# Patient Record
Sex: MALE | Race: BLACK OR AFRICAN AMERICAN | Employment: OTHER | URBAN - METROPOLITAN AREA
[De-identification: names, ages, dates, MRNs, and addresses within clinical notes are randomized per-mention and may not be internally consistent; named-entity substitution may affect disease eponyms.]

---

## 2017-01-04 ENCOUNTER — ANESTHESIA EVENT (OUTPATIENT)
Dept: ENDOSCOPY | Age: 77
End: 2017-01-04
Payer: MEDICARE

## 2017-01-05 ENCOUNTER — ANESTHESIA (OUTPATIENT)
Dept: ENDOSCOPY | Age: 77
End: 2017-01-05
Payer: MEDICARE

## 2017-01-05 ENCOUNTER — SURGERY (OUTPATIENT)
Age: 77
End: 2017-01-05

## 2017-01-05 PROCEDURE — 74011250636 HC RX REV CODE- 250/636

## 2017-01-05 PROCEDURE — 74011000250 HC RX REV CODE- 250

## 2017-01-05 RX ORDER — PROPOFOL 10 MG/ML
INJECTION, EMULSION INTRAVENOUS AS NEEDED
Status: DISCONTINUED | OUTPATIENT
Start: 2017-01-05 | End: 2017-01-05 | Stop reason: HOSPADM

## 2017-01-05 RX ORDER — HYDRALAZINE HYDROCHLORIDE 20 MG/ML
INJECTION INTRAMUSCULAR; INTRAVENOUS AS NEEDED
Status: DISCONTINUED | OUTPATIENT
Start: 2017-01-05 | End: 2017-01-05 | Stop reason: HOSPADM

## 2017-01-05 RX ADMIN — PROPOFOL 20 MG: 10 INJECTION, EMULSION INTRAVENOUS at 09:02

## 2017-01-05 RX ADMIN — HYDRALAZINE HYDROCHLORIDE 15 MG: 20 INJECTION INTRAMUSCULAR; INTRAVENOUS at 08:57

## 2017-01-05 RX ADMIN — PROPOFOL 50 MG: 10 INJECTION, EMULSION INTRAVENOUS at 08:58

## 2017-01-05 RX ADMIN — PROPOFOL 30 MG: 10 INJECTION, EMULSION INTRAVENOUS at 08:59

## 2017-01-05 RX ADMIN — PROPOFOL 10 MG: 10 INJECTION, EMULSION INTRAVENOUS at 09:05

## 2017-01-05 NOTE — ANESTHESIA POSTPROCEDURE EVALUATION
Post-Anesthesia Evaluation and Assessment    Patient: Jake Gil MRN: [id number]  SSN: [id number]    YOB: 1940  Age: 68 y.o. Sex: male       Cardiovascular Function/Vital Signs  Visit Vitals    BP (!) 114/95    Pulse 60    Temp 36.4 °C (97.5 °F)    Resp 18    Ht 5' 8\" (1.727 m)    Wt 103.9 kg (229 lb 2 oz)    SpO2 100%    BMI 34.84 kg/m2       Patient is status post MAC anesthesia for Procedure(s):  ENDOSCOPY w/ biopsies. Nausea/Vomiting: None    Postoperative hydration reviewed and adequate. Pain:  Pain Scale 1: Numeric (0 - 10) (01/05/17 0915)  Pain Intensity 1: 0 (01/05/17 0915)   Managed    Neurological Status:   Neuro (WDL): Within Defined Limits (01/05/17 0915)   At baseline    Mental Status and Level of Consciousness: Arousable    Pulmonary Status:   O2 Device: Room air (01/05/17 0915)   Adequate oxygenation and airway patent    Complications related to anesthesia: None    Post-anesthesia assessment completed.  No concerns    Signed By: Luzma Green MD     January 5, 2017

## 2017-01-05 NOTE — ANESTHESIA PREPROCEDURE EVALUATION
Anesthetic History   No history of anesthetic complications            Review of Systems / Medical History  Patient summary reviewed    Pulmonary  Within defined limits      Sleep apnea           Neuro/Psych   Within defined limits           Cardiovascular  Within defined limits  Hypertension: poorly controlled          CAD    Exercise tolerance: <4 METS     GI/Hepatic/Renal     GERD           Endo/Other  Within defined limits  Diabetes    Morbid obesity     Other Findings   Comments: FLU SHOT received? NO       If NO, provide reason: Pt did not want a Flu shot    Current Smoker? NO       Elective Surgery? Yes       Abstained from smoking 24 hours prior to anesthesia? N/A    Risk Factors for Postoperative nausea/vomiting:       History of postoperative nausea/vomiting? NO       Female? NO       Motion sickness? NO       Intended opioid administration for postoperative analgesia?   NO           Physical Exam    Airway  Mallampati: II  TM Distance: 4 - 6 cm  Neck ROM: normal range of motion   Mouth opening: Normal     Cardiovascular    Rhythm: regular  Rate: normal         Dental    Dentition: Edentulous     Pulmonary  Breath sounds clear to auscultation               Abdominal  GI exam deferred       Other Findings            Anesthetic Plan    ASA: 3  Anesthesia type: MAC          Induction: Intravenous  Anesthetic plan and risks discussed with: Patient

## 2017-01-25 ENCOUNTER — TELEPHONE (OUTPATIENT)
Dept: CARDIOLOGY CLINIC | Age: 77
End: 2017-01-25

## 2017-01-25 NOTE — TELEPHONE ENCOUNTER
Dr. Ricardo Aragon office stated they received a letter regarding patient omeprazole and plavix. The office would like to know if you would like for them to change medication. Please advise.

## 2017-01-26 NOTE — TELEPHONE ENCOUNTER
Called and left message with Pedro Velez at Dr. Mike Morales office that Dr. Jasiel Billings states patient needs plavix and please change omeprazole to protonix. If any questions to call the office.

## 2017-03-14 ENCOUNTER — OFFICE VISIT (OUTPATIENT)
Dept: CARDIOLOGY CLINIC | Age: 77
End: 2017-03-14

## 2017-03-14 VITALS
HEIGHT: 68 IN | SYSTOLIC BLOOD PRESSURE: 146 MMHG | HEART RATE: 55 BPM | BODY MASS INDEX: 34.71 KG/M2 | DIASTOLIC BLOOD PRESSURE: 54 MMHG | WEIGHT: 229 LBS

## 2017-03-14 DIAGNOSIS — I10 ESSENTIAL HYPERTENSION: ICD-10-CM

## 2017-03-14 DIAGNOSIS — I25.10 ATHEROSCLEROSIS OF NATIVE CORONARY ARTERY OF NATIVE HEART WITHOUT ANGINA PECTORIS: Primary | ICD-10-CM

## 2017-03-14 DIAGNOSIS — I50.32 CHRONIC DIASTOLIC HEART FAILURE (HCC): ICD-10-CM

## 2017-03-14 DIAGNOSIS — E78.2 MIXED HYPERLIPIDEMIA: ICD-10-CM

## 2017-03-14 DIAGNOSIS — Z95.1 POSTSURGICAL AORTOCORONARY BYPASS STATUS: ICD-10-CM

## 2017-03-14 NOTE — PROGRESS NOTES
HISTORY OF PRESENT ILLNESS  Rory Carter is a 68 y.o. male. HPI Comments: Patient with cad,chf,post cabg and pci  . recent admission with chf,acs,non q mi  Had vg to dia pci        CHF   The history is provided by the patient. This is a chronic problem. The problem occurs constantly. The problem has been gradually improving. Associated symptoms include shortness of breath. Pertinent negatives include no chest pain, no abdominal pain and no headaches. The symptoms are aggravated by exertion. Hypertension   The history is provided by the patient. This is a chronic problem. The problem occurs constantly. Associated symptoms include shortness of breath. Pertinent negatives include no chest pain, no abdominal pain and no headaches. Chest Pain (Angina)    The history is provided by the patient. This is a recurrent problem. The problem has been resolved. Episode Length: like a flash. The pain is present in the left side. Associated symptoms include shortness of breath. Pertinent negatives include no abdominal pain, no claudication, no cough, no dizziness, no fever, no headaches, no hemoptysis, no malaise/fatigue, no nausea, no orthopnea, no palpitations, no PND, no sputum production, no vomiting and no weakness. Shortness of Breath   The history is provided by the patient. This is a recurrent problem. The problem occurs intermittently. The current episode started more than 1 week ago. Pertinent negatives include no fever, no headaches, no cough, no sputum production, no hemoptysis, no wheezing, no PND, no orthopnea, no chest pain, no vomiting, no abdominal pain, no rash, no leg swelling and no claudication. The problem's precipitants include exercise (>30 mts of treadmill). Review of Systems   Constitutional: Negative for chills, fever and malaise/fatigue. HENT: Negative for nosebleeds. Eyes: Negative for blurred vision and double vision. Respiratory: Positive for shortness of breath.  Negative for cough, hemoptysis, sputum production and wheezing. Cardiovascular: Negative for chest pain, palpitations, orthopnea, claudication, leg swelling and PND. Gastrointestinal: Negative for abdominal pain, heartburn, nausea and vomiting. Musculoskeletal: Negative for myalgias. Skin: Negative for rash. Neurological: Negative for dizziness, weakness and headaches. Endo/Heme/Allergies: Does not bruise/bleed easily. Family History   Problem Relation Age of Onset    Heart Attack Father 64       Past Medical History:   Diagnosis Date    Atherosclerosis of renal artery (HCC)     S/P Rt stent    CAD (coronary artery disease) , 1997, 2012    ,double bypass, 2 stents, 2stents 7/2016    Cancer ([facility] [address]     prostate    CHF (congestive heart failure) (HCC)     Chronic diastolic heart failure ([facility])     Constipation     Coronary atherosclerosis of unspecified type of vessel, native or graft     Stable angina after recent PCI continue treatment.  CRI (chronic renal insufficiency)     Diabetes ([facility] [address] [facility] 1973    type 2    Essential hypertension, benign     Hypertension 1982    Morbid obesity ([facility] [address]     Weight loss has been strongly encouraged by following dietary restrictions and an exercise routine    APRYL (obstructive sleep apnea) 6/26/2015    non compliance to cpap     Other and unspecified hyperlipidemia     HDL's are not at goal, LDL's are at goal, triglycerides are not at goal.    Other and unspecified hyperlipidemia     HDL's are not at goal, LDL's are at goal, triglycerides are not at goal.     Other ill-defined conditions(799.89) 1960    pneumonia twice    Sleep disturbance     Type II or unspecified type diabetes mellitus without mention of complication, not stated as uncontrolled        Past Surgical History:   Procedure Laterality Date   [address]    lt. carotid endart.  HX CHOLECYSTECTOMY      HX HEENT  1997    cholecystectomy    HX UROLOGICAL  1998    renal art. surg       Allergies   Allergen Reactions    Nka [No Known Allergies] Other (comments)       Current Outpatient Prescriptions   Medication Sig    tamsulosin (FLOMAX) 0.4 mg capsule TAKE ONE CAPSULE BY MOUTH ONCE A DAY    Cholecalciferol, Vitamin D3, (DECARA) 50,000 unit cap Take  by mouth every seven (7) days.  isosorbide mononitrate ER (IMDUR) 30 mg tablet Take 1 Tab by mouth daily.  aspirin delayed-release 81 mg tablet Take 1 Tab by mouth daily.  metoprolol tartrate (LOPRESSOR) 100 mg IR tablet Take 1 Tab by mouth two (2) times a day.  clopidogrel (PLAVIX) 75 mg tablet Take 1 Tab by mouth daily.  simvastatin (ZOCOR) 40 mg tablet Take 1 Tab by mouth nightly.  furosemide (LASIX) 40 mg tablet Take 1 Tab by mouth daily as needed.  hydrALAZINE (APRESOLINE) 100 mg tablet Take 1 Tab by mouth three (3) times daily. (Patient taking differently: Take 50 mg by mouth three (3) times daily.)    NIFEdipine ER (PROCARDIA XL) 90 mg ER tablet Take 1 Tab by mouth daily.  insulin glargine (LANTUS) 100 unit/mL injection 20 Units by SubCUTAneous route every twelve (12) hours.  nitroglycerin (NITROSTAT) 0.4 mg SL tablet 1 Tab by SubLINGual route as needed for Chest Pain.  insulin aspart (NOVOLOG) 100 unit/mL injection 20 units sq 3 times a day,sliding scale    allopurinol (ZYLOPRIM) 100 mg tablet Take 100 mg by mouth daily.  cloNIDine (CATAPRESS) 0.2 mg tablet Take 0.2 mg by mouth four (4) times daily. No current facility-administered medications for this visit. Visit Vitals    /54    Pulse (!) 55    Ht 5' 8\" (1.727 m)    Wt 103.9 kg (229 lb)    BMI 34.82 kg/m2         Physical Exam   Constitutional: He is oriented to person, place, and time. He appears well-developed and well-nourished. obese   HENT:   Head: Normocephalic and atraumatic. Eyes: Conjunctivae are normal.   Neck: Neck supple. No JVD present. No tracheal deviation present. No thyromegaly present. Cardiovascular: Normal rate, regular rhythm and normal heart sounds. Exam reveals no gallop and no friction rub. No murmur heard. Pulmonary/Chest: Breath sounds normal. No respiratory distress. He has no wheezes. He has no rales. He exhibits no tenderness. Abdominal: Soft. There is no tenderness. Musculoskeletal: He exhibits no edema. Neurological: He is alert and oriented to person, place, and time. Skin: Skin is warm and dry. Psychiatric: He has a normal mood and affect. Mr. Deborah Shah has a reminder for a \"due or due soon\" health maintenance. I have asked that he contact his primary care provider for follow-up on this health maintenance. CARDIOLOGY STUDIES 10/8/2012   Cardiac Cath Result PATENT LIMA TO LAD, SVG TO D1 SUBTOTAL DIFFUSE, POST PCI WITH STENTS,HAD PROXIMAL IN STENT PROTRUSION UNABLE TO CROSS WITH BALLONS,D1 DIFFISE SEVERE   Echocardiogram - Complete Result NORMAL EF 70%     SUMMARY:echo:11/2014  Left ventricle: Systolic function was hyperdynamic. Ejection fraction was  estimated in the range of 70 % to 75 %. No obvious wall motion  abnormalities identified in the views obtained. Near cavity obliteration  seen in the mid to apical portions of the ventricular cavity. There was  mild concentric hypertrophy. Features were consistent with a pseudonormal  left ventricular filling pattern, with concomitant abnormal relaxation and  increased filling pressure (grade 2 diastolic dysfunction). NUCLEAR IMAGING:    Findings: stress test:11/2014  1. Post-stress imaging in short axis, horizontal and vertical long axis views reveals normal isotope uptake in all areas. 2. Resting images also show normal isotope uptake in all areas. 3. Gated images show normal left ventricular size, wall motion and systolic function. Ejection fraction is 85%. Diagnosis:   1. Normal scan.    2. No evidence of significant fixed or reversible defect suggesting ischemia or myocardial infarction noted from this nuclear study. 3. low risk scan.  7/2016-pci  vg to [address] [place] [address] ICD-9-CM    1. Atherosclerosis of native coronary artery of native heart without angina pectoris I25.10 414.01     stable   2. Essential hypertension I10 401.9     mildly elevated  stable at home   3. Postsurgical aortocoronary bypass status Z95.1 V45.81     stable   4. Chronic diastolic heart failure (HCC) I50.32 428.32     stable     5. Mixed hyperlipidemia E78.2 272.2     ldl 61  11/2016       Medications Discontinued During This Encounter   Medication Reason    esomeprazole (NEXIUM) 20 mg capsule Not A Current Medication       No orders of the defined types were placed in this encounter. Follow-up Disposition:  Return in about 6 months (around 9/14/2017).

## 2017-03-14 NOTE — PROGRESS NOTES
1. Have you been to the ER, urgent care clinic since your last visit? Hospitalized since your last visit? No    2. Have you seen or consulted any other health care providers outside of the [address] [place] [address] since your last visit? Include any pap smears or colon screening. Yes Where: PCP      3. Since your last visit, have you had any of the following symptoms? shortness of breath. 4.  Have you had any blood work, X-rays or cardiac testing? Yes Where: [address]              5.  Where do you normally have your labs drawn? [facility]     6. Do you need any refills today?    No

## 2017-03-14 NOTE — LETTER
Whitney Angel 1940 
 
3/14/2017 Dear MD Jorje Zambrano PA Edmond Navy, MD 
 
I had the pleasure of evaluating  Mr. Fany Elizabeth in office today. Below are the relevant portions of my assessment and plan of care. ICD-10-CM ICD-9-CM 1. Atherosclerosis of native coronary artery of native heart without angina pectoris I25.10 414.01   
 stable 2. Essential hypertension I10 401.9   
 mildly elevated 
stable at home 3. Postsurgical aortocoronary bypass status Z95.1 V45.81   
 stable 4. Chronic diastolic heart failure (HCC) I50.32 428.32   
 stable 5. Mixed hyperlipidemia E78.2 272.2   
 ldl 61 
11/2016 Current Outpatient Prescriptions Medication Sig Dispense Refill  tamsulosin (FLOMAX) 0.4 mg capsule TAKE ONE CAPSULE BY MOUTH ONCE A DAY 90 Cap 2  Cholecalciferol, Vitamin D3, (DECARA) 50,000 unit cap Take  by mouth every seven (7) days.  isosorbide mononitrate ER (IMDUR) 30 mg tablet Take 1 Tab by mouth daily. 90 Tab 3  
 aspirin delayed-release 81 mg tablet Take 1 Tab by mouth daily. 100 Tab 6  
 metoprolol tartrate (LOPRESSOR) 100 mg IR tablet Take 1 Tab by mouth two (2) times a day. 180 Tab 3  clopidogrel (PLAVIX) 75 mg tablet Take 1 Tab by mouth daily. 90 Tab 3  
 simvastatin (ZOCOR) 40 mg tablet Take 1 Tab by mouth nightly. 90 Tab 3  furosemide (LASIX) 40 mg tablet Take 1 Tab by mouth daily as needed. 90 Tab 3  
 hydrALAZINE (APRESOLINE) 100 mg tablet Take 1 Tab by mouth three (3) times daily. (Patient taking differently: Take 50 mg by mouth three (3) times daily.) 90 Tab 2  
 NIFEdipine ER (PROCARDIA XL) 90 mg ER tablet Take 1 Tab by mouth daily. 30 Tab 2  
 insulin glargine (LANTUS) 100 unit/mL injection 20 Units by SubCUTAneous route every twelve (12) hours. 1 Vial 2  
 nitroglycerin (NITROSTAT) 0.4 mg SL tablet 1 Tab by SubLINGual route as needed for Chest Pain.  25 Tab 1  
  insulin aspart (NOVOLOG) 100 unit/mL injection 20 units sq 3 times a day,sliding scale 10 mL 0  
 allopurinol (ZYLOPRIM) 100 mg tablet Take 100 mg by mouth daily.  cloNIDine (CATAPRESS) 0.2 mg tablet Take 0.2 mg by mouth four (4) times daily. No orders of the defined types were placed in this encounter. If you have questions, please do not hesitate to call me. I look forward to following Mr. Sherron Pickett along with you. Sincerely, Cata Vines MD

## 2017-03-14 NOTE — MR AVS SNAPSHOT
Visit Information Date & Time Provider Department Dept. Phone Encounter #  
 3/14/2017  1:00 PM Jodie Cisneros MD [facility] [place] [phone] [id number] Follow-up Instructions Return in about 6 months (around 9/14/2017). Your Appointments 3/14/2017  1:00 PM  
Follow Up with Jodie Cisneros MD  
[facility] [place] ([facility]) Appt Note: 6 month follow up; 6 month follow up  
 [address] [place] [address] [facility] [address]  
  
   
 [address] [address] [address] [address] 6/15/2017 10:05 AM  
Nurse Visit with [facility] [address] [place] [address] ([facility]) Appt Note: PCA Profile  
 [address] [address] [address] [address]  
  
    
 6/29/2017 10:30 AM  
ESTABLISHED PATIENT with Beth Garner MD  
[facility] of [address] [place] [address]) Appt Note: 6mo Eligard PCa Profile Prior [address] [address] [address] Upcoming Health Maintenance Date Due  
 FOOT EXAM Q1 7/10/1950 MICROALBUMIN Q1 7/10/1950 EYE EXAM RETINAL OR DILATED Q1 7/10/1950 DTaP/Tdap/Td series (1 - Tdap) 7/10/1961 ZOSTER VACCINE AGE 60> 7/10/2000 GLAUCOMA SCREENING Q2Y 7/10/2005 MEDICARE YEARLY EXAM 7/10/2005 INFLUENZA AGE 9 TO ADULT 8/1/2016 HEMOGLOBIN A1C Q6M 1/7/2017 Pneumococcal 65+ High/Highest Risk (2 of 2 - PPSV23) 3/9/2017 LIPID PANEL Q1 7/6/2017 Allergies as of 3/14/2017  Review Complete On: 3/14/2017 By: Jodie Cisneros MD  
  
 Severity Noted Reaction Type Reactions Nka [No Known Allergies]  10/07/2012    Other (comments) Current Immunizations  Reviewed on 3/12/2013 No immunizations on file. Not reviewed this visit You Were Diagnosed With   
  
 Codes Comments  Atherosclerosis of native coronary artery of native heart without angina pectoris    -  Primary ICD-10-CM: I25.10 ICD-9-CM: 414.01 stable Essential hypertension     ICD-10-CM: I10 
ICD-9-CM: 401.9 mildly elevated 
stable at home Postsurgical aortocoronary bypass status     ICD-10-CM: Z95.1 ICD-9-CM: V45.81 stable Chronic diastolic heart failure (HCC)     ICD-10-CM: I50.32 
ICD-9-CM: 428.32 stable Mixed hyperlipidemia     ICD-10-CM: E78.2 ICD-9-CM: 272.2 ldl 61 
11/2016 Vitals BP Pulse Height(growth percentile) Weight(growth percentile) BMI Smoking Status 146/54 (!) 55 5' 8\" (1.727 m) 229 lb (103.9 kg) 34.82 kg/m2 Never Smoker Vitals History BMI and BSA Data Body Mass Index Body Surface Area 34.82 kg/m 2 2.23 m 2 Preferred Pharmacy Pharmacy Name Phone [id number] [address] [phone] Your Updated Medication List  
  
   
This list is accurate as of: 3/14/17 12:55 PM.  Always use your most recent med list.  
  
  
  
  
 allopurinol 100 mg tablet Commonly known as:  Meredith Gosling Take 100 mg by mouth daily. aspirin delayed-release 81 mg tablet Take 1 Tab by mouth daily. cloNIDine HCl 0.2 mg tablet Commonly known as:  CATAPRES Take 0.2 mg by mouth four (4) times daily. clopidogrel 75 mg Tab Commonly known as:  PLAVIX Take 1 Tab by mouth daily. DECARA 50,000 unit Cap Generic drug:  Cholecalciferol (Vitamin D3) Take  by mouth every seven (7) days. furosemide 40 mg tablet Commonly known as:  LASIX Take 1 Tab by mouth daily as needed. hydrALAZINE 100 mg tablet Commonly known as:  APRESOLINE Take 1 Tab by mouth three (3) times daily. insulin aspart 100 unit/mL injection Commonly known as:  Rene Meeter 20 units sq 3 times a day,sliding scale  
  
 insulin glargine 100 unit/mL injection Commonly known as:  LANTUS  
20 Units by SubCUTAneous route every twelve (12) hours. isosorbide mononitrate ER 30 mg tablet Commonly known as:  IMDUR Take 1 Tab by mouth daily. metoprolol tartrate 100 mg IR tablet Commonly known as:  LOPRESSOR Take 1 Tab by mouth two (2) times a day. NIFEdipine ER 90 mg ER tablet Commonly known as:  PROCARDIA XL Take 1 Tab by mouth daily. nitroglycerin 0.4 mg SL tablet Commonly known as:  NITROSTAT  
1 Tab by SubLINGual route as needed for Chest Pain.  
  
 simvastatin 40 mg tablet Commonly known as:  ZOCOR Take 1 Tab by mouth nightly. tamsulosin 0.4 mg capsule Commonly known as:  FLOMAX TAKE ONE CAPSULE BY MOUTH ONCE A DAY Follow-up Instructions Return in about 6 months (around 9/14/2017). Introducing \[facility] [place] [facility]\"" & HEALTH SERVICES! Chung Contreras introduces [employer] patient portal. Now you can access parts of your medical record, email your doctor's office, and request medication refills online. 1. In your internet browser, go to [email or web address] 2. Click on the First Time User? Click Here link in the Sign In box. You will see the New Member Sign Up page. 3. Enter your [employer] Access Code exactly as it appears below. You will not need to use this code after youve completed the sign-up process. If you do not sign up before the expiration date, you must request a new code. · [employer] Access Code: [email or web address] Expires: 6/12/2017 12:24 PM 
 
4. Enter the last four digits of your Social Security Number (xxxx) and Date of Birth (mm/dd/yyyy) as indicated and click Submit. You will be taken to the next sign-up page. 5. Create a [employer] ID. This will be your [employer] login ID and cannot be changed, so think of one that is secure and easy to remember. 6. Create a [employer] password. You can change your password at any time. 7. Enter your Password Reset Question and Answer. This can be used at a later time if you forget your password. 8. Enter your e-mail address.  You will receive e-mail notification when new information is available in [employer]. 9. Click Sign Up. You can now view and download portions of your medical record. 10. Click the Download Summary menu link to download a portable copy of your medical information. If you have questions, please visit the Frequently Asked Questions section of the [employer] website. Remember, [employer] is NOT to be used for urgent needs. For medical emergencies, dial 911. Now available from your iPhone and Android! Please provide this summary of care documentation to your next provider. Your primary care clinician is listed as Zach Bradley. If you have any questions after today's visit, please call [phone].

## 2017-04-14 RX ORDER — CLOPIDOGREL BISULFATE 75 MG/1
75 TABLET ORAL DAILY
Qty: 90 TAB | Refills: 3 | Status: SHIPPED | OUTPATIENT
Start: 2017-04-14 | End: 2018-05-09 | Stop reason: SDUPTHER

## 2017-06-12 ENCOUNTER — HOSPITAL ENCOUNTER (EMERGENCY)
Age: 77
Discharge: HOME | End: 2017-06-13
Attending: EMERGENCY MEDICINE
Payer: MEDICARE

## 2017-06-12 ENCOUNTER — APPOINTMENT (OUTPATIENT)
Dept: GENERAL RADIOLOGY | Age: 77
End: 2017-06-12
Attending: EMERGENCY MEDICINE
Payer: MEDICARE

## 2017-06-12 DIAGNOSIS — K92.2 GASTROINTESTINAL HEMORRHAGE, UNSPECIFIED GASTROINTESTINAL HEMORRHAGE TYPE: ICD-10-CM

## 2017-06-12 DIAGNOSIS — M54.2 NECK PAIN: ICD-10-CM

## 2017-06-12 DIAGNOSIS — R07.9 CHEST PAIN, UNSPECIFIED TYPE: Primary | ICD-10-CM

## 2017-06-12 LAB
ANION GAP BLD CALC-SCNC: 8 MMOL/L (ref 3–18)
BASOPHILS # BLD AUTO: 0 K/UL (ref 0–0.1)
BASOPHILS # BLD: 0 % (ref 0–2)
BUN SERPL-MCNC: 48 MG/DL (ref 7–18)
BUN/CREAT SERPL: 13 (ref 12–20)
CALCIUM SERPL-MCNC: 8.8 MG/DL (ref 8.5–10.1)
CHLORIDE SERPL-SCNC: 105 MMOL/L (ref 100–108)
CK MB CFR SERPL CALC: 2.6 % (ref 0–4)
CK MB SERPL-MCNC: 2.7 NG/ML (ref 5–25)
CK SERPL-CCNC: 104 U/L (ref 39–308)
CO2 SERPL-SCNC: 28 MMOL/L (ref 21–32)
CREAT SERPL-MCNC: 3.84 MG/DL (ref 0.6–1.3)
DIFFERENTIAL METHOD BLD: ABNORMAL
EOSINOPHIL # BLD: 0.2 K/UL (ref 0–0.4)
EOSINOPHIL NFR BLD: 2 % (ref 0–5)
ERYTHROCYTE [DISTWIDTH] IN BLOOD BY AUTOMATED COUNT: 13 % (ref 11.6–14.5)
GLUCOSE SERPL-MCNC: 129 MG/DL (ref 74–99)
HCT VFR BLD AUTO: 30.6 % (ref 36–48)
HGB BLD-MCNC: 9.8 G/DL (ref 13–16)
LYMPHOCYTES # BLD AUTO: 24 % (ref 21–52)
LYMPHOCYTES # BLD: 2.4 K/UL (ref 0.9–3.6)
MCH RBC QN AUTO: 28.5 PG (ref 24–34)
MCHC RBC AUTO-ENTMCNC: 32 G/DL (ref 31–37)
MCV RBC AUTO: 89 FL (ref 74–97)
MONOCYTES # BLD: 0.8 K/UL (ref 0.05–1.2)
MONOCYTES NFR BLD AUTO: 8 % (ref 3–10)
NEUTS SEG # BLD: 6.6 K/UL (ref 1.8–8)
NEUTS SEG NFR BLD AUTO: 66 % (ref 40–73)
PLATELET # BLD AUTO: 222 K/UL (ref 135–420)
PMV BLD AUTO: 10.6 FL (ref 9.2–11.8)
POTASSIUM SERPL-SCNC: 4 MMOL/L (ref 3.5–5.5)
RBC # BLD AUTO: 3.44 M/UL (ref 4.7–5.5)
SODIUM SERPL-SCNC: 141 MMOL/L (ref 136–145)
TROPONIN I SERPL-MCNC: <0.02 NG/ML (ref 0–0.04)
WBC # BLD AUTO: 9.9 K/UL (ref 4.6–13.2)

## 2017-06-12 PROCEDURE — 85025 COMPLETE CBC W/AUTO DIFF WBC: CPT | Performed by: EMERGENCY MEDICINE

## 2017-06-12 PROCEDURE — 82550 ASSAY OF CK (CPK): CPT | Performed by: EMERGENCY MEDICINE

## 2017-06-12 PROCEDURE — 80048 BASIC METABOLIC PNL TOTAL CA: CPT | Performed by: EMERGENCY MEDICINE

## 2017-06-12 PROCEDURE — 99284 EMERGENCY DEPT VISIT MOD MDM: CPT

## 2017-06-12 PROCEDURE — 72050 X-RAY EXAM NECK SPINE 4/5VWS: CPT

## 2017-06-12 PROCEDURE — 93005 ELECTROCARDIOGRAM TRACING: CPT

## 2017-06-12 PROCEDURE — 71010 XR CHEST PORT: CPT

## 2017-06-13 VITALS
HEIGHT: 68 IN | TEMPERATURE: 98.7 F | OXYGEN SATURATION: 99 % | BODY MASS INDEX: 33.65 KG/M2 | DIASTOLIC BLOOD PRESSURE: 79 MMHG | WEIGHT: 222 LBS | HEART RATE: 53 BPM | RESPIRATION RATE: 16 BRPM | SYSTOLIC BLOOD PRESSURE: 212 MMHG

## 2017-06-13 LAB
ATRIAL RATE: 70 BPM
CALCULATED P AXIS, ECG09: 57 DEGREES
CALCULATED R AXIS, ECG10: 9 DEGREES
CALCULATED T AXIS, ECG11: 70 DEGREES
CK MB CFR SERPL CALC: 2.6 % (ref 0–4)
CK MB SERPL-MCNC: 2.3 NG/ML (ref 5–25)
CK SERPL-CCNC: 90 U/L (ref 39–308)
DIAGNOSIS, 93000: NORMAL
P-R INTERVAL, ECG05: 162 MS
Q-T INTERVAL, ECG07: 398 MS
QRS DURATION, ECG06: 102 MS
QTC CALCULATION (BEZET), ECG08: 429 MS
TROPONIN I SERPL-MCNC: <0.02 NG/ML (ref 0–0.04)
VENTRICULAR RATE, ECG03: 70 BPM

## 2017-06-13 PROCEDURE — 74011250637 HC RX REV CODE- 250/637: Performed by: EMERGENCY MEDICINE

## 2017-06-13 PROCEDURE — 82550 ASSAY OF CK (CPK): CPT | Performed by: EMERGENCY MEDICINE

## 2017-06-13 RX ORDER — CLONIDINE HYDROCHLORIDE 0.1 MG/1
TABLET ORAL
Status: DISCONTINUED
Start: 2017-06-13 | End: 2017-06-13 | Stop reason: HOSPADM

## 2017-06-13 RX ORDER — CLONIDINE HYDROCHLORIDE 0.1 MG/1
0.2 TABLET ORAL
Status: COMPLETED | OUTPATIENT
Start: 2017-06-13 | End: 2017-06-13

## 2017-06-13 RX ADMIN — CLONIDINE HYDROCHLORIDE 0.2 MG: 0.1 TABLET ORAL at 01:36

## 2017-06-13 NOTE — ED PROVIDER NOTES
HPI Comments: 10:08 PM Maribell Hayes is a 68 y.o. Male with a hx of DM, HTN, CRI, APRYL, CAD,CHF, prostate cancer, and other noted PMH who presents to the ED c/o chest tightness onset yesterday at 6 PM, but has progressively gotten worse. Pt was in resting position when the pain occurred, lasting for 30 minutes. He also has some positional exacerbations. Pt also c/o bilateral arm numbness, neck stiffness, and R neck pain that radiates \"down to the shoulder\" . Pt states that he had some stents placed in her L carotid. Dr. Uriah Ivey saw the patient last month and all his test resulted normal. Pt also mentions that he has had hematochezia X 2 months, but he is going to follow up with Dr. Keshav Encinas on 07/03/17 regarding the issue. No other associated symptoms or modifying factors at this time. The history is provided by the patient. Past Medical History:   Diagnosis Date    Atherosclerosis of renal artery (HCC)     S/P Rt stent    CAD (coronary artery disease) , 1997, 2012    ,double bypass, 2 stents, 2stents 7/2016    Cancer [facility])     prostate    CHF (congestive heart failure) (HCC)     Chronic diastolic heart failure (HCC)     Constipation     Coronary atherosclerosis of unspecified type of vessel, native or graft     Stable angina after recent PCI continue treatment.        CRI (chronic renal insufficiency)     Diabetes ([address] 1973    type 2    Essential hypertension, benign     Hypertension 1982    Morbid obesity ([address]     Weight loss has been strongly encouraged by following dietary restrictions and an exercise routine    APRYL (obstructive sleep apnea) 6/26/2015    non compliance to cpap     Other and unspecified hyperlipidemia     HDL's are not at goal, LDL's are at goal, triglycerides are not at goal.    Other and unspecified hyperlipidemia     HDL's are not at goal, LDL's are at goal, triglycerides are not at goal.     Other ill-defined conditions 1960    pneumonia twice    Sleep disturbance  Type II or unspecified type diabetes mellitus without mention of complication, not stated as uncontrolled        Past Surgical History:   Procedure Laterality Date   [address]    lt. carotid endart.  HX CHOLECYSTECTOMY      HX HEENT  1997    cholecystectomy    HX UROLOGICAL  1998    renal art. surg         Family History:   Problem Relation Age of Onset    Heart Attack Father 64       Social History     Social History    Marital status:      Spouse name: N/A    Number of children: N/A    Years of education: N/A     Occupational History    Not on file. Social History Main Topics    Smoking status: Never Smoker    Smokeless tobacco: Never Used    Alcohol use No    Drug use: No    Sexual activity: Not on file     Other Topics Concern    Not on file     Social History Narrative         ALLERGIES: Nka [no known allergies]    Review of Systems   Constitutional: Negative for activity change, appetite change, diaphoresis and fever. HENT: Negative for congestion, dental problem, ear pain, hearing loss, nosebleeds, postnasal drip, sinus pressure, sneezing and tinnitus. Eyes: Negative for photophobia, discharge, redness and visual disturbance. Respiratory: Positive for chest tightness. Negative for cough, choking, shortness of breath, wheezing and stridor. Cardiovascular: Negative for palpitations and leg swelling. Gastrointestinal: Positive for blood in stool (chronic). Negative for abdominal distention, abdominal pain, anal bleeding and nausea. Genitourinary: Negative for decreased urine volume, difficulty urinating, discharge, dysuria, frequency, hematuria, penile swelling, scrotal swelling, testicular pain and urgency. Musculoskeletal: Positive for neck pain and neck stiffness. Negative for arthralgias, back pain, gait problem, joint swelling and myalgias. Skin: Negative for color change and pallor. Neurological: Positive for numbness.  Negative for dizziness, tremors, seizures, syncope and headaches. Hematological: Negative for adenopathy. Does not bruise/bleed easily. Psychiatric/Behavioral: Negative for agitation, behavioral problems, confusion and hallucinations. The patient is not nervous/anxious. All other systems reviewed and are negative. Vitals:    06/12/17 2030 06/12/17 2039 06/12/17 2100 06/12/17 2130   BP: 169/55 182/60 157/47 148/47   Pulse: 68 67 62 (!) 55   Resp: 18 18 20 18   Temp:  98.1 °F (36.7 °C)     SpO2: 99% 98% 98% 97%   Weight:  100.7 kg (222 lb)     Height:  5' 8\" (1.727 m)              Physical Exam   Constitutional: He is oriented to person, place, and time. He appears well-developed and well-nourished. Overweight    HENT:   Head: Normocephalic and atraumatic. Right Ear: External ear normal.   Left Ear: External ear normal.   Nose: Nose normal.   Mouth/Throat: Oropharynx is clear and moist.   Eyes: Conjunctivae and EOM are normal. Pupils are equal, round, and reactive to light. Right eye exhibits no discharge. No scleral icterus. Neck: Normal range of motion. Neck supple. No JVD present. No thyromegaly present. Cardiovascular: Normal rate, regular rhythm, normal heart sounds and intact distal pulses. Exam reveals no gallop and no friction rub. No murmur heard. Pulmonary/Chest: Effort normal and breath sounds normal. No respiratory distress. He has no wheezes. He has no rales. He exhibits no tenderness. Old surgical scar noted   Abdominal: Soft. Bowel sounds are normal. He exhibits no distension and no mass. There is no tenderness. There is no rebound and no guarding. Musculoskeletal: Normal range of motion. He exhibits edema. 2+ lower extremity edema   Lymphadenopathy:     He has no cervical adenopathy. Neurological: He is alert and oriented to person, place, and time. No cranial nerve deficit. Coordination normal.   Skin: Skin is warm and dry. No rash noted. No erythema.    Psychiatric: He has a normal mood and affect. His behavior is normal. Judgment normal.   Nursing note and vitals reviewed. [facility]  ED Course       Procedures  Vitals:  Patient Vitals for the past 12 hrs:   Temp Pulse Resp BP SpO2   06/12/17 2130 - (!) 55 18 148/47 97 %   06/12/17 2100 - 62 20 157/47 98 %   06/12/17 2039 98.1 °F (36.7 °C) 67 18 182/60 98 %   06/12/17 2030 - 68 18 169/55 99 %       Medications ordered:   Medications - No data to display      Lab findings:  Recent Results (from the past 12 hour(s))   EKG, 12 LEAD, INITIAL    Collection Time: 06/12/17  8:32 PM   Result Value Ref Range    Ventricular Rate 0 BPM    Atrial Rate 0 BPM    QRS Duration 0 ms    Q-T Interval 0 ms    QTC Calculation (Bezet) 0 ms    Calculated R Axis 0 degrees    Calculated T Axis 0 degrees    Diagnosis       No QRS complexes found, no ECG analysis possible  When compared with ECG of 20-JUL-2016 04:27,  Current undetermined rhythm precludes rhythm comparison, needs review     CARDIAC PANEL,(CK, CKMB & TROPONIN)    Collection Time: 06/12/17  8:49 PM   Result Value Ref Range     39 - 308 U/L    CK - MB 2.7 <3.6 ng/ml    CK-MB Index 2.6 0.0 - 4.0 %    Troponin-I, Qt. <0.02 0.0 - 0.045 NG/ML   CBC WITH AUTOMATED DIFF    Collection Time: 06/12/17  8:49 PM   Result Value Ref Range    WBC 9.9 4.6 - 13.2 K/uL    RBC 3.44 (L) 4.70 - 5.50 M/uL    HGB 9.8 (L) 13.0 - 16.0 g/dL    HCT 30.6 (L) 36.0 - 48.0 %    MCV 89.0 74.0 - 97.0 FL    MCH 28.5 24.0 - 34.0 PG    MCHC 32.0 31.0 - 37.0 g/dL    RDW 13.0 11.6 - 14.5 %    PLATELET 652 418 - 856 K/uL    MPV 10.6 9.2 - 11.8 FL    NEUTROPHILS 66 40 - 73 %    LYMPHOCYTES 24 21 - 52 %    MONOCYTES 8 3 - 10 %    EOSINOPHILS 2 0 - 5 %    BASOPHILS 0 0 - 2 %    ABS. NEUTROPHILS 6.6 1.8 - 8.0 K/UL    ABS. LYMPHOCYTES 2.4 0.9 - 3.6 K/UL    ABS. MONOCYTES 0.8 0.05 - 1.2 K/UL    ABS. EOSINOPHILS 0.2 0.0 - 0.4 K/UL    ABS.  BASOPHILS 0.0 0.0 - 0.1 K/UL    DF AUTOMATED     METABOLIC PANEL, BASIC    Collection Time: 06/12/17 8:49 PM   Result Value Ref Range    Sodium 141 136 - 145 mmol/L    Potassium 4.0 3.5 - 5.5 mmol/L    Chloride 105 100 - 108 mmol/L    CO2 28 21 - 32 mmol/L    Anion gap 8 3.0 - 18 mmol/L    Glucose 129 (H) 74 - 99 mg/dL    BUN 48 (H) 7.0 - 18 MG/DL    Creatinine 3.84 (H) 0.6 - 1.3 MG/DL    BUN/Creatinine ratio 13 12 - 20      GFR est AA 19 (L) >60 ml/min/1.73m2    GFR est non-AA 15 (L) >60 ml/min/1.73m2    Calcium 8.8 8.5 - 10.1 MG/DL   CARDIAC PANEL,(CK, CKMB & TROPONIN)    Collection Time: 06/13/17 12:03 AM   Result Value Ref Range    CK 90 39 - 308 U/L    CK - MB 2.3 <3.6 ng/ml    CK-MB Index 2.6 0.0 - 4.0 %    Troponin-I, Qt. <0.02 0.0 - 0.045 NG/ML       EKG interpretation by ED Physician:  20:35 NSR with a rate of 70. Incomplete R BBB. No STEMI. X-Ray, CT or other radiology findings or impressions:  XR CHEST PORT   Final Result   IMPRESSION:  1. History of CABG. 2. No acute findings. XR SPIN CERV 4 OR 5  IMPRESSION:  No fractures noted. Per Carl Bradley MD 12:54 AM     Reevaluation of patient:   I informed the patient about their BP and instructed them to follow up with their PCP regarding their elevated blood pressure. I have reassessed the patient. Patient is feeling oaky. Patient was discharged. Pt states that he is not experiencing any CP or NVD. No MI noted. Question if the patient is having angina symptoms. Will discharge and told to follow up with Dr. Concepcion Ochoa, cardiology. Also suspect DJD in cervical xray, or DJD with pinched nerve. Suggest follow up with PCP for treatment and MRI as an outpatient. Pt already has an scheduled appointment with GI, and I told him to go that appointment. All questions and concerns were addressed. Disposition:  Diagnosis:   1. Chest pain, unspecified type    2. Neck pain    3.  Gastrointestinal hemorrhage, unspecified gastrointestinal hemorrhage type        Disposition: Discharged    Follow-up Information     Follow up With Details Comments Contact Kleber Santoro MD Schedule an appointment as soon as possible for a visit Follow up [address] [facility]      Rosendo Burns MD Go to Scheduled GI appointment  [address] [place] [address] Drive  [address]  Kelly Atwood [address]      Kale Richards MD Schedule an appointment as soon as possible for a visit Cardiology follow up [address] [address]  [phone]      [facility] EMERGENCY DEPT Go to As needed, If symptoms worsen [address]  [phone]           Patient's Medications   Start Taking    No medications on file   Continue Taking    ALLOPURINOL (ZYLOPRIM) 100 MG TABLET    Take 100 mg by mouth daily. ASPIRIN DELAYED-RELEASE 81 MG TABLET    Take 1 Tab by mouth daily. CHOLECALCIFEROL, VITAMIN D3, (DECARA) 50,000 UNIT CAP    Take  by mouth every seven (7) days. CLONIDINE (CATAPRESS) 0.2 MG TABLET    Take 0.2 mg by mouth four (4) times daily. CLOPIDOGREL (PLAVIX) 75 MG TAB    Take 1 Tab by mouth daily. FUROSEMIDE (LASIX) 40 MG TABLET    Take 1 Tab by mouth daily as needed. HYDRALAZINE (APRESOLINE) 100 MG TABLET    Take 1 Tab by mouth three (3) times daily. INSULIN ASPART (NOVOLOG) 100 UNIT/ML INJECTION    20 units sq 3 times a day,sliding scale    INSULIN GLARGINE (LANTUS) 100 UNIT/ML INJECTION    20 Units by SubCUTAneous route every twelve (12) hours. ISOSORBIDE MONONITRATE ER (IMDUR) 30 MG TABLET    Take 1 Tab by mouth daily. METOPROLOL TARTRATE (LOPRESSOR) 100 MG IR TABLET    Take 1 Tab by mouth two (2) times a day. NIFEDIPINE ER (PROCARDIA XL) 90 MG ER TABLET    Take 1 Tab by mouth daily. NITROGLYCERIN (NITROSTAT) 0.4 MG SL TABLET    1 Tab by SubLINGual route as needed for Chest Pain. SIMVASTATIN (ZOCOR) 40 MG TABLET    Take 1 Tab by mouth nightly.     TAMSULOSIN (FLOMAX) 0.4 MG CAPSULE    TAKE ONE CAPSULE BY MOUTH ONCE A DAY   These Medications have changed    No medications on file   Stop Taking    No medications on file       SCRIBE ATTESTATION STATEMENT  Documented by: Chery Boyer for, and in the presence of, Alice Beard MD 10:07 PM    Signed by: Rigo Hwang, 06/12/17 10:07 PM    PROVIDER ATTESTATION STATEMENT  I personally performed the services described in the documentation, reviewed the documentation, as recorded by the scribe in my presence, and it accurately and completely records my words and actions.   Alice Beard MD

## 2017-06-13 NOTE — ED TRIAGE NOTES
Pt states that starting yesterday, he had numbness in both arms and beginning today, he has been having tightness in his chest beginning around 6pm.

## 2017-06-22 ENCOUNTER — ANESTHESIA EVENT (OUTPATIENT)
Dept: ENDOSCOPY | Age: 77
End: 2017-06-22
Payer: MEDICARE

## 2017-06-23 ENCOUNTER — ANESTHESIA (OUTPATIENT)
Dept: ENDOSCOPY | Age: 77
End: 2017-06-23
Payer: MEDICARE

## 2017-06-23 ENCOUNTER — HOSPITAL ENCOUNTER (OUTPATIENT)
Age: 77
Setting detail: OUTPATIENT SURGERY
Discharge: HOME | End: 2017-06-23
Attending: INTERNAL MEDICINE | Admitting: INTERNAL MEDICINE
Payer: MEDICARE

## 2017-06-23 VITALS
OXYGEN SATURATION: 100 % | WEIGHT: 222 LBS | BODY MASS INDEX: 33.65 KG/M2 | HEIGHT: 68 IN | TEMPERATURE: 97.1 F | RESPIRATION RATE: 16 BRPM | DIASTOLIC BLOOD PRESSURE: 67 MMHG | SYSTOLIC BLOOD PRESSURE: 152 MMHG | HEART RATE: 48 BPM

## 2017-06-23 LAB
BUN BLD-MCNC: 51 MG/DL (ref 7–18)
CHLORIDE BLD-SCNC: 104 MMOL/L (ref 100–108)
GLUCOSE BLD STRIP.AUTO-MCNC: 110 MG/DL (ref 70–110)
GLUCOSE BLD STRIP.AUTO-MCNC: 132 MG/DL (ref 74–106)
GLUCOSE BLD STRIP.AUTO-MCNC: 133 MG/DL (ref 70–110)
HCT VFR BLD CALC: 33 % (ref 36–49)
HGB BLD-MCNC: 11.2 G/DL (ref 12–16)
POTASSIUM BLD-SCNC: 3.9 MMOL/L (ref 3.5–5.5)
SODIUM BLD-SCNC: 137 MMOL/L (ref 136–145)

## 2017-06-23 PROCEDURE — 77030008565 HC TBNG SUC IRR ERBE -B: Performed by: INTERNAL MEDICINE

## 2017-06-23 PROCEDURE — 74011250636 HC RX REV CODE- 250/636

## 2017-06-23 PROCEDURE — 74011250636 HC RX REV CODE- 250/636: Performed by: NURSE ANESTHETIST, CERTIFIED REGISTERED

## 2017-06-23 PROCEDURE — 76040000007: Performed by: INTERNAL MEDICINE

## 2017-06-23 PROCEDURE — 74011000250 HC RX REV CODE- 250: Performed by: NURSE ANESTHETIST, CERTIFIED REGISTERED

## 2017-06-23 PROCEDURE — 76060000032 HC ANESTHESIA 0.5 TO 1 HR: Performed by: INTERNAL MEDICINE

## 2017-06-23 PROCEDURE — 82962 GLUCOSE BLOOD TEST: CPT

## 2017-06-23 PROCEDURE — 82947 ASSAY GLUCOSE BLOOD QUANT: CPT

## 2017-06-23 PROCEDURE — 77030013992 HC SNR POLYP ENDOSC BSC -B: Performed by: INTERNAL MEDICINE

## 2017-06-23 PROCEDURE — 77030018846 HC SOL IRR STRL H20 ICUM -A: Performed by: INTERNAL MEDICINE

## 2017-06-23 PROCEDURE — 77030009426 HC FCPS BIOP ENDOSC BSC -B: Performed by: INTERNAL MEDICINE

## 2017-06-23 PROCEDURE — 88305 TISSUE EXAM BY PATHOLOGIST: CPT | Performed by: INTERNAL MEDICINE

## 2017-06-23 PROCEDURE — 77030034694 HC SCPL CANADY PLSM DISP USMD -E: Performed by: INTERNAL MEDICINE

## 2017-06-23 PROCEDURE — 74011000250 HC RX REV CODE- 250

## 2017-06-23 RX ORDER — INSULIN LISPRO 100 [IU]/ML
INJECTION, SOLUTION INTRAVENOUS; SUBCUTANEOUS ONCE
Status: DISCONTINUED | OUTPATIENT
Start: 2017-06-23 | End: 2017-06-23 | Stop reason: HOSPADM

## 2017-06-23 RX ORDER — SODIUM CHLORIDE 0.9 % (FLUSH) 0.9 %
5-10 SYRINGE (ML) INJECTION AS NEEDED
Status: DISCONTINUED | OUTPATIENT
Start: 2017-06-23 | End: 2017-06-23 | Stop reason: HOSPADM

## 2017-06-23 RX ORDER — SODIUM CHLORIDE, SODIUM LACTATE, POTASSIUM CHLORIDE, CALCIUM CHLORIDE 600; 310; 30; 20 MG/100ML; MG/100ML; MG/100ML; MG/100ML
75 INJECTION, SOLUTION INTRAVENOUS CONTINUOUS
Status: DISCONTINUED | OUTPATIENT
Start: 2017-06-23 | End: 2017-06-23 | Stop reason: HOSPADM

## 2017-06-23 RX ORDER — LIDOCAINE HYDROCHLORIDE 20 MG/ML
INJECTION, SOLUTION EPIDURAL; INFILTRATION; INTRACAUDAL; PERINEURAL AS NEEDED
Status: DISCONTINUED | OUTPATIENT
Start: 2017-06-23 | End: 2017-06-23 | Stop reason: HOSPADM

## 2017-06-23 RX ORDER — MAGNESIUM SULFATE 100 %
4 CRYSTALS MISCELLANEOUS AS NEEDED
Status: DISCONTINUED | OUTPATIENT
Start: 2017-06-23 | End: 2017-06-23 | Stop reason: SDUPTHER

## 2017-06-23 RX ORDER — DEXTROSE 50 % IN WATER (D50W) INTRAVENOUS SYRINGE
25-50 AS NEEDED
Status: DISCONTINUED | OUTPATIENT
Start: 2017-06-23 | End: 2017-06-23 | Stop reason: SDUPTHER

## 2017-06-23 RX ORDER — MAGNESIUM SULFATE 100 %
4 CRYSTALS MISCELLANEOUS AS NEEDED
Status: DISCONTINUED | OUTPATIENT
Start: 2017-06-23 | End: 2017-06-23 | Stop reason: HOSPADM

## 2017-06-23 RX ORDER — SODIUM CHLORIDE 0.9 % (FLUSH) 0.9 %
5-10 SYRINGE (ML) INJECTION EVERY 8 HOURS
Status: DISCONTINUED | OUTPATIENT
Start: 2017-06-23 | End: 2017-06-23 | Stop reason: HOSPADM

## 2017-06-23 RX ORDER — SODIUM CHLORIDE, SODIUM LACTATE, POTASSIUM CHLORIDE, CALCIUM CHLORIDE 600; 310; 30; 20 MG/100ML; MG/100ML; MG/100ML; MG/100ML
INJECTION, SOLUTION INTRAVENOUS
Status: DISCONTINUED | OUTPATIENT
Start: 2017-06-23 | End: 2017-06-23 | Stop reason: HOSPADM

## 2017-06-23 RX ORDER — PROPOFOL 10 MG/ML
INJECTION, EMULSION INTRAVENOUS AS NEEDED
Status: DISCONTINUED | OUTPATIENT
Start: 2017-06-23 | End: 2017-06-23 | Stop reason: HOSPADM

## 2017-06-23 RX ORDER — DEXTROSE 50 % IN WATER (D50W) INTRAVENOUS SYRINGE
25-50 AS NEEDED
Status: DISCONTINUED | OUTPATIENT
Start: 2017-06-23 | End: 2017-06-23 | Stop reason: HOSPADM

## 2017-06-23 RX ORDER — INSULIN LISPRO 100 [IU]/ML
INJECTION, SOLUTION INTRAVENOUS; SUBCUTANEOUS ONCE
Status: DISCONTINUED | OUTPATIENT
Start: 2017-06-23 | End: 2017-06-23 | Stop reason: SDUPTHER

## 2017-06-23 RX ADMIN — PROPOFOL 20 MG: 10 INJECTION, EMULSION INTRAVENOUS at 10:52

## 2017-06-23 RX ADMIN — LIDOCAINE HYDROCHLORIDE 40 MG: 20 INJECTION, SOLUTION EPIDURAL; INFILTRATION; INTRACAUDAL; PERINEURAL at 10:30

## 2017-06-23 RX ADMIN — PROPOFOL 50 MG: 10 INJECTION, EMULSION INTRAVENOUS at 10:40

## 2017-06-23 RX ADMIN — FAMOTIDINE 20 MG: 10 INJECTION, SOLUTION INTRAVENOUS at 08:33

## 2017-06-23 RX ADMIN — SODIUM CHLORIDE, SODIUM LACTATE, POTASSIUM CHLORIDE, AND CALCIUM CHLORIDE 75 ML/HR: 600; 310; 30; 20 INJECTION, SOLUTION INTRAVENOUS at 08:33

## 2017-06-23 RX ADMIN — SODIUM CHLORIDE, SODIUM LACTATE, POTASSIUM CHLORIDE, CALCIUM CHLORIDE: 600; 310; 30; 20 INJECTION, SOLUTION INTRAVENOUS at 10:19

## 2017-06-23 RX ADMIN — PROPOFOL 50 MG: 10 INJECTION, EMULSION INTRAVENOUS at 10:36

## 2017-06-23 RX ADMIN — PROPOFOL 50 MG: 10 INJECTION, EMULSION INTRAVENOUS at 10:30

## 2017-06-23 RX ADMIN — PROPOFOL 50 MG: 10 INJECTION, EMULSION INTRAVENOUS at 10:33

## 2017-06-23 NOTE — IP AVS SNAPSHOT
Chiquita Toth 
 
 
 [address] [facility] 61 [facility] Patient: Francois Grant MRN: [id number] You are allergic to the following Allergen Reactions Nka (No Known Allergies) Other (comments) Recent Documentation Height Weight BMI Smoking Status 1.727 m 100.7 kg 33.75 kg/m2 Never Smoker Emergency Contacts Name Discharge Info Relation Home Work Mobile LazarusBrandy DISCHARGE CAREGIVER [3] Spouse [3] [phone] Yancy Schroeder CAREGIVER [3] Daughter [21] [phone] Lazarus,Cary  Son [22] About your hospitalization You were admitted on:  June 23, 2017 You last received care in the:  [facility] PHASE 2 RECOVERY You were discharged on:  June 23, 2017 Unit phone number:  [phone] Why you were hospitalized Your primary diagnosis was:  Not on File Providers Seen During Your Hospitalizations Provider Role Specialty Primary office phone Cecile Tomas MD Attending Provider Gastroenterology [phone] Your Primary Care Physician (PCP) Primary Care Physician Office Phone Office Fax Katerin Zhu [phone] Follow-up Information Follow up With Details Comments Contact Info Sujatha Jimenez MD   [address] [facility] [address] [facility] [address] 
[phone] Cecile Tomas MD  Follow up within 8 to 12 weeks. [address] [place] [address] [address] [facility] [address] 
[phone] Your Appointments Thursday June 29, 2017 10:30 AM EDT  
ESTABLISHED PATIENT with Author MD Alix  
[facility] [address] [facility] [address] [facility] [address]  
[phone] Current Discharge Medication List  
  
CONTINUE these medications which have CHANGED Dose & Instructions Dispensing Information Comments Morning Noon Evening Bedtime  
 hydrALAZINE 100 mg tablet Commonly known as:  APRESOLINE What changed:  how much to take Your last dose was: Your next dose is:    
   
   
 Dose:  100 mg Take 1 Tab by mouth three (3) times daily. Quantity:  90 Tab Refills:  2 CONTINUE these medications which have NOT CHANGED Dose & Instructions Dispensing Information Comments Morning Noon Evening Bedtime  
 allopurinol 100 mg tablet Commonly known as:  Jackbubba Dominguez Your last dose was: Your next dose is:    
   
   
 Dose:  100 mg Take 100 mg by mouth daily. Refills:  0  
     
   
   
   
  
 aspirin delayed-release 81 mg tablet Your last dose was: Your next dose is:    
   
   
 Dose:  81 mg Take 1 Tab by mouth daily. Quantity:  100 Tab Refills:  6  
     
   
   
   
  
 cloNIDine HCl 0.2 mg tablet Commonly known as:  CATAPRES Your last dose was: Your next dose is:    
   
   
 Dose:  0.2 mg Take 0.2 mg by mouth four (4) times daily. Refills:  0  
     
   
   
   
  
 clopidogrel 75 mg Tab Commonly known as:  PLAVIX Your last dose was: Your next dose is:    
   
   
 Dose:  75 mg Take 1 Tab by mouth daily. Quantity:  90 Tab Refills:  3 DECARA 50,000 unit Cap Generic drug:  Cholecalciferol (Vitamin D3) Your last dose was: Your next dose is: Take  by mouth every seven (7) days. Refills:  0  
     
   
   
   
  
 furosemide 40 mg tablet Commonly known as:  LASIX Your last dose was: Your next dose is:    
   
   
 Dose:  40 mg Take 1 Tab by mouth daily as needed. Quantity:  90 Tab Refills:  3  
     
   
   
   
  
 insulin aspart 100 unit/mL injection Commonly known as:  Caridad Kildare Your last dose was: Your next dose is:    
   
   
 20 units sq 3 times a day,sliding scale Quantity:  10 mL Refills:  0  
     
   
   
   
  
 insulin glargine 100 unit/mL injection Commonly known as:  LANTUS Your last dose was: Your next dose is:    
   
   
 Dose:  20 Units 20 Units by SubCUTAneous route every twelve (12) hours. Quantity:  1 Vial  
Refills:  2  
     
   
   
   
  
 isosorbide mononitrate ER 30 mg tablet Commonly known as:  IMDUR Your last dose was: Your next dose is:    
   
   
 Dose:  30 mg Take 1 Tab by mouth daily. Quantity:  90 Tab Refills:  3  
     
   
   
   
  
 metoprolol tartrate 100 mg IR tablet Commonly known as:  LOPRESSOR Your last dose was: Your next dose is:    
   
   
 Dose:  100 mg Take 1 Tab by mouth two (2) times a day. Quantity:  180 Tab Refills:  3 NIFEdipine ER 90 mg ER tablet Commonly known as:  PROCARDIA XL Your last dose was: Your next dose is:    
   
   
 Dose:  90 mg Take 1 Tab by mouth daily. Quantity:  30 Tab Refills:  2  
     
   
   
   
  
 nitroglycerin 0.4 mg SL tablet Commonly known as:  NITROSTAT Your last dose was: Your next dose is:    
   
   
 Dose:  0.4 mg  
1 Tab by SubLINGual route as needed for Chest Pain. Quantity:  25 Tab Refills:  1  
     
   
   
   
  
 simvastatin 40 mg tablet Commonly known as:  ZOCOR Your last dose was: Your next dose is:    
   
   
 Dose:  40 mg Take 1 Tab by mouth nightly. Quantity:  90 Tab Refills:  3  
     
   
   
   
  
 tamsulosin 0.4 mg capsule Commonly known as:  FLOMAX Your last dose was: Your next dose is: TAKE ONE CAPSULE BY MOUTH ONCE A DAY Quantity:  90 Cap Refills:  2 Discharge Instructions Resume Plavix on Sunday, 06/25/2017. Colonoscopy: What to Expect at [facility] Your Recovery After you have a colonoscopy, you will stay at the clinic for 1 to 2 hours until the medicines wear off. Then you can go home. But you will need to arrange for a ride. Your doctor will tell you when you can eat and do your other usual activities. Your doctor will talk to you about when you will need your next colonoscopy. Your doctor can help you decide how often you need to be checked. This will depend on the results of your test and your risk for colorectal cancer. After the test, you may be bloated or have gas pains. You may need to pass gas. If a biopsy was done or a polyp was removed, you may have streaks of blood in your stool (feces) for a few days. This care sheet gives you a general idea about how long it will take for you to recover. But each person recovers at a different pace. Follow the steps below to get better as quickly as possible. How can you care for yourself at home? Activity · Rest when you feel tired. · You can do your normal activities when it feels okay to do so. Diet · Follow your doctor's directions for eating. · Unless your doctor has told you not to, drink plenty of fluids. This helps to replace the fluids that were lost during the colon prep. · Do not drink alcohol. Medicines · Your doctor will tell you if and when you can restart your medicines. He or she will also give you instructions about taking any new medicines. · If you take blood thinners, such as warfarin (Coumadin), clopidogrel (Plavix), or aspirin, be sure to talk to your doctor. He or she will tell you if and when to start taking those medicines again. Make sure that you understand exactly what your doctor wants you to do. · If polyps were removed or a biopsy was done during the test, your doctor may tell you not to take aspirin or other anti-inflammatory medicines for a few days. These include ibuprofen (Advil, Motrin) and naproxen (Aleve). Other instructions · For your safety, do not drive or operate machinery until the medicine wears off and you can think clearly. Your doctor may tell you not to drive or operate machinery until the day after your test. 
· Do not sign legal documents or make major decisions until the medicine wears off and you can think clearly. The anesthesia can make it hard for you to fully understand what you are agreeing to. Follow-up care is a key part of your treatment and safety. Be sure to make and go to all appointments, and call your doctor if you are having problems. It's also a good idea to know your test results and keep a list of the medicines you take. When should you call for help? Call 911 anytime you think you may need emergency care. For example, call if: 
· You passed out (lost consciousness). · You pass maroon or bloody stools. · You have severe belly pain. Call your doctor now or seek immediate medical care if: 
· Your stools are black and tarlike. · Your stools have streaks of blood, but you did not have a biopsy or any polyps removed. · You have belly pain, or your belly is swollen and firm. · You vomit. · You have a fever. · You are very dizzy. Watch closely for changes in your health, and be sure to contact your doctor if you have any problems. Where can you learn more? Go to http://kendal-farzaneh.info/. Enter E264 in the search box to learn more about \"Colonoscopy: What to Expect at Home. \" Current as of: August 9, 2016 Content Version: 11.3 © 6298-5817 [employer]. Care instructions adapted under license by [employer] (which disclaims liability or warranty for this information). If you have questions about a medical condition or this instruction, always ask your healthcare professional. [place] [address] any warranty or liability for your use of this information. Colon Polyps: Care Instructions Your Care Instructions Colon polyps are growths in the colon or the rectum.  The cause of most colon polyps is not known, and most people who get them do not have any problems. But a certain kind can turn into cancer. For this reason, regular testing for colon polyps is important for people age 48 and older and anyone who has an increased risk for colon cancer. Polyps are usually found through routine colon cancer screening tests. Although most colon polyps are not cancerous, they are usually removed and then tested for cancer. Screening for colon cancer saves lives because the cancer can usually be cured if it is caught early. If you have a polyp that is the type that can turn into cancer, you may need more tests to examine your entire colon. The doctor will remove any other polyps that he or she finds, and you will be tested more often. Follow-up care is a key part of your treatment and safety. Be sure to make and go to all appointments, and call your doctor if you are having problems. It's also a good idea to know your test results and keep a list of the medicines you take. How can you care for yourself at home? Regular exams to look for colon polyps are the best way to prevent polyps from turning into colon cancer. These can include stool tests, sigmoidoscopy, colonoscopy, and CT colonography. Talk with your doctor about a testing schedule that is right for you. To prevent polyps There is no home treatment that can prevent colon polyps. But these steps may help lower your risk for cancer. · Stay active. Being active can help you get to and stay at a healthy weight. Try to exercise on most days of the week. Walking is a good choice. · Eat well. Choose a variety of vegetables, fruits, legumes (such as peas and beans), fish, poultry, and whole grains. · Do not smoke. If you need help quitting, talk to your doctor about stop-smoking programs and medicines. These can increase your chances of quitting for good. · If you drink alcohol, limit how much you drink.  Limit alcohol to 2 drinks a day for men and 1 drink a day for women. When should you call for help? Call your doctor now or seek immediate medical care if: 
· You have severe belly pain. · Your stools are maroon or very bloody. Watch closely for changes in your health, and be sure to contact your doctor if: 
· You have a fever. · You have nausea or vomiting. · You have a change in bowel habits (new constipation or diarrhea). · Your symptoms get worse or are not improving as expected. Where can you learn more? Go to http://kendal-farzaneh.info/. Enter 95 [id number] in the search box to learn more about \"Colon Polyps: Care Instructions. \" Current as of: May 5, 2017 Content Version: 11.3 © 8954-6693 [employer]. Care instructions adapted under license by [employer] (which disclaims liability or warranty for this information). If you have questions about a medical condition or this instruction, always ask your healthcare professional. [place] [address] any warranty or liability for your use of this information. DISCHARGE SUMMARY from Nurse The following personal items are in your possession at time of discharge: 
 
Dental Appliances: At home, Partials, Uppers Visual Aid: None Hearing Aids/Status: Bilateral 
  
  
  
  
  
PATIENT INSTRUCTIONS: 
 
 
F-face looks uneven A-arms unable to move or move unevenly S-speech slurred or non-existent T-time-call 911 as soon as signs and symptoms begin-DO NOT go Back to bed or wait to see if you get better-TIME IS BRAIN. Warning Signs of HEART ATTACK Call 911 if you have these symptoms: 
? Chest discomfort. Most heart attacks involve discomfort in the center of the chest that lasts more than a few minutes, or that goes away and comes back. It can feel like uncomfortable pressure, squeezing, fullness, or pain. ? Discomfort in other areas of the upper body. Symptoms can include pain or discomfort in one or both arms, the back, neck, jaw, or stomach. ? Shortness of breath with or without chest discomfort. ? Other signs may include breaking out in a cold sweat, nausea, or lightheadedness. Don't wait more than five minutes to call [address]! Fast action can save your life. Calling 911 is almost always the fastest way to get lifesaving treatment. Emergency Medical Services staff can begin treatment when they arrive  up to an hour sooner than if someone gets to the hospital by car. The discharge information has been reviewed with the patient and spouse. The patient and spouse verbalized understanding. Discharge medications reviewed with the patient and spouse and appropriate educational materials and side effects teaching were provided. Mesalamine (Into the rectum) Mesalamine (me-SAL-a-meen) Treats ulcerative proctitis. Brand Name(s): Canasa, Rowasa, sfRowasa There may be other brand names for this medicine. When This Medicine Should Not Be Used: This medicine is not right for everyone. Do not use it if you had an allergic reaction to mesalamine or salicylates (such as aspirin). Do not use the rectal suppository if you are allergic to saturated vegetable fat, such as shortening. How to Use This Medicine:  
Enema, Suppository · Your doctor will tell you how much medicine to use. Do not use more than directed. · Enema:  
¨ Use the enema at bedtime, unless your doctor tells you otherwise. Shake the bottle and then remove the cap. ¨ Wash your hands with soap and water before and after using this medicine. Arturo Veronica on your left side with your left leg straight or slightly bent, and your right knee bent upward. Insert the applicator tip into your rectum about an inch. Gently squeeze the bottom of the bottle to release the enema. ¨ Remove the bottle and throw it away. ¨ You may need to hold the liquid in your rectum for several minutes or hours. This may be difficult or feel uncomfortable. It should become easier to do as you continue to use the medicine. · Suppository: ¨ Never take rectal suppositories by mouth. ¨ Use this medicine at bedtime, unless your doctor tells you otherwise. ¨ Before you insert a suppository, go to the bathroom and empty your bladder and, if possible, have a bowel movement. ¨ This medicine will stain any surface it touches (such as clothing, bedsheets, floors, and countertops). To protect your clothing, consider wearing a sanitary napkin or adult undergarment such as Depend®. Protect your sheets by placing a waterproof pad on your bed. ¨ Wash your hands with soap and water before and after using this medicine. Remove the foil or wrapper from the suppository before inserting it. Do not keep the suppository in your hand too long or it will begin to melt. ¨ To make the suppository easier to insert, you may use a lubricating gel such as K-Y® Jelly, but do not use petroleum jelly (Vaseline®). Runell Crape on your left side with your left leg straight or slightly bent, and your right knee bent upward. Gently push the pointed end of the suppository into the rectum about 1 inch. ¨ The medicine needs to remain in your body for 1 to 3 hours or longer. Try not to use the bathroom for at least that length of time after inserting the suppository. · Read and follow the patient instructions that come with this medicine. Talk to your doctor or pharmacist if you have any questions. · Missed dose: Take a dose as soon as you remember. If it is almost time for your next dose, wait until then and take a regular dose. Do not take extra medicine to make up for a missed dose. · You may store the suppositories in the refrigerator, but do not freeze them. Store the enema at room temperature. Keep it away from heat, moisture, or direct light. Drugs and Foods to Avoid: Ask your doctor or pharmacist before using any other medicine, including over-the-counter medicines, vitamins, and herbal products. · Some medicines can affect how mesalamine works. Tell your doctor if you are using any of the following: 
¨ Azathioprine ¨ Mercaptopurine ¨ Mesalamine by mouth ¨ NSAID pain or arthritis medicine (including aspirin, diclofenac, ibuprofen, naproxen) Warnings While Using This Medicine: · Tell your doctor if you are pregnant or breastfeeding, or if you have kidney disease, liver disease, or a history of heart disease (such as myocarditis or pericarditis). Tell your doctor if you have had an allergic reaction to sulfasalazine. · This medicine may cause the following problems: ¨ Kidney problems ¨ Mesalamine-induced acute intolerance syndrome ¨ Liver failure · Tell any doctor or dentist who treats you that you are using this medicine. This medicine may affect certain medical test results. · Your doctor will do lab tests at regular visits to check on the effects of this medicine. Keep all appointments. · Keep all medicine out of the reach of children. Never share your medicine with anyone. Possible Side Effects While Using This Medicine:  
Call your doctor right away if you notice any of these side effects: · Allergic reaction: Itching or hives, swelling in your face or hands, swelling or tingling in your mouth or throat, chest tightness, trouble breathing · Bloody diarrhea, severe cramping, stomach pain, possibly with fever, headache, rash · Change in how much or how often you urinate · Chest pain, trouble breathing · Dark urine or pale stools, nausea, vomiting, loss of appetite, yellow skin or eyes · Fast, slow, or pounding heartbeat If you notice other side effects that you think are caused by this medicine, tell your doctor. Call your doctor for medical advice about side effects. You may report side effects to FDA at 7-108-OXU-7350 © 2017 [facility] Information is for End User's use only and may not be sold, redistributed or otherwise used for commercial purposes. The above information is an  only. It is not intended as medical advice for individual conditions or treatments. Talk to your doctor, nurse or pharmacist before following any medical regimen to see if it is safe and effective for you. Discharge Orders None Introducing [facility] SERVICES! [employer] introduces Panasas patient portal. Now you can access parts of your medical record, email your doctor's office, and request medication refills online. 1. In your internet browser, go to [email or web address] 2. Click on the First Time User? Click Here link in the Sign In box. You will see the New Member Sign Up page. 3. Enter your Panasas Access Code exactly as it appears below. You will not need to use this code after youve completed the sign-up process. If you do not sign up before the expiration date, you must request a new code. · Panasas Access Code: [email or web address] 
Expires: 9/11/2017 12:52 AM 
 
4. Enter the last four digits of your Social Security Number (xxxx) and Date of Birth (mm/dd/yyyy) as indicated and click Submit. You will be taken to the next sign-up page. 5. Create a Panasas ID. This will be your Panasas login ID and cannot be changed, so think of one that is secure and easy to remember. 6. Create a Panasas password. You can change your password at any time. 7. Enter your Password Reset Question and Answer. This can be used at a later time if you forget your password. 8. Enter your e-mail address. You will receive e-mail notification when new information is available in [address] 9. Click Sign Up. You can now view and download portions of your medical record. 10. Click the Download Summary menu link to download a portable copy of your medical information. If you have questions, please visit the Frequently Asked Questions section of the MyChart website. Remember, MyChart is NOT to be used for urgent needs. For medical emergencies, dial 911. Now available from your iPhone and Android! General Information Please provide this summary of care documentation to your next provider. Patient Signature:  ____________________________________________________________ Date:  ____________________________________________________________  
  
Lajuanda Carole Provider Signature:  ____________________________________________________________ Date:  ____________________________________________________________

## 2017-06-23 NOTE — ANESTHESIA PREPROCEDURE EVALUATION
Anesthetic History   No history of anesthetic complications            Review of Systems / Medical History  Patient summary reviewed and pertinent labs reviewed    Pulmonary  Within defined limits      Sleep apnea           Neuro/Psych   Within defined limits           Cardiovascular    Hypertension      CHF    Past MI, CAD, cardiac stents and CABG    Exercise tolerance: <4 METS     GI/Hepatic/Renal  Within defined limits   GERD    Renal disease: CRI       Endo/Other  Within defined limits  Diabetes: type 2    Morbid obesity     Other Findings   Comments:   Risk Factors for Postoperative nausea/vomiting:       History of postoperative nausea/vomiting? NO       Female? NO       Motion sickness? NO       Intended opioid administration for postoperative analgesia? NO      Smoking Abstinence  Current Smoker? NO  Elective Surgery? YES  Seen preoperatively by anesthesiologist or proxy prior to day of surgery? YES  Pt abstained from smoking 24 hours prior to anesthesia?  YES               Physical Exam    Airway  Mallampati: II  TM Distance: 4 - 6 cm  Neck ROM: normal range of motion   Mouth opening: Normal     Cardiovascular    Rhythm: regular  Rate: normal         Dental  No notable dental hx       Pulmonary  Breath sounds clear to auscultation               Abdominal  GI exam deferred       Other Findings            Anesthetic Plan    ASA: 4  Anesthesia type: MAC          Induction: Intravenous  Anesthetic plan and risks discussed with: Patient

## 2017-06-23 NOTE — PROGRESS NOTES
[email or web address] [address]  [address], [address]      Brief Procedure Note    Ana Maria Forget  1940  [id number]    Date of Procedure: 6/23/2017    Preoperative diagnosis: Anemia, unspecified type [D64.9]  Anal bleeding [K62.5]  Abdominal pain, unspecified site [R10.9]  Encounter for colonoscopy due to history of adenomatous colonic polyps [Z12.11, Z86.010]  Atherosclerosis of native coronary artery without angina pectoris, unspecified whether native or transplanted heart [I25.10]  Long term (current) use of anticoagulants [Z79.01]  Essential hypertension, malignant [I10]  Heart failure, unspecified ([facility] [address] [I50.9]  Chronic kidney disease, unspecified [N18.9]  Sleep apnea, unspecified type [G47.30]    Postoperative diagnosis: radiation proctitis, hepatic flexure polyp, cecal polyp, internal hemorrhoids. Type of Anesthesia: MAC (Monitored anesthesia care)    Description of findings: same as post op dx    Procedure: Procedure(s):  COLONOSCOPY w/ APC w/ polypectomies    :  Dr. Michelle Love MD    Assistant(s): Endoscopy Technician-1: Cecilia Benites  Endoscopy Technician-2: Ricky Meza  Endoscopy RN-1: Sean Andres RN  Endoscopy RN-2: Kenya Ewing;  Jacek Holley RN    EBL:None    Specimens:   ID Type Source Tests Collected by Time Destination   1 : hepatic flexure and cecal polyps Preservative Colon  Michelle Love MD 6/23/2017 1039 Pathology       Findings: See printed and scanned procedure note    Complications: None    Dr. Michelle Love MD  6/23/2017  11:13 AM

## 2017-06-23 NOTE — ANESTHESIA POSTPROCEDURE EVALUATION
Post-Anesthesia Evaluation and Assessment    Patient: Maribell Hayes MRN: [id number]  SSN: [id number]    YOB: 1940  Age: 68 y.o. Sex: male       Cardiovascular Function/Vital Signs  Visit Vitals    /45    Pulse (!) 49    Temp 36.4 °C (97.6 °F)    Resp 17    Ht 5' 8\" (1.727 m)    Wt 100.7 kg (222 lb)    SpO2 100%    BMI 33.75 kg/m2       Patient is status post MAC anesthesia for Procedure(s):  COLONOSCOPY w/ APC w/ polypectomies. Nausea/Vomiting: None    Postoperative hydration reviewed and adequate. Pain:  Pain Scale 1: Visual (06/23/17 1109)  Pain Intensity 1: 0 (06/23/17 1109)   Managed    Neurological Status:   Neuro (WDL): Within Defined Limits (06/23/17 1109)   At baseline    Mental Status and Level of Consciousness: Arousable    Pulmonary Status:   O2 Device: Room air (06/23/17 1115)   Adequate oxygenation and airway patent    Complications related to anesthesia: None    Post-anesthesia assessment completed.  No concerns    Signed By: Nixon Garduno MD     June 23, 2017

## 2017-06-23 NOTE — H&P
[email or web address]  [phone]    GASTROENTEROLOGY Pre-Procedure H and P      Impression/Plan:   1. This patient is consented for a colonoscopy for rectal bleeding    Chief Complaint: rectal bleeding      HPI:  Yadira Claros is a 68 y.o. male who is being is having a colonoscopy for rectal bleeding  PMH:   Past Medical History:   Diagnosis Date    Atherosclerosis of renal artery (HCC)     S/P Rt stent    CAD (coronary artery disease) , 1997, 2012    ,double bypass, 2 stents, 2stents 7/2016    Cancer ([address]     prostate    CHF (congestive heart failure) ([address]     Chronic diastolic heart failure (HCC)     Chronic kidney disease (CKD), stage IV (severe) ([address]     Constipation     Coronary atherosclerosis of unspecified type of vessel, native or graft     Stable angina after recent PCI continue treatment.  CRI (chronic renal insufficiency)     Diabetes ([address] 1973    type 2    Essential hypertension, benign     GERD (gastroesophageal reflux disease)     Gout     Hypertension 1982    Morbid obesity ([address]     Weight loss has been strongly encouraged by following dietary restrictions and an exercise routine    APRYL (obstructive sleep apnea) 6/26/2015    no cpap    Other and unspecified hyperlipidemia     HDL's are not at goal, LDL's are at goal, triglycerides are not at goal.    Other and unspecified hyperlipidemia     HDL's are not at goal, LDL's are at goal, triglycerides are not at goal.     Other ill-defined conditions 1960    pneumonia twice    Sleep disturbance     Type II or unspecified type diabetes mellitus without mention of complication, not stated as uncontrolled        PSH:   Past Surgical History:   Procedure Laterality Date   [address]    lt. carotid endart.     HX CHOLECYSTECTOMY      HX CORONARY ARTERY BYPASS GRAFT  2000    x2    HX HEENT  1997    cholecystectomy    HX UROLOGICAL  1998    renal art. surg       Social HX:   Social History     Social History    Marital status:      Spouse name: N/A    Number of children: N/A    Years of education: N/A     Occupational History    Not on file. Social History Main Topics    Smoking status: Never Smoker    Smokeless tobacco: Never Used    Alcohol use No    Drug use: No    Sexual activity: Not on file     Other Topics Concern    Not on file     Social History Narrative       FHX:   Family History   Problem Relation Age of Onset    Heart Attack Father 64       Allergy:   Allergies   Allergen Reactions    Nka [No Known Allergies] Other (comments)       Home Medications:     Prescriptions Prior to Admission   Medication Sig    clopidogrel (PLAVIX) 75 mg tab Take 1 Tab by mouth daily.  tamsulosin (FLOMAX) 0.4 mg capsule TAKE ONE CAPSULE BY MOUTH ONCE A DAY    Cholecalciferol, Vitamin D3, (DECARA) 50,000 unit cap Take  by mouth every seven (7) days.  aspirin delayed-release 81 mg tablet Take 1 Tab by mouth daily.  metoprolol tartrate (LOPRESSOR) 100 mg IR tablet Take 1 Tab by mouth two (2) times a day.  simvastatin (ZOCOR) 40 mg tablet Take 1 Tab by mouth nightly.  furosemide (LASIX) 40 mg tablet Take 1 Tab by mouth daily as needed.  hydrALAZINE (APRESOLINE) 100 mg tablet Take 1 Tab by mouth three (3) times daily. (Patient taking differently: Take 50 mg by mouth three (3) times daily.)    insulin glargine (LANTUS) 100 unit/mL injection 20 Units by SubCUTAneous route every twelve (12) hours.  insulin aspart (NOVOLOG) 100 unit/mL injection 20 units sq 3 times a day,sliding scale    allopurinol (ZYLOPRIM) 100 mg tablet Take 100 mg by mouth daily.  cloNIDine (CATAPRESS) 0.2 mg tablet Take 0.2 mg by mouth four (4) times daily.  isosorbide mononitrate ER (IMDUR) 30 mg tablet Take 1 Tab by mouth daily.  NIFEdipine ER (PROCARDIA XL) 90 mg ER tablet Take 1 Tab by mouth daily.     nitroglycerin (NITROSTAT) 0.4 mg SL tablet 1 Tab by SubLINGual route as needed for Chest Pain.       Review of Systems:     Constitutional: No fevers, chills, weight loss, fatigue. Skin: No rashes, pruritis, jaundice, ulcerations, erythema. HENT: No headaches, nosebleeds, sinus pressure, rhinorrhea, sore throat. Eyes: No visual changes, blurred vision, eye pain, photophobia, jaundice. Cardiovascular: No chest pain, heart palpitations. Respiratory: No cough, SOB, wheezing, chest discomfort, orthopnea. Gastrointestinal:    Genitourinary: No dysuria, bleeding, discharge, pyuria. Musculoskeletal: No weakness, arthralgias, wasting. Endo: No sweats. Heme: No bruising, easy bleeding. Allergies: As noted. Neurological: Cranial nerves intact. Alert and oriented. Gait not assessed. Psychiatric:  No anxiety, depression, hallucinations. Visit Vitals    /76    Pulse (!) 55    Temp 97.6 °F (36.4 °C)    Resp 16    Ht 5' 8\" (1.727 m)    Wt 100.7 kg (222 lb)    SpO2 97%    BMI 33.75 kg/m2       Physical Assessment:     constitutional: appearance: well developed, well nourished, normal habitus, no deformities, in no acute distress. skin: inspection: no rashes, ulcers, icterus or other lesions; no clubbing or telangiectasias. palpation: no induration or subcutaneos nodules. eyes: inspection: normal conjunctivae and lids; no jaundice pupils: normal  ENMT: mouth: normal oral mucosa,lips and gums; good dentition. oropharynx: normal tongue, hard and soft palate; posterior pharynx without erithema, exudate or lesions. neck: thyroid: normal size, consistency and position; no masses or tenderness. respiratory: effort: normal chest excursion; no intercostal retraction or accessory muscle use. cardiovascular: abdominal aorta: normal size and position; no bruits. palpation: PMI of normal size and position; normal rhythm; no thrill or murmurs. abdominal: abdomen: normal consistency; no tenderness or masses. hernias: no hernias appreciated.  liver: normal size and consistency. spleen: not palpable. rectal: hemoccult/guaiac: not performed. musculoskeletal: digits and nails: no clubbing, cyanosis, petechiae or other inflammatory conditions. gait: normal gait and station head and neck: normal range of motion; no pain, crepitation or contracture. spine/ribs/pelvis: normal range of motion; no pain, deformity or contracture. neurologic: cranial nerves: II-XII normal.   psychiatric: judgement/insight: within normal limits. memory: within normal limits for recent and remote events. mood and affect: no evidence of depression, anxiety or agitation. orientation: oriented to time, space and person. Basic Metabolic Profile   No results for input(s): NA, K, CL, CO2, BUN, GLU, CA, MG, PHOS in the last 72 hours. No lab exists for component: CREAT      CBC w/Diff    No results for input(s): WBC, RBC, HGB, HCT, MCV, MCH, MCHC, RDW, PLT, HGBEXT, HCTEXT, PLTEXT in the last 72 hours. No lab exists for component: MPV No results for input(s): GRANS, LYMPH, EOS, PRO, MYELO, METAS, BLAST in the last 72 hours. No lab exists for component: MONO, BASO     Hepatic Function   No results for input(s): ALB, TP, TBILI, GPT, SGOT, AP, AML, LPSE in the last 72 hours. No lab exists for component: DBILI     Coags   No results for input(s): PTP, INR, APTT in the last 72 hours. No lab exists for component: Alina Harley MD.  [facility] [address]  Cell: [phone]  Direct pager: [phone]  [email or web address]  [email or web address]. [email or web address]

## 2017-06-23 NOTE — DISCHARGE INSTRUCTIONS
Resume Plavix on Sunday, 06/25/2017. Colonoscopy: What to Expect at [address] [place] [address]  After you have a colonoscopy, you will stay at the clinic for 1 to 2 hours until the medicines wear off. Then you can go home. But you will need to arrange for a ride. Your doctor will tell you when you can eat and do your other usual activities. Your doctor will talk to you about when you will need your next colonoscopy. Your doctor can help you decide how often you need to be checked. This will depend on the results of your test and your risk for colorectal cancer. After the test, you may be bloated or have gas pains. You may need to pass gas. If a biopsy was done or a polyp was removed, you may have streaks of blood in your stool (feces) for a few days. This care sheet gives you a general idea about how long it will take for you to recover. But each person recovers at a different pace. Follow the steps below to get better as quickly as possible. How can you care for yourself at home? Activity  · Rest when you feel tired. · You can do your normal activities when it feels okay to do so. Diet  · Follow your doctor's directions for eating. · Unless your doctor has told you not to, drink plenty of fluids. This helps to replace the fluids that were lost during the colon prep. · Do not drink alcohol. Medicines  · Your doctor will tell you if and when you can restart your medicines. He or she will also give you instructions about taking any new medicines. · If you take blood thinners, such as warfarin (Coumadin), clopidogrel (Plavix), or aspirin, be sure to talk to your doctor. He or she will tell you if and when to start taking those medicines again. Make sure that you understand exactly what your doctor wants you to do. · If polyps were removed or a biopsy was done during the test, your doctor may tell you not to take aspirin or other anti-inflammatory medicines for a few days.  These include ibuprofen (Advil, Motrin) and naproxen (Aleve). Other instructions  · For your safety, do not drive or operate machinery until the medicine wears off and you can think clearly. Your doctor may tell you not to drive or operate machinery until the day after your test.  · Do not sign legal documents or make major decisions until the medicine wears off and you can think clearly. The anesthesia can make it hard for you to fully understand what you are agreeing to. Follow-up care is a key part of your treatment and safety. Be sure to make and go to all appointments, and call your doctor if you are having problems. It's also a good idea to know your test results and keep a list of the medicines you take. When should you call for help? Call 911 anytime you think you may need emergency care. For example, call if:  · You passed out (lost consciousness). · You pass maroon or bloody stools. · You have severe belly pain. Call your doctor now or seek immediate medical care if:  · Your stools are black and tarlike. · Your stools have streaks of blood, but you did not have a biopsy or any polyps removed. · You have belly pain, or your belly is swollen and firm. · You vomit. · You have a fever. · You are very dizzy. Watch closely for changes in your health, and be sure to contact your doctor if you have any problems. Where can you learn more? Go to http://kendal-farzaneh.info/. Enter E264 in the search box to learn more about \"Colonoscopy: What to Expect at Home. \"  Current as of: August 9, 2016  Content Version: 11.3  © 0429-9787 Healthwise, Incorporated. Care instructions adapted under license by [employer] (which disclaims liability or warranty for this information). If you have questions about a medical condition or this instruction, always ask your healthcare professional. [address] any warranty or liability for your use of this information.     Colon Polyps: Care Instructions  Your Care Instructions    Colon polyps are growths in the colon or the rectum. The cause of most colon polyps is not known, and most people who get them do not have any problems. But a certain kind can turn into cancer. For this reason, regular testing for colon polyps is important for people age 48 and older and anyone who has an increased risk for colon cancer. Polyps are usually found through routine colon cancer screening tests. Although most colon polyps are not cancerous, they are usually removed and then tested for cancer. Screening for colon cancer saves lives because the cancer can usually be cured if it is caught early. If you have a polyp that is the type that can turn into cancer, you may need more tests to examine your entire colon. The doctor will remove any other polyps that he or she finds, and you will be tested more often. Follow-up care is a key part of your treatment and safety. Be sure to make and go to all appointments, and call your doctor if you are having problems. It's also a good idea to know your test results and keep a list of the medicines you take. How can you care for yourself at home? Regular exams to look for colon polyps are the best way to prevent polyps from turning into colon cancer. These can include stool tests, sigmoidoscopy, colonoscopy, and CT colonography. Talk with your doctor about a testing schedule that is right for you. To prevent polyps  There is no home treatment that can prevent colon polyps. But these steps may help lower your risk for cancer. · Stay active. Being active can help you get to and stay at a healthy weight. Try to exercise on most days of the week. Walking is a good choice. · Eat well. Choose a variety of vegetables, fruits, legumes (such as peas and beans), fish, poultry, and whole grains. · Do not smoke. If you need help quitting, talk to your doctor about stop-smoking programs and medicines.  These can increase your chances of quitting for good. · If you drink alcohol, limit how much you drink. Limit alcohol to 2 drinks a day for men and 1 drink a day for women. When should you call for help? Call your doctor now or seek immediate medical care if:  · You have severe belly pain. · Your stools are maroon or very bloody. Watch closely for changes in your health, and be sure to contact your doctor if:  · You have a fever. · You have nausea or vomiting. · You have a change in bowel habits (new constipation or diarrhea). · Your symptoms get worse or are not improving as expected. Where can you learn more? Go to http://kendal-farzaneh.info/. Enter 95 [id number] in the search box to learn more about \"Colon Polyps: Care Instructions. \"  Current as of: May 5, 2017  Content Version: 11.3  © 3560-1947 [employer]. Care instructions adapted under license by [employer] (which disclaims liability or warranty for this information). If you have questions about a medical condition or this instruction, always ask your healthcare professional. [place] [address] any warranty or liability for your use of this information. DISCHARGE SUMMARY from Nurse    The following personal items are in your possession at time of discharge:    Dental Appliances: At home, Partials, Uppers  Visual Aid: None  Hearing Aids/Status: Bilateral                 PATIENT INSTRUCTIONS:    After general anesthesia or intravenous sedation, for 24 hours or while taking prescription Narcotics:  · Limit your activities  · Do not drive and operate hazardous machinery  · Do not make important personal or business decisions  · Do  not drink alcoholic beverages  · If you have not urinated within 8 hours after discharge, please contact your surgeon on call.     Report the following to your surgeon:  · Excessive pain, swelling, redness or odor of or around the surgical area  · Temperature over 100.5  · Nausea and vomiting lasting longer than 4 hours or if unable to take medications  · Any signs of decreased circulation or nerve impairment to extremity: change in color, persistent  numbness, tingling, coldness or increase pain  · Any questions    *  Please give a list of your current medications to your Primary Care Provider. *  Please update this list whenever your medications are discontinued, doses are      changed, or new medications (including over-the-counter products) are added. *  Please carry medication information at all times in case of emergency situations. These are general instructions for a healthy lifestyle:    No smoking/ No tobacco products/ Avoid exposure to second hand smoke    Surgeon General's Warning:  Quitting smoking now greatly reduces serious risk to your health. Obesity, smoking, and sedentary lifestyle greatly increases your risk for illness    A healthy diet, regular physical exercise & weight monitoring are important for maintaining a healthy lifestyle    You may be retaining fluid if you have a history of heart failure or if you experience any of the following symptoms:  Weight gain of 3 pounds or more overnight or 5 pounds in a week, increased swelling in our hands or feet or shortness of breath while lying flat in bed. Please call your doctor as soon as you notice any of these symptoms; do not wait until your next office visit. Recognize signs and symptoms of STROKE:    F-face looks uneven    A-arms unable to move or move unevenly    S-speech slurred or non-existent    T-time-call 911 as soon as signs and symptoms begin-DO NOT go       Back to bed or wait to see if you get better-TIME IS BRAIN. Warning Signs of HEART ATTACK     Call 911 if you have these symptoms:   Chest discomfort. Most heart attacks involve discomfort in the center of the chest that lasts more than a few minutes, or that goes away and comes back.  It can feel like uncomfortable pressure, squeezing, fullness, or pain.   Discomfort in other areas of the upper body. Symptoms can include pain or discomfort in one or both arms, the back, neck, jaw, or stomach.  Shortness of breath with or without chest discomfort.  Other signs may include breaking out in a cold sweat, nausea, or lightheadedness. Don't wait more than five minutes to call 911 - MINUTES MATTER! Fast action can save your life. Calling 911 is almost always the fastest way to get lifesaving treatment. Emergency Medical Services staff can begin treatment when they arrive -- up to an hour sooner than if someone gets to the hospital by car. The discharge information has been reviewed with the patient and spouse. The patient and spouse verbalized understanding. Discharge medications reviewed with the patient and spouse and appropriate educational materials and side effects teaching were provided. Mesalamine (Into the rectum)   Mesalamine (me-SAL-a-meen)  Treats ulcerative proctitis. Brand Name(s): Canasa, Rowasa, sfRowasa   There may be other brand names for this medicine. When This Medicine Should Not Be Used: This medicine is not right for everyone. Do not use it if you had an allergic reaction to mesalamine or salicylates (such as aspirin). Do not use the rectal suppository if you are allergic to saturated vegetable fat, such as shortening. How to Use This Medicine:   Enema, Suppository  · Your doctor will tell you how much medicine to use. Do not use more than directed. · Enema:   ¨ Use the enema at bedtime, unless your doctor tells you otherwise. Shake the bottle and then remove the cap. ¨ Wash your hands with soap and water before and after using this medicine. Chris Lipschutz on your left side with your left leg straight or slightly bent, and your right knee bent upward. Insert the applicator tip into your rectum about an inch. Gently squeeze the bottom of the bottle to release the enema. ¨ Remove the bottle and throw it away.   ¨ You may need to hold the liquid in your rectum for several minutes or hours. This may be difficult or feel uncomfortable. It should become easier to do as you continue to use the medicine. · Suppository:   ¨ Never take rectal suppositories by mouth. ¨ Use this medicine at bedtime, unless your doctor tells you otherwise. ¨ Before you insert a suppository, go to the bathroom and empty your bladder and, if possible, have a bowel movement. ¨ This medicine will stain any surface it touches (such as clothing, bedsheets, floors, and countertops). To protect your clothing, consider wearing a sanitary napkin or adult undergarment such as Depend®. Protect your sheets by placing a waterproof pad on your bed. ¨ Wash your hands with soap and water before and after using this medicine. Remove the foil or wrapper from the suppository before inserting it. Do not keep the suppository in your hand too long or it will begin to melt. ¨ To make the suppository easier to insert, you may use a lubricating gel such as K-Y® Jelly, but do not use petroleum jelly (Vaseline®). Vergie Hanson on your left side with your left leg straight or slightly bent, and your right knee bent upward. Gently push the pointed end of the suppository into the rectum about 1 inch. ¨ The medicine needs to remain in your body for 1 to 3 hours or longer. Try not to use the bathroom for at least that length of time after inserting the suppository. · Read and follow the patient instructions that come with this medicine. Talk to your doctor or pharmacist if you have any questions. · Missed dose: Take a dose as soon as you remember. If it is almost time for your next dose, wait until then and take a regular dose. Do not take extra medicine to make up for a missed dose. · You may store the suppositories in the refrigerator, but do not freeze them. Store the enema at room temperature. Keep it away from heat, moisture, or direct light.   Drugs and Foods to Avoid:   Ask your doctor or pharmacist before using any other medicine, including over-the-counter medicines, vitamins, and herbal products. · Some medicines can affect how mesalamine works. Tell your doctor if you are using any of the following:  ¨ Azathioprine  ¨ Mercaptopurine  ¨ Mesalamine by mouth  ¨ NSAID pain or arthritis medicine (including aspirin, diclofenac, ibuprofen, naproxen)  Warnings While Using This Medicine:   · Tell your doctor if you are pregnant or breastfeeding, or if you have kidney disease, liver disease, or a history of heart disease (such as myocarditis or pericarditis). Tell your doctor if you have had an allergic reaction to sulfasalazine. · This medicine may cause the following problems:  ¨ Kidney problems  ¨ Mesalamine-induced acute intolerance syndrome  ¨ Liver failure  · Tell any doctor or dentist who treats you that you are using this medicine. This medicine may affect certain medical test results. · Your doctor will do lab tests at regular visits to check on the effects of this medicine. Keep all appointments. · Keep all medicine out of the reach of children. Never share your medicine with anyone. Possible Side Effects While Using This Medicine:   Call your doctor right away if you notice any of these side effects:  · Allergic reaction: Itching or hives, swelling in your face or hands, swelling or tingling in your mouth or throat, chest tightness, trouble breathing  · Bloody diarrhea, severe cramping, stomach pain, possibly with fever, headache, rash  · Change in how much or how often you urinate  · Chest pain, trouble breathing  · Dark urine or pale stools, nausea, vomiting, loss of appetite, yellow skin or eyes  · Fast, slow, or pounding heartbeat  If you notice other side effects that you think are caused by this medicine, tell your doctor. Call your doctor for medical advice about side effects.  You may report side effects to FDA at 1-843-FDA-7864  © 2017 [address] Information is for End User's use only and may not be sold, redistributed or otherwise used for commercial purposes. The above information is an  only. It is not intended as medical advice for individual conditions or treatments. Talk to your doctor, nurse or pharmacist before following any medical regimen to see if it is safe and effective for you.

## 2017-07-19 RX ORDER — METOPROLOL TARTRATE 100 MG/1
TABLET ORAL
Qty: 180 TAB | Refills: 2 | Status: SHIPPED | OUTPATIENT
Start: 2017-07-19 | End: 2019-01-03

## 2017-07-19 RX ORDER — SIMVASTATIN 40 MG/1
TABLET, FILM COATED ORAL
Qty: 90 TAB | Refills: 2 | Status: SHIPPED | OUTPATIENT
Start: 2017-07-19 | End: 2018-05-09 | Stop reason: SDUPTHER

## 2017-08-21 RX ORDER — ASPIRIN 81 MG/1
TABLET ORAL
Qty: 100 TAB | Refills: 5 | Status: SHIPPED | OUTPATIENT
Start: 2017-08-21 | End: 2017-09-26 | Stop reason: ALTCHOICE

## 2017-09-26 ENCOUNTER — OFFICE VISIT (OUTPATIENT)
Dept: CARDIOLOGY CLINIC | Age: 77
End: 2017-09-26

## 2017-09-26 VITALS
BODY MASS INDEX: 34.4 KG/M2 | HEART RATE: 56 BPM | HEIGHT: 68 IN | DIASTOLIC BLOOD PRESSURE: 60 MMHG | SYSTOLIC BLOOD PRESSURE: 126 MMHG | WEIGHT: 227 LBS

## 2017-09-26 DIAGNOSIS — Z95.5 S/P CORONARY ARTERY STENT PLACEMENT: ICD-10-CM

## 2017-09-26 DIAGNOSIS — I10 ESSENTIAL HYPERTENSION: ICD-10-CM

## 2017-09-26 DIAGNOSIS — N18.4 CKD (CHRONIC KIDNEY DISEASE) STAGE 4, GFR 15-29 ML/MIN (HCC): ICD-10-CM

## 2017-09-26 DIAGNOSIS — Z95.1 POSTSURGICAL AORTOCORONARY BYPASS STATUS: ICD-10-CM

## 2017-09-26 DIAGNOSIS — I25.10 ATHEROSCLEROSIS OF NATIVE CORONARY ARTERY OF NATIVE HEART WITHOUT ANGINA PECTORIS: Primary | ICD-10-CM

## 2017-09-26 DIAGNOSIS — I50.32 CHRONIC DIASTOLIC HEART FAILURE (HCC): ICD-10-CM

## 2017-09-26 RX ORDER — METOLAZONE 5 MG/1
TABLET ORAL DAILY
COMMUNITY
End: 2017-10-25

## 2017-09-26 NOTE — MR AVS SNAPSHOT
Visit Information Date & Time Provider Department Dept. Phone Encounter #  
 9/26/2017  9:00 AM Ines Rosario MD [facility] [phone] [id number] Follow-up Instructions Return in about 6 months (around 3/26/2018). Your Appointments 9/26/2017  9:00 AM  
Follow Up with Ines Rosario MD  
[facility] ([facility]) Appt Note: 6 month follow up  
 [facility] [address]  
  
   
 [address] [place] [address]  
  
    
 12/7/2017 10:00 AM  
Nurse Visit with [facility] [address] ([place]) Appt Note: PSA  
 [address] [facility] [address] [facility] [address]  
  
    
 12/21/2017 10:15 AM  
ESTABLISHED PATIENT with Gurjit Justin MD  
[facility] [address] [place] [address]) Appt Note: 6mo F/u Flow bus ua psa prior [address] [facility] Upcoming Health Maintenance Date Due  
 FOOT EXAM Q1 7[birth year]10/1950 MICROALBUMIN Q1 7/10/1950 EYE EXAM RETINAL OR DILATED Q1 7/10/1950 DTaP/Tdap/Td series (1 - Tdap) 7/10/1961 ZOSTER VACCINE AGE 60> 5/10/2000 GLAUCOMA SCREENING Q2Y 7/10/2005 MEDICARE YEARLY EXAM 7/10/2005 HEMOGLOBIN A1C Q6M 1/7/2017 Pneumococcal 65+ High/Highest Risk (2 of 2 - PPSV23) 3/9/2017 LIPID PANEL Q1 7/6/2017 INFLUENZA AGE 9 TO ADULT 8/1/2017 Allergies as of 9/26/2017  Review Complete On: 9/26/2017 By: Ines Rosario MD  
  
 Severity Noted Reaction Type Reactions Nka [No Known Allergies]  10/07/2012    Other (comments) Current Immunizations  Reviewed on 3/12/2013 No immunizations on file. Not reviewed this visit You Were Diagnosed With   
  
 Codes Comments  Atherosclerosis of native coronary artery of native heart without angina pectoris    -  Primary ICD-10-CM: I25.10 ICD-9-CM: 414.01 stable Essential hypertension     ICD-10-CM: I10 
ICD-9-CM: 401.9 controlled Postsurgical aortocoronary bypass status     ICD-10-CM: Z95.1 ICD-9-CM: V45.81 Chronic diastolic heart failure (HCC)     ICD-10-CM: I50.32 
ICD-9-CM: 428.32 stable S/P coronary artery stent placement     ICD-10-CM: Z95.5 ICD-9-CM: V45.82   
 CKD (chronic kidney disease) stage 4, GFR 15-29 ml/min (HCC)     ICD-10-CM: N18.4 ICD-9-CM: 808. 4 Vitals BP Pulse Height(growth percentile) Weight(growth percentile) BMI Smoking Status 126/60 (!) 56 5' 8\" (1.727 m) 227 lb (103 kg) 34.52 kg/m2 Never Smoker Vitals History BMI and BSA Data Body Mass Index Body Surface Area 34.52 kg/m 2 2.22 m 2 Preferred Pharmacy Pharmacy Name [id number] [address] [phone] Your Updated Medication List  
  
   
This list is accurate as of: 9/26/17  8:30 AM.  Always use your most recent med list.  
  
  
  
  
 allopurinol 100 mg tablet Commonly known as:  Alexandre Riedel Take 100 mg by mouth daily. cloNIDine HCl 0.2 mg tablet Commonly known as:  CATAPRES Take 0.2 mg by mouth four (4) times daily. clopidogrel 75 mg Tab Commonly known as:  PLAVIX Take 1 Tab by mouth daily. DECARA 50,000 unit Cap Generic drug:  Cholecalciferol (Vitamin D3) Take  by mouth every seven (7) days. furosemide 40 mg tablet Commonly known as:  LASIX Take 1 Tab by mouth daily as needed. hydrALAZINE 100 mg tablet Commonly known as:  APRESOLINE Take 1 Tab by mouth three (3) times daily. insulin aspart 100 unit/mL injection Commonly known as:  Bonnee [place] 20 units sq 3 times a day,sliding scale  
  
 insulin glargine 100 unit/mL injection Commonly known as:  LANTUS  
20 Units by SubCUTAneous route every twelve (12) hours. isosorbide mononitrate ER 30 mg tablet Commonly known as:  IMDUR Take 1 Tab by mouth daily. metOLazone 5 mg tablet Commonly known as:  Astrid Hamburger Take  by mouth daily. metoprolol tartrate 100 mg IR tablet Commonly known as:  LOPRESSOR  
TAKE 1 TABLET BY MOUTH TWO TIMES A DAY. NIFEdipine ER 90 mg ER tablet Commonly known as:  PROCARDIA XL Take 1 Tab by mouth daily. nitroglycerin 0.4 mg SL tablet Commonly known as:  NITROSTAT  
1 Tab by SubLINGual route as needed for Chest Pain.  
  
 simvastatin 40 mg tablet Commonly known as:  ZOCOR  
TAKE 1 TABLET BY MOUTH NIGHTLY.  
  
 tamsulosin 0.4 mg capsule Commonly known as:  FLOMAX TAKE ONE CAPSULE BY MOUTH ONCE A DAY Follow-up Instructions Return in about 6 months (around 3/26/2018). To-Do List   
 09/26/2017 Lab:  HEPATIC FUNCTION PANEL   
  
 09/26/2017 Lab:  LIPID PANEL Introducing [facility] SERVICES! [facility] introduces [email or web address] patient portal. Now you can access parts of your medical record, email your doctor's office, and request medication refills online. 1. In your internet browser, go to [email or web address] 2. Click on the First Time User? Click Here link in the Sign In box. You will see the New Member Sign Up page. 3. Enter your [email or web address] Access Code exactly as it appears below. You will not need to use this code after youve completed the sign-up process. If you do not sign up before the expiration date, you must request a new code. · [email or web address] Access Code: [email or web address] Expires: 12/25/2017  8:30 AM 
 
4. Enter the last four digits of your Social Security Number (xxxx) and Date of Birth (mm/dd/yyyy) as indicated and click Submit. You will be taken to the next sign-up page. 5. Create a [email or web address] ID. This will be your [email or web address] login ID and cannot be changed, so think of one that is secure and easy to remember. 6. Create a Ikariat password. You can change your password at any time. 7. Enter your Password Reset Question and Answer. This can be used at a later time if you forget your password. 8. Enter your e-mail address. You will receive e-mail notification when new information is available in [address] 9. Click Sign Up. You can now view and download portions of your medical record. 10. Click the Download Summary menu link to download a portable copy of your medical information. If you have questions, please visit the Frequently Asked Questions section of the [employer] website. Remember, [employer]ua is NOT to be used for urgent needs. For medical emergencies, dial 911. Now available from your iPhone and Android! Please provide this summary of care documentation to your next provider. Your primary care clinician is listed as Brown Santos. If you have any questions after today's visit, please call [phone].

## 2017-09-26 NOTE — LETTER
Jose Guadalupe Aguirreelliot 1940 
 
9/26/2017 Dear MD Stephani Villalobos PA Lollie Abate, MD 
 
I had the pleasure of evaluating  Mr. Jewel Tolliver in office today. Below are the relevant portions of my assessment and plan of care. ICD-10-CM ICD-9-CM 1. Atherosclerosis of native coronary artery of native heart without angina pectoris I25.10 414.01 HEPATIC FUNCTION PANEL  
   LIPID PANEL  
 stable 2. Essential hypertension I10 401.9   
 controlled 3. Postsurgical aortocoronary bypass status Z95.1 V45.81   
4. Chronic diastolic heart failure (HCC) I50.32 428.32   
 stable 5. S/P coronary artery stent placement Z95.5 V45.82 6. CKD (chronic kidney disease) stage 4, GFR 15-29 ml/min ([facility]) N18.4 585.4 Current Outpatient Prescriptions Medication Sig Dispense Refill  metOLazone (ZAROXOLYN) 5 mg tablet Take  by mouth daily.  simvastatin (ZOCOR) 40 mg tablet TAKE 1 TABLET BY MOUTH NIGHTLY. 90 Tab 2  
 metoprolol tartrate (LOPRESSOR) 100 mg IR tablet TAKE 1 TABLET BY MOUTH TWO TIMES A DAY. 180 Tab 2  clopidogrel (PLAVIX) 75 mg tab Take 1 Tab by mouth daily. 90 Tab 3  
 tamsulosin (FLOMAX) 0.4 mg capsule TAKE ONE CAPSULE BY MOUTH ONCE A DAY 90 Cap 2  Cholecalciferol, Vitamin D3, (DECARA) 50,000 unit cap Take  by mouth every seven (7) days.  isosorbide mononitrate ER (IMDUR) 30 mg tablet Take 1 Tab by mouth daily. 90 Tab 3  furosemide (LASIX) 40 mg tablet Take 1 Tab by mouth daily as needed. (Patient taking differently: Take 80 mg by mouth daily as needed.) 90 Tab 3  
 hydrALAZINE (APRESOLINE) 100 mg tablet Take 1 Tab by mouth three (3) times daily. (Patient taking differently: Take 50 mg by mouth three (3) times daily.) 90 Tab 2  
 NIFEdipine ER (PROCARDIA XL) 90 mg ER tablet Take 1 Tab by mouth daily. 30 Tab 2  
 insulin glargine (LANTUS) 100 unit/mL injection 20 Units by SubCUTAneous route every twelve (12) hours.  1 Vial 2  
  nitroglycerin (NITROSTAT) 0.4 mg SL tablet 1 Tab by SubLINGual route as needed for Chest Pain. 25 Tab 1  
 insulin aspart (NOVOLOG) 100 unit/mL injection 20 units sq 3 times a day,sliding scale 10 mL 0  
 allopurinol (ZYLOPRIM) 100 mg tablet Take 100 mg by mouth daily.  cloNIDine (CATAPRESS) 0.2 mg tablet Take 0.2 mg by mouth four (4) times daily. Orders Placed This Encounter  HEPATIC FUNCTION PANEL Standing Status:   Future Standing Expiration Date:   3/27/2018  LIPID PANEL Standing Status:   Future Standing Expiration Date:   3/27/2018  metOLazone (ZAROXOLYN) 5 mg tablet Sig: Take  by mouth daily. If you have questions, please do not hesitate to call me. I look forward to following Mr. Magno Wiseman along with you. Sincerely, Ericka Ogden MD

## 2017-09-26 NOTE — PROGRESS NOTES
1. Have you been to the ER, urgent care clinic since your last visit? Hospitalized since your last visit? Yes, [facility] blood vessel burst d/t chemo    2. Have you seen or consulted any other health care providers outside of the [facility] since your last visit? Include any pap smears or colon screening.  Yes, [facility] doctor

## 2017-09-26 NOTE — PROGRESS NOTES
HISTORY OF PRESENT ILLNESS  Valdez Yang is a 68 y.o. male. HPI Comments: Patient with cad,chf,post cabg and pci  . recent admission with chf,acs,non q mi  Had vg to dia pci        CHF   The history is provided by the patient. This is a chronic problem. The problem occurs constantly. The problem has been gradually improving. Pertinent negatives include no chest pain, no abdominal pain, no headaches and no shortness of breath. The symptoms are aggravated by exertion. Hypertension   The history is provided by the patient. This is a chronic problem. The problem occurs constantly. Pertinent negatives include no chest pain, no abdominal pain, no headaches and no shortness of breath. Shortness of Breath   The history is provided by the patient. This is a recurrent problem. The problem occurs intermittently. The current episode started more than 1 week ago. Pertinent negatives include no fever, no headaches, no cough, no sputum production, no hemoptysis, no wheezing, no PND, no orthopnea, no chest pain, no vomiting, no abdominal pain, no rash, no leg swelling and no claudication. The problem's precipitants include exercise (>30 mts of treadmill). Review of Systems   Constitutional: Negative for chills and fever. HENT: Negative for nosebleeds. Eyes: Negative for blurred vision and double vision. Respiratory: Negative for cough, hemoptysis, sputum production, shortness of breath and wheezing. Cardiovascular: Negative for chest pain, palpitations, orthopnea, claudication, leg swelling and PND. Gastrointestinal: Negative for abdominal pain, heartburn, nausea and vomiting. Musculoskeletal: Negative for myalgias. Skin: Negative for rash. Neurological: Negative for dizziness, weakness and headaches. Endo/Heme/Allergies: Does not bruise/bleed easily.      Family History   Problem Relation Age of Onset    Heart Attack Father 64       Past Medical History:   Diagnosis Date    Atherosclerosis of renal artery (HCC)     S/P Rt stent    CAD (coronary artery disease) , 1997, 2012    ,double bypass, 2 stents, 2stents 7/2016    [facility]     prostate    CHF (congestive heart failure) (HCC)     Chronic diastolic heart failure (HCC)     Chronic kidney disease (CKD), stage IV (severe) (HCC)     Constipation     Coronary atherosclerosis of unspecified type of vessel, native or graft     Stable angina after recent PCI continue treatment.  CRI (chronic renal insufficiency)     Diabetes ([facility] 1973    type 2    Essential hypertension, benign     GERD (gastroesophageal reflux disease)     Gout     Hypertension 1982    Morbid obesity ([facility]     Weight loss has been strongly encouraged by following dietary restrictions and an exercise routine    APRYL (obstructive sleep apnea) 6/26/2015    no cpap    Other and unspecified hyperlipidemia     HDL's are not at goal, LDL's are at goal, triglycerides are not at goal.    Other and unspecified hyperlipidemia     HDL's are not at goal, LDL's are at goal, triglycerides are not at goal.     Other ill-defined conditions 1960    pneumonia twice    Sleep disturbance     Type II or unspecified type diabetes mellitus without mention of complication, not stated as uncontrolled        Past Surgical History:   Procedure Laterality Date   [facility] 635    lt. carotid endart.  COLONOSCOPY N/A 6/23/2017    COLONOSCOPY w/ APC w/ polypectomies performed by Vergie Paget, MD at [address] HX CHOLECYSTECTOMY      HX CORONARY ARTERY BYPASS GRAFT  2000    x2    HX HEENT  1997    cholecystectomy    HX UROLOGICAL  1998    renal art. surg       Allergies   Allergen Reactions    Nka [No Known Allergies] Other (comments)       Current Outpatient Prescriptions   Medication Sig    metOLazone (ZAROXOLYN) 5 mg tablet Take  by mouth daily.  simvastatin (ZOCOR) 40 mg tablet TAKE 1 TABLET BY MOUTH NIGHTLY.     metoprolol tartrate (LOPRESSOR) 100 mg IR tablet TAKE 1 TABLET BY MOUTH TWO TIMES A DAY.  clopidogrel (PLAVIX) 75 mg tab Take 1 Tab by mouth daily.  tamsulosin (FLOMAX) 0.4 mg capsule TAKE ONE CAPSULE BY MOUTH ONCE A DAY    Cholecalciferol, Vitamin D3, (DECARA) 50,000 unit cap Take  by mouth every seven (7) days.  isosorbide mononitrate ER (IMDUR) 30 mg tablet Take 1 Tab by mouth daily.  furosemide (LASIX) 40 mg tablet Take 1 Tab by mouth daily as needed. (Patient taking differently: Take 80 mg by mouth daily as needed.)    hydrALAZINE (APRESOLINE) 100 mg tablet Take 1 Tab by mouth three (3) times daily. (Patient taking differently: Take 50 mg by mouth three (3) times daily.)    NIFEdipine ER (PROCARDIA XL) 90 mg ER tablet Take 1 Tab by mouth daily.  insulin glargine (LANTUS) 100 unit/mL injection 20 Units by SubCUTAneous route every twelve (12) hours.  nitroglycerin (NITROSTAT) 0.4 mg SL tablet 1 Tab by SubLINGual route as needed for Chest Pain.  insulin aspart (NOVOLOG) 100 unit/mL injection 20 units sq 3 times a day,sliding scale    allopurinol (ZYLOPRIM) 100 mg tablet Take 100 mg by mouth daily.  cloNIDine (CATAPRESS) 0.2 mg tablet Take 0.2 mg by mouth four (4) times daily. No current facility-administered medications for this visit. Visit Vitals    /60    Pulse (!) 56    Ht 5' 8\" (1.727 m)    Wt 103 kg (227 lb)    BMI 34.52 kg/m2         Physical Exam   Constitutional: He is oriented to person, place, and time. He appears well-developed and well-nourished. obese   HENT:   Head: Normocephalic and atraumatic. Eyes: Conjunctivae are normal.   Neck: Neck supple. No JVD present. No tracheal deviation present. No thyromegaly present. Cardiovascular: Normal rate, regular rhythm and normal heart sounds. Exam reveals no gallop and no friction rub. No murmur heard. Pulmonary/Chest: Breath sounds normal. No respiratory distress. He has no wheezes. He has no rales. He exhibits no tenderness. Abdominal: Soft. There is no tenderness. Musculoskeletal: He exhibits no edema. Neurological: He is alert and oriented to person, place, and time. Skin: Skin is warm and dry. Psychiatric: He has a normal mood and affect. Mr. Isael Mercedes has a reminder for a \"due or due soon\" health maintenance. I have asked that he contact his primary care provider for follow-up on this health maintenance. CARDIOLOGY STUDIES 10/8/2012   Cardiac Cath Result PATENT LIMA TO LAD, SVG TO D1 SUBTOTAL DIFFUSE, POST PCI WITH STENTS,HAD PROXIMAL IN STENT PROTRUSION UNABLE TO CROSS WITH BALLONS,D1 DIFFISE SEVERE   Echocardiogram - Complete Result NORMAL EF 70%     SUMMARY:echo:11/2014  Left ventricle: Systolic function was hyperdynamic. Ejection fraction was  estimated in the range of 70 % to 75 %. No obvious wall motion  abnormalities identified in the views obtained. Near cavity obliteration  seen in the mid to apical portions of the ventricular cavity. There was  mild concentric hypertrophy. Features were consistent with a pseudonormal  left ventricular filling pattern, with concomitant abnormal relaxation and  increased filling pressure (grade 2 diastolic dysfunction). NUCLEAR IMAGING:    Findings: stress test:11/2014  1. Post-stress imaging in short axis, horizontal and vertical long axis views reveals normal isotope uptake in all areas. 2. Resting images also show normal isotope uptake in all areas. 3. Gated images show normal left ventricular size, wall motion and systolic function. Ejection fraction is 85%. Diagnosis:   1. Normal scan. 2. No evidence of significant fixed or reversible defect suggesting ischemia or myocardial infarction noted from this nuclear study. 3. low risk scan.  7/2016-pci  vg to [address] [place] [address] ICD-9-CM    1. Atherosclerosis of native coronary artery of native heart without angina pectoris I25.10 414.01 HEPATIC FUNCTION PANEL      LIPID PANEL    stable   2.  Essential hypertension I10 401.9     controlled   3. Postsurgical aortocoronary bypass status Z95.1 V45.81    4. Chronic diastolic heart failure (HCC) I50.32 428.32     stable   5. S/P coronary artery stent placement Z95.5 V45.82    6. CKD (chronic kidney disease) stage 4, GFR 15-29 ml/min ([facility]) N18.4 585.4      9/2017-asa stopped-continue plavix-last stent 7/2016  Medications Discontinued During This Encounter   Medication Reason    aspirin delayed-release 81 mg tablet Therapy Completed       Orders Placed This Encounter    HEPATIC FUNCTION PANEL     Standing Status:   Future     Standing Expiration Date:   3/27/2018    LIPID PANEL     Standing Status:   Future     Standing Expiration Date:   3/27/2018       Follow-up Disposition:  Return in about 6 months (around 3/26/2018).

## 2017-10-10 ENCOUNTER — TELEPHONE (OUTPATIENT)
Dept: CARDIOLOGY CLINIC | Age: 77
End: 2017-10-10

## 2017-10-10 DIAGNOSIS — E78.5 DYSLIPIDEMIA: Primary | ICD-10-CM

## 2017-10-10 RX ORDER — FENOFIBRATE 54 MG/1
54 TABLET ORAL DAILY
Qty: 30 TAB | Refills: 5 | Status: SHIPPED | OUTPATIENT
Start: 2017-10-10 | End: 2018-04-01 | Stop reason: SDUPTHER

## 2017-10-10 NOTE — TELEPHONE ENCOUNTER
Patient called and spoke with wife. He will begin fenofibrate 54mg and have repeat labs in 4 weeks. She voices understanding and acceptance of this advice and will call back if any further questions or concerns.

## 2017-10-11 DIAGNOSIS — I25.10 ATHEROSCLEROSIS OF NATIVE CORONARY ARTERY OF NATIVE HEART WITHOUT ANGINA PECTORIS: ICD-10-CM

## 2017-10-23 ENCOUNTER — APPOINTMENT (OUTPATIENT)
Dept: GENERAL RADIOLOGY | Age: 77
DRG: 281 | End: 2017-10-23
Attending: EMERGENCY MEDICINE
Payer: MEDICARE

## 2017-10-23 ENCOUNTER — HOSPITAL ENCOUNTER (INPATIENT)
Age: 77
LOS: 2 days | Discharge: HOME | DRG: 281 | End: 2017-10-25
Attending: EMERGENCY MEDICINE | Admitting: INTERNAL MEDICINE
Payer: MEDICARE

## 2017-10-23 DIAGNOSIS — I21.4 NSTEMI (NON-ST ELEVATED MYOCARDIAL INFARCTION) (HCC): Primary | ICD-10-CM

## 2017-10-23 LAB
ALBUMIN SERPL-MCNC: 2.8 G/DL (ref 3.4–5)
ALBUMIN SERPL-MCNC: 3.3 G/DL (ref 3.4–5)
ALBUMIN/GLOB SERPL: 0.7 {RATIO} (ref 0.8–1.7)
ALBUMIN/GLOB SERPL: 0.8 {RATIO} (ref 0.8–1.7)
ALP SERPL-CCNC: 116 U/L (ref 45–117)
ALP SERPL-CCNC: 90 U/L (ref 45–117)
ALT SERPL-CCNC: 18 U/L (ref 16–61)
ALT SERPL-CCNC: 21 U/L (ref 16–61)
ANION GAP SERPL CALC-SCNC: 8 MMOL/L (ref 3–18)
ANION GAP SERPL CALC-SCNC: 8 MMOL/L (ref 3–18)
APTT PPP: 26.5 SEC (ref 23–36.4)
APTT PPP: 50.9 SEC (ref 23–36.4)
AST SERPL-CCNC: 23 U/L (ref 15–37)
AST SERPL-CCNC: 46 U/L (ref 15–37)
BASOPHILS # BLD: 0 K/UL (ref 0–0.1)
BASOPHILS NFR BLD: 0 % (ref 0–2)
BILIRUB DIRECT SERPL-MCNC: <0.1 MG/DL (ref 0–0.2)
BILIRUB SERPL-MCNC: 0.4 MG/DL (ref 0.2–1)
BILIRUB SERPL-MCNC: 0.4 MG/DL (ref 0.2–1)
BNP SERPL-MCNC: 765 PG/ML (ref 0–1800)
BUN SERPL-MCNC: 88 MG/DL (ref 7–18)
BUN SERPL-MCNC: 88 MG/DL (ref 7–18)
BUN/CREAT SERPL: 19 (ref 12–20)
BUN/CREAT SERPL: 19 (ref 12–20)
CALCIUM SERPL-MCNC: 8.8 MG/DL (ref 8.5–10.1)
CALCIUM SERPL-MCNC: 9.1 MG/DL (ref 8.5–10.1)
CHLORIDE SERPL-SCNC: 99 MMOL/L (ref 100–108)
CHLORIDE SERPL-SCNC: 99 MMOL/L (ref 100–108)
CK MB CFR SERPL CALC: 4.9 % (ref 0–4)
CK MB CFR SERPL CALC: 7.1 % (ref 0–4)
CK MB SERPL-MCNC: 15.3 NG/ML (ref 5–25)
CK MB SERPL-MCNC: 8.3 NG/ML (ref 5–25)
CK SERPL-CCNC: 171 U/L (ref 39–308)
CK SERPL-CCNC: 214 U/L (ref 39–308)
CO2 SERPL-SCNC: 30 MMOL/L (ref 21–32)
CO2 SERPL-SCNC: 30 MMOL/L (ref 21–32)
CREAT SERPL-MCNC: 4.61 MG/DL (ref 0.6–1.3)
CREAT SERPL-MCNC: 4.68 MG/DL (ref 0.6–1.3)
DIFFERENTIAL METHOD BLD: ABNORMAL
EOSINOPHIL # BLD: 0.2 K/UL (ref 0–0.4)
EOSINOPHIL NFR BLD: 2 % (ref 0–5)
ERYTHROCYTE [DISTWIDTH] IN BLOOD BY AUTOMATED COUNT: 13.4 % (ref 11.6–14.5)
ERYTHROCYTE [DISTWIDTH] IN BLOOD BY AUTOMATED COUNT: 13.5 % (ref 11.6–14.5)
EST. AVERAGE GLUCOSE BLD GHB EST-MCNC: 192 MG/DL
GLOBULIN SER CALC-MCNC: 4.1 G/DL (ref 2–4)
GLOBULIN SER CALC-MCNC: 4.2 G/DL (ref 2–4)
GLUCOSE BLD STRIP.AUTO-MCNC: 129 MG/DL (ref 70–110)
GLUCOSE BLD STRIP.AUTO-MCNC: 171 MG/DL (ref 70–110)
GLUCOSE BLD STRIP.AUTO-MCNC: 181 MG/DL (ref 70–110)
GLUCOSE SERPL-MCNC: 163 MG/DL (ref 74–99)
GLUCOSE SERPL-MCNC: 175 MG/DL (ref 74–99)
HBA1C MFR BLD: 8.3 % (ref 4.2–5.6)
HCT VFR BLD AUTO: 31.7 % (ref 36–48)
HCT VFR BLD AUTO: 34 % (ref 36–48)
HGB BLD-MCNC: 10.3 G/DL (ref 13–16)
HGB BLD-MCNC: 11 G/DL (ref 13–16)
INR PPP: 1 (ref 0.8–1.2)
LYMPHOCYTES # BLD: 1.8 K/UL (ref 0.9–3.6)
LYMPHOCYTES NFR BLD: 18 % (ref 21–52)
MCH RBC QN AUTO: 28.3 PG (ref 24–34)
MCH RBC QN AUTO: 28.5 PG (ref 24–34)
MCHC RBC AUTO-ENTMCNC: 32.4 G/DL (ref 31–37)
MCHC RBC AUTO-ENTMCNC: 32.5 G/DL (ref 31–37)
MCV RBC AUTO: 87.4 FL (ref 74–97)
MCV RBC AUTO: 87.6 FL (ref 74–97)
MONOCYTES # BLD: 0.5 K/UL (ref 0.05–1.2)
MONOCYTES NFR BLD: 5 % (ref 3–10)
NEUTS SEG # BLD: 7.4 K/UL (ref 1.8–8)
NEUTS SEG NFR BLD: 75 % (ref 40–73)
PLATELET # BLD AUTO: 225 K/UL (ref 135–420)
PLATELET # BLD AUTO: 249 K/UL (ref 135–420)
PMV BLD AUTO: 10.8 FL (ref 9.2–11.8)
PMV BLD AUTO: 10.9 FL (ref 9.2–11.8)
POTASSIUM SERPL-SCNC: 3.4 MMOL/L (ref 3.5–5.5)
POTASSIUM SERPL-SCNC: 3.5 MMOL/L (ref 3.5–5.5)
PROT SERPL-MCNC: 7 G/DL (ref 6.4–8.2)
PROT SERPL-MCNC: 7.4 G/DL (ref 6.4–8.2)
PROTHROMBIN TIME: 12.5 SEC (ref 11.5–15.2)
RBC # BLD AUTO: 3.62 M/UL (ref 4.7–5.5)
RBC # BLD AUTO: 3.89 M/UL (ref 4.7–5.5)
SODIUM SERPL-SCNC: 137 MMOL/L (ref 136–145)
SODIUM SERPL-SCNC: 137 MMOL/L (ref 136–145)
T4 FREE SERPL-MCNC: 1.1 NG/DL (ref 0.7–1.5)
TROPONIN I SERPL-MCNC: 0.69 NG/ML (ref 0–0.04)
TROPONIN I SERPL-MCNC: 8.41 NG/ML (ref 0–0.04)
TSH SERPL DL<=0.05 MIU/L-ACNC: 2.16 UIU/ML (ref 0.36–3.74)
WBC # BLD AUTO: 10 K/UL (ref 4.6–13.2)
WBC # BLD AUTO: 8.8 K/UL (ref 4.6–13.2)

## 2017-10-23 PROCEDURE — 74011250636 HC RX REV CODE- 250/636: Performed by: EMERGENCY MEDICINE

## 2017-10-23 PROCEDURE — 74011250637 HC RX REV CODE- 250/637: Performed by: INTERNAL MEDICINE

## 2017-10-23 PROCEDURE — 85730 THROMBOPLASTIN TIME PARTIAL: CPT | Performed by: INTERNAL MEDICINE

## 2017-10-23 PROCEDURE — 80053 COMPREHEN METABOLIC PANEL: CPT | Performed by: EMERGENCY MEDICINE

## 2017-10-23 PROCEDURE — 83880 ASSAY OF NATRIURETIC PEPTIDE: CPT | Performed by: EMERGENCY MEDICINE

## 2017-10-23 PROCEDURE — 84443 ASSAY THYROID STIM HORMONE: CPT | Performed by: INTERNAL MEDICINE

## 2017-10-23 PROCEDURE — 80076 HEPATIC FUNCTION PANEL: CPT | Performed by: INTERNAL MEDICINE

## 2017-10-23 PROCEDURE — 94762 N-INVAS EAR/PLS OXIMTRY CONT: CPT

## 2017-10-23 PROCEDURE — 99285 EMERGENCY DEPT VISIT HI MDM: CPT

## 2017-10-23 PROCEDURE — 82962 GLUCOSE BLOOD TEST: CPT

## 2017-10-23 PROCEDURE — 96374 THER/PROPH/DIAG INJ IV PUSH: CPT

## 2017-10-23 PROCEDURE — 85025 COMPLETE CBC W/AUTO DIFF WBC: CPT | Performed by: EMERGENCY MEDICINE

## 2017-10-23 PROCEDURE — 85027 COMPLETE CBC AUTOMATED: CPT | Performed by: INTERNAL MEDICINE

## 2017-10-23 PROCEDURE — 85610 PROTHROMBIN TIME: CPT | Performed by: INTERNAL MEDICINE

## 2017-10-23 PROCEDURE — 71010 XR CHEST PORT: CPT

## 2017-10-23 PROCEDURE — 74011636637 HC RX REV CODE- 636/637: Performed by: INTERNAL MEDICINE

## 2017-10-23 PROCEDURE — 82550 ASSAY OF CK (CPK): CPT | Performed by: EMERGENCY MEDICINE

## 2017-10-23 PROCEDURE — 96375 TX/PRO/DX INJ NEW DRUG ADDON: CPT

## 2017-10-23 PROCEDURE — 80048 BASIC METABOLIC PNL TOTAL CA: CPT | Performed by: INTERNAL MEDICINE

## 2017-10-23 PROCEDURE — 93005 ELECTROCARDIOGRAM TRACING: CPT

## 2017-10-23 PROCEDURE — 84439 ASSAY OF FREE THYROXINE: CPT | Performed by: INTERNAL MEDICINE

## 2017-10-23 PROCEDURE — 65270000029 HC RM PRIVATE

## 2017-10-23 PROCEDURE — 85730 THROMBOPLASTIN TIME PARTIAL: CPT | Performed by: EMERGENCY MEDICINE

## 2017-10-23 PROCEDURE — 83036 HEMOGLOBIN GLYCOSYLATED A1C: CPT | Performed by: INTERNAL MEDICINE

## 2017-10-23 RX ORDER — ONDANSETRON 2 MG/ML
4 INJECTION INTRAMUSCULAR; INTRAVENOUS
Status: COMPLETED | OUTPATIENT
Start: 2017-10-23 | End: 2017-10-23

## 2017-10-23 RX ORDER — CLONIDINE HYDROCHLORIDE 0.1 MG/1
0.2 TABLET ORAL 4 TIMES DAILY
Status: DISCONTINUED | OUTPATIENT
Start: 2017-10-23 | End: 2017-10-25

## 2017-10-23 RX ORDER — HYDRALAZINE HYDROCHLORIDE 50 MG/1
50 TABLET, FILM COATED ORAL 3 TIMES DAILY
Status: DISCONTINUED | OUTPATIENT
Start: 2017-10-23 | End: 2017-10-25

## 2017-10-23 RX ORDER — TAMSULOSIN HYDROCHLORIDE 0.4 MG/1
0.4 CAPSULE ORAL DAILY
Status: DISCONTINUED | OUTPATIENT
Start: 2017-10-24 | End: 2017-10-25 | Stop reason: HOSPADM

## 2017-10-23 RX ORDER — DEXTROSE 50 % IN WATER (D50W) INTRAVENOUS SYRINGE
25-50 AS NEEDED
Status: DISCONTINUED | OUTPATIENT
Start: 2017-10-23 | End: 2017-10-25 | Stop reason: HOSPADM

## 2017-10-23 RX ORDER — MORPHINE SULFATE 2 MG/ML
2 INJECTION, SOLUTION INTRAMUSCULAR; INTRAVENOUS
Status: COMPLETED | OUTPATIENT
Start: 2017-10-23 | End: 2017-10-23

## 2017-10-23 RX ORDER — INSULIN LISPRO 100 [IU]/ML
INJECTION, SOLUTION INTRAVENOUS; SUBCUTANEOUS
Status: DISCONTINUED | OUTPATIENT
Start: 2017-10-23 | End: 2017-10-25

## 2017-10-23 RX ORDER — HEPARIN SODIUM 1000 [USP'U]/ML
4000 INJECTION, SOLUTION INTRAVENOUS; SUBCUTANEOUS ONCE
Status: DISCONTINUED | OUTPATIENT
Start: 2017-10-23 | End: 2017-10-23

## 2017-10-23 RX ORDER — HEPARIN SODIUM 1000 [USP'U]/ML
60 INJECTION, SOLUTION INTRAVENOUS; SUBCUTANEOUS ONCE
Status: DISCONTINUED | OUTPATIENT
Start: 2017-10-23 | End: 2017-10-23 | Stop reason: CLARIF

## 2017-10-23 RX ORDER — ACETAMINOPHEN 325 MG/1
650 TABLET ORAL
Status: DISCONTINUED | OUTPATIENT
Start: 2017-10-23 | End: 2017-10-25 | Stop reason: HOSPADM

## 2017-10-23 RX ORDER — MAGNESIUM SULFATE 100 %
4 CRYSTALS MISCELLANEOUS AS NEEDED
Status: DISCONTINUED | OUTPATIENT
Start: 2017-10-23 | End: 2017-10-25 | Stop reason: HOSPADM

## 2017-10-23 RX ORDER — METOLAZONE 2.5 MG/1
5 TABLET ORAL DAILY
Status: DISCONTINUED | OUTPATIENT
Start: 2017-10-24 | End: 2017-10-24

## 2017-10-23 RX ORDER — HYDROCODONE BITARTRATE AND ACETAMINOPHEN 5; 325 MG/1; MG/1
1 TABLET ORAL
Status: DISCONTINUED | OUTPATIENT
Start: 2017-10-23 | End: 2017-10-25

## 2017-10-23 RX ORDER — INSULIN GLARGINE 100 [IU]/ML
20 INJECTION, SOLUTION SUBCUTANEOUS EVERY 12 HOURS
Status: DISCONTINUED | OUTPATIENT
Start: 2017-10-23 | End: 2017-10-25 | Stop reason: HOSPADM

## 2017-10-23 RX ORDER — SODIUM CHLORIDE 0.9 % (FLUSH) 0.9 %
5-10 SYRINGE (ML) INJECTION AS NEEDED
Status: DISCONTINUED | OUTPATIENT
Start: 2017-10-23 | End: 2017-10-25 | Stop reason: HOSPADM

## 2017-10-23 RX ORDER — ADHESIVE BANDAGE
30 BANDAGE TOPICAL DAILY PRN
Status: DISCONTINUED | OUTPATIENT
Start: 2017-10-23 | End: 2017-10-25 | Stop reason: HOSPADM

## 2017-10-23 RX ORDER — ISOSORBIDE MONONITRATE 30 MG/1
30 TABLET, EXTENDED RELEASE ORAL DAILY
Status: DISCONTINUED | OUTPATIENT
Start: 2017-10-24 | End: 2017-10-25 | Stop reason: HOSPADM

## 2017-10-23 RX ORDER — CLOPIDOGREL BISULFATE 75 MG/1
75 TABLET ORAL DAILY
Status: DISCONTINUED | OUTPATIENT
Start: 2017-10-24 | End: 2017-10-25 | Stop reason: HOSPADM

## 2017-10-23 RX ORDER — NIFEDIPINE 90 MG/1
90 TABLET, EXTENDED RELEASE ORAL DAILY
Status: DISCONTINUED | OUTPATIENT
Start: 2017-10-24 | End: 2017-10-25 | Stop reason: HOSPADM

## 2017-10-23 RX ORDER — METOPROLOL TARTRATE 50 MG/1
100 TABLET ORAL 2 TIMES DAILY
Status: DISCONTINUED | OUTPATIENT
Start: 2017-10-23 | End: 2017-10-25 | Stop reason: HOSPADM

## 2017-10-23 RX ORDER — DOCUSATE SODIUM 100 MG/1
100 CAPSULE, LIQUID FILLED ORAL 2 TIMES DAILY
Status: DISCONTINUED | OUTPATIENT
Start: 2017-10-23 | End: 2017-10-25

## 2017-10-23 RX ORDER — HEPARIN SODIUM 10000 [USP'U]/100ML
9.708-25 INJECTION, SOLUTION INTRAVENOUS
Status: DISCONTINUED | OUTPATIENT
Start: 2017-10-23 | End: 2017-10-25

## 2017-10-23 RX ORDER — HYDROMORPHONE HYDROCHLORIDE 1 MG/ML
1 INJECTION, SOLUTION INTRAMUSCULAR; INTRAVENOUS; SUBCUTANEOUS
Status: DISCONTINUED | OUTPATIENT
Start: 2017-10-23 | End: 2017-10-25

## 2017-10-23 RX ORDER — FUROSEMIDE 40 MG/1
80 TABLET ORAL
Status: DISCONTINUED | OUTPATIENT
Start: 2017-10-23 | End: 2017-10-25 | Stop reason: HOSPADM

## 2017-10-23 RX ORDER — SODIUM CHLORIDE 0.9 % (FLUSH) 0.9 %
5-10 SYRINGE (ML) INJECTION EVERY 8 HOURS
Status: DISCONTINUED | OUTPATIENT
Start: 2017-10-23 | End: 2017-10-25 | Stop reason: HOSPADM

## 2017-10-23 RX ORDER — HEPARIN SODIUM 10000 [USP'U]/100ML
9.7-25 INJECTION, SOLUTION INTRAVENOUS
Status: DISCONTINUED | OUTPATIENT
Start: 2017-10-23 | End: 2017-10-23

## 2017-10-23 RX ORDER — SIMVASTATIN 40 MG/1
40 TABLET, FILM COATED ORAL
Status: DISCONTINUED | OUTPATIENT
Start: 2017-10-23 | End: 2017-10-25 | Stop reason: HOSPADM

## 2017-10-23 RX ORDER — HEPARIN SODIUM 1000 [USP'U]/ML
38.8 INJECTION, SOLUTION INTRAVENOUS; SUBCUTANEOUS ONCE
Status: COMPLETED | OUTPATIENT
Start: 2017-10-23 | End: 2017-10-23

## 2017-10-23 RX ADMIN — Medication 2 MG: at 15:50

## 2017-10-23 RX ADMIN — DOCUSATE SODIUM 100 MG: 100 CAPSULE, LIQUID FILLED ORAL at 20:52

## 2017-10-23 RX ADMIN — HYDRALAZINE HYDROCHLORIDE 50 MG: 50 TABLET, FILM COATED ORAL at 21:33

## 2017-10-23 RX ADMIN — SIMVASTATIN 40 MG: 40 TABLET, FILM COATED ORAL at 21:30

## 2017-10-23 RX ADMIN — INSULIN GLARGINE 20 UNITS: 100 INJECTION, SOLUTION SUBCUTANEOUS at 20:45

## 2017-10-23 RX ADMIN — HEPARIN SODIUM AND DEXTROSE 9.71 UNITS/KG/HR: 10000; 5 INJECTION INTRAVENOUS at 16:40

## 2017-10-23 RX ADMIN — HEPARIN SODIUM 4000 UNITS: 1000 INJECTION, SOLUTION INTRAVENOUS; SUBCUTANEOUS at 16:36

## 2017-10-23 RX ADMIN — CLONIDINE HYDROCHLORIDE 0.2 MG: 0.1 TABLET ORAL at 21:30

## 2017-10-23 RX ADMIN — INSULIN LISPRO 2 UNITS: 100 INJECTION, SOLUTION INTRAVENOUS; SUBCUTANEOUS at 21:23

## 2017-10-23 RX ADMIN — METOPROLOL TARTRATE 100 MG: 50 TABLET ORAL at 20:52

## 2017-10-23 RX ADMIN — ONDANSETRON 4 MG: 2 INJECTION INTRAMUSCULAR; INTRAVENOUS at 15:48

## 2017-10-23 NOTE — ED TRIAGE NOTES
Patient's wife brought him into the ER because he was complaining of chest pain. This pain began around 1230 this afternoon. He described it as \"tightness and burning\". At that time, he stated the pain was 6/10 but got better when he took his clonidine for HTN. He says the pain is now 4/10. He denies being dizzy and having nausea, but does say that he was \"sweaty\" before he arrived.

## 2017-10-23 NOTE — IP AVS SNAPSHOT
Chelle Mota 
 
 
 [address] [address] [facility] Giorgi Caicedo MRN: [id number] UMQ:[id number] My Medications STOP taking these medications   
 metOLazone 5 mg tablet Commonly known as:  ZAROXOLYN  
   
  
  
TAKE these medications as instructed Instructions Each Dose to Equal  
 Morning Noon Evening Bedtime  
 allopurinol 100 mg tablet Commonly known as:  Tasha Hutchinson Your last dose was: Your next dose is: Take 100 mg by mouth daily. 100 mg  
    
   
   
   
  
 aspirin 81 mg chewable tablet Start taking on:  10/26/2017 Your last dose was: Your next dose is: Take 1 Tab by mouth daily. 81 mg  
    
   
   
   
  
 b complex-vitamin c-folic acid 1 mg capsule Commonly known as:  Jo Cambric Your last dose was: Your next dose is: Take 1 Cap by mouth daily. 1 Cap  
    
   
   
   
  
 calcitRIOL 0.25 mcg capsule Commonly known as:  ROCALTROL Your last dose was: Your next dose is: Take 1 Cap by mouth every Monday, Wednesday, Friday. 0.25 mcg  
    
   
   
   
  
 cloNIDine HCl 0.2 mg tablet Commonly known as:  CATAPRES Your last dose was: Your next dose is: Take 1 Tab by mouth three (3) times daily. 0.2 mg  
    
   
   
   
  
 clopidogrel 75 mg Tab Commonly known as:  PLAVIX Your last dose was: Your next dose is: Take 1 Tab by mouth daily. 75 mg DECARA 50,000 unit Cap Generic drug:  Cholecalciferol (Vitamin D3) Your last dose was: Your next dose is: Take  by mouth every seven (7) days. fenofibrate 54 mg tablet Commonly known as:  LOFIBRA Your last dose was: Your next dose is: Take 1 Tab by mouth daily.   
 54 mg  
    
   
   
   
  
 furosemide 40 mg tablet Commonly known as:  LASIX Your last dose was: Your next dose is: Take 1 Tab by mouth daily as needed. 40 mg  
    
   
   
   
  
 hydrALAZINE 100 mg tablet Commonly known as:  APRESOLINE Your last dose was: Your next dose is: Take 1 Tab by mouth three (3) times daily. 100 mg  
    
   
   
   
  
 insulin aspart 100 unit/mL injection Commonly known as:  Oumar Dillard Your last dose was: Your next dose is:    
   
   
 20 units sq 3 times a day,sliding scale  
     
   
   
   
  
 insulin glargine 100 unit/mL injection Commonly known as:  LANTUS Your last dose was: Your next dose is:    
   
   
 20 Units by SubCUTAneous route every twelve (12) hours. 20 Units  
    
   
   
   
  
 isosorbide mononitrate ER 30 mg tablet Commonly known as:  IMDUR Your last dose was: Your next dose is: Take 1 Tab by mouth daily. 30 mg  
    
   
   
   
  
 metoprolol tartrate 100 mg IR tablet Commonly known as:  LOPRESSOR Your last dose was: Your next dose is: TAKE 1 TABLET BY MOUTH TWO TIMES A DAY. NIFEdipine ER 90 mg ER tablet Commonly known as:  PROCARDIA XL Your last dose was: Your next dose is: Take 1 Tab by mouth daily. 90 mg  
    
   
   
   
  
 nitroglycerin 0.4 mg SL tablet Commonly known as:  NITROSTAT Your last dose was: Your next dose is:    
   
   
 1 Tab by SubLINGual route as needed for Chest Pain. 0.4 mg  
    
   
   
   
  
 senna-docusate 8.6-50 mg per tablet Commonly known as:  Lidia Mt Your last dose was: Your next dose is: Take 1 Tab by mouth daily. 1 Tab  
    
   
   
   
  
 simvastatin 40 mg tablet Commonly known as:  ZOCOR Your last dose was: Your next dose is: TAKE 1 TABLET BY MOUTH NIGHTLY.  
     
   
   
   
  
 tamsulosin 0.4 mg capsule Commonly known as:  FLOMAX Your last dose was: Your next dose is: TAKE ONE CAPSULE BY MOUTH ONCE A DAY Where to Get Your Medications Information on where to get these meds will be given to you by the nurse or doctor. ! Ask your nurse or doctor about these medications  
  aspirin 81 mg chewable tablet  
 b complex-vitamin c-folic acid 1 mg capsule  
 calcitRIOL 0.25 mcg capsule  
 cloNIDine HCl 0.2 mg tablet  
 senna-docusate 8.6-50 mg per tablet

## 2017-10-23 NOTE — ED PROVIDER NOTES
HPI Comments: 3:02 PM Whitney Angel is a 68 y.o. male with h/o CHF, CABG, CKD, HTN, DM who presents to ED complaining of left sided  5/10 chest pain with radiation to neck and left arm onset 2 and a half hours ago. Pt states he was had just finished bathing when the pain started, which he describes as tightness and being similar to previous MI's. Pt states his baseline SOB progressively worsened with this episode and that he started sweating. Pt has SHx of CABG with 4 stents and is followed by Dr. Roderick Ackerman, Cardiology. He also reports being recently treated for prostate cancer that he has been getting hormone injections by Dr. Alberto Arzola at [facility] that \"aren't good for the heart\". Pt takes Plavix. Denies Coumadin and Xarelto. Pt states pain has decreased in the ED to a 4/10. Pt denies appetite change, tobacco use, ETOH use, N/V/D. No other concerns or symptoms at this time. PCP: Sebastián Mclaughlin MD      The history is provided by the patient. Past Medical History:   Diagnosis Date    Atherosclerosis of renal artery (HCC)     S/P Rt stent    CAD (coronary artery disease) , 1997, 2012    ,double bypass, 2 stents, 2stents 7/2016    Cancer [facility])     prostate    CHF (congestive heart failure) (HCC)     Chronic diastolic heart failure (HCC)     Chronic kidney disease (CKD), stage IV (severe) (HCC)     Constipation     Coronary atherosclerosis of unspecified type of vessel, native or graft     Stable angina after recent PCI continue treatment.        CRI (chronic renal insufficiency)     Diabetes ([facility] [address] 1973    type 2    Essential hypertension, benign     GERD (gastroesophageal reflux disease)     Gout     Hypertension 1982    Morbid obesity ([facility] [address]     Weight loss has been strongly encouraged by following dietary restrictions and an exercise routine    APRYL (obstructive sleep apnea) 6/26/2015    no cpap    Other and unspecified hyperlipidemia     HDL's are not at goal, LDL's are at goal, triglycerides are not at goal.    Other and unspecified hyperlipidemia     HDL's are not at goal, LDL's are at goal, triglycerides are not at goal.     Other ill-defined conditions 1960    pneumonia twice    Sleep disturbance     Type II or unspecified type diabetes mellitus without mention of complication, not stated as uncontrolled        Past Surgical History:   Procedure Laterality Date   [facility]    lt. carotid endart.  COLONOSCOPY N/A 6/23/2017    COLONOSCOPY w/ APC w/ polypectomies performed by Angelito Jenkins MD at [address] HX CHOLECYSTECTOMY      HX CORONARY ARTERY BYPASS GRAFT  2000    x2    HX HEENT  1997    cholecystectomy    HX UROLOGICAL  1998    renal art. surg         Family History:   Problem Relation Age of Onset    Heart Attack Father 64       Social History     Social History    Marital status:      Spouse name: N/A    Number of children: N/A    Years of education: N/A     Occupational History    Not on file. Social History Main Topics    Smoking status: Never Smoker    Smokeless tobacco: Never Used    Alcohol use No    Drug use: No    Sexual activity: Not on file     Other Topics Concern    Not on file     Social History Narrative         ALLERGIES: Nka [no known allergies]    Review of Systems   Constitutional: Positive for diaphoresis. Negative for chills and fever. HENT: Negative. Negative for congestion, rhinorrhea and sore throat. Eyes: Negative. Negative for pain, discharge and redness. Respiratory: Positive for shortness of breath. Negative for cough, chest tightness and wheezing. Cardiovascular: Positive for chest pain. Gastrointestinal: Negative. Negative for abdominal pain, constipation, diarrhea, nausea and vomiting. Genitourinary: Negative. Negative for dysuria, flank pain, frequency, hematuria and urgency. Musculoskeletal: Positive for neck pain. Negative for back pain.         Positive left arm pain     Skin: Negative. Negative for rash. Neurological: Negative. Negative for syncope, weakness, numbness and headaches. Psychiatric/Behavioral: Negative. All other systems reviewed and are negative. There were no vitals filed for this visit. Physical Exam   Constitutional: He appears well-developed and well-nourished. Non-toxic appearance. He does not have a sickly appearance. He does not appear ill. No distress. HENT:   Head: Normocephalic and atraumatic. Mouth/Throat: Oropharynx is clear and moist. No oropharyngeal exudate. Eyes: Conjunctivae and EOM are normal. Pupils are equal, round, and reactive to light. No scleral icterus. Neck: Normal range of motion. Neck supple. No hepatojugular reflux and no JVD present. No tracheal deviation present. No thyromegaly present. Cardiovascular: Normal rate, regular rhythm, S1 normal, S2 normal, normal heart sounds, intact distal pulses and normal pulses. Exam reveals no gallop, no S3 and no S4. No murmur heard. Pulses:       Radial pulses are 2+ on the right side, and 2+ on the left side. Dorsalis pedis pulses are 2+ on the right side, and 2+ on the left side. Pulmonary/Chest: Effort normal and breath sounds normal. No respiratory distress. He has no decreased breath sounds. He has no wheezes. He has no rhonchi. He has no rales. Abdominal: Soft. Normal appearance and bowel sounds are normal. He exhibits no distension and no mass. There is no hepatosplenomegaly. There is no tenderness. There is no rigidity, no rebound, no guarding, no CVA tenderness, no tenderness at McBurney's point and negative Dumont's sign. Musculoskeletal: Normal range of motion. He exhibits edema (1+ bilateral ). Strength 5/5 throughout    Lymphadenopathy:        Head (right side): No submental, no submandibular, no preauricular and no occipital adenopathy present.         Head (left side): No submental, no submandibular, no preauricular and no occipital adenopathy present. He has no cervical adenopathy. Right: No supraclavicular adenopathy present. Left: No supraclavicular adenopathy present. Neurological: He is alert. He has normal strength and normal reflexes. He is not disoriented. No cranial nerve deficit or sensory deficit. Coordination and gait normal. GCS eye subscore is 4. GCS verbal subscore is 5. GCS motor subscore is 6. Grossly intact    Skin: Skin is warm, dry and intact. No rash noted. He is not diaphoretic. Psychiatric: He has a normal mood and affect. His speech is normal and behavior is normal. Judgment and thought content normal. Cognition and memory are normal.   Nursing note and vitals reviewed. MDM  Number of Diagnoses or Management Options  NSTEMI (non-ST elevated myocardial infarction) [facility]):   Diagnosis management comments: DIFFERENTIAL DIAGNOSES/ MEDICAL DECISION MAKING:  Chest pain etiologies include acute cardiac events to include possible acute myocardial infarction, acute coronary syndrome, pneumonia, chest wall pain (myofascial/ musculoskeletal etiology), chronic obstructive pulmonary disease (copd), acute asthma exacerbation, congestive heart failure, acute bronchitis, pulmonary embolism, upper respiratory infection, referred abdominal pain, other etiologies, versus combination of the above. ED Course       Procedures    Labs essentially normal with the exception of BUN 88 and Creatine 4.68 this is chronic. CKMB 8.3 Troponin 0.69 . Chest X-Ray showed Low lung volumes with bibasilar hypoventilatory changes similar to 6/12/2017. Nodefinite acute abnormalities. EKG showed NSR with rate of 73 bpm. With no ST elevations or depression and non specific T wave changes. 3:51 PM 10/23/2017    Consult:  Discussed care with Dr Augustus Riley, Specialty: Hosptialist  Standard discussion; including history of patients chief complaint, available diagnostic results, and treatment course. Agrees to admit.   4:16 PM, 10/23/2017      Scribe Attestation      Vera [employer] acting as a scribe for and in the presence of Pablo Escalera DO      October 23, 2017 at 3:15 PM       Provider Attestation:      I personally performed the services described in the documentation, reviewed the documentation, as recorded by the scribe in my presence, and it accurately and completely records my words and actions.  October 23, 2017 at 3:15 PM - Pablo Escalera DO

## 2017-10-23 NOTE — IP AVS SNAPSHOT
[address] Patient: Huong Bonner MRN: [id number] About your hospitalization You were admitted on:  October 23, 2017 You last received care in the:  [facility] [address] You were discharged on:  October 25, 2017 Why you were hospitalized Your primary diagnosis was:  Nstemi (Non-St Elevated Myocardial Infarction) ([facility]) Your diagnoses also included:  S/P Coronary Artery Stent Placement, Prostate Cancer (Hcc), Postsurgical Aortocoronary Bypass Status, Neeraj (Obstructive Sleep Apnea), Morbid Obesity (Hcc), Essential Hypertension, Benign, Dyslipidemia, Contrast Dye Induced Nephropathy, Diastolic Chf, Acute On Chronic (Hcc), Dm2 (Diabetes Mellitus, Type 2) (Hcc), Atherosclerosis Of Renal Artery (Hcc), Ckd (Chronic Kidney Disease) Stage 4, Gfr 15-29 Ml/Min (Hcc), Secondary Hyperparathyroidism Of Renal Origin ([facility]) Things You Need To Do (next 8 weeks) Follow up with Luis Toney MD  
  
Phone:  [phone] Where:  [address], [address] [facility] Monday Nov 20, 2017 ESTABLISHED PATIENT with Isabella High MD at  1:00 PM  
Where:  [facility] ([facility]) Thursday Dec 07, 2017 Nurse Visit with SUREKHA at 10:00 AM  
Where:  [facility] [address] [place] [address] Drive ([facility]) Discharge Orders Procedure Order Date Status Priority Quantity Spec Type Associated Dx DIET DIABETIC CONSISTENT CARB Regular; FR 1200ML; 70-70-70 (House); High Pro/Mc Protein 10/25/17 1555 Normal Routine 1 Questions: Texture:  Regular Fluid restriction:  FR 1200ML Renal (Protein/Sodium/Potassium):  70-70-70 (House) Total protein:  High Pro/Mc Protein METABOLIC PANEL, BASIC 63/46/65 1555 Future Routine 1 Blood Comments:  On 10/31/17. Call report to PCP and dr. Katy Tomas Diagnosis; stage 4 CKD A check jose indicates which time of day the medication should be taken. My Medications STOP taking these medications   
 metOLazone 5 mg tablet Commonly known as:  ZAROXOLYN  
   
  
  
TAKE these medications as instructed Instructions Each Dose to Equal  
 Morning Noon Evening Bedtime  
 allopurinol 100 mg tablet Commonly known as:  Iglesia Rader Your last dose was: Your next dose is: Take 100 mg by mouth daily. 100 mg  
    
   
   
   
  
 aspirin 81 mg chewable tablet Start taking on:  10/26/2017 Your last dose was: Your next dose is: Take 1 Tab by mouth daily. 81 mg  
    
   
   
   
  
 b complex-vitamin c-folic acid 1 mg capsule Commonly known as:  Shaun Clay Your last dose was: Your next dose is: Take 1 Cap by mouth daily. 1 Cap  
    
   
   
   
  
 calcitRIOL 0.25 mcg capsule Commonly known as:  ROCALTROL Your last dose was: Your next dose is: Take 1 Cap by mouth every Monday, Wednesday, Friday. 0.25 mcg  
    
   
   
   
  
 cloNIDine HCl 0.2 mg tablet Commonly known as:  CATAPRES Your last dose was: Your next dose is: Take 1 Tab by mouth three (3) times daily. 0.2 mg  
    
   
   
   
  
 clopidogrel 75 mg Tab Commonly known as:  PLAVIX Your last dose was: Your next dose is: Take 1 Tab by mouth daily. 75 mg DECARA 50,000 unit Cap Generic drug:  Cholecalciferol (Vitamin D3) Your last dose was: Your next dose is: Take  by mouth every seven (7) days. fenofibrate 54 mg tablet Commonly known as:  LOFIBRA Your last dose was: Your next dose is: Take 1 Tab by mouth daily. 54 mg  
    
   
   
   
  
 furosemide 40 mg tablet Commonly known as:  LASIX Your last dose was: Your next dose is: Take 1 Tab by mouth daily as needed. 40 mg  
    
   
   
   
  
 hydrALAZINE 100 mg tablet Commonly known as:  APRESOLINE Your last dose was: Your next dose is: Take 1 Tab by mouth three (3) times daily. 100 mg  
    
   
   
   
  
 insulin aspart 100 unit/mL injection Commonly known as:  Chyrel Nilsa Your last dose was: Your next dose is:    
   
   
 20 units sq 3 times a day,sliding scale  
     
   
   
   
  
 insulin glargine 100 unit/mL injection Commonly known as:  LANTUS Your last dose was: Your next dose is:    
   
   
 20 Units by SubCUTAneous route every twelve (12) hours. 20 Units  
    
   
   
   
  
 isosorbide mononitrate ER 30 mg tablet Commonly known as:  IMDUR Your last dose was: Your next dose is: Take 1 Tab by mouth daily. 30 mg  
    
   
   
   
  
 metoprolol tartrate 100 mg IR tablet Commonly known as:  LOPRESSOR Your last dose was: Your next dose is: TAKE 1 TABLET BY MOUTH TWO TIMES A DAY. NIFEdipine ER 90 mg ER tablet Commonly known as:  PROCARDIA XL Your last dose was: Your next dose is: Take 1 Tab by mouth daily. 90 mg  
    
   
   
   
  
 nitroglycerin 0.4 mg SL tablet Commonly known as:  NITROSTAT Your last dose was: Your next dose is:    
   
   
 1 Tab by SubLINGual route as needed for Chest Pain. 0.4 mg  
    
   
   
   
  
 senna-docusate 8.6-50 mg per tablet Commonly known as:  Miachel Huge Your last dose was: Your next dose is: Take 1 Tab by mouth daily. 1 Tab  
    
   
   
   
  
 simvastatin 40 mg tablet Commonly known as:  ZOCOR Your last dose was: Your next dose is: TAKE 1 TABLET BY MOUTH NIGHTLY.  
     
   
   
   
  
 tamsulosin 0.4 mg capsule Commonly known as:  FLOMAX Your last dose was: Your next dose is: TAKE ONE CAPSULE BY MOUTH ONCE A DAY Where to Get Your Medications Information on where to get these meds will be given to you by the nurse or doctor. ! Ask your nurse or doctor about these medications  
  aspirin 81 mg chewable tablet  
 b complex-vitamin c-folic acid 1 mg capsule  
 calcitRIOL 0.25 mcg capsule  
 cloNIDine HCl 0.2 mg tablet  
 senna-docusate 8.6-50 mg per tablet Discharge Instructions Patient armband removed and shredded Discharge medications reviewed with patient and appropriate educational materials and side effects teaching were provided. Taking Aspirin to Prevent Heart Attack and Stroke: Care Instructions Your Care Instructions Aspirin acts as a \"blood thinner. \" It prevents blood clots from forming. When taken during and after a heart attack, it can reduce your chance of dying. And it's used if you have a stent in your coronary artery. Also, aspirin helps certain people lower their risk of a heart attack or stroke. Be sure you know what dose of aspirin to take and how often to take it. Low-dose aspirin is typically 81 mg. But the dose for daily aspirin can range from 81 mg to 325 mg. Taking aspirin every day can cause bleeding. It may not be safe if you have stomach ulcers. And it may not be safe if you have high blood pressure that is not controlled. If you take aspirin pills every day, do not take ones that have other ingredients such as caffeine or sodium. Before you start to take aspirin, tell your doctor all the medicines, vitamins, herbal products, and supplements you take. Follow-up care is a key part of your treatment and safety. Be sure to make and go to all appointments, and call your doctor if you are having problems. It's also a good idea to know your test results and keep a list of the medicines you take. How can you care for yourself at home? · Take aspirin with a full glass of water unless your doctor tells you not to. Do not lie down right after you take it. · If you have a stent in your coronary artery, take your aspirin as your heart doctor says to. If another doctor says to stop taking the aspirin for any reason, talk to your heart doctor before you stop. · Do not chew or crush the coated or sustained-release forms of aspirin. · Ask your doctor if you can drink alcohol while you take aspirin. And ask how much you can drink. Too much alcohol with aspirin can cause stomach bleeding. · Do not take aspirin if you are pregnant, unless your doctor says it is okay. · Keep all aspirin out of children's reach. · Throw aspirin away if it starts to smell like vinegar. · Do not take aspirin if you have gout or if you take prescription blood thinners, unless your doctor has told you to. · Do not take prescription or over-the-counter medicines, vitamins, herbal products, or supplements without talking to your doctor first. Rimma Natalia the label before you take another over-the-counter medicine. Many contain aspirin. So they could cause you to take too much aspirin. · Talk with your doctor before you take a pain medicine. Ask which type of medicine you can take and how to take it safely with aspirin. · Tell your doctor or dentist before a surgery or procedure that you take aspirin. He or she will tell you if you should stop taking aspirin before your surgery or procedure. Make sure that you understand exactly what your doctor wants you to do. Where can you learn more? Go to http://kendal-farzaneh.info/. Enter J950 in the search box to learn more about \"Taking Aspirin to Prevent Heart Attack and Stroke: Care Instructions. \" Current as of: September 21, 2016 Content Version: 11.4 © 0211-4706 [employer].  Care instructions adapted under license by [employer] (which disclaims liability or warranty for this information). If you have questions about a medical condition or this instruction, always ask your healthcare professional. [address] any warranty or liability for your use of this information. Learning About ACE Inhibitors for Heart Failure Introduction ACE (angiotensin-converting enzyme) inhibitors block an enzyme that makes blood vessels narrow. As a result, the blood vessels relax and widen. This lowers blood pressure. These medicines also put more water and salt into the urine. This also lowers blood pressure. In heart failure, your heart does not pump as much blood as your body needs. These medicines: · Make it easier for your heart to pump. · Help reduce symptoms. · Make a heart attack or stroke less likely. Examples These medicines include: · Benazepril (Lotensin). · Lisinopril (Prinivil, Zestril). · Ramipril (Altace). This is not a complete list. 
Possible side effects Side effects may include: · Cough. · Low blood pressure. You may feel dizzy and weak. · High potassium levels. · An allergic reaction. You may have other side effects or reactions not listed here. Check the information that comes with your medicine. What to know about taking this medicine · ACE inhibitors: ¨ Are often used to treat heart failure. They may be the only medicine used if your only symptoms are feeling tired and mildly short of breath with no fluid buildup (edema). But in most other cases, they are used with other medicines. ¨ Can cause a dry cough. If the cough is bad, talk to your doctor. You may need to try a different medicine. ¨ Can relieve symptoms, improve how you feel, and make it less likely you will need to stay in a hospital. And they can reduce the risk of early death. ¨ Can cause an allergic reaction of the skin. Symptoms may range from mild swelling to painful welts. It is rare to have severe swelling that makes it hard to breathe. · Take your medicines exactly as prescribed. Call your doctor if you think you are having a problem with your medicine. · Check with your doctor or pharmacist before you use any other medicines. This includes over-the-counter medicines. Make sure your doctor knows all of the medicines, vitamins, herbal products, and supplements you take. Taking some medicines together can cause problems. · You should not take an ACE inhibitor if you are pregnant or planning to become pregnant. · You may need regular blood tests. Where can you learn more? Go to http://kendal-farzaneh.info/. Enter E518 in the search box to learn more about \"Learning About ACE Inhibitors for Heart Failure. \" Current as of: September 21, 2016 Content Version: 11.4 © 1107-7411 [employer]. Care instructions adapted under license by [employer] (which disclaims liability or warranty for this information). If you have questions about a medical condition or this instruction, always ask your healthcare professional. [place] [address] any warranty or liability for your use of this information. Learning About ACE Inhibitors and ARBs for Diabetes Introduction ACE inhibitors and ARBs are medicines used to control blood pressure. They allow blood vessels to relax and open up. This lowers your blood pressure. When you have diabetes, taking an ACE inhibitor or ARB can help to: · Treat high blood pressure. Your risk of problems from diabetes goes up when you have high blood pressure. · Prevent or slow kidney damage. Diabetes can damage the blood vessels in the kidneys. High blood pressure can damage the kidneys, too. · Lower the risks of stroke and heart attack. Your risks go up when you have high blood pressure, heart disease, or both. An ACE inhibitor or ARB is a good choice for people with diabetes. Unlike some medicines, these don't affect blood sugar levels. Examples ACE inhibitors include: · Benazepril. · Lisinopril. · Ramipril. ARBs include: · Irbesartan. · Losartan. · Telmisartan. Possible side effects All medicines can cause side effects. Some side effects of ACE inhibitors include: 
· Low blood pressure. You may feel dizzy and weak. · A cough. · High potassium levels. · An allergic reaction of the skin. Symptoms may range from mild swelling to painful welts. Some side effects of ARBs include: · Diarrhea. · High potassium levels. · Sinus problems. · Stomach problems. You may have other side effects or reactions not listed here. Check the information that comes with your medicine. What to know about taking this medicine · Be safe with medicines. Take your medicines exactly as prescribed. Call your doctor if you think you are having a problem with your medicine. · Before starting an ACE inhibitor or ARB, tell your doctor if you: ¨ Use a salt substitute. ¨ Take diuretics or potassium tablets. · These medicines are not safe for pregnancy. If you are pregnant or planning to be, talk to your doctor about a safe blood pressure medicine. · ACE inhibitors can cause a dry cough. If the cough is bad, talk to your doctor. Switching to an ARB is likely to help. · Taking some medicines together can cause problems. Tell your doctor or pharmacist all the medicines you take. This includes over-the-counter medicines, vitamins, herbal products, and supplements. · You may need regular blood and urine tests. Where can you learn more? Go to http://kendal-farzaneh.info/. Enter M316 in the search box to learn more about \"Learning About ACE Inhibitors and ARBs for Diabetes. \" Current as of: March 13, 2017 Content Version: 11.4 © 8214-6711 [employer]. Care instructions adapted under license by [employer] (which disclaims liability or warranty for this information).  If you have questions about a medical condition or this instruction, always ask your healthcare professional. [address] any warranty or liability for your use of this information. Deciding About Stopping Dialysis Deciding About Stopping Dialysis What should you know about stopping dialysis? Dialysis is a process that filters waste from your blood when your kidneys can no longer do the job. When you have kidney failure, you may have either hemodialysis or peritoneal dialysis. Most people die within weeks of stopping dialysis. If you decide to stop, health professionals can help you have the highest quality of life possible. These are people who give end-of-life care. This may be done through hospice care. Hospice care offers the chance to think about personal goals. It can relieve pain. And it can help take care of your emotional and spiritual needs. No matter what you decide, take the time to let others know your wishes about your care. You can use a legal document to make sure that you get the medical treatment you want. This is called an advance directive. What are varghese points about this decision? · You may feel better on dialysis than you did before you started treatment. But you may have side effects, such as appetite changes. Or you may start to have other problems. If this is the case, you may feel that continuing treatment is too hard. · If dialysis lets you do the activities you enjoyed before, you may feel that it hasn't changed your daily life that much. You may feel this way even if you can't do all of your old activities. Or you may feel that your quality of life on dialysis is not good. · Your diagnosis of kidney failure may force you to rethink your goals for your future. If you feel that your life has been rewarding and that you have met many goals, you may feel okay about stopping dialysis. But if you have goals you have not yet met, you may want to continue. · Most people die within weeks of stopping dialysis. If you choose to stop, you should be ready to put your personal, financial, and legal affairs in order. You may want to continue dialysis if you aren't ready to face these issues. · Clearly state your wishes to your family. If you decide to stop treatment, will your family understand your reasons? Do they support your decision to continue (or stop) treatment? Why might you choose to stop dialysis? If you have been getting regular dialysis, and if a kidney transplant is not an option for you, stopping dialysis may: · Give you more time each day to spend with friends and family instead of going to treatments. · Allow you to eat and drink what you want in the time you have left. You may welcome this if your diet has been limited while you have been on dialysis. · Relieve worries about paying for dialysis, if you have been concerned. · Reduce problems that come with regular dialysis. These problems may include infection or clotting of the access. · Encourage you to talk with your loved ones about end-of-life goals and wishes. Why might you choose to stay on dialysis? Staying on dialysis may: · Allow you to live longer and have more time with your family and friends. · Give you time to complete your goals and projects. · Help you feel better for as long as possible. You may feel better physically on dialysis than you did before dialysis. · Allow you to do your normal activities. · Give you more time to put your affairs in order. Your decision Thinking about the facts and your feelings can help you make a decision that is right for you. Be sure you understand the benefits and risks of your options, and think about what else you need to do before you make the decision. Where can you learn more? Go to http://kendal-farzaneh.info/. Enter V013 in the search box to learn more about \"Deciding About Stopping Dialysis. \" 
 Current as of: May 12, 2017 Content Version: 11.4 © 4317-3391 Healthwise, [facility]. Care instructions adapted under license by [employer] (which disclaims liability or warranty for this information). If you have questions about a medical condition or this instruction, always ask your healthcare professional. [address] any warranty or liability for your use of this information. ACO Transitions of Care Introducing [employer] [address] offers a voluntary care coordination program to provide high quality service and care to [facility] fee-for-service beneficiaries. Jann Bass was designed to help you enhance your health and well-being through the following services: ? Transitions of Care  support for individuals who are transitioning from one care setting to another (example: Hospital to home). ? Chronic and Complex Care Coordination  support for individuals and caregivers of those with serious or chronic illnesses or with more than one chronic (ongoing) condition and those who take a number of different medications. If you meet specific medical criteria, a [facility] [address] may call you directly to coordinate your care with your primary care physician and your other care providers. For questions about the [facility] programs, please, contact your physicians office. For general questions or additional information about Accountable Care Organizations: 
Please visit www.medicare.gov/acos. html or call 1-800-MEDICARE (4-983.173.2936) TTY users should call 9-136.943.1037. [employer] Announcement We are excited to announce that we are making your provider's discharge notes available to you in MySiteAppt.   You will see these notes when they are completed and signed by the physician that discharged you from your recent hospital stay. If you have any questions or concerns about any information you see in PSafe, please call the Health Information Department where you were seen or reach out to your Primary Care Provider for more information about your plan of care. Introducing [facility]! Daryn Osborn introduces PSafe patient portal. Now you can access parts of your medical record, email your doctor's office, and request medication refills online. 1. In your internet browser, go to [email or web address] mHealth 2. Click on the First Time User? Click Here link in the Sign In box. You will see the New Member Sign Up page. 3. Enter your PSafe Access Code exactly as it appears below. You will not need to use this code after youve completed the sign-up process. If you do not sign up before the expiration date, you must request a new code. · PSafe Access Code: [email or web address] Expires: 12/25/2017  8:30 AM 
 
4. Enter the last four digits of your Social Security Number (xxxx) and Date of Birth (mm/dd/yyyy) as indicated and click Submit. You will be taken to the next sign-up page. 5. Create a PSafe ID. This will be your PSafe login ID and cannot be changed, so think of one that is secure and easy to remember. 6. Create a PSafe password. You can change your password at any time. 7. Enter your Password Reset Question and Answer. This can be used at a later time if you forget your password. 8. Enter your e-mail address. You will receive e-mail notification when new information is available in [address] 9. Click Sign Up. You can now view and download portions of your medical record. 10. Click the Download Summary menu link to download a portable copy of your medical information. If you have questions, please visit the Frequently Asked Questions section of the PSafe website. Remember, PSafe is NOT to be used for urgent needs. For medical emergencies, dial 911. Now available from your iPhone and Android! Providers Seen During Your Hospitalization Provider Specialty Primary office phone Katja Ann DO Emergency Medicine [phone] Migdalia Chavez MD Internal Medicine [phone] Your Primary Care Physician (PCP) Primary Care Physician Office Phone Office Fax Joao Gray [phone] You are allergic to the following Allergen Reactions Nka (No Known Allergies) Other (comments) Recent Documentation Height Weight BMI Smoking Status 1.727 m 100.8 kg 33.79 kg/m2 Never Smoker Emergency Contacts Name Discharge Info Relation Home Work Mobile Lazarus,Winsome DISCHARGE CAREGIVER [3] Spouse [3] [phone] Susanleyda Hashimoto CAREGIVER [3] Daughter [21] [phone] LazarusShivani walker  Son [22] Eda Huston  Spouse [3] [phone] Patient Belongings The following personal items are in your possession at time of discharge: 
  Dental Appliances: None  Visual Aid: None      Home Medications: Kept at bedside   Jewelry: Watch, With patient  Clothing: Undergarments, With patient    Other Valuables: None Please provide this summary of care documentation to your next provider. Signatures-by signing, you are acknowledging that this After Visit Summary has been reviewed with you and you have received a copy. Patient Signature:  ____________________________________________________________ Date:  ____________________________________________________________  
  
Carlos Morrison Provider Signature:  ____________________________________________________________ Date:  ____________________________________________________________

## 2017-10-23 NOTE — ED NOTES
Pt hourly rounding competed. Safety   Pt  (x) resting on stretcher with side rails up and call bell in reach. ( ) in chair   ( ) in parents arms. Toileting   Pt offered   ( )Bedpan   (x)Assistance to Restroom   ( )Urinal   Ongoing Updates   Updated on plan of care and status of test results.    Pain Management   Inquired as to comfort and offered comfort measures:   (x ) warm blankets   (x ) dimmed lights

## 2017-10-23 NOTE — H&P
Hospitalist Admission Note    NAME: Rory Carter   :  1940   MRN:  [id number]     Date/Time of admission:  10/23/2017 4:17 PM    Patient PCP: Bonifacio Lion MD  ________________________________________________________________________    My assessment of this patient's clinical condition and my plan of care is as follows. Assessment / Plan:  1. NSTEMI  2. Acute Kidney Injury on chronic kidney disease IV/V  3. Type 2 DM  4. Chronic diastolic CHF  5. CAD s/p multiple PCI's most recently 2016 during previous MI  6. Benign HTN  7. APRYL  8. H/o prostate CA  9. DELFIN s/p right renal artery stent    1. Admit to tele with heparin gtt, bblockade, ASA, Plavix   2. Hold nephrotoxic medications and consult nephrology to assist in am - (allopurinol) but continue lasix and zaroxolyn (defer to cards for adjusting these)  3. Cardiology consulted by ED  4. Need echo and await possible further studies per cards - concerned that renal function may limit us at this  Point. Code Status: full  Surrogate Decision Maker: patient    DVT Prophylaxis: on hep gtt  GI Prophylaxis: not indicated          Subjective:   CHIEF COMPLAINT: chest pain    HISTORY OF PRESENT ILLNESS:     Tiana Nolasco is a 68 y.o. [place] male who presents with chest pain x 2 hours prior to presenting to the ED. Has a h/o CAD s/p multiple PCI's. Also has a h/o CKD. Troponin was noted to be elevated at 0.69. However, creatinine was elevated as well. Baseline creatinine usually in mid 3's. Now in upper 4's. Also, pt's troponin was negative several months ago. Due to patient's history, medicine was asked to admit. Of note, pt was recently taken off of ASA, although he still takes Plavix. Cardiology was consulted. We were asked to admit for work up and evaluation of the above problems.      Past Medical History:   Diagnosis Date    Atherosclerosis of renal artery (HCC)     S/P Rt stent    CAD (coronary artery disease) , ,     ,double bypass, 2 stents, 2stents 7/2016    Cancer [facility])     prostate    CHF (congestive heart failure) (HCC)     Chronic diastolic heart failure (HCC)     Chronic kidney disease (CKD), stage IV (severe) (HCC)     Constipation     Coronary atherosclerosis of unspecified type of vessel, native or graft     Stable angina after recent PCI continue treatment.  CRI (chronic renal insufficiency)     Diabetes ([facility] [address] 1973    type 2    Essential hypertension, benign     GERD (gastroesophageal reflux disease)     Gout     Hypertension 1982    Morbid obesity ([facility] [address]     Weight loss has been strongly encouraged by following dietary restrictions and an exercise routine    APRYL (obstructive sleep apnea) 6/26/2015    no cpap    Other and unspecified hyperlipidemia     HDL's are not at goal, LDL's are at goal, triglycerides are not at goal.    Other and unspecified hyperlipidemia     HDL's are not at goal, LDL's are at goal, triglycerides are not at goal.     Other ill-defined conditions(799.89) 1960    pneumonia twice    Sleep disturbance     Type II or unspecified type diabetes mellitus without mention of complication, not stated as uncontrolled         Past Surgical History:   Procedure Laterality Date   [facility] 635    lt. carotid endart.  COLONOSCOPY N/A 6/23/2017    COLONOSCOPY w/ APC w/ polypectomies performed by Vania Fothergill, MD at [address] HX CHOLECYSTECTOMY      HX CORONARY ARTERY BYPASS GRAFT  2000    x2    HX HEENT  1997    cholecystectomy    HX UROLOGICAL  1998    renal art. surg       Social History   Substance Use Topics    Smoking status: Never Smoker    Smokeless tobacco: Never Used    Alcohol use No        Family History   Problem Relation Age of Onset    Heart Attack Father 64     Allergies   Allergen Reactions    Nka [No Known Allergies] Other (comments)        Prior to Admission medications    Medication Sig Start Date End Date Taking?  Authorizing Provider fenofibrate (LOFIBRA) 54 mg tablet Take 1 Tab by mouth daily. 10/10/17   Urszula Coughlin NP   metOLazone (ZAROXOLYN) 5 mg tablet Take  by mouth daily. Historical Provider   simvastatin (ZOCOR) 40 mg tablet TAKE 1 TABLET BY MOUTH NIGHTLY. 7/19/17   Urszula Coughlin NP   metoprolol tartrate (LOPRESSOR) 100 mg IR tablet TAKE 1 TABLET BY MOUTH TWO TIMES A DAY. 7/19/17   Urszula Coughlin NP   clopidogrel (PLAVIX) 75 mg tab Take 1 Tab by mouth daily. 4/14/17   Isabella High MD   tamsulosin (FLOMAX) 0.4 mg capsule TAKE ONE CAPSULE BY MOUTH ONCE A DAY 2/23/17   Yael River MD   Cholecalciferol, Vitamin D3, (DECARA) 50,000 unit cap Take  by mouth every seven (7) days. Historical Provider   isosorbide mononitrate ER (IMDUR) 30 mg tablet Take 1 Tab by mouth daily. 9/15/16   Isabella High MD   furosemide (LASIX) 40 mg tablet Take 1 Tab by mouth daily as needed. Patient taking differently: Take 80 mg by mouth daily as needed. 8/1/16   Isabella High MD   hydrALAZINE (APRESOLINE) 100 mg tablet Take 1 Tab by mouth three (3) times daily. Patient taking differently: Take 50 mg by mouth three (3) times daily. 7/21/16   Julieta Pathak MD   NIFEdipine ER (PROCARDIA XL) 90 mg ER tablet Take 1 Tab by mouth daily. 7/21/16   Julieta Pathak MD   insulin glargine (LANTUS) 100 unit/mL injection 20 Units by SubCUTAneous route every twelve (12) hours. 7/21/16   Julieta Pathak MD   nitroglycerin (NITROSTAT) 0.4 mg SL tablet 1 Tab by SubLINGual route as needed for Chest Pain. 6/21/16   Isabella High MD   insulin aspart (NOVOLOG) 100 unit/mL injection 20 units sq 3 times a day,sliding scale 3/13/13   Isabella High MD   allopurinol (ZYLOPRIM) 100 mg tablet Take 100 mg by mouth daily. Historical Provider   cloNIDine (CATAPRESS) 0.2 mg tablet Take 0.2 mg by mouth four (4) times daily. Historical Provider       REVIEW OF SYSTEMS:     I am not able to complete the review of systems because:    The patient is intubated and sedated    The patient has altered mental status due to his acute medical problems    The patient has baseline aphasia from prior stroke(s)    The patient has baseline dementia and is not reliable historian    The patient is in acute medical distress and unable to provide information           Total of 12 systems reviewed as follows:       POSITIVE= bolded text  Negative = text not underlined  General:  fever, chills, sweats, generalized weakness, weight loss/gain,      loss of appetite   Eyes:    blurred vision, eye pain, loss of vision, double vision  ENT:    rhinorrhea, pharyngitis   Respiratory:   cough, sputum production, SOB, DARBY, wheezing, pleuritic pain   Cardiology:   chest pain, palpitations, orthopnea, PND, edema, syncope   Gastrointestinal:  abdominal pain , N/V, diarrhea, dysphagia, constipation, bleeding   Genitourinary:  frequency, urgency, dysuria, hematuria, incontinence   Muskuloskeletal :  arthralgia, myalgia, back pain  Hematology:  easy bruising, nose or gum bleeding, lymphadenopathy   Dermatological: rash, ulceration, pruritis, color change / jaundice  Endocrine:   hot flashes or polydipsia   Neurological:  headache, dizziness, confusion, focal weakness, paresthesia,     Speech difficulties, memory loss, gait difficulty  Psychological: Feelings of anxiety, depression, agitation    Objective:   VITALS:    Visit Vitals    /62 (BP 1 Location: Right arm, BP Patient Position: At rest)    Pulse 68    Temp 98.1 °F (36.7 °C)    Resp 16    Ht 5' 8\" (1.727 m)    Wt 103 kg (227 lb)    SpO2 99%    BMI 34.52 kg/m2       PHYSICAL EXAM:    General:    Alert, cooperative, no distress, appears stated age. HEENT: Atraumatic, anicteric sclerae, pink conjunctivae     No oral ulcers, mucosa moist, throat clear, dentition fair  Neck:  Supple, symmetrical,  thyroid: non tender  Lungs:   Clear to auscultation bilaterally. No Wheezing or Rhonchi. No rales.   Chest wall:  No tenderness No Accessory muscle use. Heart:   Regular  rhythm,  No  murmur   Trace to 1+ edema  Abdomen:   Soft, non-tender. Not distended. Bowel sounds normal  Extremities: No cyanosis. No clubbing,      Skin turgor normal, Capillary refill normal, Radial dial pulse 2+  Skin:     Not pale. Not Jaundiced  No rashes   Psych:  Good insight. Not depressed. Not anxious or agitated. Neurologic: EOMs intact. No facial asymmetry. No aphasia or slurred speech. Symmetrical strength, Sensation grossly intact. Alert and oriented X 4.     _______________________________________________________________________  Care Plan discussed with:    Comments   Patient x    Family      RN     Care Manager                    Consultant:      _______________________________________________________________________  Expected  Disposition:   Home with Family x   HH/PT/OT/RN x   SNF/LTC    JACQUELINE    ________________________________________________________________________  TOTAL TIME:  39 Minutes    Critical Care Provided     Minutes non procedure based      Comments    x Reviewed previous records   >50% of visit spent in counseling and coordination of care x Discussion with patient and/or family and questions answered       ________________________________________________________________________      Procedures: see electronic medical records for all procedures/Xrays and details which were not copied into this note but were reviewed prior to creation of Plan.     LAB DATA REVIEWED:    Recent Results (from the past 24 hour(s))   EKG, 12 LEAD, INITIAL    Collection Time: 10/23/17  2:23 PM   Result Value Ref Range    Ventricular Rate 73 BPM    Atrial Rate 73 BPM    P-R Interval 162 ms    QRS Duration 110 ms    Q-T Interval 412 ms    QTC Calculation (Bezet) 453 ms    Calculated P Axis 53 degrees    Calculated R Axis 8 degrees    Calculated T Axis 74 degrees    Diagnosis       Normal sinus rhythm  Incomplete right bundle branch block  Nonspecific ST and T wave abnormality  Abnormal ECG  When compared with ECG of 12-JUN-2017 20:34,  T wave inversion now evident in Anterior leads     CBC WITH AUTOMATED DIFF    Collection Time: 10/23/17  2:55 PM   Result Value Ref Range    WBC 10.0 4.6 - 13.2 K/uL    RBC 3.89 (L) 4.70 - 5.50 M/uL    HGB 11.0 (L) 13.0 - 16.0 g/dL    HCT 34.0 (L) 36.0 - 48.0 %    MCV 87.4 74.0 - 97.0 FL    MCH 28.3 24.0 - 34.0 PG    MCHC 32.4 31.0 - 37.0 g/dL    RDW 13.5 11.6 - 14.5 %    PLATELET 816 685 - 229 K/uL    MPV 10.8 9.2 - 11.8 FL    NEUTROPHILS 75 (H) 40 - 73 %    LYMPHOCYTES 18 (L) 21 - 52 %    MONOCYTES 5 3 - 10 %    EOSINOPHILS 2 0 - 5 %    BASOPHILS 0 0 - 2 %    ABS. NEUTROPHILS 7.4 1.8 - 8.0 K/UL    ABS. LYMPHOCYTES 1.8 0.9 - 3.6 K/UL    ABS. MONOCYTES 0.5 0.05 - 1.2 K/UL    ABS. EOSINOPHILS 0.2 0.0 - 0.4 K/UL    ABS. BASOPHILS 0.0 0.0 - 0.1 K/UL    DF AUTOMATED     METABOLIC PANEL, COMPREHENSIVE    Collection Time: 10/23/17  2:55 PM   Result Value Ref Range    Sodium 137 136 - 145 mmol/L    Potassium 3.4 (L) 3.5 - 5.5 mmol/L    Chloride 99 (L) 100 - 108 mmol/L    CO2 30 21 - 32 mmol/L    Anion gap 8 3.0 - 18 mmol/L    Glucose 175 (H) 74 - 99 mg/dL    BUN 88 (H) 7.0 - 18 MG/DL    Creatinine 4.68 (H) 0.6 - 1.3 MG/DL    BUN/Creatinine ratio 19 12 - 20      GFR est AA 15 (L) >60 ml/min/1.73m2    GFR est non-AA 12 (L) >60 ml/min/1.73m2    Calcium 9.1 8.5 - 10.1 MG/DL    Bilirubin, total 0.4 0.2 - 1.0 MG/DL    ALT (SGPT) 21 16 - 61 U/L    AST (SGOT) 23 15 - 37 U/L    Alk.  phosphatase 116 45 - 117 U/L    Protein, total 7.4 6.4 - 8.2 g/dL    Albumin 3.3 (L) 3.4 - 5.0 g/dL    Globulin 4.1 (H) 2.0 - 4.0 g/dL    A-G Ratio 0.8 0.8 - 1.7     CARDIAC PANEL,(CK, CKMB & TROPONIN)    Collection Time: 10/23/17  2:55 PM   Result Value Ref Range     39 - 308 U/L    CK - MB 8.3 (H) <3.6 ng/ml    CK-MB Index 4.9 (H) 0.0 - 4.0 %    Troponin-I, Qt. 0.69 (H) 0.0 - 0.045 NG/ML   NT-PRO BNP    Collection Time: 10/23/17  2:55 PM   Result Value Ref Range    NT pro- 0 - 1800 PG/ML   GLUCOSE, POC    Collection Time: 10/23/17  3:47 PM   Result Value Ref Range    Glucose (POC) 171 (H) 70 - 110 mg/dL       Jorge Luis Stewart MD  Internal Medicine  Hospitalist Division

## 2017-10-24 LAB
ALBUMIN SERPL-MCNC: 3.2 G/DL (ref 3.4–5)
ALBUMIN/GLOB SERPL: 0.7 {RATIO} (ref 0.8–1.7)
ALP SERPL-CCNC: 103 U/L (ref 45–117)
ALT SERPL-CCNC: 19 U/L (ref 16–61)
ANION GAP SERPL CALC-SCNC: 10 MMOL/L (ref 3–18)
ANION GAP SERPL CALC-SCNC: 8 MMOL/L (ref 3–18)
APTT PPP: 51.5 SEC (ref 23–36.4)
APTT PPP: 90.6 SEC (ref 23–36.4)
APTT PPP: 94.9 SEC (ref 23–36.4)
AST SERPL-CCNC: 25 U/L (ref 15–37)
ATRIAL RATE: 52 BPM
ATRIAL RATE: 55 BPM
ATRIAL RATE: 73 BPM
BILIRUB SERPL-MCNC: 0.3 MG/DL (ref 0.2–1)
BUN SERPL-MCNC: 86 MG/DL (ref 7–18)
BUN SERPL-MCNC: 88 MG/DL (ref 7–18)
BUN/CREAT SERPL: 18 (ref 12–20)
BUN/CREAT SERPL: 19 (ref 12–20)
CALCIUM SERPL-MCNC: 8.8 MG/DL (ref 8.5–10.1)
CALCIUM SERPL-MCNC: 9.1 MG/DL (ref 8.5–10.1)
CALCULATED P AXIS, ECG09: 38 DEGREES
CALCULATED P AXIS, ECG09: 53 DEGREES
CALCULATED P AXIS, ECG09: 62 DEGREES
CALCULATED R AXIS, ECG10: -11 DEGREES
CALCULATED R AXIS, ECG10: -4 DEGREES
CALCULATED R AXIS, ECG10: 8 DEGREES
CALCULATED T AXIS, ECG11: 61 DEGREES
CALCULATED T AXIS, ECG11: 64 DEGREES
CALCULATED T AXIS, ECG11: 74 DEGREES
CHLORIDE SERPL-SCNC: 99 MMOL/L (ref 100–108)
CHLORIDE SERPL-SCNC: 99 MMOL/L (ref 100–108)
CHOLEST SERPL-MCNC: 158 MG/DL
CK MB CFR SERPL CALC: 7.3 % (ref 0–4)
CK MB CFR SERPL CALC: 8.1 % (ref 0–4)
CK MB SERPL-MCNC: 10.8 NG/ML (ref 5–25)
CK MB SERPL-MCNC: 13.5 NG/ML (ref 5–25)
CK SERPL-CCNC: 148 U/L (ref 39–308)
CK SERPL-CCNC: 167 U/L (ref 39–308)
CO2 SERPL-SCNC: 29 MMOL/L (ref 21–32)
CO2 SERPL-SCNC: 30 MMOL/L (ref 21–32)
CREAT SERPL-MCNC: 4.46 MG/DL (ref 0.6–1.3)
CREAT SERPL-MCNC: 4.78 MG/DL (ref 0.6–1.3)
DIAGNOSIS, 93000: NORMAL
GLOBULIN SER CALC-MCNC: 4.5 G/DL (ref 2–4)
GLUCOSE BLD STRIP.AUTO-MCNC: 118 MG/DL (ref 70–110)
GLUCOSE BLD STRIP.AUTO-MCNC: 185 MG/DL (ref 70–110)
GLUCOSE BLD STRIP.AUTO-MCNC: 252 MG/DL (ref 70–110)
GLUCOSE BLD STRIP.AUTO-MCNC: 302 MG/DL (ref 70–110)
GLUCOSE SERPL-MCNC: 151 MG/DL (ref 74–99)
GLUCOSE SERPL-MCNC: 270 MG/DL (ref 74–99)
HDLC SERPL-MCNC: 39 MG/DL (ref 40–60)
HDLC SERPL: 4.1 {RATIO} (ref 0–5)
LDLC SERPL CALC-MCNC: 72.4 MG/DL (ref 0–100)
LIPID PROFILE,FLP: ABNORMAL
P-R INTERVAL, ECG05: 162 MS
P-R INTERVAL, ECG05: 176 MS
P-R INTERVAL, ECG05: 176 MS
POTASSIUM SERPL-SCNC: 3.2 MMOL/L (ref 3.5–5.5)
POTASSIUM SERPL-SCNC: 3.7 MMOL/L (ref 3.5–5.5)
PROT SERPL-MCNC: 7.7 G/DL (ref 6.4–8.2)
Q-T INTERVAL, ECG07: 412 MS
Q-T INTERVAL, ECG07: 450 MS
Q-T INTERVAL, ECG07: 450 MS
QRS DURATION, ECG06: 110 MS
QRS DURATION, ECG06: 118 MS
QRS DURATION, ECG06: 118 MS
QTC CALCULATION (BEZET), ECG08: 418 MS
QTC CALCULATION (BEZET), ECG08: 430 MS
QTC CALCULATION (BEZET), ECG08: 453 MS
SODIUM SERPL-SCNC: 137 MMOL/L (ref 136–145)
SODIUM SERPL-SCNC: 138 MMOL/L (ref 136–145)
TRIGL SERPL-MCNC: 233 MG/DL (ref ?–150)
TROPONIN I SERPL-MCNC: 4.42 NG/ML (ref 0–0.04)
TROPONIN I SERPL-MCNC: 6.5 NG/ML (ref 0–0.04)
VENTRICULAR RATE, ECG03: 52 BPM
VENTRICULAR RATE, ECG03: 55 BPM
VENTRICULAR RATE, ECG03: 73 BPM
VLDLC SERPL CALC-MCNC: 46.6 MG/DL

## 2017-10-24 PROCEDURE — 80048 BASIC METABOLIC PNL TOTAL CA: CPT | Performed by: NURSE PRACTITIONER

## 2017-10-24 PROCEDURE — 80061 LIPID PANEL: CPT | Performed by: INTERNAL MEDICINE

## 2017-10-24 PROCEDURE — 74011250637 HC RX REV CODE- 250/637: Performed by: INTERNAL MEDICINE

## 2017-10-24 PROCEDURE — 85730 THROMBOPLASTIN TIME PARTIAL: CPT | Performed by: INTERNAL MEDICINE

## 2017-10-24 PROCEDURE — 93005 ELECTROCARDIOGRAM TRACING: CPT

## 2017-10-24 PROCEDURE — 80053 COMPREHEN METABOLIC PANEL: CPT | Performed by: INTERNAL MEDICINE

## 2017-10-24 PROCEDURE — 82550 ASSAY OF CK (CPK): CPT | Performed by: INTERNAL MEDICINE

## 2017-10-24 PROCEDURE — 36415 COLL VENOUS BLD VENIPUNCTURE: CPT | Performed by: NURSE PRACTITIONER

## 2017-10-24 PROCEDURE — 74011250637 HC RX REV CODE- 250/637: Performed by: NURSE PRACTITIONER

## 2017-10-24 PROCEDURE — 74011250636 HC RX REV CODE- 250/636: Performed by: INTERNAL MEDICINE

## 2017-10-24 PROCEDURE — 74011636637 HC RX REV CODE- 636/637: Performed by: INTERNAL MEDICINE

## 2017-10-24 PROCEDURE — 74011250636 HC RX REV CODE- 250/636: Performed by: EMERGENCY MEDICINE

## 2017-10-24 PROCEDURE — 82962 GLUCOSE BLOOD TEST: CPT

## 2017-10-24 PROCEDURE — 65660000000 HC RM CCU STEPDOWN

## 2017-10-24 PROCEDURE — 74011250636 HC RX REV CODE- 250/636: Performed by: HOSPITALIST

## 2017-10-24 RX ORDER — GUAIFENESIN 100 MG/5ML
81 LIQUID (ML) ORAL DAILY
Status: DISCONTINUED | OUTPATIENT
Start: 2017-10-24 | End: 2017-10-25 | Stop reason: HOSPADM

## 2017-10-24 RX ORDER — HEPARIN SODIUM 1000 [USP'U]/ML
3000 INJECTION, SOLUTION INTRAVENOUS; SUBCUTANEOUS ONCE
Status: COMPLETED | OUTPATIENT
Start: 2017-10-24 | End: 2017-10-24

## 2017-10-24 RX ORDER — FENOFIBRATE 48 MG/1
48 TABLET, COATED ORAL DAILY
Status: DISCONTINUED | OUTPATIENT
Start: 2017-10-25 | End: 2017-10-25 | Stop reason: HOSPADM

## 2017-10-24 RX ORDER — PANTOPRAZOLE SODIUM 40 MG/1
40 TABLET, DELAYED RELEASE ORAL DAILY
Status: DISCONTINUED | OUTPATIENT
Start: 2017-10-24 | End: 2017-10-25 | Stop reason: HOSPADM

## 2017-10-24 RX ORDER — POTASSIUM CHLORIDE 20 MEQ/1
40 TABLET, EXTENDED RELEASE ORAL
Status: COMPLETED | OUTPATIENT
Start: 2017-10-24 | End: 2017-10-24

## 2017-10-24 RX ADMIN — HEPARIN SODIUM AND DEXTROSE 1200 UNITS/HR: 10000; 5 INJECTION INTRAVENOUS at 00:14

## 2017-10-24 RX ADMIN — Medication 10 ML: at 06:01

## 2017-10-24 RX ADMIN — HEPARIN SODIUM 3000 UNITS: 1000 INJECTION, SOLUTION INTRAVENOUS; SUBCUTANEOUS at 15:57

## 2017-10-24 RX ADMIN — DOCUSATE SODIUM 100 MG: 100 CAPSULE, LIQUID FILLED ORAL at 17:11

## 2017-10-24 RX ADMIN — Medication 10 ML: at 21:41

## 2017-10-24 RX ADMIN — TAMSULOSIN HYDROCHLORIDE 0.4 MG: 0.4 CAPSULE ORAL at 08:40

## 2017-10-24 RX ADMIN — METOPROLOL TARTRATE 100 MG: 50 TABLET ORAL at 17:10

## 2017-10-24 RX ADMIN — CLONIDINE HYDROCHLORIDE 0.2 MG: 0.1 TABLET ORAL at 17:10

## 2017-10-24 RX ADMIN — POTASSIUM CHLORIDE 40 MEQ: 20 TABLET, EXTENDED RELEASE ORAL at 12:39

## 2017-10-24 RX ADMIN — HYDRALAZINE HYDROCHLORIDE 50 MG: 50 TABLET, FILM COATED ORAL at 21:39

## 2017-10-24 RX ADMIN — METOLAZONE 5 MG: 2.5 TABLET ORAL at 08:58

## 2017-10-24 RX ADMIN — INSULIN GLARGINE 20 UNITS: 100 INJECTION, SOLUTION SUBCUTANEOUS at 17:26

## 2017-10-24 RX ADMIN — INSULIN LISPRO 2 UNITS: 100 INJECTION, SOLUTION INTRAVENOUS; SUBCUTANEOUS at 12:21

## 2017-10-24 RX ADMIN — METOPROLOL TARTRATE 50 MG: 50 TABLET ORAL at 09:43

## 2017-10-24 RX ADMIN — CLONIDINE HYDROCHLORIDE 0.2 MG: 0.1 TABLET ORAL at 12:23

## 2017-10-24 RX ADMIN — HEPARIN SODIUM 3000 UNITS: 1000 INJECTION, SOLUTION INTRAVENOUS; SUBCUTANEOUS at 00:25

## 2017-10-24 RX ADMIN — HEPARIN SODIUM AND DEXTROSE 1400 UNITS/HR: 10000; 5 INJECTION INTRAVENOUS at 15:47

## 2017-10-24 RX ADMIN — HYDRALAZINE HYDROCHLORIDE 50 MG: 50 TABLET, FILM COATED ORAL at 15:55

## 2017-10-24 RX ADMIN — CLONIDINE HYDROCHLORIDE 0.2 MG: 0.1 TABLET ORAL at 21:39

## 2017-10-24 RX ADMIN — INSULIN LISPRO 8 UNITS: 100 INJECTION, SOLUTION INTRAVENOUS; SUBCUTANEOUS at 17:26

## 2017-10-24 RX ADMIN — HYDRALAZINE HYDROCHLORIDE 50 MG: 50 TABLET, FILM COATED ORAL at 06:34

## 2017-10-24 RX ADMIN — DOCUSATE SODIUM 100 MG: 100 CAPSULE, LIQUID FILLED ORAL at 08:36

## 2017-10-24 RX ADMIN — CLONIDINE HYDROCHLORIDE 0.2 MG: 0.1 TABLET ORAL at 06:33

## 2017-10-24 RX ADMIN — SIMVASTATIN 40 MG: 40 TABLET, FILM COATED ORAL at 21:39

## 2017-10-24 RX ADMIN — PANTOPRAZOLE SODIUM 40 MG: 40 TABLET, DELAYED RELEASE ORAL at 17:11

## 2017-10-24 RX ADMIN — ASPIRIN 81 MG CHEWABLE TABLET 81 MG: 81 TABLET CHEWABLE at 12:54

## 2017-10-24 RX ADMIN — Medication 10 ML: at 15:44

## 2017-10-24 RX ADMIN — INSULIN LISPRO 6 UNITS: 100 INJECTION, SOLUTION INTRAVENOUS; SUBCUTANEOUS at 21:39

## 2017-10-24 RX ADMIN — Medication 10 ML: at 06:14

## 2017-10-24 RX ADMIN — CLOPIDOGREL BISULFATE 75 MG: 75 TABLET ORAL at 08:36

## 2017-10-24 NOTE — ROUTINE PROCESS
Bedside and Verbal shift change report given to Michael Mata (oncoming nurse) by ADRIANNE Vale RN(offgoing nurse). Report included the following information SBAR, Kardex, Intake/Output, MAR and Recent Results.

## 2017-10-24 NOTE — DIABETES MGMT
Diabetes Patient/Family Education Record    Factors That  May Influence Patients Ability  to Learn or  Comply with Recommendations   []   Language barrier    []   Cultural needs   []   Motivation    []   Cognitive limitation    []   Physical   [x]   Education    []   Physiological factors   []   Hearing/vision/speaking impairment   []   Protestant beliefs    []   Financial factors   []  Other:   []  No factors identified at this time. Person Instructed:   [x]   Patient   [x]   Family   []  Other     Preference for Learning:   [x]   Verbal   [x]   Written   []  Demonstration     Level of Comprehension & Competence:    []  Good                                      [x] Fair                                     []  Poor                             [x]  Needs Reinforcement   []  Teachback completed    Education Component:   [x]  Medication management, including how to administer insulin (if appropriate) and potential medication interactions Pt reports taking Lantus insulin 60 units BID and Novolog insulin per sliding scale.  States he follows up with Dr. Yary Varghese at [facility] regularly    [x]  Nutritional management Reports following a 2000 Kcal diet at home   []  Exercise   []  Signs, symptoms, and treatment of hyperglycemia and hypoglycemia   [] Prevention, recognition and treatment of hyperglycemia and hypoglycemia   [x]  Importance of blood glucose monitoring and how to obtain a blood glucose meter    []  Instruction on use of the blood glucose meter   [x]  Discuss the importance of HbA1C monitoring    []  Sick day guidelines   []  Proper use and disposal of lancets, needles, syringes or insulin pens (if appropriate)   []  Potential long-term complications (retinopathy, kidney disease, neuropathy, foot care)   [x] Information about whom to contact in case of emergency or for more information    [x]  Goal:  Patient/family will demonstrate understanding of Diabetes Self Management Skills by: 10/31/17  Plan for post-discharge education or self-management support:    [x] Outpatient class schedule provided            [] Patient Declined    [] Scheduled for outpatient classes (date) _______       Edwar Cade RD, CDE

## 2017-10-24 NOTE — PROGRESS NOTES
Patient received from ED, no complaints of chest pain or any other complaints, afebrile, AOx4, heparin gtt at 12 cc/hr, patient voiding well per urinal, repeat EKG done, [facility]

## 2017-10-24 NOTE — ED NOTES
Dr. Sarahi Pike informed that pt's Troponin is elevated at 8.41 and EKG has been transmitted. Pt resting comfortably.

## 2017-10-24 NOTE — CARDIO/PULMONARY
Cardiac rehab in to see patient today. He was sitting on side of bed talking with his wife and daughter. He has participated in the outpatient cardiac rehab program in the past. He stated that he has been doing ok. We reviewed following a low fat diet, participating in regular exercise and doing daily relaxation. Patient stated that he has gotten off track. His wife states that he does not exercise much and he does not follow a good diet all the time. Encouraged patient to make sure that he exercises at least 3 days per week for 30 minutes of walking, riding a bike, swimming, etc. Also stated that he would be eligible to participate in the outpatient cardiac rehab program again. He stated that would be a good idea. Then advised patient to eat fresh and fresh frozen items and to bake, grill broil steam and roast the foods that he chooses to eat. Will continue to follow. He appreciated the visit and the information given to him.

## 2017-10-24 NOTE — CONSULTS
[facility] - Consult Note    Date of  Admission: 10/23/2017  2:29 PM     Primary Care Physician:  Connie Lovell MD     Plan:         1. NSTEMI- -positive troponin now trending down. currently chest pain free improve SOB. Resumed asa. continue with statin, bb and Plavix. Continue with  Heparin. Will continue monitoring. Not a candidate for invasive w/u due to CKD stage IV will continue with medical management for now. Further plans based on clinical course. 2. CAD s/p coronary artery stent placement in 2012 and 7/17 with HERBER stents deployed in SVG in the focal mid lesion and a long proximal lesion. continue with asa and Plavix   3. Chronic diastolic heart failure- appears compensated- monitor for fluid overload. Will d/c metolazone                4. Essential hypertension-stable continue with present medications. 5. 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 Hyperlipidemia- on statin                                                            7. NICOLAS on CKD- Consider hydration as tolerated. Renal following   8. Diabetes managed per medicine. Patient with CKD and NSTEMI-- discussed with patient at length regarding invasive w/u. He clearly understands the need of cardiac cath to evaluate CAD- but he doesn't want to proceed due to risk of HD. We will obtain echo to assess LV function. Cardiac Cath 7/19/17  IMPRESSION: Successful deployment of drug-eluting stents as detailed above  in the focal mid lesion and a long proximal lesion of the very old  saphenous venous graft to first diagonal artery without any complications. There was no evidence of distal occlusion and distal embolization and the  BRENNA flow was 3 at the end. IV Angiomax was used during the procedure, 300  mg of Plavix was given at the end of the procedure.     DISCUSSION AND RECOMMENDATIONS: This is a very old graft.  Fortunately, we  were able to open it well today, deploying 2 drug-eluting stents. Aggressive risk factor modification and medical treatment should continue. SUMMARY: Echo 7/5/17   Left ventricle: Systolic function was normal. Ejection fraction was  estimated in the range of 65 % to 70 %. There were no regional wall motion  abnormalities. Wall thickness was mildly to moderately increased. There  was mild concentric hypertrophy. Right ventricle: The size was at the upper limits of normal.  Aortic valve: The valve was trileaflet. Leaflets exhibited mildly  increased thickness, mild to moderate calcification, and sclerosis. Inferior vena cava, hepatic veins: The respirophasic change in diameter  was more than 50%. COMPARISONS:  Comparison was made with the previous study of 20-Nov-2014. Left atrium  has decreased in size.   INDICATIONS: Congestive heart failure     Assessment:     Hospital Problems  Date Reviewed: 9/26/2017          Codes Class Noted POA    Diastolic CHF, acute on chronic [facility]) ICD-10-CM: I50.33  ICD-9-CM: 428.33, 428.0  7/5/2016 Yes        APRYL (obstructive sleep apnea) ICD-10-CM: G47.33  ICD-9-CM: 327.23  6/26/2015 Yes    Overview Signed 6/26/2015  9:25 AM by Earline Dominguez MD     non compliance to cpap             Prostate cancer ([facility] ICD-10-CM: C61  ICD-9-CM: 185  4/29/2015 Yes    Overview Addendum 5/6/2015  4:32 PM by Milan Robison MD     4/2015  T2c  PSA 57, [place];8 in 2 cores, and G7 in 2,  CT and Bone Scan no obvious mets             Atherosclerosis of renal artery (HCC) ICD-10-CM: I70.1  ICD-9-CM: 440.1  Unknown Yes    Overview Signed 4/29/2013  3:09 PM by Jose D Vinson     S/P Rt stent             Morbid obesity ([facility] ICD-10-CM: E66.01  ICD-9-CM: 278.01  Unknown Yes    Overview Signed 4/29/2013  3:10 PM by Monica Wong     Weight loss has been strongly encouraged by following dietary restrictions and an exercise routine             Essential hypertension, benign ICD-10-CM: I10  ICD-9-CM: 401.1  Unknown Yes        S/P coronary artery stent placement ICD-10-CM: Z95.5  ICD-9-CM: V45.82  3/12/2013 Yes    Overview Signed 3/12/2013  3:37 PM by Earline Dominguez MD     vg to diag stent 10/2012             Postsurgical aortocoronary bypass status ICD-10-CM: Z95.1  ICD-9-CM: V45.81  3/12/2013 Yes        Contrast dye induced nephropathy ICD-10-CM: T50.8X1A, N14.1  ICD-9-CM: 584.5, E980.4  10/14/2012 Yes        * (Principal)NSTEMI (non-ST elevated myocardial infarction) [facility]) ICD-10-CM: I21.4  ICD-9-CM: 410.70  10/7/2012 Unknown        CKD (chronic kidney disease) stage 4, GFR 15-29 ml/min ([facility]) ICD-10-CM: N18.4  ICD-9-CM: 585.4  10/7/2012 Yes        DM2 (diabetes mellitus, type 2) ([facility] ICD-10-CM: E11.9  ICD-9-CM: 250.00  10/7/2012 Yes        Dyslipidemia ICD-10-CM: G29.3  ICD-9-CM: 272.4  10/7/2012 Yes                   History of Present Illness: This is a 68 y.o. male admitted for NSTEMI (non-ST elevated myocardial infarction) ([facility] Patient known to our service followed by Dr. Sinai Fulton with PMHx of  CAD, CABG, s/pecent PCI in 07/17, hypertension, diabetes, hyperlipidemia, CKD  and morbid obesity. Patient came to the ED c/o chest pain. Patient tells me  Chest pain onset was on Sunday morning lasted for a few minutes and went away. But Monday afternoon chest pain came back again. This episode lasted for a few minutes then went away. Later on chest came back again but was more intense he report chest pain was pressure radiated to his jaw and left shoulder. Patient's  associated symptoms SOB. He denies any nausea or diaphoresis associated with his chest pain. The initial w/u in the ER revealed elevated troponin and abnormal creatinine 4.68. Patient is currently resting in bed comfortable tells me chest pain and SOB improved. He denies any orthopnea or palpitations. No dizziness or lightheadedness. No fever or chills. No diaphoresis. No leg swelling. . No recent CVA.        Past Medical History:     Past Medical History:   Diagnosis Date    Atherosclerosis of renal artery (HCC)     S/P Rt stent    CAD (coronary artery disease) , 1997, 2012    ,double bypass, 2 stents, 2stents 7/2016    Cancer ([facility]     prostate    CHF (congestive heart failure) (HCC)     Chronic diastolic heart failure (HCC)     Chronic kidney disease (CKD), stage IV (severe) (HCC)     Constipation     Coronary atherosclerosis of unspecified type of vessel, native or graft     Stable angina after recent PCI continue treatment.  CRI (chronic renal insufficiency)     Diabetes ([facility] 1973    type 2    Essential hypertension, benign     GERD (gastroesophageal reflux disease)     Gout     Hypertension 1982    Morbid obesity ([facility]     Weight loss has been strongly encouraged by following dietary restrictions and an exercise routine    APRYL (obstructive sleep apnea) 6/26/2015    no cpap    Other and unspecified hyperlipidemia     HDL's are not at goal, LDL's are at goal, triglycerides are not at goal.    Other and unspecified hyperlipidemia     HDL's are not at goal, LDL's are at goal, triglycerides are not at goal.     Other ill-defined conditions(799.89) 1960    pneumonia twice    Sleep disturbance     Type II or unspecified type diabetes mellitus without mention of complication, not stated as uncontrolled          Social History:     Social History     Social History    Marital status:      Spouse name: N/A    Number of children: N/A    Years of education: N/A     Social History Main Topics    Smoking status: Never Smoker    Smokeless tobacco: Never Used    Alcohol use No    Drug use: No    Sexual activity: Not Asked     Other Topics Concern    None     Social History Narrative        Family History:     Family History   Problem Relation Age of Onset    Heart Attack Father 64        Medications:      Allergies   Allergen Reactions    Nka [No Known Allergies] Other (comments)        Current Facility-Administered Medications   Medication Dose Route Frequency    aspirin chewable tablet 81 mg  81 mg Oral DAILY    heparin 25,000 units in D5W 250 ml infusion  9.708-25 Units/kg/hr IntraVENous TITRATE    cloNIDine HCl (CATAPRES) tablet 0.2 mg  0.2 mg Oral QID    clopidogrel (PLAVIX) tablet 75 mg  75 mg Oral DAILY    furosemide (LASIX) tablet 80 mg  80 mg Oral DAILY PRN    hydrALAZINE (APRESOLINE) tablet 50 mg  50 mg Oral TID    insulin glargine (LANTUS) injection 20 Units  20 Units SubCUTAneous Q12H    isosorbide mononitrate ER (IMDUR) tablet 30 mg  30 mg Oral DAILY    metOLazone (ZAROXOLYN) tablet 5 mg  5 mg Oral DAILY    metoprolol tartrate (LOPRESSOR) tablet 100 mg  100 mg Oral BID    NIFEdipine ER (PROCARDIA XL) tablet 90 mg  90 mg Oral DAILY    simvastatin (ZOCOR) tablet 40 mg  40 mg Oral QHS    tamsulosin (FLOMAX) capsule 0.4 mg  0.4 mg Oral DAILY    sodium chloride (NS) flush 5-10 mL  5-10 mL IntraVENous Q8H    sodium chloride (NS) flush 5-10 mL  5-10 mL IntraVENous PRN    magnesium hydroxide (MILK OF MAGNESIA) 400 mg/5 mL oral suspension 30 mL  30 mL Oral DAILY PRN    docusate sodium (COLACE) capsule 100 mg  100 mg Oral BID    acetaminophen (TYLENOL) tablet 650 mg  650 mg Oral Q6H PRN    HYDROcodone-acetaminophen (NORCO) 5-325 mg per tablet 1 Tab  1 Tab Oral Q6H PRN    HYDROmorphone (DILAUDID) syringe 1 mg  1 mg IntraVENous Q4H PRN    insulin lispro (HUMALOG) injection   SubCUTAneous AC&HS    glucose chewable tablet 16 g  4 Tab Oral PRN    glucagon (GLUCAGEN) injection 1 mg  1 mg IntraMUSCular PRN    dextrose (D50W) injection syrg 12.5-25 g  25-50 mL IntraVENous PRN        Review Of Systems:           Constitutional: No fever, no chills, no weight loss, no night sweats   HEENT: No epistaxis, no nasal drainage, no difficulty in swallowing, no redness in eyes  Respiratory: dyspnea on exertion,  Cardiovascular: chest pain, dyspnea, no pnd, no claudication no palpitations, no chronic leg edema, no syncope  Gastrointestinal: no abd pain, no vomiting, no diarrhea, no bleeding symptoms  Genitourinary: No urinary symptoms or hematuria  Integument/breast: No ulcers or rashes  Musculoskeletal: no muscle pain, no weakness  Neurological: No focal weakness, no seizures, no headaches  Behvioral/Psych: No anxiety, no depression             Physical Exam:     Visit Vitals    /66    Pulse (!) 56    Temp 97.9 °F (36.6 °C)    Resp 14    Ht 5' 8\" (1.727 m)    Wt 101.6 kg (224 lb)    SpO2 99%    BMI 34.06 kg/m2     BP Readings from Last 3 Encounters:   10/24/17 147/66   09/26/17 126/60   06/23/17 152/67     Pulse Readings from Last 3 Encounters:   10/24/17 (!) 56   09/26/17 (!) 56   06/23/17 (!) 48     Wt Readings from Last 3 Encounters:   10/24/17 101.6 kg (224 lb)   09/26/17 103 kg (227 lb)   06/29/17 100.7 kg (222 lb)       General:  alert, cooperative, no distress, appears stated age, morbidly obese  Skin: Warm and dry, acyanotic, normal color. Head: Normocephalic, atraumatic. Eyes: Sclerae anicteric, conjunctivae without injection. Neck:  nontender, no nuchal rigidity, no masses, no stridor, no carotid bruit, no JVD  Lungs:  clear to auscultation bilaterally. Heart:  regular rate and rhythm, S1, S2 normal, no click, no rub  Abdomen:  abdomen is soft without significant tenderness, masses, organomegaly or guarding  Extremities:  extremities normal, atraumatic, no cyanosis or edema  Neurological: grossly intact. No focal abnormalities, moves all extremities well. Psychiatric Affect: The patient is awake, alert and oriented x3Adan Turner is interactive and appropriate.    Data Review:     Recent Results (from the past 48 hour(s))   EKG, 12 LEAD, INITIAL    Collection Time: 10/23/17  2:23 PM   Result Value Ref Range    Ventricular Rate 73 BPM    Atrial Rate 73 BPM    P-R Interval 162 ms    QRS Duration 110 ms    Q-T Interval 412 ms    QTC Calculation (Bezet) 453 ms    Calculated P Axis 53 degrees    Calculated R Axis 8 degrees    Calculated T Axis 74 degrees    Diagnosis       Normal sinus rhythm  Incomplete right bundle branch block  Nonspecific ST and T wave abnormality  Abnormal ECG  When compared with ECG of 12-JUN-2017 20:34,  T wave inversion now evident in Anterior leads  Confirmed by Qing Bass MD, [facility] ([id number]) on 10/24/2017 8:14:36 AM     CBC WITH AUTOMATED DIFF    Collection Time: 10/23/17  2:55 PM   Result Value Ref Range    WBC 10.0 4.6 - 13.2 K/uL    RBC 3.89 (L) 4.70 - 5.50 M/uL    HGB 11.0 (L) 13.0 - 16.0 g/dL    HCT 34.0 (L) 36.0 - 48.0 %    MCV 87.4 74.0 - 97.0 FL    MCH 28.3 24.0 - 34.0 PG    MCHC 32.4 31.0 - 37.0 g/dL    RDW 13.5 11.6 - 14.5 %    PLATELET 889 844 - 286 K/uL    MPV 10.8 9.2 - 11.8 FL    NEUTROPHILS 75 (H) 40 - 73 %    LYMPHOCYTES 18 (L) 21 - 52 %    MONOCYTES 5 3 - 10 %    EOSINOPHILS 2 0 - 5 %    BASOPHILS 0 0 - 2 %    ABS. NEUTROPHILS 7.4 1.8 - 8.0 K/UL    ABS. LYMPHOCYTES 1.8 0.9 - 3.6 K/UL    ABS. MONOCYTES 0.5 0.05 - 1.2 K/UL    ABS. EOSINOPHILS 0.2 0.0 - 0.4 K/UL    ABS. BASOPHILS 0.0 0.0 - 0.1 K/UL    DF AUTOMATED     METABOLIC PANEL, COMPREHENSIVE    Collection Time: 10/23/17  2:55 PM   Result Value Ref Range    Sodium 137 136 - 145 mmol/L    Potassium 3.4 (L) 3.5 - 5.5 mmol/L    Chloride 99 (L) 100 - 108 mmol/L    CO2 30 21 - 32 mmol/L    Anion gap 8 3.0 - 18 mmol/L    Glucose 175 (H) 74 - 99 mg/dL    BUN 88 (H) 7.0 - 18 MG/DL    Creatinine 4.68 (H) 0.6 - 1.3 MG/DL    BUN/Creatinine ratio 19 12 - 20      GFR est AA 15 (L) >60 ml/min/1.73m2    GFR est non-AA 12 (L) >60 ml/min/1.73m2    Calcium 9.1 8.5 - 10.1 MG/DL    Bilirubin, total 0.4 0.2 - 1.0 MG/DL    ALT (SGPT) 21 16 - 61 U/L    AST (SGOT) 23 15 - 37 U/L    Alk.  phosphatase 116 45 - 117 U/L    Protein, total 7.4 6.4 - 8.2 g/dL    Albumin 3.3 (L) 3.4 - 5.0 g/dL    Globulin 4.1 (H) 2.0 - 4.0 g/dL    A-G Ratio 0.8 0.8 - 1.7     CARDIAC PANEL,(CK, CKMB & TROPONIN)    Collection Time: 10/23/17  2:55 PM   Result Value Ref Range     39 - 308 U/L    CK - MB 8.3 (H) <3.6 ng/ml    CK-MB Index 4.9 (H) 0.0 - 4.0 %    Troponin-I, Qt. 0.69 (H) 0.0 - 0.045 NG/ML   NT-PRO BNP    Collection Time: 10/23/17  2:55 PM   Result Value Ref Range    NT pro- 0 - 1800 PG/ML   PTT    Collection Time: 10/23/17  2:55 PM   Result Value Ref Range    aPTT 26.5 23.0 - 36.4 SEC   HEMOGLOBIN A1C WITH EAG    Collection Time: 10/23/17  2:55 PM   Result Value Ref Range    Hemoglobin A1c 8.3 (H) 4.2 - 5.6 %    Est. average glucose 192 mg/dL   GLUCOSE, POC    Collection Time: 10/23/17  3:47 PM   Result Value Ref Range    Glucose (POC) 171 (H) 70 - 110 mg/dL   GLUCOSE, POC    Collection Time: 10/23/17  7:13 PM   Result Value Ref Range    Glucose (POC) 129 (H) 70 - 110 mg/dL   GLUCOSE, POC    Collection Time: 10/23/17  9:22 PM   Result Value Ref Range    Glucose (POC) 181 (H) 70 - 958 mg/dL   METABOLIC PANEL, BASIC    Collection Time: 10/23/17 11:08 PM   Result Value Ref Range    Sodium 137 136 - 145 mmol/L    Potassium 3.5 3.5 - 5.5 mmol/L    Chloride 99 (L) 100 - 108 mmol/L    CO2 30 21 - 32 mmol/L    Anion gap 8 3.0 - 18 mmol/L    Glucose 163 (H) 74 - 99 mg/dL    BUN 88 (H) 7.0 - 18 MG/DL    Creatinine 4.61 (H) 0.6 - 1.3 MG/DL    BUN/Creatinine ratio 19 12 - 20      GFR est AA 15 (L) >60 ml/min/1.73m2    GFR est non-AA 12 (L) >60 ml/min/1.73m2    Calcium 8.8 8.5 - 10.1 MG/DL   HEPATIC FUNCTION PANEL    Collection Time: 10/23/17 11:08 PM   Result Value Ref Range    Protein, total 7.0 6.4 - 8.2 g/dL    Albumin 2.8 (L) 3.4 - 5.0 g/dL    Globulin 4.2 (H) 2.0 - 4.0 g/dL    A-G Ratio 0.7 (L) 0.8 - 1.7      Bilirubin, total 0.4 0.2 - 1.0 MG/DL    Bilirubin, direct <0.1 0.0 - 0.2 MG/DL    Alk.  phosphatase 90 45 - 117 U/L    AST (SGOT) 46 (H) 15 - 37 U/L    ALT (SGPT) 18 16 - 61 U/L   TSH 3RD GENERATION    Collection Time: 10/23/17 11:08 PM   Result Value Ref Range    TSH 2.16 0.36 - 3.74 uIU/mL   CBC W/O DIFF    Collection Time: 10/23/17 11:08 PM   Result Value Ref Range    WBC 8.8 4.6 - 13.2 K/uL RBC 3.62 (L) 4.70 - 5.50 M/uL    HGB 10.3 (L) 13.0 - 16.0 g/dL    HCT 31.7 (L) 36.0 - 48.0 %    MCV 87.6 74.0 - 97.0 FL    MCH 28.5 24.0 - 34.0 PG    MCHC 32.5 31.0 - 37.0 g/dL    RDW 13.4 11.6 - 14.5 %    PLATELET 309 514 - 508 K/uL    MPV 10.9 9.2 - 11.8 FL   PROTHROMBIN TIME + INR    Collection Time: 10/23/17 11:08 PM   Result Value Ref Range    Prothrombin time 12.5 11.5 - 15.2 sec    INR 1.0 0.8 - 1.2     T4, FREE    Collection Time: 10/23/17 11:08 PM   Result Value Ref Range    T4, Free 1.1 0.7 - 1.5 NG/DL   PTT    Collection Time: 10/23/17 11:08 PM   Result Value Ref Range    aPTT 50.9 (H) 23.0 - 36.4 SEC   CARDIAC PANEL,(CK, CKMB & TROPONIN)    Collection Time: 10/23/17 11:08 PM   Result Value Ref Range     39 - 308 U/L    CK - MB 15.3 (H) <3.6 ng/ml    CK-MB Index 7.1 (H) 0.0 - 4.0 %    Troponin-I, Qt. 8.41 (HH) 0.0 - 0.045 NG/ML   EKG, 12 LEAD, INITIAL    Collection Time: 10/24/17 12:05 AM   Result Value Ref Range    Ventricular Rate 52 BPM    Atrial Rate 52 BPM    P-R Interval 176 ms    QRS Duration 118 ms    Q-T Interval 450 ms    QTC Calculation (Bezet) 418 ms    Calculated P Axis 38 degrees    Calculated R Axis -4 degrees    Calculated T Axis 64 degrees    Diagnosis       Sinus bradycardia  Incomplete right bundle branch block  ST & T wave abnormality, consider anterolateral ischemia  Abnormal ECG    Confirmed by Jered Flores MD, [facility] ([id number]) on 10/24/2017 8:26:28 AM     PTT    Collection Time: 10/24/17  5:08 AM   Result Value Ref Range    aPTT 94.9 (H) 23.0 - 36.4 SEC   LIPID PANEL    Collection Time: 10/24/17  5:08 AM   Result Value Ref Range    LIPID PROFILE          Cholesterol, total 158 <200 MG/DL    Triglyceride 233 (H) <150 MG/DL    HDL Cholesterol 39 (L) 40 - 60 MG/DL    LDL, calculated 72.4 0 - 100 MG/DL    VLDL, calculated 46.6 MG/DL    CHOL/HDL Ratio 4.1 0 - 5.0     CARDIAC PANEL,(CK, CKMB & TROPONIN)    Collection Time: 10/24/17  5:08 AM   Result Value Ref Range     39 - 308 U/L CK - MB 13.5 (H) <3.6 ng/ml    CK-MB Index 8.1 (H) 0.0 - 4.0 %    Troponin-I, Qt. 6.50 (HH) 0.0 - 0.045 NG/ML   GLUCOSE, POC    Collection Time: 10/24/17  6:20 AM   Result Value Ref Range    Glucose (POC) 118 (H) 70 - 110 mg/dL   EKG, 12 LEAD, INITIAL    Collection Time: 10/24/17  6:32 AM   Result Value Ref Range    Ventricular Rate 55 BPM    Atrial Rate 55 BPM    P-R Interval 176 ms    QRS Duration 118 ms    Q-T Interval 450 ms    QTC Calculation (Bezet) 430 ms    Calculated P Axis 62 degrees    Calculated R Axis -11 degrees    Calculated T Axis 61 degrees    Diagnosis       Sinus bradycardia  Incomplete right bundle branch block  ST & T wave abnormality, consider anterior ischemia  Abnormal ECG  When compared with ECG of 24-OCT-2017 00:05,  No significant change was found  Confirmed by Oren Michelle MD, [facility] ([id number]) on 10/24/2017 8:27:56 AM           Intake/Output Summary (Last 24 hours) at 10/24/17 1102  Last data filed at 10/24/17 0704   Gross per 24 hour   Intake                0 ml   Output              500 ml   Net             -500 ml       Cardiographics:     ECG: Sinus bradycardia. ST-T wave abnormality and incomplete RBBB. Signed By: Yaya Alvarez NP supervised    October 24, 2017      I have independently evaluated and examined the patient. All relevant labs and testing data's are reviewed. Care plan discussed and updated after review.     Cristhian Quiñonez MD

## 2017-10-24 NOTE — ROUTINE PROCESS
TRANSFER - OUT REPORT:    Verbal report given to Jah Gardner on Daryle Blade  being transferred to ICU (unit) for routine progression of care       Report consisted of patients Situation, Background, Assessment and   Recommendations(SBAR). Information from the following report(s) SBAR, ED Summary, [facility] and Recent Results was reviewed with the receiving nurse. Lines:   Peripheral IV 10/23/17 Left Antecubital (Active)   Site Assessment Clean, dry, & intact 10/23/2017  3:00 PM   Phlebitis Assessment 0 10/23/2017  3:00 PM   Infiltration Assessment 0 10/23/2017  3:00 PM   Dressing Status Clean, dry, & intact 10/23/2017  3:00 PM   Dressing Type Transparent 10/23/2017  3:00 PM   Hub Color/Line Status Pink;Flushed;Patent 10/23/2017  3:00 PM   Action Taken Blood drawn 10/23/2017  3:00 PM       Peripheral IV 10/23/17 Right Antecubital (Active)   Site Assessment Clean, dry, & intact 10/23/2017  4:35 PM   Phlebitis Assessment 0 10/23/2017  4:35 PM   Infiltration Assessment 0 10/23/2017  4:35 PM   Dressing Status Clean, dry, & intact 10/23/2017  4:35 PM   Dressing Type Transparent 10/23/2017  4:35 PM   Hub Color/Line Status Patent; Flushed;Pink 10/23/2017  4:35 PM        Opportunity for questions and clarification was provided.       Patient transported with:   Monitor  Registered Nurse

## 2017-10-24 NOTE — CONSULTS
[address]    Name:  India Pruett  MR#:  [id number]  :  1940  Account #:  [de-identified]  Date of Adm:  10/23/2017  Date of Consultation:  10/24/2017      RENAL CONSULTATION    REQUESTING PHYSICIAN: Dr. Aslhie Silva: Advanced renal failure with an acute  non-STEMI. HISTORY OF PRESENT ILLNESS: This 80-year-old   male, known to me from previous hospital admission and consultation,  was admitted again with chest pain and found to have an acute non-  STEMI, along with a rising troponin and the troponin has started  coming down, but unfortunately his serum creatinine has increased to  a significant point that without dialysis, further cardiac intervention  would be not safe to do at all. He has long-term history of  hypertension, type 2 diabetes mellitus, history of CABG, also history of  sleep apnea, history of prostate cancer, status post right renal artery  stent and also multiple PCIs done of the coronary arteries, along with  the last one in July of last year. He also has a diastolic dysfunction by  echocardiogram with ejection fraction around 60% to 70%. The patient  is being treated with a heparin drip and now is pain free, but feels mild  shortness of breath. The patient is not willing to undergo any dialysis,  but wants to manage medically as long as possible, but also would like  to know exactly what dialysis is, and also would like to know exactly  what is going on in his heart. On  of this year, he did undergo  cardiac catheterization which shows successful deployment of drug-  eluting stents in the focal mid lesion and a long proximal lesion of the  very old saphenous venous graft to first diagonal artery without any  complications.  There was no evidence of any distal occlusion and  distal embolization and BRENNA flow was 3 at the end of IV, Angiomax  was used during the procedure, 300 mg of Plavix was given at the end  of the procedure, and the discussion was mentioned that this was very  old graft. Fortunately, the cardiologist was able to open it well,  deploying 2 drug-eluting stents and aggressive lifestyle modification  and medical treatment were recommended, but again, he came with a  similar problem,2-D echocardiogram in July of this year showed he  had an ejection fraction around 65% to 70%. There was no regional  wall motion abnormalities. Renal-wise, his serum creatinine on admission was 4.68 which  decreased to 4.61 and today came down to 4.46 spontaneously  without any hydration, but the chest x-ray shows some blunting of the  costophrenic angle, as well as some mild pleural effusion. His  creatinine in June of this year was 3.84 and in July of last year was  2.24. Today, his blood urea as high as 86 with a creatinine of 4.46,  with an estimated GFR of only 16 mL per minute, it varying between  12-16 mL per minute since admission. PAST MEDICAL HISTORY: As I mentioned, hypertension, type 2  diabetes mellitus, atherosclerotic renal artery, status post stenting of  the right renal artery, coronary artery disease, diastolic dysfunction,  gastroesophageal reflux disease, gout, prostate cancer,  hyperlipidemia, as well as sleep disturbances. PAST SURGICAL HISTORY: Cardiac surgery, colonoscopy,  cholecystectomy, coronary artery bypass graft surgery,  cholecystectomy and renal artery stenosis and stent placement. SOCIAL HISTORY: Never smoked, never drank, no alcohol. FAMILY HISTORY: Heart attack in the family, which was the father. ALLERGIES: NO KNOWN DRUG ALLERGIES. LIST OF MEDICATIONS: Before this admission are as follows:  1. Fenofibrate 54 mg tablet 1 tablet daily. 2. Zaroxolyn 5 mg tablet daily. 3. Zocor 40 mg p.o. daily. 4. Lopressor 100 mg 2 times daily. 5. Plavix 75 mg p.o. daily. 6. Flomax 0.4 mg p.o. daily. 7. Vitamin D3 50,000 units once a week. 8. Imdur 30 mg p.o. daily.   9. Lasix 40 mg tablet 1 tablet daily. 10. Hydralazine 100 mg tablet 3 times daily. 11. Nifedipine 90 mg p.o. daily. 12. Insulin Lantus 20 units subcutaneous every morning. 13. Nitroglycerin sublingually every night. 14. NovoLog insulin 20 units subcutaneous 3 times daily on sliding  scale. 15. Allopurinol 100 mg p.o. daily. 16. Clonidine 0.2 mg 4 times daily. Katherene Means PHYSICAL EXAMINATION:  GENERAL: At this time, currently on the heparin drip. VITAL SIGNS: Temperature 97.9, heart rate varying between 51-59  per minute, blood pressure 151/49, respiration of 15, oxygen saturation  99% at room air. Weight is 101.6 kilograms. NECK: Jugular venous pressure is slightly distended. LUNGS: Decreased breath sounds without any obvious crackles. HEART: S1, S2, without any gallop or murmur. ABDOMEN: Obese, could not appreciate any organomegaly. EXTREMITIES: Ankle negative edema. IMAGING: Chest x-ray: Blunting of the costophrenic  angle, cardiomegaly and mild pleural effusion. LABORATORY DATA: Done as of today, sodium 138, potassium 3.2,  chloride 99, CO2 29, glucose of 151, blood urea nitrogen of 86,  creatinine of 4.46. On admission, sodium 137, potassium 3.4, chloride  99, CO2 30, glucose 175, blood urea nitrogen of 88, creatinine 4.68,  calcium 9.1. EGFR is around 15 mL per minute. Total protein of 7.4  with albumin of 3.3. Triglycerides 233 with cholesterol of 158, HDL of  39, LDL of 72. Since admission, CPKs are as follows: 171, 204, 167  and 148 with an MB fraction of 4.97, 0.18, 0.1 and 7.3. MB index of  8.3, 15.3, 13.5 and 10.8. Troponin initially was 0.69, then increased up  to as high as 8.41, came down to 6.50 and the last one is 4.42. Hemoglobin A1c is 8.3, blood glucose is 192. Chest x-ray official  report: Low lung volumes with bibasilar hypoventilatory changes similar  to in June of this year, no definitive of acute abnormalities. A 2D echo  is still pending.  EKG that is done today shows sinus bradycardia,  incomplete right bundle branch block, ST-T wave changes, consider  anterior ischemia. IMPRESSION AND PLAN: The patient has acute on chronic stage 4  renal failure, now in stage 5, by EGFR has advanced renal failure. Certainly, it will be extremely impossible to do any cardiac intervention  without thinking of any dialysis. If the cardiologist strongly feels that the  patient needs cardiac catheterization, only then will consider doing  dialysis and that discussion was made with the patient. I also discussed  with Cardiology. They are going to do a 2D echocardiogram and  depending on the ejection fraction, they will decide whether they need  any dialysis or not. If not, medical management will be coming from the  cardiologist's recommendations, and again from a renal point of view,  given his cardiac conditions and the renal status, definitely he is a  candidate for long-term dialysis, provided he wants, but if he does not  want there is no reason to place for it and again, if he needs cardiac  catheterization, my recommendation would be do a short-term dialysis  and after the cardiac catheterization, he can stop it. Again, if not, then  management would be extremely difficult and continue to manage  conservatively and medically as far as possible. Further  recommendations will be given based on the hospital course. Also at  this time, I am not in favor of giving any IV fluid, given his chest x-ray  with mild cardiomegaly and blunting of the costophrenic angle and also  the patient complained of mild intermittent shortness of breath.         MD Umang Sarmiento / [employer] [facility]  D:  10/24/2017   18:43  T:  10/24/2017   19:54  Job #:  [id number]

## 2017-10-24 NOTE — ED NOTES
Bedside shift change report given to Bonnie Orr RN (oncoming nurse) by Kiko Gomes RN   (offgoing nurse). Report included the following information SBAR, ED Summary and MAR.

## 2017-10-24 NOTE — PROGRESS NOTES
conducted an Initial consultation and Spiritual Assessment for Donny Eric, who is a 68 y. o.,male. Patients Primary Language is: [place]. According to the patients EMR Mu-ism Affiliation is: Rastafarian. The reason the Patient came to the hospital is:   Patient Active Problem List    Diagnosis Date Noted    Fatigue 20/18/3318    Diastolic CHF, acute on chronic (HCC) 07/05/2016    CHF (congestive heart failure), NYHA class IV ([facility] 07/05/2016    APRYL (obstructive sleep apnea) 06/26/2015    [facility] 04/29/2015    Abdominal pain 03/05/2015    GERD (gastroesophageal reflux disease) 03/05/2015    Constipation 03/05/2015    Chronic renal insufficiency 03/05/2015    Atherosclerosis of renal artery (HCC)     Chronic diastolic heart failure ([facility])     Morbid obesity ([facility]     Essential hypertension, benign     Sleep disturbance     Coronary atherosclerosis     Mixed hyperlipidemia     Type II or unspecified type diabetes mellitus without mention of complication, not stated as uncontrolled     Chest pain, unspecified 03/12/2013    S/P coronary artery stent placement 03/12/2013    Postsurgical aortocoronary bypass status 03/12/2013    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    NSTEMI (non-ST elevated myocardial infarction) ([facility] 10/07/2012    Coronary atherosclerosis of native coronary artery 10/07/2012    CKD (chronic kidney disease) stage 4, GFR 15-29 ml/min ([facility] 10/07/2012    HTN (hypertension) 10/07/2012    DM2 (diabetes mellitus, type 2) ([facility] 10/07/2012    Dyslipidemia 10/07/2012        The  provided the following Interventions:  Initiated a relationship of care and support.  has known patient from a previous admission. Explored issues of negrita, belief, spirituality and Anglican/ritual needs. Patient confirmed his Anglican affiliation as Rastafarian.   Listened empathically to patient's medical concerns and his sharing on spiritual matters. Offered the Anointing of the Sick, [employer], prayer, and assurance of continued prayer for patient. Patient also received a Spiritual Health Kit. Chart reviewed. The following outcomes were achieved:  Patient shared limited information about his medical narrative and spiritual journey and beliefs. Patient expressed gratitude for the 's visit. Assessment:  Patient does not have any Latter day or cultural needs that will affect patients preferences in health care. Patient did not indicate any other spiritual or Latter day issues which require Spiritual Care Services interventions at this time. Plan:  Chaplains will continue to follow and will provide pastoral care as needed or requested. The Rev.  [address] [facility], [address] [place] [address] [address] [facility] [address]  [facility] [phone] / [facility] [phone]

## 2017-10-24 NOTE — PROGRESS NOTES
SUBJECTIVE:    Feeling better. No chest pain currently. No nausea or vomiting. No SOB or cough. No dizziness. Lives with wife. Wishes to be a full code. OBJECTIVE:    Visit Vitals    /66    Pulse (!) 56    Temp 97.9 °F (36.6 °C)    Resp 14    Ht 5' 8\" (1.727 m)    Wt 101.6 kg (224 lb)    SpO2 99%    BMI 34.06 kg/m2     HEENT: No pallor. No sinus tenderness  Neck: no JVD  CVS: RRR  RS: CTA bilaterally  Chest: no tenderness  GI: NT, BS +  Extremities: no pedal edema  CNS: Moves all extremities well  General: NAD, Awake    ASSESSMENT:    1. NSTEMI  2. Acute Kidney Injury on stage 3 chronic kidney disease   3. Type 2 DM  4. Chronic diastolic CHF, compensated  5. CAD s/p multiple PCI's most recently 7/2016 during previous MI  6. Benign HTN  7. APRYL  8. H/o prostate CA  9.  DELFIN s/p right renal artery stent    PLAN:    Cont heparin drip  Cardiology input noted about medical management  Nephrology consulted - dr. Hampton Child report reviewed - add IS  PT/OT to follow  Cont current management    Total time greater than 35 minutes    CMP:   Lab Results   Component Value Date/Time     10/24/2017 10:37 AM    K 3.2 (L) 10/24/2017 10:37 AM    CL 99 (L) 10/24/2017 10:37 AM    CO2 29 10/24/2017 10:37 AM    AGAP 10 10/24/2017 10:37 AM     (H) 10/24/2017 10:37 AM    BUN 86 (H) 10/24/2017 10:37 AM    CREA 4.46 (H) 10/24/2017 10:37 AM    GFRAA 16 (L) 10/24/2017 10:37 AM    GFRNA 13 (L) 10/24/2017 10:37 AM    CA 9.1 10/24/2017 10:37 AM    ALB 2.8 (L) 10/23/2017 11:08 PM    TP 7.0 10/23/2017 11:08 PM    GLOB 4.2 (H) 10/23/2017 11:08 PM    AGRAT 0.7 (L) 10/23/2017 11:08 PM    SGOT 46 (H) 10/23/2017 11:08 PM    ALT 18 10/23/2017 11:08 PM     CBC:   Lab Results   Component Value Date/Time    WBC 8.8 10/23/2017 11:08 PM    HGB 10.3 (L) 10/23/2017 11:08 PM    HCT 31.7 (L) 10/23/2017 11:08 PM     10/23/2017 11:08 PM     All Cardiac Markers in the last 24 hours:   Lab Results   Component Value Date/Time  10/24/2017 10:37 AM     10/24/2017 05:08 AM     10/23/2017 11:08 PM     10/23/2017 02:55 PM    CKMB 10.8 (H) 10/24/2017 10:37 AM    CKMB 13.5 (H) 10/24/2017 05:08 AM    CKMB 15.3 (H) 10/23/2017 11:08 PM    CKMB 8.3 (H) 10/23/2017 02:55 PM    CKND1 7.3 (H) 10/24/2017 10:37 AM    CKND1 8.1 (H) 10/24/2017 05:08 AM    CKND1 7.1 (H) 10/23/2017 11:08 PM    CKND1 4.9 (H) 10/23/2017 02:55 PM    TROIQ 4.42 (HH) 10/24/2017 10:37 AM    TROIQ 6.50 (HH) 10/24/2017 05:08 AM    TROIQ 8.41 ([facility]) 10/23/2017 11:08 PM    TROIQ 0.69 (H) 10/23/2017 02:55 PM

## 2017-10-25 VITALS
HEART RATE: 68 BPM | OXYGEN SATURATION: 98 % | BODY MASS INDEX: 33.68 KG/M2 | TEMPERATURE: 98 F | RESPIRATION RATE: 13 BRPM | HEIGHT: 68 IN | DIASTOLIC BLOOD PRESSURE: 55 MMHG | SYSTOLIC BLOOD PRESSURE: 136 MMHG | WEIGHT: 222.2 LBS

## 2017-10-25 PROBLEM — N25.81 SECONDARY HYPERPARATHYROIDISM OF RENAL ORIGIN (HCC): Chronic | Status: ACTIVE | Noted: 2017-10-25

## 2017-10-25 LAB
ANION GAP SERPL CALC-SCNC: 7 MMOL/L (ref 3–18)
APTT PPP: 47 SEC (ref 23–36.4)
APTT PPP: 89.1 SEC (ref 23–36.4)
ATRIAL RATE: 112 BPM
ATTENDING PHYSICIAN, CST07: NORMAL
BASOPHILS # BLD: 0 K/UL (ref 0–0.1)
BASOPHILS NFR BLD: 0 % (ref 0–2)
BUN SERPL-MCNC: 90 MG/DL (ref 7–18)
BUN/CREAT SERPL: 20 (ref 12–20)
CALCIUM SERPL-MCNC: 8.9 MG/DL (ref 8.5–10.1)
CALCIUM SERPL-MCNC: 8.9 MG/DL (ref 8.5–10.1)
CALCULATED P AXIS, ECG09: 67 DEGREES
CALCULATED R AXIS, ECG10: 5 DEGREES
CALCULATED T AXIS, ECG11: 59 DEGREES
CHLORIDE SERPL-SCNC: 100 MMOL/L (ref 100–108)
CK MB CFR SERPL CALC: 4.8 % (ref 0–4)
CK MB SERPL-MCNC: 4.1 NG/ML (ref 5–25)
CK SERPL-CCNC: 85 U/L (ref 39–308)
CO2 SERPL-SCNC: 31 MMOL/L (ref 21–32)
CREAT SERPL-MCNC: 4.6 MG/DL (ref 0.6–1.3)
DIAGNOSIS, 93000: NORMAL
DIAGNOSIS, 93000: NORMAL
DIFFERENTIAL METHOD BLD: ABNORMAL
DUKE TM SCORE RESULT, CST14: NORMAL
DUKE TREADMILL SCORE, CST13: NORMAL
ECG INTERP BEFORE EX, CST11: NORMAL
ECG INTERP DURING EX, CST12: NORMAL
EOSINOPHIL # BLD: 0.4 K/UL (ref 0–0.4)
EOSINOPHIL NFR BLD: 5 % (ref 0–5)
ERYTHROCYTE [DISTWIDTH] IN BLOOD BY AUTOMATED COUNT: 13.6 % (ref 11.6–14.5)
FUNCTIONAL CAPACITY, CST17: NORMAL
GLUCOSE BLD STRIP.AUTO-MCNC: 219 MG/DL (ref 70–110)
GLUCOSE BLD STRIP.AUTO-MCNC: 227 MG/DL (ref 70–110)
GLUCOSE BLD STRIP.AUTO-MCNC: 248 MG/DL (ref 70–110)
GLUCOSE BLD STRIP.AUTO-MCNC: 304 MG/DL (ref 70–110)
GLUCOSE SERPL-MCNC: 266 MG/DL (ref 74–99)
HCT VFR BLD AUTO: 30.8 % (ref 36–48)
HGB BLD-MCNC: 9.7 G/DL (ref 13–16)
KNOWN CARDIAC CONDITION, CST08: NORMAL
LYMPHOCYTES # BLD: 2.3 K/UL (ref 0.9–3.6)
LYMPHOCYTES NFR BLD: 28 % (ref 21–52)
MAX. DIASTOLIC BP, CST04: 77 MMHG
MAX. HEART RATE, CST05: 79 BPM
MAX. SYSTOLIC BP, CST03: 220 MMHG
MCH RBC QN AUTO: 28.2 PG (ref 24–34)
MCHC RBC AUTO-ENTMCNC: 31.5 G/DL (ref 31–37)
MCV RBC AUTO: 89.5 FL (ref 74–97)
MONOCYTES # BLD: 0.5 K/UL (ref 0.05–1.2)
MONOCYTES NFR BLD: 6 % (ref 3–10)
NEUTS SEG # BLD: 4.9 K/UL (ref 1.8–8)
NEUTS SEG NFR BLD: 61 % (ref 40–73)
OVERALL BP RESPONSE TO EXERCISE, CST16: NORMAL
OVERALL HR RESPONSE TO EXERCISE, CST15: NORMAL
P-R INTERVAL, ECG05: 180 MS
PEAK EX METS, CST10: 1 METS
PHOSPHATE SERPL-MCNC: 4.1 MG/DL (ref 2.5–4.9)
PLATELET # BLD AUTO: 228 K/UL (ref 135–420)
PMV BLD AUTO: 11.2 FL (ref 9.2–11.8)
POTASSIUM SERPL-SCNC: 3.7 MMOL/L (ref 3.5–5.5)
PROTOCOL NAME, CST01: NORMAL
PTH-INTACT SERPL-MCNC: 495.7 PG/ML (ref 14–72)
Q-T INTERVAL, ECG07: 488 MS
QRS DURATION, ECG06: 118 MS
QTC CALCULATION (BEZET), ECG08: 474 MS
RBC # BLD AUTO: 3.44 M/UL (ref 4.7–5.5)
SODIUM SERPL-SCNC: 138 MMOL/L (ref 136–145)
TEST INDICATION, CST09: NORMAL
TROPONIN I SERPL-MCNC: 1.93 NG/ML (ref 0–0.04)
VENTRICULAR RATE, ECG03: 57 BPM
WBC # BLD AUTO: 8.1 K/UL (ref 4.6–13.2)

## 2017-10-25 PROCEDURE — 84100 ASSAY OF PHOSPHORUS: CPT | Performed by: NURSE PRACTITIONER

## 2017-10-25 PROCEDURE — 78452 HT MUSCLE IMAGE SPECT MULT: CPT | Performed by: NURSE PRACTITIONER

## 2017-10-25 PROCEDURE — A9500 TC99M SESTAMIBI: HCPCS

## 2017-10-25 PROCEDURE — 74011250637 HC RX REV CODE- 250/637: Performed by: INTERNAL MEDICINE

## 2017-10-25 PROCEDURE — 74011250637 HC RX REV CODE- 250/637: Performed by: NURSE PRACTITIONER

## 2017-10-25 PROCEDURE — 83970 ASSAY OF PARATHORMONE: CPT | Performed by: INTERNAL MEDICINE

## 2017-10-25 PROCEDURE — 74011636637 HC RX REV CODE- 636/637: Performed by: INTERNAL MEDICINE

## 2017-10-25 PROCEDURE — 93005 ELECTROCARDIOGRAM TRACING: CPT

## 2017-10-25 PROCEDURE — 93017 CV STRESS TEST TRACING ONLY: CPT | Performed by: NURSE PRACTITIONER

## 2017-10-25 PROCEDURE — 82962 GLUCOSE BLOOD TEST: CPT

## 2017-10-25 PROCEDURE — 85025 COMPLETE CBC W/AUTO DIFF WBC: CPT | Performed by: NURSE PRACTITIONER

## 2017-10-25 PROCEDURE — 74011250636 HC RX REV CODE- 250/636: Performed by: EMERGENCY MEDICINE

## 2017-10-25 PROCEDURE — 82550 ASSAY OF CK (CPK): CPT | Performed by: NURSE PRACTITIONER

## 2017-10-25 PROCEDURE — 85730 THROMBOPLASTIN TIME PARTIAL: CPT | Performed by: INTERNAL MEDICINE

## 2017-10-25 PROCEDURE — 36415 COLL VENOUS BLD VENIPUNCTURE: CPT | Performed by: NURSE PRACTITIONER

## 2017-10-25 PROCEDURE — 80048 BASIC METABOLIC PNL TOTAL CA: CPT | Performed by: NURSE PRACTITIONER

## 2017-10-25 PROCEDURE — 85730 THROMBOPLASTIN TIME PARTIAL: CPT | Performed by: NURSE PRACTITIONER

## 2017-10-25 PROCEDURE — 97165 OT EVAL LOW COMPLEX 30 MIN: CPT

## 2017-10-25 PROCEDURE — 93306 TTE W/DOPPLER COMPLETE: CPT

## 2017-10-25 PROCEDURE — 97161 PT EVAL LOW COMPLEX 20 MIN: CPT

## 2017-10-25 PROCEDURE — 74011250636 HC RX REV CODE- 250/636: Performed by: INTERNAL MEDICINE

## 2017-10-25 RX ORDER — CLONIDINE HYDROCHLORIDE 0.2 MG/1
0.2 TABLET ORAL 3 TIMES DAILY
Qty: 90 TAB | Refills: 0 | Status: ON HOLD
Start: 2017-10-25 | End: 2017-11-14

## 2017-10-25 RX ORDER — AMOXICILLIN 250 MG
1 CAPSULE ORAL DAILY
Status: DISCONTINUED | OUTPATIENT
Start: 2017-10-25 | End: 2017-10-25 | Stop reason: HOSPADM

## 2017-10-25 RX ORDER — SODIUM CHLORIDE 9 MG/ML
250 INJECTION, SOLUTION INTRAVENOUS ONCE
Status: COMPLETED | OUTPATIENT
Start: 2017-10-25 | End: 2017-10-25

## 2017-10-25 RX ORDER — GUAIFENESIN 100 MG/5ML
81 LIQUID (ML) ORAL DAILY
Qty: 30 TAB | Refills: 0 | Status: SHIPPED | OUTPATIENT
Start: 2017-10-26 | End: 2018-05-07

## 2017-10-25 RX ORDER — HYDRALAZINE HYDROCHLORIDE 50 MG/1
50 TABLET, FILM COATED ORAL 3 TIMES DAILY
Status: DISCONTINUED | OUTPATIENT
Start: 2017-10-25 | End: 2017-10-25 | Stop reason: HOSPADM

## 2017-10-25 RX ORDER — BISACODYL 5 MG
10 TABLET, DELAYED RELEASE (ENTERIC COATED) ORAL
Status: COMPLETED | OUTPATIENT
Start: 2017-10-25 | End: 2017-10-25

## 2017-10-25 RX ORDER — CALCITRIOL 0.25 UG/1
0.25 CAPSULE ORAL
Qty: 15 CAP | Refills: 0 | Status: SHIPPED | OUTPATIENT
Start: 2017-10-25 | End: 2017-11-14

## 2017-10-25 RX ORDER — CALCITRIOL 0.25 UG/1
0.25 CAPSULE ORAL
Status: DISCONTINUED | OUTPATIENT
Start: 2017-10-25 | End: 2017-10-25 | Stop reason: HOSPADM

## 2017-10-25 RX ORDER — AMOXICILLIN 250 MG
1 CAPSULE ORAL DAILY
Qty: 30 TAB | Refills: 0 | Status: SHIPPED | OUTPATIENT
Start: 2017-10-25 | End: 2018-07-09

## 2017-10-25 RX ORDER — CLONIDINE HYDROCHLORIDE 0.1 MG/1
0.2 TABLET ORAL 3 TIMES DAILY
Status: DISCONTINUED | OUTPATIENT
Start: 2017-10-25 | End: 2017-10-25 | Stop reason: HOSPADM

## 2017-10-25 RX ORDER — INSULIN LISPRO 100 [IU]/ML
INJECTION, SOLUTION INTRAVENOUS; SUBCUTANEOUS
Status: DISCONTINUED | OUTPATIENT
Start: 2017-10-25 | End: 2017-10-25 | Stop reason: HOSPADM

## 2017-10-25 RX ORDER — INSULIN GLARGINE 100 [IU]/ML
20 INJECTION, SOLUTION SUBCUTANEOUS
Status: COMPLETED | OUTPATIENT
Start: 2017-10-25 | End: 2017-10-25

## 2017-10-25 RX ADMIN — DOCUSATE SODIUM 100 MG: 100 CAPSULE, LIQUID FILLED ORAL at 10:39

## 2017-10-25 RX ADMIN — NEPHROCAP 1 CAPSULE: 1 CAP ORAL at 16:53

## 2017-10-25 RX ADMIN — FENOFIBRATE 48 MG: 48 TABLET, FILM COATED ORAL at 10:39

## 2017-10-25 RX ADMIN — TAMSULOSIN HYDROCHLORIDE 0.4 MG: 0.4 CAPSULE ORAL at 10:39

## 2017-10-25 RX ADMIN — NIFEDIPINE 90 MG: 90 TABLET, FILM COATED, EXTENDED RELEASE ORAL at 10:39

## 2017-10-25 RX ADMIN — CALCITRIOL 0.25 MCG: 0.25 CAPSULE, LIQUID FILLED ORAL at 16:27

## 2017-10-25 RX ADMIN — Medication 10 ML: at 15:50

## 2017-10-25 RX ADMIN — STANDARDIZED SENNA CONCENTRATE AND DOCUSATE SODIUM 1 TABLET: 8.6; 5 TABLET, FILM COATED ORAL at 16:27

## 2017-10-25 RX ADMIN — REGADENOSON 0.4 MG: 0.08 INJECTION, SOLUTION INTRAVENOUS at 08:20

## 2017-10-25 RX ADMIN — ASPIRIN 81 MG CHEWABLE TABLET 81 MG: 81 TABLET CHEWABLE at 10:39

## 2017-10-25 RX ADMIN — ISOSORBIDE MONONITRATE 30 MG: 30 TABLET, EXTENDED RELEASE ORAL at 10:39

## 2017-10-25 RX ADMIN — CLONIDINE HYDROCHLORIDE 0.2 MG: 0.1 TABLET ORAL at 10:39

## 2017-10-25 RX ADMIN — INSULIN LISPRO 4 UNITS: 100 INJECTION, SOLUTION INTRAVENOUS; SUBCUTANEOUS at 10:37

## 2017-10-25 RX ADMIN — METOPROLOL TARTRATE 100 MG: 50 TABLET ORAL at 18:32

## 2017-10-25 RX ADMIN — CLOPIDOGREL BISULFATE 75 MG: 75 TABLET ORAL at 10:39

## 2017-10-25 RX ADMIN — HYDRALAZINE HYDROCHLORIDE 50 MG: 50 TABLET, FILM COATED ORAL at 16:28

## 2017-10-25 RX ADMIN — INSULIN LISPRO 12 UNITS: 100 INJECTION, SOLUTION INTRAVENOUS; SUBCUTANEOUS at 16:29

## 2017-10-25 RX ADMIN — INSULIN GLARGINE 20 UNITS: 100 INJECTION, SOLUTION SUBCUTANEOUS at 10:33

## 2017-10-25 RX ADMIN — HEPARIN SODIUM AND DEXTROSE 1400 UNITS/HR: 10000; 5 INJECTION INTRAVENOUS at 11:01

## 2017-10-25 RX ADMIN — INSULIN GLARGINE 20 UNITS: 100 INJECTION, SOLUTION SUBCUTANEOUS at 16:28

## 2017-10-25 RX ADMIN — CLONIDINE HYDROCHLORIDE 0.2 MG: 0.1 TABLET ORAL at 16:28

## 2017-10-25 RX ADMIN — BISACODYL 10 MG: 5 TABLET, COATED ORAL at 16:27

## 2017-10-25 RX ADMIN — HYDRALAZINE HYDROCHLORIDE 50 MG: 50 TABLET, FILM COATED ORAL at 10:39

## 2017-10-25 RX ADMIN — Medication 10 ML: at 05:06

## 2017-10-25 RX ADMIN — METOPROLOL TARTRATE 100 MG: 50 TABLET ORAL at 10:39

## 2017-10-25 RX ADMIN — PANTOPRAZOLE SODIUM 40 MG: 40 TABLET, DELAYED RELEASE ORAL at 10:39

## 2017-10-25 RX ADMIN — SODIUM CHLORIDE 250 ML/HR: 9 INJECTION, SOLUTION INTRAVENOUS at 10:55

## 2017-10-25 NOTE — PROGRESS NOTES
[facility]      CARDIOLOGY PROGRESS NOTE  RECS:    1. NSTEMI- -positive troponin now trending down. currently chest pain free improve SOB. D/c Heparin drip. Continue with medical management for CAD. Will proceed with NUC stress test today to assess CAD progress. 2. CAD s/p coronary artery stent placement in 2012 and 7/17 with HERBER stents deployed in SVG in the focal mid lesion and a long proximal lesion. continue with asa and Plavix   3. Chronic diastolic heart failure- appears compensated- monitor for fluid overload.                 4. Essential hypertension-elevated this am patient did not get his BP medications. Increased hydralazine to 75 mg tid continue with clonidine and bb.   5. 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 Hyperlipidemia- on statin                                                            7. NICOLAS on CKD- Consider hydration as tolerated. Renal following   8. Diabetes managed per medicine.     Patient with CKD and NSTEMI-- discussed with patient at length regarding abnormal stress test and the need for invasive w/u. He clearly understands the need of cardiac cath to evaluate CAD due to risk of recurrent MI/ arrhythymia and death- but he doesn't want to proceed due to risk of HD. He promises to return to ER promptly if chest pain recurs. Discussed patient's wishes with Dr Yelena Begum and Dr Roni Hernnadez: Echo 10/17  Left ventricle: Systolic function was normal by visual assessment. Ejection   fraction was estimated in the range of 60 %  to 65 %. No obvious wall motion abnormalities identified in the views   obtained. Wall thickness was moderately to  markedly increased. Aortic valve: There was very mild stenosis. Valve peak gradient was 21 mmHg. Valve mean gradient was 12 mmHg. Estimated  aortic valve area (by VTI) was 1.6 cm-sq. COMPARISONS:  There has been no significant change.  Comparison was made with the previous   study of 06-Jul-2016. Cardiac Cath 7/19/17  IMPRESSION: Successful deployment of drug-eluting stents as detailed above  in the focal mid lesion and a long proximal lesion of the very old  saphenous venous graft to first diagonal artery without any complications. There was no evidence of distal occlusion and distal embolization and the  BRENNA flow was 3 at the end. IV Angiomax was used during the procedure, 300  mg of Plavix was given at the end of the procedure.      DISCUSSION AND RECOMMENDATIONS: This is a very old graft. Fortunately, we  were able to open it well today, deploying 2 drug-eluting stents. Aggressive risk factor modification and medical treatment should continue.       ASSESSMENT:  Hospital Problems  Date Reviewed: 10/25/2017          Codes Class Noted POA    Secondary hyperparathyroidism of renal origin [facility]) (Chronic) ICD-10-CM: N25.81  ICD-9-CM: 588.81  10/25/2017 Unknown        Diastolic CHF, acute on chronic (HCC) ICD-10-CM: I50.33  ICD-9-CM: 428.33, 428.0  7/5/2016 Yes        APRYL (obstructive sleep apnea) ICD-10-CM: G47.33  ICD-9-CM: 327.23  6/26/2015 Yes    Overview Signed 6/26/2015  9:25 AM by Kirill Klein MD     non compliance to cpap             Prostate cancer ([facility] ICD-10-CM: C61  ICD-9-CM: 185  4/29/2015 Yes    Overview Addendum 5/6/2015  4:32 PM by Alicia Molina MD     4/2015  T2c  PSA 57, [place];8 in 2 cores, and G7 in 2,  CT and Bone Scan no obvious mets             Atherosclerosis of renal artery (HCC) ICD-10-CM: I70.1  ICD-9-CM: 440.1  Unknown Yes    Overview Signed 4/29/2013  3:09 PM by Edi Vinson     S/P Rt stent             Morbid obesity ([facility] ICD-10-CM: E66.01  ICD-9-CM: 278.01  Unknown Yes    Overview Signed 4/29/2013  3:10 PM by Pedro Saleem     Weight loss has been strongly encouraged by following dietary restrictions and an exercise routine             Essential hypertension, benign ICD-10-CM: I10  ICD-9-CM: 401.1  Unknown Yes        S/P coronary artery stent placement ICD-10-CM: Z95.5  ICD-9-CM: V45.82  3/12/2013 Yes    Overview Signed 3/12/2013  3:37 PM by Minor Grant MD     vg to diag stent 10/2012             Postsurgical aortocoronary bypass status ICD-10-CM: Z95.1  ICD-9-CM: V45.81  3/12/2013 Yes        Contrast dye induced nephropathy ICD-10-CM: T50.8X1A, N14.1  ICD-9-CM: 584.5, E980.4  10/14/2012 Yes        * (Principal)NSTEMI (non-ST elevated myocardial infarction) [facility]) ICD-10-CM: I21.4  ICD-9-CM: 410.70  10/7/2012 Unknown        CKD (chronic kidney disease) stage 4, GFR 15-29 ml/min ([facility]) ICD-10-CM: N18.4  ICD-9-CM: 585.4  10/7/2012 Yes        DM2 (diabetes mellitus, type 2) ([facility] ICD-10-CM: E11.9  ICD-9-CM: 250.00  10/7/2012 Yes        Dyslipidemia ICD-10-CM: E78.5  ICD-9-CM: 272.4  10/7/2012 Yes                SUBJECTIVE:    No CP  No SOB    OBJECTIVE:    VS:   Visit Vitals    /60    Pulse (!) 54    Temp 97.9 °F (36.6 °C)    Resp 15    Ht 5' 8\" (1.727 m)    Wt 100.8 kg (222 lb 3.2 oz)    SpO2 97%    BMI 33.79 kg/m2         Intake/Output Summary (Last 24 hours) at 10/25/17 1110  Last data filed at 10/25/17 0512   Gross per 24 hour   Intake           781.03 ml   Output              950 ml   Net          -168.97 ml     TELE: normal sinus rhythm    General: alert, well developed, pleasant and in no apparent distress  HENT: Normocephalic, atraumatic. Normal external eye.   Neck :  no bruit, no JVD  Cardiac:  regular rate and rhythm, S1, S2 normal, no S3 or S4, no click, no rub  Lungs: clear to auscultation bilaterally  Abdomen: Soft, nontender, no masses  Extremities:  No c/c/e, peripheral pulses present      Labs: Results:       Chemistry Recent Labs      10/25/17   0301  10/24/17   2156  10/24/17   1037  10/23/17   2308  10/23/17   1455   GLU  266*  270*  151*  163*  175*   NA  138  137  138  137  137   K  3.7  3.7  3.2*  3.5  3.4*   CL  100  99*  99*  99*  99*   CO2  31  30  29  30  30   BUN  90*  88*  86*  88*  88* CREA  4.60*  4.78*  4.46*  4.61*  4.68*   CA  8.9  8.9  8.8  9.1  8.8  9.1   AGAP  7  8  10  8  8   BUCR  20  18  19  19  19   AP   --   103   --   90  116   TP   --   7.7   --   7.0  7.4   ALB   --   3.2*   --   2.8*  3.3*   GLOB   --   4.5*   --   4.2*  4.1*   AGRAT   --   0.7*   --   0.7*  0.8      CBC w/Diff Recent Labs      10/25/17   0301  10/23/17   2308  10/23/17   1455   WBC  8.1  8.8  10.0   RBC  3.44*  3.62*  3.89*   HGB  9.7*  10.3*  11.0*   HCT  30.8*  31.7*  34.0*   PLT  228  225  249   GRANS  61   --   75*   LYMPH  28   --   18*   EOS  5   --   2      Cardiac Enzymes Recent Labs      10/25/17   0301  10/24/17   1037   CPK  85  148   CKND1  4.8*  7.3*      Coagulation Recent Labs      10/25/17   0301  10/24/17   2156   10/23/17   2308   PTP   --    --    --   12.5   INR   --    --    --   1.0   APTT  89.1*  90.6*   < >  50.9*    < > = values in this interval not displayed. Lipid Panel Lab Results   Component Value Date/Time    Cholesterol, total 158 10/24/2017 05:08 AM    HDL Cholesterol 39 10/24/2017 05:08 AM    LDL, calculated 72.4 10/24/2017 05:08 AM    VLDL, calculated 46.6 10/24/2017 05:08 AM    Triglyceride 233 10/24/2017 05:08 AM    CHOL/HDL Ratio 4.1 10/24/2017 05:08 AM      BNP No results for input(s): BNPP in the last 72 hours. Liver Enzymes Recent Labs      10/24/17   2156   TP  7.7   ALB  3.2*   AP  103   SGOT  25      Thyroid Studies Lab Results   Component Value Date/Time    T4, Total 10.9 10/09/2012 04:53 AM    TSH 2.16 10/23/2017 11:08 PM              [place] 55 supervised    I have independently evaluated and examined the patient. All relevant labs and testing data's are reviewed. Care plan discussed and updated after review.     Natasha Herbert MD

## 2017-10-25 NOTE — DISCHARGE SUMMARY
PATIENT DISCHARGE INSTRUCTIONS      PATIENT DISCHARGE INSTRUCTIONS    Saman Booth / [id number] : 1940    Admitted 10/23/2017 Discharged: 10/25/2017     Dictated # [id number]    · It is important that you take the medication exactly as they are prescribed. · Keep your medication in the bottles provided by the pharmacist and keep a list of the medication names, dosages, and times to be taken in your wallet. · Do not take other medications without consulting your doctor. What to do at Home    Recommended Diet: Cardiac Diet, Diabetic Diet and Renal Diet    Recommended Activity: Activity as tolerated    Current Discharge Medication List      START taking these medications    Details   aspirin 81 mg chewable tablet Take 1 Tab by mouth daily. Qty: 30 Tab, Refills: 0      b complex-vitamin c-folic acid (NEPHROCAPS) 1 mg capsule Take 1 Cap by mouth daily. Qty: 30 Cap, Refills: 0      calcitRIOL (ROCALTROL) 0.25 mcg capsule Take 1 Cap by mouth every Monday, Wednesday, Friday. Qty: 15 Cap, Refills: 0      senna-docusate (PERICOLACE) 8.6-50 mg per tablet Take 1 Tab by mouth daily. Qty: 30 Tab, Refills: 0         CONTINUE these medications which have CHANGED    Details   cloNIDine HCl (CATAPRES) 0.2 mg tablet Take 1 Tab by mouth three (3) times daily. Qty: 90 Tab, Refills: 0         CONTINUE these medications which have NOT CHANGED    Details   fenofibrate (LOFIBRA) 54 mg tablet Take 1 Tab by mouth daily. Qty: 30 Tab, Refills: 5    Associated Diagnoses: Dyslipidemia      simvastatin (ZOCOR) 40 mg tablet TAKE 1 TABLET BY MOUTH NIGHTLY. Qty: 90 Tab, Refills: 2      metoprolol tartrate (LOPRESSOR) 100 mg IR tablet TAKE 1 TABLET BY MOUTH TWO TIMES A DAY. Qty: 180 Tab, Refills: 2      clopidogrel (PLAVIX) 75 mg tab Take 1 Tab by mouth daily.   Qty: 90 Tab, Refills: 3      tamsulosin (FLOMAX) 0.4 mg capsule TAKE ONE CAPSULE BY MOUTH ONCE A DAY  Qty: 90 Cap, Refills: 2      Cholecalciferol, Vitamin D3, ([id number] ([facility])) 50,000 unit cap Take  by mouth every seven (7) days. isosorbide mononitrate ER (IMDUR) 30 mg tablet Take 1 Tab by mouth daily. Qty: 90 Tab, Refills: 3      furosemide (LASIX) 40 mg tablet Take 1 Tab by mouth daily as needed. Qty: 90 Tab, Refills: 3      hydrALAZINE (APRESOLINE) 100 mg tablet Take 1 Tab by mouth three (3) times daily. Qty: 90 Tab, Refills: 2      NIFEdipine ER (PROCARDIA XL) 90 mg ER tablet Take 1 Tab by mouth daily. Qty: 30 Tab, Refills: 2      insulin glargine (LANTUS) 100 unit/mL injection 20 Units by SubCUTAneous route every twelve (12) hours. Qty: 1 Vial, Refills: 2      nitroglycerin (NITROSTAT) 0.4 mg SL tablet 1 Tab by SubLINGual route as needed for Chest Pain. Qty: 25 Tab, Refills: 1      insulin aspart (NOVOLOG) 100 unit/mL injection 20 units sq 3 times a day,sliding scale  Qty: 10 mL, Refills: 0      allopurinol (ZYLOPRIM) 100 mg tablet Take 100 mg by mouth daily.            STOP taking these medications       metOLazone (ZAROXOLYN) 5 mg tablet Comments:   Reason for Stopping:                 Signed By: Shannan Clancy MD     October 25, 2017

## 2017-10-25 NOTE — CARDIO/PULMONARY
Brief visit completed with patient. He stated that he was feeling pretty good today. He stated that he will get back on track with his exercise and diet. He is even thinking that he will go participate in the outpatient cardiac rehab program again to build his strength and get back in routine of doing regular activity. Will continue to follow.

## 2017-10-25 NOTE — PROGRESS NOTES
SUBJECTIVE:    Sitting up in chair. No chest and abdominal pain. No nausea and vomiting. No SOB or cough. Wants to go home. States he has been walking in hallway without any chest pain. Does not want to go to on HD. OBJECTIVE:    Visit Vitals    /49    Pulse 61    Temp 97.9 °F (36.6 °C)    Resp 12    Ht 5' 8\" (1.727 m)    Wt 100.8 kg (222 lb 3.2 oz)    SpO2 98%    BMI 33.79 kg/m2     CVS: RRR  RS: CTA bilaterally  Chest: no tenderness  GI: NT, BS +  Extremities: no pedal edema  CNS: Moves all extremities well  General: NAD, Awake    ASSESSMENT:    1. NSTEMI  2. Acute Kidney Injury on stage 3 chronic kidney disease   3. Type 2 DM  4. Chronic diastolic CHF, compensated  5. CAD s/p multiple PCI's most recently 7/2016 during previous MI  6. Benign HTN  7. APRYL  8. H/o prostate CA  9. DELFIN s/p right renal artery stent    PLAN:    OFF heparin drip  Cardiology input noted about medical management.  Talked to dr. Maynor Sethi - will let me know about stress test report and follow up  Talked to dr. Blane Rios - can go home and will follow him as an outpatinet  Cont current management  Can be discharge later on if okay with cardiology     Total time greater than 35 minutes    CMP:   Lab Results   Component Value Date/Time     10/25/2017 03:01 AM    K 3.7 10/25/2017 03:01 AM     10/25/2017 03:01 AM    CO2 31 10/25/2017 03:01 AM    AGAP 7 10/25/2017 03:01 AM     (H) 10/25/2017 03:01 AM    BUN 90 (H) 10/25/2017 03:01 AM    CREA 4.60 (H) 10/25/2017 03:01 AM    GFRAA 15 (L) 10/25/2017 03:01 AM    GFRNA 12 (L) 10/25/2017 03:01 AM    CA 8.9 10/25/2017 03:01 AM    CA 8.9 10/25/2017 03:01 AM    PHOS 4.1 10/25/2017 03:01 AM    ALB 3.2 (L) 10/24/2017 09:56 PM    TP 7.7 10/24/2017 09:56 PM    GLOB 4.5 (H) 10/24/2017 09:56 PM    AGRAT 0.7 (L) 10/24/2017 09:56 PM    SGOT 25 10/24/2017 09:56 PM    ALT 19 10/24/2017 09:56 PM     CBC:   Lab Results   Component Value Date/Time    WBC 8.1 10/25/2017 03:01 AM    HGB 9.7 (L) 10/25/2017 03:01 AM    HCT 30.8 (L) 10/25/2017 03:01 AM     10/25/2017 03:01 AM     All Cardiac Markers in the last 24 hours:   Lab Results   Component Value Date/Time    CPK 85 10/25/2017 03:01 AM    CKMB 4.1 (H) 10/25/2017 03:01 AM    CKND1 4.8 (H) 10/25/2017 03:01 AM    [facility] 10/25/2017 03:01 AM       Current Discharge Medication List      START taking these medications    Details   aspirin 81 mg chewable tablet Take 1 Tab by mouth daily. Qty: 30 Tab, Refills: 0      b complex-vitamin c-folic acid (NEPHROCAPS) 1 mg capsule Take 1 Cap by mouth daily. Qty: 30 Cap, Refills: 0      calcitRIOL (ROCALTROL) 0.25 mcg capsule Take 1 Cap by mouth every Monday, Wednesday, Friday. Qty: 15 Cap, Refills: 0      senna-docusate (PERICOLACE) 8.6-50 mg per tablet Take 1 Tab by mouth daily. Qty: 30 Tab, Refills: 0         CONTINUE these medications which have CHANGED    Details   cloNIDine HCl (CATAPRES) 0.2 mg tablet Take 1 Tab by mouth three (3) times daily. Qty: 90 Tab, Refills: 0         CONTINUE these medications which have NOT CHANGED    Details   fenofibrate (LOFIBRA) 54 mg tablet Take 1 Tab by mouth daily. Qty: 30 Tab, Refills: 5    Associated Diagnoses: Dyslipidemia      simvastatin (ZOCOR) 40 mg tablet TAKE 1 TABLET BY MOUTH NIGHTLY. Qty: 90 Tab, Refills: 2      metoprolol tartrate (LOPRESSOR) 100 mg IR tablet TAKE 1 TABLET BY MOUTH TWO TIMES A DAY. Qty: 180 Tab, Refills: 2      clopidogrel (PLAVIX) 75 mg tab Take 1 Tab by mouth daily. Qty: 90 Tab, Refills: 3      tamsulosin (FLOMAX) 0.4 mg capsule TAKE ONE CAPSULE BY MOUTH ONCE A DAY  Qty: 90 Cap, Refills: 2      Cholecalciferol, Vitamin D3, (DECARA) 50,000 unit cap Take  by mouth every seven (7) days. isosorbide mononitrate ER (IMDUR) 30 mg tablet Take 1 Tab by mouth daily. Qty: 90 Tab, Refills: 3      furosemide (LASIX) 40 mg tablet Take 1 Tab by mouth daily as needed.   Qty: 90 Tab, Refills: 3      hydrALAZINE (APRESOLINE) 100 mg tablet Take 1 Tab by mouth three (3) times daily. Qty: 90 Tab, Refills: 2      NIFEdipine ER (PROCARDIA XL) 90 mg ER tablet Take 1 Tab by mouth daily. Qty: 30 Tab, Refills: 2      insulin glargine (LANTUS) 100 unit/mL injection 20 Units by SubCUTAneous route every twelve (12) hours. Qty: 1 Vial, Refills: 2      nitroglycerin (NITROSTAT) 0.4 mg SL tablet 1 Tab by SubLINGual route as needed for Chest Pain. Qty: 25 Tab, Refills: 1      insulin aspart (NOVOLOG) 100 unit/mL injection 20 units sq 3 times a day,sliding scale  Qty: 10 mL, Refills: 0      allopurinol (ZYLOPRIM) 100 mg tablet Take 100 mg by mouth daily.            STOP taking these medications       metOLazone (ZAROXOLYN) 5 mg tablet Comments:   Reason for Stopping:

## 2017-10-25 NOTE — ROUTINE PROCESS
DISCHARGE COMPLETED. WAITING ON FAMILY TO . PT HAS CANE AND MEDS THAT HE BROUGHT IN THE HOSPITAL WITH HIM. WALKING AROUND IN ROOM WITH CANE. PRESCRIPTIONS GIVEN.

## 2017-10-25 NOTE — PROGRESS NOTES
Patient was given 10.98 mCi of Sestamibi for the Resting pictures. Patient received 0.4 mg of Lexiscan for the exercise portion of the Stress test. Patient was then given 33 mCi of Sestamibi for the Stress pictures. Patient went back to room with armband still on.

## 2017-10-25 NOTE — PROGRESS NOTES
Problem: Self Care Deficits Care Plan (Adult)  Goal: *Acute Goals and Plan of Care (Insert Text)  Outcome: Resolved/Met Date Met: 10/25/17  Occupational Therapy EVALUATION/discharge    Patient: Nohemy Lopez (62 y.o. male)  Date: 10/25/2017  Primary Diagnosis: NSTEMI (non-ST elevated myocardial infarction) [facility])        Precautions:   Fall    ASSESSMENT AND RECOMMENDATIONS:  Based on the objective data described below, the patient is able to perform basic self care tasks and functional mobility without assistance. Patient has a supportive wife at home to assist him prn and all needed DME for bathroom safety. Discussed energy conservation techniques during daily activities and patient verbalized understanding. Skilled occupational therapy is not indicated at this time. Discharge Recommendations: None  Further Equipment Recommendations for Discharge: N/A      Barriers to Learning/Limitations: None  Compensate with: visual, verbal, tactile, kinesthetic cues/model     COMPLEXITY     Eval Complexity: History: LOW Complexity : Brief history review ; Examination: LOW Complexity : 1-3 performance deficits relating to physical, cognitive , or psychosocial skils that result in activity limitations and / or participation restrictions ; Decision Making:LOW Complexity : No comorbidities that affect functional and no verbal or physical assistance needed to complete eval tasks  Assessment: Low Complexity        G-CODES:     Self Care  Current  CI= 1-19%   Goal  CI= 1-19%   D/C  CI= 1-19%. The severity rating is based on the Level of Assistance required for Functional Mobility and ADLs. SUBJECTIVE:   Patient stated I would like to go back to pulmonary rehab.     OBJECTIVE DATA SUMMARY:     Past Medical History:   Diagnosis Date    Atherosclerosis of renal artery (HCC)     S/P Rt stent    CAD (coronary artery disease) , 1997, 2012    ,double bypass, 2 stents, 2stents 7/2016    Cancer [facility])     prostate  CHF (congestive heart failure) (HCC)     Chronic diastolic heart failure (HCC)     Chronic kidney disease (CKD), stage IV (severe) (HCC)     Constipation     Coronary atherosclerosis of unspecified type of vessel, native or graft     Stable angina after recent PCI continue treatment.  CRI (chronic renal insufficiency)     Diabetes ([facility] [facility] 1973    type 2    Essential hypertension, benign     GERD (gastroesophageal reflux disease)     Gout     Hypertension 1982    Morbid obesity ([facility] [facility]     Weight loss has been strongly encouraged by following dietary restrictions and an exercise routine    APRYL (obstructive sleep apnea) 6/26/2015    no cpap    Other and unspecified hyperlipidemia     HDL's are not at goal, LDL's are at goal, triglycerides are not at goal.    Other and unspecified hyperlipidemia     HDL's are not at goal, LDL's are at goal, triglycerides are not at goal.     Other ill-defined conditions(799.89) 1960    pneumonia twice    Sleep disturbance     Type II or unspecified type diabetes mellitus without mention of complication, not stated as uncontrolled      Past Surgical History:   Procedure Laterality Date   [facility] 635    lt. carotid endart.  COLONOSCOPY N/A 6/23/2017    COLONOSCOPY w/ APC w/ polypectomies performed by Kristi Yepez MD at [address] HX CHOLECYSTECTOMY      HX CORONARY ARTERY BYPASS GRAFT  2000    x2    HX HEENT  1997    cholecystectomy    HX UROLOGICAL  1998    renal art. surg     Prior Level of Function/Home Situation: Pt was independent with basic self care tasks and functional mobility PTA.   Home Situation  Home Environment: Private residence  # Steps to Enter: 2  Rails to Enter: No  One/Two Story Residence: One story  Living Alone: No  Support Systems: Spouse/Significant Other/Partner, Child(jeanette), Family member(s)  Patient Expects to be Discharged to[de-identified] Private residence  Tub or Shower Type: Shower (seat and grab bar)  [x] Right hand dominant   []     Left hand dominant  Cognitive/Behavioral Status:  Neurologic State: Alert  Orientation Level: Oriented X4  Cognition: Appropriate decision making; Follows commands  Safety/Judgement: Awareness of environment; Fall prevention    Skin: Intact on UEs    Edema: None noted in UEs    Vision/Perceptual:    Acuity: Within Defined Limits      Coordination:  Fine Motor Skills-Upper: Left Intact; Right Intact    Gross Motor Skills-Upper: Left Intact; Right Intact    Balance:  Sitting: Intact; Without support  Standing: Intact; Without support    Strength:  Strength: Within functional limits (UEs)    Tone & Sensation:  Tone: Normal (UEs)  Sensation: Intact (UEs)    Range of Motion:  AROM: Within functional limits (UEs)    Functional Mobility and Transfers for ADLs:  Bed Mobility:  Rolling: Modified independent  Supine to Sit: Modified independent  Sit to Supine: Modified independent  Scooting: Modified independent  Transfers:  Sit to Stand: Modified independent   Toilet Transfer : Modified independent    ADL Assessment:  Feeding: Independent    Oral Facial Hygiene/Grooming: Independent    Bathing: Modified independent    Upper Body Dressing: Modified independent    Lower Body Dressing: Modified independent    Toileting: Modified independent    Pain:  Pt reports 0/10 pain or discomfort prior to treatment.    Pt reports 0/10 pain or discomfort post treatment. Activity Tolerance:   Good    Please refer to the flowsheet for vital signs taken during this treatment. After treatment:   [x]  Patient left in no apparent distress sitting up in chair  []  Patient left in no apparent distress in bed  [x]  Call bell left within reach  []  Nursing notified  []  Caregiver present  []  Bed alarm activated    COMMUNICATION/EDUCATION:   Communication/Collaboration:  [x]      Home safety education was provided and the patient/caregiver indicated understanding.   [x]      Patient/family have participated as able and agree with findings and recommendations. []      Patient is unable to participate in plan of care at this time.     Linda Pineda MS OTR/L  Time Calculation: 15 mins

## 2017-10-25 NOTE — PROGRESS NOTES
Progress Note    Ana Laura Marker  68 y.o. Admit Date: 10/23/2017  Principal Problem:    NSTEMI (non-ST elevated myocardial infarction) ([facility] (10/7/2012) POA: Unknown    Active Problems:    CKD (chronic kidney disease) stage 4, GFR 15-29 ml/min ([facility] (10/7/2012) POA: Yes      DM2 (diabetes mellitus, type 2) ([facility] (10/7/2012) POA: Yes      Dyslipidemia (10/7/2012) POA: Yes      Contrast dye induced nephropathy (10/14/2012) POA: Yes      S/P coronary artery stent placement (3/12/2013) POA: Yes      Overview: vg to diag stent 10/2012      Postsurgical aortocoronary bypass status (3/12/2013) POA: Yes      Atherosclerosis of renal artery (HCC) () POA: Yes      Overview: S/P Rt stent      Morbid obesity ([facility] () POA: Yes      Overview: Weight loss has been strongly encouraged by following dietary restrictions       and an exercise routine      Essential hypertension, benign () POA: Yes      Prostate cancer ([facility] (4/29/2015) POA: Yes      Overview: 4/2015  T2c  PSA 57, [place];8 in 2 cores, and G7 in 2,  CT and Bone Scan       no obvious mets      APRYL (obstructive sleep apnea) (6/26/2015) POA: Yes      Overview: non compliance to cpap      Diastolic CHF, acute on chronic ([facility] (7/5/2016) POA: Yes      Secondary hyperparathyroidism of renal origin ([facility] (10/25/2017) POA: Unknown            Subjective:     Patient feels good, no Chest pain, no SOB, on Heparin drip. Back from  2-D echo. A comprehensive review of systems was negative except for that written in the History of Present Illness.     Objective:     Visit Vitals    /60    Pulse (!) 54    Temp 97.9 °F (36.6 °C)    Resp 15    Ht 5' 8\" (1.727 m)    Wt 100.8 kg (222 lb 3.2 oz)    SpO2 97%    BMI 33.79 kg/m2         Intake/Output Summary (Last 24 hours) at 10/25/17 1044  Last data filed at 10/25/17 0512   Gross per 24 hour   Intake           781.03 ml   Output              950 ml   Net          -168.97 ml       Current Facility-Administered Medications   Medication Dose Route Frequency Provider Last Rate Last Dose    0.9% sodium chloride infusion  250 mL/hr IntraVENous ONCE Monica Call MD        reg[facility]oson [facility]) injection 0.4 mg  0.4 mg IntraVENous CARD ONCE Monica Call MD        aspirin chewable tablet 81 mg  81 mg Oral DAILY Urszula ARIELA Coughlin, NP   81 mg at 10/25/17 1039    fenofibrate nanocrystallized (TRICOR) tablet 48 mg  48 mg Oral DAILY Urszula Coughlin, NP   48 mg at 10/25/17 1039    pantoprazole (PROTONIX) tablet 40 mg  40 mg Oral DAILY Samson Ramirez MD   40 mg at 10/25/17 1039    heparin 25,000 units in D5W 250 ml infusion  9.708-25 Units/kg/hr IntraVENous TITRATE Skyler Pratt DO 14 mL/hr at 10/24/17 2301 1,400 Units/hr at 10/24/17 2301    cloNIDine HCl (CATAPRES) tablet 0.2 mg  0.2 mg Oral QID Jorge Luis Stewart MD   0.2 mg at 10/25/17 1039    clopidogrel (PLAVIX) tablet 75 mg  75 mg Oral DAILY Jorge Luis Stewart MD   75 mg at 10/25/17 1039    furosemide (LASIX) tablet 80 mg  80 mg Oral DAILY PRN Jorge Luis Stewart MD        hydrALAZINE (APRESOLINE) tablet 50 mg  50 mg Oral TID Jorge Luis Stewart MD   50 mg at 10/25/17 1039    insulin glargine (LANTUS) injection 20 Units  20 Units SubCUTAneous Q12H Jorge Luis Stewart MD   20 Units at 10/25/17 1033    isosorbide mononitrate ER (IMDUR) tablet 30 mg  30 mg Oral DAILY Jorge Luis Stewart MD   30 mg at 10/25/17 1039    metoprolol tartrate (LOPRESSOR) tablet 100 mg  100 mg Oral BID Jorge Luis Stewart MD   100 mg at 10/25/17 1039    NIFEdipine ER (PROCARDIA XL) tablet 90 mg  90 mg Oral DAILY Jorge Luis Stewart MD   90 mg at 10/25/17 1039    simvastatin (ZOCOR) tablet 40 mg  40 mg Oral QHS Jorge Luis Stewart MD   40 mg at 10/24/17 2139    tamsulosin (FLOMAX) capsule 0.4 mg  0.4 mg Oral DAILY Jorge Luis Stewart MD   0.4 mg at 10/25/17 1039    sodium chloride (NS) flush 5-10 mL  5-10 mL IntraVENous Q8H Jorge Luis Stewart MD   10 mL at 10/25/17 0506    sodium chloride (NS) flush 5-10 mL 5-10 mL IntraVENous PRN Mikayla Mcelroy MD        magnesium hydroxide (MILK OF MAGNESIA) 400 mg/5 mL oral suspension 30 mL  30 mL Oral DAILY PRN Mikayla Mcelroy MD        docusate sodium (COLACE) capsule 100 mg  100 mg Oral BID Mikayla Mcelroy MD   100 mg at 10/25/17 1039    acetaminophen (TYLENOL) tablet 650 mg  650 mg Oral Q6H PRN Mikayla Mcelroy MD        HYDROcodone-acetaminophen [facility]) 5-325 mg per tablet 1 Tab  1 Tab Oral Q6H PRN Mikayla Mcelroy MD        HYDROmorphone (DILAUDID) syringe 1 mg  1 mg IntraVENous Q4H PRN Mikayla Mcelroy MD        insulin lispro (HUMALOG) injection   SubCUTAneous AC&HS Mikayla Mcelroy MD   4 Units at 10/25/17 1037    glucose chewable tablet 16 g  4 Tab Oral PRN Mikayla Mcelroy MD        glucagon [facility]) injection 1 mg  1 mg IntraMUSCular PRN Mikayla Mcelroy MD        dextrose (D50W) injection syrg 12.5-25 g  25-50 mL IntraVENous PRN Mikayla Mcelroy MD            Physical Exam:     Physical Exam:   General:  Alert, cooperative, no distress, appears stated age. Mouth/Throat: Lips, mucosa, and tongue normal. Teeth and gums normal.   Neck: Supple, symmetrical, trachea midline, no adenopathy, thyroid: no enlargement/tenderness/nodules, no carotid bruit and has  JVD. Lungs:   Clear to auscultation bilaterally. Heart:  Regular rate and rhythm, S1, S2 normal, no murmur, click, rub or gallop. Abdomen:   Soft, non-tender. Bowel sounds normal. No masses,  No organomegaly. Extremities: Extremities normal, atraumatic, no cyanosis or edema.    Skin: Skin color, texture, turgor normal. No rashes or lesions         Data Review:    CBC w/Diff    Recent Labs      10/25/17   0301  10/23/17   2308  10/23/17   1455   WBC  8.1  8.8  10.0   RBC  3.44*  3.62*  3.89*   HGB  9.7*  10.3*  11.0*   HCT  30.8*  31.7*  34.0*   MCV  89.5  87.6  87.4   MCH  28.2  28.5  28.3   MCHC  31.5  32.5  32.4   RDW  13.6  13.4  13.5    Recent Labs      10/25/17   0301   10/23/17   1455   MONOS  6   -- 5   EOS  5   --   2   BASOS  0   --   0   RDW  13.6   < >  13.5    < > = values in this interval not displayed. Comprehensive Metabolic Profile    Recent Labs      10/25/17   0301  10/24/17   2156  10/24/17   1037   NA  138  137  138   K  3.7  3.7  3.2*   CL  100  99*  99*   CO2  31  30  29   BUN  90*  88*  86*   CREA  4.60*  4.78*  4.46*    Recent Labs      10/25/17   0301  10/24/17   2156  10/24/17   1037  10/23/17   2308  10/23/17   1455   CA  8.9  8.9  8.8  9.1  8.8  9.1   PHOS  4.1   --    --    --    --    ALB   --   3.2*   --   2.8*  3.3*   TP   --   7.7   --   7.0  7.4   SGOT   --   25   --   46*  23   TBILI   --   0.3   --   0.4  0.4                        Impression:       Active Hospital Problems    Diagnosis Date Noted    Secondary hyperparathyroidism of renal origin ([facility] 94/95/6000    Diastolic CHF, acute on chronic ([facility] 07/05/2016    APRYL (obstructive sleep apnea) 06/26/2015     non compliance to cpap      [facility] ([facility] 04/29/2015 4/2015  T2c  PSA 57, [place];8 in 2 cores, and G7 in 2,  CT and Bone Scan no obvious mets      Morbid obesity ([facility]      Weight loss has been strongly encouraged by following dietary restrictions and an exercise routine      Essential hypertension, benign     Atherosclerosis of renal artery (HCC)      S/P Rt stent      S/P coronary artery stent placement 03/12/2013     vg to diag stent 10/2012      Postsurgical aortocoronary bypass status 03/12/2013    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    NSTEMI (non-ST elevated myocardial infarction) ([address] 10/07/2012    Dyslipidemia 10/07/2012    DM2 (diabetes mellitus, type 2) ([facility] 10/07/2012    CKD (chronic kidney disease) stage 4, GFR 15-29 ml/min ([address] 10/07/2012            Plan:     Continue supportive care, restart his all BP medicine,await report of 2 - D echocardiogram & Lexican, not in [place] of giving any fluid, no plan  For any acute dialysis until he needs  Cardiac cath.  Add Rocaltrol & Renal vitamin.     Christine Fong MD

## 2017-10-25 NOTE — DIABETES MGMT
NUTRITIONAL ASSESSMENT GLYCEMIC CONTROL/ PLAN OF CARE     Juan Miguel Thompson           68 y.o.           10/23/2017                 1. NSTEMI (non-ST elevated myocardial infarction) ([facility] [address]       INTERVENTIONS/PLAN:   Advance to the very insulin resistant correctional Lispro scale  Consider addition of prandial Lispro insulin coverage per Insulin Subcutaneous order set  Diabetes education   ASSESSMENT:   Pt is a 68year old male with a past medical history significant for atherosclerosis, CAD, CHF, CKD, diabetes, obesity, and hypertension. Blood glucose elevated above targets, noted pt did refuse one dose of Lantus insulin yesterday. Pt receives correctional Lispro insulin coverage (normal sensitivity scale). Pt is tolerating diet with good appetite. Reports following a 2000 kcal diet at home. Pt reports regular follow up with endocrinologist and states his glucose target is 80 -120 mg/dL. Refer to education note 10/24/17. Continue inpatient monitoring and intervention. Diabetes Management:   Recent blood glucose:    10/24/2017 17:15 10/24/2017 21:13 10/25/2017 07:24 10/25/2017 10:29 10/25/2017 11:17   302 (H) 252 (H) 219 (H) 248 (H) 227 (H)    Within target range (non-ICU: <140; ICU<180): [] Yes   [x]  No    Current Insulin regimen:   Lantus insulin 20 units every 12 hours   Correctional Lispro insulin 4 times daily ACHS (normal insulin sensitivity)  Home medication/insulin regimen:   Pt reports taking Lantus insulin 60 units BID and Novolog insulin per sliding scale.    HbA1c: 8.3% (estimated average glucose of 192 mg/dL)   Adequate glycemic control PTA:  [] Yes  [x] No     SUBJECTIVE/OBJECTIVE:   Information obtained from: patient, chart review     Diet: cardiac, 2 gram NA, consistent carbohydrate, 1800 kcal, renal     Patient Vitals for the past 100 hrs:   % Diet Eaten   10/25/17 1200 100 %   10/24/17 1700 100 %   10/24/17 1200 100 %   10/24/17 0800 50 %     Medications: [x] Reviewed     Most Recent POC Glucose: Recent Labs      10/25/17   0301  10/24/17   2156  10/24/17   1037  10/23/17   2308   GLU  266*  270*  151*  163*       Labs:   Lab Results   Component Value Date/Time    Hemoglobin A1c 8.3 10/23/2017 02:55 PM     Lab Results   Component Value Date/Time    Sodium 138 10/25/2017 03:01 AM    Potassium 3.7 10/25/2017 03:01 AM    Chloride 100 10/25/2017 03:01 AM    CO2 31 10/25/2017 03:01 AM    Anion gap 7 10/25/2017 03:01 AM    Glucose 266 10/25/2017 03:01 AM    BUN 90 10/25/2017 03:01 AM    Creatinine 4.60 10/25/2017 03:01 AM    Calcium 8.9 10/25/2017 03:01 AM    Calcium 8.9 10/25/2017 03:01 AM    Magnesium 2.4 07/06/2016 02:40 AM    Phosphorus 4.1 10/25/2017 03:01 AM    Albumin 3.2 10/24/2017 09:56 PM     Anthropometrics: BMI (calculated): 33.8  Wt Readings from Last 1 Encounters:   10/25/17 100.8 kg (222 lb 3.2 oz)    Ht Readings from Last 1 Encounters:   10/23/17 5' 8\" (1.727 m)     Estimated Nutrition Needs:  1840-8407 Kcal/day, 81 grams protein/day  Based on:   [x] Actual BW    [] IBW   []  Adjusted BW      Nutrition Diagnoses:    Altered nutrition related lab value related to diabetes as evidenced by Hemoglobin A1c of 8.3%  Nutrition Interventions: diabetes education, coordination of care   Goal: Blood glucose will be within target range of  mg/dL by 10/27/17    Nutrition Monitoring and Evaluation    []     Monitor po intake on meal rounds  [x]     Continue inpatient monitoring and intervention  []     Other:    Destinee Laird RD, CDE

## 2017-10-25 NOTE — DISCHARGE INSTRUCTIONS
Patient armband removed and shredded    Discharge medications reviewed with patient and appropriate educational materials and side effects teaching were provided. Taking Aspirin to Prevent Heart Attack and Stroke: Care Instructions  Your Care Instructions    Aspirin acts as a \"blood thinner. \" It prevents blood clots from forming. When taken during and after a heart attack, it can reduce your chance of dying. And it's used if you have a stent in your coronary artery. Also, aspirin helps certain people lower their risk of a heart attack or stroke. Be sure you know what dose of aspirin to take and how often to take it. Low-dose aspirin is typically 81 mg. But the dose for daily aspirin can range from 81 mg to 325 mg. Taking aspirin every day can cause bleeding. It may not be safe if you have stomach ulcers. And it may not be safe if you have high blood pressure that is not controlled. If you take aspirin pills every day, do not take ones that have other ingredients such as caffeine or sodium. Before you start to take aspirin, tell your doctor all the medicines, vitamins, herbal products, and supplements you take. Follow-up care is a key part of your treatment and safety. Be sure to make and go to all appointments, and call your doctor if you are having problems. It's also a good idea to know your test results and keep a list of the medicines you take. How can you care for yourself at home? · Take aspirin with a full glass of water unless your doctor tells you not to. Do not lie down right after you take it. · If you have a stent in your coronary artery, take your aspirin as your heart doctor says to. If another doctor says to stop taking the aspirin for any reason, talk to your heart doctor before you stop. · Do not chew or crush the coated or sustained-release forms of aspirin. · Ask your doctor if you can drink alcohol while you take aspirin. And ask how much you can drink.  Too much alcohol with aspirin can cause stomach bleeding. · Do not take aspirin if you are pregnant, unless your doctor says it is okay. · Keep all aspirin out of children's reach. · Throw aspirin away if it starts to smell like vinegar. · Do not take aspirin if you have gout or if you take prescription blood thinners, unless your doctor has told you to. · Do not take prescription or over-the-counter medicines, vitamins, herbal products, or supplements without talking to your doctor first. Valentino Agent the label before you take another over-the-counter medicine. Many contain aspirin. So they could cause you to take too much aspirin. · Talk with your doctor before you take a pain medicine. Ask which type of medicine you can take and how to take it safely with aspirin. · Tell your doctor or dentist before a surgery or procedure that you take aspirin. He or she will tell you if you should stop taking aspirin before your surgery or procedure. Make sure that you understand exactly what your doctor wants you to do. Where can you learn more? Go to http://kendal-farzaneh.info/. Enter N744 in the search box to learn more about \"Taking Aspirin to Prevent Heart Attack and Stroke: Care Instructions. \"  Current as of: September 21, 2016  Content Version: 11.4  © 4369-2046 [employer]. Care instructions adapted under license by [employer] (which disclaims liability or warranty for this information). If you have questions about a medical condition or this instruction, always ask your healthcare professional. [place] [address] any warranty or liability for your use of this information. Learning About ACE Inhibitors for Heart Failure  Introduction    ACE (angiotensin-converting enzyme) inhibitors block an enzyme that makes blood vessels narrow. As a result, the blood vessels relax and widen. This lowers blood pressure. These medicines also put more water and salt into the urine.  This also lowers blood pressure. In heart failure, your heart does not pump as much blood as your body needs. These medicines:  · Make it easier for your heart to pump. · Help reduce symptoms. · Make a heart attack or stroke less likely. Examples  These medicines include:  · Benazepril (Lotensin). · Lisinopril (Prinivil, Zestril). · Ramipril (Altace). This is not a complete list.  Possible side effects  Side effects may include:  · Cough. · Low blood pressure. You may feel dizzy and weak. · High potassium levels. · An allergic reaction. You may have other side effects or reactions not listed here. Check the information that comes with your medicine. What to know about taking this medicine  · ACE inhibitors:  ¨ Are often used to treat heart failure. They may be the only medicine used if your only symptoms are feeling tired and mildly short of breath with no fluid buildup (edema). But in most other cases, they are used with other medicines. ¨ Can cause a dry cough. If the cough is bad, talk to your doctor. You may need to try a different medicine. ¨ Can relieve symptoms, improve how you feel, and make it less likely you will need to stay in a hospital. And they can reduce the risk of early death. ¨ Can cause an allergic reaction of the skin. Symptoms may range from mild swelling to painful welts. It is rare to have severe swelling that makes it hard to breathe. · Take your medicines exactly as prescribed. Call your doctor if you think you are having a problem with your medicine. · Check with your doctor or pharmacist before you use any other medicines. This includes over-the-counter medicines. Make sure your doctor knows all of the medicines, vitamins, herbal products, and supplements you take. Taking some medicines together can cause problems. · You should not take an ACE inhibitor if you are pregnant or planning to become pregnant. · You may need regular blood tests. Where can you learn more?   Go to http://kendal-farzaneh.info/. Enter U447 in the search box to learn more about \"Learning About ACE Inhibitors for Heart Failure. \"  Current as of: September 21, 2016  Content Version: 11.4  © 7188-4399 [employer]. Care instructions adapted under license by [employer] (which disclaims liability or warranty for this information). If you have questions about a medical condition or this instruction, always ask your healthcare professional. [address] any warranty or liability for your use of this information. Learning About ACE Inhibitors and ARBs for Diabetes  Introduction    ACE inhibitors and ARBs are medicines used to control blood pressure. They allow blood vessels to relax and open up. This lowers your blood pressure. When you have diabetes, taking an ACE inhibitor or ARB can help to:  · Treat high blood pressure. Your risk of problems from diabetes goes up when you have high blood pressure. · Prevent or slow kidney damage. Diabetes can damage the blood vessels in the kidneys. High blood pressure can damage the kidneys, too. · Lower the risks of stroke and heart attack. Your risks go up when you have high blood pressure, heart disease, or both. An ACE inhibitor or ARB is a good choice for people with diabetes. Unlike some medicines, these don't affect blood sugar levels. Examples  ACE inhibitors include:  · Benazepril. · Lisinopril. · Ramipril. ARBs include:  · Irbesartan. · Losartan. · Telmisartan. Possible side effects  All medicines can cause side effects. Some side effects of ACE inhibitors include:  · Low blood pressure. You may feel dizzy and weak. · A cough. · High potassium levels. · An allergic reaction of the skin. Symptoms may range from mild swelling to painful welts. Some side effects of ARBs include:  · Diarrhea. · High potassium levels. · Sinus problems. · Stomach problems.   You may have other side effects or reactions not listed here. Check the information that comes with your medicine. What to know about taking this medicine  · Be safe with medicines. Take your medicines exactly as prescribed. Call your doctor if you think you are having a problem with your medicine. · Before starting an ACE inhibitor or ARB, tell your doctor if you:  ¨ Use a salt substitute. ¨ Take diuretics or potassium tablets. · These medicines are not safe for pregnancy. If you are pregnant or planning to be, talk to your doctor about a safe blood pressure medicine. · ACE inhibitors can cause a dry cough. If the cough is bad, talk to your doctor. Switching to an ARB is likely to help. · Taking some medicines together can cause problems. Tell your doctor or pharmacist all the medicines you take. This includes over-the-counter medicines, vitamins, herbal products, and supplements. · You may need regular blood and urine tests. Where can you learn more? Go to http://kendal-farzaneh.info/. Enter M316 in the search box to learn more about \"Learning About ACE Inhibitors and ARBs for Diabetes. \"  Current as of: March 13, 2017  Content Version: 11.4  © 5886-2492 [employer]. Care instructions adapted under license by [employer] (which disclaims liability or warranty for this information). If you have questions about a medical condition or this instruction, always ask your healthcare professional. [place] [address] any warranty or liability for your use of this information. Deciding About Stopping Dialysis  Deciding About Stopping Dialysis    What should you know about stopping dialysis? Dialysis is a process that filters waste from your blood when your kidneys can no longer do the job. When you have kidney failure, you may have either hemodialysis or peritoneal dialysis. Most people die within weeks of stopping dialysis.  If you decide to stop, health professionals can help you have the highest quality of life possible. These are people who give end-of-life care. This may be done through hospice care. Hospice care offers the chance to think about personal goals. It can relieve pain. And it can help take care of your emotional and spiritual needs. No matter what you decide, take the time to let others know your wishes about your care. You can use a legal document to make sure that you get the medical treatment you want. This is called an advance directive. What are varghese points about this decision? · You may feel better on dialysis than you did before you started treatment. But you may have side effects, such as appetite changes. Or you may start to have other problems. If this is the case, you may feel that continuing treatment is too hard. · If dialysis lets you do the activities you enjoyed before, you may feel that it hasn't changed your daily life that much. You may feel this way even if you can't do all of your old activities. Or you may feel that your quality of life on dialysis is not good. · Your diagnosis of kidney failure may force you to rethink your goals for your future. If you feel that your life has been rewarding and that you have met many goals, you may feel okay about stopping dialysis. But if you have goals you have not yet met, you may want to continue. · Most people die within weeks of stopping dialysis. If you choose to stop, you should be ready to put your personal, financial, and legal affairs in order. You may want to continue dialysis if you aren't ready to face these issues. · Clearly state your wishes to your family. If you decide to stop treatment, will your family understand your reasons? Do they support your decision to continue (or stop) treatment? Why might you choose to stop dialysis?   If you have been getting regular dialysis, and if a kidney transplant is not an option for you, stopping dialysis may:  · Give you more time each day to spend with friends and family instead of going to treatments. · Allow you to eat and drink what you want in the time you have left. You may welcome this if your diet has been limited while you have been on dialysis. · Relieve worries about paying for dialysis, if you have been concerned. · Reduce problems that come with regular dialysis. These problems may include infection or clotting of the access. · Encourage you to talk with your loved ones about end-of-life goals and wishes. Why might you choose to stay on dialysis? Staying on dialysis may:  · Allow you to live longer and have more time with your family and friends. · Give you time to complete your goals and projects. · Help you feel better for as long as possible. You may feel better physically on dialysis than you did before dialysis. · Allow you to do your normal activities. · Give you more time to put your affairs in order. Your decision  Thinking about the facts and your feelings can help you make a decision that is right for you. Be sure you understand the benefits and risks of your options, and think about what else you need to do before you make the decision. Where can you learn more? Go to http://kendal-farzaneh.info/. Enter O262 in the search box to learn more about \"Deciding About Stopping Dialysis. \"  Current as of: May 12, 2017  Content Version: 11.4  © 2127-8120 [employer]. Care instructions adapted under license by [employer] (which disclaims liability or warranty for this information). If you have questions about a medical condition or this instruction, always ask your healthcare professional. [place] [address] any warranty or liability for your use of this information.

## 2017-10-25 NOTE — PROGRESS NOTES
Problem: Mobility Impaired (Adult and Pediatric)  Goal: *Acute Goals and Plan of Care (Insert Text)  Acute goals not established. Patient reports/demonstrates independent/baseline functional mobility, acute skilled PT services not indicated at this time. physical Therapy EVALUATION and Discharge    Patient: Ravindre Burciaga (46 y.o. male)  Date: 10/25/2017  Primary Diagnosis: NSTEMI (non-ST elevated myocardial infarction) [facility])  Precautions: Fall    ASSESSMENT AND RECOMMENDATIONS:  Based on the objective data described below, the patient presents with baseline functional mobility with regards to bed mobility, transfers, ambulation, and general activity tolerance following admission for NSTEMI. Patient presents today supine in bed with HOB elevated, family in room, alert and agreeable to PT evaluation. Patient able to transfer OOB with Roxanna, gait training initiated with RW however patient able to progress to no assisitive device while maintaining balance and safety. Patient's ambulation limited by heart monitor lead, however able to demonstrate baseline functional mobility and safety at this time. Patient returned to bed as no recliner in room, left with call bell in reach and family in room, nurse notified. Skilled physical therapy is not indicated at this time. Discharge Recommendations: Home Health  Further Equipment Recommendations for Discharge: N/A      SUBJECTIVE:   Patient stated A walker? Are you trying to make me look like an old man?     OBJECTIVE DATA SUMMARY:     Past Medical History:   Diagnosis Date    Atherosclerosis of renal artery (HCC)     S/P Rt stent    CAD (coronary artery disease) , 1997, 2012    ,double bypass, 2 stents, 2stents 7/2016    Cancer (HCC)     prostate    CHF (congestive heart failure) (HCC)     Chronic diastolic heart failure (HCC)     Chronic kidney disease (CKD), stage IV (severe) (HCC)     Constipation     Coronary atherosclerosis of unspecified type of vessel, native or graft     Stable angina after recent PCI continue treatment.  CRI (chronic renal insufficiency)     Diabetes ([facility] [address] 1973    type 2    Essential hypertension, benign     GERD (gastroesophageal reflux disease)     Gout     Hypertension 1982    Morbid obesity ([facility] [address]     Weight loss has been strongly encouraged by following dietary restrictions and an exercise routine    APRYL (obstructive sleep apnea) 6/26/2015    no cpap    Other and unspecified hyperlipidemia     HDL's are not at goal, LDL's are at goal, triglycerides are not at goal.    Other and unspecified hyperlipidemia     HDL's are not at goal, LDL's are at goal, triglycerides are not at goal.     Other ill-defined conditions(799.89) 1960    pneumonia twice    Sleep disturbance     Type II or unspecified type diabetes mellitus without mention of complication, not stated as uncontrolled      Past Surgical History:   Procedure Laterality Date   [address]    lt. carotid endart.  COLONOSCOPY N/A 6/23/2017    COLONOSCOPY w/ APC w/ polypectomies performed by Carey Lewis MD at [address] HX CHOLECYSTECTOMY      HX CORONARY ARTERY BYPASS GRAFT  2000    x2    HX HEENT  1997    cholecystectomy    HX UROLOGICAL  1998    renal art. surg     Barriers to Learning/Limitations: None  Compensate with: N/A  Prior Level of Function/Home Situation: Patient occasionally uses SPC at home, otherwise fully independent with all functional mobility. Home Situation  Home Environment: Private residence  # Steps to Enter: 2  Rails to Enter: No  One/Two Story Residence: One story  Living Alone: No  Support Systems: Spouse/Significant Other/Partner, Child(jeanette), Family member(s)  Patient Expects to be Discharged to[de-identified] Private residence  Critical Behavior:  Neurologic State: Alert  Psychosocial  Patient Behaviors: Calm; Cooperative  Family  Behaviors: Calm; Cooperative  Purposeful Interaction: Yes  Pt Identified Daily Priority: Clinical issues (comment)  Skylars Process: Nurture loving kindness;Enable negrita/hope;Establish trust;Nurture spiritual self;Teaching/learning; Attend basic human needs; Open life/death unknowns;Create healing environment;Supportive expression  Caring Interventions: Reassure; Therapeutic modalities  Reassure: Prayer; Informing; Acceptance; Instilling negrita and hope;Support family;Quiet presence; Sit with patient;Caring rounds  Therapeutic Modalities: Humor; Intentional therapeutic touch  Strength:    Strength: Within functional limits (BLE)  Tone & Sensation:   Tone: Normal  Sensation: Intact (BLE)   Range Of Motion:  AROM: Within functional limits (BLE)  Functional Mobility:  Bed Mobility:  Rolling: Modified independent  Supine to Sit: Modified independent  Sit to Supine: Modified independent  Scooting: Modified independent  Transfers:  Sit to Stand: Modified independent  Stand to Sit: Modified independent  Balance:   Sitting: Intact; Without support  Standing: Intact; Without support  Ambulation/Gait Training:  Distance (ft): 40 Feet (ft)  Assistive Device: Walker, rolling; Other (comment) (RW for 5 ft, No assisitive device for 35 ft)  Ambulation - Level of Assistance: Supervision  Base of Support: Widened  Pain:  Pain Scale 1: Numeric (0 - 10)  Pain Intensity 1: 0  Activity Tolerance:   Good  Please refer to the flowsheet for vital signs taken during this treatment. After treatment:   [] Patient left in no apparent distress sitting up in chair  [] Patient left sitting on EOB  [x] Patient left in no apparent distress in bed  [] Patient declined to be OOB at this time due to  [x] Call bell left within reach  [x] Nursing notified(Keila)  [x] Caregiver present  [] Bed alarm activated    COMMUNICATION/EDUCATION:   [x]         Fall prevention education was provided and the patient/caregiver indicated understanding. [x]         Patient/family have participated as able in goal setting and plan of care.   [x] Patient/family agree to work toward stated goals and plan of care. []         Patient understands intent and goals of therapy, but is neutral about his/her participation. []         Patient is unable to participate in goal setting and plan of care. Thank you for this referral.  Kit Chol   Time Calculation: 16 mins    Mobility  Current  CI= 1-19%   Goal  CI= 1-19%  D/C  CI= 1-19%. The severity rating is based on the Level of Assistance required for Functional Mobility and ADLs.     Eval Complexity: History: MEDIUM  Complexity : 1-2 comorbidities / personal factors will impact the outcome/ POC Exam:MEDIUM Complexity : 3 Standardized tests and measures addressing body structure, function, activity limitation and / or participation in recreation  Presentation: MEDIUM Complexity : Evolving with changing characteristics Overall Complexity:MEDIUM

## 2017-10-26 ENCOUNTER — HOSPITAL ENCOUNTER (INPATIENT)
Age: 77
LOS: 1 days | Discharge: HOME HEALTH | DRG: 281 | End: 2017-10-28
Attending: EMERGENCY MEDICINE | Admitting: INTERNAL MEDICINE
Payer: MEDICARE

## 2017-10-26 ENCOUNTER — TELEPHONE (OUTPATIENT)
Dept: OTHER | Age: 77
End: 2017-10-26

## 2017-10-26 ENCOUNTER — TELEPHONE (OUTPATIENT)
Dept: CASE MANAGEMENT | Age: 77
End: 2017-10-26

## 2017-10-26 DIAGNOSIS — N18.5 CHRONIC RENAL FAILURE, STAGE 5 (HCC): ICD-10-CM

## 2017-10-26 DIAGNOSIS — I20.0 UNSTABLE ANGINA (HCC): Primary | ICD-10-CM

## 2017-10-26 PROCEDURE — 96372 THER/PROPH/DIAG INJ SC/IM: CPT

## 2017-10-26 PROCEDURE — 99284 EMERGENCY DEPT VISIT MOD MDM: CPT

## 2017-10-26 RX ORDER — ISOSORBIDE MONONITRATE 30 MG/1
30 TABLET, EXTENDED RELEASE ORAL DAILY
Qty: 90 TAB | Refills: 3 | Status: SHIPPED | OUTPATIENT
Start: 2017-10-26 | End: 2017-12-06

## 2017-10-26 NOTE — TELEPHONE ENCOUNTER
Explained reason for call. Reviewed discharge and patient asked about a home health nurse and having his prescriptions filled. I will look into this and return patient call.

## 2017-10-26 NOTE — TELEPHONE ENCOUNTER
Spoke with patient concerning about not being able to get his prescriptions. Patient states he was told not to drive, but has already spoken with his pharmacy [facility] in [place], and  they will deliver his prescriptions to him. Spouse reports when their son gets off work today he will be taking her to drop off the prescriptions. Patient reports he is doing well. Discussed advanced care plan and if he currently had one. Patient reported he did not. Encouraged patient to have a conversation with his PCP about advanced directive and obtaining paper work for one on 11/6/17. Patient stated he understood. Will continue to follow.

## 2017-10-26 NOTE — PROCEDURES
[address] STRESS    Name:  Adelia Lopez  MR#:  [id number]  :  1940  Account #:  [de-identified]  Date of Adm:  10/23/2017  Date of Service:  10/25/2017      Inderjit Serve REPORT    ORDERING PHYSICIAN: Jayant Williamson MD    INTERPRETING PHYSICIAN: Jayant Williamson MD    INDICATION: Non-ST elevation myocardial infarction. INTRAVENOUS SITE: Right antecubital vein. BASELINE DATA: Baseline EKG reveals sinus bradycardia with heart  rate of 57 beats per minute. Nonspecific ST-T changes noted in the  inferolateral leads. Baseline blood pressure was 194/70 mmHg. Following this, 0.4 mg of IV Lexiscan was injected over a 30 second  period. Heart rate increased to maximum 77 beats per minute. Blood  pressure increased to maximum 220/77 mmHg. We noted the patient  developing about 0.5 to 1 mm ST-segment depression in the anterior  inferior leads. ST-segment depressions subsequently resolved with  time. The patient did not report chest pain or trouble breathing. No  hypotension was documented. No ventricular arrhythmias were noted. CONCLUSIONS: A 0.5 mm to 1 mm ST-segment depression noted in  the anterior inferior leads. Nuclear imaging report to follow. NUCLEAR IMAGING REPORT: Following IV Lexiscan, 33 mCi  sestamibi was injected. Stress images were obtained. The rest images  were obtained using 10.98 mCi of sestamibi. Images were compared. FINDINGS:  1. Post-stress images in short axis, horizontal, vertical long axis  reveals a small to medium reversible defect noted in the anterior wall. 2. Resting images shows normal isotope uptake in all areas. 3. Left ventricular cavity appears to be normal size with a wall motion  and ejection fraction of 44%. CONCLUSIONS:  1. Small to medium reversible defect involving the anterior wall. 2. Left ventricular ejection fraction was 44%.   3. Positive EKG portion of the test.  4. Moderate risk scan.    Thank you for this referral.        MD Ale Hernandez / TANJA  D:  10/25/2017   17:00  T:  10/26/2017   05:39  Job #:  [id number]

## 2017-10-26 NOTE — IP AVS SNAPSHOT
[place] 
 
 
 [address] Patient: Jake Gil MRN: [id number] WER:0[birth year]11/0402 My Medications TAKE these medications as instructed Instructions Each Dose to Equal  
 Morning Noon Evening Bedtime  
 allopurinol 100 mg tablet Commonly known as:  Mikey Steve Your last dose was: Your next dose is: Take 100 mg by mouth daily. 100 mg  
    
   
   
   
  
 aspirin 81 mg chewable tablet Your last dose was: Your next dose is: Take 1 Tab by mouth daily. 81 mg  
    
   
   
   
  
 b complex-vitamin c-folic acid 1 mg capsule Commonly known as:  Kate Sanchez Your last dose was: Your next dose is: Take 1 Cap by mouth daily. 1 Cap  
    
   
   
   
  
 calcitRIOL 0.25 mcg capsule Commonly known as:  ROCALTROL Your last dose was: Your next dose is: Take 1 Cap by mouth every Monday, Wednesday, Friday. 0.25 mcg  
    
   
   
   
  
 cloNIDine HCl 0.2 mg tablet Commonly known as:  CATAPRES Your last dose was: Your next dose is: Take 1 Tab by mouth three (3) times daily. 0.2 mg  
    
   
   
   
  
 clopidogrel 75 mg Tab Commonly known as:  PLAVIX Your last dose was: Your next dose is: Take 1 Tab by mouth daily. 75 mg DECARA 50,000 unit Cap Generic drug:  Cholecalciferol (Vitamin D3) Your last dose was: Your next dose is: Take  by mouth every seven (7) days. fenofibrate 54 mg tablet Commonly known as:  LOFIBRA Your last dose was: Your next dose is: Take 1 Tab by mouth daily. 54 mg  
    
   
   
   
  
 furosemide 40 mg tablet Commonly known as:  LASIX Your last dose was: Your next dose is: Take 1 Tab by mouth daily as needed. 40 mg  
    
   
   
   
  
 hydrALAZINE 100 mg tablet Commonly known as:  APRESOLINE Your last dose was: Your next dose is: Take 1 Tab by mouth three (3) times daily. 100 mg  
    
   
   
   
  
 insulin aspart 100 unit/mL injection Commonly known as:  [place] Your last dose was: Your next dose is:    
   
   
 20 units sq 3 times a day,sliding scale  
     
   
   
   
  
 insulin glargine 100 unit/mL injection Commonly known as:  LANTUS Your last dose was: Your next dose is:    
   
   
 20 Units by SubCUTAneous route every twelve (12) hours. 20 Units  
    
   
   
   
  
 isosorbide mononitrate ER 30 mg tablet Commonly known as:  IMDUR Your last dose was: Your next dose is: Take 1 Tab by mouth daily. 30 mg  
    
   
   
   
  
 metoprolol tartrate 100 mg IR tablet Commonly known as:  LOPRESSOR Your last dose was: Your next dose is: TAKE 1 TABLET BY MOUTH TWO TIMES A DAY. NIFEdipine ER 90 mg ER tablet Commonly known as:  PROCARDIA XL Your last dose was: Your next dose is: Take 1 Tab by mouth daily. 90 mg  
    
   
   
   
  
 nitroglycerin 0.4 mg SL tablet Commonly known as:  NITROSTAT Your last dose was: Your next dose is:    
   
   
 1 Tab by SubLINGual route as needed for Chest Pain. 0.4 mg  
    
   
   
   
  
 senna-docusate 8.6-50 mg per tablet Commonly known as:  Stefanie Julian Your last dose was: Your next dose is: Take 1 Tab by mouth daily. 1 Tab  
    
   
   
   
  
 simvastatin 40 mg tablet Commonly known as:  ZOCOR Your last dose was: Your next dose is: TAKE 1 TABLET BY MOUTH NIGHTLY.  
     
   
   
   
  
 tamsulosin 0.4 mg capsule Commonly known as:  FLOMAX Your last dose was: Your next dose is:  TAKE ONE CAPSULE BY MOUTH ONCE A DAY

## 2017-10-26 NOTE — IP AVS SNAPSHOT
[address] [address] [place] [address] Patient: Davi Elkins MRN: [id number] EDE:[birth year]38[birth year]33 About your hospitalization You were admitted on:  October 27, 2017 You last received care in the:  [facility] You were discharged on:  October 28, 2017 Why you were hospitalized Your primary diagnosis was:  Not on File Your diagnoses also included:  Chest Pain, Secondary Hyperparathyroidism Of Renal Origin (Hcc), S/P Coronary Artery Stent Placement, Neeraj (Obstructive Sleep Apnea), Morbid Obesity (Hcc), Nstemi (Non-St Elevated Myocardial Infarction) (Hcc), Htn (Hypertension), Fatigue, Essential Hypertension, Benign, Dyslipidemia, Dm2 (Diabetes Mellitus, Type 2) (Hcc), Postsurgical Aortocoronary Bypass Status, Mixed Hyperlipidemia, Diastolic Chf, Acute On Chronic (Hcc), Hyperuricemia, Chronic Kidney Disease, Stage V (Hcc) Things You Need To Do (next 8 weeks) Monday Nov 06, 2017 Follow up with Connie Lovell MD  
at 8:15am (first avalible, already scheduled by patient) Phone:  [phone] Where:  [facility], [facility] Monday Nov 20, 2017 ESTABLISHED PATIENT with Rafael Mejía MD at  1:00 PM  
Where:  [facility] ([facility]) Thursday Dec 07, 2017 Nurse Visit with SUREKHA at 10:00 AM  
Where:  [facility] ([facility]) Thursday Dec 21, 2017 ESTABLISHED PATIENT with Kirti Ibrahim MD at 10:15 AM  
Where:  [facility]) Discharge Orders None A check jose indicates which time of day the medication should be taken. My Medications TAKE these medications as instructed Instructions Each Dose to Equal  
 Morning Noon Evening Bedtime  
 allopurinol 100 mg tablet Commonly known as:  Guru Mena Your last dose was: Your next dose is: Take 100 mg by mouth daily. 100 mg  
    
   
   
   
  
 aspirin 81 mg chewable tablet Your last dose was: Your next dose is: Take 1 Tab by mouth daily. 81 mg  
    
   
   
   
  
 b complex-vitamin c-folic acid 1 mg capsule Commonly known as:  Jolene Devoid Your last dose was: Your next dose is: Take 1 Cap by mouth daily. 1 Cap  
    
   
   
   
  
 calcitRIOL 0.25 mcg capsule Commonly known as:  ROCALTROL Your last dose was: Your next dose is: Take 1 Cap by mouth every Monday, Wednesday, Friday. 0.25 mcg  
    
   
   
   
  
 cloNIDine HCl 0.2 mg tablet Commonly known as:  CATAPRES Your last dose was: Your next dose is: Take 1 Tab by mouth three (3) times daily. 0.2 mg  
    
   
   
   
  
 clopidogrel 75 mg Tab Commonly known as:  PLAVIX Your last dose was: Your next dose is: Take 1 Tab by mouth daily. 75 mg DECARA 50,000 unit Cap Generic drug:  Cholecalciferol (Vitamin D3) Your last dose was: Your next dose is: Take  by mouth every seven (7) days. fenofibrate 54 mg tablet Commonly known as:  LOFIBRA Your last dose was: Your next dose is: Take 1 Tab by mouth daily. 54 mg  
    
   
   
   
  
 furosemide 40 mg tablet Commonly known as:  LASIX Your last dose was: Your next dose is: Take 1 Tab by mouth daily as needed. 40 mg  
    
   
   
   
  
 hydrALAZINE 100 mg tablet Commonly known as:  APRESOLINE Your last dose was: Your next dose is: Take 1 Tab by mouth three (3) times daily. 100 mg  
    
   
   
   
  
 insulin aspart 100 unit/mL injection Commonly known as:  [place] Your last dose was: Your next dose is: 20 units sq 3 times a day,sliding scale  
     
   
   
   
  
 insulin glargine 100 unit/mL injection Commonly known as:  LANTUS Your last dose was: Your next dose is:    
   
   
 20 Units by SubCUTAneous route every twelve (12) hours. 20 Units  
    
   
   
   
  
 isosorbide mononitrate ER 30 mg tablet Commonly known as:  IMDUR Your last dose was: Your next dose is: Take 1 Tab by mouth daily. 30 mg  
    
   
   
   
  
 metoprolol tartrate 100 mg IR tablet Commonly known as:  LOPRESSOR Your last dose was: Your next dose is: TAKE 1 TABLET BY MOUTH TWO TIMES A DAY. NIFEdipine ER 90 mg ER tablet Commonly known as:  PROCARDIA XL Your last dose was: Your next dose is: Take 1 Tab by mouth daily. 90 mg  
    
   
   
   
  
 nitroglycerin 0.4 mg SL tablet Commonly known as:  NITROSTAT Your last dose was: Your next dose is:    
   
   
 1 Tab by SubLINGual route as needed for Chest Pain. 0.4 mg  
    
   
   
   
  
 senna-docusate 8.6-50 mg per tablet Commonly known as:  Isaak Orta Your last dose was: Your next dose is: Take 1 Tab by mouth daily. 1 Tab  
    
   
   
   
  
 simvastatin 40 mg tablet Commonly known as:  ZOCOR Your last dose was: Your next dose is: TAKE 1 TABLET BY MOUTH NIGHTLY.  
     
   
   
   
  
 tamsulosin 0.4 mg capsule Commonly known as:  FLOMAX Your last dose was: Your next dose is: TAKE ONE CAPSULE BY MOUTH ONCE A DAY Discharge Instructions DISCHARGE SUMMARY from Nurse PATIENT INSTRUCTIONS: 
 
 
F-face looks uneven A-arms unable to move or move unevenly S-speech slurred or non-existent T-time-call 911 as soon as signs and symptoms begin-DO NOT go Back to bed or wait to see if you get better-TIME IS BRAIN. Warning Signs of HEART ATTACK Call 911 if you have these symptoms: 
? Chest discomfort. Most heart attacks involve discomfort in the center of the chest that lasts more than a few minutes, or that goes away and comes back. It can feel like uncomfortable pressure, squeezing, fullness, or pain. ? Discomfort in other areas of the upper body. Symptoms can include pain or discomfort in one or both arms, the back, neck, jaw, or stomach. ? Shortness of breath with or without chest discomfort. ? Other signs may include breaking out in a cold sweat, nausea, or lightheadedness. Don't wait more than five minutes to call [address]! Fast action can save your life. Calling 911 is almost always the fastest way to get lifesaving treatment. Emergency Medical Services staff can begin treatment when they arrive  up to an hour sooner than if someone gets to the hospital by car. The discharge information has been reviewed with the patient. The patient verbalized understanding. Discharge medications reviewed with the patient and appropriate educational materials and side effects teaching were provided. [employer] Activation Thank you for requesting access to [employer]. Please follow the instructions below to securely access and download your online medical record. [employer] allows you to send messages to your doctor, view your test results, renew your prescriptions, schedule appointments, and more. How Do I Sign Up? 1. In your internet browser, go to [email or web address] 
 2. Click on the First Time User? Click Here link in the Sign In box. You will be redirect to the New Member Sign Up page. 3. Enter your [employer] Access Code exactly as it appears below. You will not need to use this code after youve completed the sign-up process. If you do not sign up before the expiration date, you must request a new code. [employer] Access Code: [email or web address] Expires: 2017  8:30 AM (This is the date your [employer]able access code will ) 4. Enter the last four digits of your Social Security Number (xxxx) and Date of Birth (mm/dd/yyyy) as indicated and click Submit. You will be taken to the next sign-up page. 5. Create a App[employer]able ID. This will be your App[employer]able login ID and cannot be changed, so think of one that is secure and easy to remember. 6. Create a App[employer]able password. You can change your password at any time. 7. Enter your Password Reset Question and Answer. This can be used at a later time if you forget your password. 8. Enter your e-mail address. You will receive e-mail notification when new information is available in [address] 9. Click Sign Up. You can now view and download portions of your medical record. 10. Click the Download Summary menu link to download a portable copy of your medical information. Additional Information If you have questions, please visit the Frequently Asked Questions section of the [employer] website at [email or web address] Remember, [employer] is NOT to be used for urgent needs. For medical emergencies, dial 911. Patient armband removed and shredded 
___________________________________________________________________________________________________________________________________ ACO Transitions of Care Introducing [employer] [address] offers a voluntary care coordination program to provide high quality service and care to [facility] fee-for-service beneficiaries. José Manuel Chan was designed to help you enhance your health and well-being through the following services: ? Transitions of Care  support for individuals who are transitioning from one care setting to another (example: Hospital to home). ? Chronic and Complex Care Coordination  support for individuals and caregivers of those with serious or chronic illnesses or with more than one chronic (ongoing) condition and those who take a number of different medications. If you meet specific medical criteria, a [facility] [address] may call you directly to coordinate your care with your primary care physician and your other care providers. For questions about the [facility] programs, please, contact your physicians office. For general questions or additional information about Accountable Care Organizations: 
Please visit www.medicare.gov/acos. html or call 1-800-MEDICARE (5-550.919.6110) TTY users should call 8-987.873.9262. [employer] Announcement We are excited to announce that we are making your provider's discharge notes available to you in [employer]. You will see these notes when they are completed and signed by the physician that discharged you from your recent hospital stay. If you have any questions or concerns about any information you see in [employer], please call the Health Information Department where you were seen or reach out to your Primary Care Provider for more information about your plan of care. Introducing [facility] & HEALTH SERVICES! [facility] introduces [employer] patient portal. Now you can access parts of your medical record, email your doctor's office, and request medication refills online. 1. In your internet browser, go to [email or web address] 2. Click on the First Time User? Click Here link in the Sign In box. You will see the New Member Sign Up page. 3. Enter your [employer] Access Code exactly as it appears below.  You will not need to use this code after youve completed the sign-up process. If you do not sign up before the expiration date, you must request a new code. · [employer] Access Code: [email or web address] Expires: 12/25/2017  8:30 AM 
 
4. Enter the last four digits of your Social Security Number (xxxx) and Date of Birth (mm/dd/yyyy) as indicated and click Submit. You will be taken to the next sign-up page. 5. Create a [employer] ID. This will be your [employer] login ID and cannot be changed, so think of one that is secure and easy to remember. 6. Create a [employer] password. You can change your password at any time. 7. Enter your Password Reset Question and Answer. This can be used at a later time if you forget your password. 8. Enter your e-mail address. You will receive e-mail notification when new information is available in [address] 9. Click Sign Up. You can now view and download portions of your medical record. 10. Click the Download Summary menu link to download a portable copy of your medical information. If you have questions, please visit the Frequently Asked Questions section of the [employer] website. Remember, [employer] is NOT to be used for urgent needs. For medical emergencies, dial 911. Now available from your iPhone and Android! Providers Seen During Your Hospitalization Provider Specialty Primary office phone Yakelin Law MD Emergency Medicine [phone] Terrell Salazar, [address] [place] [address] Internal Medicine [phone] Your Primary Care Physician (PCP) Primary Care Physician Office Phone Office Fax William Flores [phone] You are allergic to the following Allergen Reactions Nka (No Known Allergies) Other (comments) Recent Documentation Weight BMI Smoking Status  
  
  
  
 100.8 kg 33.79 kg/m2 Never Smoker Emergency Contacts Name Discharge Info Relation Home Work Mobile Lazarus,Winsome DISCHARGE CAREGIVER [3] Spouse [3] [phone] [facility] C[facility] [3] Daughter [21] [phone] Patient Belongings The following personal items are in your possession at time of discharge: 
  Dental Appliances: None  Visual Aid: Contacts      Home Medications: None   Jewelry: None  Clothing: At bedside, Pants    Other Valuables: None Please provide this summary of care documentation to your next provider. Signatures-by signing, you are acknowledging that this After Visit Summary has been reviewed with you and you have received a copy. Patient Signature:  ____________________________________________________________ Date:  ____________________________________________________________  
  
[facility] Provider Signature:  ____________________________________________________________ Date:  ____________________________________________________________

## 2017-10-26 NOTE — DISCHARGE SUMMARY
[facility] [address] [place] SUMMARY    Name:  David Douglas  MR#:  [id number]  :  1940  Account #:  [de-identified]  Date of Adm:  10/23/2017  Date of Discharge:  10/25/2017      DISPOSITION: Discharged to home. DISCHARGE CONDITION: Stable. DISCHARGE DIAGNOSES  1. Non-ST-elevation myocardial infarction, status post treatment. 2. Positive stress test suggesting of 1-vessel coronary artery disease. The patient does not want any further workup at this point. 3. Stage IV chronic kidney disease. Does not want to go on dialysis. 4. History of coronary artery disease and status post stenting in . 5. Chronic diastolic heart failure, compensated. 6. Hypertension. 7. Dyslipidemia. 8. Diabetes mellitus. 9. Obstructive sleep apnea. 10. History of prostate cancer. 11. Renal artery stenosis and history of stenting in the past.    DISCHARGE MEDICATIONS  1. Aspirin 81 mg daily. 2. Plavix 75 mg daily. 3. Metoprolol 100 mg b.i.d.  4. Imdur 30 mg daily. 5. Zocor 40 mg daily. 6. Nephrocaps 1 capsule daily. 7. Rocaltrol 0.25 mcg on Monday, Wednesday, Friday if it is covered  by his insurance. Otherwise, we may have to talk to Dr. Laine Lambert. 8. Lynn-Colace 1 tablet p.o. daily, hold it for diarrhea. 9. Clonidine 0.1 mg 3 times a day. 10. Hydralazine 100 mg 3 times a day. 11. Flomax 0.4 mg daily. 12. Lofibra 54 mg daily. 13. Lasix 40 mg daily as needed for shortness of breath and/or  swelling. 14. Procardia XL 90 mg daily. 15. Nitrostat 0.4 mg as needed. 16. Insulin NovoLog sliding scale. Continue home dose. 17. Lantus 20 units b.i.d. 18. Allopurinol 100 mg p.o. daily. IMAGING AND PROCEDURES  1. Chest x-ray was done, reported low lung volume, no acute finding. 2. Echocardiogram was done, showed ejection fraction around 60%. 3. Nuclear stress test was done, reported abnormal with ST-segment  wall ischemia. CONSULTANTS  1.  Cardiology with Dr. Denia Gilbert who had a lengthy discussion with  the patient on the day of discharge about abnormal stress test and  need for invasive workup. The patient clearly understood the need of  cardiac catheterization to evaluate coronary artery disease due to  recurrent risk of MI, arrhythmia, as well as sudden cardiac death. He  did not want to proceed due to risk of going on hemodialysis. He also  promised that he would return to the ED if his chest pain reoccurs, but  he wants to go home. 2. Nephrology with Dr. Katie Mccloud. I talked to him on the day of discharge  and he says he will follow this patient as an outpatient and he cannot  provide anything at this point, as the patient is refusing to have  dialysis. HOSPITAL COURSE: The patient was admitted to the hospital with a  complaint of chest pain. Please refer to hospital admission H and P  done by Dr. James Leger for further details. The patient was found out to have  a non-STEMI, started on heparin drip. He also had acute on chronic  kidney disease. Was seen by nephrologist. He was continued on his  home medication for diabetes, hypertension, and dyslipidemia. He  started getting better after he was started on a heparin drip. His  cardiac enzymes were down trending. Heparin drip was stopped. He  had an abnormal stress test. Cardiologist doctor made a plan, but the  patient did not want any further workup as there is a risk of going on  dialysis and he does not want to go on dialysis; however, he promises  that if it comes back again with chest pain in the emergency room, then  he will go on dialysis and get all the workup done; however, he also  understand that in between, he will remain at risk of cardiac  arrhythmia, sudden cardiac death. He verbalized understanding. He  wanted to go home. He was ambulating in the hallway without any  chest pain. He will be continued on above-mentioned medication and  discharged home. DISCHARGE INSTRUCTIONS  1. Diet is ADA, cardiac and renal diet.   2. Activity as tolerated. 3. Follow with the primary care physician in 5 days. 4. Follow with Dr. Amelia Chavez in 10-14 days. 5. Follow with Dr. Brook Garcia in 10-14 days. TOTAL TIME: Greater than 35 minutes.         Leopold Martin, MD VP / COLBY  D:  10/25/2017   18:10  T:  10/26/2017   10:36  Job #:  [id number]

## 2017-10-26 NOTE — Clinical Note
Status[de-identified] Inpatient [101] Type of Bed: Telemetry [19] Inpatient Hospitalization Certified Necessary for the Following Reasons: 9. Other (further clarification in H&P documentation) Admitting Diagnosis: Chest pain [118813] Admitting Physician: Sebastian Wadsworth Attending Physician: Sebastian Wadsworth Estimated Length of Stay: 3-4 Midnights Discharge Plan[de-identified] [facility]

## 2017-10-27 ENCOUNTER — APPOINTMENT (OUTPATIENT)
Dept: GENERAL RADIOLOGY | Age: 77
DRG: 281 | End: 2017-10-27
Attending: INTERNAL MEDICINE
Payer: MEDICARE

## 2017-10-27 PROBLEM — R07.9 CHEST PAIN: Status: ACTIVE | Noted: 2017-10-27

## 2017-10-27 LAB
ALBUMIN SERPL-MCNC: 3.3 G/DL (ref 3.4–5)
ALBUMIN/GLOB SERPL: 0.7 {RATIO} (ref 0.8–1.7)
ALP SERPL-CCNC: 93 U/L (ref 45–117)
ALT SERPL-CCNC: 22 U/L (ref 16–61)
ANION GAP SERPL CALC-SCNC: 8 MMOL/L (ref 3–18)
AST SERPL-CCNC: 18 U/L (ref 15–37)
ATRIAL RATE: 64 BPM
BASOPHILS # BLD: 0 K/UL (ref 0–0.1)
BASOPHILS NFR BLD: 0 % (ref 0–2)
BILIRUB SERPL-MCNC: 0.4 MG/DL (ref 0.2–1)
BUN SERPL-MCNC: 79 MG/DL (ref 7–18)
BUN/CREAT SERPL: 15 (ref 12–20)
CALCIUM SERPL-MCNC: 9.4 MG/DL (ref 8.5–10.1)
CALCULATED P AXIS, ECG09: 60 DEGREES
CALCULATED R AXIS, ECG10: 20 DEGREES
CALCULATED T AXIS, ECG11: 146 DEGREES
CHLORIDE SERPL-SCNC: 98 MMOL/L (ref 100–108)
CK MB CFR SERPL CALC: 2.4 % (ref 0–4)
CK MB SERPL-MCNC: 2.9 NG/ML (ref 5–25)
CK SERPL-CCNC: 119 U/L (ref 39–308)
CO2 SERPL-SCNC: 28 MMOL/L (ref 21–32)
CREAT SERPL-MCNC: 5.2 MG/DL (ref 0.6–1.3)
DIAGNOSIS, 93000: NORMAL
DIFFERENTIAL METHOD BLD: ABNORMAL
EOSINOPHIL # BLD: 0.3 K/UL (ref 0–0.4)
EOSINOPHIL NFR BLD: 3 % (ref 0–5)
ERYTHROCYTE [DISTWIDTH] IN BLOOD BY AUTOMATED COUNT: 13.8 % (ref 11.6–14.5)
GLOBULIN SER CALC-MCNC: 4.8 G/DL (ref 2–4)
GLUCOSE BLD STRIP.AUTO-MCNC: 125 MG/DL (ref 70–110)
GLUCOSE BLD STRIP.AUTO-MCNC: 308 MG/DL (ref 70–110)
GLUCOSE BLD STRIP.AUTO-MCNC: 322 MG/DL (ref 70–110)
GLUCOSE SERPL-MCNC: 145 MG/DL (ref 74–99)
HCT VFR BLD AUTO: 34.4 % (ref 36–48)
HGB BLD-MCNC: 10.7 G/DL (ref 13–16)
LYMPHOCYTES # BLD: 2 K/UL (ref 0.9–3.6)
LYMPHOCYTES NFR BLD: 19 % (ref 21–52)
MCH RBC QN AUTO: 27.9 PG (ref 24–34)
MCHC RBC AUTO-ENTMCNC: 31.1 G/DL (ref 31–37)
MCV RBC AUTO: 89.6 FL (ref 74–97)
MONOCYTES # BLD: 0.7 K/UL (ref 0.05–1.2)
MONOCYTES NFR BLD: 7 % (ref 3–10)
NEUTS SEG # BLD: 7.3 K/UL (ref 1.8–8)
NEUTS SEG NFR BLD: 71 % (ref 40–73)
P-R INTERVAL, ECG05: 168 MS
PLATELET # BLD AUTO: 262 K/UL (ref 135–420)
PMV BLD AUTO: 11.3 FL (ref 9.2–11.8)
POTASSIUM SERPL-SCNC: 3.9 MMOL/L (ref 3.5–5.5)
PROT SERPL-MCNC: 8.1 G/DL (ref 6.4–8.2)
Q-T INTERVAL, ECG07: 396 MS
QRS DURATION, ECG06: 118 MS
QTC CALCULATION (BEZET), ECG08: 408 MS
RBC # BLD AUTO: 3.84 M/UL (ref 4.7–5.5)
SODIUM SERPL-SCNC: 134 MMOL/L (ref 136–145)
TROPONIN I SERPL-MCNC: 0.65 NG/ML (ref 0–0.04)
VENTRICULAR RATE, ECG03: 64 BPM
WBC # BLD AUTO: 10.3 K/UL (ref 4.6–13.2)

## 2017-10-27 PROCEDURE — 74011636637 HC RX REV CODE- 636/637: Performed by: INTERNAL MEDICINE

## 2017-10-27 PROCEDURE — 65660000000 HC RM CCU STEPDOWN

## 2017-10-27 PROCEDURE — 72050 X-RAY EXAM NECK SPINE 4/5VWS: CPT

## 2017-10-27 PROCEDURE — 93005 ELECTROCARDIOGRAM TRACING: CPT

## 2017-10-27 PROCEDURE — 82962 GLUCOSE BLOOD TEST: CPT

## 2017-10-27 PROCEDURE — 80053 COMPREHEN METABOLIC PANEL: CPT | Performed by: EMERGENCY MEDICINE

## 2017-10-27 PROCEDURE — 99218 HC RM OBSERVATION: CPT

## 2017-10-27 PROCEDURE — 74011250636 HC RX REV CODE- 250/636: Performed by: INTERNAL MEDICINE

## 2017-10-27 PROCEDURE — 74011250637 HC RX REV CODE- 250/637: Performed by: INTERNAL MEDICINE

## 2017-10-27 PROCEDURE — 85025 COMPLETE CBC W/AUTO DIFF WBC: CPT | Performed by: EMERGENCY MEDICINE

## 2017-10-27 PROCEDURE — 82550 ASSAY OF CK (CPK): CPT | Performed by: EMERGENCY MEDICINE

## 2017-10-27 RX ORDER — SODIUM CHLORIDE 9 MG/ML
50 INJECTION, SOLUTION INTRAVENOUS CONTINUOUS
Status: DISCONTINUED | OUTPATIENT
Start: 2017-10-27 | End: 2017-10-28 | Stop reason: HOSPADM

## 2017-10-27 RX ORDER — FUROSEMIDE 40 MG/1
40 TABLET ORAL DAILY
Status: DISCONTINUED | OUTPATIENT
Start: 2017-10-27 | End: 2017-10-27

## 2017-10-27 RX ORDER — HEPARIN SODIUM 5000 [USP'U]/ML
5000 INJECTION, SOLUTION INTRAVENOUS; SUBCUTANEOUS EVERY 8 HOURS
Status: DISCONTINUED | OUTPATIENT
Start: 2017-10-27 | End: 2017-10-28 | Stop reason: HOSPADM

## 2017-10-27 RX ORDER — CLONIDINE HYDROCHLORIDE 0.1 MG/1
0.2 TABLET ORAL 3 TIMES DAILY
Status: DISCONTINUED | OUTPATIENT
Start: 2017-10-27 | End: 2017-10-28 | Stop reason: HOSPADM

## 2017-10-27 RX ORDER — CALCITRIOL 0.25 UG/1
0.25 CAPSULE ORAL
Status: DISCONTINUED | OUTPATIENT
Start: 2017-10-27 | End: 2017-10-28 | Stop reason: HOSPADM

## 2017-10-27 RX ORDER — HYDRALAZINE HYDROCHLORIDE 50 MG/1
100 TABLET, FILM COATED ORAL 3 TIMES DAILY
Status: DISCONTINUED | OUTPATIENT
Start: 2017-10-27 | End: 2017-10-28 | Stop reason: HOSPADM

## 2017-10-27 RX ORDER — ONDANSETRON 2 MG/ML
4 INJECTION INTRAMUSCULAR; INTRAVENOUS
Status: DISCONTINUED | OUTPATIENT
Start: 2017-10-27 | End: 2017-10-28 | Stop reason: HOSPADM

## 2017-10-27 RX ORDER — ALLOPURINOL 100 MG/1
100 TABLET ORAL DAILY
Status: DISCONTINUED | OUTPATIENT
Start: 2017-10-27 | End: 2017-10-28 | Stop reason: HOSPADM

## 2017-10-27 RX ORDER — TAMSULOSIN HYDROCHLORIDE 0.4 MG/1
0.4 CAPSULE ORAL DAILY
Status: DISCONTINUED | OUTPATIENT
Start: 2017-10-27 | End: 2017-10-28 | Stop reason: HOSPADM

## 2017-10-27 RX ORDER — GUAIFENESIN 100 MG/5ML
81 LIQUID (ML) ORAL DAILY
Status: DISCONTINUED | OUTPATIENT
Start: 2017-10-27 | End: 2017-10-28 | Stop reason: HOSPADM

## 2017-10-27 RX ORDER — AMOXICILLIN 250 MG
1 CAPSULE ORAL DAILY
Status: DISCONTINUED | OUTPATIENT
Start: 2017-10-27 | End: 2017-10-28 | Stop reason: HOSPADM

## 2017-10-27 RX ORDER — INSULIN GLARGINE 100 [IU]/ML
20 INJECTION, SOLUTION SUBCUTANEOUS EVERY 12 HOURS
Status: DISCONTINUED | OUTPATIENT
Start: 2017-10-27 | End: 2017-10-28 | Stop reason: HOSPADM

## 2017-10-27 RX ORDER — METOPROLOL TARTRATE 50 MG/1
50 TABLET ORAL 2 TIMES DAILY
Status: DISCONTINUED | OUTPATIENT
Start: 2017-10-27 | End: 2017-10-28 | Stop reason: HOSPADM

## 2017-10-27 RX ORDER — HYDRALAZINE HYDROCHLORIDE 20 MG/ML
10 INJECTION INTRAMUSCULAR; INTRAVENOUS
Status: DISCONTINUED | OUTPATIENT
Start: 2017-10-27 | End: 2017-10-28 | Stop reason: HOSPADM

## 2017-10-27 RX ORDER — INSULIN LISPRO 100 [IU]/ML
INJECTION, SOLUTION INTRAVENOUS; SUBCUTANEOUS
Status: DISCONTINUED | OUTPATIENT
Start: 2017-10-27 | End: 2017-10-27

## 2017-10-27 RX ORDER — CLOPIDOGREL BISULFATE 75 MG/1
75 TABLET ORAL DAILY
Status: DISCONTINUED | OUTPATIENT
Start: 2017-10-27 | End: 2017-10-28 | Stop reason: HOSPADM

## 2017-10-27 RX ORDER — GABAPENTIN 100 MG/1
100 CAPSULE ORAL DAILY
Status: DISCONTINUED | OUTPATIENT
Start: 2017-10-28 | End: 2017-10-28 | Stop reason: HOSPADM

## 2017-10-27 RX ORDER — INSULIN LISPRO 100 [IU]/ML
INJECTION, SOLUTION INTRAVENOUS; SUBCUTANEOUS
Status: DISCONTINUED | OUTPATIENT
Start: 2017-10-27 | End: 2017-10-28 | Stop reason: SDUPTHER

## 2017-10-27 RX ORDER — SIMVASTATIN 40 MG/1
40 TABLET, FILM COATED ORAL DAILY
Status: DISCONTINUED | OUTPATIENT
Start: 2017-10-27 | End: 2017-10-28 | Stop reason: HOSPADM

## 2017-10-27 RX ORDER — ISOSORBIDE MONONITRATE 30 MG/1
30 TABLET, EXTENDED RELEASE ORAL DAILY
Status: DISCONTINUED | OUTPATIENT
Start: 2017-10-27 | End: 2017-10-28 | Stop reason: HOSPADM

## 2017-10-27 RX ORDER — FENOFIBRATE 145 MG/1
145 TABLET, COATED ORAL DAILY
Status: DISCONTINUED | OUTPATIENT
Start: 2017-10-27 | End: 2017-10-28 | Stop reason: HOSPADM

## 2017-10-27 RX ADMIN — INSULIN GLARGINE 20 UNITS: 100 INJECTION, SOLUTION SUBCUTANEOUS at 09:27

## 2017-10-27 RX ADMIN — STANDARDIZED SENNA CONCENTRATE AND DOCUSATE SODIUM 1 TABLET: 8.6; 5 TABLET, FILM COATED ORAL at 08:58

## 2017-10-27 RX ADMIN — CLONIDINE HYDROCHLORIDE 0.2 MG: 0.1 TABLET ORAL at 08:58

## 2017-10-27 RX ADMIN — CALCITRIOL 0.25 MCG: 0.25 CAPSULE, LIQUID FILLED ORAL at 17:15

## 2017-10-27 RX ADMIN — HEPARIN SODIUM 5000 UNITS: 5000 INJECTION, SOLUTION INTRAVENOUS; SUBCUTANEOUS at 05:31

## 2017-10-27 RX ADMIN — HYDRALAZINE HYDROCHLORIDE 100 MG: 50 TABLET, FILM COATED ORAL at 15:38

## 2017-10-27 RX ADMIN — SIMVASTATIN 40 MG: 40 TABLET, FILM COATED ORAL at 08:58

## 2017-10-27 RX ADMIN — ISOSORBIDE MONONITRATE 30 MG: 30 TABLET, EXTENDED RELEASE ORAL at 08:58

## 2017-10-27 RX ADMIN — CLONIDINE HYDROCHLORIDE 0.2 MG: 0.1 TABLET ORAL at 15:38

## 2017-10-27 RX ADMIN — CLOPIDOGREL BISULFATE 75 MG: 75 TABLET ORAL at 08:58

## 2017-10-27 RX ADMIN — HYDRALAZINE HYDROCHLORIDE 100 MG: 50 TABLET, FILM COATED ORAL at 22:29

## 2017-10-27 RX ADMIN — NIFEDIPINE 90 MG: 60 TABLET, FILM COATED, EXTENDED RELEASE ORAL at 09:46

## 2017-10-27 RX ADMIN — ASPIRIN 81 MG 81 MG: 81 TABLET ORAL at 08:58

## 2017-10-27 RX ADMIN — HYDRALAZINE HYDROCHLORIDE 100 MG: 50 TABLET, FILM COATED ORAL at 08:58

## 2017-10-27 RX ADMIN — HEPARIN SODIUM 5000 UNITS: 5000 INJECTION, SOLUTION INTRAVENOUS; SUBCUTANEOUS at 22:29

## 2017-10-27 RX ADMIN — METOPROLOL TARTRATE 50 MG: 50 TABLET ORAL at 22:28

## 2017-10-27 RX ADMIN — METOPROLOL TARTRATE 50 MG: 50 TABLET ORAL at 08:58

## 2017-10-27 RX ADMIN — INSULIN LISPRO 8 UNITS: 100 INJECTION, SOLUTION INTRAVENOUS; SUBCUTANEOUS at 17:14

## 2017-10-27 RX ADMIN — FUROSEMIDE 40 MG: 20 TABLET ORAL at 08:58

## 2017-10-27 RX ADMIN — NEPHROCAP 1 CAPSULE: 1 CAP ORAL at 08:57

## 2017-10-27 RX ADMIN — INSULIN GLARGINE 20 UNITS: 100 INJECTION, SOLUTION SUBCUTANEOUS at 22:22

## 2017-10-27 RX ADMIN — SODIUM CHLORIDE 25 ML/HR: 9 INJECTION, SOLUTION INTRAVENOUS at 05:31

## 2017-10-27 RX ADMIN — ALLOPURINOL 100 MG: 100 TABLET ORAL at 08:58

## 2017-10-27 RX ADMIN — HEPARIN SODIUM 5000 UNITS: 5000 INJECTION, SOLUTION INTRAVENOUS; SUBCUTANEOUS at 15:38

## 2017-10-27 RX ADMIN — FENOFIBRATE 145 MG: 145 TABLET, FILM COATED ORAL at 09:46

## 2017-10-27 RX ADMIN — TAMSULOSIN HYDROCHLORIDE 0.4 MG: 0.4 CAPSULE ORAL at 08:58

## 2017-10-27 RX ADMIN — CLONIDINE HYDROCHLORIDE 0.2 MG: 0.1 TABLET ORAL at 22:29

## 2017-10-27 NOTE — DIABETES MGMT
NUTRITIONAL ASSESSMENT GLYCEMIC CONTROL/ PLAN OF CARE     Huong Levo           68 y.o.           10/26/2017                 1. Unstable angina ([address]    2. Chronic renal failure, stage 5 (HCC)       INTERVENTIONS/PLAN:   Recommend addition of consistent carbohydrate diet to current renal diet   Continue inpatient monitoring and intervention    ASSESSMENT:   Pt is a 68year old male with a past medical history significant for atherosclerosis, CAD, CHF, CKD, diabetes, obesity, and hypertension. Pt receives correctional Lispro insulin coverage (normal sensitivity scale TID with meals) and home dose of Lantus insulin. Reports following a 2000 kcal diet at home. Refer to education note 10/24/17. Continue inpatient monitoring and intervention.      Diabetes Management:   Recent blood glucose:   145, 125 mg/dL   Within target range (non-ICU: <140; ICU<180): [x] Yes   []  No    Current Insulin regimen:   Lantus insulin 20 units every 12 hours   Correctional Lispro insulin TID before meals (normal insulin sensitivity)  Home medication/insulin regimen:   Lantus insulin 20 units every 12 hours  Novolog insulin 20 units TID and sliding scale  HbA1c: (8.3%, estimated average glucose of 192 mg/dL)  Adequate glycemic control PTA:  [] Yes  [x] No     SUBJECTIVE/OBJECTIVE:     Diet: Renal     Medications: [x] Reviewed     Most Recent POC Glucose:   Recent Labs      10/27/17   0015  10/25/17   0301  10/24/17   2156   GLU  145*  266*  270*      Labs:   Lab Results   Component Value Date/Time    Hemoglobin A1c 8.3 10/23/2017 02:55 PM     Lab Results   Component Value Date/Time    Sodium 134 10/27/2017 12:15 AM    Potassium 3.9 10/27/2017 12:15 AM    Chloride 98 10/27/2017 12:15 AM    CO2 28 10/27/2017 12:15 AM    Anion gap 8 10/27/2017 12:15 AM    Glucose 145 10/27/2017 12:15 AM    BUN 79 10/27/2017 12:15 AM    Creatinine 5.20 10/27/2017 12:15 AM    Calcium 9.4 10/27/2017 12:15 AM    Magnesium 2.4 07/06/2016 02:40 AM    Phosphorus 4.1 10/25/2017 03:01 AM    Albumin 3.3 10/27/2017 12:15 AM     Anthropometrics: Wt Readings from Last 1 Encounters:   10/27/17 99.8 kg (220 lb)      Ht Readings from Last 1 Encounters:   10/23/17 5' 8\" (1.727 m)     Estimated Nutrition Needs: 7614-9559 Kcal/day,  grams protein/day. Based on:   [x] Actual BW    [] IBW   [] Adjusted BW      Nutrition Diagnoses:    Altered nutrition related lab value related to diabetes as evidenced by Hemoglobin A1c of 8.3%  Nutrition Interventions: diabetes education, coordination of care   Goal: Blood glucose will be within target range of  mg/dL by 10/30/17     Nutrition Monitoring and Evaluation    []     Monitor po intake on meal rounds  [x]     Continue inpatient monitoring and intervention  []     Other:    Guerrero Loera RD,  CDE

## 2017-10-27 NOTE — ED TRIAGE NOTES
Patient reports he asked to be discharged from unit yesterday because he did not want dialysis. Patient reports he was told that he had some type of blockage and the procedure that was needed would require treatment with dialysis as well. Patient denies CP or SOB at this time, he complains of numbness in both hands, he ambulated into triage.

## 2017-10-27 NOTE — CONSULTS
[address]    Name:  Coleen Kasper  MR#:  [id number]  :  1940  Account #:  [de-identified]  Date of Adm:  10/26/2017  Date of Consultation:  10/27/2017      REQUESTING PHYSICIAN: Emergency room physician. REASON FOR CONSULTATION: Worsening renal functions. HISTORY OF PRESENT ILLNESS: This 44-year-old [facility] [employer]  male, known to me from his previous admission and consultation, as  well as followup with my office. He is back again in the hospital. This  time he presented with bilateral hand numbness, as well as increased  shortness of breath, without any chest pain. It is worth to mention that  the patient was just discharged about 48 hours back from this hospital.  At that time, the patient came with an acute non-STEMI, but did  not undergo any cardiac catheterization given his advanced renal  failure. A 2D echocardiogram, ejection fraction was not any worsened  compared to the previous one. The patient has a drug-eluting stent that  was done sometime this year, but off and on having recurrent anginal  pain. At this time on admission does not have any chest pain. Troponin  is trending down, but having bilateral arm pain, numbness and tingling  sensation. At the same time, the patient's creatinine has increased  further. The patient has good appetite. No chest pain, no palpitations. No urinary complaint. Today when he came, his blood urea has  increased up to as high as 279, and creatinine of 5.0, compared to  blood urea nitrogen of 90 and creatinine of 4.60 at the time of  discharge. His peak serum creatinine on last admission was 4.78, but  now is worsened further. During last admission, the patient was  managed conservatively and decision was made to avoid dialysis  If no  acute cardiac  catheterization was not done, and again,if   this patient  needs cardiac catheterization he needs to be dialyzed,  that was clear to him.  The patient went home in stable condition, now  back with the same problem. PAST MEDICAL HISTORY: Includes history of hypertension, type 2  diabetes mellitus, history of CABG, history of coronary artery disease,  history of a drug-eluting stent placement, stage IV to early stage V  chronic renal failure, prostate cancer, secondary hyperparathyroidism. Also, has history of hyperlipidemia. His ejection fraction around 60% to  65%. He has acute on chronic heart failure in the past, with diastolic  dysfunction, and obstructive sleep apnea also, morbid obesity. SOCIAL HISTORY: Nonsmoker, , lives with the family. No  history of drugs and alcohol. ALLERGIES: NO KNOWN DRUG ALLERGIES. FAMILY HISTORY: Positive for congestive heart failure, hypertension  and diabetes. LIST OF MEDICATIONS BEFORE ADMISSION ARE AS FOLLOWS:  1. Allopurinol 100 mg daily. 2. Aspirin 81 mg p.o. daily. 3. Nephrocaps once a day. 4. Rocaltrol 0.25 mcg Monday, Wednesday, Friday. 5. Vitamin D3 50,000 units every 7 days. 6. Clonidine 0.2 mg 3 times daily. 7. Plavix 75 mg daily. 8. Fenofibrate 54 mg tablets 1 tablet daily. 9. Lasix 40 mg tablet on need basis. He has not taken any for a long  time. 10. Hydralazine 100 mg tablet 1 tablet 3 times daily. 11. NovoLog insulin 20 units subcutaneous 3 times daily. 12. Lantus 20 units subcutaneous. 13. Imdur 30 mg p.o. daily. 14. Metoprolol tartrate or Lopressor 100 mg 1 tablet 2 times daily. 15. Procardia 90 mg tablet daily. 16. Nitroglycerin 0.4 mg sublingually on need basis. 17. Zocor 40 mg tablet daily. 18. Flomax 0.4 mg capsule daily. PHYSICAL EXAMINATION  VITAL SIGNS: Temperature 98.2, heart rate 67, blood pressure  160/65, respirations of 14, oxygen saturation 99%. Weight of 99.8  kilograms. HEENT: Oral mucosa moist.  NECK: Jugular venous pressure not distended. LUNGS: Decreased breath sounds without any obvious crackles. HEART: S1, S2. No palpable or reproducible chest pain.   ABDOMEN: Obese, could not appreciate any organomegaly. EXTREMITIES: Ankle 1+ edema. RELEVANT LABS: As of today, sodium 134, potassium 3.9, chloride  98, CO2 28, glucose of 145, blood urea nitrogen of 79, creatinine of  5.20, calcium of 9.4. Total protein of 8.1, with albumin of 3.3. EGFR is  only 13 mL per minute, compared to 16 mL last week. No chest x-ray has been done. Renal ultrasound was done on 07/07/2016, which is consistent with  chronic renal failure. The right kidney was normal in echogenicity and  size, measuring 10.6 x 5.5 x 5.3 cm. No solid mass, no  hydronephrosis. Left kidney also normal in echogenicity, 11 x 5.5 x 5.2  cm. By reviewing the ultrasound, it apparently looks like he has chronic  renal failure. IMPRESSION AND PLAN: Worsening renal failure. The patient could  be mildly dry side. No definite signs of volume overload or heart failure  at this time. Troponin is trending down. No immediate need for any  cardiac intervention, but his numbness and tingling could be from  peripheral neuropathy from renal failure, as in both arms the symptoms  are. Will try to hydrate carefully with IV fluid at 50 mL per hour and see  how clinically he improves or not. In the meanwhile for his tingling and  numbness sensation in both arms, will try low dose of Neurontin. Will  restart his Rocaltrol. No need for any diuretics. Avoid nephrotoxins. Further recommendations will be given on the hospital course, and arm  tingling/numbness, any acute event happens or kidney function does  not improve, there is no immediate plan for any dialysis. Further  recommendations will be given based on the hospital course.         MD Katy Mobley / Klaus Hagen  D:  10/27/2017   18:29  T:  10/27/2017   19:39  Job #:  [id number]

## 2017-10-27 NOTE — ROUTINE PROCESS
RECEIVED REPORT FROM MARI BOWEN IN ED REGARDING PATIENT. PT ARRIVED TO UNIT AROUND 1500. PT IS ABLE TO AMBULATE WITH NO ASSISTANCE. TELE WAS PLACED ON PATIENT. NO COMPLAINTS OF PAIN. PT COMPLAINS OF TINGLING IN HIS FINGERTIPS AND REPORTS THAT THE TINGLING \"HAS GOTTEN A LOT BETTER\"  NO SIGNS OF DISTRESS NOTED. IV FLUIDS INFUSING PER ORDER. WILL CONTINUE TO MONITOR.

## 2017-10-27 NOTE — PROGRESS NOTES
[facility]      CARDIOLOGY PROGRESS NOTE  RECS:          1. Bilateral arm pain and numbness- improved -w/u and medications per medical team   2. Hx recent NSTEMI- on 10/24/17 with troponin peak 8.41. Now rending down  has moderate DARBY and chest pain on exertion at home. No diaphoresis or nausea associated with his CP. Continue with medical management for CAD for now. 3. Recent abnormal NUC stress test- with Small to medium reversible defect involving the anterior wall and Positive EKG portion of the test. discussed with patient at length regarding invasive w/u. He clearly understands the need of cardiac cath to evaluate CAD- but he doesn't want to proceed due to risk of HD. 4. CAD s/p coronary artery stent placement in 2012 and 7/17 with HERBER stents deployed in SVG in the focal mid lesion and a long proximal lesion. continue with asa and Plavix   5. Chronic diastolic heart failure- appears compensated- monitor for fluid overload. Continue with diuretic on hold               6. Essential hypertension-labile. Continue with present medications.                           7. Hyperlipidemia- on statin                                                            8. NICOLAS on CKD stage IV- agree with gently  hydration as tolerated- monitor for fluid overload. Per Renal no need for HD at this point.- plan for gentle hydration  9. Diabetes managed per medical team      Cardiac Cath 7/19/17  IMPRESSION: Successful deployment of drug-eluting stents as detailed above  in the focal mid lesion and a long proximal lesion of the very old  saphenous venous graft to first diagonal artery without any complications. There was no evidence of distal occlusion and distal embolization and the  BRENNA flow was 3 at the end. IV Angiomax was used during the procedure, 300  mg of Plavix was given at the end of the procedure.      DISCUSSION AND RECOMMENDATIONS: This is a very old graft.  Fortunately, we  were able to open it well today, deploying 2 drug-eluting stents. Aggressive risk factor modification and medical treatment should continue. Echo 10/25/17  Left ventricle: Systolic function was normal by visual assessment. Ejection   fraction was estimated in the range of 60 %  to 65 %. No obvious wall motion abnormalities identified in the views   obtained. Wall thickness was moderately to  markedly increased. Aortic valve: There was very mild stenosis. Valve peak gradient was 21 mmHg. Valve mean gradient was 12 mmHg. Estimated  aortic valve area (by VTI) was 1.6 cm-sq. COMPARISONS:  There has been no significant change. Comparison was made with the previous   study of 06-Jul-2016.     ASSESSMENT:  Hospital Problems  Date Reviewed: 10/25/2017          Codes Class Noted POA    Chest pain ICD-10-CM: R07.9  ICD-9-CM: 786.50  10/27/2017 Unknown        Secondary hyperparathyroidism of renal origin [facility]) (Chronic) ICD-10-CM: N25.81  ICD-9-CM: 588.81  10/25/2017 Yes        Fatigue ICD-10-CM: R53.83  ICD-9-CM: 780.79  8/23/2016 Yes    Overview Signed 8/23/2016  9:10 AM by Osmar Montejo MD     chk lytes  likely due to APRYL             Diastolic CHF, acute on chronic ([facility] ICD-10-CM: I50.33  ICD-9-CM: 428.33, 428.0  7/5/2016 Yes        APRYL (obstructive sleep apnea) ICD-10-CM: G47.33  ICD-9-CM: 327.23  6/26/2015 Yes    Overview Signed 6/26/2015  9:25 AM by Charlette Pereira MD     non compliance to cpap             GERD (gastroesophageal reflux disease) ICD-10-CM: K21.9  ICD-9-CM: 530.81  3/5/2015 Yes        Chronic renal insufficiency ICD-10-CM: N18.9  ICD-9-CM: 585.9  3/5/2015 Yes        Morbid obesity ([facility] ICD-10-CM: E66.01  ICD-9-CM: 278.01  Unknown Yes    Overview Signed 4/29/2013  3:10 PM by Christine Carias     Weight loss has been strongly encouraged by following dietary restrictions and an exercise routine             Essential hypertension, benign ICD-10-CM: I10  ICD-9-CM: 401.1  Unknown Yes        Mixed hyperlipidemia ICD-10-CM: E78.2  ICD-9-CM: 272.2  Unknown Yes    Overview Signed 4/29/2013  3:11 PM by Yg Rodriguez     HDL's are not at goal, LDL's are at goal, triglycerides are not at goal.             S/P coronary artery stent placement ICD-10-CM: Z95.5  ICD-9-CM: V45.82  3/12/2013 Yes    Overview Signed 3/12/2013  3:37 PM by Gertrude Kirby MD     vg to diag stent 10/2012             Postsurgical aortocoronary bypass status ICD-10-CM: Z95.1  ICD-9-CM: V45.81  3/12/2013 Yes        NSTEMI (non-ST elevated myocardial infarction) [facility]) ICD-10-CM: I21.4  ICD-9-CM: 410.70  10/7/2012 Yes        HTN (hypertension) ICD-10-CM: I10  ICD-9-CM: 401.9  10/7/2012 Yes        DM2 (diabetes mellitus, type 2) ([facility] ICD-10-CM: E11.9  ICD-9-CM: 250.00  10/7/2012 Yes        Dyslipidemia ICD-10-CM: H56.7  ICD-9-CM: 272.4  10/7/2012 Yes                SUBJECTIVE:  No CP  No SOB    OBJECTIVE:    VS:   Visit Vitals    /57    Pulse 67    Temp 97.5 °F (36.4 °C)    Resp 14    Wt 99.8 kg (220 lb)    SpO2 97%    BMI 33.45 kg/m2       No intake or output data in the 24 hours ending 10/27/17 1433  TELE: normal sinus rhythm        General: alert, well developed, pleasant and in no apparent distress  HENT: Normocephalic, atraumatic. Normal external eye.   Neck :  JVD difficult to assess due to obesity  Cardiac:  regular rate and rhythm, S1, S2 normal, no S3 or S4, no click, no rub  Lungs: clear to auscultation bilaterally  Abdomen: Soft, nontender, no masses  Extremities:  No c/c/e, peripheral pulses present      Labs: Results:       Chemistry Recent Labs      10/27/17   0015  10/25/17   0301  10/24/17   2156   GLU  145*  266*  270*   NA  134*  138  137   K  3.9  3.7  3.7   CL  98*  100  99*   CO2  28  31  30   BUN  79*  90*  88*   CREA  5.20*  4.60*  4.78*   CA  9.4  8.9  8.9  8.8   AGAP  8  7  8   BUCR  15  20  18   AP  93   --   103   TP  8.1   --   7.7   ALB  3.3*   --   3.2*   GLOB  4.8*   --   4.5*   AGRAT  0.7*   --   0.7*      CBC w/Diff Recent Labs      10/27/17   0015  10/25/17   0301   WBC  10.3  8.1   RBC  3.84*  3.44*   HGB  10.7*  9.7*   HCT  34.4*  30.8*   PLT  262  228   GRANS  71  61   LYMPH  19*  28   EOS  3  5      Cardiac Enzymes Recent Labs      10/27/17   0015  10/25/17   0301   CPK  119  85   CKND1  2.4  4.8*      Coagulation Recent Labs      10/25/17   1040  10/25/17   0301   APTT  47.0*  89.1*       Lipid Panel Lab Results   Component Value Date/Time    Cholesterol, total 158 10/24/2017 05:08 AM    HDL Cholesterol 39 10/24/2017 05:08 AM    LDL, calculated 72.4 10/24/2017 05:08 AM    VLDL, calculated 46.6 10/24/2017 05:08 AM    Triglyceride 233 10/24/2017 05:08 AM    CHOL/HDL Ratio 4.1 10/24/2017 05:08 AM      BNP No results for input(s): BNPP in the last 72 hours. Liver Enzymes Recent Labs      10/27/17   0015   TP  8.1   ALB  3.3*   AP  93   SGOT  18      Thyroid Studies Lab Results   Component Value Date/Time    T4, Total 10.9 10/09/2012 04:53 AM    TSH 2.16 10/23/2017 11:08 PM              Urszula Coughlin Clarissa:[phone] supervised. I have independently evaluated and examined the patient. All relevant labs and testing data's are reviewed. Care plan discussed and updated after review.     Kailyn Blakely MD

## 2017-10-27 NOTE — PROGRESS NOTES
Notes and labs reviewed. Patient seen.  BP improving  Will order x-ray neck    Patient Vitals for the past 24 hrs:   Temp Pulse Resp BP SpO2   10/27/17 1500 98.2 °F (36.8 °C) 88 20 151/68 98 %   10/27/17 1145 - 67 14 175/57 97 %   10/27/17 1130 - 68 15 157/45 93 %   10/27/17 1115 - 63 16 145/43 99 %   10/27/17 1100 - 63 16 130/49 98 %   10/27/17 1045 - 62 16 127/43 99 %   10/27/17 1030 - 61 17 (!) 121/39 98 %   10/27/17 1015 - 62 15 118/42 99 %   10/27/17 1000 - 62 15 (!) 105/38 97 %   10/27/17 0945 - 67 19 133/56 100 %   10/27/17 0915 - 75 14 178/73 98 %   10/27/17 0857 - 70 - 199/82 -   10/27/17 0845 - (!) 57 14 199/82 97 %   10/27/17 0815 - 64 14 194/56 97 %   10/27/17 0745 - (!) 59 17 171/80 98 %   10/27/17 0730 - 62 19 185/78 99 %   10/27/17 0700 - (!) 59 14 154/57 98 %   10/27/17 0645 - 61 14 200/61 98 %   10/27/17 0630 - 67 15 187/63 98 %   10/27/17 0600 - 63 15 (!) 206/63 96 %   10/27/17 0530 - 64 17 188/57 90 %   10/27/17 0500 - (!) 58 14 163/58 98 %   10/27/17 0430 - (!) 59 14 167/58 99 %   10/27/17 0400 - 66 19 159/63 100 %   10/27/17 0330 - 61 11 176/68 100 %   10/27/17 0300 - 69 16 164/53 99 %   10/27/17 0230 - 62 16 117/62 97 %   10/27/17 0200 - 63 17 133/52 97 %   10/27/17 0115 - 66 22 142/50 97 %   10/27/17 0045 - 67 14 160/65 99 %   10/27/17 0000 97.5 °F (36.4 °C) 68 17 149/76 99 %

## 2017-10-27 NOTE — ED PROVIDER NOTES
HPI Comments: 3:26 AM Elijah Healy is a 68 y.o. male with h/o DM, HTN, CHF, MI, CAD, prostate cancer, and stage 4 CKD who presents to ED complaining of intermittent bilateral fingers numbness onset 11PM, approx around 5 hours ago. Numbness started when he combed his hair. He describes the numbness as tingling. He states worsening SOB starting 2 days ago after his discharge from the hospital and neck pain. He denies having CP. He states that he had CP 4 days ago that brought him to the ED. He was diagnosed with MI and was discharged 2 days ago around [facility] because he did not want to have a cardiac catheterization if it required him going to hemodialysis. The physcian warned him that if he had any other problems he would have to go to the ED for treatment. The patient understands that he will most likely need to be admitted. The patient is not a smoker. History is difficult to obtain due to the patient being a poor historian. PCP: Kaye Arreola MD      The history is provided by the patient. Past Medical History:   Diagnosis Date    Atherosclerosis of renal artery (HCC)     S/P Rt stent    CAD (coronary artery disease) , 1997, 2012    ,double bypass, 2 stents, 2stents 7/2016    Cancer [facility])     prostate    CHF (congestive heart failure) (HCC)     Chronic diastolic heart failure (HCC)     Chronic kidney disease (CKD), stage IV (severe) (HCC)     Constipation     Coronary atherosclerosis of unspecified type of vessel, native or graft     Stable angina after recent PCI continue treatment.        CRI (chronic renal insufficiency)     Diabetes ([facility] [address] 1973    type 2    Essential hypertension, benign     GERD (gastroesophageal reflux disease)     Gout     Hypertension 1982    Morbid obesity ([address]     Weight loss has been strongly encouraged by following dietary restrictions and an exercise routine    APRYL (obstructive sleep apnea) 6/26/2015    no cpap    Other and unspecified hyperlipidemia     HDL's are not at goal, LDL's are at goal, triglycerides are not at goal.    Other and unspecified hyperlipidemia     HDL's are not at goal, LDL's are at goal, triglycerides are not at goal.     Other ill-defined conditions(799.89) 1960    pneumonia twice    Sleep disturbance     Type II or unspecified type diabetes mellitus without mention of complication, not stated as uncontrolled        Past Surgical History:   Procedure Laterality Date   [address]    lt. carotid endart.  COLONOSCOPY N/A 6/23/2017    COLONOSCOPY w/ APC w/ polypectomies performed by Julieta Sanford MD at [address] HX CHOLECYSTECTOMY      HX CORONARY ARTERY BYPASS GRAFT  2000    x2    HX HEENT  1997    cholecystectomy    HX UROLOGICAL  1998    renal art. surg         Family History:   Problem Relation Age of Onset    Heart Attack Father 64       Social History     Social History    Marital status:      Spouse name: N/A    Number of children: N/A    Years of education: N/A     Occupational History    Not on file. Social History Main Topics    Smoking status: Never Smoker    Smokeless tobacco: Never Used    Alcohol use No    Drug use: No    Sexual activity: Not on file     Other Topics Concern    Not on file     Social History Narrative         ALLERGIES: Nka [no known allergies]    Review of Systems   Constitutional: Negative for activity change, appetite change, diaphoresis and fever. HENT: Negative for congestion, dental problem, ear pain, hearing loss, nosebleeds, postnasal drip, sinus pressure, sneezing and tinnitus. Eyes: Negative for photophobia, discharge, redness and visual disturbance. Respiratory: Positive for shortness of breath. Negative for cough, choking, wheezing and stridor. Cardiovascular: Negative for chest pain, palpitations and leg swelling.    Gastrointestinal: Negative for abdominal distention, abdominal pain, anal bleeding and blood in stool.   Genitourinary: Negative for decreased urine volume, difficulty urinating, discharge, dysuria, frequency, hematuria, penile swelling, scrotal swelling, testicular pain and urgency. Musculoskeletal: Positive for neck pain. Negative for arthralgias, back pain, gait problem, joint swelling and myalgias. Skin: Negative for color change and pallor. Neurological: Positive for numbness. Negative for dizziness, tremors, seizures, syncope and headaches. Hematological: Negative for adenopathy. Does not bruise/bleed easily. Psychiatric/Behavioral: Negative for agitation, behavioral problems, confusion and hallucinations. The patient is not nervous/anxious. Vitals:    10/27/17 0000 10/27/17 0045 10/27/17 0115 10/27/17 0230   BP: 149/76 160/65 142/50 117/62   Pulse: 68 67 66 62   Resp: 17 14 22 16   SpO2: 99% 99% 97% 97%            Physical Exam   Constitutional: He is oriented to person, place, and time. He appears well-developed and well-nourished. HENT:   Head: Normocephalic and atraumatic. Right Ear: External ear normal.   Left Ear: External ear normal.   Nose: Nose normal.   Mouth/Throat: Oropharynx is clear and moist.   Eyes: Conjunctivae and EOM are normal. Pupils are equal, round, and reactive to light. Right eye exhibits no discharge. No scleral icterus. Neck: Normal range of motion. Neck supple. No JVD present. No thyromegaly present. Cardiovascular: Normal rate, regular rhythm, normal heart sounds and intact distal pulses. Exam reveals no gallop and no friction rub. No murmur heard. Pulmonary/Chest: No respiratory distress. He has no wheezes. He has no rales. He exhibits no tenderness. Lungs are clear to auscultation. Abdominal: Soft. He exhibits no distension and no mass. There is no tenderness. There is no rebound and no guarding. Musculoskeletal: Normal range of motion. He exhibits edema. Pain in neck when hyperflexed.    Bilateral arm strength equal and normal.   Trace edema of lower extremities. Lymphadenopathy:     He has no cervical adenopathy. Neurological: He is alert and oriented to person, place, and time. No cranial nerve deficit. Coordination normal.   Skin: Skin is warm and dry. No rash noted. No erythema. Psychiatric: He has a normal mood and affect. His behavior is normal. Judgment normal.   Nursing note and vitals reviewed. MDM  Number of Diagnoses or Management Options  Diagnosis management comments: 70year old male discharged one day prior for NSTEMI. He also has CKD, GFR 13. Discharged as he wanted no further workup but returns with fatigue on exertion, intermittent numbness   Numbness is most likely from cervical degenerative disease. Tiredness possibly from angina or equivalent. Will admit the patient. Amount and/or Complexity of Data Reviewed  Clinical lab tests: ordered  Tests in the radiology section of CPT®: ordered  Tests in the medicine section of CPT®: ordered    Risk of Complications, Morbidity, and/or Mortality  Presenting problems: moderate      ED Course       Procedures      Vitals:  Patient Vitals for the past 12 hrs:   Pulse Resp BP SpO2   10/27/17 0230 62 16 117/62 97 %   10/27/17 0115 66 22 142/50 97 %   10/27/17 0045 67 14 160/65 99 %   10/27/17 0000 68 17 149/76 99 %       Medications ordered:   Medications - No data to display      Lab findings:  Recent Results (from the past 12 hour(s))   CBC WITH AUTOMATED DIFF    Collection Time: 10/27/17 12:15 AM   Result Value Ref Range    WBC 10.3 4.6 - 13.2 K/uL    RBC 3.84 (L) 4.70 - 5.50 M/uL    HGB 10.7 (L) 13.0 - 16.0 g/dL    HCT 34.4 (L) 36.0 - 48.0 %    MCV 89.6 74.0 - 97.0 FL    MCH 27.9 24.0 - 34.0 PG    MCHC 31.1 31.0 - 37.0 g/dL    RDW 13.8 11.6 - 14.5 %    PLATELET 698 802 - 488 K/uL    MPV 11.3 9.2 - 11.8 FL    NEUTROPHILS 71 40 - 73 %    LYMPHOCYTES 19 (L) 21 - 52 %    MONOCYTES 7 3 - 10 %    EOSINOPHILS 3 0 - 5 %    BASOPHILS 0 0 - 2 %    ABS.  NEUTROPHILS 7.3 1.8 - 8.0 K/UL    ABS. LYMPHOCYTES 2.0 0.9 - 3.6 K/UL    ABS. MONOCYTES 0.7 0.05 - 1.2 K/UL    ABS. EOSINOPHILS 0.3 0.0 - 0.4 K/UL    ABS. BASOPHILS 0.0 0.0 - 0.1 K/UL    DF AUTOMATED     METABOLIC PANEL, COMPREHENSIVE    Collection Time: 10/27/17 12:15 AM   Result Value Ref Range    Sodium 134 (L) 136 - 145 mmol/L    Potassium 3.9 3.5 - 5.5 mmol/L    Chloride 98 (L) 100 - 108 mmol/L    CO2 28 21 - 32 mmol/L    Anion gap 8 3.0 - 18 mmol/L    Glucose 145 (H) 74 - 99 mg/dL    BUN 79 (H) 7.0 - 18 MG/DL    Creatinine 5.20 (H) 0.6 - 1.3 MG/DL    BUN/Creatinine ratio 15 12 - 20      GFR est AA 13 (L) >60 ml/min/1.73m2    GFR est non-AA 11 (L) >60 ml/min/1.73m2    Calcium 9.4 8.5 - 10.1 MG/DL    Bilirubin, total 0.4 0.2 - 1.0 MG/DL    ALT (SGPT) 22 16 - 61 U/L    AST (SGOT) 18 15 - 37 U/L    Alk. phosphatase 93 45 - 117 U/L    Protein, total 8.1 6.4 - 8.2 g/dL    Albumin 3.3 (L) 3.4 - 5.0 g/dL    Globulin 4.8 (H) 2.0 - 4.0 g/dL    A-G Ratio 0.7 (L) 0.8 - 1.7     CARDIAC PANEL,(CK, CKMB & TROPONIN)    Collection Time: 10/27/17 12:15 AM   Result Value Ref Range     39 - 308 U/L    CK - MB 2.9 <3.6 ng/ml    CK-MB Index 2.4 0.0 - 4.0 %    Troponin-I, Qt. 0.65 (H) 0.0 - 0.045 NG/ML       EKG interpretation by ED Physician:  0206 NSR at a rate of 64bpm. Incomplete right bundle branch block. ST and T wave abnormality, consider anterolateral ischemia. No STEMI. X-Ray, CT or other radiology findings or impressions:  No orders to display       Progress notes, Consult notes or additional Procedure notes:   3:26 AM discussed with the patient the need for admission. Pt accepted. Consult:  Discussed care with Dr. Tariq Oconnor, hospitalist Standard discussion; including history of patients chief complaint, available diagnostic results, and treatment course. Dr. Tariq Oconnor accepted admission. 3:29 AM, 10/26/2017     Disposition:  Diagnosis:   1. Unstable angina ([facility] [address]    2. Chronic renal failure, stage 5 (HCC)        Disposition: Admit. [address] Azul Hess, acting as scribes for and in the presence of Keon Padron MD      October 27, 2017 at 3:25 AM       Provider Attestation:      I personally performed the services described in the documentation, reviewed the documentation, as recorded by the scribe in my presence, and it accurately and completely records my words and actions.  October 27, 2017 at 3:25 AM - Keon Padron MD

## 2017-10-27 NOTE — H&P
[facility]  ROUTINE H AND PS    Name:  India Pruett  MR#:  [id number]  :  1940  Account #:  [de-identified]  Date of Adm:  10/26/2017      CHIEF COMPLAINT: Shortness of breath and bilateral finger  numbness. HISTORY OF PRESENT ILLNESS: The patient is a 80-year-old, black  male who was hospitalized here several days ago for acute myocardial  infarction. At that time, cardiac catheterization was suggested, but the  patient declined because of renal issues. He was discharged on  medications as an outpatient, but apparently developed recurrent hand  numbness bilaterally, as well as increased shortness of breath. He  denied any chest pain. He came to the emergency room for evaluation. REVIEW OF SYSTEMS  CONSTITUTIONAL: His weight has been stable for the past several  months. He has had no fevers. HEENT: No problems with his vision, , tinnitus, difficulty with pain in  his face, postnasal drip. RESPIRATORY: No cough, wheezing, sputum production. CARDIOVASCULAR: As mentioned above. GASTROINTESTINAL: No dysphagia, hematemesis, or melena. GENITOURINARY: The patient has a history of prostate cancer. ENDOCRINOLOGIC: He has a history of type 2 diabetes mellitus. HEMATOLOGIC: No coagulopathy or thromboembolic phenomenon. ORTHOPEDIC: No bone pain, pain or swelling involving any of his  joints or myopathies. SKIN: No history of easy bruisability or pruritus. NEUROLOGIC: No seizures, tremors, changes in mental status, etc.    PAST MEDICAL HISTORY: As I mentioned above. SOCIAL HISTORY: He is a nonsmoker. He does not use alcohol. MEDICATIONS: Mentioned on his admission documentation. ALLERGIES: HE HAS NO ALLERGIES, EITHER SEASONAL OR TO  DRUGS. FAMILY HISTORY: Positive for congestive failure, hypertension, and  diabetes. PHYSICAL EXAMINATION  GENERAL: When I saw him, he was a moderately obese, black male  who appeared his stated age.   VITAL SIGNS: Blood pressure was 117/62, pulse 62 and regular,  respiratory rate was 16. He was afebrile. HEENT: Unremarkable. Pharynx was clear. NECK: Supple. He had no carotid bruits. No enlargement of thyroid  gland, no enlargement of cervical nodes. Trachea midline. CHEST: Revealed good airway movement bilaterally. He had no  wheezes, rales, or rhonchi. CARDIAC: Revealed PMI to be in the 5th intercostal space on  midclavicular line. S1 and S2 were normal. He had no S3, S4, or  murmur. ABDOMEN: Soft. He had no palpable organs or masses. EXTREMITIES: Revealed trace to +1 pedal edema bilaterally. He had  good pulses all around. NEUROLOGIC: At the time I saw him was basically intact. IMPRESSION  1. Bilateral arm pain with shortness of breath in the context of:  2. Atherosclerotic cardiovascular disease characterized by coronary  artery disease with congestive failure. 3. Type 2 diabetes mellitus. 4. Hypertension. 5. Chronic renal disease stage 4.  6. Prostate cancer. PLAN: The patient is to be admitted to telemetry for observation. It might be reasonable to do a CT of his neck to make sure he does not  have some type of lesion involving his cervical spine. With regard to  his diabetes, bolus basal insulin therapy is to be obtained. His  hypertension can be controlled with his outpatient medications. With  regard to his chronic renal disease stage 4, that is noted; and with  regard to his prostate, that is likewise noted. Usual housekeeping procedures to include PPI for stress ulcer  prophylaxis and heparin compound for DVT prophylaxis.         MD CANDY Foley / Giselle Reyes  D:  10/27/2017   04:18  T:  10/27/2017   05:56  Job #:  [id number]

## 2017-10-27 NOTE — PROGRESS NOTES
conducted an initial consultation and Spiritual Assessment for Nohemy Lopez, who is a 68 y. o.,male. Patients Primary Language is: [place]. According to the patients EMR Pentecostal Affiliation is: Religion. The reason the Patient came to the hospital is:   Patient Active Problem List    Diagnosis Date Noted    Chest pain 10/27/2017    Secondary hyperparathyroidism [facility] renal origin ([facility] 10/25/2017    Fatigue 10/11/3267    Diastolic CHF, acute on chronic (HCC) 07/05/2016    CHF (congestive heart failure), NYHA class IV ([facility] 07/05/2016    APRYL (obstructive sleep apnea) 06/26/2015    Prostate cancer ([facility] 04/29/2015    Abdominal pain 03/05/2015    GERD (gastroesophageal reflux disease) 03/05/2015    Constipation 03/05/2015    Chronic renal insufficiency 03/05/2015    Atherosclerosis of renal artery (HCC)     Chronic diastolic heart failure (HCC)     Morbid obesity (HCC)     Essential hypertension, benign     Sleep disturbance     Coronary atherosclerosis     Mixed hyperlipidemia     Type II or unspecified type diabetes mellitus without mention of complication, not stated as uncontrolled     Chest pain, unspecified 03/12/2013    S/P coronary artery stent placement 03/12/2013    Postsurgical aortocoronary bypass status 03/12/2013    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    NSTEMI (non-ST elevated myocardial infarction) ([facility] 10/07/2012    Coronary atherosclerosis of native coronary artery 10/07/2012    CKD (chronic kidney disease) stage 4, GFR 15-29 ml/min ([facility] 10/07/2012    HTN (hypertension) 10/07/2012    DM2 (diabetes mellitus, type 2) ([facility] 10/07/2012    Dyslipidemia 10/07/2012        The  provided the following Interventions:  Initiated a relationship of care and support. Explored issues of negrita, belief, spirituality and Christian/ritual needs while hospitalized. Listened empathically. Provided information about Spiritual Care Services.   Chart reviewed. The following outcomes where achieved:   confirmed Patient's Roman Catholic Affiliation. Patient expressed gratitude for 's visit. Assessment:  Patient does not have any Latter-day/cultural needs that will affect patients preferences in health care. There are no spiritual or Latter-day issues which require intervention at this time. Plan:  Chaplains will continue to follow and will provide pastoral care on an as needed/requested basis.  recommends bedside caregivers page  on duty if patient shows signs of acute spiritual or emotional distress.     [address]  [phone])

## 2017-10-28 ENCOUNTER — APPOINTMENT (OUTPATIENT)
Dept: GENERAL RADIOLOGY | Age: 77
DRG: 281 | End: 2017-10-28
Attending: INTERNAL MEDICINE
Payer: MEDICARE

## 2017-10-28 VITALS
WEIGHT: 222.22 LBS | RESPIRATION RATE: 16 BRPM | HEART RATE: 84 BPM | SYSTOLIC BLOOD PRESSURE: 174 MMHG | DIASTOLIC BLOOD PRESSURE: 76 MMHG | BODY MASS INDEX: 33.79 KG/M2 | OXYGEN SATURATION: 97 % | TEMPERATURE: 98.5 F

## 2017-10-28 PROBLEM — N18.5 CHRONIC KIDNEY DISEASE, STAGE V (HCC): Status: ACTIVE | Noted: 2017-10-28

## 2017-10-28 PROBLEM — E79.0 HYPERURICEMIA: Status: ACTIVE | Noted: 2017-10-28

## 2017-10-28 LAB
ANION GAP SERPL CALC-SCNC: 10 MMOL/L (ref 3–18)
BASOPHILS # BLD: 0 K/UL (ref 0–0.1)
BASOPHILS NFR BLD: 0 % (ref 0–2)
BUN SERPL-MCNC: 75 MG/DL (ref 7–18)
BUN/CREAT SERPL: 17 (ref 12–20)
CALCIUM SERPL-MCNC: 9.1 MG/DL (ref 8.5–10.1)
CHLORIDE SERPL-SCNC: 101 MMOL/L (ref 100–108)
CO2 SERPL-SCNC: 29 MMOL/L (ref 21–32)
CREAT SERPL-MCNC: 4.51 MG/DL (ref 0.6–1.3)
DIFFERENTIAL METHOD BLD: ABNORMAL
EOSINOPHIL # BLD: 0.2 K/UL (ref 0–0.4)
EOSINOPHIL NFR BLD: 2 % (ref 0–5)
ERYTHROCYTE [DISTWIDTH] IN BLOOD BY AUTOMATED COUNT: 13.9 % (ref 11.6–14.5)
EST. AVERAGE GLUCOSE BLD GHB EST-MCNC: 192 MG/DL
GLUCOSE BLD STRIP.AUTO-MCNC: 171 MG/DL (ref 70–110)
GLUCOSE BLD STRIP.AUTO-MCNC: 274 MG/DL (ref 70–110)
GLUCOSE BLD STRIP.AUTO-MCNC: 312 MG/DL (ref 70–110)
GLUCOSE SERPL-MCNC: 241 MG/DL (ref 74–99)
HBA1C MFR BLD: 8.3 % (ref 4.2–5.6)
HCT VFR BLD AUTO: 34.3 % (ref 36–48)
HGB BLD-MCNC: 10.8 G/DL (ref 13–16)
LYMPHOCYTES # BLD: 1.8 K/UL (ref 0.9–3.6)
LYMPHOCYTES NFR BLD: 19 % (ref 21–52)
MCH RBC QN AUTO: 28.1 PG (ref 24–34)
MCHC RBC AUTO-ENTMCNC: 31.5 G/DL (ref 31–37)
MCV RBC AUTO: 89.3 FL (ref 74–97)
MONOCYTES # BLD: 0.7 K/UL (ref 0.05–1.2)
MONOCYTES NFR BLD: 7 % (ref 3–10)
NEUTS SEG # BLD: 6.8 K/UL (ref 1.8–8)
NEUTS SEG NFR BLD: 72 % (ref 40–73)
PHOSPHATE SERPL-MCNC: 3 MG/DL (ref 2.5–4.9)
PLATELET # BLD AUTO: 257 K/UL (ref 135–420)
PMV BLD AUTO: 10.9 FL (ref 9.2–11.8)
POTASSIUM SERPL-SCNC: 3.4 MMOL/L (ref 3.5–5.5)
RBC # BLD AUTO: 3.84 M/UL (ref 4.7–5.5)
SODIUM SERPL-SCNC: 140 MMOL/L (ref 136–145)
URATE SERPL-MCNC: 9 MG/DL (ref 2.6–7.2)
WBC # BLD AUTO: 9.5 K/UL (ref 4.6–13.2)

## 2017-10-28 PROCEDURE — 80048 BASIC METABOLIC PNL TOTAL CA: CPT | Performed by: INTERNAL MEDICINE

## 2017-10-28 PROCEDURE — 85025 COMPLETE CBC W/AUTO DIFF WBC: CPT | Performed by: INTERNAL MEDICINE

## 2017-10-28 PROCEDURE — 99218 HC RM OBSERVATION: CPT

## 2017-10-28 PROCEDURE — 74011636637 HC RX REV CODE- 636/637

## 2017-10-28 PROCEDURE — 83036 HEMOGLOBIN GLYCOSYLATED A1C: CPT | Performed by: INTERNAL MEDICINE

## 2017-10-28 PROCEDURE — 84550 ASSAY OF BLOOD/URIC ACID: CPT | Performed by: INTERNAL MEDICINE

## 2017-10-28 PROCEDURE — 74011250637 HC RX REV CODE- 250/637: Performed by: INTERNAL MEDICINE

## 2017-10-28 PROCEDURE — 36415 COLL VENOUS BLD VENIPUNCTURE: CPT | Performed by: INTERNAL MEDICINE

## 2017-10-28 PROCEDURE — 71020 XR CHEST PA LAT: CPT

## 2017-10-28 PROCEDURE — 84100 ASSAY OF PHOSPHORUS: CPT | Performed by: INTERNAL MEDICINE

## 2017-10-28 RX ORDER — INSULIN LISPRO 100 [IU]/ML
INJECTION, SOLUTION INTRAVENOUS; SUBCUTANEOUS
Status: DISCONTINUED | OUTPATIENT
Start: 2017-10-28 | End: 2017-10-28 | Stop reason: HOSPADM

## 2017-10-28 RX ORDER — DEXTROSE 50 % IN WATER (D50W) INTRAVENOUS SYRINGE
25-50 AS NEEDED
Status: DISCONTINUED | OUTPATIENT
Start: 2017-10-28 | End: 2017-10-28 | Stop reason: HOSPADM

## 2017-10-28 RX ORDER — MAGNESIUM SULFATE 100 %
4 CRYSTALS MISCELLANEOUS AS NEEDED
Status: DISCONTINUED | OUTPATIENT
Start: 2017-10-28 | End: 2017-10-28 | Stop reason: HOSPADM

## 2017-10-28 RX ADMIN — CLONIDINE HYDROCHLORIDE 0.2 MG: 0.1 TABLET ORAL at 10:52

## 2017-10-28 RX ADMIN — METOPROLOL TARTRATE 50 MG: 50 TABLET ORAL at 10:52

## 2017-10-28 RX ADMIN — INSULIN HUMAN 5 UNITS: 100 INJECTION, SOLUTION PARENTERAL at 00:37

## 2017-10-28 RX ADMIN — HYDRALAZINE HYDROCHLORIDE 100 MG: 50 TABLET, FILM COATED ORAL at 10:52

## 2017-10-28 RX ADMIN — CLOPIDOGREL BISULFATE 75 MG: 75 TABLET ORAL at 10:52

## 2017-10-28 RX ADMIN — ISOSORBIDE MONONITRATE 30 MG: 30 TABLET, EXTENDED RELEASE ORAL at 10:53

## 2017-10-28 RX ADMIN — NIFEDIPINE 90 MG: 60 TABLET, FILM COATED, EXTENDED RELEASE ORAL at 10:52

## 2017-10-28 NOTE — PROGRESS NOTES
Admit Date: 10/26/2017  Date of Service: 10/28/2017    Reason for follow-up: SOB      Assessment:         B/l arm pain and numbness: improved, x-ray with DJD, no obvious stenosis  Acute on chronic renal failure: Cr slightly better today. Gentle hydration. Followed by Dr. Edmundo Gomez NSTEMI 10/24/17 with hx of CAD (prior stenting in ): troponins trending down as expected; still has DARBY at home. Does not want to proceed with cardiac cath. Cardiology following closely,. On ASA and Plavix  Essential HTN: currently stable  DM type 2: on SSI      Plan: Will d/c today. Discussed with Nephrology and Cardiology  Continue current home medication regimen  May benefit from cardiac rehab  Has f/u with Cardiology on     Current Antibtiocs:   None    Lines:   peripheral    I have independently examined the patient and reviewed all lab studies and imgaing as well as review of nursing notes and physican notes from the past 24 hours. The plan of care has been discussed with the patient and all questions are answered. Jane Boyd D.O. Allergies   Allergen Reactions    Nka [No Known Allergies] Other (comments)           Subjective:      Pt seen and examined. Feeling much better today. Has concerns regarding decreased exercise tolerance and SOB. Has some mild neck pain. No more heaviness or numbness in his arms or shoulders.       Objective:        Visit Vitals    /74 (BP 1 Location: Left arm, BP Patient Position: At rest)    Pulse 83    Temp 97.9 °F (36.6 °C)    Resp 17    Wt 100.8 kg (222 lb 3.6 oz)    SpO2 97%    BMI 33.79 kg/m2     Temp (24hrs), Av.4 °F (36.9 °C), Min:97.8 °F (36.6 °C), Max:99.2 °F (37.3 °C)        General:   awake alert and oriented, non-toxic   Skin:   no rashes or skin lesions noted on limited exam, dry and warm   HEENT:  No scleral icterus or pallor; oral mucosa moist, lips moist   Lymph Nodes:   not assessed today   Lungs:   non, labored; bilaterally clear to aspiration- no crackles wheezes rales or rhonchi   Heart:  RRR, s1 and s2; no murmurs rubs or gallops; no edema, + pedal pulses   Abdomen:  soft, non-distended, active bowel sounds, non-tender   Genitourinary:  deferred   Extremities:   average muscle tone; no contractures, no joint effusions   Neurologic:  No gross focal motor or sensory abnormalities; CN 2-12 intact; Follows commands. Psychiatric:   appropriate and interactive. Labs: Results:   Chemistry Recent Labs      10/28/17   0420  10/27/17   0015   GLU  241*  145*   NA  140  134*   K  3.4*  3.9   CL  101  98*   CO2  29  28   BUN  75*  79*   CREA  4.51*  5.20*   CA  9.1  9.4   AGAP  10  8   BUCR  17  15   AP   --   93   TP   --   8.1   ALB   --   3.3*   GLOB   --   4.8*   AGRAT   --   0.7*      CBC w/Diff Recent Labs      10/28/17   0420  10/27/17   0015   WBC  9.5  10.3   RBC  3.84*  3.84*   HGB  10.8*  10.7*   HCT  34.3*  34.4*   PLT  257  262   GRANS  72  71   LYMPH  19*  19*   EOS  2  3        Lab Results   Component Value Date/Time    Specimen Description: [facility] 10/10/2012 06:15 AM    Lab Results   Component Value Date/Time    Culture result: NO GROWTH 2 DAYS 10/08/2015 07:46 PM    Culture result: NO GROWTH 1 DAY 10/10/2012 06:15 AM          Imaging:   10/27 cervical spine x-ray: Advanced degenerative disease with no acute injuries  10/28 CXR: No CHF or pneumonia. No acute findings    Echo 10/25/17  Left ventricle: Systolic function was normal by visual assessment. Ejection   fraction was estimated in the range of 60 %  to 65 %. No obvious wall motion abnormalities identified in the views   obtained. Wall thickness was moderately to  markedly increased. Aortic valve: There was very mild stenosis. Valve peak gradient was 21 mmHg. Valve mean gradient was 12 mmHg. Estimated  aortic valve area (by VTI) was 1.6 cm-sq. COMPARISONS:  There has been no significant change. Comparison was made with the previous   study of 06-Jul-2016.

## 2017-10-28 NOTE — DISCHARGE SUMMARY
Discharge Summary    Patient: Brii Paul MRN: [id number]  CSN: [id number]    YOB: 1940  Age: 68 y.o.   Sex: male    DOA: 10/26/2017 LOS:  LOS: 0 days   Discharge Date:      Admission Diagnoses: Chest pain  Chest pain    Discharge Diagnoses:    Problem List as of 10/28/2017  Date Reviewed: 10/25/2017          Codes Class Noted - Resolved    Chest pain ICD-10-CM: R07.9  ICD-9-CM: 786.50  10/27/2017 - Present        Secondary hyperparathyroidism of renal origin ([facility] (Chronic) ICD-10-CM: N25.81  ICD-9-CM: 588.81  10/25/2017 - Present        Fatigue ICD-10-CM: R53.83  ICD-9-CM: 780.79  8/23/2016 - Present    Overview Signed 8/23/2016  9:10 AM by Priyanka Bergeron MD     k lytes  likely due to APRYL             Diastolic CHF, acute on chronic [facility]) ICD-10-CM: I50.33  ICD-9-CM: 428.33, 428.0  7/5/2016 - Present        CHF (congestive heart failure), NYHA class IV ([facility] ICD-10-CM: I50.9  ICD-9-CM: 428.0  7/5/2016 - Present        APRYL (obstructive sleep apnea) ICD-10-CM: G47.33  ICD-9-CM: 327.23  6/26/2015 - Present    Overview Signed 6/26/2015  9:25 AM by Bonita Warren MD     non compliance to cpap             Prostate cancer ([facility] ICD-10-CM: C61  ICD-9-CM: 185  4/29/2015 - Present    Overview Addendum 5/6/2015  4:32 PM by Kavon Rodriguez MD     4/2015  T2c  PSA 57, Navid;8 in 2 cores, and G7 in 2,  CT and Bone Scan no obvious mets             Abdominal pain ICD-10-CM: R10.9  ICD-9-CM: 789.00  3/5/2015 - Present        GERD (gastroesophageal reflux disease) ICD-10-CM: K21.9  ICD-9-CM: 530.81  3/5/2015 - Present        Constipation ICD-10-CM: K59.00  ICD-9-CM: 564.00  3/5/2015 - Present        Chronic renal insufficiency ICD-10-CM: N18.9  ICD-9-CM: 585.9  3/5/2015 - Present        Atherosclerosis of renal artery (HCC) ICD-10-CM: I70.1  ICD-9-CM: 440.1  Unknown - Present    Overview Signed 4/29/2013  3:09 PM by Towana Conception E Alisson     S/P Rt stent             Chronic diastolic heart failure ([facility] ICD-10-CM: I50.32  ICD-9-CM: 428.32  Unknown - Present        Morbid obesity ([facility] ICD-10-CM: E66.01  ICD-9-CM: 278.01  Unknown - Present    Overview Signed 4/29/2013  3:10 PM by Sandy Haynes     Weight loss has been strongly encouraged by following dietary restrictions and an exercise routine             Essential hypertension, benign ICD-10-CM: I10  ICD-9-CM: 401.1  Unknown - Present        Sleep disturbance ICD-10-CM: G47.9  ICD-9-CM: 780.50  Unknown - Present    Overview Signed 4/29/2013  3:11 PM by Thalia Vinson     APRYL, not using CPAP regularly             Coronary atherosclerosis ICD-10-CM: I25.10  ICD-9-CM: 414.00  Unknown - Present    Overview Signed 4/29/2013  3:11 PM by Jennifer Vinson     Stable angina after recent PCI continue treatment.                  Mixed hyperlipidemia ICD-10-CM: E78.2  ICD-9-CM: 272.2  Unknown - Present    Overview Signed 4/29/2013  3:11 PM by Sandy Haynes     HDL's are not at goal, LDL's are at goal, triglycerides are not at goal.             Type II or unspecified type diabetes mellitus without mention of complication, not stated as uncontrolled ICD-10-CM: E11.9  ICD-9-CM: 250.00  Unknown - Present        Chest pain, unspecified ICD-10-CM: R07.9  ICD-9-CM: 786.50  3/12/2013 - Present        S/P coronary artery stent placement ICD-10-CM: Z95.5  ICD-9-CM: V45.82  3/12/2013 - Present    Overview Signed 3/12/2013  3:37 PM by Ivan Kasper MD     vg to diag stent 10/2012             Postsurgical aortocoronary bypass status ICD-10-CM: Z95.1  ICD-9-CM: V45.81  3/12/2013 - Present        Contrast dye induced nephropathy ICD-10-CM: T50.8X1A, N14.1  ICD-9-CM: 584.5, E980.4  10/14/2012 - Present        NSTEMI (non-ST elevated myocardial infarction) ([facility] ICD-10-CM: I21.4  ICD-9-CM: 410.70  10/7/2012 - Present        Coronary atherosclerosis of native coronary artery ICD-10-CM: I25.10  ICD-9-CM: 414.01  10/7/2012 - Present        CKD (chronic kidney disease) stage 4, GFR 15-29 ml/min [facility]) ICD-10-CM: N18.4  ICD-9-CM: 585.4  10/7/2012 - Present        HTN (hypertension) ICD-10-CM: I10  ICD-9-CM: 401.9  10/7/2012 - Present        DM2 (diabetes mellitus, type 2) ([facility] ICD-10-CM: E11.9  ICD-9-CM: 250.00  10/7/2012 - Present        Dyslipidemia ICD-10-CM: E78.5  ICD-9-CM: 272.4  10/7/2012 - Present              Discharge Condition: Stable    PHYSICAL EXAM  Visit Vitals    /74 (BP 1 Location: Left arm, BP Patient Position: At rest)    Pulse 83    Temp 97.9 °F (36.6 °C)    Resp 17    Wt 100.8 kg (222 lb 3.6 oz)    SpO2 97%    BMI 33.79 kg/m2       General: Alert, cooperative, no acute distress    HEENT: NC, Atraumatic. PERRLA, EOMI. Anicteric sclerae. Lungs:  CTA Bilaterally. No Wheezing/Rhonchi/Rales. Heart:  Regular  rhythm,  No murmur, No Rubs, No Gallops  Abdomen: Soft, Non distended, Non tender.  +Bowel sounds, no HSM  Extremities: No c/c/e  Psych:   Good insight. Not anxious or agitated. Neurologic:  CN 2-12 grossly intact, oriented X 3. No acute neurological                                 Deficits,     Hospital Course:   Mr. Fany Elizabeth is a 80-year-old, black male who was recently discharged from the hospital for acute myocardial infarction. At that time, cardiac catheterization was suggested, but the patient declined because of renal issues. He was discharged on medications as an outpatient, but apparently developed recurrent hand numbness bilaterally, as well as increased shortness of breath. He denied any chest pain. The following problems have been addressed this admission:     B/l arm pain and numbness: improved, x-ray with DJD, no obvious stenosis  Acute on chronic renal failure: Cr slightly better. Gentle hydration. Followed by Dr. Constance Coto NSTEMI 10/24/17 with hx of CAD (prior stenting in 2012): troponins trending down as expected; still has DARBY at home. Does not want to proceed with cardiac cath. Cardiology following closely,.  On ASA and Plavix  Essential HTN: currently stable  DM type 2: on SSI    Consults:   Nephrology: Dr. Yobani Norman  Cardiology: Dr. Lorie Gilford    Significant Diagnostic Studies:     10/27 cervical spine x-ray: Advanced degenerative disease with no acute injuries  10/28 CXR: No CHF or pneumonia. No acute findings     Echo 10/25/17  Left ventricle: Systolic function was normal by visual assessment. Ejection   fraction was estimated in the range of 60 %  to 65 %. No obvious wall motion abnormalities identified in the views   obtained. Wall thickness was moderately to  markedly increased. Aortic valve: There was very mild stenosis. Valve peak gradient was 21 mmHg. Valve mean gradient was 12 mmHg. Estimated  aortic valve area (by VTI) was 1.6 cm-sq. COMPARISONS:  There has been no significant change. Comparison was made with the previous   study of 06-Jul-2016. Discharge Medications:     Current Discharge Medication List      CONTINUE these medications which have NOT CHANGED    Details   isosorbide mononitrate ER (IMDUR) 30 mg tablet Take 1 Tab by mouth daily. Qty: 90 Tab, Refills: 3      aspirin 81 mg chewable tablet Take 1 Tab by mouth daily. Qty: 30 Tab, Refills: 0      b complex-vitamin c-folic acid (NEPHROCAPS) 1 mg capsule Take 1 Cap by mouth daily. Qty: 30 Cap, Refills: 0      calcitRIOL (ROCALTROL) 0.25 mcg capsule Take 1 Cap by mouth every Monday, Wednesday, Friday. Qty: 15 Cap, Refills: 0      cloNIDine HCl (CATAPRES) 0.2 mg tablet Take 1 Tab by mouth three (3) times daily. Qty: 90 Tab, Refills: 0      senna-docusate (PERICOLACE) 8.6-50 mg per tablet Take 1 Tab by mouth daily. Qty: 30 Tab, Refills: 0      fenofibrate (LOFIBRA) 54 mg tablet Take 1 Tab by mouth daily. Qty: 30 Tab, Refills: 5    Associated Diagnoses: Dyslipidemia      simvastatin (ZOCOR) 40 mg tablet TAKE 1 TABLET BY MOUTH NIGHTLY. Qty: 90 Tab, Refills: 2      metoprolol tartrate (LOPRESSOR) 100 mg IR tablet TAKE 1 TABLET BY MOUTH TWO TIMES A DAY.   Qty: 180 Tab, Refills: 2 clopidogrel (PLAVIX) 75 mg tab Take 1 Tab by mouth daily. Qty: 90 Tab, Refills: 3      tamsulosin (FLOMAX) 0.4 mg capsule TAKE ONE CAPSULE BY MOUTH ONCE A DAY  Qty: 90 Cap, Refills: 2      Cholecalciferol, Vitamin D3, (DECARA) 50,000 unit cap Take  by mouth every seven (7) days. furosemide (LASIX) 40 mg tablet Take 1 Tab by mouth daily as needed. Qty: 90 Tab, Refills: 3      hydrALAZINE (APRESOLINE) 100 mg tablet Take 1 Tab by mouth three (3) times daily. Qty: 90 Tab, Refills: 2      NIFEdipine ER (PROCARDIA XL) 90 mg ER tablet Take 1 Tab by mouth daily. Qty: 30 Tab, Refills: 2      insulin glargine (LANTUS) 100 unit/mL injection 20 Units by SubCUTAneous route every twelve (12) hours. Qty: 1 Vial, Refills: 2      nitroglycerin (NITROSTAT) 0.4 mg SL tablet 1 Tab by SubLINGual route as needed for Chest Pain. Qty: 25 Tab, Refills: 1      insulin aspart (NOVOLOG) 100 unit/mL injection 20 units sq 3 times a day,sliding scale  Qty: 10 mL, Refills: 0      allopurinol (ZYLOPRIM) 100 mg tablet Take 100 mg by mouth daily.                Activity: activity as tolerated    Diet: Diabetic Diet    Wound Care: None needed    Follow-up: with PCP, Felicia Arora MD in 7-10 days  Cardiology: has appointment on 11/13  Dr. Puja Atkins- onesimo has appointment arranged from prior admission    Minutes spent on discharge: >30 minutes spent coordinating this discharge (review instructions/follow-up, prescriptions, preparing report for sign off)

## 2017-10-28 NOTE — PROGRESS NOTES
ASSUMED CARE OF PATIENT AT 0730. PT IS ALERT AND ORIENTED X4. NO COMPLAINTS OF PAIN. NO SIGNS OF DISTRESS. PT IS REFUSING TO TAKE ALL AM MEDS AND DEMANDING TO SEE A DOCTOR. PT INFORMED THAT THE DOCTORS ARE CURRENTLY MAKING ROUNDS. PT STILL REFUSES TO TAKE MEDICATIONS. AROUND 1145 RN ENTERED PATIENT ROOM TO ADMINISTER AFTERNOON MEDS AND INSULIN. RN NOTED PATIENT DRAWING UP INSULIN FROM HOME. PTS WIFE AND SON WERE AT THE BEDSIDE. PT WAS INFORMED THAT HE IS NOT ALLOWED TO TAKE HIS OWN MEDS FROM HOME PER HOSPITAL POLICY. PT STATED, \"TELL ALL THOSE DOCTORS TO GO TO [facility] AND COME UP HERE WITH THEIR . I DON'T NEED YOUR INSULIN. \" PT CONTINUED TO IGNORE RN AND TAKE MEDICATIONS. PT WAS EDUCATED ON RISKS. DR. Solano Neither WAS MADE AWARE. DISCHARGE PAPERWORK REVIEWED WITH PATIENT AND FAMILY. PT EXPRESSED UNDERSTANDING. IV ACCESS WAS REMOVED. PT WAS ESCORTED TO FRONT ENTRANCE VIA WHEELCHAIR BY NURSING STAFF. BELONGINGS SENT WITH PATIENT.

## 2017-10-28 NOTE — PROGRESS NOTES
[facility]      CARDIOLOGY PROGRESS NOTE  RECS:  1. Bilateral arm pain and numbness- improved -w/u and medications per medical team   2. Hx recent NSTEMI- on 10/24/17 with troponin peak 8.41. Now rending down  has moderate DARBY and chest pain on exertion at home. No diaphoresis or nausea associated with his CP. Continue with medical management for CAD for now. 3. Recent abnormal NUC stress test- with Small to medium reversible defect involving the anterior wall and Positive EKG portion of the test. discussed with patient at length regarding invasive w/u. He clearly understands the need of cardiac cath to evaluate CAD- but he doesn't want to proceed due to risk of HD. 4. CAD s/p coronary artery stent placement in 2012 and 7/17 with HERBER stents deployed in SVG in the focal mid lesion and a long proximal lesion. continue with ASA and Plavix   5. Chronic diastolic heart failure- appears compensated- monitor for fluid overload. Continue with diuretic on hold. His volume status is stable               6. Essential hypertension-labile. Continue with present medications.                           7. Hyperlipidemia- on statin                                                            8. NICOLAS on CKD stage IV- agree with gently  hydration as tolerated- monitor for fluid overload. Per Renal no need for HD at this point.- plan for gentle hydration. His creatinine is improving daily. 9. Diabetes managed per medical team     ok to d/c from cardiac point  Cardiac Cath 7/19/17  IMPRESSION: Successful deployment of drug-eluting stents as detailed above  in the focal mid lesion and a long proximal lesion of the very old  saphenous venous graft to first diagonal artery without any complications. There was no evidence of distal occlusion and distal embolization and the  BRENNA flow was 3 at the end.  IV Angiomax was used during the procedure, 300  mg of Plavix was given at the end of the procedure.      DISCUSSION AND RECOMMENDATIONS: This is a very old graft. Fortunately, we  were able to open it well today, deploying 2 drug-eluting stents. Aggressive risk factor modification and medical treatment should continue. Echo 10/25/17  Left ventricle: Systolic function was normal by visual assessment. Ejection   fraction was estimated in the range of 60 %  to 65 %. No obvious wall motion abnormalities identified in the views   obtained. Wall thickness was moderately to  markedly increased. Aortic valve: There was very mild stenosis. Valve peak gradient was 21 mmHg. Valve mean gradient was 12 mmHg. Estimated  aortic valve area (by VTI) was 1.6 cm-sq. COMPARISONS:  There has been no significant change. Comparison was made with the previous   study of 06-Jul-2016.     ASSESSMENT:  Hospital Problems  Date Reviewed: 10/25/2017          Codes Class Noted POA    Chest pain ICD-10-CM: R07.9  ICD-9-CM: 786.50  10/27/2017 Unknown        Secondary hyperparathyroidism of renal origin [facility]) (Chronic) ICD-10-CM: N25.81  ICD-9-CM: 588.81  10/25/2017 Yes        Fatigue ICD-10-CM: R53.83  ICD-9-CM: 780.79  8/23/2016 Yes    Overview Signed 8/23/2016  9:10 AM by Leonardo Mcknight MD chk lyjaimie  likely due to APRYL             Diastolic CHF, acute on chronic ([facility] ICD-10-CM: I50.33  ICD-9-CM: 428.33, 428.0  7/5/2016 Yes        APRYL (obstructive sleep apnea) ICD-10-CM: G47.33  ICD-9-CM: 327.23  6/26/2015 Yes    Overview Signed 6/26/2015  9:25 AM by Micah Echols MD     non compliance to cpap             GERD (gastroesophageal reflux disease) ICD-10-CM: K21.9  ICD-9-CM: 530.81  3/5/2015 Yes        Chronic renal insufficiency ICD-10-CM: N18.9  ICD-9-CM: 585.9  3/5/2015 Yes        Morbid obesity ([facility] ICD-10-CM: E66.01  ICD-9-CM: 278.01  Unknown Yes    Overview Signed 4/29/2013  3:10 PM by Ashley Vinson     Weight loss has been strongly encouraged by following dietary restrictions and an exercise routine Essential hypertension, benign ICD-10-CM: I10  ICD-9-CM: 401.1  Unknown Yes        Mixed hyperlipidemia ICD-10-CM: E78.2  ICD-9-CM: 272.2  Unknown Yes    Overview Signed 4/29/2013  3:11 PM by Malika Loya     HDL's are not at goal, LDL's are at goal, triglycerides are not at goal.             S/P coronary artery stent placement ICD-10-CM: Z95.5  ICD-9-CM: V45.82  3/12/2013 Yes    Overview Signed 3/12/2013  3:37 PM by Hamzah Loyola MD     vg to diag stent 10/2012             Postsurgical aortocoronary bypass status ICD-10-CM: Z95.1  ICD-9-CM: V45.81  3/12/2013 Yes        NSTEMI (non-ST elevated myocardial infarction) [facility]) ICD-10-CM: I21.4  ICD-9-CM: 410.70  10/7/2012 Yes        HTN (hypertension) ICD-10-CM: I10  ICD-9-CM: 401.9  10/7/2012 Yes        DM2 (diabetes mellitus, type 2) ([facility] ICD-10-CM: E11.9  ICD-9-CM: 250.00  10/7/2012 Yes        Dyslipidemia ICD-10-CM: R16.0  ICD-9-CM: 272.4  10/7/2012 Yes                SUBJECTIVE:  No CP  No SOB  Patient refused his morning meds claiming that there are too many to take at one time. He is asking to break them up. OBJECTIVE:    VS:   Visit Vitals    /74 (BP 1 Location: Left arm, BP Patient Position: At rest)    Pulse 83    Temp 97.9 °F (36.6 °C)    Resp 17    Wt 100.8 kg (222 lb 3.6 oz)    SpO2 97%    BMI 33.79 kg/m2         Intake/Output Summary (Last 24 hours) at 10/28/17 0901  Last data filed at 10/27/17 1752   Gross per 24 hour   Intake              480 ml   Output                0 ml   Net              480 ml     TELE: normal sinus rhythm    General: healthy, alert, well developed, well nourished, cooperative, in no apparent distress and stated age,  HENT: Normocephalic, atraumatic. Normal external eye.   Neck :  negative  Cardiac:  regular rate and rhythm, S1, S2 normal, no murmur, click, rub or gallop  Chest/Lungs:chest clear, no wheezing, rales, normal symmetric air entry  Extremities:  No c/c/edema, peripheral pulses present      Labs: Results:       Chemistry Recent Labs      10/28/17   0420  10/27/17   0015   GLU  241*  145*   NA  140  134*   K  3.4*  3.9   CL  101  98*   CO2  29  28   BUN  75*  79*   CREA  4.51*  5.20*   CA  9.1  9.4   PHOS  3.0   --    AGAP  10  8   BUCR  17  15   AP   --   93   TP   --   8.1   ALB   --   3.3*   GLOB   --   4.8*   AGRAT   --   0.7*      CBC w/Diff Recent Labs      10/28/17   0420  10/27/17   0015   WBC  9.5  10.3   RBC  3.84*  3.84*   HGB  10.8*  10.7*   HCT  34.3*  34.4*   PLT  257  262   GRANS  72  71   LYMPH  19*  19*   EOS  2  3      Cardiac Enzymes Recent Labs      10/27/17   0015   CPK  119   CKND1  2.4      Coagulation Recent Labs      10/25/17   1040   APTT  47.0*       Lipid Panel Lab Results   Component Value Date/Time    Cholesterol, total 158 10/24/2017 05:08 AM    HDL Cholesterol 39 10/24/2017 05:08 AM    LDL, calculated 72.4 10/24/2017 05:08 AM    VLDL, calculated 46.6 10/24/2017 05:08 AM    Triglyceride 233 10/24/2017 05:08 AM    CHOL/HDL Ratio 4.1 10/24/2017 05:08 AM      BNP No results for input(s): BNPP in the last 72 hours. Liver Enzymes Recent Labs      10/27/17   0015   TP  8.1   ALB  3.3*   AP  93   SGOT  18      Digoxin    Thyroid Studies Lab Results   Component Value Date/Time    T4, Total 10.9 10/09/2012 04:53 AM    TSH 2.16 10/23/2017 11:08 PM              LESLY Dunbar       I have independently evaluated taken history and examined the patient. All relevant labs and testing data's are reviewed. Care plan discussed and updated after review.   Gloria Stout MD

## 2017-10-28 NOTE — PROGRESS NOTES
Progress Note    Darryl Urias  68 y.o. Admit Date: 10/26/2017  Active Problems:    NSTEMI (non-ST elevated myocardial infarction) ([facility] (10/7/2012) POA: Yes      HTN (hypertension) (10/7/2012) POA: Yes      DM2 (diabetes mellitus, type 2) ([facility] (10/7/2012) POA: Yes      Dyslipidemia (10/7/2012) POA: Yes      S/P coronary artery stent placement (3/12/2013) POA: Yes      Overview: vg to diag stent 10/2012      Postsurgical aortocoronary bypass status (3/12/2013) POA: Yes      Morbid obesity ([facility] () POA: Yes      Overview: Weight loss has been strongly encouraged by following dietary restrictions       and an exercise routine      Essential hypertension, benign () POA: Yes      Mixed hyperlipidemia () POA: Yes      Overview: HDL's are not at goal, LDL's are at goal, triglycerides are not at goal.      GERD (gastroesophageal reflux disease) (3/5/2015) POA: Yes      Chronic renal insufficiency (3/5/2015) POA: Yes      APRYL (obstructive sleep apnea) (6/26/2015) POA: Yes      Overview: non compliance to cpap      Diastolic CHF, acute on chronic ([facility] (7/5/2016) POA: Yes      Fatigue (8/23/2016) POA: Yes      Overview: chk lytes      likely due to APRYL      Secondary hyperparathyroidism of renal origin ([facility] (10/25/2017) POA: Yes      Chest pain (10/27/2017) POA: Unknown      Hyperuricemia (10/28/2017) POA: Unknown      Chronic kidney disease, stage V ([facility] (10/28/2017) POA: Unknown            Subjective:     Patient feels good, no SOB, no numbness  On hands, no Chest pain, wants to go home. Received hydration. Now off IV  Fluid. Off Diuretics. A comprehensive review of systems was negative except for that written in the History of Present Illness.     Objective:     Visit Vitals    /74 (BP 1 Location: Left arm, BP Patient Position: At rest)    Pulse 83    Temp 97.9 °F (36.6 °C)    Resp 17    Wt 100.8 kg (222 lb 3.6 oz)    SpO2 97%    BMI 33.79 kg/m2         Intake/Output Summary (Last 24 hours) at 10/28/17 1121  Last data filed at 10/27/17 1752   Gross per 24 hour   Intake              480 ml   Output                0 ml   Net              480 ml       Current Facility-Administered Medications   Medication Dose Route Frequency Provider Last Rate Last Dose    insulin lispro (HUMALOG) injection   SubCUTAneous AC&HS Terrell Kam MD   Stopped at 10/28/17 0730    glucose chewable tablet 16 g  4 Tab Oral PRN Terrell Kam MD        glucagon (GLUCAGEN) injection 1 mg  1 mg IntraMUSCular PRN Terrell Kam MD        dextrose (D50W) injection syrg 12.5-25 g  25-50 mL IntraVENous PRN Terrell Kam MD        isosorbide mononitrate ER (IMDUR) tablet 30 mg  30 mg Oral DAILY Terrell Kam MD   30 mg at 10/28/17 1053    aspirin chewable tablet 81 mg  81 mg Oral DAILY Terrell Kam MD   81 mg at 10/27/17 0858    B complex-vitaminC-folic acid (NEPHROCAP) cap  1 Cap Oral DAILY Terrell Kam MD   1 Cap at 10/27/17 0857    calcitRIOL (ROCALTROL) capsule 0.25 mcg  0.25 mcg Oral Q MON, WED & FRI Terrell Kam MD   0.25 mcg at 10/27/17 1715    cloNIDine HCl (CATAPRES) tablet 0.2 mg  0.2 mg Oral TID Terrell Kam MD   0.2 mg at 10/28/17 1052    senna-docusate (PERICOLACE) 8.6-50 mg per tablet 1 Tab  1 Tab Oral DAILY Terrell Kam MD   1 Tab at 10/27/17 0858    fenofibrate nanocrystallized (TRICOR) tablet 145 mg  145 mg Oral DAILY Terrell Kam MD   145 mg at 10/27/17 0946    metoprolol tartrate (LOPRESSOR) tablet 50 mg  50 mg Oral BID Terrell Kam MD   50 mg at 10/28/17 1052    simvastatin (ZOCOR) tablet 40 mg  40 mg Oral DAILY Terrell Kam MD   40 mg at 10/27/17 0858    clopidogrel (PLAVIX) tablet 75 mg  75 mg Oral DAILY Terrell Kam MD   75 mg at 10/28/17 1052    tamsulosin (FLOMAX) capsule 0.4 mg  0.4 mg Oral DAILY Terrell Kam MD   0.4 mg at 10/27/17 0854    hydrALAZINE (APRESOLINE) tablet 100 mg  100 mg Oral TID Terrell Kam MD   100 mg at 10/28/17 1050    insulin glargine (LANTUS) injection 20 Units  20 Units SubCUTAneous Q12H Terrell Kam MD   20 Units at 10/27/17 2222    NIFEdipine ER (PROCARDIA XL) tablet 90 mg  90 mg Oral DAILY Terrell Kam MD   90 mg at 10/28/17 1052    allopurinol (ZYLOPRIM) tablet 100 mg  100 mg Oral DAILY Terrell Kam MD   100 mg at 10/27/17 0858    0.9% sodium chloride infusion  50 mL/hr IntraVENous CONTINUOUS Adam Ivey MD 25 mL/hr at 10/27/17 0531 25 mL/hr at 10/27/17 0531    ondansetron (ZOFRAN) injection 4 mg  4 mg IntraVENous Q4H PRN Terrell Kam MD        heparin (porcine) injection 5,000 Units  5,000 Units SubCUTAneous Q8H Terrell Kam MD   5,000 Units at 10/27/17 2229    hydrALAZINE (APRESOLINE) 20 mg/mL injection 10 mg  10 mg IntraVENous Q4H PRN Terrell Kam MD        gabapentin (NEURONTIN) capsule 100 mg  100 mg Oral DAILY Adam Ivey MD            Physical Exam:     Physical Exam:   General:  Alert, cooperative, no distress, appears stated age. Lungs:   Clear to auscultation bilaterally. Heart:  Regular rate and rhythm, S1, S2 normal, no murmur, click, rub or gallop. Abdomen:   Soft, non-tender. Bowel sounds normal. No masses,  No organomegaly. Extremities: Extremities normal, atraumatic, no cyanosis or edema.    Skin: Skin color, texture, turgor normal. No rashes or lesions         Data Review:    CBC w/Diff    Recent Labs      10/28/17   0420  10/27/17   0015   WBC  9.5  10.3   RBC  3.84*  3.84*   HGB  10.8*  10.7*   HCT  34.3*  34.4*   MCV  89.3  89.6   MCH  28.1  27.9   MCHC  31.5  31.1   RDW  13.9  13.8    Recent Labs      10/28/17   0420  10/27/17   0015   MONOS  7  7   EOS  2  3   BASOS  0  0   RDW  13.9  13.8        Comprehensive Metabolic Profile    Recent Labs      10/28/17   0420  10/27/17   0015   NA  140  134*   K  3.4*  3.9   CL  101  98*   CO2  29  28   BUN  75*  79*   CREA  4.51*  5.20*    Recent Labs      10/28/17   0420  10/27/17   0015   CA  9.1  9.4   PHOS  3.0   -- ALB   --   3.3*   TP   --   8.1   SGOT   --   18   TBILI   --   0.4                        Impression:       Active Hospital Problems    Diagnosis Date Noted    Hyperuricemia 10/28/2017    Chronic kidney disease, stage V ([facility] 10/28/2017    Chest pain 10/27/2017    Secondary hyperparathyroidism of renal origin ([facility] 10/25/2017    Fatigue 08/23/2016     chk lytes  likely due to APRYL      Diastolic CHF, acute on chronic (HCC) 07/05/2016    APRYL (obstructive sleep apnea) 06/26/2015     non compliance to cpap      GERD (gastroesophageal reflux disease) 03/05/2015    Chronic renal insufficiency 03/05/2015    Morbid obesity ([facility]      Weight loss has been strongly encouraged by following dietary restrictions and an exercise routine      Essential hypertension, benign     Mixed hyperlipidemia      HDL's are not at goal, LDL's are at goal, triglycerides are not at goal.      S/P coronary artery stent placement 03/12/2013     vg to diag stent 10/2012      Postsurgical aortocoronary bypass status 03/12/2013    NSTEMI (non-ST elevated myocardial infarction) ([facility] 10/07/2012    HTN (hypertension) 10/07/2012    Dyslipidemia 10/07/2012    DM2 (diabetes mellitus, type 2) ([facility] 10/07/2012        Renal function improved to some extent. Plan:     OK to DC Home, continue to hold Diuretics, ARB. Continue Rocaltrol, Neurontin & allopurinol. Follow renal function, he knows if he needs cardiac  catheterization then he will have to be on Dialysis, he also knows that if he gets any acute symptoms of SOB, Chest pain then he should notify us. Continue to follow with me  & cardiology as planned.       Christine Fong MD

## 2017-10-28 NOTE — DISCHARGE INSTRUCTIONS
DISCHARGE SUMMARY from Nurse    PATIENT INSTRUCTIONS:    After general anesthesia or intravenous sedation, for 24 hours or while taking prescription Narcotics:  · Limit your activities  · Do not drive and operate hazardous machinery  · Do not make important personal or business decisions  · Do  not drink alcoholic beverages  · If you have not urinated within 8 hours after discharge, please contact your surgeon on call. Report the following to your surgeon:  · Excessive pain, swelling, redness or odor of or around the surgical area  · Temperature over 100.5  · Nausea and vomiting lasting longer than 4 hours or if unable to take medications  · Any signs of decreased circulation or nerve impairment to extremity: change in color, persistent  numbness, tingling, coldness or increase pain  · Any questions    What to do at Home:  Recommended activity: Activity as tolerated,     If you experience any of the following symptoms chest pain or pressure, increased tingling, dizziness, weakness, shortness of breath, please follow up with PCP or call 911 if an emergency. *  Please give a list of your current medications to your Primary Care Provider. *  Please update this list whenever your medications are discontinued, doses are      changed, or new medications (including over-the-counter products) are added. *  Please carry medication information at all times in case of emergency situations. These are general instructions for a healthy lifestyle:    No smoking/ No tobacco products/ Avoid exposure to second hand smoke  Surgeon General's Warning:  Quitting smoking now greatly reduces serious risk to your health.     Obesity, smoking, and sedentary lifestyle greatly increases your risk for illness    A healthy diet, regular physical exercise & weight monitoring are important for maintaining a healthy lifestyle    You may be retaining fluid if you have a history of heart failure or if you experience any of the following symptoms:  Weight gain of 3 pounds or more overnight or 5 pounds in a week, increased swelling in our hands or feet or shortness of breath while lying flat in bed. Please call your doctor as soon as you notice any of these symptoms; do not wait until your next office visit. Recognize signs and symptoms of STROKE:    F-face looks uneven    A-arms unable to move or move unevenly    S-speech slurred or non-existent    T-time-call 911 as soon as signs and symptoms begin-DO NOT go       Back to bed or wait to see if you get better-TIME IS BRAIN. Warning Signs of HEART ATTACK     Call 911 if you have these symptoms:   Chest discomfort. Most heart attacks involve discomfort in the center of the chest that lasts more than a few minutes, or that goes away and comes back. It can feel like uncomfortable pressure, squeezing, fullness, or pain.  Discomfort in other areas of the upper body. Symptoms can include pain or discomfort in one or both arms, the back, neck, jaw, or stomach.  Shortness of breath with or without chest discomfort.  Other signs may include breaking out in a cold sweat, nausea, or lightheadedness. Don't wait more than five minutes to call 911 - MINUTES MATTER! Fast action can save your life. Calling 911 is almost always the fastest way to get lifesaving treatment. Emergency Medical Services staff can begin treatment when they arrive -- up to an hour sooner than if someone gets to the hospital by car. The discharge information has been reviewed with the patient. The patient verbalized understanding. Discharge medications reviewed with the patient and appropriate educational materials and side effects teaching were provided. Matchup Activation    Thank you for requesting access to Matchup. Please follow the instructions below to securely access and download your online medical record.  Matchup allows you to send messages to your doctor, view your test results, renew your prescriptions, schedule appointments, and more. How Do I Sign Up? 1. In your internet browser, go to [email or web address]  2. Click on the First Time User? Click Here link in the Sign In box. You will be redirect to the New Member Sign Up page. 3. Enter your Carmine Access Code exactly as it appears below. You will not need to use this code after youve completed the sign-up process. If you do not sign up before the expiration date, you must request a new code. Carmine Access Code: [email or web address]  Expires: 2017  8:30 AM (This is the date your Carmine access code will )    4. Enter the last four digits of your Social Security Number (xxxx) and Date of Birth (mm/dd/yyyy) as indicated and click Submit. You will be taken to the next sign-up page. 5. Create a Carmine ID. This will be your Carmine login ID and cannot be changed, so think of one that is secure and easy to remember. 6. Create a Carmine password. You can change your password at any time. 7. Enter your Password Reset Question and Answer. This can be used at a later time if you forget your password. 8. Enter your e-mail address. You will receive e-mail notification when new information is available in [address] 9. Click Sign Up. You can now view and download portions of your medical record. 10. Click the Download Summary menu link to download a portable copy of your medical information. Additional Information    If you have questions, please visit the Frequently Asked Questions section of the Carmine website at [email or web address] Remember, Carmine is NOT to be used for urgent needs. For medical emergencies, dial 911.     Patient armband removed and shredded  ___________________________________________________________________________________________________________________________________

## 2017-10-30 ENCOUNTER — TELEPHONE (OUTPATIENT)
Dept: OTHER | Age: 77
End: 2017-10-30

## 2017-10-31 ENCOUNTER — TELEPHONE (OUTPATIENT)
Dept: CARDIAC REHAB | Age: 77
End: 2017-10-31

## 2017-10-31 NOTE — TELEPHONE ENCOUNTER
Cardiac Rehab called patient and spoke with his wife. They are both very interested. Will follow up to schedule.     Thank you,  Felipe Louis

## 2017-11-03 ENCOUNTER — PATIENT OUTREACH (OUTPATIENT)
Dept: CASE MANAGEMENT | Age: 77
End: 2017-11-03

## 2017-11-07 ENCOUNTER — OFFICE VISIT (OUTPATIENT)
Dept: CARDIOLOGY CLINIC | Age: 77
End: 2017-11-07

## 2017-11-07 ENCOUNTER — HOSPITAL ENCOUNTER (INPATIENT)
Age: 77
LOS: 7 days | Discharge: HOME | DRG: 246 | End: 2017-11-14
Attending: INTERNAL MEDICINE | Admitting: INTERNAL MEDICINE
Payer: MEDICARE

## 2017-11-07 VITALS
HEIGHT: 68 IN | SYSTOLIC BLOOD PRESSURE: 117 MMHG | DIASTOLIC BLOOD PRESSURE: 50 MMHG | WEIGHT: 226 LBS | BODY MASS INDEX: 34.25 KG/M2 | HEART RATE: 54 BPM

## 2017-11-07 DIAGNOSIS — Z95.5 S/P CORONARY ARTERY STENT PLACEMENT: ICD-10-CM

## 2017-11-07 DIAGNOSIS — Z95.1 POSTSURGICAL AORTOCORONARY BYPASS STATUS: ICD-10-CM

## 2017-11-07 DIAGNOSIS — I10 ESSENTIAL HYPERTENSION: ICD-10-CM

## 2017-11-07 DIAGNOSIS — I25.119 ATHEROSCLEROSIS OF NATIVE CORONARY ARTERY OF NATIVE HEART WITH ANGINA PECTORIS (HCC): ICD-10-CM

## 2017-11-07 DIAGNOSIS — I21.4 NSTEMI (NON-ST ELEVATED MYOCARDIAL INFARCTION) (HCC): Primary | ICD-10-CM

## 2017-11-07 DIAGNOSIS — I50.32 CHRONIC DIASTOLIC HEART FAILURE (HCC): ICD-10-CM

## 2017-11-07 LAB
ALBUMIN SERPL-MCNC: 3.3 G/DL (ref 3.4–5)
ALBUMIN/GLOB SERPL: 0.9 {RATIO} (ref 0.8–1.7)
ALP SERPL-CCNC: 85 U/L (ref 45–117)
ALT SERPL-CCNC: 19 U/L (ref 16–61)
ANION GAP SERPL CALC-SCNC: 6 MMOL/L (ref 3–18)
AST SERPL-CCNC: 16 U/L (ref 15–37)
BILIRUB SERPL-MCNC: 0.4 MG/DL (ref 0.2–1)
BUN SERPL-MCNC: 68 MG/DL (ref 7–18)
BUN/CREAT SERPL: 14 (ref 12–20)
CALCIUM SERPL-MCNC: 8.9 MG/DL (ref 8.5–10.1)
CHLORIDE SERPL-SCNC: 97 MMOL/L (ref 100–108)
CO2 SERPL-SCNC: 32 MMOL/L (ref 21–32)
CREAT SERPL-MCNC: 4.93 MG/DL (ref 0.6–1.3)
GLOBULIN SER CALC-MCNC: 3.8 G/DL (ref 2–4)
GLUCOSE BLD STRIP.AUTO-MCNC: 231 MG/DL (ref 70–110)
GLUCOSE BLD STRIP.AUTO-MCNC: 239 MG/DL (ref 70–110)
GLUCOSE SERPL-MCNC: 250 MG/DL (ref 74–99)
POTASSIUM SERPL-SCNC: 3.7 MMOL/L (ref 3.5–5.5)
PROT SERPL-MCNC: 7.1 G/DL (ref 6.4–8.2)
SODIUM SERPL-SCNC: 135 MMOL/L (ref 136–145)

## 2017-11-07 PROCEDURE — 80053 COMPREHEN METABOLIC PANEL: CPT | Performed by: INTERNAL MEDICINE

## 2017-11-07 PROCEDURE — 74011250637 HC RX REV CODE- 250/637: Performed by: INTERNAL MEDICINE

## 2017-11-07 PROCEDURE — 82962 GLUCOSE BLOOD TEST: CPT

## 2017-11-07 PROCEDURE — 36415 COLL VENOUS BLD VENIPUNCTURE: CPT | Performed by: INTERNAL MEDICINE

## 2017-11-07 PROCEDURE — 93005 ELECTROCARDIOGRAM TRACING: CPT

## 2017-11-07 PROCEDURE — 74011636637 HC RX REV CODE- 636/637: Performed by: INTERNAL MEDICINE

## 2017-11-07 PROCEDURE — 65660000000 HC RM CCU STEPDOWN

## 2017-11-07 RX ORDER — TAMSULOSIN HYDROCHLORIDE 0.4 MG/1
0.4 CAPSULE ORAL DAILY
Status: DISCONTINUED | OUTPATIENT
Start: 2017-11-07 | End: 2017-11-07

## 2017-11-07 RX ORDER — CALCITRIOL 0.25 UG/1
0.25 CAPSULE ORAL
Status: DISCONTINUED | OUTPATIENT
Start: 2017-11-08 | End: 2017-11-08

## 2017-11-07 RX ORDER — FENOFIBRATE 48 MG/1
48 TABLET, COATED ORAL DAILY
Status: DISCONTINUED | OUTPATIENT
Start: 2017-11-08 | End: 2017-11-14 | Stop reason: HOSPADM

## 2017-11-07 RX ORDER — ISOSORBIDE MONONITRATE 30 MG/1
30 TABLET, EXTENDED RELEASE ORAL DAILY
Status: DISCONTINUED | OUTPATIENT
Start: 2017-11-08 | End: 2017-11-07

## 2017-11-07 RX ORDER — INSULIN GLARGINE 100 [IU]/ML
20 INJECTION, SOLUTION SUBCUTANEOUS EVERY 12 HOURS
Status: DISCONTINUED | OUTPATIENT
Start: 2017-11-07 | End: 2017-11-14 | Stop reason: HOSPADM

## 2017-11-07 RX ORDER — DOXERCALCIFEROL 4 UG/2ML
0.5 INJECTION INTRAVENOUS
Status: DISCONTINUED | OUTPATIENT
Start: 2017-11-08 | End: 2017-11-09

## 2017-11-07 RX ORDER — DEXTROSE 50 % IN WATER (D50W) INTRAVENOUS SYRINGE
25-50 AS NEEDED
Status: DISCONTINUED | OUTPATIENT
Start: 2017-11-07 | End: 2017-11-14 | Stop reason: HOSPADM

## 2017-11-07 RX ORDER — MAGNESIUM SULFATE 100 %
16 CRYSTALS MISCELLANEOUS AS NEEDED
Status: DISCONTINUED | OUTPATIENT
Start: 2017-11-07 | End: 2017-11-14 | Stop reason: HOSPADM

## 2017-11-07 RX ORDER — TAMSULOSIN HYDROCHLORIDE 0.4 MG/1
0.4 CAPSULE ORAL DAILY
Status: DISCONTINUED | OUTPATIENT
Start: 2017-11-08 | End: 2017-11-14 | Stop reason: HOSPADM

## 2017-11-07 RX ORDER — CLONIDINE HYDROCHLORIDE 0.1 MG/1
0.2 TABLET ORAL 3 TIMES DAILY
Status: DISCONTINUED | OUTPATIENT
Start: 2017-11-07 | End: 2017-11-07

## 2017-11-07 RX ORDER — NIFEDIPINE 90 MG/1
90 TABLET, EXTENDED RELEASE ORAL DAILY
Status: DISCONTINUED | OUTPATIENT
Start: 2017-11-08 | End: 2017-11-09

## 2017-11-07 RX ORDER — NITROGLYCERIN 0.4 MG/1
0.4 TABLET SUBLINGUAL AS NEEDED
Status: DISCONTINUED | OUTPATIENT
Start: 2017-11-07 | End: 2017-11-08 | Stop reason: SDUPTHER

## 2017-11-07 RX ORDER — GUAIFENESIN 100 MG/5ML
81 LIQUID (ML) ORAL DAILY
Status: DISCONTINUED | OUTPATIENT
Start: 2017-11-08 | End: 2017-11-14 | Stop reason: HOSPADM

## 2017-11-07 RX ORDER — METOPROLOL TARTRATE 50 MG/1
100 TABLET ORAL 2 TIMES DAILY
Status: DISCONTINUED | OUTPATIENT
Start: 2017-11-07 | End: 2017-11-14 | Stop reason: HOSPADM

## 2017-11-07 RX ORDER — FENOFIBRATE 48 MG/1
48 TABLET, COATED ORAL DAILY
Status: DISCONTINUED | OUTPATIENT
Start: 2017-11-08 | End: 2017-11-07

## 2017-11-07 RX ORDER — SIMVASTATIN 40 MG/1
40 TABLET, FILM COATED ORAL
Status: DISCONTINUED | OUTPATIENT
Start: 2017-11-07 | End: 2017-11-07

## 2017-11-07 RX ORDER — ALLOPURINOL 100 MG/1
100 TABLET ORAL DAILY
Status: DISCONTINUED | OUTPATIENT
Start: 2017-11-08 | End: 2017-11-10

## 2017-11-07 RX ORDER — ONDANSETRON 2 MG/ML
4 INJECTION INTRAMUSCULAR; INTRAVENOUS
Status: DISCONTINUED | OUTPATIENT
Start: 2017-11-07 | End: 2017-11-14 | Stop reason: HOSPADM

## 2017-11-07 RX ORDER — HYDRALAZINE HYDROCHLORIDE 50 MG/1
100 TABLET, FILM COATED ORAL 3 TIMES DAILY
Status: DISCONTINUED | OUTPATIENT
Start: 2017-11-07 | End: 2017-11-12

## 2017-11-07 RX ORDER — AMOXICILLIN 250 MG
1 CAPSULE ORAL DAILY
Status: DISCONTINUED | OUTPATIENT
Start: 2017-11-08 | End: 2017-11-14 | Stop reason: HOSPADM

## 2017-11-07 RX ORDER — CLONIDINE HYDROCHLORIDE 0.1 MG/1
0.2 TABLET ORAL 3 TIMES DAILY
Status: DISCONTINUED | OUTPATIENT
Start: 2017-11-07 | End: 2017-11-12

## 2017-11-07 RX ORDER — SIMVASTATIN 40 MG/1
40 TABLET, FILM COATED ORAL
Status: DISCONTINUED | OUTPATIENT
Start: 2017-11-07 | End: 2017-11-14 | Stop reason: HOSPADM

## 2017-11-07 RX ORDER — BISACODYL 5 MG
5 TABLET, DELAYED RELEASE (ENTERIC COATED) ORAL DAILY PRN
Status: DISCONTINUED | OUTPATIENT
Start: 2017-11-07 | End: 2017-11-14 | Stop reason: HOSPADM

## 2017-11-07 RX ORDER — ALLOPURINOL 100 MG/1
100 TABLET ORAL DAILY
Status: DISCONTINUED | OUTPATIENT
Start: 2017-11-08 | End: 2017-11-07

## 2017-11-07 RX ORDER — NITROGLYCERIN 0.4 MG/1
0.4 TABLET SUBLINGUAL AS NEEDED
Status: DISCONTINUED | OUTPATIENT
Start: 2017-11-07 | End: 2017-11-14 | Stop reason: HOSPADM

## 2017-11-07 RX ORDER — METOPROLOL TARTRATE 50 MG/1
100 TABLET ORAL EVERY 12 HOURS
Status: DISCONTINUED | OUTPATIENT
Start: 2017-11-07 | End: 2017-11-07

## 2017-11-07 RX ORDER — NIFEDIPINE 90 MG/1
90 TABLET, EXTENDED RELEASE ORAL DAILY
Status: DISCONTINUED | OUTPATIENT
Start: 2017-11-08 | End: 2017-11-07

## 2017-11-07 RX ORDER — ACETAMINOPHEN 325 MG/1
650 TABLET ORAL
Status: DISCONTINUED | OUTPATIENT
Start: 2017-11-07 | End: 2017-11-14 | Stop reason: HOSPADM

## 2017-11-07 RX ORDER — GUAIFENESIN 100 MG/5ML
81 LIQUID (ML) ORAL DAILY
Status: DISCONTINUED | OUTPATIENT
Start: 2017-11-08 | End: 2017-11-07

## 2017-11-07 RX ORDER — ISOSORBIDE MONONITRATE 30 MG/1
30 TABLET, EXTENDED RELEASE ORAL DAILY
Status: DISCONTINUED | OUTPATIENT
Start: 2017-11-08 | End: 2017-11-14 | Stop reason: HOSPADM

## 2017-11-07 RX ORDER — INSULIN LISPRO 100 [IU]/ML
INJECTION, SOLUTION INTRAVENOUS; SUBCUTANEOUS
Status: DISCONTINUED | OUTPATIENT
Start: 2017-11-07 | End: 2017-11-14 | Stop reason: HOSPADM

## 2017-11-07 RX ORDER — HYDRALAZINE HYDROCHLORIDE 50 MG/1
100 TABLET, FILM COATED ORAL 3 TIMES DAILY
Status: DISCONTINUED | OUTPATIENT
Start: 2017-11-07 | End: 2017-11-07

## 2017-11-07 RX ADMIN — METOPROLOL TARTRATE 100 MG: 50 TABLET ORAL at 21:00

## 2017-11-07 RX ADMIN — CLONIDINE HYDROCHLORIDE 0.2 MG: 0.1 TABLET ORAL at 22:00

## 2017-11-07 RX ADMIN — SIMVASTATIN 40 MG: 40 TABLET, FILM COATED ORAL at 22:00

## 2017-11-07 RX ADMIN — INSULIN GLARGINE 20 UNITS: 100 INJECTION, SOLUTION SUBCUTANEOUS at 21:52

## 2017-11-07 RX ADMIN — INSULIN LISPRO 6 UNITS: 100 INJECTION, SOLUTION INTRAVENOUS; SUBCUTANEOUS at 21:52

## 2017-11-07 RX ADMIN — HYDRALAZINE HYDROCHLORIDE 100 MG: 50 TABLET, FILM COATED ORAL at 22:00

## 2017-11-07 NOTE — PROGRESS NOTES
HISTORY OF PRESENT ILLNESS  Juan Miguel Thompson is a 68 y.o. male. HPI Comments: Patient with cad,chf,post cabg and pci  . recent admission with chf,acs,non q mi  Had vg to [facility] pci        CHF   The history is provided by the patient. This is a chronic problem. The problem occurs constantly. The problem has not changed since onset. Pertinent negatives include no chest pain, no abdominal pain, no headaches and no shortness of breath. The symptoms are aggravated by exertion. Hypertension   The history is provided by the patient. This is a chronic problem. The problem occurs constantly. The problem has not changed since onset. Pertinent negatives include no chest pain, no abdominal pain, no headaches and no shortness of breath. Nothing aggravates the symptoms. Shortness of Breath   The history is provided by the patient. This is a recurrent problem. The problem occurs intermittently. The current episode started more than 1 week ago. Pertinent negatives include no fever, no headaches, no cough, no sputum production, no hemoptysis, no wheezing, no PND, no orthopnea, no chest pain, no vomiting, no abdominal pain, no rash, no leg swelling and no claudication. The problem's precipitants include exercise (>30 mts of treadmill). Chest Pain (Angina)    This is a recurrent problem. The problem has not changed since onset. The pain is associated with exertion. The pain is present in the substernal region. The quality of the pain is described as pressure-like. The pain does not radiate. Pertinent negatives include no abdominal pain, no claudication, no cough, no dizziness, no fever, no headaches, no hemoptysis, no nausea, no orthopnea, no palpitations, no PND, no shortness of breath, no sputum production, no vomiting and no weakness. Review of Systems   Constitutional: Negative for chills and fever. HENT: Negative for nosebleeds. Eyes: Negative for blurred vision and double vision.    Respiratory: Negative for cough, hemoptysis, sputum production, shortness of breath and wheezing. Cardiovascular: Negative for chest pain, palpitations, orthopnea, claudication, leg swelling and PND. Gastrointestinal: Negative for abdominal pain, heartburn, nausea and vomiting. Musculoskeletal: Negative for myalgias. Skin: Negative for rash. Neurological: Negative for dizziness, weakness and headaches. Endo/Heme/Allergies: Does not bruise/bleed easily. Family History   Problem Relation Age of Onset    Heart Attack Father 64       Past Medical History:   Diagnosis Date    Atherosclerosis of renal artery (HCC)     S/P Rt stent    CAD (coronary artery disease) , 1997, 2012    ,double bypass, 2 stents, 2stents 7/2016    Cancer ([facility]     prostate    CHF (congestive heart failure) (HCC)     Chronic diastolic heart failure (HCC)     Chronic kidney disease (CKD), stage IV (severe) (HCC)     Constipation     Coronary atherosclerosis of unspecified type of vessel, native or graft     Stable angina after recent PCI continue treatment.  CRI (chronic renal insufficiency)     Diabetes ([facility] 1973    type 2    Essential hypertension, benign     GERD (gastroesophageal reflux disease)     Gout     Hypertension 1982    Morbid obesity ([address]     Weight loss has been strongly encouraged by following dietary restrictions and an exercise routine    APRYL (obstructive sleep apnea) 6/26/2015    no cpap    Other and unspecified hyperlipidemia     HDL's are not at goal, LDL's are at goal, triglycerides are not at goal.    Other and unspecified hyperlipidemia     HDL's are not at goal, LDL's are at goal, triglycerides are not at goal.     Other ill-defined conditions(799.89) 1960    pneumonia twice    Sleep disturbance     Type II or unspecified type diabetes mellitus without mention of complication, not stated as uncontrolled        Past Surgical History:   Procedure Laterality Date   [address]    lt. carotid endart.  COLONOSCOPY N/A 6/23/2017    COLONOSCOPY w/ APC w/ polypectomies performed by Ekaterina Monae MD at [address] HX CHOLECYSTECTOMY      HX CORONARY ARTERY BYPASS GRAFT  2000    x2    HX HEENT  1997    cholecystectomy    HX UROLOGICAL  1998    renal art. surg       Allergies   Allergen Reactions    Nka [No Known Allergies] Other (comments)       Current Outpatient Prescriptions   Medication Sig    isosorbide mononitrate ER (IMDUR) 30 mg tablet Take 1 Tab by mouth daily.  aspirin 81 mg chewable tablet Take 1 Tab by mouth daily.  b complex-vitamin c-folic acid (NEPHROCAPS) 1 mg capsule Take 1 Cap by mouth daily.  calcitRIOL (ROCALTROL) 0.25 mcg capsule Take 1 Cap by mouth every Monday, Wednesday, Friday.  cloNIDine HCl (CATAPRES) 0.2 mg tablet Take 1 Tab by mouth three (3) times daily.  senna-docusate (PERICOLACE) 8.6-50 mg per tablet Take 1 Tab by mouth daily.  fenofibrate (LOFIBRA) 54 mg tablet Take 1 Tab by mouth daily.  simvastatin (ZOCOR) 40 mg tablet TAKE 1 TABLET BY MOUTH NIGHTLY.  metoprolol tartrate (LOPRESSOR) 100 mg IR tablet TAKE 1 TABLET BY MOUTH TWO TIMES A DAY.  clopidogrel (PLAVIX) 75 mg tab Take 1 Tab by mouth daily.  tamsulosin (FLOMAX) 0.4 mg capsule TAKE ONE CAPSULE BY MOUTH ONCE A DAY    Cholecalciferol, Vitamin D3, (DECARA) 50,000 unit cap Take  by mouth every seven (7) days.  furosemide (LASIX) 40 mg tablet Take 1 Tab by mouth daily as needed. (Patient taking differently: Take 80 mg by mouth daily as needed.)    hydrALAZINE (APRESOLINE) 100 mg tablet Take 1 Tab by mouth three (3) times daily. (Patient taking differently: Take 50 mg by mouth three (3) times daily.)    NIFEdipine ER (PROCARDIA XL) 90 mg ER tablet Take 1 Tab by mouth daily.  insulin glargine (LANTUS) 100 unit/mL injection 20 Units by SubCUTAneous route every twelve (12) hours.     nitroglycerin (NITROSTAT) 0.4 mg SL tablet 1 Tab by SubLINGual route as needed for Chest Pain.  insulin aspart (NOVOLOG) 100 unit/mL injection 20 units sq 3 times a day,sliding scale    allopurinol (ZYLOPRIM) 100 mg tablet Take 100 mg by mouth daily. No current facility-administered medications for this visit. Visit Vitals    /50    Pulse (!) 54    Ht 5' 8\" (1.727 m)    Wt 102.5 kg (226 lb)    BMI 34.36 kg/m2         Physical Exam   Constitutional: He is oriented to person, place, and time. He appears well-developed and well-nourished. obese   HENT:   Head: Normocephalic and atraumatic. Eyes: Conjunctivae are normal.   Neck: Neck supple. No JVD present. No tracheal deviation present. No thyromegaly present. Cardiovascular: Normal rate, regular rhythm and normal heart sounds. Exam reveals no gallop and no friction rub. No murmur heard. Pulmonary/Chest: Breath sounds normal. No respiratory distress. He has no wheezes. He has no rales. He exhibits no tenderness. Abdominal: Soft. There is no tenderness. Musculoskeletal: He exhibits no edema. Neurological: He is alert and oriented to person, place, and time. Skin: Skin is warm and dry. Psychiatric: He has a normal mood and affect. Mr. Lucretia Duarte has a reminder for a \"due or due soon\" health maintenance. I have asked that he contact his primary care provider for follow-up on this health maintenance. CARDIOLOGY STUDIES 10/8/2012   Cardiac Cath Result PATENT LIMA TO LAD, SVG TO D1 SUBTOTAL DIFFUSE, POST PCI WITH STENTS,HAD PROXIMAL IN STENT PROTRUSION UNABLE TO CROSS WITH BALLONS,D1 DIFFISE SEVERE   Echocardiogram - Complete Result NORMAL EF 70%     SUMMARY:echo:11/2014  Left ventricle: Systolic function was hyperdynamic. Ejection fraction was  estimated in the range of 70 % to 75 %. No obvious wall motion  abnormalities identified in the views obtained. Near cavity obliteration  seen in the mid to apical portions of the ventricular cavity. There was  mild concentric hypertrophy.  Features were consistent with a pseudonormal  left ventricular filling pattern, with concomitant abnormal relaxation and  increased filling pressure (grade 2 diastolic dysfunction). NUCLEAR IMAGING:    Findings: stress test:11/2014  1. Post-stress imaging in short axis, horizontal and vertical long axis views reveals normal isotope uptake in all areas. 2. Resting images also show normal isotope uptake in all areas. 3. Gated images show normal left ventricular size, wall motion and systolic function. Ejection fraction is 85%. Diagnosis:   1. Normal scan. 2. No evidence of significant fixed or reversible defect suggesting ischemia or myocardial infarction noted from this nuclear study. 3. low risk scan.  7/2016-pci  vg to [address] [place] [address] ICD-9-CM    1. NSTEMI (non-ST elevated myocardial infarction) ([facility] I21.4 410.70     recent mi  now willing to proceed with cath-as ready for dialysis   2. Atherosclerosis of native coronary artery of native heart with angina pectoris ([facility] I25.119 414.01      413.9     recurrent angina   3. Essential hypertension I10 401.9     stable   4. Postsurgical aortocoronary bypass status Z95.1 V45.81     stable   5. Chronic diastolic heart failure (HCC) I50.32 428.32     sob on exertion   6. S/P coronary artery stent placement Z95.5 V45.82      9/2017-asa stopped-continue plavix-last stent 7/2016 11/2017  Ready for cardiac cath  Discussed with dr Priyank Kate -will admit to hospital-plan for catheter and cath after one dialysis  There are no discontinued medications. No orders of the defined types were placed in this encounter. Follow-up Disposition:  Return in about 6 weeks (around 12/19/2017).

## 2017-11-07 NOTE — IP AVS SNAPSHOT
[address] Patient: Juan Miguel Thompson MRN: [id number] YDH:[id number] My Medications STOP taking these medications   
 calcitRIOL 0.25 mcg capsule Commonly known as:  ROCALTROL  
   
  
 furosemide 40 mg tablet Commonly known as:  LASIX  
   
  
 hydrALAZINE 100 mg tablet Commonly known as:  APRESOLINE  
   
  
 NIFEdipine ER 90 mg ER tablet Commonly known as:  PROCARDIA XL  
   
  
  
TAKE these medications as instructed Instructions Each Dose to Equal  
 Morning Noon Evening Bedtime  
 allopurinol 100 mg tablet Commonly known as:  Sharon Gold Your last dose was: Your next dose is: Take 100 mg by mouth daily. 100 mg  
    
   
   
   
  
 aspirin 81 mg chewable tablet Your last dose was: Your next dose is: Take 1 Tab by mouth daily. 81 mg  
    
   
   
   
  
 b complex-vitamin c-folic acid 1 mg capsule Commonly known as:  Larena Folk Your last dose was: Your next dose is: Take 1 Cap by mouth daily. 1 Cap  
    
   
   
   
  
 cloNIDine HCl 0.2 mg tablet Commonly known as:  CATAPRES Your last dose was: Your next dose is: Take 1 Tab by mouth two (2) times a day. 0.2 mg  
    
   
   
   
  
 clopidogrel 75 mg Tab Commonly known as:  PLAVIX Your last dose was: Your next dose is: Take 1 Tab by mouth daily. 75 mg DECARA 50,000 unit Cap Generic drug:  Cholecalciferol (Vitamin D3) Your last dose was: Your next dose is: Take  by mouth every seven (7) days. fenofibrate 54 mg tablet Commonly known as:  LOFIBRA Your last dose was: Your next dose is: Take 1 Tab by mouth daily. 54 mg  
    
   
   
   
  
 insulin aspart 100 unit/mL injection Commonly known as:  Jason Minium Your last dose was: Your next dose is:    
   
   
 20 units sq 3 times a day,sliding scale  
     
   
   
   
  
 insulin glargine 100 unit/mL injection Commonly known as:  LANTUS Your last dose was: Your next dose is:    
   
   
 20 Units by SubCUTAneous route every twelve (12) hours. 20 Units  
    
   
   
   
  
 isosorbide mononitrate ER 30 mg tablet Commonly known as:  IMDUR Your last dose was: Your next dose is: Take 1 Tab by mouth daily. 30 mg  
    
   
   
   
  
 losartan 50 mg tablet Commonly known as:  COZAAR Your last dose was: Your next dose is: Take 1 Tab by mouth daily. 50 mg  
    
   
   
   
  
 metoprolol tartrate 100 mg IR tablet Commonly known as:  LOPRESSOR Your last dose was: Your next dose is: TAKE 1 TABLET BY MOUTH TWO TIMES A DAY. nitroglycerin 0.4 mg SL tablet Commonly known as:  NITROSTAT Your last dose was: Your next dose is:    
   
   
 1 Tab by SubLINGual route as needed for Chest Pain. 0.4 mg  
    
   
   
   
  
 senna-docusate 8.6-50 mg per tablet Commonly known as:  Leellen Cancel Your last dose was: Your next dose is: Take 1 Tab by mouth daily. 1 Tab  
    
   
   
   
  
 simvastatin 40 mg tablet Commonly known as:  ZOCOR Your last dose was: Your next dose is: TAKE 1 TABLET BY MOUTH NIGHTLY.  
     
   
   
   
  
 tamsulosin 0.4 mg capsule Commonly known as:  FLOMAX Your last dose was: Your next dose is: TAKE ONE CAPSULE BY MOUTH ONCE A DAY Where to Get Your Medications Information on where to get these meds will be given to you by the nurse or doctor. ! Ask your nurse or doctor about these medications  
  cloNIDine HCl 0.2 mg tablet losartan 50 mg tablet

## 2017-11-07 NOTE — IP AVS SNAPSHOT
Carlosdennis Washington 
 
 
 [address] Patient: Cecile Sosa MRN: [id number] About your hospitalization You were admitted on:  November 7, 2017 You last received care in the:  [address] [place] [address] You were discharged on:  November 14, 2017 Why you were hospitalized Your primary diagnosis was:  Not on File Your diagnoses also included:  Chest Pain, Esrd (End Stage Renal Disease) (Hcc), Secondary Hyperparathyroidism Of Renal Origin (Hcc), Coronary Atherosclerosis Of Native Coronary Artery, Htn (Hypertension), Dm2 (Diabetes Mellitus, Type 2) (Hcc), S/P Coronary Artery Stent Placement, Chronic Kidney Disease, Stage V (Hcc) Things You Need To Do (next 8 weeks) Follow up with Lakesha Posadas MD  
  
Phone:  [phone] Where:  [facility], [address] [facility] Thursday Dec 07, 2017 Nurse Visit with SUREKHA at 10:00 AM  
Where:  Urology of [address] [place] [address] ([facility]) Monday Dec 18, 2017 Follow Up with Rashad Cote MD at 10:00 AM  
Where:  [facility] ([facility]) Thursday Dec 21, 2017 ESTABLISHED PATIENT with Joaquina Iniguez MD at 10:15 AM  
Where:  Urology of [address] [place] [address]) Discharge Orders Procedure Order Date Status Priority Quantity Spec Type Associated Dx DIET DIABETIC CONSISTENT CARB Regular; FR 1200ML; 70-70-70 (House); AHA-LOW-CHOL FAT 11/14/17 1251 Normal Routine 1 Questions: Texture:  Regular Fluid restriction:  FR 1200ML Renal (Protein/Sodium/Potassium):  70-70-70 (House) Cardiac:  AHA-LOW-CHOL FAT A check jose indicates which time of day the medication should be taken. My Medications STOP taking these medications   
 calcitRIOL 0.25 mcg capsule Commonly known as:  ROCALTROL  
   
  
 furosemide 40 mg tablet Commonly known as:  LASIX  
   
  
 hydrALAZINE 100 mg tablet Commonly known as:  APRESOLINE  
   
  
 NIFEdipine ER 90 mg ER tablet Commonly known as:  PROCARDIA XL  
   
  
  
TAKE these medications as instructed Instructions Each Dose to Equal  
 Morning Noon Evening Bedtime  
 allopurinol 100 mg tablet Commonly known as:  Rachell Lemuel Your last dose was: Your next dose is: Take 100 mg by mouth daily. 100 mg  
    
   
   
   
  
 aspirin 81 mg chewable tablet Your last dose was: Your next dose is: Take 1 Tab by mouth daily. 81 mg  
    
   
   
   
  
 b complex-vitamin c-folic acid 1 mg capsule Commonly known as:  Marlborough Mend Your last dose was: Your next dose is: Take 1 Cap by mouth daily. 1 Cap  
    
   
   
   
  
 cloNIDine HCl 0.2 mg tablet Commonly known as:  CATAPRES Your last dose was: Your next dose is: Take 1 Tab by mouth two (2) times a day. 0.2 mg  
    
   
   
   
  
 clopidogrel 75 mg Tab Commonly known as:  PLAVIX Your last dose was: Your next dose is: Take 1 Tab by mouth daily. 75 mg DECARA 50,000 unit Cap Generic drug:  Cholecalciferol (Vitamin D3) Your last dose was: Your next dose is: Take  by mouth every seven (7) days. fenofibrate 54 mg tablet Commonly known as:  LOFIBRA Your last dose was: Your next dose is: Take 1 Tab by mouth daily. 54 mg  
    
   
   
   
  
 insulin aspart 100 unit/mL injection Commonly known as:  Frandy How Your last dose was: Your next dose is:    
   
   
 20 units sq 3 times a day,sliding scale  
     
   
   
   
  
 insulin glargine 100 unit/mL injection Commonly known as:  LANTUS Your last dose was: Your next dose is: 20 Units by SubCUTAneous route every twelve (12) hours. 20 Units  
    
   
   
   
  
 isosorbide mononitrate ER 30 mg tablet Commonly known as:  IMDUR Your last dose was: Your next dose is: Take 1 Tab by mouth daily. 30 mg  
    
   
   
   
  
 losartan 50 mg tablet Commonly known as:  COZAAR Your last dose was: Your next dose is: Take 1 Tab by mouth daily. 50 mg  
    
   
   
   
  
 metoprolol tartrate 100 mg IR tablet Commonly known as:  LOPRESSOR Your last dose was: Your next dose is: TAKE 1 TABLET BY MOUTH TWO TIMES A DAY. nitroglycerin 0.4 mg SL tablet Commonly known as:  NITROSTAT Your last dose was: Your next dose is:    
   
   
 1 Tab by SubLINGual route as needed for Chest Pain. 0.4 mg  
    
   
   
   
  
 senna-docusate 8.6-50 mg per tablet Commonly known as:  Vermary Nuness Your last dose was: Your next dose is: Take 1 Tab by mouth daily. 1 Tab  
    
   
   
   
  
 simvastatin 40 mg tablet Commonly known as:  ZOCOR Your last dose was: Your next dose is: TAKE 1 TABLET BY MOUTH NIGHTLY.  
     
   
   
   
  
 tamsulosin 0.4 mg capsule Commonly known as:  FLOMAX Your last dose was: Your next dose is: TAKE ONE CAPSULE BY MOUTH ONCE A DAY Where to Get Your Medications Information on where to get these meds will be given to you by the nurse or doctor. ! Ask your nurse or doctor about these medications  
  cloNIDine HCl 0.2 mg tablet  
 losartan 50 mg tablet Discharge Instructions DISCHARGE SUMMARY from Nurse PATIENT INSTRUCTIONS: 
 
 
F-face looks uneven A-arms unable to move or move unevenly S-speech slurred or non-existent T-time-call 911 as soon as signs and symptoms begin-DO NOT go Back to bed or wait to see if you get better-TIME IS BRAIN. Warning Signs of HEART ATTACK Call 911 if you have these symptoms: 
? Chest discomfort. Most heart attacks involve discomfort in the center of the chest that lasts more than a few minutes, or that goes away and comes back. It can feel like uncomfortable pressure, squeezing, fullness, or pain. ? Discomfort in other areas of the upper body. Symptoms can include pain or discomfort in one or both arms, the back, neck, jaw, or stomach. ? Shortness of breath with or without chest discomfort. ? Other signs may include breaking out in a cold sweat, nausea, or lightheadedness. Don't wait more than five minutes to call [address]! Fast action can save your life. Calling 911 is almost always the fastest way to get lifesaving treatment. Emergency Medical Services staff can begin treatment when they arrive  up to an hour sooner than if someone gets to the hospital by car. The discharge information has been reviewed with the patient. The patient verbalized understanding. Discharge medications reviewed with the patient and appropriate educational materials and side effects teaching were provided. ___________________________________________________________________________________________________________________________________ ACO Transitions of Care Introducing [employer] [address] offers a voluntary care coordination program to provide high quality service and care to [facility] fee-for-service beneficiaries. Mando Thompson was designed to help you enhance your health and well-being through the following services: ? Transitions of Care  support for individuals who are transitioning from one care setting to another (example: Hospital to home). ? Chronic and Complex Care Coordination  support for individuals and caregivers of those with serious or chronic illnesses or with more than one chronic (ongoing) condition and those who take a number of different medications. If you meet specific medical criteria, a [address] [place] [address] Rd may call you directly to coordinate your care with your primary care physician and your other care providers. For questions about the [facility] programs, please, contact your physicians office. For general questions or additional information about Accountable Care Organizations: 
Please visit www.medicare.gov/acos. html or call 1-800-MEDICARE (3-927.450.5728) TTY users should call 5-667.787.2712. FAB BAG Announcement We are excited to announce that we are making your provider's discharge notes available to you in FAB BAG. You will see these notes when they are completed and signed by the physician that discharged you from your recent hospital stay. If you have any questions or concerns about any information you see in FAB BAG, please call the Health Information Department where you were seen or reach out to your Primary Care Provider for more information about your plan of care. Introducing [facility] [place] & [facility]! Aruna Winchester introduces FAB BAG patient portal. Now you can access parts of your medical record, email your doctor's office, and request medication refills online. 1. In your internet browser, go to [email or web address] 2. Click on the First Time User? Click Here link in the Sign In box. You will see the New Member Sign Up page. 3. Enter your FAB BAG Access Code exactly as it appears below.  You will not need to use this code after youve completed the sign-up process. If you do not sign up before the expiration date, you must request a new code. · iContact Access Code: [email or web address] Expires: 12/25/2017  7:30 AM 
 
4. Enter the last four digits of your Social Security Number (xxxx) and Date of Birth (mm/dd/yyyy) as indicated and click Submit. You will be taken to the next sign-up page. 5. Create a iContact ID. This will be your iContact login ID and cannot be changed, so think of one that is secure and easy to remember. 6. Create a iContact password. You can change your password at any time. 7. Enter your Password Reset Question and Answer. This can be used at a later time if you forget your password. 8. Enter your e-mail address. You will receive e-mail notification when new information is available in [address] 9. Click Sign Up. You can now view and download portions of your medical record. 10. Click the Download Summary menu link to download a portable copy of your medical information. If you have questions, please visit the Frequently Asked Questions section of the iContact website. Remember, iContact is NOT to be used for urgent needs. For medical emergencies, dial 911. Now available from your iPhone and Android! Unresulted Labs-Please follow up with your PCP about these lab tests Order Current Status NC XR TECHNOLOGIST SERVICE In process Providers Seen During Your Hospitalization Provider Specialty Primary office phone Earline Dominguez MD Cardiology [phone] Your Primary Care Physician (PCP) Primary Care Physician Office Phone Office Fax Joaquin Orts [phone] You are allergic to the following Allergen Reactions Nka (No Known Allergies) Other (comments) Recent Documentation Height Weight BMI Smoking Status 1.727 m 96.8 kg 32.45 kg/m2 Never Smoker Emergency Contacts Name Discharge Info Relation Home Work Mobile Lazarus,Winsome DISCHARGE CAREGIVER [3] Spouse [3] [phone] Charlyne Gain CAREGIVER [3] Daughter [21] [phone] Patient Belongings The following personal items are in your possession at time of discharge: 
  Dental Appliances: Uppers  Visual Aid: None      Home Medications: None   Jewelry: None  Clothing: Undergarments    Other Valuables: None  Personal Items Sent to Safe: Declines Please provide this summary of care documentation to your next provider. Signatures-by signing, you are acknowledging that this After Visit Summary has been reviewed with you and you have received a copy. Patient Signature:  ____________________________________________________________ Date:  ____________________________________________________________  
  
[facility] Provider Signature:  ____________________________________________________________ Date:  ____________________________________________________________

## 2017-11-07 NOTE — PROGRESS NOTES
Admitted with advance Renal failure  & known CAD, requiring frequent admissions, finally  has agreed for dialysis , discussed with Vascular surgery Dr. Radha Johnson , he will put dialyssi catheter tomorrow morning, will dialyze him first  Then he will undergo Cardiac cat,

## 2017-11-07 NOTE — MR AVS SNAPSHOT
Visit Information Date & Time Provider Department Dept. Phone Encounter #  
 11/7/2017 10:00 AM Rashad Cote MD [facility] [phone] Follow-up Instructions Return in about 6 weeks (around 12/19/2017). Your Appointments 11/7/2017 10:00 AM  
Follow Up with Rasahd Cote MD  
[facility] ([facility]) Appt Note: h/f/ sw wife/ date per dr Luisana Hoover r/s; h/f/ sw wife/ date per dr Luisana Hoover r/s; confirmed appt with pt wife/dg [address] [place] [address] [address]  
  
   
 [address] [address] [address] [address]  
  
    
 12/7/2017 10:00 AM  
Nurse Visit with DAKOTA_UVA [facility] of [address] [place] [address] ([facility]) Appt Note: PSA  
 [address] [facility] 1 [facility] [address]  
  
    
 12/21/2017 10:15 AM  
ESTABLISHED PATIENT with Joaquina Iniguez MD  
Urology of [address] [place] [address]) Appt Note: 6mo F/u Flow bus ua psa prior [facility] 1 [facility] Upcoming Health Maintenance Date Due  
 FOOT EXAM Q1 7/10/1950 MICROALBUMIN Q1 7/10/1950 EYE EXAM RETINAL OR DILATED Q1 7/10/1950 DTaP/Tdap/Td series (1 - Tdap) 7/10/1961 ZOSTER VACCINE AGE 60> 5/10/2000 GLAUCOMA SCREENING Q2Y 7/10/2005 MEDICARE YEARLY EXAM 7/10/2005 Pneumococcal 65+ High/Highest Risk (2 of 2 - PPSV23) 3/9/2017 INFLUENZA AGE 9 TO ADULT 8/1/2017 HEMOGLOBIN A1C Q6M 4/28/2018 LIPID PANEL Q1 10/24/2018 Allergies as of 11/7/2017  Review Complete On: 11/7/2017 By: Rashad Cote MD  
  
 Severity Noted Reaction Type Reactions Nka [No Known Allergies]  10/07/2012    Other (comments) Current Immunizations  Reviewed on 3/12/2013 No immunizations on file. Not reviewed this visit You Were Diagnosed With   
  
 Codes Comments NSTEMI (non-ST elevated myocardial infarction) ([facility] [address]    -  Primary ICD-10-CM: I21.4 ICD-9-CM: 410.70 recent mi 
now willing to proceed with cath-as ready for dialysis Atherosclerosis of native coronary artery of native heart with angina pectoris ([facility] [address]     ICD-10-CM: I25.119 ICD-9-CM: 414.01, 413.9 recurrent angina Essential hypertension     ICD-10-CM: I10 
ICD-9-CM: 401.9 stable Postsurgical aortocoronary bypass status     ICD-10-CM: Z95.1 ICD-9-CM: V45.81 stable Chronic diastolic heart failure (HCC)     ICD-10-CM: I50.32 
ICD-9-CM: 428.32 sob on exertion S/P coronary artery stent placement     ICD-10-CM: Z95.5 ICD-9-CM: V45.82 Vitals BP Pulse Height(growth percentile) Weight(growth percentile) BMI Smoking Status 117/50 (!) 54 5' 8\" (1.727 m) 226 lb (102.5 kg) 34.36 kg/m2 Never Smoker Vitals History BMI and BSA Data Body Mass Index Body Surface Area  
 34.36 kg/m 2 2.22 m 2 Preferred Pharmacy Pharmacy Name Phone [id number] [address] [phone] Your Updated Medication List  
  
   
This list is accurate as of: 11/7/17  9:48 AM.  Always use your most recent med list.  
  
  
  
  
 allopurinol 100 mg tablet Commonly known as:  Rawland Shyanne Take 100 mg by mouth daily. aspirin 81 mg chewable tablet Take 1 Tab by mouth daily. b complex-vitamin c-folic acid 1 mg capsule Commonly known as:  Maritza Sacks Take 1 Cap by mouth daily. calcitRIOL 0.25 mcg capsule Commonly known as:  ROCALTROL Take 1 Cap by mouth every Monday, Wednesday, Friday. cloNIDine HCl 0.2 mg tablet Commonly known as:  CATAPRES Take 1 Tab by mouth three (3) times daily. clopidogrel 75 mg Tab Commonly known as:  PLAVIX Take 1 Tab by mouth daily. DECARA 50,000 unit Cap Generic drug:  Cholecalciferol (Vitamin D3) Take  by mouth every seven (7) days. fenofibrate 54 mg tablet Commonly known as:  LOFIBRA Take 1 Tab by mouth daily. furosemide 40 mg tablet Commonly known as:  LASIX Take 1 Tab by mouth daily as needed. hydrALAZINE 100 mg tablet Commonly known as:  APRESOLINE Take 1 Tab by mouth three (3) times daily. insulin aspart 100 unit/mL injection Commonly known as:  Chyrel Coyer 20 units sq 3 times a day,sliding scale  
  
 insulin glargine 100 unit/mL injection Commonly known as:  LANTUS  
20 Units by SubCUTAneous route every twelve (12) hours. isosorbide mononitrate ER 30 mg tablet Commonly known as:  IMDUR Take 1 Tab by mouth daily. metoprolol tartrate 100 mg IR tablet Commonly known as:  LOPRESSOR  
TAKE 1 TABLET BY MOUTH TWO TIMES A DAY. NIFEdipine ER 90 mg ER tablet Commonly known as:  PROCARDIA XL Take 1 Tab by mouth daily. nitroglycerin 0.4 mg SL tablet Commonly known as:  NITROSTAT  
1 Tab by SubLINGual route as needed for Chest Pain. senna-docusate 8.6-50 mg per tablet Commonly known as:  Miachel Huge Take 1 Tab by mouth daily. simvastatin 40 mg tablet Commonly known as:  ZOCOR  
TAKE 1 TABLET BY MOUTH NIGHTLY.  
  
 tamsulosin 0.4 mg capsule Commonly known as:  FLOMAX TAKE ONE CAPSULE BY MOUTH ONCE A DAY Follow-up Instructions Return in about 6 weeks (around 12/19/2017). Introducing [facility] & HEALTH SERVICES! [facility] introduces CFX BATTERY patient portal. Now you can access parts of your medical record, email your doctor's office, and request medication refills online. 1. In your internet browser, go to [email or web address] 2. Click on the First Time User? Click Here link in the Sign In box. You will see the New Member Sign Up page. 3. Enter your CFX BATTERY Access Code exactly as it appears below. You will not need to use this code after youve completed the sign-up process. If you do not sign up before the expiration date, you must request a new code. · Appriss Access Code: [email or web address] Expires: 12/25/2017  7:30 AM 
 
4. Enter the last four digits of your Social Security Number (xxxx) and Date of Birth (mm/dd/yyyy) as indicated and click Submit. You will be taken to the next sign-up page. 5. Create a Appriss ID. This will be your Appriss login ID and cannot be changed, so think of one that is secure and easy to remember. 6. Create a Appriss password. You can change your password at any time. 7. Enter your Password Reset Question and Answer. This can be used at a later time if you forget your password. 8. Enter your e-mail address. You will receive e-mail notification when new information is available in [address] 9. Click Sign Up. You can now view and download portions of your medical record. 10. Click the Download Summary menu link to download a portable copy of your medical information. If you have questions, please visit the Frequently Asked Questions section of the Appriss website. Remember, Appriss is NOT to be used for urgent needs. For medical emergencies, dial 911. Now available from your iPhone and Android! Please provide this summary of care documentation to your next provider. Your primary care clinician is listed as Tawnya Thomson. If you have any questions after today's visit, please call [phone].

## 2017-11-07 NOTE — PROGRESS NOTES
1. Have you been to the ER, urgent care clinic since your last visit? Hospitalized since your last visit? Yes [facility]  2. Have you seen or consulted any other health care providers outside of the [address] [place] [address] since your last visit? Include any pap smears or colon screening. Yes Where: pcp     3. Since your last visit, have you had any of the following symptoms? shortness of breath.

## 2017-11-08 ENCOUNTER — APPOINTMENT (OUTPATIENT)
Dept: GENERAL RADIOLOGY | Age: 77
DRG: 246 | End: 2017-11-08
Attending: SURGERY
Payer: MEDICARE

## 2017-11-08 ENCOUNTER — APPOINTMENT (OUTPATIENT)
Dept: CARDIAC CATH/INVASIVE PROCEDURES | Age: 77
DRG: 246 | End: 2017-11-08
Attending: SURGERY
Payer: MEDICARE

## 2017-11-08 PROBLEM — N18.6 ESRD (END STAGE RENAL DISEASE) (HCC): Status: ACTIVE | Noted: 2017-11-08

## 2017-11-08 LAB
ANION GAP SERPL CALC-SCNC: 10 MMOL/L (ref 3–18)
ATRIAL RATE: 65 BPM
BASOPHILS # BLD: 0 K/UL (ref 0–0.1)
BASOPHILS NFR BLD: 0 % (ref 0–2)
BUN SERPL-MCNC: 69 MG/DL (ref 7–18)
BUN/CREAT SERPL: 15 (ref 12–20)
CALCIUM SERPL-MCNC: 9.8 MG/DL (ref 8.5–10.1)
CALCIUM SERPL-MCNC: 9.9 MG/DL (ref 8.5–10.1)
CALCULATED P AXIS, ECG09: 20 DEGREES
CALCULATED R AXIS, ECG10: -14 DEGREES
CALCULATED T AXIS, ECG11: 41 DEGREES
CHLORIDE SERPL-SCNC: 97 MMOL/L (ref 100–108)
CO2 SERPL-SCNC: 30 MMOL/L (ref 21–32)
CREAT SERPL-MCNC: 4.68 MG/DL (ref 0.6–1.3)
DIAGNOSIS, 93000: NORMAL
DIFFERENTIAL METHOD BLD: ABNORMAL
EOSINOPHIL # BLD: 0.3 K/UL (ref 0–0.4)
EOSINOPHIL NFR BLD: 4 % (ref 0–5)
ERYTHROCYTE [DISTWIDTH] IN BLOOD BY AUTOMATED COUNT: 14 % (ref 11.6–14.5)
GLUCOSE BLD STRIP.AUTO-MCNC: 175 MG/DL (ref 70–110)
GLUCOSE BLD STRIP.AUTO-MCNC: 179 MG/DL (ref 70–110)
GLUCOSE BLD STRIP.AUTO-MCNC: 188 MG/DL (ref 70–110)
GLUCOSE BLD STRIP.AUTO-MCNC: 192 MG/DL (ref 70–110)
GLUCOSE SERPL-MCNC: 147 MG/DL (ref 74–99)
HBV SURFACE AG SER QL: <0.1 INDEX
HBV SURFACE AG SER QL: NEGATIVE
HCT VFR BLD AUTO: 34.1 % (ref 36–48)
HGB BLD-MCNC: 10.6 G/DL (ref 13–16)
INR PPP: 0.9 (ref 0.8–1.2)
LYMPHOCYTES # BLD: 2.1 K/UL (ref 0.9–3.6)
LYMPHOCYTES NFR BLD: 24 % (ref 21–52)
MCH RBC QN AUTO: 28.1 PG (ref 24–34)
MCHC RBC AUTO-ENTMCNC: 31.1 G/DL (ref 31–37)
MCV RBC AUTO: 90.5 FL (ref 74–97)
MONOCYTES # BLD: 0.5 K/UL (ref 0.05–1.2)
MONOCYTES NFR BLD: 6 % (ref 3–10)
NEUTS SEG # BLD: 5.9 K/UL (ref 1.8–8)
NEUTS SEG NFR BLD: 66 % (ref 40–73)
P-R INTERVAL, ECG05: 144 MS
PHOSPHATE SERPL-MCNC: 4.4 MG/DL (ref 2.5–4.9)
PLATELET # BLD AUTO: 305 K/UL (ref 135–420)
PMV BLD AUTO: 11.3 FL (ref 9.2–11.8)
POTASSIUM SERPL-SCNC: 3.2 MMOL/L (ref 3.5–5.5)
PROTHROMBIN TIME: 12.1 SEC (ref 11.5–15.2)
PTH-INTACT SERPL-MCNC: 475.6 PG/ML (ref 14–72)
Q-T INTERVAL, ECG07: 454 MS
QRS DURATION, ECG06: 110 MS
QTC CALCULATION (BEZET), ECG08: 472 MS
RBC # BLD AUTO: 3.77 M/UL (ref 4.7–5.5)
SODIUM SERPL-SCNC: 137 MMOL/L (ref 136–145)
VENTRICULAR RATE, ECG03: 65 BPM
WBC # BLD AUTO: 8.9 K/UL (ref 4.6–13.2)

## 2017-11-08 PROCEDURE — 74011000250 HC RX REV CODE- 250: Performed by: SURGERY

## 2017-11-08 PROCEDURE — 77030018719 HC DRSG PTCH ANTIMIC J&J -A

## 2017-11-08 PROCEDURE — 65660000000 HC RM CCU STEPDOWN

## 2017-11-08 PROCEDURE — 36558 INSERT TUNNELED CV CATH: CPT

## 2017-11-08 PROCEDURE — 82962 GLUCOSE BLOOD TEST: CPT

## 2017-11-08 PROCEDURE — 71010 XR CHEST PORT: CPT

## 2017-11-08 PROCEDURE — 74011250636 HC RX REV CODE- 250/636: Performed by: INTERNAL MEDICINE

## 2017-11-08 PROCEDURE — 77030002986 HC SUT PROL J&J -A

## 2017-11-08 PROCEDURE — 83970 ASSAY OF PARATHORMONE: CPT | Performed by: INTERNAL MEDICINE

## 2017-11-08 PROCEDURE — 36415 COLL VENOUS BLD VENIPUNCTURE: CPT | Performed by: NURSE PRACTITIONER

## 2017-11-08 PROCEDURE — 74011250637 HC RX REV CODE- 250/637: Performed by: NURSE PRACTITIONER

## 2017-11-08 PROCEDURE — 84100 ASSAY OF PHOSPHORUS: CPT | Performed by: NURSE PRACTITIONER

## 2017-11-08 PROCEDURE — 74011250636 HC RX REV CODE- 250/636: Performed by: EMERGENCY MEDICINE

## 2017-11-08 PROCEDURE — 85610 PROTHROMBIN TIME: CPT | Performed by: NURSE PRACTITIONER

## 2017-11-08 PROCEDURE — 74011250637 HC RX REV CODE- 250/637: Performed by: INTERNAL MEDICINE

## 2017-11-08 PROCEDURE — 85025 COMPLETE CBC W/AUTO DIFF WBC: CPT | Performed by: NURSE PRACTITIONER

## 2017-11-08 PROCEDURE — 80048 BASIC METABOLIC PNL TOTAL CA: CPT | Performed by: NURSE PRACTITIONER

## 2017-11-08 PROCEDURE — 74011250636 HC RX REV CODE- 250/636: Performed by: SURGERY

## 2017-11-08 PROCEDURE — 02H633Z INSERTION OF INFUSION DEVICE INTO RIGHT ATRIUM, PERCUTANEOUS APPROACH: ICD-10-PCS | Performed by: SURGERY

## 2017-11-08 PROCEDURE — 90935 HEMODIALYSIS ONE EVALUATION: CPT

## 2017-11-08 PROCEDURE — C1752 CATH,HEMODIALYSIS,SHORT-TERM: HCPCS

## 2017-11-08 PROCEDURE — 87340 HEPATITIS B SURFACE AG IA: CPT | Performed by: INTERNAL MEDICINE

## 2017-11-08 PROCEDURE — 74011636637 HC RX REV CODE- 636/637: Performed by: INTERNAL MEDICINE

## 2017-11-08 RX ORDER — HEPARIN SODIUM 5000 [USP'U]/ML
5000 INJECTION, SOLUTION INTRAVENOUS; SUBCUTANEOUS EVERY 8 HOURS
Status: DISCONTINUED | OUTPATIENT
Start: 2017-11-08 | End: 2017-11-14 | Stop reason: HOSPADM

## 2017-11-08 RX ORDER — HEPARIN SODIUM 200 [USP'U]/100ML
1000 INJECTION, SOLUTION INTRAVENOUS ONCE
Status: COMPLETED | OUTPATIENT
Start: 2017-11-08 | End: 2017-11-08

## 2017-11-08 RX ORDER — HEPARIN SODIUM 5000 [USP'U]/ML
5000 INJECTION, SOLUTION INTRAVENOUS; SUBCUTANEOUS ONCE
Status: DISCONTINUED | OUTPATIENT
Start: 2017-11-08 | End: 2017-11-08 | Stop reason: HOSPADM

## 2017-11-08 RX ORDER — LIDOCAINE HYDROCHLORIDE 10 MG/ML
1-30 INJECTION, SOLUTION EPIDURAL; INFILTRATION; INTRACAUDAL; PERINEURAL
Status: DISCONTINUED | OUTPATIENT
Start: 2017-11-08 | End: 2017-11-08 | Stop reason: HOSPADM

## 2017-11-08 RX ADMIN — HYDRALAZINE HYDROCHLORIDE 100 MG: 50 TABLET, FILM COATED ORAL at 18:00

## 2017-11-08 RX ADMIN — ERYTHROPOIETIN 5000 UNITS: 3000 INJECTION, SOLUTION INTRAVENOUS; SUBCUTANEOUS at 16:51

## 2017-11-08 RX ADMIN — HEPARIN SODIUM 1000 UNITS: 200 INJECTION, SOLUTION INTRAVENOUS at 09:55

## 2017-11-08 RX ADMIN — HYDRALAZINE HYDROCHLORIDE 100 MG: 50 TABLET, FILM COATED ORAL at 21:50

## 2017-11-08 RX ADMIN — CLONIDINE HYDROCHLORIDE 0.2 MG: 0.1 TABLET ORAL at 18:37

## 2017-11-08 RX ADMIN — INSULIN LISPRO 100 UNITS: 100 INJECTION, SOLUTION INTRAVENOUS; SUBCUTANEOUS at 07:30

## 2017-11-08 RX ADMIN — INSULIN GLARGINE 20 UNITS: 100 INJECTION, SOLUTION SUBCUTANEOUS at 21:51

## 2017-11-08 RX ADMIN — HEPARIN SODIUM 5000 UNITS: 5000 INJECTION, SOLUTION INTRAVENOUS; SUBCUTANEOUS at 18:38

## 2017-11-08 RX ADMIN — DOXERCALCIFEROL 0.5 MCG: 4 INJECTION, SOLUTION INTRAVENOUS at 16:51

## 2017-11-08 RX ADMIN — METOPROLOL TARTRATE 100 MG: 50 TABLET ORAL at 18:00

## 2017-11-08 RX ADMIN — SIMVASTATIN 40 MG: 40 TABLET, FILM COATED ORAL at 21:50

## 2017-11-08 RX ADMIN — CLONIDINE HYDROCHLORIDE 0.2 MG: 0.1 TABLET ORAL at 21:49

## 2017-11-08 RX ADMIN — INSULIN LISPRO 3 UNITS: 100 INJECTION, SOLUTION INTRAVENOUS; SUBCUTANEOUS at 16:30

## 2017-11-08 RX ADMIN — INSULIN LISPRO 3 UNITS: 100 INJECTION, SOLUTION INTRAVENOUS; SUBCUTANEOUS at 11:30

## 2017-11-08 RX ADMIN — INSULIN GLARGINE 20 UNITS: 100 INJECTION, SOLUTION SUBCUTANEOUS at 09:00

## 2017-11-08 RX ADMIN — LIDOCAINE HYDROCHLORIDE 3 ML: 10 INJECTION, SOLUTION EPIDURAL; INFILTRATION; INTRACAUDAL; PERINEURAL at 09:58

## 2017-11-08 RX ADMIN — INSULIN LISPRO 3 UNITS: 100 INJECTION, SOLUTION INTRAVENOUS; SUBCUTANEOUS at 21:53

## 2017-11-08 RX ADMIN — CLONIDINE HYDROCHLORIDE 0.2 MG: 0.1 TABLET ORAL at 06:05

## 2017-11-08 RX ADMIN — HYDRALAZINE HYDROCHLORIDE 100 MG: 50 TABLET, FILM COATED ORAL at 06:06

## 2017-11-08 RX ADMIN — NITROGLYCERIN 0.4 MG: 0.4 TABLET SUBLINGUAL at 14:49

## 2017-11-08 NOTE — ROUTINE PROCESS
TRANSFER - OUT REPORT:    Verbal report given to JESSI Hernandez(name) on Maggie Silverman  being transferred to CVT Stepdown(unit) for routine progression of care       Report consisted of patients Situation, Background, Assessment and   Recommendations(SBAR). Information from the following report(s) SBAR, Kardex, Procedure Summary and MAR was reviewed with the receiving nurse. Lines:   Peripheral IV 11/07/17 Left Antecubital (Active)   Site Assessment Clean, dry, & intact 11/8/2017  7:05 AM   Phlebitis Assessment 0 11/8/2017  7:05 AM   Infiltration Assessment 0 11/8/2017  7:05 AM   Dressing Status Clean, dry, & intact 11/8/2017  7:05 AM   Dressing Type Transparent 11/8/2017  7:05 AM   Hub Color/Line Status Patent;Pink 11/8/2017  7:05 AM   Alcohol Cap Used Yes 11/8/2017  1:00 AM        Opportunity for questions and clarification was provided.       Patient transported with:   [employer]

## 2017-11-08 NOTE — PROGRESS NOTES
Bedside turnover given to me by JESSI Brown. Pt is on the cardiac telemetry monitor, stable condition, vitals to his baseline. Call bell is within reach on the bedside table. Pt denies chest pain and denies shortness of breath. He denies all pain. He has a urinal at bedside and is aware we are measuring his input and output. Discussed his plan of care tomorrow related to dialysis access insertion and then dialysis and then cardiac catheterization. Consent forms obtained. Pt took his own home medications before I began my shift, day shift JESSI Brown informed me and I verified this with patient. The only medications he had not taken were Lantus and regular insulin which was given to him. \    Pt npo with the exception of two sips of water to swallow medication. He was shaved and clipped this am at 5:50 then wiped down with chlorhexidine wipes. Patient then cleaned himself with soap and wash clothes in the sink. Pt asked for his bp medication early, systolic was 889 he was anxious and wanted BP med   Consent forms signed by patient and questions answered. 0715 am bedside turnover given to RN Flash DELANEY, [facility], ED Summary and care plan for the day given. Call bell within reach on bedside table.

## 2017-11-08 NOTE — PROGRESS NOTES
Progress Note    Ana Laura Marker  68 y.o. Admit Date: 11/7/2017  Patient Active Problem List   Diagnosis Code    NSTEMI (non-ST elevated myocardial infarction) ([facility] I21.4    Coronary atherosclerosis of native coronary artery I25.10    CKD (chronic kidney disease) stage 4, GFR 15-29 ml/min ([facility]) N18.4    HTN (hypertension) I10    DM2 (diabetes mellitus, type 2) ([facility]) E11.9    Dyslipidemia E78.5    Contrast dye induced nephropathy T50.8X1A, N14.1    Chest pain, unspecified R07.9    S/P coronary artery stent placement Z95.5    Postsurgical aortocoronary bypass status Z95.1    Atherosclerosis of renal artery ([facility]) I70.1    Chronic diastolic heart failure ([facility]) I50.32    Morbid obesity ([facility]) E66.01    Essential hypertension, benign I10    Sleep disturbance G47.9    Coronary atherosclerosis I25.10    Mixed hyperlipidemia E78.2    Type II or unspecified type diabetes mellitus without mention of complication, not stated as uncontrolled E11.9    Abdominal pain R10.9    GERD (gastroesophageal reflux disease) K21.9    Constipation K59.00    Chronic renal insufficiency N18.9    Prostate cancer ([facility] C61    APRYL (obstructive sleep apnea) X86.75    Diastolic CHF, acute on chronic ([facility]) I50.33    CHF (congestive heart failure), NYHA class IV ([facility]) I50.9    Fatigue R53.83    Secondary hyperparathyroidism of renal origin ([facility] N25.81    Chest pain R07.9    Hyperuricemia E79.0    Chronic kidney disease, stage V ([facility]) N18.5    ESRD (end stage renal disease) ([facility]) N18.6           Subjective:     Patient is complaining of mild headache, mild SOB, did not receive BP medicine. Has right Temporary HD catheter. A comprehensive review of systems was negative except for that written in the History of Present Illness.     Objective:     Visit Vitals    /68 (BP 1 Location: Right arm, BP Patient Position: At rest)  Comment: JESSI Brown aware;imminent dialysis planned    Pulse 63    Temp 97.6 °F (36.4 °C)    Resp 18    Wt 100.2 kg (220 lb 12.8 oz)    SpO2 97%    BMI 33.57 kg/m2         Intake/Output Summary (Last 24 hours) at 11/08/17 1351  Last data filed at 11/08/17 1302   Gross per 24 hour   Intake              420 ml   Output             2175 ml   Net            -1755 ml       Current Facility-Administered Medications   Medication Dose Route Frequency Provider Last Rate Last Dose    nitroglycerin (NITROSTAT) tablet 0.4 mg  0.4 mg SubLINGual PRN Urszula Coughlin NP        epoetin lee (EPOGEN;PROCRIT) 5,000 Units  5,000 Units IntraVENous DIALYSIS MON, WED & Darren Figueredo MD        doxercalciferol (HECTOROL) 4 mcg/2 mL injection 0.5 mcg  0.5 mcg IntraVENous DIALYSIS MON, WED & Darren Figueredo MD        allopurinol (ZYLOPRIM) tablet 100 mg  100 mg Oral DAILY Vera Land MD   Stopped at 11/08/17 0900    aspirin chewable tablet 81 mg  81 mg Oral DAILY Vera Land MD   Stopped at 11/08/17 0900    B complex-vitaminC-folic acid (NEPHROCAP) cap  1 Cap Oral DAILY Vera Land MD   Stopped at 11/08/17 0900    cloNIDine HCl (CATAPRES) tablet 0.2 mg  0.2 mg Oral TID Vera Land MD   Stopped at 11/08/17 0900    fenofibrate nanocrystallized (TRICOR) tablet 48 mg  48 mg Oral DAILY Vera Land MD   Stopped at 11/08/17 0900    hydrALAZINE (APRESOLINE) tablet 100 mg  100 mg Oral TID Vera Land MD   Stopped at 11/08/17 0900    insulin glargine (LANTUS) injection 20 Units  20 Units SubCUTAneous Q12H Vera Land MD   20 Units at 11/08/17 0900    isosorbide mononitrate ER (IMDUR) tablet 30 mg  30 mg Oral DAILY Vera Land MD   Stopped at 11/08/17 0900    metoprolol tartrate (LOPRESSOR) tablet 100 mg  100 mg Oral BID Vera Land MD   Stopped at 11/08/17 0900    NIFEdipine ER (PROCARDIA XL) tablet 90 mg  90 mg Oral DAILY Vera Land MD   Stopped at 11/08/17 0900    senna-docusate (PERICOLACE) 8.6-50 mg per tablet 1 Tab  1 Tab Oral DAILY Vera Land MD Stopped at 11/08/17 0900    simvastatin (ZOCOR) tablet 40 mg  40 mg Oral QHS Terence Aase, MD   40 mg at 11/07/17 2200    tamsulosin (FLOMAX) capsule 0.4 mg  0.4 mg Oral DAILY Terence Aase, MD   Stopped at 11/08/17 0900    insulin lispro (HUMALOG) injection   SubCUTAneous AC&HS Terence Aase, MD   100 Units at 11/08/17 0730    glucose chewable tablet 16 g  16 g Oral PRN Terence Aase, MD        glucagon (GLUCAGEN) injection 1 mg  1 mg IntraMUSCular PRN Terence Aase, MD        dextrose (D50W) injection syrg 12.5-25 g  25-50 mL IntraVENous PRN Terence Aase, MD        acetaminophen (TYLENOL) tablet 650 mg  650 mg Oral Q4H PRN Yogesh Olguin MD        bisacodyl (DULCOLAX) tablet 5 mg  5 mg Oral DAILY PRN Terence Aase, MD        ondansetron [facility]) injection 4 mg  4 mg IntraVENous Q4H PRN Terence Aase, MD            Physical Exam:     Physical Exam:   General:  Alert, cooperative, no distress, appears stated age. Lungs:   Clear to auscultation bilaterally. Heart:  Regular rate and rhythm, S1, S2 normal, no murmur, click, rub or gallop. Abdomen:   Soft, non-tender. Bowel sounds normal. No masses,  No organomegaly. Extremities: Extremities normal, atraumatic, no cyanosis or edema, has Temporary HD catheter in right IJ.    Skin: Skin color, texture, turgor normal. No rashes or lesions         Data Review:    CBC w/Diff    Recent Labs      11/08/17   0600   WBC  8.9   RBC  3.77*   HGB  10.6*   HCT  34.1*   MCV  90.5   MCH  28.1   MCHC  31.1   RDW  14.0    Recent Labs      11/08/17   0600   MONOS  6   EOS  4   BASOS  0   RDW  14.0        Comprehensive Metabolic Profile    Recent Labs      11/08/17   0600  11/07/17   1730   NA  137  135*   K  3.2*  3.7   CL  97*  97*   CO2  30  32   BUN  69*  68*   CREA  4.68*  4.93*    Recent Labs      11/08/17   0600 11/07/17   1730   CA  9.9  9.8  8.9   PHOS  4.4   --    ALB   --   3.3*   TP   --   7.1   SGOT   --   16   TBILI   --   0.4 Lab Results   Component Value Date/Time    GFR est AA 15 11/08/2017 06:00 AM    GFR est non-AA 12 11/08/2017 06:00 AM    Creatinine, POC 3.1 07/08/2015 12:22 PM    Creatinine 4.68 11/08/2017 06:00 AM    BUN 69 11/08/2017 06:00 AM    BUN, POC 51 06/23/2017 08:35 AM    Sodium,  06/23/2017 08:35 AM    Sodium 137 11/08/2017 06:00 AM    Potassium 3.2 11/08/2017 06:00 AM    Potassium, POC 3.9 06/23/2017 08:35 AM    Chloride,  06/23/2017 08:35 AM    Chloride 97 11/08/2017 06:00 AM    CO2 30 11/08/2017 06:00 AM                 Active Hospital Problems    Diagnosis Date Noted    [facility]) [facility] 11/08/2017    Chronic kidney disease, stage V [facility] 10/28/2017    Chest pain 10/27/2017    Secondary hyperparathyroidism of renal origin ([facility] 10/25/2017    S/P coronary artery stent placement 03/12/2013     vg to diag stent 10/2012      Coronary atherosclerosis of native coronary artery 10/07/2012    HTN (hypertension) 10/07/2012    DM2 (diabetes mellitus, type 2) ([facility] 10/07/2012            Plan: Will start dialysis today & do tomorrow  after checking that  Chest x-ray beng done for  HD cath  placement.     Lesly Panchal MD

## 2017-11-08 NOTE — DIABETES MGMT
Diabetes Patient/Family Education Record    Factors That  May Influence Patients Ability  to Learn or  Comply with Recommendations   []   Language barrier    []   Cultural needs   []   Motivation    []   Cognitive limitation    []   Physical   [x]   Education    []   Physiological factors   []   Hearing/vision/speaking impairment   []   Christianity beliefs    []   Financial factors   []  Other:   []  No factors identified at this time. Person Instructed:   [x]   Patient   []   Family   []  Other     Preference for Learning:   [x]   Verbal   []   Written   []  Demonstration     Level of Comprehension & Competence:    [x]  Good                                      [] Fair                                     []  Poor                             []  Needs Reinforcement   [x]  Teachback completed    Education Component:   [x]  Medication management, including how to administer insulin (if appropriate) and potential medication interactions: Patient stated that his physician Dr. Ana Ferguson knows how he is taking insulin at home:  Lantus: up to 60 units daily. Clarified this information with patient and stated that he usually inject 20-30 units of lantus twice daily depending on his blood sugar trend and Dr. Ana Ferguson knows about it. Novolog sliding scale insulin. [x]  Nutritional management: Patient stated that he recently resumed healthy diet to help manage his diabetes and he noticed better fasting blood glucose now. [x]  Exercise   [x]  Signs, symptoms, and treatment of hyperglycemia and hypoglycemia   [x] Prevention, recognition and treatment of hyperglycemia and hypoglycemia   [x]  Importance of blood glucose monitoring and how to obtain a blood glucose meter: Patient stated that he is checking his blood sugar at least 4x daily.     []  Instruction on use of the blood glucose meter   [x]  Discuss the importance of HbA1C monitoring: Discussed with patient his current A1c of 8.3% (10/28/2017) which is equivalent to average blood glucose of 192 mg/dL during the past 2-3 months. Patient acknowledge elevated A1c indicating his diabetes is not controlled. Patient stated that he knew better but did not comply to recommended nutrition for diabetes. His A1c was previously 7.3% (07/06/2016). Patient stated that he ate lot, drank alcohol, and consumed sweets during for months, but changed eating habits during the last couple of months. He is looking forward to improved/lower A1c around Feb. 2019. []  Sick day guidelines   []  Proper use and disposal of lancets, needles, syringes or insulin pens (if appropriate)   [x]  Potential long-term complications (retinopathy, kidney disease, neuropathy, foot care)   [x] Information about whom to contact in case of emergency or for more information    [x]  Goal:  Patient/family will demonstrate understanding of Diabetes Self Management Skills by: 11/15/2017  Plan for post-discharge education or self-management support:    [] Outpatient class schedule provided            [x] Patient Declined: Patient not interested and stated that he already know how to manage his diabetes. Encouraged patient to follow his diabetes treatment plan as prescribed by his doctor.     [] Scheduled for outpatient classes (date) _______         Kobe Hamilton RN

## 2017-11-08 NOTE — DIABETES MGMT
Diabetes Education:    Patient with A1c of 8.3% (10/28/2017). Unable to see patient because he went to cath lab. Plan to see patient later today for assessment of home diabetes management and educational needs.     Ana Bonds RN

## 2017-11-08 NOTE — NURSE NAVIGATOR
[facility] Readmission Patient Interview    The following is related to the patient's last admission and the events following discharge from the hospital:      Date of last hospital discharge: 10/28/2017      Date of Readmission:  11/7/2017    Reason for Readmission:  Chest pain, SOB    Were you kept informed about your diagnoses during your stay in the hospital and what was being done to further evaluate and treat them? * in reference to index admission*      Interviewer Notes:       [] None of time   [] Some of time   [] Most of time   [x] All of time   At the time of your discharge, did someone talk to you about:  *if patient answers yes - ask them to recall what information they remember*     1. What your diagnoses were? 2. What tests or procedures needed to be done after you left? 3. What to watch out for regarding worsening of your disease? 4. What to do if you were experiencing worsening of your disease? 5. Who to contact (and how) if you were experiencing worsening of your disease? Interviewer Notes:           [x] Yes  [] No  [] Not Sure   [x] Yes  [] No  [] Not Sure   [x] Yes  [] No  [] Not Sure   [x] Yes  [] No  [] Not Sure   [x] Yes  [] No  [] Not Sure   Were you asked about your understanding of these instructions? Interviewer Notes:    [x] Yes  [] No  [] Not Sure   Were the discharge instructions written down and given to you before you left? Interviewer Notes:    [x] Yes  [] No  [] Not Sure   Were the written discharge instructions and plans easy to read and understand? Interviewer Notes:    [x] Yes  [] No  [] Not Sure   How confident were you about understanding these instructions? Interviewer Notes:  [x] Very confident   [] Somewhat confident   [] Not confident   [] Not Sure   Do you have a regular doctor who takes care of you for most things?    Primary Providers Name/Practice Name:    [x] Yes  [] No  [] Not Sure   At the time of your discharge, did someone talk to you about which medications to take when you left and which ones to discontinue? Interviewer Notes:    [x] Yes  [] No  [] Not Sure   Did you take your medications as they were prescribed? Interviewer Notes:    [x] Yes  [] No  [] Not Sure   If not, what were the difficulties you experienced with taking your medications? Comments:          After you left the hospital, did you have an appointment with your doctor? [x] Yes  [] No  [] Not Sure     If yes, who made the appointment? [] I did    [] My family   [] Hospital staff   [x] Not Sure   Were you able to get to this appointment? Interviewer Notes:    [x] Yes  [] No  [] Not Sure   How do you think you became sick enough to be readmitted to the hospital?   Comment:   \"Because I didn't get the procedure last time, I wanted to talk to my family\".         Source:  Modified from [facility], [place], [address] [place] [address] Drive

## 2017-11-08 NOTE — DIABETES MGMT
NUTRITIONAL ASSESSMENT GLYCEMIC CONTROL/ PLAN OF CARE     Juan Miguel Thompson           68 y.o.           11/7/2017                    INTERVENTIONS/PLAN:   Monitor need to increase Lantus insulin to 25 units every 12 hours. Continue very insulin resistant correctional Lispro coverage  Added diabetic restriction to renal diet. ASSESSMENT:   Pt is a 68year old male with a past medical history significant for CAD, prostate cancer, chronic diastolic heart failure, CKD stage 4, diabetes, GERD, hypertension, morbid obesity, and dyslipidemia. Blood glucose slightly elevated. Pt is receiving basal and correctional insulin coverage. Pt eating lunch when visited. States appetite is good and reports following a diabetic diet at home. States he has had diabetes for forty years and does not need any education at this time.      Diabetes Management:   Recent blood glucose:     11/7/2017 17:06 11/7/2017 21:22 11/8/2017 07:30 11/8/2017 10:30   239 (H) 231 (H) 179 (H) 188 (H)   Within target range (non-ICU: <140; ICU<180): [] Yes   [x]  No    Current Insulin regimen:   Lantus insulin 20 units every 12 hours  Correctional Lispro insulin 4 times daily ACHS (very insulin resistant)   Home medication/insulin regimen:   Lantus insulin, 20-30 units twice daily depending on his blood sugar trend  HbA1c: 8.3% (estimated average glucose of 192 mg/dL)  Adequate glycemic control PTA:  [] Yes  [x] No     SUBJECTIVE/OBJECTIVE:   Information obtained from: patient, chart review     Diet: Renal, No concentrated sweets     Patient Vitals for the past 100 hrs:   % Diet Eaten   11/08/17 0934 0 %   11/07/17 1715 100 %     Medications: [x]  Reviewed     Most Recent POC Glucose: Recent Labs      11/08/17   0600  11/07/17   1730   GLU  147*  250*       Labs:   Lab Results   Component Value Date/Time    Hemoglobin A1c 8.3 10/28/2017 04:20 AM     Lab Results   Component Value Date/Time    Sodium 137 11/08/2017 06:00 AM    Potassium 3.2 11/08/2017 06:00 AM    Chloride 97 11/08/2017 06:00 AM    CO2 30 11/08/2017 06:00 AM    Anion gap 10 11/08/2017 06:00 AM    Glucose 147 11/08/2017 06:00 AM    BUN 69 11/08/2017 06:00 AM    Creatinine 4.68 11/08/2017 06:00 AM    Calcium 9.8 11/08/2017 06:00 AM    Calcium 9.9 11/08/2017 06:00 AM    Magnesium 2.4 07/06/2016 02:40 AM    Phosphorus 4.4 11/08/2017 06:00 AM    Albumin 3.3 11/07/2017 05:30 PM     Anthropometrics: BMI (calculated): 33.6  Wt Readings from Last 1 Encounters:   11/08/17 100.2 kg (220 lb 12.8 oz)    Ht Readings from Last 1 Encounters:   11/07/17 5' 8\" (1.727 m)     Estimated Nutrition Needs: 8060-9894 Kcal/day, 80-90 grams protein/day    Based on:   [x] Actual BW    []  IBW   [] Adjusted BW      Nutrition Diagnoses:    Altered nutrition related lab value related to diabetes as evidenced by Hemoglobin A1c of 8.3%  Nutrition Interventions: diabetes education, coordination of care   Goal: Blood glucose will be within target range of  mg/dL by 11/11/17    Nutrition Monitoring and Evaluation    []     Monitor po intake on meal rounds  [x]     Continue inpatient monitoring and intervention  []     Other:    Nurys Juarez RD, CDE

## 2017-11-08 NOTE — CARDIO/PULMONARY
Cardiac rehab by to see patient. He stated that he was trying to eat right and do more activity. He stated that he did plenty of relaxation. Encouraged patient to look closely at what he is eating and try to eat fresh and fresh frozen items. He appreciated the visit. Will continue to follow.

## 2017-11-08 NOTE — PROGRESS NOTES
[facility] Hospitalist Group  Progress Note    Patient: Shelia Zhou Age: 68 y.o. : 1940 MR#: [id number] SSN: [id number]  Date: 2017     Subjective:     Patient seen in HD, awake, alert. Denies CP or sob    Assessment/Plan:     1. chest pain. 2. Coronary artery disease. 3. Stage 5 chronic kidney disease.- ESRD  4. Diabetes mellitus. 5. Hypertension. 6. History of prostate cancer.   7- Uncontrolled HTN  8- Anemia of chronic disease  9- Chronic diastolic chf - compensated    PLAN  HD as per nephro, s/p [facility]  Cardio following, on asa, BB, statin  On clonidine  Lantus, ssi  DVT prophylaxis  Advanced care planning: full code per patient    Case discussed with:  [x]Patient  []Family  []Nursing  []Case Management  DVT Prophylaxis:  []Lovenox  []Hep SQ  []SCDs  []Coumadin   []On Heparin gtt    Objective:   VS:   Visit Vitals    /79    Pulse 84    Temp 97.9 °F (36.6 °C) (Oral)    Resp 19    Wt 100.2 kg (220 lb 12.8 oz)    SpO2 97%    BMI 33.57 kg/m2      Tmax/24hrs: Temp (24hrs), Av.8 °F (36.6 °C), Min:97.6 °F (36.4 °C), Max:98.1 °F (36.7 °C)    Intake/Output Summary (Last 24 hours) at 17 1716  Last data filed at 17 1302   Gross per 24 hour   Intake              180 ml   Output             2125 ml   Net            -1945 ml       General:  Awake, alert  Cardiovascular:  S1S2+, RRR  Pulmonary:  CTA b/l  GI:  Soft, BS+, NT, ND  Extremities:  No edema      Labs:    Recent Results (from the past 24 hour(s))   METABOLIC PANEL, COMPREHENSIVE    Collection Time: 17  5:30 PM   Result Value Ref Range    Sodium 135 (L) 136 - 145 mmol/L    Potassium 3.7 3.5 - 5.5 mmol/L    Chloride 97 (L) 100 - 108 mmol/L    CO2 32 21 - 32 mmol/L    Anion gap 6 3.0 - 18 mmol/L    Glucose 250 (H) 74 - 99 mg/dL    BUN 68 (H) 7.0 - 18 MG/DL    Creatinine 4.93 (H) 0.6 - 1.3 MG/DL    BUN/Creatinine ratio 14 12 - 20      GFR est AA 14 (L) >60 ml/min/1.73m2    GFR est non-AA 11 (L) >60 ml/min/1.73m2    Calcium 8.9 8.5 - 10.1 MG/DL    Bilirubin, total 0.4 0.2 - 1.0 MG/DL    ALT (SGPT) 19 16 - 61 U/L    AST (SGOT) 16 15 - 37 U/L    Alk.  phosphatase 85 45 - 117 U/L    Protein, total 7.1 6.4 - 8.2 g/dL    Albumin 3.3 (L) 3.4 - 5.0 g/dL    Globulin 3.8 2.0 - 4.0 g/dL    A-G Ratio 0.9 0.8 - 1.7     EKG, 12 LEAD, SUBSEQUENT    Collection Time: 11/07/17  6:10 PM   Result Value Ref Range    Ventricular Rate 65 BPM    Atrial Rate 65 BPM    P-R Interval 144 ms    QRS Duration 110 ms    Q-T Interval 454 ms    QTC Calculation (Bezet) 472 ms    Calculated P Axis 20 degrees    Calculated R Axis -14 degrees    Calculated T Axis 41 degrees    Diagnosis       Normal sinus rhythm  Incomplete right bundle branch block  Borderline ECG  When compared with ECG of 27-OCT-2017 02:04,  Nonspecific T wave abnormality no longer evident in Inferior leads  T wave inversion no longer evident in Anterolateral leads  QT has lengthened  Confirmed by Selma Aguilar [id number]) on 11/8/2017 8:23:21 AM     GLUCOSE, POC    Collection Time: 11/07/17  9:22 PM   Result Value Ref Range    Glucose (POC) 231 (H) 70 - 709 mg/dL   METABOLIC PANEL, BASIC    Collection Time: 11/08/17  6:00 AM   Result Value Ref Range    Sodium 137 136 - 145 mmol/L    Potassium 3.2 (L) 3.5 - 5.5 mmol/L    Chloride 97 (L) 100 - 108 mmol/L    CO2 30 21 - 32 mmol/L    Anion gap 10 3.0 - 18 mmol/L    Glucose 147 (H) 74 - 99 mg/dL    BUN 69 (H) 7.0 - 18 MG/DL    Creatinine 4.68 (H) 0.6 - 1.3 MG/DL    BUN/Creatinine ratio 15 12 - 20      GFR est AA 15 (L) >60 ml/min/1.73m2    GFR est non-AA 12 (L) >60 ml/min/1.73m2    Calcium 9.8 8.5 - 10.1 MG/DL   CBC WITH AUTOMATED DIFF    Collection Time: 11/08/17  6:00 AM   Result Value Ref Range    WBC 8.9 4.6 - 13.2 K/uL    RBC 3.77 (L) 4.70 - 5.50 M/uL    HGB 10.6 (L) 13.0 - 16.0 g/dL    HCT 34.1 (L) 36.0 - 48.0 %    MCV 90.5 74.0 - 97.0 FL    MCH 28.1 24.0 - 34.0 PG    MCHC 31.1 31.0 - 37.0 g/dL    RDW 14.0 11.6 - 14.5 % PLATELET 035 389 - 727 K/uL    MPV 11.3 9.2 - 11.8 FL    NEUTROPHILS 66 40 - 73 %    LYMPHOCYTES 24 21 - 52 %    MONOCYTES 6 3 - 10 %    EOSINOPHILS 4 0 - 5 %    BASOPHILS 0 0 - 2 %    ABS. NEUTROPHILS 5.9 1.8 - 8.0 K/UL    ABS. LYMPHOCYTES 2.1 0.9 - 3.6 K/UL    ABS. MONOCYTES 0.5 0.05 - 1.2 K/UL    ABS. EOSINOPHILS 0.3 0.0 - 0.4 K/UL    ABS. BASOPHILS 0.0 0.0 - 0.1 K/UL    DF AUTOMATED     PHOSPHORUS    Collection Time: 11/08/17  6:00 AM   Result Value Ref Range    Phosphorus 4.4 2.5 - 4.9 MG/DL   PTH INTACT    Collection Time: 11/08/17  6:00 AM   Result Value Ref Range    Calcium 9.9 8.5 - 10.1 MG/DL    PTH, Intact 475.6 (H) 14.0 - 72.0 pg/mL   HEP B SURFACE AG    Collection Time: 11/08/17  6:00 AM   Result Value Ref Range    Hepatitis B surface Ag <0.10 <1.00 Index    Hep B surface Ag Interp.  NEGATIVE  NEG     PROTHROMBIN TIME + INR    Collection Time: 11/08/17  6:00 AM   Result Value Ref Range    Prothrombin time 12.1 11.5 - 15.2 sec    INR 0.9 0.8 - 1.2     GLUCOSE, POC    Collection Time: 11/08/17  7:30 AM   Result Value Ref Range    Glucose (POC) 179 (H) 70 - 110 mg/dL   GLUCOSE, POC    Collection Time: 11/08/17 10:30 AM   Result Value Ref Range    Glucose (POC) 188 (H) 70 - 110 mg/dL       Signed By: Brigido Garcia MD     November 8, 2017 5:16 PM

## 2017-11-08 NOTE — PROGRESS NOTES
Care Management Interventions  PCP Verified by CM: Yes  Current Support Network: Lives with Spouse  Confirm Follow Up Transport: Family  Plan discussed with Pt/Family/Caregiver: Yes     Pt is a 68year old male admitted for NSTEMI angina. , chest pain. Pt is alert and oriented in room. Pt states that he resides in the home with her spouse Devyn Souza [phone] or [phone]). Pt's plan is to return home at discharge. He shares that he is independent with ADLs and reports that he uses a cane as needed to assist with ambulation. Pt mentions that family will be assisting him with transportation home at discharge.

## 2017-11-08 NOTE — OP NOTES
[address]    Name:  Jose Astorga  MR#:  [id number]  :  1940  Account #:  [de-identified]  Date of Adm:  2017  Date of Surgery:  2017      PREOPERATIVE DIAGNOSIS: Acute renal failure. POSTOPERATIVE DIAGNOSIS: Acute renal failure. PROCEDURES PERFORMED: Placement of right internal jugular  temporary hemodialysis catheter, ultrasound of right internal jugular  with interpretation. ESTIMATED BLOOD LOSS: Zero. SPECIMENS REMOVED: None. ANESTHESIA: Local.    SURGEON: La Milian DO    COMPLICATIONS: Zero. CONDITION OF THE PATIENT: Stable. DETAILS OF PROCEDURE: The patient is a 49-year-old male  admitted with heart failure and coronary artery disease, is planning for  heart catheterization. They need dialysis access to dialyze him before  and after with his acute renal failure as well. Called by Nephrology and  agreed for his catheter placement today. He was brought to the cath lab, placed on the table in supine position. His right neck was prepped and draped in the usual standard fashion,  1% lidocaine was used for local. I did an ultrasound. His internal  jugular vein on the right was dilated, compressible. No signs of DVT. It  was patent. There was a single anterior puncture with these, placed a  wire into the central system, seen on fluoroscopy. Placed a 16 cm  triple-lumen hemodialysis catheter without any difficulty. It was secured  in place. It flushes and aspirates easily. Caps were applied after it was  flushed and a sterile dressing was applied. He can use this right away  for dialysis. We will check a chest x-ray.         Najma Walker DO CM / Kristi Chaves  D:  2017   10:12  T:  2017   14:08  Job #:  [id number]

## 2017-11-08 NOTE — PROGRESS NOTES
[facility]      CARDIOLOGY PROGRESS NOTE  RECS:  1. Recent NSTEMI (non-ST elevated myocardial infarction) ([facility] I21.4 410.70       recent mi- will recommend cath after 1-2 HD which will help CHF  now willing to proceed with cath-as ready for dialysis   2. Atherosclerosis of native coronary artery of native heart with angina pectoris ([facility] I25.119 414.01         413. 9       recurrent angina   3. Essential hypertension I10 401. 9       stable   4. Postsurgical aortocoronary bypass status Z95.1 V45.81       stable   5. Chronic diastolic heart failure (HCC)  NYHA3 I50.32 428.32         sob on exertion   6. S/P coronary artery stent placement Z95.5 V45.82      Plan d/w pt and Dr Donovan Go:  Hospital Problems  Date Reviewed: 11/7/2017          Codes Class Noted POA    Chest pain ICD-10-CM: R07.9  ICD-9-CM: 786.50  10/27/2017 Unknown                SUBJECTIVE:  No CP  Severe DARBY- few steps    OBJECTIVE:    VS:   Visit Vitals    /68 (BP 1 Location: Right arm, BP Patient Position: At rest)    Pulse 63    Temp 97.6 °F (36.4 °C)    Resp 18    Wt 100.2 kg (220 lb 12.8 oz)    SpO2 97%    BMI 33.57 kg/m2         Intake/Output Summary (Last 24 hours) at 11/08/17 1242  Last data filed at 11/08/17 0934   Gross per 24 hour   Intake              420 ml   Output             1850 ml   Net            -1430 ml     TELE: normal sinus rhythm    General: alert and in no apparent distress  HENT: Normocephalic, atraumatic. Normal external eye.   Neck :  no bruit, JVD difficult to assess due to obesity  Cardiac:  regular rate and rhythm, S1, S2 normal, no murmur, click, rub or gallop  Chest/Lungs:chest clear, no wheezing, rales, normal symmetric air entry  Abdomen: Soft, nontender, no masses  Extremities:  No c/c/edema, peripheral pulses present      Labs: Results:       Chemistry Recent Labs      11/08/17   0600  11/07/17   1730   GLU  147*  250*   NA  137  135*   K  3.2*  3.7   CL  97* 97*   CO2  30  32   BUN  69*  68*   CREA  4.68*  4.93*   CA  9.9  9.8  8.9   PHOS  4.4   --    AGAP  10  6   BUCR  15  14   AP   --   85   TP   --   7.1   ALB   --   3.3*   GLOB   --   3.8   AGRAT   --   0.9      CBC w/Diff Recent Labs      11/08/17   0600   WBC  8.9   RBC  3.77*   HGB  10.6*   HCT  34.1*   PLT  305   GRANS  66   LYMPH  24   EOS  4      Cardiac Enzymes No results for input(s): CPK, CKND1, PAIGE in the last 72 hours. No lab exists for component: CKRMB, TROIP   Coagulation Recent Labs      11/08/17   0600   PTP  12.1   INR  0.9       Lipid Panel Lab Results   Component Value Date/Time    Cholesterol, total 158 10/24/2017 05:08 AM    HDL Cholesterol 39 10/24/2017 05:08 AM    LDL, calculated 72.4 10/24/2017 05:08 AM    VLDL, calculated 46.6 10/24/2017 05:08 AM    Triglyceride 233 10/24/2017 05:08 AM    CHOL/HDL Ratio 4.1 10/24/2017 05:08 AM      BNP No results for input(s): BNPP in the last 72 hours.    Liver Enzymes Recent Labs      11/07/17   1730   TP  7.1   ALB  3.3*   AP  85   SGOT  16      Digoxin    Thyroid Studies Lab Results   Component Value Date/Time    T4, Total 10.9 10/09/2012 04:53 AM    TSH 2.16 10/23/2017 11:08 PM              Lorenza Montoya MD   Pager # [phone]

## 2017-11-08 NOTE — DIALYSIS
ACUTE HEMODIALYSIS FLOW SHEET    HEMODIALYSIS ORDERS: Physician: Puja Atkins     Pt monitored by PCT Sydni Shields.      Dialyzer: revaclear         Duration: 3.0 hr  BFR: 300   DFR: 600   Dialysate:  Temp 37 K+   2    Ca+  2.5 Na 140 Bicarb 30   Weight:   kg    Bed Scale [x]     Unable to Obtain []      Dry weight/UF Goal: 1000 ml  Access CVL   Needle Gauge     Heparin []  Bolus      Units    [] Hourly       Units    [x]None     Catheter locking solution heparin   Pre BP: 201/78    Pulse: 840    Temperature:   97.9  Respirations: 18  Tx: NS       ml/Bolus  Other        [x] N/A   Labs: Pre        Post:        [x] N/A   Additional Orders(medications, blood products, hypotension management):       [x] N/A     [x] Time Out/Safety Check  [x] [facility] Consent Verified     CATHETER ACCESS: []N/A   []Right   []Left   []IJ     []Fem   [] First use X-ray verified     [x]Tunnel                [] Non Tunneled   [x]No S/S infection  []Redness  []Drainage []Cultured []Swelling []Pain   [x]Medical Aseptic Prep Utilized   []Dressing Changed  [] Biopatch  Date:       []Clotted   [x]Patent   Flows: [x]Good  []Poor  []Reversed   If access problem,  notified: []Yes    []N/A  Date:           GRAFT/FISTULA ACCESS:  [x]N/A     []Right     []Left     []UE     []LE   []AVG   []AVF        []Buttonhole    []Medical Aseptic Prep Utilized   []No S/S infection  []Redness  []Drainage []Cultured []Swelling []Pain    Bruit:   [] Strong    [] Weak       Thrill :   [] Strong    [] Weak       Needle Gauge:    Length:     If access problem,  notified: []Yes     [x]N/A  Date:        Please describe access if present and not used:       GENERAL ASSESSMENT:    LUNGS:  Rate  SaO2%        [x] N/A    [x] Clear  [] Coarse  [] Crackles  [] Wheezing        [] Diminished     Location : []RLL   []LLL    []RUL  []NOMI   Cough: []Productive  []Dry  [x]N/A   Respirations:  [x]Easy  []Labored   Therapy:  [x]RA  []NC  l/min    Mask: []NRB []Venti       O2% []Ventilator  []Intubated  [] Trach  [] BiPaP   CARDIAC: [x]Regular      [] Irregular   [] Pericardial Rub  [] JVD        []  Monitored  [] Bedside  [] Remotely monitored [] N/A  Rhythm:    EDEMA: [] None  [x]Generalized  [] Pitting [] 1    [] 2    [] 3    [] 4                 [] Facial  [] Pedal  []  UE  [] LE   SKIN:   [x] Warm  [] Hot     [] Cold   [x] Dry     [] Pale   [] Diaphoretic                  [] Flushed  [] Jaundiced  [] Cyanotic  [] Rash  [] Weeping   LOC:    [x] Alert      [x]Oriented:    [x] Person     [x] Place  [x]Time               [] Confused  [] Lethargic  [] Medicated  [] Non-responsive     GI / ABDOMEN:  [] Flat    [] Distended    [x] Soft    [] Firm   []  Obese                             [] Diarrhea  [x] Bowel Sounds  [] Nausea  [] Vomiting       / URINE ASSESSMENT:[] Voiding   [x] Oliguria  [] Anuria   []  Xiao     [] Incontinent    []  Incontinent Brief      []  Bathroom Privileges     PAIN: [x] 0 []1  []2   []3   []4   []5   []6   []7   []8   []9   []10            Scale 0-10  Action/Follow Up:    MOBILITY:  [] Amb    [] Amb/Assist    [x] Bed    [] Wheelchair  [] Stretcher      All Vitals and Treatment Details on Attached [address]: [facility]          Room # 1812     [x] 1st Time Acute  [] Stat  [x] Routine  [] Urgent     [x] Acute Room  []  Bedside  [] ICU/CCU  [] ER   Isolation Precautions:  [x] Dialysis   [] Airborne   [] Contact    [] Reverse   Special Considerations:         [] Blood Consent Verified [x]N/A     ALLERGIES:  benadryl, ibuprofen [] NKA          Code Status:  [x] Full Code  [] DNR  [] Other           HBsAg ONLY: Date Drawn 11/8/17         [x]Negative []Positive []Unknown   HBsAb: Date 11/8/17    [x] Susceptible   [] Onwdge83 []Not Drawn  [] Drawn     Current Labs:    Date of Labs: Today [x]        Cut and paste current labs here. DIET:  [x] Renal    [] Other     [] NPO     []  Diabetic      PRIMARY NURSE REPORT: First initial/Last name/Title      Pre Dialysis: Agustin Mcallister RN    Time: 0900      EDUCATION:    [x] Patient [] Other         Knowledge Basis: []None [x]Minimal [] Substantial   Barriers to learning  [x]N/A   [] Access Care     [] S&S of infection     [] Fluid Management     []K+     [x]Procedural    []Albumin     [] Medications     [] Tx Options     [] Transplant     [] Diet     [] Other   Teaching Tools:  [x] Explain  [] Demo  [] Handouts [] Video  Patient response:   [x] Verbalized understanding  [] Teach back  [] Return demonstration [] Requires follow up   Inappropriate due to            [address] Before each treatment:     Machine Number:                   [address] [place] [address]                                   [x] Unit Machine # 9 with centralized RO                                  [] Portable Machine #1/RO serial # [id number]                                  [] Portable Machine #2/RO serial # [id number]                                  [] Portable Machine #3/RO serial # [id number]                                                                                                       [facility]                                  [] Portable Machine #11/RO serial # [id number]                                   [] Portable Machine #12/RO serial # [id number]                                  [] Portable Machine #13/RO serial #  [id number]      Alarm Test:  Pass time 1400         Other:         [x] RO/Machine Log Complete   Temp    37            [x]Extracorporeal Circuit Tested for integrity   Dialysate: pH  7.4 Conductivity: Meter   14     HD Machine   14.2                  TCD: 14  Dialyzer Lot # [id number]        Blood Tubing Lot # [id number]     Saline Lot #  [id number]     CHLORINE TESTING-Before each treatment and every 4 hours    Total Chlorine: [x] less than 0.1 ppm  Time: 0800 4 Hr/2nd Check Time: 1100   (if greater than 0.1 ppm from Primary then every 30 minutes from Secondary)     TREATMENT INITIATION  with Dialysis Precautions:   [x] All Connections Secured                 [x] Saline Line Double Clamped   [x] Venous Parameters Set                  [x] Arterial Parameters Set    [x] Prime Given  250 ml                        [x]Air Foam Detector Engaged      Treatment Initiation Note: Pt arrived in stable condition via bed. No acute distress noted. Right I/J HD cath accessed and treatment initiated without difficulty. Medication Dose Volume Route Initials Dialyzer Cleared: [x] Good [] Fair  [] Poor    Blood processed:  36L  UF Removed  1400 Ml    Post Wt:  99 kg  POst BP: 189/67   Pulse: 86      Respirations: 18  Temperature: 98.3                                   Post Tx Vascular Access: AVF/AVG: Bleeding stopped Art  min. Cecil. Min   N/A                                   Catheter: Locking solution: Heparin 1:1000 Art. 1.6 ml   Cecil. 1.6  ml                                 Post Assessment:                                    Skin:  [x] Warm  [x] Dry [] Diaphoretic    [] Flushed  [] Pale [] Cyanotic   [facility] Signatures Title Initials  Time Lungs: [x] Clear    [] Course  [] Crackles  [] Wheezing [] Diminished   Harpreet Wild RN    Cardiac: [x] Regular   [] Irregular   [] Monitor  [] N/A  Rhythm:           Edema:  [x] None    [] General     [] Facial   [] Pedal    [] UE    [] LE       Pain: [x]0  []1  []2   []3  []4   []5   []6   []7   []8   []9   []10         Post Treatment Note: Pt tolerated HD tx well. No acute distress noted post tx. 1200 ml UF removed. No acute distress noted during or post HD treatment.      POST TREATMENT PRIMARY NURSE HANDOFF REPORT:     First initial/Last name/Title         Post Dialysis: Anton Bass RN Time: 1800     Abbreviations: AVG-arterial venous graft, AVF-arterial venous fistula, IJ-Internal Jugular, Subcl-Subclavian, Fem-Femoral, Tx-treatment, AP/HR-apical heart rate, DFR-dialysate flow rate, BFR-blood flow rate, AP-arterial pressure, -venous pressure, UF-ultrafiltrate, TMP-transmembrane pressure, Cecil-Venous, Art-Arterial, RO-Reverse Osmosis

## 2017-11-08 NOTE — H&P
[address] [facility]  ROUTINE H AND PS    Name:  Yassine Millard  MR#:  [id number]  :  1940  Account #:  [de-identified]  Date of Adm:  2017      PRIMARY CARE PHYSICIAN: Blanca Gtz MD    REQUESTING PHYSICIAN: Dr. Tsering Alarcon. CHIEF COMPLAINT: The patient was sent here from Dr. Van Page office for off and chest pain. HISTORY OF PRESENT ILLNESS: This is a 26-year-old male patient  with the below-mentioned medical problem, presented to hospital from  Dr. Van Page office for further evaluation of chest pain and some  shortness of breath on exertion. The patient told me he had some  retrosternal chest pain earlier today, so he decided to go to Dr. Van Page office. His pain is much better. He has had off and on epigastric  pain. He accepts that he has dyspnea on exertion and denies for  coughing. No headache or dizziness or no syncopal effect. No visual  disturbances. No bowel or bladder disturbances. He had a normal  bowel movement this morning. Some chronic back pain. No tingling,  numbness. He says he also decided to go on dialysis, as well as have a cardiac  workup done. PAST MEDICAL HISTORY:  1. Coronary artery disease and status post stenting in 2017.  2. Prostate cancer. 3. Chronic diastolic heart failure. 4. CKD, stage 4.  5. Constipation. 6. Diabetes mellitus. 7. GERD. 8. Hypertension. 9. Morbid obesity. 10. Sleep apnea. 11. Dyslipidemia. PAST SURGICAL HISTORY:  1. Carotid endarterectomy. 2. Colonoscopy. 3. Cholecystectomy. ALLERGIES: NKDA. HOME MEDICATION: The patient says he has been taking same  medication he was discharged from here, that includes:  1. Imdur 30 mg daily. 2. Aspirin 81 mg daily. 3. Nephrocaps 1 capsule daily. 4. Rocaltrol 0.25 mcg on Monday, Wednesday, Friday. 5. Clonidine 0.2 mg 3 times a day. 6. Lynn-Colace 1 tablet daily. 7. Lofibra 54 mg daily. 8. Zocor 40 mg daily.   9. Lopressor 100 mg twice a day.  10. Plavix 75 mg daily. 11. Flomax 0.4 mg daily. 12. Vitamin D3 50,000 units every 1 week. 13. Lasix 40 mg daily as needed for shortness of breath and/or  swelling. 14. Hydralazine 100 mg 3 times a day. 15. Procardia XL 90 mg daily. 16. Lantus 20 units b.i.d.  17. Insulin Humalog 20 units 3 times a day or sliding scale. 18. Nitroglycerin sublingual p.r.n. for chest pain. 19. Allopurinol 100 mg daily. SOCIAL HISTORY: He lives with his wife. Denies smoking or drinking  any alcohol. FAMILY HISTORY: Positive for heart problem. Negative for stroke or  cancer. PHYSICAL EXAMINATION:  GENERAL: The patient is awake, not in acute distress. VITAL SIGNS: Temperature 98.4, heart rate 69, respiratory rate 18,  blood pressure 164/71. HEENT: PERRLA, EOMI. Atraumatic, normocephalic. NECK: No JVD. Trachea is midline. CHEST: CTA bilaterally. No tenderness. CVS: S1, S2 heard. ABDOMEN: Soft, nontender. Bowel sounds present. EXTREMITIES: Pitting pedal edema present. NEUROLOGICAL: Moves all 4 extremities very well. SKIN: No active skin rash. LABORATORY DATA: Sodium 135, potassium 3.7, chloride 97,  bicarbonate 32, BUN of 68, creatinine 4.93. Liver function test is  normal.    EKG x1 showed normal sinus rhythm, incomplete right bundle branch  block, no acute ST wave changes to me. Last A1c was 8.3 on October 28, 2017. ASSESSMENT:  1. Atypical chest pain. 2. Coronary artery disease. 3. Stage 4 chronic kidney disease. 4. Diabetes mellitus. 5. Hypertension. 6. History of prostate cancer. PLAN: The patient was already admitted here. It was a direct  admission. Hospitalist service was not notified until cardiologist PA called us. The  patient was already seen by PA of cardiologist and has been started  on clonidine, hydralazine, metoprolol, Imdur, Procardia XL for  hypertension. Will continue Zocor and aspirin at this point. Plavix will  be on hold as patient will need dialysis catheter.   Radha Aguirre was  consulted. He already started that process of having him a dialysis  catheter placed via Vascular Surgery Department. We will put him on sliding  scale and continue Lantus, ADA diet. HE WISHES TO BE A FULL  CODE. TOTAL TIME: Greater than 65 minutes.          MD TORO Mendiola / Deidre Leo  D:  11/07/2017   19:10  T:  11/07/2017   23:18  Job #:  [id number]

## 2017-11-09 LAB
ANION GAP SERPL CALC-SCNC: 8 MMOL/L (ref 3–18)
BASOPHILS # BLD: 0 K/UL (ref 0–0.06)
BASOPHILS NFR BLD: 0 % (ref 0–2)
BUN SERPL-MCNC: 44 MG/DL (ref 7–18)
BUN/CREAT SERPL: 12 (ref 12–20)
CALCIUM SERPL-MCNC: 9.1 MG/DL (ref 8.5–10.1)
CHLORIDE SERPL-SCNC: 97 MMOL/L (ref 100–108)
CO2 SERPL-SCNC: 35 MMOL/L (ref 21–32)
CREAT SERPL-MCNC: 3.62 MG/DL (ref 0.6–1.3)
DIFFERENTIAL METHOD BLD: ABNORMAL
EOSINOPHIL # BLD: 0.2 K/UL (ref 0–0.4)
EOSINOPHIL NFR BLD: 2 % (ref 0–5)
ERYTHROCYTE [DISTWIDTH] IN BLOOD BY AUTOMATED COUNT: 13.8 % (ref 11.6–14.5)
GLUCOSE BLD STRIP.AUTO-MCNC: 119 MG/DL (ref 70–110)
GLUCOSE BLD STRIP.AUTO-MCNC: 169 MG/DL (ref 70–110)
GLUCOSE BLD STRIP.AUTO-MCNC: 186 MG/DL (ref 70–110)
GLUCOSE BLD STRIP.AUTO-MCNC: 331 MG/DL (ref 70–110)
GLUCOSE SERPL-MCNC: 179 MG/DL (ref 74–99)
HCT VFR BLD AUTO: 34.1 % (ref 36–48)
HGB BLD-MCNC: 10.6 G/DL (ref 13–16)
LYMPHOCYTES # BLD: 2.2 K/UL (ref 0.9–3.6)
LYMPHOCYTES NFR BLD: 25 % (ref 21–52)
MCH RBC QN AUTO: 28.3 PG (ref 24–34)
MCHC RBC AUTO-ENTMCNC: 31.1 G/DL (ref 31–37)
MCV RBC AUTO: 90.9 FL (ref 74–97)
MONOCYTES # BLD: 0.7 K/UL (ref 0.05–1.2)
MONOCYTES NFR BLD: 8 % (ref 3–10)
NEUTS SEG # BLD: 5.6 K/UL (ref 1.8–8)
NEUTS SEG NFR BLD: 65 % (ref 40–73)
PHOSPHATE SERPL-MCNC: 3.2 MG/DL (ref 2.5–4.9)
PLATELET # BLD AUTO: 250 K/UL (ref 135–420)
PMV BLD AUTO: 10.8 FL (ref 9.2–11.8)
POTASSIUM SERPL-SCNC: 3.5 MMOL/L (ref 3.5–5.5)
RBC # BLD AUTO: 3.75 M/UL (ref 4.7–5.5)
SODIUM SERPL-SCNC: 140 MMOL/L (ref 136–145)
WBC # BLD AUTO: 8.7 K/UL (ref 4.6–13.2)

## 2017-11-09 PROCEDURE — 90935 HEMODIALYSIS ONE EVALUATION: CPT

## 2017-11-09 PROCEDURE — 74011250636 HC RX REV CODE- 250/636

## 2017-11-09 PROCEDURE — 65660000000 HC RM CCU STEPDOWN

## 2017-11-09 PROCEDURE — 74011250636 HC RX REV CODE- 250/636: Performed by: INTERNAL MEDICINE

## 2017-11-09 PROCEDURE — 74011250636 HC RX REV CODE- 250/636: Performed by: EMERGENCY MEDICINE

## 2017-11-09 PROCEDURE — 74011636637 HC RX REV CODE- 636/637: Performed by: INTERNAL MEDICINE

## 2017-11-09 PROCEDURE — 80048 BASIC METABOLIC PNL TOTAL CA: CPT | Performed by: NURSE PRACTITIONER

## 2017-11-09 PROCEDURE — 84100 ASSAY OF PHOSPHORUS: CPT | Performed by: NURSE PRACTITIONER

## 2017-11-09 PROCEDURE — 36415 COLL VENOUS BLD VENIPUNCTURE: CPT | Performed by: NURSE PRACTITIONER

## 2017-11-09 PROCEDURE — 85025 COMPLETE CBC W/AUTO DIFF WBC: CPT | Performed by: NURSE PRACTITIONER

## 2017-11-09 PROCEDURE — 82962 GLUCOSE BLOOD TEST: CPT

## 2017-11-09 PROCEDURE — 74011250637 HC RX REV CODE- 250/637: Performed by: NURSE PRACTITIONER

## 2017-11-09 PROCEDURE — 74011250637 HC RX REV CODE- 250/637: Performed by: INTERNAL MEDICINE

## 2017-11-09 RX ORDER — CLOPIDOGREL BISULFATE 75 MG/1
75 TABLET ORAL DAILY
Status: DISCONTINUED | OUTPATIENT
Start: 2017-11-09 | End: 2017-11-14 | Stop reason: HOSPADM

## 2017-11-09 RX ORDER — CLOPIDOGREL BISULFATE 75 MG/1
75 TABLET ORAL DAILY
Status: DISCONTINUED | OUTPATIENT
Start: 2017-11-10 | End: 2017-11-09

## 2017-11-09 RX ORDER — DOXERCALCIFEROL 4 UG/2ML
0.5 INJECTION INTRAVENOUS
Status: DISCONTINUED | OUTPATIENT
Start: 2017-11-09 | End: 2017-11-14 | Stop reason: HOSPADM

## 2017-11-09 RX ORDER — LOSARTAN POTASSIUM 50 MG/1
50 TABLET ORAL DAILY
Status: DISCONTINUED | OUTPATIENT
Start: 2017-11-10 | End: 2017-11-12

## 2017-11-09 RX ORDER — HYDRALAZINE HYDROCHLORIDE 20 MG/ML
10 INJECTION INTRAMUSCULAR; INTRAVENOUS
Status: DISCONTINUED | OUTPATIENT
Start: 2017-11-09 | End: 2017-11-13

## 2017-11-09 RX ORDER — HEPARIN SODIUM 1000 [USP'U]/ML
INJECTION, SOLUTION INTRAVENOUS; SUBCUTANEOUS
Status: COMPLETED
Start: 2017-11-09 | End: 2017-11-09

## 2017-11-09 RX ADMIN — STANDARDIZED SENNA CONCENTRATE AND DOCUSATE SODIUM 1 TABLET: 8.6; 5 TABLET, FILM COATED ORAL at 09:08

## 2017-11-09 RX ADMIN — INSULIN LISPRO 12 UNITS: 100 INJECTION, SOLUTION INTRAVENOUS; SUBCUTANEOUS at 22:03

## 2017-11-09 RX ADMIN — METOPROLOL TARTRATE 100 MG: 50 TABLET ORAL at 18:05

## 2017-11-09 RX ADMIN — ALLOPURINOL 100 MG: 100 TABLET ORAL at 09:08

## 2017-11-09 RX ADMIN — ACETAMINOPHEN 650 MG: 325 TABLET ORAL at 09:08

## 2017-11-09 RX ADMIN — CLONIDINE HYDROCHLORIDE 0.2 MG: 0.1 TABLET ORAL at 16:37

## 2017-11-09 RX ADMIN — CLOPIDOGREL 75 MG: 75 TABLET, FILM COATED ORAL at 15:00

## 2017-11-09 RX ADMIN — FENOFIBRATE 48 MG: 48 TABLET, FILM COATED ORAL at 09:08

## 2017-11-09 RX ADMIN — DOXERCALCIFEROL 0.5 MCG: 4 INJECTION, SOLUTION INTRAVENOUS at 15:00

## 2017-11-09 RX ADMIN — HYDRALAZINE HYDROCHLORIDE 100 MG: 50 TABLET, FILM COATED ORAL at 16:37

## 2017-11-09 RX ADMIN — METOPROLOL TARTRATE 100 MG: 50 TABLET ORAL at 09:08

## 2017-11-09 RX ADMIN — CLONIDINE HYDROCHLORIDE 0.2 MG: 0.1 TABLET ORAL at 22:02

## 2017-11-09 RX ADMIN — ISOSORBIDE MONONITRATE 30 MG: 30 TABLET, EXTENDED RELEASE ORAL at 09:08

## 2017-11-09 RX ADMIN — INSULIN LISPRO 3 UNITS: 100 INJECTION, SOLUTION INTRAVENOUS; SUBCUTANEOUS at 09:09

## 2017-11-09 RX ADMIN — HEPARIN SODIUM: 1000 INJECTION INTRAVENOUS; SUBCUTANEOUS at 13:00

## 2017-11-09 RX ADMIN — HYDRALAZINE HYDROCHLORIDE 100 MG: 50 TABLET, FILM COATED ORAL at 09:08

## 2017-11-09 RX ADMIN — NEPHROCAP 1 CAPSULE: 1 CAP ORAL at 09:07

## 2017-11-09 RX ADMIN — HEPARIN SODIUM 5000 UNITS: 5000 INJECTION, SOLUTION INTRAVENOUS; SUBCUTANEOUS at 09:18

## 2017-11-09 RX ADMIN — TAMSULOSIN HYDROCHLORIDE 0.4 MG: 0.4 CAPSULE ORAL at 09:08

## 2017-11-09 RX ADMIN — SIMVASTATIN 40 MG: 40 TABLET, FILM COATED ORAL at 22:01

## 2017-11-09 RX ADMIN — HEPARIN SODIUM 5000 UNITS: 5000 INJECTION, SOLUTION INTRAVENOUS; SUBCUTANEOUS at 02:00

## 2017-11-09 RX ADMIN — ASPIRIN 81 MG CHEWABLE TABLET 81 MG: 81 TABLET CHEWABLE at 09:07

## 2017-11-09 RX ADMIN — INSULIN GLARGINE 20 UNITS: 100 INJECTION, SOLUTION SUBCUTANEOUS at 09:09

## 2017-11-09 RX ADMIN — CLONIDINE HYDROCHLORIDE 0.2 MG: 0.1 TABLET ORAL at 09:08

## 2017-11-09 RX ADMIN — HYDRALAZINE HYDROCHLORIDE 100 MG: 50 TABLET, FILM COATED ORAL at 22:01

## 2017-11-09 RX ADMIN — NIFEDIPINE 90 MG: 90 TABLET, FILM COATED, EXTENDED RELEASE ORAL at 09:08

## 2017-11-09 RX ADMIN — HEPARIN SODIUM 5000 UNITS: 5000 INJECTION, SOLUTION INTRAVENOUS; SUBCUTANEOUS at 18:05

## 2017-11-09 RX ADMIN — INSULIN LISPRO 3 UNITS: 100 INJECTION, SOLUTION INTRAVENOUS; SUBCUTANEOUS at 16:38

## 2017-11-09 RX ADMIN — INSULIN GLARGINE 20 UNITS: 100 INJECTION, SOLUTION SUBCUTANEOUS at 22:02

## 2017-11-09 RX ADMIN — ONDANSETRON 4 MG: 2 INJECTION INTRAMUSCULAR; INTRAVENOUS at 18:05

## 2017-11-09 RX ADMIN — ERYTHROPOIETIN 5000 UNITS: 3000 INJECTION, SOLUTION INTRAVENOUS; SUBCUTANEOUS at 15:00

## 2017-11-09 NOTE — CARDIO/PULMONARY
Cardiac rehab by to see patient earlier. He stated that he was doing ok today. He stated that he has no questions for me at the present time. Will continue to follow throughout his hospital stay.

## 2017-11-09 NOTE — PROGRESS NOTES
This writer was requested by Dr. Saintclair Bright, M.D. A need for dialysis set up in the [facility]. Pt will placed in a chair at the [facility]. Paperwork for intake faxed to [facility]. Pt  is Kim Yun [phone] is agent's direct number. Fax # [phone].

## 2017-11-09 NOTE — PROGRESS NOTES
Bedside turnover given to me by JESSI Brown. Pt is on the cardiac telemetry monitor, I asked him how his day had gone and he stated \"It was a very difficult day\". Pt explained that dialysis was more than he thought it would be and stated \"I don't know if I want to do it again\". I educated him on the need for continuous dialysis to keep his body free of the toxins he is not able to filter out through his kidneys. Pt bathed on his own at the sink  Bedside turnover given to RN _ pt is on the cardiac telemetry monitor, denies chest pain and denies shortness of breath. Call bell is within reach on his bedside table.

## 2017-11-09 NOTE — PROGRESS NOTES
[facility]      CARDIOLOGY PROGRESS NOTE  RECS:      1. Unstable Angina- currently chest pain free improving SOB. Plans for cardiac cath possible tomorrow. I will resume Plavix   2. Hx of recent NSTEMI- was managed medically. Unable to perform cardiac cath due to CKD stage IV now on HD. 3. CAD s/p coronary artery stent placement in 2012 and 7/17 with HERBER stents deployed in SVG in the focal mid lesion and a long proximal lesion. continue with asa and Plavix   4. Chronic diastolic heart failure- appears compensated- monitor for fluid overload.                5. Essential hypertension-better controlled but still elevated. 6. 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 Hyperlipidemia- on statin                                                            8. CKD- now ESRD on HD per Renal following   9. Diabetes managed per medicine.     Patient now on HD for ESRD. Will proceed with [facility] to evaluate CAD.   Npo past MN.        ASSESSMENT:  Hospital Problems  Date Reviewed: 11/8/2017          Codes Class Noted POA    ESRD (end stage renal disease) ([facility] ICD-10-CM: N18.6  ICD-9-CM: 585.6  11/8/2017 Yes        Chronic kidney disease, stage V ([facility] ICD-10-CM: N18.5  ICD-9-CM: 585.5  10/28/2017 Yes        Chest pain ICD-10-CM: R07.9  ICD-9-CM: 786.50  10/27/2017 Unknown        Secondary hyperparathyroidism of renal origin ([facility] (Chronic) ICD-10-CM: N25.81  ICD-9-CM: 588.81  10/25/2017 Yes        S/P coronary artery stent placement ICD-10-CM: Z95.5  ICD-9-CM: V45.82  3/12/2013 Yes    Overview Signed 3/12/2013  3:37 PM by Alexandre Person MD     vg to diag stent 10/2012             Coronary atherosclerosis of native coronary artery ICD-10-CM: I25.10  ICD-9-CM: 414.01  10/7/2012 Yes        HTN (hypertension) ICD-10-CM: I10  ICD-9-CM: 401.9  10/7/2012 Yes        DM2 (diabetes mellitus, type 2) ([facility] ICD-10-CM: E11.9  ICD-9-CM: 250.00  10/7/2012 Yes SUBJECTIVE:  No CP  No SOB    OBJECTIVE:    VS:   Visit Vitals    /74    Pulse 63    Temp 98.1 °F (36.7 °C)    Resp 20    Wt 101.5 kg (223 lb 12.8 oz)    SpO2 96%    BMI 34.03 kg/m2         Intake/Output Summary (Last 24 hours) at 11/09/17 8677  Last data filed at 11/09/17 0454   Gross per 24 hour   Intake              320 ml   Output             2155 ml   Net            -1835 ml     TELE: normal sinus rhythm    General: alert, well developed, cooperative, pleasant and in no apparent distress  HENT: Normocephalic, atraumatic. Normal external eye. Neck :  JVD difficult to assess due to obesity  Cardiac:  regular rate and rhythm, S1, S2 normal, no murmur, click, rub or gallop  Lungs: clear to auscultation bilaterally  Abdomen: Soft, nontender, no masses  Extremities:  No c/c/e, peripheral pulses present      Labs: Results:       Chemistry Recent Labs      11/09/17   0550  11/08/17   0600  11/07/17   1730   GLU  179*  147*  250*   NA  140  137  135*   K  3.5  3.2*  3.7   CL  97*  97*  97*   CO2  35*  30  32   BUN  44*  69*  68*   CREA  3.62*  4.68*  4.93*   CA  9.1  9.9  9.8  8.9   AGAP  8  10  6   BUCR  12  15  14   AP   --    --   85   TP   --    --   7.1   ALB   --    --   3.3*   GLOB   --    --   3.8   AGRAT   --    --   0.9      CBC w/Diff Recent Labs      11/09/17   0550  11/08/17   0600   WBC  8.7  8.9   RBC  3.75*  3.77*   HGB  10.6*  10.6*   HCT  34.1*  34.1*   PLT  250  305   GRANS  65  66   LYMPH  25  24   EOS  2  4      Cardiac Enzymes No results for input(s): CPK, CKND1, PAIGE in the last 72 hours.     No lab exists for component: CKRMB, TROIP   Coagulation Recent Labs      11/08/17   0600   PTP  12.1   INR  0.9       Lipid Panel Lab Results   Component Value Date/Time    Cholesterol, total 158 10/24/2017 05:08 AM    HDL Cholesterol 39 10/24/2017 05:08 AM    LDL, calculated 72.4 10/24/2017 05:08 AM    VLDL, calculated 46.6 10/24/2017 05:08 AM    Triglyceride 233 10/24/2017 05:08 AM CHOL/HDL Ratio 4.1 10/24/2017 05:08 AM      BNP No results for input(s): BNPP in the last 72 hours. Liver Enzymes Recent Labs      11/07/17   1730   TP  7.1   ALB  3.3*   AP  85   SGOT  16      Thyroid Studies Lab Results   Component Value Date/Time    T4, Total 10.9 10/09/2012 04:53 AM    TSH 2.16 10/23/2017 11:08 PM              [place] 55 supervised. I have independently evaluated and examined the patient. All relevant labs and testing data's are reviewed. Care plan discussed and updated after review.     Sherrine Castleman MD

## 2017-11-09 NOTE — PROGRESS NOTES
conducted an initial consultation and Spiritual Assessment for Leonor Braden, who is a 68 y. o.,male. Patients Primary Language is: [place]. According to the patients EMR Oriental orthodox Affiliation is: Episcopal. The reason the Patient came to the hospital is:   Patient Active Problem List    Diagnosis Date Noted    ESRD (end stage renal disease) ([facility] 11/08/2017    Hyperuricemia 10/28/2017    Chronic kidney disease, stage V ([facility] 10/28/2017    Chest pain 10/27/2017    Secondary hyperparathyroidism of renal origin ([facility] 10/25/2017    Fatigue 61/44/8219    Diastolic CHF, acute on chronic (HCC) 07/05/2016    CHF (congestive heart failure), NYHA class IV ([facility] 07/05/2016    APRYL (obstructive sleep apnea) 06/26/2015    Prostate cancer ([facility] 04/29/2015    Abdominal pain 03/05/2015    GERD (gastroesophageal reflux disease) 03/05/2015    Constipation 03/05/2015    Chronic renal insufficiency 03/05/2015    Atherosclerosis of renal artery (HCC)     Chronic diastolic heart failure (HCC)     Morbid obesity (HCC)     Essential hypertension, benign     Sleep disturbance     Coronary atherosclerosis     Mixed hyperlipidemia     Type II or unspecified type diabetes mellitus without mention of complication, not stated as uncontrolled     Chest pain, unspecified 03/12/2013    S/P coronary artery stent placement 03/12/2013    Postsurgical aortocoronary bypass status 03/12/2013    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    NSTEMI (non-ST elevated myocardial infarction) ([facility] 10/07/2012    Coronary atherosclerosis of native coronary artery 10/07/2012    CKD (chronic kidney disease) stage 4, GFR 15-29 ml/min ([facility] 10/07/2012    HTN (hypertension) 10/07/2012    DM2 (diabetes mellitus, type 2) ([facility] 10/07/2012    Dyslipidemia 10/07/2012        The  provided the following Interventions:  Initiated a relationship of care and support.    Explored issues of negrita, belief, spirituality and Restorationist/ritual needs while hospitalized. Listened empathically about medical journey and multiple health crisis and life's stories. Provided information about Spiritual Care Services. Offered prayer and assurance of continued prayers on patient's behalf. The following outcomes where achieved:  Patient shared limited information about both their medical narrative and spiritual journey/beliefs.  confirmed Patient's Sikhism Affiliation: [address] [place] [address];   Patient processed feeling about current hospitalization. Patient expressed gratitude for 's visit. Assessment:  Patient does not have any Restorationist/cultural needs that will affect patients preferences in health care. There are no spiritual or Restorationist issues which require intervention at this time. Plan:  Chaplains will continue to follow and will provide pastoral care on an as needed/requested basis.  recommends bedside caregivers page  on duty if patient shows signs of acute spiritual or emotional distress.       Leroy Mendezj [address]  Spiritual Care  [phone]

## 2017-11-09 NOTE — DIALYSIS
ACUTE HEMODIALYSIS FLOW SHEET     HEMODIALYSIS ORDERS: Physician: Margaret Saavedra    Dialyzer: Revaclear      Duration: 3.0 hr  BFR: 350   DFR: 600   Dialysate:  Temp 37 K+   3    Ca+  2.5 Na 140 Bicarb 30   Weight:  101.5 kg    Bed Scale [x]     Unable to Obtain []      Dry weight/UF Goal: 1000 ml  Access Right IJ Cath   Needle Gauge     Heparin []  Bolus      Units    [] Hourly       Units    [x]None     Catheter locking solution Heparin 1:1000   Pre BP: 140/53   Pulse: 62    Temperature:   97.6  Respirations: 18  Tx: NS       ml/Bolus  Other        [x] N/A   Labs: Pre        Post:        [x] N/A   Additional Orders(medications, blood products, hypotension management):       [x] N/A      [x] Time Out/Safety Check                                            [x] [facility] Consent Verified      CATHETER ACCESS: []N/A   [x]Right   []Left   []IJ     []Fem   [] First use X-ray verified     [x]Tunnel                [] Non Tunneled   [x]No S/S infection  []Redness  []Drainage []Cultured []Swelling []Pain   [x]Medical Aseptic Prep Utilized   []Dressing Changed  [x] Biopatch  Date:    11/08/2017   []Clotted   [x]Patent   Flows: [x]Good  []Poor  []Reversed   If access problem,  notified: []Yes    []N/A  Date:            GRAFT/FISTULA ACCESS:  [x]N/A     []Right     []Left     []UE     []LE   []AVG   []AVF        []Buttonhole    []Medical Aseptic Prep Utilized   []No S/S infection  []Redness  []Drainage []Cultured []Swelling []Pain     Bruit:   [] Strong    [] Weak       Thrill :   [] Strong    [] Weak        Needle Gauge:    Length:     If access problem,  notified: []Yes     [x]N/A  Date:        Please describe access if present and not used:         GENERAL ASSESSMENT:    LUNGS: 18 Rate  SaO2%        [x] N/A    [] Clear  [x] Coarse  [] Crackles  [] Wheezing        [] Diminished     Location : []RLL   []LLL    []RUL  []NOMI   Cough: []Productive  []Dry  [x]N/A   Respirations:  [x]Easy  []Labored   Therapy:  [x]RA  []NC l/min    Mask: []NRB []Venti       O2%                  []Ventilator  []Intubated  [] Trach  [] BiPaP   CARDIAC: [x]Regular      [] Irregular   [] Pericardial Rub  [] JVD                             []  Monitored  [] Bedside  [] Remotely monitored [] N/A  Rhythm:    EDEMA: [] None  [x]Generalized  [] Pitting [] 1    [] 2    [] 3    [] 4                 [] Facial  [] Pedal  []  UE  [] LE   SKIN:   [x] Warm  [] Hot     [] Cold   [x] Dry     [] Pale   [] Diaphoretic                  [] Flushed  [] Jaundiced  [] Cyanotic  [] Rash  [] Weeping   LOC:    [x] Alert      [x]Oriented:    [x] Person     [x] Place  [x]Time               [] Confused  [] Lethargic  [] Medicated  [] Non-responsive      GI / ABDOMEN:  [] Flat    [] Distended    [x] Soft    [] Firm   []  Obese                             [] Diarrhea  [x] Bowel Sounds  [] Nausea  [] Vomiting         / URINE ASSESSMENT:[] Voiding   [x] Oliguria  [] Anuria   []  Xiao     [] Incontinent    []  Incontinent Brief      []  Bathroom Privileges   PAIN: [x] 0 []1  []2   []3   []4   []5   []6   []7   []8   []9   []10            Scale 0-10  Action/Follow Up:    MOBILITY:  [] Amb    [] Amb/Assist    [x] Bed    [] Wheelchair  [] Stretcher       All Vitals and Treatment Details on Attached [facility] [place]          Room # 5065   [x] 1st Time Acute  [] Stat  [x] Routine  [] Urgent     [x] Acute Room  []  Bedside  [] ICU/CCU  [] ER   Isolation Precautions:  [x] Dialysis   [] Airborne   [] Contact    [] Reverse   Special Considerations:         [] Blood Consent Verified [x]N/A      ALLERGIES:   [] NKA                    Allergies   Nka [No Known Allergies] Other (comments)   10/7/2012  Past Updates. ..       [place] as Reviewed Last Reviewed by Sophia Buckley on 11/8/2017 at 9:36 AM: Review Complete  (History)           Code Status:  [x] Full Code  [] DNR  [] Other            HBsAg ONLY: Date Drawn 11/8/17         [x]Negative []Positive []Unknown   HBsAb: Date 11/8/17 [x] Susceptible   [] [id number] []Not Drawn  [] Drawn      Current Labs:    Date of Labs: Today [x]          Results for Laurence Lutz (MRN [id number]) as of 11/9/2017 11:10   Ref. Range 11/9/2017 05:50   WBC Latest Ref Range: 4.6 - 13.2 K/uL 8.7   RBC Latest Ref Range: 4.70 - 5.50 M/uL 3.75 (L)   HGB Latest Ref Range: 13.0 - 16.0 g/dL 10.6 (L)   HCT Latest Ref Range: 36.0 - 48.0 % 34.1 (L)   MCV Latest Ref Range: 74.0 - 97.0 FL 90.9   MCH Latest Ref Range: 24.0 - 34.0 PG 28.3   MCHC Latest Ref Range: 31.0 - 37.0 g/dL 31.1   RDW Latest Ref Range: 11.6 - 14.5 % 13.8   PLATELET Latest Ref Range: 135 - 420 K/uL 250   MPV Latest Ref Range: 9.2 - 11.8 FL 10.8   NEUTROPHILS Latest Ref Range: 40 - 73 % 65   LYMPHOCYTES Latest Ref Range: 21 - 52 % 25   MONOCYTES Latest Ref Range: 3 - 10 % 8   EOSINOPHILS Latest Ref Range: 0 - 5 % 2   BASOPHILS Latest Ref Range: 0 - 2 % 0   DF Latest Units:   AUTOMATED   ABS. NEUTROPHILS Latest Ref Range: 1.8 - 8.0 K/UL 5.6   ABS. LYMPHOCYTES Latest Ref Range: 0.9 - 3.6 K/UL 2.2   ABS. MONOCYTES Latest Ref Range: 0.05 - 1.2 K/UL 0.7   ABS. EOSINOPHILS Latest Ref Range: 0.0 - 0.4 K/UL 0.2   ABS.  BASOPHILS Latest Ref Range: 0.0 - 0.06 K/UL 0.0   Sodium Latest Ref Range: 136 - 145 mmol/L 140   Potassium Latest Ref Range: 3.5 - 5.5 mmol/L 3.5   Chloride Latest Ref Range: 100 - 108 mmol/L 97 (L)   CO2 Latest Ref Range: 21 - 32 mmol/L 35 (H)   Anion gap Latest Ref Range: 3.0 - 18 mmol/L 8   Glucose Latest Ref Range: 74 - 99 mg/dL 179 (H)   BUN Latest Ref Range: 7.0 - 18 MG/DL 44 (H)   Creatinine Latest Ref Range: 0.6 - 1.3 MG/DL 3.62 (H)   BUN/Creatinine ratio Latest Ref Range: 12 - 20   12   Calcium Latest Ref Range: 8.5 - 10.1 MG/DL 9.1   Phosphorus Latest Ref Range: 2.5 - 4.9 MG/DL 3.2   GFR est non-AA Latest Ref Range: >60 ml/min/1.73m2 16 (L)   GFR est AA Latest Ref Range: >60 ml/min/1.73m2 20 (L)               DIET:  [x] Renal    [] Other     [] NPO     []  Diabetic       PRIMARY NURSE REPORT: First initial/Last name/Title       Pre Dialysis: Kathy Flanagan RN  Time: 5539       EDUCATION:    [x] Patient [] Other         Knowledge Basis: []None [x]Minimal [] Substantial   Barriers to learning  [x]N/A  New to dialysis, second dialysis treatment.     [x] Access Care     [x] S&S of infection     [x] Fluid Management     [x]K+     [x]Procedural    []Albumin     [] Medications     [] Tx Options     [] Transplant     [] Diet     [] Other   Teaching Tools:  [x] Explain  [] Demo  [] Handouts [] Video  Patient response:   [x] Verbalized understanding  [] Teach back  [] Return demonstration [x] Requires follow up   Inappropriate due to             [address] Before each treatment:     Machine Number:                   [address] [place] [address]                                   [x] Unit Machine # 9 with centralized RO                                  [] Portable Machine #1/RO serial # [id number]                                  [] Portable Machine #2/RO serial # [id number]                                  [] Portable Machine #3/RO serial # [id number]  [facility]                                  [] Portable Machine #11/RO serial # [id number]                                   [] Portable Machine #12/RO serial # [id number]                                  [] Portable Machine #13/RO serial #  [id number]      Alarm Test:  Pass time: 6525        Other:         [x] RO/Machine Log Complete   Temp    37            [x]Extracorporeal Circuit Tested for integrity   Dialysate: pH  7.4 Conductivity: Meter   14     HD Machine   14.2                  TCD: 14  Dialyzer Lot # [id number]        Blood Tubing Lot # [id number]     Saline Lot #  [id number]      CHLORINE TESTING-Before each treatment and every 4 hours    Total Chlorine: [x] less than 0.1 ppm  Time: 0800 4 Hr/2nd Check Time: 1100   (if greater than 0.1 ppm from Primary then every 30 minutes from Secondary)      TREATMENT INITIATION  with Dialysis Precautions:   [x] All Connections Secured                 [x] Saline Line Double Clamped   [x] Venous Parameters Set                  [x] Arterial Parameters Set    [x] Prime Given  250 ml                        [x]Air Foam Detector Engaged       Treatment Initiation Note: Patient arrived alert and oriented x4. Resp unlabored and even on RA. Denies any SOB or CP. No complaints of nausea or vomiting. Right IJ accessed without any difficulty. Arterial/Venous flush/arpirate without difficulty. IJ cath reinforced by dry guaze dressing.     [id number] Patient complain of nausea and noted diaphoresis. 250ml NS given. Vitals obtained and documented. UF turned off. Dr Kaylee Medrano paged. @0024-- Dr. Kaylee Medrano returned call, ordered for UF goal to remain off this treatment. @1895- Patient complaining of chest discomfort, Cardiology NP at bedside and notified. Patient says pain is minimum and can be associated with \"acid reflux\". He denies needing any medications. @2449- Patient resting, both eyes closed in no acute distress.                   Medication Dose Volume Route Initials Dialyzer Cleared: [x] Good [] Fair  [] Poor     Blood processed:  L  UF Removed   Ml     Post Wt:   kg  POst BP: 182/79    Pulse: 76  Respirations: 18 Temperature: 98.0   No meds given             Post Tx Vascular Access: AVF/AVG: Bleeding stopped Art  min. Cecil. Min   N/A                  Catheter: Locking solution: Heparin 1:1000 Art. 1.6 ml   Cecil.  1.6  ml                     Post Assessment:                   Skin:  [x] Warm  [x] Dry [] Diaphoretic    [] Flushed  [] Pale [] Cyanotic   [facility] Signatures Title Initials  Time Lungs: [x] Clear    [] Course  [] Crackles  [] Wheezing [] Diminished   Chassitie Raman Silverman RN CLG  1069 Cardiac: [x] Regular   [] Irregular   [] Monitor  [] N/A  Rhythm:               Edema:  [x] None    [] General     [] Facial   [] Pedal    [] UE    [] LE           Pain: []0  []1 [x]2   []3  []4   []5   []6   []7   []8   []9   []10            Post Treatment Note: Dr. Junior Carson at bedside. Blood returned without difficulty. Ports flushed and capped with heparin as per protocol. Patient tolerated dialysis fair. No fluid removed as per ordered by Dr. Junior Carson. Report called to primary nurse.  Patient states he needs BP meds, message relayed to primary nurse.       POST TREATMENT PRIMARY NURSE HANDOFF REPORT:      First initial/Last name/Title          Post Dialysis:KATHI Herndon RN   Time: 1190      Abbreviations: AVG-arterial venous graft, AVF-arterial venous fistula, IJ-Internal Jugular, Subcl-Subclavian, Fem-Femoral, Tx-treatment, AP/HR-apical heart rate, DFR-dialysate flow rate, BFR-blood flow rate, AP-arterial pressure, -venous pressure, UF-ultrafiltrate, TMP-transmembrane pressure, Cecil-Venous, Art-Arterial, RO-Reverse Osmosis

## 2017-11-09 NOTE — CONSULTS
[address]    Name:  Corrine Ramirez  MR#:  [id number]  :  1940  Account #:  [de-identified]  Date of Adm:  2017  Date of Consultation:  2017      REQUESTING PHYSICIAN: Jonathan Siegel MD, Cardiologist    REASON FOR CONSULTATION: The patient needs dialysis in  preparation for cardiac catheterization and the patient has long-term  history of advanced renal failure, stage IV to stage V chronic renal  failure. HISTORY OF PRESENT ILLNESS: This 77-year-old male known to  me from his recurrent admissions in the hospital with chest pain and  SOB  with a recent non-STEMI acute MI with renal failure. The patient  was treated in this hospital several times with medications and medical  management and the patient was on the verge for dialysis, but the  patient declined. The patient was successfully managed with medical  treatment and the patient remained chest pain-free and was trying to  avoid dialysis, but now he has come to the point that he   Is  having shortness of breath and also having recurrent chest pain off  and on. With these symptoms, the patient presented to his  cardiologist's office yesterday. The cardiologist definitely told him to do  the cardiac catheterization. He needs dialysis, no other choice. Based  on that, he has decided to come back to the hospital and start dialysis. I had seen this patient after the admission yesterday. The patient,  during that time, was not complaining of anything. No chest pain, no  shortness of breath, no palpitation, but the patient is willing to proceed  with dialysis. Based on that, vascular surgeon  was notified and it was requested to put  a dialysis catheter. Plan is to start dialysis and after a few dialysis,  cardiologist can decide about his cardiac catheterization. This patient  has a significant coronary artery disease. The patient has a history of  atherosclerotic coronary artery disease, angina pectoris.  He also has a  history of CABG, diastolic dysfunction, hypertension, history of  diabetes mellitus. Stage IV to stage V chronic renal failure along with   secondary Hyperparathyroidism Also, has anemia of chronic disease. Besides that,  also has morbid obesity, sleep apnea, as well as dyslipidemia and  gastroesophageal reflux disease. PAST SURGICAL HISTORY: Includes carotid  endarterectomy, colonoscopy, cholecystectomy, and CABG. ALLERGIES: NO KNOWN DRUG ALLERGIES. LIST OF MEDICATIONS: Before admissions were as follows:  1. Imdur 30 mg p.o. daily. 2. Aspirin 81 mg p.o. daily. 3. Nephrocaps once a day. 4. Rocaltrol 0.25 mcg Monday, Wednesday, Friday. 5. Clonidine 0.2 mg 3 times daily. 6. Colace 1 tablet daily. 7. Lofibra 54 mg daily. 8. Zocor 40 mg daily. 9. Lopressor 100 mg twice daily. 10. Plavix 75 mg p.o. daily, which is on hold. 11. Flomax 0.4 mg daily. 12. Vitamin D2 at 50,000 units once per week. 13. Lasix 40 mg daily as needed for shortness of breath. 14. Hydralazine 100 mg 3 times daily. 15. Procardia 90 mg p.o. daily. 16. Lantus 20 units b.i.d.  17. Humalog insulin 3 times daily. 18. Nitroglycerin sublingual on need basis for chest pain. 19. Allopurinol 100 mg daily. SOCIAL HISTORY: He lives with his wife. No history of smoking or  drinking or drug-related problem. FAMILY HISTORY: Significant for cardiac issue. Negative for stroke or  cancer. No history of kidney disease in the family. REVIEW OF SYSTEMS:  GENERAL: Well-nourished, well-developed, obese gentleman without  any acute distress, but has a tendency to have recurrent off and on  chest pain which equals to angina pectoris. CARDIOVASCULAR: Off and on chest pain with shortness of breath  and some leg swelling. RESPIRATORY: No cough, no wheezing, no hemoptysis. GASTROINTESTINAL: No nausea, no vomiting, but has constipation. GENITOURINARY: No urgency, no hesitancy, no dysuria, no hematuria. NEUROLOGIC: No headache. No dizziness. No seizure. No gait  disturbances. PHYSICAL EXAMINATION  VITAL SIGNS: Temperature 98.4, , blood pressure 141/71,  Oxygen saturation of 95% at room air. Weight is 101.6 kg. HEENT: Oral mucosa moist.  NECK: Jugular venous pressure distended. Carotids with no audible  bruit. LUNGS: Decreased breath sounds without obvious crackles. HEART: S1, S2, without any gallop or murmur. No reproducible chest  pain. ABDOMEN: Obese, could not appreciate organomegaly. EXTREMITIES: Ankle trace edema. RELEVANT LABS ON ADMISSION: WBC of 8.9 with hemoglobin of  10.6, hematocrit of 34.1, platelets of 969,846, neutrophils 56%,  lymphocytes 24%. Prothrombin time of 12.1 with INR of 0.9. Chemistry: Sodium 137, potassium 3.2, chloride 97, CO2 of 30,  glucose of 147, blood urea nitrogen of 69, creatinine of 4.68. On  10/27/2017, blood urea nitrogen at 79 with a creatinine of  5.20,  calcium of 9.8, with a phosphorus of 4.4, total protein of 7.1 with  albumin of 3.3. Hemoglobin A1c was 8.3. . EKG shows normal sinus rhythm, incomplete right bundle branch  block, nonspecific T-wave changes. Echocardiogram was done on 10/25/2017 which shows left ventricular  systolic function was normal by visual estimation, ejection fraction was  estimated to be 60% to 65%, no wall motion abnormalities,  mild  aortic stenosis. Urine chemistry that was done in the past in July of last year reveals  that the patient has a protein creatinine ratio of 3.1, indicating  significant proteinuria from diabetes mellitus. IMPRESSION AND PLAN: The patient has advanced renal failure with  stage V chronic renal failure with underlying type 2 diabetes mellitus  and hypertension and known coronary artery disease. The patient is  having symptoms of angina off and on. No evidence of any heart  failure, has some signs of volume overload. The patient needs to be  dialyzed before Cardiology can do the cardiac catheterization.  The  patient has diastolic dysfunction. The patient has proteinuria also. The  patient was in the ICU in the past, given his advanced renal failure and  advised the patient to start  dialysis. Recently, put him on A2 receptor  blocker and he tolerated that . Also, we need to keep an eye on  his potassium,k  was on the lower side. and also switch from  Rocaltrol to [facility] and also will have to determine cardiac  status and once the cardiac issues  have been addressed, and if no surgical intervention from cardiac  standpoint, then after this admission will arrange his outpatient dialysis  center and will follow him as an outpatient. Further recommendations  will be given based on the hospital course. His Temporary  catheter has to be changed to [facility].         Loni Erwin MD    [address] / CD  D:  11/08/2017   21:51  T:  11/09/2017   11:26  Job #:  [id number]

## 2017-11-09 NOTE — PROGRESS NOTES
[facility] Hospitalist Group  Progress Note    Patient: Shelia Zhou Age: 68 y.o. : 1940 MR#: [id number] SSN: [id number]  Date: 2017     Subjective:     Patient seen and evaluated independently on 17. Patient sitting in bed in NAD, denies CP or sob    Assessment/Plan:     1. chest pain. 2. Coronary artery disease. 3. Stage 5 chronic kidney disease.- ESRD  4. Diabetes mellitus. 5. Hypertension. 6. History of prostate cancer.   7- Uncontrolled HTN  8- Anemia of chronic disease  9- Chronic diastolic chf - compensated    PLAN  HD as per nephro, s/p [facility]  Cardio following, , asa, BB, statin, plans for cath noted  On clonidine  SSI, lantus  DVT prophylaxis  Advanced care planning: full code per patient  D/w patient    Case discussed with:  [x]Patient  []Family  []Nursing  []Case Management  DVT Prophylaxis:  []Lovenox  []Hep SQ  []SCDs  []Coumadin   []On Heparin gtt    Objective:   VS:   Visit Vitals    /74    Pulse 89    Temp 99.5 °F (37.5 °C)    Resp 18    Wt 101.5 kg (223 lb 12.8 oz)    SpO2 93%    BMI 34.03 kg/m2      Tmax/24hrs: Temp (24hrs), Av °F (36.7 °C), Min:97.3 °F (36.3 °C), Max:99.5 °F (37.5 °C)      Intake/Output Summary (Last 24 hours) at 17 1810  Last data filed at 17 1701   Gross per 24 hour   Intake              440 ml   Output              380 ml   Net               60 ml       General:  Awake, alert  Cardiovascular:  S1S2+, RRR  Pulmonary:  CTA b/l  GI:  Soft, BS+, NT, ND  Extremities:  No edema      Labs:    Recent Results (from the past 24 hour(s))   GLUCOSE, POC    Collection Time: 17  9:45 PM   Result Value Ref Range    Glucose (POC) 175 (H) 70 - 789 mg/dL   METABOLIC PANEL, BASIC    Collection Time: 17  5:50 AM   Result Value Ref Range    Sodium 140 136 - 145 mmol/L    Potassium 3.5 3.5 - 5.5 mmol/L    Chloride 97 (L) 100 - 108 mmol/L    CO2 35 (H) 21 - 32 mmol/L    Anion gap 8 3.0 - 18 mmol/L    Glucose 179 (H) 74 - 99 mg/dL    BUN 44 (H) 7.0 - 18 MG/DL    Creatinine 3.62 (H) 0.6 - 1.3 MG/DL    BUN/Creatinine ratio 12 12 - 20      GFR est AA 20 (L) >60 ml/min/1.73m2    GFR est non-AA 16 (L) >60 ml/min/1.73m2    Calcium 9.1 8.5 - 10.1 MG/DL   CBC WITH AUTOMATED DIFF    Collection Time: 11/09/17  5:50 AM   Result Value Ref Range    WBC 8.7 4.6 - 13.2 K/uL    RBC 3.75 (L) 4.70 - 5.50 M/uL    HGB 10.6 (L) 13.0 - 16.0 g/dL    HCT 34.1 (L) 36.0 - 48.0 %    MCV 90.9 74.0 - 97.0 FL    MCH 28.3 24.0 - 34.0 PG    MCHC 31.1 31.0 - 37.0 g/dL    RDW 13.8 11.6 - 14.5 %    PLATELET 534 407 - 708 K/uL    MPV 10.8 9.2 - 11.8 FL    NEUTROPHILS 65 40 - 73 %    LYMPHOCYTES 25 21 - 52 %    MONOCYTES 8 3 - 10 %    EOSINOPHILS 2 0 - 5 %    BASOPHILS 0 0 - 2 %    ABS. NEUTROPHILS 5.6 1.8 - 8.0 K/UL    ABS. LYMPHOCYTES 2.2 0.9 - 3.6 K/UL    ABS. MONOCYTES 0.7 0.05 - 1.2 K/UL    ABS. EOSINOPHILS 0.2 0.0 - 0.4 K/UL    ABS.  BASOPHILS 0.0 0.0 - 0.06 K/UL    DF AUTOMATED     PHOSPHORUS    Collection Time: 11/09/17  5:50 AM   Result Value Ref Range    Phosphorus 3.2 2.5 - 4.9 MG/DL   GLUCOSE, POC    Collection Time: 11/09/17  7:43 AM   Result Value Ref Range    Glucose (POC) 186 (H) 70 - 110 mg/dL   GLUCOSE, POC    Collection Time: 11/09/17  1:48 PM   Result Value Ref Range    Glucose (POC) 119 (H) 70 - 110 mg/dL   GLUCOSE, POC    Collection Time: 11/09/17  4:25 PM   Result Value Ref Range    Glucose (POC) 169 (H) 70 - 110 mg/dL       Signed By: Kolton Barnard MD     November 9, 2017 5:16 PM

## 2017-11-09 NOTE — PROGRESS NOTES
C/O nausea, prn Zofran given. Pt stated, \"I suffer from this ever since I was diagnosed with prostate cancer, I have reflux. \" He also stated he is now cancer free.

## 2017-11-10 LAB
ACT BLD: 120 SECS (ref 79–138)
ACT BLD: 164 SECS (ref 79–138)
ACT BLD: 213 SECS (ref 79–138)
ANION GAP SERPL CALC-SCNC: 8 MMOL/L (ref 3–18)
BASOPHILS # BLD: 0 K/UL (ref 0–0.1)
BASOPHILS NFR BLD: 0 % (ref 0–2)
BUN SERPL-MCNC: 27 MG/DL (ref 7–18)
BUN/CREAT SERPL: 7 (ref 12–20)
CALCIUM SERPL-MCNC: 8.8 MG/DL (ref 8.5–10.1)
CHLORIDE SERPL-SCNC: 99 MMOL/L (ref 100–108)
CO2 SERPL-SCNC: 31 MMOL/L (ref 21–32)
CREAT SERPL-MCNC: 3.73 MG/DL (ref 0.6–1.3)
DIFFERENTIAL METHOD BLD: ABNORMAL
EOSINOPHIL # BLD: 0.4 K/UL (ref 0–0.4)
EOSINOPHIL NFR BLD: 3 % (ref 0–5)
ERYTHROCYTE [DISTWIDTH] IN BLOOD BY AUTOMATED COUNT: 14.1 % (ref 11.6–14.5)
GLUCOSE BLD STRIP.AUTO-MCNC: 195 MG/DL (ref 70–110)
GLUCOSE BLD STRIP.AUTO-MCNC: 206 MG/DL (ref 70–110)
GLUCOSE BLD STRIP.AUTO-MCNC: 229 MG/DL (ref 70–110)
GLUCOSE BLD STRIP.AUTO-MCNC: 259 MG/DL (ref 70–110)
GLUCOSE SERPL-MCNC: 214 MG/DL (ref 74–99)
HCT VFR BLD AUTO: 31.7 % (ref 36–48)
HGB BLD-MCNC: 9.8 G/DL (ref 13–16)
LYMPHOCYTES # BLD: 2.2 K/UL (ref 0.9–3.6)
LYMPHOCYTES NFR BLD: 19 % (ref 21–52)
MCH RBC QN AUTO: 28.2 PG (ref 24–34)
MCHC RBC AUTO-ENTMCNC: 30.9 G/DL (ref 31–37)
MCV RBC AUTO: 91.4 FL (ref 74–97)
MONOCYTES # BLD: 0.7 K/UL (ref 0.05–1.2)
MONOCYTES NFR BLD: 6 % (ref 3–10)
NEUTS SEG # BLD: 8.2 K/UL (ref 1.8–8)
NEUTS SEG NFR BLD: 72 % (ref 40–73)
PHOSPHATE SERPL-MCNC: 2.8 MG/DL (ref 2.5–4.9)
PLATELET # BLD AUTO: 242 K/UL (ref 135–420)
PMV BLD AUTO: 10.9 FL (ref 9.2–11.8)
POTASSIUM SERPL-SCNC: 3.5 MMOL/L (ref 3.5–5.5)
RBC # BLD AUTO: 3.47 M/UL (ref 4.7–5.5)
SODIUM SERPL-SCNC: 138 MMOL/L (ref 136–145)
WBC # BLD AUTO: 11.5 K/UL (ref 4.6–13.2)

## 2017-11-10 PROCEDURE — 85025 COMPLETE CBC W/AUTO DIFF WBC: CPT | Performed by: NURSE PRACTITIONER

## 2017-11-10 PROCEDURE — C1725 CATH, TRANSLUMIN NON-LASER: HCPCS

## 2017-11-10 PROCEDURE — 74011250637 HC RX REV CODE- 250/637

## 2017-11-10 PROCEDURE — 74011000250 HC RX REV CODE- 250

## 2017-11-10 PROCEDURE — 74011250637 HC RX REV CODE- 250/637: Performed by: NURSE PRACTITIONER

## 2017-11-10 PROCEDURE — 65660000004 HC RM CVT STEPDOWN

## 2017-11-10 PROCEDURE — 74011636320 HC RX REV CODE- 636/320: Performed by: INTERNAL MEDICINE

## 2017-11-10 PROCEDURE — 74011250636 HC RX REV CODE- 250/636

## 2017-11-10 PROCEDURE — 74011250637 HC RX REV CODE- 250/637: Performed by: INTERNAL MEDICINE

## 2017-11-10 PROCEDURE — 74011250636 HC RX REV CODE- 250/636: Performed by: INTERNAL MEDICINE

## 2017-11-10 PROCEDURE — 84100 ASSAY OF PHOSPHORUS: CPT | Performed by: NURSE PRACTITIONER

## 2017-11-10 PROCEDURE — 36415 COLL VENOUS BLD VENIPUNCTURE: CPT | Performed by: INTERNAL MEDICINE

## 2017-11-10 PROCEDURE — 86706 HEP B SURFACE ANTIBODY: CPT | Performed by: INTERNAL MEDICINE

## 2017-11-10 PROCEDURE — B2111ZZ FLUOROSCOPY OF MULTIPLE CORONARY ARTERIES USING LOW OSMOLAR CONTRAST: ICD-10-PCS | Performed by: INTERNAL MEDICINE

## 2017-11-10 PROCEDURE — 80048 BASIC METABOLIC PNL TOTAL CA: CPT | Performed by: NURSE PRACTITIONER

## 2017-11-10 PROCEDURE — 02C03ZZ EXTIRPATION OF MATTER FROM CORONARY ARTERY, ONE ARTERY, PERCUTANEOUS APPROACH: ICD-10-PCS | Performed by: INTERNAL MEDICINE

## 2017-11-10 PROCEDURE — 85347 COAGULATION TIME ACTIVATED: CPT

## 2017-11-10 PROCEDURE — 74011636637 HC RX REV CODE- 636/637: Performed by: INTERNAL MEDICINE

## 2017-11-10 PROCEDURE — B2121ZZ FLUOROSCOPY OF SINGLE CORONARY ARTERY BYPASS GRAFT USING LOW OSMOLAR CONTRAST: ICD-10-PCS | Performed by: INTERNAL MEDICINE

## 2017-11-10 PROCEDURE — 82962 GLUCOSE BLOOD TEST: CPT

## 2017-11-10 PROCEDURE — 74011250636 HC RX REV CODE- 250/636: Performed by: EMERGENCY MEDICINE

## 2017-11-10 PROCEDURE — B2181ZZ FLUOROSCOPY OF LEFT INTERNAL MAMMARY BYPASS GRAFT USING LOW OSMOLAR CONTRAST: ICD-10-PCS | Performed by: INTERNAL MEDICINE

## 2017-11-10 PROCEDURE — 027034Z DILATION OF CORONARY ARTERY, ONE ARTERY WITH DRUG-ELUTING INTRALUMINAL DEVICE, PERCUTANEOUS APPROACH: ICD-10-PCS | Performed by: INTERNAL MEDICINE

## 2017-11-10 PROCEDURE — 74011000250 HC RX REV CODE- 250: Performed by: INTERNAL MEDICINE

## 2017-11-10 RX ORDER — NICARDIPINE HYDROCHLORIDE 2.5 MG/ML
INJECTION INTRAVENOUS
Status: COMPLETED
Start: 2017-11-10 | End: 2017-11-10

## 2017-11-10 RX ORDER — HEPARIN SODIUM 1000 [USP'U]/ML
INJECTION, SOLUTION INTRAVENOUS; SUBCUTANEOUS
Status: COMPLETED
Start: 2017-11-10 | End: 2017-11-10

## 2017-11-10 RX ORDER — HEPARIN SODIUM 200 [USP'U]/100ML
500 INJECTION, SOLUTION INTRAVENOUS ONCE
Status: COMPLETED | OUTPATIENT
Start: 2017-11-10 | End: 2017-11-11

## 2017-11-10 RX ORDER — MIDAZOLAM HYDROCHLORIDE 1 MG/ML
.5-2 INJECTION, SOLUTION INTRAMUSCULAR; INTRAVENOUS
Status: DISCONTINUED | OUTPATIENT
Start: 2017-11-10 | End: 2017-11-10 | Stop reason: HOSPADM

## 2017-11-10 RX ORDER — SODIUM CHLORIDE 0.9 % (FLUSH) 0.9 %
5-10 SYRINGE (ML) INJECTION AS NEEDED
Status: DISCONTINUED | OUTPATIENT
Start: 2017-11-10 | End: 2017-11-14 | Stop reason: HOSPADM

## 2017-11-10 RX ORDER — FENTANYL CITRATE 50 UG/ML
12.5-1 INJECTION, SOLUTION INTRAMUSCULAR; INTRAVENOUS
Status: DISCONTINUED | OUTPATIENT
Start: 2017-11-10 | End: 2017-11-10 | Stop reason: HOSPADM

## 2017-11-10 RX ORDER — SODIUM CHLORIDE 0.9 % (FLUSH) 0.9 %
5-10 SYRINGE (ML) INJECTION EVERY 8 HOURS
Status: DISCONTINUED | OUTPATIENT
Start: 2017-11-10 | End: 2017-11-14 | Stop reason: HOSPADM

## 2017-11-10 RX ORDER — LIDOCAINE HYDROCHLORIDE 10 MG/ML
1-30 INJECTION, SOLUTION EPIDURAL; INFILTRATION; INTRACAUDAL; PERINEURAL
Status: DISCONTINUED | OUTPATIENT
Start: 2017-11-10 | End: 2017-11-10 | Stop reason: HOSPADM

## 2017-11-10 RX ORDER — EPTIFIBATIDE 2 MG/ML
INJECTION, SOLUTION INTRAVENOUS
Status: DISPENSED
Start: 2017-11-10 | End: 2017-11-11

## 2017-11-10 RX ORDER — HEPARIN SODIUM 1000 [USP'U]/ML
1000-10000 INJECTION, SOLUTION INTRAVENOUS; SUBCUTANEOUS
Status: DISCONTINUED | OUTPATIENT
Start: 2017-11-10 | End: 2017-11-10 | Stop reason: HOSPADM

## 2017-11-10 RX ORDER — CLOPIDOGREL 300 MG/1
TABLET, FILM COATED ORAL
Status: COMPLETED
Start: 2017-11-10 | End: 2017-11-10

## 2017-11-10 RX ORDER — EPTIFIBATIDE 0.75 MG/ML
2 INJECTION, SOLUTION INTRAVENOUS CONTINUOUS
Status: DISCONTINUED | OUTPATIENT
Start: 2017-11-10 | End: 2017-11-10

## 2017-11-10 RX ORDER — EPTIFIBATIDE 2 MG/ML
180 INJECTION, SOLUTION INTRAVENOUS ONCE
Status: COMPLETED | OUTPATIENT
Start: 2017-11-10 | End: 2017-11-10

## 2017-11-10 RX ORDER — NICARDIPINE HYDROCHLORIDE 2.5 MG/ML
100 INJECTION INTRAVENOUS
Status: DISCONTINUED | OUTPATIENT
Start: 2017-11-10 | End: 2017-11-10 | Stop reason: HOSPADM

## 2017-11-10 RX ORDER — HEPARIN SODIUM 200 [USP'U]/100ML
INJECTION, SOLUTION INTRAVENOUS
Status: DISPENSED
Start: 2017-11-10 | End: 2017-11-10

## 2017-11-10 RX ORDER — ALLOPURINOL 100 MG/1
100 TABLET ORAL
Status: DISCONTINUED | OUTPATIENT
Start: 2017-11-13 | End: 2017-11-14 | Stop reason: HOSPADM

## 2017-11-10 RX ADMIN — CLONIDINE HYDROCHLORIDE 0.2 MG: 0.1 TABLET ORAL at 17:13

## 2017-11-10 RX ADMIN — NEPHROCAP 1 CAPSULE: 1 CAP ORAL at 08:15

## 2017-11-10 RX ADMIN — FENTANYL CITRATE 25 MCG: 50 INJECTION INTRAMUSCULAR; INTRAVENOUS at 11:52

## 2017-11-10 RX ADMIN — HYDRALAZINE HYDROCHLORIDE 100 MG: 50 TABLET, FILM COATED ORAL at 17:13

## 2017-11-10 RX ADMIN — SIMVASTATIN 40 MG: 40 TABLET, FILM COATED ORAL at 21:31

## 2017-11-10 RX ADMIN — INSULIN LISPRO 9 UNITS: 100 INJECTION, SOLUTION INTRAVENOUS; SUBCUTANEOUS at 21:32

## 2017-11-10 RX ADMIN — HYDRALAZINE HYDROCHLORIDE 100 MG: 50 TABLET, FILM COATED ORAL at 08:15

## 2017-11-10 RX ADMIN — CLONIDINE HYDROCHLORIDE 0.2 MG: 0.1 TABLET ORAL at 08:15

## 2017-11-10 RX ADMIN — Medication 10 ML: at 17:14

## 2017-11-10 RX ADMIN — NICARDIPINE HYDROCHLORIDE 200 MCG: 2.5 INJECTION INTRAVENOUS at 13:19

## 2017-11-10 RX ADMIN — NITROGLYCERIN 200 MCG: 5 INJECTION, SOLUTION INTRAVENOUS at 13:43

## 2017-11-10 RX ADMIN — IOPAMIDOL 240 ML: 612 INJECTION, SOLUTION INTRAVENOUS at 14:09

## 2017-11-10 RX ADMIN — INSULIN GLARGINE 20 UNITS: 100 INJECTION, SOLUTION SUBCUTANEOUS at 21:30

## 2017-11-10 RX ADMIN — CLOPIDOGREL 75 MG: 75 TABLET, FILM COATED ORAL at 08:15

## 2017-11-10 RX ADMIN — CLONIDINE HYDROCHLORIDE 0.2 MG: 0.1 TABLET ORAL at 21:31

## 2017-11-10 RX ADMIN — EPTIFIBATIDE 18.36 MG: 2 INJECTION, SOLUTION INTRAVENOUS at 13:33

## 2017-11-10 RX ADMIN — NICARDIPINE HYDROCHLORIDE 200 MCG: 2.5 INJECTION INTRAVENOUS at 14:01

## 2017-11-10 RX ADMIN — METOPROLOL TARTRATE 100 MG: 50 TABLET ORAL at 08:15

## 2017-11-10 RX ADMIN — EPTIFIBATIDE 18.36 MG: 2 INJECTION, SOLUTION INTRAVENOUS at 13:11

## 2017-11-10 RX ADMIN — NICARDIPINE HYDROCHLORIDE 100 MCG: 2.5 INJECTION INTRAVENOUS at 13:17

## 2017-11-10 RX ADMIN — Medication 10 ML: at 21:34

## 2017-11-10 RX ADMIN — HYDRALAZINE HYDROCHLORIDE 100 MG: 50 TABLET, FILM COATED ORAL at 21:31

## 2017-11-10 RX ADMIN — NICARDIPINE HYDROCHLORIDE 100 MCG: 2.5 INJECTION INTRAVENOUS at 13:44

## 2017-11-10 RX ADMIN — HEPARIN SODIUM 5000 UNITS: 5000 INJECTION, SOLUTION INTRAVENOUS; SUBCUTANEOUS at 02:31

## 2017-11-10 RX ADMIN — FENOFIBRATE 48 MG: 48 TABLET, FILM COATED ORAL at 08:15

## 2017-11-10 RX ADMIN — NICARDIPINE HYDROCHLORIDE 200 MCG: 2.5 INJECTION INTRAVENOUS at 13:51

## 2017-11-10 RX ADMIN — INSULIN LISPRO 6 UNITS: 100 INJECTION, SOLUTION INTRAVENOUS; SUBCUTANEOUS at 15:50

## 2017-11-10 RX ADMIN — HEPARIN SODIUM 1000 UNITS: 200 INJECTION, SOLUTION INTRAVENOUS at 11:40

## 2017-11-10 RX ADMIN — EPTIFIBATIDE 18.36 MG: 2 INJECTION, SOLUTION INTRAVENOUS at 12:27

## 2017-11-10 RX ADMIN — METOPROLOL TARTRATE 100 MG: 50 TABLET ORAL at 17:13

## 2017-11-10 RX ADMIN — HEPARIN SODIUM 5000 UNITS: 5000 INJECTION, SOLUTION INTRAVENOUS; SUBCUTANEOUS at 09:53

## 2017-11-10 RX ADMIN — TAMSULOSIN HYDROCHLORIDE 0.4 MG: 0.4 CAPSULE ORAL at 08:15

## 2017-11-10 RX ADMIN — CLOPIDOGREL BISULFATE 300 MG: 300 TABLET, FILM COATED ORAL at 14:15

## 2017-11-10 RX ADMIN — LIDOCAINE HYDROCHLORIDE 20 ML: 10 INJECTION, SOLUTION EPIDURAL; INFILTRATION; INTRACAUDAL; PERINEURAL at 11:53

## 2017-11-10 RX ADMIN — MIDAZOLAM HYDROCHLORIDE 0.5 MG: 1 INJECTION, SOLUTION INTRAMUSCULAR; INTRAVENOUS at 11:52

## 2017-11-10 RX ADMIN — LOSARTAN POTASSIUM 50 MG: 50 TABLET ORAL at 09:53

## 2017-11-10 RX ADMIN — INSULIN LISPRO 3 UNITS: 100 INJECTION, SOLUTION INTRAVENOUS; SUBCUTANEOUS at 08:08

## 2017-11-10 RX ADMIN — NITROGLYCERIN 200 MCG: 5 INJECTION, SOLUTION INTRAVENOUS at 12:58

## 2017-11-10 RX ADMIN — ISOSORBIDE MONONITRATE 30 MG: 30 TABLET, EXTENDED RELEASE ORAL at 08:15

## 2017-11-10 RX ADMIN — HEPARIN SODIUM 5000 UNITS: 5000 INJECTION, SOLUTION INTRAVENOUS; SUBCUTANEOUS at 17:17

## 2017-11-10 RX ADMIN — HEPARIN SODIUM 4000 UNITS: 1000 INJECTION, SOLUTION INTRAVENOUS; SUBCUTANEOUS at 13:11

## 2017-11-10 RX ADMIN — HEPARIN SODIUM 5000 UNITS: 1000 INJECTION, SOLUTION INTRAVENOUS; SUBCUTANEOUS at 12:25

## 2017-11-10 RX ADMIN — INSULIN GLARGINE 20 UNITS: 100 INJECTION, SOLUTION SUBCUTANEOUS at 08:09

## 2017-11-10 RX ADMIN — ALLOPURINOL 100 MG: 100 TABLET ORAL at 08:15

## 2017-11-10 RX ADMIN — ASPIRIN 81 MG CHEWABLE TABLET 81 MG: 81 TABLET CHEWABLE at 08:15

## 2017-11-10 RX ADMIN — HEPARIN SODIUM 1000 UNITS: 1000 INJECTION, SOLUTION INTRAVENOUS; SUBCUTANEOUS at 12:02

## 2017-11-10 RX ADMIN — NICARDIPINE HYDROCHLORIDE 100 MCG: 25 INJECTION INTRAVENOUS at 13:17

## 2017-11-10 NOTE — PROGRESS NOTES
Progress Note    Leonor Core  68 y.o. Admit Date: 11/7/2017  Active Problems:    Coronary atherosclerosis of native coronary artery (10/7/2012) POA: Yes      HTN (hypertension) (10/7/2012) POA: Yes      DM2 (diabetes mellitus, type 2) ([facility] (10/7/2012) POA: Yes      S/P coronary artery stent placement (3/12/2013) POA: Yes      Overview: vg to diag stent 10/2012      Secondary hyperparathyroidism of renal origin ([facility] (10/25/2017) POA: Yes      Chest pain (10/27/2017) POA: Unknown      Chronic kidney disease, stage V ([facility] (10/28/2017) POA: Yes      ESRD (end stage renal disease) ([facility] (11/8/2017) POA: Yes            Subjective:     Patient feels good, undergone Cardiac cath ,received contrast 250 cc, No SOB,eating dinner, cath findings noted. A comprehensive review of systems was negative except for that written in the History of Present Illness.     Objective:     Visit Vitals    /57    Pulse 85    Temp 98.1 °F (36.7 °C)    Resp 16    Ht 5' 8\" (1.727 m)    Wt 101.6 kg (224 lb)    SpO2 95%    BMI 34.06 kg/m2         Intake/Output Summary (Last 24 hours) at 11/10/17 1806  Last data filed at 11/10/17 1418   Gross per 24 hour   Intake              370 ml   Output              100 ml   Net              270 ml       Current Facility-Administered Medications   Medication Dose Route Frequency Provider Last Rate Last Dose    heparinized saline 2 units/mL 1,000 unit/500 mL infusion        Stopped at 11/10/17 1123    [START ON 11/13/2017] allopurinol (ZYLOPRIM) tablet 100 mg  100 mg Oral Q MON, WED & Darren Figueredo MD        eptifibatide (INTEGRILIN) 2 mg/mL injection        Stopped at 11/10/17 1331    eptifibatide (INTEGRILIN) 2 mg/mL injection        Stopped at 11/10/17 1333    sodium chloride (NS) flush 5-10 mL  5-10 mL IntraVENous Q8H Alma Hoyt MD   10 mL at 11/10/17 1714    sodium chloride (NS) flush 5-10 mL  5-10 mL IntraVENous PRN Alma Hoyt MD       [address] [place] [address] clopidogrel (PLAVIX) tablet 75 mg  75 mg Oral DAILY Urszula Coughlin NP   75 mg at 11/10/17 0815    epoetin lee (EPOGEN;PROCRIT) 5,000 Units  5,000 Units IntraVENous DIALYSIS ABIEL KRUGER & HUGO Vallejo MD   5,000 Units at 11/09/17 1500    doxercalciferol (HECTOROL) 4 mcg/2 mL injection 0.5 mcg  0.5 mcg IntraVENous DIALYSIS ABIEL KRUGER & HUGO Vallejo MD   0.5 mcg at 11/09/17 1500    losartan (COZAAR) tablet 50 mg  50 mg Oral DAILY Lorrie Vallejo MD   50 mg at 11/10/17 0953    hydrALAZINE (APRESOLINE) 20 mg/mL injection 10 mg  10 mg IntraVENous Q6H PRN Glennie Goldberg, MD        heparin (porcine) injection 5,000 Units  5,000 Units SubCUTAneous Q8H Glennie Goldberg, MD   5,000 Units at 11/10/17 1717    nitroglycerin (NITROSTAT) tablet 0.4 mg  0.4 mg SubLINGual PRN Urszula Coughlin NP   0.4 mg at 11/08/17 1449    aspirin chewable tablet 81 mg  81 mg Oral DAILY Harini Raman MD   81 mg at 11/10/17 0815    B complex-vitaminC-folic acid (NEPHROCAP) cap  1 Cap Oral DAILY Harini Raman MD   1 Cap at 11/10/17 0815    cloNIDine HCl (CATAPRES) tablet 0.2 mg  0.2 mg Oral TID Harini Raman MD   0.2 mg at 11/10/17 1713    fenofibrate nanocrystallized (TRICOR) tablet 48 mg  48 mg Oral DAILY Harini Raman MD   48 mg at 11/10/17 0815    hydrALAZINE (APRESOLINE) tablet 100 mg  100 mg Oral TID Harini Raman MD   100 mg at 11/10/17 1713    insulin glargine (LANTUS) injection 20 Units  20 Units SubCUTAneous Q12H Harini Raman MD   20 Units at 11/10/17 0809    isosorbide mononitrate ER (IMDUR) tablet 30 mg  30 mg Oral DAILY Harini Raman MD   30 mg at 11/10/17 0815    metoprolol tartrate (LOPRESSOR) tablet 100 mg  100 mg Oral BID Harini Raman MD   100 mg at 11/10/17 1713    senna-docusate (PERICOLACE) 8.6-50 mg per tablet 1 Tab  1 Tab Oral DAILY Harini Raman MD   1 Tab at 11/09/17 0908    simvastatin (ZOCOR) tablet 40 mg  40 mg Oral QHS Harini Raman MD   40 mg at 11/09/17 2201    tamsulosin (FLOMAX) capsule 0.4 mg  0.4 mg Oral DAILY Efrem Cadena MD   0.4 mg at 11/10/17 0815    insulin lispro (HUMALOG) injection   SubCUTAneous AC&HS Efrem Cadena MD   6 Units at 11/10/17 1550    glucose chewable tablet 16 g  16 g Oral PRN Efrem Cadena MD        glucagon (GLUCAGEN) injection 1 mg  1 mg IntraMUSCular PRN Efrem Cadena MD        dextrose (D50W) injection syrg 12.5-25 g  25-50 mL IntraVENous PRN Efrem Cadena MD        acetaminophen (TYLENOL) tablet 650 mg  650 mg Oral Q4H PRN Efrem Cadena MD   650 mg at 11/09/17 0908    bisacodyl (DULCOLAX) tablet 5 mg  5 mg Oral DAILY PRN Efrem Cadena MD        ondansetron [facility]) injection 4 mg  4 mg IntraVENous Q4H PRN Efrem Cadena MD   4 mg at 11/09/17 1805        Physical Exam:     Physical Exam:   General:  Alert, cooperative, no distress, appears stated age. Neck: Supple, symmetrical, trachea midline, no adenopathy, thyroid: no enlargement/tenderness/nodules, no carotid bruit and no JVD. Lungs:   Clear to auscultation bilaterally. Heart:  Regular rate and rhythm, S1, S2 normal, no murmur, click, rub or gallop. Abdomen:   Soft, non-tender. Bowel sounds normal. No masses,  No organomegaly. Extremities: Extremities normal, atraumatic, no cyanosis or edema,HD catheter is well dressed.          Data Review:    CBC w/Diff    Recent Labs      11/10/17   0546  11/09/17   0550  11/08/17   0600   WBC  11.5  8.7  8.9   RBC  3.47*  3.75*  3.77*   HGB  9.8*  10.6*  10.6*   HCT  31.7*  34.1*  34.1*   MCV  91.4  90.9  90.5   MCH  28.2  28.3  28.1   MCHC  30.9*  31.1  31.1   RDW  14.1  13.8  14.0    Recent Labs      11/10/17   0546  11/09/17   0550  11/08/17   0600   MONOS  6  8  6   EOS  3  2  4   BASOS  0  0  0   RDW  14.1  13.8  14.0        Comprehensive Metabolic Profile    Recent Labs      11/10/17   0546  11/09/17   0550  11/08/17   0600   NA  138  140  137   K  3.5  3.5  3.2*   CL  99*  97*  97*   CO2  31  35*  30   BUN  27*  44* 69*   CREA  3.73*  3.62*  4.68*    Recent Labs      11/10/17   0546  11/09/17   0550  11/08/17   0600   CA  8.8  9.1  9.9  9.8   PHOS  2.8  3.2  4.4                        Impression:       Active Hospital Problems    Diagnosis Date Noted    ESRD (end stage renal disease) ([facility] 11/08/2017    Chronic kidney disease, stage V ([facility] 10/28/2017    Chest pain 10/27/2017    Secondary hyperparathyroidism of renal origin ([facility] 10/25/2017    S/P coronary artery stent placement 03/12/2013     vg to diag stent 10/2012      Coronary atherosclerosis of native coronary artery 10/07/2012    HTN (hypertension) 10/07/2012    DM2 (diabetes mellitus, type 2) ([facility] 10/07/2012            Plan:     No need for any dialysis tonight, will Dialyze tomorrow morning  & then maintain on q Tues, Thursday & Saturday. , needs to change his Temporary HD  catheter to [facility]  Before DC, his OP Dialysis center is  finalized      Erna Hodges MD

## 2017-11-10 NOTE — PROGRESS NOTES
Right FAS aspirated and pulled @ 1500, by JT. Pressure held for 20 min. Sterile hemostaic dressing applied. No bleeding or swelling. Safety instructions reviewed.

## 2017-11-10 NOTE — PROGRESS NOTES
Patient arrived to cath holding awake and alert, vital signs stable, no C/O of pain, will continue to monitor. 1345 report given to Paige Stanley RN to assume care of patient once out of cath lab.

## 2017-11-10 NOTE — PROGRESS NOTES
Pt alert, able to voice needs/wants. Denies pain. VSS. SR on the monitor. Pt has stated that he is has concerns about the procedure and is not sure he wants to have it done. Active listening and reassurance given to patient. Pt states has some questions for the MD.  Pt has been NPO since midnight. Call light within reach, will monitor.

## 2017-11-10 NOTE — ROUTINE PROCESS
TRANSFER - OUT REPORT:    Verbal report given to Julien Diaz RN(name) on Brii Paul  being transferred to [facility]) for routine progression of care       Report consisted of patients Situation, Background, Assessment and   Recommendations(SBAR). Information from the following report(s) SBAR, Kardex, Procedure Summary and MAR was reviewed with the receiving nurse. Lines:   Peripheral IV 11/07/17 Left Antecubital (Active)   Site Assessment Clean, dry, & intact 11/10/2017  8:03 AM   Phlebitis Assessment 0 11/10/2017  8:03 AM   Infiltration Assessment 0 11/10/2017  8:03 AM   Dressing Status Clean, dry, & intact 11/10/2017  8:03 AM   Dressing Type Transparent 11/10/2017  8:03 AM   Hub Color/Line Status Pink;Patent 11/10/2017  8:03 AM   Action Taken Dressing reinforced 11/8/2017  1:04 PM   Alcohol Cap Used Yes 11/10/2017  8:03 AM        Opportunity for questions and clarification was provided.       Patient transported with:   [employer]

## 2017-11-10 NOTE — PROGRESS NOTES
CM met with pt regarding scheduled time preference for outpatient dialysis. Pt shares that he would prefer evenings. CM contacted Mason Mcginnis with [facility] to inform. Mason Mcginnis shares that pt has a scheduled time of 5:30 am on T-T-S. Nephrologist (Dr. Priyank Kate) will be able to follow pt during that scheduled time. Mason Mcginnis shares that she will contact  for confirmation and will update CM.

## 2017-11-10 NOTE — PROGRESS NOTES
[facility]      CARDIOLOGY PROGRESS NOTE  RECS:      1. Unstable Angina- currently chest pain free. No SOB. Plans for cardiac cath Today   2. Hx of recent NSTEMI- was managed medically. 3. CAD s/p coronary artery stent placement in 2012 and 7/17 with HERBER stents deployed in SVG in the focal mid lesion and a long proximal lesion. continue with asa/Plavix/bb/statin   4. Chronic diastolic heart failure- appears compensated- monitor for fluid overload.                5. Essential hypertension-labile now on HD. Will adjust medications   6. 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 Hyperlipidemia- on statin                                                            8. CKD- now ESRD on HD per Renal following   9. Diabetes managed per medicine.       The benefits and risks of cardiac catheterization have been discussed in detail with the patient present at the time. Patient understands risk of potential cath complications including but not limited to bleeding, infection, difficulty healing the arteriotomy access site(2 chances in 100) which may require surgical repair, potential thromboembolic complications which could result in stroke, myocardial infarction, loss of limb or organ function and/or death( 1 chance in 1000)and potential allergic reaction to contrast dye or other medication used during the procedure. Patient is also aware of the therapeutic implications for medical management vs coronary artery bypass surgery vs percutaneous coronary intervention in treatment of coronary artery disease. The additional risks for percutaneous coronary artery intervention include the need for emergent bypass surgery at 1 chance in 100 and for restenosis which may range from 35% for plain balloon angioplasty, 15% for bare metal stent, and 5% for drug eluting stent implants.  The need for mandatory uninterrupted dual antiplatelet therapy with lifelong Aspirin combined with Plavix for up to 12 months following drug eluting stents, and minimum 1 month following bare metal stents to prevent stent thrombosis which is the equivalent of acute heart attack has been reviewed in detail. No contraindications to use of drug eluting stents has been discovered.       ASSESSMENT:  Hospital Problems  Date Reviewed: 11/8/2017          Codes Class Noted POA    ESRD (end stage renal disease) ([facility] ICD-10-CM: N18.6  ICD-9-CM: 585.6  11/8/2017 Yes        Chronic kidney disease, stage V ([facility] ICD-10-CM: N18.5  ICD-9-CM: 585.5  10/28/2017 Yes        Chest pain ICD-10-CM: R07.9  ICD-9-CM: 786.50  10/27/2017 Unknown        Secondary hyperparathyroidism of renal origin ([facility] (Chronic) ICD-10-CM: N25.81  ICD-9-CM: 588.81  10/25/2017 Yes        S/P coronary artery stent placement ICD-10-CM: Z95.5  ICD-9-CM: V45.82  3/12/2013 Yes    Overview Signed 3/12/2013  3:37 PM by Rashad Cote MD     vg to diag stent 10/2012             Coronary atherosclerosis of native coronary artery ICD-10-CM: I25.10  ICD-9-CM: 414.01  10/7/2012 Yes        HTN (hypertension) ICD-10-CM: I10  ICD-9-CM: 401.9  10/7/2012 Yes        DM2 (diabetes mellitus, type 2) ([facility] ICD-10-CM: E11.9  ICD-9-CM: 250.00  10/7/2012 Yes                SUBJECTIVE:  No CP  No SOB    OBJECTIVE:    VS:   Visit Vitals    /65    Pulse 76    Temp 98.3 °F (36.8 °C)    Resp 20    Wt 102 kg (224 lb 14.4 oz)    SpO2 94%    BMI 34.2 kg/m2         Intake/Output Summary (Last 24 hours) at 11/10/17 0919  Last data filed at 11/10/17 0448   Gross per 24 hour   Intake              240 ml   Output              100 ml   Net              140 ml     TELE: normal sinus rhythm    General: alert, well developed, cooperative, pleasant and in no apparent distress  HENT: Normocephalic, atraumatic. Normal external eye.   Neck :  JVD difficult to assess due to obesity  Cardiac:  regular rate and rhythm, S1, S2 normal, no murmur, click, rub or gallop  Lungs: clear to auscultation bilaterally  Abdomen: Soft, nontender, no masses  Extremities:  No c/c/e, peripheral pulses present      Labs: Results:       Chemistry Recent Labs      11/10/17   0546  11/09/17   0550  11/08/17   0600  11/07/17   1730   GLU  214*  179*  147*  250*   NA  138  140  137  135*   K  3.5  3.5  3.2*  3.7   CL  99*  97*  97*  97*   CO2  31  35*  30  32   BUN  27*  44*  69*  68*   CREA  3.73*  3.62*  4.68*  4.93*   CA  8.8  9.1  9.9  9.8  8.9   AGAP  8  8  10  6   BUCR  7*  12  15  14   AP   --    --    --   85   TP   --    --    --   7.1   ALB   --    --    --   3.3*   GLOB   --    --    --   3.8   AGRAT   --    --    --   0.9      CBC w/Diff Recent Labs      11/10/17   0546  11/09/17   0550  11/08/17   0600   WBC  11.5  8.7  8.9   RBC  3.47*  3.75*  3.77*   HGB  9.8*  10.6*  10.6*   HCT  31.7*  34.1*  34.1*   PLT  242  250  305   GRANS  72  65  66   LYMPH  19*  25  24   EOS  3  2  4      Cardiac Enzymes No results for input(s): CPK, CKND1, PAIGE in the last 72 hours. No lab exists for component: CKRMB, TROIP   Coagulation Recent Labs      11/08/17   0600   PTP  12.1   INR  0.9       Lipid Panel Lab Results   Component Value Date/Time    Cholesterol, total 158 10/24/2017 05:08 AM    HDL Cholesterol 39 10/24/2017 05:08 AM    LDL, calculated 72.4 10/24/2017 05:08 AM    VLDL, calculated 46.6 10/24/2017 05:08 AM    Triglyceride 233 10/24/2017 05:08 AM    CHOL/HDL Ratio 4.1 10/24/2017 05:08 AM      BNP No results for input(s): BNPP in the last 72 hours. Liver Enzymes Recent Labs      11/07/17   1730   TP  7.1   ALB  3.3*   AP  85   SGOT  16      Thyroid Studies Lab Results   Component Value Date/Time    T4, Total 10.9 10/09/2012 04:53 AM    TSH 2.16 10/23/2017 11:08 PM              Urszula BONILLA Clarissa:[phone]  Supervised    I have independently evaluated and examined the patient. All relevant labs and testing data's are reviewed.   Care plan discussed and updated after review.     Kailyn Blakely MD

## 2017-11-10 NOTE — PROGRESS NOTES
Pt's IV found to be leaking heme; screw cap not tightened all the way. Cap was replaced. IV flushed. MD notified of heme loss.

## 2017-11-10 NOTE — PROGRESS NOTES
[facility] Hospitalist Group  Progress Note    Patient: Ravinder Burciaga Age: 68 y.o. : 1940 MR#: [id number] SSN: [id number]  Date: 11/10/2017     Subjective:     Patient lying in bed in NAD, s/p cardiac catheterization    Assessment/Plan:     1. chest pain. 2. Coronary artery disease. 3. Stage 5 chronic kidney disease.- ESRD  4. Diabetes mellitus. 5. Hypertension. 6. History of prostate cancer.   7- Uncontrolled HTN  8- Anemia of chronic disease  9- Chronic diastolic chf - compensated    PLAN  HD as per nephro, s/p [facility]  Cardio following, s/p cardiac catheterization, On asa, plavix, BB, statin  On clonidine  Lantus, ssi  DVT prophylaxis  Advanced care planning: full code per patient  D/w patient, d/w Dr Elby Sandifer    Case discussed with:  [x]Patient  []Family  []Nursing  []Case Management  DVT Prophylaxis:  []Lovenox  []Hep SQ  []SCDs  []Coumadin   []On Heparin gtt    Objective:   VS:   Visit Vitals    /59    Pulse 76    Temp 98.3 °F (36.8 °C)    Resp 8    Ht 5' 8\" (1.727 m)    Wt 101.6 kg (224 lb)    SpO2 96%    BMI 34.06 kg/m2      Tmax/24hrs: Temp (24hrs), Av.8 °F (37.1 °C), Min:98.3 °F (36.8 °C), Max:99.5 °F (37.5 °C)      Intake/Output Summary (Last 24 hours) at 11/10/17 1554  Last data filed at 11/10/17 1418   Gross per 24 hour   Intake              490 ml   Output              100 ml   Net              390 ml       General:  Awake, alert  Cardiovascular:  S1S2+, RRR  Pulmonary:  CTA b/l  GI:  Soft, BS+, NT, ND  Extremities:  No edema      Labs:    Recent Results (from the past 24 hour(s))   GLUCOSE, POC    Collection Time: 17  4:25 PM   Result Value Ref Range    Glucose (POC) 169 (H) 70 - 110 mg/dL   GLUCOSE, POC    Collection Time: 17  9:20 PM   Result Value Ref Range    Glucose (POC) 331 (H) 70 - 172 mg/dL   METABOLIC PANEL, BASIC    Collection Time: 11/10/17  5:46 AM   Result Value Ref Range    Sodium 138 136 - 145 mmol/L    Potassium 3.5 3.5 - 5.5 mmol/L    Chloride 99 (L) 100 - 108 mmol/L    CO2 31 21 - 32 mmol/L    Anion gap 8 3.0 - 18 mmol/L    Glucose 214 (H) 74 - 99 mg/dL    BUN 27 (H) 7.0 - 18 MG/DL    Creatinine 3.73 (H) 0.6 - 1.3 MG/DL    BUN/Creatinine ratio 7 (L) 12 - 20      GFR est AA 19 (L) >60 ml/min/1.73m2    GFR est non-AA 16 (L) >60 ml/min/1.73m2    Calcium 8.8 8.5 - 10.1 MG/DL   CBC WITH AUTOMATED DIFF    Collection Time: 11/10/17  5:46 AM   Result Value Ref Range    WBC 11.5 4.6 - 13.2 K/uL    RBC 3.47 (L) 4.70 - 5.50 M/uL    HGB 9.8 (L) 13.0 - 16.0 g/dL    HCT 31.7 (L) 36.0 - 48.0 %    MCV 91.4 74.0 - 97.0 FL    MCH 28.2 24.0 - 34.0 PG    MCHC 30.9 (L) 31.0 - 37.0 g/dL    RDW 14.1 11.6 - 14.5 %    PLATELET 904 651 - 474 K/uL    MPV 10.9 9.2 - 11.8 FL    NEUTROPHILS 72 40 - 73 %    LYMPHOCYTES 19 (L) 21 - 52 %    MONOCYTES 6 3 - 10 %    EOSINOPHILS 3 0 - 5 %    BASOPHILS 0 0 - 2 %    ABS. NEUTROPHILS 8.2 (H) 1.8 - 8.0 K/UL    ABS. LYMPHOCYTES 2.2 0.9 - 3.6 K/UL    ABS. MONOCYTES 0.7 0.05 - 1.2 K/UL    ABS. EOSINOPHILS 0.4 0.0 - 0.4 K/UL    ABS.  BASOPHILS 0.0 0.0 - 0.1 K/UL    DF AUTOMATED     PHOSPHORUS    Collection Time: 11/10/17  5:46 AM   Result Value Ref Range    Phosphorus 2.8 2.5 - 4.9 MG/DL   GLUCOSE, POC    Collection Time: 11/10/17  8:02 AM   Result Value Ref Range    Glucose (POC) 195 (H) 70 - 110 mg/dL   POC ACTIVATED CLOTTING TIME    Collection Time: 11/10/17 12:32 PM   Result Value Ref Range    Activated Clotting Time (POC) 180 (H) 79 - 138 SECS   POC ACTIVATED CLOTTING TIME    Collection Time: 11/10/17 12:54 PM   Result Value Ref Range    Activated Clotting Time (POC) 120 79 - 138 SECS   POC ACTIVATED CLOTTING TIME    Collection Time: 11/10/17  1:08 PM   Result Value Ref Range    Activated Clotting Time (POC) 125 79 - 138 SECS   POC ACTIVATED CLOTTING TIME    Collection Time: 11/10/17  1:18 PM   Result Value Ref Range    Activated Clotting Time (POC) 213 (H) 79 - [address] TIME    Collection Time: 11/10/17  2:07 PM   Result Value Ref Range    Activated Clotting Time (POC) 197 (H) 79 - 138 SECS   POC ACTIVATED CLOTTING TIME    Collection Time: 11/10/17  2:57 PM   Result Value Ref Range    Activated Clotting Time (POC) 164 (H) 79 - 138 SECS   GLUCOSE, POC    Collection Time: 11/10/17  3:47 PM   Result Value Ref Range    Glucose (POC) 206 (H) 70 - 110 mg/dL       Signed By: Javad Tomas MD     November 10, 2017

## 2017-11-10 NOTE — ROUTINE PROCESS
Bedside and Verbal shift change report given to Sana Drummond RN   (oncoming nurse) by Eve Tirado RN   (offgoing nurse). Report included the following information SBAR, Kardex, Procedure Summary, Intake/Output, MAR, Recent Results, Med Rec Status and Cardiac Rhythm NSR.

## 2017-11-10 NOTE — CARDIO/PULMONARY
Patient has been seen multiple times since admission. He is a very pleasant man. Will forward his information to the  for outpatient cardiac rehab.

## 2017-11-10 NOTE — ROUTINE PROCESS
TRANSFER - OUT REPORT:    Verbal report given to Micah Zayas RN(name) on Cecile Sosa  being transferred to 2304(unit) for routine progression of care       Report consisted of patients Situation, Background, Assessment and   Recommendations(SBAR). Information from the following report(s) SBAR was reviewed with the receiving nurse. Lines:   Peripheral IV 11/07/17 Left Antecubital (Active)   Site Assessment Clean, dry, & intact 11/10/2017  8:03 AM   Phlebitis Assessment 0 11/10/2017  8:03 AM   Infiltration Assessment 0 11/10/2017  8:03 AM   Dressing Status Clean, dry, & intact 11/10/2017  8:03 AM   Dressing Type Transparent 11/10/2017  8:03 AM   Hub Color/Line Status Pink;Patent 11/10/2017  8:03 AM   Action Taken Dressing reinforced 11/8/2017  1:04 PM   Alcohol Cap Used Yes 11/10/2017  8:03 AM        Opportunity for questions and clarification was provided. Patient transported with:   Registered Nurse: LELO Kim

## 2017-11-10 NOTE — PROGRESS NOTES
Problem: Falls - Risk of  Goal: *Absence of Falls  Document Rene Fall Risk and appropriate interventions in the flowsheet.    Outcome: Progressing Towards Goal  Fall Risk Interventions:     Call light within reach  Pt to call before getting OOB

## 2017-11-10 NOTE — CARDIO/PULMONARY
Brief visit by cardiac rehab. He stated that he is doing ok. He knows what he needs to do at home. Will continue to follow.

## 2017-11-11 ENCOUNTER — ANESTHESIA EVENT (OUTPATIENT)
Dept: CARDIOTHORACIC SURGERY | Age: 77
DRG: 246 | End: 2017-11-11
Payer: MEDICARE

## 2017-11-11 LAB
ANION GAP SERPL CALC-SCNC: 9 MMOL/L (ref 3–18)
BUN SERPL-MCNC: 35 MG/DL (ref 7–18)
BUN/CREAT SERPL: 7 (ref 12–20)
CALCIUM SERPL-MCNC: 8.7 MG/DL (ref 8.5–10.1)
CHLORIDE SERPL-SCNC: 101 MMOL/L (ref 100–108)
CO2 SERPL-SCNC: 29 MMOL/L (ref 21–32)
CREAT SERPL-MCNC: 5.2 MG/DL (ref 0.6–1.3)
GLUCOSE BLD STRIP.AUTO-MCNC: 101 MG/DL (ref 70–110)
GLUCOSE BLD STRIP.AUTO-MCNC: 169 MG/DL (ref 70–110)
GLUCOSE BLD STRIP.AUTO-MCNC: 230 MG/DL (ref 70–110)
GLUCOSE BLD STRIP.AUTO-MCNC: 93 MG/DL (ref 70–110)
GLUCOSE SERPL-MCNC: 113 MG/DL (ref 74–99)
PHOSPHATE SERPL-MCNC: 2.8 MG/DL (ref 2.5–4.9)
POTASSIUM SERPL-SCNC: 3.7 MMOL/L (ref 3.5–5.5)
SODIUM SERPL-SCNC: 139 MMOL/L (ref 136–145)

## 2017-11-11 PROCEDURE — 74011250637 HC RX REV CODE- 250/637: Performed by: INTERNAL MEDICINE

## 2017-11-11 PROCEDURE — 74011250636 HC RX REV CODE- 250/636: Performed by: EMERGENCY MEDICINE

## 2017-11-11 PROCEDURE — 74011636637 HC RX REV CODE- 636/637: Performed by: INTERNAL MEDICINE

## 2017-11-11 PROCEDURE — 74011250636 HC RX REV CODE- 250/636: Performed by: INTERNAL MEDICINE

## 2017-11-11 PROCEDURE — 90935 HEMODIALYSIS ONE EVALUATION: CPT

## 2017-11-11 PROCEDURE — 80048 BASIC METABOLIC PNL TOTAL CA: CPT | Performed by: INTERNAL MEDICINE

## 2017-11-11 PROCEDURE — 82962 GLUCOSE BLOOD TEST: CPT

## 2017-11-11 PROCEDURE — 65660000004 HC RM CVT STEPDOWN

## 2017-11-11 PROCEDURE — 84100 ASSAY OF PHOSPHORUS: CPT | Performed by: INTERNAL MEDICINE

## 2017-11-11 PROCEDURE — 74011250637 HC RX REV CODE- 250/637: Performed by: NURSE PRACTITIONER

## 2017-11-11 RX ADMIN — SIMVASTATIN 40 MG: 40 TABLET, FILM COATED ORAL at 21:22

## 2017-11-11 RX ADMIN — INSULIN LISPRO 3 UNITS: 100 INJECTION, SOLUTION INTRAVENOUS; SUBCUTANEOUS at 11:30

## 2017-11-11 RX ADMIN — HEPARIN SODIUM 5000 UNITS: 5000 INJECTION, SOLUTION INTRAVENOUS; SUBCUTANEOUS at 10:00

## 2017-11-11 RX ADMIN — HYDRALAZINE HYDROCHLORIDE 100 MG: 50 TABLET, FILM COATED ORAL at 21:22

## 2017-11-11 RX ADMIN — Medication 10 ML: at 21:27

## 2017-11-11 RX ADMIN — BISACODYL 5 MG: 5 TABLET, COATED ORAL at 16:49

## 2017-11-11 RX ADMIN — HEPARIN SODIUM 5000 UNITS: 5000 INJECTION, SOLUTION INTRAVENOUS; SUBCUTANEOUS at 17:04

## 2017-11-11 RX ADMIN — TAMSULOSIN HYDROCHLORIDE 0.4 MG: 0.4 CAPSULE ORAL at 16:41

## 2017-11-11 RX ADMIN — DOXERCALCIFEROL 0.5 MCG: 4 INJECTION, SOLUTION INTRAVENOUS at 15:04

## 2017-11-11 RX ADMIN — METOPROLOL TARTRATE 100 MG: 50 TABLET ORAL at 16:43

## 2017-11-11 RX ADMIN — HYDRALAZINE HYDROCHLORIDE 10 MG: 20 INJECTION INTRAMUSCULAR; INTRAVENOUS at 16:52

## 2017-11-11 RX ADMIN — INSULIN LISPRO 6 UNITS: 100 INJECTION, SOLUTION INTRAVENOUS; SUBCUTANEOUS at 21:24

## 2017-11-11 RX ADMIN — ASPIRIN 81 MG CHEWABLE TABLET 81 MG: 81 TABLET CHEWABLE at 16:43

## 2017-11-11 RX ADMIN — NEPHROCAP 1 CAPSULE: 1 CAP ORAL at 16:41

## 2017-11-11 RX ADMIN — HEPARIN SODIUM 5000 UNITS: 5000 INJECTION, SOLUTION INTRAVENOUS; SUBCUTANEOUS at 01:21

## 2017-11-11 RX ADMIN — ERYTHROPOIETIN 5000 UNITS: 3000 INJECTION, SOLUTION INTRAVENOUS; SUBCUTANEOUS at 15:00

## 2017-11-11 RX ADMIN — ISOSORBIDE MONONITRATE 30 MG: 30 TABLET, EXTENDED RELEASE ORAL at 16:42

## 2017-11-11 RX ADMIN — INSULIN GLARGINE 20 UNITS: 100 INJECTION, SOLUTION SUBCUTANEOUS at 21:24

## 2017-11-11 RX ADMIN — CLONIDINE HYDROCHLORIDE 0.2 MG: 0.1 TABLET ORAL at 21:22

## 2017-11-11 RX ADMIN — Medication 10 ML: at 16:09

## 2017-11-11 RX ADMIN — Medication 10 ML: at 05:32

## 2017-11-11 RX ADMIN — CLONIDINE HYDROCHLORIDE 0.2 MG: 0.1 TABLET ORAL at 16:39

## 2017-11-11 RX ADMIN — LOSARTAN POTASSIUM 50 MG: 50 TABLET ORAL at 16:42

## 2017-11-11 RX ADMIN — CLOPIDOGREL 75 MG: 75 TABLET, FILM COATED ORAL at 16:39

## 2017-11-11 RX ADMIN — STANDARDIZED SENNA CONCENTRATE AND DOCUSATE SODIUM 1 TABLET: 8.6; 5 TABLET, FILM COATED ORAL at 16:18

## 2017-11-11 RX ADMIN — INSULIN GLARGINE 20 UNITS: 100 INJECTION, SOLUTION SUBCUTANEOUS at 09:02

## 2017-11-11 RX ADMIN — HYDRALAZINE HYDROCHLORIDE 100 MG: 50 TABLET, FILM COATED ORAL at 16:38

## 2017-11-11 NOTE — PROGRESS NOTES
[facility]  Progress Note    Patient: Nohemy Lopez Age: 68 y.o. : 1940 MR#: [id number] SSN: [id number]  Date: 2017     Subjective:     Patient in NAD, denies cp or sobcatheterization    Assessment/Plan:     1. chest pain. 2. Coronary artery disease. 3. Stage 5 chronic kidney disease.- ESRD  4. Diabetes mellitus. 5. Hypertension. 6. History of prostate cancer.   7- Uncontrolled HTN  8- Anemia of chronic disease  9- Chronic diastolic chf - compensated    PLAN  HD as per nephro, await Permacath  Cardio following, s/p cardiac catheterization, On asa, plavix, BB, statin  On clonidine  SSI, lantus  DVT prophylaxis  Advnced care planning: full code per patient  D/w patient    Case discussed with:  [x]Patient  []Family  []Nursing  []Case Management  DVT Prophylaxis:  []Lovenox  []Hep SQ  []SCDs  []Coumadin   []On Heparin gtt    Objective:   VS:   Visit Vitals    /65    Pulse 74    Temp 98.8 °F (37.1 °C)    Resp 18    Ht 5' 8\" (1.727 m)    Wt 98.9 kg (218 lb)    SpO2 95%    BMI 33.15 kg/m2      Tmax/24hrs: Temp (24hrs), Av.2 °F (36.8 °C), Min:97.9 °F (36.6 °C), Max:98.8 °F (37.1 °C)      Intake/Output Summary (Last 24 hours) at 17 1230  Last data filed at 17 0814   Gross per 24 hour   Intake              490 ml   Output              400 ml   Net               90 ml       General:  Awake, alert  Cardiovascular:  S1S2+, RRR  Pulmonary:  CTA b/l  GI:  Soft, BS+, NT, ND  Extremities:  No edema        Labs:    Recent Results (from the past 24 hour(s))   POC ACTIVATED CLOTTING TIME    Collection Time: 11/10/17 12:32 PM   Result Value Ref Range    Activated Clotting Time (POC) 180 (H) 79 - 138 SECS   POC ACTIVATED CLOTTING TIME    Collection Time: 11/10/17 12:54 PM   Result Value Ref Range    Activated Clotting Time (POC) 120 79 - 138 SECS   POC ACTIVATED CLOTTING TIME    Collection Time: 11/10/17  1:08 PM   Result Value Ref Range Activated Clotting Time (POC) 125 79 - 138 SECS   POC ACTIVATED CLOTTING TIME    Collection Time: 11/10/17  1:18 PM   Result Value Ref Range    Activated Clotting Time (POC) 213 (H) 79 - 138 SECS   POC ACTIVATED CLOTTING TIME    Collection Time: 11/10/17  2:07 PM   Result Value Ref Range    Activated Clotting Time (POC) 197 (H) 79 - 138 SECS   POC ACTIVATED CLOTTING TIME    Collection Time: 11/10/17  2:57 PM   Result Value Ref Range    Activated Clotting Time (POC) 164 (H) 79 - 138 SECS   GLUCOSE, POC    Collection Time: 11/10/17  3:47 PM   Result Value Ref Range    Glucose (POC) 206 (H) 70 - 110 mg/dL   GLUCOSE, POC    Collection Time: 11/10/17  4:35 PM   Result Value Ref Range    Glucose (POC) 229 (H) 70 - 110 mg/dL   GLUCOSE, POC    Collection Time: 11/10/17  8:10 PM   Result Value Ref Range    Glucose (POC) 259 (H) 70 - 517 mg/dL   METABOLIC PANEL, BASIC    Collection Time: 11/11/17  3:00 AM   Result Value Ref Range    Sodium 139 136 - 145 mmol/L    Potassium 3.7 3.5 - 5.5 mmol/L    Chloride 101 100 - 108 mmol/L    CO2 29 21 - 32 mmol/L    Anion gap 9 3.0 - 18 mmol/L    Glucose 113 (H) 74 - 99 mg/dL    BUN 35 (H) 7.0 - 18 MG/DL    Creatinine 5.20 (H) 0.6 - 1.3 MG/DL    BUN/Creatinine ratio 7 (L) 12 - 20      GFR est AA 13 (L) >60 ml/min/1.73m2    GFR est non-AA 11 (L) >60 ml/min/1.73m2    Calcium 8.7 8.5 - 10.1 MG/DL   PHOSPHORUS    Collection Time: 11/11/17  3:00 AM   Result Value Ref Range    Phosphorus 2.8 2.5 - 4.9 MG/DL   GLUCOSE, POC    Collection Time: 11/11/17  7:17 AM   Result Value Ref Range    Glucose (POC) 93 70 - 110 mg/dL   GLUCOSE, POC    Collection Time: 11/11/17 11:16 AM   Result Value Ref Range    Glucose (POC) 169 (H) 70 - 110 mg/dL       Signed By: Ricky Garcia MD     November 11, 2017

## 2017-11-11 NOTE — PROGRESS NOTES
[facility]      CARDIOLOGY PROGRESS NOTE  RECS:      1. Unstable Angina- currently chest pain free. No SOB. P0st HERBER in vg to diag  Continue treatment  2. Hx of recent NSTEMI- was managed medically. 3. CAD s/p coronary artery stent placement in 2012 and 7/17 with HERBER stents deployed in SVG in the focal mid lesion and a long proximal lesion. continue with asa/Plavix/bb/statin   4. Chronic diastolic heart failure- appears compensated- monitor for fluid overload.                5. Essential hypertension-labile now on HD. Will adjust medications   6. 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 Hyperlipidemia- on statin                                                            8. CKD- now ESRD on HD per Renal following   9.  Diabetes managed per medicine.           ASSESSMENT:  Hospital Problems  Date Reviewed: 11/8/2017          Codes Class Noted POA    ESRD (end stage renal disease) ([facility] ICD-10-CM: N18.6  ICD-9-CM: 585.6  11/8/2017 Yes        Chronic kidney disease, stage V ([facility] ICD-10-CM: N18.5  ICD-9-CM: 585.5  10/28/2017 Yes        Chest pain ICD-10-CM: R07.9  ICD-9-CM: 786.50  10/27/2017 Unknown        Secondary hyperparathyroidism of renal origin ([facility] (Chronic) ICD-10-CM: N25.81  ICD-9-CM: 588.81  10/25/2017 Yes        S/P coronary artery stent placement ICD-10-CM: Z95.5  ICD-9-CM: V45.82  3/12/2013 Yes    Overview Signed 3/12/2013  3:37 PM by Bonita Warren MD     vg to diag stent 10/2012             Coronary atherosclerosis of native coronary artery ICD-10-CM: I25.10  ICD-9-CM: 414.01  10/7/2012 Yes        HTN (hypertension) ICD-10-CM: I10  ICD-9-CM: 401.9  10/7/2012 Yes        DM2 (diabetes mellitus, type 2) ([facility] ICD-10-CM: E11.9  ICD-9-CM: 250.00  10/7/2012 Yes                SUBJECTIVE:  No CP  No SOB    OBJECTIVE:    VS:   Visit Vitals    /57 (BP 1 Location: Right arm, BP Patient Position: At rest)    Pulse 60    Temp 98.1 °F (36.7 °C)    Resp 18    Ht 5' 8\" (1.727 m)    Wt 98.9 kg (218 lb)    SpO2 94%    BMI 33.15 kg/m2         Intake/Output Summary (Last 24 hours) at 11/11/17 1108  Last data filed at 11/11/17 0814   Gross per 24 hour   Intake              490 ml   Output              400 ml   Net               90 ml     TELE: normal sinus rhythm    General: alert, well developed, cooperative, pleasant and in no apparent distress  HENT: Normocephalic, atraumatic. Normal external eye. Neck :  JVD difficult to assess due to obesity  Cardiac:  regular rate and rhythm, S1, S2 normal, no murmur, click, rub or gallop  Lungs: clear to auscultation bilaterally  Abdomen: Soft, nontender, no masses  Extremities:  No c/c/e, peripheral pulses present      Labs: Results:       Chemistry Recent Labs      11/11/17   0300  11/10/17   0546  11/09/17   0550   GLU  113*  214*  179*   NA  139  138  140   K  3.7  3.5  3.5   CL  101  99*  97*   CO2  29  31  35*   BUN  35*  27*  44*   CREA  5.20*  3.73*  3.62*   CA  8.7  8.8  9.1   AGAP  9  8  8   BUCR  7*  7*  12      CBC w/Diff Recent Labs      11/10/17   0546  11/09/17   0550   WBC  11.5  8.7   RBC  3.47*  3.75*   HGB  9.8*  10.6*   HCT  31.7*  34.1*   PLT  242  250   GRANS  72  65   LYMPH  19*  25   EOS  3  2      Cardiac Enzymes No results for input(s): CPK, CKND1, PAIGE in the last 72 hours. No lab exists for component: CKRMB, TROIP   Coagulation No results for input(s): PTP, INR, APTT in the last 72 hours. No lab exists for component: INREXT, INREXT    Lipid Panel Lab Results   Component Value Date/Time    Cholesterol, total 158 10/24/2017 05:08 AM    HDL Cholesterol 39 10/24/2017 05:08 AM    LDL, calculated 72.4 10/24/2017 05:08 AM    VLDL, calculated 46.6 10/24/2017 05:08 AM    Triglyceride 233 10/24/2017 05:08 AM    CHOL/HDL Ratio 4.1 10/24/2017 05:08 AM      BNP No results for input(s): BNPP in the last 72 hours.    Liver Enzymes No results for input(s): TP, ALB, TBIL, AP, SGOT, GPT in the last 72 hours.     No lab exists for component: DBIL   Thyroid Studies Lab Results   Component Value Date/Time    T4, Total 10.9 10/09/2012 04:53 AM    TSH 2.16 10/23/2017 11:08 PM              Sebastián Gómez MD

## 2017-11-11 NOTE — PROGRESS NOTES
Progress Note    Ana Laura Marker  68 y.o. Admit Date: 11/7/2017  Active Problems:    Coronary atherosclerosis of native coronary artery (10/7/2012) POA: Yes      HTN (hypertension) (10/7/2012) POA: Yes      DM2 (diabetes mellitus, type 2) ([facility] (10/7/2012) POA: Yes      S/P coronary artery stent placement (3/12/2013) POA: Yes      Overview: vg to diag stent 10/2012      Secondary hyperparathyroidism of renal origin ([facility] (10/25/2017) POA: Yes      Chest pain (10/27/2017) POA: Unknown      Chronic kidney disease, stage V ([facility] (10/28/2017) POA: Yes      ESRD (end stage renal disease) ([facility] (11/8/2017) POA: Yes            Subjective:     Patient feels good, no Chest pain, no SO, Surrounded by Family members. A comprehensive review of systems was negative except for that written in the History of Present Illness.     Objective:     Visit Vitals    /65    Pulse 74    Temp 98.8 °F (37.1 °C)    Resp 18    Ht 5' 8\" (1.727 m)    Wt 98.9 kg (218 lb)    SpO2 95%    BMI 33.15 kg/m2         Intake/Output Summary (Last 24 hours) at 11/11/17 1130  Last data filed at 11/11/17 0814   Gross per 24 hour   Intake              490 ml   Output              400 ml   Net               90 ml       Current Facility-Administered Medications   Medication Dose Route Frequency Provider Last Rate Last Dose    [START ON 11/13/2017] allopurinol (ZYLOPRIM) tablet 100 mg  100 mg Oral Q MON, WED & FRI Natan Parish MD        sodium chloride (NS) flush 5-10 mL  5-10 mL IntraVENous Q8H America Denise MD   10 mL at 11/11/17 0532    sodium chloride (NS) flush 5-10 mL  5-10 mL IntraVENous PRN America Denise MD        clopidogrel (PLAVIX) tablet 75 mg  75 mg Oral DAILY Urszula Coughlin NP   Stopped at 11/11/17 0900    epoetin lee (EPOGEN;PROCRIT) 5,000 Units  5,000 Units IntraVENous DIALYSIS TUE, THU & SAT Natan Parish MD   5,000 Units at 11/09/17 1500    doxercalciferol (HECTOROL) 4 mcg/2 mL injection 0.5 mcg 0.5 mcg IntraVENous DIALYSIS TUE, THU & SAT Nito Bell MD   Stopped at 11/11/17 0900    losartan (COZAAR) tablet 50 mg  50 mg Oral DAILY Nito Bell MD   50 mg at 11/10/17 0953    hydrALAZINE (APRESOLINE) 20 mg/mL injection 10 mg  10 mg IntraVENous Q6H PRN Richy Nayak MD        heparin (porcine) injection 5,000 Units  5,000 Units SubCUTAneous Q8H Richy Nayak MD   5,000 Units at 11/11/17 0121    nitroglycerin (NITROSTAT) tablet 0.4 mg  0.4 mg SubLINGual PRN Urszula Coughlin NP   0.4 mg at 11/08/17 1449    aspirin chewable tablet 81 mg  81 mg Oral DAILY Kiko Morfin MD   Stopped at 11/11/17 0900    B complex-vitaminC-folic acid (NEPHROCAP) cap  1 Cap Oral DAILY Kiko Morfin MD   Stopped at 11/11/17 0900    cloNIDine HCl (CATAPRES) tablet 0.2 mg  0.2 mg Oral TID Kiko Morfin MD   Stopped at 11/11/17 0900    fenofibrate nanocrystallized (TRICOR) tablet 48 mg  48 mg Oral DAILY Kiko Morfin MD   Stopped at 11/11/17 0900    hydrALAZINE (APRESOLINE) tablet 100 mg  100 mg Oral TID Kiko Morfin MD   100 mg at 11/10/17 2131    insulin glargine (LANTUS) injection 20 Units  20 Units SubCUTAneous Q12H Kiko Morfin MD   20 Units at 11/10/17 2130    isosorbide mononitrate ER (IMDUR) tablet 30 mg  30 mg Oral DAILY Kiko Morfin MD   30 mg at 11/10/17 0815    metoprolol tartrate (LOPRESSOR) tablet 100 mg  100 mg Oral BID Kiko Morfin MD   100 mg at 11/10/17 1713    senna-docusate (PERICOLACE) 8.6-50 mg per tablet 1 Tab  1 Tab Oral DAILY Kiko Morfin MD   1 Tab at 11/09/17 0908    simvastatin (ZOCOR) tablet 40 mg  40 mg Oral QHS Kiko Morfin MD   40 mg at 11/10/17 2131    tamsulosin (FLOMAX) capsule 0.4 mg  0.4 mg Oral DAILY Kiko Morfin MD   0.4 mg at 11/10/17 0815    insulin lispro (HUMALOG) injection   SubCUTAneous AC&HS Kiko Morfin MD   9 Units at 11/10/17 8344    glucose chewable tablet 16 g  16 g Oral PRN Kiko Morfin MD        glucagon [facility]) injection 1 mg  1 mg IntraMUSCular PRN Efrem Cadena MD        dextrose (D50W) injection syrg 12.5-25 g  25-50 mL IntraVENous PRN Efrem Cadena MD        acetaminophen (TYLENOL) tablet 650 mg  650 mg Oral Q4H PRN Efrem Cadena MD   650 mg at 11/09/17 0908    bisacodyl (DULCOLAX) tablet 5 mg  5 mg Oral DAILY PRN Efrem Cadena MD        ondansetron [facility]) injection 4 mg  4 mg IntraVENous Q4H PRN Efrem Cadena MD   4 mg at 11/09/17 1805        Physical Exam:     Physical Exam:   General:  Alert, cooperative, no distress, appears stated age. Neck: Supple, symmetrical, trachea midline, no adenopathy, thyroid: no enlargement/tenderness/nodules, no carotid bruit and no JVD. Lungs:   Clear to auscultation bilaterally. Heart:  Regular rate and rhythm, S1, S2 normal, no murmur, click, rub or gallop. Abdomen:   Soft, non-tender. Bowel sounds normal. No masses,  No organomegaly. Extremities: Extremities normal, atraumatic, no cyanosis or edema, has temporary HD catheter   Pulses: 2+ and symmetric all extremities.    Skin: Skin color, texture, turgor normal. No rashes or lesions         Data Review:    CBC w/Diff    Recent Labs      11/10/17   0546  11/09/17   0550   WBC  11.5  8.7   RBC  3.47*  3.75*   HGB  9.8*  10.6*   HCT  31.7*  34.1*   MCV  91.4  90.9   MCH  28.2  28.3   MCHC  30.9*  31.1   RDW  14.1  13.8    Recent Labs      11/10/17   0546  11/09/17   0550   MONOS  6  8   EOS  3  2   BASOS  0  0   RDW  14.1  13.8        Comprehensive Metabolic Profile    Recent Labs      11/11/17   0300  11/10/17   0546  11/09/17   0550   NA  139  138  140   K  3.7  3.5  3.5   CL  101  99*  97*   CO2  29  31  35*   BUN  35*  27*  44*   CREA  5.20*  3.73*  3.62*    Recent Labs      11/11/17   0300  11/10/17   0546  11/09/17   0550   CA  8.7  8.8  9.1   PHOS  2.8  2.8  3.2                        Impression:       Active Hospital Problems    Diagnosis Date Noted    [facility] 11/08/2017    Chronic kidney disease, stage V [facility] 10/28/2017    Chest pain 10/27/2017    Secondary hyperparathyroidism of renal origin ([facility] 10/25/2017    S/P coronary artery stent placement 03/12/2013     vg to diag stent 10/2012      Coronary atherosclerosis of native coronary artery 10/07/2012    HTN (hypertension) 10/07/2012    DM2 (diabetes mellitus, type 2) ([facility] 10/07/2012            Plan:     Dalysis today, await for [facility] by vascular, LOVE once TDC is done & then start OP dialysis q Tues, Thurs & Saturday.       Adam Ivey MD

## 2017-11-11 NOTE — PROGRESS NOTES
Bedside turnover given to me by JESSI Walter. Pt is on the cardiac telemetry monitor, stable condition. He is seated in the recliner chair watching tv and waiting for breakfast tray. 0900 dialysis phoned for report to send transport for pt, he has family on a lay over from [place] and asked to have his dialysis delayed for an hour. JESSI Tapia called with report on dialysis, removed 2000 ml. Pt is returning to the unit shortly. Bedside turnover given to RN _ Pt is on the cardiac monitor.

## 2017-11-11 NOTE — PROCEDURES
[facility] CATH LAB    Name:  Kade Hood  MR#:  [id number]  :  1940  Account #:  [de-identified]  Date of Adm:  2017  Date of Service:  11/10/2017      ESTIMATED BLOOD LOSS: 30 cc    SPECIMENS REMOVED: none    PROCEDURE PERFORMED BY: Dana Gerber MD    NAME OF PROCEDURE: Coronary angiogram, saphenous vein graft,  left internal mammary artery angiogram. Status post percutaneous  transluminal coronary angioplasty/stent to saphenous vein graft to  diagonal 1 artery. INDICATION: Unstable angina. ENTRY: Right femoral artery. COMPLICATIONS: None. SEDATION: Moderate sedation was obtained using 0.5 mg of Versed  and 50 mcg of fentanyl. Total sedation time was 2 hours and 15  minutes. PROCEDURE IN DETAIL: [facility]. At that time, he declined cardiac catheterization due to CKD stage 4. He returned back to the emergency room within a day due to chest  pain and had worsening kidney function. At that time, he was treated  conservatively and ended up on dialysis. Now that he is on dialysis, we  decided to proceed with coronary angiogram due to unstable  symptoms. A written witnessed informed consent was obtained from  the patient. The patient was prepped and draped in a sterile fashion. Sedation was obtained using 0.5 mg of Versed and 50 mcg of  fentanyl; 1% lidocaine was injected around the groin. The right femoral  artery was accessed using a 5-Russian sheath without any  complication. Following this, a left coronary angiogram was performed  using a JL4 catheter. A right coronary angiogram was performed using  a JR catheter. SVG to diagonal 1 angiogram was performed using a  JR catheter. Left internal mammary artery angiogram was performed  using a LIMA catheter. Following diagnostic angiogram, we noted a  high-grade stenosis of mid SVG to diagonal 1 graft.  We decided to  proceed with intervention due to symptoms. Diagnostic catheter was removed and a 5-Pashto was exchanged over a wire for a 6-Pashto sheath. Following this, a JR guide was advanced to the  level of SVG graft. A FilterWire was then carefully advanced to the  level of mid to distal portion of the SVG to diagonal 1 artery. However,  we did not have enough support and the FilterWire started prolapsing,  hence, the FilterWire was then captured and removed. At this  time, we also exchanged for a JR4 guide with side hole. We obtained SVG  angiogram. Following this, we then wired the graft using a Runthrough  wire. The graft wire was positioned in the distal portion of diagonal 1  artery. Following this, we then performed PTCA using Emerge 2.5 mm  x 15 mm balloon. We noted some thrombus in the mid to distal portion  of the graft, likely from a low ACT from problems with the IV site. The balloon was withdrawn and anticoagulation was  appropriately addressed using IV heparin with the ACT greater than  200. Two boluses of Integrilin were administered. Aspiration  thrombectomy was performed using Pronto catheter. After  3 runs of thrombectomy, we successfully aspirated the thrombus. We  deployed a Xience 2.75 mm x 28 mm stent to the distal portion of the  previously deployed stent edge. The stent was deployed about 14  atmospheres. Following this, the stent balloon was then withdrawn and  was inflated about 16 atmospheres. The proximal portion of the SVG  graft which had 95% stenosis was tackled using an Emerge 2.5 mm x  15 mm balloon followed by a noncompliant Emerge 3.0 mm x 15 mm  balloon inflated at about 16 atmospheres. The lesion was reduced to  about 10%. At the end of the case, BRENNA-3 flow with excellent  angiographic result was noted. Following this, supporting catheter and  wire were removed. The patient was transferred to holding area for  further management. FINDINGS:  1. Left main is patent.  It bifurcates into left anterior descending artery  and circumflex artery. 2. Left anterior descending artery is occluded in the proximal portion up  to the bifurcation of septal 1 branch. 3. Circumflex artery is a medium caliber vessel with proximal 30% to  40% stenosis followed by diffuse 60% stenosis in the distal portion. It  subtends to become a small caliber vessel. Obtuse marginal 1 and obtuse  marginal 2 and obtuse marginal 3 are small to medium caliber vessels  with 20%-30% stenosis. 4. Right coronary artery is codominant with proximal 99% stenosis. It  appears to have right-to-right collaterals. 5. Left internal mammary artery to left anterior descending artery is  patent. Distal to anastomosis of the LIMA, LAD has wall irregularities. 6. SVG to diagonal 1 artery - site of prior stenting has severe in-stent  stenosis up to 99%. Status post PTCA using an Emerge 2.5 mm x 15  mm balloon followed by a noncompliant Emerge 3.0 mm x 15 mm  balloon, lesion reduced to about 10%. Aspiration thrombectomy was  performed to the distal portion of the graft due to thrombus during the  procedure. A successful aspiration thrombectomy was performed using  a Pronto catheter. Following this, we then deployed a Xience 2.75 mm  x 28 mm stent to cover the thrombus layered area. We noted BRENNA-3  flow at the end of the case. Distal to anastomosis, diagonal 1 artery is  a small to medium caliber vessel with wall irregularities. Brisk BRENNA-3  flow was noted. CONCLUSION: Triple vessel coronary artery disease. Patent left  internal mammary artery to left anterior descending. Status post  percutaneous transluminal coronary angioplasty/stent, status post  aspiration thrombectomy, followed by percutaneous transluminal  coronary angioplasty and stent to saphenous vein graft to diagonal 1  artery. The patient was advised to be on intense medical management  and risk factor modification.  Will reload this patient with 300 mg of  Plavix.         MD Yannick Lopez Memdilcia  D:  11/10/2017   14:21  T:  11/10/2017   21:49  Job #:  [id number]

## 2017-11-11 NOTE — PROGRESS NOTES
RENAL PROGRESS NOTE: Pt seen on dialysis. Follow up of ESRD     Present on Admission:   ESRD (end stage renal disease) ([facility]   Secondary hyperparathyroidism of renal origin ([facility]   Coronary atherosclerosis of native coronary artery   HTN (hypertension)   DM2 (diabetes mellitus, type 2) ([facility]   S/P coronary artery stent placement   Chronic kidney disease, stage V (HCC)         Subjective:  Doing OK, no chest pain, no SOB. Had cardiac cath yesterday. Patient is on Dialysis. Objective:    Patient Vitals for the past 12 hrs:   Temp Pulse Resp BP SpO2   11/11/17 1243 98.7 °F (37.1 °C) 76 20 143/63 96 %   11/11/17 1125 98.8 °F (37.1 °C) 74 18 149/65 95 %   11/11/17 0712 98.1 °F (36.7 °C) 60 18 141/57 94 %   11/11/17 0400 97.9 °F (36.6 °C) 66 18 124/57 94 %        Intake/Output Summary (Last 24 hours) at 11/11/17 1456  Last data filed at 11/11/17 1314   Gross per 24 hour   Intake              240 ml   Output              400 ml   Net             -160 ml       Physical Assessment:     General Appearance: NAD  Lung: clear to auscultation  Heart: regular rate and rhythm and no murmurs, clicks or gallops  Lower Extremities: no edema   Access: has temporary HD catheter, qb 350    Labs    CBC w/Diff    Recent Labs      11/10/17   0546  11/09/17   0550   WBC  11.5  8.7   RBC  3.47*  3.75*   HGB  9.8*  10.6*   HCT  31.7*  34.1*   MCV  91.4  90.9   MCH  28.2  28.3   MCHC  30.9*  31.1   RDW  14.1  13.8    Recent Labs      11/10/17   0546  11/09/17   0550   MONOS  6  8   EOS  3  2   BASOS  0  0   RDW  14.1  13.8        Comprehensive Metabolic Profile    Recent Labs      11/11/17   0300  11/10/17   0546  11/09/17   0550   NA  139  138  140   K  3.7  3.5  3.5   CL  101  99*  97*   CO2  29  31  35*   BUN  35*  27*  44*   CREA  5.20*  3.73*  3.62*    Recent Labs      11/11/17   0300  11/10/17   0546  11/09/17   0550   CA  8.7  8.8  9.1   PHOS  2.8  2.8  3.2          Basic Metabolic Profile       results  reviwed. MEDS:Reviwed.   Current Facility-Administered Medications   Medication Dose Route Frequency Provider Last Rate Last Dose    [START ON 11/13/2017] allopurinol (ZYLOPRIM) tablet 100 mg  100 mg Oral Q MON, WED & FRI Jay Haines MD        sodium chloride (NS) flush 5-10 mL  5-10 mL IntraVENous Q8H Boris Blas MD   10 mL at 11/11/17 0532    sodium chloride (NS) flush 5-10 mL  5-10 mL IntraVENous PRN Boris Blas MD        clopidogrel (PLAVIX) tablet 75 mg  75 mg Oral DAILY Urszula Coughlin NP   Stopped at 11/11/17 0900    epoetin lee (EPOGEN;PROCRIT) 5,000 Units  5,000 Units IntraVENous DIALYSIS ABIEL KRUGER & HUGO Haines MD   5,000 Units at 11/09/17 1500    doxercalciferol (HECTOROL) 4 mcg/2 mL injection 0.5 mcg  0.5 mcg IntraVENous DIALYSIS ABIEL KRUGER & HUGO Haines MD   Stopped at 11/11/17 0900    losartan (COZAAR) tablet 50 mg  50 mg Oral DAILY Jay Haines MD   50 mg at 11/10/17 0953    hydrALAZINE (APRESOLINE) 20 mg/mL injection 10 mg  10 mg IntraVENous Q6H PRN Lynne Samaniego MD        heparin (porcine) injection 5,000 Units  5,000 Units SubCUTAneous Q8H Lynne Samaniego MD   5,000 Units at 11/11/17 0121    nitroglycerin (NITROSTAT) tablet 0.4 mg  0.4 mg SubLINGual PRN Urszula Coughlin NP   0.4 mg at 11/08/17 1449    aspirin chewable tablet 81 mg  81 mg Oral DAILY Renetta Andrews MD   Stopped at 11/11/17 0900    B complex-vitaminC-folic acid (NEPHROCAP) cap  1 Cap Oral DAILY Renetta Andrews MD   Stopped at 11/11/17 0900    cloNIDine HCl (CATAPRES) tablet 0.2 mg  0.2 mg Oral TID Renetta Andrews MD   Stopped at 11/11/17 0900    fenofibrate nanocrystallized (TRICOR) tablet 48 mg  48 mg Oral DAILY Renetta Andrews MD   Stopped at 11/11/17 0900    hydrALAZINE (APRESOLINE) tablet 100 mg  100 mg Oral TID Renetta Andrews MD   100 mg at 11/10/17 2131    insulin glargine (LANTUS) injection 20 Units  20 Units SubCUTAneous Q12H Renetta Andrews MD   20 Units at 11/11/17 2124  isosorbide mononitrate ER (IMDUR) tablet 30 mg  30 mg Oral DAILY Ricardo Rivera MD   30 mg at 11/10/17 0815    metoprolol tartrate (LOPRESSOR) tablet 100 mg  100 mg Oral BID Ricardo Rivera MD   100 mg at 11/10/17 1713    senna-docusate (PERICOLACE) 8.6-50 mg per tablet 1 Tab  1 Tab Oral DAILY Ricardo Rivera MD   1 Tab at 11/09/17 0908    simvastatin (ZOCOR) tablet 40 mg  40 mg Oral QHS Ricardo Rivera MD   40 mg at 11/10/17 2131    tamsulosin (FLOMAX) capsule 0.4 mg  0.4 mg Oral DAILY Ricardo Rivera MD   0.4 mg at 11/10/17 0815    insulin lispro (HUMALOG) injection   SubCUTAneous AC&HS Ricardo Rivera MD   9 Units at 11/10/17 2132    glucose chewable tablet 16 g  16 g Oral PRN Ricardo Rivera MD        glucagon (GLUCAGEN) injection 1 mg  1 mg IntraMUSCular PRN Ricardo Rivera MD        dextrose (D50W) injection syrg 12.5-25 g  25-50 mL IntraVENous PRN Ricardo Rivera MD        acetaminophen (TYLENOL) tablet 650 mg  650 mg Oral Q4H PRN Ricardo Rivera MD   650 mg at 11/09/17 0908    bisacodyl (DULCOLAX) tablet 5 mg  5 mg Oral DAILY PRN Ricardo Rivera MD        [facility]etron [facility]) injection 4 mg  4 mg IntraVENous Q4H PRN Ricardo Rivera MD   4 mg at 11/09/17 1805       Impression:    ESRD: Tolerating HD well. Anemia of CKD: on Epogen. Hyperparathyroidism Yes  Hypertension  Plan  Dialysis for volume and solute management  Epogen  5000 units. Hectorol: 0.5 microgram.  Needs to change temporary HD catheter to [facility].         Elizabeth Edgar MD

## 2017-11-12 LAB
ATRIAL RATE: 57 BPM
CALCULATED P AXIS, ECG09: 38 DEGREES
CALCULATED T AXIS, ECG11: 129 DEGREES
CK MB CFR SERPL CALC: 3.2 % (ref 0–4)
CK MB SERPL-MCNC: 1.7 NG/ML (ref 5–25)
CK SERPL-CCNC: 53 U/L (ref 39–308)
DIAGNOSIS, 93000: NORMAL
GLUCOSE BLD STRIP.AUTO-MCNC: 149 MG/DL (ref 70–110)
GLUCOSE BLD STRIP.AUTO-MCNC: 152 MG/DL (ref 70–110)
GLUCOSE BLD STRIP.AUTO-MCNC: 259 MG/DL (ref 70–110)
GLUCOSE BLD STRIP.AUTO-MCNC: 336 MG/DL (ref 70–110)
P-R INTERVAL, ECG05: 164 MS
Q-T INTERVAL, ECG07: 456 MS
QRS DURATION, ECG06: 116 MS
QTC CALCULATION (BEZET), ECG08: 443 MS
TROPONIN I SERPL-MCNC: 0.41 NG/ML (ref 0–0.04)
VENTRICULAR RATE, ECG03: 57 BPM

## 2017-11-12 PROCEDURE — 36415 COLL VENOUS BLD VENIPUNCTURE: CPT | Performed by: INTERNAL MEDICINE

## 2017-11-12 PROCEDURE — 74011250637 HC RX REV CODE- 250/637: Performed by: INTERNAL MEDICINE

## 2017-11-12 PROCEDURE — 74011636637 HC RX REV CODE- 636/637: Performed by: INTERNAL MEDICINE

## 2017-11-12 PROCEDURE — 74011250637 HC RX REV CODE- 250/637: Performed by: NURSE PRACTITIONER

## 2017-11-12 PROCEDURE — 82550 ASSAY OF CK (CPK): CPT | Performed by: INTERNAL MEDICINE

## 2017-11-12 PROCEDURE — 74011250636 HC RX REV CODE- 250/636: Performed by: EMERGENCY MEDICINE

## 2017-11-12 PROCEDURE — 65660000004 HC RM CVT STEPDOWN

## 2017-11-12 PROCEDURE — 82962 GLUCOSE BLOOD TEST: CPT

## 2017-11-12 PROCEDURE — 74011636637 HC RX REV CODE- 636/637: Performed by: EMERGENCY MEDICINE

## 2017-11-12 PROCEDURE — 93005 ELECTROCARDIOGRAM TRACING: CPT

## 2017-11-12 RX ORDER — INSULIN LISPRO 100 [IU]/ML
5 INJECTION, SOLUTION INTRAVENOUS; SUBCUTANEOUS
Status: DISCONTINUED | OUTPATIENT
Start: 2017-11-12 | End: 2017-11-14 | Stop reason: HOSPADM

## 2017-11-12 RX ORDER — HYDRALAZINE HYDROCHLORIDE 25 MG/1
25 TABLET, FILM COATED ORAL 3 TIMES DAILY
Status: DISCONTINUED | OUTPATIENT
Start: 2017-11-12 | End: 2017-11-12

## 2017-11-12 RX ORDER — SODIUM CHLORIDE 9 MG/ML
250 INJECTION, SOLUTION INTRAVENOUS CONTINUOUS
Status: DISCONTINUED | OUTPATIENT
Start: 2017-11-12 | End: 2017-11-13

## 2017-11-12 RX ORDER — LOSARTAN POTASSIUM 25 MG/1
25 TABLET ORAL DAILY
Status: DISCONTINUED | OUTPATIENT
Start: 2017-11-13 | End: 2017-11-13

## 2017-11-12 RX ADMIN — ACETAMINOPHEN 650 MG: 325 TABLET ORAL at 19:05

## 2017-11-12 RX ADMIN — CLOPIDOGREL 75 MG: 75 TABLET, FILM COATED ORAL at 08:42

## 2017-11-12 RX ADMIN — Medication 10 ML: at 14:00

## 2017-11-12 RX ADMIN — Medication 10 ML: at 05:00

## 2017-11-12 RX ADMIN — INSULIN LISPRO 5 UNITS: 100 INJECTION, SOLUTION INTRAVENOUS; SUBCUTANEOUS at 17:49

## 2017-11-12 RX ADMIN — BISACODYL 5 MG: 5 TABLET, COATED ORAL at 16:06

## 2017-11-12 RX ADMIN — SIMVASTATIN 40 MG: 40 TABLET, FILM COATED ORAL at 22:38

## 2017-11-12 RX ADMIN — INSULIN GLARGINE 20 UNITS: 100 INJECTION, SOLUTION SUBCUTANEOUS at 22:37

## 2017-11-12 RX ADMIN — Medication 10 ML: at 22:51

## 2017-11-12 RX ADMIN — ASPIRIN 81 MG CHEWABLE TABLET 81 MG: 81 TABLET CHEWABLE at 08:43

## 2017-11-12 RX ADMIN — HEPARIN SODIUM 5000 UNITS: 5000 INJECTION, SOLUTION INTRAVENOUS; SUBCUTANEOUS at 17:00

## 2017-11-12 RX ADMIN — METOPROLOL TARTRATE 100 MG: 50 TABLET ORAL at 08:43

## 2017-11-12 RX ADMIN — FENOFIBRATE 48 MG: 48 TABLET, FILM COATED ORAL at 09:00

## 2017-11-12 RX ADMIN — NEPHROCAP 1 CAPSULE: 1 CAP ORAL at 08:50

## 2017-11-12 RX ADMIN — INSULIN GLARGINE 20 UNITS: 100 INJECTION, SOLUTION SUBCUTANEOUS at 08:54

## 2017-11-12 RX ADMIN — INSULIN LISPRO 12 UNITS: 100 INJECTION, SOLUTION INTRAVENOUS; SUBCUTANEOUS at 16:30

## 2017-11-12 RX ADMIN — HEPARIN SODIUM 5000 UNITS: 5000 INJECTION, SOLUTION INTRAVENOUS; SUBCUTANEOUS at 10:00

## 2017-11-12 RX ADMIN — LOSARTAN POTASSIUM 50 MG: 50 TABLET ORAL at 08:52

## 2017-11-12 RX ADMIN — CLONIDINE HYDROCHLORIDE 0.2 MG: 0.1 TABLET ORAL at 08:44

## 2017-11-12 RX ADMIN — TAMSULOSIN HYDROCHLORIDE 0.4 MG: 0.4 CAPSULE ORAL at 08:42

## 2017-11-12 RX ADMIN — HEPARIN SODIUM 5000 UNITS: 5000 INJECTION, SOLUTION INTRAVENOUS; SUBCUTANEOUS at 04:58

## 2017-11-12 RX ADMIN — STANDARDIZED SENNA CONCENTRATE AND DOCUSATE SODIUM 1 TABLET: 8.6; 5 TABLET, FILM COATED ORAL at 08:53

## 2017-11-12 RX ADMIN — INSULIN LISPRO 3 UNITS: 100 INJECTION, SOLUTION INTRAVENOUS; SUBCUTANEOUS at 22:37

## 2017-11-12 RX ADMIN — HYDRALAZINE HYDROCHLORIDE 100 MG: 50 TABLET, FILM COATED ORAL at 08:44

## 2017-11-12 RX ADMIN — ISOSORBIDE MONONITRATE 30 MG: 30 TABLET, EXTENDED RELEASE ORAL at 08:50

## 2017-11-12 RX ADMIN — INSULIN LISPRO 9 UNITS: 100 INJECTION, SOLUTION INTRAVENOUS; SUBCUTANEOUS at 11:30

## 2017-11-12 NOTE — PROGRESS NOTES
Progress Note    Hugo Sanchez  68 y.o. Admit Date: 11/7/2017  Active Problems:    Coronary atherosclerosis of native coronary artery (10/7/2012) POA: Yes      HTN (hypertension) (10/7/2012) POA: Yes      DM2 (diabetes mellitus, type 2) ([facility] (10/7/2012) POA: Yes      S/P coronary artery stent placement (3/12/2013) POA: Yes      Overview: vg to diag stent 10/2012      Secondary hyperparathyroidism of renal origin ([facility] (10/25/2017) POA: Yes      Chest pain (10/27/2017) POA: Unknown      Chronic kidney disease, stage V ([facility] (10/28/2017) POA: Yes      ESRD (end stage renal disease) ([facility] (11/8/2017) POA: Yes            Subjective:     Patient became hypotensive & became dis\zzy weak feeling in legs,No SOB, No Chest pain,  happened after receiving Metoprolol, Losartan & Isordil at 9.00 AM. Earlier received  Clonidine, was dialyzed yesterday afternoon , removed 36846. BP dropped to 87/50, now improved to 114/64. Did not receive any bolus yet & he feels OK.           Objective:     Visit Vitals    /64    Pulse 65    Temp 97.9 °F (36.6 °C)    Resp 18    Ht 5' 8\" (1.727 m)    Wt 96.9 kg (213 lb 11.2 oz)    SpO2 97%    BMI 32.49 kg/m2         Intake/Output Summary (Last 24 hours) at 11/12/17 1255  Last data filed at 11/12/17 0835   Gross per 24 hour   Intake              360 ml   Output                0 ml   Net              360 ml       Current Facility-Administered Medications   Medication Dose Route Frequency Provider Last Rate Last Dose    0.9% sodium chloride infusion  250 mL/hr IntraVENous CONTINUOUS Yassinemarc Shrestha MD        [START ON 11/13/2017] allopurinol (ZYLOPRIM) tablet 100 mg  100 mg Oral Q MON, WED & FRI Yassinemarc Shrestha MD        sodium chloride (NS) flush 5-10 mL  5-10 mL IntraVENous Q8H Hawa Robles MD   10 mL at 11/12/17 0500    sodium chloride (NS) flush 5-10 mL  5-10 mL IntraVENous PRN Hawa Robles MD        clopidogrel (PLAVIX) tablet 75 mg  75 mg Oral DAILY Urszula Coughlin NP   75 mg at 11/12/17 0842    epoetin lee (EPOGEN;PROCRIT) 5,000 Units  5,000 Units IntraVENous DIALYSIS ABIEL KRUGER & HUGO Mortensen MD   5,000 Units at 11/11/17 1500    doxercalciferol (HECTOROL) 4 mcg/2 mL injection 0.5 mcg  0.5 mcg IntraVENous DIALYSIS ABIEL KRUGER & HUGO Mortensen MD   0.5 mcg at 11/11/17 1504    losartan (COZAAR) tablet 50 mg  50 mg Oral DAILY Goldie Mortensen MD   50 mg at 11/12/17 0852    hydrALAZINE (APRESOLINE) 20 mg/mL injection 10 mg  10 mg IntraVENous Q6H PRN Mirella Mcelroy MD   10 mg at 11/11/17 1652    heparin (porcine) injection 5,000 Units  5,000 Units SubCUTAneous Q8H Mirella Mcelroy MD   5,000 Units at 11/12/17 0458    nitroglycerin (NITROSTAT) tablet 0.4 mg  0.4 mg SubLINGual PRN Urszula Coughlin NP   0.4 mg at 11/08/17 1449    aspirin chewable tablet 81 mg  81 mg Oral DAILY Nusrat Paulino MD   81 mg at 11/12/17 0843    B complex-vitaminC-folic acid (NEPHROCAP) cap  1 Cap Oral DAILY Nusrat Paulino MD   1 Cap at 11/12/17 0850    fenofibrate nanocrystallized (TRICOR) tablet 48 mg  48 mg Oral DAILY Nusrat Paulino MD   Stopped at 11/11/17 0900    insulin glargine (LANTUS) injection 20 Units  20 Units SubCUTAneous Q12H Nusrat Paulino MD   20 Units at 11/12/17 0854    isosorbide mononitrate ER (IMDUR) tablet 30 mg  30 mg Oral DAILY Nusrat Paulino MD   30 mg at 11/12/17 0850    metoprolol tartrate (LOPRESSOR) tablet 100 mg  100 mg Oral BID Nusrat Paulino MD   100 mg at 11/12/17 0843    senna-docusate (PERICOLACE) 8.6-50 mg per tablet 1 Tab  1 Tab Oral DAILY Nusrat Paulino MD   1 Tab at 11/12/17 0853    simvastatin (ZOCOR) tablet 40 mg  40 mg Oral QHS Nusrat Paulino MD   40 mg at 11/11/17 2122    tamsulosin (FLOMAX) capsule 0.4 mg  0.4 mg Oral DAILY Nusrat Paulino MD   0.4 mg at 11/12/17 0842    insulin lispro (HUMALOG) injection   SubCUTAneous AC&HS Nusrat Paulino MD   Stopped at 11/12/17 0700    glucose chewable tablet 16 g  16 g Oral HAYLEY Christine MD        glucagon (GLUCAGEN) injection 1 mg  1 mg IntraMUSCular PRN Mason Christine MD        dextrose (D50W) injection syrg 12.5-25 g  25-50 mL IntraVENous PRN Mason Christine MD        acetaminophen (TYLENOL) tablet 650 mg  650 mg Oral Q4H PRN Mason Christine MD   650 mg at 11/09/17 0908    bisacodyl (DULCOLAX) tablet 5 mg  5 mg Oral DAILY PRN Mason Christine MD   5 mg at 11/11/17 1649    ondansetron (ZOFRAN) injection 4 mg  4 mg IntraVENous Q4H PRN Mason Christine MD   4 mg at 11/09/17 1805        Physical Exam:     Physical Exam:   General:  Alert, cooperative, no distress, appears stated age. Lungs:   Clear to auscultation bilaterally. Heart:  Regular rate and rhythm, S1, S2 normal, no murmur, click, rub or gallop. Abdomen:   Soft, non-tender. Bowel sounds normal. No masses,  No organomegaly. Extremities: Extremities normal, atraumatic, no cyanosis or edema, HD catheter is well dressed. Skin: Skin color, texture, turgor normal. No rashes or lesions         Data Review:    CBC w/Diff    Recent Labs      11/10/17   0546   WBC  11.5   RBC  3.47*   HGB  9.8*   HCT  31.7*   MCV  91.4   MCH  28.2   MCHC  30.9*   RDW  14.1    Recent Labs      11/10/17   0546   MONOS  6   EOS  3   BASOS  0   RDW  14.1        Comprehensive Metabolic Profile    Recent Labs      11/11/17   0300  11/10/17   0546   NA  139  138   K  3.7  3.5   CL  101  99*   CO2  29  31   BUN  35*  27*   CREA  5.20*  3.73*    Recent Labs      11/11/17   0300  11/10/17   0546   CA  8.7  8.8   PHOS  2.8  2.8          EKG done : has more pronounced ST depression & T wave inversions from V3 to V6 than his base line EKG before cardiac cath.               Impression:       Active Hospital Problems    Diagnosis Date Noted    [facility] 11/08/2017    Chronic [facility], [facility] V [facility] 10/28/2017    Chest pain 10/27/2017    Secondary hyperparathyroidism of renal origin ([facility] 10/25/2017    S/P coronary artery stent placement 03/12/2013     vg to diag stent 10/2012      Coronary atherosclerosis of native coronary artery 10/07/2012    HTN (hypertension) 10/07/2012    DM2 (diabetes mellitus, type 2) ([facility] 10/07/2012            Plan:     DC Clonidine, Hydralazine, decrease  Losartan  to 25 mg PO daily, continue  Metoprolol  & Isosorbide, observe in Tele, next dialysis on 11/4/16. Christopherf\elpidio permanent TDC  By vascular tomorrow. Discussed with Dr. Desmond Snellen, he will review the EKG.       Duglas Centeno MD

## 2017-11-12 NOTE — PROGRESS NOTES
Bedside turnover given to me by JESSI Bullock. Pt is on the cardiac telemetry monitor in stable condition. He is well and already laughing with visitors. He ate most of his breakfast./  He is asking if he can leave as soon as he gets his permanent dialysis. JESSI Hilliard given bedside report, Pt upset about not receiving his lantus and humalog together at the same time. RN talking with him now. SBAR, MAR, ED Summary and a chance to ask questions given. //

## 2017-11-12 NOTE — PROGRESS NOTES
[facility] Hospitalist Group  Progress Note    Patient: Rory Carter Age: 68 y.o. : 1940 MR#: [id number] SSN: [id number]  Date: 2017     Subjective:   Patient was seen and evaluated independently on 17  Patient sitting in bed in NAD, no new complaints    Assessment/Plan:     1. chest pain. 2. Coronary artery disease. 3. Stage 5 chronic kidney disease.- ESRD  4. Diabetes mellitus. 5. Hypertension. 6. History of prostate cancer.   7- Uncontrolled HTN  8- Anemia of chronic disease  9- Chronic diastolic chf - compensated    PLAN  HD as per nephro, await Permacath  Cardio following, s/p cardiac catheterization, asa, plavix, BB, statin  On clonidine  SSI, lantus, add premeal humalog  DVT prophylaxis  Advnced care planning: full code  D/w patient    Case discussed with:  [x]Patient  []Family  []Nursing  []Case Management  DVT Prophylaxis:  []Lovenox  []Hep SQ  []SCDs  []Coumadin   []On Heparin gtt    Objective:   VS:   Visit Vitals    /62    Pulse 66    Temp 98.1 °F (36.7 °C)    Resp 18    Ht 5' 8\" (1.727 m)    Wt 96.9 kg (213 lb 11.2 oz)    SpO2 97%    BMI 32.49 kg/m2      Tmax/24hrs: Temp (24hrs), Av.6 °F (37 °C), Min:97.9 °F (36.6 °C), Max:99.6 °F (37.6 °C)      Intake/Output Summary (Last 24 hours) at 17 1632  Last data filed at 17 1619   Gross per 24 hour   Intake              360 ml   Output              300 ml   Net               60 ml       General:  Awake, alert  Cardiovascular:  S1S2+, RRR  Pulmonary:  CTA b/l  GI:  Soft, BS+, NT, ND  Extremities:  No edema      Labs:    Recent Results (from the past 24 hour(s))   GLUCOSE, POC    Collection Time: 17  4:43 PM   Result Value Ref Range    Glucose (POC) 101 70 - 110 mg/dL   GLUCOSE, POC    Collection Time: 17  9:10 PM   Result Value Ref Range    Glucose (POC) 230 (H) 70 - 110 mg/dL   GLUCOSE, POC    Collection Time: 17  7:26 AM   Result Value Ref Range    Glucose (POC) 149 (H) 70 - 110 mg/dL   GLUCOSE, POC    Collection Time: 11/12/17 10:43 AM   Result Value Ref Range    Glucose (POC) 259 (H) 70 - 110 mg/dL   EKG, 12 LEAD, INITIAL    Collection Time: 11/12/17 12:39 PM   Result Value Ref Range    Ventricular Rate 57 BPM    Atrial Rate 57 BPM    P-R Interval 164 ms    QRS Duration 116 ms    Q-T Interval 456 ms    QTC Calculation (Bezet) 443 ms    Calculated P Axis 38 degrees    Calculated T Axis 129 degrees    Diagnosis       Sinus bradycardia  Incomplete right bundle branch block  ST & T wave abnormality, consider anterolateral ischemia  Abnormal ECG  When compared with ECG of 07-NOV-2017 18:10,  Nonspecific T wave abnormality now evident in Inferior leads  T wave inversion now evident in Anterolateral leads  Confirmed by Jose Richmond ([id number]) on 11/12/2017 1:11:30 PM     CARDIAC PANEL,(CK, CKMB & TROPONIN)    Collection Time: 11/12/17  1:15 PM   Result Value Ref Range    CK 53 39 - 308 U/L    CK - MB 1.7 <3.6 ng/ml    CK-MB Index 3.2 0.0 - 4.0 %    Troponin-I, Qt. 0.41 (H) 0.0 - 0.045 NG/ML   GLUCOSE, POC    Collection Time: 11/12/17  3:50 PM   Result Value Ref Range    Glucose (POC) 336 (H) 70 - 110 mg/dL       Signed By: Richy Nayak MD     November 12, 2017

## 2017-11-12 NOTE — PROGRESS NOTES
[facility]      CARDIOLOGY PROGRESS NOTE  RECS:      1. Unstable Angina- stable now. Post HERBER in vg to diag  Continue treatment  2. Hx of recent NSTEMI- was managed medically. 3. CAD s/p coronary artery stent placement in 2012 and 7/17 with HERBER stents deployed in SVG in the focal mid lesion and a long proximal lesion. continue with asa/Plavix/bb/statin   4. Chronic diastolic heart failure- appears compensated- monitor for fluid overload.                5. Essential hypertension-Hypotension with orthostatic symptoms this am-stop clonidine-reduce hydralazine-monitor for further change in meds  6. 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 Hyperlipidemia- on statin                                                            8. CKD- now ESRD on HD per Renal following   9.  Diabetes managed per medicine.           ASSESSMENT:  Hospital Problems  Date Reviewed: 11/8/2017          Codes Class Noted POA    ESRD (end stage renal disease) ([facility] ICD-10-CM: N18.6  ICD-9-CM: 585.6  11/8/2017 Yes        Chronic [facility], stage V [facility] ICD-10-CM: N18.5  ICD-9-CM: 585.5  10/28/2017 Yes        Chest pain ICD-10-CM: R07.9  ICD-9-CM: 786.50  10/27/2017 Unknown        Secondary hyperparathyroidism of renal origin ([facility] (Chronic) ICD-10-CM: N25.81  ICD-9-CM: 588.81  10/25/2017 Yes        S/P coronary artery stent placement ICD-10-CM: Z95.5  ICD-9-CM: V45.82  3/12/2013 Yes    Overview Signed 3/12/2013  3:37 PM by Micah Echols MD     vg to diag stent 10/2012             Coronary atherosclerosis of native coronary artery ICD-10-CM: I25.10  ICD-9-CM: 414.01  10/7/2012 Yes        HTN (hypertension) ICD-10-CM: I10  ICD-9-CM: 401.9  10/7/2012 Yes        DM2 (diabetes mellitus, type 2) ([facility] ICD-10-CM: E11.9  ICD-9-CM: 250.00  10/7/2012 Yes                SUBJECTIVE:  No CP  No SOB    OBJECTIVE:    VS:   Visit Vitals    BP 99/49    Pulse 65    Temp 97.9 °F (36.6 °C)    Resp 18    Ht 5' 8\" (1.727 m)    Wt 96.9 kg (213 lb 11.2 oz)    SpO2 97%    BMI 32.49 kg/m2         Intake/Output Summary (Last 24 hours) at 11/12/17 1111  Last data filed at 11/12/17 0835   Gross per 24 hour   Intake              360 ml   Output                0 ml   Net              360 ml     TELE: normal sinus rhythm    General: alert, well developed, cooperative, pleasant and in no apparent distress  HENT: Normocephalic, atraumatic. Normal external eye. Neck :  JVD difficult to assess due to obesity  Cardiac:  regular rate and rhythm, S1, S2 normal, no murmur, click, rub or gallop  Lungs: clear to auscultation bilaterally  Abdomen: Soft, nontender, no masses  Extremities:  No c/c/e, peripheral pulses present      Labs: Results:       Chemistry Recent Labs      11/11/17   0300  11/10/17   0546   GLU  113*  214*   NA  139  138   K  3.7  3.5   CL  101  99*   CO2  29  31   BUN  35*  27*   CREA  5.20*  3.73*   CA  8.7  8.8   AGAP  9  8   BUCR  7*  7*      CBC w/Diff Recent Labs      11/10/17   0546   WBC  11.5   RBC  3.47*   HGB  9.8*   HCT  31.7*   PLT  242   GRANS  72   LYMPH  19*   EOS  3      Cardiac Enzymes No results for input(s): CPK, CKND1, PAIGE in the last 72 hours. No lab exists for component: CKRMB, TROIP   Coagulation No results for input(s): PTP, INR, APTT in the last 72 hours. No lab exists for component: INREXT, INREXT    Lipid Panel Lab Results   Component Value Date/Time    Cholesterol, total 158 10/24/2017 05:08 AM    HDL Cholesterol 39 10/24/2017 05:08 AM    LDL, calculated 72.4 10/24/2017 05:08 AM    VLDL, calculated 46.6 10/24/2017 05:08 AM    Triglyceride 233 10/24/2017 05:08 AM    CHOL/HDL Ratio 4.1 10/24/2017 05:08 AM      BNP No results for input(s): BNPP in the last 72 hours. Liver Enzymes No results for input(s): TP, ALB, TBIL, AP, SGOT, GPT in the last 72 hours.     No lab exists for component: DBIL   Thyroid Studies Lab Results   Component Value Date/Time T4, Total 10.9 10/09/2012 04:53 AM    TSH 2.16 10/23/2017 11:08 PM              Mayur Flowers MD

## 2017-11-12 NOTE — ROUTINE PROCESS
Bedside and Verbal shift change report given to Danny Still (oncoming nurse) by Melecio Wu RN (offgoing nurse). Report included the following information SBAR, Kardex and Recent Results.

## 2017-11-13 ENCOUNTER — ANESTHESIA (OUTPATIENT)
Dept: CARDIOTHORACIC SURGERY | Age: 77
DRG: 246 | End: 2017-11-13
Payer: MEDICARE

## 2017-11-13 ENCOUNTER — APPOINTMENT (OUTPATIENT)
Dept: GENERAL RADIOLOGY | Age: 77
DRG: 246 | End: 2017-11-13
Attending: SURGERY
Payer: MEDICARE

## 2017-11-13 DIAGNOSIS — E78.5 DYSLIPIDEMIA: ICD-10-CM

## 2017-11-13 LAB
ACT BLD: 125 SECS (ref 79–138)
ACT BLD: 136 SECS (ref 79–138)
ACT BLD: 180 SECS (ref 79–138)
ACT BLD: 197 SECS (ref 79–138)
ANION GAP SERPL CALC-SCNC: 13 MMOL/L (ref 3–18)
BASOPHILS # BLD: 0 K/UL (ref 0–0.1)
BASOPHILS NFR BLD: 0 % (ref 0–2)
BUN SERPL-MCNC: 31 MG/DL (ref 7–18)
BUN/CREAT SERPL: 4 (ref 12–20)
CALCIUM SERPL-MCNC: 9.4 MG/DL (ref 8.5–10.1)
CHLORIDE SERPL-SCNC: 99 MMOL/L (ref 100–108)
CO2 SERPL-SCNC: 25 MMOL/L (ref 21–32)
CREAT SERPL-MCNC: 7.08 MG/DL (ref 0.6–1.3)
DIFFERENTIAL METHOD BLD: ABNORMAL
EOSINOPHIL # BLD: 0.4 K/UL (ref 0–0.4)
EOSINOPHIL NFR BLD: 3 % (ref 0–5)
ERYTHROCYTE [DISTWIDTH] IN BLOOD BY AUTOMATED COUNT: 14.2 % (ref 11.6–14.5)
GLUCOSE BLD STRIP.AUTO-MCNC: 119 MG/DL (ref 70–110)
GLUCOSE BLD STRIP.AUTO-MCNC: 145 MG/DL (ref 70–110)
GLUCOSE BLD STRIP.AUTO-MCNC: 147 MG/DL (ref 70–110)
GLUCOSE BLD STRIP.AUTO-MCNC: 184 MG/DL (ref 70–110)
GLUCOSE SERPL-MCNC: 91 MG/DL (ref 74–99)
HBV SURFACE AB SER QL IA: NEGATIVE
HBV SURFACE AB SERPL IA-ACNC: <3.1 MIU/ML
HCT VFR BLD AUTO: 32.2 % (ref 36–48)
HEP BS AB COMMENT,HBSAC: ABNORMAL
HGB BLD-MCNC: 10 G/DL (ref 13–16)
LYMPHOCYTES # BLD: 2 K/UL (ref 0.9–3.6)
LYMPHOCYTES NFR BLD: 16 % (ref 21–52)
MCH RBC QN AUTO: 28.2 PG (ref 24–34)
MCHC RBC AUTO-ENTMCNC: 31.1 G/DL (ref 31–37)
MCV RBC AUTO: 90.7 FL (ref 74–97)
MONOCYTES # BLD: 0.8 K/UL (ref 0.05–1.2)
MONOCYTES NFR BLD: 6 % (ref 3–10)
NEUTS SEG # BLD: 9.4 K/UL (ref 1.8–8)
NEUTS SEG NFR BLD: 75 % (ref 40–73)
PHOSPHATE SERPL-MCNC: 3.6 MG/DL (ref 2.5–4.9)
PLATELET # BLD AUTO: 305 K/UL (ref 135–420)
PMV BLD AUTO: 10.9 FL (ref 9.2–11.8)
POTASSIUM SERPL-SCNC: 3.2 MMOL/L (ref 3.5–5.5)
RBC # BLD AUTO: 3.55 M/UL (ref 4.7–5.5)
SODIUM SERPL-SCNC: 137 MMOL/L (ref 136–145)
WBC # BLD AUTO: 12.7 K/UL (ref 4.6–13.2)

## 2017-11-13 PROCEDURE — 76010000112 HC CV SURG 0.5 TO 1 HR: Performed by: SURGERY

## 2017-11-13 PROCEDURE — 77030002986 HC SUT PROL J&J -A: Performed by: SURGERY

## 2017-11-13 PROCEDURE — 74011636637 HC RX REV CODE- 636/637: Performed by: EMERGENCY MEDICINE

## 2017-11-13 PROCEDURE — 74011250636 HC RX REV CODE- 250/636: Performed by: SURGERY

## 2017-11-13 PROCEDURE — 02PY33Z REMOVAL OF INFUSION DEVICE FROM GREAT VESSEL, PERCUTANEOUS APPROACH: ICD-10-PCS | Performed by: SURGERY

## 2017-11-13 PROCEDURE — C1894 INTRO/SHEATH, NON-LASER: HCPCS | Performed by: SURGERY

## 2017-11-13 PROCEDURE — 77030003390 HC NDL ANGI MRTM -A: Performed by: SURGERY

## 2017-11-13 PROCEDURE — 74011000258 HC RX REV CODE- 258: Performed by: NURSE ANESTHETIST, CERTIFIED REGISTERED

## 2017-11-13 PROCEDURE — 74011250637 HC RX REV CODE- 250/637: Performed by: INTERNAL MEDICINE

## 2017-11-13 PROCEDURE — 74011250637 HC RX REV CODE- 250/637: Performed by: NURSE ANESTHETIST, CERTIFIED REGISTERED

## 2017-11-13 PROCEDURE — 84100 ASSAY OF PHOSPHORUS: CPT | Performed by: INTERNAL MEDICINE

## 2017-11-13 PROCEDURE — 74011636637 HC RX REV CODE- 636/637: Performed by: INTERNAL MEDICINE

## 2017-11-13 PROCEDURE — 0JH63XZ INSERTION OF TUNNELED VASCULAR ACCESS DEVICE INTO CHEST SUBCUTANEOUS TISSUE AND FASCIA, PERCUTANEOUS APPROACH: ICD-10-PCS | Performed by: SURGERY

## 2017-11-13 PROCEDURE — 74011250637 HC RX REV CODE- 250/637: Performed by: NURSE PRACTITIONER

## 2017-11-13 PROCEDURE — 71010 XR CHEST PORT: CPT

## 2017-11-13 PROCEDURE — C1750 CATH, HEMODIALYSIS,LONG-TERM: HCPCS | Performed by: SURGERY

## 2017-11-13 PROCEDURE — 74011250636 HC RX REV CODE- 250/636: Performed by: EMERGENCY MEDICINE

## 2017-11-13 PROCEDURE — 74011250636 HC RX REV CODE- 250/636

## 2017-11-13 PROCEDURE — 82962 GLUCOSE BLOOD TEST: CPT

## 2017-11-13 PROCEDURE — 76060000032 HC ANESTHESIA 0.5 TO 1 HR: Performed by: SURGERY

## 2017-11-13 PROCEDURE — 77030002933 HC SUT MCRYL J&J -A: Performed by: SURGERY

## 2017-11-13 PROCEDURE — 85025 COMPLETE CBC W/AUTO DIFF WBC: CPT | Performed by: INTERNAL MEDICINE

## 2017-11-13 PROCEDURE — 76210000001 HC OR PH I REC 2.5 TO 3 HR: Performed by: SURGERY

## 2017-11-13 PROCEDURE — 74011000250 HC RX REV CODE- 250: Performed by: SURGERY

## 2017-11-13 PROCEDURE — 65660000000 HC RM CCU STEPDOWN

## 2017-11-13 PROCEDURE — 36415 COLL VENOUS BLD VENIPUNCTURE: CPT | Performed by: INTERNAL MEDICINE

## 2017-11-13 PROCEDURE — 77030018719 HC DRSG PTCH ANTIMIC J&J -A: Performed by: SURGERY

## 2017-11-13 PROCEDURE — 80048 BASIC METABOLIC PNL TOTAL CA: CPT | Performed by: INTERNAL MEDICINE

## 2017-11-13 DEVICE — PRECISION HSI CHRONIC CATHETER SPORT PACK,SYMMETRICAL TIP, HEPARIN COATING, SILVER ION SLEEVE AND TAL VENATRAC STYLET,14.5 FR/CH (4.8 MM) X 23 CM
Type: IMPLANTABLE DEVICE | Site: NECK | Status: FUNCTIONAL
Brand: PALINDROME

## 2017-11-13 RX ORDER — PANTOPRAZOLE SODIUM 40 MG/1
40 TABLET, DELAYED RELEASE ORAL
Status: DISPENSED | OUTPATIENT
Start: 2017-11-13 | End: 2017-11-13

## 2017-11-13 RX ORDER — INSULIN LISPRO 100 [IU]/ML
INJECTION, SOLUTION INTRAVENOUS; SUBCUTANEOUS ONCE
Status: DISCONTINUED | OUTPATIENT
Start: 2017-11-13 | End: 2017-11-13 | Stop reason: HOSPADM

## 2017-11-13 RX ORDER — LIDOCAINE HYDROCHLORIDE 10 MG/ML
INJECTION, SOLUTION EPIDURAL; INFILTRATION; INTRACAUDAL; PERINEURAL AS NEEDED
Status: DISCONTINUED | OUTPATIENT
Start: 2017-11-13 | End: 2017-11-13 | Stop reason: HOSPADM

## 2017-11-13 RX ORDER — FAMOTIDINE 20 MG/1
20 TABLET, FILM COATED ORAL ONCE
Status: COMPLETED | OUTPATIENT
Start: 2017-11-13 | End: 2017-11-13

## 2017-11-13 RX ORDER — HYDROMORPHONE HYDROCHLORIDE 2 MG/ML
0.5 INJECTION, SOLUTION INTRAMUSCULAR; INTRAVENOUS; SUBCUTANEOUS
Status: DISCONTINUED | OUTPATIENT
Start: 2017-11-13 | End: 2017-11-14 | Stop reason: HOSPADM

## 2017-11-13 RX ORDER — PROPOFOL 10 MG/ML
INJECTION, EMULSION INTRAVENOUS
Status: DISCONTINUED | OUTPATIENT
Start: 2017-11-13 | End: 2017-11-13 | Stop reason: HOSPADM

## 2017-11-13 RX ORDER — HEPARIN SODIUM 5000 [USP'U]/ML
INJECTION, SOLUTION INTRAVENOUS; SUBCUTANEOUS AS NEEDED
Status: DISCONTINUED | OUTPATIENT
Start: 2017-11-13 | End: 2017-11-13 | Stop reason: HOSPADM

## 2017-11-13 RX ORDER — SODIUM CHLORIDE 0.9 % (FLUSH) 0.9 %
5-10 SYRINGE (ML) INJECTION AS NEEDED
Status: DISCONTINUED | OUTPATIENT
Start: 2017-11-13 | End: 2017-11-14 | Stop reason: HOSPADM

## 2017-11-13 RX ORDER — SODIUM CHLORIDE 0.9 % (FLUSH) 0.9 %
5-10 SYRINGE (ML) INJECTION AS NEEDED
Status: DISCONTINUED | OUTPATIENT
Start: 2017-11-13 | End: 2017-11-13 | Stop reason: HOSPADM

## 2017-11-13 RX ORDER — DEXTROSE MONOHYDRATE AND SODIUM CHLORIDE 5; .45 G/100ML; G/100ML
25 INJECTION, SOLUTION INTRAVENOUS CONTINUOUS
Status: DISCONTINUED | OUTPATIENT
Start: 2017-11-13 | End: 2017-11-13 | Stop reason: HOSPADM

## 2017-11-13 RX ORDER — PROPOFOL 10 MG/ML
INJECTION, EMULSION INTRAVENOUS AS NEEDED
Status: DISCONTINUED | OUTPATIENT
Start: 2017-11-13 | End: 2017-11-13 | Stop reason: HOSPADM

## 2017-11-13 RX ORDER — ONDANSETRON 2 MG/ML
4 INJECTION INTRAMUSCULAR; INTRAVENOUS ONCE
Status: ACTIVE | OUTPATIENT
Start: 2017-11-13 | End: 2017-11-14

## 2017-11-13 RX ORDER — INSULIN LISPRO 100 [IU]/ML
INJECTION, SOLUTION INTRAVENOUS; SUBCUTANEOUS ONCE
Status: ACTIVE | OUTPATIENT
Start: 2017-11-13 | End: 2017-11-14

## 2017-11-13 RX ORDER — HEPARIN SODIUM 200 [USP'U]/100ML
INJECTION, SOLUTION INTRAVENOUS
Status: DISCONTINUED | OUTPATIENT
Start: 2017-11-13 | End: 2017-11-13 | Stop reason: HOSPADM

## 2017-11-13 RX ORDER — FLUTICASONE PROPIONATE 50 MCG
2 SPRAY, SUSPENSION (ML) NASAL DAILY
Status: DISCONTINUED | OUTPATIENT
Start: 2017-11-13 | End: 2017-11-14 | Stop reason: HOSPADM

## 2017-11-13 RX ORDER — LOSARTAN POTASSIUM 50 MG/1
50 TABLET ORAL DAILY
Status: DISCONTINUED | OUTPATIENT
Start: 2017-11-14 | End: 2017-11-14 | Stop reason: HOSPADM

## 2017-11-13 RX ORDER — MAGNESIUM SULFATE 100 %
4 CRYSTALS MISCELLANEOUS AS NEEDED
Status: DISCONTINUED | OUTPATIENT
Start: 2017-11-13 | End: 2017-11-13 | Stop reason: HOSPADM

## 2017-11-13 RX ORDER — SODIUM CHLORIDE 0.9 % (FLUSH) 0.9 %
5-10 SYRINGE (ML) INJECTION EVERY 8 HOURS
Status: DISCONTINUED | OUTPATIENT
Start: 2017-11-13 | End: 2017-11-13 | Stop reason: HOSPADM

## 2017-11-13 RX ORDER — HYDRALAZINE HYDROCHLORIDE 50 MG/1
100 TABLET, FILM COATED ORAL
Status: DISCONTINUED | OUTPATIENT
Start: 2017-11-13 | End: 2017-11-14 | Stop reason: HOSPADM

## 2017-11-13 RX ORDER — LIDOCAINE HYDROCHLORIDE 10 MG/ML
INJECTION, SOLUTION EPIDURAL; INFILTRATION; INTRACAUDAL; PERINEURAL
Status: DISPENSED
Start: 2017-11-13 | End: 2017-11-13

## 2017-11-13 RX ORDER — DEXTROSE 50 % IN WATER (D50W) INTRAVENOUS SYRINGE
25-50 AS NEEDED
Status: DISCONTINUED | OUTPATIENT
Start: 2017-11-13 | End: 2017-11-13 | Stop reason: HOSPADM

## 2017-11-13 RX ORDER — DEXTROSE 50 % IN WATER (D50W) INTRAVENOUS SYRINGE
25-50 AS NEEDED
Status: DISCONTINUED | OUTPATIENT
Start: 2017-11-13 | End: 2017-11-14 | Stop reason: HOSPADM

## 2017-11-13 RX ORDER — MAGNESIUM SULFATE 100 %
4 CRYSTALS MISCELLANEOUS AS NEEDED
Status: DISCONTINUED | OUTPATIENT
Start: 2017-11-13 | End: 2017-11-14 | Stop reason: HOSPADM

## 2017-11-13 RX ORDER — CEFAZOLIN SODIUM 2 G/50ML
2 SOLUTION INTRAVENOUS ONCE
Status: COMPLETED | OUTPATIENT
Start: 2017-11-13 | End: 2017-11-13

## 2017-11-13 RX ADMIN — METOPROLOL TARTRATE 100 MG: 50 TABLET ORAL at 09:05

## 2017-11-13 RX ADMIN — INSULIN GLARGINE 20 UNITS: 100 INJECTION, SOLUTION SUBCUTANEOUS at 21:21

## 2017-11-13 RX ADMIN — INSULIN GLARGINE 20 UNITS: 100 INJECTION, SOLUTION SUBCUTANEOUS at 09:06

## 2017-11-13 RX ADMIN — ALLOPURINOL 100 MG: 100 TABLET ORAL at 16:48

## 2017-11-13 RX ADMIN — ACETAMINOPHEN 650 MG: 325 TABLET ORAL at 16:48

## 2017-11-13 RX ADMIN — INSULIN LISPRO 5 UNITS: 100 INJECTION, SOLUTION INTRAVENOUS; SUBCUTANEOUS at 17:00

## 2017-11-13 RX ADMIN — PROPOFOL 50 MCG/KG/MIN: 10 INJECTION, EMULSION INTRAVENOUS at 11:08

## 2017-11-13 RX ADMIN — LOSARTAN POTASSIUM 25 MG: 25 TABLET ORAL at 09:05

## 2017-11-13 RX ADMIN — HYDRALAZINE HYDROCHLORIDE 100 MG: 50 TABLET, FILM COATED ORAL at 23:22

## 2017-11-13 RX ADMIN — SIMVASTATIN 40 MG: 40 TABLET, FILM COATED ORAL at 21:24

## 2017-11-13 RX ADMIN — HEPARIN SODIUM 5000 UNITS: 5000 INJECTION, SOLUTION INTRAVENOUS; SUBCUTANEOUS at 09:13

## 2017-11-13 RX ADMIN — CLOPIDOGREL 75 MG: 75 TABLET, FILM COATED ORAL at 09:04

## 2017-11-13 RX ADMIN — FENOFIBRATE 48 MG: 48 TABLET, FILM COATED ORAL at 09:05

## 2017-11-13 RX ADMIN — Medication 10 ML: at 21:24

## 2017-11-13 RX ADMIN — ASPIRIN 81 MG CHEWABLE TABLET 81 MG: 81 TABLET CHEWABLE at 09:05

## 2017-11-13 RX ADMIN — CEFAZOLIN SODIUM 2 G: 2 SOLUTION INTRAVENOUS at 11:01

## 2017-11-13 RX ADMIN — TAMSULOSIN HYDROCHLORIDE 0.4 MG: 0.4 CAPSULE ORAL at 09:05

## 2017-11-13 RX ADMIN — STANDARDIZED SENNA CONCENTRATE AND DOCUSATE SODIUM 1 TABLET: 8.6; 5 TABLET, FILM COATED ORAL at 09:05

## 2017-11-13 RX ADMIN — PROPOFOL 20 MG: 10 INJECTION, EMULSION INTRAVENOUS at 11:05

## 2017-11-13 RX ADMIN — HYDRALAZINE HYDROCHLORIDE 10 MG: 20 INJECTION INTRAMUSCULAR; INTRAVENOUS at 12:51

## 2017-11-13 RX ADMIN — FAMOTIDINE 20 MG: 20 TABLET, FILM COATED ORAL at 10:39

## 2017-11-13 RX ADMIN — NEPHROCAP 1 CAPSULE: 1 CAP ORAL at 09:04

## 2017-11-13 RX ADMIN — ISOSORBIDE MONONITRATE 30 MG: 30 TABLET, EXTENDED RELEASE ORAL at 09:04

## 2017-11-13 RX ADMIN — HEPARIN SODIUM 5000 UNITS: 5000 INJECTION, SOLUTION INTRAVENOUS; SUBCUTANEOUS at 19:06

## 2017-11-13 RX ADMIN — INSULIN LISPRO 3 UNITS: 100 INJECTION, SOLUTION INTRAVENOUS; SUBCUTANEOUS at 21:20

## 2017-11-13 RX ADMIN — METOPROLOL TARTRATE 100 MG: 50 TABLET ORAL at 19:06

## 2017-11-13 RX ADMIN — DEXTROSE MONOHYDRATE AND SODIUM CHLORIDE: 5; .45 INJECTION, SOLUTION INTRAVENOUS at 11:01

## 2017-11-13 NOTE — ANESTHESIA POSTPROCEDURE EVALUATION
Post-Anesthesia Evaluation and Assessment    Patient: Cortney Giles MRN: [id number]  SSN: [id number]    YOB: 1940  Age: 68 y.o. Sex: male       Cardiovascular Function/Vital Signs  Visit Vitals    /68 (BP 1 Location: Left arm, BP Patient Position: At rest)    Pulse 91    Temp 36.9 °C (98.5 °F)    Resp 20    Ht 5' 8\" (1.727 m)    Wt 96.1 kg (211 lb 13.8 oz)    SpO2 96%    BMI 32.21 kg/m2       Patient is status post MAC anesthesia for Procedure(s):  iNSERTION PERMANENT CATHETER PLACEMENT /C-ARM . Nausea/Vomiting: None    Postoperative hydration reviewed and adequate. Pain:  Pain Scale 1: Numeric (0 - 10) (11/13/17 1411)  Pain Intensity 1: 2 (11/13/17 1411)   Managed    Neurological Status:   Neuro (WDL): Within Defined Limits (11/13/17 1142)  Neuro  Neurologic State: Alert (11/12/17 2000)  Orientation Level: Oriented X4 (11/12/17 2000)  Cognition: Appropriate decision making (11/12/17 2000)  Speech: Clear (11/12/17 2000)   At baseline    Mental Status and Level of Consciousness: Arousable    Pulmonary Status:   O2 Device: Nasal cannula (11/13/17 1152)   Adequate oxygenation and airway patent    Complications related to anesthesia: None    Post-anesthesia assessment completed.  No concerns      Signed By: Shannan Solano MD     November 13, 2017

## 2017-11-13 NOTE — ANESTHESIA PREPROCEDURE EVALUATION
Anesthetic History               Review of Systems / Medical History      Pulmonary        Sleep apnea: No treatment           Neuro/Psych              Cardiovascular    Hypertension          CAD         GI/Hepatic/Renal     GERD           Endo/Other    Diabetes         Other Findings   Comments:   Risk Factors for Postoperative nausea/vomiting:       History of postoperative nausea/vomiting? NO       Female? NO       Motion sickness? NO       Intended opioid administration for postoperative analgesia? YES      Smoking Abstinence  Current Smoker? NO  Elective Surgery? YES  Seen preoperatively by anesthesiologist or proxy prior to day of surgery? YES  Pt abstained from smoking 24 hours prior to anesthesia?  N/A           Physical Exam    Airway  Mallampati: III  TM Distance: 4 - 6 cm  Neck ROM: normal range of motion   Mouth opening: Normal     Cardiovascular  Regular rate and rhythm,  S1 and S2 normal,  no murmur, click, rub, or gallop  Rhythm: regular  Rate: normal         Dental    Dentition: Lower dentition intact and Full upper dentures     Pulmonary  Breath sounds clear to auscultation               Abdominal  GI exam deferred       Other Findings            Anesthetic Plan    ASA: 3  Anesthesia type: MAC          Induction: Intravenous  Anesthetic plan and risks discussed with: Patient

## 2017-11-13 NOTE — PROGRESS NOTES
[facility]      CARDIOLOGY PROGRESS NOTE  RECS:      1. Unstable Angina- stable now. Post HERBER in svg to diag  Continue treatment  2. Hx of recent NSTEMI- was managed medically. 3. CAD s/p coronary artery stent placement in 2012 and 7/17 with HERBER stents deployed in SVG in the focal mid lesion and a long proximal lesion. continue with asa/Plavix/bb/statin   4. Chronic diastolic heart failure- appears compensated- monitor for fluid overload.                5. Essential hypertension-better today with no hypotension  6. 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 Hyperlipidemia- on statin                                                            8. CKD- now ESRD on HD per Renal following   9.  Diabetes managed per medicine.           ASSESSMENT:  Hospital Problems  Date Reviewed: 11/8/2017          Codes Class Noted POA    ESRD (end stage renal disease) ([facility].) ICD-10-CM: N18.6  ICD-9-CM: 585.6  11/8/2017 Yes        Chronic kidney disease, stage V ([facility] ICD-10-CM: N18.5  ICD-9-CM: 585.5  10/28/2017 Yes        Chest pain ICD-10-CM: R07.9  ICD-9-CM: 786.50  10/27/2017 Unknown        Secondary hyperparathyroidism of renal origin ([facility] (Chronic) ICD-10-CM: N25.81  ICD-9-CM: 588.81  10/25/2017 Yes        S/P coronary artery stent placement ICD-10-CM: Z95.5  ICD-9-CM: V45.82  3/12/2013 Yes    Overview Signed 3/12/2013  3:37 PM by Leandro Parrish MD     vg to diag stent 10/2012             Coronary atherosclerosis of native coronary artery ICD-10-CM: I25.10  ICD-9-CM: 414.01  10/7/2012 Yes        HTN (hypertension) ICD-10-CM: I10  ICD-9-CM: 401.9  10/7/2012 Yes        DM2 (diabetes mellitus, type 2) ([facility] ICD-10-CM: E11.9  ICD-9-CM: 250.00  10/7/2012 Yes                SUBJECTIVE:  No CP  No SOB    OBJECTIVE:    VS:   Visit Vitals    /74 (BP 1 Location: Left arm, BP Patient Position: Sitting)    Pulse 93    Temp 98.4 °F (36.9 °C)    Resp 20  Ht 5' 8\" (1.727 m)    Wt 96.1 kg (211 lb 13.8 oz)    SpO2 94%    BMI 32.21 kg/m2         Intake/Output Summary (Last 24 hours) at 11/13/17 0841  Last data filed at 11/12/17 1619   Gross per 24 hour   Intake                0 ml   Output              120 ml   Net             -120 ml     TELE: normal sinus rhythm    General: alert, well developed, cooperative, pleasant and in no apparent distress  HENT: Normocephalic, atraumatic. Normal external eye. Neck :  JVD difficult to assess due to obesity  Cardiac:  regular rate and rhythm, S1, S2 normal, no murmur, click, rub or gallop  Lungs: clear to auscultation bilaterally  Abdomen: Soft, nontender, no masses  Extremities:  No c/c/e, peripheral pulses present      Labs: Results:       Chemistry Recent Labs      11/13/17   0515  11/11/17   0300   GLU  91  113*   NA  137  139   K  3.2*  3.7   CL  99*  101   CO2  25  29   BUN  31*  35*   CREA  7.08*  5.20*   CA  9.4  8.7   AGAP  13  9   BUCR  4*  7*      CBC w/Diff Recent Labs      11/13/17   0515   WBC  12.7   RBC  3.55*   HGB  10.0*   HCT  32.2*   PLT  305   GRANS  75*   LYMPH  16*   EOS  3      Cardiac Enzymes Recent Labs      11/12/17   1315   CPK  53   CKND1  3.2      Coagulation No results for input(s): PTP, INR, APTT in the last 72 hours. No lab exists for component: INREXT, INREXT    Lipid Panel Lab Results   Component Value Date/Time    Cholesterol, total 158 10/24/2017 05:08 AM    HDL Cholesterol 39 10/24/2017 05:08 AM    LDL, calculated 72.4 10/24/2017 05:08 AM    VLDL, calculated 46.6 10/24/2017 05:08 AM    Triglyceride 233 10/24/2017 05:08 AM    CHOL/HDL Ratio 4.1 10/24/2017 05:08 AM      BNP No results for input(s): BNPP in the last 72 hours. Liver Enzymes No results for input(s): TP, ALB, TBIL, AP, SGOT, GPT in the last 72 hours.     No lab exists for component: DBIL   Thyroid Studies Lab Results   Component Value Date/Time    T4, Total 10.9 10/09/2012 04:53 AM    TSH 2.16 10/23/2017 11:08 PM Isael Hays MD

## 2017-11-13 NOTE — PROGRESS NOTES
Progress Note    Leonor Core  68 y.o. Admit Date: 11/7/2017  Active Problems:    Coronary atherosclerosis of native coronary artery (10/7/2012) POA: Yes      HTN (hypertension) (10/7/2012) POA: Yes      DM2 (diabetes mellitus, type 2) ([facility] (10/7/2012) POA: Yes      S/P coronary artery stent placement (3/12/2013) POA: Yes      Overview: vg to diag stent 10/2012      Secondary hyperparathyroidism of renal origin ([facility] (10/25/2017) POA: Yes      Chest pain (10/27/2017) POA: Unknown      Chronic kidney disease, stage V ([facility] 75.) (10/28/2017) POA: Yes      ESRD (end stage renal disease) ([facility] 75.) (11/8/2017) POA: Yes            Subjective:     Patient feels good, has TDC in (R) IJ, seen in recovery room. Clear mental status, no new complain. A comprehensive review of systems was negative except for that written in the History of Present Illness.     Objective:     Visit Vitals    /78    Pulse 79    Temp 97 °F (36.1 °C)    Resp 16    Ht 5' 8\" (1.727 m)    Wt 96.1 kg (211 lb 13.8 oz)    SpO2 100%    BMI 32.21 kg/m2         Intake/Output Summary (Last 24 hours) at 11/13/17 1320  Last data filed at 11/13/17 1134   Gross per 24 hour   Intake              100 ml   Output              120 ml   Net              -20 ml       Current Facility-Administered Medications   Medication Dose Route Frequency Provider Last Rate Last Dose    sodium chloride (NS) flush 5-10 mL  5-10 mL IntraVENous Q8H Dakota Al CRNA        sodium chloride (NS) flush 5-10 mL  5-10 mL IntraVENous PRN Dakota Al CRNA        insulin lispro (HUMALOG) injection   SubCUTAneous ONCE Dakota Al CRNA        glucose chewable tablet 16 g  4 Tab Oral PRN Dakota Al CRNA        glucagon (GLUCAGEN) injection 1 mg  1 mg IntraMUSCular PRN Dakota Al CRNA        dextrose (D50W) injection syrg 12.5-25 g  25-50 mL IntraVENous PRN Dakota Al CRNA        dextrose 5 % - 0.45% NaCl infusion  25 mL/hr IntraVENous CONTINUOUS Chery Or, CRNA   Stopped at 11/13/17 1134    pantoprazole (PROTONIX) tablet 40 mg  40 mg Oral NOW Camille Jones MD        lidocaine (PF) (XYLOCAINE) 10 mg/mL (1 %) injection             sodium chloride (NS) flush 5-10 mL  5-10 mL IntraVENous PRN Chery Or, CRNA        ondansetron [facility]) injection 4 mg  4 mg IntraVENous ONCE Chery Or, CRNA        insulin lispro (HUMALOG) injection   SubCUTAneous ONCE Chery Or, CRNA        glucose chewable tablet 16 g  4 Tab Oral PRN Chery Or, CRNA        glucagon (GLUCAGEN) injection 1 mg  1 mg IntraMUSCular PRN Chery Or, CRNA        dextrose (D50W) injection syrg 12.5-25 g  25-50 mL IntraVENous PRN Chery Or, CRNA        HYDROmorphone (PF) (DILAUDID) injection 0.5 mg  0.5 mg IntraVENous Q10MIN PRN Chery Or, CRNA        lidocaine (PF) (XYLOCAINE) 10 mg/mL (1 %) injection    PRN Daxa Larios MD   30 mL at 11/13/17 1115    heparinized saline 2 units/mL infusion    CONTINUOUS Daxa Larios MD   500 mL at 11/13/17 1115    heparin (porcine) injection    PRN Daxa Larios MD   10,000 Units at 11/13/17 1115    losartan (COZAAR) tablet 25 mg  25 mg Oral DAILY Adam Ivey MD   25 mg at 11/13/17 0905    insulin lispro (HUMALOG) injection 5 Units  5 Units SubCUTAneous TID WITH MEALS Nora Mehta MD   Stopped at 11/13/17 0908    allopurinol (ZYLOPRIM) tablet 100 mg  100 mg Oral Q MON, WED & FRI Adam Ivey MD        sodium chloride (NS) flush 5-10 mL  5-10 mL IntraVENous Q8H Minus MD Jacoby   10 mL at 11/12/17 2251    sodium chloride (NS) flush 5-10 mL  5-10 mL IntraVENous PRN Minus MD Jacoby        clopidogrel (PLAVIX) tablet 75 mg  75 mg Oral DAILY Urszula Coughlin, NP   75 mg at 11/13/17 0904    epoetin lee (EPOGEN;PROCRIT) 5,000 Units  5,000 Units IntraVENous [facility] Medina Sa, MD   5,000 Units at 11/11/17 1500    doxercalciferol (HECTOROL) 4 mcg/2 mL injection 0.5 mcg  0.5 mcg IntraVENous [facility] Doris Gibbs MD   0.5 mcg at 11/11/17 1504    hydrALAZINE (APRESOLINE) 20 mg/mL injection 10 mg  10 mg IntraVENous Q6H PRN Ricky Garcia MD   10 mg at 11/13/17 1251    heparin (porcine) injection 5,000 Units  5,000 Units SubCUTAneous Q8H Ricky Garcia MD   5,000 Units at 11/13/17 0913    nitroglycerin (NITROSTAT) tablet 0.4 mg  0.4 mg SubLINGual PRN Urszula Coughlin NP   0.4 mg at 11/08/17 1449    aspirin chewable tablet 81 mg  81 mg Oral DAILY Dbebie Buckner MD   81 mg at 11/13/17 0905    B complex-vitaminC-folic acid (NEPHROCAP) cap  1 Cap Oral DAILY Debbie Buckner MD   1 Cap at 11/13/17 0904    fenofibrate nanocrystallized (TRICOR) tablet 48 mg  48 mg Oral DAILY Debbie Buckner MD   48 mg at 11/13/17 0905    insulin glargine (LANTUS) injection 20 Units  20 Units SubCUTAneous Q12H Debbie Buckner MD   20 Units at 11/13/17 0906    isosorbide mononitrate ER (IMDUR) tablet 30 mg  30 mg Oral DAILY Debbie Buckner MD   30 mg at 11/13/17 0904    metoprolol tartrate (LOPRESSOR) tablet 100 mg  100 mg Oral BID Debbie Buckner MD   100 mg at 11/13/17 0905    senna-docusate (PERICOLACE) 8.6-50 mg per tablet 1 Tab  1 Tab Oral DAILY Debbie Buckner MD   1 Tab at 11/13/17 0905    simvastatin (ZOCOR) tablet 40 mg  40 mg Oral QHS Debbie Buckner MD   40 mg at 11/12/17 2238    tamsulosin (FLOMAX) capsule 0.4 mg  0.4 mg Oral DAILY Debbie Buckner MD   0.4 mg at 11/13/17 0905    insulin lispro (HUMALOG) injection   SubCUTAneous AC&HS Debbie Buckner MD   Stopped at 11/13/17 0908    glucose chewable tablet 16 g  16 g Oral PRN Debbie Buckner MD        glucagon (GLUCAGEN) injection 1 mg  1 mg IntraMUSCular PRN Debbie Buckner MD        dextrose (D50W) injection syrg 12.5-25 g  25-50 mL IntraVENous PRN Debbie Buckner MD        acetaminophen (TYLENOL) tablet 650 mg  650 mg Oral Q4H PRN Yogesh Collado, MD   650 mg at 11/12/17 1905    bisacodyl (DULCOLAX) tablet 5 mg  5 mg Oral DAILY PRN Soco Hartman MD   5 mg at 11/12/17 1606    ondansetron (ZOFRAN) injection 4 mg  4 mg IntraVENous Q4H PRN Soco Hartman MD   4 mg at 11/09/17 1805        Physical Exam:     Physical Exam:   General:  Alert, cooperative, no distress, appears stated age. Neck: Supple, symmetrical, trachea midline, no adenopathy, thyroid: no enlargement/tenderness/nodules, no carotid bruit and no JVD. Lungs:   Clear to auscultation bilaterally. Heart:  Regular rate and rhythm, S1, S2 normal, no murmur, click, rub or gallop. Abdomen:   Soft, non-tender. Bowel sounds normal. No masses,  No organomegaly. Extremities: Extremities normal, atraumatic, no cyanosis or edema, HD catheter site is well dressed.    Skin: Skin color, texture, turgor normal. No rashes or lesions         Data Review:    CBC w/Diff    Recent Labs      11/13/17 0515   WBC  12.7   RBC  3.55*   HGB  10.0*   HCT  32.2*   MCV  90.7   MCH  28.2   MCHC  31.1   RDW  14.2    Recent Labs      11/13/17   0515   MONOS  6   EOS  3   BASOS  0   RDW  14.2        Comprehensive Metabolic Profile    Recent Labs      11/13/17   0515  11/11/17   0300   NA  137  139   K  3.2*  3.7   CL  99*  101   CO2  25  29   BUN  31*  35*   CREA  7.08*  5.20*    Recent Labs      11/13/17 0515  11/11/17   0300   CA  9.4  8.7   PHOS  3.6  2.8                        Impression:       Active Hospital Problems    Diagnosis Date Noted    ESRD (end stage renal disease) ([facility] 11/08/2017    Chronic kidney disease, stage V ([facility] 10/28/2017    Chest pain 10/27/2017    Secondary hyperparathyroidism of renal origin ([facility].) 10/25/2017    S/P coronary artery stent placement 03/12/2013     vg to diag stent 10/2012      Coronary atherosclerosis of native coronary artery 10/07/2012    HTN (hypertension) 10/07/2012    DM2 (diabetes mellitus, type 2) ([facility] 10/07/2012            Plan:     Adjust BP medicine,encouraged him to ambulate. Dialysis tomorrow AM & then DC,EDW 96 kg.       Ian Dailey MD

## 2017-11-13 NOTE — ROUTINE PROCESS
Spoke with on call Md Dr. Kiran Baron in reference to patient c/o acid reflux. Orders received will continue to monitor.

## 2017-11-13 NOTE — CARDIO/PULMONARY
Attempted to stop by and see patient. He was out of room at present. Will attempt to see patient at a later time.

## 2017-11-13 NOTE — PROGRESS NOTES
Pt explained he uses a different sliding scale for his insulin than he has been getting during his hospital stay. This RN asked him to write down the sliding scale he uses so it can be reported to the physician. Last BS was 336 and pt refuses to eat his dinner after previous RN gave him his ordered 12 units according the current sliding scale. This RN spoke to Adan Brand to explain pt's concern regarding insulin amount in in accordance to his blood glucose levels.  Dr. Corrine Felix gave verbal order with read-back to add 5 units of Humalog before meals including now along with the current ordered sliding scale and instructions that pt must eat his current meal.

## 2017-11-13 NOTE — PERIOP NOTES
TRANSFER - OUT REPORT:    Verbal report given to Baby Caodaism RN on Nagi Yin  being transferred to [facility] [id number] for routine post - op       Report consisted of patients Situation, Background, Assessment and   Recommendations(SBAR). Information from the following report(s) SBAR, OR Summary and MAR was reviewed with the receiving nurse. Lines:   Peripheral IV 11/07/17 Left Antecubital (Active)   Site Assessment Clean, dry, & intact 11/12/2017  8:00 PM   Phlebitis Assessment 0 11/12/2017  8:00 PM   Infiltration Assessment 0 11/12/2017  8:00 PM   Dressing Status Clean, dry, & intact 11/12/2017  8:00 PM   Dressing Type Transparent 11/12/2017  8:00 PM   Hub Color/Line Status Pink;Flushed 11/12/2017  8:00 PM   Action Taken Dressing reinforced 11/11/2017 10:30 AM   Alcohol Cap Used Yes 11/12/2017  4:17 PM        Opportunity for questions and clarification was provided.       Patient transported with:   O2 @ 3 liters  Registered Nurse

## 2017-11-13 NOTE — ROUTINE PROCESS
Bedside and Verbal shift change report given to Sg Bird RN (oncoming nurse) by Kat Joyner RN   (offgoing nurse). Report included the following information SBAR, Kardex, ED Summary, Procedure Summary, Intake/Output, MAR, Recent Results and Cardiac Rhythm NSR.

## 2017-11-13 NOTE — DIABETES MGMT
Glycemic Control Plan of Care    POC BG range on 11/12/2017: 149-336 mg/dL. Noted prandial insulin was added. POC BG report on 11/13/2017 at time of review: 119 mg/dL. Noted plan for permanent dialysis cath placement. Recommendation(s):  1.) Continue BG monitoring and current insulin orders: basal, prandial, and correctional.  2.) Resume renal diabetic consistent carb diet post op when ready to feed patient. Assessment:  Patient is 68year old with past medical history including type 2 diabetes mellitus, hypertension, CAD with stent, CKD/ESRD, obesity, hyperlipidemia, GERD, prostate cancer, APRYL, and CHF - was admitted on 11/07/2017 with c/o chest pain. Noted:  CAD. Status post HERBER on 11/10/2017. Stage 5 CKD - ESRD, hemodialysis. Type 2 diabetes mellitus with current A1c of 8.3% (10/28/2017). Most recent blood glucose values:    Results for Renny Cooper (MRN [id number]) as of 11/13/2017 10:40   Ref. Range 11/12/2017 07:26 11/12/2017 10:43 11/12/2017 15:50 11/12/2017 21:22   GLUCOSE,FAST - POC Latest Ref Range: 70 - 110 mg/dL 149 (H) 259 (H) 336 (H) 152 (H)     Results for Renny Cooper (MRN [id number]) as of 11/13/2017 10:40   Ref. Range 11/13/2017 07:20   GLUCOSE,FAST - POC Latest Ref Range: 70 - 110 mg/dL 119 (H)     Current A1C: 8.3% (10/28/2017) is equivalent to average blood glucose of 192 mg/dL during the past 2-3 months. Current hospital diabetes medications:  Basal lantus insulin 20 units every 12 hours. Prandial lispro insulin 5 units TID AC. Correctional lispro insulin ACHS. Very resistant dose. Total daily dose insulin requirement previous day: 11/12/2017  Lantus: 40 units  Lispro: 29 units   TDD: 69 units of insulin. Home diabetes medications: As reported by patient on 11/08/2017:  Patient stated that his physician Dr. Ruslan Sparrow knows how he is taking insulin at home:  Lantus: up to 60 units daily.  Clarified this information with patient and stated that he usually inject 20-30 units of lantus twice daily depending on his blood sugar trend and Dr. Ana Ferguson knows about it. Novolog sliding scale insulin. Diet: NPO for procedure 11/13/2017.     Goals:  Blood glucose will be within target range of  mg/dL by 11/16/2017    Education:  _X__  Refer to Diabetes Education Record: 11/08/2017             ___  Education not indicated at this time    Kervin Mott RN

## 2017-11-13 NOTE — PROGRESS NOTES
[facility] Hospitalist Group  Progress Note    Patient: Mindy Win Age: 68 y.o. : 1940 MR#: [id number] SSN: [id number]  Date: 2017     Subjective:     Feeling better. No chest or abdominal pain. No SOB or cough. Nasal congestion present. Wants to be dialyzed in morning. Assessment/Plan:     1. chest pain, resolved . 2. Coronary artery disease s/p  Post HERBER in svg to diag  3. ESRD  4. Diabetes mellitus. 5. Hypertension. 6. History of prostate cancer.   7- Uncontrolled HTN  8- Anemia of chronic disease  9- Chronic diastolic chf - compensated    PLAN    HD as per nephro, s/p Permacath, d/c home tomorrow after HD  Cardio following, s/p cardiac catheterization, asa, plavix, BB, statin  Cont current management  flonase will be added    Case discussed with:  [x]Patient  []Family  []Nursing  []Case Management  DVT Prophylaxis:  []Lovenox  []Hep SQ  []SCDs  []Coumadin   []On Heparin gtt    Objective:   VS:   Visit Vitals    /68 (BP 1 Location: Left arm, BP Patient Position: At rest)    Pulse 91    Temp 98.5 °F (36.9 °C)    Resp 20    Ht 5' 8\" (1.727 m)    Wt 96.1 kg (211 lb 13.8 oz)    SpO2 96%    BMI 32.21 kg/m2      Tmax/24hrs: Temp (24hrs), Av °F (36.7 °C), Min:97 °F (36.1 °C), Max:98.6 °F (37 °C)      Intake/Output Summary (Last 24 hours) at 17 1712  Last data filed at 17 1134   Gross per 24 hour   Intake              100 ml   Output                0 ml   Net              100 ml       General:  Awake, alert  Cardiovascular:  S1S2+, RRR  Pulmonary:  CTA b/l  GI:  Soft, BS+, NT, ND  Extremities:  No pedal edema      Labs:    Recent Results (from the past 24 hour(s))   GLUCOSE, POC    Collection Time: 17  9:22 PM   Result Value Ref Range    Glucose (POC) 152 (H) 70 - 110 mg/dL   CBC WITH AUTOMATED DIFF    Collection Time: 17  5:15 AM   Result Value Ref Range    WBC 12.7 4.6 - 13.2 K/uL    RBC 3.55 (L) 4.70 - 5.50 M/uL    HGB 10.0 (L) 13.0 - 16.0 g/dL    HCT 32.2 (L) 36.0 - 48.0 %    MCV 90.7 74.0 - 97.0 FL    MCH 28.2 24.0 - 34.0 PG    MCHC 31.1 31.0 - 37.0 g/dL    RDW 14.2 11.6 - 14.5 %    PLATELET 664 579 - 732 K/uL    MPV 10.9 9.2 - 11.8 FL    NEUTROPHILS 75 (H) 40 - 73 %    LYMPHOCYTES 16 (L) 21 - 52 %    MONOCYTES 6 3 - 10 %    EOSINOPHILS 3 0 - 5 %    BASOPHILS 0 0 - 2 %    ABS. NEUTROPHILS 9.4 (H) 1.8 - 8.0 K/UL    ABS. LYMPHOCYTES 2.0 0.9 - 3.6 K/UL    ABS. MONOCYTES 0.8 0.05 - 1.2 K/UL    ABS. EOSINOPHILS 0.4 0.0 - 0.4 K/UL    ABS.  BASOPHILS 0.0 0.0 - 0.1 K/UL    DF AUTOMATED     METABOLIC PANEL, BASIC    Collection Time: 11/13/17  5:15 AM   Result Value Ref Range    Sodium 137 136 - 145 mmol/L    Potassium 3.2 (L) 3.5 - 5.5 mmol/L    Chloride 99 (L) 100 - 108 mmol/L    CO2 25 21 - 32 mmol/L    Anion gap 13 3.0 - 18 mmol/L    Glucose 91 74 - 99 mg/dL    BUN 31 (H) 7.0 - 18 MG/DL    Creatinine 7.08 (H) 0.6 - 1.3 MG/DL    BUN/Creatinine ratio 4 (L) 12 - 20      GFR est AA 9 (L) >60 ml/min/1.73m2    GFR est non-AA 8 (L) >60 ml/min/1.73m2    Calcium 9.4 8.5 - 10.1 MG/DL   PHOSPHORUS    Collection Time: 11/13/17  5:15 AM   Result Value Ref Range    Phosphorus 3.6 2.5 - 4.9 MG/DL   GLUCOSE, POC    Collection Time: 11/13/17  7:20 AM   Result Value Ref Range    Glucose (POC) 119 (H) 70 - 110 mg/dL   GLUCOSE, POC    Collection Time: 11/13/17  1:00 PM   Result Value Ref Range    Glucose (POC) 147 (H) 70 - 110 mg/dL   GLUCOSE, POC    Collection Time: 11/13/17  3:10 PM   Result Value Ref Range    Glucose (POC) 145 (H) 70 - 110 mg/dL       Signed By: Samson Ramirez MD     November 13, 2017

## 2017-11-14 VITALS
RESPIRATION RATE: 18 BRPM | HEART RATE: 123 BPM | DIASTOLIC BLOOD PRESSURE: 83 MMHG | TEMPERATURE: 97.9 F | HEIGHT: 68 IN | BODY MASS INDEX: 32.34 KG/M2 | SYSTOLIC BLOOD PRESSURE: 177 MMHG | OXYGEN SATURATION: 98 % | WEIGHT: 213.4 LBS

## 2017-11-14 LAB
ANION GAP SERPL CALC-SCNC: 12 MMOL/L (ref 3–18)
BASOPHILS # BLD: 0.1 K/UL (ref 0–0.06)
BASOPHILS NFR BLD: 0 % (ref 0–2)
BUN SERPL-MCNC: 34 MG/DL (ref 7–18)
BUN/CREAT SERPL: 5 (ref 12–20)
CALCIUM SERPL-MCNC: 9.9 MG/DL (ref 8.5–10.1)
CHLORIDE SERPL-SCNC: 99 MMOL/L (ref 100–108)
CO2 SERPL-SCNC: 25 MMOL/L (ref 21–32)
CREAT SERPL-MCNC: 6.36 MG/DL (ref 0.6–1.3)
DIFFERENTIAL METHOD BLD: ABNORMAL
EOSINOPHIL # BLD: 0.2 K/UL (ref 0–0.4)
EOSINOPHIL NFR BLD: 2 % (ref 0–5)
ERYTHROCYTE [DISTWIDTH] IN BLOOD BY AUTOMATED COUNT: 14.5 % (ref 11.6–14.5)
GLUCOSE BLD STRIP.AUTO-MCNC: 253 MG/DL (ref 70–110)
GLUCOSE BLD STRIP.AUTO-MCNC: 255 MG/DL (ref 70–110)
GLUCOSE SERPL-MCNC: 218 MG/DL (ref 74–99)
HCT VFR BLD AUTO: 32.8 % (ref 36–48)
HGB BLD-MCNC: 10.4 G/DL (ref 13–16)
LYMPHOCYTES # BLD: 1.7 K/UL (ref 0.9–3.6)
LYMPHOCYTES NFR BLD: 15 % (ref 21–52)
MCH RBC QN AUTO: 28.7 PG (ref 24–34)
MCHC RBC AUTO-ENTMCNC: 31.7 G/DL (ref 31–37)
MCV RBC AUTO: 90.6 FL (ref 74–97)
MONOCYTES # BLD: 0.8 K/UL (ref 0.05–1.2)
MONOCYTES NFR BLD: 7 % (ref 3–10)
NEUTS SEG # BLD: 8.6 K/UL (ref 1.8–8)
NEUTS SEG NFR BLD: 76 % (ref 40–73)
PHOSPHATE SERPL-MCNC: 3.3 MG/DL (ref 2.5–4.9)
PLATELET # BLD AUTO: 299 K/UL (ref 135–420)
PMV BLD AUTO: 10.7 FL (ref 9.2–11.8)
POTASSIUM SERPL-SCNC: 3.4 MMOL/L (ref 3.5–5.5)
RBC # BLD AUTO: 3.62 M/UL (ref 4.7–5.5)
SODIUM SERPL-SCNC: 136 MMOL/L (ref 136–145)
WBC # BLD AUTO: 11.4 K/UL (ref 4.6–13.2)

## 2017-11-14 PROCEDURE — 5A1D70Z PERFORMANCE OF URINARY FILTRATION, INTERMITTENT, LESS THAN 6 HOURS PER DAY: ICD-10-PCS | Performed by: INTERNAL MEDICINE

## 2017-11-14 PROCEDURE — 74011250636 HC RX REV CODE- 250/636: Performed by: INTERNAL MEDICINE

## 2017-11-14 PROCEDURE — 36415 COLL VENOUS BLD VENIPUNCTURE: CPT | Performed by: INTERNAL MEDICINE

## 2017-11-14 PROCEDURE — 90935 HEMODIALYSIS ONE EVALUATION: CPT

## 2017-11-14 PROCEDURE — 82962 GLUCOSE BLOOD TEST: CPT

## 2017-11-14 PROCEDURE — 80048 BASIC METABOLIC PNL TOTAL CA: CPT | Performed by: INTERNAL MEDICINE

## 2017-11-14 PROCEDURE — 74011250636 HC RX REV CODE- 250/636: Performed by: EMERGENCY MEDICINE

## 2017-11-14 PROCEDURE — 74011250637 HC RX REV CODE- 250/637: Performed by: INTERNAL MEDICINE

## 2017-11-14 PROCEDURE — 85025 COMPLETE CBC W/AUTO DIFF WBC: CPT | Performed by: INTERNAL MEDICINE

## 2017-11-14 PROCEDURE — 84100 ASSAY OF PHOSPHORUS: CPT | Performed by: INTERNAL MEDICINE

## 2017-11-14 RX ORDER — LOSARTAN POTASSIUM 50 MG/1
50 TABLET ORAL DAILY
Qty: 30 TAB | Refills: 0 | Status: SHIPPED | OUTPATIENT
Start: 2017-11-14 | End: 2017-12-12 | Stop reason: SDUPTHER

## 2017-11-14 RX ORDER — CLONIDINE HYDROCHLORIDE 0.1 MG/1
0.1 TABLET ORAL 2 TIMES DAILY
Status: DISCONTINUED | OUTPATIENT
Start: 2017-11-14 | End: 2017-11-14 | Stop reason: HOSPADM

## 2017-11-14 RX ORDER — HYDRALAZINE HYDROCHLORIDE 20 MG/ML
10 INJECTION INTRAMUSCULAR; INTRAVENOUS
Status: DISCONTINUED | OUTPATIENT
Start: 2017-11-14 | End: 2017-11-14 | Stop reason: HOSPADM

## 2017-11-14 RX ORDER — CLONIDINE HYDROCHLORIDE 0.1 MG/1
0.1 TABLET ORAL
Status: COMPLETED | OUTPATIENT
Start: 2017-11-14 | End: 2017-11-14

## 2017-11-14 RX ORDER — CLONIDINE HYDROCHLORIDE 0.2 MG/1
0.2 TABLET ORAL 2 TIMES DAILY
Qty: 90 TAB | Refills: 0 | Status: ON HOLD
Start: 2017-11-14 | End: 2017-12-05

## 2017-11-14 RX ADMIN — ONDANSETRON 4 MG: 2 INJECTION INTRAMUSCULAR; INTRAVENOUS at 05:47

## 2017-11-14 RX ADMIN — ERYTHROPOIETIN 5000 UNITS: 3000 INJECTION, SOLUTION INTRAVENOUS; SUBCUTANEOUS at 11:42

## 2017-11-14 RX ADMIN — HYDRALAZINE HYDROCHLORIDE 10 MG: 20 INJECTION INTRAMUSCULAR; INTRAVENOUS at 05:04

## 2017-11-14 RX ADMIN — DOXERCALCIFEROL 0.5 MCG: 4 INJECTION, SOLUTION INTRAVENOUS at 11:42

## 2017-11-14 RX ADMIN — HYDRALAZINE HYDROCHLORIDE 10 MG: 20 INJECTION INTRAMUSCULAR; INTRAVENOUS at 15:19

## 2017-11-14 RX ADMIN — CLONIDINE HYDROCHLORIDE 0.1 MG: 0.1 TABLET ORAL at 15:19

## 2017-11-14 RX ADMIN — HEPARIN SODIUM 5000 UNITS: 5000 INJECTION, SOLUTION INTRAVENOUS; SUBCUTANEOUS at 01:32

## 2017-11-14 NOTE — PROGRESS NOTES
Care Management Interventions  PCP Verified by CM: Yes  Current Support Network: Lives with Spouse  Confirm Follow Up Transport: Family  Plan discussed with Pt/Family/Caregiver: Yes     Pt is being discharged home. He will receive outpatient dialysis beginning 11/16/2017 at 5:30 am at the [facility] on [place]. Family will be assisting pt with transportation home.

## 2017-11-14 NOTE — DIALYSIS
ACUTE HEMODIALYSIS FLOW SHEET    Treatment Initiation Note: Care taken over from Antonella James RN at 4602. Initial noted done via paper charting. Medication Dose Volume Route Initials Dialyzer Cleared: [] Good [x] Fair  [] Poor    Blood processed: 63.5  L  UF Removed  1500 Ml    Post Wt:     kg  POst BP:    123/57      Pulse: 121      Respirations: 18  Temperature: 98.3     Hectorol 0.5 mcg 0.25 ml IV SN Post Tx Vascular Access: AVF/AVG: Bleeding stopped Art  min. Cecil. Min   N/A     epogen 5000 units 2 ml IV SN Catheter: Locking solution: Heparin 1:1000 Art. 1.9  Cecil. 1.9  N/A                                 Post Assessment:                                    Skin:  [x] Warm  [x] Dry [] Diaphoretic    [] Flushed  [] Pale [] Cyanotic   [facility] Signatures Title Initials  Time Lungs: [x] Clear    [] Course  [] Crackles  [] Wheezing [] Diminished   Pamela Prophet RN SN  Cardiac: [x] Regular   [] Irregular   [] Monitor  [] N/A  Rhythm:           Edema:  [x] None    [] General     [] Facial   [] Pedal    [] UE    [] LE       Pain: [x]0  []1  []2   []3  []4   []5   []6   []7   []8   []9   []10         Post Treatment Note: Pt completed treatment without complications. All blood returned via CVC. VSS, no complaints of pain, in no acute distress at handoff.       POST TREATMENT PRIMARY NURSE HANDOFF REPORT:     First initial/Last name/Title         Post Dialysis: Marsha Pinedo RN Time:  3012     Abbreviations: AVG-arterial venous graft, AVF-arterial venous fistula, IJ-Internal Jugular, Subcl-Subclavian, Fem-Femoral, Tx-treatment, AP/HR-apical heart rate, DFR-dialysate flow rate, BFR-blood flow rate, AP-arterial pressure, -venous pressure, UF-ultrafiltrate, TMP-transmembrane pressure, Cecil-Venous, Art-Arterial, RO-Reverse Osmosis

## 2017-11-14 NOTE — PROGRESS NOTES
RENAL PROGRESS NOTE: Pt seen on dialysis. Follow up of ESRD     Present on Admission:   ESRD (end stage renal disease) ([facility]   Secondary hyperparathyroidism of renal origin ([facility]   Coronary atherosclerosis of native coronary artery   HTN (hypertension)   DM2 (diabetes mellitus, type 2) ([facility]   S/P coronary artery stent placement   Chronic kidney disease, stage V (HCC)         Subjective: Feels good, no SOB, no chest pain, did not take BP medicine yet due to early dialysis. Patient is on Dialysis.      Objective:    Patient Vitals for the past 12 hrs:   Temp Pulse Resp BP SpO2   11/14/17 1100 - (!) 111 18 (!) 189/93 -   11/14/17 1030 - (!) 120 18 168/68 -   11/14/17 1000 - (!) 114 18 (!) 225/94 -   11/14/17 0945 98.4 °F (36.9 °C) (!) 114 18 (!) 211/81 -   11/14/17 0800 98.3 °F (36.8 °C) (!) 117 22 195/84 99 %   11/14/17 0530 - - - 148/70 -   11/14/17 0414 - - - 183/80 -   11/14/17 0404 98.7 °F (37.1 °C) 98 20 183/80 96 %   11/14/17 0157 - - - 116/62 -   11/13/17 2353 98.9 °F (37.2 °C) 84 20 185/70 95 %   11/13/17 2322 - - - 177/52 -        Intake/Output Summary (Last 24 hours) at 11/14/17 1119  Last data filed at 11/14/17 0036   Gross per 24 hour   Intake              580 ml   Output                0 ml   Net              580 ml       Physical Assessment:     General Appearance: NAD  Lung: clear to auscultation  Heart: regular rate and rhythm and no murmurs, clicks or gallops  Lower Extremities:no  edema   Access: (R) IJ TDC, qb 350    Labs    CBC w/Diff    Recent Labs      11/14/17   0520  11/13/17   0515   WBC  11.4  12.7   RBC  3.62*  3.55*   HGB  10.4*  10.0*   HCT  32.8*  32.2*   MCV  90.6  90.7   MCH  28.7  28.2   MCHC  31.7  31.1   RDW  14.5  14.2    Recent Labs      11/14/17   0520  11/13/17   0515   MONOS  7  6   EOS  2  3   BASOS  0  0   RDW  14.5  14.2        Comprehensive Metabolic Profile    Recent Labs      11/14/17   0520  11/13/17 0515   NA  136  137   K  3.4*  3.2*   CL  99*  99* CO2  25  25   BUN  34*  31*   CREA  6.36*  7.08*    Recent Labs      11/14/17   0520  11/13/17   0515   CA  9.9  9.4   PHOS  3.3  3.6          Basic Metabolic Profile       results  reviwed. MEDS:Reviwed.   Current Facility-Administered Medications   Medication Dose Route Frequency Provider Last Rate Last Dose    hydrALAZINE (APRESOLINE) 20 mg/mL injection 10 mg  10 mg IntraVENous Q6H PRN Anali Padilla MD   10 mg at 11/14/17 0504    sodium chloride (NS) flush 5-10 mL  5-10 mL IntraVENous PRN Janay Puentes CRNA        glucose chewable tablet 16 g  4 Tab Oral PRN Janay Puentes CRNA        glucagon (GLUCAGEN) injection 1 mg  1 mg IntraMUSCular PRN Janay Puentes CRNA        dextrose (D50W) injection syrg 12.5-25 g  25-50 mL IntraVENous PRN Janay Puentes CRNA        HYDROmorphone (PF) (DILAUDID) injection 0.5 mg  0.5 mg IntraVENous Q10MIN PRN Janay Puentes CRNA        losartan (COZAAR) tablet 50 mg  50 mg Oral DAILY Doris Gibbs MD        fluticasone (FLONASE) 50 mcg/actuation nasal spray 2 Spray  2 Spray Both Nostrils DAILY Debbie Buckner MD        hydrALAZINE (APRESOLINE) tablet 100 mg  100 mg Oral Q6H PRN Anali Padilla MD   100 mg at 11/13/17 2322    insulin lispro (HUMALOG) injection 5 Units  5 Units SubCUTAneous TID WITH MEALS Ricky Garcia MD   5 Units at 11/13/17 1700    allopurinol (ZYLOPRIM) tablet 100 mg  100 mg Oral Q MON, WED & Conrado Vaca MD   100 mg at 11/13/17 1648    sodium chloride (NS) flush 5-10 mL  5-10 mL IntraVENous Q8H Juan Mariano MD   10 mL at 11/13/17 2124    sodium chloride (NS) flush 5-10 mL  5-10 mL IntraVENous PRN Juan Mariano MD        clopidogrel (PLAVIX) tablet 75 mg  75 mg Oral DAILY Urszula Coughlin, PATRICIA   75 mg at 11/13/17 0904    epoetin lee (EPOGEN;PROCRIT) 5,000 Units  5,000 Units IntraVENous DIALYSIS ABIEL KRUGER & HUGO Gibbs MD   5,000 Units at 11/11/17 1500    doxercalciferol ([address] 4 mcg/2 mL injection 0.5 mcg  0.5 mcg IntraVENous DIALYSIS TUE, THU & SAT Ian Dailey MD   0.5 mcg at 11/11/17 1504    heparin (porcine) injection 5,000 Units  5,000 Units SubCUTAneous Q8H Gavin Alejandra MD   5,000 Units at 11/14/17 0132    nitroglycerin (NITROSTAT) tablet 0.4 mg  0.4 mg SubLINGual PRN Urszula Coughlin NP   0.4 mg at 11/08/17 1449    aspirin chewable tablet 81 mg  81 mg Oral DAILY Marylee Balo, MD   81 mg at 11/13/17 0905    B complex-vitaminC-folic acid (NEPHROCAP) cap  1 Cap Oral DAILY Marylee Balo, MD   1 Cap at 11/13/17 0904    fenofibrate nanocrystallized (TRICOR) tablet 48 mg  48 mg Oral DAILY Marylee Balo, MD   48 mg at 11/13/17 0905    insulin glargine (LANTUS) injection 20 Units  20 Units SubCUTAneous Q12H Marylee Balo, MD   20 Units at 11/13/17 2121    isosorbide mononitrate ER (IMDUR) tablet 30 mg  30 mg Oral DAILY Marylee Balo, MD   30 mg at 11/13/17 0904    metoprolol tartrate (LOPRESSOR) tablet 100 mg  100 mg Oral BID Marylee Balo, MD   100 mg at 11/13/17 1906    senna-docusate (PERICOLACE) 8.6-50 mg per tablet 1 Tab  1 Tab Oral DAILY Marylee Balo, MD   1 Tab at 11/13/17 0905    simvastatin (ZOCOR) tablet 40 mg  40 mg Oral QHS Marylee Balo, MD   40 mg at 11/13/17 2124    tamsulosin (FLOMAX) capsule 0.4 mg  0.4 mg Oral DAILY Marylee Balo, MD   0.4 mg at 11/13/17 0905    insulin lispro (HUMALOG) injection   SubCUTAneous AC&HS Marylee Balo, MD   3 Units at 11/13/17 2120    glucose chewable tablet 16 g  16 g Oral PRN Marylee Balo, MD        glucagon (GLUCAGEN) injection 1 mg  1 mg IntraMUSCular PRN Marylee Balo, MD        dextrose (D50W) injection syrg 12.5-25 g  25-50 mL IntraVENous PRN Marylee Balo, MD        acetaminophen (TYLENOL) tablet 650 mg  650 mg Oral Q4H PRN Marylee Balo, MD   650 mg at 11/13/17 1648    bisacodyl (DULCOLAX) tablet 5 mg  5 mg Oral DAILY PRN Marylee Balo, MD   5 mg at 11/12/17 1606    ondansetron [facility]) injection 4 mg  4 mg IntraVENous Q4H PRN Indy Capellan MD   4 mg at 11/14/17 0545       Impression:    ESRD: Tolerating HD well. Anemia of CKD: on Epogen. Hyperparathyroidism Yes  Hypertension  . Plan  Dialysis for volume and solute management  Epogen  5000 units. Hectorol: 0.5 microgram.  Will give Clonidine 0.1 mg  PO now  & increased UF goal to 2.4 kg. Anticipate BP will come Down. Continue Metoprolol ,Losartan& Isosorbide . DC home after dialysis. I will follow him  At OP dialysis . Start Dialysis as OP on 11/6/17 at 5.30 AM  At:  [facility]   Dialysis Unit. [address] , [address]       Lesly Panchal MD

## 2017-11-14 NOTE — PROGRESS NOTES
Patient MEWS 4, high heart rate and blood pressure. Patient awake and alert in bed without signs of distress or pain.

## 2017-11-14 NOTE — PROGRESS NOTES
Chelle BP high. I went to administer his PRN BP medication that is IV, and his IV is infilitrated. Patient declined a new IV and asked me to call the MD and have the medication switched to PO. I will call MD. Matt Pineda with MD and had the hydralazine changed to PO, given at 2322. Will recheck BP in 1 hour.

## 2017-11-14 NOTE — PROGRESS NOTES
[facility]      CARDIOLOGY PROGRESS NOTE  RECS:      1. Unstable Angina- stable now. Post HERBER in svg to diag  Continue treatment  2. Hx of recent NSTEMI- was managed medically until prior to initiating HD. 3. CAD s/p coronary artery stent placement in 2012 and 7/17 with HERBER stents deployed in SVG in the focal mid lesion and a long proximal lesion. continue with asa/Plavix/bb/statin   4. Chronic diastolic heart failure- appears compensated- monitor for fluid overload.                5. Essential hypertension-better today with no hypotension. Appears labile. D/w pt the need to titrate BP meds for few weeks due to new HD.  6. 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 Hyperlipidemia- on statin                                                            8. CKD- now ESRD on HD per Renal following   9.  Diabetes managed per medicine.           ASSESSMENT:  Hospital Problems  Date Reviewed: 11/8/2017          Codes Class Noted POA    ESRD (end stage renal disease) ([facility] ICD-10-CM: N18.6  ICD-9-CM: 585.6  11/8/2017 Yes        Chronic kidney disease, stage V ([facility] ICD-10-CM: N18.5  ICD-9-CM: 585.5  10/28/2017 Yes        Chest pain ICD-10-CM: R07.9  ICD-9-CM: 786.50  10/27/2017 Unknown        Secondary hyperparathyroidism of renal origin ([facility] (Chronic) ICD-10-CM: N25.81  ICD-9-CM: 588.81  10/25/2017 Yes        S/P coronary artery stent placement ICD-10-CM: Z95.5  ICD-9-CM: V45.82  3/12/2013 Yes    Overview Signed 3/12/2013  3:37 PM by Mahin Mena MD     vg to diag stent 10/2012             Coronary atherosclerosis of native coronary artery ICD-10-CM: I25.10  ICD-9-CM: 414.01  10/7/2012 Yes        HTN (hypertension) ICD-10-CM: I10  ICD-9-CM: 401.9  10/7/2012 Yes        DM2 (diabetes mellitus, type 2) ([facility] ICD-10-CM: E11.9  ICD-9-CM: 250.00  10/7/2012 Yes                SUBJECTIVE:  No CP  No SOB    OBJECTIVE:    VS:   Visit Vitals    BP (!) 115/94    Pulse (!) 125    Temp 98.4 °F (36.9 °C) (Oral)    Resp 18    Ht 5' 8\" (1.727 m)    Wt 96.8 kg (213 lb 6.4 oz)    SpO2 99%    BMI 32.45 kg/m2         Intake/Output Summary (Last 24 hours) at 11/14/17 1236  Last data filed at 11/14/17 0036   Gross per 24 hour   Intake              480 ml   Output                0 ml   Net              480 ml     TELE: normal sinus rhythm    General: alert, well developed, cooperative, pleasant and in no apparent distress  HENT: Normocephalic, atraumatic. Normal external eye. Neck :  JVD difficult to assess due to obesity  Cardiac:  regular rate and rhythm, S1, S2 normal, no murmur, click, rub or gallop  Lungs: clear to auscultation bilaterally  Abdomen: Soft, nontender, no masses  Extremities:  No c/c/e,       Labs: Results:       Chemistry Recent Labs      11/14/17 0520 11/13/17 0515   GLU  218*  91   NA  136  137   K  3.4*  3.2*   CL  99*  99*   CO2  25  25   BUN  34*  31*   CREA  6.36*  7.08*   CA  9.9  9.4   AGAP  12  13   BUCR  5*  4*      CBC w/Diff Recent Labs      11/14/17   0520  11/13/17   0515   WBC  11.4  12.7   RBC  3.62*  3.55*   HGB  10.4*  10.0*   HCT  32.8*  32.2*   PLT  299  305   GRANS  76*  75*   LYMPH  15*  16*   EOS  2  3      Cardiac Enzymes Recent Labs      11/12/17   1315   CPK  53   CKND1  3.2      Coagulation No results for input(s): PTP, INR, APTT in the last 72 hours. No lab exists for component: INREXT, INREXT    Lipid Panel Lab Results   Component Value Date/Time    Cholesterol, total 158 10/24/2017 05:08 AM    HDL Cholesterol 39 10/24/2017 05:08 AM    LDL, calculated 72.4 10/24/2017 05:08 AM    VLDL, calculated 46.6 10/24/2017 05:08 AM    Triglyceride 233 10/24/2017 05:08 AM    CHOL/HDL Ratio 4.1 10/24/2017 05:08 AM      BNP No results for input(s): BNPP in the last 72 hours. Liver Enzymes No results for input(s): TP, ALB, TBIL, AP, SGOT, GPT in the last 72 hours.     No lab exists for component: DBIL   Thyroid Studies Lab Results   Component Value Date/Time    T4, Total 10.9 10/09/2012 04:53 AM    TSH 2.16 10/23/2017 11:08 PM              Roc Barr MD

## 2017-11-14 NOTE — PROGRESS NOTES
conducted a Follow up consultation and Spiritual Assessment for Ana Laura Vargas, who is a 68 y. o.,male. The  provided the following Interventions:  Continued the relationship of care and support. Patient was dressed up, ready for discharge. His son Soumya Doll was with him. Listened empathically to patient, who is glad to get discharged. Offered assurance of continued prayer and best wishes on patient's discharge. Chart reviewed. The following outcomes were achieved:  Patient and son expressed gratitude for pastoral care visit. Assessment:  There are no further spiritual or Jehovah's witness issues which require Spiritual Care Services interventions at this time. Plan:  Chaplains will continue to follow and provide pastoral care as needed or requested. The Rev.  [address]  [facility] [phone] / [facility] [phone]

## 2017-11-14 NOTE — PROGRESS NOTES
Perm cath placed yesterday. Note plan for discharge today after dialysis. Can f/u in office as outpt to discuss permanent access. Please call with questions.

## 2017-11-14 NOTE — PROGRESS NOTES
Blood pressures elevated, sbp 165-188 through multiple tries (both manual and automatic), heart rate elevated at 123 while doing vitals. Patient insisting to leave tonight despite Dr. Vicki Donis coming to room educating patient on the risk of going home with elevated blood pressures and heart rates. Patient understanding he has higher risk of heart related issues going home in his current state. IVs removed, telemetry removed, amrband removed and shredded. Patient educated on the signs/ symptoms of stroke, heart attack, when to call for medical help. Patient given discharge paperwork and education, prescription to be filled. Time given for questions, all questions answered. Dr. Vicki Donis spoken with son at bedside with patient.

## 2017-11-14 NOTE — PROGRESS NOTES
[facility] Hospitalist [facility]  Progress Note    Patient: Giorgi Caicedo Age: 68 y.o. : 1940 MR#: [id number] SSN: [id number]  Date: 2017     Subjective:     Feeling better. Saw him in HD room. Wants to go home. No chest or abdominal pain. No SOB or cough. ADDENDUM: patient's HR is now 110 and SBP is 165 after he got his anti-HTN medications. I recommended him to stay overnight as there is a risk of cardiac arrythmias and sudden cardiac death in presence of his son and nurse. Patient states he knows about it, he will be responsible BUT wants to go home at any cost and asking me to discharge him. Assessment/Plan:     1. chest pain, resolved . 2. Coronary artery disease s/p  Post HERBER in svg to diag  3. ESRD  4. Diabetes mellitus. 5. Hypertension. 6. History of prostate cancer. 7- Uncontrolled HTN  8- Anemia of chronic disease  9- Chronic diastolic chf - compensated    PLAN    HD as per nephro, s/p Permacath, d/c home today after HD  Talked to dr. Darren Hartley - can go home after HD, has given patient information about outpatient HD center.    Cardio following, s/p cardiac catheterization, asa, plavix, BB, statin  Cont current management    Case discussed with:  [x]Patient  []Family  []Nursing  []Case Management  DVT Prophylaxis:  []Lovenox  []Hep SQ  []SCDs  []Coumadin   []On Heparin gtt    Objective:   VS:   Visit Vitals    BP (!) 115/94    Pulse (!) 125    Temp 98.4 °F (36.9 °C) (Oral)    Resp 18    Ht 5' 8\" (1.727 m)    Wt 96.8 kg (213 lb 6.4 oz)    SpO2 99%    BMI 32.45 kg/m2      Tmax/24hrs: Temp (24hrs), Av.5 °F (36.9 °C), Min:98.1 °F (36.7 °C), Max:98.9 °F (37.2 °C)      Intake/Output Summary (Last 24 hours) at 17 1251  Last data filed at 17 0036   Gross per 24 hour   Intake              480 ml   Output                0 ml   Net              480 ml       General:  Awake, alert  Cardiovascular:  S1S2+, RRR  Pulmonary:  CTA b/l  GI:  Soft, BS+, NT, ND  Extremities:  No pedal edema      Labs:    Recent Results (from the past 24 hour(s))   GLUCOSE, POC    Collection Time: 11/13/17  1:00 PM   Result Value Ref Range    Glucose (POC) 147 (H) 70 - 110 mg/dL   GLUCOSE, POC    Collection Time: 11/13/17  3:10 PM   Result Value Ref Range    Glucose (POC) 145 (H) 70 - 110 mg/dL   GLUCOSE, POC    Collection Time: 11/13/17  9:19 PM   Result Value Ref Range    Glucose (POC) 184 (H) 70 - 110 mg/dL   CBC WITH AUTOMATED DIFF    Collection Time: 11/14/17  5:20 AM   Result Value Ref Range    WBC 11.4 4.6 - 13.2 K/uL    RBC 3.62 (L) 4.70 - 5.50 M/uL    HGB 10.4 (L) 13.0 - 16.0 g/dL    HCT 32.8 (L) 36.0 - 48.0 %    MCV 90.6 74.0 - 97.0 FL    MCH 28.7 24.0 - 34.0 PG    MCHC 31.7 31.0 - 37.0 g/dL    RDW 14.5 11.6 - 14.5 %    PLATELET 709 666 - 538 K/uL    MPV 10.7 9.2 - 11.8 FL    NEUTROPHILS 76 (H) 40 - 73 %    LYMPHOCYTES 15 (L) 21 - 52 %    MONOCYTES 7 3 - 10 %    EOSINOPHILS 2 0 - 5 %    BASOPHILS 0 0 - 2 %    ABS. NEUTROPHILS 8.6 (H) 1.8 - 8.0 K/UL    ABS. LYMPHOCYTES 1.7 0.9 - 3.6 K/UL    ABS. MONOCYTES 0.8 0.05 - 1.2 K/UL    ABS. EOSINOPHILS 0.2 0.0 - 0.4 K/UL    ABS.  BASOPHILS 0.1 (H) 0.0 - 0.06 K/UL    DF AUTOMATED     METABOLIC PANEL, BASIC    Collection Time: 11/14/17  5:20 AM   Result Value Ref Range    Sodium 136 136 - 145 mmol/L    Potassium 3.4 (L) 3.5 - 5.5 mmol/L    Chloride 99 (L) 100 - 108 mmol/L    CO2 25 21 - 32 mmol/L    Anion gap 12 3.0 - 18 mmol/L    Glucose 218 (H) 74 - 99 mg/dL    BUN 34 (H) 7.0 - 18 MG/DL    Creatinine 6.36 (H) 0.6 - 1.3 MG/DL    BUN/Creatinine ratio 5 (L) 12 - 20      GFR est AA 10 (L) >60 ml/min/1.73m2    GFR est non-AA 9 (L) >60 ml/min/1.73m2    Calcium 9.9 8.5 - 10.1 MG/DL   PHOSPHORUS    Collection Time: 11/14/17  5:20 AM   Result Value Ref Range    Phosphorus 3.3 2.5 - 4.9 MG/DL   GLUCOSE, POC    Collection Time: 11/14/17  5:33 AM   Result Value Ref Range    Glucose (POC) 253 (H) 70 - 110 mg/dL   GLUCOSE, POC    Collection Time: 11/14/17  8:33 AM   Result Value Ref Range    Glucose (POC) 255 (H) 70 - 110 mg/dL     Current Discharge Medication List      START taking these medications    Details   losartan (COZAAR) 50 mg tablet Take 1 Tab by mouth daily. Qty: 30 Tab, Refills: 0         CONTINUE these medications which have CHANGED    Details   cloNIDine HCl (CATAPRES) 0.2 mg tablet Take 1 Tab by mouth two (2) times a day. Qty: 90 Tab, Refills: 0         CONTINUE these medications which have NOT CHANGED    Details   isosorbide mononitrate ER (IMDUR) 30 mg tablet Take 1 Tab by mouth daily. Qty: 90 Tab, Refills: 3      aspirin 81 mg chewable tablet Take 1 Tab by mouth daily. Qty: 30 Tab, Refills: 0      b complex-vitamin c-folic acid (NEPHROCAPS) 1 mg capsule Take 1 Cap by mouth daily. Qty: 30 Cap, Refills: 0      senna-docusate (PERICOLACE) 8.6-50 mg per tablet Take 1 Tab by mouth daily. Qty: 30 Tab, Refills: 0      fenofibrate (LOFIBRA) 54 mg tablet Take 1 Tab by mouth daily. Qty: 30 Tab, Refills: 5    Associated Diagnoses: Dyslipidemia      simvastatin (ZOCOR) 40 mg tablet TAKE 1 TABLET BY MOUTH NIGHTLY. Qty: 90 Tab, Refills: 2      metoprolol tartrate (LOPRESSOR) 100 mg IR tablet TAKE 1 TABLET BY MOUTH TWO TIMES A DAY. Qty: 180 Tab, Refills: 2      clopidogrel (PLAVIX) 75 mg tab Take 1 Tab by mouth daily. Qty: 90 Tab, Refills: 3      tamsulosin (FLOMAX) 0.4 mg capsule TAKE ONE CAPSULE BY MOUTH ONCE A DAY  Qty: 90 Cap, Refills: 2      Cholecalciferol, Vitamin D3, (DECARA) 50,000 unit cap Take  by mouth every seven (7) days. insulin glargine (LANTUS) 100 unit/mL injection 20 Units by SubCUTAneous route every twelve (12) hours. Qty: 1 Vial, Refills: 2      nitroglycerin (NITROSTAT) 0.4 mg SL tablet 1 Tab by SubLINGual route as needed for Chest Pain.   Qty: 25 Tab, Refills: 1      insulin aspart (NOVOLOG) 100 unit/mL injection 20 units sq 3 times a day,sliding scale  Qty: 10 mL, Refills: 0      allopurinol (ZYLOPRIM) 100 mg tablet Take 100 mg by mouth daily.            STOP taking these medications       calcitRIOL (ROCALTROL) 0.25 mcg capsule Comments:   Reason for Stopping:         furosemide (LASIX) 40 mg tablet Comments:   Reason for Stopping:         hydrALAZINE (APRESOLINE) 100 mg tablet Comments:   Reason for Stopping:         NIFEdipine ER (PROCARDIA XL) 90 mg ER tablet Comments:   Reason for Stopping:                 Signed By: Efrem Cadena MD     November 14, 2017

## 2017-11-14 NOTE — DISCHARGE INSTRUCTIONS
symptoms:  Weight gain of 3 pounds or more overnight or 5 pounds in a week, increased swelling in our hands or feet or shortness of breath while lying flat in bed. Please call your doctor as soon as you notice any of these symptoms; do not wait until your next office visit. Recognize signs and symptoms of STROKE:    F-face looks uneven    A-arms unable to move or move unevenly    S-speech slurred or non-existent    T-time-call 911 as soon as signs and symptoms begin-DO NOT go       Back to bed or wait to see if you get better-TIME IS BRAIN. Warning Signs of HEART ATTACK     Call 911 if you have these symptoms:   Chest discomfort. Most heart attacks involve discomfort in the center of the chest that lasts more than a few minutes, or that goes away and comes back. It can feel like uncomfortable pressure, squeezing, fullness, or pain.  Discomfort in other areas of the upper body. Symptoms can include pain or discomfort in one or both arms, the back, neck, jaw, or stomach.  Shortness of breath with or without chest discomfort.  Other signs may include breaking out in a cold sweat, nausea, or lightheadedness. Don't wait more than five minutes to call 911 - MINUTES MATTER! Fast action can save your life. Calling 911 is almost always the fastest way to get lifesaving treatment. Emergency Medical Services staff can begin treatment when they arrive -- up to an hour sooner than if someone gets to the hospital by car. The discharge information has been reviewed with the patient. The patient verbalized understanding. Discharge medications reviewed with the patient and appropriate educational materials and side effects teaching were provided.   ___________________________________________________________________________________________________________________________________

## 2017-11-14 NOTE — DISCHARGE SUMMARY
PATIENT DISCHARGE INSTRUCTIONS      PATIENT DISCHARGE INSTRUCTIONS    Saman Booth / [id number] : 1940    Admitted 2017 Discharged: 2017     Dictated # [id number]    · It is important that you take the medication exactly as they are prescribed. · Keep your medication in the bottles provided by the pharmacist and keep a list of the medication names, dosages, and times to be taken in your wallet. · Do not take other medications without consulting your doctor. What to do at Home    Recommended Diet: Cardiac Diet, Diabetic Diet and Renal Diet    Recommended Activity: Activity as tolerated    Current Discharge Medication List      START taking these medications    Details   losartan (COZAAR) 50 mg tablet Take 1 Tab by mouth daily. Qty: 30 Tab, Refills: 0         CONTINUE these medications which have CHANGED    Details   cloNIDine HCl (CATAPRES) 0.2 mg tablet Take 1 Tab by mouth two (2) times a day. Qty: 90 Tab, Refills: 0         CONTINUE these medications which have NOT CHANGED    Details   isosorbide mononitrate ER (IMDUR) 30 mg tablet Take 1 Tab by mouth daily. Qty: 90 Tab, Refills: 3      aspirin 81 mg chewable tablet Take 1 Tab by mouth daily. Qty: 30 Tab, Refills: 0      b complex-vitamin c-folic acid (NEPHROCAPS) 1 mg capsule Take 1 Cap by mouth daily. Qty: 30 Cap, Refills: 0      senna-docusate (PERICOLACE) 8.6-50 mg per tablet Take 1 Tab by mouth daily. Qty: 30 Tab, Refills: 0      fenofibrate (LOFIBRA) 54 mg tablet Take 1 Tab by mouth daily. Qty: 30 Tab, Refills: 5    Associated Diagnoses: Dyslipidemia      simvastatin (ZOCOR) 40 mg tablet TAKE 1 TABLET BY MOUTH NIGHTLY. Qty: 90 Tab, Refills: 2      metoprolol tartrate (LOPRESSOR) 100 mg IR tablet TAKE 1 TABLET BY MOUTH TWO TIMES A DAY. Qty: 180 Tab, Refills: 2      clopidogrel (PLAVIX) 75 mg tab Take 1 Tab by mouth daily.   Qty: 90 Tab, Refills: 3      tamsulosin (FLOMAX) 0.4 mg capsule TAKE ONE CAPSULE BY MOUTH ONCE A DAY  Qty: 90 Cap, Refills: 2      Cholecalciferol, Vitamin D3, (DECARA) 50,000 unit cap Take  by mouth every seven (7) days. insulin glargine (LANTUS) 100 unit/mL injection 20 Units by SubCUTAneous route every twelve (12) hours. Qty: 1 Vial, Refills: 2      nitroglycerin (NITROSTAT) 0.4 mg SL tablet 1 Tab by SubLINGual route as needed for Chest Pain. Qty: 25 Tab, Refills: 1      insulin aspart (NOVOLOG) 100 unit/mL injection 20 units sq 3 times a day,sliding scale  Qty: 10 mL, Refills: 0      allopurinol (ZYLOPRIM) 100 mg tablet Take 100 mg by mouth daily.            STOP taking these medications       calcitRIOL (ROCALTROL) 0.25 mcg capsule Comments:   Reason for Stopping:         furosemide (LASIX) 40 mg tablet Comments:   Reason for Stopping:         hydrALAZINE (APRESOLINE) 100 mg tablet Comments:   Reason for Stopping:         NIFEdipine ER (PROCARDIA XL) 90 mg ER tablet Comments:   Reason for Stopping:                 Signed By: Nusrat Paulino MD     November 14, 2017

## 2017-11-15 ENCOUNTER — PATIENT OUTREACH (OUTPATIENT)
Dept: CASE MANAGEMENT | Age: 77
End: 2017-11-15

## 2017-11-15 NOTE — PROGRESS NOTES
Huong Bonner is a 68 y.o. male   This patient was received as a referral from inpatient admission   High Risk            24       Total Score        3 Has Seen PCP in Last 6 Months (Yes=3, No=0)    4 IP Visits Last 12 Months (1-3=4, 4=9, >4=11)    17 Charlson Comorbidity Score (Age + Comorbid Conditions)        Criteria that do not apply:    . Living with Significant Other. Assisted Living. LTAC. SNF. or   Rehab    Patient Length of Stay (>5 days = 3)    Pt. Coverage (Medicare=5 , Medicaid, or Self-Pay=4)        Patient's challenges to self management identified:  Patient has a language barrier and often needs to have his spouse speak for him. Medication Management:  poor understanding, Patient refused to go over medications at this time    Advance Care Planning:   Patient was offered the opportunity to discuss advance care planning:  yes     Does patient have an Advance Directive:  no   If no, did you provide information on Advance Care Planning? yes     [employer], Referrals, and Durable Medical Equipment: Dietra Hives, Patient has spoken with cardiac rehab and wants to attend. Spouse states she is awaiting to hear back to confirm. Follow up appointments:  Cardiology 11/20/17  Goals        Patient Stated     <enter goal here> (pt-stated)            Fill out advance directive paperwork and provide a copy to all physicians. Patient verbalized understanding of all information discussed. Patient has this Nurse Navigators contact information for any further questions, concerns, or needs.

## 2017-11-15 NOTE — DISCHARGE SUMMARY
[facility] [address]  [address] [place] SUMMARY    Name:  Coleen Kasper  MR#:  [id number]  :  1940  Account #:  [de-identified]  Date of Adm:  2017  Date of Discharge:  2017      DISPOSITION: Discharged to home. DISCHARGE CONDITION: Stable. DISCHARGE DIAGNOSES  1. Unstable angina/chest pain, now resolved. 2. Coronary artery disease, status post drug-eluting stent in saphenous  vein graft to diagonal.  3. End-stage renal disease, on dialysis. 4. Diabetes mellitus. 5. Hypertension. 6. History of prostate cancer. 7. Anemia of chronic disease. 8. Chronic diastolic congestive heart failure, compensated with ejection  fraction of 60%. DISCHARGE MEDICATIONS  1. Aspirin 81 mg p.o. daily. 2. Plavix 75 mg p.o. daily. 3. Isosorbide mononitrate 30 mg p.o. daily. 4. Cozaar 50 mg daily. 5. Clonidine 0.2 mg twice a day. 6. Nephrocaps 1 capsule daily. 7. Lynn-Colace 1 tablet p.o. daily if he was taking it. 8. Lofibra 54 mg daily. 9. Zocor 40 mg daily. 10. Metoprolol 100 mg b.i.d.  11. Flomax 0.4 mg daily. 12. Vitamin D3 50,000 units every week. 13. Lantus 20 units subcutaneous b.i.d.  14. Insulin NovoLog sliding scale. 15. Nitroglycerin sublingual p.r.n. for chest pain. 16. Allopurinol 100 mg p.o. daily. IMAGING AND PROCEDURES  1. Chest x-ray was done at the time of admission, showed no CHF or  pneumonia. 2. Repeat chest x-ray after the dialysis catheter was negative for  pneumothorax. 3. Repeat chest x-ray on  after obtaining Perm-A-Cath was  negative for pneumothorax. 4. Cardiac catheterization was done, showed the patient has triple-  vessel coronary artery disease, patent LIMA to LAD, status post  percutaneous coronary angioplasty/stent, aspiration thrombectomy  from SVG to diagonal, 1 artery. CONSULTANTS  1. Cardiology with Dr. Bridget Farmer and group. 2. Nephrology with Dr. Annabel Torres.     HOSPITAL COURSE: The patient was admitted directly from Dr. Cornelio Esquivel office for chest pain and unstable angina. Please refer  to hospital admission H and P for further details. The patient had  known coronary artery disease, as well as stage IV chronic kidney  disease prior to coming to the hospital; however, he did not want any  intervention until then because he did not want to go on dialysis. At  that point, however, afterwards he decided to go on dialysis. The  patient was admitted. He had a Nephrology consult done. After Dr. Gustavo Crandall spoke to the patient, the patient elected to go for dialysis. Vascular Surgery was consulted. The patient had a temporary dialysis  catheter placed initially and started on dialysis. After the patient being  started on dialysis, he also underwent cardiac catheterization, which  showed above-mentioned finding and the patient had a stent placed. Post-procedure course was uneventful. The patient also had a [place]-  A-Cath placed afterwards and dialyzed. However, prior to coming to  the hospital, he was on multiple antihypertensive medications, and  hydralazine and clonidine were stopped. On the day of dialysis, he had  some hypertension, as well as some tachycardia which was thought  secondary to rebound hypertension and tachycardia due to clonidine  withdrawal. Clonidine was restarted. I recommended the patient to stay  overnight and go home once his heart rate and blood pressure is  better; however, the patient insisted to go home in the presence of his  son and nurse. Risk of sudden cardiac death from uncontrolled  hypertension and tachycardia was discussed with the patient. He  states he understands, but he would like to go home and he left the  hospital with the above-mentioned medications. DISCHARGE INSTRUCTIONS  1. Diet: ADA, cardiac, renal diet. 2. Activity as tolerated. 3. Follow with the primary care physician in 3-5 days. 4. Follow with Dr. Paris Hyatt in 10 days. 5. Please resume dialysis as scheduled by Dr. Gustavo Crandall.     TOTAL TIME: Greater than 35 minutes.         MD TORO Orellana / COLBY  D:  11/15/2017   08:09  T:  11/15/2017   11:26  Job #:  [id number]

## 2017-11-20 ENCOUNTER — APPOINTMENT (OUTPATIENT)
Dept: VASCULAR SURGERY | Age: 77
End: 2017-11-20

## 2017-11-20 DIAGNOSIS — N18.6 ESRD (END STAGE RENAL DISEASE) (HCC): ICD-10-CM

## 2017-11-20 DIAGNOSIS — N18.6 ESRD (END STAGE RENAL DISEASE) (HCC): Primary | ICD-10-CM

## 2017-11-20 NOTE — PROCEDURES
[address] [place] [address] Vein   *** FINAL REPORT ***    Name: Vita Long  MRN: [id number]       Outpatient  : 10 Jul 1940  HIS Order #: [id number]  [address] [facility] Visit #: [id number]  Date: 2017    TYPE OF TEST: Peripheral Venous Testing    REASON FOR TEST  Chronic renal failure, Venous mapping    Right Arm:-  Deep venous thrombosis:           Not examined  Superficial venous thrombosis:    Not examined    Left Arm:-  Deep venous thrombosis:           Not examined  Superficial venous thrombosis:    Not examined    Vein Mapping:    Diam.   Depth  (mm)    Right Cephalic Vein:    Axilla:          3.3    Upper Arm:       3.0    Lower Arm:       3.6    Antecubital:     4.0    Upper Forearm:   3.4    Forearm:         3.0    Wrist:           2.5    Right Basilic Vein:    Upper Arm:    Lower Arm:       5.0    Antecubital:     1.9    Upper Forearm:   1.9    Lower Forearm:   1.9    Wrist:           1.8    R. Median Cubital:    Right Brachial Vein:    Proximal:    Distal:    Right Subclavian Artery:  Right Axillary Artery  :    Left Cephalic Vein:    Axilla:          4.1    Upper Arm:       4.1    Lower Arm:       4.3    Antecubital:     4.5    Upper Forearm:   3.7    Forearm:         3.8    Wrist:           3.5    Left Basilic Vein:    Upper Arm:    Lower Arm:       4.8    Antecubital:     3.1    Upper Forearm:   1.6    Lower Forearm:   1.4    Wrist:           1.8    L. Median Cubital:    Left Brachial Vein:    Proximal:    Distal:    Left Subclavian Artery:  Left Axillary Artery  :      INTERPRETATION/FINDINGS  Duplex images were obtained using 2-D gray scale, color flow and  spectral doppler analysis. Right arm :  1. The cephalic vein appears to be an adequate conduit for dialysis  access - the smallest diameter is 2.5 mm.  2. The basilic vein may be an inadequate conduit for dialysis access -   the smallest diameter is 1.8 mm.  3. Triphasic signals noted in the right radial artery at the wrist  measuring 3.9 mm.   The right brachial artery measures 6.5 mm at the  ACF level. 4. Right sided catheter noted. Left arm :  1. The cephalic vein appears to be an adequate conduit for dialysis  access - the smallest diameter is 3.5 mm.  2. The basilic vein may be an inadequate conduit for dialysis access -   the smallest diameter is 1.4 mm. 3. Triphasic signals noted in the left radial artery at the wrist  measuring 3.5 mm. The left brachial artery at the ACF level measures  5.3 mm.  4. Left SCV patent. ADDITIONAL COMMENTS    I have personally reviewed the data relevant to the interpretation of  this  study. TECHNOLOGIST: Luh Fraser RVT, KASANDRA  Signed: 11/20/2017 01:46 PM    PHYSICIAN: Aristeo Grant MD  Signed: 11/20/2017 02:51 PM

## 2017-11-27 ENCOUNTER — APPOINTMENT (OUTPATIENT)
Dept: GENERAL RADIOLOGY | Age: 77
End: 2017-11-27
Attending: PHYSICIAN ASSISTANT
Payer: MEDICARE

## 2017-11-27 ENCOUNTER — HOSPITAL ENCOUNTER (EMERGENCY)
Age: 77
Discharge: HOME | End: 2017-11-27
Attending: EMERGENCY MEDICINE
Payer: MEDICARE

## 2017-11-27 VITALS
BODY MASS INDEX: 31.83 KG/M2 | WEIGHT: 210 LBS | HEART RATE: 82 BPM | HEIGHT: 68 IN | RESPIRATION RATE: 18 BRPM | OXYGEN SATURATION: 98 % | DIASTOLIC BLOOD PRESSURE: 63 MMHG | TEMPERATURE: 98.5 F | SYSTOLIC BLOOD PRESSURE: 126 MMHG

## 2017-11-27 DIAGNOSIS — J20.9 ACUTE BRONCHITIS, UNSPECIFIED ORGANISM: Primary | ICD-10-CM

## 2017-11-27 LAB
ANION GAP SERPL CALC-SCNC: 11 MMOL/L (ref 3–18)
BASOPHILS # BLD: 0 K/UL (ref 0–0.06)
BASOPHILS NFR BLD: 0 % (ref 0–2)
BUN SERPL-MCNC: 45 MG/DL (ref 7–18)
BUN/CREAT SERPL: 10 (ref 12–20)
CALCIUM SERPL-MCNC: 9.6 MG/DL (ref 8.5–10.1)
CHLORIDE SERPL-SCNC: 98 MMOL/L (ref 100–108)
CO2 SERPL-SCNC: 28 MMOL/L (ref 21–32)
CREAT SERPL-MCNC: 4.6 MG/DL (ref 0.6–1.3)
DIFFERENTIAL METHOD BLD: ABNORMAL
EOSINOPHIL # BLD: 0 K/UL (ref 0–0.4)
EOSINOPHIL NFR BLD: 1 % (ref 0–5)
ERYTHROCYTE [DISTWIDTH] IN BLOOD BY AUTOMATED COUNT: 14.1 % (ref 11.6–14.5)
FLUAV AG NPH QL IA: NEGATIVE
FLUBV AG NOSE QL IA: NEGATIVE
GLUCOSE SERPL-MCNC: 196 MG/DL (ref 74–99)
HCT VFR BLD AUTO: 31.5 % (ref 36–48)
HGB BLD-MCNC: 9.6 G/DL (ref 13–16)
LACTATE BLD-SCNC: 1 MMOL/L (ref 0.4–2)
LYMPHOCYTES # BLD: 1.1 K/UL (ref 0.9–3.6)
LYMPHOCYTES NFR BLD: 16 % (ref 21–52)
MCH RBC QN AUTO: 27.7 PG (ref 24–34)
MCHC RBC AUTO-ENTMCNC: 30.5 G/DL (ref 31–37)
MCV RBC AUTO: 90.8 FL (ref 74–97)
MONOCYTES # BLD: 0.6 K/UL (ref 0.05–1.2)
MONOCYTES NFR BLD: 9 % (ref 3–10)
NEUTS SEG # BLD: 5.2 K/UL (ref 1.8–8)
NEUTS SEG NFR BLD: 74 % (ref 40–73)
PLATELET # BLD AUTO: 238 K/UL (ref 135–420)
PMV BLD AUTO: 10 FL (ref 9.2–11.8)
POTASSIUM SERPL-SCNC: 4.1 MMOL/L (ref 3.5–5.5)
RBC # BLD AUTO: 3.47 M/UL (ref 4.7–5.5)
SODIUM SERPL-SCNC: 137 MMOL/L (ref 136–145)
WBC # BLD AUTO: 7 K/UL (ref 4.6–13.2)

## 2017-11-27 PROCEDURE — 99283 EMERGENCY DEPT VISIT LOW MDM: CPT

## 2017-11-27 PROCEDURE — 87804 INFLUENZA ASSAY W/OPTIC: CPT | Performed by: PHYSICIAN ASSISTANT

## 2017-11-27 PROCEDURE — 80048 BASIC METABOLIC PNL TOTAL CA: CPT | Performed by: PHYSICIAN ASSISTANT

## 2017-11-27 PROCEDURE — 77030029684 HC NEB SM VOL KT MONA -A

## 2017-11-27 PROCEDURE — 71020 XR CHEST PA LAT: CPT

## 2017-11-27 PROCEDURE — 83605 ASSAY OF LACTIC ACID: CPT

## 2017-11-27 PROCEDURE — 94640 AIRWAY INHALATION TREATMENT: CPT

## 2017-11-27 PROCEDURE — 87040 BLOOD CULTURE FOR BACTERIA: CPT | Performed by: PHYSICIAN ASSISTANT

## 2017-11-27 PROCEDURE — 74011000250 HC RX REV CODE- 250: Performed by: PHYSICIAN ASSISTANT

## 2017-11-27 PROCEDURE — 85025 COMPLETE CBC W/AUTO DIFF WBC: CPT | Performed by: PHYSICIAN ASSISTANT

## 2017-11-27 RX ORDER — IPRATROPIUM BROMIDE AND ALBUTEROL SULFATE 2.5; .5 MG/3ML; MG/3ML
3 SOLUTION RESPIRATORY (INHALATION)
Status: COMPLETED | OUTPATIENT
Start: 2017-11-27 | End: 2017-11-27

## 2017-11-27 RX ORDER — AMOXICILLIN AND CLAVULANATE POTASSIUM 875; 125 MG/1; MG/1
TABLET, FILM COATED ORAL 2 TIMES DAILY
COMMUNITY
End: 2017-12-14

## 2017-11-27 RX ORDER — CODEINE PHOSPHATE AND GUAIFENESIN 10; 100 MG/5ML; MG/5ML
5 SOLUTION ORAL
Qty: 120 ML | Refills: 0 | Status: SHIPPED | OUTPATIENT
Start: 2017-11-27 | End: 2017-12-14

## 2017-11-27 RX ORDER — ALBUTEROL SULFATE 90 UG/1
2 AEROSOL, METERED RESPIRATORY (INHALATION)
Qty: 1 INHALER | Refills: 0 | Status: ON HOLD | OUTPATIENT
Start: 2017-11-27 | End: 2018-01-28

## 2017-11-27 RX ADMIN — IPRATROPIUM BROMIDE AND ALBUTEROL SULFATE 3 ML: .5; 3 SOLUTION RESPIRATORY (INHALATION) at 17:00

## 2017-11-27 NOTE — ED NOTES
69 yo M with hx of ESRD (dialysis Tu,Th, Sat), NSTEMI, DM who presents to the ED c/o productive cough, fever/chills, dyspnea and weakness  x 3 days. PMD saw patient today and sent him to the ED for further evaluation to r/o pneumonia. Has been on Augmentin x 3 days. I performed a brief evaluation, including history and physical, of the patient here in triage and I have determined that pt will need further treatment and evaluation from the main side ER physician. I have placed initial orders to help in expediting patients care.      November 27, 2017 at 4:07 PM - [place] Marie        Visit Vitals    /63    Pulse 82    Temp 98.5 °F (36.9 °C)    Resp 18    Ht 5' 8\" (1.727 m)    Wt 95.3 kg (210 lb)    SpO2 98%    BMI 31.93 kg/m2

## 2017-11-27 NOTE — DISCHARGE INSTRUCTIONS
Bronchitis: Care Instructions  Your Care Instructions    Bronchitis is inflammation of the bronchial tubes, which carry air to the lungs. The tubes swell and produce mucus, or phlegm. The mucus and inflamed bronchial tubes make you cough. You may have trouble breathing. Most cases of bronchitis are caused by viruses like those that cause colds. Antibiotics usually do not help and they may be harmful. Bronchitis usually develops rapidly and lasts about 2 to 3 weeks in otherwise healthy people. Follow-up care is a key part of your treatment and safety. Be sure to make and go to all appointments, and call your doctor if you are having problems. It's also a good idea to know your test results and keep a list of the medicines you take. How can you care for yourself at home? · Take all medicines exactly as prescribed. Call your doctor if you think you are having a problem with your medicine. · Get some extra rest.  · Take an over-the-counter pain medicine, such as acetaminophen (Tylenol), ibuprofen (Advil, Motrin), or naproxen (Aleve) to reduce fever and relieve body aches. Read and follow all instructions on the label. · Do not take two or more pain medicines at the same time unless the doctor told you to. Many pain medicines have acetaminophen, which is Tylenol. Too much acetaminophen (Tylenol) can be harmful. · Take an over-the-counter cough medicine that contains dextromethorphan to help quiet a dry, hacking cough so that you can sleep. Avoid cough medicines that have more than one active ingredient. Read and follow all instructions on the label. · Breathe moist air from a humidifier, hot shower, or sink filled with hot water. The heat and moisture will thin mucus so you can cough it out. · Do not smoke. Smoking can make bronchitis worse. If you need help quitting, talk to your doctor about stop-smoking programs and medicines. These can increase your chances of quitting for good.   When should you call for help? Call 911 anytime you think you may need emergency care. For example, call if:  ? · You have severe trouble breathing. ?Call your doctor now or seek immediate medical care if:  ? · You have new or worse trouble breathing. ? · You cough up dark brown or bloody mucus (sputum). ? · You have a new or higher fever. ? · You have a new rash. ? Watch closely for changes in your health, and be sure to contact your doctor if:  ? · You cough more deeply or more often, especially if you notice more mucus or a change in the color of your mucus. ? · You are not getting better as expected. Where can you learn more? Go to http://kendal-farzaneh.info/. Enter H333 in the search box to learn more about \"Bronchitis: Care Instructions. \"  Current as of: May 12, 2017  Content Version: 11.4  © 9712-2968 [employer]. Care instructions adapted under license by [employer] (which disclaims liability or warranty for this information). If you have questions about a medical condition or this instruction, always ask your healthcare professional. [address] any warranty or liability for your use of this information.

## 2017-11-27 NOTE — Clinical Note
Follow up with your primary doctor in 2-3 days to recheck your symptoms. Go to dialysis as scheduled tomorrow. Return to the ED for any concerns.

## 2017-11-27 NOTE — ED NOTES
Cecile Sosa is a 68 y.o. male that was discharged in stable condition. The patients diagnosis, condition and treatment were explained to  patient and aftercare instructions were given. The patient verbalized understanding. Patient armband removed and shredded.

## 2017-11-27 NOTE — ED PROVIDER NOTES
HPI Comments: 67 yo M with h/o ESRD on [facility] dialysis c/o cough x 10 days. Was started on augmentin by his nephrologist 3 days ago and states his cough has been improving. Saw his PCP today for same and was instructed to come to the ED for further evaluation. Has had associated generalized fatigue and subjective fever but states when he has checked his temperature it was 98F. Denies CP, SOB, abdominal pain, n/v. No other complaints. Patient is a 68 y.o. male presenting with cough and fever. Cough   Pertinent negatives include no chest pain, no shortness of breath, no wheezing, no nausea and no vomiting. Fever    Associated symptoms include cough. Pertinent negatives include no chest pain, no vomiting and no shortness of breath. Past Medical History:   Diagnosis Date    Atherosclerosis of renal artery (HCC)     S/P Rt stent    CAD (coronary artery disease) , 1997, 2012    ,double bypass, 2 stents, 2stents 7/2016    Cancer [facility])     prostate    CHF (congestive heart failure) (HCC)     Chronic diastolic heart failure (HCC)     Chronic kidney disease (CKD), stage IV (severe) (HCC)     Constipation     Coronary atherosclerosis of unspecified type of vessel, native or graft     Stable angina after recent PCI continue treatment.        CRI (chronic renal insufficiency)     Diabetes ([address] 1973    type 2    Essential hypertension, benign     GERD (gastroesophageal reflux disease)     Gout     Hypertension 1982    Morbid obesity ([address]     Weight loss has been strongly encouraged by following dietary restrictions and an exercise routine    APRYL (obstructive sleep apnea) 6/26/2015    no cpap    Other and unspecified hyperlipidemia     HDL's are not at goal, LDL's are at goal, triglycerides are not at goal.    Other and unspecified hyperlipidemia     HDL's are not at goal, LDL's are at goal, triglycerides are not at goal.     Other ill-defined conditions(799.89) 1960    pneumonia twice    Sleep disturbance     Type II or unspecified type diabetes mellitus without mention of complication, not stated as uncontrolled        Past Surgical History:   Procedure Laterality Date   [address]    lt. carotid endart.  COLONOSCOPY N/A 6/23/2017    COLONOSCOPY w/ APC w/ polypectomies performed by Hazel Uribe MD at [address] HX CHOLECYSTECTOMY      HX CORONARY ARTERY BYPASS GRAFT  2000    x2    HX HEENT  1997    cholecystectomy    HX UROLOGICAL  1998    renal art. surg         Family History:   Problem Relation Age of Onset    Heart Attack Father 64       Social History     Social History    Marital status:      Spouse name: N/A    Number of children: N/A    Years of education: N/A     Occupational History    Not on file. Social History Main Topics    Smoking status: Never Smoker    Smokeless tobacco: Never Used    Alcohol use No    Drug use: No    Sexual activity: Not on file     Other Topics Concern    Not on file     Social History Narrative         ALLERGIES: Nka [no known allergies]    Review of Systems   Constitutional: Positive for fatigue and fever (subjective). Respiratory: Positive for cough. Negative for shortness of breath and wheezing. Cardiovascular: Negative for chest pain and palpitations. Gastrointestinal: Negative for abdominal pain, nausea and vomiting. All other systems reviewed and are negative. Vitals:    11/27/17 1603 11/27/17 1605   BP:  126/63   Pulse: 82    Resp: 18    Temp: 98.5 °F (36.9 °C)    SpO2: 98%    Weight: 95.3 kg (210 lb)    Height: 5' 8\" (1.727 m)             Physical Exam   Constitutional: He is oriented to person, place, and time. He appears well-developed and well-nourished. No distress. HENT:   Head: Normocephalic and atraumatic. Eyes: Conjunctivae are normal.   Neck: Normal range of motion. Neck supple. Cardiovascular: Normal rate, regular rhythm and normal heart sounds.     Pulmonary/Chest: Effort normal. No respiratory distress. He has wheezes (moderate). He has no rales. Abdominal: Soft. He exhibits no distension. There is no tenderness. There is no guarding. Musculoskeletal: Normal range of motion. Neurological: He is alert and oriented to person, place, and time. Skin: Skin is warm and dry. Psychiatric: He has a normal mood and affect. His behavior is normal. Judgment and thought content normal.   Nursing note and vitals reviewed. MDM  Number of Diagnoses or Management Options  Acute bronchitis, unspecified organism:     ED Course       Procedures      -------------------------------------------------------------------------------------------------------------------     EKG INTERPRETATIONS:      RADIOLOGY RESULTS:   XR CHEST PA LAT   Final Result          LABORATORY RESULTS:  Recent Results (from the past 12 hour(s))   CBC WITH AUTOMATED DIFF    Collection Time: 11/27/17  4:40 PM   Result Value Ref Range    WBC 7.0 4.6 - 13.2 K/uL    RBC 3.47 (L) 4.70 - 5.50 M/uL    HGB 9.6 (L) 13.0 - 16.0 g/dL    HCT 31.5 (L) 36.0 - 48.0 %    MCV 90.8 74.0 - 97.0 FL    MCH 27.7 24.0 - 34.0 PG    MCHC 30.5 (L) 31.0 - 37.0 g/dL    RDW 14.1 11.6 - 14.5 %    PLATELET 422 427 - 881 K/uL    MPV 10.0 9.2 - 11.8 FL    NEUTROPHILS 74 (H) 40 - 73 %    LYMPHOCYTES 16 (L) 21 - 52 %    MONOCYTES 9 3 - 10 %    EOSINOPHILS 1 0 - 5 %    BASOPHILS 0 0 - 2 %    ABS. NEUTROPHILS 5.2 1.8 - 8.0 K/UL    ABS. LYMPHOCYTES 1.1 0.9 - 3.6 K/UL    ABS. MONOCYTES 0.6 0.05 - 1.2 K/UL    ABS. EOSINOPHILS 0.0 0.0 - 0.4 K/UL    ABS.  BASOPHILS 0.0 0.0 - 0.06 K/UL    DF AUTOMATED     METABOLIC PANEL, BASIC    Collection Time: 11/27/17  4:40 PM   Result Value Ref Range    Sodium 137 136 - 145 mmol/L    Potassium 4.1 3.5 - 5.5 mmol/L    Chloride 98 (L) 100 - 108 mmol/L    CO2 28 21 - 32 mmol/L    Anion gap 11 3.0 - 18 mmol/L    Glucose 196 (H) 74 - 99 mg/dL    BUN 45 (H) 7.0 - 18 MG/DL    Creatinine 4.60 (H) 0.6 - 1.3 MG/DL BUN/Creatinine ratio 10 (L) 12 - 20      GFR est AA 15 (L) >60 ml/min/1.73m2    GFR est non-AA 12 (L) >60 ml/min/1.73m2    Calcium 9.6 8.5 - 10.1 MG/DL   INFLUENZA A & B AG (RAPID TEST)    Collection Time: 11/27/17  5:00 PM   Result Value Ref Range    Influenza A Antigen NEGATIVE  NEG      Influenza B Antigen NEGATIVE  NEG     POC LACTIC ACID    Collection Time: 11/27/17  5:00 PM   Result Value Ref Range    Lactic Acid (POC) 1.0 0.4 - 2.0 mmol/L           CONSULTATIONS:        PROGRESS NOTES:    5:42 PM Pt well appearing and in NAD. Labs unremarkable. CXR without acute process. Feeling better with neb treatment in ED. CURB-65 score of 2, pt has good outpatient f/u and per pt symptoms improving since starting abx, so reasonable to continue outpatient treatment. Recheck by PCP in 2 days for re-eval, return precautions given. Lengthy D/W pt regarding possible worsening of pt's condition, need for follow up and strict ED return instructions for any worsening symptoms. DISPOSITION:  ED DIAGNOSIS & DISPOSITION INFORMATION  Diagnosis:   1. Acute bronchitis, unspecified organism          Disposition: home    Follow-up Information     Follow up With Details Comments Radha Rice MD In 2 days For further evaluation [address] [place] [address]      [id number] [place] [address] EMERGENCY DEPT  Immediately if symptoms worsen [facility]  [phone]          Patient's Medications   Start Taking    ALBUTEROL (PROVENTIL HFA, VENTOLIN HFA, PROAIR HFA) 90 MCG/ACTUATION INHALER    Take 2 Puffs by inhalation every four (4) hours as needed for Wheezing. GUAIFENESIN-CODEINE (ROBITUSSIN AC) 100-10 MG/5 ML SOLUTION    Take 5 mL by mouth nightly as needed for Cough. Max Daily Amount: 5 mL. Continue Taking    ALLOPURINOL (ZYLOPRIM) 100 MG TABLET    Take 100 mg by mouth daily.       AMOXICILLIN-CLAVULANATE (AUGMENTIN) 875-125 MG PER TABLET    Take  by mouth two (2) times a day. Indications: HAEMOPHILUS INFLUENZAE PNEUMONIA    ASPIRIN 81 MG CHEWABLE TABLET    Take 1 Tab by mouth daily. B COMPLEX-VITAMIN C-FOLIC ACID (NEPHROCAPS) 1 MG CAPSULE    Take 1 Cap by mouth daily. CHOLECALCIFEROL, VITAMIN D3, (DECARA) 50,000 UNIT CAP    Take  by mouth every seven (7) days. CLONIDINE HCL (CATAPRES) 0.2 MG TABLET    Take 1 Tab by mouth two (2) times a day. CLOPIDOGREL (PLAVIX) 75 MG TAB    Take 1 Tab by mouth daily. FENOFIBRATE (LOFIBRA) 54 MG TABLET    Take 1 Tab by mouth daily. INSULIN ASPART (NOVOLOG) 100 UNIT/ML INJECTION    20 units sq 3 times a day,sliding scale    INSULIN GLARGINE (LANTUS) 100 UNIT/ML INJECTION    20 Units by SubCUTAneous route every twelve (12) hours. ISOSORBIDE MONONITRATE ER (IMDUR) 30 MG TABLET    Take 1 Tab by mouth daily. LOSARTAN (COZAAR) 50 MG TABLET    Take 1 Tab by mouth daily. METOPROLOL TARTRATE (LOPRESSOR) 100 MG IR TABLET    TAKE 1 TABLET BY MOUTH TWO TIMES A DAY. NITROGLYCERIN (NITROSTAT) 0.4 MG SL TABLET    1 Tab by SubLINGual route as needed for Chest Pain. SENNA-DOCUSATE (PERICOLACE) 8.6-50 MG PER TABLET    Take 1 Tab by mouth daily. SIMVASTATIN (ZOCOR) 40 MG TABLET    TAKE 1 TABLET BY MOUTH NIGHTLY.     TAMSULOSIN (FLOMAX) 0.4 MG CAPSULE    TAKE ONE CAPSULE BY MOUTH ONCE A DAY   These Medications have changed    No medications on file   Stop Taking    No medications on file

## 2017-11-29 ENCOUNTER — PATIENT OUTREACH (OUTPATIENT)
Dept: CASE MANAGEMENT | Age: 77
End: 2017-11-29

## 2017-11-29 NOTE — PROGRESS NOTES
Spoke with spouse who reports patient had to go to the emergency room for bronchitis on 11/27 and was sent there by his PCP, but was discharged home with antibiotics and cough medication. Patient is currently taking them now and is feeling better. Denies any other needs at this time. Will continue to follow.

## 2017-12-03 LAB
BACTERIA SPEC CULT: NORMAL
BACTERIA SPEC CULT: NORMAL
SERVICE CMNT-IMP: NORMAL
SERVICE CMNT-IMP: NORMAL

## 2017-12-05 ENCOUNTER — HOSPITAL ENCOUNTER (OUTPATIENT)
Dept: CARDIAC CATH/INVASIVE PROCEDURES | Age: 77
Discharge: HOME | End: 2017-12-05
Attending: SURGERY | Admitting: SURGERY
Payer: MEDICARE

## 2017-12-05 VITALS
SYSTOLIC BLOOD PRESSURE: 162 MMHG | HEIGHT: 68 IN | WEIGHT: 215 LBS | OXYGEN SATURATION: 100 % | HEART RATE: 62 BPM | RESPIRATION RATE: 13 BRPM | BODY MASS INDEX: 32.58 KG/M2 | DIASTOLIC BLOOD PRESSURE: 88 MMHG

## 2017-12-05 LAB
BUN BLD-MCNC: 31 MG/DL (ref 7–18)
CHLORIDE BLD-SCNC: 100 MMOL/L (ref 100–108)
GLUCOSE BLD STRIP.AUTO-MCNC: 175 MG/DL (ref 74–106)
HCT VFR BLD CALC: 31 % (ref 36–49)
HGB BLD-MCNC: 10.5 G/DL (ref 12–16)
POTASSIUM BLD-SCNC: 4.3 MMOL/L (ref 3.5–5.5)
SODIUM BLD-SCNC: 138 MMOL/L (ref 136–145)

## 2017-12-05 PROCEDURE — 74011250636 HC RX REV CODE- 250/636: Performed by: SURGERY

## 2017-12-05 PROCEDURE — 82947 ASSAY GLUCOSE BLOOD QUANT: CPT

## 2017-12-05 PROCEDURE — 77030018719 HC DRSG PTCH ANTIMIC J&J -A

## 2017-12-05 PROCEDURE — 77030002986 HC SUT PROL J&J -A

## 2017-12-05 PROCEDURE — 36558 INSERT TUNNELED CV CATH: CPT

## 2017-12-05 PROCEDURE — 74011000250 HC RX REV CODE- 250: Performed by: SURGERY

## 2017-12-05 PROCEDURE — C1769 GUIDE WIRE: HCPCS

## 2017-12-05 PROCEDURE — C1750 CATH, HEMODIALYSIS,LONG-TERM: HCPCS

## 2017-12-05 PROCEDURE — 99152 MOD SED SAME PHYS/QHP 5/>YRS: CPT

## 2017-12-05 RX ORDER — FENTANYL CITRATE 50 UG/ML
100 INJECTION, SOLUTION INTRAMUSCULAR; INTRAVENOUS
Status: DISCONTINUED | OUTPATIENT
Start: 2017-12-05 | End: 2017-12-05 | Stop reason: HOSPADM

## 2017-12-05 RX ORDER — MORPHINE SULFATE 10 MG/ML
1 INJECTION, SOLUTION INTRAMUSCULAR; INTRAVENOUS
Status: DISCONTINUED | OUTPATIENT
Start: 2017-12-05 | End: 2017-12-05 | Stop reason: HOSPADM

## 2017-12-05 RX ORDER — MIDAZOLAM HYDROCHLORIDE 1 MG/ML
1-4 INJECTION, SOLUTION INTRAMUSCULAR; INTRAVENOUS
Status: DISCONTINUED | OUTPATIENT
Start: 2017-12-05 | End: 2017-12-05 | Stop reason: HOSPADM

## 2017-12-05 RX ORDER — ONDANSETRON 2 MG/ML
4 INJECTION INTRAMUSCULAR; INTRAVENOUS
Status: DISCONTINUED | OUTPATIENT
Start: 2017-12-05 | End: 2017-12-05 | Stop reason: HOSPADM

## 2017-12-05 RX ORDER — OXYCODONE AND ACETAMINOPHEN 5; 325 MG/1; MG/1
1 TABLET ORAL
Status: DISCONTINUED | OUTPATIENT
Start: 2017-12-05 | End: 2017-12-05 | Stop reason: HOSPADM

## 2017-12-05 RX ORDER — DIPHENHYDRAMINE HYDROCHLORIDE 50 MG/ML
12.5 INJECTION, SOLUTION INTRAMUSCULAR; INTRAVENOUS
Status: DISCONTINUED | OUTPATIENT
Start: 2017-12-05 | End: 2017-12-05 | Stop reason: HOSPADM

## 2017-12-05 RX ORDER — HEPARIN SODIUM 5000 [USP'U]/ML
10000 INJECTION, SOLUTION INTRAVENOUS; SUBCUTANEOUS ONCE
Status: COMPLETED | OUTPATIENT
Start: 2017-12-05 | End: 2017-12-05

## 2017-12-05 RX ORDER — LIDOCAINE HYDROCHLORIDE 10 MG/ML
30 INJECTION, SOLUTION EPIDURAL; INFILTRATION; INTRACAUDAL; PERINEURAL
Status: DISCONTINUED | OUTPATIENT
Start: 2017-12-05 | End: 2017-12-05 | Stop reason: HOSPADM

## 2017-12-05 RX ORDER — HEPARIN SODIUM 200 [USP'U]/100ML
500 INJECTION, SOLUTION INTRAVENOUS ONCE
Status: COMPLETED | OUTPATIENT
Start: 2017-12-05 | End: 2017-12-05

## 2017-12-05 RX ORDER — ACETAMINOPHEN 325 MG/1
650 TABLET ORAL
Status: DISCONTINUED | OUTPATIENT
Start: 2017-12-05 | End: 2017-12-05 | Stop reason: HOSPADM

## 2017-12-05 RX ADMIN — HEPARIN SODIUM IN SODIUM CHLORIDE 1000 UNITS: 200 INJECTION INTRAVENOUS at 07:44

## 2017-12-05 RX ADMIN — LIDOCAINE HYDROCHLORIDE 12 ML: 10 INJECTION, SOLUTION EPIDURAL; INFILTRATION; INTRACAUDAL; PERINEURAL at 07:54

## 2017-12-05 RX ADMIN — FENTANYL CITRATE 25 MCG: 50 INJECTION INTRAMUSCULAR; INTRAVENOUS at 07:51

## 2017-12-05 RX ADMIN — MIDAZOLAM HYDROCHLORIDE 1 MG: 1 INJECTION, SOLUTION INTRAMUSCULAR; INTRAVENOUS at 07:51

## 2017-12-05 RX ADMIN — HEPARIN SODIUM 10000 UNITS: 5000 INJECTION, SOLUTION INTRAVENOUS; SUBCUTANEOUS at 07:57

## 2017-12-05 NOTE — PROGRESS NOTES
Pt up to ambulate without difficulty. Dressed at bedside and PIV removed in good condition. DC instructions reviewed with verbalized understanding and copy provided. Released ambulatory to wife at vehicle in no pain.

## 2017-12-05 NOTE — DISCHARGE INSTRUCTIONS
[facility] Tunneled or Non Tunneled Catheter Discharge Instructions    General Information:   A catheter, either tunneled (permanent) or non-tunneled (temporary) catheter was inserted into your neck today for the purpose of Cancer treatment (apheresis) or dialysis. Your catheter will be used for the length of your apheresis, or until you get a fistula placed in surgery for dialysis. After this time, your catheter may be removed. You may return to our department for the catheter removal.  A non-tunneled catheter will exit at the neck. There will be three ports, two of which will be used for dialysis or apheresis. The one smaller port in the middle can be used like a regular IV. It ideally is used only for about two weeks. A tunneled catheter will exit lower down on the chest wall, and can be used for a longer period of time. There is no IV port on these. The tunneled catheter is used only for dialysis. In case of emergencies only can drugs be given through these catheters and only with written permission from your doctor. Home Care Instructions: You can resume your regular diet. Do not drink alcohol, drive, or make any important legal decisions in the next 24 hours. Watch the site carefully for signs of infection, like fever, drainage, redness, and/or swelling. Your catheter should be \"packed\" with heparin after each use or at least once a week if it is not being used. This \"packing\" should only be done by nurses experienced with caring for this type of catheter. You may shower in 24 hours, but you need to cover the catheter with plastic wrap and tape to keep it dry while you are showering. Keep the site clean, dry, and protected. Do not immerse yourself in water like in the case of tub baths or swimming as long as you have the catheter.      Call If:   You should call your Physician and/or the Radiology Nurse if you have any signs of infection, shortness of breath, or if the dressing should come off or become moist.  You will be instructed on where to go for a new dressing. You should not have to change the dressings yourself, as that will be done by the nurses who access the catheter. Follow-Up Instructions:  Please see your ordering doctor as he/she has requested. DISCHARGE SUMMARY from Nurse    PATIENT INSTRUCTIONS:    After general anesthesia or intravenous sedation, for 24 hours or while taking prescription Narcotics:  · Limit your activities  · Do not drive and operate hazardous machinery  · Do not make important personal or business decisions  · Do  not drink alcoholic beverages  · If you have not urinated within 8 hours after discharge, please contact your surgeon on call. Report the following to your surgeon:  · Excessive pain, swelling, redness or odor of or around the surgical area  · Temperature over 100.5  · Nausea and vomiting lasting longer than 4 hours or if unable to take medications  · Any signs of decreased circulation or nerve impairment to extremity: change in color, persistent  numbness, tingling, coldness or increase pain  · Any questions        *  Please give a list of your current medications to your Primary Care Provider. *  Please update this list whenever your medications are discontinued, doses are      changed, or new medications (including over-the-counter products) are added. *  Please carry medication information at all times in case of emergency situations. These are general instructions for a healthy lifestyle:    No smoking/ No tobacco products/ Avoid exposure to second hand smoke  Surgeon General's Warning:  Quitting smoking now greatly reduces serious risk to your health.     Obesity, smoking, and sedentary lifestyle greatly increases your risk for illness    A healthy diet, regular physical exercise & weight monitoring are important for maintaining a healthy lifestyle    You may be retaining fluid if you have a history of heart failure or if you experience any of the following symptoms:  Weight gain of 3 pounds or more overnight or 5 pounds in a week, increased swelling in our hands or feet or shortness of breath while lying flat in bed. Please call your doctor as soon as you notice any of these symptoms; do not wait until your next office visit. Recognize signs and symptoms of STROKE:    F-face looks uneven    A-arms unable to move or move unevenly    S-speech slurred or non-existent    T-time-call 911 as soon as signs and symptoms begin-DO NOT go       Back to bed or wait to see if you get better-TIME IS BRAIN. Warning Signs of HEART ATTACK     Call 911 if you have these symptoms:   Chest discomfort. Most heart attacks involve discomfort in the center of the chest that lasts more than a few minutes, or that goes away and comes back. It can feel like uncomfortable pressure, squeezing, fullness, or pain.  Discomfort in other areas of the upper body. Symptoms can include pain or discomfort in one or both arms, the back, neck, jaw, or stomach.  Shortness of breath with or without chest discomfort.  Other signs may include breaking out in a cold sweat, nausea, or lightheadedness. Don't wait more than five minutes to call 911 - MINUTES MATTER! Fast action can save your life. Calling 911 is almost always the fastest way to get lifesaving treatment. Emergency Medical Services staff can begin treatment when they arrive -- up to an hour sooner than if someone gets to the hospital by car. The discharge information has been reviewed with the patient and spouse. The patient and spouse verbalized understanding. Discharge medications reviewed with the patient and spouse and appropriate educational materials and side effects teaching were provided.   ___________________________________________________________________________________________________________________________________

## 2017-12-05 NOTE — ROUTINE PROCESS
TRANSFER - IN REPORT:    Verbal report received from Radha Adams RN(name) on Cecile Sosa  being received from [facility](unit) for ordered procedure      Report consisted of patients Situation, Background, Assessment and   Recommendations(SBAR). Information from the following report(s) SBAR, Kardex and MAR was reviewed with the receiving nurse. Opportunity for questions and clarification was provided. Assessment completed upon patients arrival to unit and care assumed.

## 2017-12-05 NOTE — ROUTINE PROCESS
TRANSFER - OUT REPORT:    Verbal report given to Ghada Rider RN(name) on Donny   being transferred to [facility](unit) for routine progression of care       Report consisted of patients Situation, Background, Assessment and   Recommendations(SBAR). Information from the following report(s) SBAR, Procedure Summary and MAR was reviewed with the receiving nurse. Lines:   Peripheral IV 12/05/17 Right Antecubital (Active)   Site Assessment Clean, dry, & intact 12/5/2017  7:25 AM   Phlebitis Assessment 0 12/5/2017  7:25 AM   Infiltration Assessment 0 12/5/2017  7:25 AM   Dressing Status Clean, dry, & intact 12/5/2017  7:25 AM   Hub Color/Line Status Pink;Flushed 12/5/2017  7:25 AM        Opportunity for questions and clarification was provided.       Patient transported with:   Check-Cap

## 2017-12-05 NOTE — H&P
Surgery History and Physical    Subjective:      Tori Pickard is a 68 y.o. male who presents with poorly working dialysis catheter.     Patient Active Problem List    Diagnosis Date Noted    ESRD (end stage renal disease) ([facility] 11/08/2017    Hyperuricemia 10/28/2017    Chronic kidney disease, stage V ([facility] 10/28/2017    Chest pain 10/27/2017    Secondary hyperparathyroidism of renal origin ([facility] 10/25/2017    Fatigue 86/42/6628    Diastolic CHF, acute on chronic (HCC) 07/05/2016    CHF (congestive heart failure), NYHA class IV ([facility] 07/05/2016    APRYL (obstructive sleep apnea) 06/26/2015    Prostate cancer ([facility] 04/29/2015    Abdominal pain 03/05/2015    GERD (gastroesophageal reflux disease) 03/05/2015    Constipation 03/05/2015    Chronic renal insufficiency 03/05/2015    Atherosclerosis of renal artery ([facility]     Chronic diastolic heart failure ([facility]     Morbid obesity ([facility])     Essential hypertension, benign     Sleep disturbance     Coronary atherosclerosis     Mixed hyperlipidemia     Type II or unspecified type diabetes mellitus without mention of complication, not stated as uncontrolled     Chest pain, unspecified 03/12/2013    S/P coronary artery stent placement 03/12/2013    Postsurgical aortocoronary bypass status 03/12/2013    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    NSTEMI (non-ST elevated myocardial infarction) ([facility] 10/07/2012    Coronary atherosclerosis of native coronary artery 10/07/2012    CKD (chronic kidney disease) stage 4, GFR 15-29 ml/min ([address] 10/07/2012    HTN (hypertension) 10/07/2012    DM2 (diabetes mellitus, type 2) ([facility] 10/07/2012    Dyslipidemia 10/07/2012     Past Medical History:   Diagnosis Date    Atherosclerosis of renal artery ([facility])     S/P Rt stent    CAD (coronary artery disease) , 1997, 2012    ,double bypass, 2 stents, 2stents 7/2016    Cancer ([facility])     prostate    CHF (congestive heart failure) ([facility])     Chronic diastolic heart failure (HCC)     Chronic kidney disease (CKD), stage IV (severe) (HCC)     Constipation     Coronary atherosclerosis of unspecified type of vessel, native or graft     Stable angina after recent PCI continue treatment.  CRI (chronic renal insufficiency)     Diabetes ([facility] 1973    type 2    Essential hypertension, benign     GERD (gastroesophageal reflux disease)     Gout     Hypertension 1982    Morbid obesity ([facility]     Weight loss has been strongly encouraged by following dietary restrictions and an exercise routine    APRYL (obstructive sleep apnea) 6/26/2015    no cpap    Other and unspecified hyperlipidemia     HDL's are not at goal, LDL's are at goal, triglycerides are not at goal.    Other and unspecified hyperlipidemia     HDL's are not at goal, LDL's are at goal, triglycerides are not at goal.     Other ill-defined conditions(799.89) 1960    pneumonia twice    Sleep disturbance     Type II or unspecified type diabetes mellitus without mention of complication, not stated as uncontrolled       Past Surgical History:   Procedure Laterality Date   [address]    lt. carotid endart.  COLONOSCOPY N/A 6/23/2017    COLONOSCOPY w/ APC w/ polypectomies performed by Compa Angeles MD at [address] HX CHOLECYSTECTOMY      HX CORONARY ARTERY BYPASS GRAFT  2000    x2    HX HEENT  1997    cholecystectomy    HX UROLOGICAL  1998    renal art. surg      Social History   Substance Use Topics    Smoking status: Never Smoker    Smokeless tobacco: Never Used    Alcohol use No      Family History   Problem Relation Age of Onset    Heart Attack Father 64      Prior to Admission medications    Medication Sig Start Date End Date Taking? Authorizing Provider   tamsulosin (FLOMAX) 0.4 mg capsule TAKE ONE CAPSULE BY MOUTH ONE TIME DAILY  11/30/17  Yes Will Watson MD   losartan (COZAAR) 50 mg tablet Take 1 Tab by mouth daily.  11/14/17  Yes Indy Capellan MD   isosorbide mononitrate ER (IMDUR) 30 mg tablet Take 1 Tab by mouth daily. 10/26/17  Yes Urszula Coughlin NP   aspirin 81 mg chewable tablet Take 1 Tab by mouth daily. 10/26/17  Yes Mounika Walters MD   b complex-vitamin c-folic acid (NEPHROCAPS) 1 mg capsule Take 1 Cap by mouth daily. 10/25/17  Yes Mounika Walters MD   senna-docusate (PERICOLACE) 8.6-50 mg per tablet Take 1 Tab by mouth daily. 10/25/17  Yes Mounika Walters MD   fenofibrate (LOFIBRA) 54 mg tablet Take 1 Tab by mouth daily. 10/10/17  Yes Urszula Coughlin NP   simvastatin (ZOCOR) 40 mg tablet TAKE 1 TABLET BY MOUTH NIGHTLY. 7/19/17  Yes Urszula Coughlin NP   metoprolol tartrate (LOPRESSOR) 100 mg IR tablet TAKE 1 TABLET BY MOUTH TWO TIMES A DAY. 7/19/17  Yes Urszula Coughlin NP   clopidogrel (PLAVIX) 75 mg tab Take 1 Tab by mouth daily. 4/14/17  Yes Faiza Liu MD   Cholecalciferol, Vitamin D3, (DECARA) 50,000 unit cap Take  by mouth every seven (7) days. Yes Historical Provider   insulin glargine (LANTUS) 100 unit/mL injection 20 Units by SubCUTAneous route every twelve (12) hours. 7/21/16  Yes Gerardo Saini MD   insulin aspart (NOVOLOG) 100 unit/mL injection 20 units sq 3 times a day,sliding scale 3/13/13  Yes Faiza Liu MD   allopurinol (ZYLOPRIM) 100 mg tablet Take 100 mg by mouth daily. Yes Historical Provider   amoxicillin-clavulanate (AUGMENTIN) 875-125 mg per tablet Take  by mouth two (2) times a day. Indications: HAEMOPHILUS INFLUENZAE PNEUMONIA    Laya Irwin, MD   albuterol (PROVENTIL HFA, VENTOLIN HFA, PROAIR HFA) 90 mcg/actuation inhaler Take 2 Puffs by inhalation every four (4) hours as needed for Wheezing. 11/27/17   Dora Campo PA-C   guaiFENesin-codeine (ROBITUSSIN AC) 100-10 mg/5 mL solution Take 5 mL by mouth nightly as needed for Cough.  Max Daily Amount: 5 mL. 11/27/17   Dora Campo PA-C   nitroglycerin (NITROSTAT) 0.4 mg SL tablet 1 Tab by SubLINGual route as needed for Chest Pain. 6/21/16   Faiza Liu MD Allergies   Allergen Reactions    Nka [No Known Allergies] Other (comments)         ROS:  Pertinent items are noted in HPI. Unless otherwise mentioned in the HPI. Objective:     Patient Vitals for the past 8 hrs:   BP Pulse SpO2 Height Weight   12/05/17 0719 156/65 86 97 % - -   12/05/17 0714 - - - 5' 8\" (1.727 m) 215 lb (97.5 kg)       No data recorded. Physical Exam:  GENERAL: alert, cooperative, no distress, appears stated age, THROAT & NECK: normal and no erythema or exudates noted. , LUNG: clear to auscultation bilaterally, HEART: regular rate and rhythm, S1, S2 normal, no murmur, click, rub or gallop, ABDOMEN: soft, non-tender. Bowel sounds normal. No masses,  no organomegaly    Labs:   Recent Results (from the past 24 hour(s))   POC 6 PLUS    Collection Time: 12/05/17  7:25 AM   Result Value Ref Range    Sodium,  136 - 145 MMOL/L    Potassium, POC 4.3 3.5 - 5.5 MMOL/L    Chloride,  100 - 108 MMOL/L    BUN, POC 31 (H) 7 - 18 MG/DL    Glucose,  (H) 74 - 106 MG/DL    Hematocrit, POC 31 (L) 36 - 49 %    Hemoglobin, POC 10.5 (L) 12 - 16 G/DL       Data Review:    CBC:   Lab Results   Component Value Date/Time    WBC 7.0 11/27/2017 04:40 PM    RBC 3.47 11/27/2017 04:40 PM    HGB 9.6 11/27/2017 04:40 PM    HCT 31.5 11/27/2017 04:40 PM    PLATELET 403 67/43/0339 04:40 PM      BMP:   Lab Results   Component Value Date/Time    Glucose 196 11/27/2017 04:40 PM    Sodium 137 11/27/2017 04:40 PM    Potassium 4.1 11/27/2017 04:40 PM    Chloride 98 11/27/2017 04:40 PM    CO2 28 11/27/2017 04:40 PM    BUN 45 11/27/2017 04:40 PM    Creatinine 4.60 11/27/2017 04:40 PM    Calcium 9.6 11/27/2017 04:40 PM     Coagulation:   Lab Results   Component Value Date/Time    Prothrombin time 12.1 11/08/2017 06:00 AM    INR 0.9 11/08/2017 06:00 AM    aPTT 47.0 10/25/2017 10:40 AM       Assessment:     Active Problems:    * No active hospital problems. *      Plan:     permcath exchange.     Signed By: Jumana Machado Monalisa Fry MD     December 5, 2017

## 2017-12-05 NOTE — IP AVS SNAPSHOT
[address] [address] Patient: Nagi Esqueda MRN: [id number] CHELSEY:[birth year] About your hospitalization You were admitted on:  December 5, 2017 You last received care in the:  [facility] You were discharged on:  December 5, 2017 Why you were hospitalized Your primary diagnosis was:  Not on File Things You Need To Do (next 8 weeks) Follow up with Mellissa Shultz MD  
  
Phone:  [phone] Where:  [address], [address] [facility] Schedule an appointment with Vasile Carson MD as soon as possible for a visit in 5 day(s) Phone:  [phone] Where:  [address] [facility],  [facility], 8 On license of [facility] [address] Monday Dec 11, 2017 Nurse Visit with SUREKHA at 10:15 AM  
Where:  [facility] ([facility]) Monday Dec 18, 2017 Follow Up with Ana Jackson MD at 10:00 AM  
Where:  [facility] ([address]) Monday Jan 08, 2018 ESTABLISHED PATIENT with China Frias MD at 10:00 AM  
Where:  [facility] ([address] [place] [address]) Discharge Orders None A check jose indicates which time of day the medication should be taken. My Medications TAKE these medications as instructed Instructions Each Dose to Equal  
 Morning Noon Evening Bedtime  
 albuterol 90 mcg/actuation inhaler Commonly known as:  PROVENTIL HFA, VENTOLIN HFA, PROAIR HFA Your last dose was: Your next dose is: Take 2 Puffs by inhalation every four (4) hours as needed for Wheezing. 2 Puff  
    
   
   
   
  
 allopurinol 100 mg tablet Commonly known as:  Cornelio Gr Your last dose was: Your next dose is: Take 100 mg by mouth daily. 100 mg  
    
   
   
   
  
 aspirin 81 mg chewable tablet Your last dose was: Your next dose is: Take 1 Tab by mouth daily. 81 mg  
    
   
   
   
  
 AUGMENTIN 875-125 mg per tablet Generic drug:  amoxicillin-clavulanate Your last dose was: Your next dose is: Take  by mouth two (2) times a day. Indications: HAEMOPHILUS INFLUENZAE PNEUMONIA  
     
   
   
   
  
 b complex-vitamin c-folic acid 1 mg capsule Commonly known as:  Lenetta [place] Your last dose was: Your next dose is: Take 1 Cap by mouth daily. 1 Cap  
    
   
   
   
  
 clopidogrel 75 mg Tab Commonly known as:  PLAVIX Your last dose was: Your next dose is: Take 1 Tab by mouth daily. 75 mg DECARA 50,000 unit Cap Generic drug:  Cholecalciferol (Vitamin D3) Your last dose was: Your next dose is: Take  by mouth every seven (7) days. fenofibrate 54 mg tablet Commonly known as:  LOFIBRA Your last dose was: Your next dose is: Take 1 Tab by mouth daily. 54 mg  
    
   
   
   
  
 guaiFENesin-codeine 100-10 mg/5 mL solution Commonly known as:  ROBITUSSIN AC Your last dose was: Your next dose is: Take 5 mL by mouth nightly as needed for Cough. Max Daily Amount: 5 mL. 5 mL  
    
   
   
   
  
 insulin aspart 100 unit/mL injection Commonly known as:  Neftali [place] Your last dose was: Your next dose is:    
   
   
 20 units sq 3 times a day,sliding scale  
     
   
   
   
  
 insulin glargine 100 unit/mL injection Commonly known as:  LANTUS Your last dose was: Your next dose is:    
   
   
 20 Units by SubCUTAneous route every twelve (12) hours. 20 Units  
    
   
   
   
  
 isosorbide mononitrate ER 30 mg tablet Commonly known as:  IMDUR Your last dose was: Your next dose is: Take 1 Tab by mouth daily. 30 mg  
    
   
   
   
  
 losartan 50 mg tablet Commonly known as:  COZAAR Your last dose was: Your next dose is: Take 1 Tab by mouth daily. 50 mg  
    
   
   
   
  
 metoprolol tartrate 100 mg IR tablet Commonly known as:  LOPRESSOR Your last dose was: Your next dose is: TAKE 1 TABLET BY MOUTH TWO TIMES A DAY. nitroglycerin 0.4 mg SL tablet Commonly known as:  NITROSTAT Your last dose was: Your next dose is:    
   
   
 1 Tab by SubLINGual route as needed for Chest Pain. 0.4 mg  
    
   
   
   
  
 senna-docusate 8.6-50 mg per tablet Commonly known as:  Lanfrancisco [place] Your last dose was: Your next dose is: Take 1 Tab by mouth daily. 1 Tab  
    
   
   
   
  
 simvastatin 40 mg tablet Commonly known as:  ZOCOR Your last dose was: Your next dose is: TAKE 1 TABLET BY MOUTH NIGHTLY.  
     
   
   
   
  
 tamsulosin 0.4 mg capsule Commonly known as:  FLOMAX Your last dose was: Your next dose is: TAKE ONE CAPSULE BY MOUTH ONE TIME DAILY Discharge Instructions Mil[address] 34 Tunneled or Non Tunneled Catheter Discharge Instructions General Information: A catheter, either tunneled (permanent) or non-tunneled (temporary) catheter was inserted into your neck today for the purpose of Cancer treatment (apheresis) or dialysis. Your catheter will be used for the length of your apheresis, or until you get a fistula placed in surgery for dialysis. After this time, your catheter may be removed. You may return to our department for the catheter removal.  A non-tunneled catheter will exit at the neck. There will be three ports, two of which will be used for dialysis or apheresis.  The one smaller port in the middle can be used like a regular IV. It ideally is used only for about two weeks. A tunneled catheter will exit lower down on the chest wall, and can be used for a longer period of time. There is no IV port on these. The tunneled catheter is used only for dialysis. In case of emergencies only can drugs be given through these catheters and only with written permission from your doctor. Home Care Instructions: You can resume your regular diet. Do not drink alcohol, drive, or make any important legal decisions in the next 24 hours. Watch the site carefully for signs of infection, like fever, drainage, redness, and/or swelling. Your catheter should be \"packed\" with heparin after each use or at least once a week if it is not being used. This \"packing\" should only be done by nurses experienced with caring for this type of catheter. You may shower in 24 hours, but you need to cover the catheter with plastic wrap and tape to keep it dry while you are showering. Keep the site clean, dry, and protected. Do not immerse yourself in water like in the case of tub baths or swimming as long as you have the catheter. Call If: 
 You should call your Physician and/or the Radiology Nurse if you have any signs of infection, shortness of breath, or if the dressing should come off or become moist.  You will be instructed on where to go for a new dressing. You should not have to change the dressings yourself, as that will be done by the nurses who access the catheter. Follow-Up Instructions:  Please see your ordering doctor as he/she has requested. DISCHARGE SUMMARY from Nurse PATIENT INSTRUCTIONS: 
 
 
F-face looks uneven A-arms unable to move or move unevenly S-speech slurred or non-existent T-time-call 911 as soon as signs and symptoms begin-DO NOT go Back to bed or wait to see if you get better-TIME IS BRAIN. Warning Signs of HEART ATTACK Call 911 if you have these symptoms: 
? Chest discomfort. Most heart attacks involve discomfort in the center of the chest that lasts more than a few minutes, or that goes away and comes back. It can feel like uncomfortable pressure, squeezing, fullness, or pain. ? Discomfort in other areas of the upper body. Symptoms can include pain or discomfort in one or both arms, the back, neck, jaw, or stomach. ? Shortness of breath with or without chest discomfort. ? Other signs may include breaking out in a cold sweat, nausea, or lightheadedness. Don't wait more than five minutes to call [address]! Fast action can save your life. Calling 911 is almost always the fastest way to get lifesaving treatment. Emergency Medical Services staff can begin treatment when they arrive  up to an hour sooner than if someone gets to the hospital by car. The discharge information has been reviewed with the patient and spouse. The patient and spouse verbalized understanding. Discharge medications reviewed with the patient and spouse and appropriate educational materials and side effects teaching were provided. ___________________________________________________________________________________________________________________________________ ACO Transitions of Care Introducing [facility] [address] offers a voluntary care coordination program to provide high quality service and care to [facility] fee-for-service beneficiaries. Mal Mera was designed to help you enhance your health and well-being through the following services: ? Transitions of Care  support for individuals who are transitioning from one care setting to another (example: Hospital to home). ?  Chronic and Complex Care Coordination  support for individuals and caregivers of those with serious or chronic illnesses or with more than one chronic (ongoing) condition and those who take a number of different medications. If you meet specific medical criteria, a [address] [place] [address] Rd may call you directly to coordinate your care with your primary care physician and your other care providers. For questions about the [facility] programs, please, contact your physicians office. For general questions or additional information about Accountable Care Organizations: 
Please visit www.medicare.gov/acos. html or call 1-800-MEDICARE (1-752.334.6769) TTY users should call 7-140.247.6976. Introducing \[facility]\"" & HEALTH SERVICES! Jessy Kim introduces [employer] patient portal. Now you can access parts of your medical record, email your doctor's office, and request medication refills online. 1. In your internet browser, go to [email or web address] 2. Click on the First Time User? Click Here link in the Sign In box. You will see the New Member Sign Up page. 3. Enter your [employer] Access Code exactly as it appears below. You will not need to use this code after youve completed the sign-up process. If you do not sign up before the expiration date, you must request a new code. · [employer] Access Code: [email or web address] Expires: 12/25/2017  7:30 AM 
 
4. Enter the last four digits of your Social Security Number (xxxx) and Date of Birth (mm/dd/yyyy) as indicated and click Submit. You will be taken to the next sign-up page. 5. Create a [employer] ID. This will be your [employer] login ID and cannot be changed, so think of one that is secure and easy to remember. 6. Create a [employer] password. You can change your password at any time. 7. Enter your Password Reset Question and Answer. This can be used at a later time if you forget your password. 8. Enter your e-mail address. You will receive e-mail notification when new information is available in [address] 9. Click Sign Up. You can now view and download portions of your medical record. 10. Click the Download Summary menu link to download a portable copy of your medical information. If you have questions, please visit the Frequently Asked Questions section of the [employer] website. Remember, [employer] is NOT to be used for urgent needs. For medical emergencies, dial 911. Now available from your iPhone and Android! Providers Seen During Your Hospitalization Provider Specialty Primary office phone Esau Lesches, MD Vascular Surgery [phone] Your Primary Care Physician (PCP) Primary Care Physician Office Phone Office Fax Zoie Venturaide [phone] You are allergic to the following Allergen Reactions Nka (No Known Allergies) Other (comments) Recent Documentation Height Weight BMI Smoking Status 1.727 m 97.5 kg 32.69 kg/m2 Never Smoker Emergency Contacts Name Discharge Info Relation Home Work Mobile Lazarus,Winsome DISCHARGE CAREGIVER [3] Spouse [3] [phone] Biggers Sandra CAREGIVER [3] Daughter [21] [phone] Renee Mclain  Spouse [3] [phone] Patient Belongings The following personal items are in your possession at time of discharge: 
     Visual Aid: None Please provide this summary of care documentation to your next provider. Signatures-by signing, you are acknowledging that this After Visit Summary has been reviewed with you and you have received a copy. Patient Signature:  ____________________________________________________________ Date:  ____________________________________________________________  
  
Satinder Artis Provider Signature:  ____________________________________________________________ Date:  ____________________________________________________________

## 2017-12-05 NOTE — OP NOTES
[address]    Name:  Sandip Wilkinson  MR#:  [id number]  :  1940  Account #:  [de-identified]  Date of Adm:  2017  Date of Surgery:  2017      PREOPERATIVE DIAGNOSIS: End-stage renal disease in need of  new dialysis catheter. POSTOPERATIVE DIAGNOSIS: End-stage renal disease in need of  new dialysis catheter. PROCEDURES PERFORMED:  1. Permanent dialysis catheter exchange 23 cm Palindrome catheter  through a right internal jugular approach. 2. Moderate conscious sedation of 11 minutes. CULTURES: None. SPECIMENS REMOVED: None. DRAINS: None. ESTIMATED BLOOD LOSS: Less than 50 mL. ANESTHESIA: Spinal. Conscious sedation of 11 minutes. This was  provided by an independent provider, given medications per my  discretion and monitoring the vital signs throughout the case. INDICATIONS FOR PROCEDURE: The patient is a 72-year-old  gentleman with end-stage renal disease in need of dialysis catheter  exchange as his previous one was not working appropriately. The  patient was given risks and benefits of the procedure including but not  limited to bleeding, infection, damage to adjacent structures, MI,  stroke, and death, as well as loss of access. The patient was  understanding of all the risks and underwent the procedure. DESCRIPTION OF PROCEDURE: The patient was correctly identified  in the precath area and took to the cath lab in stable condition. The  patient had pre-incision timeout prior to any incision. The patient was  prepped and draped in normal sterile fashion according to CDC  guidelines, dissecting technique. We then were able to numb her up  appropriately using 1% lidocaine from the catheter insertion site, all the  way to the neck.  We then bluntly dissected out the cuff, placed an  Amplatz Super Stiff wire through the blue port of the previous catheter,  removed the catheter out over a wire and placed a new 23 cm  Palindrome catheter over wire. We then were able to remove the wire  and stiffness. We made sure that the cuff was approximately 1 cm  within the patient. The tip of the catheter was at the sinoatrial junction. There was good curvature of the catheter without any kinking and no  evidence of pneumothorax. We then were able to easily flush and  aspirate both ports, placed 1.9 mL of hot heparin in each port, capped and wrapped it  with 4 x 4 and tape, secured the catheter with 2-0 Prolene suture at  both butterfly holes and a catheter at insertion site. Biopatch and  Tegaderm were placed. The patient tolerated the procedure well  without any issues.         Faustino Morrissey MD    EC / CD  D:  12/05/2017   09:42  T:  12/05/2017   14:01  Job #:  [id number]

## 2017-12-07 ENCOUNTER — TELEPHONE (OUTPATIENT)
Dept: CARDIAC REHAB | Age: 77
End: 2017-12-07

## 2017-12-07 ENCOUNTER — PATIENT OUTREACH (OUTPATIENT)
Dept: CASE MANAGEMENT | Age: 77
End: 2017-12-07

## 2017-12-07 NOTE — PROGRESS NOTES
Per Spouse, patient is doing well, and is having a new dialysis catheter inserted next week. Patient feels much better after his bronchitis diagnosis and treatment. Denies any other needs at this time.

## 2017-12-07 NOTE — TELEPHONE ENCOUNTER
Cardiac Rehab called patient attempting to schedule an evaluation and left a message on his voicemail. Additional attempts to contact patient will be made.     Thank you,  Vance Rodgers

## 2017-12-08 ENCOUNTER — TELEPHONE (OUTPATIENT)
Dept: CARDIAC REHAB | Age: 77
End: 2017-12-08

## 2017-12-08 NOTE — H&P
Subjective:       Ekaterina Villalobos is a 68 y.o. male who presents with hemodialysis catheter for dialysis, now in need of permanent dialysis access          Patient Active Problem List     Diagnosis Date Noted    ESRD (end stage renal disease) ([facility] 11/08/2017    Hyperuricemia 10/28/2017    Chronic kidney disease, stage V ([facility] 10/28/2017    Chest pain 10/27/2017    Secondary hyperparathyroidism of renal origin ([facility] 10/25/2017    Fatigue 20/81/5312    Diastolic CHF, acute on chronic (HCC) 07/05/2016    CHF (congestive heart failure), NYHA class IV ([facility] 07/05/2016    APRYL (obstructive sleep apnea) 06/26/2015    Prostate cancer ([facility] 04/29/2015    Abdominal pain 03/05/2015    GERD (gastroesophageal reflux disease) 03/05/2015    Constipation 03/05/2015    Chronic renal insufficiency 03/05/2015    Atherosclerosis of renal artery ([facility])      Chronic diastolic heart failure ([facility])      Morbid obesity ([facility])      Essential hypertension, benign      Sleep disturbance      Coronary atherosclerosis      Mixed hyperlipidemia      Type II or unspecified type diabetes mellitus without mention of complication, not stated as uncontrolled      Chest pain, unspecified 03/12/2013    S/P coronary artery stent placement 03/12/2013    Postsurgical aortocoronary bypass status 03/12/2013    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    NSTEMI (non-ST elevated myocardial infarction) ([facility] 10/07/2012    Coronary atherosclerosis of native coronary artery 10/07/2012    CKD (chronic kidney disease) stage 4, GFR 15-29 ml/min ([facility] 10/07/2012    HTN (hypertension) 10/07/2012    DM2 (diabetes mellitus, type 2) ([facility] 10/07/2012    Dyslipidemia 10/07/2012           Past Medical History:   Diagnosis Date    Atherosclerosis of renal artery ([facility])       S/P Rt stent    CAD (coronary artery disease) , 1997, 2012     ,double bypass, 2 stents, 2stents 7/2016    Cancer ([facility]       prostate    CHF (congestive heart failure) (HCC)      Chronic diastolic heart failure (HCC)      Chronic kidney disease (CKD), stage IV (severe) (HCC)      Constipation      Coronary atherosclerosis of unspecified type of vessel, native or graft       Stable angina after recent PCI continue treatment.  CRI (chronic renal insufficiency)      Diabetes ([facility] 1973     type 2    Essential hypertension, benign      GERD (gastroesophageal reflux disease)      Gout      Hypertension 1982    Morbid obesity ([facility]       Weight loss has been strongly encouraged by following dietary restrictions and an exercise routine    APRYL (obstructive sleep apnea) 6/26/2015     no cpap    Other and unspecified hyperlipidemia       HDL's are not at goal, LDL's are at goal, triglycerides are not at goal.    Other and unspecified hyperlipidemia       HDL's are not at goal, LDL's are at goal, triglycerides are not at goal.     Other ill-defined conditions(799.89) 1960     pneumonia twice    Sleep disturbance      Type II or unspecified type diabetes mellitus without mention of complication, not stated as uncontrolled              Past Surgical History:   Procedure Laterality Date    CARDIAC SURG PROCEDURE UNLIST   1995     lt. carotid endart.  COLONOSCOPY N/A 6/23/2017     COLONOSCOPY w/ APC w/ polypectomies performed by Fidelina Turner MD at [address] HX CHOLECYSTECTOMY        HX CORONARY ARTERY BYPASS GRAFT   2000     x2    HX HEENT   1997     cholecystectomy    HX UROLOGICAL   1998     renal art. surg           Social History   Substance Use Topics    Smoking status: Never Smoker    Smokeless tobacco: Never Used    Alcohol use No            Family History   Problem Relation Age of Onset    Heart Attack Father 64              Prior to Admission medications    Medication Sig Start Date End Date Taking?  Authorizing Provider   tamsulosin (FLOMAX) 0.4 mg capsule TAKE ONE CAPSULE BY MOUTH ONE TIME DAILY 11/30/17   Yes Kia Mathews MD   losartan (COZAAR) 50 mg tablet Take 1 Tab by mouth daily. 11/14/17   Yes Celeste Vera MD   isosorbide mononitrate ER (IMDUR) 30 mg tablet Take 1 Tab by mouth daily. 10/26/17   Yes Urszula Coughlin NP   aspirin 81 mg chewable tablet Take 1 Tab by mouth daily. 10/26/17   Yes Celeste Vera MD   b complex-vitamin c-folic acid (NEPHROCAPS) 1 mg capsule Take 1 Cap by mouth daily. 10/25/17   Yes Celeste Vera MD   senna-docusate (PERICOLACE) 8.6-50 mg per tablet Take 1 Tab by mouth daily. 10/25/17   Yes Celeste Vera MD   fenofibrate (LOFIBRA) 54 mg tablet Take 1 Tab by mouth daily. 10/10/17   Yes Urszula Coughlin NP   simvastatin (ZOCOR) 40 mg tablet TAKE 1 TABLET BY MOUTH NIGHTLY. 7/19/17   Yes Urszula Coughlin NP   metoprolol tartrate (LOPRESSOR) 100 mg IR tablet TAKE 1 TABLET BY MOUTH TWO TIMES A DAY. 7/19/17   Yes Urszula Coughlin NP   clopidogrel (PLAVIX) 75 mg tab Take 1 Tab by mouth daily. 4/14/17   Yes Patt Dumont MD   Cholecalciferol, Vitamin D3, ([address] ([facility])) 50,000 unit cap Take  by mouth every seven (7) days.     Yes Historical Provider   insulin glargine (LANTUS) 100 unit/mL injection 20 Units by SubCUTAneous route every twelve (12) hours. 7/21/16   Yes Xu Watson MD   insulin aspart (NOVOLOG) 100 unit/mL injection 20 units sq 3 times a day,sliding scale 3/13/13   Yes Patt Dumont MD   allopurinol (ZYLOPRIM) 100 mg tablet Take 100 mg by mouth daily.       Yes Historical Provider   amoxicillin-clavulanate (AUGMENTIN) 875-125 mg per tablet Take  by mouth two (2) times a day. Indications: HAEMOPHILUS INFLUENZAE PNEUMONIA       Laya Irwin, MD   albuterol (PROVENTIL HFA, VENTOLIN HFA, PROAIR HFA) 90 mcg/actuation inhaler Take 2 Puffs by inhalation every four (4) hours as needed for Wheezing. 11/27/17     Naida Partida PA-C   guaiFENesin-codeine [facility]) 100-10 mg/5 mL solution Take 5 mL by mouth nightly as needed for Cough. Max Daily Amount: 5 mL. 11/27/17     Vivi Multani PA-C   nitroglycerin (NITROSTAT) 0.4 mg SL tablet 1 Tab by SubLINGual route as needed for Chest Pain. 6/21/16     Tracey Tabares MD           Allergies   Allergen Reactions    Nka [No Known Allergies] Other (comments)          ROS:  Pertinent items are noted in HPI. Unless otherwise mentioned in the HPI.     Objective:      Patient Vitals for the past 8 hrs:    BP Pulse SpO2 Height Weight   12/05/17 0719 156/65 86 97 % - -   12/05/17 0714 - - - 5' 8\" (1.727 m) 215 lb (97.5 kg)         No data recorded.        Physical Exam:  GENERAL: alert, cooperative, no distress, appears stated age, THROAT & NECK: normal and no erythema or exudates noted. , LUNG: clear to auscultation bilaterally, HEART: regular rate and rhythm, S1, S2 normal, no murmur, click, rub or gallop, ABDOMEN: soft, non-tender.  Bowel sounds normal. No masses,  no organomegaly          Assessment:      ESRD        Plan:      Creation of left arm avf vs avg      avf today, pt agrees

## 2017-12-08 NOTE — TELEPHONE ENCOUNTER
[facility] called and left a message on patient home voicemail. Will continue to follow up.     Thanks,  Ozzie Claros

## 2017-12-09 ENCOUNTER — ANESTHESIA EVENT (OUTPATIENT)
Dept: CARDIOTHORACIC SURGERY | Age: 77
DRG: 264 | End: 2017-12-09
Payer: MEDICARE

## 2017-12-11 ENCOUNTER — ANESTHESIA (OUTPATIENT)
Dept: CARDIOTHORACIC SURGERY | Age: 77
DRG: 264 | End: 2017-12-11
Payer: MEDICARE

## 2017-12-11 ENCOUNTER — HOSPITAL ENCOUNTER (OUTPATIENT)
Age: 77
Setting detail: OUTPATIENT SURGERY
Discharge: HOME | DRG: 264 | End: 2017-12-11
Attending: SURGERY | Admitting: SURGERY
Payer: MEDICARE

## 2017-12-11 VITALS
OXYGEN SATURATION: 98 % | HEART RATE: 96 BPM | WEIGHT: 217 LBS | HEIGHT: 65 IN | RESPIRATION RATE: 15 BRPM | DIASTOLIC BLOOD PRESSURE: 68 MMHG | SYSTOLIC BLOOD PRESSURE: 153 MMHG | TEMPERATURE: 97.9 F | BODY MASS INDEX: 36.15 KG/M2

## 2017-12-11 LAB
BUN BLD-MCNC: 32 MG/DL (ref 7–18)
CHLORIDE BLD-SCNC: 99 MMOL/L (ref 100–108)
GLUCOSE BLD STRIP.AUTO-MCNC: 152 MG/DL (ref 74–106)
GLUCOSE BLD STRIP.AUTO-MCNC: 246 MG/DL (ref 70–110)
HCT VFR BLD CALC: 34 % (ref 36–49)
HGB BLD-MCNC: 11.6 G/DL (ref 12–16)
POTASSIUM BLD-SCNC: 5 MMOL/L (ref 3.5–5.5)
SODIUM BLD-SCNC: 135 MMOL/L (ref 136–145)

## 2017-12-11 PROCEDURE — 84295 ASSAY OF SERUM SODIUM: CPT

## 2017-12-11 PROCEDURE — 74011250637 HC RX REV CODE- 250/637: Performed by: ANESTHESIOLOGY

## 2017-12-11 PROCEDURE — 74011000250 HC RX REV CODE- 250

## 2017-12-11 PROCEDURE — 77030011640 HC PAD GRND REM COVD -A: Performed by: SURGERY

## 2017-12-11 PROCEDURE — 76010000113 HC CV SURG 1 TO 1.5 HR: Performed by: SURGERY

## 2017-12-11 PROCEDURE — 74011636637 HC RX REV CODE- 636/637: Performed by: ANESTHESIOLOGY

## 2017-12-11 PROCEDURE — 77030002996 HC SUT SLK J&J -A: Performed by: SURGERY

## 2017-12-11 PROCEDURE — 77030010509 HC AIRWY LMA MSK TELE -A: Performed by: ANESTHESIOLOGY

## 2017-12-11 PROCEDURE — 82962 GLUCOSE BLOOD TEST: CPT

## 2017-12-11 PROCEDURE — 74011250636 HC RX REV CODE- 250/636

## 2017-12-11 PROCEDURE — 77030010507 HC ADH SKN DERMBND J&J -B: Performed by: SURGERY

## 2017-12-11 PROCEDURE — 76210000006 HC OR PH I REC 0.5 TO 1 HR: Performed by: SURGERY

## 2017-12-11 PROCEDURE — 77030002933 HC SUT MCRYL J&J -A: Performed by: SURGERY

## 2017-12-11 PROCEDURE — 76060000033 HC ANESTHESIA 1 TO 1.5 HR: Performed by: SURGERY

## 2017-12-11 PROCEDURE — 03180ZD BYPASS LEFT BRACHIAL ARTERY TO UPPER ARM VEIN, OPEN APPROACH: ICD-10-PCS | Performed by: SURGERY

## 2017-12-11 PROCEDURE — 74011250636 HC RX REV CODE- 250/636: Performed by: SURGERY

## 2017-12-11 PROCEDURE — 74011000250 HC RX REV CODE- 250: Performed by: SURGERY

## 2017-12-11 PROCEDURE — 77030013079 HC BLNKT BAIR HGGR 3M -A: Performed by: ANESTHESIOLOGY

## 2017-12-11 PROCEDURE — 74011000258 HC RX REV CODE- 258

## 2017-12-11 PROCEDURE — 76210000026 HC REC RM PH II 1 TO 1.5 HR: Performed by: SURGERY

## 2017-12-11 RX ORDER — SODIUM CHLORIDE 0.9 % (FLUSH) 0.9 %
5-10 SYRINGE (ML) INJECTION EVERY 8 HOURS
Status: DISCONTINUED | OUTPATIENT
Start: 2017-12-11 | End: 2017-12-11 | Stop reason: HOSPADM

## 2017-12-11 RX ORDER — CEFAZOLIN SODIUM 2 G/50ML
SOLUTION INTRAVENOUS
Status: DISCONTINUED
Start: 2017-12-11 | End: 2017-12-11 | Stop reason: HOSPADM

## 2017-12-11 RX ORDER — HYDROCODONE BITARTRATE AND ACETAMINOPHEN 5; 325 MG/1; MG/1
1-2 TABLET ORAL
Qty: 40 TAB | Refills: 0 | Status: ON HOLD | OUTPATIENT
Start: 2017-12-11 | End: 2017-12-13

## 2017-12-11 RX ORDER — SODIUM CHLORIDE 0.9 % (FLUSH) 0.9 %
5-10 SYRINGE (ML) INJECTION AS NEEDED
Status: DISCONTINUED | OUTPATIENT
Start: 2017-12-11 | End: 2017-12-11 | Stop reason: HOSPADM

## 2017-12-11 RX ORDER — DEXTROSE MONOHYDRATE AND SODIUM CHLORIDE 5; .225 G/100ML; G/100ML
25 INJECTION, SOLUTION INTRAVENOUS CONTINUOUS
Status: DISCONTINUED | OUTPATIENT
Start: 2017-12-11 | End: 2017-12-11 | Stop reason: HOSPADM

## 2017-12-11 RX ORDER — HEPARIN SODIUM 1000 [USP'U]/ML
INJECTION, SOLUTION INTRAVENOUS; SUBCUTANEOUS AS NEEDED
Status: DISCONTINUED | OUTPATIENT
Start: 2017-12-11 | End: 2017-12-11 | Stop reason: HOSPADM

## 2017-12-11 RX ORDER — MIDAZOLAM HYDROCHLORIDE 1 MG/ML
INJECTION, SOLUTION INTRAMUSCULAR; INTRAVENOUS AS NEEDED
Status: DISCONTINUED | OUTPATIENT
Start: 2017-12-11 | End: 2017-12-11 | Stop reason: HOSPADM

## 2017-12-11 RX ORDER — HEPARIN SODIUM 200 [USP'U]/100ML
INJECTION, SOLUTION INTRAVENOUS
Status: DISCONTINUED
Start: 2017-12-11 | End: 2017-12-11 | Stop reason: HOSPADM

## 2017-12-11 RX ORDER — DEXTROSE 50 % IN WATER (D50W) INTRAVENOUS SYRINGE
25-50 AS NEEDED
Status: DISCONTINUED | OUTPATIENT
Start: 2017-12-11 | End: 2017-12-11 | Stop reason: HOSPADM

## 2017-12-11 RX ORDER — LIDOCAINE HYDROCHLORIDE 10 MG/ML
INJECTION, SOLUTION EPIDURAL; INFILTRATION; INTRACAUDAL; PERINEURAL
Status: DISCONTINUED
Start: 2017-12-11 | End: 2017-12-11 | Stop reason: HOSPADM

## 2017-12-11 RX ORDER — FENTANYL CITRATE 50 UG/ML
25 INJECTION, SOLUTION INTRAMUSCULAR; INTRAVENOUS AS NEEDED
Status: DISCONTINUED | OUTPATIENT
Start: 2017-12-11 | End: 2017-12-11 | Stop reason: HOSPADM

## 2017-12-11 RX ORDER — INSULIN LISPRO 100 [IU]/ML
INJECTION, SOLUTION INTRAVENOUS; SUBCUTANEOUS ONCE
Status: COMPLETED | OUTPATIENT
Start: 2017-12-11 | End: 2017-12-11

## 2017-12-11 RX ORDER — LIDOCAINE HYDROCHLORIDE 20 MG/ML
INJECTION, SOLUTION EPIDURAL; INFILTRATION; INTRACAUDAL; PERINEURAL AS NEEDED
Status: DISCONTINUED | OUTPATIENT
Start: 2017-12-11 | End: 2017-12-11 | Stop reason: HOSPADM

## 2017-12-11 RX ORDER — ONDANSETRON 2 MG/ML
4 INJECTION INTRAMUSCULAR; INTRAVENOUS ONCE
Status: DISCONTINUED | OUTPATIENT
Start: 2017-12-11 | End: 2017-12-11 | Stop reason: HOSPADM

## 2017-12-11 RX ORDER — PROPOFOL 10 MG/ML
INJECTION, EMULSION INTRAVENOUS AS NEEDED
Status: DISCONTINUED | OUTPATIENT
Start: 2017-12-11 | End: 2017-12-11 | Stop reason: HOSPADM

## 2017-12-11 RX ORDER — LIDOCAINE HYDROCHLORIDE 10 MG/ML
INJECTION, SOLUTION EPIDURAL; INFILTRATION; INTRACAUDAL; PERINEURAL AS NEEDED
Status: DISCONTINUED | OUTPATIENT
Start: 2017-12-11 | End: 2017-12-11 | Stop reason: HOSPADM

## 2017-12-11 RX ORDER — ONDANSETRON 2 MG/ML
INJECTION INTRAMUSCULAR; INTRAVENOUS AS NEEDED
Status: DISCONTINUED | OUTPATIENT
Start: 2017-12-11 | End: 2017-12-11 | Stop reason: HOSPADM

## 2017-12-11 RX ORDER — HEPARIN SODIUM 200 [USP'U]/100ML
INJECTION, SOLUTION INTRAVENOUS
Status: DISCONTINUED | OUTPATIENT
Start: 2017-12-11 | End: 2017-12-11 | Stop reason: HOSPADM

## 2017-12-11 RX ORDER — DEXAMETHASONE SODIUM PHOSPHATE 4 MG/ML
INJECTION, SOLUTION INTRA-ARTICULAR; INTRALESIONAL; INTRAMUSCULAR; INTRAVENOUS; SOFT TISSUE AS NEEDED
Status: DISCONTINUED | OUTPATIENT
Start: 2017-12-11 | End: 2017-12-11 | Stop reason: HOSPADM

## 2017-12-11 RX ORDER — FAMOTIDINE 20 MG/1
20 TABLET, FILM COATED ORAL ONCE
Status: COMPLETED | OUTPATIENT
Start: 2017-12-11 | End: 2017-12-11

## 2017-12-11 RX ORDER — MAGNESIUM SULFATE 100 %
4 CRYSTALS MISCELLANEOUS AS NEEDED
Status: DISCONTINUED | OUTPATIENT
Start: 2017-12-11 | End: 2017-12-11 | Stop reason: HOSPADM

## 2017-12-11 RX ORDER — FENTANYL CITRATE 50 UG/ML
INJECTION, SOLUTION INTRAMUSCULAR; INTRAVENOUS AS NEEDED
Status: DISCONTINUED | OUTPATIENT
Start: 2017-12-11 | End: 2017-12-11 | Stop reason: HOSPADM

## 2017-12-11 RX ORDER — CEFAZOLIN SODIUM 2 G/50ML
2 SOLUTION INTRAVENOUS EVERY 8 HOURS
Status: DISCONTINUED | OUTPATIENT
Start: 2017-12-11 | End: 2017-12-11 | Stop reason: HOSPADM

## 2017-12-11 RX ORDER — CEFAZOLIN SODIUM IN 0.9 % NACL 2 G/100 ML
PLASTIC BAG, INJECTION (ML) INTRAVENOUS AS NEEDED
Status: DISCONTINUED | OUTPATIENT
Start: 2017-12-11 | End: 2017-12-11 | Stop reason: HOSPADM

## 2017-12-11 RX ADMIN — FENTANYL CITRATE 50 MCG: 50 INJECTION, SOLUTION INTRAMUSCULAR; INTRAVENOUS at 10:14

## 2017-12-11 RX ADMIN — INSULIN LISPRO 3 UNITS: 100 INJECTION, SOLUTION INTRAVENOUS; SUBCUTANEOUS at 08:57

## 2017-12-11 RX ADMIN — Medication 2 G: at 10:20

## 2017-12-11 RX ADMIN — FAMOTIDINE 20 MG: 20 TABLET, FILM COATED ORAL at 08:33

## 2017-12-11 RX ADMIN — PROPOFOL 150 MG: 10 INJECTION, EMULSION INTRAVENOUS at 10:14

## 2017-12-11 RX ADMIN — FENTANYL CITRATE 50 MCG: 50 INJECTION, SOLUTION INTRAMUSCULAR; INTRAVENOUS at 11:00

## 2017-12-11 RX ADMIN — DEXAMETHASONE SODIUM PHOSPHATE 4 MG: 4 INJECTION, SOLUTION INTRA-ARTICULAR; INTRALESIONAL; INTRAMUSCULAR; INTRAVENOUS; SOFT TISSUE at 10:14

## 2017-12-11 RX ADMIN — MIDAZOLAM HYDROCHLORIDE 2 MG: 1 INJECTION, SOLUTION INTRAMUSCULAR; INTRAVENOUS at 10:14

## 2017-12-11 RX ADMIN — INSULIN LISPRO 6 UNITS: 100 INJECTION, SOLUTION INTRAVENOUS; SUBCUTANEOUS at 11:59

## 2017-12-11 RX ADMIN — HEPARIN SODIUM 3000 UNITS: 1000 INJECTION, SOLUTION INTRAVENOUS; SUBCUTANEOUS at 10:50

## 2017-12-11 RX ADMIN — LIDOCAINE HYDROCHLORIDE 40 MG: 20 INJECTION, SOLUTION EPIDURAL; INFILTRATION; INTRACAUDAL; PERINEURAL at 10:14

## 2017-12-11 RX ADMIN — ONDANSETRON 4 MG: 2 INJECTION INTRAMUSCULAR; INTRAVENOUS at 10:14

## 2017-12-11 NOTE — ANESTHESIA PREPROCEDURE EVALUATION
Anesthetic History   No history of anesthetic complications            Review of Systems / Medical History  Patient summary reviewed and pertinent labs reviewed    Pulmonary        Sleep apnea: No treatment           Neuro/Psych   Within defined limits           Cardiovascular    Hypertension: well controlled    Angina: with exertion  CHF: NYHA Classification III, orthopnea, dyspnea on exertion    Past MI, CAD, PAD, cardiac stents, CABG and hyperlipidemia    Exercise tolerance: <4 METS     GI/Hepatic/Renal     GERD: poorly controlled    Renal disease: ESRD and dialysis       Endo/Other    Diabetes: well controlled, type 2, using insulin    Obesity and anemia  Pertinent negatives: No blood dyscrasia   Other Findings   Comments:   Risk Factors for Postoperative nausea/vomiting:       History of postoperative nausea/vomiting? NO       Female? NO       Motion sickness? NO       Intended opioid administration for postoperative analgesia? NO      Smoking Abstinence  Current Smoker? NO  Elective Surgery? YES  Seen preoperatively by anesthesiologist or proxy prior to day of surgery? YES  Pt abstained from smoking 24 hours prior to anesthesia?  YES           Physical Exam    Airway  Mallampati: I  TM Distance: > 6 cm  Neck ROM: normal range of motion   Mouth opening: Normal     Cardiovascular               Dental    Dentition: Full upper dentures     Pulmonary  Breath sounds clear to auscultation               Abdominal  GI exam deferred       Other Findings            Anesthetic Plan    ASA: 4  Anesthesia type: general          Induction: Intravenous  Anesthetic plan and risks discussed with: Patient

## 2017-12-11 NOTE — DISCHARGE INSTRUCTIONS
DISCHARGE SUMMARY from Nurse    PATIENT INSTRUCTIONS:    After general anesthesia or intravenous sedation, for 24 hours or while taking prescription Narcotics:  · Limit your activities  · Do not drive and operate hazardous machinery  · Do not make important personal or business decisions  · Do  not drink alcoholic beverages  · If you have not urinated within 8 hours after discharge, please contact your surgeon on call. Report the following to your surgeon:  · Excessive pain, swelling, redness or odor of or around the surgical area  · Temperature over 100.5  · Nausea and vomiting lasting longer than 4 hours or if unable to take medications  · Any signs of decreased circulation or nerve impairment to extremity: change in color, persistent  numbness, tingling, coldness or increase pain  · Any questions    What to do at Home:  Recommended activity: Activity as tolerated      *  Please give a list of your current medications to your Primary Care Provider. *  Please update this list whenever your medications are discontinued, doses are      changed, or new medications (including over-the-counter products) are added. *  Please carry medication information at all times in case of emergency situations. These are general instructions for a healthy lifestyle:    No smoking/ No tobacco products/ Avoid exposure to second hand smoke  Surgeon General's Warning:  Quitting smoking now greatly reduces serious risk to your health. Obesity, smoking, and sedentary lifestyle greatly increases your risk for illness    A healthy diet, regular physical exercise & weight monitoring are important for maintaining a healthy lifestyle    You may be retaining fluid if you have a history of heart failure or if you experience any of the following symptoms:  Weight gain of 3 pounds or more overnight or 5 pounds in a week, increased swelling in our hands or feet or shortness of breath while lying flat in bed.   Please call your doctor as soon as you notice any of these symptoms; do not wait until your next office visit. Recognize signs and symptoms of STROKE:    F-face looks uneven    A-arms unable to move or move unevenly    S-speech slurred or non-existent    T-time-call 911 as soon as signs and symptoms begin-DO NOT go       Back to bed or wait to see if you get better-TIME IS BRAIN. Warning Signs of HEART ATTACK     Call 911 if you have these symptoms:   Chest discomfort. Most heart attacks involve discomfort in the center of the chest that lasts more than a few minutes, or that goes away and comes back. It can feel like uncomfortable pressure, squeezing, fullness, or pain.  Discomfort in other areas of the upper body. Symptoms can include pain or discomfort in one or both arms, the back, neck, jaw, or stomach.  Shortness of breath with or without chest discomfort.  Other signs may include breaking out in a cold sweat, nausea, or lightheadedness. Don't wait more than five minutes to call 911 - MINUTES MATTER! Fast action can save your life. Calling 911 is almost always the fastest way to get lifesaving treatment. Emergency Medical Services staff can begin treatment when they arrive -- up to an hour sooner than if someone gets to the hospital by car. The discharge information has been reviewed with the patient and spouse. The patient and spouse verbalized understanding. Discharge medications reviewed with the patient and spouse and appropriate educational materials and side effects teaching were provided. ___________________________________________________________________________________________________________________________________    Hemodialysis Access: What to Expect at [address] [place] [address]  Hemodialysis is a way to remove wastes from the blood when your kidneys can no longer do the job. It is not a cure, but it can help you live longer and feel better. It is a lifesaving treatment when you have kidney failure. Hemodialysis is often called dialysis. Your doctor created a place (called an access) in your arm for your blood to flow in and out of your body during your dialysis sessions. Your arm will probably be bruised and swollen. It may hurt. The cut (incision) may bleed. The pain and bleeding will get better over several days. You will probably need only over-the-counter pain medicine. You can reduce swelling by propping your arm on 1 or 2 pillows and keeping your elbow straight. You will have stitches. These may dissolve on their own, or your doctor will tell you when to come in to have them removed. You should also be able to return to work in a few days. You may feel some coolness or numbness in your hand. These feelings usually go away in a few weeks. Your doctor may suggest squeezing a soft object. This will strengthen your access and may make hemodialysis faster and easier. You should always be able to feel blood rushing through the fistula or graft. It feels like a slight vibration when you put your fingers on the skin over the fistula or graft. This feeling is called a thrill or pulse. This care sheet gives you a general idea about how long it will take for you to recover. But each person recovers at a different pace. Follow the steps below to get better as quickly as possible. How can you care for yourself at home? Activity  ? · Rest when you feel tired. Getting enough sleep will help you recover. Do not lie on or sleep on the arm with the access. ? · Avoid activities such as washing windows or gardening that put stress on the arm with the access. ? · You may use your arm, but do not lift anything that weighs more than about 15 pounds. This may include a child, heavy grocery bags, a heavy briefcase or backpack, cat litter or dog food bags, or a vacuum . ? · You can shower, but keep the access dry for the first 2 days. Cover the area with a plastic bag to keep it dry.    ? · Do not soak or scrub the incision until it has healed. ? · Wear an arm guard to protect the area if you play sports or work with your arms. ? · You may drive when your doctor says it is okay. This is usually in 1 to 2 days. ? · Most people are able to return to work about 1 or 2 days after surgery. Diet  ? · Follow an eating plan that is good for your kidneys. A registered dietitian can help you make a meal plan that is right for you. You may need to limit protein, salt, fluids, and certain foods. Medicines  ? · Your doctor will tell you if and when you can restart your medicines. He or she will also give you instructions about taking any new medicines. ? · If you take blood thinners, such as warfarin (Coumadin), clopidogrel (Plavix), or aspirin, be sure to talk to your doctor. He or she will tell you if and when to start taking those medicines again. Make sure that you understand exactly what your doctor wants you to do. ? · Take pain medicines exactly as directed. ¨ If the doctor gave you a prescription medicine for pain, take it as prescribed. ¨ If you are not taking a prescription pain medicine, ask your doctor if you can take acetaminophen (Tylenol). Do not take ibuprofen (Advil, Motrin) or naproxen (Aleve), or similar medicines, unless your doctor tells you to. They may make chronic kidney disease worse. ¨ Do not take two or more pain medicines at the same time unless the doctor told you to. Many pain medicines have acetaminophen, which is Tylenol. Too much acetaminophen (Tylenol) can be harmful. ? · If you think your pain medicine is making you sick to your stomach:  ¨ Take your medicine after meals (unless your doctor has told you not to). ¨ Ask your doctor for a different pain medicine. ? · If your doctor prescribed antibiotics, take them as directed. Do not stop taking them just because you feel better. You need to take the full course of antibiotics. Incision care  ? · Keep the area dry for 2 days. After 2 days, wash the area with soap and water every day, and always before dialysis. ? · Do not soak or scrub the incision until it has healed. ? · If you have a bandage, change it every day or as your doctor recommends. Your doctor will tell you when you can remove it. Exercise  ? · Squeeze a soft ball or other object as your doctor tells you. This will help blood flow through the access and help prevent blood clots. ? Elevation  ? · Prop up the sore arm on a pillow anytime you sit or lie down during the next 3 days. Try to keep it above the level of your heart. This will help reduce swelling. Other instructions  ? · Every day, check your access for a pulse or thrill in the fistula or graft area. A thrill is a vibration. To feel a pulse or thrill, place the first two fingers of your hand over the access. ? · Do not bump your arm. ? · Do not wear tight clothing, jewelry, or anything else that may squeeze the access. ? · Use your other arm to have blood drawn or blood pressure taken. ? · Do not put cream or lotion on or near the access. ? · Make sure all doctors you deal with know you have a vascular access. Follow-up care is a key part of your treatment and safety. Be sure to make and go to all appointments, and call your doctor if you are having problems. It's also a good idea to know your test results and keep a list of the medicines you take. When should you call for help? Call 911 anytime you think you may need emergency care. For example, call if:  ? · You passed out (lost consciousness). ? · You have chest pain, are short of breath, or cough up blood. ?Call your doctor now or seek immediate medical care if:  ? · Your hand or arm is cold or dark-colored. ? · You have no pulse in your access. ? · You have nausea or you vomit. ? · You have pain that does not get better after you take pain medicine. ? · You have loose stitches, or your incision comes open.    ? · You are bleeding from the incision. ? · You have signs of infection, such as:  ¨ Increased pain, swelling, warmth, or redness. ¨ Red streaks leading from the area. ¨ Pus draining from the area. ¨ A fever. ? · You have signs of a blood clot in your leg (called a deep vein thrombosis), such as:  ¨ Pain in your calf, back of the knee, thigh, or groin. ¨ Redness or swelling in your leg. ? Watch closely for changes in your health, and be sure to contact your doctor if you have any problems. Where can you learn more? Go to http://kendal-farzaneh.info/. Enter P616 in the search box to learn more about \"Hemodialysis Access: What to Expect at Home. \"  Current as of: May 12, 2017  Content Version: 11.4  © 7816-5419 [employer]. Care instructions adapted under license by [employer] (which disclaims liability or warranty for this information). If you have questions about a medical condition or this instruction, always ask your healthcare professional. [place] [address] any warranty or liability for your use of this information. Hydrocodone/Acetaminophen (By mouth)   Acetaminophen (w-nnen-v-MIN-oh-fen), Hydrocodone Bitartrate (mur-sdgb-CAJ-done bye-TAR-trate)  Treats pain. This medicine contains a narcotic pain reliever. Brand Name(s): Hycet, Lorcet, Lorcet HD, Lorcet Plus, Lortab 10/325, Lortab 5/325, Lortab 7.5/325, Lortab Elixir, Norco, Verdrocet, Vicodin, Vicodin ES, Vicodin HP, Xodol, Xodol 5/300   There may be other brand names for this medicine. When This Medicine Should Not Be Used: This medicine is not right for everyone. Do not use it if you had an allergic reaction to acetaminophen, hydrocodone, or other narcotic medicines, or stomach or bowel blockage (including paralytic ileus). How to Use This Medicine:   Capsule, Liquid, Tablet  · Your doctor will tell you how much medicine to use. Do not use more than directed. · An overdose can be dangerous. Follow directions carefully so you do not get too much medicine at one time. · Oral liquid: Measure the oral liquid medicine with a marked measuring spoon, oral syringe, or medicine cup. · Drink plenty of liquids to help avoid constipation. · This medicine should come with a Medication Guide. Ask your pharmacist for a copy if you do not have one. · Missed dose: Take a dose as soon as you remember. If it is almost time for your next dose, wait until then and take a regular dose. Do not take extra medicine to make up for a missed dose. · Store the medicine in a closed container at room temperature, away from heat, moisture, and direct light. Flush any unused Norco® tablets down the toilet. Drugs and Foods to Avoid:   Ask your doctor or pharmacist before using any other medicine, including over-the-counter medicines, vitamins, and herbal products. · Do not use this medicine if you are using or have used an MAO inhibitor within the past 14 days. · Some medicines can affect how hydrocodone/acetaminophen works. Tell your doctor if you are using any of the following:   ¨ Carbamazepine, erythromycin, ketoconazole, mirtazapine, phenytoin, rifampin, ritonavir, tramadol, trazodone  ¨ Diuretic (water pill)  ¨ Medicine to treat depression or mental health problems  ¨ Medicine to treat migraine headaches  ¨ Phenothiazine medicine  · Tell your doctor if you use anything else that makes you sleepy. Some examples are allergy medicine, narcotic pain medicine, and alcohol. Tell your doctor if you are using buprenorphine, butorphanol, nalbuphine, pentazocine, or a muscle relaxer. · Do not drink alcohol while you are using this medicine. Acetaminophen can damage your liver, and your risk is higher if you also drink alcohol.   Warnings While Using This Medicine:   · Tell your doctor if you are pregnant or breastfeeding, or if you have kidney disease, liver disease, lung or breathing problems, gallbladder or pancreas problems, an underactive thyroid, Ar disease, prostate problems, trouble urinating, stomach problems, or a history of head injury or brain tumor, seizures, alcohol or drug addiction. · This medicine may cause the following problems:   ¨ High risk of overdose, which can lead to death  ¨ Respiratory depression (serious breathing problem that can be life-threatening)  ¨ Liver problems  ¨ Serious skin reactions  ¨ Serotonin syndrome (when used with certain medicines)  · This medicine can be habit-forming. Do not use more than your prescribed dose. Call your doctor if you think your medicine is not working. · This medicine may make you dizzy or drowsy. Do not drive or doing anything else that could be dangerous until you know how this medicine affects you. · This medicine contains acetaminophen. Read the labels of all other medicines you are using to see if they also contain acetaminophen, or ask your doctor or pharmacist. [place] not use more than 4 grams (4,000 milligrams) total of acetaminophen in one day. · Tell any doctor or dentist who treats you that you are using this medicine. This medicine may affect certain medical test results. · This medicine may cause constipation, especially with long-term use. Ask your doctor if you should use a laxative to prevent and treat constipation. · This medicine could cause infertility. Talk with your doctor before using this medicine if you plan to have children. · Keep all medicine out of the reach of children. Never share your medicine with anyone.   Possible Side Effects While Using This Medicine:   Call your doctor right away if you notice any of these side effects:  · Allergic reaction: Itching or hives, swelling in your face or hands, swelling or tingling in your mouth or throat, chest tightness, trouble breathing  · Anxiety, restlessness, fast heartbeat, fever, sweating, muscle spasms, twitching, diarrhea, seeing or hearing things that are not there  · Blistering, peeling, red skin rash  · Blue lips, fingernails, or skin  · Dark urine or pale stools, loss of appetite, nausea or vomiting, stomach pain, yellow skin or eyes  · Extreme weakness, shallow breathing, slow heartbeat, sweating, seizures, cold or clammy skin  · Lightheadedness, dizziness, fainting  If you notice these less serious side effects, talk with your doctor:   · Constipation, nausea, vomiting  · Tiredness or sleepiness  If you notice other side effects that you think are caused by this medicine, tell your doctor. Call your doctor for medical advice about side effects. You may report side effects to FDA at 2-244-EQZ-8122  © 2017 [facility] Information is for End User's use only and may not be sold, redistributed or otherwise used for commercial purposes. The above information is an  only. It is not intended as medical advice for individual conditions or treatments. Talk to your doctor, nurse or pharmacist before following any medical regimen to see if it is safe and effective for you. [employer]    Thank you for enrolling in [address] Please follow the instructions below to securely access your online medical record. Notch allows you to send messages to your doctor, view your test results, renew your prescriptions, schedule appointments, and more. How Do I Sign Up? 1. In your internet browser, go to [email or web address]. 2. Click on the First Time User? Click Here link in the Sign In box. You will see the New Member Sign Up page. 3. Enter your Notch Access Code exactly as it appears below. You will not need to use this code after youve completed the sign-up process. If you do not sign up before the expiration date, you must request a new code. Notch Access Code: [email or web address]  Expires: 12/25/2017  7:30 AM     4. Enter the last four digits of your Social Security Number (xxxx) and Date of Birth (mm/dd/yyyy) as indicated and click Submit. You will be taken to the next sign-up page. 5. Create a [employer] ID. This will be your [employer] login ID and cannot be changed, so think of one that is secure and easy to remember. 6. Create a [employer] password. You can change your password at any time. 7. Enter your Password Reset Question and Answer. This can be used at a later time if you forget your password. 8. Enter your e-mail address. You will receive e-mail notification when new information is available in [address]. 9. Click Sign Up. You can now view your medical record. Additional Information    Remember, [employer] is NOT to be used for urgent needs. For medical emergencies, dial 911. Now available from your iPhone and Android!

## 2017-12-11 NOTE — ANESTHESIA POSTPROCEDURE EVALUATION
Post-Anesthesia Evaluation and Assessment    Patient: Nagi Esqueda MRN: [id number]  SSN: [id number]    YOB: 1940  Age: 68 y.o. Sex: male       Cardiovascular Function/Vital Signs  Visit Vitals    /68    Pulse 95    Temp 36.6 °C (97.9 °F)    Resp 15    Ht 5' 5\" (1.651 m)    Wt 98.4 kg (217 lb)    SpO2 100%    BMI 36.11 kg/m2       Patient is status post general anesthesia for Procedure(s):  CREATION LEFT ARM FISTULA. Nausea/Vomiting: None    Postoperative hydration reviewed and adequate. Pain:  Pain Scale 1: Numeric (0 - 10) (12/11/17 1140)  Pain Intensity 1: 0 (12/11/17 1140)   Managed    Neurological Status:   Neuro (WDL): Within Defined Limits (12/11/17 0821)   At baseline    Mental Status and Level of Consciousness: Arousable    Pulmonary Status:   O2 Device: Room air (12/11/17 1200)   Adequate oxygenation and airway patent    Complications related to anesthesia: None    Post-anesthesia assessment completed.  No concerns        Signed By: Duyen Lee MD     December 11, 2017

## 2017-12-12 ENCOUNTER — HOSPITAL ENCOUNTER (INPATIENT)
Age: 77
LOS: 2 days | Discharge: HOME HEALTH | DRG: 264 | End: 2017-12-14
Attending: INTERNAL MEDICINE | Admitting: INTERNAL MEDICINE
Payer: MEDICARE

## 2017-12-12 ENCOUNTER — APPOINTMENT (OUTPATIENT)
Dept: GENERAL RADIOLOGY | Age: 77
DRG: 264 | End: 2017-12-12
Attending: EMERGENCY MEDICINE
Payer: MEDICARE

## 2017-12-12 DIAGNOSIS — N18.6 ESRD (END STAGE RENAL DISEASE) (HCC): ICD-10-CM

## 2017-12-12 DIAGNOSIS — R07.9 ACUTE CHEST PAIN: Primary | ICD-10-CM

## 2017-12-12 PROBLEM — E87.70 FLUID OVERLOAD: Status: ACTIVE | Noted: 2017-12-12

## 2017-12-12 PROBLEM — I95.9 HYPOTENSION: Status: ACTIVE | Noted: 2017-12-12

## 2017-12-12 LAB
ANION GAP SERPL CALC-SCNC: 8 MMOL/L (ref 3–18)
ATRIAL RATE: 79 BPM
BASOPHILS # BLD: 0 K/UL (ref 0–0.06)
BASOPHILS NFR BLD: 0 % (ref 0–2)
BUN SERPL-MCNC: 44 MG/DL (ref 7–18)
BUN/CREAT SERPL: 7 (ref 12–20)
CALCIUM SERPL-MCNC: 9.4 MG/DL (ref 8.5–10.1)
CALCULATED P AXIS, ECG09: 32 DEGREES
CALCULATED R AXIS, ECG10: 23 DEGREES
CALCULATED T AXIS, ECG11: -140 DEGREES
CHLORIDE SERPL-SCNC: 96 MMOL/L (ref 100–108)
CK MB CFR SERPL CALC: 4.5 % (ref 0–4)
CK MB CFR SERPL CALC: 4.8 % (ref 0–4)
CK MB CFR SERPL CALC: 5 % (ref 0–4)
CK MB SERPL-MCNC: 3.8 NG/ML (ref 5–25)
CK MB SERPL-MCNC: 4.5 NG/ML (ref 5–25)
CK MB SERPL-MCNC: 4.9 NG/ML (ref 5–25)
CK SERPL-CCNC: 100 U/L (ref 39–308)
CK SERPL-CCNC: 103 U/L (ref 39–308)
CK SERPL-CCNC: 76 U/L (ref 39–308)
CO2 SERPL-SCNC: 31 MMOL/L (ref 21–32)
CREAT SERPL-MCNC: 6.62 MG/DL (ref 0.6–1.3)
DIAGNOSIS, 93000: NORMAL
DIFFERENTIAL METHOD BLD: ABNORMAL
EOSINOPHIL # BLD: 0.1 K/UL (ref 0–0.4)
EOSINOPHIL NFR BLD: 1 % (ref 0–5)
ERYTHROCYTE [DISTWIDTH] IN BLOOD BY AUTOMATED COUNT: 15.9 % (ref 11.6–14.5)
GLUCOSE BLD STRIP.AUTO-MCNC: 125 MG/DL (ref 70–110)
GLUCOSE BLD STRIP.AUTO-MCNC: 53 MG/DL (ref 70–110)
GLUCOSE SERPL-MCNC: 147 MG/DL (ref 74–99)
HCT VFR BLD AUTO: 30.5 % (ref 36–48)
HGB BLD-MCNC: 9.2 G/DL (ref 13–16)
LYMPHOCYTES # BLD: 2.6 K/UL (ref 0.9–3.6)
LYMPHOCYTES NFR BLD: 21 % (ref 21–52)
MCH RBC QN AUTO: 28.1 PG (ref 24–34)
MCHC RBC AUTO-ENTMCNC: 30.2 G/DL (ref 31–37)
MCV RBC AUTO: 93.3 FL (ref 74–97)
MONOCYTES # BLD: 0.8 K/UL (ref 0.05–1.2)
MONOCYTES NFR BLD: 6 % (ref 3–10)
NEUTS SEG # BLD: 9 K/UL (ref 1.8–8)
NEUTS SEG NFR BLD: 72 % (ref 40–73)
P-R INTERVAL, ECG05: 152 MS
PLATELET # BLD AUTO: 315 K/UL (ref 135–420)
PMV BLD AUTO: 9.9 FL (ref 9.2–11.8)
POTASSIUM SERPL-SCNC: 5.5 MMOL/L (ref 3.5–5.5)
Q-T INTERVAL, ECG07: 364 MS
QRS DURATION, ECG06: 72 MS
QTC CALCULATION (BEZET), ECG08: 417 MS
RBC # BLD AUTO: 3.27 M/UL (ref 4.7–5.5)
SODIUM SERPL-SCNC: 135 MMOL/L (ref 136–145)
TROPONIN I SERPL-MCNC: 0.13 NG/ML (ref 0–0.04)
TROPONIN I SERPL-MCNC: 0.18 NG/ML (ref 0–0.04)
TROPONIN I SERPL-MCNC: 0.2 NG/ML (ref 0–0.04)
VENTRICULAR RATE, ECG03: 79 BPM
WBC # BLD AUTO: 12.4 K/UL (ref 4.6–13.2)

## 2017-12-12 PROCEDURE — 99285 EMERGENCY DEPT VISIT HI MDM: CPT

## 2017-12-12 PROCEDURE — 93005 ELECTROCARDIOGRAM TRACING: CPT

## 2017-12-12 PROCEDURE — 94762 N-INVAS EAR/PLS OXIMTRY CONT: CPT

## 2017-12-12 PROCEDURE — 74011250637 HC RX REV CODE- 250/637: Performed by: NURSE PRACTITIONER

## 2017-12-12 PROCEDURE — 36415 COLL VENOUS BLD VENIPUNCTURE: CPT | Performed by: INTERNAL MEDICINE

## 2017-12-12 PROCEDURE — 85025 COMPLETE CBC W/AUTO DIFF WBC: CPT | Performed by: EMERGENCY MEDICINE

## 2017-12-12 PROCEDURE — 65660000000 HC RM CCU STEPDOWN

## 2017-12-12 PROCEDURE — 82553 CREATINE MB FRACTION: CPT | Performed by: EMERGENCY MEDICINE

## 2017-12-12 PROCEDURE — 80048 BASIC METABOLIC PNL TOTAL CA: CPT | Performed by: EMERGENCY MEDICINE

## 2017-12-12 PROCEDURE — 74011250636 HC RX REV CODE- 250/636: Performed by: EMERGENCY MEDICINE

## 2017-12-12 PROCEDURE — 71010 XR CHEST PORT: CPT

## 2017-12-12 PROCEDURE — 82962 GLUCOSE BLOOD TEST: CPT

## 2017-12-12 RX ORDER — LOSARTAN POTASSIUM 50 MG/1
TABLET ORAL
Qty: 30 TAB | Refills: 0 | Status: SHIPPED | OUTPATIENT
Start: 2017-12-12 | End: 2018-01-13 | Stop reason: SDUPTHER

## 2017-12-12 RX ORDER — GUAIFENESIN 100 MG/5ML
81 LIQUID (ML) ORAL DAILY
Status: DISCONTINUED | OUTPATIENT
Start: 2017-12-12 | End: 2017-12-14 | Stop reason: HOSPADM

## 2017-12-12 RX ORDER — GUAIFENESIN 100 MG/5ML
324 LIQUID (ML) ORAL
Status: DISCONTINUED | OUTPATIENT
Start: 2017-12-12 | End: 2017-12-12

## 2017-12-12 RX ORDER — SODIUM CHLORIDE 9 MG/ML
250 INJECTION, SOLUTION INTRAVENOUS ONCE
Status: DISCONTINUED | OUTPATIENT
Start: 2017-12-12 | End: 2017-12-12

## 2017-12-12 RX ORDER — SIMVASTATIN 40 MG/1
40 TABLET, FILM COATED ORAL
Status: DISCONTINUED | OUTPATIENT
Start: 2017-12-12 | End: 2017-12-14 | Stop reason: HOSPADM

## 2017-12-12 RX ORDER — SODIUM CHLORIDE 9 MG/ML
150 INJECTION, SOLUTION INTRAVENOUS ONCE
Status: DISCONTINUED | OUTPATIENT
Start: 2017-12-12 | End: 2017-12-12

## 2017-12-12 RX ORDER — CLOPIDOGREL BISULFATE 75 MG/1
75 TABLET ORAL DAILY
Status: DISCONTINUED | OUTPATIENT
Start: 2017-12-13 | End: 2017-12-14 | Stop reason: HOSPADM

## 2017-12-12 RX ADMIN — SIMVASTATIN 40 MG: 40 TABLET, FILM COATED ORAL at 22:42

## 2017-12-12 RX ADMIN — SODIUM CHLORIDE 250 ML: 9 INJECTION, SOLUTION INTRAVENOUS at 13:44

## 2017-12-12 NOTE — CONSULTS
Surgery Consult      Patient: Naina Escobar MRN: [id number]  CSN: [id number]      YOB: 1940    Age: 68 y.o. Sex: male      DOA: 12/12/2017       HPI:     Naina Escobar is a 68 y.o. male who presents with chest pain and r/o MI. Also has ESRD with poorly working dialysis catheter. Needs new catheter. Currently in good spirits with no pain eased off with po ntg. No signs of steal syndrome from AVF. No pain at site. Wound is c/d/i. Past Medical History:   Diagnosis Date    Atherosclerosis of renal artery (HCC)     S/P Rt stent    CAD (coronary artery disease) , 1997, 2012    ,double bypass, 2 stents, 2stents 7/2016    Cancer [facility])     prostate    CHF (congestive heart failure) (HCC)     Chronic diastolic heart failure (HCC)     Chronic kidney disease (CKD), stage IV (severe) (HCC)     Constipation     Coronary atherosclerosis of unspecified type of vessel, native or graft     Stable angina after recent PCI continue treatment.  CRI (chronic renal insufficiency)     Diabetes ([address] 1973    type 2    Essential hypertension, benign     GERD (gastroesophageal reflux disease)     Gout     Hypertension 1982    Morbid obesity ([address]     Weight loss has been strongly encouraged by following dietary restrictions and an exercise routine    APRYL (obstructive sleep apnea) 6/26/2015    no cpap    Other and unspecified hyperlipidemia     HDL's are not at goal, LDL's are at goal, triglycerides are not at goal.    Other and unspecified hyperlipidemia     HDL's are not at goal, LDL's are at goal, triglycerides are not at goal.     Other ill-defined conditions(799.89) 1960    pneumonia twice    Sleep disturbance     Type II or unspecified type diabetes mellitus without mention of complication, not stated as uncontrolled        Past Surgical History:   Procedure Laterality Date   [address]    lt. carotid endart.     COLONOSCOPY N/A 6/23/2017    COLONOSCOPY w/ APC w/ polypectomies performed by Lizzy Victoria MD at [address] HX CHOLECYSTECTOMY      HX CORONARY ARTERY BYPASS GRAFT  2000    x2    HX HEENT  1997    cholecystectomy    HX UROLOGICAL  1998    renal art. surg       Family History   Problem Relation Age of Onset    Heart Attack Father 64       Social History     Social History    Marital status:      Spouse name: N/A    Number of children: N/A    Years of education: N/A     Social History Main Topics    Smoking status: Never Smoker    Smokeless tobacco: Never Used    Alcohol use No    Drug use: No    Sexual activity: Not on file     Other Topics Concern    Not on file     Social History Narrative       Prior to Admission medications    Medication Sig Start Date End Date Taking? Authorizing Provider   losartan (COZAAR) 50 mg tablet TAKE ONE TABLET BY MOUTH ONE TIME DAILY  12/12/17   Moi Biggs MD   HYDROcodone-acetaminophen (NORCO) 5-325 mg per tablet Take 1-2 Tabs by mouth every four (4) hours as needed for Pain. Max Daily Amount: 12 Tabs. 12/11/17   Jonatan Fuentes MD   tamsulosin (FLOMAX) 0.4 mg capsule TAKE ONE CAPSULE BY MOUTH ONE TIME DAILY  11/30/17   Melita Bell MD   amoxicillin-clavulanate (AUGMENTIN) 875-125 mg per tablet Take  by mouth two (2) times a day. Indications: HAEMOPHILUS INFLUENZAE PNEUMONIA    Phys Other, MD   albuterol (PROVENTIL HFA, VENTOLIN HFA, PROAIR HFA) 90 mcg/actuation inhaler Take 2 Puffs by inhalation every four (4) hours as needed for Wheezing. 11/27/17   Ju Torrez PA-C   guaiFENesin-codeine (ROBITUSSIN AC) 100-10 mg/5 mL solution Take 5 mL by mouth nightly as needed for Cough. Max Daily Amount: 5 mL. 11/27/17   Ju Torrez PA-C   aspirin 81 mg chewable tablet Take 1 Tab by mouth daily. 10/26/17   Leyda Kaur MD   b complex-vitamin c-folic acid (NEPHROCAPS) 1 mg capsule Take 1 Cap by mouth daily.  10/25/17   Leyda Kaur MD   senna-docusate (PERICOLACE) 8.6-50 mg per tablet Take 1 Tab by mouth daily. 10/25/17   Vicki Rosales MD   fenofibrate (LOFIBRA) 54 mg tablet Take 1 Tab by mouth daily. 10/10/17   Urszula Coughlin NP   simvastatin (ZOCOR) 40 mg tablet TAKE 1 TABLET BY MOUTH NIGHTLY. 7/19/17   Urszula Coughlin NP   metoprolol tartrate (LOPRESSOR) 100 mg IR tablet TAKE 1 TABLET BY MOUTH TWO TIMES A DAY. 7/19/17   Urszula Coughlin NP   clopidogrel (PLAVIX) 75 mg tab Take 1 Tab by mouth daily. 4/14/17   Win Oliver MD   Cholecalciferol, Vitamin D3, (DECARA) 50,000 unit cap Take  by mouth every seven (7) days. Historical Provider   insulin glargine (LANTUS) 100 unit/mL injection 20 Units by SubCUTAneous route every twelve (12) hours. 7/21/16   Zev Solano MD   nitroglycerin (NITROSTAT) 0.4 mg SL tablet 1 Tab by SubLINGual route as needed for Chest Pain. 6/21/16   Win Oliver MD   insulin aspart (NOVOLOG) 100 unit/mL injection 20 units sq 3 times a day,sliding scale 3/13/13   Win Oliver MD   allopurinol (ZYLOPRIM) 100 mg tablet Take 100 mg by mouth daily. Historical Provider       Allergies   Allergen Reactions    Nka [No Known Allergies] Other (comments)       Physical Exam:      Visit Vitals    /46    Pulse 67    Temp 98 °F (36.7 °C)    Resp 16    Ht 5' 8\" (1.727 m)    Wt 214 lb (97.1 kg)    SpO2 98%    BMI 32.54 kg/m2       GENERAL: alert, cooperative, no distress, appears stated age, THROAT & NECK: normal and no erythema or exudates noted. , LUNG: clear to auscultation bilaterally, HEART: regular rate and rhythm, S1, S2 normal, no murmur, click, rub or gallop, ABDOMEN: soft, non-tender. Bowel sounds normal. No masses,  no organomegaly, EXTREMITIES:  extremities normal, atraumatic, no cyanosis or edema, NEUROLOGIC: AOx3. Cranial nerves 2-12 and sensation grossly intact.     ROS:  Constitutional: negative  Eyes: negative  Ears, nose, mouth, throat, and face: negative  Respiratory: negative  Cardiovascular: negative  Gastrointestinal: negative  Genitourinary:negative  Hematologic/lymphatic: negative  Musculoskeletal:negative  Neurological: negative  Behavioral/Psych: negative  Endocrine: negative  Allergic/Immunologic: negative  Unless otherwise mentioned in the HPI. Data Review:    CBC:   Lab Results   Component Value Date/Time    WBC 12.4 12/12/2017 11:35 AM    RBC 3.27 12/12/2017 11:35 AM    HGB 9.2 12/12/2017 11:35 AM    HCT 30.5 12/12/2017 11:35 AM    PLATELET 824 50/26/3369 11:35 AM      BMP:   Lab Results   Component Value Date/Time    Glucose 147 12/12/2017 11:35 AM    Sodium 135 12/12/2017 11:35 AM    Potassium 5.5 12/12/2017 11:35 AM    Chloride 96 12/12/2017 11:35 AM    CO2 31 12/12/2017 11:35 AM    BUN 44 12/12/2017 11:35 AM    Creatinine 6.62 12/12/2017 11:35 AM    Calcium 9.4 12/12/2017 11:35 AM     Coagulation:   Lab Results   Component Value Date/Time    Prothrombin time 12.1 11/08/2017 06:00 AM    INR 0.9 11/08/2017 06:00 AM    aPTT 47.0 10/25/2017 10:40 AM         Assessment/Plan     69 y/o male with ESRD and chest pain    --Will perform permcath exchange in am.  --Please call with any further questions or concerns.     Active Problems:    Coronary atherosclerosis of native coronary artery (10/7/2012)      CKD (chronic kidney disease) stage 4, GFR 15-29 ml/min ([facility]) (10/7/2012)      HTN (hypertension) (10/7/2012)      DM2 (diabetes mellitus, type 2) ([facility] (10/7/2012)      Dyslipidemia (10/7/2012)      S/P coronary artery stent placement (3/12/2013)      Overview: vg to diag stent 10/2012      Postsurgical aortocoronary bypass status (3/12/2013)      Sleep disturbance ()      Overview: APRYL, not using CPAP regularly      Mixed hyperlipidemia ()      Overview: HDL's are not at goal, LDL's are at goal, triglycerides are not at goal.      GERD (gastroesophageal reflux disease) (3/5/2015)      Prostate cancer ([facility] (4/29/2015)      Overview: 4/2015  T2c  PSA 57, Navid;8 in 2 cores, and G7 in 2,  CT and Bone Scan       no obvious mets      Diastolic CHF, acute on chronic (HCC) (7/5/2016)      Chest pain (10/27/2017)      ESRD (end stage renal disease) ([facility] (11/8/2017)      Hypotension (12/12/2017)        Shelly Childs MD  December 12, 2017

## 2017-12-12 NOTE — IP AVS SNAPSHOT
[id number] [address] [place] Patient: Gini Knapp MRN: [id number] About your hospitalization You were admitted on:  December 12, 2017 You last received care in the:  [facility] You were discharged on:  December 14, 2017 Why you were hospitalized Your primary diagnosis was:  Chest Pain Your diagnoses also included:  Sleep Disturbance, S/P Coronary Artery Stent Placement, Prostate Cancer (Hcc), Postsurgical Aortocoronary Bypass Status, Mixed Hyperlipidemia, Htn (Hypertension), Diastolic Chf, Acute On Chronic (Hcc), Esrd (End Stage Renal Disease) (Hcc), Dyslipidemia, Ckd (Chronic Kidney Disease) Stage 4, Gfr 15-29 Ml/Min (Hcc), Coronary Atherosclerosis Of Native Coronary Artery, Dm2 (Diabetes Mellitus, Type 2) (Hcc), Hypotension, Fluid Overload, Hyperkalemia, Secondary Hyperparathyroidism Of Renal Origin (Hcc) Things You Need To Do (next 8 weeks) Follow up with Lesa Fernandez MD  
Spoke to nurse, she said Dr. Moya Seen would see patient at regular scheduled appointment. Phone:  [phone] Where:  [address], [address] Monday Dec 18, 2017 Follow Up with Lindsey Juarez MD at 10:00 AM  
Where:  [facility] ([facility] Follow up with Desiree Farmer MD  
Appointment @ 10:00AM  
  
Phone:  [phone] Where:  [address], [address] [address] Tuesday Dec 19, 2017 Follow up with Carolann Amin MD  
Appointment @ 3:00PM  
  
Phone:  [phone] Where:  [address] [facility], [facility] Friday Dec 22, 2017 HOSPITAL DISCHARGE with LESLY Ly at 11:15 AM  
Where: [facility] Monday Jan 08, 2018 ESTABLISHED PATIENT with Ayush Eagle MD at 10:00 AM  
 Where:  [facility] [address] ([facility]) Discharge Orders None A check jose indicates which time of day the medication should be taken. My Medications STOP taking these medications AUGMENTIN 875-125 mg per tablet Generic drug:  amoxicillin-clavulanate  
   
  
 guaiFENesin-codeine 100-10 mg/5 mL solution Commonly known as:  ROBITUSSIN AC  
   
  
  
TAKE these medications as instructed Instructions Each Dose to Equal  
 Morning Noon Evening Bedtime  
 albuterol 90 mcg/actuation inhaler Commonly known as:  PROVENTIL HFA, VENTOLIN HFA, PROAIR HFA Your last dose was: Your next dose is: Take 2 Puffs by inhalation every four (4) hours as needed for Wheezing. 2 Puff  
    
   
   
   
  
 allopurinol 100 mg tablet Commonly known as:  Dee Gibbs Your last dose was: Your next dose is: Take 100 mg by mouth daily. 100 mg  
    
   
   
   
  
 amLODIPine 5 mg tablet Commonly known as:  Juan Carlos Nearing Start taking on:  12/15/2017 Your last dose was: Your next dose is: Take 1 Tab by mouth daily. 5 mg  
    
   
   
   
  
 aspirin 81 mg chewable tablet Your last dose was: Your next dose is: Take 1 Tab by mouth daily. 81 mg  
    
   
   
   
  
 b complex-vitamin c-folic acid 1 mg capsule Commonly known as:  Dana [place] Your last dose was: Your next dose is: Take 1 Cap by mouth daily. 1 Cap  
    
   
   
   
  
 clopidogrel 75 mg Tab Commonly known as:  PLAVIX Your last dose was: Your next dose is: Take 1 Tab by mouth daily. 75 mg DECARA 50,000 unit Cap Generic drug:  Cholecalciferol (Vitamin D3) Your last dose was: Your next dose is: Take  by mouth every seven (7) days. fenofibrate 54 mg tablet Commonly known as:  LOFIBRA Your last dose was: Your next dose is: Take 1 Tab by mouth daily. 54 mg HYDROcodone-acetaminophen 5-325 mg per tablet Commonly known as:  Naina Miller Your last dose was: Your next dose is: Take 1-2 Tabs by mouth every four (4) hours as needed for Pain. Max Daily Amount: 12 Tabs. 1-2 Tab  
    
   
   
   
  
 insulin aspart 100 unit/mL injection Commonly known as:  Rayerika Lacsuraj Your last dose was: Your next dose is:    
   
   
 20 units sq 3 times a day,sliding scale  
     
   
   
   
  
 insulin glargine 100 unit/mL injection Commonly known as:  LANTUS Your last dose was: Your next dose is:    
   
   
 20 Units by SubCUTAneous route every twelve (12) hours. 20 Units  
    
   
   
   
  
 isosorbide mononitrate ER 30 mg tablet Commonly known as:  IMDUR Start taking on:  12/15/2017 Your last dose was: Your next dose is: Take 1 Tab by mouth daily. 30 mg  
    
   
   
   
  
 losartan 50 mg tablet Commonly known as:  COZAAR Your last dose was: Your next dose is: TAKE ONE TABLET BY MOUTH ONE TIME DAILY  
     
   
   
   
  
 metoprolol tartrate 100 mg IR tablet Commonly known as:  LOPRESSOR Your last dose was: Your next dose is: TAKE 1 TABLET BY MOUTH TWO TIMES A DAY. nitroglycerin 0.4 mg SL tablet Commonly known as:  NITROSTAT Your last dose was: Your next dose is:    
   
   
 1 Tab by SubLINGual route as needed for Chest Pain. 0.4 mg  
    
   
   
   
  
 senna-docusate 8.6-50 mg per tablet Commonly known as:  Maureen Plain Your last dose was: Your next dose is: Take 1 Tab by mouth daily. 1 Tab  
    
   
   
   
  
 simvastatin 40 mg tablet Commonly known as:  ZOCOR Your last dose was: Your next dose is: TAKE 1 TABLET BY MOUTH NIGHTLY.  
     
   
   
   
  
 tamsulosin 0.4 mg capsule Commonly known as:  FLOMAX Your last dose was: Your next dose is: TAKE ONE CAPSULE BY MOUTH ONE TIME DAILY Where to Get Your Medications Information on where to get these meds will be given to you by the nurse or doctor. ! Ask your nurse or doctor about these medications  
  amLODIPine 5 mg tablet HYDROcodone-acetaminophen 5-325 mg per tablet  
 isosorbide mononitrate ER 30 mg tablet Discharge Instructions Learning About Fluid Overload What is fluid overload? Fluid overload means that your body has too much water. The extra fluid in your body can raise your blood pressure and force your heart to work harder. It can also make it hard for you to breathe. Most of your body is made up of water. The body uses minerals like sodium and potassium to help organs such as your heart, kidneys, and liver balance how much water you need. For example, the heart pumps blood to move water around the body. And the kidneys work to get rid of the water that the body doesn't need. Health conditions like kidney disease, heart failure, and cirrhosis can cause fluid overload. Other things can cause extra fluid to build up. IV fluids, some medicines, too much salt (sodium) from food, and certain medical treatments can sometimes cause this fluid increase. What are the symptoms? Some of the most common symptoms are: 
· Gaining weight over a short period of time. · Swelling in the ankles or legs. · Shortness of breath. How is it treated? The goal of treatment is to remove the extra fluid in your body. Your treatment will depend on the cause. Your doctor may: · Give you medicines, such as diuretics (also called \"water pills\"). They help your body get rid of the extra fluid. · Restrict your fluid or salt intake. Follow-up care is a key part of your treatment and safety. Be sure to make and go to all appointments, and call your doctor if you are having problems. It's also a good idea to know your test results and keep a list of the medicines you take. Where can you learn more? Go to http://kendal-farzaneh.info/. Enter O110 in the search box to learn more about \"Learning About Fluid Overload. \" Current as of: September 21, 2016 Content Version: 11.4 © 8022-3329 [employer]. Care instructions adapted under license by [employer] (which disclaims liability or warranty for this information). If you have questions about a medical condition or this instruction, always ask your healthcare professional. [address] any warranty or liability for your use of this information. Hyperkalemia: Care Instructions Your Care Instructions Hyperkalemia is too much potassium in the blood. Potassium helps keep the right mix of fluids in your body. It also helps your nerves and muscles work as they should. And it keeps your heartbeat in a normal rhythm. Some things can raise potassium levels. These include some health problems, medicines, and kidney problems. (Normally, your kidneys remove extra potassium.) Too much potassium can cause nausea. It also can cause a heartbeat that isn't normal. But you may not have any symptoms. Too much potassium can be dangerous. That's why it's important to treat it. If you are taking any of the medicines that can raise your levels, your doctor will ask you to stop. You may get medicines to lower your levels. And you may have to limit or not eat foods that have a lot of potassium. Follow-up care is a key part of your treatment and safety. Be sure to make and go to all appointments, and call your doctor if you are having problems. It's also a good idea to know your test results and keep a list of the medicines you take. How can you care for yourself at home? · Take your medicines exactly as prescribed. Call your doctor if you think you are having a problem with your medicine. · Stop taking certain medicines if your doctor asks you to. They may be causing your high potassium levels. If you have concerns about stopping medicine, talk with your doctor. · If you have kidney, heart, or liver disease and have to limit fluids, talk with your doctor before you increase the amount of fluids you drink. If the doctor says it's okay, drink plenty of fluids. This means drinking enough so that your urine is light yellow or clear like water. · Avoid strenuous exercise until your doctor tells you it is okay. · Potassium is in many foods, including vegetables, fruits, and milk products. Foods high in potassium include bananas, cantaloupe, broccoli, milk, potatoes, and tomatoes. · Low potassium foods include blueberries, raspberries, cucumber, white or brown rice, spaghetti, and macaroni. · Do not use a salt substitute without talking to your doctor first. Most of these are very high in potassium. · Be sure to tell your doctor about any prescription, over-the-counter, or herbal medicines you take. Some of these can raise potassium. When should you call for help? Call 911 anytime you think you may need emergency care. For example, call if: 
? · You passed out (lost consciousness). ? · You have an unusual heartbeat. Your heart may beat fast or skip beats. ?Call your doctor now or seek immediate medical care if: 
? · You have muscle aches. ? · You feel very weak. ? Watch closely for changes in your health, and be sure to contact your doctor if: 
? · You do not get better as expected. Where can you learn more? Go to http://kendal-farzaneh.info/. Enter L924 in the search box to learn more about \"Hyperkalemia: Care Instructions. \" Current as of: May 12, 2017 Content Version: 11.4 © 3006-8546 [employer]. Care instructions adapted under license by [employer] (which disclaims liability or warranty for this information). If you have questions about a medical condition or this instruction, always ask your healthcare professional. [address] any warranty or liability for your use of this information. Low Blood Pressure: Care Instructions Your Care Instructions Blood pressure is a measurement of the force of the blood against the walls of the blood vessels during and after each beat of the heart. Low blood pressure is also called hypotension. It means that your blood pressure is much lower than normal. Some people, especially young, slim women, may have slightly low blood pressure without symptoms. But in many people, low blood pressure can cause symptoms such as feeling dizzy or lightheaded. When your blood pressure is too low, your heart, brain, and other organs do not get enough blood. Low blood pressure can be caused by many things, including heart problems and some medicines. Diabetes that is not under control can cause your blood pressure to drop. And so can a severe allergic reaction or infection. Another cause is dehydration, which is when your body loses too much fluid. Treatment for low blood pressure depends on the cause. Follow-up care is a key part of your treatment and safety. Be sure to make and go to all appointments, and call your doctor if you are having problems. It's also a good idea to know your test results and keep a list of the medicines you take. How can you care for yourself at home? · Drink plenty of fluids, enough so that your urine is light yellow or clear like water. If you have kidney, heart, or liver disease and have to limit fluids, talk with your doctor before you increase the amount of fluids you drink. · Be safe with medicines.  Call your doctor if you think you are having a problem with your medicine. You will get more details on the specific medicines your doctor prescribes. · Stand up or get out of bed very slowly to allow your body to adjust. 
· Get plenty of rest. 
· Do not smoke. Smoking increases your risk of heart attack. If you need help quitting, talk to your doctor about stop-smoking programs and medicines. These can increase your chances of quitting for good. · Limit alcohol to 2 drinks a day for men and 1 drink a day for women. Alcohol may interfere with your medicine. In addition, alcohol can make your low blood pressure worse by causing your body to lose water. When should you call for help? Call 911 anytime you think you may need emergency care. For example, call if: 
? · You passed out (lost consciousness). ?Call your doctor now or seek immediate medical care if: 
? · You are dizzy or lightheaded, or you feel like you may faint. ? Watch closely for changes in your health, and be sure to contact your doctor if you have any problems. Where can you learn more? Go to http://kendal-farzaneh.info/. Enter C304 in the search box to learn more about \"Low Blood Pressure: Care Instructions. \" Current as of: September 21, 2016 Content Version: 11.4 © 6971-0912 [employer]. Care instructions adapted under license by [employer] (which disclaims liability or warranty for this information). If you have questions about a medical condition or this instruction, always ask your healthcare professional. [address] [address] any warranty or liability for your use of this information. Hemodialysis Access: What to Expect at [facility] Your Recovery Hemodialysis is a way to remove wastes from the blood when your kidneys can no longer do the job. It is not a cure, but it can help you live longer and feel better. It is a lifesaving treatment when you have kidney failure. Hemodialysis is often called dialysis.  Your doctor created a place (called an access) in your arm for your blood to flow in and out of your body during your dialysis sessions. Your arm will probably be bruised and swollen. It may hurt. The cut (incision) may bleed. The pain and bleeding will get better over several days. You will probably need only over-the-counter pain medicine. You can reduce swelling by propping your arm on 1 or 2 pillows and keeping your elbow straight. You will have stitches. These may dissolve on their own, or your doctor will tell you when to come in to have them removed. You should also be able to return to work in a few days. You may feel some coolness or numbness in your hand. These feelings usually go away in a few weeks. Your doctor may suggest squeezing a soft object. This will strengthen your access and may make hemodialysis faster and easier. You should always be able to feel blood rushing through the fistula or graft. It feels like a slight vibration when you put your fingers on the skin over the fistula or graft. This feeling is called a thrill or pulse. This care sheet gives you a general idea about how long it will take for you to recover. But each person recovers at a different pace. Follow the steps below to get better as quickly as possible. How can you care for yourself at home? Activity ? · Rest when you feel tired. Getting enough sleep will help you recover. Do not lie on or sleep on the arm with the access. ? · Avoid activities such as washing windows or gardening that put stress on the arm with the access. ? · You may use your arm, but do not lift anything that weighs more than about 15 pounds. This may include a child, heavy grocery bags, a heavy briefcase or backpack, cat litter or dog food bags, or a vacuum . ? · You can shower, but keep the access dry for the first 2 days. Cover the area with a plastic bag to keep it dry. ? · Do not soak or scrub the incision until it has healed. ? · Wear an arm guard to protect the area if you play sports or work with your arms. ? · You may drive when your doctor says it is okay. This is usually in 1 to 2 days. ? · Most people are able to return to work about 1 or 2 days after surgery. Diet ? · Follow an eating plan that is good for your kidneys. A registered dietitian can help you make a meal plan that is right for you. You may need to limit protein, salt, fluids, and certain foods. Medicines ? · Your doctor will tell you if and when you can restart your medicines. He or she will also give you instructions about taking any new medicines. ? · If you take blood thinners, such as warfarin (Coumadin), clopidogrel (Plavix), or aspirin, be sure to talk to your doctor. He or she will tell you if and when to start taking those medicines again. Make sure that you understand exactly what your doctor wants you to do. ? · Take pain medicines exactly as directed. ¨ If the doctor gave you a prescription medicine for pain, take it as prescribed. ¨ If you are not taking a prescription pain medicine, ask your doctor if you can take acetaminophen (Tylenol). Do not take ibuprofen (Advil, Motrin) or naproxen (Aleve), or similar medicines, unless your doctor tells you to. They may make chronic kidney disease worse. ¨ Do not take two or more pain medicines at the same time unless the doctor told you to. Many pain medicines have acetaminophen, which is Tylenol. Too much acetaminophen (Tylenol) can be harmful. ? · If you think your pain medicine is making you sick to your stomach: 
¨ Take your medicine after meals (unless your doctor has told you not to). ¨ Ask your doctor for a different pain medicine. ? · If your doctor prescribed antibiotics, take them as directed. Do not stop taking them just because you feel better. You need to take the full course of antibiotics. Incision care ? · Keep the area dry for 2 days.  After 2 days, wash the area with soap and water every day, and always before dialysis. ? · Do not soak or scrub the incision until it has healed. ? · If you have a bandage, change it every day or as your doctor recommends. Your doctor will tell you when you can remove it. Exercise ? · Squeeze a soft ball or other object as your doctor tells you. This will help blood flow through the access and help prevent blood clots. ? Elevation ? · Prop up the sore arm on a pillow anytime you sit or lie down during the next 3 days. Try to keep it above the level of your heart. This will help reduce swelling. Other instructions ? · Every day, check your access for a pulse or thrill in the fistula or graft area. A thrill is a vibration. To feel a pulse or thrill, place the first two fingers of your hand over the access. ? · Do not bump your arm. ? · Do not wear tight clothing, jewelry, or anything else that may squeeze the access. ? · Use your other arm to have blood drawn or blood pressure taken. ? · Do not put cream or lotion on or near the access. ? · Make sure all doctors you deal with know you have a vascular access. Follow-up care is a key part of your treatment and safety. Be sure to make and go to all appointments, and call your doctor if you are having problems. It's also a good idea to know your test results and keep a list of the medicines you take. When should you call for help? Call 911 anytime you think you may need emergency care. For example, call if: 
? · You passed out (lost consciousness). ? · You have chest pain, are short of breath, or cough up blood. ?Call your doctor now or seek immediate medical care if: 
? · Your hand or arm is cold or dark-colored. ? · You have no pulse in your access. ? · You have nausea or you vomit. ? · You have pain that does not get better after you take pain medicine. ? · You have loose stitches, or your incision comes open. ? · You are bleeding from the incision. ? · You have signs of infection, such as: 
¨ Increased pain, swelling, warmth, or redness. ¨ Red streaks leading from the area. ¨ Pus draining from the area. ¨ A fever. ? · You have signs of a blood clot in your leg (called a deep vein thrombosis), such as: 
¨ Pain in your calf, back of the knee, thigh, or groin. ¨ Redness or swelling in your leg. ? Watch closely for changes in your health, and be sure to contact your doctor if you have any problems. Where can you learn more? Go to http://kendal-farzaneh.info/. Enter P616 in the search box to learn more about \"Hemodialysis Access: What to Expect at Home. \" Current as of: May 12, 2017 Content Version: 11.4 © 0561-6501 [employer]. Care instructions adapted under license by [employer]abi (which disclaims liability or warranty for this information). If you have questions about a medical condition or this instruction, always ask your healthcare professional. [address] [address] any warranty or liability for your use of this information. Patient armband removed and shredded DISCHARGE SUMMARY from Nurse PATIENT INSTRUCTIONS: 
 
 
F-face looks uneven A-arms unable to move or move unevenly S-speech slurred or non-existent T-time-call 911 as soon as signs and symptoms begin-DO NOT go Back to bed or wait to see if you get better-TIME IS BRAIN. Warning Signs of HEART ATTACK Call 911 if you have these symptoms: 
? Chest discomfort.  Most heart attacks involve discomfort in the center of the chest that lasts more than a few minutes, or that goes away and comes back. It can feel like uncomfortable pressure, squeezing, fullness, or pain. ? Discomfort in other areas of the upper body. Symptoms can include pain or discomfort in one or both arms, the back, neck, jaw, or stomach. ? Shortness of breath with or without chest discomfort. ? Other signs may include breaking out in a cold sweat, nausea, or lightheadedness. Don't wait more than five minutes to call [address]! Fast action can save your life. Calling 911 is almost always the fastest way to get lifesaving treatment. Emergency Medical Services staff can begin treatment when they arrive  up to an hour sooner than if someone gets to the hospital by car. The discharge information has been reviewed with the patient. The patient verbalized understanding. Discharge medications reviewed with the patient and appropriate educational materials and side effects teaching were provided. ___________________________________________________________________________________________________________________________________ ACO Transitions of Care Introducing [employer] [address] offers a voluntary care coordination program to provide high quality service and care to [facility] fee-for-service beneficiaries. Alonzo Nicolas was designed to help you enhance your health and well-being through the following services: ? Transitions of Care  support for individuals who are transitioning from one care setting to another (example: Hospital to home). ? Chronic and Complex Care Coordination  support for individuals and caregivers of those with serious or chronic illnesses or with more than one chronic (ongoing) condition and those who take a number of different medications.   
 
 
If you meet specific medical criteria, a [address] [place] [address] Rd may call you directly to coordinate your care with your primary care physician and your other care providers. For questions about the [facility] programs, please, contact your physicians office. For general questions or additional information about Accountable Care Organizations: 
Please visit www.medicare.gov/acos. html or call 1-800-MEDICARE (0-399.874.1844) TTY users should call 3-376.613.2184. Introducing [facility] [place] & [facility] SERVICES! Ashley Phillips introduces [employer] patient portal. Now you can access parts of your medical record, email your doctor's office, and request medication refills online. 1. In your internet browser, go to [email or web address] 2. Click on the First Time User? Click Here link in the Sign In box. You will see the New Member Sign Up page. 3. Enter your Twice Access Code exactly as it appears below. You will not need to use this code after youve completed the sign-up process. If you do not sign up before the expiration date, you must request a new code. · Twice Access Code: [email or web address] Expires: 12/25/2017  7:30 AM 
 
4. Enter the last four digits of your Social Security Number (xxxx) and Date of Birth (mm/dd/yyyy) as indicated and click Submit. You will be taken to the next sign-up page. 5. Create a Twice ID. This will be your Twice login ID and cannot be changed, so think of one that is secure and easy to remember. 6. Create a Twice password. You can change your password at any time. 7. Enter your Password Reset Question and Answer. This can be used at a later time if you forget your password. 8. Enter your e-mail address. You will receive e-mail notification when new information is available in [address] 9. Click Sign Up. You can now view and download portions of your medical record. 10. Click the Download Summary menu link to download a portable copy of your medical information.  
 
If you have questions, please visit the Frequently Asked Questions section of the [facility]. Remember, MyChart is NOT to be used for urgent needs. For medical emergencies, dial 911. Now available from your iPhone and Android! Unresulted Labs-Please follow up with your PCP about these lab tests Order Current Status EKG, 12 LEAD, SUBSEQUENT Preliminary result Providers Seen During Your Hospitalization Provider Specialty Primary office phone Ruddy Waldron MD Nephrology [phone] Suzanne Garrison MD Internal Medicine [phone] Your Primary Care Physician (PCP) Primary Care Physician Office Phone Office Fax Maira Newsome [phone] You are allergic to the following Allergen Reactions Nka (No Known Allergies) Other (comments) Recent Documentation Height Weight BMI Smoking Status 1.74 m 95.5 kg 31.56 kg/m2 Never Smoker Emergency Contacts Name Discharge Info Relation Home Work Mobile Lazarus,Winsome DISCHARGE CAREGIVER [3] Spouse [3] [phone] Retia Cambric CAREGIVER [3] Daughter [21] [phone] Patient Belongings The following personal items are in your possession at time of discharge: 
  Dental Appliances: At bedside  Visual Aid: None      Home Medications: Sent home   Jewelry: None  Clothing: At bedside    Other Valuables: None  Personal Items Sent to Safe: none Discharge Instructions Attachments/References AMLODIPINE (BY MOUTH) (ENGLISH) OXYCODONE/ACETAMINOPHEN (BY MOUTH) (ENGLISH) ISOSORBIDE MONONITRATE (BY MOUTH) (ENGLISH) Patient Handouts Amlodipine (By mouth) Amlodipine (ll-NRJ-vs-peen) Treats high blood pressure and angina (chest pain). This medicine is a calcium channel blocker. Brand Name(s): Norvasc There may be other brand names for this medicine. When This Medicine Should Not Be Used: This medicine is not right for everyone.  Do not use it if you had an allergic reaction to amlodipine. How to Use This Medicine:  
Tablet, Dissolving Tablet · Take your medicine as directed. Your dose may need to be changed several times to find what works best for you. Take this medicine at the same time each day. · Read and follow the patient instructions that come with this medicine. Talk to your doctor or pharmacist if you have any questions. · Missed dose: Take a dose as soon as you remember. If it has been more than 12 hours since you were supposed to take your dose, skip the missed dose and take your next regular dose at the regular time. · Store the medicine in a closed container at room temperature, away from heat, moisture, and direct light. Drugs and Foods to Avoid: Ask your doctor or pharmacist before using any other medicine, including over-the-counter medicines, vitamins, and herbal products. · Some medicines can affect how amlodipine works. Tell your doctor if you are also using any of the following: ¨ Clarithromycin, cyclosporine, diltiazem, itraconazole, ritonavir, sildenafil, simvastatin, tacrolimus Warnings While Using This Medicine: · Tell your doctor if you are pregnant or breastfeeding, or if you have liver disease, heart disease, coronary artery disease, or aortic stenosis. · This medicine could lower your blood pressure too much, especially when you first use it or if you are dehydrated. Stand or sit up slowly if you feel lightheaded or dizzy. · Your doctor will check your progress and the effects of this medicine at regular visits. Keep all appointments. · Do not stop using this medicine without asking your doctor, even if you feel well. This medicine will not cure high blood pressure, but it will help keep it in normal range. You may have to take blood pressure medicine for the rest of your life. · Keep all medicine out of the reach of children. Never share your medicine with anyone. Possible Side Effects While Using This Medicine: Call your doctor right away if you notice any of these side effects: · Allergic reaction: Itching or hives, swelling in your face or hands, swelling or tingling in your mouth or throat, chest tightness, trouble breathing · Lightheadedness, dizziness · New or worsening chest pain · Swelling in your hands, ankles, or legs · Trouble breathing, nausea, unusual sweating, fainting If you notice other side effects that you think are caused by this medicine, tell your doctor. Call your doctor for medical advice about side effects. You may report side effects to FDA at 5-281-FDA-0013 © 2017 [address] Information is for End User's use only and may not be sold, redistributed or otherwise used for commercial purposes. The above information is an  only. It is not intended as medical advice for individual conditions or treatments. Talk to your doctor, nurse or pharmacist before following any medical regimen to see if it is safe and effective for you. Oxycodone/Acetaminophen (By mouth) Acetaminophen (c-wgrd-m-MIN-oh-fen), Oxycodone Hydrochloride (vv-l-ARZ-done dank-droe-KLOR-deyanira) Treats moderate to moderately severe pain. This medicine is a narcotic pain reliever. Brand Name(s): Endocet, Percocet, Primlev, Xartemis XR There may be other brand names for this medicine. When This Medicine Should Not Be Used: This medicine is not right for everyone. Do not use it if you had an allergic reaction to acetaminophen or oxycodone, or if you have serious breathing problems or paralytic ileus. How to Use This Medicine:  
Capsule, Liquid, Tablet, Long Acting Tablet · Your doctor will tell you how much medicine to use. Do not use more than directed. · An overdose can be dangerous. Follow directions carefully so you do not get too much medicine at one time. · Oral liquid: Measure the oral liquid medicine with a marked measuring spoon, oral syringe, or medicine cup. · Swallow the extended-release tablet whole. Do not crush, break, or chew it. Do not lick or wet the tablet before placing it in your mouth. Do not give this medicine through a feeding tube. · This medicine should come with a Medication Guide. Ask your pharmacist for a copy if you do not have one. · Missed dose: If you miss a dose of this medicine, skip the missed dose and go back to your regular dosing schedule. Do not double doses. · Store the medicine in a closed container at room temperature, away from heat, moisture, and direct light. Ask your pharmacist about the best way to dispose of medicine you do not use. Drugs and Foods to Avoid: Ask your doctor or pharmacist before using any other medicine, including over-the-counter medicines, vitamins, and herbal products. · Do not use Xartemis XR if you are using or have used an MAO inhibitor in the past 14 days. · Some medicines can affect how this medicine works. Tell your doctor if you are using any of the following: ¨ Carbamazepine, erythromycin, ketoconazole, lamotrigine, mirtazapine, naltrexone, phenytoin, propranolol, rifampin, ritonavir, tramadol, trazodone, or zidovudine ¨ Birth control pills ¨ Diuretic (water pill) ¨ Medicine to treat depression ¨ Phenothiazine medicine ¨ Triptan medicine to treat migraine headaches · Do not drink alcohol while you are using this medicine. Acetaminophen can damage your liver, and alcohol can increase this risk. Do not take acetaminophen without asking your doctor if you have 3 or more drinks of alcohol every day. · Tell your doctor if you use anything else that makes you sleepy. Some examples are allergy medicine, narcotic pain medicine, and alcohol. Tell your doctor if you are using buprenorphine, butorphanol, nalbuphine, pentazocine, a benzodiazepine, or a muscle relaxer. Warnings While Using This Medicine: · Tell your doctor if you are pregnant or breastfeeding, or if you have kidney disease, liver disease, heart disease, low blood pressure, breathing problems or lung disease (such as asthma, COPD), thyroid problems, [place] disease, pancreas or gallbladder problems, prostate problems, trouble urinating, or a stomach problems, or a history of head injury or brain damage, seizures, or alcohol or drug abuse. Tell your doctor if you are allergic to codeine. · This medicine may cause the following problems: 
¨ High risk of overdose, which can lead to death ¨ Respiratory depression (serious breathing problem that can be life-threatening) ¨ Liver problems ¨ Serious skin reactions ¨ Serotonin syndrome (when used with certain medicines) · This medicine may make you dizzy or drowsy. Do not drive or do anything that could be dangerous until you know how this medicine affects you. Sit or lie down if you feel dizzy. Stand up carefully. · This medicine contains acetaminophen. Read the labels of all other medicines you are using to see if they also contain acetaminophen, or ask your doctor or pharmacist. [place] Valenzuela not use more than 4 grams (4,000 milligrams) total of acetaminophen in one day. · This medicine can be habit-forming. Do not use more than your prescribed dose. Call your doctor if you think your medicine is not working. · Do not stop using this medicine suddenly. Your doctor will need to slowly decrease your dose before you stop it completely. · This medicine could cause infertility. Talk with your doctor before using this medicine if you plan to have children. · This medicine may cause constipation, especially with long-term use. Ask your doctor if you should use a laxative to prevent and treat constipation. · Keep all medicine out of the reach of children. Never share your medicine with anyone. Possible Side Effects While Using This Medicine:  
Call your doctor right away if you notice any of these side effects: · Allergic reaction: Itching or hives, swelling in your face or hands, swelling or tingling in your mouth or throat, chest tightness, trouble breathing · Anxiety, restlessness, fast heartbeat, fever, muscle spasms, twitching, diarrhea, seeing or hearing things that are not there · Blistering, peeling, red skin rash · Blue lips, fingernails, or skin · Dark urine or pale stools, loss of appetite, stomach pain, yellow skin or eyes · Extreme weakness, shallow breathing, uneven heartbeat, seizures, sweating, or cold or clammy skin · Severe confusion, lightheadedness, dizziness, or fainting · Severe constipation, nausea, or vomiting · Trouble breathing or slow breathing If you notice these less serious side effects, talk with your doctor:  
· Headache · Mild constipation, nausea, or vomiting · Mild sleepiness or drowsiness If you notice other side effects that you think are caused by this medicine, tell your doctor. Call your doctor for medical advice about side effects. You may report side effects to FDA at 4-445-FDA-4690 © 2017 [facility] Information is for End User's use only and may not be sold, redistributed or otherwise used for commercial purposes. The above information is an  only. It is not intended as medical advice for individual conditions or treatments. Talk to your doctor, nurse or pharmacist before following any medical regimen to see if it is safe and effective for you. Isosorbide Mononitrate (By mouth) Isosorbide Mononitrate (eye-gutierrez-SOR-bide jrn-fw-FJJ-trate) Prevents angina. This medicine is a nitrate. Brand Name(s):  
There may be other brand names for this medicine. When This Medicine Should Not Be Used: This medicine is not right for everyone. Do not use it if you had an allergic reaction to isosorbide or similar medicines. How to Use This Medicine:  
Tablet, Long Acting Tablet · Take your medicine as directed.  Your dose may need to be changed several times to find what works best for you. You should take this medicine first thing in the morning. Carefully follow the same schedule each day so the medicine will work properly. · It is best to take this medicine on an empty stomach with at least half a glass of water. · Swallow the extended-release tablet whole. Do not crush, break, or chew it. · Missed dose: Take a dose as soon as you remember. If it is almost time for your next dose, wait until then and take a regular dose. Do not take extra medicine to make up for a missed dose. · Store the medicine in a closed container at room temperature, away from heat, moisture, and direct light. Drugs and Foods to Avoid: Ask your doctor or pharmacist before using any other medicine, including over-the-counter medicines, vitamins, and herbal products. · Do not use this medicine together with riociguat, sildenafil, tadalafil, or vardenafil. · Some foods and medicines can affect how isosorbide mononitrate works. Tell your doctor if you are using blood pressure medicine or if you drink alcohol. Warnings While Using This Medicine: · Tell your doctor if you are pregnant or breastfeeding. Tell him if you had a recent heart attack or you have heart failure, an enlarged heart, low blood pressure, or other heart problems. · This medicine may cause the following problems: 
¨ Severe low blood pressure ¨ Worsening of chest pain caused by an enlarged heart · Medicines that treat chest pain sometimes cause headaches. These headaches are a sign that the medicine is working. Do not stop taking the medicine or change the time you take it in order to avoid the headaches. Ask your doctor if you can take aspirin or acetaminophen to treat the headache. · This medicine may make you dizzy. Do not drive or do anything else that could be dangerous until you know how this medicine affects you. You may feel lightheaded when standing, so stand up slowly.  Drinking alcohol may make these symptoms worse. · Do not stop using this medicine suddenly. Your doctor will need to slowly decrease your dose before you stop it completely. · Your doctor will do lab tests at regular visits to check on the effects of this medicine. Keep all appointments. · Keep all medicine out of the reach of children. Never share your medicine with anyone. Possible Side Effects While Using This Medicine:  
Call your doctor right away if you notice any of these side effects: · Allergic reaction: Itching or hives, swelling in your face or hands, swelling or tingling in your mouth or throat, chest tightness, trouble breathing · Severe or ongoing dizziness, lightheadedness, or fainting · Severe headache · Slow heartbeat, increased chest pain If you notice these less serious side effects, talk with your doctor: · Mild headache If you notice other side effects that you think are caused by this medicine, tell your doctor. Call your doctor for medical advice about side effects. You may report side effects to FDA at 1-818-FDA-2988 © 2017 [address] Information is for End User's use only and may not be sold, redistributed or otherwise used for commercial purposes. The above information is an  only. It is not intended as medical advice for individual conditions or treatments. Talk to your doctor, nurse or pharmacist before following any medical regimen to see if it is safe and effective for you. Please provide this summary of care documentation to your next provider. Signatures-by signing, you are acknowledging that this After Visit Summary has been reviewed with you and you have received a copy. Patient Signature:  ____________________________________________________________ Date:  ____________________________________________________________  
  
Tay Garcia  Provider Signature: ____________________________________________________________ Date:  ____________________________________________________________

## 2017-12-12 NOTE — IP AVS SNAPSHOT
[address] [address] [address]ft[address] Barber Patient: Gentry Anton MRN: [id number] My Medications STOP taking these medications AUGMENTIN 875-125 mg per tablet Generic drug:  amoxicillin-clavulanate  
   
  
 guaiFENesin-codeine 100-10 mg/5 mL solution Commonly known as:  ROBITUSSIN AC  
   
  
  
TAKE these medications as instructed Instructions Each Dose to Equal  
 Morning Noon Evening Bedtime  
 albuterol 90 mcg/actuation inhaler Commonly known as:  PROVENTIL HFA, VENTOLIN HFA, PROAIR HFA Your last dose was: Your next dose is: Take 2 Puffs by inhalation every four (4) hours as needed for Wheezing. 2 Puff  
    
   
   
   
  
 allopurinol 100 mg tablet Commonly known as:  Vivek Copping Your last dose was: Your next dose is: Take 100 mg by mouth daily. 100 mg  
    
   
   
   
  
 amLODIPine 5 mg tablet Commonly known as:  Adarsh Free Start taking on:  12/15/2017 Your last dose was: Your next dose is: Take 1 Tab by mouth daily. 5 mg  
    
   
   
   
  
 aspirin 81 mg chewable tablet Your last dose was: Your next dose is: Take 1 Tab by mouth daily. 81 mg  
    
   
   
   
  
 b complex-vitamin c-folic acid 1 mg capsule Commonly known as:  Penne Zohra Your last dose was: Your next dose is: Take 1 Cap by mouth daily. 1 Cap  
    
   
   
   
  
 clopidogrel 75 mg Tab Commonly known as:  PLAVIX Your last dose was: Your next dose is: Take 1 Tab by mouth daily. 75 mg DECARA 50,000 unit Cap Generic drug:  Cholecalciferol (Vitamin D3) Your last dose was: Your next dose is: Take  by mouth every seven (7) days. fenofibrate 54 mg tablet Commonly known as:  LOFIBRA Your last dose was: Your next dose is: Take 1 Tab by mouth daily. 54 mg HYDROcodone-acetaminophen 5-325 mg per tablet Commonly known as:  Hope Ponallyn Your last dose was: Your next dose is: Take 1-2 Tabs by mouth every four (4) hours as needed for Pain. Max Daily Amount: 12 Tabs. 1-2 Tab  
    
   
   
   
  
 insulin aspart 100 unit/mL injection Commonly known as:  García Sony Your last dose was: Your next dose is:    
   
   
 20 units sq 3 times a day,sliding scale  
     
   
   
   
  
 insulin glargine 100 unit/mL injection Commonly known as:  LANTUS Your last dose was: Your next dose is:    
   
   
 20 Units by SubCUTAneous route every twelve (12) hours. 20 Units  
    
   
   
   
  
 isosorbide mononitrate ER 30 mg tablet Commonly known as:  IMDUR Start taking on:  12/15/2017 Your last dose was: Your next dose is: Take 1 Tab by mouth daily. 30 mg  
    
   
   
   
  
 losartan 50 mg tablet Commonly known as:  COZAAR Your last dose was: Your next dose is: TAKE ONE TABLET BY MOUTH ONE TIME DAILY  
     
   
   
   
  
 metoprolol tartrate 100 mg IR tablet Commonly known as:  LOPRESSOR Your last dose was: Your next dose is: TAKE 1 TABLET BY MOUTH TWO TIMES A DAY. nitroglycerin 0.4 mg SL tablet Commonly known as:  NITROSTAT Your last dose was: Your next dose is:    
   
   
 1 Tab by SubLINGual route as needed for Chest Pain. 0.4 mg  
    
   
   
   
  
 senna-docusate 8.6-50 mg per tablet Commonly known as:  Toy Forth Your last dose was: Your next dose is: Take 1 Tab by mouth daily. 1 Tab  
    
   
   
   
  
 simvastatin 40 mg tablet Commonly known as:  ZOCOR Your last dose was: Your next dose is: TAKE 1 TABLET BY MOUTH NIGHTLY.  
     
   
   
   
  
 tamsulosin 0.4 mg capsule Commonly known as:  FLOMAX Your last dose was: Your next dose is: TAKE ONE CAPSULE BY MOUTH ONE TIME DAILY Where to Get Your Medications Information on where to get these meds will be given to you by the nurse or doctor. ! Ask your nurse or doctor about these medications  
  amLODIPine 5 mg tablet HYDROcodone-acetaminophen 5-325 mg per tablet  
 isosorbide mononitrate ER 30 mg tablet

## 2017-12-12 NOTE — ED PROVIDER NOTES
HPI Comments: Pt was 30min into his 5:30 dialysis session when he developed a chest pain he describes as burning then tightness. Lasted several minutes; had SLN and pain resolved. Was advised by his nephrologist to come to ED. H/o CBAG and prior stents. Last saw his cardiologist Scarlett Marolw and Deven Sy @3 weeks ago for cardiac clearance for new left arm fistula. Currently dialysis access through catheter placed by [facility] who also placed fistula yesterday. Denies ETOH, tobacco, illicits. +h/o HTN, DM, hypercholesterolemia. The history is provided by the patient and a relative. Past Medical History:   Diagnosis Date    Atherosclerosis of renal artery (HCC)     S/P Rt stent    CAD (coronary artery disease) , 1997, 2012    ,double bypass, 2 stents, 2stents 7/2016    Cancer (HCC)     prostate    CHF (congestive heart failure) (HCC)     Chronic diastolic heart failure (HCC)     Chronic kidney disease     Chronic kidney disease (CKD), stage IV (severe) ([facility])     On Dialysis now -T-Th-sat    Constipation     Coronary atherosclerosis of unspecified type of vessel, native or graft     Stable angina after recent PCI continue treatment.        CRI (chronic renal insufficiency)     Diabetes ([address] 1973    type 2    Essential hypertension, benign     GERD (gastroesophageal reflux disease)     Gout     Hypertension 1982    Morbid obesity ([address]     Weight loss has been strongly encouraged by following dietary restrictions and an exercise routine    APRYL (obstructive sleep apnea) 6/26/2015    no cpap    Other and unspecified hyperlipidemia     HDL's are not at goal, LDL's are at goal, triglycerides are not at goal.    Other and unspecified hyperlipidemia     HDL's are not at goal, LDL's are at goal, triglycerides are not at goal.     Other ill-defined conditions(799.89) 1960    pneumonia twice    Sleep disturbance     Type II or unspecified type diabetes mellitus without mention of complication, not stated as uncontrolled        Past Surgical History:   Procedure Laterality Date   [address]    lt. carotid endart. [facility] CARDIAC SURG PROCEDURE UNLIST  2012, 2016, Nov 2017    Coronary Stent    COLONOSCOPY N/A 6/23/2017    COLONOSCOPY w/ APC w/ polypectomies performed by Mia Maxwell MD at [address] [facility] N/A 1/19/2018    FLEXIBLE SIGMOIDOSCOPY WITH Radio frequency ablation performed by Mia Maxwell MD at [address] HX CHOLECYSTECTOMY      HX CORONARY ARTERY BYPASS GRAFT  2000    x2    HX HEENT  1997    cholecystectomy    HX UROLOGICAL  1998    renal art. surg    HX VASCULAR ACCESS      Perma cath for HD- right chest.         Family History:   Problem Relation Age of Onset    Heart Attack Father 64       Social History     Social History    Marital status:      Spouse name: N/A    Number of children: N/A    Years of education: N/A     Occupational History    Not on file. Social History Main Topics    Smoking status: Never Smoker    Smokeless tobacco: Never Used    Alcohol use No    Drug use: No    Sexual activity: Not on file     Other Topics Concern    Not on file     Social History Narrative         ALLERGIES: Nka [no known allergies]    Review of Systems   Constitutional: Negative. Negative for fever. HENT: Negative. Eyes: Negative. Respiratory: Negative. Negative for cough, shortness of breath and wheezing. Cardiovascular: Positive for chest pain. Gastrointestinal: Negative. Endocrine: Negative. Genitourinary: Negative. Musculoskeletal: Negative. Skin: Negative. Allergic/Immunologic: Negative. Neurological: Negative. Hematological: Negative. Psychiatric/Behavioral: Negative. All other systems reviewed and are negative.       Vitals:    12/14/17 0427 12/14/17 0453 12/14/17 0731 12/14/17 1150   BP: 164/66  146/66 159/66   Pulse: 80  76 80   Resp: 18  18 18   Temp: 98.7 °F (37.1 °C)  98.4 °F (36.9 °C) 98.5 °F (36.9 °C)   TempSrc:       SpO2: 96%  99% 97%   Weight:  95.5 kg (210 lb 9.6 oz)     Height:                Physical Exam   Constitutional: Vital signs are normal. He appears well-developed and well-nourished. He is active. Non-toxic appearance. He does not appear ill. No distress. HENT:   Head: Normocephalic and atraumatic. Neck: Normal range of motion. Neck supple. Carotid bruit is not present. No tracheal deviation present. No thyromegaly present. Cardiovascular: Normal rate, regular rhythm and normal heart sounds. Exam reveals no gallop and no friction rub. No murmur heard. Pulmonary/Chest: Effort normal and breath sounds normal. No stridor. No respiratory distress. He has no wheezes. He has no rales. He exhibits no tenderness. Abdominal: Soft. He exhibits no distension and no mass. There is no tenderness. There is no rebound, no guarding and no CVA tenderness. Musculoskeletal: Normal range of motion. Neurological: He is alert. Skin: Skin is warm, dry and intact. He is not diaphoretic. No pallor. Psychiatric: He has a normal mood and affect. His speech is normal and behavior is normal. Judgment and thought content normal.   Nursing note and vitals reviewed. MDM  Number of Diagnoses or Management Options  Acute chest pain:   ESRD (end stage renal disease) [facility]):   Diagnosis management comments: Differential: cardiomyopathy; angina; Dissection; NSTEMI; pericarditis; PE; pneumothorax; PNA; PUD; GERD; AAA    Nothing acute on CXR. Pt declined ASA b/c he thought the 324mg dose was too high. NP from cardiology group is present with patient. Aware of troponin result. She wants repeat CE in 4hrs from initial draw. Pt remains comfortable, CP-free. 1:48 PM  josse came and spoke with Dr. Clement Rowland. Report Josse received was that catheter not working at all and will need to be replaced.   Love at bedside and will perform replacement tomorrow; wants pt NPO after midnight. Tessie's NP Toby updated. Admit to hospitalist.       Amount and/or Complexity of Data Reviewed  Clinical lab tests: ordered and reviewed  Tests in the radiology section of CPT®: ordered and reviewed  Obtain history from someone other than the patient: yes  Discuss the patient with other providers: yes    Risk of Complications, Morbidity, and/or Mortality  Presenting problems: moderate  Diagnostic procedures: moderate  Management options: moderate      ED Course       EKG  Date/Time: 12/12/2017 11:54 AM  Performed by: Lc Fabian by: Felicitas Ascencio     ECG reviewed by ED Physician in the absence of a cardiologist: yes    Previous ECG:     Previous ECG:  Compared to current (11/12/17)    Comparison ECG info:  T wave inversion V4,5,6; no t wave inversion in V3 today    Similarity:  Changes noted  Interpretation:     Interpretation: abnormal    Rate:     ECG rate:  79    ECG rate assessment: normal    Rhythm:     Rhythm: sinus rhythm    Ectopy:     Ectopy: none    QRS:     QRS axis:  Normal    QRS intervals:  Normal  Conduction:     Conduction: normal    ST segments:     ST segments:  Normal  T waves:     T waves: inverted      Inverted:  V4, V5 and V6          No results found for this or any previous visit (from the past 12 hour(s)). 1:49 PM  Diagnosis:   1. Acute chest pain    2. ESRD (end stage renal disease) ([facility]          Disposition: admit    Follow-up Information     Follow up With Details Comments Radha Villegas MD  Spoke to nurse, she said Dr. Carolin Becerra would see patient at regular scheduled appointment.   [address] [facility]      Sebastian Sales MD On 12/18/2017 Appointment @ 10:00AM [address] [facility]      Peggy Sanchez MD On 12/19/2017 Appointment @ 3:00PM [address]  [phone]            Discharge Medication List as of 12/14/2017  4:50 PM START taking these medications    Details   isosorbide mononitrate ER (IMDUR) 30 mg tablet Take 1 Tab by mouth daily. , Print, Disp-30 Tab, R-0      amLODIPine (NORVASC) 5 mg tablet Take 1 Tab by mouth daily. , Print, Disp-30 Tab, R-0         CONTINUE these medications which have CHANGED    Details   HYDROcodone-acetaminophen (NORCO) 5-325 mg per tablet Take 1-2 Tabs by mouth every four (4) hours as needed for Pain. Max Daily Amount: 12 Tabs., Print, Disp-12 Tab, R-0         CONTINUE these medications which have NOT CHANGED    Details   tamsulosin (FLOMAX) 0.4 mg capsule TAKE ONE CAPSULE BY MOUTH ONE TIME DAILY , Normal, Disp-90 Cap, R-0      simvastatin (ZOCOR) 40 mg tablet TAKE 1 TABLET BY MOUTH NIGHTLY., Normal, Disp-90 Tab, R-2      metoprolol tartrate (LOPRESSOR) 100 mg IR tablet TAKE 1 TABLET BY MOUTH TWO TIMES A DAY., Normal, Disp-180 Tab, R-2      clopidogrel (PLAVIX) 75 mg tab Take 1 Tab by mouth daily. , Normal, Disp-90 Tab, R-3      Cholecalciferol, Vitamin D3, (DECARA) 50,000 unit cap Take  by mouth every seven (7) days. , Historical Med      nitroglycerin (NITROSTAT) 0.4 mg SL tablet 1 Tab by SubLINGual route as needed for Chest Pain., Normal, Disp-25 Tab, R-1      losartan (COZAAR) 50 mg tablet TAKE ONE TABLET BY MOUTH ONE TIME DAILY , Normal, Disp-30 Tab, R-0      albuterol (PROVENTIL HFA, VENTOLIN HFA, PROAIR HFA) 90 mcg/actuation inhaler Take 2 Puffs by inhalation every four (4) hours as needed for Wheezing., Print, Disp-1 Inhaler, R-0      aspirin 81 mg chewable tablet Take 1 Tab by mouth daily. , Print, Disp-30 Tab, R-0      b complex-vitamin c-folic acid (NEPHROCAPS) 1 mg capsule Take 1 Cap by mouth daily. , Print, Disp-30 Cap, R-0      senna-docusate (PERICOLACE) 8.6-50 mg per tablet Take 1 Tab by mouth daily. , Print, Disp-30 Tab, R-0      fenofibrate (LOFIBRA) 54 mg tablet Take 1 Tab by mouth daily. , Normal, Disp-30 Tab, R-5      insulin glargine (LANTUS) 100 unit/mL injection 20 Units by SubCUTAneous route every twelve (12) hours. , Print, Disp-1 Vial, R-2      insulin aspart (NOVOLOG) 100 unit/mL injection 20 units sq 3 times a day,sliding scale, No Print, Disp-10 mL, R-0      allopurinol (ZYLOPRIM) 100 mg tablet Take 100 mg by mouth daily. , Historical Med         STOP taking these medications       amoxicillin-clavulanate (AUGMENTIN) 875-125 mg per tablet Comments:   Reason for Stopping:         guaiFENesin-codeine (ROBITUSSIN AC) 100-10 mg/5 mL solution Comments:   Reason for Stopping:                                  I was personally available for consultation in the emergency department. I have reviewed the chart prior to the patient being discharged and agree with the documentation recorded by the [facility], including the assessment, treatment plan, and disposition.   LESLY Christianson

## 2017-12-12 NOTE — PROGRESS NOTES
Notified by ED nurse, that pt in ED with a dysfunctional catheter  Had this replaced last week  Had avf creation yesterday, which is said to be fine    Apparently he was at oupt dialysis  Did not get full treatment  Also having work up for chest pain    Notified colleagues who are in the hospital to stephanie

## 2017-12-12 NOTE — ED NOTES
Pt refusing Aspirin at this time. Pt is hypotensive, but states he was given a nitro @ dialysis prior to that his pressure was fine. Pt states he does not want aspirin due to \"aspirin causing blood pressure to drop. \"

## 2017-12-12 NOTE — CONSULTS
[facility] - Consult Note    Date of  Admission: 12/12/2017 10:46 AM     Primary Care Physician:  Jayashree Watson MD     Plan:       1. Chest pain- c/o  CP during HD today that resolved with NTG x1 no radiation no diaphoresis or nausea associated. Chest pain likely angina in a patient with known history of CAD and recent PCI . He is currently  chest pain free. Denies any  worsening in SOB. No CHF sympotms  Will continue monitoring for any ischemia symptoms. Will trend troponin and follow EKG. 2.    CAD with recent PCI with HERBER in SVG to diagonal in 11/17 - will continue with Plavix and asa. 3.    Hypotension- no dizziness or lightheadedness. Patient with low BP's since HD. His BP medications need to be adjust. Will add NS bolus now and monitor. Will hold BP medications for now. May need increase his target weight    4. Hx of coronary artery stent placement in 2012 and 7/17 with HERBER stents deployed in SVG. 5.    Chronic diastolic heart failure- appears compensated- monitor for fluid overload.               6. Postsurgical aortocoronary bypass status  in 1997                                     7. Hyperlipidemia- on statin and Tricor                                                            8. ESRD on HD  Renal following   9. Diabetes  10. Poorly working HD catheter- plans for  Eat Local Tomorrow  per vascular         If cardiac enzymes do not increase significantly, hopefully patient can be discharged home tomorrow from cardiac standpoint.        FINDINGS: [facility] 11/1017  1. Left main is patent. It bifurcates into left anterior descending artery  and circumflex artery. 2. Left anterior descending artery is occluded in the proximal portion up  to the bifurcation of septal 1 branch. 3. Circumflex artery is a medium caliber vessel with proximal 30% to  40% stenosis followed by diffuse 60% stenosis in the distal portion. It  subtends to become a small caliber vessel.  Obtuse marginal 1 and obtuse  marginal 2 and obtuse marginal 3 are small to medium caliber vessels  with 20%-30% stenosis. 4. Right coronary artery is codominant with proximal 99% stenosis. It  appears to have right-to-right collaterals. 5. Left internal mammary artery to left anterior descending artery is  patent. Distal to anastomosis of the LIMA, LAD has wall irregularities. 6. SVG to diagonal 1 artery - site of prior stenting has severe in-stent  stenosis up to 99%. Status post PTCA using an Emerge 2.5 mm x 15  mm balloon followed by a noncompliant Emerge 3.0 mm x 15 mm  balloon, lesion reduced to about 10%. Aspiration thrombectomy was  performed to the distal portion of the graft due to thrombus during the  procedure. A successful aspiration thrombectomy was performed using  a Pronto catheter. Following this, we then deployed a Xience 2.75 mm  x 28 mm stent to cover the thrombus layered area. We noted BRENNA-3  flow at the end of the case. Distal to anastomosis, diagonal 1 artery is  a small to medium caliber vessel with wall irregularities. Brisk BRENNA-3  flow was noted.     CONCLUSION: Triple vessel coronary artery disease. Patent left  internal mammary artery to left anterior descending. Status post  percutaneous transluminal coronary angioplasty/stent, status post  aspiration thrombectomy, followed by percutaneous transluminal  coronary angioplasty and stent to saphenous vein graft to diagonal 1  artery. The patient was advised to be on intense medical management  and risk factor modification. Will reload this patient with 300 mg of  Plavix.       Cardiac Cath 7/19/17  IMPRESSION: Successful deployment of drug-eluting stents as detailed above  in the focal mid lesion and a long proximal lesion of the very old  saphenous venous graft to first diagonal artery without any complications. There was no evidence of distal occlusion and distal embolization and the  BRENNA flow was 3 at the end.  IV Angiomax was used during the procedure, 300  mg of Plavix was given at the end of the procedure.      DISCUSSION AND RECOMMENDATIONS: This is a very old graft. Fortunately, we  were able to open it well today, deploying 2 drug-eluting stents. Aggressive risk factor modification and medical treatment should continue. SUMMARY: Echo 7/5/17   Left ventricle: Systolic function was normal. Ejection fraction was  estimated in the range of 65 % to 70 %. There were no regional wall motion  abnormalities. Wall thickness was mildly to moderately increased. There  was mild concentric hypertrophy. Right ventricle: The size was at the upper limits of normal.  Aortic valve: The valve was trileaflet. Leaflets exhibited mildly  increased thickness, mild to moderate calcification, and sclerosis. Inferior vena cava, hepatic veins: The respirophasic change in diameter  was more than 50%. COMPARISONS:  Comparison was made with the previous study of 20-Nov-2014. Left atrium  has decreased in size.   INDICATIONS: Congestive heart failure           Assessment:     Hospital Problems  Date Reviewed: 11/8/2017          Codes Class Noted POA    Hypotension ICD-10-CM: I95.9  ICD-9-CM: 458.9  12/12/2017 Unknown        ESRD (end stage renal disease) ([facility] ICD-10-CM: N18.6  ICD-9-CM: 585.6  11/8/2017 Yes        Chest pain ICD-10-CM: R07.9  ICD-9-CM: 786.50  10/27/2017 Unknown        Diastolic CHF, acute on chronic [facility]) ICD-10-CM: I50.33  ICD-9-CM: 428.33, 428.0  7/5/2016 Yes        Prostate cancer ([facility] ICD-10-CM: C61  ICD-9-CM: 082  4/29/2015 Yes    Overview Addendum 5/6/2015  4:32 PM by Todd Salcedo MD     4/2015  T2c  PSA 57, Navid;8 in 2 cores, and G7 in 2,  CT and Bone Scan no obvious mets             GERD (gastroesophageal reflux disease) ICD-10-CM: K21.9  ICD-9-CM: 530.81  3/5/2015 Yes        Sleep disturbance ICD-10-CM: G47.9  ICD-9-CM: 780.50  Unknown Yes    Overview Signed 4/29/2013  3:11 PM by Thalia Vinson     APRYL, not using CPAP regularly Mixed hyperlipidemia ICD-10-CM: E78.2  ICD-9-CM: 272.2  Unknown Yes    Overview Signed 4/29/2013  3:11 PM by Yolanda Montero     HDL's are not at goal, LDL's are at goal, triglycerides are not at goal.             S/P coronary artery stent placement ICD-10-CM: Z95.5  ICD-9-CM: V45.82  3/12/2013 Yes    Overview Signed 3/12/2013  3:37 PM by Jesse Vernon MD     vg to diag stent 10/2012             Postsurgical aortocoronary bypass status ICD-10-CM: Z95.1  ICD-9-CM: V45.81  3/12/2013 Yes        Coronary atherosclerosis of native coronary artery ICD-10-CM: I25.10  ICD-9-CM: 414.01  10/7/2012 Yes        CKD (chronic kidney disease) stage 4, GFR 15-29 ml/min ([facility]) ICD-10-CM: N18.4  ICD-9-CM: 585.4  10/7/2012 Yes        HTN (hypertension) ICD-10-CM: I10  ICD-9-CM: 401.9  10/7/2012 Yes        DM2 (diabetes mellitus, type 2) ([facility] ICD-10-CM: E11.9  ICD-9-CM: 250.00  10/7/2012 Yes        Dyslipidemia ICD-10-CM: S49.8  ICD-9-CM: 272.4  10/7/2012 Yes                   History of Present Illness: This is a 68 y.o. male admitted for Chest pain. Pateint known to our practice with PMHx of hypertension, CAD recent PCI, CABG, ESRD on HD and Diabetes. Patient came to the ED c/o chest pain. The patient was in HD this morning  30min into hi  dialysis session when he developed a chest pain he describes as burning then tightness. Lasted several minutes; had NTGx1. He denies any diaphoresis or worsening SOB. patient CP did no radiated. and pain resolved. he is resting in bed family present during the interview. He denies any active chest pain or chest pressure. Has DARBY that is stable. Patient  c/o since HD his BP are running low. He denies any dizziness or lightheadedness. Also denies any  syncopal episodes. No nausea or vomit. No recent CVA.        Past Medical History:     Past Medical History:   Diagnosis Date    Atherosclerosis of renal artery (HCC)     S/P Rt stent    CAD (coronary artery disease) , 1997, 2012 ,double bypass, 2 stents, 2stents 7/2016    Cancer [facility])     prostate    CHF (congestive heart failure) (HCC)     Chronic diastolic heart failure (HCC)     Chronic kidney disease (CKD), stage IV (severe) (HCC)     Constipation     Coronary atherosclerosis of unspecified type of vessel, native or graft     Stable angina after recent PCI continue treatment.  CRI (chronic renal insufficiency)     Diabetes ([facility] 1973    type 2    Essential hypertension, benign     GERD (gastroesophageal reflux disease)     Gout     Hypertension 1982    Morbid obesity ([facility]     Weight loss has been strongly encouraged by following dietary restrictions and an exercise routine    APRYL (obstructive sleep apnea) 6/26/2015    no cpap    Other and unspecified hyperlipidemia     HDL's are not at goal, LDL's are at goal, triglycerides are not at goal.    Other and unspecified hyperlipidemia     HDL's are not at goal, LDL's are at goal, triglycerides are not at goal.     Other ill-defined conditions(799.89) 1960    pneumonia twice    Sleep disturbance     Type II or unspecified type diabetes mellitus without mention of complication, not stated as uncontrolled          Social History:     Social History     Social History    Marital status:      Spouse name: N/A    Number of children: N/A    Years of education: N/A     Social History Main Topics    Smoking status: Never Smoker    Smokeless tobacco: Never Used    Alcohol use No    Drug use: No    Sexual activity: Not on file     Other Topics Concern    Not on file     Social History Narrative        Family History:     Family History   Problem Relation Age of Onset    Heart Attack Father 64        Medications:      Allergies   Allergen Reactions    Nka [No Known Allergies] Other (comments)        Current Facility-Administered Medications   Medication Dose Route Frequency    aspirin chewable tablet 81 mg  81 mg Oral DAILY    [START ON 12/13/2017] clopidogrel (PLAVIX) tablet 75 mg  75 mg Oral DAILY    simvastatin (ZOCOR) tablet 40 mg  40 mg Oral QHS    sodium chloride 0.9 % bolus infusion 250 mL  250 mL IntraVENous ONCE     Current Outpatient Prescriptions   Medication Sig    losartan (COZAAR) 50 mg tablet TAKE ONE TABLET BY MOUTH ONE TIME DAILY     HYDROcodone-acetaminophen (NORCO) 5-325 mg per tablet Take 1-2 Tabs by mouth every four (4) hours as needed for Pain. Max Daily Amount: 12 Tabs.  tamsulosin (FLOMAX) 0.4 mg capsule TAKE ONE CAPSULE BY MOUTH ONE TIME DAILY     amoxicillin-clavulanate (AUGMENTIN) 875-125 mg per tablet Take  by mouth two (2) times a day. Indications: HAEMOPHILUS INFLUENZAE PNEUMONIA    albuterol (PROVENTIL HFA, VENTOLIN HFA, PROAIR HFA) 90 mcg/actuation inhaler Take 2 Puffs by inhalation every four (4) hours as needed for Wheezing.  guaiFENesin-codeine (ROBITUSSIN AC) 100-10 mg/5 mL solution Take 5 mL by mouth nightly as needed for Cough. Max Daily Amount: 5 mL.  aspirin 81 mg chewable tablet Take 1 Tab by mouth daily.  b complex-vitamin c-folic acid (NEPHROCAPS) 1 mg capsule Take 1 Cap by mouth daily.  senna-docusate (PERICOLACE) 8.6-50 mg per tablet Take 1 Tab by mouth daily.  fenofibrate (LOFIBRA) 54 mg tablet Take 1 Tab by mouth daily.  simvastatin (ZOCOR) 40 mg tablet TAKE 1 TABLET BY MOUTH NIGHTLY.  metoprolol tartrate (LOPRESSOR) 100 mg IR tablet TAKE 1 TABLET BY MOUTH TWO TIMES A DAY.  clopidogrel (PLAVIX) 75 mg tab Take 1 Tab by mouth daily.  Cholecalciferol, Vitamin D3, (DECARA) 50,000 unit cap Take  by mouth every seven (7) days.  insulin glargine (LANTUS) 100 unit/mL injection 20 Units by SubCUTAneous route every twelve (12) hours.  nitroglycerin (NITROSTAT) 0.4 mg SL tablet 1 Tab by SubLINGual route as needed for Chest Pain.  insulin aspart (NOVOLOG) 100 unit/mL injection 20 units sq 3 times a day,sliding scale    allopurinol (ZYLOPRIM) 100 mg tablet Take 100 mg by mouth daily. Review Of Systems:       Constitutional: No fever, no chills, no weight loss, no night sweats   HEENT: Nasal drainage. Respiratory: dyspnea on exertion, cough, sputum  Cardiovascular: +chest pain, no chest pressure,  Dyspnea, chronic leg edema. Gastrointestinal: constipation  Genitourinary: No urinary symptoms or hematuria  Integument/breast: No ulcers or rashes  Musculoskeletal: no muscle pain, no weakness  Neurological: No focal weakness, no seizures, no headaches  Behvioral/Psych: No anxiety, no depression     Physical Exam:     Visit Vitals    /46    Pulse 82    Temp 98 °F (36.7 °C)    Resp 16    Ht 5' 8\" (1.727 m)    Wt 97.1 kg (214 lb)    SpO2 98%    BMI 32.54 kg/m2     BP Readings from Last 3 Encounters:   12/12/17 112/46   12/11/17 153/68   12/05/17 162/88     Pulse Readings from Last 3 Encounters:   12/12/17 82   12/11/17 96   12/05/17 62     Wt Readings from Last 3 Encounters:   12/12/17 97.1 kg (214 lb)   12/11/17 98.4 kg (217 lb)   12/05/17 97.5 kg (215 lb)       General:  alert, cooperative, no distress, appears stated age, moderately obese  Skin: Warm and dry, acyanotic, normal color. Head: Normocephalic, atraumatic. Eyes: Sclerae anicteric, conjunctivae without injection. Neck:  nontender, no nuchal rigidity, no masses, no stridor, no carotid bruit, no JVD  Lungs:  clear to auscultation bilaterally. Heart:  regular rate and rhythm, S1, S2 normal, no S3 or S4, no click, no rub  Abdomen:  abdomen is soft without significant tenderness, masses, organomegaly or guarding  Extremities:  extremities normal, atraumatic, no cyanosis. Trace edema  Neurological: grossly intact. No focal abnormalities, moves all extremities well. Psychiatric Affect: The patient is awake, alert and oriented x3. Latasha Samples is interactive and appropriate.      Data Review:     Recent Results (from the past 48 hour(s))   POC 6 PLUS    Collection Time: 12/11/17  8:51 AM   Result Value Ref Range    Sodium,  (L) 136 - 145 MMOL/L    Potassium, POC 5.0 3.5 - 5.5 MMOL/L    Chloride, POC 99 (L) 100 - 108 MMOL/L    BUN, POC 32 (H) 7 - 18 MG/DL    Glucose,  (H) 74 - 106 MG/DL    Hematocrit, POC 34 (L) 36 - 49 %    Hemoglobin, POC 11.6 (L) 12 - 16 G/DL   GLUCOSE, POC    Collection Time: 12/11/17 11:56 AM   Result Value Ref Range    Glucose (POC) 246 (H) 70 - 110 mg/dL   EKG, 12 LEAD, INITIAL    Collection Time: 12/12/17 10:52 AM   Result Value Ref Range    Ventricular Rate 79 BPM    Atrial Rate 79 BPM    P-R Interval 152 ms    QRS Duration 72 ms    Q-T Interval 364 ms    QTC Calculation (Bezet) 417 ms    Calculated P Axis 32 degrees    Calculated R Axis 23 degrees    Calculated T Axis -140 degrees    Diagnosis       Normal sinus rhythm  T wave abnormality, consider inferolateral ischemia  Abnormal ECG  When compared with ECG of 12-NOV-2017 12:39,  Incomplete right bundle branch block is no longer present  Confirmed by Hui Yousif MD, Kong Shaper ([id number]) on 12/12/2017 12:17:17 PM     CBC WITH AUTOMATED DIFF    Collection Time: 12/12/17 11:35 AM   Result Value Ref Range    WBC 12.4 4.6 - 13.2 K/uL    RBC 3.27 (L) 4.70 - 5.50 M/uL    HGB 9.2 (L) 13.0 - 16.0 g/dL    HCT 30.5 (L) 36.0 - 48.0 %    MCV 93.3 74.0 - 97.0 FL    MCH 28.1 24.0 - 34.0 PG    MCHC 30.2 (L) 31.0 - 37.0 g/dL    RDW 15.9 (H) 11.6 - 14.5 %    PLATELET 306 506 - 703 K/uL    MPV 9.9 9.2 - 11.8 FL    NEUTROPHILS 72 40 - 73 %    LYMPHOCYTES 21 21 - 52 %    MONOCYTES 6 3 - 10 %    EOSINOPHILS 1 0 - 5 %    BASOPHILS 0 0 - 2 %    ABS. NEUTROPHILS 9.0 (H) 1.8 - 8.0 K/UL    ABS. LYMPHOCYTES 2.6 0.9 - 3.6 K/UL    ABS. MONOCYTES 0.8 0.05 - 1.2 K/UL    ABS. EOSINOPHILS 0.1 0.0 - 0.4 K/UL    ABS.  BASOPHILS 0.0 0.0 - 0.06 K/UL    DF AUTOMATED     METABOLIC PANEL, BASIC    Collection Time: 12/12/17 11:35 AM   Result Value Ref Range    Sodium 135 (L) 136 - 145 mmol/L    Potassium 5.5 3.5 - 5.5 mmol/L    Chloride 96 (L) 100 - 108 mmol/L    CO2 31 21 - 32 mmol/L    Anion gap 8 3.0 - 18 mmol/L    Glucose 147 (H) 74 - 99 mg/dL    BUN 44 (H) 7.0 - 18 MG/DL    Creatinine 6.62 (H) 0.6 - 1.3 MG/DL    BUN/Creatinine ratio 7 (L) 12 - 20      GFR est AA 10 (L) >60 ml/min/1.73m2    GFR est non-AA 8 (L) >60 ml/min/1.73m2    Calcium 9.4 8.5 - 10.1 MG/DL   CARDIAC PANEL,(CK, CKMB & TROPONIN)    Collection Time: 12/12/17 11:35 AM   Result Value Ref Range     39 - 308 U/L    CK - MB 4.9 (H) <3.6 ng/ml    CK-MB Index 4.8 (H) 0.0 - 4.0 %    Troponin-I, Qt. 0.20 (H) 0.0 - 0.045 NG/ML       No intake or output data in the 24 hours ending 12/12/17 1407    Cardiographics:     ECG: Normal sinus rhythm. T wave abnormality, consider inferolateral ischemia       Signed By: Memo Tovar NP     December 12, 2017      Patient seen independently  Discussed the details with NP and patient.  Please see orders & recommendations  Eleonora Kyle MD

## 2017-12-12 NOTE — H&P
History & Physical    Patient: Stella Wolf MRN: [id number]  CSN: [id number]    YOB: 1940  Age: 68 y.o. Sex: male      DOA: 12/12/2017    Chief Complaint:   Chief Complaint   Patient presents with    Chest Pain          HPI:     Stella Wolf is a 68 y.o.  male who  Who recently started dialysis 3 weeks ago today during dialysis developed chest burning and pain   He has hx of CABG and recent stent placement few weeks ago; Has been compliant with meds and diet; Having difficulty with acess issues as well and needs fistula change         Past Medical History:   Diagnosis Date    Atherosclerosis of renal artery (HCC)     S/P Rt stent    CAD (coronary artery disease) , 1997, 2012    ,double bypass, 2 stents, 2stents 7/2016    Cancer (HCC)     prostate    CHF (congestive heart failure) (HCC)     Chronic diastolic heart failure (HCC)     Chronic kidney disease (CKD), stage IV (severe) (HCC)     Constipation     Coronary atherosclerosis of unspecified type of vessel, native or graft     Stable angina after recent PCI continue treatment.        CRI (chronic renal insufficiency)     Diabetes ([address] 1973    type 2    Essential hypertension, benign     GERD (gastroesophageal reflux disease)     Gout     Hypertension 1982    Morbid obesity ([address]     Weight loss has been strongly encouraged by following dietary restrictions and an exercise routine    APRYL (obstructive sleep apnea) 6/26/2015    no cpap    Other and unspecified hyperlipidemia     HDL's are not at goal, LDL's are at goal, triglycerides are not at goal.    Other and unspecified hyperlipidemia     HDL's are not at goal, LDL's are at goal, triglycerides are not at goal.     Other ill-defined conditions(799.89) 1960    pneumonia twice    Sleep disturbance     Type II or unspecified type diabetes mellitus without mention of complication, not stated as uncontrolled        Past Surgical History:   Procedure Laterality Date    CARDIAC SURG PROCEDURE UNLIST  1995    lt. carotid endart.  COLONOSCOPY N/A 6/23/2017    COLONOSCOPY w/ APC w/ polypectomies performed by Angel Richey MD at [address] [place] [address] HX CHOLECYSTECTOMY      HX CORONARY ARTERY BYPASS GRAFT  2000    x2    HX HEENT  1997    cholecystectomy    HX UROLOGICAL  1998    renal art. surg       Family History   Problem Relation Age of Onset    Heart Attack Father 64       Social History     Social History    Marital status:      Spouse name: N/A    Number of children: N/A    Years of education: N/A     Social History Main Topics    Smoking status: Never Smoker    Smokeless tobacco: Never Used    Alcohol use No    Drug use: No    Sexual activity: Not Asked     Other Topics Concern    None     Social History Narrative       Prior to Admission medications    Medication Sig Start Date End Date Taking? Authorizing Provider   losartan (COZAAR) 50 mg tablet TAKE ONE TABLET BY MOUTH ONE TIME DAILY  12/12/17   Eloy Lopez MD   HYDROcodone-acetaminophen (NORCO) 5-325 mg per tablet Take 1-2 Tabs by mouth every four (4) hours as needed for Pain. Max Daily Amount: 12 Tabs. 12/11/17   Ron Kenney MD   tamsulosin (FLOMAX) 0.4 mg capsule TAKE ONE CAPSULE BY MOUTH ONE TIME DAILY  11/30/17   Kati Godinez MD   amoxicillin-clavulanate (AUGMENTIN) 875-125 mg per tablet Take  by mouth two (2) times a day. Indications: HAEMOPHILUS INFLUENZAE PNEUMONIA    Phys Other, MD   albuterol (PROVENTIL HFA, VENTOLIN HFA, PROAIR HFA) 90 mcg/actuation inhaler Take 2 Puffs by inhalation every four (4) hours as needed for Wheezing. 11/27/17   Chanelle Fields PA-C   guaiFENesin-codeine (ROBITUSSIN AC) 100-10 mg/5 mL solution Take 5 mL by mouth nightly as needed for Cough. Max Daily Amount: 5 mL. 11/27/17   Chanelle Fields PA-C   aspirin 81 mg chewable tablet Take 1 Tab by mouth daily.  10/26/17   Marybeth Jerome MD   b complex-vitamin c-folic acid (NEPHROCAPS) 1 mg capsule Take 1 Cap by mouth daily. 10/25/17   De Cuenca MD   senna-docusate (PERICOLACE) 8.6-50 mg per tablet Take 1 Tab by mouth daily. 10/25/17   De Cuenca MD   fenofibrate (LOFIBRA) 54 mg tablet Take 1 Tab by mouth daily. 10/10/17   Urszula Coughlin NP   simvastatin (ZOCOR) 40 mg tablet TAKE 1 TABLET BY MOUTH NIGHTLY. 7/19/17   Urszula Coughlin NP   metoprolol tartrate (LOPRESSOR) 100 mg IR tablet TAKE 1 TABLET BY MOUTH TWO TIMES A DAY. 7/19/17   Urszula Coughlin NP   clopidogrel (PLAVIX) 75 mg tab Take 1 Tab by mouth daily. 4/14/17   Chery Stokes MD   Cholecalciferol, Vitamin D3, (DECARA) 50,000 unit cap Take  by mouth every seven (7) days. Historical Provider   insulin glargine (LANTUS) 100 unit/mL injection 20 Units by SubCUTAneous route every twelve (12) hours. 7/21/16   Terrance Ackerman MD   nitroglycerin (NITROSTAT) 0.4 mg SL tablet 1 Tab by SubLINGual route as needed for Chest Pain. 6/21/16   Chery Stokes MD   insulin aspart (NOVOLOG) 100 unit/mL injection 20 units sq 3 times a day,sliding scale 3/13/13   Chery Stokes MD   allopurinol (ZYLOPRIM) 100 mg tablet Take 100 mg by mouth daily. Historical Provider       Allergies   Allergen Reactions    Nka [No Known Allergies] Other (comments)         Review of Systems  GENERAL: Patient alert, awake and oriented times 3, able to communicate full sentences and not in distress. HEENT: No change in vision, no earache, tinnitus, sore throat or sinus congestion. NECK: No pain or stiffness. PULMONARY: No shortness of breath, cough or wheeze. Cardiovascular: no pnd or orthopnea, no CP  GASTROINTESTINAL: No abdominal pain, nausea, vomiting or diarrhea, melena or bright red blood per rectum. GENITOURINARY: No urinary frequency, urgency, hesitancy or dysuria. MUSCULOSKELETAL: No joint or muscle pain, no back pain, no recent trauma. DERMATOLOGIC: No rash, no itching, no lesions.    ENDOCRINE: No polyuria, polydipsia, no heat or cold intolerance. No recent change in weight. HEMATOLOGICAL: No anemia or easy bruising or bleeding. NEUROLOGIC: No headache, seizures, numbness, +tingling or weakness. Physical Exam:     Physical Exam:  Visit Vitals    BP (!) 97/39    Pulse 67    Temp 98 °F (36.7 °C)    Resp 16    Ht 5' 8\" (1.727 m)    Wt 97.1 kg (214 lb)    SpO2 98%    BMI 32.54 kg/m2      O2 Device: Room air    Temp (24hrs), Av °F (36.7 °C), Min:98 °F (36.7 °C), Max:98 °F (36.7 °C)             General:  Alert, cooperative, no distress, appears stated age. Head: Normocephalic, without obvious abnormality, atraumatic. Eyes:  Conjunctivae/corneas clear. PERRL, EOMs intact. Nose: Nares normal. No drainage or sinus tenderness. Neck: Supple, symmetrical, trachea midline, no adenopathy, thyroid: no enlargement, no carotid bruit and no JVD. Lungs:   Clear to auscultation bilaterally. Heart:  Regular rate and rhythm, S1, S2 normal murmer present . Abdomen: Soft, non-tender. Bowel sounds normal.    Extremities: Extremities normal, atraumatic, no cyanosis +2 edema. Pulses: 2+ and symmetric all extremities. Skin:  No rashes or lesions   Neurologic: AAOx3, No focal motor or sensory deficit.        Labs Reviewed:  Recent Results (from the past 12 hour(s))   EKG, 12 LEAD, INITIAL    Collection Time: 17 10:52 AM   Result Value Ref Range    Ventricular Rate 79 BPM    Atrial Rate 79 BPM    P-R Interval 152 ms    QRS Duration 72 ms    Q-T Interval 364 ms    QTC Calculation (Bezet) 417 ms    Calculated P Axis 32 degrees    Calculated R Axis 23 degrees    Calculated T Axis -140 degrees    Diagnosis       Normal sinus rhythm  T wave abnormality, consider inferolateral ischemia  Abnormal ECG  When compared with ECG of 2017 12:39,  Incomplete right bundle branch block is no longer present  Confirmed by Jake Shirley MD, Ronald Nelson ([id number]) on 2017 12:17:17 PM     CBC WITH AUTOMATED DIFF    Collection Time: 12/12/17 11:35 AM   Result Value Ref Range    WBC 12.4 4.6 - 13.2 K/uL    RBC 3.27 (L) 4.70 - 5.50 M/uL    HGB 9.2 (L) 13.0 - 16.0 g/dL    HCT 30.5 (L) 36.0 - 48.0 %    MCV 93.3 74.0 - 97.0 FL    MCH 28.1 24.0 - 34.0 PG    MCHC 30.2 (L) 31.0 - 37.0 g/dL    RDW 15.9 (H) 11.6 - 14.5 %    PLATELET 757 033 - 847 K/uL    MPV 9.9 9.2 - 11.8 FL    NEUTROPHILS 72 40 - 73 %    LYMPHOCYTES 21 21 - 52 %    MONOCYTES 6 3 - 10 %    EOSINOPHILS 1 0 - 5 %    BASOPHILS 0 0 - 2 %    ABS. NEUTROPHILS 9.0 (H) 1.8 - 8.0 K/UL    ABS. LYMPHOCYTES 2.6 0.9 - 3.6 K/UL    ABS. MONOCYTES 0.8 0.05 - 1.2 K/UL    ABS. EOSINOPHILS 0.1 0.0 - 0.4 K/UL    ABS. BASOPHILS 0.0 0.0 - 0.06 K/UL    DF AUTOMATED     METABOLIC PANEL, BASIC    Collection Time: 12/12/17 11:35 AM   Result Value Ref Range    Sodium 135 (L) 136 - 145 mmol/L    Potassium 5.5 3.5 - 5.5 mmol/L    Chloride 96 (L) 100 - 108 mmol/L    CO2 31 21 - 32 mmol/L    Anion gap 8 3.0 - 18 mmol/L    Glucose 147 (H) 74 - 99 mg/dL    BUN 44 (H) 7.0 - 18 MG/DL    Creatinine 6.62 (H) 0.6 - 1.3 MG/DL    BUN/Creatinine ratio 7 (L) 12 - 20      GFR est AA 10 (L) >60 ml/min/1.73m2    GFR est non-AA 8 (L) >60 ml/min/1.73m2    Calcium 9.4 8.5 - 10.1 MG/DL   CARDIAC PANEL,(CK, CKMB & TROPONIN)    Collection Time: 12/12/17 11:35 AM   Result Value Ref Range     39 - 308 U/L    CK - MB 4.9 (H) <3.6 ng/ml    CK-MB Index 4.8 (H) 0.0 - 4.0 %    Troponin-I, Qt. 0.20 (H) 0.0 - 0.045 NG/ML         All lab results for the last 24 hours reviewed.   Recent Results (from the past 12 hour(s))   EKG, 12 LEAD, INITIAL    Collection Time: 12/12/17 10:52 AM   Result Value Ref Range    Ventricular Rate 79 BPM    Atrial Rate 79 BPM    P-R Interval 152 ms    QRS Duration 72 ms    Q-T Interval 364 ms    QTC Calculation (Bezet) 417 ms    Calculated P Axis 32 degrees    Calculated R Axis 23 degrees    Calculated T Axis -140 degrees    Diagnosis       Normal sinus rhythm  T wave abnormality, consider inferolateral ischemia  Abnormal ECG  When compared with ECG of 12-NOV-2017 12:39,  Incomplete right bundle branch block is no longer present  Confirmed by Deandra Velasquez MD, Shah Crew ([id number]) on 12/12/2017 12:17:17 PM     CBC WITH AUTOMATED DIFF    Collection Time: 12/12/17 11:35 AM   Result Value Ref Range    WBC 12.4 4.6 - 13.2 K/uL    RBC 3.27 (L) 4.70 - 5.50 M/uL    HGB 9.2 (L) 13.0 - 16.0 g/dL    HCT 30.5 (L) 36.0 - 48.0 %    MCV 93.3 74.0 - 97.0 FL    MCH 28.1 24.0 - 34.0 PG    MCHC 30.2 (L) 31.0 - 37.0 g/dL    RDW 15.9 (H) 11.6 - 14.5 %    PLATELET 103 316 - 648 K/uL    MPV 9.9 9.2 - 11.8 FL    NEUTROPHILS 72 40 - 73 %    LYMPHOCYTES 21 21 - 52 %    MONOCYTES 6 3 - 10 %    EOSINOPHILS 1 0 - 5 %    BASOPHILS 0 0 - 2 %    ABS. NEUTROPHILS 9.0 (H) 1.8 - 8.0 K/UL    ABS. LYMPHOCYTES 2.6 0.9 - 3.6 K/UL    ABS. MONOCYTES 0.8 0.05 - 1.2 K/UL    ABS. EOSINOPHILS 0.1 0.0 - 0.4 K/UL    ABS.  BASOPHILS 0.0 0.0 - 0.06 K/UL    DF AUTOMATED     METABOLIC PANEL, BASIC    Collection Time: 12/12/17 11:35 AM   Result Value Ref Range    Sodium 135 (L) 136 - 145 mmol/L    Potassium 5.5 3.5 - 5.5 mmol/L    Chloride 96 (L) 100 - 108 mmol/L    CO2 31 21 - 32 mmol/L    Anion gap 8 3.0 - 18 mmol/L    Glucose 147 (H) 74 - 99 mg/dL    BUN 44 (H) 7.0 - 18 MG/DL    Creatinine 6.62 (H) 0.6 - 1.3 MG/DL    BUN/Creatinine ratio 7 (L) 12 - 20      GFR est AA 10 (L) >60 ml/min/1.73m2    GFR est non-AA 8 (L) >60 ml/min/1.73m2    Calcium 9.4 8.5 - 10.1 MG/DL   CARDIAC PANEL,(CK, CKMB & TROPONIN)    Collection Time: 12/12/17 11:35 AM   Result Value Ref Range     39 - 308 U/L    CK - MB 4.9 (H) <3.6 ng/ml    CK-MB Index 4.8 (H) 0.0 - 4.0 %    Troponin-I, Qt. 0.20 (H) 0.0 - 0.045 NG/ML      and EKG    Procedures/imaging: see electronic medical records for all procedures/Xrays and details which were not copied into this note but were reviewed prior to creation of Plan      Assessment/Plan     Principal Problem:    Chest pain (10/27/2017)    Active Problems:    Coronary atherosclerosis of native coronary artery (10/7/2012)      CKD (chronic kidney disease) stage 4, GFR 15-29 ml/min ([facility]) (10/7/2012)      HTN (hypertension) (10/7/2012)      DM2 (diabetes mellitus, type 2) ([facility] (10/7/2012)      Dyslipidemia (10/7/2012)      S/P coronary artery stent placement (3/12/2013)      Overview: vg to diag stent 10/2012      Postsurgical aortocoronary bypass status (3/12/2013)      Sleep disturbance ()      Overview: APRYL, not using CPAP regularly      Mixed hyperlipidemia ()      Overview: HDL's are not at goal, LDL's are at goal, triglycerides are not at goal.      GERD (gastroesophageal reflux disease) (3/5/2015)      Prostate cancer ([facility] (4/29/2015)      Overview: 4/2015  T2c  PSA 57, Navid;8 in 2 cores, and G7 in 2,  CT and Bone Scan       no obvious mets      Diastolic CHF, acute on chronic ([facility] (7/5/2016)      ESRD (end stage renal disease) ([facility] (11/8/2017)      Hypotension (12/12/2017)      Fluid overload (12/12/2017)      PLan   Check cardiac enzymes  Cardiology input interms of diagnostic testing  Vascular surgery for am  NPO after Midgnight  Sliding scale insulin   Nephrology will dialyze today   All consults nephrology, cardio, and vascular called in ER        DVT/GI Prophylaxis: Hep SQ    Discussed with patient at bedside about hospital admission and my plan care, who understood and agree with my plan care.     Nery Cline MD  12/12/2017 3:26 PM

## 2017-12-12 NOTE — ED NOTES
Pt transported via stretcher from ED to 3. Upon leaving ED pt is A&Ox4, denies SOB and chest pain, is asymptomatic hypotensive, and is currently in no acute distress.  Pt transported via transport team.

## 2017-12-12 NOTE — PROGRESS NOTES
Came with anginal symptoms relived by Nitroglycerine tab SL, now 1st set of troponin is positive, 2nd set  Will be done in 4 hours, admission will depend based on 2nd set result, could not get dialysis this morning because of poorly functional catheter & chest pain. Now K is ok, no sign of volume overload. HD catheter needs to be changed. Recommend to keep overnight & change the HD cath & followed by HD.

## 2017-12-12 NOTE — ED NOTES
TRANSFER - OUT REPORT:    Verbal report given to Poli Bourne RN. (name) on Leander Fair  being transferred to  (unit) for cardiac monitoring, and continuous care. Report consisted of patients Situation, Background, Assessment and   Recommendations(SBAR). Information from the following report(s) SBAR, ED Summary and MAR was reviewed with the receiving nurse. Lines:   Peripheral IV 12/12/17 Right Antecubital (Active)   Site Assessment Clean, dry, & intact 12/12/2017 11:35 AM   Dressing Status Clean, dry, & intact 12/12/2017 11:35 AM        Opportunity for questions and clarification was provided.       Patient transported with:   Monitor  Registered Nurse

## 2017-12-13 ENCOUNTER — ANESTHESIA (OUTPATIENT)
Dept: CARDIAC CATH/INVASIVE PROCEDURES | Age: 77
DRG: 264 | End: 2017-12-13
Payer: MEDICARE

## 2017-12-13 ENCOUNTER — ANESTHESIA EVENT (OUTPATIENT)
Dept: CARDIAC CATH/INVASIVE PROCEDURES | Age: 77
DRG: 264 | End: 2017-12-13
Payer: MEDICARE

## 2017-12-13 ENCOUNTER — APPOINTMENT (OUTPATIENT)
Dept: CARDIAC CATH/INVASIVE PROCEDURES | Age: 77
DRG: 264 | End: 2017-12-13
Payer: MEDICARE

## 2017-12-13 PROBLEM — E87.5 HYPERKALEMIA: Status: ACTIVE | Noted: 2017-12-13

## 2017-12-13 LAB
ANION GAP SERPL CALC-SCNC: 10 MMOL/L (ref 3–18)
ANION GAP SERPL CALC-SCNC: 10 MMOL/L (ref 3–18)
APTT PPP: 25.8 SEC (ref 23–36.4)
ATRIAL RATE: 83 BPM
BASOPHILS # BLD: 0 K/UL (ref 0–0.06)
BASOPHILS NFR BLD: 1 % (ref 0–2)
BUN SERPL-MCNC: 47 MG/DL (ref 7–18)
BUN SERPL-MCNC: 47 MG/DL (ref 7–18)
BUN/CREAT SERPL: 7 (ref 12–20)
BUN/CREAT SERPL: 8 (ref 12–20)
CALCIUM SERPL-MCNC: 9 MG/DL (ref 8.5–10.1)
CALCIUM SERPL-MCNC: 9 MG/DL (ref 8.5–10.1)
CALCULATED P AXIS, ECG09: 36 DEGREES
CALCULATED R AXIS, ECG10: -3 DEGREES
CALCULATED T AXIS, ECG11: 96 DEGREES
CHLORIDE SERPL-SCNC: 101 MMOL/L (ref 100–108)
CHLORIDE SERPL-SCNC: 98 MMOL/L (ref 100–108)
CK MB CFR SERPL CALC: 4.5 % (ref 0–4)
CK MB CFR SERPL CALC: 5.6 % (ref 0–4)
CK MB SERPL-MCNC: 2.9 NG/ML (ref 5–25)
CK MB SERPL-MCNC: 3.3 NG/ML (ref 5–25)
CK SERPL-CCNC: 59 U/L (ref 39–308)
CK SERPL-CCNC: 65 U/L (ref 39–308)
CO2 SERPL-SCNC: 26 MMOL/L (ref 21–32)
CO2 SERPL-SCNC: 29 MMOL/L (ref 21–32)
CREAT SERPL-MCNC: 6.16 MG/DL (ref 0.6–1.3)
CREAT SERPL-MCNC: 6.5 MG/DL (ref 0.6–1.3)
DIAGNOSIS, 93000: NORMAL
DIFFERENTIAL METHOD BLD: ABNORMAL
EOSINOPHIL # BLD: 0.1 K/UL (ref 0–0.4)
EOSINOPHIL NFR BLD: 2 % (ref 0–5)
ERYTHROCYTE [DISTWIDTH] IN BLOOD BY AUTOMATED COUNT: 16.2 % (ref 11.6–14.5)
GLUCOSE BLD STRIP.AUTO-MCNC: 105 MG/DL (ref 70–110)
GLUCOSE BLD STRIP.AUTO-MCNC: 118 MG/DL (ref 70–110)
GLUCOSE BLD STRIP.AUTO-MCNC: 170 MG/DL (ref 70–110)
GLUCOSE BLD STRIP.AUTO-MCNC: 99 MG/DL (ref 70–110)
GLUCOSE SERPL-MCNC: 100 MG/DL (ref 74–99)
GLUCOSE SERPL-MCNC: 146 MG/DL (ref 74–99)
HCT VFR BLD AUTO: 32.1 % (ref 36–48)
HGB BLD-MCNC: 9.7 G/DL (ref 13–16)
INR PPP: 1.1 (ref 0.8–1.2)
LACTATE SERPL-SCNC: 0.7 MMOL/L (ref 0.4–2)
LYMPHOCYTES # BLD: 2.5 K/UL (ref 0.9–3.6)
LYMPHOCYTES NFR BLD: 30 % (ref 21–52)
MAGNESIUM SERPL-MCNC: 2.6 MG/DL (ref 1.6–2.6)
MCH RBC QN AUTO: 28.4 PG (ref 24–34)
MCHC RBC AUTO-ENTMCNC: 30.2 G/DL (ref 31–37)
MCV RBC AUTO: 93.9 FL (ref 74–97)
MONOCYTES # BLD: 0.7 K/UL (ref 0.05–1.2)
MONOCYTES NFR BLD: 8 % (ref 3–10)
NEUTS SEG # BLD: 4.9 K/UL (ref 1.8–8)
NEUTS SEG NFR BLD: 59 % (ref 40–73)
P-R INTERVAL, ECG05: 148 MS
PHOSPHATE SERPL-MCNC: 5.3 MG/DL (ref 2.5–4.9)
PLATELET # BLD AUTO: 308 K/UL (ref 135–420)
PMV BLD AUTO: 10.2 FL (ref 9.2–11.8)
POTASSIUM SERPL-SCNC: 5.6 MMOL/L (ref 3.5–5.5)
POTASSIUM SERPL-SCNC: 5.7 MMOL/L (ref 3.5–5.5)
PROTHROMBIN TIME: 13.5 SEC (ref 11.5–15.2)
Q-T INTERVAL, ECG07: 372 MS
QRS DURATION, ECG06: 96 MS
QTC CALCULATION (BEZET), ECG08: 437 MS
RBC # BLD AUTO: 3.42 M/UL (ref 4.7–5.5)
SODIUM SERPL-SCNC: 137 MMOL/L (ref 136–145)
SODIUM SERPL-SCNC: 137 MMOL/L (ref 136–145)
TROPONIN I SERPL-MCNC: 0.08 NG/ML (ref 0–0.04)
TROPONIN I SERPL-MCNC: 0.1 NG/ML (ref 0–0.04)
VENTRICULAR RATE, ECG03: 83 BPM
WBC # BLD AUTO: 8.2 K/UL (ref 4.6–13.2)

## 2017-12-13 PROCEDURE — 83735 ASSAY OF MAGNESIUM: CPT | Performed by: NURSE PRACTITIONER

## 2017-12-13 PROCEDURE — 85730 THROMBOPLASTIN TIME PARTIAL: CPT | Performed by: NURSE PRACTITIONER

## 2017-12-13 PROCEDURE — 85610 PROTHROMBIN TIME: CPT | Performed by: INTERNAL MEDICINE

## 2017-12-13 PROCEDURE — 74011250636 HC RX REV CODE- 250/636: Performed by: INTERNAL MEDICINE

## 2017-12-13 PROCEDURE — C1769 GUIDE WIRE: HCPCS

## 2017-12-13 PROCEDURE — 74011250636 HC RX REV CODE- 250/636

## 2017-12-13 PROCEDURE — 80048 BASIC METABOLIC PNL TOTAL CA: CPT | Performed by: NURSE PRACTITIONER

## 2017-12-13 PROCEDURE — 36415 COLL VENOUS BLD VENIPUNCTURE: CPT | Performed by: SURGERY

## 2017-12-13 PROCEDURE — 93005 ELECTROCARDIOGRAM TRACING: CPT

## 2017-12-13 PROCEDURE — 5A1D70Z PERFORMANCE OF URINARY FILTRATION, INTERMITTENT, LESS THAN 6 HOURS PER DAY: ICD-10-PCS | Performed by: INTERNAL MEDICINE

## 2017-12-13 PROCEDURE — 36558 INSERT TUNNELED CV CATH: CPT

## 2017-12-13 PROCEDURE — 74011250637 HC RX REV CODE- 250/637

## 2017-12-13 PROCEDURE — 76060000032 HC ANESTHESIA 0.5 TO 1 HR

## 2017-12-13 PROCEDURE — 74011250637 HC RX REV CODE- 250/637: Performed by: INTERNAL MEDICINE

## 2017-12-13 PROCEDURE — 82550 ASSAY OF CK (CPK): CPT | Performed by: SURGERY

## 2017-12-13 PROCEDURE — 84100 ASSAY OF PHOSPHORUS: CPT | Performed by: SURGERY

## 2017-12-13 PROCEDURE — 02HV33Z INSERTION OF INFUSION DEVICE INTO SUPERIOR VENA CAVA, PERCUTANEOUS APPROACH: ICD-10-PCS | Performed by: SURGERY

## 2017-12-13 PROCEDURE — 77030002986 HC SUT PROL J&J -A

## 2017-12-13 PROCEDURE — 74011250637 HC RX REV CODE- 250/637: Performed by: NURSE PRACTITIONER

## 2017-12-13 PROCEDURE — 83605 ASSAY OF LACTIC ACID: CPT

## 2017-12-13 PROCEDURE — 77010033678 HC OXYGEN DAILY

## 2017-12-13 PROCEDURE — 74011250636 HC RX REV CODE- 250/636: Performed by: SURGERY

## 2017-12-13 PROCEDURE — 90935 HEMODIALYSIS ONE EVALUATION: CPT

## 2017-12-13 PROCEDURE — 80048 BASIC METABOLIC PNL TOTAL CA: CPT | Performed by: SURGERY

## 2017-12-13 PROCEDURE — 82962 GLUCOSE BLOOD TEST: CPT

## 2017-12-13 PROCEDURE — C1750 CATH, HEMODIALYSIS,LONG-TERM: HCPCS

## 2017-12-13 PROCEDURE — 65660000000 HC RM CCU STEPDOWN

## 2017-12-13 PROCEDURE — 77030018719 HC DRSG PTCH ANTIMIC J&J -A

## 2017-12-13 PROCEDURE — 74011000250 HC RX REV CODE- 250: Performed by: SURGERY

## 2017-12-13 PROCEDURE — 85025 COMPLETE CBC W/AUTO DIFF WBC: CPT | Performed by: SURGERY

## 2017-12-13 PROCEDURE — 74011250636 HC RX REV CODE- 250/636: Performed by: NURSE PRACTITIONER

## 2017-12-13 RX ORDER — DEXTROSE MONOHYDRATE AND SODIUM CHLORIDE 5; .225 G/100ML; G/100ML
25 INJECTION, SOLUTION INTRAVENOUS CONTINUOUS
Status: CANCELLED | OUTPATIENT
Start: 2017-12-13

## 2017-12-13 RX ORDER — HEPARIN SODIUM 1000 [USP'U]/ML
INJECTION, SOLUTION INTRAVENOUS; SUBCUTANEOUS
Status: DISPENSED
Start: 2017-12-13 | End: 2017-12-14

## 2017-12-13 RX ORDER — HEPARIN SODIUM 1000 [USP'U]/ML
1500 INJECTION, SOLUTION INTRAVENOUS; SUBCUTANEOUS ONCE
Status: COMPLETED | OUTPATIENT
Start: 2017-12-13 | End: 2017-12-13

## 2017-12-13 RX ORDER — MAGNESIUM SULFATE 100 %
4 CRYSTALS MISCELLANEOUS AS NEEDED
Status: CANCELLED | OUTPATIENT
Start: 2017-12-13

## 2017-12-13 RX ORDER — FENTANYL CITRATE 50 UG/ML
25 INJECTION, SOLUTION INTRAMUSCULAR; INTRAVENOUS AS NEEDED
Status: CANCELLED | OUTPATIENT
Start: 2017-12-13

## 2017-12-13 RX ORDER — NITROGLYCERIN 0.4 MG/1
TABLET SUBLINGUAL
Status: COMPLETED
Start: 2017-12-13 | End: 2017-12-13

## 2017-12-13 RX ORDER — DIPHENHYDRAMINE HYDROCHLORIDE 50 MG/ML
12.5 INJECTION, SOLUTION INTRAMUSCULAR; INTRAVENOUS
Status: CANCELLED | OUTPATIENT
Start: 2017-12-13

## 2017-12-13 RX ORDER — HEPARIN SODIUM 5000 [USP'U]/ML
10000 INJECTION, SOLUTION INTRAVENOUS; SUBCUTANEOUS ONCE
Status: COMPLETED | OUTPATIENT
Start: 2017-12-13 | End: 2017-12-13

## 2017-12-13 RX ORDER — LIDOCAINE HYDROCHLORIDE 10 MG/ML
30 INJECTION, SOLUTION EPIDURAL; INFILTRATION; INTRACAUDAL; PERINEURAL
Status: DISCONTINUED | OUTPATIENT
Start: 2017-12-13 | End: 2017-12-13 | Stop reason: HOSPADM

## 2017-12-13 RX ORDER — HEPARIN SODIUM 1000 [USP'U]/ML
41.2 INJECTION, SOLUTION INTRAVENOUS; SUBCUTANEOUS ONCE
Status: COMPLETED | OUTPATIENT
Start: 2017-12-13 | End: 2017-12-13

## 2017-12-13 RX ORDER — LOSARTAN POTASSIUM 50 MG/1
50 TABLET ORAL DAILY
Status: DISCONTINUED | OUTPATIENT
Start: 2017-12-13 | End: 2017-12-14 | Stop reason: HOSPADM

## 2017-12-13 RX ORDER — METOPROLOL TARTRATE 50 MG/1
50 TABLET ORAL 2 TIMES DAILY
Status: DISCONTINUED | OUTPATIENT
Start: 2017-12-13 | End: 2017-12-13

## 2017-12-13 RX ORDER — DOXERCALCIFEROL 4 UG/2ML
1 INJECTION INTRAVENOUS
Status: DISCONTINUED | OUTPATIENT
Start: 2017-12-16 | End: 2017-12-14 | Stop reason: HOSPADM

## 2017-12-13 RX ORDER — HYDROCODONE BITARTRATE AND ACETAMINOPHEN 5; 325 MG/1; MG/1
1-2 TABLET ORAL
Qty: 12 TAB | Refills: 0 | Status: ON HOLD | OUTPATIENT
Start: 2017-12-13 | End: 2018-03-09

## 2017-12-13 RX ORDER — PROPOFOL 10 MG/ML
INJECTION, EMULSION INTRAVENOUS
Status: DISCONTINUED | OUTPATIENT
Start: 2017-12-13 | End: 2017-12-13 | Stop reason: HOSPADM

## 2017-12-13 RX ORDER — DEXTROSE 50 % IN WATER (D50W) INTRAVENOUS SYRINGE
25-50 AS NEEDED
Status: CANCELLED | OUTPATIENT
Start: 2017-12-13

## 2017-12-13 RX ORDER — HEPARIN SODIUM 10000 [USP'U]/100ML
10.5-25 INJECTION, SOLUTION INTRAVENOUS
Status: DISCONTINUED | OUTPATIENT
Start: 2017-12-13 | End: 2017-12-14 | Stop reason: HOSPADM

## 2017-12-13 RX ORDER — INSULIN LISPRO 100 [IU]/ML
INJECTION, SOLUTION INTRAVENOUS; SUBCUTANEOUS
Status: DISCONTINUED | OUTPATIENT
Start: 2017-12-13 | End: 2017-12-14 | Stop reason: HOSPADM

## 2017-12-13 RX ORDER — SODIUM CHLORIDE 0.9 % (FLUSH) 0.9 %
5-10 SYRINGE (ML) INJECTION AS NEEDED
Status: CANCELLED | OUTPATIENT
Start: 2017-12-13

## 2017-12-13 RX ORDER — INSULIN LISPRO 100 [IU]/ML
INJECTION, SOLUTION INTRAVENOUS; SUBCUTANEOUS ONCE
Status: CANCELLED | OUTPATIENT
Start: 2017-12-13 | End: 2017-12-13

## 2017-12-13 RX ORDER — ONDANSETRON 2 MG/ML
4 INJECTION INTRAMUSCULAR; INTRAVENOUS ONCE
Status: CANCELLED | OUTPATIENT
Start: 2017-12-13 | End: 2017-12-13

## 2017-12-13 RX ORDER — METOPROLOL TARTRATE 50 MG/1
50 TABLET ORAL 2 TIMES DAILY
Status: DISCONTINUED | OUTPATIENT
Start: 2017-12-13 | End: 2017-12-14 | Stop reason: HOSPADM

## 2017-12-13 RX ORDER — AMLODIPINE BESYLATE 5 MG/1
5 TABLET ORAL DAILY
Status: DISCONTINUED | OUTPATIENT
Start: 2017-12-13 | End: 2017-12-14 | Stop reason: HOSPADM

## 2017-12-13 RX ORDER — HEPARIN SODIUM 200 [USP'U]/100ML
500 INJECTION, SOLUTION INTRAVENOUS ONCE
Status: COMPLETED | OUTPATIENT
Start: 2017-12-13 | End: 2017-12-13

## 2017-12-13 RX ADMIN — HEPARIN SODIUM 4000 UNITS: 1000 INJECTION, SOLUTION INTRAVENOUS; SUBCUTANEOUS at 20:16

## 2017-12-13 RX ADMIN — ASPIRIN 81 MG 81 MG: 81 TABLET ORAL at 12:15

## 2017-12-13 RX ADMIN — LIDOCAINE HYDROCHLORIDE 6 ML: 10 INJECTION, SOLUTION EPIDURAL; INFILTRATION; INTRACAUDAL; PERINEURAL at 10:31

## 2017-12-13 RX ADMIN — HEPARIN SODIUM AND DEXTROSE 1019.55 UNITS/HR: 10000; 5 INJECTION INTRAVENOUS at 20:19

## 2017-12-13 RX ADMIN — METOPROLOL TARTRATE 50 MG: 50 TABLET ORAL at 20:18

## 2017-12-13 RX ADMIN — PROPOFOL 30 MCG/KG/MIN: 10 INJECTION, EMULSION INTRAVENOUS at 10:11

## 2017-12-13 RX ADMIN — HEPARIN SODIUM IN SODIUM CHLORIDE 1000 UNITS: 200 INJECTION INTRAVENOUS at 10:16

## 2017-12-13 RX ADMIN — HEPARIN SODIUM 1500 UNITS: 1000 INJECTION INTRAVENOUS; SUBCUTANEOUS at 16:45

## 2017-12-13 RX ADMIN — CLOPIDOGREL BISULFATE 75 MG: 75 TABLET ORAL at 12:15

## 2017-12-13 RX ADMIN — NITROGLYCERIN 0.4 MG: 0.4 TABLET SUBLINGUAL at 15:00

## 2017-12-13 RX ADMIN — HEPARIN SODIUM 10000 UNITS: 5000 INJECTION, SOLUTION INTRAVENOUS; SUBCUTANEOUS at 10:35

## 2017-12-13 RX ADMIN — SIMVASTATIN 40 MG: 40 TABLET, FILM COATED ORAL at 22:57

## 2017-12-13 NOTE — ROUTINE PROCESS
TRANSFER - OUT REPORT:    Verbal report given to Griffin Bender RN(name) on Michelle Walker  being transferred to (unit) for routine progression of care       Report consisted of patients Situation, Background, Assessment and   Recommendations(SBAR). Information from the following report(s) SBAR, Kardex, Procedure Summary, MAR, Recent Results and Cardiac Rhythm NSR was reviewed with the receiving nurse. Lines:   Peripheral IV 12/12/17 Right Antecubital (Active)   Site Assessment Clean, dry, & intact 12/13/2017  4:00 AM   Phlebitis Assessment 0 12/13/2017  4:00 AM   Infiltration Assessment 0 12/13/2017  4:00 AM   Dressing Status Clean, dry, & intact 12/13/2017  4:00 AM   Dressing Type Tape;Transparent 12/13/2017  4:00 AM   Hub Color/Line Status Pink 12/13/2017  4:00 AM   Alcohol Cap Used Yes 12/12/2017  4:46 PM        Opportunity for questions and clarification was provided. Patient transported with:   Registered Nurse        Pt surgical site assessed at bedside with this writer and Griffin Bender RN. C/d/i.

## 2017-12-13 NOTE — ROUTINE PROCESS
Bedside and Verbal shift change report given by Elisabeth Teixeira RN (offgoing nurse). Report included the following information SBAR, Kardex, MAR and Recent Results      Patient resting in bed. Denies pain or needs. Call bell in reach. Patient NPO for Catheter revision.     0830 Pt off floor for procedure. 1100 Report from Cath, procedure completed, HR 74 ECG SR /51; will go to dialysis after 1500 today. 12 Pt reported having \"buring, pressure,\" in chest similar to the pain that brought him to the ED on 12/12. Pt stated pain 6/10 with some DARBY. Pt just had BM and ambulated back to chair. RRT called. See RRT notes. 1600 Consultation with Dr. Kerwin Andrea and Dr. Domenic Knutson, decision [facility] [address] [facility] so patient can continue to be ECG monitored. Dialysis Charge Nurse contacted and arrangements made. 1645 Dialysis on floor to perform dialysis, plan 3 hours at bed side. 1925 Bedside and Verbal shift change report given to Greta Mcburney, RN (oncoming nurse). Report included the following information SBAR, Kardex, MAR and Recent Results      Patient resting in bed. Denies pain or needs. Call bell in reach. .

## 2017-12-13 NOTE — ANESTHESIA POSTPROCEDURE EVALUATION
Post-Anesthesia Evaluation and Assessment    Patient: Pia Servin MRN: [id number]  SSN: [id number]    YOB: 1940  Age: 68 y.o. Sex: male       Cardiovascular Function/Vital Signs  Visit Vitals    /78    Pulse 73    Temp 36.8 °C (98.2 °F)    Resp 15    Ht 5' 8.5\" (1.74 m)    Wt 97.1 kg (214 lb)    SpO2 99%    BMI 32.07 kg/m2       Patient is status post MAC anesthesia for * No procedures listed *. Nausea/Vomiting: None    Postoperative hydration reviewed and adequate. Pain:  Pain Scale 1: Numeric (0 - 10) (12/13/17 0912)  Pain Intensity 1: 0 (12/13/17 0912)   Managed    Neurological Status: At baseline    Mental Status and Level of Consciousness: Alert and oriented     Pulmonary Status:   O2 Device: Room air (12/13/17 1043)   Adequate oxygenation and airway patent    Complications related to anesthesia: None    Post-anesthesia assessment completed.  No concerns    Signed By: Tessy Ortega CRNA     December 13, 2017

## 2017-12-13 NOTE — PROGRESS NOTES
Attempted to complete initial assessment, unable due to rapid response. Will attempt again tomorrow.

## 2017-12-13 NOTE — ROUTINE PROCESS
SBAR: Received telephone report from JESSI Freeman from ED. Report included the following information SBAR, Kardex, MAR and Recent Results      1515 Received patient onto unit, assisted to stretcher, wt obtained 77.3 KG, pt ambulated to bed with cane very steady gait. Pt denied any daniels or chest pain.      1600 AccuCheck done, BS 53, pt alert and orientated. Given two orange juices, evening tray on way. 2000 Bedside and Verbal shift change report given to Nuria Giraldo RN   (oncoming nurse). Report included the following information SBAR, Kardex, MAR and Recent Results      Patient resting in bed. Denies pain or needs. Call bell in reach. .

## 2017-12-13 NOTE — PROGRESS NOTES
Pateint seen and evaluated by Dr. Shan Trimble. Rapid response called for chest pain. CP improved after NTGx1. Will add Norvasc 5 mg daily and Heparin drip. Follow serial cardiac enzymes plans for possible NUC stress test vs Cardiac cath tomorrow. NPO after midnight. Patient seen independently  Discussed the details with NP, residents and patient.  Please see orders & recommendations  Alicia Jack MD

## 2017-12-13 NOTE — PROGRESS NOTES
[facility]      CARDIOLOGY PROGRESS NOTE  RECS:  1. Chest pain- resolved. Negative troponin and follow EKG. 2.    CAD with recent PCI with HERBER in [place] to diagonal in 11/17 - will continue with Plavix and asa. 3.    Hypotension- resolved. Now high BP.restart BP medications for now per orders. 4.    Hx of coronary artery stent placement in 2012 and 7/17 with HERBER stents deployed in [place]. 5.    Chronic diastolic heart failure- appears compensated- monitor for fluid overload.               6. Postsurgical aortocoronary bypass status  in 1997                                     7. Hyperlipidemia- on statin and Tricor                                                            8. ESRD on HD  Renal following   9. Diabetes  10. Poorly working HD catheter-s/p exchange      Ok for discharge from cardiac view if rpt ekg without new changes.   Follow up in office in 2-3 wks       ASSESSMENT:  Hospital Problems  Date Reviewed: 11/8/2017          Codes Class Noted POA    Hypotension ICD-10-CM: I95.9  ICD-9-CM: 458.9  12/12/2017 Unknown        Fluid overload ICD-10-CM: E87.70  ICD-9-CM: 276.69  12/12/2017 Unknown        ESRD (end stage renal disease) ([facility] ICD-10-CM: N18.6  ICD-9-CM: 585.6  11/8/2017 Yes        * (Principal)Chest pain ICD-10-CM: R07.9  ICD-9-CM: 786.50  10/27/2017 Unknown        Diastolic CHF, acute on chronic ([facility] ICD-10-CM: I50.33  ICD-9-CM: 428.33, 428.0  7/5/2016 Yes        Prostate cancer ([facility] ICD-10-CM: C61  ICD-9-CM: 211  4/29/2015 Yes    Overview Addendum 5/6/2015  4:32 PM by Lucian Mariano MD     4/2015  T2c  PSA 57, [place];8 in 2 cores, and G7 in 2,  CT and Bone Scan no obvious mets             GERD (gastroesophageal reflux disease) ICD-10-CM: K21.9  ICD-9-CM: 530.81  3/5/2015 Yes        Sleep disturbance ICD-10-CM: G47.9  ICD-9-CM: 780.50  Unknown Yes    Overview Signed 4/29/2013  3:11 PM by Thalia Vinson     APRYL, not using CPAP regularly Mixed hyperlipidemia ICD-10-CM: E78.2  ICD-9-CM: 272.2  Unknown Yes    Overview Signed 4/29/2013  3:11 PM by Gaetano Hernandez     HDL's are not at goal, LDL's are at goal, triglycerides are not at goal.             S/P coronary artery stent placement ICD-10-CM: Z95.5  ICD-9-CM: V45.82  3/12/2013 Yes    Overview Signed 3/12/2013  3:37 PM by Jose Angel MD     vg to diag stent 10/2012             Postsurgical aortocoronary bypass status ICD-10-CM: Z95.1  ICD-9-CM: V45.81  3/12/2013 Yes        Coronary atherosclerosis of native coronary artery ICD-10-CM: I25.10  ICD-9-CM: 414.01  10/7/2012 Yes        CKD (chronic kidney disease) stage 4, GFR 15-29 ml/min ([facility]) ICD-10-CM: N18.4  ICD-9-CM: 585.4  10/7/2012 Yes        HTN (hypertension) ICD-10-CM: I10  ICD-9-CM: 401.9  10/7/2012 Yes        DM2 (diabetes mellitus, type 2) ([facility] ICD-10-CM: E11.9  ICD-9-CM: 250.00  10/7/2012 Yes        Dyslipidemia ICD-10-CM: I00.8  ICD-9-CM: 272.4  10/7/2012 Yes                SUBJECTIVE:  No CP  No SOB    OBJECTIVE:    VS:   Visit Vitals    /70    Pulse 76    Temp 98.2 °F (36.8 °C)    Resp 14    Ht 5' 8.5\" (1.74 m)    Wt 97.1 kg (214 lb)    SpO2 96%    BMI 32.07 kg/m2         Intake/Output Summary (Last 24 hours) at 12/13/17 1318  Last data filed at 12/13/17 1312   Gross per 24 hour   Intake              540 ml   Output                2 ml   Net              538 ml     TELE: normal sinus rhythm    General: alert and in no apparent distress  HENT: Normocephalic, atraumatic. Normal external eye.   Neck :  no bruit, JVD difficult to assess due to obesity  Cardiac:  regular rate and rhythm  Chest/Lungs:chest clear, no wheezing, rales, normal symmetric air entry  Abdomen: Soft, nontender, no masses  Extremities:  No c/c/edema, peripheral pulses present      Labs: Results:       Chemistry Recent Labs      12/13/17   0750  12/12/17   1135   GLU  100*  147*   NA  137  135*   K  5.6*  5.5   CL  98*  96*   CO2  29  31   BUN  47* 44*   CREA  6.50*  6.62*   CA  9.0  9.4   PHOS  5.3*   --    AGAP  10  8   BUCR  7*  7*      CBC w/Diff Recent Labs      12/13/17   0750  12/12/17   1135   WBC  8.2  12.4   RBC  3.42*  3.27*   HGB  9.7*  9.2*   HCT  32.1*  30.5*   PLT  308  315   GRANS  59  72   LYMPH  30  21   EOS  2  1      Cardiac Enzymes Recent Labs      12/13/17   0750  12/12/17   2145   CPK  59  76   CKND1  5.6*  5.0*      Coagulation No results for input(s): PTP, INR, APTT in the last 72 hours. No lab exists for component: INREXT    Lipid Panel Lab Results   Component Value Date/Time    Cholesterol, total 158 10/24/2017 05:08 AM    HDL Cholesterol 39 10/24/2017 05:08 AM    LDL, calculated 72.4 10/24/2017 05:08 AM    VLDL, calculated 46.6 10/24/2017 05:08 AM    Triglyceride 233 10/24/2017 05:08 AM    CHOL/HDL Ratio 4.1 10/24/2017 05:08 AM      BNP No results for input(s): BNPP in the last 72 hours. Liver Enzymes No results for input(s): TP, ALB, TBIL, AP, SGOT, GPT in the last 72 hours.     No lab exists for component: DBIL   Digoxin    Thyroid Studies Lab Results   Component Value Date/Time    T4, Total 10.9 10/09/2012 04:53 AM    TSH 2.16 10/23/2017 11:08 PM              Javier Espino MD   Pager # [phone]

## 2017-12-13 NOTE — PROGRESS NOTES
I checked in with the team, and they did not need my support. I stayed for several minutes to make sure all was well, then I exited.      [address] [place] [facility]\[address], M.Div, CPE Resident   Pager: [phone]  Phone: [phone]

## 2017-12-13 NOTE — DIALYSIS
ACUTE HEMODIALYSIS FLOW SHEET    HEMODIALYSIS ORDERS: Physician: Jo Ann Milligan   Dialyzer:  Revaclear    Duration:  3 hr  BFR: 350 DFR: 600   Dialysate:  Temp   37 K+ 2   Ca+  2.5 Na 140 Bicarb 35   Weight:      Bed Scale []     Unable to Obtain [x]        Dry weight/UF Goal: 2000 mL   Access: CVC  Needle Gauge N/A   Heparin [x]  Bolus   1500   Units    [x] Hourly   500    Units    []None     Catheter locking solution    Pre BP: 150/67   Pulse:  76 Temperature: 98.5   Respirations: 12Tx: NS       ml/Bolus  Other        [x] N/A   Labs: Pre       Post:        [x] N/A    Additional Orders(medications, blood products, hypotension management):       [x] N/A     [x] Time Out/Safety Check  [x] [facility] Consent Verified     CATHETER ACCESS: []N/A   [x]Right   []Left   []IJ     []Fem   [] First use X-ray verified     [x]Tunnel                [] Non Tunneled   [x]No S/S infection  [x]Redness  []Drainage []Cultured []Swelling []Pain   [x]Medical Aseptic Prep Utilized   [x]Dressing Changed  [x] Biopatch  Date: 12/13/17   []Clotted   [x]Patent   Flows: [x]Good  []Poor  []Reversed   If access problem,  notified: []Yes    [x]N/A  Date:           GRAFT/FISTULA ACCESS:  [x]N/A     []Right     [x]Left     []UE     []LE   []AVG   []AVF        []Buttonhole    []Medical Aseptic Prep Utilized   []No S/S infection  []Redness  []Drainage []Cultured []Swelling []Pain    Bruit:   [x] Strong    [] Weak       Thrill :   [x] Strong    [] Weak       Needle Gauge:   Length:     If access problem,  notified: []Yes     [x]N/A  Date:        Please describe access if present and not used:       GENERAL ASSESSMENT:    LUNGS:  Rate  12 SaO2 100%   [] N/A    [x] Clear  [] Coarse  [] Crackles  [] Wheezing        [x] Diminished     Location : []RLL   []LLL    [x]RUL  [x]NOMI   Cough: []Productive  [x]Dry  []N/A   Respirations:  [x]Easy  []Labored   Therapy:  []RA  [x]NC 2 l/min    Mask: []NRB []Venti       O2%                  []Ventilator  []Intubated  [] Trach  [] BiPaP   CARDIAC: []Regular      [x] Irregular   [] Pericardial Rub  [] JVD        []  Monitored  [x] Bedside  [] Remotely monitored [] N/A  Rhythm:    EDEMA: [] None  [x]Generalized  [] Pitting [] 1    [] 2    [] 3    [] 4                 [x] Facial  [x] Pedal  [x]  UE  [x] LE   SKIN:   [x] Warm  [] Hot     [] Cold   [x] Dry     [] Pale   [] Diaphoretic                  [] Flushed  [] Jaundiced  [] Cyanotic  [] Rash  [] Weeping   LOC:    [x] Alert      [x]Oriented:    [] Person     [] Place  []Time               [] Confused  [] Lethargic  [] Medicated  [] Non-responsive     GI / ABDOMEN:  [] Flat    [] Distended    [] Soft    [] Firm   [x]  Obese                             [] Diarrhea  [x] Bowel Sounds  [] Nausea  [] Vomiting       / URINE ASSESSMENT:[] Voiding   [x] Oliguria  [] Anuria   []  Xiao     [] Incontinent    []  Incontinent Brief      []  Bathroom Privileges     PAIN: [x] 0 []1  []2   []3   []4   []5   []6   []7   []8   []9   []10            Scale 0-10  Action/Follow Up:    MOBILITY:  [] Amb    [x] Amb/Assist/Recliner   [] Bed    [] Wheelchair  [] Stretcher      All Vitals and Treatment Details on Attached [facility] [place]:  MV  Room # 354   [] 1st Time Acute  [] Stat  [x] Routine  [] Urgent     [] Acute Room  [x]  Bedside  [] ICU/CCU  [] ER   Isolation Precautions:  [x] Dialysis   [] Airborne   [] Contact    [] Reverse   Special Considerations:         [] Blood Consent Verified [x]N/A     ALLERGIES:  NKA   Code Status:  [x] Full Code  [] DNR  [] Other           HBsAg ONLY: Date Drawn  11/27/17      [x]Negative []Positive []Unknown   HBsAb:11/27/17   [x] Susceptible   [] [id number] []Not Drawn  [] Drawn     Current Labs:    Date of Labs: Today [x]     Results for Caron Bolton (MRN [id number]) as of 12/13/2017 18:10   Ref.  Range 12/13/2017 07:50   WBC Latest Ref Range: 4.6 - 13.2 K/uL 8.2   RBC Latest Ref Range: 4.70 - 5.50 M/uL 3.42 (L)   HGB Latest Ref Range: 13.0 - 16.0 g/dL 9.7 (L)   HCT Latest Ref Range: 36.0 - 48.0 % 32.1 (L)   MCV Latest Ref Range: 74.0 - 97.0 FL 93.9   MCH Latest Ref Range: 24.0 - 34.0 PG 28.4   MCHC Latest Ref Range: 31.0 - 37.0 g/dL 30.2 (L)   RDW Latest Ref Range: 11.6 - 14.5 % 16.2 (H)   PLATELET Latest Ref Range: 135 - 420 K/uL 308   MPV Latest Ref Range: 9.2 - 11.8 FL 10.2   NEUTROPHILS Latest Ref Range: 40 - 73 % 59   LYMPHOCYTES Latest Ref Range: 21 - 52 % 30   MONOCYTES Latest Ref Range: 3 - 10 % 8   EOSINOPHILS Latest Ref Range: 0 - 5 % 2   BASOPHILS Latest Ref Range: 0 - 2 % 1   DF Latest Units:   AUTOMATED   ABS. NEUTROPHILS Latest Ref Range: 1.8 - 8.0 K/UL 4.9   ABS. LYMPHOCYTES Latest Ref Range: 0.9 - 3.6 K/UL 2.5   ABS. MONOCYTES Latest Ref Range: 0.05 - 1.2 K/UL 0.7   ABS. EOSINOPHILS Latest Ref Range: 0.0 - 0.4 K/UL 0.1   ABS. BASOPHILS Latest Ref Range: 0.0 - 0.06 K/UL 0.0      Results for Lindsey Medina (MRN [id number]) as of 12/13/2017 18:10   Ref. Range 12/13/2017 14:51 12/13/2017 16:00   Sodium Latest Ref Range: 136 - 145 mmol/L 137    Potassium Latest Ref Range: 3.5 - 5.5 mmol/L 5.7 (H)    Chloride Latest Ref Range: 100 - 108 mmol/L 101    CO2 Latest Ref Range: 21 - 32 mmol/L 26    Anion gap Latest Ref Range: 3.0 - 18 mmol/L 10    Glucose Latest Ref Range: 74 - 99 mg/dL 146 (H)    BUN Latest Ref Range: 7.0 - 18 MG/DL 47 (H)    Creatinine Latest Ref Range: 0.6 - 1.3 MG/DL 6.16 (H)    BUN/Creatinine ratio Latest Ref Range: 12 - 20   8 (L)    Calcium Latest Ref Range: 8.5 - 10.1 MG/DL 9.0    Magnesium Latest Ref Range: 1.6 - 2.6 mg/dL 2.6    GFR est non-AA Latest Ref Range: >60 ml/min/1.73m2 9 (L)    GFR est AA Latest Ref Range: >60 ml/min/1.73m2 11 (L)    Lactic acid Latest Ref Range: 0.4 - 2.0 MMOL/L  0.7   CK Latest Ref Range: 39 - 308 U/L 65    CK-MB Index Latest Ref Range: 0.0 - 4.0 % 4.5 (H)    CK - MB Latest Ref Range: <3.6 ng/ml 2.9    Troponin-I, Qt.  Latest Ref Range: 0.0 - 0.045 NG/ML 0.08 (H) DIET:  [] Renal    [] Other     [] NPO     [x]  Diabetic      PRIMARY NURSE REPORT: First initial/Last name/Title      Pre Dialysis:   G./Membreno/RN     EDUCATION:    [x] Patient [] Other         Knowledge Basis: []None [x]Minimal [] Substantial   Barriers to learning  []N/A   [] Access Care     [] S&S of infection     [] Fluid Management     []K+     [x]Procedural    []Albumin     [x] Medications     [] Tx Options     [] Transplant     [] Diet     [] Other   Teaching Tools:  [x] Explain  [] Demo  [] Handouts [] Video  Patient response:   [x] Verbalized understanding  [] Teach back  [] Return demonstration [x] Requires follow up   Inappropriate due to            [address] Before each treatment:     Machine Number:                   [facility]                                   [] Unit Machine # 4  with centralized RO                                  [] Portable Machine #1/RO serial # [id number]                                  [] Portable Machine #2/RO serial # [id number]                                  [x] Portable Machine #3/RO serial # [id number]                                                                                                       [facility]                                  [] Portable Machine #11/RO serial # [id number]                                   [] Portable Machine #12/RO serial # [id number]                                  [] Portable Machine #13/RO serial #  [id number]      Alarm Test:  Pass time  1600 Other:         [x] RO/Machine Log Complete      Temp  37   [x]Extracorporeal Circuit Tested for integrity   Dialysate: Conductivity: Meter: 14 HD Machine   14.1              TCD: 13.9  Dialyzer Lot # [id number]        Blood Tubing Lot #    [id number]   Saline Lot #  JT     CHLORINE TESTING-Before each treatment and every 4 hours    Total Chlorine: [x] less than 0.1 ppm  Time:  4 Hr/2nd Check Time:    (if greater than 0.1 ppm from Primary then every 30 minutes from Secondary)     TREATMENT INITIATION  with Dialysis Precautions:   [x] All Connections Secured                 [x] Saline Line Double Clamped   [x] Venous Parameters Set                  [x] Arterial Parameters Set    [x] Prime Given   250 ml                       [x]Air Foam Detector Engaged      Treatment Initiation Note: HD treatment started in right CVC. Patient mentions that his 5 left fingers are, \"numb and tingling maybe due to arthritis. \" @ [id number], I sent a note to pharmacy to deliver Epogen. @4966, primary nurse notifies me that Epogen medication is not in the pyxis. @ [phone], sent a note to pharmacy to notify me in the room when medication (Epogen) arrives. Dr. Migue Hamilton notified about Epogen not give at bedside. Medication Dose Volume Route Initials Dialyzer Cleared: [] Good [] Fair  [] Poor    Blood processed: 53 L  UF Removed 2000 mL Post Wt:   UTO kg   POst BP:  172/66   Pulse: 84   Respirations: 12 12Temperature: 98.3      NaCl 0.9%      250 mL prim  250 mL rinse    500 mL    IV          Post Tx Vascular Access: AVF/AVG:   N/A  Minutes pressure held to site:  Art: N/A  Cecil:   Epogen, was not available from pharmacy during tx. Dr. Mgiue Hamilton notified at bedside. Catheter: Locking solution: Heparin 1:1000 Art. 1.9 mL  Cecil. 1.9 mL               Post Assessment:                                    Skin:  [x] Warm  [x] Dry [] Diaphoretic    [] Flushed  [] Pale [] Cyanotic   [facility] Signatures Title Initials  Time Lungs: [x] Clear    [] Course  [] Crackles  [] Wheezing [x] Diminished        Cardiac: [] Regular   [] Irregular   [x] Monitor  [] N/A  Rhythm:           Edema:  [] None    [x] General     [x] Facial   [x] Pedal    [x] UE    [x] LE       Pain: [x]0  []1  []2   []3  []4   []5   []6   []7   []8   []9   []10         Post Treatment Note: HD treatment complete.   Patient tolerated well is still in recliner and would like a snack.      POST TREATMENT PRIMARY NURSE HANDOFF REPORT:     First initial/Last name/Title     J/Haseeb/RN and oncoming nurse, Rody [facility] D[facility]:Time: 1695     Abbreviations: AVG-arterial venous graft, AVF-arterial venous fistula, IJ-Internal Jugular, Subcl-Subclavian, Fem-Femoral, Tx-treatment, AP/HR-apical heart rate, DFR-dialysate flow rate, BFR-blood flow rate, AP-arterial pressure, -venous pressure, UF-ultrafiltrate, TMP-transmembrane pressure, Cecil-Venous, Art-Arterial, RO-Reverse Osmosis

## 2017-12-13 NOTE — DIABETES MGMT
NUTRITIONAL ASSESSMENT GLYCEMIC CONTROL/ PLAN OF CARE     Nitish Farias           68 y.o.           12/12/2017                 1. Acute chest pain    2. ESRD (end stage renal disease) (HCC)       INTERVENTIONS/PLAN:   Pt eating well, consider addition of lantus insulin 20 units nightly   Continue correctional Lispro insulin coverage  Continue inpatient monitoring and intervention   ASSESSMENT:   Pt is a 68year old male with a past medical history significant for CAD, CHF, type 2 diabetes, hypertension, obesity, and CKD. Pt recently started dialysis. Blood glucose has fluctuated, currently within targets. Pt has order for correctional Lispro insulin coverage. Pt was eating lunch when visited. States appetite is good. Pt reports his diabetes has been well controlled since last admission. Pt was seen on 11/08/17 during previous admission. Pt has glucometer and reports regular follow up with physician for his diabetes management.      Diabetes Management:   Recent blood glucose:   12/11/2017 11:56 12/12/2017 17:03 12/12/2017 20:24 12/13/2017 08:06 12/13/2017 12:14   246 (H) 53 (LL) 125 (H) 99 105     Within target range (non-ICU: <140; ICU<180): [x] Yes   []  No    Current Insulin regimen:   Correctional Lispro insulin 4 times daily ACHS (normal insulin sensitivity)   Home medication/insulin regimen:   Pt reports taking lantus insulin up to 60 units daily (20-30 units BID) and Novolog per sliding scale   HbA1c: 8.3% (estimated average glucose of 192 mg/dL)  Adequate glycemic control PTA:  [] Yes  [x] No     SUBJECTIVE/OBJECTIVE:   Information obtained from: patient, chart review     Diet: Diabetic consistent carbohydrate, 1200 Kcal, Heart healthy    Patient Vitals for the past 100 hrs:   % Diet Eaten   12/13/17 1312 100 %     Medications: [x] Reviewed     Most Recent POC Glucose: Recent Labs      12/13/17   0750  12/12/17   1135   GLU  100*  147*      Labs:   Lab Results   Component Value Date/Time    Hemoglobin A1c 8.3 10/28/2017 04:20 AM     Lab Results   Component Value Date/Time    Sodium 137 12/13/2017 07:50 AM    Potassium 5.6 12/13/2017 07:50 AM    Chloride 98 12/13/2017 07:50 AM    CO2 29 12/13/2017 07:50 AM    Anion gap 10 12/13/2017 07:50 AM    Glucose 100 12/13/2017 07:50 AM    BUN 47 12/13/2017 07:50 AM    Creatinine 6.50 12/13/2017 07:50 AM    Calcium 9.0 12/13/2017 07:50 AM    Magnesium 2.4 07/06/2016 02:40 AM    Phosphorus 5.3 12/13/2017 07:50 AM    Albumin 3.3 11/07/2017 05:30 PM     Anthropometrics: BMI (calculated): 32.1  Wt Readings from Last 1 Encounters:   12/13/17 97.1 kg (214 lb)    Ht Readings from Last 1 Encounters:   12/13/17 5' 8.5\" (1.74 m)     Estimated Nutrition Needs:  2340 Kcal/day, >86 grams protein/day  Based on:   [] Actual BW    [x]  SBW (78 kg)   []  Adjusted BW      Nutrition Diagnoses:    Altered nutrition related lab value related to diabetes as evidenced by Hemoglobin A1c of 8.3%  Nutrition Interventions: diabetes education, coordination of care   Goal: Blood glucose will be within target range of  mg/dL by 12/16/17    Nutrition Monitoring and Evaluation    []     Monitor po intake on meal rounds  [x]     Continue inpatient monitoring and intervention  []     Other:    Dayami Perry RD, CDE

## 2017-12-13 NOTE — ROUTINE PROCESS
Bedside shift change report given to me (oncoming nurse) by Josephine Joyner (offgoing nurse). Report included the following information SBAR, Kardex, Recent Results, Med Rec Status and Cardiac Rhythm NSR. Patient is sitting on the side of the bed. Voiced no complaints of chest pain. Call bell is within reach.

## 2017-12-13 NOTE — PROGRESS NOTES
[facility] Hospitalist Group  Progress Note    Patient: Nazia Buckley Age: 68 y.o. : 1940 MR#: [id number] SSN: [id number]  Date/Time: 2017 3:17 PM    Subjective/24-hour events:     No issues until returning to floor from HD unit - developed acute onset of chest pain described as being similar to those at time of presentation. RRT called, evaluation carried out. Sitting up in chair at bedside, no symptoms currently. Assessment:   Chest pain  ESRD  HTN  DM 2  Dyslipidemia  Obesity   Prostate cancer hx  CAD hx    Plan:  Discharge was planned today after HD, but will hold off on this given recurrent chest pain. Heparin infusion ordered with plan for cath vs stress testing in AM.  Cardiology assistance appreciated. Case discussed with:  []Patient  []Family  []Nursing  []Case Management  DVT Prophylaxis:  []Lovenox  []Hep SQ  []SCDs  []Coumadin   []On Heparin gtt    Objective:   VS:   Visit Vitals    /68    Pulse 85    Temp 98.2 °F (36.8 °C)    Resp 18    Ht 5' 8.5\" (1.74 m)    Wt 97.1 kg (214 lb)    SpO2 100%    BMI 32.07 kg/m2      Tmax/24hrs: Temp (24hrs), Av.3 °F (36.8 °C), Min:98 °F (36.7 °C), Max:98.6 °F (37 °C)    Intake/Output Summary (Last 24 hours) at 17 1517  Last data filed at 17 1312   Gross per 24 hour   Intake              540 ml   Output                2 ml   Net              538 ml       General:  In NAD. Cardiovascular:  RRR. Pulmonary:  Clear, no wheezes. Effort nonlabored. GI:  Abdomen soft, NTTP. Extremities:  Warm, no ischemia. Neuro:  Awake and alert.     Labs:    Recent Results (from the past 24 hour(s))   GLUCOSE, POC    Collection Time: 17  5:03 PM   Result Value Ref Range    Glucose (POC) 53 (LL) 70 - 110 mg/dL   CARDIAC PANEL,(CK, CKMB & TROPONIN)    Collection Time: 17  5:30 PM   Result Value Ref Range     39 - 308 U/L    CK - MB 4.5 (H) <3.6 ng/ml    CK-MB Index 4.5 (H) 0.0 - 4.0 % Troponin-I, Qt. 0.18 (H) 0.0 - 0.045 NG/ML   GLUCOSE, POC    Collection Time: 12/12/17  8:24 PM   Result Value Ref Range    Glucose (POC) 125 (H) 70 - 110 mg/dL   CARDIAC PANEL,(CK, CKMB & TROPONIN)    Collection Time: 12/12/17  9:45 PM   Result Value Ref Range    CK 76 39 - 308 U/L    CK - MB 3.8 (H) <3.6 ng/ml    CK-MB Index 5.0 (H) 0.0 - 4.0 %    Troponin-I, Qt. 0.13 (H) 0.0 - 0.045 NG/ML   PHOSPHORUS    Collection Time: 12/13/17  7:50 AM   Result Value Ref Range    Phosphorus 5.3 (H) 2.5 - 4.9 MG/DL   METABOLIC PANEL, BASIC    Collection Time: 12/13/17  7:50 AM   Result Value Ref Range    Sodium 137 136 - 145 mmol/L    Potassium 5.6 (H) 3.5 - 5.5 mmol/L    Chloride 98 (L) 100 - 108 mmol/L    CO2 29 21 - 32 mmol/L    Anion gap 10 3.0 - 18 mmol/L    Glucose 100 (H) 74 - 99 mg/dL    BUN 47 (H) 7.0 - 18 MG/DL    Creatinine 6.50 (H) 0.6 - 1.3 MG/DL    BUN/Creatinine ratio 7 (L) 12 - 20      GFR est AA 10 (L) >60 ml/min/1.73m2    GFR est non-AA 8 (L) >60 ml/min/1.73m2    Calcium 9.0 8.5 - 10.1 MG/DL   CBC WITH AUTOMATED DIFF    Collection Time: 12/13/17  7:50 AM   Result Value Ref Range    WBC 8.2 4.6 - 13.2 K/uL    RBC 3.42 (L) 4.70 - 5.50 M/uL    HGB 9.7 (L) 13.0 - 16.0 g/dL    HCT 32.1 (L) 36.0 - 48.0 %    MCV 93.9 74.0 - 97.0 FL    MCH 28.4 24.0 - 34.0 PG    MCHC 30.2 (L) 31.0 - 37.0 g/dL    RDW 16.2 (H) 11.6 - 14.5 %    PLATELET 121 181 - 805 K/uL    MPV 10.2 9.2 - 11.8 FL    NEUTROPHILS 59 40 - 73 %    LYMPHOCYTES 30 21 - 52 %    MONOCYTES 8 3 - 10 %    EOSINOPHILS 2 0 - 5 %    BASOPHILS 1 0 - 2 %    ABS. NEUTROPHILS 4.9 1.8 - 8.0 K/UL    ABS. LYMPHOCYTES 2.5 0.9 - 3.6 K/UL    ABS. MONOCYTES 0.7 0.05 - 1.2 K/UL    ABS. EOSINOPHILS 0.1 0.0 - 0.4 K/UL    ABS.  BASOPHILS 0.0 0.0 - 0.06 K/UL    DF AUTOMATED     CARDIAC PANEL,(CK, CKMB & TROPONIN)    Collection Time: 12/13/17  7:50 AM   Result Value Ref Range    CK 59 39 - 308 U/L    CK - MB 3.3 <3.6 ng/ml    CK-MB Index 5.6 (H) 0.0 - 4.0 %    Troponin-I, Qt. 0.10 (H) 0.0 - 0.045 NG/ML   GLUCOSE, POC    Collection Time: 12/13/17  8:06 AM   Result Value Ref Range    Glucose (POC) 99 70 - 110 mg/dL   GLUCOSE, POC    Collection Time: 12/13/17 12:14 PM   Result Value Ref Range    Glucose (POC) 105 70 - 110 mg/dL   EKG, 12 LEAD, SUBSEQUENT    Collection Time: 12/13/17  1:56 PM   Result Value Ref Range    Ventricular Rate 83 BPM    Atrial Rate 83 BPM    P-R Interval 148 ms    QRS Duration 96 ms    Q-T Interval 372 ms    QTC Calculation (Bezet) 437 ms    Calculated P Axis 36 degrees    Calculated R Axis -3 degrees    Calculated T Axis 96 degrees    Diagnosis       Sinus rhythm with fusion complexes  Incomplete right bundle branch block  Nonspecific T wave abnormality  Abnormal ECG         Signed By: Reed Parker MD     December 13, 2017 3:17 PM

## 2017-12-13 NOTE — PROGRESS NOTES
RENAL PROGRESS NOTE: Pt seen on dialysis. Follow up of ESRD     Present on Admission:   Sleep disturbance   S/P coronary artery stent placement   Prostate cancer ([facility]   Postsurgical aortocoronary bypass status   Mixed hyperlipidemia   HTN (hypertension)   GERD (gastroesophageal reflux disease)   Diastolic CHF, acute on chronic (HCC)   ESR[facility] (end stage renal disease) ([facility]   Dyslipidemia   CKD (chronic kidney disease) stage 4, GFR 15-29 ml/min ([facility])   Coronary atherosclerosis of native coronary artery   DM2 (diabetes mellitus, type 2) ([facility]   Secondary hyperparathyroidism of renal origin ([facility]         Subjective: All events noted, no more chest pain, Dialyzing in his room, reviewed his EKG, incomplete  RBB. Patient is on Dialysis.      Objective:    Patient Vitals for the past 12 hrs:   Temp Pulse Resp BP SpO2   12/13/17 1700 - - - 130/56 -   12/13/17 1645 98.5 °F (36.9 °C) 75 12 156/60 100 %   12/13/17 1543 98.6 °F (37 °C) 80 18 137/62 100 %   12/13/17 1458 - 85 18 157/68 100 %   12/13/17 1109 - 76 14 173/70 -   12/13/17 1058 - 74 8 - 96 %   12/13/17 1047 - - - 142/51 -   12/13/17 1043 98.2 °F (36.8 °C) 73 15 126/78 99 %   12/13/17 0852 98.5 °F (36.9 °C) 78 17 187/70 100 %   12/13/17 0823 98.1 °F (36.7 °C) 77 20 150/67 95 %        Intake/Output Summary (Last 24 hours) at 12/13/17 1712  Last data filed at 12/13/17 1312   Gross per 24 hour   Intake              540 ml   Output                2 ml   Net              538 ml       Physical Assessment:     General Appearance: NAD  Lung: clear to auscultation  Heart: regular rate and rhythm and no murmurs, clicks or gallops  Lower Extremities:no  edema   Access: HD catheter, qb 350    Labs    CBC w/Diff    Recent Labs      12/13/17   0750  12/12/17   1135   WBC  8.2  12.4   RBC  3.42*  3.27*   HGB  9.7*  9.2*   HCT  32.1*  30.5*   MCV  93.9  93.3   MCH  28.4  28.1   MCHC  30.2*  30.2*   RDW  16.2*  15.9*    Recent Labs      12/13/17   0750  12/12/17 1135   MONOS  8  6   EOS  2  1   BASOS  1  0   RDW  16.2*  15.9*        Comprehensive Metabolic Profile    Recent Labs      12/13/17   1451  12/13/17   0750  12/12/17   1135   NA  137  137  135*   K  5.7*  5.6*  5.5   CL  101  98*  96*   CO2  26  29  31   BUN  47*  47*  44*   CREA  6.16*  6.50*  6.62*    Recent Labs      12/13/17   1451  12/13/17   0750  12/12/17   1135   CA  9.0  9.0  9.4   PHOS   --   5.3*   --           Basic Metabolic Profile       results  reviwed. MEDS:Reviwed. Current Facility-Administered Medications   Medication Dose Route Frequency Provider Last Rate Last Dose    insulin lispro (HUMALOG) injection   SubCUTAneous AC&HS Karel Singh MD   Stopped at 12/13/17 0730    losartan (COZAAR) tablet 50 mg  50 mg Oral DAILY Michelle Diego MD        metoprolol tartrate (LOPRESSOR) tablet 50 mg  50 mg Oral BID Michelle Diego MD        nitroglycerin (NITROSTAT) 0.4 mg tablet             amLODIPine (NORVASC) tablet 5 mg  5 mg Oral DAILY Urszula Coughlin NP        heparin (porcine) 1,000 unit/mL injection 4,000 Units  41.2 Units/kg IntraVENous ONCE Urszula Coughlin NP        heparin 25,000 units in D5W 250 ml infusion  10.5-25 Units/kg/hr IntraVENous TITRATE Urszula Coughlin NP        aspirin chewable tablet 81 mg  81 mg Oral DAILY Urszula E Madyson NP   81 mg at 12/13/17 1215    clopidogrel (PLAVIX) tablet 75 mg  75 mg Oral DAILY Urszula E Halecki, NP   75 mg at 12/13/17 1215    simvastatin (ZOCOR) tablet 40 mg  40 mg Oral QHS Urszula E Halaichai, NP   40 mg at 12/12/17 2242       Impression:    ESRD: Tolerating HD well. Anemia of CKD: on  Epogen. Hyperparathyroidism Yes  Anginal Symptoms  Plan  Dialysis for volume and solute management  Epogen  5000 units. Hectorol: 1 microgram.  Cath vs nuclear stress tomorrow.           New Ledezma MD

## 2017-12-13 NOTE — DIABETES MGMT
Diabetes Patient/Family Education Record    Factors That  May Influence Patients Ability  to Learn or  Comply with Recommendations   []   Language barrier    []   Cultural needs   []   Motivation    []   Cognitive limitation    []   Physical   []   Education    []   Physiological factors   []   Hearing/vision/speaking impairment   []   Confucianism beliefs    []   Financial factors   []  Other:   [x]  No factors identified at this time. Person Instructed:   [x]   Patient   []   Family   []  Other     Preference for Learning:   [x]   Verbal   []   Written   []  Demonstration     Level of Comprehension & Competence:    [x]  Good                                      [] Fair                                     []  Poor                             []  Needs Reinforcement   [x]  Teachback completed    Education Component: pt reports he has been doing really well with his diabetes since last admission.     [x]  Medication management, including how to administer insulin (if appropriate) and potential medication interactions    [x]  Nutritional management    []  Exercise   []  Signs, symptoms, and treatment of hyperglycemia and hypoglycemia   [] Prevention, recognition and treatment of hyperglycemia and hypoglycemia   [x]  Importance of blood glucose monitoring and how to obtain a blood glucose meter    []  Instruction on use of the blood glucose meter   [x]  Discuss the importance of HbA1C monitoring    []  Sick day guidelines   []  Proper use and disposal of lancets, needles, syringes or insulin pens (if appropriate)   []  Potential long-term complications (retinopathy, kidney disease, neuropathy, foot care)   [] Information about whom to contact in case of emergency or for more information    [x]  Goal:  Patient/family will demonstrate understanding of Diabetes Self Management Skills by: 12/20/17  Plan for post-discharge education or self-management support:    [] Outpatient class schedule provided            [] Patient Declined    [] Scheduled for outpatient classes (date) _______  Pt has class information from previous admission.  Previous education on 11/08/17     Laurel Merino RD, CDE

## 2017-12-13 NOTE — PROGRESS NOTES
Rapid Response Note  [facility]    Patient: Zuri Bryan 68 y.o. male  [id number]  1940      Admit Date: 12/12/2017   Admission Diagnosis: Chest pain    RAPID RESPONSE     Rapid response called for chest pain. Patient had a similar episode during dialysis yesterday that was relieve by sublingual nitroglycerin and dialysis was terminated after 30 minutes. Patient endorsed burning, non-radiating substernal chest pain. Patient endorsed diaphoresis but no shortness of breath at onset of rapid response. Patient rated his chest pain as 6/10 before receiving sublingual nitroglycerin and 3/10 a few minutes afterwards. Patient's blood pressure was 170s/90s before nitro and improved to 150s/60s afterwards. Patient remained hemodynamically stable during entire rapid response. Patient has a cardiac history and recently had a stent placed in 11/2017. Patient also has a history of GERD but reports these symptoms are unlike his reflux episodes. Patient report no chest pain after 15 minutes into the rapid response. Dr. Giorgi Sanchez evaluated EKG and patient at [facility]. Medications Reviewed    OBJECTIVE     Visit Vitals    /68    Pulse 85    Temp 98.2 °F (36.8 °C)    Resp 18    Ht 5' 8.5\" (1.74 m)    Wt 97.1 kg (214 lb)    SpO2 100%    BMI 32.07 kg/m2       PHYSICAL:  General:  Alert and Responsive and in no acute distress. CV:  RRR, no murmurs, rubs, or gallops. PMI not displaced. No visible pulsations or thrills. No carotid bruits. RESP:  Unlabored breathing. Lungs clear to auscultation. no wheeze, rales, or rhonchi. Equal expansion bilaterally. ABD:  Soft, nontender, nondistended. Normoactive bowel sounds. No hepatosplenomegaly. No suprapubic tenderness. No CVA tenderness. Neuro: A+Ox3. 5/5 strength bilateral upper extremities and lower extremities. CN II-XII intact. Sensation grossly intact.     Medications administered: GI Cocktail    EKG: NSR, no significant ST changes appreciated but difficult to evaluate    Labs: CBC, BMP, Mag, Cardiac Panel, Lactic Acid    ASSESSMENT, PLAN & DISPOSITION   Jourdan Corcoran is a 68y.o. year old male admitted for Chest pain. Rapid response called for chest pain. Patient condition currently: stable. Patient endorsed improvement in chest pain from 6/10 to 3/10 after sublingual nitroglycerin. Patient's chest pain completely resolved by the end of rapid response. Patient remained hemodynamically stable and denied any shortness of breath or weakness. Dr. Clarice Hooker (cardiology) was present at bedside during rapid response and evaluate patient. Dr. Clarice Hooker assessed EKG and provided recs to start heparin gtt. Patient was stable to remain on [facility]. - Start heparin gtt + norvasc 5 mg daily  - Consider nuclear stress test vs cardiac cath tomorrow, per cardiology  - F/u repeat cardiac panel @2100  - F/u CBC, BMP, Cardiac Panel, Mag, Lactic Acid  - Continue management per primary team    Disposition: [facility]    Dr. Clarice Hooker assessed patient at bedside and agreed plan. Attending Dr. Gretel Moreno notified of rapid response. In agreement with plan. Primary team resuming care.        Mayra Krueger MD, PGY-1  [facility]  12/13/2017, 3:18 PM

## 2017-12-13 NOTE — PROGRESS NOTES
Pateint seen and evaluated by Dr. Denny Phoenix. Rapid response called for chest pain. CP improved after NTGx1. Will add Norvasc 5 mg daily and Heparin drip. Follow serial cardiac enzymes plans for possible NUC stress test vs Cardiac cath tomorrow. NPO after midnight.     Error-duplicate

## 2017-12-13 NOTE — ANESTHESIA PREPROCEDURE EVALUATION
Anesthetic History               Review of Systems / Medical History  Patient summary reviewed and pertinent labs reviewed    Pulmonary        Sleep apnea: CPAP           Neuro/Psych   Within defined limits           Cardiovascular    Hypertension: well controlled          CAD    Exercise tolerance: <4 METS     GI/Hepatic/Renal     GERD: well controlled    Renal disease: ESRD and dialysis       Endo/Other    Diabetes: poorly controlled, type 2    Morbid obesity     Other Findings   Comments:   Risk Factors for Postoperative nausea/vomiting:       History of postoperative nausea/vomiting? NO       Female? NO       Motion sickness? NO       Intended opioid administration for postoperative analgesia? NO      Smoking Abstinence  Current Smoker? NO  Elective Surgery? YES  Seen preoperatively by anesthesiologist or proxy prior to day of surgery? YES  Pt abstained from smoking 24 hours prior to anesthesia?  N/A           Physical Exam    Airway  Mallampati: II  TM Distance: 4 - 6 cm  Neck ROM: normal range of motion   Mouth opening: Normal     Cardiovascular  Regular rate and rhythm,  S1 and S2 normal,  no murmur, click, rub, or gallop             Dental    Dentition: Poor dentition     Pulmonary  Breath sounds clear to auscultation               Abdominal  GI exam deferred       Other Findings            Anesthetic Plan    ASA: 4  Anesthesia type: MAC          Induction: Intravenous  Anesthetic plan and risks discussed with: Patient

## 2017-12-13 NOTE — ROUTINE PROCESS
Bedside shift change report given to [address] [place] [address] Jim (oncoming nurse) by Me (offgoing nurse). Report included the following information SBAR, Kardex, MAR, Med Rec Status and Cardiac Rhythm NSR.

## 2017-12-13 NOTE — PROGRESS NOTES
Progress Note    Aramis Owens  68 y.o. Admit Date: 12/12/2017  Principal Problem:    Chest pain (10/27/2017) POA: Unknown    Active Problems:    Coronary atherosclerosis of native coronary artery (10/7/2012) POA: Yes      CKD (chronic kidney disease) stage 4, GFR 15-29 ml/min ([facility] (10/7/2012) POA: Yes      HTN (hypertension) (10/7/2012) POA: Yes      DM2 (diabetes mellitus, type 2) ([facility] (10/7/2012) POA: Yes      Dyslipidemia (10/7/2012) POA: Yes      S/P coronary artery stent placement (3/12/2013) POA: Yes      Overview: vg to diag stent 10/2012      Postsurgical aortocoronary bypass status (3/12/2013) POA: Yes      Sleep disturbance () POA: Yes      Overview: APRYL, not using CPAP regularly      Mixed hyperlipidemia () POA: Yes      Overview: HDL's are not at goal, LDL's are at goal, triglycerides are not at goal.      GERD (gastroesophageal reflux disease) (3/5/2015) POA: Yes      Prostate cancer ([facility] (4/29/2015) POA: Yes      Overview: 4/2015  T2c  PSA 57, [place];8 in 2 cores, and G7 in 2,  CT and Bone Scan       no obvious mets      Diastolic CHF, acute on chronic ([facility] (7/5/2016) POA: Yes      Secondary hyperparathyroidism of renal origin ([facility] (10/25/2017) POA: Yes      ESRD (end stage renal disease) ([facility] (11/8/2017) POA: Yes      Hypotension (12/12/2017) POA: Unknown      Fluid overload (12/12/2017) POA: Unknown      Hyperkalemia (12/13/2017) POA: Unknown            Subjective:     Patient feels good, back from HD catheter exchange. A comprehensive review of systems was negative except for that written in the History of Present Illness.     Objective:     Visit Vitals    /70    Pulse 76    Temp 98.2 °F (36.8 °C)    Resp 14    Ht 5' 8.5\" (1.74 m)    Wt 97.1 kg (214 lb)    SpO2 96%    BMI 32.07 kg/m2         Intake/Output Summary (Last 24 hours) at 12/13/17 1349  Last data filed at 12/13/17 1312   Gross per 24 hour   Intake              540 ml   Output                2 ml   Net 538 ml       Current Facility-Administered Medications   Medication Dose Route Frequency Provider Last Rate Last Dose    insulin lispro (HUMALOG) injection   SubCUTAneous AC&HS Esvin Spivey MD   Stopped at 12/13/17 0730    losartan (COZAAR) tablet 50 mg  50 mg Oral DAILY Patricia Conde MD        metoprolol tartrate (LOPRESSOR) tablet 50 mg  50 mg Oral BID Patricia Conde MD        aspirin chewable tablet 81 mg  81 mg Oral DAILY Urszula E Halecki, NP   81 mg at 12/13/17 1215    clopidogrel (PLAVIX) tablet 75 mg  75 mg Oral DAILY Urszula E Halecki, NP   75 mg at 12/13/17 1215    simvastatin (ZOCOR) tablet 40 mg  40 mg Oral QHS Urszula E Halecki, NP   40 mg at 12/12/17 2242        Physical Exam:     Physical Exam:   General:  Alert, cooperative, no distress, appears stated age. Mouth/Throat: Lips, mucosa, and tongue normal. Teeth and gums normal.   Neck: Supple, symmetrical, trachea midline, no adenopathy, thyroid: no enlargement/tenderness/nodules, no carotid bruit and no JVD. Lungs:   Clear to auscultation bilaterally. Heart:  Regular rate and rhythm, S1, S2 normal, no murmur, click, rub or gallop. Abdomen:   Soft, non-tender. Bowel sounds normal. No masses,  No organomegaly. Extremities: Extremities normal, atraumatic, no cyanosis or edema, AV fistulaon Left arm has good thrill & bruit, HD catheter is well dressed.    Skin: Skin color, texture, turgor normal. No rashes or lesions         Data Review:    CBC w/Diff    Recent Labs      12/13/17   0750  12/12/17   1135   WBC  8.2  12.4   RBC  3.42*  3.27*   HGB  9.7*  9.2*   HCT  32.1*  30.5*   MCV  93.9  93.3   MCH  28.4  28.1   MCHC  30.2*  30.2*   RDW  16.2*  15.9*    Recent Labs      12/13/17   0750  12/12/17   1135   MONOS  8  6   EOS  2  1   BASOS  1  0   RDW  16.2*  15.9*        Comprehensive Metabolic Profile    Recent Labs      12/13/17   0750  12/12/17   1135   NA  137  135*   K  5.6*  5.5   CL  98*  96*   CO2  29  31   BUN 52*  40*   CREA  6.50*  6.62*    Recent Labs      12/13/17   0750  12/12/17   1135   CA  9.0  9.4   PHOS  5.3*   --                         Impression:       Active Hospital Problems    Diagnosis Date Noted    Hyperkalemia 12/13/2017    Hypotension 12/12/2017    Fluid overload 12/12/2017    [facility]) [facility] 11/08/2017    Chest pain 10/27/2017    [facility] 12/85/9559    Diastolic CHF, acute on chronic (HCC) 07/05/2016    Pro[facility] ([facility] 04/29/2015 4/2015  T2c  PSA 57, [place];8 in 2 cores, and G7 in 2,  CT and Bone Scan no obvious mets      GERD (gastroesophageal reflux disease) 03/05/2015    Sleep disturbance      APRYL, not using CPAP regularly      Mixed hyperlipidemia      HDL's are not at goal, LDL's are at goal, triglycerides are not at goal.      S/P coronary artery stent placement 03/12/2013     vg to diag stent 10/2012      Postsurgical aortocoronary bypass status 03/12/2013    HTN (hypertension) 10/07/2012    Dyslipidemia 10/07/2012    CKD (chronic kidney disease) stage 4, GFR 15-29 ml/min ([facility] 10/07/2012    Coronary atherosclerosis of native coronary artery 10/07/2012    DM2 (diabetes mellitus, type 2) ([facility] 10/07/2012            Plan:     K is on the high side , will dialyze him today  & then can be discharged home, no need for OP dialysis tomorrow. Discussed with the patient  & cardiologist also.     Prince Skiff, MD

## 2017-12-14 VITALS
DIASTOLIC BLOOD PRESSURE: 66 MMHG | SYSTOLIC BLOOD PRESSURE: 159 MMHG | HEIGHT: 69 IN | WEIGHT: 210.6 LBS | OXYGEN SATURATION: 97 % | BODY MASS INDEX: 31.19 KG/M2 | RESPIRATION RATE: 18 BRPM | HEART RATE: 80 BPM | TEMPERATURE: 98.5 F

## 2017-12-14 LAB
ANION GAP SERPL CALC-SCNC: 7 MMOL/L (ref 3–18)
APTT PPP: 50.4 SEC (ref 23–36.4)
APTT PPP: 53.9 SEC (ref 23–36.4)
BASOPHILS # BLD: 0 K/UL (ref 0–0.1)
BASOPHILS NFR BLD: 0 % (ref 0–2)
BUN SERPL-MCNC: 27 MG/DL (ref 7–18)
BUN/CREAT SERPL: 7 (ref 12–20)
CALCIUM SERPL-MCNC: 8.6 MG/DL (ref 8.5–10.1)
CHLORIDE SERPL-SCNC: 101 MMOL/L (ref 100–108)
CK MB CFR SERPL CALC: 4.9 % (ref 0–4)
CK MB SERPL-MCNC: 2 NG/ML (ref 5–25)
CK SERPL-CCNC: 41 U/L (ref 39–308)
CO2 SERPL-SCNC: 31 MMOL/L (ref 21–32)
CREAT SERPL-MCNC: 4.02 MG/DL (ref 0.6–1.3)
DIFFERENTIAL METHOD BLD: ABNORMAL
EOSINOPHIL # BLD: 0.1 K/UL (ref 0–0.4)
EOSINOPHIL NFR BLD: 1 % (ref 0–5)
ERYTHROCYTE [DISTWIDTH] IN BLOOD BY AUTOMATED COUNT: 16.2 % (ref 11.6–14.5)
GLUCOSE BLD STRIP.AUTO-MCNC: 195 MG/DL (ref 70–110)
GLUCOSE BLD STRIP.AUTO-MCNC: 237 MG/DL (ref 70–110)
GLUCOSE SERPL-MCNC: 218 MG/DL (ref 74–99)
HCT VFR BLD AUTO: 30 % (ref 36–48)
HGB BLD-MCNC: 9.1 G/DL (ref 13–16)
LYMPHOCYTES # BLD: 1.8 K/UL (ref 0.9–3.6)
LYMPHOCYTES NFR BLD: 25 % (ref 21–52)
MCH RBC QN AUTO: 28.2 PG (ref 24–34)
MCHC RBC AUTO-ENTMCNC: 30.3 G/DL (ref 31–37)
MCV RBC AUTO: 92.9 FL (ref 74–97)
MONOCYTES # BLD: 0.6 K/UL (ref 0.05–1.2)
MONOCYTES NFR BLD: 8 % (ref 3–10)
NEUTS SEG # BLD: 4.5 K/UL (ref 1.8–8)
NEUTS SEG NFR BLD: 66 % (ref 40–73)
PHOSPHATE SERPL-MCNC: 3.8 MG/DL (ref 2.5–4.9)
PLATELET # BLD AUTO: 299 K/UL (ref 135–420)
PMV BLD AUTO: 10.2 FL (ref 9.2–11.8)
POTASSIUM SERPL-SCNC: 5.3 MMOL/L (ref 3.5–5.5)
RBC # BLD AUTO: 3.23 M/UL (ref 4.7–5.5)
SODIUM SERPL-SCNC: 139 MMOL/L (ref 136–145)
TROPONIN I SERPL-MCNC: 0.06 NG/ML (ref 0–0.04)
WBC # BLD AUTO: 7 K/UL (ref 4.6–13.2)

## 2017-12-14 PROCEDURE — 74011250637 HC RX REV CODE- 250/637: Performed by: NURSE PRACTITIONER

## 2017-12-14 PROCEDURE — 85730 THROMBOPLASTIN TIME PARTIAL: CPT | Performed by: INTERNAL MEDICINE

## 2017-12-14 PROCEDURE — 85730 THROMBOPLASTIN TIME PARTIAL: CPT | Performed by: NURSE PRACTITIONER

## 2017-12-14 PROCEDURE — 80048 BASIC METABOLIC PNL TOTAL CA: CPT | Performed by: NURSE PRACTITIONER

## 2017-12-14 PROCEDURE — 84484 ASSAY OF TROPONIN QUANT: CPT | Performed by: NURSE PRACTITIONER

## 2017-12-14 PROCEDURE — 74011250636 HC RX REV CODE- 250/636: Performed by: INTERNAL MEDICINE

## 2017-12-14 PROCEDURE — 36415 COLL VENOUS BLD VENIPUNCTURE: CPT | Performed by: NURSE PRACTITIONER

## 2017-12-14 PROCEDURE — 74011250637 HC RX REV CODE- 250/637: Performed by: INTERNAL MEDICINE

## 2017-12-14 PROCEDURE — 82962 GLUCOSE BLOOD TEST: CPT

## 2017-12-14 PROCEDURE — 85025 COMPLETE CBC W/AUTO DIFF WBC: CPT | Performed by: NURSE PRACTITIONER

## 2017-12-14 PROCEDURE — 74011636637 HC RX REV CODE- 636/637: Performed by: INTERNAL MEDICINE

## 2017-12-14 PROCEDURE — 84100 ASSAY OF PHOSPHORUS: CPT | Performed by: NURSE PRACTITIONER

## 2017-12-14 RX ORDER — HEPARIN SODIUM 1000 [USP'U]/ML
3000 INJECTION, SOLUTION INTRAVENOUS; SUBCUTANEOUS ONCE
Status: COMPLETED | OUTPATIENT
Start: 2017-12-14 | End: 2017-12-14

## 2017-12-14 RX ORDER — HEPARIN SODIUM 1000 [USP'U]/ML
3000 INJECTION, SOLUTION INTRAVENOUS; SUBCUTANEOUS ONCE
Status: DISCONTINUED | OUTPATIENT
Start: 2017-12-14 | End: 2017-12-14 | Stop reason: HOSPADM

## 2017-12-14 RX ORDER — ISOSORBIDE MONONITRATE 30 MG/1
30 TABLET, EXTENDED RELEASE ORAL DAILY
Status: DISCONTINUED | OUTPATIENT
Start: 2017-12-14 | End: 2017-12-14 | Stop reason: HOSPADM

## 2017-12-14 RX ORDER — ISOSORBIDE MONONITRATE 30 MG/1
30 TABLET, EXTENDED RELEASE ORAL DAILY
Qty: 30 TAB | Refills: 0 | Status: SHIPPED | OUTPATIENT
Start: 2017-12-15 | End: 2018-12-07 | Stop reason: SDUPTHER

## 2017-12-14 RX ORDER — AMLODIPINE BESYLATE 5 MG/1
5 TABLET ORAL DAILY
Qty: 30 TAB | Refills: 0 | Status: SHIPPED | OUTPATIENT
Start: 2017-12-15 | End: 2018-05-07

## 2017-12-14 RX ADMIN — CLOPIDOGREL BISULFATE 75 MG: 75 TABLET ORAL at 08:19

## 2017-12-14 RX ADMIN — ASPIRIN 81 MG 81 MG: 81 TABLET ORAL at 08:20

## 2017-12-14 RX ADMIN — AMLODIPINE BESYLATE 5 MG: 5 TABLET ORAL at 08:19

## 2017-12-14 RX ADMIN — HEPARIN SODIUM 3000 UNITS: 1000 INJECTION, SOLUTION INTRAVENOUS; SUBCUTANEOUS at 05:16

## 2017-12-14 RX ADMIN — INSULIN LISPRO 4 UNITS: 100 INJECTION, SOLUTION INTRAVENOUS; SUBCUTANEOUS at 12:33

## 2017-12-14 RX ADMIN — LOSARTAN POTASSIUM 50 MG: 50 TABLET ORAL at 08:19

## 2017-12-14 RX ADMIN — INSULIN LISPRO 2 UNITS: 100 INJECTION, SOLUTION INTRAVENOUS; SUBCUTANEOUS at 08:20

## 2017-12-14 RX ADMIN — METOPROLOL TARTRATE 50 MG: 50 TABLET ORAL at 08:19

## 2017-12-14 RX ADMIN — ISOSORBIDE MONONITRATE 30 MG: 30 TABLET, EXTENDED RELEASE ORAL at 11:33

## 2017-12-14 NOTE — PROGRESS NOTES
[facility]      CARDIOLOGY PROGRESS NOTE  RECS:    1. Chest pain- had rapid response team called yesterday PM CP resolved with NTG x1 patient troponin unremarkable. He is chest pain free no SOB no CHF symptoms. Will continue with medical managment for CAD added Imdur 30 mg daily. 1. CAD with recent PCI with HERBER in [place] to diagonal in 11/17. continue with Plavix and asa  2. Hypotension- on admission. Resolved   3. Hypertension- labile. restart BP medications for now per orders. monitor on imdur  4. Hx of coronary artery stent placement in 2012 and 7/17 with HERBER stents deployed in [place]. 5. Chronic diastolic heart failure- appears compensated- monitor for fluid overload.               6. Postsurgical aortocoronary bypass status  in 1997                                    7. Hyperlipidemia- on statin and Tricor                                                            8. ESRD on HD  Renal following   9. Diabetes- medical team is following. Ambulate today. Continue medical management. If he remains asymptomatic, can be discharged home.       ASSESSMENT:  Hospital Problems  Date Reviewed: 12/13/2017          Codes Class Noted POA    Hyperkalemia ICD-10-CM: E87.5  ICD-9-CM: 276.7  12/13/2017 Unknown        Hypotension ICD-10-CM: I95.9  ICD-9-CM: 458.9  12/12/2017 Unknown        Fluid overload ICD-10-CM: E87.70  ICD-9-CM: 276.69  12/12/2017 Unknown        ESRD (end stage renal disease) ([facility] ICD-10-CM: N18.6  ICD-9-CM: 585.6  11/8/2017 Yes        * (Principal)Chest pain ICD-10-CM: R07.9  ICD-9-CM: 786.50  10/27/2017 Unknown        Secondary hyperparathyroidism [facility] ([facility] (Chronic) ICD-10-CM: N25.81  ICD-9-CM: 588.81  10/25/2017 Yes        Diastolic CHF, acute on chronic ([facility] ICD-10-CM: I50.33  ICD-9-CM: 428.33, 428.0  7/5/2016 Yes        Prostate cancer ([facility] ICD-10-CM: C61  ICD-9-CM: 499  4/29/2015 Yes    Overview Addendum 5/6/2015  4:32 PM by Deisy Markham MD     4/2015 T2c  PSA 57, [place];8 in 2 cores, and G7 in 2,  CT and Bone Scan no obvious mets             GERD (gastroesophageal reflux disease) ICD-10-CM: K21.9  ICD-9-CM: 530.81  3/5/2015 Yes        Sleep disturbance ICD-10-CM: G47.9  ICD-9-CM: 780.50  Unknown Yes    Overview Signed 4/29/2013  3:11 PM by Yovany Vinson     APRYL, not using CPAP regularly             Mixed hyperlipidemia ICD-10-CM: E78.2  ICD-9-CM: 272.2  Unknown Yes    Overview Signed 4/29/2013  3:11 PM by Micah Metz     HDL's are not at goal, LDL's are at goal, triglycerides are not at goal.             S/P coronary artery stent placement ICD-10-CM: Z95.5  ICD-9-CM: V45.82  3/12/2013 Yes    Overview Signed 3/12/2013  3:37 PM by Tesfaye Borges MD     vg to diag stent 10/2012             Postsurgical aortocoronary bypass status ICD-10-CM: Z95.1  ICD-9-CM: V45.81  3/12/2013 Yes        Coronary atherosclerosis of native coronary artery ICD-10-CM: I25.10  ICD-9-CM: 414.01  10/7/2012 Yes        CKD (chronic kidney disease) stage 4, GFR 15-29 ml/min ([facility]) ICD-10-CM: N18.4  ICD-9-CM: 585.4  10/7/2012 Yes        HTN (hypertension) ICD-10-CM: I10  ICD-9-CM: 401.9  10/7/2012 Yes        DM2 (diabetes mellitus, type 2) [facility] ICD-10-CM: E11.9  ICD-9-CM: 250.00  10/7/2012 Yes        Dyslipidemia ICD-10-CM: B69.9  ICD-9-CM: 272.4  10/7/2012 Yes                SUBJECTIVE:    Wants to go home no chest pain during the night or SOB.         OBJECTIVE:    VS:   Visit Vitals    /66 (BP 1 Location: Right arm, BP Patient Position: At rest)    Pulse 76    Temp 98.4 °F (36.9 °C)    Resp 18    Ht 5' 8.5\" (1.74 m)    Wt 95.5 kg (210 lb 9.6 oz)    SpO2 99%    BMI 31.56 kg/m2         Intake/Output Summary (Last 24 hours) at 12/14/17 9396  Last data filed at 12/13/17 2325   Gross per 24 hour   Intake              750 ml   Output             2002 ml   Net            -1252 ml     TELE: normal sinus rhythm    General: alert and in no apparent distress  HENT: Normocephalic, atraumatic. Normal external eye. Neck :  no bruit, JVD difficult to assess due to obesity  Cardiac:  regular rate and rhythm  Chest/Lungs:chest clear, no wheezing, rales, normal symmetric air entry  Abdomen: Soft, nontender, no masses  Extremities:  No c/c/edema, peripheral pulses present      Labs: Results:       Chemistry Recent Labs      12/14/17   0300  12/13/17   1451  12/13/17   0750   GLU  218*  146*  100*   NA  139  137  137   K  5.3  5.7*  5.6*   CL  101  101  98*   CO2  31  26  29   BUN  27*  47*  47*   CREA  4.02*  6.16*  6.50*   CA  8.6  9.0  9.0   MG   --   2.6   --    PHOS  3.8   --   5.3*   AGAP  7  10  10   BUCR  7*  8*  7*      CBC w/Diff Recent Labs      12/14/17   0300  12/13/17   0750  12/12/17   1135   WBC  7.0  8.2  12.4   RBC  3.23*  3.42*  3.27*   HGB  9.1*  9.7*  9.2*   HCT  30.0*  32.1*  30.5*   PLT  299  308  315   GRANS  66  59  72   LYMPH  25  30  21   EOS  1  2  1      Cardiac Enzymes Recent Labs      12/14/17   0300  12/13/17   1451   CPK  41  65   CKND1  4.9*  4.5*      Coagulation Recent Labs      12/14/17   0300  12/13/17   1600   PTP   --   13.5   INR   --   1.1   APTT  53.9*  25.8       Lipid Panel Lab Results   Component Value Date/Time    Cholesterol, total 158 10/24/2017 05:08 AM    HDL Cholesterol 39 10/24/2017 05:08 AM    LDL, calculated 72.4 10/24/2017 05:08 AM    VLDL, calculated 46.6 10/24/2017 05:08 AM    Triglyceride 233 10/24/2017 05:08 AM    CHOL/HDL Ratio 4.1 10/24/2017 05:08 AM      BNP No results for input(s): BNPP in the last 72 hours. Liver Enzymes No results for input(s): TP, ALB, TBIL, AP, SGOT, GPT in the last 72 hours. No lab exists for component: DBIL   Digoxin    Thyroid Studies Lab Results   Component Value Date/Time    T4, Total 10.9 10/09/2012 04:53 AM    TSH 2.16 10/23/2017 11:08 PM              CHAD Allen supervised. I have independently evaluated and examined the patient. All relevant labs and testing data's are reviewed.   Care plan discussed and updated after review.     Otis Dean MD

## 2017-12-14 NOTE — PROGRESS NOTES
Care Management Interventions  Current Support Network: Lives with Spouse (Joanna Newberry)  Confirm Follow Up Transport: Family (Spouse)     68 yr old male admit with chest pain. Patient lives with his spouse Karma Puentes [phone] and address verified. Patient stated that he was independent prior to this admission, has crutches and denies home 02 and any other DME equipment. Spoke with Dr. Cathi Harrison about discharge disposition and patient may need [facility] for SN for disease teaching. Patient verbalized no needs at this time, will remain available.

## 2017-12-14 NOTE — DISCHARGE INSTRUCTIONS
Learning About Fluid Overload  What is fluid overload? Fluid overload means that your body has too much water. The extra fluid in your body can raise your blood pressure and force your heart to work harder. It can also make it hard for you to breathe. Most of your body is made up of water. The body uses minerals like sodium and potassium to help organs such as your heart, kidneys, and liver balance how much water you need. For example, the heart pumps blood to move water around the body. And the kidneys work to get rid of the water that the body doesn't need. Health conditions like kidney disease, heart failure, and cirrhosis can cause fluid overload. Other things can cause extra fluid to build up. IV fluids, some medicines, too much salt (sodium) from food, and certain medical treatments can sometimes cause this fluid increase. What are the symptoms? Some of the most common symptoms are:  · Gaining weight over a short period of time. · Swelling in the ankles or legs. · Shortness of breath. How is it treated? The goal of treatment is to remove the extra fluid in your body. Your treatment will depend on the cause. Your doctor may:  · Give you medicines, such as diuretics (also called \"water pills\"). They help your body get rid of the extra fluid. · Restrict your fluid or salt intake. Follow-up care is a key part of your treatment and safety. Be sure to make and go to all appointments, and call your doctor if you are having problems. It's also a good idea to know your test results and keep a list of the medicines you take. Where can you learn more? Go to http://kendal-farzaneh.info/. Enter O110 in the search box to learn more about \"Learning About Fluid Overload. \"  Current as of: September 21, 2016  Content Version: 11.4  © 1949-7750 [employer].  Care instructions adapted under license by [employer] (which disclaims liability or warranty for this information). If you have questions about a medical condition or this instruction, always ask your healthcare professional. [address] any warranty or liability for your use of this information. Hyperkalemia: Care Instructions  Your Care Instructions    Hyperkalemia is too much potassium in the blood. Potassium helps keep the right mix of fluids in your body. It also helps your nerves and muscles work as they should. And it keeps your heartbeat in a normal rhythm. Some things can raise potassium levels. These include some health problems, medicines, and kidney problems. (Normally, your kidneys remove extra potassium.)  Too much potassium can cause nausea. It also can cause a heartbeat that isn't normal. But you may not have any symptoms. Too much potassium can be dangerous. That's why it's important to treat it. If you are taking any of the medicines that can raise your levels, your doctor will ask you to stop. You may get medicines to lower your levels. And you may have to limit or not eat foods that have a lot of potassium. Follow-up care is a key part of your treatment and safety. Be sure to make and go to all appointments, and call your doctor if you are having problems. It's also a good idea to know your test results and keep a list of the medicines you take. How can you care for yourself at home? · Take your medicines exactly as prescribed. Call your doctor if you think you are having a problem with your medicine. · Stop taking certain medicines if your doctor asks you to. They may be causing your high potassium levels. If you have concerns about stopping medicine, talk with your doctor. · If you have kidney, heart, or liver disease and have to limit fluids, talk with your doctor before you increase the amount of fluids you drink. If the doctor says it's okay, drink plenty of fluids. This means drinking enough so that your urine is light yellow or clear like water.   · Avoid strenuous exercise until your doctor tells you it is okay. · Potassium is in many foods, including vegetables, fruits, and milk products. Foods high in potassium include bananas, cantaloupe, broccoli, milk, potatoes, and tomatoes. · Low potassium foods include blueberries, raspberries, cucumber, white or brown rice, spaghetti, and macaroni. · Do not use a salt substitute without talking to your doctor first. Most of these are very high in potassium. · Be sure to tell your doctor about any prescription, over-the-counter, or herbal medicines you take. Some of these can raise potassium. When should you call for help? Call 911 anytime you think you may need emergency care. For example, call if:  ? · You passed out (lost consciousness). ? · You have an unusual heartbeat. Your heart may beat fast or skip beats. ?Call your doctor now or seek immediate medical care if:  ? · You have muscle aches. ? · You feel very weak. ? Watch closely for changes in your health, and be sure to contact your doctor if:  ? · You do not get better as expected. Where can you learn more? Go to http://kendal-farzaneh.info/. Enter K050 in the search box to learn more about \"Hyperkalemia: Care Instructions. \"  Current as of: May 12, 2017  Content Version: 11.4  © 9381-0427 [employer]. Care instructions adapted under license by [employer] (which disclaims liability or warranty for this information). If you have questions about a medical condition or this instruction, always ask your healthcare professional. [place] [address] any warranty or liability for your use of this information. Low Blood Pressure: Care Instructions  Your Care Instructions    Blood pressure is a measurement of the force of the blood against the walls of the blood vessels during and after each beat of the heart. Low blood pressure is also called hypotension.  It means that your blood pressure is much lower than normal. Some people, especially young, slim women, may have slightly low blood pressure without symptoms. But in many people, low blood pressure can cause symptoms such as feeling dizzy or lightheaded. When your blood pressure is too low, your heart, brain, and other organs do not get enough blood. Low blood pressure can be caused by many things, including heart problems and some medicines. Diabetes that is not under control can cause your blood pressure to drop. And so can a severe allergic reaction or infection. Another cause is dehydration, which is when your body loses too much fluid. Treatment for low blood pressure depends on the cause. Follow-up care is a key part of your treatment and safety. Be sure to make and go to all appointments, and call your doctor if you are having problems. It's also a good idea to know your test results and keep a list of the medicines you take. How can you care for yourself at home? · Drink plenty of fluids, enough so that your urine is light yellow or clear like water. If you have kidney, heart, or liver disease and have to limit fluids, talk with your doctor before you increase the amount of fluids you drink. · Be safe with medicines. Call your doctor if you think you are having a problem with your medicine. You will get more details on the specific medicines your doctor prescribes. · Stand up or get out of bed very slowly to allow your body to adjust.  · Get plenty of rest.  · Do not smoke. Smoking increases your risk of heart attack. If you need help quitting, talk to your doctor about stop-smoking programs and medicines. These can increase your chances of quitting for good. · Limit alcohol to 2 drinks a day for men and 1 drink a day for women. Alcohol may interfere with your medicine. In addition, alcohol can make your low blood pressure worse by causing your body to lose water. When should you call for help?   Call 911 anytime you think you may need emergency care. For example, call if:  ? · You passed out (lost consciousness). ?Call your doctor now or seek immediate medical care if:  ? · You are dizzy or lightheaded, or you feel like you may faint. ? Watch closely for changes in your health, and be sure to contact your doctor if you have any problems. Where can you learn more? Go to http://kendal-farzaneh.info/. Enter C304 in the search box to learn more about \"Low Blood Pressure: Care Instructions. \"  Current as of: September 21, 2016  Content Version: 11.4  © 3762-9075 [employer]. Care instructions adapted under license by [employer] (which disclaims liability or warranty for this information). If you have questions about a medical condition or this instruction, always ask your healthcare professional. [address] any warranty or liability for your use of this information. Hemodialysis Access: What to Expect at [address] [place] [address]  Hemodialysis is a way to remove wastes from the blood when your kidneys can no longer do the job. It is not a cure, but it can help you live longer and feel better. It is a lifesaving treatment when you have kidney failure. Hemodialysis is often called dialysis. Your doctor created a place (called an access) in your arm for your blood to flow in and out of your body during your dialysis sessions. Your arm will probably be bruised and swollen. It may hurt. The cut (incision) may bleed. The pain and bleeding will get better over several days. You will probably need only over-the-counter pain medicine. You can reduce swelling by propping your arm on 1 or 2 pillows and keeping your elbow straight. You will have stitches. These may dissolve on their own, or your doctor will tell you when to come in to have them removed. You should also be able to return to work in a few days. You may feel some coolness or numbness in your hand.  These feelings usually go away in a few weeks. Your doctor may suggest squeezing a soft object. This will strengthen your access and may make hemodialysis faster and easier. You should always be able to feel blood rushing through the fistula or graft. It feels like a slight vibration when you put your fingers on the skin over the fistula or graft. This feeling is called a thrill or pulse. This care sheet gives you a general idea about how long it will take for you to recover. But each person recovers at a different pace. Follow the steps below to get better as quickly as possible. How can you care for yourself at home? Activity  ? · Rest when you feel tired. Getting enough sleep will help you recover. Do not lie on or sleep on the arm with the access. ? · Avoid activities such as washing windows or gardening that put stress on the arm with the access. ? · You may use your arm, but do not lift anything that weighs more than about 15 pounds. This may include a child, heavy grocery bags, a heavy briefcase or backpack, cat litter or dog food bags, or a vacuum . ? · You can shower, but keep the access dry for the first 2 days. Cover the area with a plastic bag to keep it dry. ? · Do not soak or scrub the incision until it has healed. ? · Wear an arm guard to protect the area if you play sports or work with your arms. ? · You may drive when your doctor says it is okay. This is usually in 1 to 2 days. ? · Most people are able to return to work about 1 or 2 days after surgery. Diet  ? · Follow an eating plan that is good for your kidneys. A registered dietitian can help you make a meal plan that is right for you. You may need to limit protein, salt, fluids, and certain foods. Medicines  ? · Your doctor will tell you if and when you can restart your medicines. He or she will also give you instructions about taking any new medicines.    ? · If you take blood thinners, such as warfarin (Coumadin), clopidogrel (Plavix), or aspirin, be sure to talk to your doctor. He or she will tell you if and when to start taking those medicines again. Make sure that you understand exactly what your doctor wants you to do. ? · Take pain medicines exactly as directed. ¨ If the doctor gave you a prescription medicine for pain, take it as prescribed. ¨ If you are not taking a prescription pain medicine, ask your doctor if you can take acetaminophen (Tylenol). Do not take ibuprofen (Advil, Motrin) or naproxen (Aleve), or similar medicines, unless your doctor tells you to. They may make chronic kidney disease worse. ¨ Do not take two or more pain medicines at the same time unless the doctor told you to. Many pain medicines have acetaminophen, which is Tylenol. Too much acetaminophen (Tylenol) can be harmful. ? · If you think your pain medicine is making you sick to your stomach:  ¨ Take your medicine after meals (unless your doctor has told you not to). ¨ Ask your doctor for a different pain medicine. ? · If your doctor prescribed antibiotics, take them as directed. Do not stop taking them just because you feel better. You need to take the full course of antibiotics. Incision care  ? · Keep the area dry for 2 days. After 2 days, wash the area with soap and water every day, and always before dialysis. ? · Do not soak or scrub the incision until it has healed. ? · If you have a bandage, change it every day or as your doctor recommends. Your doctor will tell you when you can remove it. Exercise  ? · Squeeze a soft ball or other object as your doctor tells you. This will help blood flow through the access and help prevent blood clots. ? Elevation  ? · Prop up the sore arm on a pillow anytime you sit or lie down during the next 3 days. Try to keep it above the level of your heart. This will help reduce swelling. Other instructions  ? · Every day, check your access for a pulse or thrill in the fistula or graft area. A thrill is a vibration.  To feel a pulse or thrill, place the first two fingers of your hand over the access. ? · Do not bump your arm. ? · Do not wear tight clothing, jewelry, or anything else that may squeeze the access. ? · Use your other arm to have blood drawn or blood pressure taken. ? · Do not put cream or lotion on or near the access. ? · Make sure all doctors you deal with know you have a vascular access. Follow-up care is a key part of your treatment and safety. Be sure to make and go to all appointments, and call your doctor if you are having problems. It's also a good idea to know your test results and keep a list of the medicines you take. When should you call for help? Call 911 anytime you think you may need emergency care. For example, call if:  ? · You passed out (lost consciousness). ? · You have chest pain, are short of breath, or cough up blood. ?Call your doctor now or seek immediate medical care if:  ? · Your hand or arm is cold or dark-colored. ? · You have no pulse in your access. ? · You have nausea or you vomit. ? · You have pain that does not get better after you take pain medicine. ? · You have loose stitches, or your incision comes open. ? · You are bleeding from the incision. ? · You have signs of infection, such as:  ¨ Increased pain, swelling, warmth, or redness. ¨ Red streaks leading from the area. ¨ Pus draining from the area. ¨ A fever. ? · You have signs of a blood clot in your leg (called a deep vein thrombosis), such as:  ¨ Pain in your calf, back of the knee, thigh, or groin. ¨ Redness or swelling in your leg. ? Watch closely for changes in your health, and be sure to contact your doctor if you have any problems. Where can you learn more? Go to http://kendal-farzaneh.info/. Enter P616 in the search box to learn more about \"Hemodialysis Access: What to Expect at Home. \"  Current as of:  May 12, 2017  Content Version: 11.4  © 7967-1572 [employer]. Care instructions adapted under license by [employer] (which disclaims liability or warranty for this information). If you have questions about a medical condition or this instruction, always ask your healthcare professional. [address] any warranty or liability for your use of this information. Patient armband removed and shredded      DISCHARGE SUMMARY from Nurse    PATIENT INSTRUCTIONS:    After general anesthesia or intravenous sedation, for 24 hours or while taking prescription Narcotics:  · Limit your activities  · Do not drive and operate hazardous machinery  · Do not make important personal or business decisions  · Do  not drink alcoholic beverages  · If you have not urinated within 8 hours after discharge, please contact your surgeon on call. Report the following to your surgeon:  · Excessive pain, swelling, redness or odor of or around the surgical area  · Temperature over 100.5  · Nausea and vomiting lasting longer than 4 hours or if unable to take medications  · Any signs of decreased circulation or nerve impairment to extremity: change in color, persistent  numbness, tingling, coldness or increase pain  · Any questions    What to do at Home:  Recommended activity: Activity as tolerated    If you experience any of the following symptoms Nausea, vomiting, diarrhea, fever greater than 100.5, dizziness, severe headache, shortness of breath, chest pain, increased pain, please follow up with PCP. *  Please give a list of your current medications to your Primary Care Provider. *  Please update this list whenever your medications are discontinued, doses are      changed, or new medications (including over-the-counter products) are added. *  Please carry medication information at all times in case of emergency situations.     These are general instructions for a healthy lifestyle:    No smoking/ No tobacco products/ Avoid exposure to second hand smoke  Surgeon General's Warning:  Quitting smoking now greatly reduces serious risk to your health. Obesity, smoking, and sedentary lifestyle greatly increases your risk for illness    A healthy diet, regular physical exercise & weight monitoring are important for maintaining a healthy lifestyle    You may be retaining fluid if you have a history of heart failure or if you experience any of the following symptoms:  Weight gain of 3 pounds or more overnight or 5 pounds in a week, increased swelling in our hands or feet or shortness of breath while lying flat in bed. Please call your doctor as soon as you notice any of these symptoms; do not wait until your next office visit. Recognize signs and symptoms of STROKE:    F-face looks uneven    A-arms unable to move or move unevenly    S-speech slurred or non-existent    T-time-call 911 as soon as signs and symptoms begin-DO NOT go       Back to bed or wait to see if you get better-TIME IS BRAIN. Warning Signs of HEART ATTACK     Call 911 if you have these symptoms:   Chest discomfort. Most heart attacks involve discomfort in the center of the chest that lasts more than a few minutes, or that goes away and comes back. It can feel like uncomfortable pressure, squeezing, fullness, or pain.  Discomfort in other areas of the upper body. Symptoms can include pain or discomfort in one or both arms, the back, neck, jaw, or stomach.  Shortness of breath with or without chest discomfort.  Other signs may include breaking out in a cold sweat, nausea, or lightheadedness. Don't wait more than five minutes to call 911 - MINUTES MATTER! Fast action can save your life. Calling 911 is almost always the fastest way to get lifesaving treatment. Emergency Medical Services staff can begin treatment when they arrive -- up to an hour sooner than if someone gets to the hospital by car. The discharge information has been reviewed with the patient.   The patient verbalized understanding. Discharge medications reviewed with the patient and appropriate educational materials and side effects teaching were provided.   ___________________________________________________________________________________________________________________________________

## 2017-12-14 NOTE — OP NOTES
[facility]  OPERATIVE REPORT    Neva Fraga  MR#: [id number]  : 1940  ACCOUNT #: [de-identified]   DATE OF SERVICE: 2017    DATE OF SURGERY: 2017    DIAGNOSIS: End-stage renal disease, dialysis dependent. POSTOPERATIVE DIAGNOSIS: End-stage renal disease, dialysis dependent. PROCEDURE: Over the wire exchange of tunneled dialysis catheter. SURGEON: Lloyd Ravi DO     COMPLICATIONS: Zero. ESTIMATED BLOOD LOSS: Zero. CONDITION OF THE PATIENT: Stable. ANESTHESIA: Moderate sedation. SPECIMEN REMOVED: Tunneled dialysis catheter. DETAILS OF PROCEDURE: I was called regarding patient's dialysis catheter. The dialysis unit said that it was nonfunctional. He was brought to the lab today, had moderate sedation, supine position. I localized and cleaned up the site, wired the catheter with an Amplatz wire and removed it, removing the entire tunneled section under fluoroscopy. I obtained a new 23-cm Palindrome. Using the stiffeners, placed it over the stiff wire into the central position. I pulled the wire and the stiffeners, adjusted the catheter into the appropriate position using fluoroscopy. The catheter flushes and aspirates wonderfully. It was locked with concentrated heparin, secured in place with sutures. Sterile dressing was applied. He tolerated this well. He can use his catheter right away from dialysis.       DO GIA Olivares / CEZAR  D: 2017 10:53     T: 2017 19:10  JOB #: [id number]

## 2017-12-14 NOTE — PROGRESS NOTES
Care Management Interventions  Transition of Care Consult (CM Consult): Home Health ([facility])  [address]: Yes  Current Support Network: Lives with Spouse (Fernanda Fleming)  Confirm Follow Up Transport: Family (spouse)  Plan discussed with Pt/Family/Caregiver: Yes  Freedom of Choice Offered: Yes  Discharge Location  Discharge Placement: Home with home health     Received orders for [facility], choice list given to patient, choice letter signed for [address] and placed in chart. Referral sent/called to jesse Marr. Patient verbalized no further needs, anticipated DC to home today.

## 2017-12-14 NOTE — ROUTINE PROCESS
Bedside shift change report given to Nagi Nieves RN (oncoming nurse) by Jeana Sainz (offgoing nurse). Report given with SBAR, Kardex, Intake/Output, MAR and Recent Results.

## 2017-12-14 NOTE — PROGRESS NOTES
Progress Note    Nitish Farias  68 y.o. Admit Date: 12/12/2017  Principal Problem:    Chest pain (10/27/2017) POA: Unknown    Active Problems:    Coronary atherosclerosis of native coronary artery (10/7/2012) POA: Yes      CKD (chronic kidney disease) stage 4, GFR 15-29 ml/min ([facility] (10/7/2012) POA: Yes      HTN (hypertension) (10/7/2012) POA: Yes      DM2 (diabetes mellitus, type 2) ([facility] (10/7/2012) POA: Yes      Dyslipidemia (10/7/2012) POA: Yes      S/P coronary artery stent placement (3/12/2013) POA: Yes      Overview: vg to diag stent 10/2012      Postsurgical aortocoronary bypass status (3/12/2013) POA: Yes      Sleep disturbance () POA: Yes      Overview: APRYL, not using CPAP regularly      Mixed hyperlipidemia () POA: Yes      Overview: HDL's are not at goal, LDL's are at goal, triglycerides are not at goal.      GERD (gastroesophageal reflux disease) (3/5/2015) POA: Yes      Prostate cancer ([facility] (4/29/2015) POA: Yes      Overview: 4/2015  T2c  PSA 57, [place];8 in 2 cores, and G7 in 2,  CT and Bone Scan       no obvious mets      Diastolic CHF, acute on chronic ([facility] (7/5/2016) POA: Yes      Secondary hyperparathyroidism of renal origin ([facility] (10/25/2017) POA: Yes      ESRD (end stage renal disease) ([facility] (11/8/2017) POA: Yes      Hypotension (12/12/2017) POA: Unknown      Fluid overload (12/12/2017) POA: Unknown      Hyperkalemia (12/13/2017) POA: Unknown            Subjective:     Patient feels good , no SOB, no Chest pain. A comprehensive review of systems was negative except for that written in the History of Present Illness.     Objective:     Visit Vitals    /66 (BP 1 Location: Right arm, BP Patient Position: At rest)    Pulse 80    Temp 98.5 °F (36.9 °C)    Resp 18    Ht 5' 8.5\" (1.74 m)    Wt 95.5 kg (210 lb 9.6 oz)    SpO2 97%    BMI 31.56 kg/m2         Intake/Output Summary (Last 24 hours) at 12/14/17 1437  Last data filed at 12/14/17 1313   Gross per 24 hour   Intake 600 ml   Output             2000 ml   Net            -1400 ml       Current Facility-Administered Medications   Medication Dose Route Frequency Provider Last Rate Last Dose    isosorbide mononitrate ER (IMDUR) tablet 30 mg  30 mg Oral DAILY Urszula Coughlin NP   30 mg at 12/14/17 1133    heparin (porcine) 1,000 unit/mL injection 3,000 Units  3,000 Units IntraVENous ONCE Laura Grissom MD        insulin lispro (HUMALOG) injection   SubCUTAneous AC&HS Laura Grissom MD   4 Units at 12/14/17 1233    losartan (COZAAR) tablet 50 mg  50 mg Oral DAILY Nathalia Frazier MD   50 mg at 12/14/17 0819    metoprolol tartrate (LOPRESSOR) tablet 50 mg  50 mg Oral BID Nathalia Frazier MD   50 mg at 12/14/17 0819    amLODIPine (NORVASC) tablet 5 mg  5 mg Oral DAILY Urszula Coughlin NP   5 mg at 12/14/17 0819    heparin 25,000 units in D5W 250 ml infusion  10.5-25 Units/kg/hr IntraVENous TITRATE Urszula Coughlin NP 12.2 mL/hr at 12/14/17 0716 1,220 Units/hr at 12/14/17 0716    epoetin lee (EPOGEN;PROCRIT) 5,000 Units  5,000 Units IntraVENous DIALYSIS ONCE Rita Ledezma MD        [START ON 12/16/2017] doxercalciferol (HECTOROL) 4 mcg/2 mL injection 1 mcg  1 mcg IntraVENous EVERY WED & SAT Rita Ledezma MD        aspirin chewable tablet 81 mg  81 mg Oral DAILY Urszula Coughlin, NP   81 mg at 12/14/17 0820    clopidogrel (PLAVIX) tablet 75 mg  75 mg Oral DAILY Urszula ARIELA Coughlin, NP   75 mg at 12/14/17 0049    simvastatin (ZOCOR) tablet 40 mg  40 mg Oral QHS Urszula Coughlin, NP   40 mg at 12/13/17 5817        Physical Exam:     Physical Exam:   General:  Alert, cooperative, no distress, appears stated age. Lungs:   Clear to auscultation bilaterally. Heart:  Regular rate and rhythm, S1, S2 normal, no murmur, click, rub or gallop. Abdomen:   Soft, non-tender. Bowel sounds normal. No masses,  No organomegaly.    Extremities: Extremities normal, atraumatic, no cyanosis or edema,HD catheter is well dressed. AVF has good thrill & bruit          Data Review:    CBC w/Diff    Recent Labs      12/14/17   0300  12/13/17   0750  12/12/17   1135   WBC  7.0  8.2  12.4   RBC  3.23*  3.42*  3.27*   HGB  9.1*  9.7*  9.2*   HCT  30.0*  32.1*  30.5*   MCV  92.9  93.9  93.3   MCH  28.2  28.4  28.1   MCHC  30.3*  30.2*  30.2*   RDW  16.2*  16.2*  15.9*    Recent Labs      12/14/17   0300  12/13/17   0750  12/12/17   1135   MONOS  8  8  6   EOS  1  2  1   BASOS  0  1  0   RDW  16.2*  16.2*  15.9*        Comprehensive Metabolic Profile    Recent Labs      12/14/17   0300  12/13/17   1451  12/13/17   0750   NA  139  137  137   K  5.3  5.7*  5.6*   CL  101  101  98*   CO2  31  26  29   BUN  27*  47*  47*   CREA  4.02*  6.16*  6.50*    Recent Labs      12/14/17   0300  12/13/17   1451  12/13/17   0750   CA  8.6  9.0  9.0   PHOS  3.8   --   5.3*                        Impression:       Active Hospital Problems    Diagnosis Date Noted    Hyperkalemia 12/13/2017    Hypotension 12/12/2017    Fluid overload 12/12/2017    ESRD (end stage renal disease) ([facility] 11/08/2017    Chest pain 10/27/2017    Secondary hyperparathyroidism of renal origin ([facility] 97/85/5934    Diastolic CHF, acute on chronic ([facility] 07/05/2016    Prostate cancer ([facility] 04/29/2015 4/2015  T2c  PSA 57, [place];8 in 2 cores, and G7 in 2,  CT and Bone Scan no obvious mets      GERD (gastroesophageal reflux disease) 03/05/2015    Sleep disturbance      APRYL, not using CPAP regularly      Mixed hyperlipidemia      HDL's are not at goal, LDL's are at goal, triglycerides are not at goal.      S/P coronary artery stent placement 03/12/2013     vg to diag stent 10/2012      Postsurgical aortocoronary bypass status 03/12/2013    HTN (hypertension) 10/07/2012    Dyslipidemia 10/07/2012    CKD (chronic kidney disease) stage 4, GFR 15-29 ml/min ([facility] 10/07/2012    Coronary atherosclerosis of native coronary artery 10/07/2012    DM2 (diabetes mellitus, type 2) ([facility] 10/07/2012      Was dialyzed last night, k slightly on the high side. Plan:     OK to DC home  [facility] containing food, dialysis on 12/16.//17 as scheduled.       Geovanny Persaud MD

## 2017-12-14 NOTE — PROGRESS NOTES
conducted a Follow-up consultation and Spiritual Assessment for Kym Sky, who is a 68 y. o.,male. Patients Primary Language is: [place]. According to the patients EMR Moravian Affiliation is: Oriental orthodox.      The reason the Patient came to the hospital is:   Patient Active Problem List    Diagnosis Date Noted    Hyperkalemia 12/13/2017    Hypotension 12/12/2017    Fluid overload 12/12/2017    ESRD (end stage renal disease) ([facility] 11/08/2017    Hyperuricemia 10/28/2017    Chronic kidney disease, stage V ([facility] 10/28/2017    Chest pain 10/27/2017    Secondary hyperparathyroidism of renal origin ([facility] 10/25/2017    Fatigue 66/06/0128    Diastolic CHF, acute on chronic (HCC) 07/05/2016    CHF (congestive heart failure), NYHA class IV ([facility] 07/05/2016    APRYL (obstructive sleep apnea) 06/26/2015    [facility] 04/29/2015    Abdominal pain 03/05/2015    GERD (gastroesophageal reflux disease) 03/05/2015    Constipation 03/05/2015    Chronic renal insufficiency 03/05/2015    Atherosclerosis of renal artery (HCC)     Chronic diastolic heart failure (HCC)     Morbid obesity (HCC)     Essential hypertension, benign     Sleep disturbance     Coronary atherosclerosis     Mixed hyperlipidemia     Type II or unspecified type diabetes mellitus without mention of complication, not stated as uncontrolled     Chest pain, unspecified 03/12/2013    S/P coronary artery stent placement 03/12/2013    Postsurgical aortocoronary bypass status 03/12/2013    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    NSTEMI (non-ST elevated myocardial infarction) ([facility] 10/07/2012    Coronary atherosclerosis of native coronary artery 10/07/2012    CKD (chronic kidney disease) stage 4, GFR 15-29 ml/min ([address] 10/07/2012    HTN (hypertension) 10/07/2012    DM2 (diabetes mellitus, type 2) ([address] 10/07/2012    Dyslipidemia 10/07/2012        The  provided the following Interventions:  Continued the relationship of care and support. Patient is known to  from previous admissions. Listened empathically to patient's update of his medical condition. Offered the Anointing of the Sick, prayer and assurance of continued prayer on patient's behalf. Chart reviewed. The following outcomes were achieved:  Patient expressed gratitude for pastoral care visit. Assessment:  There are no further spiritual or Jainism issues which require Spiritual Care Services interventions at this time. Plan:  Chaplains will continue to make follow-up visits and provide pastoral care as needed or requested. The Rev.  [facility] [address] [place] [address]  [facility] [phone] / [facility] [phone]

## 2017-12-15 ENCOUNTER — TELEPHONE (OUTPATIENT)
Dept: OTHER | Age: 77
End: 2017-12-15

## 2017-12-15 ENCOUNTER — HOME HEALTH ADMISSION (OUTPATIENT)
Dept: HOME HEALTH SERVICES | Facility: HOME HEALTH | Age: 77
End: 2017-12-15

## 2017-12-15 ENCOUNTER — PATIENT OUTREACH (OUTPATIENT)
Dept: CASE MANAGEMENT | Age: 77
End: 2017-12-15

## 2017-12-15 LAB
ATRIAL RATE: 87 BPM
CALCULATED P AXIS, ECG09: 54 DEGREES
CALCULATED R AXIS, ECG10: 5 DEGREES
CALCULATED T AXIS, ECG11: 88 DEGREES
DIAGNOSIS, 93000: NORMAL
P-R INTERVAL, ECG05: 144 MS
Q-T INTERVAL, ECG07: 372 MS
QRS DURATION, ECG06: 94 MS
QTC CALCULATION (BEZET), ECG08: 447 MS
VENTRICULAR RATE, ECG03: 87 BPM

## 2017-12-15 NOTE — OP NOTES
[facility]  OPERATIVE REPORT    Dominique Calzada  MR#: [id number]  : 1940  ACCOUNT #: [de-identified]   DATE OF SERVICE: 2017    SURGEON:  Margoth Phan DO    PREOPERATIVE DIAGNOSIS:  End-stage renal disease, dialysis dependent. POSTOPERATIVE DIAGNOSIS:  End-stage renal disease, dialysis dependent. PROCEDURE:  Creation left arm, primary dialysis, brachial cephalic fistula (Torrez fistula). COMPLICATIONS:  0.     ESTIMATED BLOOD LOSS: 0.     CONDITION OF THE PATIENT:  Stable. ANESTHESIA:  Moderate sedation. SPECIMENS REMOVED:  None. DETAILS OF THE PROCEDURE:  The patient is the most pleasant 77-year-old male who is completing dialysis via catheter currently and needs his permanent access. He is under the care of Nephrology and Cardiology. He was brought back to the room after receiving his preop antibiotic. He had moderate sedation in supine position. His left arm was prepped and draped in the usual standard fashion. I used vein mapping for his evaluation for fistula. Made an incision after localizing at the antecubital fossa. The brachial artery was appropriate in size and patent. The cephalic vein was lateral, but it was patent and reasonable in size. I exposed it proximally and distally. I exposed it as far distal as possible and clipped off at a branching point, allowing for transposition. I mobilized it proximally so there would be no tension on the fistula. I anticoagulated him at this point. Gained vessel control on the artery using 2 vessel loops. Arteriotomy, 11 blade scalpel, Pott's scissors spatulated the vein and sewed in end-to-side of the artery using 6-0 Prolene sutures in circular and standard fashion. Upon completion, I then released all the controls. There was a wonderful thrill in the fistula. Straight, superficial, easily to feel. I was happy with this. I irrigated the sites.   There was no bleeding and closed the fascial with interrupted with 3-0 Monocryl, 4-0 Monocryl and Dermabond over the skin. He was transferred to recovery in stable condition. No chest pain, no hand pain encountered.       DO GIA Tam / CEZAR  D: 12/13/2017 08:57     T: 12/13/2017 14:06  JOB #: [id number]

## 2017-12-15 NOTE — CONSULTS
[address] [facility] [place]    Rad Maki  MR#: [id number]  : 1940  ACCOUNT #: [de-identified]   DATE OF SERVICE: 2017    NEPHROLOGY CONSULTATION      CONSULT REQUESTED BY:  Emergency room physician. REASON FOR CONSULTATION:  Dialysis patient with a nonfunctional dialysis catheter. HISTORY OF PRESENT ILLNESS:  This is a 80-year-old [facility] [employer] male known to me from medical and renal problem was sent to the emergency room as per my advice. The patient usually gets dialysis every Tuesday, Thursday, Saturday early morning at [facility] [place] [facility]. Yesterday, he was having some problem with his dialysis catheter, which is a relatively new catheter, and the blood flow was not more than 200 mL per minute. Subsequently also, the patient was also having chest discomfort with chest tightness, which resolved with a nitroglycerin tablet. , the patient was advised to go to the nearest emergency rooml, but the patient declined as he has some issues  with this hospital.  Subsequently, the patient was brought to the [facility] emergency room by his family members where I have seen the patient. By the time I had seen the patient, there was no chest discomfort or chest pain. MI was ruling out by the serial CPKs and the troponin was slightly elevated. Vascular surgeon was notified and they did a tunneled dialysis catheter sometime today to restart his dialysis. This patient has numerous medical problems, particularly the cardiac issue. He has a history of coronary artery disease with CABG and also has 2 stents done in the past.  He says that he has also a history of diabetes mellitus, ** secondary hyperparathyroidism, obesity, and sleep apnea. Also, he has gastroesophageal reflux disease.       PAST SURGICAL HISTORY:  Includes CABG, colonoscopy, cholecystectomy, coronary artery bypass surgery along with stent, also has an AV fistula done recently on the left arm along with a dialysis catheter. FAMILY HISTORY:  Significant for heart attack in father. SOCIAL HISTORY:  , lives with a family member. Never smoked. ALLERGIES:  NO KNOWN DRUG ALLERGY. LIST OF MEDICATIONS BEFORE ADMISSION:  Losartan 50 mg p.o. daily, Tylenol No. 3 at 5/325 mg 1 every 6 hours p.r.n. for pain, Flomax 0.4 mg p.o. daily, Proventil 2 puffs every 6 hours, aspirin 81 mg p.o. daily, Nephrocaps once a day, Zocor 40 mg p.o. daily, Lopressor 100 mg tablet twice daily, Plavix 75 mg p.o. daily, vitamin D3 at 50,000 units once a week, Lantus insulin 30 units subcutaneous every 12 hours, nitroglycerin 0.4 mg sublingually q. 5 minutes p.r.n. for chest pain and Losartan  100 mg p.o. daily. PHYSICAL EXAMINATION:    VITAL SIGNS:  Temp is 98.0, respirations 16, weight 214 pounds, blood pressure 112/46, BMI of 32.54. HEENT:  Oral mucosa moist.    NECK:  Supple. LUNGS:  Good air entry on both sides. HEART:  S1, S2 regular. No gallop or murmur. ABDOMEN:  Soft, nontender, normoactive bowel sounds. EXTREMITIES:  His AV fistula in the left arm has a competent fistula. No signs of any steal syndrome, has a good thrill and bruit. Dialysis catheter in the right IJ.      RELEVANT LABORATORY AND TESTING:  On admission:  WBC of 12.4 with a hemoglobin of 9.2, hematocrit of 30.5, platelets of 259,617. Chemistry:  Sodium 135, potassium 5.5, chloride 96, CO2 of 31, glucose 147, blood urea nitrogen 44, creatinine of 6.62, calcium of 9.4. Troponin was 0.20 and the second one was done   0.18 and then 0.13 also. The last one this morning was 0.10. Chest x-ray was done, which shows no acute cardiopulmonary process. IMPRESSION:    1. ESRD. 2.  Nonfunctional dialysis catheter. 3.  Anginal chest pain. 4.  History of known coronary artery disease. 5.  Anemia. 6.  Secondary hyperparathyroidism.       PLAN:  Patient needs a dialysis catheter to continue to dialyze and also cardiac clearance. By cardiac enzymes, it looks like initially the troponin was slightly elevated, followed by slowly decreasing. By this time, the patient has a dialysis catheter, which will be used. He will be dialyzed one time in the hospital and subsequently will be discharged to continue to dialyze as an outpatient. During this hospital stay, it is also important to mention that he also had anginal symptoms relieved with nitroglycerin. There were EKG changes with incomplete right bundle branch block, cardiology needs to     do more cardiac workup. Once he has been cleared and remains stable, then he can go home to continue his medical care as an outpatient.       Jose Daniel Wu MD       [facility] [place] [facility]  D: 12/13/2017 17:59     T: 12/14/2017 07:49  JOB #: [id number]

## 2017-12-15 NOTE — PROGRESS NOTES
Patient is home and denies any chest pain at this time, reports he is feeling better. Has his Cardiology follow up scheduled for 12/18/17. Confirms he received new prescriptions at discharge, but refuses to review his medications right now. Denies any other needs at this time, will continue to follow.

## 2017-12-15 NOTE — HOME CARE
Received HH referral , Discharge noted yesterday, [facility] will follow for SN,PT/OT ; pt's wife states pt has cane;pt wife states pt takes HD on Tues-Thur-Sat at 5:32am-6am,  [facility] will follow. STACI FREEMAN.

## 2017-12-17 ENCOUNTER — HOME CARE VISIT (OUTPATIENT)
Dept: SCHEDULING | Facility: HOME HEALTH | Age: 77
End: 2017-12-17

## 2017-12-18 ENCOUNTER — OFFICE VISIT (OUTPATIENT)
Dept: CARDIOLOGY CLINIC | Age: 77
End: 2017-12-18

## 2017-12-18 VITALS
SYSTOLIC BLOOD PRESSURE: 147 MMHG | WEIGHT: 216 LBS | HEART RATE: 75 BPM | BODY MASS INDEX: 32.74 KG/M2 | DIASTOLIC BLOOD PRESSURE: 46 MMHG | HEIGHT: 68 IN

## 2017-12-18 DIAGNOSIS — Z95.5 S/P CORONARY ARTERY STENT PLACEMENT: ICD-10-CM

## 2017-12-18 DIAGNOSIS — Z95.1 POSTSURGICAL AORTOCORONARY BYPASS STATUS: ICD-10-CM

## 2017-12-18 DIAGNOSIS — N18.6 ESRD (END STAGE RENAL DISEASE) (HCC): ICD-10-CM

## 2017-12-18 DIAGNOSIS — I10 ESSENTIAL HYPERTENSION, BENIGN: ICD-10-CM

## 2017-12-18 DIAGNOSIS — I50.32 CHRONIC DIASTOLIC HEART FAILURE (HCC): ICD-10-CM

## 2017-12-18 DIAGNOSIS — I25.119 ATHEROSCLEROSIS OF CORONARY ARTERY OF NATIVE HEART WITH ANGINA PECTORIS, UNSPECIFIED VESSEL OR LESION TYPE (HCC): Primary | ICD-10-CM

## 2017-12-18 PROBLEM — E11.21 TYPE 2 DIABETES MELLITUS WITH NEPHROPATHY (HCC): Status: ACTIVE | Noted: 2017-12-18

## 2017-12-18 NOTE — LETTER
Shelia Zhou 1940 
 
12/18/2017 Dear MD Sheldon Phan PA Babetta Castilla, MD 
 
I had the pleasure of evaluating  Mr. Cheri Lazaro in office today. Below are the relevant portions of my assessment and plan of care. ICD-10-CM ICD-9-CM 1. Atherosclerosis of coronary artery of native heart with angina pectoris, unspecified vessel or lesion type ([facility] I25.119 414.01   
  413.9   
 occasional angina 2. Chronic diastolic heart failure (HCC) I50.32 428.32   
 stable 3. Essential hypertension, benign I10 401.1   
 mildly elevated 4. Postsurgical aortocoronary bypass status Z95.1 V45.81   
5. ESRD (end stage renal disease) ([facility] N18.6 585.6   
 on dialysis 6. S/P coronary artery stent placement Z95.5 V45.82 Current Outpatient Prescriptions Medication Sig Dispense Refill  isosorbide mononitrate ER (IMDUR) 30 mg tablet Take 1 Tab by mouth daily. 30 Tab 0  
 amLODIPine (NORVASC) 5 mg tablet Take 1 Tab by mouth daily. 30 Tab 0  
 HYDROcodone-acetaminophen (NORCO) 5-325 mg per tablet Take 1-2 Tabs by mouth every four (4) hours as needed for Pain. Max Daily Amount: 12 Tabs. 12 Tab 0  
 losartan (COZAAR) 50 mg tablet TAKE ONE TABLET BY MOUTH ONE TIME DAILY  30 Tab 0  
 tamsulosin (FLOMAX) 0.4 mg capsule TAKE ONE CAPSULE BY MOUTH ONE TIME DAILY  90 Cap 0  
 albuterol (PROVENTIL HFA, VENTOLIN HFA, PROAIR HFA) 90 mcg/actuation inhaler Take 2 Puffs by inhalation every four (4) hours as needed for Wheezing. 1 Inhaler 0  
 aspirin 81 mg chewable tablet Take 1 Tab by mouth daily. 30 Tab 0  
 b complex-vitamin c-folic acid (NEPHROCAPS) 1 mg capsule Take 1 Cap by mouth daily. 30 Cap 0  
 senna-docusate (PERICOLACE) 8.6-50 mg per tablet Take 1 Tab by mouth daily. 30 Tab 0  
 fenofibrate (LOFIBRA) 54 mg tablet Take 1 Tab by mouth daily. 30 Tab 5  
 simvastatin (ZOCOR) 40 mg tablet TAKE 1 TABLET BY MOUTH NIGHTLY.  90 Tab 2  
  metoprolol tartrate (LOPRESSOR) 100 mg IR tablet TAKE 1 TABLET BY MOUTH TWO TIMES A DAY. 180 Tab 2  clopidogrel (PLAVIX) 75 mg tab Take 1 Tab by mouth daily. 90 Tab 3  Cholecalciferol, Vitamin D3, (DECARA) 50,000 unit cap Take  by mouth every seven (7) days.  insulin glargine (LANTUS) 100 unit/mL injection 20 Units by SubCUTAneous route every twelve (12) hours. 1 Vial 2  
 nitroglycerin (NITROSTAT) 0.4 mg SL tablet 1 Tab by SubLINGual route as needed for Chest Pain. 25 Tab 1  
 insulin aspart (NOVOLOG) 100 unit/mL injection 20 units sq 3 times a day,sliding scale 10 mL 0  
 allopurinol (ZYLOPRIM) 100 mg tablet Take 100 mg by mouth daily. No orders of the defined types were placed in this encounter. If you have questions, please do not hesitate to call me. I look forward to following Mr. Gadiel Gaitan along with you. Sincerely, José Miguel Salazar MD

## 2017-12-18 NOTE — MR AVS SNAPSHOT
Visit Information Date & Time Provider Department Dept. Phone Encounter #  
 12/18/2017 10:00 AM Rashad Cote MD [facility] [phone] Follow-up Instructions Return in about 3 months (around 3/18/2018). Your Appointments 12/22/2017 11:15 AM  
HOSPITAL DISCHARGE with [facility] ([address] [facility]) Appt Note: DC lt arm creation of fistula/poss graft 2300 [facility] 320 200 [facility]  
[phone] 2300 [facility] 47 [facility]  
  
    
 1/8/2018 10:00 AM  
ESTABLISHED PATIENT with Joaquina Iniguez MD  
[facility] [address] [facility]) Appt Note: 6mo F/u Flow bus ua psa prior; .  
 2000 Pending sale to [facility] [address] Upcoming Health Maintenance Date Due  
 FOOT EXAM Q1 7/10/1950 MICROALBUMIN Q1 7/10/1950 EYE EXAM RETINAL OR DILATED Q1 7/10/1950 DTaP/Tdap/Td series (1 - Tdap) 7/10/1961 ZOSTER VACCINE AGE 60> 5/10/2000 GLAUCOMA SCREENING Q2Y 7/10/2005 MEDICARE YEARLY EXAM 7/10/2005 Pneumococcal 65+ High/Highest Risk (2 of 2 - PPSV23) 3/9/2017 Influenza Age 5 to Adult 8/1/2017 HEMOGLOBIN A1C Q6M 4/28/2018 LIPID PANEL Q1 11/14/2018 Allergies as of 12/18/2017  Review Complete On: 12/18/2017 By: Rashad Cote MD  
  
 Severity Noted Reaction Type Reactions Nka [No Known Allergies]  10/07/2012    Other (comments) Current Immunizations  Reviewed on 3/12/2013 No immunizations on file. Not reviewed this visit You Were Diagnosed With   
  
 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 
 ICD-9-CM: 428.32 stable Essential hypertension, benign     ICD-10-CM: I10 
ICD-9-CM: 401.1 mildly elevated Postsurgical aortocoronary bypass status     ICD-10-CM: Z95.1 ICD-9-CM: V45.81 ESRD (end stage renal disease) ([facility]     ICD-10-CM: N18.6 ICD-9-CM: 585.6 on dialysis S/P coronary artery stent placement     ICD-10-CM: Z95.5 ICD-9-CM: V45.82 Vitals BP Pulse Height(growth percentile) Weight(growth percentile) BMI Smoking Status 147/46 75 5' 8\" (1.727 m) 216 lb (98 kg) 32.84 kg/m2 Never Smoker BMI and BSA Data Body Mass Index Body Surface Area  
 32.84 kg/m 2 2.17 m 2 Preferred Pharmacy Pharmacy Name Phone [facility] #[id number] - [address] [phone] Your Updated Medication List  
  
   
This list is accurate as of: 12/18/17 10:16 AM.  Always use your most recent med list.  
  
  
  
  
 albuterol 90 mcg/actuation inhaler Commonly known as:  PROVENTIL HFA, VENTOLIN HFA, PROAIR HFA Take 2 Puffs by inhalation every four (4) hours as needed for Wheezing. allopurinol 100 mg tablet Commonly known as:  Garvey Bless Take 100 mg by mouth daily. amLODIPine 5 mg tablet Commonly known as:  Davis Jose Take 1 Tab by mouth daily. aspirin 81 mg chewable tablet Take 1 Tab by mouth daily. b complex-vitamin c-folic acid 1 mg capsule Commonly known as:  Hope Vita Take 1 Cap by mouth daily. clopidogrel 75 mg Tab Commonly known as:  PLAVIX Take 1 Tab by mouth daily. DECARA 50,000 unit Cap Generic drug:  Cholecalciferol (Vitamin D3) Take  by mouth every seven (7) days. fenofibrate 54 mg tablet Commonly known as:  LOFIBRA Take 1 Tab by mouth daily. HYDROcodone-acetaminophen 5-325 mg per tablet Commonly known as:  Hill Howe Take 1-2 Tabs by mouth every four (4) hours as needed for Pain. Max Daily Amount: 12 Tabs. insulin aspart 100 unit/mL injection Commonly known as:  Lukasz Cashing 20 units sq 3 times a day,sliding scale  
  
 insulin glargine 100 unit/mL injection Commonly known as:  LANTUS  
20 Units by SubCUTAneous route every twelve (12) hours. isosorbide mononitrate ER 30 mg tablet Commonly known as:  IMDUR Take 1 Tab by mouth daily. losartan 50 mg tablet Commonly known as:  COZAAR  
TAKE ONE TABLET BY MOUTH ONE TIME DAILY  
  
 metoprolol tartrate 100 mg IR tablet Commonly known as:  LOPRESSOR  
TAKE 1 TABLET BY MOUTH TWO TIMES A DAY. nitroglycerin 0.4 mg SL tablet Commonly known as:  NITROSTAT  
1 Tab by SubLINGual route as needed for Chest Pain. senna-docusate 8.6-50 mg per tablet Commonly known as:  Barry Hao Take 1 Tab by mouth daily. simvastatin 40 mg tablet Commonly known as:  ZOCOR  
TAKE 1 TABLET BY MOUTH NIGHTLY.  
  
 tamsulosin 0.4 mg capsule Commonly known as:  FLOMAX TAKE ONE CAPSULE BY MOUTH ONE TIME DAILY Follow-up Instructions Return in about 3 months (around 3/18/2018). Introducing [facility]! Shirin Pavon introduces [employer] patient portal. Now you can access parts of your medical record, email your doctor's office, and request medication refills online. 1. In your internet browser, go to [email or web address] 2. Click on the First Time User? Click Here link in the Sign In box. You will see the New Member Sign Up page. 3. Enter your [employer] Access Code exactly as it appears below. You will not need to use this code after youve completed the sign-up process. If you do not sign up before the expiration date, you must request a new code. · [employer] Access Code: [email or web address] Expires: 12/25/2017  7:30 AM 
 
4. Enter the last four digits of your Social Security Number (xxxx) and Date of Birth (mm/dd/yyyy) as indicated and click Submit. You will be taken to the next sign-up page. 5. Create a Save On [employer] ID. This will be your Save [employer] login ID and cannot be changed, so think of one that is secure and easy to remember. 6. Create a [employer] password. You can change your password at any time. 7. Enter your Password Reset Question and Answer. This can be used at a later time if you forget your password. 8. Enter your e-mail address. You will receive e-mail notification when new information is available in [address] 9. Click Sign Up. You can now view and download portions of your medical record. 10. Click the Download Summary menu link to download a portable copy of your medical information. If you have questions, please visit the Frequently Asked Questions section of the [employer] website. Remember, [employer] is NOT to be used for urgent needs. For medical emergencies, dial 911. Now available from your iPhone and Android! Please provide this summary of care documentation to your next provider. Your primary care clinician is listed as Ida Almodovar. If you have any questions after today's visit, please call [phone].

## 2017-12-18 NOTE — PROGRESS NOTES
1. Have you been to the ER, urgent care clinic since your last visit? Hospitalized since your last visit? YesMV   2. Have you seen or consulted any other health care providers outside of the [address] [place] [address] since your last visit? Include any pap smears or colon screening. Yes Where: kidney dr     3. Since your last visit, have you had any of the following symptoms? shortness of breath.

## 2017-12-20 ENCOUNTER — PATIENT OUTREACH (OUTPATIENT)
Dept: CASE MANAGEMENT | Age: 77
End: 2017-12-20

## 2017-12-20 ENCOUNTER — HOME CARE VISIT (OUTPATIENT)
Dept: HOME HEALTH SERVICES | Facility: HOME HEALTH | Age: 77
End: 2017-12-20

## 2017-12-20 NOTE — PROGRESS NOTES
Spoke with spouse and patient who reports he is doing well. Provided an opportunity for questions and to address any needs. Denies any other needs at this time. Will continue to follow.

## 2017-12-22 ENCOUNTER — OFFICE VISIT (OUTPATIENT)
Dept: VASCULAR SURGERY | Age: 77
End: 2017-12-22

## 2017-12-22 VITALS
RESPIRATION RATE: 16 BRPM | DIASTOLIC BLOOD PRESSURE: 60 MMHG | HEIGHT: 68 IN | BODY MASS INDEX: 32.74 KG/M2 | WEIGHT: 216 LBS | HEART RATE: 72 BPM | SYSTOLIC BLOOD PRESSURE: 160 MMHG

## 2017-12-22 DIAGNOSIS — N18.6 ESRD (END STAGE RENAL DISEASE) (HCC): Primary | ICD-10-CM

## 2017-12-22 NOTE — MR AVS SNAPSHOT
Visit Information Date & Time Provider Department Dept. Phone Encounter #  
 12/22/2017 11:15 AM Vahid Ashby, [address] [facility] [phone] [id number] Your Appointments 1/8/2018 10:00 AM  
ESTABLISHED PATIENT with Celia Stokes MD  
[facility] [address]) Appt Note: 6mo F/u Flow bus ua psa prior; .  
 2000 [place] [facility] [address]  
  
    
 1/22/2018 11:00 AM  
PROCEDURE with [facility] 1 [facility] ([address] [place] [address]) Appt Note: LEFT NEW LEFT ARM AVF [address] 707 200 [facility]  
[phone] [address]  
  
    
 1/26/2018  9:45 AM  
Follow Up with [facility] ([address] [place] [address]) Appt Note: FOLLOW UP AFTER STUDY  
 [address] [place], [place] [address] 200 [facility]  
[phone] [address]  
  
    
 3/27/2018 10:00 AM  
Follow Up with Porfirio Manriquez MD  
[facility] ([address] [place] [address]) Appt Note: 3 month follow up  
 [address] [facility] [address]  
  
   
 [address] [place] [facility] [address] Upcoming Health Maintenance Date Due  
 FOOT EXAM Q1 7/10/1950 MICROALBUMIN Q1 7/10/1950 EYE EXAM RETINAL OR DILATED Q1 7/10/1950 DTaP/Tdap/Td series (1 - Tdap) 7/10/1961 ZOSTER VACCINE AGE 60> 5/10/2000 GLAUCOMA SCREENING Q2Y 7/10/2005 MEDICARE YEARLY EXAM 7/10/2005 Pneumococcal 65+ High/Highest Risk (2 of 2 - PPSV23) 3/9/2017 Influenza Age 5 to Adult 8/1/2017 HEMOGLOBIN A1C Q6M 4/28/2018 LIPID PANEL Q1 11/14/2018 Allergies as of 12/22/2017  Review Complete On: 12/22/2017 By: Justen Gavin LPN Severity Noted Reaction Type Reactions Nka [No Known Allergies]  10/07/2012    Other (comments) Current Immunizations  Reviewed on 3/12/2013 No immunizations on file. Not reviewed this visit You Were Diagnosed With   
  
 Codes Comments ESRD (end stage renal disease) ([facility]    -  Primary ICD-10-CM: N18.6 ICD-9-CM: 470. 6 Vitals BP Pulse Resp Height(growth percentile) Weight(growth percentile) BMI  
 160/60 (BP 1 Location: Right arm, BP Patient Position: Sitting) 72 16 5' 8\" (1.727 m) 216 lb (98 kg) 32.84 kg/m2 Smoking Status Never Smoker Vitals History BMI and BSA Data Body Mass Index Body Surface Area  
 32.84 kg/m 2 2.17 m 2 Preferred Pharmacy Pharmacy Name [facility] #[id number] [address] [phone] Your Updated Medication List  
  
   
This list is accurate as of: 12/22/17 12:00 PM.  Always use your most recent med list.  
  
  
  
  
 albuterol 90 mcg/actuation inhaler Commonly known as:  PROVENTIL HFA, VENTOLIN HFA, PROAIR HFA Take 2 Puffs by inhalation every four (4) hours as needed for Wheezing. allopurinol 100 mg tablet Commonly known as:  Carolin Jensen Take 100 mg by mouth daily. amLODIPine 5 mg tablet Commonly known as:  Jose Baldemar Take 1 Tab by mouth daily. aspirin 81 mg chewable tablet Take 1 Tab by mouth daily. b complex-vitamin c-folic acid 1 mg capsule Commonly known as:  Fatuma Oneil Take 1 Cap by mouth daily. clopidogrel 75 mg Tab Commonly known as:  PLAVIX Take 1 Tab by mouth daily. DECARA 50,000 unit Cap Generic drug:  Cholecalciferol (Vitamin D3) Take  by mouth every seven (7) days. fenofibrate 54 mg tablet Commonly known as:  LOFIBRA Take 1 Tab by mouth daily. HYDROcodone-acetaminophen 5-325 mg per tablet Commonly known as:  Khang Valadez Take 1-2 Tabs by mouth every four (4) hours as needed for Pain. Max Daily Amount: 12 Tabs. insulin aspart 100 unit/mL injection Commonly known as:  Barstow Borne 20 units sq 3 times a day,sliding scale  
  
 insulin glargine 100 unit/mL injection Commonly known as:  LANTUS  
20 Units by SubCUTAneous route every twelve (12) hours. isosorbide mononitrate ER 30 mg tablet Commonly known as:  IMDUR Take 1 Tab by mouth daily. losartan 50 mg tablet Commonly known as:  COZAAR  
TAKE ONE TABLET BY MOUTH ONE TIME DAILY  
  
 metoprolol tartrate 100 mg IR tablet Commonly known as:  LOPRESSOR  
TAKE 1 TABLET BY MOUTH TWO TIMES A DAY. nitroglycerin 0.4 mg SL tablet Commonly known as:  NITROSTAT  
1 Tab by SubLINGual route as needed for Chest Pain. senna-docusate 8.6-50 mg per tablet Commonly known as:  Stefanie Julian Take 1 Tab by mouth daily. simvastatin 40 mg tablet Commonly known as:  ZOCOR  
TAKE 1 TABLET BY MOUTH NIGHTLY.  
  
 tamsulosin 0.4 mg capsule Commonly known as:  FLOMAX TAKE ONE CAPSULE BY MOUTH ONE TIME DAILY To-Do List   
 01/22/2018 Imaging:  DUPLEX HEMODIALYSIS ACCESS LEFT AMB [facility] SERVICES! Gabrielle Goins introduces [employer] patient portal. Now you can access parts of your medical record, email your doctor's office, and request medication refills online. 1. In your internet browser, go to [email or web address] 2. Click on the First Time User? Click Here link in the Sign In box. You will see the New Member Sign Up page. 3. Enter your [employer]plify Access Code exactly as it appears below. You will not need to use this code after youve completed the sign-up process. If you do not sign up before the expiration date, you must request a new code. · [employer] Access Code: [email or web address] Expires: 12/25/2017  7:30 AM 
 
4. Enter the last four digits of your Social Security Number (xxxx) and Date of Birth (mm/dd/yyyy) as indicated and click Submit. You will be taken to the next sign-up page. 5. Create a [employer] ID. This will be your [employer] login ID and cannot be changed, so think of one that is secure and easy to remember. 6. Create a [employer] password. You can change your password at any time. 7. Enter your Password Reset Question and Answer. This can be used at a later time if you forget your password. 8. Enter your e-mail address. You will receive e-mail notification when new information is available in [address] 9. Click Sign Up. You can now view and download portions of your medical record. 10. Click the Download Summary menu link to download a portable copy of your medical information. If you have questions, please visit the Frequently Asked Questions section of the [employer] website. Remember, [employer] is NOT to be used for urgent needs. For medical emergencies, dial 911. Now available from your iPhone and Android! Please provide this summary of care documentation to your next provider. Your primary care clinician is listed as nAna Ayoub. If you have any questions after today's visit, please call [phone].

## 2017-12-22 NOTE — PROGRESS NOTES
Subjective:      Lawanda Carranza is a 68 y.o. male who presents today for post op visit, status post creation of left arm avf. He has a surgical incision wound which is located on the left antecubital space. Current symptoms:  Some numbness in fingers but no pain and no coolness or loss of function  Currently dialyzing with catheter which was also recently exchanged    Objective:     Visit Vitals    /60 (BP 1 Location: Right arm, BP Patient Position: Sitting)    Pulse 72    Resp 16    Ht 5' 8\" (1.727 m)    Wt 216 lb (98 kg)    BMI 32.84 kg/m2       Wound:   wound margins intact and healing well. No signs of infection. Good thrill and bruit of fistula  Good cap refill in hand, which is also warm. Normal ROM. Palpable radial pulse at wrist     Assessment:     Wound check/discharge visit. Plan:       ICD-10-CM ICD-9-CM    1. ESRD (end stage renal disease) ([facility] N18.6 585.6 DUPLEX HEMODIALYSIS ACCESS LEFT AMB     Orders Placed This Encounter    DUPLEX HEMODIALYSIS ACCESS LEFT AMB     New left arm avf, needs time to mature  Explained symptoms of steal and if he develops any to let us know, which includes pain of hand, loss of function, change in color/temperature  Otherwise will plan maturation ultrasound study before end of January and call results and recommendations for use or revision     LESLY Norton    Portions of this note have been entered using voice recognition software.

## 2017-12-27 ENCOUNTER — PATIENT OUTREACH (OUTPATIENT)
Dept: CASE MANAGEMENT | Age: 77
End: 2017-12-27

## 2017-12-27 NOTE — PROGRESS NOTES
Spoke with spouse who reports patient is doing well. Provided opportunity for questions and to address any needs. Denies any other needs at this time. Will continue to follow.

## 2018-01-02 ENCOUNTER — PATIENT OUTREACH (OUTPATIENT)
Dept: CASE MANAGEMENT | Age: 78
End: 2018-01-02

## 2018-01-02 NOTE — PROGRESS NOTES
Spouse states patient is currently at dialysis. Reports he is doing well. Provided an opportunity for questions and to address needs. Denies any needs at this time. Will continue to follow.

## 2018-01-11 NOTE — DISCHARGE SUMMARY
Discharge Summary    Patient: Yelena Feliz MRN: [id number]  CSN: [id number]    YOB: 1940  Age: 68 y.o. Sex: male    DOA: 12/12/2017 LOS:  LOS: 2 days   Discharge Date: 12/14/2017     Admission Diagnoses:   Chest pain    Discharge Diagnoses:    Chest pain  ESRD  HTN  DM 2  Dyslipidemia  Obesity   Prostate cancer hx  CAD hx    Discharge Condition: Stable    PHYSICAL EXAM  Visit Vitals    /66 (BP 1 Location: Right arm, BP Patient Position: At rest)    Pulse 80    Temp 98.5 °F (36.9 °C)    Resp 18    Ht 5' 8.5\" (1.74 m)    Wt 95.5 kg (210 lb 9.6 oz)    SpO2 97%    BMI 31.56 kg/m2       General: In NAD. HEENT: NCAT. EOMI. Lungs:  CTA Bilaterally. No Wheezing/Rhonchi/Rales. Heart:  RRR. Abdomen: Soft, NTTP. Extremities: No c/c/e  Psych:   Mood normal.  Neurologic:  Awake and alert, motor nonfocal      Hospital Course:   See admission H&P for full details of HPI. Patient admitted to telemetry bed with cardiology and nephrology in consultation. Management of chest pain was medical - no cardiac intervention was performed. Patient received hemodialysis per usual schedule without complication. Vascular surgery saw patient had had over the wire exchanged of TDC this admission. Fistula was examined as well but this needs additional time to mature per vascular. Patient is medically stable for discharge home with outpatient follow up as advised. Consults:   Nephrology  Cardiology  Vascular Surgery    Significant Diagnostic Studies:   CXR:  IMPRESSION:     No acute cardiopulmonary process. Discharge Medications:     Discharge Medication List as of 12/14/2017  4:50 PM      START taking these medications    Details   isosorbide mononitrate ER (IMDUR) 30 mg tablet Take 1 Tab by mouth daily. , Print, Disp-30 Tab, R-0      amLODIPine (NORVASC) 5 mg tablet Take 1 Tab by mouth daily. , Print, Disp-30 Tab, R-0         CONTINUE these medications which have CHANGED    Details HYDROcodone-acetaminophen (NORCO) 5-325 mg per tablet Take 1-2 Tabs by mouth every four (4) hours as needed for Pain. Max Daily Amount: 12 Tabs., Print, Disp-12 Tab, R-0         CONTINUE these medications which have NOT CHANGED    Details   tamsulosin (FLOMAX) 0.4 mg capsule TAKE ONE CAPSULE BY MOUTH ONE TIME DAILY , Normal, Disp-90 Cap, R-0      simvastatin (ZOCOR) 40 mg tablet TAKE 1 TABLET BY MOUTH NIGHTLY., Normal, Disp-90 Tab, R-2      metoprolol tartrate (LOPRESSOR) 100 mg IR tablet TAKE 1 TABLET BY MOUTH TWO TIMES A DAY., Normal, Disp-180 Tab, R-2      clopidogrel (PLAVIX) 75 mg tab Take 1 Tab by mouth daily. , Normal, Disp-90 Tab, R-3      Cholecalciferol, Vitamin D3, (DECARA) 50,000 unit cap Take  by mouth every seven (7) days. , Historical Med      nitroglycerin (NITROSTAT) 0.4 mg SL tablet 1 Tab by SubLINGual route as needed for Chest Pain., Normal, Disp-25 Tab, R-1      losartan (COZAAR) 50 mg tablet TAKE ONE TABLET BY MOUTH ONE TIME DAILY , Normal, Disp-30 Tab, R-0      albuterol (PROVENTIL HFA, VENTOLIN HFA, PROAIR HFA) 90 mcg/actuation inhaler Take 2 Puffs by inhalation every four (4) hours as needed for Wheezing., Print, Disp-1 Inhaler, R-0      aspirin 81 mg chewable tablet Take 1 Tab by mouth daily. , Print, Disp-30 Tab, R-0      b complex-vitamin c-folic acid (NEPHROCAPS) 1 mg capsule Take 1 Cap by mouth daily. , Print, Disp-30 Cap, R-0      senna-docusate (PERICOLACE) 8.6-50 mg per tablet Take 1 Tab by mouth daily. , Print, Disp-30 Tab, R-0      fenofibrate (LOFIBRA) 54 mg tablet Take 1 Tab by mouth daily. , Normal, Disp-30 Tab, R-5      insulin glargine (LANTUS) 100 unit/mL injection 20 Units by SubCUTAneous route every twelve (12) hours. , Print, Disp-1 Vial, R-2      insulin aspart (NOVOLOG) 100 unit/mL injection 20 units sq 3 times a day,sliding scale, No Print, Disp-10 mL, R-0      allopurinol (ZYLOPRIM) 100 mg tablet Take 100 mg by mouth daily.   , Historical Med         STOP taking these medications       amoxicillin-clavulanate (AUGMENTIN) 875-125 mg per tablet Comments:   Reason for Stopping:         guaiFENesin-codeine (ROBITUSSIN AC) 100-10 mg/5 mL solution Comments:   Reason for Stopping:               Activity: As tolerated    Diet: Cardiac Diet, Diabetic Diet and Renal Diet    Follow-up: with PCP, Carolann Amin MD in 1 week and cardiology as directed. Michelle Ceja.  Melania Nunez MD  [facility]

## 2018-01-12 ENCOUNTER — TELEPHONE (OUTPATIENT)
Dept: CARDIAC REHAB | Age: 78
End: 2018-01-12

## 2018-01-12 NOTE — TELEPHONE ENCOUNTER
Cardiac Rehab called patient to offer an evaluation, however due to multiple medical appointments and problems the patient is not currently interested in our services.      Thank you,  Richelle Jacques

## 2018-01-15 ENCOUNTER — PATIENT OUTREACH (OUTPATIENT)
Dept: CASE MANAGEMENT | Age: 78
End: 2018-01-15

## 2018-01-15 RX ORDER — LOSARTAN POTASSIUM 50 MG/1
TABLET ORAL
Qty: 30 TAB | Refills: 0 | Status: SHIPPED | OUTPATIENT
Start: 2018-01-15 | End: 2018-05-07

## 2018-01-18 ENCOUNTER — ANESTHESIA EVENT (OUTPATIENT)
Dept: ENDOSCOPY | Age: 78
End: 2018-01-18
Payer: MEDICARE

## 2018-01-19 ENCOUNTER — ANESTHESIA (OUTPATIENT)
Dept: ENDOSCOPY | Age: 78
End: 2018-01-19
Payer: MEDICARE

## 2018-01-19 ENCOUNTER — HOSPITAL ENCOUNTER (OUTPATIENT)
Age: 78
Setting detail: OUTPATIENT SURGERY
Discharge: HOME | End: 2018-01-19
Attending: INTERNAL MEDICINE | Admitting: INTERNAL MEDICINE
Payer: MEDICARE

## 2018-01-19 VITALS
HEIGHT: 69 IN | HEART RATE: 61 BPM | BODY MASS INDEX: 33.36 KG/M2 | SYSTOLIC BLOOD PRESSURE: 172 MMHG | RESPIRATION RATE: 12 BRPM | DIASTOLIC BLOOD PRESSURE: 68 MMHG | WEIGHT: 225.25 LBS | OXYGEN SATURATION: 100 % | TEMPERATURE: 97.4 F

## 2018-01-19 LAB
BUN BLD-MCNC: 58 MG/DL (ref 7–18)
CHLORIDE BLD-SCNC: 105 MMOL/L (ref 100–108)
GLUCOSE BLD STRIP.AUTO-MCNC: 105 MG/DL (ref 70–110)
GLUCOSE BLD STRIP.AUTO-MCNC: 97 MG/DL (ref 74–106)
HCT VFR BLD CALC: 38 % (ref 36–49)
HGB BLD-MCNC: 12.9 G/DL (ref 12–16)
POTASSIUM BLD-SCNC: 4.8 MMOL/L (ref 3.5–5.5)
SODIUM BLD-SCNC: 142 MMOL/L (ref 136–145)

## 2018-01-19 PROCEDURE — 77030008565 HC TBNG SUC IRR ERBE -B: Performed by: INTERNAL MEDICINE

## 2018-01-19 PROCEDURE — 74011000250 HC RX REV CODE- 250: Performed by: NURSE ANESTHETIST, CERTIFIED REGISTERED

## 2018-01-19 PROCEDURE — 74011250636 HC RX REV CODE- 250/636

## 2018-01-19 PROCEDURE — C1886 CATHETER, ABLATION: HCPCS | Performed by: INTERNAL MEDICINE

## 2018-01-19 PROCEDURE — 82962 GLUCOSE BLOOD TEST: CPT

## 2018-01-19 PROCEDURE — 76040000019: Performed by: INTERNAL MEDICINE

## 2018-01-19 PROCEDURE — 76060000031 HC ANESTHESIA FIRST 0.5 HR: Performed by: INTERNAL MEDICINE

## 2018-01-19 PROCEDURE — 74011000250 HC RX REV CODE- 250

## 2018-01-19 PROCEDURE — 77030018846 HC SOL IRR STRL H20 ICUM -A: Performed by: INTERNAL MEDICINE

## 2018-01-19 PROCEDURE — 74011000258 HC RX REV CODE- 258: Performed by: ANESTHESIOLOGY

## 2018-01-19 PROCEDURE — 77030009426 HC FCPS BIOP ENDOSC BSC -B: Performed by: INTERNAL MEDICINE

## 2018-01-19 PROCEDURE — 77030013992 HC SNR POLYP ENDOSC BSC -B: Performed by: INTERNAL MEDICINE

## 2018-01-19 PROCEDURE — 84295 ASSAY OF SERUM SODIUM: CPT

## 2018-01-19 RX ORDER — DEXTROSE MONOHYDRATE AND SODIUM CHLORIDE 5; .225 G/100ML; G/100ML
50 INJECTION, SOLUTION INTRAVENOUS CONTINUOUS
Status: DISCONTINUED | OUTPATIENT
Start: 2018-01-19 | End: 2018-01-19 | Stop reason: HOSPADM

## 2018-01-19 RX ORDER — SODIUM CHLORIDE 0.9 % (FLUSH) 0.9 %
5-10 SYRINGE (ML) INJECTION AS NEEDED
Status: DISCONTINUED | OUTPATIENT
Start: 2018-01-19 | End: 2018-01-19 | Stop reason: HOSPADM

## 2018-01-19 RX ORDER — NALOXONE HYDROCHLORIDE 0.4 MG/ML
0.1 INJECTION, SOLUTION INTRAMUSCULAR; INTRAVENOUS; SUBCUTANEOUS ONCE
Status: DISCONTINUED | OUTPATIENT
Start: 2018-01-19 | End: 2018-01-19 | Stop reason: HOSPADM

## 2018-01-19 RX ORDER — MAGNESIUM SULFATE 100 %
4 CRYSTALS MISCELLANEOUS AS NEEDED
Status: DISCONTINUED | OUTPATIENT
Start: 2018-01-19 | End: 2018-01-19 | Stop reason: HOSPADM

## 2018-01-19 RX ORDER — SODIUM CHLORIDE, SODIUM LACTATE, POTASSIUM CHLORIDE, CALCIUM CHLORIDE 600; 310; 30; 20 MG/100ML; MG/100ML; MG/100ML; MG/100ML
75 INJECTION, SOLUTION INTRAVENOUS CONTINUOUS
Status: DISCONTINUED | OUTPATIENT
Start: 2018-01-19 | End: 2018-01-19 | Stop reason: HOSPADM

## 2018-01-19 RX ORDER — DEXTROSE 50 % IN WATER (D50W) INTRAVENOUS SYRINGE
25-50 AS NEEDED
Status: DISCONTINUED | OUTPATIENT
Start: 2018-01-19 | End: 2018-01-19 | Stop reason: HOSPADM

## 2018-01-19 RX ORDER — PROPOFOL 10 MG/ML
INJECTION, EMULSION INTRAVENOUS AS NEEDED
Status: DISCONTINUED | OUTPATIENT
Start: 2018-01-19 | End: 2018-01-19 | Stop reason: HOSPADM

## 2018-01-19 RX ORDER — HYDROMORPHONE HYDROCHLORIDE 2 MG/ML
0.5 INJECTION, SOLUTION INTRAMUSCULAR; INTRAVENOUS; SUBCUTANEOUS AS NEEDED
Status: DISCONTINUED | OUTPATIENT
Start: 2018-01-19 | End: 2018-01-19 | Stop reason: HOSPADM

## 2018-01-19 RX ORDER — LIDOCAINE HYDROCHLORIDE 20 MG/ML
INJECTION, SOLUTION EPIDURAL; INFILTRATION; INTRACAUDAL; PERINEURAL AS NEEDED
Status: DISCONTINUED | OUTPATIENT
Start: 2018-01-19 | End: 2018-01-19 | Stop reason: HOSPADM

## 2018-01-19 RX ORDER — DEXTROSE MONOHYDRATE AND SODIUM CHLORIDE 5; .225 G/100ML; G/100ML
25 INJECTION, SOLUTION INTRAVENOUS CONTINUOUS
Status: DISCONTINUED | OUTPATIENT
Start: 2018-01-19 | End: 2018-01-19 | Stop reason: HOSPADM

## 2018-01-19 RX ORDER — INSULIN LISPRO 100 [IU]/ML
INJECTION, SOLUTION INTRAVENOUS; SUBCUTANEOUS ONCE
Status: DISCONTINUED | OUTPATIENT
Start: 2018-01-19 | End: 2018-01-19 | Stop reason: HOSPADM

## 2018-01-19 RX ORDER — SODIUM CHLORIDE 0.9 % (FLUSH) 0.9 %
5-10 SYRINGE (ML) INJECTION EVERY 8 HOURS
Status: DISCONTINUED | OUTPATIENT
Start: 2018-01-19 | End: 2018-01-19 | Stop reason: HOSPADM

## 2018-01-19 RX ADMIN — PROPOFOL 40 MG: 10 INJECTION, EMULSION INTRAVENOUS at 12:48

## 2018-01-19 RX ADMIN — DEXTROSE MONOHYDRATE AND SODIUM CHLORIDE 50 ML/HR: 5; .225 INJECTION, SOLUTION INTRAVENOUS at 12:32

## 2018-01-19 RX ADMIN — PROPOFOL 60 MG: 10 INJECTION, EMULSION INTRAVENOUS at 12:45

## 2018-01-19 RX ADMIN — PROPOFOL 50 MG: 10 INJECTION, EMULSION INTRAVENOUS at 12:53

## 2018-01-19 RX ADMIN — FAMOTIDINE 20 MG: 10 INJECTION, SOLUTION INTRAVENOUS at 12:23

## 2018-01-19 RX ADMIN — LIDOCAINE HYDROCHLORIDE 40 MG: 20 INJECTION, SOLUTION EPIDURAL; INFILTRATION; INTRACAUDAL; PERINEURAL at 12:45

## 2018-01-19 NOTE — H&P
[email or web address]  [phone]      GASTROENTEROLOGY Pre-Procedure H and P      Impression/Plan:   1. This patient is consented for a flex sig for rectal bleeding-h/o radiation proctitis      Chief Complaint: flex sig for rectal bleeding-h/o radiation proctitis      HPI:  Hector Alcantar is a 68 y.o. male who is being is having a flex sig for XRT proctitis with RFA  PMH:   Past Medical History:   Diagnosis Date    Atherosclerosis of renal artery (HCC)     S/P Rt stent    CAD (coronary artery disease) , 1997, 2012    ,double bypass, 2 stents, 2stents 7/2016    Cancer ([address]     prostate    CHF (congestive heart failure) ([address]     Chronic diastolic heart failure (HCC)     Chronic kidney disease     Chronic kidney disease (CKD), stage IV (severe) ([address]     On Dialysis now -T-Th-sat    Constipation     Coronary atherosclerosis of unspecified type of vessel, native or graft     Stable angina after recent PCI continue treatment.  CRI (chronic renal insufficiency)     Diabetes ([address] 1973    type 2    Essential hypertension, benign     GERD (gastroesophageal reflux disease)     Gout     Hypertension 1982    Morbid obesity ([address]     Weight loss has been strongly encouraged by following dietary restrictions and an exercise routine    APRYL (obstructive sleep apnea) 6/26/2015    no cpap    Other and unspecified hyperlipidemia     HDL's are not at goal, LDL's are at goal, triglycerides are not at goal.    Other and unspecified hyperlipidemia     HDL's are not at goal, LDL's are at goal, triglycerides are not at goal.     Other ill-defined conditions(799.89) 1960    pneumonia twice    Sleep disturbance     Type II or unspecified type diabetes mellitus without mention of complication, not stated as uncontrolled        PSH:   Past Surgical History:   Procedure Laterality Date   [address]    lt. carotid endart.    [facility] CARDIAC SURG PROCEDURE UNLIST  2012, 2016, Nov 2017    Coronary Stent    COLONOSCOPY N/A 6/23/2017    COLONOSCOPY w/ APC w/ polypectomies performed by Theodore Craig MD at [address] HX CHOLECYSTECTOMY      HX CORONARY ARTERY BYPASS GRAFT  2000    x2    HX HEENT  1997    cholecystectomy    HX UROLOGICAL  1998    renal art. surg    HX VASCULAR ACCESS      Perma cath for HD- right chest.       Social HX:   Social History     Social History    Marital status:      Spouse name: N/A    Number of children: N/A    Years of education: N/A     Occupational History    Not on file. Social History Main Topics    Smoking status: Never Smoker    Smokeless tobacco: Never Used    Alcohol use No    Drug use: No    Sexual activity: Not on file     Other Topics Concern    Not on file     Social History Narrative       FHX:   Family History   Problem Relation Age of Onset    Heart Attack Father 64       Allergy:   Allergies   Allergen Reactions    Nka [No Known Allergies] Other (comments)       Home Medications:     Prescriptions Prior to Admission   Medication Sig    losartan (COZAAR) 50 mg tablet TAKE ONE TABLET BY MOUTH ONE TIME DAILY     tamsulosin (FLOMAX) 0.4 mg capsule TAKE ONE CAPSULE BY MOUTH ONE TIME DAILY    isosorbide mononitrate ER (IMDUR) 30 mg tablet Take 1 Tab by mouth daily.  amLODIPine (NORVASC) 5 mg tablet Take 1 Tab by mouth daily.  HYDROcodone-acetaminophen (NORCO) 5-325 mg per tablet Take 1-2 Tabs by mouth every four (4) hours as needed for Pain. Max Daily Amount: 12 Tabs.  albuterol (PROVENTIL HFA, VENTOLIN HFA, PROAIR HFA) 90 mcg/actuation inhaler Take 2 Puffs by inhalation every four (4) hours as needed for Wheezing.  aspirin 81 mg chewable tablet Take 1 Tab by mouth daily.  b complex-vitamin c-folic acid (NEPHROCAPS) 1 mg capsule Take 1 Cap by mouth daily.  senna-docusate (PERICOLACE) 8.6-50 mg per tablet Take 1 Tab by mouth daily.  fenofibrate (LOFIBRA) 54 mg tablet Take 1 Tab by mouth daily.     simvastatin (ZOCOR) 40 mg tablet TAKE 1 TABLET BY MOUTH NIGHTLY.  metoprolol tartrate (LOPRESSOR) 100 mg IR tablet TAKE 1 TABLET BY MOUTH TWO TIMES A DAY.  clopidogrel (PLAVIX) 75 mg tab Take 1 Tab by mouth daily.  Cholecalciferol, Vitamin D3, (DECARA) 50,000 unit cap Take  by mouth every seven (7) days.  insulin glargine (LANTUS) 100 unit/mL injection 20 Units by SubCUTAneous route every twelve (12) hours.  nitroglycerin (NITROSTAT) 0.4 mg SL tablet 1 Tab by SubLINGual route as needed for Chest Pain.  insulin aspart (NOVOLOG) 100 unit/mL injection 20 units sq 3 times a day,sliding scale    allopurinol (ZYLOPRIM) 100 mg tablet Take 100 mg by mouth daily. Review of Systems:     Constitutional: No fevers, chills, weight loss, fatigue. Skin: No rashes, pruritis, jaundice, ulcerations, erythema. HENT: No headaches, nosebleeds, sinus pressure, rhinorrhea, sore throat. Eyes: No visual changes, blurred vision, eye pain, photophobia, jaundice. Cardiovascular: No chest pain, heart palpitations. Respiratory: No cough, SOB, wheezing, chest discomfort, orthopnea. Gastrointestinal:    Genitourinary: No dysuria, bleeding, discharge, pyuria. Musculoskeletal: No weakness, arthralgias, wasting. Endo: No sweats. Heme: No bruising, easy bleeding. Allergies: As noted. Neurological: Cranial nerves intact. Alert and oriented. Gait not assessed. Psychiatric:  No anxiety, depression, hallucinations. Visit Vitals    Ht 5' 8.5\" (1.74 m)    Wt 98 kg (216 lb)    BMI 32.36 kg/m2       Physical Assessment:     constitutional: appearance: well developed, well nourished, normal habitus, no deformities, in no acute distress. skin: inspection: no rashes, ulcers, icterus or other lesions; no clubbing or telangiectasias. palpation: no induration or subcutaneos nodules.    eyes: inspection: normal conjunctivae and lids; no jaundice pupils: normal  ENMT: mouth: normal oral mucosa,lips and gums; good dentition. oropharynx: normal tongue, hard and soft palate; posterior pharynx without erithema, exudate or lesions. neck: thyroid: normal size, consistency and position; no masses or tenderness. respiratory: effort: normal chest excursion; no intercostal retraction or accessory muscle use. cardiovascular: abdominal aorta: normal size and position; no bruits. palpation: PMI of normal size and position; normal rhythm; no thrill or murmurs. abdominal: abdomen: normal consistency; no tenderness or masses. hernias: no hernias appreciated. liver: normal size and consistency. spleen: not palpable. rectal: hemoccult/guaiac: not performed. musculoskeletal: digits and nails: no clubbing, cyanosis, petechiae or other inflammatory conditions. gait: normal gait and station head and neck: normal range of motion; no pain, crepitation or contracture. spine/ribs/pelvis: normal range of motion; no pain, deformity or contracture. neurologic: cranial nerves: II-XII normal.   psychiatric: judgement/insight: within normal limits. memory: within normal limits for recent and remote events. mood and affect: no evidence of depression, anxiety or agitation. orientation: oriented to time, space and person. Basic Metabolic Profile   No results for input(s): NA, K, CL, CO2, BUN, GLU, CA, MG, PHOS in the last 72 hours. No lab exists for component: CREAT      CBC w/Diff    No results for input(s): WBC, RBC, HGB, HCT, MCV, MCH, MCHC, RDW, PLT, HGBEXT, HCTEXT, PLTEXT in the last 72 hours. No lab exists for component: MPV No results for input(s): GRANS, LYMPH, EOS, PRO, MYELO, METAS, BLAST in the last 72 hours. No lab exists for component: MONO, BASO     Hepatic Function   No results for input(s): ALB, TP, TBILI, GPT, SGOT, AP, AML, LPSE in the last 72 hours. No lab exists for component: DBILI     Coags   No results for input(s): PTP, INR, APTT in the last 72 hours.     No lab exists for component: INREXT        Hamzah Stephie Vazquez MD  [facility] 1947, [address] [facility]  Cell: [phone]  Direct pager: [phone]  [email or web address]  [email or web address]

## 2018-01-19 NOTE — IP AVS SNAPSHOT
Joel Oliver 
 
 
 [address] [facility] 61 [facility] Patient: Yelena Feliz MRN: [id number] [facility]:2/96/9137 About your hospitalization You were admitted on:  January 19, 2018 You last received care in the:  [facility] PHASE 2 RECOVERY You were discharged on:  January 19, 2018 Why you were hospitalized Your primary diagnosis was:  Not on File Follow-up Information Follow up With Details Comments Contact Info Wade Freeman MD   [address] [facility] [address] 
[phone] Radha Haines MD  Follow up as instructed [address] 200 [facility] 
[phone] Your Scheduled Appointments Monday January 22, 2018 11:00 AM EST PROCEDURE with [facility] 1 [facility] ([address] [place] [address]) [address] 200 [facility]  
[phone] Friday January 26, 2018  9:45 AM EST Follow Up with [facility] ([address] [place] [address]) [address] 200 [facility]  
[phone] Discharge Orders None A check jose indicates which time of day the medication should be taken. My Medications CONTINUE taking these medications Instructions Each Dose to Equal  
 Morning Noon Evening Bedtime  
 albuterol 90 mcg/actuation inhaler Commonly known as:  PROVENTIL HFA, VENTOLIN HFA, PROAIR HFA Your last dose was: Your next dose is: Take 2 Puffs by inhalation every four (4) hours as needed for Wheezing. 2 Puff  
    
   
   
   
  
 allopurinol 100 mg tablet Commonly known as:  Naga Clay Your last dose was: Your next dose is: Take 100 mg by mouth daily. 100 mg  
    
   
   
   
  
 amLODIPine 5 mg tablet Commonly known as:  Raffi Waldrop Your last dose was: Your next dose is: Take 1 Tab by mouth daily. 5 mg  
    
   
   
   
  
 aspirin 81 mg chewable tablet Your last dose was: Your next dose is: Take 1 Tab by mouth daily. 81 mg  
    
   
   
   
  
 b complex-vitamin c-folic acid 1 mg capsule Commonly known as:  Philipp Severin Your last dose was: Your next dose is: Take 1 Cap by mouth daily. 1 Cap  
    
   
   
   
  
 clopidogrel 75 mg Tab Commonly known as:  PLAVIX Your last dose was: Your next dose is: Take 1 Tab by mouth daily. 75 mg DECARA 50,000 unit Cap Generic drug:  Cholecalciferol (Vitamin D3) Your last dose was: Your next dose is: Take  by mouth every seven (7) days. fenofibrate 54 mg tablet Commonly known as:  LOFIBRA Your last dose was: Your next dose is: Take 1 Tab by mouth daily. 54 mg HYDROcodone-acetaminophen 5-325 mg per tablet Commonly known as:  Arlene Punt Your last dose was: Your next dose is: Take 1-2 Tabs by mouth every four (4) hours as needed for Pain. Max Daily Amount: 12 Tabs. 1-2 Tab  
    
   
   
   
  
 insulin aspart 100 unit/mL injection Commonly known as:  Nikhil Alarcon Your last dose was: Your next dose is:    
   
   
 20 units sq 3 times a day,sliding scale  
     
   
   
   
  
 insulin glargine 100 unit/mL injection Commonly known as:  LANTUS Your last dose was: Your next dose is:    
   
   
 20 Units by SubCUTAneous route every twelve (12) hours. 20 Units  
    
   
   
   
  
 isosorbide mononitrate ER 30 mg tablet Commonly known as:  IMDUR Your last dose was: Your next dose is: Take 1 Tab by mouth daily. 30 mg  
    
   
   
   
  
 losartan 50 mg tablet Commonly known as:  COZAAR Your last dose was: Your next dose is: TAKE ONE TABLET BY MOUTH ONE TIME DAILY  
     
   
   
   
  
 metoprolol tartrate 100 mg IR tablet Commonly known as:  LOPRESSOR Your last dose was: Your next dose is: TAKE 1 TABLET BY MOUTH TWO TIMES A DAY. nitroglycerin 0.4 mg SL tablet Commonly known as:  NITROSTAT Your last dose was: Your next dose is:    
   
   
 1 Tab by SubLINGual route as needed for Chest Pain. 0.4 mg  
    
   
   
   
  
 senna-docusate 8.6-50 mg per tablet Commonly known as:  Henery Ruse Your last dose was: Your next dose is: Take 1 Tab by mouth daily. 1 Tab  
    
   
   
   
  
 simvastatin 40 mg tablet Commonly known as:  ZOCOR Your last dose was: Your next dose is: TAKE 1 TABLET BY MOUTH NIGHTLY.  
     
   
   
   
  
 tamsulosin 0.4 mg capsule Commonly known as:  FLOMAX Your last dose was: Your next dose is: TAKE ONE CAPSULE BY MOUTH ONE TIME DAILY Discharge Instructions Colonoscopy: What to Expect at [facility] Your Recovery After you have a colonoscopy, you will stay at the clinic for 1 to 2 hours until the medicines wear off. Then you can go home. But you will need to arrange for a ride. Your doctor will tell you when you can eat and do your other usual activities. Your doctor will talk to you about when you will need your next colonoscopy. Your doctor can help you decide how often you need to be checked. This will depend on the results of your test and your risk for colorectal cancer. After the test, you may be bloated or have gas pains. You may need to pass gas. If a biopsy was done or a polyp was removed, you may have streaks of blood in your stool (feces) for a few days.  
This care sheet gives you a general idea about how long it will take for you to recover. But each person recovers at a different pace. Follow the steps below to get better as quickly as possible. How can you care for yourself at home? Activity ? · Rest when you feel tired. ? · You can do your normal activities when it feels okay to do so. Diet ? · Follow your doctor's directions for eating. ? · Unless your doctor has told you not to, drink plenty of fluids. This helps to replace the fluids that were lost during the colon prep. ? · Do not drink alcohol. Medicines ? · Your doctor will tell you if and when you can restart your medicines. He or she will also give you instructions about taking any new medicines. ? · If you take blood thinners, such as warfarin (Coumadin), clopidogrel (Plavix), or aspirin, be sure to talk to your doctor. He or she will tell you if and when to start taking those medicines again. Make sure that you understand exactly what your doctor wants you to do. ? · If polyps were removed or a biopsy was done during the test, your doctor may tell you not to take aspirin or other anti-inflammatory medicines for a few days. These include ibuprofen (Advil, Motrin) and naproxen (Aleve). Other instructions ? · For your safety, do not drive or operate machinery until the medicine wears off and you can think clearly. Your doctor may tell you not to drive or operate machinery until the day after your test.  
? · Do not sign legal documents or make major decisions until the medicine wears off and you can think clearly. The anesthesia can make it hard for you to fully understand what you are agreeing to. Follow-up care is a key part of your treatment and safety. Be sure to make and go to all appointments, and call your doctor if you are having problems. It's also a good idea to know your test results and keep a list of the medicines you take. When should you call for help? Call 911 anytime you think you may need emergency care. For example, call if: ? · You passed out (lost consciousness). ? · You pass maroon or bloody stools. ? · You have trouble breathing. ?Call your doctor now or seek immediate medical care if: 
? · You have pain that does not get better after you take pain medicine. ? · You are sick to your stomach or cannot drink fluids. ? · You have new or worse belly pain. ? · You have blood in your stools. ? · You have a fever. ? · You cannot pass stools or gas. ? Watch closely for changes in your health, and be sure to contact your doctor if you have any problems. Where can you learn more? Go to http://kendal-farzaneh.info/. Enter E264 in the search box to learn more about \"Colonoscopy: What to Expect at Home. \" Current as of: May 12, 2017 Content Version: 11.4 © 3915-7486 [employer]. Care instructions adapted under license by [employer] (which disclaims liability or warranty for this information). If you have questions about a medical condition or this instruction, always ask your healthcare professional. [place] [address] any warranty or liability for your use of this information. Hemorrhoids: Care Instructions Your Care Instructions Hemorrhoids are enlarged veins that develop in the anal canal. Bleeding during bowel movements, itching, swelling, and rectal pain are the most common symptoms. They can be uncomfortable at times, but hemorrhoids rarely are a serious problem. You can treat most hemorrhoids with simple changes to your diet and bowel habits. These changes include eating more fiber and not straining to pass stools. Most hemorrhoids do not need surgery or other treatment unless they are very large and painful or bleed a lot. Follow-up care is a key part of your treatment and safety. Be sure to make and go to all appointments, and call your doctor if you are having problems.  It's also a good idea to know your test results and keep a list of the medicines you take. How can you care for yourself at home? · Sit in a few inches of warm water (sitz bath) 3 times a day and after bowel movements. The warm water helps with pain and itching. · Put ice on your anal area several times a day for 10 minutes at a time. Put a thin cloth between the ice and your skin. Follow this by placing a warm, wet towel on the area for another 10 to 20 minutes. · Take pain medicines exactly as directed. ¨ If the doctor gave you a prescription medicine for pain, take it as prescribed. ¨ If you are not taking a prescription pain medicine, ask your doctor if you can take an over-the-counter medicine. · Keep the anal area clean, but be gentle. Use water and a fragrance-free soap, such as [place], or use baby wipes or medicated pads, such as Tucks. · Wear cotton underwear and loose clothing to decrease moisture in the anal area. · Eat more fiber. Include foods such as whole-grain breads and cereals, raw vegetables, raw and dried fruits, and beans. · Drink plenty of fluids, enough so that your urine is light yellow or clear like water. If you have kidney, heart, or liver disease and have to limit fluids, talk with your doctor before you increase the amount of fluids you drink. · Use a stool softener that contains bran or psyllium. You can save money by buying bran or psyllium (available in bulk at most health food stores) and sprinkling it on foods or stirring it into fruit juice. Or you can use a product such as Metamucil or Hydrocil. · Practice healthy bowel habits. ¨ Go to the bathroom as soon as you have the urge. ¨ Avoid straining to pass stools. Relax and give yourself time to let things happen naturally. ¨ Do not hold your breath while passing stools. ¨ Do not read while sitting on the toilet. Get off the toilet as soon as you have finished. · Take your medicines exactly as prescribed. Call your doctor if you think you are having a problem with your medicine. When should you call for help? Call 911 anytime you think you may need emergency care. For example, call if: 
? · You pass maroon or very bloody stools. ?Call your doctor now or seek immediate medical care if: 
? · You have increased pain. ? · You have increased bleeding. ? Watch closely for changes in your health, and be sure to contact your doctor if: 
? · Your symptoms have not improved after 3 or 4 days. Where can you learn more? Go to http://kendal-farzaneh.info/. Enter F228 in the search box to learn more about \"Hemorrhoids: Care Instructions. \" Current as of: May 12, 2017 Content Version: 11.4 © 2313-1856 [employer]Memories. Care instructions adapted under license by [employer] (which disclaims liability or warranty for this information). If you have questions about a medical condition or this instruction, always ask your healthcare professional. [place] [address] any warranty or liability for your use of this information. DISCHARGE SUMMARY from Nurse PATIENT INSTRUCTIONS: 
 
After general anesthesia or intravenous sedation, for 24 hours or while taking prescription Narcotics: · Limit your activities · Do not drive and operate hazardous machinery · Do not make important personal or business decisions · Do  not drink alcoholic beverages · If you have not urinated within 8 hours after discharge, please contact your surgeon on call. Report the following to your surgeon: 
· Excessive pain, swelling, redness or odor of or around the surgical area · Temperature over 100.5 · Nausea and vomiting lasting longer than 4 hours or if unable to take medications · Any signs of decreased circulation or nerve impairment to extremity: change in color, persistent  numbness, tingling, coldness or increase pain · Any questions What to do at Home: 
Recommended activity: {discharge activity:10085}, *** 
 
 If you experience any of the following symptoms ***, please follow up with ***. *  Please give a list of your current medications to your Primary Care Provider. *  Please update this list whenever your medications are discontinued, doses are 
    changed, or new medications (including over-the-counter products) are added. *  Please carry medication information at all times in case of emergency situations. These are general instructions for a healthy lifestyle: No smoking/ No tobacco products/ Avoid exposure to second hand smoke Surgeon General's Warning:  Quitting smoking now greatly reduces serious risk to your health. Obesity, smoking, and sedentary lifestyle greatly increases your risk for illness A healthy diet, regular physical exercise & weight monitoring are important for maintaining a healthy lifestyle You may be retaining fluid if you have a history of heart failure or if you experience any of the following symptoms:  Weight gain of 3 pounds or more overnight or 5 pounds in a week, increased swelling in our hands or feet or shortness of breath while lying flat in bed. Please call your doctor as soon as you notice any of these symptoms; do not wait until your next office visit. Recognize signs and symptoms of STROKE: 
 
F-face looks uneven A-arms unable to move or move unevenly S-speech slurred or non-existent T-time-call 911 as soon as signs and symptoms begin-DO NOT go Back to bed or wait to see if you get better-TIME IS BRAIN. Warning Signs of HEART ATTACK Call 911 if you have these symptoms: 
? Chest discomfort. Most heart attacks involve discomfort in the center of the chest that lasts more than a few minutes, or that goes away and comes back. It can feel like uncomfortable pressure, squeezing, fullness, or pain. ? Discomfort in other areas of the upper body. Symptoms can include pain or discomfort in one or both arms, the back, neck, jaw, or stomach. ? Shortness of breath with or without chest discomfort. ? Other signs may include breaking out in a cold sweat, nausea, or lightheadedness. Don't wait more than five minutes to call [address]! Fast action can save your life. Calling 911 is almost always the fastest way to get lifesaving treatment. Emergency Medical Services staff can begin treatment when they arrive  up to an hour sooner than if someone gets to the hospital by car. The discharge information has been reviewed with the {PATIENT PARENT GUARDIAN:22317}. The {PATIENT PARENT GUARDIAN:75158} verbalized understanding. Discharge medications reviewed with the {Dishcarge meds reviewed FW:53897} and appropriate educational materials and side effects teaching were provided. ___________________________________________________________________________________________________________________________________ ACO Transitions of Care Introducing [facility] [address] offers a voluntary care coordination program to provide high quality service and care to [facility] fee-for-service beneficiaries. Ladi Laboy was designed to help you enhance your health and well-being through the following services: ? Transitions of Care  support for individuals who are transitioning from one care setting to another (example: Hospital to home). ? Chronic and Complex Care Coordination  support for individuals and caregivers of those with serious or chronic illnesses or with more than one chronic (ongoing) condition and those who take a number of different medications. If you meet specific medical criteria, a [address] may call you directly to coordinate your care with your primary care physician and your other care providers. For questions about the [facility] programs, please, contact your physicians office. For general questions or additional information about Accountable Care Organizations: 
Please visit www.medicare.gov/acos. html or call 1-800-MEDICARE (7-689.699.4871) TTY users should call 8-433.747.9567. Introducing [facility]! [facility] introduces Sellf patient portal. Now you can access parts of your medical record, email your doctor's office, and request medication refills online. 1. In your internet browser, go to [email or web address] Way 2. Click on the First Time User? Click Here link in the Sign In box. You will see the New Member Sign Up page. 3. Enter your Sellf Access Code exactly as it appears below. You will not need to use this code after youve completed the sign-up process. If you do not sign up before the expiration date, you must request a new code. · Sellf Access Code: [email or web address] Expires: 3/26/2018 10:23 PM 
 
4. Enter the last four digits of your Social Security Number (xxxx) and Date of Birth (mm/dd/yyyy) as indicated and click Submit. You will be taken to the next sign-up page. 5. Create a Sellf ID. This will be your Sellf login ID and cannot be changed, so think of one that is secure and easy to remember. 6. Create a Sellf password. You can change your password at any time. 7. Enter your Password Reset Question and Answer. This can be used at a later time if you forget your password. 8. Enter your e-mail address. You will receive e-mail notification when new information is available in [address] 9. Click Sign Up. You can now view and download portions of your medical record. 10. Click the Download Summary menu link to download a portable copy of your medical information. If you have questions, please visit the Frequently Asked Questions section of the Sellf website. Remember, Sellf is NOT to be used for urgent needs. For medical emergencies, dial 911. Now available from your iPhone and Android! Providers Seen During Your Hospitalization Provider Specialty Primary office phone Josey Stapleton MD Gastroenterology [phone] Your Primary Care Physician (PCP) Primary Care Physician Office Phone Office Fax Terrance Vincent [phone] You are allergic to the following Allergen Reactions Nka (No Known Allergies) Other (comments) Recent Documentation Height Weight BMI Smoking Status 1.74 m 102.2 kg 33.75 kg/m2 Never Smoker Emergency Contacts Name Discharge Info Relation Home Work Mobile Lazarus,Winsome DISCHARGE CAREGIVER [3] Spouse [3] [phone] Courtney Martinez  Daughter [21] [phone] LazarusShivani  Son [22] Patient Belongings The following personal items are in your possession at time of discharge: 
  Dental Appliances: Uppers, Lowers  Visual Aid: None Please provide this summary of care documentation to your next provider. Signatures-by signing, you are acknowledging that this After Visit Summary has been reviewed with you and you have received a copy. Patient Signature:  ____________________________________________________________ Date:  ____________________________________________________________  
  
Racquel Ernandez Provider Signature:  ____________________________________________________________ Date:  ____________________________________________________________

## 2018-01-19 NOTE — DISCHARGE INSTRUCTIONS
Colonoscopy: What to Expect at [address] [place] [address]  After you have a colonoscopy, you will stay at the clinic for 1 to 2 hours until the medicines wear off. Then you can go home. But you will need to arrange for a ride. Your doctor will tell you when you can eat and do your other usual activities. Your doctor will talk to you about when you will need your next colonoscopy. Your doctor can help you decide how often you need to be checked. This will depend on the results of your test and your risk for colorectal cancer. After the test, you may be bloated or have gas pains. You may need to pass gas. If a biopsy was done or a polyp was removed, you may have streaks of blood in your stool (feces) for a few days. This care sheet gives you a general idea about how long it will take for you to recover. But each person recovers at a different pace. Follow the steps below to get better as quickly as possible. How can you care for yourself at home? Activity  ? · Rest when you feel tired. ? · You can do your normal activities when it feels okay to do so. Diet  ? · Follow your doctor's directions for eating. ? · Unless your doctor has told you not to, drink plenty of fluids. This helps to replace the fluids that were lost during the colon prep. ? · Do not drink alcohol. Medicines  ? · Your doctor will tell you if and when you can restart your medicines. He or she will also give you instructions about taking any new medicines. ? · If you take blood thinners, such as warfarin (Coumadin), clopidogrel (Plavix), or aspirin, be sure to talk to your doctor. He or she will tell you if and when to start taking those medicines again. Make sure that you understand exactly what your doctor wants you to do. ? · If polyps were removed or a biopsy was done during the test, your doctor may tell you not to take aspirin or other anti-inflammatory medicines for a few days.  These include ibuprofen (Advil, Motrin) and naproxen (Aleve). Other instructions  ? · For your safety, do not drive or operate machinery until the medicine wears off and you can think clearly. Your doctor may tell you not to drive or operate machinery until the day after your test.   ? · Do not sign legal documents or make major decisions until the medicine wears off and you can think clearly. The anesthesia can make it hard for you to fully understand what you are agreeing to. Follow-up care is a key part of your treatment and safety. Be sure to make and go to all appointments, and call your doctor if you are having problems. It's also a good idea to know your test results and keep a list of the medicines you take. When should you call for help? Call 911 anytime you think you may need emergency care. For example, call if:  ? · You passed out (lost consciousness). ? · You pass maroon or bloody stools. ? · You have trouble breathing. ?Call your doctor now or seek immediate medical care if:  ? · You have pain that does not get better after you take pain medicine. ? · You are sick to your stomach or cannot drink fluids. ? · You have new or worse belly pain. ? · You have blood in your stools. ? · You have a fever. ? · You cannot pass stools or gas. ? Watch closely for changes in your health, and be sure to contact your doctor if you have any problems. Where can you learn more? Go to http://kendal-farzaneh.info/. Enter E264 in the search box to learn more about \"Colonoscopy: What to Expect at Home. \"  Current as of: May 12, 2017  Content Version: 11.4  © 1780-0718 Healthwise, Incorporated. Care instructions adapted under license by [employer] (which disclaims liability or warranty for this information). If you have questions about a medical condition or this instruction, always ask your healthcare professional. [address] any warranty or liability for your use of this information. Hemorrhoids: Care Instructions  Your Care Instructions    Hemorrhoids are enlarged veins that develop in the anal canal. Bleeding during bowel movements, itching, swelling, and rectal pain are the most common symptoms. They can be uncomfortable at times, but hemorrhoids rarely are a serious problem. You can treat most hemorrhoids with simple changes to your diet and bowel habits. These changes include eating more fiber and not straining to pass stools. Most hemorrhoids do not need surgery or other treatment unless they are very large and painful or bleed a lot. Follow-up care is a key part of your treatment and safety. Be sure to make and go to all appointments, and call your doctor if you are having problems. It's also a good idea to know your test results and keep a list of the medicines you take. How can you care for yourself at home? · Sit in a few inches of warm water (sitz bath) 3 times a day and after bowel movements. The warm water helps with pain and itching. · Put ice on your anal area several times a day for 10 minutes at a time. Put a thin cloth between the ice and your skin. Follow this by placing a warm, wet towel on the area for another 10 to 20 minutes. · Take pain medicines exactly as directed. ¨ If the doctor gave you a prescription medicine for pain, take it as prescribed. ¨ If you are not taking a prescription pain medicine, ask your doctor if you can take an over-the-counter medicine. · Keep the anal area clean, but be gentle. Use water and a fragrance-free soap, such as [place], or use baby wipes or medicated pads, such as Tucks. · Wear cotton underwear and loose clothing to decrease moisture in the anal area. · Eat more fiber. Include foods such as whole-grain breads and cereals, raw vegetables, raw and dried fruits, and beans. · Drink plenty of fluids, enough so that your urine is light yellow or clear like water.  If you have kidney, heart, or liver disease and have to limit fluids, talk with your doctor before you increase the amount of fluids you drink. · Use a stool softener that contains bran or psyllium. You can save money by buying bran or psyllium (available in bulk at most health food stores) and sprinkling it on foods or stirring it into fruit juice. Or you can use a product such as Metamucil or Hydrocil. · Practice healthy bowel habits. ¨ Go to the bathroom as soon as you have the urge. ¨ Avoid straining to pass stools. Relax and give yourself time to let things happen naturally. ¨ Do not hold your breath while passing stools. ¨ Do not read while sitting on the toilet. Get off the toilet as soon as you have finished. · Take your medicines exactly as prescribed. Call your doctor if you think you are having a problem with your medicine. When should you call for help? Call 911 anytime you think you may need emergency care. For example, call if:  ? · You pass maroon or very bloody stools. ?Call your doctor now or seek immediate medical care if:  ? · You have increased pain. ? · You have increased bleeding. ? Watch closely for changes in your health, and be sure to contact your doctor if:  ? · Your symptoms have not improved after 3 or 4 days. Where can you learn more? Go to http://kendal-farzaneh.info/. Enter F228 in the search box to learn more about \"Hemorrhoids: Care Instructions. \"  Current as of: May 12, 2017  Content Version: 11.4  © 2188-6781 [employer]. Care instructions adapted under license by [employer] (which disclaims liability or warranty for this information). If you have questions about a medical condition or this instruction, always ask your healthcare professional. [place] [address] any warranty or liability for your use of this information.     DISCHARGE SUMMARY from Nurse    PATIENT INSTRUCTIONS:    After general anesthesia or intravenous sedation, for 24 hours or while taking prescription Narcotics:  · Limit your activities  · Do not drive and operate hazardous machinery  · Do not make important personal or business decisions  · Do  not drink alcoholic beverages  · If you have not urinated within 8 hours after discharge, please contact your surgeon on call. Report the following to your surgeon:  · Excessive pain, swelling, redness or odor of or around the surgical area  · Temperature over 100.5  · Nausea and vomiting lasting longer than 4 hours or if unable to take medications  · Any signs of decreased circulation or nerve impairment to extremity: change in color, persistent  numbness, tingling, coldness or increase pain  · Any questions    What to do at Home:  Recommended activity: Activity as tolerated and no driving for today      These are general instructions for a healthy lifestyle:    No smoking/ No tobacco products/ Avoid exposure to second hand smoke  Surgeon General's Warning:  Quitting smoking now greatly reduces serious risk to your health. Obesity, smoking, and sedentary lifestyle greatly increases your risk for illness    A healthy diet, regular physical exercise & weight monitoring are important for maintaining a healthy lifestyle    You may be retaining fluid if you have a history of heart failure or if you experience any of the following symptoms:  Weight gain of 3 pounds or more overnight or 5 pounds in a week, increased swelling in our hands or feet or shortness of breath while lying flat in bed. Please call your doctor as soon as you notice any of these symptoms; do not wait until your next office visit. Recognize signs and symptoms of STROKE:    F-face looks uneven    A-arms unable to move or move unevenly    S-speech slurred or non-existent    T-time-call 911 as soon as signs and symptoms begin-DO NOT go       Back to bed or wait to see if you get better-TIME IS BRAIN.     Warning Signs of HEART ATTACK     Call 911 if you have these symptoms:   Chest discomfort. Most heart attacks involve discomfort in the center of the chest that lasts more than a few minutes, or that goes away and comes back. It can feel like uncomfortable pressure, squeezing, fullness, or pain.  Discomfort in other areas of the upper body. Symptoms can include pain or discomfort in one or both arms, the back, neck, jaw, or stomach.  Shortness of breath with or without chest discomfort.  Other signs may include breaking out in a cold sweat, nausea, or lightheadedness. Don't wait more than five minutes to call 911 - MINUTES MATTER! Fast action can save your life. Calling 911 is almost always the fastest way to get lifesaving treatment. Emergency Medical Services staff can begin treatment when they arrive -- up to an hour sooner than if someone gets to the hospital by car. The discharge information has been reviewed with the patient and spouse. The patient and spouse verbalized understanding. Discharge medications reviewed with the patient and appropriate educational materials and side effects teaching were provided.   ___________________________________________________________________________________________________________________________________

## 2018-01-19 NOTE — PROGRESS NOTES
[email or web address] [address]  [place], [address]      Brief Procedure Note    Naina Escobar  1940  [id number]    Date of Procedure: 1/19/2018    Preoperative diagnosis: Rectal bleeding [K62.5]  Exposure to other nonionizing radiation, initial encounter [W90.8XXA]    Postoperative diagnosis: halo equiptment number: [id number] RFA 7 passes. Radiation protitis    Type of Anesthesia: MAC (Monitored anesthesia care)    Description of findings: same as post op dx    Procedure: Procedure(s):   FLEXIBLE SIGMOIDOSCOPY WITH ABLATION AND HALO    :  Dr. Gilberto Carty MD    Assistant(s): Endoscopy Technician-1: Thalia Rome; Bipin Cornelius  Endoscopy RN-1: Isabel Peoples; Sean Murillo RN    EBL:None    Specimens: * No specimens in log *    Findings: See printed and scanned procedure note    Complications: None    Dr. Gilberto Carty MD  1/19/2018  1:03 PM

## 2018-01-19 NOTE — ANESTHESIA POSTPROCEDURE EVALUATION
Post-Anesthesia Evaluation and Assessment    Patient: Hector Alcantar MRN: [id number]  SSN: [id number]    YOB: 1940  Age: 68 y.o. Sex: male       Cardiovascular Function/Vital Signs  Visit Vitals    /51    Pulse 62    Temp 36.5 °C (97.7 °F)    Resp 16    Ht 5' 8.5\" (1.74 m)    Wt 102.2 kg (225 lb 4 oz)    SpO2 100%    BMI 33.75 kg/m2       Patient is status post MAC anesthesia for Procedure(s): FLEXIBLE SIGMOIDOSCOPY WITH ABLATION AND HALO. Nausea/Vomiting: None    Postoperative hydration reviewed and adequate. Pain:  Pain Scale 1: Numeric (0 - 10) (01/19/18 1217)  Pain Intensity 1: 6 (01/19/18 1217)   Managed    Neurological Status: At baseline    Mental Status and Level of Consciousness: Arousable    Pulmonary Status:   O2 Device: CO2 nasal cannula (01/19/18 1308)   Adequate oxygenation and airway patent    Complications related to anesthesia: None    Post-anesthesia assessment completed.  No concerns    Signed By: Sonya Matias MD     January 19, 2018

## 2018-01-19 NOTE — ANESTHESIA PREPROCEDURE EVALUATION
Anesthetic History   No history of anesthetic complications            Review of Systems / Medical History  Patient summary reviewed, nursing notes reviewed and pertinent labs reviewed    Pulmonary        Sleep apnea: CPAP           Neuro/Psych   Within defined limits           Cardiovascular    Hypertension: well controlled          CAD    Exercise tolerance: <4 METS     GI/Hepatic/Renal     GERD: well controlled    Renal disease: ESRD and dialysis       Endo/Other    Diabetes: well controlled, type 2    Morbid obesity     Other Findings   Comments: Risk Factors for Postoperative nausea/vomiting:       History of postoperative nausea/vomiting? NO       Female? NO       Motion sickness? NO       Intended opioid administration for postoperative analgesia? NO      Smoking Abstinence  Current Smoker? NO  Elective Surgery? YES  Seen preoperatively by anesthesiologist or proxy prior to day of surgery? YES  Pt abstained from smoking 24 hours prior to anesthesia?  N/A           Physical Exam    Airway  Mallampati: II  TM Distance: 4 - 6 cm  Neck ROM: normal range of motion   Mouth opening: Normal     Cardiovascular  Regular rate and rhythm,  S1 and S2 normal,  no murmur, click, rub, or gallop             Dental    Dentition: Full lower dentures and Full upper dentures     Pulmonary  Breath sounds clear to auscultation               Abdominal  GI exam deferred       Other Findings            Anesthetic Plan    ASA: 4, emergent  Anesthesia type: MAC            Anesthetic plan and risks discussed with: Patient and Family

## 2018-01-22 ENCOUNTER — OFFICE VISIT (OUTPATIENT)
Dept: VASCULAR SURGERY | Age: 78
End: 2018-01-22

## 2018-01-22 DIAGNOSIS — N18.6 ESRD (END STAGE RENAL DISEASE) (HCC): ICD-10-CM

## 2018-01-22 NOTE — PROCEDURES
[facility]   *** FINAL REPORT ***    Name: Jonatan Salazar  MRN: [id number]       Outpatient  : 10 Jul 1940  HIS Order #: [id number]  [address] [facility] Visit #: [id number]  Date: 2018    TYPE OF TEST: Dialysis Access Duplex    REASON FOR TEST  Comp of dialysis device,graft    Graft:-  Summary:   Left arm primary AVF from brachial artery to cephalic  (upper arm) vein  Op. Date:  2017  Surgeon:   Raya Riggs    Results:-            Velocity  Ratio  Flow Volume Stenosis            --------  -----  ----------- --------  Inflow:    269.0  Proximal:  385.0      1.4      1267. Normal  Mid-graft: 182.0      0.5      1244. Normal  Distal:    163.0      0.9      815.0   Normal  Outflow:   148.0      0.9      N/A     Normal    Mean Flow Volume:              1108. INTERPRETATION/FINDINGS  Duplex images were obtained using 2-D gray scale, color flow and  spectral doppler analysis. 1. Patent left brachial to cephalic fistula without significant  stenosis. 2. Non vascular mass at the antecubital fossa measuring 1.6 x 1.0 cm  trv.  3. Size of fistula measures 5.9mm to 8.8mm trv.  4. Depth from the skin measures 5.4mm to 13mm trv.  5. Volume flows range from 815 ml/min distally to 1267ml/min  proximally. 6. There is an increase in peak systolic velocity in the proximal to  mid segment of 697 cm/sec at a suspected valve site. No significant  stenosis visualized. ADDITIONAL COMMENTS    I have personally reviewed the data relevant to the interpretation of  this  study. TECHNOLOGIST: Soham Morris RDMS  Signed: 2018 11:23 AM    PHYSICIAN: Michele Ralph D.O.   Signed: 2018 09:10 AM

## 2018-01-25 LAB
BUN BLD-MCNC: 59 MG/DL (ref 7–18)
CHLORIDE BLD-SCNC: 110 MMOL/L (ref 100–108)
GLUCOSE BLD STRIP.AUTO-MCNC: 105 MG/DL (ref 74–106)
HCT VFR BLD CALC: 37 % (ref 36–49)
HGB BLD-MCNC: 12.6 G/DL (ref 12–16)
POTASSIUM BLD-SCNC: 4.7 MMOL/L (ref 3.5–5.5)
SODIUM BLD-SCNC: 140 MMOL/L (ref 136–145)

## 2018-01-26 ENCOUNTER — OFFICE VISIT (OUTPATIENT)
Dept: VASCULAR SURGERY | Age: 78
End: 2018-01-26

## 2018-01-26 DIAGNOSIS — Z99.2 ESRD (END STAGE RENAL DISEASE) ON DIALYSIS (HCC): Primary | ICD-10-CM

## 2018-01-26 DIAGNOSIS — N18.6 ESRD (END STAGE RENAL DISEASE) ON DIALYSIS (HCC): Primary | ICD-10-CM

## 2018-01-26 NOTE — PROGRESS NOTES
Mr Zach Lynne is here following his ultrasound of his left arm AV fistula. This is created in mid December, approximately 9 weeks old now. He is currently still dialyzing with his catheter. The fistula is in the left upper arm, and is palpable with a thrill and bruit. Ultrasound does show that he has adequate flow volumes, size ranges are adequate, there is some depth to it in places but overall within the 6 mm range. There is a valve location which is creating some increased velocities, but this may be a little bit more proximal to where they would need to cannulate him. What I offered is that they can evaluate arm to see if they feel comfortable trying to access. I provided the patient with the drawing and that notation.   If successful then will just gradually advanced to 2 needles and see him when he is ready for catheter removal.  At their not comfortable attempting or do attempted have problems, then will just need to follow-up over the phone and arrange for a fistulogram.

## 2018-01-26 NOTE — MR AVS SNAPSHOT
[address] [facility] 
 
 
 [address] [place], [place] [address] [facility] 
[phone] Patient: Carlotta Tello MRN: [id number] Visit Information Date & Time Provider Department Dept. Phone Encounter #  
 1/26/2018  9:45 AM [facility] [phone] [id number] Your Appointments 1/26/2018  9:45 AM  
Follow Up with [facility] ([facility]) Appt Note: FOLLOW UP AFTER STUDY  
 [address] [place], [place] [address] [facility]  
[phone] [address]  
  
    
 3/27/2018 10:00 AM  
Follow Up with Suzi Marcano MD  
[facility] ([facility]) Appt Note: 3 month follow up  
 [facility] [address]  
  
   
 [address] [place] [id number] 7/11/2018 10:15 AM  
Nurse Visit with DAKOTA[facility] ([facility]) Appt Note: PSA  
 [address] [place] [address] [facility] 7/18/2018 10:00 AM  
ESTABLISHED PATIENT with Abraham Ya MD  
[facility] [address] [place] [address]) Appt Note: 6mo Flow Bus UA PSA Prior [address] [place] [address] [facility] Upcoming Health Maintenance Date Due  
 FOOT EXAM Q1 7/10/1950 MICROALBUMIN Q1 7/10/1950 EYE EXAM RETINAL OR DILATED Q1 7/10/1950 DTaP/Tdap/Td series (1 - Tdap) 7/10/1961 ZOSTER VACCINE AGE 60> 5/10/2000 GLAUCOMA SCREENING Q2Y 7/10/2005 MEDICARE YEARLY EXAM 7/10/2005 Pneumococcal 65+ High/Highest Risk (2 of 2 - PPSV23) 3/9/2017 Influenza Age 5 to Adult 8/1/2017 HEMOGLOBIN A1C Q6M 4/28/2018 LIPID PANEL Q1 11/14/2018 Allergies as of 1/26/2018  Review Complete On: 1/26/2018 By: Shila Araujo LPN Severity Noted Reaction Type Reactions Nka [No Known Allergies]  10/07/2012    Other (comments) Current Immunizations  Reviewed on 3/12/2013 No immunizations on file. Not reviewed this visit Vitals BP Pulse Height(growth percentile) Weight(growth percentile) BMI Smoking Status (P) 116/62 (P) 70 (P) 5' 8\" (1.727 m) (P) 225 lb (102.1 kg) (P) 34.21 kg/m2 Never Smoker BMI and BSA Data Body Mass Index Body Surface Area (P) 34.21 kg/m 2 (P) 2.21 m 2 Preferred Pharmacy Pharmacy Name [id number] [address] [phone] Your Updated Medication List  
  
   
This list is accurate as of: 1/26/18  9:41 AM.  Always use your most recent med list.  
  
  
  
  
 albuterol 90 mcg/actuation inhaler Commonly known as:  PROVENTIL HFA, VENTOLIN HFA, PROAIR HFA Take 2 Puffs by inhalation every four (4) hours as needed for Wheezing. allopurinol 100 mg tablet Commonly known as:  Kirk Ruffini Take 100 mg by mouth daily. amLODIPine 5 mg tablet Commonly known as:  Julieta Prow Take 1 Tab by mouth daily. aspirin 81 mg chewable tablet Take 1 Tab by mouth daily. b complex-vitamin c-folic acid 1 mg capsule Commonly known as:  Phoenix Sandhoff Take 1 Cap by mouth daily. clopidogrel 75 mg Tab Commonly known as:  PLAVIX Take 1 Tab by mouth daily. DECARA 50,000 unit Cap Generic drug:  Cholecalciferol (Vitamin D3) Take  by mouth every seven (7) days. fenofibrate 54 mg tablet Commonly known as:  LOFIBRA Take 1 Tab by mouth daily. HYDROcodone-acetaminophen 5-325 mg per tablet Commonly known as:  Hope Pont Take 1-2 Tabs by mouth every four (4) hours as needed for Pain. Max Daily Amount: 12 Tabs. insulin aspart 100 unit/mL injection Commonly known as:  García Cardoza 20 units sq 3 times a day,sliding scale  
  
 insulin glargine 100 unit/mL injection Commonly known as:  LANTUS  
20 Units by SubCUTAneous route every twelve (12) hours. isosorbide mononitrate ER 30 mg tablet Commonly known as:  IMDUR Take 1 Tab by mouth daily. losartan 50 mg tablet Commonly known as:  COZAAR  
TAKE ONE TABLET BY MOUTH ONE TIME DAILY  
  
 metoprolol tartrate 100 mg IR tablet Commonly known as:  LOPRESSOR  
TAKE 1 TABLET BY MOUTH TWO TIMES A DAY. nitroglycerin 0.4 mg SL tablet Commonly known as:  NITROSTAT  
1 Tab by SubLINGual route as needed for Chest Pain. senna-docusate 8.6-50 mg per tablet Commonly known as:  Rhunette Anon Take 1 Tab by mouth daily. simvastatin 40 mg tablet Commonly known as:  ZOCOR  
TAKE 1 TABLET BY MOUTH NIGHTLY.  
  
 tamsulosin 0.4 mg capsule Commonly known as:  FLOMAX TAKE ONE CAPSULE BY MOUTH ONE TIME DAILY Introducing hospitals & HEALTH SERVICES! Ashley Phillips introduces [facility] patient portal. Now you can access parts of your medical record, email your doctor's office, and request medication refills online. 1. In your internet browser, go to [email or web address] 2. Click on the First Time User? Click Here link in the Sign In box. You will see the New Member Sign Up page. 3. Enter your [facility] Access Code exactly as it appears below. You will not need to use this code after youve completed the sign-up process. If you do not sign up before the expiration date, you must request a new code. · [facility] Access Code: [email or web address] Expires: 3/26/2018 10:23 PM 
 
4. Enter the last four digits of your Social Security Number (xxxx) and Date of Birth (mm/dd/yyyy) as indicated and click Submit. You will be taken to the next sign-up page. 5. Create a RoomClipt ID. This will be your RoomClipt login ID and cannot be changed, so think of one that is secure and easy to remember. 6. Create a [employer] password. You can change your password at any time. 7. Enter your Password Reset Question and Answer. This can be used at a later time if you forget your password. 8. Enter your e-mail address. You will receive e-mail notification when new information is available in [address] 9. Click Sign Up. You can now view and download portions of your medical record. 10. Click the Download Summary menu link to download a portable copy of your medical information. If you have questions, please visit the Frequently Asked Questions section of the [employer] website. Remember, [employer] is NOT to be used for urgent needs. For medical emergencies, dial 911. Now available from your iPhone and Android! Please provide this summary of care documentation to your next provider. Your primary care clinician is listed as Azucena Berger. If you have any questions after today's visit, please call [phone].

## 2018-01-27 ENCOUNTER — APPOINTMENT (OUTPATIENT)
Dept: GENERAL RADIOLOGY | Age: 78
DRG: 280 | End: 2018-01-27
Attending: PHYSICIAN ASSISTANT
Payer: MEDICARE

## 2018-01-27 ENCOUNTER — HOSPITAL ENCOUNTER (INPATIENT)
Age: 78
LOS: 1 days | Discharge: HOME | DRG: 280 | End: 2018-01-29
Attending: EMERGENCY MEDICINE | Admitting: INTERNAL MEDICINE
Payer: MEDICARE

## 2018-01-27 DIAGNOSIS — R07.9 CHEST PAIN, UNSPECIFIED TYPE: Primary | ICD-10-CM

## 2018-01-27 DIAGNOSIS — R77.8 ELEVATED TROPONIN: ICD-10-CM

## 2018-01-27 LAB
ANION GAP SERPL CALC-SCNC: 6 MMOL/L (ref 3–18)
APTT PPP: 27.6 SEC (ref 23–36.4)
BASOPHILS # BLD: 0 K/UL (ref 0–0.06)
BASOPHILS NFR BLD: 0 % (ref 0–2)
BUN SERPL-MCNC: 26 MG/DL (ref 7–18)
BUN/CREAT SERPL: 7 (ref 12–20)
CALCIUM SERPL-MCNC: 8.5 MG/DL (ref 8.5–10.1)
CHLORIDE SERPL-SCNC: 99 MMOL/L (ref 100–108)
CK MB CFR SERPL CALC: 4.3 % (ref 0–4)
CK MB CFR SERPL CALC: 5.1 % (ref 0–4)
CK MB SERPL-MCNC: 2.3 NG/ML (ref 5–25)
CK MB SERPL-MCNC: 2.7 NG/ML (ref 5–25)
CK SERPL-CCNC: 53 U/L (ref 39–308)
CK SERPL-CCNC: 54 U/L (ref 39–308)
CO2 SERPL-SCNC: 30 MMOL/L (ref 21–32)
CREAT SERPL-MCNC: 3.94 MG/DL (ref 0.6–1.3)
DIFFERENTIAL METHOD BLD: ABNORMAL
EOSINOPHIL # BLD: 0.2 K/UL (ref 0–0.4)
EOSINOPHIL NFR BLD: 2 % (ref 0–5)
ERYTHROCYTE [DISTWIDTH] IN BLOOD BY AUTOMATED COUNT: 15.7 % (ref 11.6–14.5)
GLUCOSE SERPL-MCNC: 360 MG/DL (ref 74–99)
HCT VFR BLD AUTO: 36.4 % (ref 36–48)
HGB BLD-MCNC: 11.4 G/DL (ref 13–16)
INR PPP: 1 (ref 0.8–1.2)
LYMPHOCYTES # BLD: 1.8 K/UL (ref 0.9–3.6)
LYMPHOCYTES NFR BLD: 20 % (ref 21–52)
MAGNESIUM SERPL-MCNC: 2 MG/DL (ref 1.6–2.6)
MCH RBC QN AUTO: 28.1 PG (ref 24–34)
MCHC RBC AUTO-ENTMCNC: 31.3 G/DL (ref 31–37)
MCV RBC AUTO: 89.7 FL (ref 74–97)
MONOCYTES # BLD: 0.6 K/UL (ref 0.05–1.2)
MONOCYTES NFR BLD: 7 % (ref 3–10)
NEUTS SEG # BLD: 6.3 K/UL (ref 1.8–8)
NEUTS SEG NFR BLD: 71 % (ref 40–73)
PLATELET # BLD AUTO: 257 K/UL (ref 135–420)
PMV BLD AUTO: 10.1 FL (ref 9.2–11.8)
POTASSIUM SERPL-SCNC: 4 MMOL/L (ref 3.5–5.5)
PROTHROMBIN TIME: 12.5 SEC (ref 11.5–15.2)
RBC # BLD AUTO: 4.06 M/UL (ref 4.7–5.5)
SODIUM SERPL-SCNC: 135 MMOL/L (ref 136–145)
TROPONIN I SERPL-MCNC: 0.09 NG/ML (ref 0–0.04)
TROPONIN I SERPL-MCNC: 0.17 NG/ML (ref 0–0.04)
WBC # BLD AUTO: 9 K/UL (ref 4.6–13.2)

## 2018-01-27 PROCEDURE — 85730 THROMBOPLASTIN TIME PARTIAL: CPT | Performed by: PHYSICIAN ASSISTANT

## 2018-01-27 PROCEDURE — 85025 COMPLETE CBC W/AUTO DIFF WBC: CPT | Performed by: PHYSICIAN ASSISTANT

## 2018-01-27 PROCEDURE — 85610 PROTHROMBIN TIME: CPT | Performed by: PHYSICIAN ASSISTANT

## 2018-01-27 PROCEDURE — 99285 EMERGENCY DEPT VISIT HI MDM: CPT

## 2018-01-27 PROCEDURE — 93005 ELECTROCARDIOGRAM TRACING: CPT

## 2018-01-27 PROCEDURE — 80048 BASIC METABOLIC PNL TOTAL CA: CPT | Performed by: PHYSICIAN ASSISTANT

## 2018-01-27 PROCEDURE — 71045 X-RAY EXAM CHEST 1 VIEW: CPT

## 2018-01-27 PROCEDURE — 82553 CREATINE MB FRACTION: CPT | Performed by: PHYSICIAN ASSISTANT

## 2018-01-27 PROCEDURE — 83735 ASSAY OF MAGNESIUM: CPT | Performed by: PHYSICIAN ASSISTANT

## 2018-01-27 NOTE — IP AVS SNAPSHOT
Germaine Viramontes 
 
 
 [address] [facility] [address] Patient: Daryn Mercer MRN: [id number] CQU:8/13/2964 A check jose indicates which time of day the medication should be taken. My Medications CHANGE how you take these medications Instructions Each Dose to Equal  
 Morning Noon Evening Bedtime  
 insulin aspart 100 unit/mL injection Commonly known as:  Donal Arevalo What changed:  additional instructions Your last dose was: Your next dose is:    
   
   
 20 units sq 3 times a day,sliding scale  
     
   
   
   
  
 insulin glargine 100 unit/mL injection Commonly known as:  LANTUS What changed:  how much to take Your last dose was: Your next dose is:    
   
   
 20 Units by SubCUTAneous route every twelve (12) hours. 20 Units CONTINUE taking these medications Instructions Each Dose to Equal  
 Morning Noon Evening Bedtime  
 allopurinol 100 mg tablet Commonly known as:  Nica Rios Your last dose was: Your next dose is: Take 100 mg by mouth daily. 100 mg  
    
   
   
   
  
 amLODIPine 5 mg tablet Commonly known as:  Carola Gray Your last dose was: Your next dose is: Take 1 Tab by mouth daily. 5 mg  
    
   
   
   
  
 aspirin 81 mg chewable tablet Your last dose was: Your next dose is: Take 1 Tab by mouth daily. 81 mg  
    
   
   
   
  
 b complex-vitamin c-folic acid 1 mg capsule Commonly known as:  Harvey Camargo Your last dose was: Your next dose is: Take 1 Cap by mouth daily. 1 Cap  
    
   
   
   
  
 clopidogrel 75 mg Tab Commonly known as:  PLAVIX Your last dose was: Your next dose is: Take 1 Tab by mouth daily. 75 mg DECARA 50,000 unit Cap Generic drug:  Cholecalciferol (Vitamin D3) Your last dose was: Your next dose is: Take  by mouth every seven (7) days. fenofibrate 54 mg tablet Commonly known as:  LOFIBRA Your last dose was: Your next dose is: Take 1 Tab by mouth daily. 54 mg HYDROcodone-acetaminophen 5-325 mg per tablet Commonly known as:  Yajaira Quiroga Your last dose was: Your next dose is: Take 1-2 Tabs by mouth every four (4) hours as needed for Pain. Max Daily Amount: 12 Tabs. 1-2 Tab  
    
   
   
   
  
 isosorbide mononitrate ER 30 mg tablet Commonly known as:  IMDUR Your last dose was: Your next dose is: Take 1 Tab by mouth daily. 30 mg  
    
   
   
   
  
 losartan 50 mg tablet Commonly known as:  COZAAR Your last dose was: Your next dose is: TAKE ONE TABLET BY MOUTH ONE TIME DAILY  
     
   
   
   
  
 metoprolol tartrate 100 mg IR tablet Commonly known as:  LOPRESSOR Your last dose was: Your next dose is: TAKE 1 TABLET BY MOUTH TWO TIMES A DAY. nitroglycerin 0.4 mg SL tablet Commonly known as:  NITROSTAT Your last dose was: Your next dose is:    
   
   
 1 Tab by SubLINGual route as needed for Chest Pain. 0.4 mg  
    
   
   
   
  
 senna-docusate 8.6-50 mg per tablet Commonly known as:  Serena Magdaleno Your last dose was: Your next dose is: Take 1 Tab by mouth daily. 1 Tab  
    
   
   
   
  
 simvastatin 40 mg tablet Commonly known as:  ZOCOR Your last dose was: Your next dose is: TAKE 1 TABLET BY MOUTH NIGHTLY.  
     
   
   
   
  
 tamsulosin 0.4 mg capsule Commonly known as:  FLOMAX Your last dose was: Your next dose is: TAKE ONE CAPSULE BY MOUTH ONE TIME DAILY

## 2018-01-27 NOTE — IP AVS SNAPSHOT
[id number] Patient: Antonietta Burgos MRN: [id number] AVV:1/46/[id number] About your hospitalization You were admitted on:  January 28, 2018 You last received care in the:  [address] [place] [address] You were discharged on:  January 29, 2018 Why you were hospitalized Your primary diagnosis was:  Not on File Your diagnoses also included:  Esrd (End Stage Renal Disease) (Hcc), Chest Pain, Elevated Troponin Follow-up Information Follow up With Details Comments Contact Info Anna Muro MD   [address] [place] [address] [address] [facility] 
[phone] Your Scheduled Appointments Thursday February 15, 2018  9:30 AM EST  
ESTABLISHED PATIENT with Jesse Vernon MD  
[facility] ([address] [facility]) [facility] [address] [facility]  
[phone] Discharge Orders None A check jose indicates which time of day the medication should be taken. My Medications CHANGE how you take these medications Instructions Each Dose to Equal  
 Morning Noon Evening Bedtime  
 insulin aspart 100 unit/mL injection Commonly known as:  Rachel Gaston What changed:  additional instructions Your last dose was: Your next dose is:    
   
   
 20 units sq 3 times a day,sliding scale  
     
   
   
   
  
 insulin glargine 100 unit/mL injection Commonly known as:  LANTUS What changed:  how much to take Your last dose was: Your next dose is:    
   
   
 20 Units by SubCUTAneous route every twelve (12) hours. 20 Units CONTINUE taking these medications Instructions Each Dose to Equal  
 Morning Noon Evening Bedtime  
 allopurinol 100 mg tablet Commonly known as:  Shelvy Casper Your last dose was: Your next dose is: Take 100 mg by mouth daily.   
 100 mg  
    
   
   
   
  
 amLODIPine 5 mg tablet Commonly known as:  Ronald Zhong Your last dose was: Your next dose is: Take 1 Tab by mouth daily. 5 mg  
    
   
   
   
  
 aspirin 81 mg chewable tablet Your last dose was: Your next dose is: Take 1 Tab by mouth daily. 81 mg  
    
   
   
   
  
 b complex-vitamin c-folic acid 1 mg capsule Commonly known as:  Isabelle Haines Your last dose was: Your next dose is: Take 1 Cap by mouth daily. 1 Cap  
    
   
   
   
  
 clopidogrel 75 mg Tab Commonly known as:  PLAVIX Your last dose was: Your next dose is: Take 1 Tab by mouth daily. 75 mg DECARA 50,000 unit Cap Generic drug:  Cholecalciferol (Vitamin D3) Your last dose was: Your next dose is: Take  by mouth every seven (7) days. fenofibrate 54 mg tablet Commonly known as:  LOFIBRA Your last dose was: Your next dose is: Take 1 Tab by mouth daily. 54 mg HYDROcodone-acetaminophen 5-325 mg per tablet Commonly known as:  Naina Miller Your last dose was: Your next dose is: Take 1-2 Tabs by mouth every four (4) hours as needed for Pain. Max Daily Amount: 12 Tabs. 1-2 Tab  
    
   
   
   
  
 isosorbide mononitrate ER 30 mg tablet Commonly known as:  IMDUR Your last dose was: Your next dose is: Take 1 Tab by mouth daily. 30 mg  
    
   
   
   
  
 losartan 50 mg tablet Commonly known as:  COZAAR Your last dose was: Your next dose is: TAKE ONE TABLET BY MOUTH ONE TIME DAILY  
     
   
   
   
  
 metoprolol tartrate 100 mg IR tablet Commonly known as:  LOPRESSOR Your last dose was: Your next dose is: TAKE 1 TABLET BY MOUTH TWO TIMES A DAY. nitroglycerin 0.4 mg SL tablet Commonly known as:  NITROSTAT Your last dose was: Your next dose is:    
   
   
 1 Tab by SubLINGual route as needed for Chest Pain. 0.4 mg  
    
   
   
   
  
 senna-docusate 8.6-50 mg per tablet Commonly known as:  Lexy Downer Your last dose was: Your next dose is: Take 1 Tab by mouth daily. 1 Tab  
    
   
   
   
  
 simvastatin 40 mg tablet Commonly known as:  ZOCOR Your last dose was: Your next dose is: TAKE 1 TABLET BY MOUTH NIGHTLY.  
     
   
   
   
  
 tamsulosin 0.4 mg capsule Commonly known as:  FLOMAX Your last dose was: Your next dose is: TAKE ONE CAPSULE BY MOUTH ONE TIME DAILY Discharge Instructions Chest Pain: Care Instructions Your Care Instructions There are many things that can cause chest pain. Some are not serious and will get better on their own in a few days. But some kinds of chest pain need more testing and treatment. Your doctor may have recommended a follow-up visit in the next 8 to 12 hours. If you are not getting better, you may need more tests or treatment. Even though your doctor has released you, you still need to watch for any problems. The doctor carefully checked you, but sometimes problems can develop later. If you have new symptoms or if your symptoms do not get better, get medical care right away. If you have worse or different chest pain or pressure that lasts more than 5 minutes or you passed out (lost consciousness), call 911 or seek other emergency help right away. A medical visit is only one step in your treatment. Even if you feel better, you still need to do what your doctor recommends, such as going to all suggested follow-up appointments and taking medicines exactly as directed. This will help you recover and help prevent future problems. How can you care for yourself at home? · Rest until you feel better. · Take your medicine exactly as prescribed. Call your doctor if you think you are having a problem with your medicine. · Do not drive after taking a prescription pain medicine. When should you call for help? Call 911 if: 
? · You passed out (lost consciousness). ? · You have severe difficulty breathing. ? · You have symptoms of a heart attack. These may include: ¨ Chest pain or pressure, or a strange feeling in your chest. 
¨ Sweating. ¨ Shortness of breath. ¨ Nausea or vomiting. ¨ Pain, pressure, or a strange feeling in your back, neck, jaw, or upper belly or in one or both shoulders or arms. ¨ Lightheadedness or sudden weakness. ¨ A fast or irregular heartbeat. After you call 911, the  may tell you to chew 1 adult-strength or 2 to 4 low-dose aspirin. Wait for an ambulance. Do not try to drive yourself. ?Call your doctor today if: 
? · You have any trouble breathing. ? · Your chest pain gets worse. ? · You are dizzy or lightheaded, or you feel like you may faint. ? · You are not getting better as expected. ? · You are having new or different chest pain. Where can you learn more? Go to http://kendal-farzaneh.info/. Enter A120 in the search box to learn more about \"Chest Pain: Care Instructions. \" Current as of: March 20, 2017 Content Version: 11.4 © 4253-6814 [employer]. Care instructions adapted under license by [employer] (which disclaims liability or warranty for this information). If you have questions about a medical condition or this instruction, always ask your healthcare professional. [place] [address] any warranty or liability for your use of this information. Kidney Dialysis: Care Instructions Your Care Instructions Dialysis is a process that filters wastes from the blood when your kidneys can no longer do the job. It is not a cure, but it can help you live longer and feel better. It is a lifesaving treatment when you have kidney failure. Normal kidneys work 24 hours a day to clean wastes from your blood. Your kidneys are not able to do this job, so a process called dialysis will do some of the work for your kidneys. You and your doctor will decide which type of dialysis you should have. Peritoneal dialysis uses the lining of your belly (peritoneum) to filter your blood. You can do it at home, on a daily basis. Hemodialysis uses a man-made filter called a dialyzer to clean your blood. Most people need to go to a hospital or clinic 3 days a week for several hours each time. Sometimes hemodialysis can be done at home. It is normal to have questions about your treatment, and you have a right to know what is happening to you. Learning about dialysis can help you take an active role in your treatment. Dialysis does not cure kidney disease, but it can help you live longer and feel better. You will need to follow your diet and treatment schedule carefully. Follow-up care is a key part of your treatment and safety. Be sure to make and go to all appointments, and call your doctor if you are having problems. It's also a good idea to know your test results and keep a list of the medicines you take. What do you need to know about peritoneal dialysis? Peritoneal dialysis uses the lining of your belly (or peritoneal membrane) to filter your blood. Before you can begin peritoneal dialysis, your doctor will need to place a thin tube called a catheter in your belly. This is the dialysis access. · Peritoneal dialysis can be done at home or in any clean place. You may be able to do it while you sleep. · You can do it by yourself. You do not have to rely on help from others. · You can do it at the times you choose as long as you do the right number of treatments. · It has to be done every day of the week. · Some people find it hard to do all the required steps. · It increases your chance for a serious infection of the lining of the belly (peritoneum). What do you need to know about hemodialysis? Hemodialysis uses a man-made membrane called a dialyzer to clean your blood. You are connected to the dialyzer by tubes attached to your blood vessels. Before you start hemodialysis, your doctor will create a site where the blood can flow in and out of your body during your dialysis sessions. This site is called the vascular access. It may be a fistula, made by connecting an artery and a vein. Or it may be a graft, which is a tube implanted under your skin. · Hemodialysis is done mainly by trained health workers who can watch for any problems. · It allows you to be in contact with other people having dialysis. This can help provide emotional support. · You can schedule your treatments in the evenings so you can keep working. · You may be able to do home hemodialysis, which gives you more control over your schedule. · It usually needs to be done on a set schedule 3 times a week. · It can cause side effects. The most common side effects are low blood pressure and muscle cramps. These can often be treated easily. · It requires needle sticks during every treatment, which bothers some people. Others get used to it and even do the needle sticks themselves. How can you care for yourself at home? · Be sure to have all of your dialysis sessions. Do not try to shorten or skip your sessions. You have a better chance of a longer and healthier life by getting your full treatment. · Your doctor or health care team will show you the steps you need to go through each day before, during, and after dialysis. Be sure to follow these steps. If you do not understand a step, talk to your team. 
· Your doctor and dietitian will help you design menus that follow your diet. Be sure to follow your diet guidelines. ¨ You will need to limit fluids and certain foods that contain salt (sodium), potassium, and phosphorus. ¨ You may need to follow a heart-healthy diet to keep the fat (cholesterol) in your blood under control. ¨ You may need higher levels of protein in your diet. · Your doctor may recommend certain vitamins. But do not take any other medicine, including over-the-counter medicines, vitamins, and herbal products, without talking to your doctor first. 
· Do not smoke. Smoking raises your risk of many health problems, including more kidney damage. If you need help quitting, talk to your doctor about stop-smoking programs and medicines. These can increase your chances of quitting for good. · Do not take ibuprofen (Advil, Motrin), naproxen (Aleve), or similar medicines, unless your doctor tells you to. These medicines may make kidney problems worse. When should you call for help? Call your doctor now or seek immediate medical care if: 
? · You have a fever. ? · You are dizzy or lightheaded, or you feel like you may faint. ? · You are confused or cannot think clearly. ? · You have new or worse nausea or vomiting. ? · You have new or more blood in your urine. ? · You have new swelling. ? Watch closely for changes in your health, and be sure to contact your doctor if: 
? · You do not get better as expected. Where can you learn more? Go to http://kendal-farzaneh.info/. Enter N384 in the search box to learn more about \"Kidney Dialysis: Care Instructions. \" Current as of: May 12, 2017 Content Version: 11.4 © 2994-4917 [employer]. Care instructions adapted under license by [employer] (which disclaims liability or warranty for this information). If you have questions about a medical condition or this instruction, always ask your healthcare professional. [address] any warranty or liability for your use of this information. ACO Transitions of Care Introducing [employer] [address] offers a voluntary care coordination program to provide high quality service and care to [facility] fee-for-service beneficiaries. Author Devan was designed to help you enhance your health and well-being through the following services: ? Transitions of Care  support for individuals who are transitioning from one care setting to another (example: Hospital to home). ? Chronic and Complex Care Coordination  support for individuals and caregivers of those with serious or chronic illnesses or with more than one chronic (ongoing) condition and those who take a number of different medications. If you meet specific medical criteria, a [address] [place] [address] Rd may call you directly to coordinate your care with your primary care physician and your other care providers. For questions about the [facility] programs, please, contact your physicians office. For general questions or additional information about Accountable Care Organizations: 
Please visit www.medicare.gov/acos. html or call 1-800-MEDICARE (8-956.145.6606) TTY users should call 5-929.741.6074. [employer] Announcement We are excited to announce that we are making your provider's discharge notes available to you in [employer] You will see these notes when they are completed and signed by the physician that discharged you from your recent hospital stay. If you have any questions or concerns about any information you see in [employer] please call the Health Information Department where you were seen or reach out to your Primary Care Provider for more information about your plan of care. Introducing [facility]! Debbora Form introduces [employer] patient portal. Now you can access parts of your medical record, email your doctor's office, and request medication refills online. 1. In your internet browser, go to [email or web address] 2. Click on the First Time User? Click Here link in the Sign In box. You will see the New Member Sign Up page. 3. Enter your Wow[employer]uff Access Code exactly as it appears below. You will not need to use this code after youve completed the sign-up process. If you do not sign up before the expiration date, you must request a new code. · [employer] Access Code: [email or web address] Expires: 3/26/2018 10:23 PM 
 
4. Enter the last four digits of your Social Security Number (xxxx) and Date of Birth (mm/dd/yyyy) as indicated and click Submit. You will be taken to the next sign-up page. 5. Create a [employer]t ID. This will be your Wow[employer] Stuff login ID and cannot be changed, so think of one that is secure and easy to remember. 6. Create a [employer] password. You can change your password at any time. 7. Enter your Password Reset Question and Answer. This can be used at a later time if you forget your password. 8. Enter your e-mail address. You will receive e-mail notification when new information is available in [address] 9. Click Sign Up. You can now view and download portions of your medical record. 10. Click the Download Summary menu link to download a portable copy of your medical information. If you have questions, please visit the Frequently Asked Questions section of the [employer] website. Remember, [employer] is NOT to be used for urgent needs. For medical emergencies, dial 911. Now available from your iPhone and Android! Providers Seen During Your Hospitalization Provider Specialty Primary office phone Erik Avendaño MD Emergency Medicine [phone] Cara Garcia MD Hospitalist [phone] Celeste De MD Internal Medicine [phone] Your Primary Care Physician (PCP) Primary Care Physician Office Phone Office Fax Tere Carlson [phone] You are allergic to the following Allergen Reactions Nka (No Known Allergies) Other (comments) Recent Documentation Height Weight BMI Smoking Status 1.753 m 100.2 kg 32.61 kg/m2 Never Smoker Emergency Contacts Name Discharge Info Relation Home Work Mobile Lazarus,Winsome DISCHARGE CAREGIVER [3] Spouse [3] [phone] Denis Alexander  Daughter [21] [phone] Lazarus,Cary YES [1] Son [22] [phone] Melania Duel [1] Son [22] [phone] Patient Belongings The following personal items are in your possession at time of discharge: 
  Dental Appliances: Uppers, With patient  Visual Aid: None      Home Medications: None   Jewelry: Watch  Clothing: At bedside, [place], Footwear, Jacket/Coat, Pants, Shirt    Other Valuables: None Please provide this summary of care documentation to your next provider. Signatures-by signing, you are acknowledging that this After Visit Summary has been reviewed with you and you have received a copy. Patient Signature:  ____________________________________________________________ Date:  ____________________________________________________________  
  
[facility] Provider Signature:  ____________________________________________________________ Date:  ____________________________________________________________

## 2018-01-28 PROBLEM — R77.8 ELEVATED TROPONIN: Status: ACTIVE | Noted: 2018-01-28

## 2018-01-28 LAB
APTT PPP: 29.5 SEC (ref 23–36.4)
APTT PPP: 42.6 SEC (ref 23–36.4)
ATRIAL RATE: 89 BPM
BASOPHILS # BLD: 0 K/UL (ref 0–0.06)
BASOPHILS NFR BLD: 1 % (ref 0–2)
CALCULATED P AXIS, ECG09: 44 DEGREES
CALCULATED R AXIS, ECG10: 21 DEGREES
CALCULATED T AXIS, ECG11: 70 DEGREES
CK MB CFR SERPL CALC: 5.6 % (ref 0–4)
CK MB SERPL-MCNC: 2.8 NG/ML (ref 5–25)
CK SERPL-CCNC: 50 U/L (ref 39–308)
DIAGNOSIS, 93000: NORMAL
DIFFERENTIAL METHOD BLD: ABNORMAL
EOSINOPHIL # BLD: 0.3 K/UL (ref 0–0.4)
EOSINOPHIL NFR BLD: 4 % (ref 0–5)
ERYTHROCYTE [DISTWIDTH] IN BLOOD BY AUTOMATED COUNT: 15.7 % (ref 11.6–14.5)
GLUCOSE BLD STRIP.AUTO-MCNC: 114 MG/DL (ref 70–110)
GLUCOSE BLD STRIP.AUTO-MCNC: 179 MG/DL (ref 70–110)
GLUCOSE BLD STRIP.AUTO-MCNC: 191 MG/DL (ref 70–110)
GLUCOSE BLD STRIP.AUTO-MCNC: 199 MG/DL (ref 70–110)
GLUCOSE BLD STRIP.AUTO-MCNC: 99 MG/DL (ref 70–110)
HCT VFR BLD AUTO: 35.8 % (ref 36–48)
HGB BLD-MCNC: 11.1 G/DL (ref 13–16)
LYMPHOCYTES # BLD: 2.4 K/UL (ref 0.9–3.6)
LYMPHOCYTES NFR BLD: 33 % (ref 21–52)
MCH RBC QN AUTO: 28 PG (ref 24–34)
MCHC RBC AUTO-ENTMCNC: 31 G/DL (ref 31–37)
MCV RBC AUTO: 90.2 FL (ref 74–97)
MONOCYTES # BLD: 0.6 K/UL (ref 0.05–1.2)
MONOCYTES NFR BLD: 8 % (ref 3–10)
NEUTS SEG # BLD: 3.9 K/UL (ref 1.8–8)
NEUTS SEG NFR BLD: 54 % (ref 40–73)
P-R INTERVAL, ECG05: 150 MS
PLATELET # BLD AUTO: 273 K/UL (ref 135–420)
PMV BLD AUTO: 10.3 FL (ref 9.2–11.8)
Q-T INTERVAL, ECG07: 394 MS
QRS DURATION, ECG06: 90 MS
QTC CALCULATION (BEZET), ECG08: 479 MS
RBC # BLD AUTO: 3.97 M/UL (ref 4.7–5.5)
TROPONIN I SERPL-MCNC: 0.35 NG/ML (ref 0–0.04)
VENTRICULAR RATE, ECG03: 89 BPM
WBC # BLD AUTO: 7.2 K/UL (ref 4.6–13.2)

## 2018-01-28 PROCEDURE — 74011636637 HC RX REV CODE- 636/637: Performed by: INTERNAL MEDICINE

## 2018-01-28 PROCEDURE — 74011250637 HC RX REV CODE- 250/637: Performed by: INTERNAL MEDICINE

## 2018-01-28 PROCEDURE — 85730 THROMBOPLASTIN TIME PARTIAL: CPT | Performed by: INTERNAL MEDICINE

## 2018-01-28 PROCEDURE — 74011250636 HC RX REV CODE- 250/636

## 2018-01-28 PROCEDURE — 82962 GLUCOSE BLOOD TEST: CPT

## 2018-01-28 PROCEDURE — 74011250636 HC RX REV CODE- 250/636: Performed by: INTERNAL MEDICINE

## 2018-01-28 PROCEDURE — 36415 COLL VENOUS BLD VENIPUNCTURE: CPT | Performed by: INTERNAL MEDICINE

## 2018-01-28 PROCEDURE — 65660000000 HC RM CCU STEPDOWN

## 2018-01-28 PROCEDURE — 82550 ASSAY OF CK (CPK): CPT | Performed by: INTERNAL MEDICINE

## 2018-01-28 PROCEDURE — 85025 COMPLETE CBC W/AUTO DIFF WBC: CPT | Performed by: INTERNAL MEDICINE

## 2018-01-28 RX ORDER — HEPARIN SODIUM 5000 [USP'U]/ML
5000 INJECTION, SOLUTION INTRAVENOUS; SUBCUTANEOUS EVERY 8 HOURS
Status: DISCONTINUED | OUTPATIENT
Start: 2018-01-28 | End: 2018-01-28

## 2018-01-28 RX ORDER — INSULIN LISPRO 100 [IU]/ML
20 INJECTION, SOLUTION INTRAVENOUS; SUBCUTANEOUS 3 TIMES DAILY
Status: DISCONTINUED | OUTPATIENT
Start: 2018-01-28 | End: 2018-01-28

## 2018-01-28 RX ORDER — HYDROCODONE BITARTRATE AND ACETAMINOPHEN 5; 325 MG/1; MG/1
1 TABLET ORAL
Status: DISCONTINUED | OUTPATIENT
Start: 2018-01-28 | End: 2018-01-29 | Stop reason: HOSPADM

## 2018-01-28 RX ORDER — SIMVASTATIN 40 MG/1
40 TABLET, FILM COATED ORAL
Status: DISCONTINUED | OUTPATIENT
Start: 2018-01-28 | End: 2018-01-28

## 2018-01-28 RX ORDER — AMLODIPINE BESYLATE 5 MG/1
2.5 TABLET ORAL DAILY
Status: DISCONTINUED | OUTPATIENT
Start: 2018-01-29 | End: 2018-01-29 | Stop reason: HOSPADM

## 2018-01-28 RX ORDER — GUAIFENESIN 100 MG/5ML
81 LIQUID (ML) ORAL DAILY
Status: DISCONTINUED | OUTPATIENT
Start: 2018-01-28 | End: 2018-01-29 | Stop reason: HOSPADM

## 2018-01-28 RX ORDER — AMOXICILLIN 250 MG
1 CAPSULE ORAL DAILY
Status: DISCONTINUED | OUTPATIENT
Start: 2018-01-28 | End: 2018-01-29 | Stop reason: HOSPADM

## 2018-01-28 RX ORDER — TAMSULOSIN HYDROCHLORIDE 0.4 MG/1
0.4 CAPSULE ORAL DAILY
Status: DISCONTINUED | OUTPATIENT
Start: 2018-01-28 | End: 2018-01-29 | Stop reason: HOSPADM

## 2018-01-28 RX ORDER — DEXTROSE 50 % IN WATER (D50W) INTRAVENOUS SYRINGE
25-50 AS NEEDED
Status: DISCONTINUED | OUTPATIENT
Start: 2018-01-28 | End: 2018-01-29 | Stop reason: HOSPADM

## 2018-01-28 RX ORDER — INSULIN GLARGINE 100 [IU]/ML
20 INJECTION, SOLUTION SUBCUTANEOUS EVERY 12 HOURS
Status: DISCONTINUED | OUTPATIENT
Start: 2018-01-28 | End: 2018-01-29 | Stop reason: HOSPADM

## 2018-01-28 RX ORDER — ALLOPURINOL 100 MG/1
100 TABLET ORAL DAILY
Status: DISCONTINUED | OUTPATIENT
Start: 2018-01-28 | End: 2018-01-29 | Stop reason: HOSPADM

## 2018-01-28 RX ORDER — FENOFIBRATE 48 MG/1
48 TABLET, COATED ORAL DAILY
Status: DISCONTINUED | OUTPATIENT
Start: 2018-01-28 | End: 2018-01-29 | Stop reason: HOSPADM

## 2018-01-28 RX ORDER — METOPROLOL TARTRATE 50 MG/1
100 TABLET ORAL 2 TIMES DAILY
Status: DISCONTINUED | OUTPATIENT
Start: 2018-01-28 | End: 2018-01-29 | Stop reason: HOSPADM

## 2018-01-28 RX ORDER — ROSUVASTATIN CALCIUM 20 MG/1
40 TABLET, COATED ORAL
Status: DISCONTINUED | OUTPATIENT
Start: 2018-01-28 | End: 2018-01-29 | Stop reason: HOSPADM

## 2018-01-28 RX ORDER — ALBUTEROL SULFATE 90 UG/1
2 AEROSOL, METERED RESPIRATORY (INHALATION)
Status: DISCONTINUED | OUTPATIENT
Start: 2018-01-28 | End: 2018-01-28 | Stop reason: CLARIF

## 2018-01-28 RX ORDER — HEPARIN SODIUM 10000 [USP'U]/100ML
10 INJECTION, SOLUTION INTRAVENOUS
Status: DISCONTINUED | OUTPATIENT
Start: 2018-01-28 | End: 2018-01-29

## 2018-01-28 RX ORDER — HEPARIN SODIUM 1000 [USP'U]/ML
INJECTION, SOLUTION INTRAVENOUS; SUBCUTANEOUS
Status: DISPENSED
Start: 2018-01-28 | End: 2018-01-29

## 2018-01-28 RX ORDER — HEPARIN SODIUM 1000 [USP'U]/ML
INJECTION, SOLUTION INTRAVENOUS; SUBCUTANEOUS
Status: COMPLETED
Start: 2018-01-28 | End: 2018-01-28

## 2018-01-28 RX ORDER — MAGNESIUM SULFATE 100 %
4 CRYSTALS MISCELLANEOUS AS NEEDED
Status: DISCONTINUED | OUTPATIENT
Start: 2018-01-28 | End: 2018-01-29 | Stop reason: HOSPADM

## 2018-01-28 RX ORDER — ISOSORBIDE MONONITRATE 30 MG/1
30 TABLET, EXTENDED RELEASE ORAL DAILY
Status: DISCONTINUED | OUTPATIENT
Start: 2018-01-28 | End: 2018-01-29 | Stop reason: HOSPADM

## 2018-01-28 RX ORDER — LOSARTAN POTASSIUM 50 MG/1
50 TABLET ORAL DAILY
Status: DISCONTINUED | OUTPATIENT
Start: 2018-01-28 | End: 2018-01-29 | Stop reason: HOSPADM

## 2018-01-28 RX ORDER — ALBUTEROL SULFATE 0.83 MG/ML
2.5 SOLUTION RESPIRATORY (INHALATION)
Status: DISCONTINUED | OUTPATIENT
Start: 2018-01-28 | End: 2018-01-29 | Stop reason: HOSPADM

## 2018-01-28 RX ORDER — AMLODIPINE BESYLATE 5 MG/1
5 TABLET ORAL DAILY
Status: DISCONTINUED | OUTPATIENT
Start: 2018-01-28 | End: 2018-01-28

## 2018-01-28 RX ORDER — INSULIN LISPRO 100 [IU]/ML
INJECTION, SOLUTION INTRAVENOUS; SUBCUTANEOUS
Status: DISCONTINUED | OUTPATIENT
Start: 2018-01-28 | End: 2018-01-29 | Stop reason: HOSPADM

## 2018-01-28 RX ORDER — CLOPIDOGREL BISULFATE 75 MG/1
75 TABLET ORAL DAILY
Status: DISCONTINUED | OUTPATIENT
Start: 2018-01-28 | End: 2018-01-29 | Stop reason: HOSPADM

## 2018-01-28 RX ADMIN — INSULIN GLARGINE 20 UNITS: 100 INJECTION, SOLUTION SUBCUTANEOUS at 02:01

## 2018-01-28 RX ADMIN — METOPROLOL TARTRATE 100 MG: 50 TABLET ORAL at 08:41

## 2018-01-28 RX ADMIN — HEPARIN SODIUM 5000 UNITS: 5000 INJECTION, SOLUTION INTRAVENOUS; SUBCUTANEOUS at 08:45

## 2018-01-28 RX ADMIN — INSULIN GLARGINE 20 UNITS: 100 INJECTION, SOLUTION SUBCUTANEOUS at 21:00

## 2018-01-28 RX ADMIN — HEPARIN SODIUM 5000 UNITS: 5000 INJECTION, SOLUTION INTRAVENOUS; SUBCUTANEOUS at 02:01

## 2018-01-28 RX ADMIN — AMLODIPINE BESYLATE 5 MG: 5 TABLET ORAL at 08:40

## 2018-01-28 RX ADMIN — ROSUVASTATIN CALCIUM 40 MG: 20 TABLET ORAL at 23:17

## 2018-01-28 RX ADMIN — SIMVASTATIN 40 MG: 40 TABLET, FILM COATED ORAL at 02:01

## 2018-01-28 RX ADMIN — HEPARIN SODIUM AND DEXTROSE 1000 UNITS/HR: 10000; 5 INJECTION INTRAVENOUS at 16:14

## 2018-01-28 RX ADMIN — CLOPIDOGREL BISULFATE 75 MG: 75 TABLET ORAL at 08:41

## 2018-01-28 RX ADMIN — LOSARTAN POTASSIUM 50 MG: 50 TABLET ORAL at 08:40

## 2018-01-28 RX ADMIN — STANDARDIZED SENNA CONCENTRATE AND DOCUSATE SODIUM 1 TABLET: 8.6; 5 TABLET, FILM COATED ORAL at 08:40

## 2018-01-28 RX ADMIN — FENOFIBRATE 48 MG: 48 TABLET, FILM COATED ORAL at 08:40

## 2018-01-28 RX ADMIN — TAMSULOSIN HYDROCHLORIDE 0.4 MG: 0.4 CAPSULE ORAL at 08:41

## 2018-01-28 RX ADMIN — ALLOPURINOL 100 MG: 100 TABLET ORAL at 08:41

## 2018-01-28 RX ADMIN — ASPIRIN 81 MG 81 MG: 81 TABLET ORAL at 08:40

## 2018-01-28 RX ADMIN — METOPROLOL TARTRATE 100 MG: 50 TABLET ORAL at 17:47

## 2018-01-28 RX ADMIN — INSULIN GLARGINE 20 UNITS: 100 INJECTION, SOLUTION SUBCUTANEOUS at 08:13

## 2018-01-28 RX ADMIN — ISOSORBIDE MONONITRATE 30 MG: 30 TABLET, EXTENDED RELEASE ORAL at 08:40

## 2018-01-28 RX ADMIN — HEPARIN SODIUM: 1000 INJECTION, SOLUTION INTRAVENOUS; SUBCUTANEOUS at 22:43

## 2018-01-28 NOTE — ED TRIAGE NOTES
Pt complaining of of chest pain since approx 1915. Pt took 1 Nitro at home and reported relief. Pt states he also has a hx of kidney failure with dialysis, and acid reflux. Pt states it feels like pressure on his chest with some burning that he thinks can also be his reflux. Pt had dialysis today.   Fistula left arm and port to right chest.

## 2018-01-28 NOTE — ED NOTES
Pt resting on stretcher with family at bedside. No acute distress noted and vital signs stable. Will continue to monitor pt and await further orders and bed assignment.

## 2018-01-28 NOTE — ROUTINE PROCESS
Bedside and Verbal shift change report given to Nicole Hand (oncoming nurse) by Damon Hutchison (offgoing nurse). Report included the following information SBAR, Kardex, MAR and Recent Results. SITUATION:  Code Status: Full Code  Reason for Admission: Chest pain  Elevated troponin  ESRD (end stage renal disease) [facility])  Hospital day: 0  Problem List:       Hospital Problems  Date Reviewed: 1/19/2018          Codes Class Noted POA    Elevated troponin ICD-10-CM: R74.8  ICD-9-CM: 790.6  1/28/2018 Unknown        ESRD (end stage renal disease) ([facility] [address] ICD-10-CM: N18.6  ICD-9-CM: 585.6  11/8/2017 Unknown        Chest pain ICD-10-CM: R07.9  ICD-9-CM: 786.50  10/27/2017 Unknown              BACKGROUND:   Past Medical History:   Past Medical History:   Diagnosis Date    Atherosclerosis of renal artery (HCC)     S/P Rt stent    CAD (coronary artery disease) , 1997, 2012    ,double bypass, 2 stents, 2stents 7/2016    Cancer ([facility] [address]     prostate    CHF (congestive heart failure) (HCC)     Chronic diastolic heart failure (HCC)     Chronic kidney disease     Chronic kidney disease (CKD), stage IV (severe) (HCC)     On Dialysis now -T-Th-sat    Constipation     Coronary atherosclerosis of unspecified type of vessel, native or graft     Stable angina after recent PCI continue treatment.        CRI (chronic renal insufficiency)     Diabetes ([address] 1973    type 2    Essential hypertension, benign     GERD (gastroesophageal reflux disease)     Gout     Hypertension 1982    Morbid obesity ([facility] [address]     Weight loss has been strongly encouraged by following dietary restrictions and an exercise routine    APRYL (obstructive sleep apnea) 6/26/2015    no cpap    Other and unspecified hyperlipidemia     HDL's are not at goal, LDL's are at goal, triglycerides are not at goal.    Other and unspecified hyperlipidemia     HDL's are not at goal, LDL's are at goal, triglycerides are not at goal.     Other ill-defined conditions(799.89) 1960    pneumonia twice    Sleep disturbance     Type II or unspecified type diabetes mellitus without mention of complication, not stated as uncontrolled       Patient taking anticoagulants yes    Patient has a defibrillator: no    History of shots NO for example, flu, pneumonia, tetanus   Isolation History NO for example, MRSA, CDiff    ASSESSMENT:  Changes in Assessment Throughout Shift: no chest pain  Significant Changes in 24 hours (for example, RR/code, fall)  Patient has Central Line: yes Reasons if yes: Dialysis  Patient has Xiao Cath: no Reasons if yes:     Mobility Issues  PT  IV Patency  OR Checklist  Pending Tests    Last Vitals:  Vitals w/ MEWS Score (last day)     Date/Time MEWS Score Pulse Resp Temp BP Level of Consciousness SpO2    01/28/18 0425 1 65 18 98.8 °F (37.1 °C) 130/57 Alert 97 %    01/28/18 0132 1 69 16 98.9 °F (37.2 °C) 142/58 Alert 97 %    01/28/18 0100 -- 65 16 -- 109/49 -- 97 %    01/28/18 0015 -- 67 21 -- -- -- --    01/28/18 0000 -- 67 18 -- -- -- 96 %    01/27/18 2345 -- 71 25 -- -- -- 99 %    01/27/18 2330 -- 68 17 -- -- -- 98 %    01/27/18 2315 -- 69 22 -- 110/42 -- 98 %    01/27/18 2300 -- 68 21 -- 107/41 -- 100 %    01/27/18 2245 -- 69 18 -- 116/60 -- 98 %    01/27/18 2215 -- 69 19 -- 126/89 -- --    01/27/18 2200 -- 71 19 -- 123/69 -- 99 %    01/27/18 2145 -- 74 20 -- 147/60 -- 99 %    01/27/18 2130 -- 72 19 -- -- -- 97 %    01/27/18 2115 -- 73 21 -- -- -- 98 %    01/27/18 2100 -- 74 21 -- -- -- 100 %    01/27/18 2030 -- 78 23 -- -- -- 97 %    01/27/18 2015 -- 79 18 -- 162/48 -- 99 %    01/27/18 2000 -- 85 19 -- 169/58 -- 99 %    01/27/18 1957 -- 86 11 -- 168/83 -- 100 %    01/27/18 1941 -- 89 -- 98.5 °F (36.9 °C) 189/69 Alert 98 %            PAIN    Pain Assessment    Pain Intensity 1: 10 (01/27/18 1941)    Pain Location 1: Chest         Patient Stated Pain Goal: 0  Intervention effective: no  Time of last intervention:  Reassessment Completed: no   Other actions taken for pain:     Last 3 Weights:  Last 3 Recorded Weights in this Encounter    01/27/18 1941 01/28/18 0425   Weight: 108.9 kg (240 lb) 100.7 kg (222 lb 1.6 oz)   Weight change:     INTAKE/OUPUT    Current Shift:      Last three shifts:      RECOMMENDATIONS AND DISCHARGE PLANNING  Patient needs and requests: Coffee    Pending tests/procedures: none     Discharge plan for patient: home    Discharge planning Needs or Barriers:     Estimated Discharge Date: TBD Posted on Whiteboard in Patients Room: no       \"HEALS\" SAFETY CHECK  A safety check occurred in the patient's room between off going nurse and oncoming nurse listed above. The safety check included the below items:    H  High Alert Medications Verify all high alert medication drips (heparin, PCA, etc.)  E  Equipment Suction is set up for ALL patients (with ely)  Red plugs utilized for all equipment (IV pumps, etc.)  WOWs wiped down at end of shift. Room stocked with oxygen, suction, and other unit-specific supplies  A  Alarms Bed alarm is set for fall risk patients  Ensure chair alarm is in place and activated if patient is up in a chair  L  Lines Check IV for any infiltration  Xiao bag is empty if patient has a Xiao   Tubing and IV bags are labeled  S  Safety  Room is clean, patient is clean, and equipment is clean. Hallways are clear from equipment besides carts. Fall bracelet on for fall risk patients  Ensure room is clear and free of clutter  Suction is set up for ALL patients (with ely)  Hallways are clear from equipment besides carts.    Isolation precautions followed, supplies available outside room, sign posted    [place]

## 2018-01-28 NOTE — ED NOTES
Pt resting on stretcher with family at bedside. No acute distress noted and vital signs stable. Will continue to monitor and await orders.

## 2018-01-28 NOTE — CONSULTS
[address]  MR#: [id number]  : 1940  ACCOUNT #: [de-identified]   DATE OF SERVICE: 2018    CARDIOLOGY CONSULTATION    REASON FOR CONSULTATION: Chest pain. Requested by emergency room and hospitalist, Dr. Amy Rodney. HISTORY OF PRESENT ILLNESS:  This is a 80-year-old -American male who is well known to our service and follows with Dr. Ross Godinez of our practice, came to the emergency room last evening after an episode of chest pain which happened while he was sitting. It was retrosternal without any radiation, associated with sweating. There was no shortness of breath or nausea noted. He took a nitroglycerin which eased it and it was completely relieved in about 20 minutes or so. He was admitted to rule out an MI and his enzymes have up trended since admission. Patient had been doing fairly good. His wife notes that he was missing Norvasc for the last 8 to 10 days approximately, though his blood pressure was stable and he did not have any other chest pains. Patient has significant dyspnea on exertion and is an NYHA class 3. He gets short of breath going from his bedroom to the kitchen area. He has a history of significant coronary artery disease, old CABG, and recent stent in the SVG in  and again in 2016. The saphenous venous graft to the first diagonal artery, the procedure had to be repeated in November again. It was a challenging procedure technically as well. PAST MEDICAL HISTORY: Past history is as above, also includes chronic diastolic heart failure, previous bypass in , hyperlipidemia, end-stage renal disease on dialysis, which was started in September or October approximately in 2017, diabetes. Also has a history of prostate cancer, GERD, sleep apnea, hyperlipidemia. REVIEW OF SYSTEMS:  No definite strokes or seizures.   Does have a history of GI bleed and had a recent procedure about a week ago by Dr. Bart Aragon when he had an endoscopic ablation for a few lesions for rectal bleeding due to previous proctitis from previous surgery. REVIEW OF SYSTEMS:  No recent fever, cough, cold, vomiting, diarrhea, hematuria, dysuria. ALLERGIES:  NONE KNOWN. MEDICATIONS:  In the hospital include: aspirin, Plavix, subcutaneous heparin, which is being changed to IV heparin, Zocor, TriCor, insulin, Imdur, losartan, metoprolol, Lynn-Colace, Flomax. PERSONAL AND SOCIAL HISTORY:  Nonsmoker, denies alcohol or drug abuse. FAMILY HISTORY:  Father had MI at the age of 64. PHYSICAL EXAMINATION:  GENERAL:  Patient is alert, oriented. He is comfortable and without any pain at present. VITAL SIGNS:  Heart rate in 60s, sinus rhythm. Blood pressure 112/62. He is severely obese, weighing 222 pounds. HEENT:  Normocephalic, atraumatic. No definite JVD or bruits in the neck. There is a subcutaneous tunneled dialysis catheter noted on the right neck. No lymph nodes. LUNGS:  No rales or rhonchi. HEART:  S1, S2 regular. No definite murmur, rub or gallop heard. ABDOMEN:  Obese, nontender, no masses. EXTREMITIES:  Trace edema. Distal pulses are faint. No clubbing or cyanosis. NEUROLOGIC:  No focal abnormalities. PSYCHIATRIC:  Alert and oriented, answers appropriately and has normal affect. LABORATORIES:  Hemoglobin 11.4. Platelets 791. Chemistry with up trending troponin I's. On admission it was 0.09, which has gone up to 0.35 this morning. K 4.0, BUN 26, creatinine 3.9, magnesium 2.0. X-ray chest without any acute cardiopulmonary process. A 12-lead EKG sinus rhythm, ST-T changes in anterolateral leads, possibly due to ischemia which is seen intermittently in the previous EKGs as well. The last cardiac catheterization 11/10/2017, when he had redo stents in the SVG to the diagonal artery.     IMPRESSION: Acute non-ST elevation myocardial infarction, recurrent in a patient with multiple risk factors and extensive underlying disease. Not much further intervention may be possible, but cardiac catheterization can be considered to check for the graft patency. I will discuss this with Dr. Charmayne Boll who did his previous procedure. I discussed this with the family and the patient and they are in agreement. In the meantime, we will treat him medically with IV heparin, continuing beta blockers. I will reduce Norvasc to 2.5 as his blood pressure is going down to 254 systolic. We will also request Dr. [facility] to reduce his target weight, so hopefully his CHF can be helped. 1.  Congestive heart failure, chronic diastolic NYHA class 3:  Treatment with medications and more aggressive dialysis if possible. 2.  Hyperlipidemia. We will switch Zocor to Crestor to lower the cholesterol further. 3.  Hypertension, which is controlled on present medications. 4.  Diabetes. Treatment per medicine. 5.  Severe obesity. Patient understands the importance of losing weight, but has not been able to do so. 6.  Remote CABG in 1997.  7.  End-stage renal disease, on hemodialysis. 8.  Diabetes, which is being responsible for his extensive coronary artery disease in addition to other risk factors. PLAN: As above. Continue medications. Possible cardiac cath. Prognosis is relatively poor in this patient with recurrent non-STEMI's and underlying severe coronary artery disease.       MD LUCY Braun / RN  D: 01/28/2018 13:05     T: 01/28/2018 14:53  JOB #: [id number]

## 2018-01-28 NOTE — CONSULTS
[address] [facility]    Britton Clay  MR#: [id number]  : 1940  ACCOUNT #: [de-identified]   DATE OF SERVICE: 2018    RENAL CONSULTATION     REQUESTING PHYSICIAN:  Dr. Compa Bell, hospitalist service. REASON FOR CONSULTATION:  Dialysis patient admitted with chest pain. HISTORY OF PRESENT ILLNESS:  This is a 66-year-old  male who was brought to the hospital by the family members, and this patient started having chest pain last night. Pain was 10/10 in the chest area and had taken 1 nitroglycerin tablet without any improvement, then he came to this hospital.  The patient lives in [facility], but they prefer this hospital.  For that reason, they came here. He has long-term history of hypertension, type 2 diabetes mellitus, he has gastroesophageal reflux disease, prostate cancer, being treated with radiation and subsequently complicated with radiation proctitis causing intermittent hematochezia and rectal bleeding, recently was cauterized by the gastroenterologist.  During that time, his Plavix was on hold, but after the surgery, he restarted his Plavix. He has significant cardiac issues with a known history of CABG x2, as well as several stents in the past, including the last stent sometime last year. He has been on  dialysis for the last 3-4 months and is getting dialysis through a catheter. During this time, AV fistula was done on the left arm and this matured well and Vascular Surgery has cleared it to start using. He goes to dialysis every Tuesday, Thursday and Saturday at [facility] in [place]. Last time I saw him during dialysis was yesterday and he was mildly short of breath and with oxygen with dialysis, shortness of breath improved. He was also found to be hypoglycemic as he had taken insulin without eating anything in the morning. Subsequently,   with the sweet candies and he was doing fine.   He did not have any complaint of any chest pain or any cardiac issue during dialysis yesterday. PAST MEDICAL HISTORY:  As I mentioned, and also beside the list of problems I mentioned, he also has hyperlipidemia and sleep disturbances. PAST SURGICAL HISTORY:  CABG, cardiac catheterization several times, AV fistula, dialysis catheter, flexible sigmoidoscopy with radiofrequency ablation endoscopically. Also had renal artery stenosis being treated with stenting in the past.    SOCIAL HISTORY:  , lives with the family. Never smoked, no drugs or alcohol-related problems. FAMILY HISTORY:  Significant for heart attack in father. ALLERGIES:  NO KNOWN DRUG ALLERGIES. MEDICATIONS:  List of medications are as follows:  Vitamin D3 50,000-unit capsule every 7 days, Norco 5/325 mg tablet 1-2 tablets every 4-6 hours p.r.n. for pain, Proventil inhaler 2 puffs every 6 hours, allopurinol 100 mg daily, Norvasc 5 mg p.o. daily, aspirin chewable 81 mg p.o. daily, Plavix 75 mg p.o. daily, Nephrocaps once a day, fenofibrate 54 mg tablet 1 tablet by mouth daily, losartan 50 mg p.o. daily, isosorbide mononitrate 30 mg p.o. daily, Lantus 20 units subcu every 12 hours, metoprolol 100 mg p.o. twice daily, nitroglycerin 0.4 mg sublingually, Zocor 40 mg p.o. daily, Flomax 0.4 mg p.o. daily. Besides that, he also gets Epogen or erythropoietin as well as Hectorol during dialysis, and depending on his iron level, he also receives Venofer parenterally. REVIEW OF SYSTEMS   GENERAL:  Well-built, well-nourished gentleman without any acute distress now, but admitted with chest pain. CARDIOVASCULAR:  Chest pain-free now, but on admission had chest pain 10/10, being treated with nitroglycerin tablet, but no palpitation and no shortness of breath. RESPIRATORY:  No cough, no wheezing, no calf muscle tenderness. GASTROINTESTINAL:  No nausea, no vomiting. Good appetite. GENITOURINARY:  No urgency, no hesitancy, no flank pain. Still he makes some urine. No hematuria. No dysuria. MUSCULOSKELETAL:  No muscle pain or any weakness in the body. NEUROLOGIC:  No headache, no seizures, no dizziness or vertigo. PSYCHIATRIC:  Negative for mood changes. Mildly anxious looking. No evidence of any depression. PHYSICAL EXAMINATION   GENERAL:  Alert, awake, oriented x3. VITAL SIGNS:  Temperature 98.9, heart rate 69 per minute, blood pressure 142/58 on admission. HEENT:  Oral mucosa moist.  NECK:  Jugular venous pressure not distended. LUNGS:  Good air entry on both sides. HEART:  S1, S2, without any gallop or murmur. ABDOMEN:  Obese. Could not appreciate any organomegaly. EXTREMITIES:  Ankles, negative edema. No calf muscle tenderness. Hemodialysis catheter on the right IJ, and has excellent thrill and bruit on his left forearm AV fistula. LABORATORY DATA:  Last echocardiogram was done in October of last year and he has known diastolic dysfunction with previous ejection fraction of 60% to 65%. No obvious wall motion abnormalities noted during that echo. Aortic valve, there was mild stenosis. .    Relevant labs that have been done since admission are as follows: On 01/27/2018, sodium 135, potassium WNL CO2 30,  creatinine 3.94, magnesium 2.0. CPK 54, MB index of 4.3, MB fraction 2.3. Troponin was 0.09, subsequently increased up to 0.17, and the last troponin is 0.35 this morning around 9:45. Cardiology has seen and planning to start on heparin drip. Chest x-ray done on 01/27/2018, stable examination without any acute pulmonary process. No signs of any volume overload. EKG that was done on admission, normal sinus rhythm, nonspecific ST-T-wave changes. Prolonged QT interval.     IMPRESSION AND PLAN   1. Acute non-ST elevation myocardial infarction. 2.  Hypertension. 3.  Type 2 diabetes mellitus. 4.  Chest pain. 5.  End-stage renal disease. 6.  Gastroesophageal reflux disease. 7.  Obesity.     PLAN:  Patient will be monitored on telemetry, Cardiology will be following. They are going to start him on heparin drip also. All other medication should be started. There is no acute need for any dialysis until his scheduled dialysis on 30th of this month, Tuesday. If he is still in the hospital during that time, will dialyze him in the hospital and will use his AV fistula with one needle. In the meanwhile, follow him closely and depending on his phosphorus level, will decide about the phosphorus binder or not. Also will check PTH and will decide about the Hectorol dose. Further recommendations will be given based on the hospital course.       Kierra Tolentino MD       [facility] [place] [facility] [place]  D: 01/28/2018 13:34     T: 01/28/2018 15:15  JOB #: [id number]

## 2018-01-28 NOTE — PROGRESS NOTES
All events noted, now  Chest pain free, cardiology will start Heparin drip, was dialyzed yesterday, will dialyze on 01/30 /18  & will start using AVF 1st time if still he is here. Ced Dykes

## 2018-01-28 NOTE — ED PROVIDER NOTES
EMERGENCY DEPARTMENT HISTORY AND PHYSICAL EXAM    9:40 PM      Date: 1/27/2018  Patient Name: Elisha Claros    History of Presenting Illness     Chief Complaint   Patient presents with    Chest Pain         History Provided By: Patient and Patient's Daughter    Chief Complaint: Chest Pain  Duration: 3 Hours  Timing:  Acute  Location: Center of chest  Quality: Burning and pressure  Severity: 10 out of 10  Modifying Factors: NTG eliminated pain  Associated Symptoms: diaphoretic      Additional History (Context): Elisha Claros is a 68 y.o. male with prostate cancer, HTN, diabetes, and HLD who presents with acute 10/10 burning and pressure like center chest pain that started 3 hours ago. Pt reports that when the pain began he took 1 dose of NTG and the chest pain stopped. He denied SOB and nausea, but did confirm diaphoresis during the episode. Pt stated this burning pressure pain was \"different\" from his GERD pain. He did experienced a similar chest pain 7 weeks ago while at dialysis and was admitted to the hospital overnight. Pt's daughter reported that pt has had 3 stents placed. The pt is currently not in any pain. No other concerns, modifying factors, or symptoms were expressed by the pt at this time. PCP: Demond Encinas MD    Current Outpatient Prescriptions   Medication Sig Dispense Refill    losartan (COZAAR) 50 mg tablet TAKE ONE TABLET BY MOUTH ONE TIME DAILY  30 Tab 0    tamsulosin (FLOMAX) 0.4 mg capsule TAKE ONE CAPSULE BY MOUTH ONE TIME DAILY 90 Cap 3    isosorbide mononitrate ER (IMDUR) 30 mg tablet Take 1 Tab by mouth daily. 30 Tab 0    amLODIPine (NORVASC) 5 mg tablet Take 1 Tab by mouth daily. 30 Tab 0    HYDROcodone-acetaminophen (NORCO) 5-325 mg per tablet Take 1-2 Tabs by mouth every four (4) hours as needed for Pain. Max Daily Amount: 12 Tabs.  12 Tab 0    albuterol (PROVENTIL HFA, VENTOLIN HFA, PROAIR HFA) 90 mcg/actuation inhaler Take 2 Puffs by inhalation every four (4) hours as needed for Wheezing. 1 Inhaler 0    aspirin 81 mg chewable tablet Take 1 Tab by mouth daily. 30 Tab 0    b complex-vitamin c-folic acid (NEPHROCAPS) 1 mg capsule Take 1 Cap by mouth daily. 30 Cap 0    senna-docusate (PERICOLACE) 8.6-50 mg per tablet Take 1 Tab by mouth daily. 30 Tab 0    fenofibrate (LOFIBRA) 54 mg tablet Take 1 Tab by mouth daily. 30 Tab 5    simvastatin (ZOCOR) 40 mg tablet TAKE 1 TABLET BY MOUTH NIGHTLY. 90 Tab 2    metoprolol tartrate (LOPRESSOR) 100 mg IR tablet TAKE 1 TABLET BY MOUTH TWO TIMES A DAY. 180 Tab 2    clopidogrel (PLAVIX) 75 mg tab Take 1 Tab by mouth daily. 90 Tab 3    Cholecalciferol, Vitamin D3, (DECARA) 50,000 unit cap Take  by mouth every seven (7) days.  insulin glargine (LANTUS) 100 unit/mL injection 20 Units by SubCUTAneous route every twelve (12) hours. 1 Vial 2    nitroglycerin (NITROSTAT) 0.4 mg SL tablet 1 Tab by SubLINGual route as needed for Chest Pain. 25 Tab 1    insulin aspart (NOVOLOG) 100 unit/mL injection 20 units sq 3 times a day,sliding scale 10 mL 0    allopurinol (ZYLOPRIM) 100 mg tablet Take 100 mg by mouth daily. Past History     Past Medical History:  Past Medical History:   Diagnosis Date    Atherosclerosis of renal artery (HCC)     S/P Rt stent    CAD (coronary artery disease) , 1997, 2012    ,double bypass, 2 stents, 2stents 7/2016    Cancer (HCC)     prostate    CHF (congestive heart failure) (HCC)     Chronic diastolic heart failure (HCC)     Chronic kidney disease     Chronic kidney disease (CKD), stage IV (severe) (HCC)     On Dialysis now -T-Th-sat    Constipation     Coronary atherosclerosis of unspecified type of vessel, native or graft     Stable angina after recent PCI continue treatment.        CRI (chronic renal insufficiency)     Diabetes ([facility] [address] 1973    type 2    Essential hypertension, benign     GERD (gastroesophageal reflux disease)     Gout     Hypertension 1982    Morbid obesity ([facility] [address] Weight loss has been strongly encouraged by following dietary restrictions and an exercise routine    APRYL (obstructive sleep apnea) 6/26/2015    no cpap    Other and unspecified hyperlipidemia     HDL's are not at goal, LDL's are at goal, triglycerides are not at goal.    Other and unspecified hyperlipidemia     HDL's are not at goal, LDL's are at goal, triglycerides are not at goal.     Other ill-defined conditions(799.89) 1960    pneumonia twice    Sleep disturbance     Type II or unspecified type diabetes mellitus without mention of complication, not stated as uncontrolled        Past Surgical History:  Past Surgical History:   Procedure Laterality Date   [address]    lt. carotid endart. [facility] CARDIAC SURG PROCEDURE UNLIST  2012, 2016, Nov 2017    Coronary Stent    COLONOSCOPY N/A 6/23/2017    COLONOSCOPY w/ APC w/ polypectomies performed by Mary Jo Serrato MD at [address] N/A 1/19/2018    FLEXIBLE SIGMOIDOSCOPY WITH Radio frequency ablation performed by Mary Jo Serrato MD at [address] HX CHOLECYSTECTOMY      HX CORONARY ARTERY BYPASS GRAFT  2000    x2    HX HEENT  1997    cholecystectomy    HX UROLOGICAL  1998    renal art. surg    HX VASCULAR ACCESS      Perma cath for HD- right chest.       Family History:  Family History   Problem Relation Age of Onset    Heart Attack Father 64       Social History:  Social History   Substance Use Topics    Smoking status: Never Smoker    Smokeless tobacco: Never Used    Alcohol use No       Allergies: Allergies   Allergen Reactions    Nka [No Known Allergies] Other (comments)         Review of Systems     Review of Systems   Constitutional: Positive for diaphoresis. Negative for activity change, appetite change and fever. HENT: Negative for congestion, dental problem, ear pain, hearing loss, nosebleeds, postnasal drip, sinus pressure, sneezing and tinnitus.     Eyes: Negative for photophobia, discharge, redness and visual disturbance. Respiratory: Negative for cough, choking, shortness of breath, wheezing and stridor. Cardiovascular: Positive for chest pain (10/10 center of chest burning). Negative for palpitations and leg swelling. Gastrointestinal: Negative for abdominal distention, abdominal pain, anal bleeding and blood in stool. Genitourinary: Negative for decreased urine volume, difficulty urinating, discharge, dysuria, frequency, hematuria, penile swelling, scrotal swelling, testicular pain and urgency. Musculoskeletal: Negative for arthralgias, back pain, gait problem, joint swelling, myalgias and neck pain. Skin: Negative for color change and pallor. Neurological: Negative for dizziness, tremors, seizures, syncope and headaches. Hematological: Negative for adenopathy. Does not bruise/bleed easily. Psychiatric/Behavioral: Negative for agitation, behavioral problems, confusion and hallucinations. The patient is not nervous/anxious. All other systems reviewed and are negative. Physical Exam     Visit Vitals    /48    Pulse 72    Temp 98.5 °F (36.9 °C)    Resp 19    Ht 5' 9\" (1.753 m)    Wt 108.9 kg (240 lb)    SpO2 97%    BMI 35.44 kg/m2     Physical Exam   Constitutional: He is oriented to person, place, and time. He appears well-developed and well-nourished. HENT:   Head: Normocephalic and atraumatic. Right Ear: External ear normal.   Left Ear: External ear normal.   Nose: Nose normal.   Mouth/Throat: Oropharynx is clear and moist.   Eyes: Conjunctivae and EOM are normal. Pupils are equal, round, and reactive to light. Right eye exhibits no discharge. No scleral icterus. Neck: Normal range of motion. Neck supple. No JVD present. No thyromegaly present. Cardiovascular: Normal rate, regular rhythm, normal heart sounds and intact distal pulses. Exam reveals no gallop and no friction rub. No murmur heard.   Dialysis access port on pt's right side of chest.   Pt also has a fistula in his left arm. Both access points are working normally. Pulmonary/Chest: No respiratory distress. He has no wheezes. He has no rales. He exhibits no tenderness. Abdominal: He exhibits no distension and no mass. There is no tenderness. There is no rebound and no guarding. Musculoskeletal: Normal range of motion. He exhibits no edema. Lymphadenopathy:     He has no cervical adenopathy. Neurological: He is alert and oriented to person, place, and time. No cranial nerve deficit. Coordination normal.   Skin: Skin is warm and dry. No rash noted. No erythema. Psychiatric: He has a normal mood and affect. His behavior is normal. Judgment normal.   Nursing note and vitals reviewed. Diagnostic Study Results     Labs -  Recent Results (from the past 12 hour(s))   CBC WITH AUTOMATED DIFF    Collection Time: 01/27/18  9:24 PM   Result Value Ref Range    WBC 9.0 4.6 - 13.2 K/uL    RBC 4.06 (L) 4.70 - 5.50 M/uL    HGB 11.4 (L) 13.0 - 16.0 g/dL    HCT 36.4 36.0 - 48.0 %    MCV 89.7 74.0 - 97.0 FL    MCH 28.1 24.0 - 34.0 PG    MCHC 31.3 31.0 - 37.0 g/dL    RDW 15.7 (H) 11.6 - 14.5 %    PLATELET 928 857 - 459 K/uL    MPV 10.1 9.2 - 11.8 FL    NEUTROPHILS 71 40 - 73 %    LYMPHOCYTES 20 (L) 21 - 52 %    MONOCYTES 7 3 - 10 %    EOSINOPHILS 2 0 - 5 %    BASOPHILS 0 0 - 2 %    ABS. NEUTROPHILS 6.3 1.8 - 8.0 K/UL    ABS. LYMPHOCYTES 1.8 0.9 - 3.6 K/UL    ABS. MONOCYTES 0.6 0.05 - 1.2 K/UL    ABS. EOSINOPHILS 0.2 0.0 - 0.4 K/UL    ABS.  BASOPHILS 0.0 0.0 - 0.06 K/UL    DF AUTOMATED     METABOLIC PANEL, BASIC    Collection Time: 01/27/18  9:24 PM   Result Value Ref Range    Sodium 135 (L) 136 - 145 mmol/L    Potassium 4.0 3.5 - 5.5 mmol/L    Chloride 99 (L) 100 - 108 mmol/L    CO2 30 21 - 32 mmol/L    Anion gap 6 3.0 - 18 mmol/L    Glucose 360 (H) 74 - 99 mg/dL    BUN 26 (H) 7.0 - 18 MG/DL    Creatinine 3.94 (H) 0.6 - 1.3 MG/DL    BUN/Creatinine ratio 7 (L) 12 - 20      GFR est AA 18 (L) >60 ml/min/1.73m2    GFR est non-AA 15 (L) >60 ml/min/1.73m2    Calcium 8.5 8.5 - 10.1 MG/DL   MAGNESIUM    Collection Time: 01/27/18  9:24 PM   Result Value Ref Range    Magnesium 2.0 1.6 - 2.6 mg/dL   PROTHROMBIN TIME + INR    Collection Time: 01/27/18  9:24 PM   Result Value Ref Range    Prothrombin time 12.5 11.5 - 15.2 sec    INR 1.0 0.8 - 1.2     PTT    Collection Time: 01/27/18  9:24 PM   Result Value Ref Range    aPTT 27.6 23.0 - 36.4 SEC   CARDIAC PANEL,(CK, CKMB & TROPONIN)    Collection Time: 01/27/18  9:24 PM   Result Value Ref Range    CK 54 39 - 308 U/L    CK - MB 2.3 <3.6 ng/ml    CK-MB Index 4.3 (H) 0.0 - 4.0 %    Troponin-I, Qt. 0.09 (H) 0.0 - 0.045 NG/ML       Radiologic Studies -   XR CHEST PORT    (Results Pending)   CXR no change      Medical Decision Making   I am the first provider for this patient. I reviewed the vital signs, available nursing notes, past medical history, past surgical history, family history and social history. Provider Notes (Medical Decision Making): Chest pain, differential to include coronary artery disease related,pericardial disease, vascular disease, other possible etiologies. Patient has known CAD with recent stent      Vital Signs-Reviewed the patient's vital signs. EKG: Interpreted by the EP. Time Interpreted: 7:50 PM    Rate: 89 BPM   Rhythm: NSR   Interpretation: No STEMI    Records Reviewed: Nursing Notes and Old Medical Records (Time of Review: 9:40 PM)    ED Course: Progress Notes, Reevaluation, and Consults:  12:11 AM Consult: I discussed care with Dr. Dave Pruett, Hospitalist.  It was a standard discussion including patient history, chief complaint, available diagnostic results, and predicted treatment course. Agreed to admit patient. For Hospitalized Patients:    1. Hospitalization Decision Time:  The decision to hospitalize the patient was made by Dr. Dave Pruett at 12:11 AM   on 1/27/2018    2.  Aspirin: Aspirin was not given because the patient did not present with a stroke at the time of their Emergency Department evaluation    Diagnosis     Clinical Impression: No diagnosis found. Disposition: admission    Follow-up Information     None           Patient's Medications   Start Taking    No medications on file   Continue Taking    ALBUTEROL (PROVENTIL HFA, VENTOLIN HFA, PROAIR HFA) 90 MCG/ACTUATION INHALER    Take 2 Puffs by inhalation every four (4) hours as needed for Wheezing. ALLOPURINOL (ZYLOPRIM) 100 MG TABLET    Take 100 mg by mouth daily. AMLODIPINE (NORVASC) 5 MG TABLET    Take 1 Tab by mouth daily. ASPIRIN 81 MG CHEWABLE TABLET    Take 1 Tab by mouth daily. B COMPLEX-VITAMIN C-FOLIC ACID (NEPHROCAPS) 1 MG CAPSULE    Take 1 Cap by mouth daily. CHOLECALCIFEROL, VITAMIN D3, (DECARA) 50,000 UNIT CAP    Take  by mouth every seven (7) days. CLOPIDOGREL (PLAVIX) 75 MG TAB    Take 1 Tab by mouth daily. FENOFIBRATE (LOFIBRA) 54 MG TABLET    Take 1 Tab by mouth daily. HYDROCODONE-ACETAMINOPHEN (NORCO) 5-325 MG PER TABLET    Take 1-2 Tabs by mouth every four (4) hours as needed for Pain. Max Daily Amount: 12 Tabs. INSULIN ASPART (NOVOLOG) 100 UNIT/ML INJECTION    20 units sq 3 times a day,sliding scale    INSULIN GLARGINE (LANTUS) 100 UNIT/ML INJECTION    20 Units by SubCUTAneous route every twelve (12) hours. ISOSORBIDE MONONITRATE ER (IMDUR) 30 MG TABLET    Take 1 Tab by mouth daily. LOSARTAN (COZAAR) 50 MG TABLET    TAKE ONE TABLET BY MOUTH ONE TIME DAILY     METOPROLOL TARTRATE (LOPRESSOR) 100 MG IR TABLET    TAKE 1 TABLET BY MOUTH TWO TIMES A DAY. NITROGLYCERIN (NITROSTAT) 0.4 MG SL TABLET    1 Tab by SubLINGual route as needed for Chest Pain. SENNA-DOCUSATE (PERICOLACE) 8.6-50 MG PER TABLET    Take 1 Tab by mouth daily. SIMVASTATIN (ZOCOR) 40 MG TABLET    TAKE 1 TABLET BY MOUTH NIGHTLY.     TAMSULOSIN (FLOMAX) 0.4 MG CAPSULE    TAKE ONE CAPSULE BY MOUTH ONE TIME DAILY   These Medications have changed    No medications on file   Stop Taking    No medications on file     _______________________________    Attestations:  Scribe Attestation     Mirna Fields acting as a scribe for and in the presence of José Freeman MD      January 27, 2018 at 9:40 PM       Provider Attestation:      I personally performed the services described in the documentation, reviewed the documentation, as recorded by the scribe in my presence, and it accurately and completely records my words and actions.  January 27, 2018 at 9:40 PM - José Freeman MD    _______________________________

## 2018-01-28 NOTE — ED NOTES
TRANSFER - OUT REPORT:    Verbal report given to Vernon Bauer RN(name) on Leander Fair  being transferred to (unit) for routine progression of care       Report consisted of patients Situation, Background, Assessment and   Recommendations(SBAR). Information from the following report(s) SBAR, ED Summary and Recent Results was reviewed with the receiving nurse. Lines:       Opportunity for questions and clarification was provided.       Patient transported with:   Monitor  Tech

## 2018-01-28 NOTE — H&P
Hospitalist Admission History and Physical    NAME:  Milan Major   :   1940   MRN:   [id number]     PCP:  Natalie Caceres MD  Date/Time:  2018 1:37 AM      Subjective:       CHIEF COMPLAINT:  Chest pain    HISTORY OF PRESENT ILLNESS:       Milan Major is a 68 y.o.  male  with PMH significant for HTN, T2DM, CHF, GERD, ESKD on HD on TTSat, CAD s/P CABG in  and in , PCI in 2016 who was brought by his wife  To ED for chest pain whoch started around 4:00 pm on Saturday evening which was 4 hrs after he completed his HD. He describes the chest pain as burning and he took NG at home with complete resolution of chest pain. He denies SOB or palpitation . He denies NVD. In ED he was  Found to have elevated troponin level of 0.09 and 0.17 with non ischemic EKG and he was admitted for further  In patient care . Past Medical History:   Diagnosis Date    Atherosclerosis of renal artery (HCC)     S/P Rt stent    CAD (coronary artery disease) , ,     ,double bypass, 2 stents, 2stents 2016    Cancer (HCC)     prostate    CHF (congestive heart failure) (HCC)     Chronic diastolic heart failure (HCC)     Chronic kidney disease     Chronic kidney disease (CKD), stage IV (severe) (HCC)     On Dialysis now -T-Th-sat    Constipation     Coronary atherosclerosis of unspecified type of vessel, native or graft     Stable angina after recent PCI continue treatment.        CRI (chronic renal insufficiency)     Diabetes ([address]     type 2    Essential hypertension, benign     GERD (gastroesophageal reflux disease)     Gout     Hypertension     Morbid obesity ([address]     Weight loss has been strongly encouraged by following dietary restrictions and an exercise routine    APRYL (obstructive sleep apnea) 2015    no cpap    Other and unspecified hyperlipidemia     HDL's are not at goal, LDL's are at goal, triglycerides are not at goal.    Other and unspecified hyperlipidemia     HDL's are not at goal, LDL's are at goal, triglycerides are not at goal.     Other ill-defined conditions(069.72) 1960    pneumonia twice    Sleep disturbance     Type II or unspecified type diabetes mellitus without mention of complication, not stated as uncontrolled         Past Surgical History:   Procedure Laterality Date    CARDIAC SURG PROCEDURE UNLIST  1995    lt. carotid endart. [facility] CARDIAC SURG PROCEDURE UNLIST  2012, 2016, Nov 2017    Coronary Stent    COLONOSCOPY N/A 6/23/2017    COLONOSCOPY w/ APC w/ polypectomies performed by Hina Sullivan MD at [address] N/A 1/19/2018    FLEXIBLE SIGMOIDOSCOPY WITH Radio frequency ablation performed by Hina Sullivan MD at [address] HX CHOLECYSTECTOMY      HX CORONARY ARTERY BYPASS GRAFT  2000    x2    HX HEENT  1997    cholecystectomy    HX UROLOGICAL  1998    renal art. surg    HX VASCULAR ACCESS      Perma cath for HD- right chest.       Social History   Substance Use Topics    Smoking status: Never Smoker    Smokeless tobacco: Never Used    Alcohol use No        Family History   Problem Relation Age of Onset    Heart Attack Father 64        Allergies   Allergen Reactions    Nka [No Known Allergies] Other (comments)        Prior to Admission Medications   Prescriptions Last Dose Informant Patient Reported? Taking? Cholecalciferol, Vitamin D3, (DECARA) 50,000 unit cap   Yes No   Sig: Take  by mouth every seven (7) days. HYDROcodone-acetaminophen (NORCO) 5-325 mg per tablet   No No   Sig: Take 1-2 Tabs by mouth every four (4) hours as needed for Pain. Max Daily Amount: 12 Tabs. albuterol (PROVENTIL HFA, VENTOLIN HFA, PROAIR HFA) 90 mcg/actuation inhaler   No No   Sig: Take 2 Puffs by inhalation every four (4) hours as needed for Wheezing. allopurinol (ZYLOPRIM) 100 mg tablet   Yes No   Sig: Take 100 mg by mouth daily.      amLODIPine (NORVASC) 5 mg tablet   No No   Sig: Take 1 Tab by mouth daily. aspirin 81 mg chewable tablet   No No   Sig: Take 1 Tab by mouth daily. b complex-vitamin c-folic acid (NEPHROCAPS) 1 mg capsule   No No   Sig: Take 1 Cap by mouth daily. clopidogrel (PLAVIX) 75 mg tab   No No   Sig: Take 1 Tab by mouth daily. fenofibrate (LOFIBRA) 54 mg tablet   No No   Sig: Take 1 Tab by mouth daily. insulin aspart (NOVOLOG) 100 unit/mL injection   No No   Si units sq 3 times a day,sliding scale   insulin glargine (LANTUS) 100 unit/mL injection   No No   Si Units by SubCUTAneous route every twelve (12) hours. isosorbide mononitrate ER (IMDUR) 30 mg tablet   No No   Sig: Take 1 Tab by mouth daily. losartan (COZAAR) 50 mg tablet   No No   Sig: TAKE ONE TABLET BY MOUTH ONE TIME DAILY    metoprolol tartrate (LOPRESSOR) 100 mg IR tablet   No No   Sig: TAKE 1 TABLET BY MOUTH TWO TIMES A DAY. nitroglycerin (NITROSTAT) 0.4 mg SL tablet   No No   Si Tab by SubLINGual route as needed for Chest Pain. senna-docusate (PERICOLACE) 8.6-50 mg per tablet   No No   Sig: Take 1 Tab by mouth daily.    simvastatin (ZOCOR) 40 mg tablet   No No   Sig: TAKE 1 TABLET BY MOUTH NIGHTLY.   tamsulosin (FLOMAX) 0.4 mg capsule   No No   Sig: TAKE ONE CAPSULE BY MOUTH ONE TIME DAILY      Facility-Administered Medications: None       REVIEW OF SYSTEMS:         [x]Total of 12 systems reviewed as follows:    Constitutional: negative fever, negative chills, negative weight loss  Eyes:   negative visual changes  ENT:   negative sore throat, tongue or lip swelling  Respiratory:  negative cough, negative dyspnea  CVS:   Postive for chest pain, negative for  palpitations, lower extremity edema  GI:   negative for nausea, vomiting, diarrhea, and abdominal pain  Genitourinary: negative for frequency, dysuria  Integument:  negative for rash and pruritus  Hematologic:  negative for easy bruising and gum/nose bleeding  Musculoskel: negative for myalgias,  back pain and muscle weakness  Neurological:  negative for headaches, dizziness, vertigo  Behavl/Psych: negative for  mood changes , feelings of anxiety, depression       Objective:     VITALS:      Visit Vitals    /58 (BP 1 Location: Right arm, BP Patient Position: At rest)    Pulse 69    Temp 98.9 °F (37.2 °C)    Resp 16    Ht 5' 9\" (1.753 m)    Wt 108.9 kg (240 lb)    SpO2 97%    BMI 35.44 kg/m2     Temp (24hrs), Av.7 °F (37.1 °C), Min:98.5 °F (36.9 °C), Max:98.9 °F (37.2 °C)      PHYSICAL EXAM:     General:    Alert, cooperative, not in  distress, appears stated age. Head:   Normocephalic, without obvious abnormality, atraumatic. Eyes:   Conjunctivae clear, anicteric sclerae. Pupils are equal  Nose:  Nares normal. No drainage or sinus tenderness. Throat:    Lips, mucosa, and tongue normal.  No Thrush  Neck:  Supple, symmetrical,  no adenopathy, thyroid: non tender    no carotid bruit and no JVD. Back:    Symmetric,  No CVA tenderness. Lungs:   Clear to auscultation bilaterally. Chest wall:  No tenderness or deformity. No Accessory muscle use, right upper chest vas cath in place   Heart:   Regular rate and rhythm,  no murmur, rub or gallop. Abdomen:   Soft, non-tender. Not distended. No masses,  Bowel sounds normal.   Extremities: Extremities normal, atraumatic, No cyanosis,  No edema. No clubbing  Skin:     Texture, turgor normal. No rashes or skin lesions. Not Jaundiced  Psych:  Appropriate affect Not anxious or agitated. Neurologic: Alert and oriented X 3. EOMs intact. No facial asymmetry. No aphasia or slurred speech.  Normal strength      LAB DATA REVIEWED:      No components found for: [place]  Recent Labs      18   2124   NA  135*   K  4.0   CL  99*   CO2  30   BUN  26*   CREA  3.94*   GLU  360*   CA  8.5   WBC  9.0   HGB  11.4*   HCT  36.4   PLT  257       Lab Results   Component Value Date/Time    CK 53 2018 10:48 PM    CK - MB 2.7 2018 10:48 PM    CK-MB Index 5.1 2018 10:48 PM    Troponin-I, Qt. 0.17 01/27/2018 10:48 PM       EKG Results     Procedure 720 Value Units Date/Time    EKG, 12 LEAD, SUBSEQUENT [[id number]]     Order Status:  Completed             IMAGING RESULTS:          Assessment:        Active Problems:      Chest pain (10/27/2017), non cardiac ,resolved      Elevated troponin (1/28/2018) type 2    CAD S/P CABG and PCI    ESRD (end stage renal disease) ([facility] [address] (11/8/2017)    T2DM    HTN    HLP    GERD         Plan:       Admit to telemetry floor  Chest pain likely 2ry to GERD  Chest pain free since presentation to ED  Elevated troponin likely 2ry to ESKD   Trend Mary [facility]  Cardiology consulted in ED  Continue medical treatment for CAD  Nephrology consult for continuation of HD  Glycemic control with basal/bolus regimen    DVT prophylaxis:  []Lovenox  []Coumadin  [x]Hep SQ  []SCDs  []H2B/PPI    Discussed Code Status:    [x]Full Code      []DNR     ___________________________________________________    Care Plan discussed with:      [x]Patient   []Family    []ED Care Manager  []ED Doc   []Specialist :    Total Time Coordinating Admission:  50 minutes  Total Critical Care Time:     ___________________________________________________  Admitting Physician: Saulo Sol MD   01/28/18

## 2018-01-28 NOTE — ED NOTES
EMERGENCY DEPARTMENT HISTORY AND PHYSICAL EXAM    9:20 PM      Date: 1/27/2018  Patient Name: Ouida Primrose    History of Presenting Illness     Chief Complaint   Patient presents with    Chest Pain         History Provided By: Patient    Chief Complaint: Chest pain  Duration:  Hours  Timing:  Acute and Gradual  Location: substernal  Quality: Aching  Severity: 6 out of 10  Modifying Factors: onset at rest  Associated Symptoms: Nausea, diaphoresis      Additional History (Context): Ouida Primrose is a 68 y.o. male withCAD/stents who presents with chest pain at rest. Onset was one hour PTA. Mild dyspnea was associated,no nausea or diaphoresis. The pain resolved post the patient taking one NTG  PCP: Cecilio Elmore MD    Current Outpatient Prescriptions   Medication Sig Dispense Refill    losartan (COZAAR) 50 mg tablet TAKE ONE TABLET BY MOUTH ONE TIME DAILY  30 Tab 0    tamsulosin (FLOMAX) 0.4 mg capsule TAKE ONE CAPSULE BY MOUTH ONE TIME DAILY 90 Cap 3    isosorbide mononitrate ER (IMDUR) 30 mg tablet Take 1 Tab by mouth daily. 30 Tab 0    amLODIPine (NORVASC) 5 mg tablet Take 1 Tab by mouth daily. 30 Tab 0    HYDROcodone-acetaminophen (NORCO) 5-325 mg per tablet Take 1-2 Tabs by mouth every four (4) hours as needed for Pain. Max Daily Amount: 12 Tabs. 12 Tab 0    albuterol (PROVENTIL HFA, VENTOLIN HFA, PROAIR HFA) 90 mcg/actuation inhaler Take 2 Puffs by inhalation every four (4) hours as needed for Wheezing. 1 Inhaler 0    aspirin 81 mg chewable tablet Take 1 Tab by mouth daily. 30 Tab 0    b complex-vitamin c-folic acid (NEPHROCAPS) 1 mg capsule Take 1 Cap by mouth daily. 30 Cap 0    senna-docusate (PERICOLACE) 8.6-50 mg per tablet Take 1 Tab by mouth daily. 30 Tab 0    fenofibrate (LOFIBRA) 54 mg tablet Take 1 Tab by mouth daily. 30 Tab 5    simvastatin (ZOCOR) 40 mg tablet TAKE 1 TABLET BY MOUTH NIGHTLY.  90 Tab 2    metoprolol tartrate (LOPRESSOR) 100 mg IR tablet TAKE 1 TABLET BY MOUTH TWO TIMES A DAY. 180 Tab 2    clopidogrel (PLAVIX) 75 mg tab Take 1 Tab by mouth daily. 90 Tab 3    Cholecalciferol, Vitamin D3, (DECARA) 50,000 unit cap Take  by mouth every seven (7) days.  insulin glargine (LANTUS) 100 unit/mL injection 20 Units by SubCUTAneous route every twelve (12) hours. 1 Vial 2    nitroglycerin (NITROSTAT) 0.4 mg SL tablet 1 Tab by SubLINGual route as needed for Chest Pain. 25 Tab 1    insulin aspart (NOVOLOG) 100 unit/mL injection 20 units sq 3 times a day,sliding scale 10 mL 0    allopurinol (ZYLOPRIM) 100 mg tablet Take 100 mg by mouth daily. Past History     Past Medical History:  Past Medical History:   Diagnosis Date    Atherosclerosis of renal artery (HCC)     S/P Rt stent    CAD (coronary artery disease) , 1997, 2012    ,double bypass, 2 stents, 2stents 7/2016    Cancer (HCC)     prostate    CHF (congestive heart failure) (HCC)     Chronic diastolic heart failure (HCC)     Chronic kidney disease     Chronic kidney disease (CKD), stage IV (severe) (HCC)     On Dialysis now -T-Th-sat    Constipation     Coronary atherosclerosis of unspecified type of vessel, native or graft     Stable angina after recent PCI continue treatment.        CRI (chronic renal insufficiency)     Diabetes ([facility] [address] 1973    type 2    Essential hypertension, benign     GERD (gastroesophageal reflux disease)     Gout     Hypertension 1982    Morbid obesity ([facility] [address]     Weight loss has been strongly encouraged by following dietary restrictions and an exercise routine    APRYL (obstructive sleep apnea) 6/26/2015    no cpap    Other and unspecified hyperlipidemia     HDL's are not at goal, LDL's are at goal, triglycerides are not at goal.    Other and unspecified hyperlipidemia     HDL's are not at goal, LDL's are at goal, triglycerides are not at goal.     Other ill-defined conditions(799.89) 1960    pneumonia twice    Sleep disturbance     Type II or unspecified type diabetes mellitus without mention of complication, not stated as uncontrolled        Past Surgical History:  Past Surgical History:   Procedure Laterality Date   [address]    lt. carotid endart. [facility] CARDIAC SURG PROCEDURE UNLIST  2012, 2016, Nov 2017    Coronary Stent    COLONOSCOPY N/A 6/23/2017    COLONOSCOPY w/ APC w/ polypectomies performed by Alejandro Youngblood MD at [address] N/A 1/19/2018    FLEXIBLE SIGMOIDOSCOPY WITH Radio frequency ablation performed by Alejandro Youngblood MD at [address] HX CHOLECYSTECTOMY      HX CORONARY ARTERY BYPASS GRAFT  2000    x2    HX HEENT  1997    cholecystectomy    HX UROLOGICAL  1998    renal art. surg    HX VASCULAR ACCESS      Perma cath for HD- right chest.       Family History:  Family History   Problem Relation Age of Onset    Heart Attack Father 64       Social History:  Social History   Substance Use Topics    Smoking status: Never Smoker    Smokeless tobacco: Never Used    Alcohol use No       Allergies:   Allergies   Allergen Reactions    Nka [No Known Allergies] Other (comments)         Review of Systems       Review of Systems   Review of Systems - History obtained from the patient  General ROS: negative  No fevers chills, sweats or weight change  Psychological ROS: positive for - anxiety and No suicidal ideation  Denies depression  Ophthalmic ROS: negative for - blurry vision, decreased vision, double vision, dry eyes, itchy eyes or loss of vision  ENT ROS: negative for - headaches, hearing change, nasal congestion, nasal discharge, sinus pain, sneezing or sore throat  Hematological and Lymphatic ROS: negative for - bruising, fatigue, jaundice, night sweats, pallor or swollen lymph nodes  Endocrine ROS: negative for - hot flashes, mood swings, palpitations, polydipsia/polyuria, skin changes or temperature intolerance  Breast ROS: negative  Respiratory ROS: positive for - cough and pleuritic pain  Cardiovascular ROS: negative for - chest pain, dyspnea on exertion, edema, irregular heartbeat, murmur or palpitations  Gastrointestinal ROS: no abdominal pain, change in bowel habits, or black or bloody stools  negative for - appetite loss, blood in stools, change in bowel habits, change in stools, constipation, diarrhea, gas/bloating or heartburn  Genito-Urinary ROS: negative for - change in urinary stream or dysuria  Musculoskeletal ROS: negative  positive for - joint pain, joint stiffness, muscle pain and pain in back - bilateral  Neurological ROS: no TIA or stroke symptoms  negative for - confusion, dizziness, gait disturbance or memory loss  Dermatological ROS: negative        Physical Exam     Visit Vitals    /48    Pulse 78    Temp 98.5 °F (36.9 °C)    Resp 23    Ht 5' 9\" (1.753 m)    Wt 108.9 kg (240 lb)    SpO2 97%    BMI 35.44 kg/m2         Physical Exam      Diagnostic Study Results     Results for orders placed or performed during the hospital encounter of 01/27/18   CBC WITH AUTOMATED DIFF   Result Value Ref Range    WBC 9.0 4.6 - 13.2 K/uL    RBC 4.06 (L) 4.70 - 5.50 M/uL    HGB 11.4 (L) 13.0 - 16.0 g/dL    HCT 36.4 36.0 - 48.0 %    MCV 89.7 74.0 - 97.0 FL    MCH 28.1 24.0 - 34.0 PG    MCHC 31.3 31.0 - 37.0 g/dL    RDW 15.7 (H) 11.6 - 14.5 %    PLATELET 166 696 - 153 K/uL    MPV 10.1 9.2 - 11.8 FL    NEUTROPHILS 71 40 - 73 %    LYMPHOCYTES 20 (L) 21 - 52 %    MONOCYTES 7 3 - 10 %    EOSINOPHILS 2 0 - 5 %    BASOPHILS 0 0 - 2 %    ABS. NEUTROPHILS 6.3 1.8 - 8.0 K/UL    ABS. LYMPHOCYTES 1.8 0.9 - 3.6 K/UL    ABS. MONOCYTES 0.6 0.05 - 1.2 K/UL    ABS. EOSINOPHILS 0.2 0.0 - 0.4 K/UL    ABS.  BASOPHILS 0.0 0.0 - 0.06 K/UL    DF AUTOMATED     METABOLIC PANEL, BASIC   Result Value Ref Range    Sodium 135 (L) 136 - 145 mmol/L    Potassium 4.0 3.5 - 5.5 mmol/L    Chloride 99 (L) 100 - 108 mmol/L    CO2 30 21 - 32 mmol/L    Anion gap 6 3.0 - 18 mmol/L    Glucose 360 (H) 74 - 99 mg/dL    BUN 26 (H) 7.0 - 18 MG/DL Creatinine 3.94 (H) 0.6 - 1.3 MG/DL    BUN/Creatinine ratio 7 (L) 12 - 20      GFR est AA 18 (L) >60 ml/min/1.73m2    GFR est non-AA 15 (L) >60 ml/min/1.73m2    Calcium 8.5 8.5 - 10.1 MG/DL   MAGNESIUM   Result Value Ref Range    Magnesium 2.0 1.6 - 2.6 mg/dL   PROTHROMBIN TIME + INR   Result Value Ref Range    Prothrombin time 12.5 11.5 - 15.2 sec    INR 1.0 0.8 - 1.2     PTT   Result Value Ref Range    aPTT 27.6 23.0 - 36.4 SEC   CARDIAC PANEL,(CK, CKMB & TROPONIN)   Result Value Ref Range    CK 54 39 - 308 U/L    CK - MB 2.3 <3.6 ng/ml    CK-MB Index 4.3 (H) 0.0 - 4.0 %    Troponin-I, Qt. 0.09 (H) 0.0 - 0.045 NG/ML   CARDIAC PANEL,(CK, CKMB & TROPONIN)   Result Value Ref Range    CK 53 39 - 308 U/L    CK - MB 2.7 <3.6 ng/ml    CK-MB Index 5.1 (H) 0.0 - 4.0 %    Troponin-I, Qt. 0.17 (H) 0.0 - 0.045 NG/ML   CARDIAC PANEL,(CK, CKMB & TROPONIN)   Result Value Ref Range    CK 50 39 - 308 U/L    CK - MB 2.8 <3.6 ng/ml    CK-MB Index 5.6 (H) 0.0 - 4.0 %    Troponin-I, Qt. 0.35 (H) 0.0 - 0.045 NG/ML   CBC WITH AUTOMATED DIFF   Result Value Ref Range    WBC 7.2 4.6 - 13.2 K/uL    RBC 3.97 (L) 4.70 - 5.50 M/uL    HGB 11.1 (L) 13.0 - 16.0 g/dL    HCT 35.8 (L) 36.0 - 48.0 %    MCV 90.2 74.0 - 97.0 FL    MCH 28.0 24.0 - 34.0 PG    MCHC 31.0 31.0 - 37.0 g/dL    RDW 15.7 (H) 11.6 - 14.5 %    PLATELET 331 839 - 277 K/uL    MPV 10.3 9.2 - 11.8 FL    NEUTROPHILS 54 40 - 73 %    LYMPHOCYTES 33 21 - 52 %    MONOCYTES 8 3 - 10 %    EOSINOPHILS 4 0 - 5 %    BASOPHILS 1 0 - 2 %    ABS. NEUTROPHILS 3.9 1.8 - 8.0 K/UL    ABS. LYMPHOCYTES 2.4 0.9 - 3.6 K/UL    ABS. MONOCYTES 0.6 0.05 - 1.2 K/UL    ABS. EOSINOPHILS 0.3 0.0 - 0.4 K/UL    ABS.  BASOPHILS 0.0 0.0 - 0.06 K/UL    DF AUTOMATED     PTT   Result Value Ref Range    aPTT 29.5 23.0 - 36.4 SEC   PTT   Result Value Ref Range    aPTT 42.6 (H) 23.0 - 19.9 SEC   METABOLIC PANEL, BASIC   Result Value Ref Range    Sodium 139 136 - 145 mmol/L    Potassium 3.8 3.5 - 5.5 mmol/L    Chloride 104 100 - 108 mmol/L    CO2 27 21 - 32 mmol/L    Anion gap 8 3.0 - 18 mmol/L    Glucose 94 74 - 99 mg/dL    BUN 38 (H) 7.0 - 18 MG/DL    Creatinine 4.52 (H) 0.6 - 1.3 MG/DL    BUN/Creatinine ratio 8 (L) 12 - 20      GFR est AA 15 (L) >60 ml/min/1.73m2    GFR est non-AA 13 (L) >60 ml/min/1.73m2    Calcium 8.9 8.5 - 10.1 MG/DL   CBC W/O DIFF   Result Value Ref Range    WBC 9.1 4.6 - 13.2 K/uL    RBC 4.41 (L) 4.70 - 5.50 M/uL    HGB 12.1 (L) 13.0 - 16.0 g/dL    HCT 39.4 36.0 - 48.0 %    MCV 89.3 74.0 - 97.0 FL    MCH 27.4 24.0 - 34.0 PG    MCHC 30.7 (L) 31.0 - 37.0 g/dL    RDW 15.9 (H) 11.6 - 14.5 %    PLATELET 960 454 - 762 K/uL    MPV 10.7 9.2 - 11.8 FL   PTT   Result Value Ref Range    aPTT 59.2 (H) 23.0 - 36.4 SEC   PTT   Result Value Ref Range    aPTT 25.0 23.0 - 36.4 SEC   HEMOGLOBIN A1C W/O EAG   Result Value Ref Range    Hemoglobin A1c 7.3 (H) 4.2 - 5.6 %   CARDIAC PANEL,(CK, CKMB & TROPONIN)   Result Value Ref Range    CK 48 39 - 308 U/L    CK - MB 2.1 <3.6 ng/ml    CK-MB Index 4.4 (H) 0.0 - 4.0 %    Troponin-I, Qt. 0.12 (H) 0.0 - 0.045 NG/ML   MAGNESIUM   Result Value Ref Range    Magnesium 2.2 1.6 - 2.6 mg/dL   GLUCOSE, POC   Result Value Ref Range    Glucose (POC) 191 (H) 70 - 110 mg/dL   GLUCOSE, POC   Result Value Ref Range    Glucose (POC) 99 70 - 110 mg/dL   GLUCOSE, POC   Result Value Ref Range    Glucose (POC) 179 (H) 70 - 110 mg/dL   GLUCOSE, POC   Result Value Ref Range    Glucose (POC) 114 (H) 70 - 110 mg/dL   GLUCOSE, POC   Result Value Ref Range    Glucose (POC) 199 (H) 70 - 110 mg/dL   GLUCOSE, POC   Result Value Ref Range    Glucose (POC) 113 (H) 70 - 110 mg/dL   GLUCOSE, POC   Result Value Ref Range    Glucose (POC) 153 (H) 70 - 110 mg/dL   EKG, 12 LEAD, SUBSEQUENT   Result Value Ref Range    Ventricular Rate 89 BPM    Atrial Rate 89 BPM    P-R Interval 150 ms    QRS Duration 90 ms    Q-T Interval 394 ms    QTC Calculation (Bezet) 479 ms    Calculated P Axis 44 degrees    Calculated R Axis 21 degrees    Calculated T Axis 70 degrees    Diagnosis       Normal sinus rhythm  Nonspecific ST and T wave abnormality  Incomplete right bundle branch block  Prolonged QT  Abnormal ECG  When compared with ECG of 13-DEC-2017 14:38,  No significant change was found  Confirmed by Pavan Lynn ([id number]) on 1/28/2018 6:53:14 PM             Medical Decision Making   I am the first provider for this patient. I reviewed the vital signs, available nursing notes, past medical history, past surgical history, family history and social history. Provider Notes (Medical Decision Making): Patient presents with chest pain. Known CAD, stent placement one month prior. I will begin the workup, obtain labs and diagnostics, treat as indicated and follow the the patient's condition and response. Vital Signs-Reviewed the patient's vital signs. EKG: Interpreted by the EP. Time Interpreted:    Rate:    Rhythm:    Interpretation:   Comparison:     Diagnoses and all orders for this visit:    1. Chest pain, unspecified type    2. Elevated troponin    Other orders  -     EKG, 12 LEAD, SUBSEQUENT; Standing  -     XR CHEST PORT; Standing  -     CBC WITH AUTOMATED DIFF; Standing  -     METABOLIC PANEL, BASIC; Standing  -     MAGNESIUM; Standing  -     PROTHROMBIN TIME + INR; Standing  -     PTT; Standing  -     CARDIAC PANEL,(CK, CKMB & TROPONIN); Standing  -     CARDIAC PANEL,(CK, CKMB & TROPONIN);  Standing  -     INITIAL PHYSICIAN ORDER: INPATIENT; Standing  -     METABOLIC PANEL, BASIC; Standing  -     CBC W/O DIFF; Standing  -     GLUCOSE, POC; Standing  -     GLUCOSE, POC; Standing  -     GLUCOSE, POC; Standing  -     CBC WITH AUTOMATED DIFF; Standing  -     PTT; Standing  -     PTT; Standing  -     CBC WITH AUTOMATED DIFF; Standing  -     heparin (porcine) 1,000 unit/mL injection;   -     GLUCOSE, POC; Standing  -     GLUCOSE, POC; Standing  -     heparin (porcine) 1,000 unit/mL injection;   -     PTT; Standing  -     heparin (porcine) 1,000 unit/mL injection;   -     PTT; Standing  -     GLUCOSE, POC; Standing  -     HEMOGLOBIN A1C W/O EAG; Standing  -     MAGNESIUM; Standing  -     SCANNED CARDIAC RHYTHM STRIP  -     GLUCOSE, POC; Standing  -     SCANNED CARDIAC RHYTHM STRIP  -     SCANNED CARDIAC RHYTHM STRIP          Follow-up Information     None           Patient's Medications   Start Taking    No medications on file   Continue Taking    ALBUTEROL (PROVENTIL HFA, VENTOLIN HFA, PROAIR HFA) 90 MCG/ACTUATION INHALER    Take 2 Puffs by inhalation every four (4) hours as needed for Wheezing. ALLOPURINOL (ZYLOPRIM) 100 MG TABLET    Take 100 mg by mouth daily. AMLODIPINE (NORVASC) 5 MG TABLET    Take 1 Tab by mouth daily. ASPIRIN 81 MG CHEWABLE TABLET    Take 1 Tab by mouth daily. B COMPLEX-VITAMIN C-FOLIC ACID (NEPHROCAPS) 1 MG CAPSULE    Take 1 Cap by mouth daily. CHOLECALCIFEROL, VITAMIN D3, (DECARA) 50,000 UNIT CAP    Take  by mouth every seven (7) days. CLOPIDOGREL (PLAVIX) 75 MG TAB    Take 1 Tab by mouth daily. FENOFIBRATE (LOFIBRA) 54 MG TABLET    Take 1 Tab by mouth daily. HYDROCODONE-ACETAMINOPHEN (NORCO) 5-325 MG PER TABLET    Take 1-2 Tabs by mouth every four (4) hours as needed for Pain. Max Daily Amount: 12 Tabs. INSULIN ASPART (NOVOLOG) 100 UNIT/ML INJECTION    20 units sq 3 times a day,sliding scale    INSULIN GLARGINE (LANTUS) 100 UNIT/ML INJECTION    20 Units by SubCUTAneous route every twelve (12) hours. ISOSORBIDE MONONITRATE ER (IMDUR) 30 MG TABLET    Take 1 Tab by mouth daily. LOSARTAN (COZAAR) 50 MG TABLET    TAKE ONE TABLET BY MOUTH ONE TIME DAILY     METOPROLOL TARTRATE (LOPRESSOR) 100 MG IR TABLET    TAKE 1 TABLET BY MOUTH TWO TIMES A DAY. NITROGLYCERIN (NITROSTAT) 0.4 MG SL TABLET    1 Tab by SubLINGual route as needed for Chest Pain. SENNA-DOCUSATE (PERICOLACE) 8.6-50 MG PER TABLET    Take 1 Tab by mouth daily.     SIMVASTATIN (ZOCOR) 40 MG TABLET    TAKE 1 TABLET BY MOUTH NIGHTLY. TAMSULOSIN (FLOMAX) 0.4 MG CAPSULE    TAKE ONE CAPSULE BY MOUTH ONE TIME DAILY   These Medications have changed    No medications on file   Stop Taking    No medications on file     _______________________________    Attestations:  Roman Vicente MD acting as a scribe for and in the presence of Mehul Pastrana MD      January 27, 2018 at 9:20 PM       Provider Attestation:      I personally performed the services described in the documentation, reviewed the documentation, as recorded by the scribe in my presence, and it accurately and completely records my words and actions.  January 27, 2018 at 9:20 PM - Mehul Pastrana MD    _______________________________

## 2018-01-28 NOTE — ED NOTES
Pt resting on stretcher talking with family at bedside. Tech obtained lab samples. No acute distress noted and vitals stable. Will continue to monitor pt and await further orders.

## 2018-01-29 VITALS
BODY MASS INDEX: 32.7 KG/M2 | HEIGHT: 69 IN | TEMPERATURE: 99.2 F | SYSTOLIC BLOOD PRESSURE: 182 MMHG | RESPIRATION RATE: 18 BRPM | OXYGEN SATURATION: 97 % | DIASTOLIC BLOOD PRESSURE: 73 MMHG | WEIGHT: 220.8 LBS | HEART RATE: 70 BPM

## 2018-01-29 LAB
ANION GAP SERPL CALC-SCNC: 8 MMOL/L (ref 3–18)
APTT PPP: 25 SEC (ref 23–36.4)
APTT PPP: 59.2 SEC (ref 23–36.4)
BUN SERPL-MCNC: 38 MG/DL (ref 7–18)
BUN/CREAT SERPL: 8 (ref 12–20)
CALCIUM SERPL-MCNC: 8.9 MG/DL (ref 8.5–10.1)
CHLORIDE SERPL-SCNC: 104 MMOL/L (ref 100–108)
CK MB CFR SERPL CALC: 4.4 % (ref 0–4)
CK MB SERPL-MCNC: 2.1 NG/ML (ref 5–25)
CK SERPL-CCNC: 48 U/L (ref 39–308)
CO2 SERPL-SCNC: 27 MMOL/L (ref 21–32)
CREAT SERPL-MCNC: 4.52 MG/DL (ref 0.6–1.3)
ERYTHROCYTE [DISTWIDTH] IN BLOOD BY AUTOMATED COUNT: 15.9 % (ref 11.6–14.5)
GLUCOSE BLD STRIP.AUTO-MCNC: 113 MG/DL (ref 70–110)
GLUCOSE BLD STRIP.AUTO-MCNC: 153 MG/DL (ref 70–110)
GLUCOSE SERPL-MCNC: 94 MG/DL (ref 74–99)
HBA1C MFR BLD: 7.3 % (ref 4.2–5.6)
HCT VFR BLD AUTO: 39.4 % (ref 36–48)
HGB BLD-MCNC: 12.1 G/DL (ref 13–16)
MAGNESIUM SERPL-MCNC: 2.2 MG/DL (ref 1.6–2.6)
MCH RBC QN AUTO: 27.4 PG (ref 24–34)
MCHC RBC AUTO-ENTMCNC: 30.7 G/DL (ref 31–37)
MCV RBC AUTO: 89.3 FL (ref 74–97)
PLATELET # BLD AUTO: 295 K/UL (ref 135–420)
PMV BLD AUTO: 10.7 FL (ref 9.2–11.8)
POTASSIUM SERPL-SCNC: 3.8 MMOL/L (ref 3.5–5.5)
RBC # BLD AUTO: 4.41 M/UL (ref 4.7–5.5)
SODIUM SERPL-SCNC: 139 MMOL/L (ref 136–145)
TROPONIN I SERPL-MCNC: 0.12 NG/ML (ref 0–0.04)
WBC # BLD AUTO: 9.1 K/UL (ref 4.6–13.2)

## 2018-01-29 PROCEDURE — 85730 THROMBOPLASTIN TIME PARTIAL: CPT | Performed by: INTERNAL MEDICINE

## 2018-01-29 PROCEDURE — 83036 HEMOGLOBIN GLYCOSYLATED A1C: CPT | Performed by: INTERNAL MEDICINE

## 2018-01-29 PROCEDURE — 74011636637 HC RX REV CODE- 636/637: Performed by: INTERNAL MEDICINE

## 2018-01-29 PROCEDURE — 82550 ASSAY OF CK (CPK): CPT | Performed by: NURSE PRACTITIONER

## 2018-01-29 PROCEDURE — 83735 ASSAY OF MAGNESIUM: CPT | Performed by: NURSE PRACTITIONER

## 2018-01-29 PROCEDURE — 74011250637 HC RX REV CODE- 250/637: Performed by: INTERNAL MEDICINE

## 2018-01-29 PROCEDURE — 82962 GLUCOSE BLOOD TEST: CPT

## 2018-01-29 PROCEDURE — 80048 BASIC METABOLIC PNL TOTAL CA: CPT | Performed by: INTERNAL MEDICINE

## 2018-01-29 PROCEDURE — 36415 COLL VENOUS BLD VENIPUNCTURE: CPT | Performed by: INTERNAL MEDICINE

## 2018-01-29 PROCEDURE — 85027 COMPLETE CBC AUTOMATED: CPT | Performed by: INTERNAL MEDICINE

## 2018-01-29 PROCEDURE — 74011250636 HC RX REV CODE- 250/636

## 2018-01-29 RX ORDER — DOXERCALCIFEROL 4 UG/2ML
1 INJECTION INTRAVENOUS
Status: DISCONTINUED | OUTPATIENT
Start: 2018-01-30 | End: 2018-01-29 | Stop reason: HOSPADM

## 2018-01-29 RX ORDER — HEPARIN SODIUM 1000 [USP'U]/ML
INJECTION, SOLUTION INTRAVENOUS; SUBCUTANEOUS
Status: COMPLETED
Start: 2018-01-29 | End: 2018-01-29

## 2018-01-29 RX ADMIN — ASPIRIN 81 MG 81 MG: 81 TABLET ORAL at 09:03

## 2018-01-29 RX ADMIN — INSULIN GLARGINE 20 UNITS: 100 INJECTION, SOLUTION SUBCUTANEOUS at 09:00

## 2018-01-29 RX ADMIN — TAMSULOSIN HYDROCHLORIDE 0.4 MG: 0.4 CAPSULE ORAL at 09:03

## 2018-01-29 RX ADMIN — HEPARIN SODIUM 3000 UNITS: 1000 INJECTION, SOLUTION INTRAVENOUS; SUBCUTANEOUS at 06:58

## 2018-01-29 RX ADMIN — STANDARDIZED SENNA CONCENTRATE AND DOCUSATE SODIUM 1 TABLET: 8.6; 5 TABLET, FILM COATED ORAL at 09:03

## 2018-01-29 RX ADMIN — AMLODIPINE BESYLATE 2.5 MG: 5 TABLET ORAL at 09:04

## 2018-01-29 RX ADMIN — CLOPIDOGREL BISULFATE 75 MG: 75 TABLET ORAL at 10:33

## 2018-01-29 RX ADMIN — FENOFIBRATE 48 MG: 48 TABLET, FILM COATED ORAL at 09:03

## 2018-01-29 RX ADMIN — METOPROLOL TARTRATE 100 MG: 50 TABLET ORAL at 09:03

## 2018-01-29 RX ADMIN — LOSARTAN POTASSIUM 50 MG: 50 TABLET ORAL at 09:03

## 2018-01-29 RX ADMIN — INSULIN LISPRO 2 UNITS: 100 INJECTION, SOLUTION INTRAVENOUS; SUBCUTANEOUS at 12:45

## 2018-01-29 RX ADMIN — ALLOPURINOL 100 MG: 100 TABLET ORAL at 09:03

## 2018-01-29 RX ADMIN — ISOSORBIDE MONONITRATE 30 MG: 30 TABLET, EXTENDED RELEASE ORAL at 09:03

## 2018-01-29 NOTE — PROGRESS NOTES
Upon assessment, patient has a large bruise to the right upper arm around the IV site. Spoke with cardiology Np which told me to hold heparin.

## 2018-01-29 NOTE — ROUTINE PROCESS
Bedside and Verbal shift change report given to Eboni Connor (oncoming nurse) by Eunice Resendiz RN (offgoing nurse). Report included the following information SBAR, Kardex, MAR and Recent Results. SITUATION:    Code Status: Full Code   Reason for Admission: Chest pain   Elevated troponin   ESRD (end stage renal disease) ([facility] [address]    [facility] day: 0   Problem List:       Hospital Problems  Date Reviewed: 1/19/2018          Codes Class Noted POA    Elevated troponin ICD-10-CM: R74.8  ICD-9-CM: 790.6  1/28/2018 Unknown        ESRD (end stage renal disease) ([facility] [address] ICD-10-CM: N18.6  ICD-9-CM: 585.6  11/8/2017 Unknown        Chest pain ICD-10-CM: R07.9  ICD-9-CM: 786.50  10/27/2017 Unknown              BACKGROUND:    Past Medical History:   Past Medical History:   Diagnosis Date    Atherosclerosis of renal artery (HCC)     S/P Rt stent    CAD (coronary artery disease) , 1997, 2012    ,double bypass, 2 stents, 2stents 7/2016    Cancer ([facility] [address]     prostate    CHF (congestive heart failure) (HCC)     Chronic diastolic heart failure (HCC)     Chronic kidney disease     Chronic kidney disease (CKD), stage IV (severe) (HCC)     On Dialysis now -T-Th-sat    Constipation     Coronary atherosclerosis of unspecified type of vessel, native or graft     Stable angina after recent PCI continue treatment.        CRI (chronic renal insufficiency)     Diabetes ([facility] [address] 1973    type 2    Essential hypertension, benign     GERD (gastroesophageal reflux disease)     Gout     Hypertension 1982    Morbid obesity ([facility] [address]     Weight loss has been strongly encouraged by following dietary restrictions and an exercise routine    APRYL (obstructive sleep apnea) 6/26/2015    no cpap    Other and unspecified hyperlipidemia     HDL's are not at goal, LDL's are at goal, triglycerides are not at goal.    Other and unspecified hyperlipidemia     HDL's are not at goal, LDL's are at goal, triglycerides are not at goal.     Other ill-defined conditions(799.89) 1960    pneumonia twice    Sleep disturbance     Type II or unspecified type diabetes mellitus without mention of complication, not stated as uncontrolled          Patient taking anticoagulants yes     ASSESSMENT:    Changes in Assessment Throughout Shift: none     Patient has Central Line: no Reasons if yes:    Patient has Xiao Cath: no Reasons if yes:       Last Vitals:     Vitals:    01/28/18 0425 01/28/18 0725 01/28/18 1108 01/28/18 1541   BP: 130/57 163/63 112/62 155/74   Pulse: 65 64 62 66   Resp: 18 18 18 18   Temp: 98.8 °F (37.1 °C) 97.9 °F (36.6 °C) 98.8 °F (37.1 °C) 98 °F (36.7 °C)   SpO2: 97% 98% 96% 95%   Weight: 100.7 kg (222 lb 1.6 oz)      Height:            IV and DRAINS (will only show if present)   Peripheral IV 01/28/18 Right Antecubital-Site Assessment: Clean, dry, & intact  Hemodialysis Access-Site Assessment: Clean, dry, & intact     WOUND (if present)   Wound Type:  none   Dressing present     Wound Concerns/Notes:  none     PAIN    Pain Assessment    Pain Intensity 1: 0 (01/28/18 1800)    Pain Location 1: Chest         Patient Stated Pain Goal: 0  o Interventions for Pain:  none  o Intervention effective: no pain  o Time of last intervention:    o Reassessment Completed: no      Last 3 Weights:  Last 3 Recorded Weights in this Encounter    01/27/18 1941 01/28/18 0425   Weight: 108.9 kg (240 lb) 100.7 kg (222 lb 1.6 oz)     Weight change:      INTAKE/OUPUT    Current Shift:      Last three shifts:       LAB RESULTS     Recent Labs      01/28/18   1328  01/27/18 2124   WBC  7.2  9.0   HGB  11.1*  11.4*   HCT  35.8*  36.4   PLT  273  257        Recent Labs      01/27/18 2124   NA  135*   K  4.0   GLU  360*   BUN  26*   CREA  3.94*   CA  8.5   MG  2.0   INR  1.0       RECOMMENDATIONS AND DISCHARGE PLANNING     1. Pending tests/procedures/ Plan of Care or Other Needs: none     2. Discharge plan for patient and Needs/Barriers:     3.  Estimated Discharge Date: tbd Posted on Whiteboard in [address] [place] [address] Room: yes      4. The patient's care plan was reviewed with the oncoming nurse. \"HEALS\" SAFETY CHECK      Fall Risk    Total Score: 2    Safety Measures: Safety Measures: Bed/Chair-Wheels locked, Drug code sheet    A safety check occurred in the patient's room between off going nurse and oncoming nurse listed above. The safety check included the below items  Area Items   H  High Alert Medications - Verify all high alert medication drips (heparin, PCA, etc.)   E  Equipment - Suction is set up for ALL patients (with ely)  - Red plugs utilized for all equipment (IV pumps, etc.)  - WOWs wiped down at end of shift.  - Room stocked with oxygen, suction, and other unit-specific supplies   A  Alarms - Bed alarm is set for fall risk patients  - Ensure chair alarm is in place and activated if patient is up in a chair   L  Lines - Check IV for any infiltration  - Xiao bag is empty if patient has a Xiao   - Tubing and IV bags are labeled   S  Safety   - Room is clean, patient is clean, and equipment is clean. - Hallways are clear from equipment besides carts. - Fall bracelet on for fall risk patients  - Ensure room is clear and free of clutter  - Suction is set up for ALL patients (with ely)  - Hallways are clear from equipment besides carts.    - Isolation precautions followed, supplies available outside room, sign posted     Antonette Magdaleno RN

## 2018-01-29 NOTE — PROGRESS NOTES
conducted an initial consultation and Spiritual Assessment for Stella Wolf, who is a 68 y. o.,male. Patients Primary Language is: [place]. According to the patients EMR Judaism Affiliation is: Mormonism.      The reason the Patient came to the hospital is:   Patient Active Problem List    Diagnosis Date Noted    Elevated troponin 01/28/2018    Type 2 diabetes mellitus with nephropathy ([address] 12/18/2017    Hyperkalemia 12/13/2017    Hypotension 12/12/2017    Fluid overload 12/12/2017    ESRD (end stage renal disease) ([address] 11/08/2017    Hyperuricemia 10/28/2017    Chronic kidney disease, stage V ([address] 10/28/2017    Chest pain 10/27/2017    Secondary hyperparathyroidism of renal origin ([address] 10/25/2017    Fatigue 18/55/5806    Diastolic CHF, acute on chronic (HCC) 07/05/2016    CHF (congestive heart failure), NYHA class IV ([address] 07/05/2016    APRYL (obstructive sleep apnea) 06/26/2015    Prostate cancer ([address] 04/29/2015    Abdominal pain 03/05/2015    GERD (gastroesophageal reflux disease) 03/05/2015    Constipation 03/05/2015    Chronic renal insufficiency 03/05/2015    Atherosclerosis of renal artery (HCC)     Chronic diastolic heart failure (HCC)     Morbid obesity (HCC)     Essential hypertension, benign     Sleep disturbance     Coronary atherosclerosis     Mixed hyperlipidemia     Type II or unspecified type diabetes mellitus without mention of complication, not stated as uncontrolled     Chest pain, unspecified 03/12/2013    S/P coronary artery stent placement 03/12/2013    Postsurgical aortocoronary bypass status 03/12/2013    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    NSTEMI (non-ST elevated myocardial infarction) ([address] 10/07/2012    Coronary atherosclerosis of native coronary artery 10/07/2012    CKD (chronic kidney disease) stage 4, GFR 15-29 ml/min ([address] 10/07/2012    HTN (hypertension) 10/07/2012    DM2 (diabetes mellitus, type 2) ([address] 10/07/2012    Dyslipidemia 10/07/2012        The  provided the following Interventions:  Initiated a relationship of care and support. Listened empathically. Provided information about Spiritual Care Services. Chart reviewed. The following outcomes where achieved:  Patient's negrita in God is very important for him to cope.  confirmed Patient's Shinto Affiliation. Patient expressed gratitude for 's visit. Assessment:  Patient does not have any Baptism/cultural needs that will affect patients preferences in health care. There are no spiritual or Baptism issues which require intervention at this time. Plan:  Chaplains will continue to follow and will provide pastoral care on an as needed/requested basis.  recommends bedside caregivers page  on duty if patient shows signs of acute spiritual or emotional distress.     [address]  [phone])

## 2018-01-29 NOTE — ROUTINE PROCESS
Bedside and Verbal shift change report given to Frandy Swartz (oncoming nurse) by Elene Soulier (offgoing nurse). Report included the following information SBAR, Kardex, MAR and Recent Results. SITUATION:    Code Status: Full Code  Reason for Admission: Chest pain  Elevated troponin   ESRD (end stage renal disease) ([facility]    [facility] day: 1   Problem List:       Hospital Problems  Date Reviewed: 1/19/2018          Codes Class Noted POA    Elevated troponin ICD-10-CM: R74.8  ICD-9-CM: 790.6  1/28/2018 Unknown        ESRD (end stage renal disease) ([facility] ICD-10-CM: N18.6  ICD-9-CM: 585.6  11/8/2017 Unknown        Chest pain ICD-10-CM: R07.9  ICD-9-CM: 786.50  10/27/2017 Unknown              BACKGROUND:    Past Medical History:   Past Medical History:   Diagnosis Date    Atherosclerosis of renal artery (HCC)     S/P Rt stent    CAD (coronary artery disease) , 1997, 2012    ,double bypass, 2 stents, 2stents 7/2016    Cancer ([facility]     prostate    CHF (congestive heart failure) (HCC)     Chronic diastolic heart failure (HCC)     Chronic kidney disease     Chronic kidney disease (CKD), stage IV (severe) (HCC)     On Dialysis now -T-Th-sat    Constipation     Coronary atherosclerosis of unspecified type of vessel, native or graft     Stable angina after recent PCI continue treatment.        CRI (chronic renal insufficiency)     Diabetes ([facility] 1973    type 2    Essential hypertension, benign     GERD (gastroesophageal reflux disease)     Gout     Hypertension 1982    Morbid obesity ([address]     Weight loss has been strongly encouraged by following dietary restrictions and an exercise routine    APRYL (obstructive sleep apnea) 6/26/2015    no cpap    Other and unspecified hyperlipidemia     HDL's are not at goal, LDL's are at goal, triglycerides are not at goal.    Other and unspecified hyperlipidemia     HDL's are not at goal, LDL's are at goal, triglycerides are not at goal.     Other ill-defined conditions(789.89) 1960    pneumonia twice    Sleep disturbance     Type II or unspecified type diabetes mellitus without mention of complication, not stated as uncontrolled          Patient taking anticoagulants yes     ASSESSMENT:    Changes in Assessment Throughout Shift: none     Patient has Central Line: no Reasons if yes:    Patient has Xiao Cath: no Reasons if yes:       Last Vitals:     Vitals:    01/28/18 1932 01/28/18 2339 01/29/18 0327 01/29/18 0400   BP: 144/75 176/77 157/78    Pulse: 69 69 68    Resp: 17 18 18    Temp: 99.3 °F (37.4 °C) 97.9 °F (36.6 °C) 98.5 °F (36.9 °C)    SpO2: 95% 97% 97%    Weight:    100.2 kg (220 lb 12.8 oz)   Height:            IV and DRAINS (will only show if present)   Peripheral IV 01/28/18 Right Antecubital-Site Assessment: Clean, dry, & intact  Hemodialysis Access-Site Assessment: Clean, dry, & intact  Peripheral IV 01/28/18 Right;Upper Arm-Site Assessment: Clean, dry, & intact     WOUND (if present)   Wound Type:  none   Dressing present Dressing Present : No   Wound Concerns/Notes:  none     PAIN    Pain Assessment    Pain Intensity 1: 0 (01/29/18 0516)    Pain Location 1: Chest         Patient Stated Pain Goal: 0  o Interventions for Pain:  none  o Intervention effective: no pain  o Time of last intervention:    o Reassessment Completed: no      Last 3 Weights:  Last 3 Recorded Weights in this Encounter    01/27/18 1941 01/28/18 0425 01/29/18 0400   Weight: 108.9 kg (240 lb) 100.7 kg (222 lb 1.6 oz) 100.2 kg (220 lb 12.8 oz)     Weight change: -8.709 kg (-19 lb 3.2 oz)     INTAKE/OUPUT    Current Shift:      Last three shifts: 01/27 1901 - 01/29 0700  In: -   Out: 400 [Urine:400]     LAB RESULTS     Recent Labs      01/29/18   0450  01/28/18   1328  01/27/18   2124   WBC  9.1  7.2  9.0   HGB  12.1*  11.1*  11.4*   HCT  39.4  35.8*  36.4   PLT  295  273  257        Recent Labs      01/29/18   0450  01/27/18   2124   NA  139  135*   K  3.8  4.0   GLU  94  360* BUN  38*  26*   CREA  4.52*  3.94*   CA  8.9  8.5   MG   --   2.0   INR   --   1.0       RECOMMENDATIONS AND DISCHARGE PLANNING     1. Pending tests/procedures/ Plan of Care or Other Needs: none     2. Discharge plan for patient and Needs/Barriers:     3. Estimated Discharge Date: tbd Posted on Whiteboard in [facility]: yes      4. The patient's care plan was reviewed with the oncoming nurse. \"HEALS\" SAFETY CHECK      Fall Risk    Total Score: 2    Safety Measures: Safety Measures: Bed/Chair-Wheels locked, Bed in low position, Call light within reach, Fall prevention (comment), Gripper socks    A safety check occurred in the patient's room between off going nurse and oncoming nurse listed above. The safety check included the below items  Area Items   H  High Alert Medications - Verify all high alert medication drips (heparin, PCA, etc.)   E  Equipment - Suction is set up for ALL patients (with ely)  - Red plugs utilized for all equipment (IV pumps, etc.)  - WOWs wiped down at end of shift.  - Room stocked with oxygen, suction, and other unit-specific supplies   A  Alarms - Bed alarm is set for fall risk patients  - Ensure chair alarm is in place and activated if patient is up in a chair   L  Lines - Check IV for any infiltration  - Xiao bag is empty if patient has a Xiao   - Tubing and IV bags are labeled   S  Safety   - Room is clean, patient is clean, and equipment is clean. - Hallways are clear from equipment besides carts. - Fall bracelet on for fall risk patients  - Ensure room is clear and free of clutter  - Suction is set up for ALL patients (with ely)  - Hallways are clear from equipment besides carts.    - Isolation precautions followed, supplies available outside room, sign posted     Tosin Beams

## 2018-01-29 NOTE — DISCHARGE INSTRUCTIONS
Chest Pain: Care Instructions  Your Care Instructions    There are many things that can cause chest pain. Some are not serious and will get better on their own in a few days. But some kinds of chest pain need more testing and treatment. Your doctor may have recommended a follow-up visit in the next 8 to 12 hours. If you are not getting better, you may need more tests or treatment. Even though your doctor has released you, you still need to watch for any problems. The doctor carefully checked you, but sometimes problems can develop later. If you have new symptoms or if your symptoms do not get better, get medical care right away. If you have worse or different chest pain or pressure that lasts more than 5 minutes or you passed out (lost consciousness), call 911 or seek other emergency help right away. A medical visit is only one step in your treatment. Even if you feel better, you still need to do what your doctor recommends, such as going to all suggested follow-up appointments and taking medicines exactly as directed. This will help you recover and help prevent future problems. How can you care for yourself at home? · Rest until you feel better. · Take your medicine exactly as prescribed. Call your doctor if you think you are having a problem with your medicine. · Do not drive after taking a prescription pain medicine. When should you call for help? Call 911 if:  ? · You passed out (lost consciousness). ? · You have severe difficulty breathing. ? · You have symptoms of a heart attack. These may include:  ¨ Chest pain or pressure, or a strange feeling in your chest.  ¨ Sweating. ¨ Shortness of breath. ¨ Nausea or vomiting. ¨ Pain, pressure, or a strange feeling in your back, neck, jaw, or upper belly or in one or both shoulders or arms. ¨ Lightheadedness or sudden weakness. ¨ A fast or irregular heartbeat.   After you call 911, the  may tell you to chew 1 adult-strength or 2 to 4 low-dose aspirin. Wait for an ambulance. Do not try to drive yourself. ?Call your doctor today if:  ? · You have any trouble breathing. ? · Your chest pain gets worse. ? · You are dizzy or lightheaded, or you feel like you may faint. ? · You are not getting better as expected. ? · You are having new or different chest pain. Where can you learn more? Go to http://kendal-farzaneh.info/. Enter A120 in the search box to learn more about \"Chest Pain: Care Instructions. \"  Current as of: March 20, 2017  Content Version: 11.4  © 2405-7374 [employer]. Care instructions adapted under license by [employer] (which disclaims liability or warranty for this information). If you have questions about a medical condition or this instruction, always ask your healthcare professional. [place] [address] any warranty or liability for your use of this information. Kidney Dialysis: Care Instructions  Your Care Instructions    Dialysis is a process that filters wastes from the blood when your kidneys can no longer do the job. It is not a cure, but it can help you live longer and feel better. It is a lifesaving treatment when you have kidney failure. Normal kidneys work 24 hours a day to clean wastes from your blood. Your kidneys are not able to do this job, so a process called dialysis will do some of the work for your kidneys. You and your doctor will decide which type of dialysis you should have. Peritoneal dialysis uses the lining of your belly (peritoneum) to filter your blood. You can do it at home, on a daily basis. Hemodialysis uses a man-made filter called a dialyzer to clean your blood. Most people need to go to a hospital or clinic 3 days a week for several hours each time. Sometimes hemodialysis can be done at home. It is normal to have questions about your treatment, and you have a right to know what is happening to you.  Learning about dialysis can help you take an active role in your treatment. Dialysis does not cure kidney disease, but it can help you live longer and feel better. You will need to follow your diet and treatment schedule carefully. Follow-up care is a key part of your treatment and safety. Be sure to make and go to all appointments, and call your doctor if you are having problems. It's also a good idea to know your test results and keep a list of the medicines you take. What do you need to know about peritoneal dialysis? Peritoneal dialysis uses the lining of your belly (or peritoneal membrane) to filter your blood. Before you can begin peritoneal dialysis, your doctor will need to place a thin tube called a catheter in your belly. This is the dialysis access. · Peritoneal dialysis can be done at home or in any clean place. You may be able to do it while you sleep. · You can do it by yourself. You do not have to rely on help from others. · You can do it at the times you choose as long as you do the right number of treatments. · It has to be done every day of the week. · Some people find it hard to do all the required steps. · It increases your chance for a serious infection of the lining of the belly (peritoneum). What do you need to know about hemodialysis? Hemodialysis uses a man-made membrane called a dialyzer to clean your blood. You are connected to the dialyzer by tubes attached to your blood vessels. Before you start hemodialysis, your doctor will create a site where the blood can flow in and out of your body during your dialysis sessions. This site is called the vascular access. It may be a fistula, made by connecting an artery and a vein. Or it may be a graft, which is a tube implanted under your skin. · Hemodialysis is done mainly by trained health workers who can watch for any problems. · It allows you to be in contact with other people having dialysis. This can help provide emotional support.   · You can schedule your treatments in the evenings so you can keep working. · You may be able to do home hemodialysis, which gives you more control over your schedule. · It usually needs to be done on a set schedule 3 times a week. · It can cause side effects. The most common side effects are low blood pressure and muscle cramps. These can often be treated easily. · It requires needle sticks during every treatment, which bothers some people. Others get used to it and even do the needle sticks themselves. How can you care for yourself at home? · Be sure to have all of your dialysis sessions. Do not try to shorten or skip your sessions. You have a better chance of a longer and healthier life by getting your full treatment. · Your doctor or health care team will show you the steps you need to go through each day before, during, and after dialysis. Be sure to follow these steps. If you do not understand a step, talk to your team.  · Your doctor and dietitian will help you design menus that follow your diet. Be sure to follow your diet guidelines. ¨ You will need to limit fluids and certain foods that contain salt (sodium), potassium, and phosphorus. ¨ You may need to follow a heart-healthy diet to keep the fat (cholesterol) in your blood under control. ¨ You may need higher levels of protein in your diet. · Your doctor may recommend certain vitamins. But do not take any other medicine, including over-the-counter medicines, vitamins, and herbal products, without talking to your doctor first.  · Do not smoke. Smoking raises your risk of many health problems, including more kidney damage. If you need help quitting, talk to your doctor about stop-smoking programs and medicines. These can increase your chances of quitting for good. · Do not take ibuprofen (Advil, Motrin), naproxen (Aleve), or similar medicines, unless your doctor tells you to. These medicines may make kidney problems worse. When should you call for help?   Call your doctor now or seek immediate medical care if:  ? · You have a fever. ? · You are dizzy or lightheaded, or you feel like you may faint. ? · You are confused or cannot think clearly. ? · You have new or worse nausea or vomiting. ? · You have new or more blood in your urine. ? · You have new swelling. ? Watch closely for changes in your health, and be sure to contact your doctor if:  ? · You do not get better as expected. Where can you learn more? Go to http://kendal-farzaneh.info/. Enter H904 in the search box to learn more about \"Kidney Dialysis: Care Instructions. \"  Current as of: May 12, 2017  Content Version: 11.4  © 4883-0841 [employer]. Care instructions adapted under license by [employer] (which disclaims liability or warranty for this information). If you have questions about a medical condition or this instruction, always ask your healthcare professional. [place] [address] any warranty or liability for your use of this information.

## 2018-01-29 NOTE — DIABETES MGMT
GLYCEMIC CONTROL PLAN OF CARE    Assessment:  Pt is 68 yr old male admitted on 1/27/18 for treatment and evaluation of chest pain. Pt with past medical history significant for HTN, T2DM, CHF, GERD, ESRD on HD, CAD, HLD, Body mass index is 32.61 kg/(m^2). -Obesity. Recommendations:  Continue current diabetes medications. Diabetic education. Will continue to monitor inpatient for intervention.     Most recent blood glucose values:  Lab Results   Component Value Date/Time    GLU 94 01/29/2018 04:50 AM    GLUCPOC 153 (H) 01/29/2018 12:24 PM    GLUCPOC 113 (H) 01/29/2018 07:57 AM    GLUCPOC 199 (H) 01/28/2018 08:43 PM     Current A1C:  7.3%-1/29/18 estimated average blood glucose of 163 mg/dl over the past 2-3 months    Current hospital diabetes medications:  20 unit lantus every 12 hours  Correctional lispro ACHS- normal insulin sensitivity scale    Previous day Insulin TDD:  1/28: 60 units lantus    Diet:   Diabetic consistent carbohydrate, aha low chol fat    Home diabetes medications:   pt reports taking 35 units lantus two times daily and novolog ssi with meals    Goals:  Pt BG will be within target range by _2/1/18____    Education:  _x__  Refer to Diabetes Education Record             ___  Education not indicated at this time    Jesi Teixeira, [address] [place] [address], E  Pager: [phone]

## 2018-01-29 NOTE — DIABETES MGMT
Diabetes Patient/Family Education Record    Factors That  May Influence Patients Ability  to Learn or  Comply with Recommendations   []   Language barrier    []   Cultural needs   []   Motivation    []   Cognitive limitation    []   Physical   []   Education    []   Physiological factors   [x]   Hearing   []   Congregation beliefs    []   Financial factors   []  Other:   [x]  No factors identified at this time.      Person Instructed:   [x]   Patient   []   Family   []  Other     Preference for Learning:   [x]   Verbal   [x]   Written   []  Demonstration     Level of Comprehension & Competence:    []  Good                                      [x] Fair                                     []  Poor                             []  Needs Reinforcement   [x]  Teachback completed    Education Component:   [x]  Medication management,     [x]  Nutritional management    []  Exercise   []  Signs, symptoms, and treatment of hyperglycemia and hypoglycemia   [] Prevention, recognition and treatment of hyperglycemia and hypoglycemia   [x]  Importance of blood glucose monitoring and how to obtain a blood glucose meter    []  Instruction on use of the blood glucose meter   [x]  Discuss the importance of HbA1C monitoring    []  Sick day guidelines   []  Proper use and disposal of lancets, needles, syringes or insulin pens (if appropriate)   [x]  Potential long-term complications (retinopathy, kidney disease, neuropathy, foot care)   [x] Information about whom to contact in case of emergency or for more information    [x]  Goal:  Patient/family will demonstrate understanding of Diabetes Self Management Skills by: (date) _2/5/18______  Plan for post-discharge education or self-management support:    [] Outpatient class schedule provided            [x] Patient Declined    [] Scheduled for outpatient classes (date) _______     Kamala Iqbal MS, [address], [facility]  Pager: [phone]

## 2018-01-29 NOTE — PROGRESS NOTES
NICOLE INITIAL NOTE: SW met with pt to verify information for this assessment. Pt receives dialysis at [facility] and Sat chair time 5 am. Pt reported he drives himself. NICOLE confirmed with [facility] [address] (P: [phone] F: [phone]) that the pt indeed is seen at that facility in [place]. Pt uses a straight cane when ambulating. Pt reported a hx of HH with PT and OT. He reported attending classes in [place]; however, it was difficulty to continue due to the commute. Care Management Interventions  PCP Verified by CM: Yes Rochelle Linton MD last seen about 1 month ago)  Palliative Care Criteria Met (RRAT>21 & CHF Dx)?: No  Mode of Transport at Discharge: BLS (Pt's son can provide discharge transport)  Physical Therapy Consult: No  Occupational Therapy Consult: No  Speech Therapy Consult: No  Current Support Network: Lives with Spouse (Pt lives with his wife in a 1 story home with about 2 steps to enter the home)  Confirm Follow Up Transport: Family    SW will be available for discharge purposes.     LARRY Cooney

## 2018-01-29 NOTE — PROGRESS NOTES
[facility]      CARDIOLOGY PROGRESS NOTE  RECS:    1. NSTEMI- Acute non-ST elevation myocardial infarction, recurrent in a patient with multiple risk factors and extensive underlying disease. continue with medical  managment for now IV heparin, beta blockers, asa, statin. follow cardiac enzymes and ekg today. Further plans based on clinical course  2. Congestive heart failure, chronic diastolic NYHA class 3: compensated will monitor for s/s fluid overload. 3. CAD with multiple PCI last PCI with HERBER in SVG to diagonal in 11/17 - will continue with Plavix and asa.  4. Hx of CABG -old   5. Non sustained VT- short run asymptomatic. Follow mag level today  6. Hyperlipidemia-continue  Crestor  7. Hypertension- elevated this am. Haven't got his morning meds. 8. Diabetes. Treatment per medicine. 9. End-stage renal disease, on hemodialysis. Patient with known CAD s/p PCI admitted with one episode of chest pain which relieved with sl NTG. Discussed with patient at length regarding further CAD w/u- prefers conservative medical management.   Will ambulate today        ASSESSMENT:  Hospital Problems  Date Reviewed: 1/19/2018          Codes Class Noted POA    Elevated troponin ICD-10-CM: R74.8  ICD-9-CM: 790.6  1/28/2018 Unknown        ESRD (end stage renal disease) ([facility] [address] ICD-10-CM: N18.6  ICD-9-CM: 585.6  11/8/2017 Unknown        Chest pain ICD-10-CM: R07.9  ICD-9-CM: 786.50  10/27/2017 Unknown                SUBJECTIVE:    No CP  No SOB    OBJECTIVE:    VS:   Visit Vitals    /72 (BP 1 Location: Right arm, BP Patient Position: Sitting)    Pulse 77    Temp 97.9 °F (36.6 °C)    Resp 18    Ht 5' 9\" (1.753 m)    Wt 100.2 kg (220 lb 12.8 oz)    SpO2 95%    BMI 32.61 kg/m2         Intake/Output Summary (Last 24 hours) at 01/29/18 0903  Last data filed at 01/29/18 1759   Gross per 24 hour   Intake                0 ml   Output              400 ml   Net             -400 ml     TELE: normal sinus rhythm    General: alert, well developed, pleasant and in no apparent distress  HENT: Normocephalic, atraumatic. Normal external eye. Neck :  JVD difficult to assess due to obesity  Cardiac:  regular rate and rhythm, S1, S2 normal, no click, no rub  Lungs: clear to auscultation bilaterally  Abdomen: Soft, nontender, no masses  Extremities:  No c/c/e, peripheral pulses present      Labs: Results:       Chemistry Recent Labs      01/29/18   0450  01/27/18 2124   GLU  94  360*   NA  139  135*   K  3.8  4.0   CL  104  99*   CO2  27  30   BUN  38*  26*   CREA  4.52*  3.94*   CA  8.9  8.5   AGAP  8  6   BUCR  8*  7*      CBC w/Diff Recent Labs      01/29/18   0450  01/28/18   1328  01/27/18 2124   WBC  9.1  7.2  9.0   RBC  4.41*  3.97*  4.06*   HGB  12.1*  11.1*  11.4*   HCT  39.4  35.8*  36.4   PLT  295  273  257   GRANS   --   54  71   LYMPH   --   33  20*   EOS   --   4  2      Cardiac Enzymes Recent Labs      01/28/18   0945  01/27/18   2248   CPK  50  53   CKND1  5.6*  5.1*      Coagulation Recent Labs      01/29/18   0450  01/28/18   1925   01/27/18 2124   PTP   --    --    --   12.5   INR   --    --    --   1.0   APTT  59.2*  42.6*   < >  27.6    < > = values in this interval not displayed. Lipid Panel Lab Results   Component Value Date/Time    Cholesterol, total 158 10/24/2017 05:08 AM    HDL Cholesterol 39 10/24/2017 05:08 AM    LDL, calculated 72.4 10/24/2017 05:08 AM    VLDL, calculated 46.6 10/24/2017 05:08 AM    Triglyceride 233 10/24/2017 05:08 AM    CHOL/HDL Ratio 4.1 10/24/2017 05:08 AM      BNP No results for input(s): BNPP in the last 72 hours. Liver Enzymes No results for input(s): TP, ALB, TBIL, AP, SGOT, GPT in the last 72 hours.     No lab exists for component: DBIL   Thyroid Studies Lab Results   Component Value Date/Time    T4, Total 10.9 10/09/2012 04:53 AM    TSH 2.16 10/23/2017 11:08 PM              Urszula Coughlin Clarissa:[phone] supervised    I have independently evaluated and examined the patient. All relevant labs and testing data's are reviewed. Care plan discussed and updated after review.     Dejan Rodriguez MD

## 2018-01-29 NOTE — DISCHARGE SUMMARY
[facility]  [facility]       Patient: Daryn Mercer Age: 68 y.o. : 1940 MR#: [id number] SSN: [id number]  PCP on record: Len Nelson MD  Admit date: 2018  Discharge date: 2018    Consults:Dr Darrell Jeff-nephrology  Dr. Brian Bumpers Gupta-cardiology    -   Procedures: none  -     Significant Diagnostic Studies:   -    Discharge Diagnoses: NSTEMI                                          Patient Active Problem List   Diagnosis Code    NSTEMI (non-ST elevated myocardial infarction) ([facility] I21.4    Coronary atherosclerosis of native coronary artery I25.10    CKD (chronic kidney disease) stage 4, GFR 15-29 ml/min ([facility]) N18.4    HTN (hypertension) I10    DM2 (diabetes mellitus, type 2) ([facility] E11.9    Dyslipidemia E78.5    Contrast dye induced nephropathy T50.8X1A, N14.1    Chest pain, unspecified R07.9    S/P coronary artery stent placement Z95.5    Postsurgical aortocoronary bypass status Z95.1    Atherosclerosis of renal artery ([facility]) I70.1    Chronic diastolic heart failure ([facility]) I50.32    Morbid obesity ([facility] E66.01    Essential hypertension, benign I10    Sleep disturbance G47.9    Coronary atherosclerosis I25.10    Mixed hyperlipidemia E78.2    Type II or unspecified type diabetes mellitus without mention of complication, not stated as uncontrolled E11.9    Abdominal pain R10.9    GERD (gastroesophageal reflux disease) K21.9    Constipation K59.00    Chronic renal insufficiency N18.9    Prostate cancer ([facility] C61    APRYL (obstructive sleep apnea) M84.18    Diastolic CHF, acute on chronic ([facility]) I50.33    CHF (congestive heart failure), NYHA class IV ([facility]) I50.9    Fatigue R53.83    Secondary hyperparathyroidism of renal origin ([facility] N25.81    Chest pain R07.9    Hyperuricemia E79.0    Chronic kidney disease, stage V ([facility]) N18.5    ESRD (end stage renal disease) ([facility]) N18.6    Hypotension I95.9    Fluid overload E87.70  Hyperkalemia E87.5    Type 2 diabetes mellitus with nephropathy (HCC) E11.21    Elevated troponin R74.8       Hospital Course by Problem   68year old male w/ h/ CAD among many other med conditions who presented w/ cc of chest pain. He had serial cardiac enzyme checks with mild elevation and was started on heparin gtt. His enzymes have trended down. He was evaluated by his cardiology group with ultimate decision to pursue medical management. Patient in agreement. He remained chest pain free and overall had the one episode of cp that led to the admission. Today's examination of the patient revealed:     Subjective:   Pt has no complaints.   Objective:   VS:   Visit Vitals    /73 (BP 1 Location: Right arm, BP Patient Position: Sitting)    Pulse 70    Temp 99.2 °F (37.3 °C)    Resp 18    Ht 5' 9\" (1.753 m)    Wt 100.2 kg (220 lb 12.8 oz)    SpO2 97%    BMI 32.61 kg/m2      Tmax/24hrs: Temp (24hrs), Av.6 °F (37 °C), Min:97.9 °F (36.6 °C), Max:99.3 °F (37.4 °C)     Input/Output:   Intake/Output Summary (Last 24 hours) at 18 1632  Last data filed at 18 4353   Gross per 24 hour   Intake           192.23 ml   Output              400 ml   Net          -207.77 ml       General: alert, awake, in nad   Cardiovascular:  Rrr, no murmurs  Pulmonary:  ctab  GI:  Soft, nt, nd,  Extremities:  No edema  Additional:      Labs:    Recent Results (from the past 24 hour(s))   PTT    Collection Time: 18  7:25 PM   Result Value Ref Range    aPTT 42.6 (H) 23.0 - 36.4 SEC   GLUCOSE, POC    Collection Time: 18  8:43 PM   Result Value Ref Range    Glucose (POC) 199 (H) 70 - 260 mg/dL   METABOLIC PANEL, BASIC    Collection Time: 18  4:50 AM   Result Value Ref Range    Sodium 139 136 - 145 mmol/L    Potassium 3.8 3.5 - 5.5 mmol/L    Chloride 104 100 - 108 mmol/L    CO2 27 21 - 32 mmol/L    Anion gap 8 3.0 - 18 mmol/L    Glucose 94 74 - 99 mg/dL    BUN 38 (H) 7.0 - 18 MG/DL Creatinine 4.52 (H) 0.6 - 1.3 MG/DL    BUN/Creatinine ratio 8 (L) 12 - 20      GFR est AA 15 (L) >60 ml/min/1.73m2    GFR est non-AA 13 (L) >60 ml/min/1.73m2    Calcium 8.9 8.5 - 10.1 MG/DL   CBC W/O DIFF    Collection Time: 01/29/18  4:50 AM   Result Value Ref Range    WBC 9.1 4.6 - 13.2 K/uL    RBC 4.41 (L) 4.70 - 5.50 M/uL    HGB 12.1 (L) 13.0 - 16.0 g/dL    HCT 39.4 36.0 - 48.0 %    MCV 89.3 74.0 - 97.0 FL    MCH 27.4 24.0 - 34.0 PG    MCHC 30.7 (L) 31.0 - 37.0 g/dL    RDW 15.9 (H) 11.6 - 14.5 %    PLATELET 079 061 - 425 K/uL    MPV 10.7 9.2 - 11.8 FL   PTT    Collection Time: 01/29/18  4:50 AM   Result Value Ref Range    aPTT 59.2 (H) 23.0 - 36.4 SEC   GLUCOSE, POC    Collection Time: 01/29/18  7:57 AM   Result Value Ref Range    Glucose (POC) 113 (H) 70 - 110 mg/dL   PTT    Collection Time: 01/29/18 10:45 AM   Result Value Ref Range    aPTT 25.0 23.0 - 36.4 SEC   HEMOGLOBIN A1C W/O EAG    Collection Time: 01/29/18 10:45 AM   Result Value Ref Range    Hemoglobin A1c 7.3 (H) 4.2 - 5.6 %   CARDIAC PANEL,(CK, CKMB & TROPONIN)    Collection Time: 01/29/18 10:45 AM   Result Value Ref Range    CK 48 39 - 308 U/L    CK - MB 2.1 <3.6 ng/ml    CK-MB Index 4.4 (H) 0.0 - 4.0 %    Troponin-I, Qt. 0.12 (H) 0.0 - 0.045 NG/ML   MAGNESIUM    Collection Time: 01/29/18 10:45 AM   Result Value Ref Range    Magnesium 2.2 1.6 - 2.6 mg/dL   GLUCOSE, POC    Collection Time: 01/29/18 12:24 PM   Result Value Ref Range    Glucose (POC) 153 (H) 70 - 110 mg/dL     Additional Data Reviewed:     Condition:   Disposition:    []Home   []Home with Home Health   []SNF/NH   []Rehab   []Home with family   []Alternate Facility:____________________      Discharge Medications:     Current Discharge Medication List      CONTINUE these medications which have NOT CHANGED    Details   losartan (COZAAR) 50 mg tablet TAKE ONE TABLET BY MOUTH ONE TIME DAILY   Qty: 30 Tab, Refills: 0      tamsulosin (FLOMAX) 0.4 mg capsule TAKE ONE CAPSULE BY MOUTH ONE TIME DAILY  Qty: 90 Cap, Refills: 3      isosorbide mononitrate ER (IMDUR) 30 mg tablet Take 1 Tab by mouth daily. Qty: 30 Tab, Refills: 0      amLODIPine (NORVASC) 5 mg tablet Take 1 Tab by mouth daily. Qty: 30 Tab, Refills: 0      HYDROcodone-acetaminophen (NORCO) 5-325 mg per tablet Take 1-2 Tabs by mouth every four (4) hours as needed for Pain. Max Daily Amount: 12 Tabs. Qty: 12 Tab, Refills: 0      aspirin 81 mg chewable tablet Take 1 Tab by mouth daily. Qty: 30 Tab, Refills: 0      b complex-vitamin c-folic acid (NEPHROCAPS) 1 mg capsule Take 1 Cap by mouth daily. Qty: 30 Cap, Refills: 0      senna-docusate (PERICOLACE) 8.6-50 mg per tablet Take 1 Tab by mouth daily. Qty: 30 Tab, Refills: 0      fenofibrate (LOFIBRA) 54 mg tablet Take 1 Tab by mouth daily. Qty: 30 Tab, Refills: 5    Associated Diagnoses: Dyslipidemia      simvastatin (ZOCOR) 40 mg tablet TAKE 1 TABLET BY MOUTH NIGHTLY. Qty: 90 Tab, Refills: 2      metoprolol tartrate (LOPRESSOR) 100 mg IR tablet TAKE 1 TABLET BY MOUTH TWO TIMES A DAY. Qty: 180 Tab, Refills: 2      clopidogrel (PLAVIX) 75 mg tab Take 1 Tab by mouth daily. Qty: 90 Tab, Refills: 3      Cholecalciferol, Vitamin D3, (DECARA) 50,000 unit cap Take  by mouth every seven (7) days. insulin glargine (LANTUS) 100 unit/mL injection 20 Units by SubCUTAneous route every twelve (12) hours. Qty: 1 Vial, Refills: 2      nitroglycerin (NITROSTAT) 0.4 mg SL tablet 1 Tab by SubLINGual route as needed for Chest Pain. Qty: 25 Tab, Refills: 1      insulin aspart (NOVOLOG) 100 unit/mL injection 20 units sq 3 times a day,sliding scale  Qty: 10 mL, Refills: 0      allopurinol (ZYLOPRIM) 100 mg tablet Take 100 mg by mouth daily. Follow-up Appointments:   1. Your PCP: Daren Arizmendi MD, within 7-10days  2.  Your cardiologist in 2-3 wks            >30 minutes spent coordinating this discharge (review instructions/follow-up, prescriptions, preparing report for sign off)    Signed:  Keith Harris MD  1/29/2018  4:32 PM

## 2018-01-29 NOTE — PROGRESS NOTES
Patient discharged home today with son. IV and tele box removed. Discharge instructions reviewed with patient. No new prescriptions. Patient education on chest pain provided, patient acceptant of information. All belongings given to patient. Patient left with no issues or complaints at time of discharge.

## 2018-01-29 NOTE — PROGRESS NOTES
Progress Note    Leander Fair  68 y.o. Admit Date: 1/27/2018  Active Problems:    Chest pain (10/27/2017) POA: Unknown      ESRD (end stage renal disease) ([facility] [address] (11/8/2017) POA: Unknown      Elevated troponin (1/28/2018) POA: Unknown            Subjective:     Patient seen earlier, no new complain, no SOB, no Chest pain. A comprehensive review of systems was negative except for that written in the History of Present Illness.     Objective:     Visit Vitals    /73 (BP 1 Location: Right arm, BP Patient Position: Sitting)    Pulse 70    Temp 99.2 °F (37.3 °C)    Resp 18    Ht 5' 9\" (1.753 m)    Wt 100.2 kg (220 lb 12.8 oz)    SpO2 97%    BMI 32.61 kg/m2         Intake/Output Summary (Last 24 hours) at 01/29/18 1827  Last data filed at 01/29/18 0130   Gross per 24 hour   Intake           192.23 ml   Output              400 ml   Net          -207.77 ml       Current Facility-Administered Medications   Medication Dose Route Frequency Provider Last Rate Last Dose    [START ON 1/30/2018] epoetin lee (EPOGEN;PROCRIT) 5,000 Units  5,000 Units IntraVENous DIALYSIS [facility] SAT Verner Musca, MD        [START ON 1/30/2018] doxercalciferol (HECTOROL) 4 mcg/2 mL injection 1 mcg  1 mcg IntraVENous DIALYSIS [facility] Verner Musca, MD        losartan (COZAAR) tablet 50 mg  50 mg Oral DAILY Callum Lawler MD   50 mg at 01/29/18 0903    tamsulosin (FLOMAX) capsule 0.4 mg  0.4 mg Oral DAILY Callum Lawler MD   0.4 mg at 01/29/18 0903    HYDROcodone-acetaminophen (NORCO) 5-325 mg per tablet 1 Tab  1 Tab Oral Q4H PRN Callum Lawler MD        isosorbide mononitrate ER (IMDUR) tablet 30 mg  30 mg Oral DAILY Callum Lawler MD   30 mg at 01/29/18 1806    aspirin chewable tablet 81 mg  81 mg Oral DAILY Callum Lawler MD   81 mg at 01/29/18 0903    senna-docusate (PERICOLACE) 8.6-50 mg per tablet 1 Tab  1 Tab Oral DAILY Callum Lawler MD   1 Tab at 01/29/18 0903    fenofibrate nanocrystallized (TRICOR) tablet 48 mg  48 mg Oral DAILY Vikki Moritz, MD   48 mg at 01/29/18 0903    metoprolol tartrate (LOPRESSOR) tablet 100 mg  100 mg Oral BID Vikki Moritz, MD   100 mg at 01/29/18 0903    clopidogrel (PLAVIX) tablet 75 mg  75 mg Oral DAILY Vikki Moritz, MD   75 mg at 01/29/18 1033    insulin glargine (LANTUS) injection 20 Units  20 Units SubCUTAneous Q12H Vikki Moritz, MD   20 Units at 01/29/18 0900    allopurinol (ZYLOPRIM) tablet 100 mg  100 mg Oral DAILY Vikki Moritz, MD   100 mg at 01/29/18 3716    insulin lispro (HUMALOG) injection   SubCUTAneous TIDAC Vikki Moritz, MD   2 Units at 01/29/18 1245    glucose chewable tablet 16 g  4 Tab Oral PRN Vikki Moritz, MD        glucagon (GLUCAGEN) injection 1 mg  1 mg IntraMUSCular PRN Vikki Moritz, MD        dextrose (D50W) injection syrg 12.5-25 g  25-50 mL IntraVENous PRN Vikki Moritz, MD        albuterol (PROVENTIL VENTOLIN) nebulizer solution 2.5 mg  2.5 mg Nebulization Q4H PRN Vikki Moritz, MD        amLODIPine (NORVASC) tablet 2.5 mg  2.5 mg Oral DAILY Natalie Larson MD   2.5 mg at 01/29/18 8264    rosuvastatin (CRESTOR) tablet 40 mg  40 mg Oral QHS Natalie Larson MD   40 mg at 01/28/18 2317     Current Outpatient Prescriptions   Medication Sig Dispense Refill    losartan (COZAAR) 50 mg tablet TAKE ONE TABLET BY MOUTH ONE TIME DAILY  30 Tab 0    tamsulosin (FLOMAX) 0.4 mg capsule TAKE ONE CAPSULE BY MOUTH ONE TIME DAILY 90 Cap 3    isosorbide mononitrate ER (IMDUR) 30 mg tablet Take 1 Tab by mouth daily. 30 Tab 0    amLODIPine (NORVASC) 5 mg tablet Take 1 Tab by mouth daily. 30 Tab 0    HYDROcodone-acetaminophen (NORCO) 5-325 mg per tablet Take 1-2 Tabs by mouth every four (4) hours as needed for Pain. Max Daily Amount: 12 Tabs. 12 Tab 0    aspirin 81 mg chewable tablet Take 1 Tab by mouth daily. 30 Tab 0    b complex-vitamin c-folic acid (NEPHROCAPS) 1 mg capsule Take 1 Cap by mouth daily. 30 Cap 0    senna-docusate (PERICOLACE) 8.6-50 mg per tablet Take 1 Tab by mouth daily. 30 Tab 0    fenofibrate (LOFIBRA) 54 mg tablet Take 1 Tab by mouth daily. 30 Tab 5    simvastatin (ZOCOR) 40 mg tablet TAKE 1 TABLET BY MOUTH NIGHTLY. 90 Tab 2    metoprolol tartrate (LOPRESSOR) 100 mg IR tablet TAKE 1 TABLET BY MOUTH TWO TIMES A DAY. 180 Tab 2    clopidogrel (PLAVIX) 75 mg tab Take 1 Tab by mouth daily. 90 Tab 3    Cholecalciferol, Vitamin D3, (DECARA) 50,000 unit cap Take  by mouth every seven (7) days.  insulin glargine (LANTUS) 100 unit/mL injection 20 Units by SubCUTAneous route every twelve (12) hours. (Patient taking differently: 35 Units by SubCUTAneous route every twelve (12) hours. ) 1 Vial 2    nitroglycerin (NITROSTAT) 0.4 mg SL tablet 1 Tab by SubLINGual route as needed for Chest Pain. 25 Tab 1    insulin aspart (NOVOLOG) 100 unit/mL injection 20 units sq 3 times a day,sliding scale (Patient taking differently: sliding scale with meals) 10 mL 0    allopurinol (ZYLOPRIM) 100 mg tablet Take 100 mg by mouth daily. Physical Exam:     Physical Exam:   General:  Alert, cooperative, no distress, appears stated age. Neck: Supple, symmetrical, trachea midline, no adenopathy, thyroid: no enlargement/tenderness/nodules, no carotid bruit and no JVD. Lungs:   Clear to auscultation bilaterally. Heart:  Regular rate and rhythm, S1, S2 normal, no murmur, click, rub or gallop. Abdomen:   Soft, non-tender. Bowel sounds normal. No masses,  No organomegaly. Extremities: Extremities normal, atraumatic, no cyanosis or edema, AVF has good thrill & bruit, HD catheter is well dressed.    Skin: Skin color, texture, turgor normal. No rashes or lesions         Data Review:    CBC w/Diff    Recent Labs      01/29/18   0450  01/28/18   1328  01/27/18   2124   WBC  9.1  7.2  9.0   RBC  4.41*  3.97*  4.06*   HGB  12.1*  11.1*  11.4*   HCT  39.4  35.8*  36.4   MCV  89.3  90.2  89.7   MCH  27.4  28.0 28.1   MCHC  30.7*  31.0  31.3   RDW  15.9*  15.7*  15.7*    Recent Labs      01/29/18   0450  01/28/18   1328  01/27/18 2124   MONOS   --   8  7   EOS   --   4  2   BASOS   --   1  0   RDW  15.9*  15.7*  15.7*        Comprehensive Metabolic Profile    Recent Labs      01/29/18   0450  01/27/18 2124   NA  139  135*   K  3.8  4.0   CL  104  99*   CO2  27  30   BUN  38*  26*   CREA  4.52*  3.94*    Recent Labs      01/29/18   0450  01/27/18 2124   CA  8.9  8.5                      Impression:       Active Hospital Problems    Diagnosis Date Noted    Elevated troponin 01/28/2018    ESRD (end stage renal disease) ([facility] [address] 11/08/2017    Chest pain 10/27/2017            Plan:     AVF is ready to use, will start using  AVF with 1 needle as planned.  Dialysis tomorrow      Sai Saldivar MD

## 2018-01-31 ENCOUNTER — PATIENT OUTREACH (OUTPATIENT)
Dept: CASE MANAGEMENT | Age: 78
End: 2018-01-31

## 2018-02-06 ENCOUNTER — PATIENT OUTREACH (OUTPATIENT)
Dept: CASE MANAGEMENT | Age: 78
End: 2018-02-06

## 2018-02-06 NOTE — PROGRESS NOTES
Unsuccessful attempt at reaching patient. Left message for patient to return call when convenient. Will continue to follow.

## 2018-02-13 ENCOUNTER — PATIENT OUTREACH (OUTPATIENT)
Dept: CASE MANAGEMENT | Age: 78
End: 2018-02-13

## 2018-02-13 NOTE — PROGRESS NOTES
Gini Knapp is a 68 y.o. male   This patient was received as a referral from inpatient admission   High Risk            39       Total Score        3 Has Seen PCP in Last 6 Months (Yes=3, No=0)    9 IP Visits Last 12 Months (1-3=4, 4=9, >4=11)    27 Charlson Comorbidity Score (Age + Comorbid Conditions)        Criteria that do not apply:    . Living with Significant Other. Assisted Living. LTAC. SNF. or   Rehab    Patient Length of Stay (>5 days = 3)    Pt. Coverage (Medicare=5 , Medicaid, or Self-Pay=4)        Summary of patients top three medical problems:     Problem 1: NSTEMI     Problem 2: HTN     Problem 3: CKD    Patient's challenges to self management identified:  None identified at present time. Medication Management:  good adherence, good understanding  Advance Care Planning:   Patient was offered the opportunity to discuss advance care planning:  yes     Does patient have an Advance Directive:  no   If no, did you provide information on Advance Care Planning? yes     [employer], Referrals, and Durable Medical Equipment: Cane    Follow up appointments:  ENT 2/12/18  Cardiology 2/15/18  Goals        Patient Stated     <enter goal here> (pt-stated)            Fill out advance directive paperwork and provide a copy to all physicians. Other     Attends follow-up appointments as directed.  Prevent complications post hospitalization. Patient verbalized understanding of all information discussed. Patient has this Nurse Navigators contact information for any further questions, concerns, or needs.

## 2018-02-15 ENCOUNTER — OFFICE VISIT (OUTPATIENT)
Dept: CARDIOLOGY CLINIC | Age: 78
End: 2018-02-15

## 2018-02-15 VITALS
DIASTOLIC BLOOD PRESSURE: 55 MMHG | WEIGHT: 222 LBS | SYSTOLIC BLOOD PRESSURE: 133 MMHG | HEART RATE: 73 BPM | HEIGHT: 69 IN | BODY MASS INDEX: 32.88 KG/M2

## 2018-02-15 DIAGNOSIS — Z95.1 POSTSURGICAL AORTOCORONARY BYPASS STATUS: ICD-10-CM

## 2018-02-15 DIAGNOSIS — I25.119 ATHEROSCLEROSIS OF NATIVE CORONARY ARTERY OF NATIVE HEART WITH ANGINA PECTORIS (HCC): Primary | ICD-10-CM

## 2018-02-15 DIAGNOSIS — Z95.5 S/P CORONARY ARTERY STENT PLACEMENT: ICD-10-CM

## 2018-02-15 DIAGNOSIS — E78.5 DYSLIPIDEMIA: ICD-10-CM

## 2018-02-15 DIAGNOSIS — I50.32 CHRONIC DIASTOLIC HEART FAILURE (HCC): ICD-10-CM

## 2018-02-15 DIAGNOSIS — I10 ESSENTIAL HYPERTENSION: ICD-10-CM

## 2018-02-15 NOTE — PROGRESS NOTES
1. Have you been to the ER, urgent care clinic since your last visit? Hospitalized since your last visit? Yes MV for heart attack    2. Have you seen or consulted any other health care providers outside of the [address] [place] [address] since your last visit? Include any pap smears or colon screening.  Yes, pcp

## 2018-02-15 NOTE — PROGRESS NOTES
HISTORY OF PRESENT ILLNESS  Bobo De is a 68 y.o. male. HPI Comments: Patient with cad,chf,post cabg and pci  . recent admission with chf,acs,non q mi  Had vg to [address] pci-11/2017 12/2017-admitted with angina  1/2018-non stemi      Hospital Follow Up   Pertinent negatives include no chest pain, no abdominal pain, no headaches and no shortness of breath. CHF   The history is provided by the patient. This is a chronic problem. The problem occurs constantly. The problem has not changed since onset. Pertinent negatives include no chest pain, no abdominal pain, no headaches and no shortness of breath. The symptoms are aggravated by exertion. Hypertension   The history is provided by the patient. This is a chronic problem. The problem occurs constantly. The problem has not changed since onset. Pertinent negatives include no chest pain, no abdominal pain, no headaches and no shortness of breath. Nothing aggravates the symptoms. Shortness of Breath   The history is provided by the patient. This is a recurrent problem. The problem occurs intermittently. The current episode started more than 1 week ago. Pertinent negatives include no fever, no headaches, no cough, no sputum production, no hemoptysis, no wheezing, no PND, no orthopnea, no chest pain, no vomiting, no abdominal pain, no rash, no leg swelling and no claudication. The problem's precipitants include exercise (>30 mts of treadmill). Chest Pain (Angina)    This is a recurrent problem. The problem has not changed since onset. The problem occurs every several days. The pain is associated with exertion. The pain is present in the substernal region. The quality of the pain is described as pressure-like. The pain does not radiate.  Pertinent negatives include no abdominal pain, no claudication, no cough, no dizziness, no fever, no headaches, no hemoptysis, no nausea, no orthopnea, no palpitations, no PND, no shortness of breath, no sputum production, no vomiting and no weakness. Review of Systems   Constitutional: Negative for chills and fever. HENT: Negative for nosebleeds. Eyes: Negative for blurred vision and double vision. Respiratory: Negative for cough, hemoptysis, sputum production, shortness of breath and wheezing. Cardiovascular: Negative for chest pain, palpitations, orthopnea, claudication, leg swelling and PND. Gastrointestinal: Negative for abdominal pain, heartburn, nausea and vomiting. Musculoskeletal: Negative for myalgias. Skin: Negative for rash. Neurological: Negative for dizziness, weakness and headaches. Endo/Heme/Allergies: Does not bruise/bleed easily. Family History   Problem Relation Age of Onset    Heart Attack Father 64       Past Medical History:   Diagnosis Date    Atherosclerosis of renal artery (HCC)     S/P Rt stent    CAD (coronary artery disease) , 1997, 2012    ,double bypass, 2 stents, 2stents 7/2016    Cancer ([facility] [address]     prostate    CHF (congestive heart failure) (HCC)     Chronic diastolic heart failure (HCC)     Chronic kidney disease     Chronic kidney disease (CKD), stage IV (severe) (HCC)     On Dialysis now -T-Th-sat    Constipation     Coronary atherosclerosis of unspecified type of vessel, native or graft     Stable angina after recent PCI continue treatment.        CRI (chronic renal insufficiency)     Diabetes ([address] 1973    type 2    Essential hypertension, benign     GERD (gastroesophageal reflux disease)     Gout     Hypertension 1982    Morbid obesity ([address]     Weight loss has been strongly encouraged by following dietary restrictions and an exercise routine    APRYL (obstructive sleep apnea) 6/26/2015    no cpap    Other and unspecified hyperlipidemia     HDL's are not at goal, LDL's are at goal, triglycerides are not at goal.    Other and unspecified hyperlipidemia     HDL's are not at goal, LDL's are at goal, triglycerides are not at goal.     Other ill-defined conditions([address]) 1960    pneumonia twice    Sleep disturbance     Type II or unspecified type diabetes mellitus without mention of complication, not stated as uncontrolled        Past Surgical History:   Procedure Laterality Date   [address]    lt. carotid endart. [place] CARDIAC SURG PROCEDURE UNLIST  2012, 2016, Nov 2017    Coronary Stent    COLONOSCOPY N/A 6/23/2017    COLONOSCOPY w/ APC w/ polypectomies [address] Aravind Regan MD [address] N/A 1/19/2018    FLEXIBLE SIGMOIDOSCOPY WITH Radio frequency ablation performed by Aravind Regan MD at [address] [facility] HX CHOLECYSTECTOMY      HX CORONARY ARTERY BYPASS GRAFT  2000    x2    HX HEENT  1997    cholecystectomy    HX UROLOGICAL  1998    renal art. surg    HX VASCULAR ACCESS      Perma cath for HD- right chest.       Allergies   Allergen Reactions    Nka [No Known Allergies] Other (comments)       Current Outpatient Prescriptions   Medication Sig    losartan (COZAAR) 50 mg tablet TAKE ONE TABLET BY MOUTH ONE TIME DAILY     tamsulosin (FLOMAX) 0.4 mg capsule TAKE ONE CAPSULE BY MOUTH ONE TIME DAILY    isosorbide mononitrate ER (IMDUR) 30 mg tablet Take 1 Tab by mouth daily.  amLODIPine (NORVASC) 5 mg tablet Take 1 Tab by mouth daily.  HYDROcodone-acetaminophen (NORCO) 5-325 mg per tablet Take 1-2 Tabs by mouth every four (4) hours as needed for Pain. Max Daily Amount: 12 Tabs.  aspirin 81 mg chewable tablet Take 1 Tab by mouth daily.  b complex-vitamin c-folic acid (NEPHROCAPS) 1 mg capsule Take 1 Cap by mouth daily.  senna-docusate (PERICOLACE) 8.6-50 mg per tablet Take 1 Tab by mouth daily.  fenofibrate (LOFIBRA) 54 mg tablet Take 1 Tab by mouth daily.  simvastatin (ZOCOR) 40 mg tablet TAKE 1 TABLET BY MOUTH NIGHTLY.  metoprolol tartrate (LOPRESSOR) 100 mg IR tablet TAKE 1 TABLET BY MOUTH TWO TIMES A DAY.  clopidogrel (PLAVIX) 75 mg tab Take 1 Tab by mouth daily.  Cholecalciferol, Vitamin D3, (DECARA) 50,000 unit cap Take  by mouth every seven (7) days.  insulin glargine (LANTUS) 100 unit/mL injection 20 Units by SubCUTAneous route every twelve (12) hours. (Patient taking differently: 35 Units by SubCUTAneous route every twelve (12) hours.)    nitroglycerin (NITROSTAT) 0.4 mg SL tablet 1 Tab by SubLINGual route as needed for Chest Pain.  insulin aspart (NOVOLOG) 100 unit/mL injection 20 units sq 3 times a day,sliding scale (Patient taking differently: sliding scale with meals)    allopurinol (ZYLOPRIM) 100 mg tablet Take 100 mg by mouth daily. No current facility-administered medications for this visit. Visit Vitals    /55    Pulse 73    Ht 5' 9\" (1.753 m)    Wt 100.7 kg (222 lb)    BMI 32.78 kg/m2         Physical Exam   Constitutional: He is oriented to person, place, and time. He appears well-developed and well-nourished. obese   HENT:   Head: Normocephalic and atraumatic. Eyes: Conjunctivae are normal.   Neck: Neck supple. No JVD present. No tracheal deviation present. No thyromegaly present. Cardiovascular: Normal rate, regular rhythm and normal heart sounds. Exam reveals no gallop and no friction rub. No murmur heard. Pulmonary/Chest: Breath sounds normal. No respiratory distress. He has no wheezes. He has no rales. He exhibits no tenderness. Abdominal: Soft. There is no tenderness. Musculoskeletal: He exhibits no edema. Neurological: He is alert and oriented to person, place, and time. Skin: Skin is warm and dry. Psychiatric: He has a normal mood and affect. Mr. Pavithra Sr has a reminder for a \"due or due soon\" health maintenance. I have asked that he contact his primary care provider for follow-up on this health maintenance.     CARDIOLOGY STUDIES 10/8/2012   Cardiac Cath Result PATENT LIMA TO LAD, SVG TO D1 SUBTOTAL DIFFUSE, POST PCI WITH STENTS,HAD PROXIMAL IN STENT PROTRUSION UNABLE TO CROSS WITH BALLONS,D1 DIFFISE SEVERE   Echocardiogram - Complete Result NORMAL EF 70%     SUMMARY:echo:11/2014  Left ventricle: Systolic function was hyperdynamic. Ejection fraction was  estimated in the range of 70 % to 75 %. No obvious wall motion  abnormalities identified in the views obtained. Near cavity obliteration  seen in the mid to apical portions of the ventricular cavity. There was  mild concentric hypertrophy. Features were consistent with a pseudonormal  left ventricular filling pattern, with concomitant abnormal relaxation and  increased filling pressure (grade 2 diastolic dysfunction). NUCLEAR IMAGING:    Findings: stress test:11/2014  1. Post-stress imaging in short axis, horizontal and vertical long axis views reveals normal isotope uptake in all areas. 2. Resting images also show normal isotope uptake in all areas. 3. Gated images show normal left ventricular size, wall motion and systolic function. Ejection fraction is 85%. Diagnosis:   1. Normal scan. 2. No evidence of significant fixed or reversible defect suggesting ischemia or myocardial infarction noted from this nuclear study. 3. low risk scan.  7/2016-pci  vg to diag  11/2017-pci svg-diag  I have personally reviewed patient's records available from hospital and other providers and incorporated findings in patient care. 1/2018-[facility]  Assessment         ICD-10-CM ICD-9-CM    1. Atherosclerosis of native coronary artery of native heart with angina pectoris ([facility] [address] I25.119 414.01      413.9     recent non q mi  stable after discharge   2. Essential hypertension I10 401.9     controlled   3. Postsurgical aortocoronary bypass status Z95.1 V45.81    4. Chronic diastolic heart failure (HCC) I50.32 428.32     stable  on dialysis   5. Dyslipidemia E78.5 272.4    6. S/P coronary artery stent placement Z95.5 V45.82     11/2917 on statin           There are no discontinued medications. No orders of the defined types were placed in this encounter.       Follow-up Disposition:  Return in about 3 months (around 5/15/2018).

## 2018-02-15 NOTE — LETTER
[address] 1940 
 
2/15/2018 Dear Maya Sever, MD Judi Bienenstock, PA Rick Chick, MD 
 
I had the pleasure of evaluating  Mr. Lelan Severs in office today. Below are the relevant portions of my assessment and plan of care. ICD-10-CM ICD-9-CM 1. Atherosclerosis of native coronary artery of native heart with angina pectoris ([facility] [address] I25.119 414.01   
  413.9   
 recent non q mi 
stable after discharge 2. Essential hypertension I10 401.9   
 controlled 3. Postsurgical aortocoronary bypass status Z95.1 V45.81   
4. Chronic diastolic heart failure (HCC) I50.32 428.32   
 stable 
on dialysis 5. Dyslipidemia E78.5 272.4 6. S/P coronary artery stent placement Z95.5 V45.82   
 11/2917 on statin Current Outpatient Prescriptions Medication Sig Dispense Refill  losartan (COZAAR) 50 mg tablet TAKE ONE TABLET BY MOUTH ONE TIME DAILY  30 Tab 0  
 tamsulosin (FLOMAX) 0.4 mg capsule TAKE ONE CAPSULE BY MOUTH ONE TIME DAILY 90 Cap 3  
 isosorbide mononitrate ER (IMDUR) 30 mg tablet Take 1 Tab by mouth daily. 30 Tab 0  
 amLODIPine (NORVASC) 5 mg tablet Take 1 Tab by mouth daily. 30 Tab 0  
 HYDROcodone-acetaminophen (NORCO) 5-325 mg per tablet Take 1-2 Tabs by mouth every four (4) hours as needed for Pain. Max Daily Amount: 12 Tabs. 12 Tab 0  
 aspirin 81 mg chewable tablet Take 1 Tab by mouth daily. 30 Tab 0  
 b complex-vitamin c-folic acid (NEPHROCAPS) 1 mg capsule Take 1 Cap by mouth daily. 30 Cap 0  
 senna-docusate (PERICOLACE) 8.6-50 mg per tablet Take 1 Tab by mouth daily. 30 Tab 0  
 fenofibrate (LOFIBRA) 54 mg tablet Take 1 Tab by mouth daily. 30 Tab 5  
 simvastatin (ZOCOR) 40 mg tablet TAKE 1 TABLET BY MOUTH NIGHTLY. 90 Tab 2  
 metoprolol tartrate (LOPRESSOR) 100 mg IR tablet TAKE 1 TABLET BY MOUTH TWO TIMES A DAY. 180 Tab 2  clopidogrel (PLAVIX) 75 mg tab Take 1 Tab by mouth daily.  90 Tab 3  
  Cholecalciferol, Vitamin D3, (DECARA) 50,000 unit cap Take  by mouth every seven (7) days.  insulin glargine (LANTUS) 100 unit/mL injection 20 Units by SubCUTAneous route every twelve (12) hours. (Patient taking differently: 35 Units by SubCUTAneous route every twelve (12) hours. ) 1 Vial 2  
 nitroglycerin (NITROSTAT) 0.4 mg SL tablet 1 Tab by SubLINGual route as needed for Chest Pain. 25 Tab 1  
 insulin aspart (NOVOLOG) 100 unit/mL injection 20 units sq 3 times a day,sliding scale (Patient taking differently: sliding scale with meals) 10 mL 0  
 allopurinol (ZYLOPRIM) 100 mg tablet Take 100 mg by mouth daily. No orders of the defined types were placed in this encounter. If you have questions, please do not hesitate to call me. I look forward to following Mr. Zambrano  along with you. Sincerely, Kandice Ríos MD

## 2018-02-15 NOTE — MR AVS SNAPSHOT
Carola Pulse 
 
 
 [address] [phone] Patient: Candice Essex MRN: [id number] LXB:[birth year] Visit Information Date & Time Provider Department Dept. Phone Encounter #  
 2/15/2018 11:45 AM Marbella Galvan MD [facility] [place] [id number] Follow-up Instructions Return in about 3 months (around 5/15/2018). Your Appointments 2/15/2018 11:45 AM  
Follow Up with Marbella Galvan MD  
[facility] [place] ([facility]) Appt Note: h/f; h/f; hospital follow up  
 [address] [place] [address] [facility] [address]  
  
   
 [address] [facility] [address] 7/11/2018 10:15 AM  
Nurse Visit with SUREKHA [facility] [address] ([address]) Appt Note: PSA  
 [address] [facility] [place] [address] [address] [place] [address] [address] 7/11/2018 10:30 AM  
Nurse Visit with Sara Moore [facility] ([address]) Appt Note: PSA  
 [address] [place] [address] [address] [facility] [address]  
  
   
 [address] [facility] [address] 7/18/2018 10:00 AM  
ESTABLISHED PATIENT with Aniyah Broussard MD  
[facility] [address]) Appt Note: 6mo Flow Bus UA PSA Prior [address] [facility] [address] [address] [address] Upcoming Health Maintenance Date Due  
 FOOT EXAM Q1 7[birth year]10/1950 MICROALBUMIN Q1 7/10/1950 EYE EXAM RETINAL OR DILATED Q1 7/10/1950 DTaP/Tdap/Td series (1 - Tdap) 7/10/1961 ZOSTER VACCINE AGE 60> 5/10/2000 GLAUCOMA SCREENING Q2Y 7/10/2005 MEDICARE YEARLY EXAM 7/10/2005 Pneumococcal 65+ High/Highest Risk (2 of 2 - PPSV23) 3/9/2017 Influenza Age 5 to Adult 8/1/2017 HEMOGLOBIN A1C Q6M 7/29/2018 LIPID PANEL Q1 11/14/2018 Allergies as of 2/15/2018  Review Complete On: 2/15/2018 By: Leona Amaya MD  
  
 Severity Noted Reaction Type Reactions Nka [No Known Allergies]  10/07/2012    Other (comments) Current Immunizations  Reviewed on 3/12/2013 No immunizations on file. Not reviewed this visit You Were Diagnosed With   
  
 Codes Comments Atherosclerosis of native coronary artery of native heart with angina pectoris ([facility] [address]    -  Primary ICD-10-CM: I25.119 ICD-9-CM: 414.01, 413.9 recent non q mi 
stable after discharge Essential hypertension     ICD-10-CM: I10 
ICD-9-CM: 401.9 controlled Postsurgical aortocoronary bypass status     ICD-10-CM: Z95.1 ICD-9-CM: V45.81 Chronic diastolic heart failure (HCC)     ICD-10-CM: I50.32 
ICD-9-CM: 428.32 stable 
on dialysis Dyslipidemia     ICD-10-CM: E78.5 ICD-9-CM: 272.4 S/P coronary artery stent placement     ICD-10-CM: Z95.5 ICD-9-CM: V45.82 11/2917 on statin Vitals BP Pulse Height(growth percentile) Weight(growth percentile) BMI Smoking Status 133/55 73 5' 9\" (1.753 m) 222 lb (100.7 kg) 32.78 kg/m2 Never Smoker Vitals History BMI and BSA Data Body Mass Index Body Surface Area 32.78 kg/m 2 2.21 m 2 Preferred Pharmacy Pharmacy Name Phone [id number] - [address] [phone] Your Updated Medication List  
  
   
This list is accurate as of: 2/15/18 11:32 AM.  Always use your most recent med list.  
  
  
  
  
 allopurinol 100 mg tablet Commonly known as:  [place] Laurita Take 100 mg by mouth daily. amLODIPine 5 mg tablet Commonly known as:  Jazlyn Pique Take 1 Tab by mouth daily. aspirin 81 mg chewable tablet Take 1 Tab by mouth daily. b complex-vitamin c-folic acid 1 mg capsule Commonly known as:  Nitish Sans Take 1 Cap by mouth daily. clopidogrel 75 mg Tab Commonly known as:  PLAVIX Take 1 Tab by mouth daily. DECARA 50,000 unit Cap Generic drug:  Cholecalciferol (Vitamin D3) Take  by mouth every seven (7) days. fenofibrate 54 mg tablet Commonly known as:  LOFIBRA Take 1 Tab by mouth daily. HYDROcodone-acetaminophen 5-325 mg per tablet Commonly known as:  Irene Adria Take 1-2 Tabs by mouth every four (4) hours as needed for Pain. Max Daily Amount: 12 Tabs. insulin aspart U-100 100 unit/mL injection Commonly known as:  NovoLOG U-100 Insulin aspart  
20 units sq 3 times a day,sliding scale  
  
 insulin glargine 100 unit/mL injection Commonly known as:  LANTUS  
20 Units by SubCUTAneous route every twelve (12) hours. isosorbide mononitrate ER 30 mg tablet Commonly known as:  IMDUR Take 1 Tab by mouth daily. losartan 50 mg tablet Commonly known as:  COZAAR  
TAKE ONE TABLET BY MOUTH ONE TIME DAILY  
  
 metoprolol tartrate 100 mg IR tablet Commonly known as:  LOPRESSOR  
TAKE 1 TABLET BY MOUTH TWO TIMES A DAY. nitroglycerin 0.4 mg SL tablet Commonly known as:  NITROSTAT  
1 Tab by SubLINGual route as needed for Chest Pain. senna-docusate 8.6-50 mg per tablet Commonly known as:  [place] Distant Take 1 Tab by mouth daily. simvastatin 40 mg tablet Commonly known as:  ZOCOR  
TAKE 1 TABLET BY MOUTH NIGHTLY.  
  
 tamsulosin 0.4 mg capsule Commonly known as:  FLOMAX TAKE ONE CAPSULE BY MOUTH ONE TIME DAILY Follow-up Instructions Return in about 3 months (around 5/15/2018). Introducing [facility]! [facility] introduces [employer] patient portal. Now you can access parts of your medical record, email your doctor's office, and request medication refills online. 1. In your internet browser, go to [email or web address] 2. Click on the First Time User? Click Here link in the Sign In box. You will see the New Member Sign Up page. 3. Enter your [employer] Access Code exactly as it appears below.  You will not need to use this code after youve completed the sign-up process. If you do not sign up before the expiration date, you must request a new code. · [employer] Access Code: [email or web address] Expires: 3/26/2018 10:23 PM 
 
4. Enter the last four digits of your Social Security Number (xxxx) and Date of Birth (mm/dd/yyyy) as indicated and click Submit. You will be taken to the next sign-up page. 5. Create a [employer] ID. This will be your [employer] login ID and cannot be changed, so think of one that is secure and easy to remember. 6. Create a [employer] password. You can change your password at any time. 7. Enter your Password Reset Question and Answer. This can be used at a later time if you forget your password. 8. Enter your e-mail address. You will receive e-mail notification when new information is available in [address] 9. Click Sign Up. You can now view and download portions of your medical record. 10. Click the Download Summary menu link to download a portable copy of your medical information. If you have questions, please visit the Frequently Asked Questions section of the [employer] website. Remember, [employer] is NOT to be used for urgent needs. For medical emergencies, dial 911. Now available from your iPhone and Android! Please provide this summary of care documentation to your next provider. Your primary care clinician is listed as Anna Muro. If you have any questions after today's visit, please call [phone].

## 2018-02-20 ENCOUNTER — PATIENT OUTREACH (OUTPATIENT)
Dept: CASE MANAGEMENT | Age: 78
End: 2018-02-20

## 2018-02-20 NOTE — PROGRESS NOTES
Goals Addressed             Most Recent       Patient Stated     <enter goal here> (pt-stated)   No change (2/20/2018)             Fill out advance directive paperwork and provide a copy to all physicians. Other     Attends follow-up appointments as directed. On track (2/20/2018)             Spouse confirms patient attended his Cardiology appointment on 2/15/18.  Prevent complications post hospitalization.    On track (2/20/2018)

## 2018-03-01 ENCOUNTER — PATIENT OUTREACH (OUTPATIENT)
Dept: CASE MANAGEMENT | Age: 78
End: 2018-03-01

## 2018-03-08 ENCOUNTER — ANESTHESIA EVENT (OUTPATIENT)
Dept: CARDIOTHORACIC SURGERY | Age: 78
End: 2018-03-08
Payer: MEDICARE

## 2018-03-08 ENCOUNTER — TELEPHONE (OUTPATIENT)
Dept: VASCULAR SURGERY | Age: 78
End: 2018-03-08

## 2018-03-08 ENCOUNTER — OFFICE VISIT (OUTPATIENT)
Dept: VASCULAR SURGERY | Age: 78
End: 2018-03-08

## 2018-03-08 DIAGNOSIS — N18.6 END STAGE RENAL DISEASE (HCC): Primary | ICD-10-CM

## 2018-03-08 NOTE — PROGRESS NOTES
Jay Friday from [facility] dialysis center has called and notified that patient's left arm fistula access was clotted. Stated no bruit or thrill and then states that patient was infiltrated on Saturday. Patient has perm catheter so able to have dialysis treatment. Upon assessment of left upper arm, patient has bruising and swelling noted and bruit and thrill are absent. Vannesa Reyna assess as well and will have patient set up for a thrombectomy tomorrow. Advised patient to apply warm compresses to upper arm to help reduce swelling and bruising. Patient is in agreement.

## 2018-03-08 NOTE — MR AVS SNAPSHOT
[address] [facility] [address] 
 
 
 [address] [facility] 
[phone] Patient: Leander Fair MRN: [id number] CAQ:[id number]/[id number] Visit Information Date & Time Provider Department Dept. Phone Encounter #  
 3/8/2018 10:15 AM [facility] NURSE [facility] [phone] [id number] Your Appointments 3/21/2018  1:30 PM  
HOSPITAL DISCHARGE with [address] [facility] ([facility]) Appt Note: DC Lt arm thrombectomy 23[address] [facility] 255 706 [facility]  
[phone] [address] [place] [address],[facility]  
  
    
 3/28/2018 10:00 AM  
PROCEDURE with [address] [facility] ([facility]) Appt Note: procedure/cath removal  
 [address] 102 406 [facility]  
[phone] [address]  
  
    
 5/7/2018 11:00 AM  
Follow Up with Jonel Spann MD  
[facility] ([facility]) Appt Note: 3 month follow up  
 [address] [facility] [address]  
  
   
 [address] [address] [address] [address] 7/11/2018 10:30 AM  
Nurse Visit with Abdi Flores [facility] ([facility]) Appt Note: PSA  
 [address] [facility] [address]  
  
   
 [address] [facility] [address] [address] [address] [address] Upcoming Health Maintenance Date Due  
 FOOT EXAM Q1 7[birth year]10/1950 MICROALBUMIN Q1 7/10/1950 EYE EXAM RETINAL OR DILATED Q1 7/10/1950 DTaP/Tdap/Td series (1 - Tdap) 7/10/1961 ZOSTER VACCINE AGE 60> 5/10/2000 GLAUCOMA SCREENING Q2Y 7/10/2005 Bone Densitometry (Dexa) Screening 7/10/2005 Pneumococcal 65+ High/Highest Risk (2 of 2 - PPSV23) 3/9/2017 Influenza Age 5 to Adult 8/1/2017 HEMOGLOBIN A1C Q6M 7/29/2018 LIPID PANEL Q1 11/14/2018 Allergies as of 3/8/2018  Review Complete On: 3/8/2018 By: Ozzy Garcia LPN Severity Noted Reaction Type Reactions Nka [No Known Allergies]  10/07/2012    Other (comments) Current Immunizations  Reviewed on 3/12/2013 No immunizations on file. Not reviewed this visit You Were Diagnosed With   
  
 Codes Comments End stage renal disease ([facility] [address]    -  Primary ICD-10-CM: N18.6 ICD-9-CM: 551. 6 Vitals Smoking Status Never Smoker Preferred Pharmacy Pharmacy Name Phone [facility] [address] [phone] Your Updated Medication List  
  
   
This list is accurate as of 3/8/18 11:26 AM.  Always use your most recent med list.  
  
  
  
  
 allopurinol 100 mg tablet Commonly known as:  Shashi Hanellieer Take 100 mg by mouth daily. amLODIPine 5 mg tablet Commonly known as:  Alberta Hair Take 1 Tab by mouth daily. aspirin 81 mg chewable tablet Take 1 Tab by mouth daily. b complex-vitamin c-folic acid 1 mg capsule Commonly known as:  Luane Haagensen Take 1 Cap by mouth daily. clopidogrel 75 mg Tab Commonly known as:  PLAVIX Take 1 Tab by mouth daily. DECARA 50,000 unit Cap Generic drug:  Cholecalciferol (Vitamin D3) Take  by mouth every seven (7) days. fenofibrate 54 mg tablet Commonly known as:  LOFIBRA Take 1 Tab by mouth daily. HYDROcodone-acetaminophen 5-325 mg per tablet Commonly known as:  Kristian Lama Take 1-2 Tabs by mouth every four (4) hours as needed for Pain. Max Daily Amount: 12 Tabs. insulin aspart U-100 100 unit/mL injection Commonly known as:  NovoLOG U-100 Insulin aspart  
20 units sq 3 times a day,sliding scale  
  
 insulin glargine 100 unit/mL injection Commonly known as:  LANTUS  
20 Units by SubCUTAneous route every twelve (12) hours. isosorbide mononitrate ER 30 mg tablet Commonly known as:  IMDUR Take 1 Tab by mouth daily. losartan 50 mg tablet Commonly known as:  COZAAR  
TAKE ONE TABLET BY MOUTH ONE TIME DAILY  
  
 metoprolol tartrate 100 mg IR tablet Commonly known as:  LOPRESSOR  
TAKE 1 TABLET BY MOUTH TWO TIMES A DAY. nitroglycerin 0.4 mg SL tablet Commonly known as:  NITROSTAT  
1 Tab by SubLINGual route as needed for Chest Pain. senna-docusate 8.6-50 mg per tablet Commonly known as:  [place] Take 1 Tab by mouth daily. simvastatin 40 mg tablet Commonly known as:  ZOCOR  
TAKE 1 TABLET BY MOUTH NIGHTLY.  
  
 tamsulosin 0.4 mg capsule Commonly known as:  FLOMAX TAKE ONE CAPSULE BY MOUTH ONE TIME DAILY Introducing [facility]! James Quevedo introduces [employer] patient portal. Now you can access parts of your medical record, email your doctor's office, and request medication refills online. 1. In your internet browser, go to [email or web address] 2. Click on the First Time User? Click Here link in the Sign In box. You will see the New Member Sign Up page. 3. Enter your [employer] Access Code exactly as it appears below. You will not need to use this code after youve completed the sign-up process. If you do not sign up before the expiration date, you must request a new code. · [employer] Access Code: [email or web address] Expires: 3/26/2018 10:23 PM 
 
4. Enter the last four digits of your Social Security Number (xxxx) and Date of Birth (mm/dd/yyyy) as indicated and click Submit. You will be taken to the next sign-up page. 5. Create a [employer] ID. This will be your [employer] login ID and cannot be changed, so think of one that is secure and easy to remember. 6. Create a [employer] password. You can change your password at any time. 7. Enter your Password Reset Question and Answer. This can be used at a later time if you forget your password. 8. Enter your e-mail address. You will receive e-mail notification when new information is available in [address] 9. Click Sign Up. You can now view and download portions of your medical record. 10. Click the Download Summary menu link to download a portable copy of your medical information. If you have questions, please visit the Frequently Asked Questions section of the [employer] website. Remember, [employer] is NOT to be used for urgent needs. For medical emergencies, dial 911. Now available from your iPhone and Android! Please provide this summary of care documentation to your next provider. Your primary care clinician is listed as Peggy November. If you have any questions after today's visit, please call [phone].

## 2018-03-08 NOTE — TELEPHONE ENCOUNTER
Tara Garcia from [facility] dialysis center has called and notified that patient's left arm fistula access was clotted. Stated no bruit or thrill and then states that patient was infiltrated on Saturday. Patient has perm catheter so able to have dialysis treatment. Will have patient come into office after dialysis for assessment to determine exactly what patient requires.

## 2018-03-09 ENCOUNTER — HOSPITAL ENCOUNTER (OUTPATIENT)
Age: 78
Setting detail: OUTPATIENT SURGERY
Discharge: HOME | End: 2018-03-09
Attending: SURGERY | Admitting: SURGERY
Payer: MEDICARE

## 2018-03-09 ENCOUNTER — ANESTHESIA (OUTPATIENT)
Dept: CARDIOTHORACIC SURGERY | Age: 78
End: 2018-03-09
Payer: MEDICARE

## 2018-03-09 ENCOUNTER — APPOINTMENT (OUTPATIENT)
Dept: GENERAL RADIOLOGY | Age: 78
End: 2018-03-09
Attending: SURGERY
Payer: MEDICARE

## 2018-03-09 VITALS
RESPIRATION RATE: 20 BRPM | DIASTOLIC BLOOD PRESSURE: 60 MMHG | TEMPERATURE: 97.6 F | HEIGHT: 69 IN | BODY MASS INDEX: 32.24 KG/M2 | HEART RATE: 68 BPM | OXYGEN SATURATION: 97 % | SYSTOLIC BLOOD PRESSURE: 134 MMHG | WEIGHT: 217.7 LBS

## 2018-03-09 DIAGNOSIS — N18.4 CKD (CHRONIC KIDNEY DISEASE) STAGE 4, GFR 15-29 ML/MIN (HCC): Primary | ICD-10-CM

## 2018-03-09 LAB — GLUCOSE BLD STRIP.AUTO-MCNC: 125 MG/DL (ref 70–110)

## 2018-03-09 PROCEDURE — 76010000113 HC CV SURG 1 TO 1.5 HR: Performed by: SURGERY

## 2018-03-09 PROCEDURE — 77030002996 HC SUT SLK J&J -A: Performed by: SURGERY

## 2018-03-09 PROCEDURE — 77030013058 HC DEV INFL ANGIO BSC -B: Performed by: SURGERY

## 2018-03-09 PROCEDURE — C1757 CATH, THROMBECTOMY/EMBOLECT: HCPCS | Performed by: SURGERY

## 2018-03-09 PROCEDURE — 76210000006 HC OR PH I REC 0.5 TO 1 HR: Performed by: SURGERY

## 2018-03-09 PROCEDURE — C1725 CATH, TRANSLUMIN NON-LASER: HCPCS | Performed by: SURGERY

## 2018-03-09 PROCEDURE — 74011000258 HC RX REV CODE- 258: Performed by: SURGERY

## 2018-03-09 PROCEDURE — 82962 GLUCOSE BLOOD TEST: CPT

## 2018-03-09 PROCEDURE — 77030011640 HC PAD GRND REM COVD -A: Performed by: SURGERY

## 2018-03-09 PROCEDURE — 74011250636 HC RX REV CODE- 250/636: Performed by: SURGERY

## 2018-03-09 PROCEDURE — 77030018836 HC SOL IRR NACL ICUM -A: Performed by: SURGERY

## 2018-03-09 PROCEDURE — 74011000250 HC RX REV CODE- 250: Performed by: NURSE ANESTHETIST, CERTIFIED REGISTERED

## 2018-03-09 PROCEDURE — 76060000033 HC ANESTHESIA 1 TO 1.5 HR: Performed by: SURGERY

## 2018-03-09 PROCEDURE — 77030031139 HC SUT VCRL2 J&J -A: Performed by: SURGERY

## 2018-03-09 PROCEDURE — C1894 INTRO/SHEATH, NON-LASER: HCPCS | Performed by: SURGERY

## 2018-03-09 PROCEDURE — 74011636320 HC RX REV CODE- 636/320: Performed by: SURGERY

## 2018-03-09 PROCEDURE — 84295 ASSAY OF SERUM SODIUM: CPT

## 2018-03-09 PROCEDURE — 77030003390 HC NDL ANGI MRTM -A: Performed by: SURGERY

## 2018-03-09 PROCEDURE — 76210000021 HC REC RM PH II 0.5 TO 1 HR: Performed by: SURGERY

## 2018-03-09 PROCEDURE — 77030010507 HC ADH SKN DERMBND J&J -B: Performed by: SURGERY

## 2018-03-09 PROCEDURE — 37248 TRLUML BALO ANGIOP 1ST VEIN: CPT

## 2018-03-09 PROCEDURE — 77030002986 HC SUT PROL J&J -A: Performed by: SURGERY

## 2018-03-09 PROCEDURE — 74011250636 HC RX REV CODE- 250/636

## 2018-03-09 PROCEDURE — C1769 GUIDE WIRE: HCPCS | Performed by: SURGERY

## 2018-03-09 RX ORDER — SODIUM CHLORIDE 0.9 % (FLUSH) 0.9 %
5-10 SYRINGE (ML) INJECTION AS NEEDED
Status: DISCONTINUED | OUTPATIENT
Start: 2018-03-09 | End: 2018-03-09 | Stop reason: HOSPADM

## 2018-03-09 RX ORDER — DEXTROSE MONOHYDRATE AND SODIUM CHLORIDE 5; .225 G/100ML; G/100ML
25 INJECTION, SOLUTION INTRAVENOUS CONTINUOUS
Status: DISCONTINUED | OUTPATIENT
Start: 2018-03-09 | End: 2018-03-09 | Stop reason: HOSPADM

## 2018-03-09 RX ORDER — FAMOTIDINE 10 MG/ML
INJECTION INTRAVENOUS
Status: DISCONTINUED
Start: 2018-03-09 | End: 2018-03-09 | Stop reason: HOSPADM

## 2018-03-09 RX ORDER — HEPARIN SODIUM 200 [USP'U]/100ML
INJECTION, SOLUTION INTRAVENOUS
Status: DISCONTINUED | OUTPATIENT
Start: 2018-03-09 | End: 2018-03-09 | Stop reason: HOSPADM

## 2018-03-09 RX ORDER — SODIUM CHLORIDE 0.9 % (FLUSH) 0.9 %
5-10 SYRINGE (ML) INJECTION EVERY 8 HOURS
Status: DISCONTINUED | OUTPATIENT
Start: 2018-03-09 | End: 2018-03-09 | Stop reason: HOSPADM

## 2018-03-09 RX ORDER — HYDROCODONE BITARTRATE AND ACETAMINOPHEN 5; 325 MG/1; MG/1
1-2 TABLET ORAL
Qty: 40 TAB | Refills: 0 | Status: ON HOLD | OUTPATIENT
Start: 2018-03-09 | End: 2018-04-30

## 2018-03-09 RX ORDER — LIDOCAINE HYDROCHLORIDE 10 MG/ML
INJECTION, SOLUTION EPIDURAL; INFILTRATION; INTRACAUDAL; PERINEURAL AS NEEDED
Status: DISCONTINUED | OUTPATIENT
Start: 2018-03-09 | End: 2018-03-09 | Stop reason: HOSPADM

## 2018-03-09 RX ORDER — PHENYLEPHRINE HCL IN 0.9% NACL 1 MG/10 ML
SYRINGE (ML) INTRAVENOUS AS NEEDED
Status: DISCONTINUED | OUTPATIENT
Start: 2018-03-09 | End: 2018-03-09 | Stop reason: HOSPADM

## 2018-03-09 RX ORDER — FENTANYL CITRATE 50 UG/ML
25 INJECTION, SOLUTION INTRAMUSCULAR; INTRAVENOUS
Status: DISCONTINUED | OUTPATIENT
Start: 2018-03-09 | End: 2018-03-09 | Stop reason: HOSPADM

## 2018-03-09 RX ORDER — ONDANSETRON 2 MG/ML
4 INJECTION INTRAMUSCULAR; INTRAVENOUS ONCE
Status: DISCONTINUED | OUTPATIENT
Start: 2018-03-09 | End: 2018-03-09 | Stop reason: HOSPADM

## 2018-03-09 RX ORDER — LIDOCAINE HYDROCHLORIDE 10 MG/ML
INJECTION, SOLUTION EPIDURAL; INFILTRATION; INTRACAUDAL; PERINEURAL
Status: DISCONTINUED
Start: 2018-03-09 | End: 2018-03-09 | Stop reason: HOSPADM

## 2018-03-09 RX ORDER — INSULIN LISPRO 100 [IU]/ML
INJECTION, SOLUTION INTRAVENOUS; SUBCUTANEOUS ONCE
Status: DISCONTINUED | OUTPATIENT
Start: 2018-03-09 | End: 2018-03-09 | Stop reason: HOSPADM

## 2018-03-09 RX ORDER — PROPOFOL 10 MG/ML
INJECTION, EMULSION INTRAVENOUS
Status: DISCONTINUED | OUTPATIENT
Start: 2018-03-09 | End: 2018-03-09 | Stop reason: HOSPADM

## 2018-03-09 RX ORDER — CEFAZOLIN SODIUM 2 G/50ML
2 SOLUTION INTRAVENOUS ONCE
Status: COMPLETED | OUTPATIENT
Start: 2018-03-09 | End: 2018-03-09

## 2018-03-09 RX ORDER — DEXTROSE 50 % IN WATER (D50W) INTRAVENOUS SYRINGE
25-50 AS NEEDED
Status: DISCONTINUED | OUTPATIENT
Start: 2018-03-09 | End: 2018-03-09 | Stop reason: HOSPADM

## 2018-03-09 RX ORDER — HEPARIN SODIUM 200 [USP'U]/100ML
INJECTION, SOLUTION INTRAVENOUS
Status: DISCONTINUED
Start: 2018-03-09 | End: 2018-03-09 | Stop reason: HOSPADM

## 2018-03-09 RX ORDER — MAGNESIUM SULFATE 100 %
4 CRYSTALS MISCELLANEOUS AS NEEDED
Status: DISCONTINUED | OUTPATIENT
Start: 2018-03-09 | End: 2018-03-09 | Stop reason: HOSPADM

## 2018-03-09 RX ORDER — MAGNESIUM SULFATE 100 %
4 CRYSTALS MISCELLANEOUS AS NEEDED
Status: DISCONTINUED | OUTPATIENT
Start: 2018-03-09 | End: 2018-03-09 | Stop reason: SDUPTHER

## 2018-03-09 RX ORDER — HEPARIN SODIUM 5000 [USP'U]/ML
INJECTION, SOLUTION INTRAVENOUS; SUBCUTANEOUS AS NEEDED
Status: DISCONTINUED | OUTPATIENT
Start: 2018-03-09 | End: 2018-03-09 | Stop reason: HOSPADM

## 2018-03-09 RX ORDER — SODIUM CHLORIDE, SODIUM LACTATE, POTASSIUM CHLORIDE, CALCIUM CHLORIDE 600; 310; 30; 20 MG/100ML; MG/100ML; MG/100ML; MG/100ML
75 INJECTION, SOLUTION INTRAVENOUS CONTINUOUS
Status: DISCONTINUED | OUTPATIENT
Start: 2018-03-09 | End: 2018-03-09 | Stop reason: HOSPADM

## 2018-03-09 RX ADMIN — Medication 100 MCG: at 10:20

## 2018-03-09 RX ADMIN — CEFAZOLIN SODIUM 2 G: 2 SOLUTION INTRAVENOUS at 09:38

## 2018-03-09 RX ADMIN — PROPOFOL 50 MCG/KG/MIN: 10 INJECTION, EMULSION INTRAVENOUS at 09:58

## 2018-03-09 RX ADMIN — DEXTROSE MONOHYDRATE AND SODIUM CHLORIDE 25 ML/HR: 5; .225 INJECTION, SOLUTION INTRAVENOUS at 09:30

## 2018-03-09 RX ADMIN — FAMOTIDINE 20 MG: 10 INJECTION, SOLUTION INTRAVENOUS at 09:33

## 2018-03-09 RX ADMIN — HEPARIN SODIUM 5000 UNITS: 5000 INJECTION, SOLUTION INTRAVENOUS; SUBCUTANEOUS at 10:05

## 2018-03-09 NOTE — OP NOTES
[address] [place] [address] Dr  OPERATIVE REPORT    Emerson Perez  MR#: [id number]  : 1940  ACCOUNT #: [de-identified]   DATE OF SERVICE: 2018    PREOPERATIVE DIAGNOSIS:  End-stage renal disease with clotted access. POSTOPERATIVE DIAGNOSIS:  End-stage renal disease with clotted access. PROCEDURES PERFORMED:    1. Left arm fistula de-clot. 2.  Balloon angioplasty of the cephalic vein. 3.  Left upper extremity fistulogram.    SURGEON:  Michelle Hernandez MD    CULTURES:  None. SPECIMENS:  None. DRAINS:  None. ESTIMATED BLOOD LOSS:  Less than 50 mL. ANESTHESIA:  MAC with local anesthetic. COMPLICATIONS:  None. INDICATIONS FOR THE PROCEDURE:  The patient is a 77-year-old gentleman with occluded brachiocephalic fistula. The patient was recommended for repair. The patient was given risks and benefits of the procedure including but not limited to bleeding, infection, damage to adjacent structures, hemorrhage, stroke and death as well as loss of upper extremity, loss of access and need for further surgery. The patient was understanding of all the risks and underwent the procedure. OPERATIVE FINDINGS:  Occluded fistula. DESCRIPTION OF PROCEDURE:  The patient was correctly identified in the pre-cath area, taken to the operating room in stable condition. The patient had a preincision timeout prior to incision. The patient was prepped and draped in normal sterile fashion according to CDC guidelines in aseptic technique. We then were able to feel the arterial inflow. Numbed this area up appropriately and made a small skin incision and dissected down to the cephalic vein. We then looped it with vessel loops in a Valle fashion. We then were able to heparinize the patient appropriately, created an arteriotomy using 11 blade and Valle scissors. We used a 4 Jayant balloon and then a 5 Jayant balloon and removed all thrombus. We had good arterial inflow.   We then closed using a 5-0 Prolene suture. There was no need for repair sutures. We had a good thrill. We then took a single-wall entry needle to gain access into the vein, placed a wire in place. A 6-[place] sheath was then placed. Fistulogram was performed. This showed no arterial anastomotic lesions, but there was some diffuse 60% narrowing of the cephalic vein. We serially dilated this with an 8 x 80 balloon all the way towards the subclavian vein. We then were able to show no residual narrowing and the subclavian vein and brachiocephalic vein and SVC were all patent without stenosis. Decision was then made to conclude the case. We did heparinize the patient upon opening the cephalic vein. The patient tolerated the procedure well. We removed our access with a 5-0 Prolene suture closing the access site. We then were able to copiously irrigate the wound and closed with a 3-0 Vicryl deep dermal layer, 4-0 Vicryl subcuticular layer and Dermabond for dressing. The patient tolerated the procedure well without any issues.       MD ANIBAL LópezLETON [facility] / Latesha Nahum  D: 03/09/2018 10:57     T: 03/09/2018 11:40  JOB #: [id number]

## 2018-03-09 NOTE — DISCHARGE INSTRUCTIONS
[address] THROMBECTOMY DISCHARGE INSTRUCTIONS    General Information: This procedure has been done in order to improve blood flow through your dialysis access. The procedure is designed to remove clots and/or to enlarge the narrowed areas of your dialysis access. Home Care Instructions: You can resume your regular diet and medication regimen. Do not drink alcohol, drive, or make any important legal decisions in the next 24 hours. Watch the site carefully for signs of infection. You may have some tenderness at your graft site. You make take Tylenol (acetaminophen) for this discomfort. Do not carry anything heavier than a gallon of milk. Do not wear any tight clothing on this arm, including elastic cuffs and/or tight watches. Do not allow anyone to take a blood pressure or draw blood from this extremity. You should elevate this arm on pillows to help with any swelling. Do not sleep on this arm with the graft, or fold it under your head while you sleep. Feel your graft every day to see if you can feel a thrill (pulsation). Call If:     You should call your Physician if you have any signs of infection like fever, drainage, redness, or increased pain at the graft site. Call your doctor or dialysis center immediately if you do not feel a thrill on your graft/fistula, or if you have a change in color of this extremity. Call 911 and seek immediate medical attention if you start bleeding from your graft or fistula. Apply pressure to the graft, but only enough pressure to control the bleeding. [place] the time you start holding pressure and let medical personnel know. Follow-Up Instructions: Please see your ordering doctor as he/she has requested. You may not go to dialysis today, except with special written permission from you doctor. You may go to dialysis tomorrow, and resume your normal dialysis schedule.       DISCHARGE SUMMARY from Nurse    PATIENT INSTRUCTIONS:    After general anesthesia or intravenous sedation, for 24 hours or while taking prescription Narcotics:  · Limit your activities  · Do not drive and operate hazardous machinery  · Do not make important personal or business decisions  · Do  not drink alcoholic beverages  · If you have not urinated within 8 hours after discharge, please contact your surgeon on call. Report the following to your surgeon:  · Excessive pain, swelling, redness or odor of or around the surgical area  · Temperature over 100.5  · Nausea and vomiting lasting longer than 4 hours or if unable to take medications  · Any signs of decreased circulation or nerve impairment to extremity: change in color, persistent  numbness, tingling, coldness or increase pain  · Any questions    *  Please give a list of your current medications to your Primary Care Provider. *  Please update this list whenever your medications are discontinued, doses are      changed, or new medications (including over-the-counter products) are added. *  Please carry medication information at all times in case of emergency situations. These are general instructions for a healthy lifestyle:    No smoking/ No tobacco products/ Avoid exposure to second hand smoke  Surgeon General's Warning:  Quitting smoking now greatly reduces serious risk to your health. Obesity, smoking, and sedentary lifestyle greatly increases your risk for illness    A healthy diet, regular physical exercise & weight monitoring are important for maintaining a healthy lifestyle    You may be retaining fluid if you have a history of heart failure or if you experience any of the following symptoms:  Weight gain of 3 pounds or more overnight or 5 pounds in a week, increased swelling in our hands or feet or shortness of breath while lying flat in bed. Please call your doctor as soon as you notice any of these symptoms; do not wait until your next office visit.     Recognize signs and symptoms of STROKE:    F-face looks uneven    A-arms unable to move or move unevenly    S-speech slurred or non-existent    T-time-call 911 as soon as signs and symptoms begin-DO NOT go       Back to bed or wait to see if you get better-TIME IS BRAIN. Warning Signs of HEART ATTACK     Call 911 if you have these symptoms:   Chest discomfort. Most heart attacks involve discomfort in the center of the chest that lasts more than a few minutes, or that goes away and comes back. It can feel like uncomfortable pressure, squeezing, fullness, or pain.  Discomfort in other areas of the upper body. Symptoms can include pain or discomfort in one or both arms, the back, neck, jaw, or stomach.  Shortness of breath with or without chest discomfort.  Other signs may include breaking out in a cold sweat, nausea, or lightheadedness. Don't wait more than five minutes to call 911 - MINUTES MATTER! Fast action can save your life. Calling 911 is almost always the fastest way to get lifesaving treatment. Emergency Medical Services staff can begin treatment when they arrive -- up to an hour sooner than if someone gets to the hospital by car. The discharge information has been reviewed with the patient and spouse. The patient and spouse verbalized understanding. Discharge medications reviewed with the patient and spouse and appropriate educational materials and side effects teaching were provided.   ___________________________________________________________________________________________________________________________________

## 2018-03-09 NOTE — IP AVS SNAPSHOT
[address] [facility] [address] 
 
 
 [address] Patient: Zuri Bryan MRN: [id number] BDR:4/54/1709 About your hospitalization You were admitted on:  March 9, 2018 You last received care in the:  [facility] You were discharged on:  March 9, 2018 Why you were hospitalized Your primary diagnosis was:  Not on File Follow-up Information Follow up With Details Comments Contact Info Elias Britt MD   300 [facility] [address] [facility] [address] 
[phone] Sofie Haddad MD Schedule an appointment as soon as possible for a visit in 2 week(s)  165 [facility] Vika Hanna 
[facility]  [facility] 
[phone] Your Scheduled Appointments Wednesday March 21, 2018  1:30 PM EDT HOSPITAL DISCHARGE with [address] [place], [place] [facility] ([address] [place] [address]) 1212 [facility] 371 200 [facility]  
[phone] Wednesday March 28, 2018 10:00 AM EDT PROCEDURE with [address] [place], [place] [facility] ([address] [place] [address]) 1212 [facility] 464 200 [facility]  
[phone] Discharge Orders None A check jose indicates which time of day the medication should be taken. My Medications CHANGE how you take these medications Instructions Each Dose to Equal  
 Morning Noon Evening Bedtime  
 insulin aspart U-100 100 unit/mL injection Commonly known as:  NovoLOG U-100 Insulin aspart What changed:  additional instructions Your last dose was: Your next dose is:    
   
   
 20 units sq 3 times a day,sliding scale  
     
   
   
   
  
 insulin glargine 100 unit/mL injection Commonly known as:  LANTUS What changed:  how much to take Your last dose was: Your next dose is: 20 Units by SubCUTAneous route every twelve (12) hours. 20 Units CONTINUE taking these medications Instructions Each Dose to Equal  
 Morning Noon Evening Bedtime  
 allopurinol 100 mg tablet Commonly known as:  Srinivas Velez Your last dose was: Your next dose is: Take 100 mg by mouth daily. 100 mg  
    
   
   
   
  
 amLODIPine 5 mg tablet Commonly known as:  Suzi Rey Your last dose was: Your next dose is: Take 1 Tab by mouth daily. 5 mg  
    
   
   
   
  
 aspirin 81 mg chewable tablet Your last dose was: Your next dose is: Take 1 Tab by mouth daily. 81 mg  
    
   
   
   
  
 b complex-vitamin c-folic acid 1 mg capsule Commonly known as:  Clair Apley Your last dose was: Your next dose is: Take 1 Cap by mouth daily. 1 Cap  
    
   
   
   
  
 clopidogrel 75 mg Tab Commonly known as:  PLAVIX Your last dose was: Your next dose is: Take 1 Tab by mouth daily. 75 mg DECARA 50,000 unit Cap Generic drug:  Cholecalciferol (Vitamin D3) Your last dose was: Your next dose is: Take  by mouth every seven (7) days. fenofibrate 54 mg tablet Commonly known as:  LOFIBRA Your last dose was: Your next dose is: Take 1 Tab by mouth daily. 54 mg HYDROcodone-acetaminophen 5-325 mg per tablet Commonly known as:  Radha Gaytan Your last dose was: Your next dose is: Take 1-2 Tabs by mouth every four (4) hours as needed for Pain. Max Daily Amount: 12 Tabs. 1-2 Tab  
    
   
   
   
  
 isosorbide mononitrate ER 30 mg tablet Commonly known as:  IMDUR Your last dose was: Your next dose is: Take 1 Tab by mouth daily.   
 30 mg  
    
   
   
   
  
 losartan 50 mg tablet Commonly known as:  COZAAR Your last dose was: Your next dose is: TAKE ONE TABLET BY MOUTH ONE TIME DAILY  
     
   
   
   
  
 metoprolol tartrate 100 mg IR tablet Commonly known as:  LOPRESSOR Your last dose was: Your next dose is: TAKE 1 TABLET BY MOUTH TWO TIMES A DAY. nitroglycerin 0.4 mg SL tablet Commonly known as:  NITROSTAT Your last dose was: Your next dose is:    
   
   
 1 Tab by SubLINGual route as needed for Chest Pain. 0.4 mg  
    
   
   
   
  
 senna-docusate 8.6-50 mg per tablet Commonly known as:  Edith Dickinson Your last dose was: Your next dose is: Take 1 Tab by mouth daily. 1 Tab  
    
   
   
   
  
 simvastatin 40 mg tablet Commonly known as:  ZOCOR Your last dose was: Your next dose is: TAKE 1 TABLET BY MOUTH NIGHTLY.  
     
   
   
   
  
 tamsulosin 0.4 mg capsule Commonly known as:  FLOMAX Your last dose was: Your next dose is: TAKE ONE CAPSULE BY MOUTH ONE TIME DAILY Where to Get Your Medications Information on where to get these meds will be given to you by the nurse or doctor. ! Ask your nurse or doctor about these medications HYDROcodone-acetaminophen 5-325 mg per tablet Discharge Instructions [address] THROMBECTOMY DISCHARGE INSTRUCTIONS General Information: This procedure has been done in order to improve blood flow through your dialysis access. The procedure is designed to remove clots and/or to enlarge the narrowed areas of your dialysis access. Home Care Instructions: You can resume your regular diet and medication regimen.  Do not drink alcohol, drive, or make any important legal decisions in the next 24 hours. Watch the site carefully for signs of infection. You may have some tenderness at your graft site. You make take Tylenol (acetaminophen) for this discomfort. Do not carry anything heavier than a gallon of milk. Do not wear any tight clothing on this arm, including elastic cuffs and/or tight watches. Do not allow anyone to take a blood pressure or draw blood from this extremity. You should elevate this arm on pillows to help with any swelling. Do not sleep on this arm with the graft, or fold it under your head while you sleep. Feel your graft every day to see if you can feel a thrill (pulsation). Call If: 
   You should call your Physician if you have any signs of infection like fever, drainage, redness, or increased pain at the graft site. Call your doctor or dialysis center immediately if you do not feel a thrill on your graft/fistula, or if you have a change in color of this extremity. Call 911 and seek immediate medical attention if you start bleeding from your graft or fistula. Apply pressure to the graft, but only enough pressure to control the bleeding. Rancho the time you start holding pressure and let medical personnel know. Follow-Up Instructions: Please see your ordering doctor as he/she has requested. You may not go to dialysis today, except with special written permission from you doctor. You may go to dialysis tomorrow, and resume your normal dialysis schedule. DISCHARGE SUMMARY from Nurse PATIENT INSTRUCTIONS: 
 
 
F-face looks uneven A-arms unable to move or move unevenly S-speech slurred or non-existent T-time-call 911 as soon as signs and symptoms begin-DO NOT go Back to bed or wait to see if you get better-TIME IS BRAIN. Warning Signs of HEART ATTACK Call 911 if you have these symptoms: 
? Chest discomfort.  Most heart attacks involve discomfort in the center of the chest that lasts more than a few minutes, or that goes away and comes back. It can feel like uncomfortable pressure, squeezing, fullness, or pain. ? Discomfort in other areas of the upper body. Symptoms can include pain or discomfort in one or both arms, the back, neck, jaw, or stomach. ? Shortness of breath with or without chest discomfort. ? Other signs may include breaking out in a cold sweat, nausea, or lightheadedness. Don't wait more than five minutes to call [address]! Fast action can save your life. Calling 911 is almost always the fastest way to get lifesaving treatment. Emergency Medical Services staff can begin treatment when they arrive  up to an hour sooner than if someone gets to the hospital by car. The discharge information has been reviewed with the patient and spouse. The patient and spouse verbalized understanding. Discharge medications reviewed with the patient and spouse and appropriate educational materials and side effects teaching were provided. ___________________________________________________________________________________________________________________________________ ACO Transitions of Care Introducing [employer] [facility] offers a voluntary care coordination program to provide high quality service and care to [facility] fee-for-service beneficiaries. Alexander Ice was designed to help you enhance your health and well-being through the following services: ? Transitions of Care  support for individuals who are transitioning from one care setting to another (example: Hospital to home). ? Chronic and Complex Care Coordination  support for individuals and caregivers of those with serious or chronic illnesses or with more than one chronic (ongoing) condition and those who take a number of different medications.   
 
 
If you meet specific medical criteria, a [facility] Coordinator may call you directly to coordinate your care with your primary care physician and your other care providers. For questions about the [facility] programs, please, contact your physicians office. For general questions or additional information about Accountable Care Organizations: 
Please visit www.medicare.gov/acos. html or call 1-800-MEDICARE (4-679.762.3174) TTY users should call 5-478.616.3463. Introducing [facility]! [facility] introduces [employer] patient portal. Now you can access parts of your medical record, email your doctor's office, and request medication refills online. 1. In your internet browser, go to [email or web address] 2. Click on the First Time User? Click Here link in the Sign In box. You will see the New Member Sign Up page. 3. Enter your [employer] Access Code exactly as it appears below. You will not need to use this code after youve completed the sign-up process. If you do not sign up before the expiration date, you must request a new code. · [employer] Access Code: [email or web address] Expires: 3/26/2018 10:23 PM 
 
4. Enter the last four digits of your Social Security Number (xxxx) and Date of Birth (mm/dd/yyyy) as indicated and click Submit. You will be taken to the next sign-up page. 5. Create a [employer]hao ID. This will be your [employer] login ID and cannot be changed, so think of one that is secure and easy to remember. 6. Create a [employer] password. You can change your password at any time. 7. Enter your Password Reset Question and Answer. This can be used at a later time if you forget your password. 8. Enter your e-mail address. You will receive e-mail notification when new information is available in [address] 9. Click Sign Up. You can now view and download portions of your medical record. 10. Click the Download Summary menu link to download a portable copy of your medical information. If you have questions, please visit the Frequently Asked Questions section of the MyChart website. Remember, [employer] is NOT to be used for urgent needs. For medical emergencies, dial 911. Now available from your iPhone and Android! Unresulted Labs-Please follow up with your PCP about these lab tests Order Current Status XR ANGIO TRANSLUMINAL BALLOON VENOUS In process Providers Seen During Your Hospitalization Provider Specialty Primary office phone Marcelo Oreilly MD Vascular Surgery [phone] Your Primary Care Physician (PCP) Primary Care Physician Office Phone Office Fax Pierre Li [phone] You are allergic to the following Allergen Reactions Nka (No Known Allergies) Other (comments) Recent Documentation Height Weight BMI Smoking Status 1.753 m 98.7 kg 32.15 kg/m2 Never Smoker Emergency Contacts Name Discharge Info Relation Home Work Mobile Lazarus,Winsome DISCHARGE CAREGIVER [3] Spouse [3] [phone] Jia Zhus  Daughter [21] [phone] Lazarus,Cary DISCHARGE CAREGIVER [3] Son [22] [phone] Luci Drivers  Son [22] [phone] Patient Belongings The following personal items are in your possession at time of discharge: 
  Dental Appliances: None  Visual Aid: None      Home Medications: None   Jewelry: None  Clothing: Undergarments, Pants, Shirt, Socks, Footwear    Other Valuables: Other (comment) (Bilateral hearing aids) Please provide this summary of care documentation to your next provider. Signatures-by signing, you are acknowledging that this After Visit Summary has been reviewed with you and you have received a copy. Patient Signature:  ____________________________________________________________  Date:  ____________________________________________________________  
  
Tasia Medico    
    
 Provider Signature:  ____________________________________________________________ Date:  ____________________________________________________________

## 2018-03-09 NOTE — H&P
Surgery History and Physical    Subjective:      Zachary Canchola is a 68 y.o. male who presents with ESRD and occluded fistula.     Patient Active Problem List    Diagnosis Date Noted    Elevated troponin 01/28/2018    Type 2 diabetes mellitus with nephropathy ([address] 12/18/2017    Hyperkalemia 12/13/2017    Hypotension 12/12/2017    Fluid overload 12/12/2017    ESRD (end stage renal disease) ([address] 11/08/2017    Hyperuricemia 10/28/2017    Chronic kidney disease, stage V ([address] 10/28/2017    Chest pain 10/27/2017    Secondary hyperparathyroidism of renal origin ([address] 10/25/2017    Fatigue 54/83/9081    Diastolic CHF, acute on chronic (HCC) 07/05/2016    CHF (congestive heart failure), NYHA class IV ([address] 07/05/2016    APRYL (obstructive sleep apnea) 06/26/2015    Prostate cancer [facility] 04/29/2015    Abdominal pain 03/05/2015    GERD (gastroesophageal reflux disease) 03/05/2015    Constipation 03/05/2015    Chronic renal insufficiency 03/05/2015    Atherosclerosis of renal artery (HCC)     Chronic diastolic heart failure (HCC)     Morbid obesity (HCC)     Essential hypertension, benign     Sleep disturbance     Coronary atherosclerosis     Mixed hyperlipidemia     Type II or unspecified type diabetes mellitus without mention of complication, not stated as uncontrolled     Chest pain, unspecified 03/12/2013    S/P coronary artery stent placement 03/12/2013    Postsurgical aortocoronary bypass status 03/12/2013    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    NSTEMI (non-ST elevated myocardial infarction) ([address] 10/07/2012    Coronary atherosclerosis of native coronary artery 10/07/2012    CKD (chronic kidney disease) stage 4, GFR 15-29 ml/min ([address] 10/07/2012    HTN (hypertension) 10/07/2012    DM2 (diabetes mellitus, type 2) ([address] 10/07/2012    Dyslipidemia 10/07/2012     Past Medical History:   Diagnosis Date    Atherosclerosis of renal artery (HCC)     S/P Rt stent    CAD (coronary artery disease) , 1997, 2012    ,double bypass, 2 stents, 2stents 7/2016    Cancer [facility])     prostate    CHF (congestive heart failure) (HCC)     Chronic diastolic heart failure (HCC)     Chronic kidney disease     Chronic kidney disease (CKD), stage IV (severe) (HCC)     On Dialysis now -T-Th-sat    Constipation     Coronary atherosclerosis of unspecified type of vessel, native or graft     Stable angina after recent PCI continue treatment.  CRI (chronic renal insufficiency)     Diabetes ([facility] 1973    type 2    Essential hypertension, benign     GERD (gastroesophageal reflux disease)     Gout     Hypertension 1982    Morbid obesity ([address]     Weight loss has been strongly encouraged by following dietary restrictions and an exercise routine    APRYL (obstructive sleep apnea) 6/26/2015    no cpap    Other and unspecified hyperlipidemia     HDL's are not at goal, LDL's are at goal, triglycerides are not at goal.    Other and unspecified hyperlipidemia     HDL's are not at goal, LDL's are at goal, triglycerides are not at goal.     Other ill-defined conditions(799.89) 1960    pneumonia twice    Sleep disturbance     Type II or unspecified type diabetes mellitus without mention of complication, not stated as uncontrolled       Past Surgical History:   Procedure Laterality Date   [address]    lt. carotid endart.    Asia Martinez CARDIAC SURG PROCEDURE UNLIST  2012, 2016, Nov 2017    Coronary Stent    COLONOSCOPY N/A 6/23/2017    COLONOSCOPY w/ APC w/ polypectomies performed by Genet Trejo MD at [address] N/A 1/19/2018    FLEXIBLE SIGMOIDOSCOPY WITH Radio frequency ablation performed by Genet Trejo MD at [address] HX CHOLECYSTECTOMY      HX CORONARY ARTERY BYPASS GRAFT  2000    x2    HX HEENT  1997    cholecystectomy    HX UROLOGICAL  1998    renal art. surg    HX VASCULAR ACCESS      Perma cath for HD- right chest.      Social History Substance Use Topics    Smoking status: Never Smoker    Smokeless tobacco: Never Used    Alcohol use No      Family History   Problem Relation Age of Onset    Heart Attack Father 64      Prior to Admission medications    Medication Sig Start Date End Date Taking? Authorizing Provider   losartan (COZAAR) 50 mg tablet TAKE ONE TABLET BY MOUTH ONE TIME DAILY  1/15/18   Kenia Temple MD   tamsulosin (FLOMAX) 0.4 mg capsule TAKE ONE CAPSULE BY MOUTH ONE TIME DAILY 1/8/18   Renetta Erickson MD   isosorbide mononitrate ER (IMDUR) 30 mg tablet Take 1 Tab by mouth daily. 12/15/17   Yosvany Weeks MD   amLODIPine (NORVASC) 5 mg tablet Take 1 Tab by mouth daily. 12/15/17   Yosvany Weeks MD   HYDROcodone-acetaminophen (NORCO) 5-325 mg per tablet Take 1-2 Tabs by mouth every four (4) hours as needed for Pain. Max Daily Amount: 12 Tabs. 12/13/17   Yosvany Weeks MD   aspirin 81 mg chewable tablet Take 1 Tab by mouth daily. 10/26/17   Zenovia Leyden, MD   b complex-vitamin c-folic acid (NEPHROCAPS) 1 mg capsule Take 1 Cap by mouth daily. 10/25/17   Zenovia Leyden, MD   senna-docusate (PERICOLACE) 8.6-50 mg per tablet Take 1 Tab by mouth daily. 10/25/17   Zenovia Leyden, MD   fenofibrate (LOFIBRA) 54 mg tablet Take 1 Tab by mouth daily. 10/10/17   Urszula Coughlin NP   simvastatin (ZOCOR) 40 mg tablet TAKE 1 TABLET BY MOUTH NIGHTLY. 7/19/17   Urszula Coughlin NP   metoprolol tartrate (LOPRESSOR) 100 mg IR tablet TAKE 1 TABLET BY MOUTH TWO TIMES A DAY. 7/19/17   Urszula Coughlin NP   clopidogrel (PLAVIX) 75 mg tab Take 1 Tab by mouth daily. 4/14/17   Tracey Tabares MD   Cholecalciferol, Vitamin D3, (DECARA) 50,000 unit cap Take  by mouth every seven (7) days. Historical Provider   insulin glargine (LANTUS) 100 unit/mL injection 20 Units by SubCUTAneous route every twelve (12) hours. Patient taking differently: 35 Units by SubCUTAneous route every twelve (12) hours.  7/21/16   Mallika Cavanaugh MD nitroglycerin (NITROSTAT) 0.4 mg SL tablet 1 Tab by SubLINGual route as needed for Chest Pain. 6/21/16   Jah Garcia MD   insulin aspart (NOVOLOG) 100 unit/mL injection 20 units sq 3 times a day,sliding scale  Patient taking differently: sliding scale with meals 3/13/13   Jah Garcia MD   allopurinol (ZYLOPRIM) 100 mg tablet Take 100 mg by mouth daily. Historical Provider     Allergies   Allergen Reactions    Nka [No Known Allergies] Other (comments)         ROS:  Pertinent items are noted in HPI. Unless otherwise mentioned in the HPI. Objective:     No data found. No data recorded. Physical Exam:  GENERAL: alert, cooperative, no distress, appears stated age, THROAT & NECK: normal and no erythema or exudates noted. , LUNG: clear to auscultation bilaterally, HEART: regular rate and rhythm, S1, S2 normal, no murmur, click, rub or gallop, ABDOMEN: soft, non-tender. Bowel sounds normal. No masses,  no organomegaly    Labs: No results found for this or any previous visit (from the past 24 hour(s)). Data Review:    CBC:   Lab Results   Component Value Date/Time    WBC 9.1 01/29/2018 04:50 AM    RBC 4.41 (L) 01/29/2018 04:50 AM    HGB 12.1 (L) 01/29/2018 04:50 AM    HCT 39.4 01/29/2018 04:50 AM    PLATELET 837 95/98/1075 04:50 AM      BMP:   Lab Results   Component Value Date/Time    Glucose 94 01/29/2018 04:50 AM    Sodium 139 01/29/2018 04:50 AM    Potassium 3.8 01/29/2018 04:50 AM    Chloride 104 01/29/2018 04:50 AM    CO2 27 01/29/2018 04:50 AM    BUN 38 (H) 01/29/2018 04:50 AM    Creatinine 4.52 (H) 01/29/2018 04:50 AM    Calcium 8.9 01/29/2018 04:50 AM     Coagulation:   Lab Results   Component Value Date/Time    Prothrombin time 12.5 01/27/2018 09:24 PM    INR 1.0 01/27/2018 09:24 PM    aPTT 25.0 01/29/2018 10:45 AM       Assessment:     Active Problems:    * No active hospital problems. *      Plan:     Fistula declot.     Signed By: Sita Conley MD     March 9, 2018

## 2018-03-09 NOTE — ANESTHESIA POSTPROCEDURE EVALUATION
Post-Anesthesia Evaluation and Assessment    Patient: Jamie Morrissey MRN: [id number]  SSN: [id number]    YOB: 1940  Age: 68 y.o. Sex: male      Data from PACU flowsheet    Cardiovascular Function/Vital Signs  Visit Vitals    /60 (BP 1 Location: Right arm, BP Patient Position: At rest)    Pulse 68    Temp 36.4 °C (97.6 °F)    Resp 20    Ht 5' 9\" (1.753 m)    Wt 98.7 kg (217 lb 11.2 oz)    SpO2 97%    BMI 32.15 kg/m2       Patient is status post MAC anesthesia for Procedure(s):  LEFT ARM FISTULA THROMBECTOMY/C-ARM. Nausea/Vomiting: controlled    Postoperative hydration reviewed and adequate. Pain:  Pain Scale 1: Numeric (0 - 10) (03/09/18 1155)  Pain Intensity 1: 0 (03/09/18 1155)   Managed      Mental Status and Level of Consciousness: Alert and oriented     Pulmonary Status:   O2 Device: Room air (03/09/18 1155)   Adequate oxygenation and airway patent    Complications related to anesthesia: None    Post-anesthesia assessment completed.  No concerns    Signed By: Giles Cazares MD     March 9, 2018

## 2018-03-09 NOTE — ANESTHESIA PREPROCEDURE EVALUATION
Anesthetic History   No history of anesthetic complications            Review of Systems / Medical History  Patient summary reviewed and pertinent labs reviewed    Pulmonary        Sleep apnea: No treatment           Neuro/Psych   Within defined limits           Cardiovascular    Hypertension: well controlled    Angina (Relieved by SL NTG): with exertion      CAD    Exercise tolerance: <4 METS  Comments: Chronic diastolic heart failure   GI/Hepatic/Renal     GERD: well controlled           Endo/Other    Diabetes: using insulin    Morbid obesity and arthritis    Comments: Gout Other Findings   Comments: Documentation of current medication  Current medications obtained, documented and obtained? YES      Risk Factors for Postoperative nausea/vomiting:       History of postoperative nausea/vomiting? NO       Female? NO       Motion sickness? NO       Intended opioid administration for postoperative analgesia? NO      Smoking Abstinence:  Current Smoker? NO  Elective Surgery? YES  Seen preoperatively by anesthesiologist or proxy prior to day of surgery? YES  Pt abstained from smoking 24 hours prior to anesthesia?  N/A    Preventive care/screening for High Blood Pressure:  Aged 18 years and older: YES  Screened for high blood pressure: YES  Patients with high blood pressure referred to primary care provider   for BP management: YES               Physical Exam    Airway  Mallampati: II  TM Distance: 4 - 6 cm  Neck ROM: normal range of motion   Mouth opening: Normal     Cardiovascular  Regular rate and rhythm,  S1 and S2 normal,  no murmur, click, rub, or gallop             Dental      Comments: Few lower teeth in mouth   Pulmonary  Breath sounds clear to auscultation               Abdominal  GI exam deferred       Other Findings            Anesthetic Plan    ASA: 4  Anesthesia type: MAC            Anesthetic plan and risks discussed with: Patient

## 2018-03-12 LAB
BUN BLD-MCNC: 57 MG/DL (ref 7–18)
CHLORIDE BLD-SCNC: 94 MMOL/L (ref 100–108)
GLUCOSE BLD STRIP.AUTO-MCNC: 155 MG/DL (ref 74–106)
HCT VFR BLD CALC: 35 % (ref 36–49)
HGB BLD-MCNC: 11.9 G/DL (ref 12–16)
POTASSIUM BLD-SCNC: 5.3 MMOL/L (ref 3.5–5.5)
SODIUM BLD-SCNC: 135 MMOL/L (ref 136–145)

## 2018-03-20 NOTE — OP NOTES
[address]    Name:  Yassine Millard  MR#:  [id number]  :  1940  Account #:  [de-identified]  Date of Adm:  2017  Date of Surgery:  2017      PREOPERATIVE DIAGNOSIS: End-stage renal disease. POSTOPERATIVE DIAGNOSIS: End-stage renal disease. PROCEDURES PERFORMED: Insertion of tunneled dialysis catheter,  removal of temporary dialysis catheter, fluoroscopy used. COMPLICATIONS: 0.    ESTIMATED BLOOD LOSS: 0.    CONDITION OF THE PATIENT: Stable. SPECIMEN REMOVED: Temporary catheter. ANESTHESIA: Moderate sedation. DESCRIPTION OF PROCEDURE: The patient is a 70-year-old being  treated in this hospitalization for coronary artery disease, also has had  advancement of renal failure. He had a temporary catheter placed. He  has had dialysis. He has had his coronary intervention. They had  asked for a tunneled catheter. He was brought back today. He had a  preoperative antibiotic. He had moderate sedation. His neck and chest  were prepped and draped in the usual standard fashion following [facility]  compliant guidelines for aseptic technique. Lidocaine 1% was used for  local. I wired the existing catheter and pulled this out completely, made  an incision at the wire site at a point below the clavicle and tunneled a  23 cm Palindrome from the lower incision to the guidewire and then  inserted through a split sheath into the central system. It is sitting in  good position. It flushes and aspirates easily. It was secured using  Prolene sutures. The skin was closed with 4-0 Monocryl and  Dermabond glue. Concentrated heparin was placed in the catheter. The appropriate dressing was applied. He was transferred to recovery in  stable condition.         Daina Hyman DO CM / Candelaria Lee  D:  2017   14:06  T:  2017   19:13  Job #:  [id number]

## 2018-03-22 ENCOUNTER — OFFICE VISIT (OUTPATIENT)
Dept: VASCULAR SURGERY | Age: 78
End: 2018-03-22

## 2018-03-22 VITALS
BODY MASS INDEX: 32.14 KG/M2 | SYSTOLIC BLOOD PRESSURE: 100 MMHG | DIASTOLIC BLOOD PRESSURE: 50 MMHG | WEIGHT: 217 LBS | HEIGHT: 69 IN

## 2018-03-22 DIAGNOSIS — Z99.2 ESRD (END STAGE RENAL DISEASE) ON DIALYSIS (HCC): Primary | ICD-10-CM

## 2018-03-22 DIAGNOSIS — N18.6 ESRD (END STAGE RENAL DISEASE) ON DIALYSIS (HCC): Primary | ICD-10-CM

## 2018-03-22 NOTE — MR AVS SNAPSHOT
[address] [facility] [address] 
 
 
 [address] [facility] [address] [facility] [address] 
[phone] Patient: Carlotta Tello MRN: [id number] MMY:[birth year] Visit Information Date & Time Provider Department Dept. Phone Encounter #  
 3/22/2018  2:15 PM [facility] [phone] [id number] Your Appointments 3/22/2018  2:15 PM  
HOSPITAL DISCHARGE with [address], [place] [facility] ([address] [place] [address]) Appt Note: DC Lt arm thrombectomy; wife called to confirm appt 23[address] [facility] [place] 030 [address] [facility] [address]  
[phone] 2300 [facility] 47 [facility]  
  
    
 3/23/2018  2:45 PM  
PROCEDURE with [facility] ([address] [place] [address]) Appt Note: Dialysis access left. C Wild. Will call for results. 2300 [facility] [place] 629 [address] [facility]  
[phone] [address] [place] [address],[address] [facility]  
  
    
 3/28/2018 10:00 AM  
PROCEDURE with [address], [place] [facility] ([address] [place] [address]) Appt Note: procedure/cath removal  
 [address] [facility] 298 200 [facility]  
[phone] [address]  
  
    
 5/7/2018 11:00 AM  
Follow Up with Suzi Marcano MD  
[facility] ([address] [place] [address]) Appt Note: 3 month follow up  
 [address] "[employer] [address]  
  
   
 [address] [address] [address] [address] 7/11/2018 10:30 AM  
Nurse Visit with Thalia Davila [facility] ([address] [place] [address]) Appt Note: PSA  
 [address] "[employer] [address]  
  
   
 [address] [facility] [address] [address] [address] [address] Upcoming Health Maintenance Date Due  
 FOOT EXAM Q1 7[birth year]10/1950 MICROALBUMIN Q1 7/10/1950 EYE EXAM RETINAL OR DILATED Q1 7/10/1950 DTaP/Tdap/Td series (1 - Tdap) 7/10/1961 ZOSTER VACCINE AGE 60> 5/10/2000 GLAUCOMA SCREENING Q2Y 7/10/2005 Pneumococcal 65+ High/Highest Risk (2 of 2 - PPSV23) 3/9/2017 Influenza Age 5 to Adult 8/1/2017 MEDICARE YEARLY EXAM 3/14/2018 HEMOGLOBIN A1C Q6M 7/29/2018 LIPID PANEL Q1 11/14/2018 Allergies as of 3/22/2018  Review Complete On: 3/22/2018 By: Abraham Velazquez LPN Severity Noted Reaction Type Reactions Nka [No Known Allergies]  10/07/2012    Other (comments) Current Immunizations  Reviewed on 3/12/2013 No immunizations on file. Not reviewed this visit You Were Diagnosed With   
  
 Codes Comments ESRD (end stage renal disease) on dialysis ([facility]    -  Primary ICD-10-CM: N18.6, Z99.2 ICD-9-CM: 585.6, V45.11 Vitals BP Height(growth percentile) Weight(growth percentile) BMI Smoking Status 100/50 (BP 1 Location: Right arm, BP Patient Position: Sitting) 5' 9\" (1.753 m) 217 lb (98.4 kg) 32.05 kg/m2 Never Smoker BMI and BSA Data Body Mass Index Body Surface Area 32.05 kg/m 2 2.19 m 2 Preferred Pharmacy Pharmacy Name Phone [id number] [address] [phone] Your Updated Medication List  
  
   
This list is accurate as of 3/22/18  1:53 PM.  Always use your most recent med list.  
  
  
  
  
 allopurinol 100 mg tablet Commonly known as:  Gutierrez Darnell Take 100 mg by mouth daily. amLODIPine 5 mg tablet Commonly known as:  Juana Victoria Take 1 Tab by mouth daily. aspirin 81 mg chewable tablet Take 1 Tab by mouth daily. b complex-vitamin c-folic acid 1 mg capsule Commonly known as:  Adrián Severin Take 1 Cap by mouth daily. clopidogrel 75 mg Tab Commonly known as:  PLAVIX Take 1 Tab by mouth daily. DECARA 50,000 unit Cap Generic drug:  Cholecalciferol (Vitamin D3) Take  by mouth every seven (7) days. fenofibrate 54 mg tablet Commonly known as:  LOFIBRA Take 1 Tab by mouth daily. HYDROcodone-acetaminophen 5-325 mg per tablet Commonly known as:  [facility] Take 1-2 Tabs by mouth every four (4) hours as needed for Pain. Max Daily Amount: 12 Tabs. insulin aspart U-100 100 unit/mL injection Commonly known as:  NovoLOG U-100 Insulin aspart  
20 units sq 3 times a day,sliding scale  
  
 insulin glargine 100 unit/mL injection Commonly known as:  LANTUS  
20 Units by SubCUTAneous route every twelve (12) hours. isosorbide mononitrate ER 30 mg tablet Commonly known as:  IMDUR Take 1 Tab by mouth daily. losartan 50 mg tablet Commonly known as:  COZAAR  
TAKE ONE TABLET BY MOUTH ONE TIME DAILY  
  
 metoprolol tartrate 100 mg IR tablet Commonly known as:  LOPRESSOR  
TAKE 1 TABLET BY MOUTH TWO TIMES A DAY. nitroglycerin 0.4 mg SL tablet Commonly known as:  NITROSTAT  
1 Tab by SubLINGual route as needed for Chest Pain. senna-docusate 8.6-50 mg per tablet Commonly known as:  Dellia Bustle Take 1 Tab by mouth daily. simvastatin 40 mg tablet Commonly known as:  ZOCOR  
TAKE 1 TABLET BY MOUTH NIGHTLY.  
  
 tamsulosin 0.4 mg capsule Commonly known as:  FLOMAX TAKE ONE CAPSULE BY MOUTH ONE TIME DAILY To-Do List   
 03/28/2018 Imaging:  DUPLEX HEMODIALYSIS ACCESS LEFT AMB Introducing [facility] SERVICES! [facility] introduces #waywire patient portal. Now you can access parts of your medical record, email your doctor's office, and request medication refills online. 1. In your internet browser, go to [email or web address] 2. Click on the First Time User? Click Here link in the Sign In box. You will see the New Member Sign Up page. 3. Enter your #waywire Access Code exactly as it appears below.  You will not need to use this code after youve completed the sign-up process. If you do not sign up before the expiration date, you must request a new code. · CMGE Access Code: [email or web address] Expires: 3/26/2018 11:23 PM 
 
4. Enter the last four digits of your Social Security Number (xxxx) and Date of Birth (mm/dd/yyyy) as indicated and click Submit. You will be taken to the next sign-up page. 5. Create a CMGE ID. This will be your CMGE login ID and cannot be changed, so think of one that is secure and easy to remember. 6. Create a CMGE password. You can change your password at any time. 7. Enter your Password Reset Question and Answer. This can be used at a later time if you forget your password. 8. Enter your e-mail address. You will receive e-mail notification when new information is available in [address] 9. Click Sign Up. You can now view and download portions of your medical record. 10. Click the Download Summary menu link to download a portable copy of your medical information. If you have questions, please visit the Frequently Asked Questions section of the CMGE website. Remember, CMGE is NOT to be used for urgent needs. For medical emergencies, dial 911. Now available from your iPhone and Android! Please provide this summary of care documentation to your next provider. Your primary care clinician is listed as Maya Sever. If you have any questions after today's visit, please call [phone].

## 2018-03-22 NOTE — PROGRESS NOTES
79-year-old gentleman with occluded brachiocephalic fistula. He presents today in follow up after left arm fistula de-clot with fistulogram and balloon angioplasty of the cephalic vein. He seems to be doing well postoperatively but he is quite lethargic today as he presents after his dialysis treatment. He continues to dialyze with a tunneled dialysis catheter without issue. He has not really had any successful cannulation of his fistula as of yet and he does state that he is trying to request change of his dialysis unit  He denies any pain in the left arm. His incision is clean dry and intact with no signs of hematoma or infection. He has no significant swelling in the arm and hand is warm to touch. His maturation ultrasound done in January did show that the fistula was fairly deep in some areas. Discussed with patient and his wife that we will obtain an ultrasound to assess the fistula prior to clearing for cannulation due to all of the issues that he has had and to assess the depth of his fistula. Patient and wife agree to plan.

## 2018-03-23 ENCOUNTER — OFFICE VISIT (OUTPATIENT)
Dept: VASCULAR SURGERY | Age: 78
End: 2018-03-23

## 2018-03-23 DIAGNOSIS — Z99.2 ESRD (END STAGE RENAL DISEASE) ON DIALYSIS (HCC): ICD-10-CM

## 2018-03-23 DIAGNOSIS — N18.6 ESRD (END STAGE RENAL DISEASE) ON DIALYSIS (HCC): ICD-10-CM

## 2018-03-23 NOTE — PROCEDURES
[facility]  *** FINAL REPORT ***    Name: Luep Costa  MRN: [id number]       Outpatient  : 10 Jul 1940  HIS Order #: [id number]  [address] [facility] Visit #: [id number]  Date: 23 Mar 2018    TYPE OF TEST: Dialysis Access Duplex    REASON FOR TEST  Comp of dialysis device,graft    Graft:-  Summary:   Left arm straight fistula with vein from brachial to  cephalic (upper arm)  Op. Date:  2018  Surgeon:   Mary Alice Neil    Results:-            Velocity  Ratio  Flow Volume Stenosis            --------  -----  ----------- --------  Inflow:    303.0  Proximal:  577.0      1.9      2894. Normal  Mid-graft: 202.0      0.4      1557. Normal  Distal:    243.0      1.2      1955. Normal  Outflow:   229.0      0.9      N/A     Normal    Mean Flow Volume:              2135. INTERPRETATION/FINDINGS  Duplex images were obtained using 2-D gray scale, color flow and  spectral doppler analysis. Duplex exam of the dialysis access reveals :  1. Patent left brachial artery to cephalic vein AVF without  significant stenosis. 2. Volume flow ranges from 2894 mL/min proximally, 1557 mL/min at the  mid arm level and 1955 mL/min distally. 3. Diameters range from 9.4 mm proximally, 7.6 mm at the mid arm and  7.9 mm distally. 4. Dept ranges from 6.5 mm proximally, 7.5 mm at the mid arm and 13.6  mm distally. 5. Non vascularized mass noted in the mid arm level consistent with  hematoma measuring 0.6 x 1.1 cm Trv. See worksheet for details. ADDITIONAL COMMENTS    I have personally reviewed the data relevant to the interpretation of  this  study. TECHNOLOGIST: Lui Fraser RVT, BS  Signed: 2018 03:05 PM    PHYSICIAN: Marden Snowman A. Dorthea Oppenheim, MD  Signed: 2018 11:47 AM

## 2018-04-01 DIAGNOSIS — E78.5 DYSLIPIDEMIA: ICD-10-CM

## 2018-04-02 RX ORDER — FENOFIBRATE 54 MG/1
TABLET ORAL
Qty: 30 TAB | Refills: 4 | Status: SHIPPED | OUTPATIENT
Start: 2018-04-02 | End: 2018-10-01 | Stop reason: SDUPTHER

## 2018-04-19 ENCOUNTER — DOCUMENTATION ONLY (OUTPATIENT)
Dept: VASCULAR SURGERY | Age: 78
End: 2018-04-19

## 2018-04-19 NOTE — PROGRESS NOTES
Patient's dialysis unit had sent over fax stating that patient was ready for perm catheter removal yesterday and then faxed a new update this morning stating that patient's left arm fistula access is clotted. Reviewed chart with [facility] [place] and decided to offer patient a thrombectomy and contacted patient's wife who stated patient would like to be set up for the procedure. Surgery scheduler will contact patient with a time for tomorrow.

## 2018-04-27 ENCOUNTER — ANESTHESIA EVENT (OUTPATIENT)
Dept: CARDIOTHORACIC SURGERY | Age: 78
End: 2018-04-27
Payer: MEDICARE

## 2018-04-27 NOTE — H&P
Surgery History and Physical     Subjective:       Autumn Alberts is a 68 y.o. male who presents with ESRD and occluded fistula.  Already had declot last month and had then been using fistula and ready for catheter removal  However, he ended up re-clotting so will again attempt declot          Patient Active Problem List     Diagnosis Date Noted    Elevated troponin 01/28/2018    Type 2 diabetes mellitus with nephropathy ([address] 12/18/2017    Hyperkalemia 12/13/2017    Hypotension 12/12/2017    Fluid overload 12/12/2017    ESRD (end stage renal disease) ([address] 11/08/2017    Hyperuricemia 10/28/2017    Chronic kidney disease, stage V ([address] 10/28/2017    Chest pain 10/27/2017    Secondary hyperparathyroidism of renal origin ([address] 10/25/2017    Fatigue 14/41/8516    Diastolic CHF, acute on chronic ([address] 07/05/2016    CHF (congestive heart failure), NYHA class IV ([address] 07/05/2016    APRYL (obstructive sleep apnea) 06/26/2015    Prostate cancer ([address] 04/29/2015    Abdominal pain 03/05/2015    GERD (gastroesophageal reflux disease) 03/05/2015    Constipation 03/05/2015    Chronic renal insufficiency 03/05/2015    Atherosclerosis of renal artery (HCC)      Chronic diastolic heart failure (HCC)      Morbid obesity (HCC)      Essential hypertension, benign      Sleep disturbance      Coronary atherosclerosis      Mixed hyperlipidemia      Type II or unspecified type diabetes mellitus without mention of complication, not stated as uncontrolled      Chest pain, unspecified 03/12/2013    S/P coronary artery stent placement 03/12/2013    Postsurgical aortocoronary bypass status 03/12/2013    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    NSTEMI (non-ST elevated myocardial infarction) ([address] 10/07/2012    Coronary atherosclerosis of native coronary artery 10/07/2012    CKD (chronic kidney disease) stage 4, GFR 15-29 ml/min ([address] 10/07/2012    HTN (hypertension) 10/07/2012    DM2 (diabetes mellitus, type 2) ([facility] 10/07/2012    Dyslipidemia 10/07/2012           Past Medical History:   Diagnosis Date    Atherosclerosis of renal artery (HCC)       S/P Rt stent    CAD (coronary artery disease) , 1997, 2012     ,double bypass, 2 stents, 2stents 7/2016    Cancer (HCC)       prostate    CHF (congestive heart failure) (HCC)      Chronic diastolic heart failure (HCC)      Chronic kidney disease      Chronic kidney disease (CKD), stage IV (severe) (HCC)       On Dialysis now -T-Th-sat    Constipation      Coronary atherosclerosis of unspecified type of vessel, native or graft       Stable angina after recent PCI continue treatment.  CRI (chronic renal insufficiency)      Diabetes ([facility] 1973     type 2    Essential hypertension, benign      GERD (gastroesophageal reflux disease)      Gout      Hypertension 1982    Morbid obesity ([facility]       Weight loss has been strongly encouraged by following dietary restrictions and an exercise routine    APRYL (obstructive sleep apnea) 6/26/2015     no cpap    Other and unspecified hyperlipidemia       HDL's are not at goal, LDL's are at goal, triglycerides are not at goal.    Other and unspecified hyperlipidemia       HDL's are not at goal, LDL's are at goal, triglycerides are not at goal.     Other ill-defined conditions(799.89) 1960     pneumonia twice    Sleep disturbance      Type II or unspecified type diabetes mellitus without mention of complication, not stated as uncontrolled              Past Surgical History:   Procedure Laterality Date    CARDIAC SURG PROCEDURE UNLIST   1995     lt. carotid endart.    [facility] CARDIAC SURG PROCEDURE UNLIST   2012, 2016, Nov 2017     Coronary Stent    COLONOSCOPY N/A 6/23/2017     COLONOSCOPY w/ APC w/ polypectomies performed by Selvin Warner MD at [address] N/A 1/19/2018     FLEXIBLE SIGMOIDOSCOPY WITH Radio frequency ablation performed by Selvin Warner MD at [facility]COPY    HX CHOLECYSTECTOMY        HX CORONARY ARTERY BYPASS GRAFT   2000     x2    HX HEENT   1997     cholecystectomy    HX UROLOGICAL   1998     renal art. surg    HX VASCULAR ACCESS         Perma cath for HD- right chest.           Social History   Substance Use Topics    Smoking status: Never Smoker    Smokeless tobacco: Never Used    Alcohol use No            Family History   Problem Relation Age of Onset    Heart Attack Father 64              Prior to Admission medications    Medication Sig Start Date End Date Taking? Authorizing Provider   losartan (COZAAR) 50 mg tablet TAKE ONE TABLET BY MOUTH ONE TIME DAILY  1/15/18     Katlyn Le MD   tamsulosin (FLOMAX) 0.4 mg capsule TAKE ONE CAPSULE BY MOUTH ONE TIME DAILY 1/8/18     Emiliano Bose MD   isosorbide mononitrate ER (IMDUR) 30 mg tablet Take 1 Tab by mouth daily. 12/15/17     Charla Avery MD   amLODIPine (NORVASC) 5 mg tablet Take 1 Tab by mouth daily. 12/15/17     Charla Avery MD   HYDROcodone-acetaminophen [facility]) 5-325 mg per tablet Take 1-2 Tabs by mouth every four (4) hours as needed for Pain. Max Daily Amount: 12 Tabs. 12/13/17     Charla Avery MD   aspirin 81 mg chewable tablet Take 1 Tab by mouth daily. 10/26/17     Marlen Everett MD   b complex-vitamin c-folic acid (NEPHROCAPS) 1 mg capsule Take 1 Cap by mouth daily. 10/25/17     Marlen Everett MD   senna-docusate (PERICOLACE) 8.6-50 mg per tablet Take 1 Tab by mouth daily. 10/25/17     Marlen Everett MD   fenofibrate (LOFIBRA) 54 mg tablet Take 1 Tab by mouth daily. 10/10/17     Urszula Coughlin NP   simvastatin (ZOCOR) 40 mg tablet TAKE 1 TABLET BY MOUTH NIGHTLY. 7/19/17     Urszula Coughlin NP   metoprolol tartrate (LOPRESSOR) 100 mg IR tablet TAKE 1 TABLET BY MOUTH TWO TIMES A DAY. 7/19/17     Urszula Coughlin NP   clopidogrel (PLAVIX) 75 mg tab Take 1 Tab by mouth daily.  4/14/17     Marge Mccall MD   Cholecalciferol, Vitamin D3, [facility]) 50,000 unit cap Take  by mouth every seven (7) days.       Historical Provider   insulin glargine (LANTUS) 100 unit/mL injection 20 Units by SubCUTAneous route every twelve (12) hours. Patient taking differently: 35 Units by SubCUTAneous route every twelve (12) hours. 7/21/16     Trice Spivey MD   nitroglycerin (NITROSTAT) 0.4 mg SL tablet 1 Tab by SubLINGual route as needed for Chest Pain. 6/21/16     Ericka Ogden MD   insulin aspart (NOVOLOG) 100 unit/mL injection 20 units sq 3 times a day,sliding scale  Patient taking differently: sliding scale with meals 3/13/13     Ericka Ogden MD   allopurinol (ZYLOPRIM) 100 mg tablet Take 100 mg by mouth daily.         Historical Provider           Allergies   Allergen Reactions    Nka [No Known Allergies] Other (comments)          ROS:  Pertinent items are noted in HPI. Unless otherwise mentioned in the HPI.     Objective:      No data found.        No data recorded.        Physical Exam:  GENERAL: alert, cooperative, no distress, appears stated age, THROAT & NECK: normal and no erythema or exudates noted. , LUNG: clear to auscultation bilaterally, HEART: regular rate and rhythm, S1, S2 normal, no murmur, click, rub or gallop, ABDOMEN: soft, non-tender. Bowel sounds normal. No masses,  no organomegaly             Assessment:      Clotted left arm avf        Plan:      Fistula declot attempt, left arm.

## 2018-04-30 ENCOUNTER — APPOINTMENT (OUTPATIENT)
Dept: GENERAL RADIOLOGY | Age: 78
End: 2018-04-30
Attending: SURGERY
Payer: MEDICARE

## 2018-04-30 ENCOUNTER — ANESTHESIA (OUTPATIENT)
Dept: CARDIOTHORACIC SURGERY | Age: 78
End: 2018-04-30
Payer: MEDICARE

## 2018-04-30 ENCOUNTER — HOSPITAL ENCOUNTER (OUTPATIENT)
Age: 78
Setting detail: OUTPATIENT SURGERY
Discharge: HOME | End: 2018-04-30
Attending: SURGERY | Admitting: SURGERY
Payer: MEDICARE

## 2018-04-30 VITALS
OXYGEN SATURATION: 100 % | RESPIRATION RATE: 18 BRPM | TEMPERATURE: 97.3 F | BODY MASS INDEX: 31.22 KG/M2 | WEIGHT: 206 LBS | DIASTOLIC BLOOD PRESSURE: 58 MMHG | HEART RATE: 63 BPM | HEIGHT: 68 IN | SYSTOLIC BLOOD PRESSURE: 155 MMHG

## 2018-04-30 LAB
BUN BLD-MCNC: 55 MG/DL (ref 7–18)
CHLORIDE BLD-SCNC: 95 MMOL/L (ref 100–108)
GLUCOSE BLD STRIP.AUTO-MCNC: 75 MG/DL (ref 74–106)
GLUCOSE BLD STRIP.AUTO-MCNC: 87 MG/DL (ref 70–110)
HCT VFR BLD CALC: 36 % (ref 36–49)
HGB BLD-MCNC: 12.2 G/DL (ref 12–16)
POTASSIUM BLD-SCNC: 5 MMOL/L (ref 3.5–5.5)
SODIUM BLD-SCNC: 132 MMOL/L (ref 136–145)

## 2018-04-30 PROCEDURE — 76210000006 HC OR PH I REC 0.5 TO 1 HR: Performed by: SURGERY

## 2018-04-30 PROCEDURE — 76210000020 HC REC RM PH II FIRST 0.5 HR: Performed by: SURGERY

## 2018-04-30 PROCEDURE — 77030002996 HC SUT SLK J&J -A: Performed by: SURGERY

## 2018-04-30 PROCEDURE — 74011250636 HC RX REV CODE- 250/636: Performed by: SURGERY

## 2018-04-30 PROCEDURE — 74011000258 HC RX REV CODE- 258: Performed by: NURSE ANESTHETIST, CERTIFIED REGISTERED

## 2018-04-30 PROCEDURE — 74011250637 HC RX REV CODE- 250/637: Performed by: NURSE ANESTHETIST, CERTIFIED REGISTERED

## 2018-04-30 PROCEDURE — 76010000112 HC CV SURG 0.5 TO 1 HR: Performed by: SURGERY

## 2018-04-30 PROCEDURE — 74011636320 HC RX REV CODE- 636/320: Performed by: SURGERY

## 2018-04-30 PROCEDURE — 74011000250 HC RX REV CODE- 250

## 2018-04-30 PROCEDURE — 75710 ARTERY X-RAYS ARM/LEG: CPT

## 2018-04-30 PROCEDURE — 74011250636 HC RX REV CODE- 250/636

## 2018-04-30 PROCEDURE — 77030003390 HC NDL ANGI MRTM -A: Performed by: SURGERY

## 2018-04-30 PROCEDURE — 77030011640 HC PAD GRND REM COVD -A: Performed by: SURGERY

## 2018-04-30 PROCEDURE — 74011250636 HC RX REV CODE- 250/636: Performed by: PHYSICIAN ASSISTANT

## 2018-04-30 PROCEDURE — 77030013079 HC BLNKT BAIR HGGR 3M -A: Performed by: ANESTHESIOLOGY

## 2018-04-30 PROCEDURE — C1894 INTRO/SHEATH, NON-LASER: HCPCS | Performed by: SURGERY

## 2018-04-30 PROCEDURE — 82962 GLUCOSE BLOOD TEST: CPT

## 2018-04-30 PROCEDURE — 76060000032 HC ANESTHESIA 0.5 TO 1 HR: Performed by: SURGERY

## 2018-04-30 PROCEDURE — 82947 ASSAY GLUCOSE BLOOD QUANT: CPT

## 2018-04-30 PROCEDURE — 77030002933 HC SUT MCRYL J&J -A: Performed by: SURGERY

## 2018-04-30 RX ORDER — MAGNESIUM SULFATE 100 %
4 CRYSTALS MISCELLANEOUS AS NEEDED
Status: DISCONTINUED | OUTPATIENT
Start: 2018-04-30 | End: 2018-04-30 | Stop reason: HOSPADM

## 2018-04-30 RX ORDER — MAGNESIUM SULFATE 100 %
4 CRYSTALS MISCELLANEOUS AS NEEDED
Status: CANCELLED | OUTPATIENT
Start: 2018-04-30

## 2018-04-30 RX ORDER — LIDOCAINE HYDROCHLORIDE 10 MG/ML
INJECTION, SOLUTION EPIDURAL; INFILTRATION; INTRACAUDAL; PERINEURAL
Status: DISCONTINUED
Start: 2018-04-30 | End: 2018-04-30 | Stop reason: HOSPADM

## 2018-04-30 RX ORDER — HEPARIN SODIUM 200 [USP'U]/100ML
INJECTION, SOLUTION INTRAVENOUS
Status: DISCONTINUED
Start: 2018-04-30 | End: 2018-04-30 | Stop reason: HOSPADM

## 2018-04-30 RX ORDER — DEXTROSE 50 % IN WATER (D50W) INTRAVENOUS SYRINGE
25-50 AS NEEDED
Status: DISCONTINUED | OUTPATIENT
Start: 2018-04-30 | End: 2018-04-30 | Stop reason: HOSPADM

## 2018-04-30 RX ORDER — FAMOTIDINE 20 MG/1
20 TABLET, FILM COATED ORAL ONCE
Status: COMPLETED | OUTPATIENT
Start: 2018-04-30 | End: 2018-04-30

## 2018-04-30 RX ORDER — ONDANSETRON 2 MG/ML
4 INJECTION INTRAMUSCULAR; INTRAVENOUS ONCE
Status: CANCELLED | OUTPATIENT
Start: 2018-04-30 | End: 2018-04-30

## 2018-04-30 RX ORDER — LIDOCAINE HYDROCHLORIDE 10 MG/ML
INJECTION, SOLUTION EPIDURAL; INFILTRATION; INTRACAUDAL; PERINEURAL AS NEEDED
Status: DISCONTINUED | OUTPATIENT
Start: 2018-04-30 | End: 2018-04-30 | Stop reason: HOSPADM

## 2018-04-30 RX ORDER — LIDOCAINE HYDROCHLORIDE 10 MG/ML
0.1 INJECTION, SOLUTION EPIDURAL; INFILTRATION; INTRACAUDAL; PERINEURAL AS NEEDED
Status: DISCONTINUED | OUTPATIENT
Start: 2018-04-30 | End: 2018-04-30 | Stop reason: HOSPADM

## 2018-04-30 RX ORDER — SODIUM CHLORIDE 0.9 % (FLUSH) 0.9 %
5-10 SYRINGE (ML) INJECTION EVERY 8 HOURS
Status: DISCONTINUED | OUTPATIENT
Start: 2018-04-30 | End: 2018-04-30 | Stop reason: HOSPADM

## 2018-04-30 RX ORDER — DEXTROSE 50 % IN WATER (D50W) INTRAVENOUS SYRINGE
25-50 AS NEEDED
Status: CANCELLED | OUTPATIENT
Start: 2018-04-30

## 2018-04-30 RX ORDER — SODIUM CHLORIDE 0.9 % (FLUSH) 0.9 %
5-10 SYRINGE (ML) INJECTION AS NEEDED
Status: DISCONTINUED | OUTPATIENT
Start: 2018-04-30 | End: 2018-04-30 | Stop reason: HOSPADM

## 2018-04-30 RX ORDER — INSULIN LISPRO 100 [IU]/ML
INJECTION, SOLUTION INTRAVENOUS; SUBCUTANEOUS ONCE
Status: DISCONTINUED | OUTPATIENT
Start: 2018-04-30 | End: 2018-04-30 | Stop reason: HOSPADM

## 2018-04-30 RX ORDER — INSULIN LISPRO 100 [IU]/ML
INJECTION, SOLUTION INTRAVENOUS; SUBCUTANEOUS ONCE
Status: CANCELLED | OUTPATIENT
Start: 2018-04-30 | End: 2018-04-30

## 2018-04-30 RX ORDER — PROPOFOL 10 MG/ML
INJECTION, EMULSION INTRAVENOUS
Status: DISCONTINUED | OUTPATIENT
Start: 2018-04-30 | End: 2018-04-30 | Stop reason: HOSPADM

## 2018-04-30 RX ORDER — DEXTROSE MONOHYDRATE AND SODIUM CHLORIDE 5; .225 G/100ML; G/100ML
25 INJECTION, SOLUTION INTRAVENOUS CONTINUOUS
Status: DISCONTINUED | OUTPATIENT
Start: 2018-04-30 | End: 2018-04-30 | Stop reason: HOSPADM

## 2018-04-30 RX ORDER — CEFAZOLIN SODIUM 2 G/50ML
2 SOLUTION INTRAVENOUS EVERY 8 HOURS
Status: DISCONTINUED | OUTPATIENT
Start: 2018-04-30 | End: 2018-04-30 | Stop reason: HOSPADM

## 2018-04-30 RX ORDER — SODIUM CHLORIDE 0.9 % (FLUSH) 0.9 %
5-10 SYRINGE (ML) INJECTION AS NEEDED
Status: CANCELLED | OUTPATIENT
Start: 2018-04-30

## 2018-04-30 RX ORDER — PROPOFOL 10 MG/ML
INJECTION, EMULSION INTRAVENOUS AS NEEDED
Status: DISCONTINUED | OUTPATIENT
Start: 2018-04-30 | End: 2018-04-30 | Stop reason: HOSPADM

## 2018-04-30 RX ORDER — LIDOCAINE HYDROCHLORIDE 20 MG/ML
INJECTION, SOLUTION EPIDURAL; INFILTRATION; INTRACAUDAL; PERINEURAL AS NEEDED
Status: DISCONTINUED | OUTPATIENT
Start: 2018-04-30 | End: 2018-04-30 | Stop reason: HOSPADM

## 2018-04-30 RX ORDER — HEPARIN SODIUM 200 [USP'U]/100ML
INJECTION, SOLUTION INTRAVENOUS
Status: DISCONTINUED | OUTPATIENT
Start: 2018-04-30 | End: 2018-04-30 | Stop reason: HOSPADM

## 2018-04-30 RX ORDER — FENTANYL CITRATE 50 UG/ML
25 INJECTION, SOLUTION INTRAMUSCULAR; INTRAVENOUS
Status: CANCELLED | OUTPATIENT
Start: 2018-04-30

## 2018-04-30 RX ADMIN — LIDOCAINE HYDROCHLORIDE 20 MG: 20 INJECTION, SOLUTION EPIDURAL; INFILTRATION; INTRACAUDAL; PERINEURAL at 10:51

## 2018-04-30 RX ADMIN — FAMOTIDINE 20 MG: 20 TABLET ORAL at 08:17

## 2018-04-30 RX ADMIN — DEXTROSE MONOHYDRATE AND SODIUM CHLORIDE 25 ML/HR: 5; .225 INJECTION, SOLUTION INTRAVENOUS at 08:17

## 2018-04-30 RX ADMIN — PROPOFOL 30 MG: 10 INJECTION, EMULSION INTRAVENOUS at 10:51

## 2018-04-30 RX ADMIN — PROPOFOL 75 MCG/KG/MIN: 10 INJECTION, EMULSION INTRAVENOUS at 10:51

## 2018-04-30 RX ADMIN — CEFAZOLIN SODIUM 2 G: 2 SOLUTION INTRAVENOUS at 10:51

## 2018-04-30 NOTE — ANESTHESIA PREPROCEDURE EVALUATION
Anesthetic History   No history of anesthetic complications            Review of Systems / Medical History  Patient summary reviewed and pertinent labs reviewed    Pulmonary  Within defined limits      Sleep apnea           Neuro/Psych   Within defined limits           Cardiovascular    Hypertension      CHF    CAD and cardiac stents    Exercise tolerance: <4 METS     GI/Hepatic/Renal  Within defined limits   GERD    Renal disease: ESRD and dialysis       Endo/Other    Diabetes: type 2    Obesity     Other Findings   Comments: Documentation of current medication  Current medications obtained, documented and obtained? YES      Risk Factors for Postoperative nausea/vomiting:       History of postoperative nausea/vomiting? NO       Female? NO       Motion sickness? NO       Intended opioid administration for postoperative analgesia? YES      Smoking Abstinence:  Current Smoker? NO  Elective Surgery? YES  Seen preoperatively by anesthesiologist or proxy prior to day of surgery? YES  Pt abstained from smoking 24 hours prior to anesthesia?  YES    Preventive care/screening for High Blood Pressure:  Aged 18 years and older: YES  Screened for high blood pressure: YES  Patients with high blood pressure referred to primary care provider   for BP management: YES                     Physical Exam    Airway  Mallampati: III  TM Distance: 4 - 6 cm  Neck ROM: normal range of motion   Mouth opening: Normal     Cardiovascular    Rhythm: regular  Rate: normal         Dental    Dentition: Poor dentition  Comments: No upper teeth   Pulmonary  Breath sounds clear to auscultation               Abdominal  GI exam deferred       Other Findings            Anesthetic Plan    ASA: 4  Anesthesia type: MAC and general - backup          Induction: Intravenous  Anesthetic plan and risks discussed with: Patient

## 2018-04-30 NOTE — ANESTHESIA POSTPROCEDURE EVALUATION
Post-Anesthesia Evaluation and Assessment    Patient: Valdez Yang MRN: [id number]  SSN: [id number]    YOB: 1940  Age: 68 y.o. Sex: male       Cardiovascular Function/Vital Signs  Visit Vitals    /56    Pulse 65    Temp 36.8 °C (98.3 °F)    Resp 18    Ht 5' 8\" (1.727 m)    Wt 93.4 kg (206 lb)    SpO2 97%    BMI 31.32 kg/m2       Patient is status post MAC, general - backup anesthesia for Procedure(s):  LEFT ARM FISTULOGRAM/C-ARM. Nausea/Vomiting: None    Postoperative hydration reviewed and adequate. Pain:  Pain Scale 1: Numeric (0 - 10) (04/30/18 1125)  Pain Intensity 1: 0 (04/30/18 1125)   Managed    Neurological Status:   Neuro (WDL): Within Defined Limits (04/30/18 1125)   At baseline    Mental Status and Level of Consciousness: Arousable    Pulmonary Status:   O2 Device: Room air (04/30/18 1125)   Adequate oxygenation and airway patent    Complications related to anesthesia: None    Post-anesthesia assessment completed.  No concerns    Signed By: Rashad Shine MD     April 30, 2018

## 2018-04-30 NOTE — PROGRESS NOTES
conducted a Follow up consultation and Spiritual Assessment for Telma Peraza, who is a 68 y. o.,male. The  provided the following Interventions:  Continued the relationship of care and support to patient's spouse, Felipe Chavez, in the CVT Waiting Room during patient's procedure. Listened empathically to spouse, Felipe Chavez, share stories about the patient and his procedure today. Offered prayer and assurance of continued prayer on patients behalf. The following outcomes were achieved:  Patient expressed gratitude for 's visit. Assessment:  There are no further spiritual or [employer] issues which require Spiritual Care Services interventions at this time. Plan:  Chaplains will continue to follow and will provide pastoral care on an as needed/requested basis.  recommends bedside caregivers page  on duty if patient shows signs of acute spiritual or emotional distress.      Clau Armijo, [address] [place] [address] Care  [phone]

## 2018-04-30 NOTE — IP AVS SNAPSHOT
[id number] Patient: Telma Peraza MRN: [id number] BGM:2/60/3997 About your hospitalization You were admitted on:  April 30, 2018 You last received care in the:  [facility] You were discharged on:  April 30, 2018 Why you were hospitalized Your primary diagnosis was:  Not on File Your diagnoses also included:  Esrd (End Stage Renal Disease) (Hcc) Follow-up Information Follow up With Details Comments Contact Info Kan Francis MD   [address] [facility] [address] [facility] [address] 
[phone] Qing Carvalho MD  Follow up in 2 weeks. [address] [facility] [address] [facility] [address] 
[phone] Your Scheduled Appointments Monday May 07, 2018 11:00 AM EDT Follow Up with Jagdish Larios MD  
[facility] ([place]osa [place]) [facility] [address] [facility] [address]  
[phone] Wednesday May 09, 2018 10:30 AM EDT HOSPITAL DISCHARGE with Vann[facility] ([place]osa Dillard) 12[address] [facility] Andrew Alirio 884 [address] [facility] [address]  
[phone] Discharge Orders None A check jose indicates which time of day the medication should be taken. My Medications CHANGE how you take these medications Instructions Each Dose to Equal  
 Morning Noon Evening Bedtime  
 insulin aspart U-100 100 unit/mL injection Commonly known as:  NovoLOG U-100 Insulin aspart What changed:  additional instructions Your last dose was: Your next dose is:    
   
   
 20 units sq 3 times a day,sliding scale  
     
   
   
   
  
 insulin glargine 100 unit/mL injection Commonly known as:  LANTUS What changed:  how much to take Your last dose was: Your next dose is:    
   
   
 20 Units by SubCUTAneous route every twelve (12) hours. 20 Units CONTINUE taking these medications Instructions Each Dose to Equal  
 Morning Noon Evening Bedtime  
 allopurinol 100 mg tablet Commonly known as:  Merrily Schilder Your last dose was: Your next dose is: Take 100 mg by mouth daily. 100 mg  
    
   
   
   
  
 amLODIPine 5 mg tablet Commonly known as:  Kenneth Dia Your last dose was: Your next dose is: Take 1 Tab by mouth daily. 5 mg  
    
   
   
   
  
 aspirin 81 mg chewable tablet Your last dose was: Your next dose is: Take 1 Tab by mouth daily. 81 mg  
    
   
   
   
  
 b complex-vitamin c-folic acid 1 mg capsule Commonly known as:  Nikolai Dayw Your last dose was: Your next dose is: Take 1 Cap by mouth daily. 1 Cap  
    
   
   
   
  
 clopidogrel 75 mg Tab Commonly known as:  PLAVIX Your last dose was: Your next dose is: Take 1 Tab by mouth daily. 75 mg DECARA 50,000 unit capsule Generic drug:  cholecalciferol Your last dose was: Your next dose is: Take  by mouth every seven (7) days. fenofibrate 54 mg tablet Commonly known as:  LOFIBRA Your last dose was: Your next dose is: TAKE ONE TABLET BY MOUTH ONE TIME DAILY  
     
   
   
   
  
 isosorbide mononitrate ER 30 mg tablet Commonly known as:  IMDUR Your last dose was: Your next dose is: Take 1 Tab by mouth daily. 30 mg  
    
   
   
   
  
 losartan 50 mg tablet Commonly known as:  COZAAR Your last dose was: Your next dose is: TAKE ONE TABLET BY MOUTH ONE TIME DAILY  
     
   
   
   
  
 metoprolol tartrate 100 mg IR tablet Commonly known as:  LOPRESSOR Your last dose was: Your next dose is: TAKE 1 TABLET BY MOUTH TWO TIMES A DAY. nitroglycerin 0.4 mg SL tablet Commonly known as:  NITROSTAT Your last dose was: Your next dose is:    
   
   
 1 Tab by SubLINGual route as needed for Chest Pain. 0.4 mg  
    
   
   
   
  
 senna-docusate 8.6-50 mg per tablet Commonly known as:  Jorene Calk Your last dose was: Your next dose is: Take 1 Tab by mouth daily. 1 Tab  
    
   
   
   
  
 simvastatin 40 mg tablet Commonly known as:  ZOCOR Your last dose was: Your next dose is: TAKE 1 TABLET BY MOUTH NIGHTLY.  
     
   
   
   
  
 tamsulosin 0.4 mg capsule Commonly known as:  FLOMAX Your last dose was: Your next dose is: TAKE ONE CAPSULE BY MOUTH ONE TIME DAILY Discharge Instructions DISCHARGE SUMMARY from Nurse PATIENT INSTRUCTIONS: 
 
 
F-face looks uneven A-arms unable to move or move unevenly S-speech slurred or non-existent T-time-call 911 as soon as signs and symptoms begin-DO NOT go Back to bed or wait to see if you get better-TIME IS BRAIN. Warning Signs of HEART ATTACK Call 911 if you have these symptoms: 
? Chest discomfort. Most heart attacks involve discomfort in the center of the chest that lasts more than a few minutes, or that goes away and comes back. It can feel like uncomfortable pressure, squeezing, fullness, or pain. ? Discomfort in other areas of the upper body. Symptoms can include pain or discomfort in one or both arms, the back, neck, jaw, or stomach. ? Shortness of breath with or without chest discomfort. ? Other signs may include breaking out in a cold sweat, nausea, or lightheadedness. Don't wait more than five minutes to call [address]! Fast action can save your life.  Calling 911 is almost always the fastest way to get lifesaving treatment. Emergency Medical Services staff can begin treatment when they arrive  up to an hour sooner than if someone gets to the hospital by car. The discharge information has been reviewed with the patient and spouse. The patient and spouse verbalized understanding. Discharge medications reviewed with the patient and spouse and appropriate educational materials and side effects teaching were provided. ___________________________________________________________________________________________________________________________________ ACO Transitions of Care Introducing [facility] [address] offers a voluntary care coordination program to provide high quality service and care to [facility] fee-for-service beneficiaries. Fidencio Rowland was designed to help you enhance your health and well-being through the following services: ? Transitions of Care  support for individuals who are transitioning from one care setting to another (example: Hospital to home). ? Chronic and Complex Care Coordination  support for individuals and caregivers of those with serious or chronic illnesses or with more than one chronic (ongoing) condition and those who take a number of different medications. If you meet specific medical criteria, a [facility] [address] may call you directly to coordinate your care with your primary care physician and your other care providers. For questions about the [facility] programs, please, contact your physicians office. For general questions or additional information about Accountable Care Organizations: 
Please visit www.medicare.gov/acos. html or call 1-800-MEDICARE (8-238.501.8505) TTY users should call 5-283.203.8912. Introducing [facility] & HEALTH SERVICES!    
 Avelina Santo introduces Smart GPS Backpack patient portal. Now you can access parts of your medical record, email your doctor's office, and request medication refills online. 1. In your internet browser, go to [email or web address] 2. Click on the First Time User? Click Here link in the Sign In box. You will see the New Member Sign Up page. 3. Enter your TradeSync Access Code exactly as it appears below. You will not need to use this code after youve completed the sign-up process. If you do not sign up before the expiration date, you must request a new code. · TradeSync Access Code: [email or web address] Expires: 7/18/2018  1:46 PM 
 
4. Enter the last four digits of your Social Security Number (xxxx) and Date of Birth (mm/dd/yyyy) as indicated and click Submit. You will be taken to the next sign-up page. 5. Create a TradeSync ID. This will be your TradeSync login ID and cannot be changed, so think of one that is secure and easy to remember. 6. Create a TradeSync password. You can change your password at any time. 7. Enter your Password Reset Question and Answer. This can be used at a later time if you forget your password. 8. Enter your e-mail address. You will receive e-mail notification when new information is available in [address] 9. Click Sign Up. You can now view and download portions of your medical record. 10. Click the Download Summary menu link to download a portable copy of your medical information. If you have questions, please visit the Frequently Asked Questions section of the TradeSync website. Remember, TradeSync is NOT to be used for urgent needs. For medical emergencies, dial 911. Now available from your iPhone and Android! Introducing Edgar Avalos As a [facility] patient, I wanted to make you aware of our electronic visit tool called Edgar Avalos. [facility] 24/7 allows you to connect within minutes with a medical provider 24 hours a day, seven days a week via a mobile device or tablet or logging into a secure website from your computer. You can access ZenDay from anywhere in the [place]. A virtual visit might be right for you when you have a simple condition and feel like you just dont want to get out of bed, or cant get away from work for an appointment, when your regular [employer] provider is not available (evenings, weekends or holidays), or when youre out of town and need minor care. Electronic visits cost only $49 and if the [employer] 24/7 provider determines a prescription is needed to treat your condition, one can be electronically transmitted to a nearby pharmacy*. Please take a moment to enroll today if you have not already done so. The enrollment process is free and takes just a few minutes. To enroll, please download the [employer] 24/7 breann to your tablet or phone, or visit [email or web address] to enroll on your computer. And, as an [address] [place] [address] patient with a [employer] account, the results of your visits will be scanned into your electronic medical record and your primary care provider will be able to view the scanned results. We urge you to continue to see your regular [employer] provider for your ongoing medical care. And while your primary care provider may not be the one available when you seek a ZenDay virtual visit, the peace of mind you get from getting a real diagnosis real time can be priceless. For more information on ZenDay, view our Frequently Asked Questions (FAQs) at [email or web address]. Sincerely, 
 
Janine Daley MD 
Chief Medical Officer [employer] *:  certain medications cannot be prescribed via ZenDay Unresulted Labs-Please follow up with your PCP about these lab tests Order Current Status XR ANGIO EXTREMITY UNI SI In process Providers Seen During Your Hospitalization Provider Specialty Primary office phone Gonzalo Peres MD Vascular Surgery [phone] Your Primary Care Physician (PCP) Primary Care Physician Office Phone Office Fax Son Medeiros [phone] You are allergic to the following Allergen Reactions Nka (No Known Allergies) Other (comments) Recent Documentation Height Weight BMI Smoking Status 1.727 m 93.4 kg 31.32 kg/m2 Never Smoker Emergency Contacts Name Discharge Info Relation Home Work Mobile Lazarus,Winsome DISCHARGE CAREGIVER [3] Spouse [3] [phone] Lazarus,Annie N/A  AT THIS TIME [6] Daughter [21] [phone] Lazarus,Cary N/A  AT THIS TIME [6] Son [22] [phone] [address] [place] [address] CAREGIVER [3] Son [22] [phone] Patient Belongings The following personal items are in your possession at time of discharge: 
  Dental Appliances: None  Visual Aid: None      Home Medications: None   Jewelry: None  Clothing: Hat, Jacket/Coat, Footwear, Pants, Shirt, Undergarments    Other Valuables: Home Medical Equipment(comment) (Bilateral hearing aides)  Personal Items Sent to Safe: Declines Please provide this summary of care documentation to your next provider. Signatures-by signing, you are acknowledging that this After Visit Summary has been reviewed with you and you have received a copy. Patient Signature:  ____________________________________________________________ Date:  ____________________________________________________________  
  
Claudene Born Provider Signature:  ____________________________________________________________ Date:  ____________________________________________________________

## 2018-04-30 NOTE — OP NOTES
[facility]  OPERATIVE REPORT    Lavanda Oppenheim  MR#: [id number]  : 1940  ACCOUNT #: [de-identified]   DATE OF SERVICE: 2018    SURGEON:  Viridiana Maynard DO     PREOPERATIVE DIAGNOSIS:  End-stage renal disease. POSTOPERATIVE DIAGNOSIS:  End-stage renal disease. PROCEDURE PERFORMED:  Left arm fistulogram with radiographic interpretation and reflux arteriogram.    COMPLICATIONS:  Zero. ESTIMATED BLOOD LOSS:  Zero. CONDITION:  The patient is stable. ASSISTANT:  Salinas Almodovar    IMPLANTS:  None    EXPLANTS:  None. SPECIMENS REMOVED:  thrombus    ANESTHESIA:  Mac      FINDINGS:  Brachial artery patent anastomosis to the cephalic vein fistula, patent cephalic vein fistula straight and patent without narrowing. Central veins evaluated. No evidence of stenosis. Good flow was seen dynamically on fistulogram.    DETAIL OF PROCEDURE:  The patient was brought to the OR and placed on the table in supine position, had moderate sedation. The left arm was prepped and draped in usual sterile fashion. He had been called. Patient was reported to have had a clotted fistula. On exam, there is a nice thrill in the fistula. I do not think there is any evidence that is clotted. With concern of ability to dialyze, because they were not able to use it, I did access it easily with a 19 needle, placed a wire and a JR4 Phylicia Zenia sheath. It  compressed, did a reflux arteriogram, then standard fistulogram.  I removed the sheath, repaired the site with a 3-0 Ethilon, transferred him back to the recovery area with a patent fistula and a nice thrill in it. No intervention was necessary today. He can use his fistula as needed with the outpatient dialysis center.       DO GIA Torres  D: 2018 11:21     T: 2018 12:59  JOB #: [id number]  CC: Norman Elam MD

## 2018-05-07 ENCOUNTER — OFFICE VISIT (OUTPATIENT)
Dept: CARDIOLOGY CLINIC | Age: 78
End: 2018-05-07

## 2018-05-07 VITALS
HEIGHT: 68 IN | BODY MASS INDEX: 34.1 KG/M2 | HEART RATE: 70 BPM | SYSTOLIC BLOOD PRESSURE: 125 MMHG | DIASTOLIC BLOOD PRESSURE: 49 MMHG | WEIGHT: 225 LBS

## 2018-05-07 DIAGNOSIS — I25.119 ATHEROSCLEROSIS OF NATIVE CORONARY ARTERY OF NATIVE HEART WITH ANGINA PECTORIS (HCC): ICD-10-CM

## 2018-05-07 DIAGNOSIS — I10 ESSENTIAL HYPERTENSION, BENIGN: ICD-10-CM

## 2018-05-07 DIAGNOSIS — E66.01 MORBID OBESITY (HCC): ICD-10-CM

## 2018-05-07 DIAGNOSIS — N18.6 ESRD (END STAGE RENAL DISEASE) (HCC): ICD-10-CM

## 2018-05-07 DIAGNOSIS — Z95.5 S/P CORONARY ARTERY STENT PLACEMENT: ICD-10-CM

## 2018-05-07 DIAGNOSIS — I50.32 CHRONIC DIASTOLIC HEART FAILURE (HCC): Primary | ICD-10-CM

## 2018-05-07 RX ORDER — ASPIRIN 81 MG/1
TABLET ORAL DAILY
COMMUNITY
End: 2018-09-03 | Stop reason: SDUPTHER

## 2018-05-07 RX ORDER — LOSARTAN POTASSIUM 25 MG/1
TABLET ORAL DAILY
COMMUNITY
End: 2019-01-03

## 2018-05-07 NOTE — MR AVS SNAPSHOT
[address] [facility] 
 
 
 [address] [facility] [address] 
[phone] Patient: Lizbet Angulo MRN: [id number] BBQ:[birth year] Visit Information Date & Time Provider Department Dept. Phone Encounter #  
 5/7/2018 11:00 AM Carolin Vee MD [facility] [phone] Follow-up Instructions Return in about 6 months (around 11/7/2018). Your Appointments 5/7/2018 11:00 AM  
Follow Up with Carolin Vee MD  
[facility] ([address]) Appt Note: 3 month follow up  
 [address] [facility] [address]  
  
   
 [address] [address] [address] [address]  
  
    
 5/9/2018 10:30 AM  
HOSPITAL DISCHARGE with [address] [facility] ([address]) Appt Note: DC Lt arm thrombectomy; .  
 [address] [place], [place] [address] 200 [facility] [address]  
[phone] 1212 [facility] 200 [facility] [address] 7/11/2018 10:30 AM  
Nurse Visit with Rosa Maria Garcia [facility] ([address]) Appt Note: PSA  
 [address] [facility] [address]  
  
   
 709 [facility] [address] [address] [address] Upcoming Health Maintenance Date Due  
 FOOT EXAM Q1 7/10/1950 MICROALBUMIN Q1 7[birth year]10/1950 EYE EXAM RETINAL OR DILATED Q1 7/10/1950 DTaP/Tdap/Td series (1 - Tdap) 7/10/1961 ZOSTER VACCINE AGE 60> 5/10/2000 GLAUCOMA SCREENING Q2Y 7/10/2005 Pneumococcal 65+ High/Highest Risk (2 of 2 - PPSV23) 3/9/2017 MEDICARE YEARLY EXAM 3/14/2018 HEMOGLOBIN A1C Q6M 7/29/2018 Influenza Age 5 to Adult 8/1/2018 LIPID PANEL Q1 11/14/2018 Allergies as of 5/7/2018  Review Complete On: 5/7/2018 By: Carolin Vee MD  
  
 Severity Noted Reaction Type Reactions Nka [No Known Allergies]  10/07/2012    Other (comments) Current Immunizations  Reviewed on 3/12/2013 No immunizations on file. Not reviewed this visit You Were Diagnosed With   
  
 Codes Comments Chronic diastolic heart failure (HCC)    -  Primary ICD-10-CM: I50.32 
ICD-9-CM: 428.32 stable Essential hypertension, benign     ICD-10-CM: I10 
ICD-9-CM: 401.1 controlled 
meds reduced after being on dialysis Atherosclerosis of native coronary artery of native heart with angina pectoris ([facility] [address]     ICD-10-CM: I25.119 ICD-9-CM: 414.01, 413.9 stable 
exertional stable angina ESRD (end stage renal disease) ([facility] [address] [facility]     ICD-10-CM: N18.6 ICD-9-CM: 585.6 stable [facility]bid [facility]     ICD-10-CM: E66.01 
ICD-9-CM: 278.01 discussed  diet S/P coronary artery stent placement     ICD-10-CM: Z95.5 ICD-9-CM: V45.82 Vitals BP Pulse Height(growth percentile) Weight(growth percentile) BMI Smoking Status 125/49 70 5' 8\" (1.727 m) 225 lb (102.1 kg) 34.21 kg/m2 Never Smoker Vitals History BMI and BSA Data Body Mass Index Body Surface Area  
 34.21 kg/m 2 2.21 m 2 Your Updated Medication List  
  
   
This list is accurate as of 5/7/18 10:46 AM.  Always use your most recent med list.  
  
  
  
  
 allopurinol 100 mg tablet Commonly known as:  Tomy Lakeisha Take 100 mg by mouth. Monday, Wednesday,friday  
  
 aspirin delayed-release 81 mg tablet Take  by mouth daily. b complex-vitamin c-folic acid 1 mg capsule Commonly known as:  Lisa Juan Take 1 Cap by mouth daily. clopidogrel 75 mg Tab Commonly known as:  PLAVIX Take 1 Tab by mouth daily. DECARA 50,000 unit capsule Generic drug:  cholecalciferol Take  by mouth every seven (7) days. fenofibrate 54 mg tablet Commonly known as:  LOFIBRA TAKE ONE TABLET BY MOUTH ONE TIME DAILY  
  
 insulin aspart U-100 100 unit/mL injection Commonly known as:  NovoLOG U-100 Insulin aspart  
20 units sq 3 times a day,sliding scale insulin glargine 100 unit/mL injection Commonly known as:  LANTUS  
20 Units by SubCUTAneous route every twelve (12) hours. isosorbide mononitrate ER 30 mg tablet Commonly known as:  IMDUR Take 1 Tab by mouth daily. losartan 25 mg tablet Commonly known as:  COZAAR Take  by mouth daily. metoprolol tartrate 100 mg IR tablet Commonly known as:  LOPRESSOR  
TAKE 1 TABLET BY MOUTH TWO TIMES A DAY. nitroglycerin 0.4 mg SL tablet Commonly known as:  NITROSTAT  
1 Tab by SubLINGual route as needed for Chest Pain. senna-docusate 8.6-50 mg per tablet Commonly known as:  Felicita Helen Take 1 Tab by mouth daily. simvastatin 40 mg tablet Commonly known as:  ZOCOR  
TAKE 1 TABLET BY MOUTH NIGHTLY.  
  
 tamsulosin 0.4 mg capsule Commonly known as:  FLOMAX TAKE ONE CAPSULE BY MOUTH ONE TIME DAILY Follow-up Instructions Return in about 6 months (around 11/7/2018). Introducing [facility] & HEALTH SERVICES! Rachelle Medeiros introduces [employer] patient portal. Now you can access parts of your medical record, email your doctor's office, and request medication refills online. 1. In your internet browser, go to [email or web address] 2. Click on the First Time User? Click Here link in the Sign In box. You will see the New Member Sign Up page. 3. Enter your [employer] Access Code exactly as it appears below. You will not need to use this code after youve completed the sign-up process. If you do not sign up before the expiration date, you must request a new code. · [employer] Access Code: [email or web address] Expires: 7/18/2018  1:46 PM 
 
4. Enter the last four digits of your Social Security Number (xxxx) and Date of Birth (mm/dd/yyyy) as indicated and click Submit. You will be taken to the next sign-up page. 5. Create a Zolat ID. This will be your Zolat login ID and cannot be changed, so think of one that is secure and easy to remember. 6. Create a Tr[employer]arkia password. You can change your password at any time. 7. Enter your Password Reset Question and Answer. This can be used at a later time if you forget your password. 8. Enter your e-mail address. You will receive e-mail notification when new information is available in [address] 9. Click Sign Up. You can now view and download portions of your medical record. 10. Click the Download Summary menu link to download a portable copy of your medical information. If you have questions, please visit the Frequently Asked Questions section of the Tr[employer]arkia website. Remember, [employer] is NOT to be used for urgent needs. For medical emergencies, dial 911. Now available from your iPhone and Android! Please provide this summary of care documentation to your next provider. Your primary care clinician is listed as Claudeen Dawn. If you have any questions after today's visit, please call [phone].

## 2018-05-07 NOTE — LETTER
Arian Allison 1940 
 
5/7/2018 Dear MD Taylor Oliveira, MD Jacqueline Choudhary, [address] I had the pleasure of evaluating  Mr. La Aragon in office today. Below are the relevant portions of my assessment and plan of care. ICD-10-CM ICD-9-CM 1. Chronic diastolic heart failure (HCC) I50.32 428.32   
 stable 2. Essential hypertension, benign I10 401.1   
 controlled 
meds reduced after being on dialysis 3. Atherosclerosis of native coronary artery of native heart with angina pectoris (HCC) I25.119 414.01   
  413.9   
 stable 
exertional stable angina 4. ESRD (end stage renal disease) ([facility] [address] N18.6 585.6   
 stable 5. Morbid obesity ([facility] [facility] E66.01 278.01   
 discussed  diet 6. S/P coronary artery stent placement Z95.5 V45.82 Current Outpatient Prescriptions Medication Sig Dispense Refill  losartan (COZAAR) 25 mg tablet Take  by mouth daily.  aspirin delayed-release 81 mg tablet Take  by mouth daily.  fenofibrate (LOFIBRA) 54 mg tablet TAKE ONE TABLET BY MOUTH ONE TIME DAILY  30 Tab 4  
 tamsulosin (FLOMAX) 0.4 mg capsule TAKE ONE CAPSULE BY MOUTH ONE TIME DAILY 90 Cap 3  
 isosorbide mononitrate ER (IMDUR) 30 mg tablet Take 1 Tab by mouth daily. 30 Tab 0  
 b complex-vitamin c-folic acid (NEPHROCAPS) 1 mg capsule Take 1 Cap by mouth daily. 30 Cap 0  
 senna-docusate (PERICOLACE) 8.6-50 mg per tablet Take 1 Tab by mouth daily. 30 Tab 0  
 simvastatin (ZOCOR) 40 mg tablet TAKE 1 TABLET BY MOUTH NIGHTLY. 90 Tab 2  
 metoprolol tartrate (LOPRESSOR) 100 mg IR tablet TAKE 1 TABLET BY MOUTH TWO TIMES A DAY. 180 Tab 2  clopidogrel (PLAVIX) 75 mg tab Take 1 Tab by mouth daily. 90 Tab 3  Cholecalciferol, Vitamin D3, (DECARA) 50,000 unit cap Take  by mouth every seven (7) days.  insulin glargine (LANTUS) 100 unit/mL injection 20 Units by SubCUTAneous route every twelve (12) hours.  (Patient taking differently: 35 Units by SubCUTAneous route every twelve (12) hours. ) 1 Vial 2  
 nitroglycerin (NITROSTAT) 0.4 mg SL tablet 1 Tab by SubLINGual route as needed for Chest Pain. 25 Tab 1  
 insulin aspart (NOVOLOG) 100 unit/mL injection 20 units sq 3 times a day,sliding scale (Patient taking differently: sliding scale with meals) 10 mL 0  
 allopurinol (ZYLOPRIM) 100 mg tablet Take 100 mg by mouth. Monday, Wednesday,friday Orders Placed This Encounter  losartan (COZAAR) 25 mg tablet Sig: Take  by mouth daily.  aspirin delayed-release 81 mg tablet Sig: Take  by mouth daily. If you have questions, please do not hesitate to call me. I look forward to following Mr. Zana Claros along with you. Sincerely, Joseph Rudolph MD

## 2018-05-07 NOTE — PROGRESS NOTES
1. Have you been to the ER, urgent care clinic since your last visit? Hospitalized since your last visit? Yes MV     2. Have you seen or consulted any other health care providers outside of the [facility] since your last visit? Include any pap smears or colon screening. Yes Where: pcp     3. Since your last visit, have you had any of the following symptoms? shortness of breath.

## 2018-05-07 NOTE — PROGRESS NOTES
HISTORY OF PRESENT ILLNESS  Autumn Gilmore is a 68 y.o. male. HPI Comments: Patient with cad,chf,post cabg and pci  . recent admission with chf,acs,non q mi  Had vg to [facility] pci-11/2017 12/2017-admitted with angina  1/2018-non stemi      CHF   The history is provided by the patient. This is a chronic problem. The problem occurs constantly. The problem has not changed since onset. Associated symptoms include chest pain and shortness of breath. The symptoms are aggravated by exertion. Hypertension   The history is provided by the patient. This is a chronic problem. The problem occurs constantly. The problem has not changed since onset. Associated symptoms include chest pain and shortness of breath. Nothing aggravates the symptoms. Shortness of Breath   The history is provided by the patient. This is a recurrent problem. The problem occurs intermittently. The current episode started more than 1 week ago. Associated symptoms include chest pain. Pertinent negatives include no fever, no cough, no sputum production, no hemoptysis, no wheezing, no PND, no orthopnea, no vomiting, no rash, no leg swelling and no claudication. The problem's precipitants include exercise (>30 mts of treadmill). Chest Pain (Angina)    This is a recurrent problem. The problem has not changed since onset. The problem occurs every several days. The pain is associated with exertion. The pain is present in the substernal region. The quality of the pain is described as pressure-like. The pain does not radiate. Associated symptoms include shortness of breath. Pertinent negatives include no claudication, no cough, no dizziness, no fever, no hemoptysis, no nausea, no orthopnea, no palpitations, no PND, no sputum production, no vomiting and no weakness. Review of Systems   Constitutional: Negative for chills and fever. HENT: Negative for nosebleeds. Eyes: Negative for blurred vision and double vision.    Respiratory: Positive for shortness of breath. Negative for cough, hemoptysis, sputum production and wheezing. Cardiovascular: Positive for chest pain. Negative for palpitations, orthopnea, claudication, leg swelling and PND. Gastrointestinal: Negative for heartburn, nausea and vomiting. Musculoskeletal: Negative for myalgias. Skin: Negative for rash. Neurological: Negative for dizziness and weakness. Endo/Heme/Allergies: Does not bruise/bleed easily. Family History   Problem Relation Age of Onset    Heart Attack Father 64       Past Medical History:   Diagnosis Date    Atherosclerosis of renal artery (HCC)     S/P Rt stent    CAD (coronary artery disease) , 1997, 2012    ,double bypass, 2 stents, 2stents 7/2016    Cancer ([facility] [address]     prostate    CHF (congestive heart failure) (HCC)     Chronic diastolic heart failure (HCC)     Chronic kidney disease     Chronic kidney disease (CKD), stage IV (severe) (HCC)     On Dialysis now -T-Th-sat    Constipation     Coronary atherosclerosis of unspecified type of vessel, native or graft     Stable angina after recent PCI continue treatment.        CRI (chronic renal insufficiency)     Diabetes ([address] 1973    type 2    Essential hypertension, benign     GERD (gastroesophageal reflux disease)     Gout     Hypertension 1982    Morbid obesity ([address]     Weight loss has been strongly encouraged by following dietary restrictions and an exercise routine    APRYL (obstructive sleep apnea) 6/26/2015    no cpap    Other and unspecified hyperlipidemia     HDL's are not at goal, LDL's are at goal, triglycerides are not at goal.    Other and unspecified hyperlipidemia     HDL's are not at goal, LDL's are at goal, triglycerides are not at goal.     Other ill-defined conditions(799.89) 1960    pneumonia twice    Sleep disturbance     Type II or unspecified type diabetes mellitus without mention of complication, not stated as uncontrolled        Past Surgical History:   Procedure Laterality Date [address] [facility] [address]    lt. carotid endart. [facility] CARDIAC SURG PROCEDURE UNLIST  2012, 2016, Nov 2017    Coronary Stent    COLONOSCOPY N/A 6/23/2017    COLONOSCOPY w/ APC w/ polypectomies performed by Syed Rushing [address] N/A 1/19/2018    FLEXIBLE SIGMOIDOSCOPY WITH Radio frequency ablation performed by Syed Rushing MD at [address] HX CHOLECYSTECTOMY      HX CORONARY ARTERY BYPASS GRAFT  2000    x2    HX HEENT  1997    cholecystectomy    HX UROLOGICAL  1998    renal art. surg    HX VASCULAR ACCESS      Perma cath for HD- right chest.       Allergies   Allergen Reactions    Nka [No Known Allergies] Other (comments)       Current Outpatient Prescriptions   Medication Sig    losartan (COZAAR) 25 mg tablet Take  by mouth daily.  aspirin delayed-release 81 mg tablet Take  by mouth daily.  fenofibrate (LOFIBRA) 54 mg tablet TAKE ONE TABLET BY MOUTH ONE TIME DAILY     tamsulosin (FLOMAX) 0.4 mg capsule TAKE ONE CAPSULE BY MOUTH ONE TIME DAILY    isosorbide mononitrate ER (IMDUR) 30 mg tablet Take 1 Tab by mouth daily.  b complex-vitamin c-folic acid (NEPHROCAPS) 1 mg capsule Take 1 Cap by mouth daily.  senna-docusate (PERICOLACE) 8.6-50 mg per tablet Take 1 Tab by mouth daily.  simvastatin (ZOCOR) 40 mg tablet TAKE 1 TABLET BY MOUTH NIGHTLY.  metoprolol tartrate (LOPRESSOR) 100 mg IR tablet TAKE 1 TABLET BY MOUTH TWO TIMES A DAY.  clopidogrel (PLAVIX) 75 mg tab Take 1 Tab by mouth daily.  Cholecalciferol, Vitamin D3, (DECARA) 50,000 unit cap Take  by mouth every seven (7) days.  insulin glargine (LANTUS) 100 unit/mL injection 20 Units by SubCUTAneous route every twelve (12) hours. (Patient taking differently: 35 Units by SubCUTAneous route every twelve (12) hours.)    nitroglycerin (NITROSTAT) 0.4 mg SL tablet 1 Tab by SubLINGual route as needed for Chest Pain.     insulin aspart (NOVOLOG) 100 unit/mL injection 20 units sq 3 times a day,sliding scale (Patient taking differently: sliding scale with meals)    allopurinol (ZYLOPRIM) 100 mg tablet Take 100 mg by mouth. Monday, Wednesday,friday     No current facility-administered medications for this visit. Visit Vitals    /49    Pulse 70    Ht 5' 8\" (1.727 m)    Wt 102.1 kg (225 lb)    BMI 34.21 kg/m2         Physical Exam   Constitutional: He is oriented to person, place, and time. He appears well-developed and well-nourished. obese   HENT:   Head: Normocephalic and atraumatic. Eyes: Conjunctivae are normal.   Neck: Neck supple. No JVD present. No tracheal deviation present. No thyromegaly present. Cardiovascular: Normal rate, regular rhythm and normal heart sounds. Exam reveals no gallop and no friction rub. No murmur heard. Pulmonary/Chest: Breath sounds normal. No respiratory distress. He has no wheezes. He has no rales. He exhibits no tenderness. Abdominal: Soft. There is no tenderness. Musculoskeletal: He exhibits no edema. Neurological: He is alert and oriented to person, place, and time. Skin: Skin is warm and dry. Psychiatric: He has a normal mood and affect. Mr. Alcides Pastor has a reminder for a \"due or due soon\" health maintenance. I have asked that he contact his primary care provider for follow-up on this health maintenance. CARDIOLOGY STUDIES 10/8/2012   Cardiac Cath Result PATENT LIMA TO LAD, SVG TO D1 SUBTOTAL DIFFUSE, POST PCI WITH STENTS,HAD PROXIMAL IN STENT PROTRUSION UNABLE TO CROSS WITH BALLONS,D1 DIFFISE SEVERE   Echocardiogram - Complete Result NORMAL EF 70%     SUMMARY:echo:11/2014  Left ventricle: Systolic function was hyperdynamic. Ejection fraction was  estimated in the range of 70 % to 75 %. No obvious wall motion  abnormalities identified in the views obtained. Near cavity obliteration  seen in the mid to apical portions of the ventricular cavity. There was  mild concentric hypertrophy.  Features were consistent with a pseudonormal  left ventricular filling pattern, with concomitant abnormal relaxation and  increased filling pressure (grade 2 diastolic dysfunction). NUCLEAR IMAGING:    Findings: stress test:11/2014  1. Post-stress imaging in short axis, horizontal and vertical long axis views reveals normal isotope uptake in all areas. 2. Resting images also show normal isotope uptake in all areas. 3. Gated images show normal left ventricular size, wall motion and systolic function. Ejection fraction is 85%. Diagnosis:   1. Normal scan. 2. No evidence of significant fixed or reversible defect suggesting ischemia or myocardial infarction noted from this nuclear study. 3. low risk scan.  7/2016-pci  vg to diag  11/2017-pci svg-diag  I have personally reviewed patient's records available from hospital and other providers and incorporated findings in patient care. 1/2018-[facility]  Assessment         ICD-10-CM ICD-9-CM    1. Chronic diastolic heart failure (HCC) I50.32 428.32     stable   2. Essential hypertension, benign I10 401.1     controlled  meds reduced after being on dialysis   3. Atherosclerosis of native coronary artery of native heart with angina pectoris ([facility]) I25.119 414.01      413.9     stable  exertional stable angina   4. ESRD (end stage renal disease) ([facility]) N18.6 585.6     stable   5. Morbid obesity ([facility] [address] E66.01 278.01     discussed  diet   6. S/P coronary artery stent placement Z95.5 V45.82            Medications Discontinued During This Encounter   Medication Reason    amLODIPine (NORVASC) 5 mg tablet Not A Current Medication    losartan (COZAAR) 50 mg tablet Not A Current Medication    aspirin 81 mg chewable tablet Not A Current Medication       No orders of the defined types were placed in this encounter. Follow-up Disposition:  Return in about 6 months (around 11/7/2018).

## 2018-05-09 RX ORDER — SIMVASTATIN 40 MG/1
TABLET, FILM COATED ORAL
Qty: 90 TAB | Refills: 2 | Status: SHIPPED | OUTPATIENT
Start: 2018-05-09 | End: 2019-03-04 | Stop reason: SDUPTHER

## 2018-05-09 RX ORDER — CLOPIDOGREL BISULFATE 75 MG/1
75 TABLET ORAL DAILY
Qty: 90 TAB | Refills: 6 | Status: SHIPPED | OUTPATIENT
Start: 2018-05-09 | End: 2018-05-29

## 2018-05-16 ENCOUNTER — OFFICE VISIT (OUTPATIENT)
Dept: VASCULAR SURGERY | Age: 78
End: 2018-05-16

## 2018-05-16 VITALS
DIASTOLIC BLOOD PRESSURE: 54 MMHG | SYSTOLIC BLOOD PRESSURE: 122 MMHG | WEIGHT: 225 LBS | BODY MASS INDEX: 34.1 KG/M2 | HEIGHT: 68 IN

## 2018-05-16 DIAGNOSIS — Z99.2 ESRD (END STAGE RENAL DISEASE) ON DIALYSIS (HCC): Primary | ICD-10-CM

## 2018-05-16 DIAGNOSIS — T14.8XXA HEMATOMA: ICD-10-CM

## 2018-05-16 DIAGNOSIS — N18.6 ESRD (END STAGE RENAL DISEASE) ON DIALYSIS (HCC): Primary | ICD-10-CM

## 2018-05-28 ENCOUNTER — HOSPITAL ENCOUNTER (INPATIENT)
Age: 78
LOS: 1 days | Discharge: HOME | DRG: 393 | End: 2018-05-29
Attending: EMERGENCY MEDICINE | Admitting: INTERNAL MEDICINE
Payer: MEDICARE

## 2018-05-28 DIAGNOSIS — K92.2 LOWER GI BLEED: Primary | ICD-10-CM

## 2018-05-28 DIAGNOSIS — R19.7 DIARRHEA IN ADULT PATIENT: ICD-10-CM

## 2018-05-28 PROBLEM — N18.5 CHRONIC KIDNEY DISEASE, STAGE V (HCC): Chronic | Status: ACTIVE | Noted: 2017-10-28

## 2018-05-28 PROBLEM — K62.5 GASTROINTESTINAL HEMORRHAGE ASSOCIATED WITH ANORECTAL SOURCE: Status: ACTIVE | Noted: 2018-05-28

## 2018-05-28 LAB
ABO + RH BLD: NORMAL
ALBUMIN SERPL-MCNC: 3.5 G/DL (ref 3.4–5)
ALBUMIN/GLOB SERPL: 0.7 {RATIO} (ref 0.8–1.7)
ALP SERPL-CCNC: 111 U/L (ref 45–117)
ALT SERPL-CCNC: 17 U/L (ref 16–61)
ANION GAP SERPL CALC-SCNC: 6 MMOL/L (ref 3–18)
AST SERPL-CCNC: 16 U/L (ref 15–37)
BASOPHILS # BLD: 0 K/UL (ref 0–0.1)
BASOPHILS NFR BLD: 0 % (ref 0–2)
BILIRUB SERPL-MCNC: 0.5 MG/DL (ref 0.2–1)
BLOOD GROUP ANTIBODIES SERPL: NORMAL
BUN SERPL-MCNC: 57 MG/DL (ref 7–18)
BUN/CREAT SERPL: 7 (ref 12–20)
CALCIUM SERPL-MCNC: 9.3 MG/DL (ref 8.5–10.1)
CHLORIDE SERPL-SCNC: 98 MMOL/L (ref 100–108)
CK MB CFR SERPL CALC: 1.5 % (ref 0–4)
CK MB CFR SERPL CALC: 1.7 % (ref 0–4)
CK MB SERPL-MCNC: 2.8 NG/ML (ref 5–25)
CK MB SERPL-MCNC: 3.1 NG/ML (ref 5–25)
CK SERPL-CCNC: 164 U/L (ref 39–308)
CK SERPL-CCNC: 207 U/L (ref 39–308)
CO2 SERPL-SCNC: 32 MMOL/L (ref 21–32)
CREAT SERPL-MCNC: 8.75 MG/DL (ref 0.6–1.3)
DIFFERENTIAL METHOD BLD: ABNORMAL
EOSINOPHIL # BLD: 0.2 K/UL (ref 0–0.4)
EOSINOPHIL NFR BLD: 2 % (ref 0–5)
ERYTHROCYTE [DISTWIDTH] IN BLOOD BY AUTOMATED COUNT: 13.4 % (ref 11.6–14.5)
EST. AVERAGE GLUCOSE BLD GHB EST-MCNC: 154 MG/DL
GLOBULIN SER CALC-MCNC: 4.8 G/DL (ref 2–4)
GLUCOSE BLD STRIP.AUTO-MCNC: 118 MG/DL (ref 70–110)
GLUCOSE BLD STRIP.AUTO-MCNC: 121 MG/DL (ref 70–110)
GLUCOSE SERPL-MCNC: 116 MG/DL (ref 74–99)
HBA1C MFR BLD: 7 % (ref 4.2–5.6)
HCT VFR BLD AUTO: 36.1 % (ref 36–48)
HCT VFR BLD AUTO: 37.5 % (ref 36–48)
HGB BLD-MCNC: 11.8 G/DL (ref 13–16)
HGB BLD-MCNC: 12.3 G/DL (ref 13–16)
INR PPP: 1 (ref 0.8–1.2)
LYMPHOCYTES # BLD: 1.3 K/UL (ref 0.9–3.6)
LYMPHOCYTES NFR BLD: 12 % (ref 21–52)
MCH RBC QN AUTO: 29.9 PG (ref 24–34)
MCHC RBC AUTO-ENTMCNC: 32.8 G/DL (ref 31–37)
MCV RBC AUTO: 91.2 FL (ref 74–97)
MONOCYTES # BLD: 0.5 K/UL (ref 0.05–1.2)
MONOCYTES NFR BLD: 4 % (ref 3–10)
NEUTS SEG # BLD: 8.7 K/UL (ref 1.8–8)
NEUTS SEG NFR BLD: 82 % (ref 40–73)
PLATELET # BLD AUTO: 293 K/UL (ref 135–420)
PMV BLD AUTO: 9.6 FL (ref 9.2–11.8)
POTASSIUM SERPL-SCNC: 4.4 MMOL/L (ref 3.5–5.5)
PROT SERPL-MCNC: 8.3 G/DL (ref 6.4–8.2)
PROTHROMBIN TIME: 12.7 SEC (ref 11.5–15.2)
RBC # BLD AUTO: 4.11 M/UL (ref 4.7–5.5)
SODIUM SERPL-SCNC: 136 MMOL/L (ref 136–145)
SPECIMEN EXP DATE BLD: NORMAL
TROPONIN I SERPL-MCNC: <0.02 NG/ML (ref 0–0.04)
TROPONIN I SERPL-MCNC: <0.02 NG/ML (ref 0–0.04)
WBC # BLD AUTO: 10.6 K/UL (ref 4.6–13.2)

## 2018-05-28 PROCEDURE — 82962 GLUCOSE BLOOD TEST: CPT

## 2018-05-28 PROCEDURE — 84484 ASSAY OF TROPONIN QUANT: CPT | Performed by: INTERNAL MEDICINE

## 2018-05-28 PROCEDURE — 85014 HEMATOCRIT: CPT | Performed by: INTERNAL MEDICINE

## 2018-05-28 PROCEDURE — 65660000000 HC RM CCU STEPDOWN

## 2018-05-28 PROCEDURE — 85025 COMPLETE CBC W/AUTO DIFF WBC: CPT | Performed by: EMERGENCY MEDICINE

## 2018-05-28 PROCEDURE — 74011250636 HC RX REV CODE- 250/636: Performed by: EMERGENCY MEDICINE

## 2018-05-28 PROCEDURE — 74011250637 HC RX REV CODE- 250/637: Performed by: INTERNAL MEDICINE

## 2018-05-28 PROCEDURE — 74011250636 HC RX REV CODE- 250/636: Performed by: INTERNAL MEDICINE

## 2018-05-28 PROCEDURE — 86900 BLOOD TYPING SEROLOGIC ABO: CPT | Performed by: EMERGENCY MEDICINE

## 2018-05-28 PROCEDURE — 85610 PROTHROMBIN TIME: CPT | Performed by: EMERGENCY MEDICINE

## 2018-05-28 PROCEDURE — 83036 HEMOGLOBIN GLYCOSYLATED A1C: CPT | Performed by: INTERNAL MEDICINE

## 2018-05-28 PROCEDURE — 99283 EMERGENCY DEPT VISIT LOW MDM: CPT

## 2018-05-28 PROCEDURE — 80053 COMPREHEN METABOLIC PANEL: CPT | Performed by: EMERGENCY MEDICINE

## 2018-05-28 PROCEDURE — C9113 INJ PANTOPRAZOLE SODIUM, VIA: HCPCS | Performed by: INTERNAL MEDICINE

## 2018-05-28 PROCEDURE — 36415 COLL VENOUS BLD VENIPUNCTURE: CPT | Performed by: INTERNAL MEDICINE

## 2018-05-28 PROCEDURE — 74011000250 HC RX REV CODE- 250: Performed by: INTERNAL MEDICINE

## 2018-05-28 RX ORDER — METOPROLOL TARTRATE 50 MG/1
100 TABLET ORAL 2 TIMES DAILY
Status: DISCONTINUED | OUTPATIENT
Start: 2018-05-28 | End: 2018-05-29 | Stop reason: HOSPADM

## 2018-05-28 RX ORDER — MAGNESIUM SULFATE 100 %
4 CRYSTALS MISCELLANEOUS AS NEEDED
Status: DISCONTINUED | OUTPATIENT
Start: 2018-05-28 | End: 2018-05-29 | Stop reason: HOSPADM

## 2018-05-28 RX ORDER — SIMVASTATIN 40 MG/1
40 TABLET, FILM COATED ORAL
Status: DISCONTINUED | OUTPATIENT
Start: 2018-05-28 | End: 2018-05-29 | Stop reason: HOSPADM

## 2018-05-28 RX ORDER — DOXERCALCIFEROL 4 UG/2ML
1 INJECTION INTRAVENOUS
Status: DISCONTINUED | OUTPATIENT
Start: 2018-05-29 | End: 2018-05-29 | Stop reason: HOSPADM

## 2018-05-28 RX ORDER — DEXTROSE 50 % IN WATER (D50W) INTRAVENOUS SYRINGE
25-50 AS NEEDED
Status: DISCONTINUED | OUTPATIENT
Start: 2018-05-28 | End: 2018-05-29 | Stop reason: HOSPADM

## 2018-05-28 RX ORDER — ISOSORBIDE MONONITRATE 30 MG/1
30 TABLET, EXTENDED RELEASE ORAL DAILY
Status: DISCONTINUED | OUTPATIENT
Start: 2018-05-29 | End: 2018-05-29 | Stop reason: HOSPADM

## 2018-05-28 RX ORDER — ISOSORBIDE MONONITRATE 30 MG/1
30 TABLET, EXTENDED RELEASE ORAL
Status: DISPENSED | OUTPATIENT
Start: 2018-05-28 | End: 2018-05-29

## 2018-05-28 RX ORDER — LOSARTAN POTASSIUM 25 MG/1
25 TABLET ORAL DAILY
Status: DISCONTINUED | OUTPATIENT
Start: 2018-05-29 | End: 2018-05-29 | Stop reason: HOSPADM

## 2018-05-28 RX ORDER — SODIUM CHLORIDE 0.9 % (FLUSH) 0.9 %
5-10 SYRINGE (ML) INJECTION AS NEEDED
Status: DISCONTINUED | OUTPATIENT
Start: 2018-05-28 | End: 2018-05-29 | Stop reason: HOSPADM

## 2018-05-28 RX ORDER — NITROGLYCERIN 0.4 MG/1
0.4 TABLET SUBLINGUAL AS NEEDED
Status: DISCONTINUED | OUTPATIENT
Start: 2018-05-28 | End: 2018-05-29 | Stop reason: HOSPADM

## 2018-05-28 RX ORDER — FENOFIBRATE 48 MG/1
48 TABLET, COATED ORAL DAILY
Status: DISCONTINUED | OUTPATIENT
Start: 2018-05-29 | End: 2018-05-29 | Stop reason: HOSPADM

## 2018-05-28 RX ORDER — ALLOPURINOL 100 MG/1
100 TABLET ORAL
Status: DISCONTINUED | OUTPATIENT
Start: 2018-05-30 | End: 2018-05-29 | Stop reason: HOSPADM

## 2018-05-28 RX ORDER — INSULIN LISPRO 100 [IU]/ML
INJECTION, SOLUTION INTRAVENOUS; SUBCUTANEOUS
Status: DISCONTINUED | OUTPATIENT
Start: 2018-05-28 | End: 2018-05-29 | Stop reason: HOSPADM

## 2018-05-28 RX ORDER — SODIUM CHLORIDE 0.9 % (FLUSH) 0.9 %
5-10 SYRINGE (ML) INJECTION EVERY 8 HOURS
Status: DISCONTINUED | OUTPATIENT
Start: 2018-05-28 | End: 2018-05-29 | Stop reason: HOSPADM

## 2018-05-28 RX ORDER — AMOXICILLIN 250 MG
1 CAPSULE ORAL DAILY
Status: DISCONTINUED | OUTPATIENT
Start: 2018-05-29 | End: 2018-05-29 | Stop reason: HOSPADM

## 2018-05-28 RX ORDER — LOSARTAN POTASSIUM 25 MG/1
25 TABLET ORAL
Status: DISPENSED | OUTPATIENT
Start: 2018-05-28 | End: 2018-05-29

## 2018-05-28 RX ADMIN — SIMVASTATIN 40 MG: 40 TABLET, FILM COATED ORAL at 21:47

## 2018-05-28 RX ADMIN — SODIUM CHLORIDE 1000 ML: 900 INJECTION, SOLUTION INTRAVENOUS at 10:02

## 2018-05-28 RX ADMIN — SODIUM CHLORIDE 1000 ML: 900 INJECTION, SOLUTION INTRAVENOUS at 09:02

## 2018-05-28 RX ADMIN — SODIUM CHLORIDE 40 MG: 9 INJECTION INTRAMUSCULAR; INTRAVENOUS; SUBCUTANEOUS at 21:31

## 2018-05-28 RX ADMIN — SODIUM CHLORIDE 40 MG: 9 INJECTION INTRAMUSCULAR; INTRAVENOUS; SUBCUTANEOUS at 13:50

## 2018-05-28 RX ADMIN — Medication 10 ML: at 22:00

## 2018-05-28 NOTE — IP AVS SNAPSHOT
[address] [facility] 
 
 
 [address] [address] Henok Kaba Patient: Leopoldo Weiss MRN: [id number] PJ About your hospitalization You were admitted on:  May 28, 2018 You last received care in the:  [address] [place] [address] You were discharged on:  May 29, 2018 Why you were hospitalized Your primary diagnosis was:  Gastrointestinal Hemorrhage Associated With Anorectal Source Your diagnoses also included:  Lower Gi Bleed, Gi Bleed Follow-up Information Follow up With Details Comments Contact Info Estrella Mccullough MD In 5 days lower GI bleed. [address] [facility] 
[phone] Meng Nicholson MD Call in 1 week Lower GI bleed [address] [place] [address] [address] [facility] 
[phone] Your Scheduled Appointments 2018 10:30 AM EDT Nurse Visit with Ruthy Luque [facility] ([address] [facility]) [address] [facility] [address] [facility]  
[phone] Discharge Orders None A check jose indicates which time of day the medication should be taken. My Medications START taking these medications Instructions Each Dose to Equal  
 Morning Noon Evening Bedtime  
 hydrocortisone 2.5 % rectal cream  
Commonly known as:  ANUSOL-HC Your last dose was: Your next dose is: Insert  into rectum four (4) times daily. lidocaine 5 % ointment Commonly known as:  XYLOCAINE Your last dose was: Your next dose is:    
   
   
 Apply to marilee-anal area and rectum daily as needed for pain  
     
   
   
   
  
 loperamide 2 mg capsule Commonly known as:  IMODIUM Your last dose was: Your next dose is: Take 1 Cap by mouth every four (4) hours as needed for Diarrhea for up to 10 days. 2 mg CHANGE how you take these medications Instructions Each Dose to Equal  
 Morning Noon Evening Bedtime  
 insulin aspart U-100 100 unit/mL injection Commonly known as:  NovoLOG U-100 Insulin aspart What changed:  additional instructions Your last dose was: Your next dose is:    
   
   
 20 units sq 3 times a day,sliding scale  
     
   
   
   
  
 insulin glargine 100 unit/mL injection Commonly known as:  LANTUS What changed:  how much to take Your last dose was: Your next dose is:    
   
   
 20 Units by SubCUTAneous route every twelve (12) hours. 20 Units CONTINUE taking these medications Instructions Each Dose to Equal  
 Morning Noon Evening Bedtime  
 allopurinol 100 mg tablet Commonly known as:  Caitlyn Claremont Your last dose was: Your next dose is: Take 100 mg by mouth. Monday, Wednesday,friday 100 mg  
    
   
   
   
  
 aspirin delayed-release 81 mg tablet Your last dose was: Your next dose is: Take  by mouth daily. b complex-vitamin c-folic acid 1 mg capsule Commonly known as:  Tarun Stratford Your last dose was: Your next dose is: Take 1 Cap by mouth daily. 1 Cap DECARA 50,000 unit capsule Generic drug:  cholecalciferol Your last dose was: Your next dose is: Take  by mouth every seven (7) days. fenofibrate 54 mg tablet Commonly known as:  LOFIBRA Your last dose was: Your next dose is: TAKE ONE TABLET BY MOUTH ONE TIME DAILY  
     
   
   
   
  
 isosorbide mononitrate ER 30 mg tablet Commonly known as:  IMDUR Your last dose was: Your next dose is: Take 1 Tab by mouth daily. 30 mg  
    
   
   
   
  
 losartan 25 mg tablet Commonly known as:  COZAAR Your last dose was: Your next dose is: Take  by mouth daily. metoprolol tartrate 100 mg IR tablet Commonly known as:  LOPRESSOR Your last dose was: Your next dose is: TAKE 1 TABLET BY MOUTH TWO TIMES A DAY. nitroglycerin 0.4 mg SL tablet Commonly known as:  NITROSTAT Your last dose was: Your next dose is:    
   
   
 1 Tab by SubLINGual route as needed for Chest Pain. 0.4 mg  
    
   
   
   
  
 senna-docusate 8.6-50 mg per tablet Commonly known as:  Johnmaxx Smack Your last dose was: Your next dose is: Take 1 Tab by mouth daily. 1 Tab  
    
   
   
   
  
 simvastatin 40 mg tablet Commonly known as:  ZOCOR Your last dose was: Your next dose is: TAKE 1 TABLET BY MOUTH NIGHTLY.  
     
   
   
   
  
 tamsulosin 0.4 mg capsule Commonly known as:  FLOMAX Your last dose was: Your next dose is: TAKE ONE CAPSULE BY MOUTH ONE TIME DAILY  
     
   
   
   
  
  
STOP taking these medications   
 clopidogrel 75 mg Tab Commonly known as:  PLAVIX Where to Get Your Medications Information on where to get these meds will be given to you by the nurse or doctor. ! Ask your nurse or doctor about these medications  
  hydrocortisone 2.5 % rectal cream  
 lidocaine 5 % ointment  
 loperamide 2 mg capsule Discharge Instructions Learning About Cleaning up Diarrhea When cleaning up diarrhea, it is important to remember that germs can spread very easily. This can happen when people or items in the home come into contact with diarrhea. Careful cleaning can help reduce the chance of spreading germs. The Centers for Disease Control and Prevention (CDC) recommends that you wear disposable gloves when cleaning up diarrhea or other body fluids.  You may wear reusable rubber gloves if you wash and disinfect them after each use. If you don't have gloves, be sure to wash your hands thoroughly with soap and water when you are finished. How do you clean up diarrhea from skin? 1. Wear disposable gloves. 2. Use damp paper towels to wipe up the stool off the skin, and put the used paper towels in a plastic trash bag. 
3. Gently wash the area with warm water and a soft cloth. Rinse well, and dry completely. ¨ Do not use any soap on the person's bottom unless the area is very soiled. If soap is needed, use only a mild soap, such as Cetaphil. ¨ If there's a rash on the skin, do not clean the skin with wet wipes that have alcohol or propylene glycol. These wipes may sting the skin. 4. Remove the gloves, and throw them away in a plastic bag. Then wash your hands with soap and water right away. How do you clean up diarrhea from soiled linens and clothes? 1. Wear disposable gloves. 2. Wipe off any stool with paper towels. Put the used paper towels in a plastic trash bag. Small amounts of easily removed stool can be removed with toilet paper and flushed down the toilet. 3. Put the linens in a large plastic bag. The bag should prevent moisture from leaking through. Take the bag to the washing machine. 4. Put the linens in the washing machine. Wash items in a pre-wash cycle first. Then use a regular wash cycle with detergent. Use the warmest temperatures recommended on their labels. 5. After you finish handling soiled clothes, remove your gloves and throw them away in a plastic bag. Then wash your hands with soap and water right away. 6. Dry clothes and linens in a clothes dryer. Use the warmest temperature recommended on the labels. There is no need to disinfect the tubs of the washer or the dryer after a full cycle is completed. How do you clean up diarrhea from hard surfaces? 1. Wear disposable gloves. 2. Wipe up the stool with paper towels. Put the used paper towels in a plastic trash bag. Rinse the surfaces with water. 3. Disinfect hard surfaces with diluted household bleach or with disinfectants that you buy at the store. Wet the surface with the diluted bleach or disinfectant, and let it air dry. ¨ To dilute household bleach, follow the directions on the label. ¨ If you mix your own diluted bleach, use goggles or glasses to protect your eyes from splashes. ¨ Be aware that diluted bleach may remove color from some hard surfaces. 4. Remove your gloves, and throw them away in a plastic bag. Then wash your hands with soap and water right away. Where can you learn more? Go to http://kendal-farzaneh.info/. Enter C909 in the search box to learn more about \"Learning About Cleaning up Diarrhea. \" Current as of: September 24, 2016 Content Version: 11.4 © 1235-7939 [employer]. Care instructions adapted under license by [employer] (which disclaims liability or warranty for this information). If you have questions about a medical condition or this instruction, always ask your healthcare professional. [place] [address] any warranty or liability for your use of this information. 1. Hold Plavix for 5 days; you must follow up with your PCP before restarting Plavix. 2. Follow Up with your PCP in 5-7 days. 3. Follow up with GI in 1 week. 4. Seek immediate medical attention should you experience worsened symptoms, severe abdominal pain, bloody stools, bloody vomitus, chest pain, fever, or chills. Gastrointestinal Bleeding: Care Instructions Your Care Instructions The digestive or gastrointestinal tract goes from the mouth to the anus. It is often called the GI tract. Bleeding can happen anywhere in the GI tract. It may be caused by an ulcer, an infection, or cancer. It may also be caused by medicines such as aspirin or ibuprofen. Light bleeding may not cause any symptoms at first. But if you continue to bleed for a while, you may feel very weak or tired. Sudden, heavy bleeding means you need to see a doctor right away. This kind of bleeding can be very dangerous. But it can usually be cured or controlled. The doctor may do some tests to find the cause of your bleeding. Follow-up care is a key part of your treatment and safety. Be sure to make and go to all appointments, and call your doctor if you are having problems. It's also a good idea to know your test results and keep a list of the medicines you take. How can you care for yourself at home? · Be safe with medicines. Take your medicines exactly as prescribed. Call your doctor if you think you are having a problem with your medicine. You will get more details on the specific medicines your doctor prescribes. · Do not take aspirin or other anti-inflammatory medicines, such as naproxen (Aleve) or ibuprofen (Advil, Motrin), without talking to your doctor first. Ask your doctor if it is okay to use acetaminophen (Tylenol). · Do not drink alcohol. · The bleeding may make you lose iron. So it's important to eat foods that have a lot of iron. These include red meat, shellfish, poultry, and eggs. They also include beans, raisins, whole-grain breads, and leafy green vegetables. If you want help planning meals, you can make an appointment with a dietitian. When should you call for help? Call 911 anytime you think you may need emergency care. For example, call if: 
? · You have sudden, severe belly pain. ? · You vomit blood or what looks like coffee grounds. ? · You passed out (lost consciousness). ? · Your stools are maroon or very bloody. ?Call your doctor now or seek immediate medical care if: 
? · You are dizzy or lightheaded, or you feel like you may faint. ? · Your stools are black and look like tar, or they have streaks of blood. ? · You have belly pain. ? · You vomit or have nausea. ? · You have trouble swallowing, or it hurts when you swallow. ? Watch closely for changes in your health, and be sure to contact your doctor if: 
? · You do not get better as expected. Where can you learn more? Go to http://kendal-farzaneh.info/. Enter A453 in the search box to learn more about \"Gastrointestinal Bleeding: Care Instructions. \" Current as of: March 20, 2017 Content Version: 11.4 © 0587-0317 [employer]. Care instructions adapted under license by [employer] (which disclaims liability or warranty for this information). If you have questions about a medical condition or this instruction, always ask your healthcare professional. [address] [address] any warranty or liability for your use of this information. ACO Transitions of Care Introducing [employer] [address] Beth Hogue offers a voluntary care coordination program to provide high quality service and care to [facility] fee-for-service beneficiaries. Rao Jada was designed to help you enhance your health and well-being through the following services: ? Transitions of Care  support for individuals who are transitioning from one care setting to another (example: Hospital to home). ? Chronic and Complex Care Coordination  support for individuals and caregivers of those with serious or chronic illnesses or with more than one chronic (ongoing) condition and those who take a number of different medications. If you meet specific medical criteria, a [facility] [address] may call you directly to coordinate your care with your primary care physician and your other care providers. For questions about the [facility] programs, please, contact your physicians office. For general questions or additional information about Accountable Care Organizations: Please visit www.medicare.gov/acos. html or call 1-800-MEDICARE (9-423.931.6301) TTY users should call 0-218.671.4047. Dailymotion Announcement We are excited to announce that we are making your provider's discharge notes available to you in Dailymotion. You will see these notes when they are completed and signed by the physician that discharged you from your recent hospital stay. If you have any questions or concerns about any information you see in Dailymotion, please call the Health Information Department where you were seen or reach out to your Primary Care Provider for more information about your plan of care. Introducing hospitals & HEALTH SERVICES! Annabelle Da Silva introduces Dailymotion patient portal. Now you can access parts of your medical record, email your doctor's office, and request medication refills online. 1. In your internet browser, go to [email or web address] 2. Click on the First Time User? Click Here link in the Sign In box. You will see the New Member Sign Up page. 3. Enter your Dailymotion Access Code exactly as it appears below. You will not need to use this code after youve completed the sign-up process. If you do not sign up before the expiration date, you must request a new code. · Dailymotion Access Code: [email or web address] Expires: 7/18/2018  1:46 PM 
 
4. Enter the last four digits of your Social Security Number (xxxx) and Date of Birth (mm/dd/yyyy) as indicated and click Submit. You will be taken to the next sign-up page. 5. Create a Collective Healtht ID. This will be your Dailymotion login ID and cannot be changed, so think of one that is secure and easy to remember. 6. Create a Dailymotion password. You can change your password at any time. 7. Enter your Password Reset Question and Answer. This can be used at a later time if you forget your password. 8. Enter your e-mail address. You will receive e-mail notification when new information is available in [address] 9. Click Sign Up. You can now view and download portions of your medical record. 10. Click the Download Summary menu link to download a portable copy of your medical information. If you have questions, please visit the Frequently Asked Questions section of the [employer] website. Remember, Redbeacon is NOT to be used for urgent needs. For medical emergencies, dial 911. Now available from your iPhone and Android! Introducing Edgar Avalos As a [facility] patient, I wanted to make you aware of our electronic visit tool called Edgar Avalos. ZepedaBuildFax 24/7 allows you to connect within minutes with a medical provider 24 hours a day, seven days a week via a mobile device or tablet or logging into a secure website from your computer. You can access Edgar Avalos from anywhere in the [place]. A virtual visit might be right for you when you have a simple condition and feel like you just dont want to get out of bed, or cant get away from work for an appointment, when your regular [facility] provider is not available (evenings, weekends or holidays), or when youre out of town and need minor care. Electronic visits cost only $49 and if the [facility] 24/7 provider determines a prescription is needed to treat your condition, one can be electronically transmitted to a nearby pharmacy*. Please take a moment to enroll today if you have not already done so. The enrollment process is free and takes just a few minutes. To enroll, please download the Real Time Tomography 24/7 breann to your tablet or phone, or visit [email or web address] to enroll on your computer. And, as an [address] [place] [address] patient with a swabr account, the results of your visits will be scanned into your electronic medical record and your primary care provider will be able to view the scanned results.    
We urge you to continue to see your regular [facility] provider for your ongoing medical care. And while your primary care provider may not be the one available when you seek a Edgar Ewingangelafin virtual visit, the peace of mind you get from getting a real diagnosis real time can be priceless. For more information on Edgar Ewingangelafin, view our Frequently Asked Questions (FAQs) at [email or web address]. Sincerely, 
 
Layla Robin MD 
Chief Medical Officer Deanna Hogue *:  certain medications cannot be prescribed via Edgar Ewingdimitri Providers Seen During Your Hospitalization Provider Specialty Primary office phone Moira Mcgraw MD Emergency Medicine [phone] Lissette Colón MD Internal Medicine [phone] Isaias Troy MD Internal Medicine [phone] Your Primary Care Physician (PCP) Primary Care Physician Office Phone Office Fax Oren Homer [phone] You are allergic to the following Allergen Reactions Nka (No Known Allergies) Other (comments) Recent Documentation Height Weight BMI Smoking Status 1.626 m 97.4 kg 36.85 kg/m2 Never Smoker Emergency Contacts Name Discharge Info Relation Home Work Mobile Lazarus,Winsome DISCHARGE CAREGIVER [3] Spouse [3] [phone] Lazarus,Annie N/A  AT THIS TIME [6] Daughter [21] [phone] Lazarus,Cary N/A  AT THIS TIME [6] Son [22] [phone] [address] [place] [address] CAREGIVER [3] Son [22] [phone] Diana Bennett  Spouse [3] [phone] Patient Belongings The following personal items are in your possession at time of discharge: 
  Dental Appliances: Uppers  Visual Aid: Glasses, With patient      Home Medications: Sent home   Jewelry: None  Clothing: Footwear, Pants, Shirt, Shorts, Undergarments    Other Valuables: None  Personal Items Sent to Safe:  (none) Please provide this summary of care documentation to your next provider. Signatures-by signing, you are acknowledging that this After Visit Summary has been reviewed with you and you have received a copy. Patient Signature:  ____________________________________________________________ Date:  ____________________________________________________________  
  
Afia Lambing Provider Signature:  ____________________________________________________________ Date:  ____________________________________________________________

## 2018-05-28 NOTE — CONSULTS
[facility]   [email or web address]      Impression:   1. 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 ESRD on HD. HTN DM. Hgb is stable and is hemodynamically stable. Plan:     1. Transfuse as needed. Check need for anticoagulation. Consider repeating APC vs HALO. Continue dialysis. Chief Complaint:   Bleeding. HPI:  Amelia Jon is a 68 y.o. male who is being seen on consult for lower gi bleed 4 days with BM small amounts bright red without clots brown stool. Had good response to ablation treatment 5 months ago for radiation proctitis diagnosed at that time. PMH:   Past Medical History:   Diagnosis Date    Atherosclerosis of renal artery (HCC)     S/P Rt stent    CAD (coronary artery disease) , 1997, 2012    ,double bypass, 2 stents, 2stents 7/2016    Cancer (HCC)     prostate    CHF (congestive heart failure) (HCC)     Chronic diastolic heart failure (HCC)     Chronic kidney disease     Chronic kidney disease (CKD), stage IV (severe) (HCC)     On Dialysis now -T-Th-sat    Constipation     Coronary atherosclerosis of unspecified type of vessel, native or graft     Stable angina after recent PCI continue treatment.        CRI (chronic renal insufficiency)     Diabetes ([address] 1973    type 2    Essential hypertension, benign     GERD (gastroesophageal reflux disease)     Gout     Hypertension 1982    Morbid obesity ([address]     Weight loss has been strongly encouraged by following dietary restrictions and an exercise routine    APRYL (obstructive sleep apnea) 6/26/2015    no cpap    Other and unspecified hyperlipidemia     HDL's are not at goal, LDL's are at goal, triglycerides are not at goal.    Other and unspecified hyperlipidemia     HDL's are not at goal, LDL's are at goal, triglycerides are not at goal.     Other ill-defined conditions(799.89) 1960    pneumonia twice    Sleep disturbance     Type II or unspecified type diabetes mellitus without mention of complication, not stated as uncontrolled        PSH:   Past Surgical History:   Procedure Laterality Date   [address]    lt. carotid endart. [facility] CARDIAC SURG PROCEDURE UNLIST  2012, 2016, Nov 2017    Coronary Stent    COLONOSCOPY N/A 6/23/2017    COLONOSCOPY w/ APC w/ polypectomies [address] N/A 1/19/2018    FLEXIBLE SIGMOIDOSCOPY WITH Radio frequency ablation performed by Chelsea Souza MD at [address] HX CHOLECYSTECTOMY      HX CORONARY ARTERY BYPASS GRAFT  2000    x2    HX HEENT  1997    cholecystectomy    HX UROLOGICAL  1998    renal art. surg    HX VASCULAR ACCESS      Perma cath for HD- right chest.       Social HX:   Social History     Social History    Marital status:      Spouse name: N/A    Number of children: N/A    Years of education: N/A     Occupational History    Not on file. Social History Main Topics    Smoking status: Never Smoker    Smokeless tobacco: Never Used    Alcohol use No    Drug use: No    Sexual activity: Not on file     Other Topics Concern    Not on file     Social History Narrative       FHX:   Family History   Problem Relation Age of Onset    Heart Attack Father 64       Allergy:   Allergies   Allergen Reactions    Nka [No Known Allergies] Other (comments)       Home Medications:     Prescriptions Prior to Admission   Medication Sig    clopidogrel (PLAVIX) 75 mg tab Take 1 Tab by mouth daily.  simvastatin (ZOCOR) 40 mg tablet TAKE 1 TABLET BY MOUTH NIGHTLY.  losartan (COZAAR) 25 mg tablet Take  by mouth daily.  aspirin delayed-release 81 mg tablet Take  by mouth daily.  fenofibrate (LOFIBRA) 54 mg tablet TAKE ONE TABLET BY MOUTH ONE TIME DAILY     tamsulosin (FLOMAX) 0.4 mg capsule TAKE ONE CAPSULE BY MOUTH ONE TIME DAILY    isosorbide mononitrate ER (IMDUR) 30 mg tablet Take 1 Tab by mouth daily.     b complex-vitamin c-folic acid (NEPHROCAPS) 1 mg capsule Take 1 Cap by mouth daily.  metoprolol tartrate (LOPRESSOR) 100 mg IR tablet TAKE 1 TABLET BY MOUTH TWO TIMES A DAY.  insulin glargine (LANTUS) 100 unit/mL injection 20 Units by SubCUTAneous route every twelve (12) hours. (Patient taking differently: 35 Units by SubCUTAneous route every twelve (12) hours.)    insulin aspart (NOVOLOG) 100 unit/mL injection 20 units sq 3 times a day,sliding scale (Patient taking differently: sliding scale with meals)    allopurinol (ZYLOPRIM) 100 mg tablet Take 100 mg by mouth. Monday, Wednesday,friday    senna-docusate (PERICOLACE) 8.6-50 mg per tablet Take 1 Tab by mouth daily.  Cholecalciferol, Vitamin D3, (DECARA) 50,000 unit cap Take  by mouth every seven (7) days.  nitroglycerin (NITROSTAT) 0.4 mg SL tablet 1 Tab by SubLINGual route as needed for Chest Pain.        Medications:       Current Facility-Administered Medications:     [START ON 5/30/2018] allopurinol (ZYLOPRIM) tablet 100 mg, 100 mg, Oral, Q MON, WED & FRI, Lissette Colón MD  [facility]  [START ON 5/29/2018] fenofibrate nanocrystallized (TRICOR) tablet 48 mg, 48 mg, Oral, DAILY, Lissette Colón MD  [facility]  [START ON 5/29/2018] isosorbide mononitrate ER (IMDUR) tablet 30 mg, 30 mg, Oral, DAILY, Lissette Colón MD  [facility]  [START ON 5/29/2018] losartan (COZAAR) tablet 25 mg, 25 mg, Oral, DAILY, Lissette Colón MD    nitroglycerin (NITROSTAT) tablet 0.4 mg, 0.4 mg, SubLINGual, PRN, Lissette Colón MD  [facility]  [START ON 5/29/2018] senna-docusate (PERICOLACE) 8.6-50 mg per tablet 1 Tab, 1 Tab, Oral, DAILY, Lissette Colón MD    simvastatin (ZOCOR) tablet 40 mg, 40 mg, Oral, QHS, Lissette Colón MD    insulin lispro (HUMALOG) injection, , SubCUTAneous, AC&HS, Lissette Colón MD, Stopped at 05/28/18 1130    glucose chewable tablet 16 g, 4 Tab, Oral, PRN, Lissette Colón MD    glucagon (GLUCAGEN) injection 1 mg, 1 mg, IntraMUSCular, PRN, Faye Blackmon MD    dextrose (D50W) injection syrg 12.5-25 g, 25-50 mL, IntraVENous, PRN, Faye Blackmon MD    sodium chloride (NS) flush 5-10 mL, 5-10 mL, IntraVENous, Q8H, Faye Blackmon MD    sodium chloride (NS) flush 5-10 mL, 5-10 mL, IntraVENous, PRN, Faye Blackmon MD    pantoprazole (PROTONIX) 40 mg in sodium chloride 0.9% 10 mL injection, 40 mg, IntraVENous, Q12H, Faye Blackmon MD, 40 mg at 05/28/18 1350    [START ON 5/29/2018] doxercalciferol (HECTOROL) 4 mcg/2 mL injection 1 mcg, 1 mcg, IntraVENous, DIALYSIS TUE, THU & SAT, Tesfaye Nicholas MD      Review of Systems:     Constitutional: No fevers, chills, weight loss, fatigue. Skin: No rashes, pruritis, jaundice,    HENT: No headaches,  auditory changes out of ordinary. Eyes: No visual changes, blurred vision,   jaundice. Cardiovascular: No chest pain, heart palpitations. Respiratory: No cough, SOB, PND chest discomfort, orthopnea. Gastrointestinal: bleeding   Genitourinary: No dysuria or hematuria. Musculoskeletal: No weakness, arthralgias, wasting. Endo: No sweats. Heme: No bruising, easy bleeding. Allergies: As noted. Neurological: Cranial nerves intact. Alert and oriented. Gait not assessed. Psychiatric:  No anxiety, depression, hallucinations or mood swings       Physical Assessment:     Visit Vitals    /74 (BP 1 Location: Left arm, BP Patient Position: Sitting)    Pulse 60    Temp 98 °F (36.7 °C)    Resp 20    Ht 5' 4\" (1.626 m)    Wt 99.8 kg (220 lb)    SpO2 97%    BMI 37.76 kg/m2       constitutional: appearance: well developed, obese,  in no acute distress. skin: inspection: no rashes, icterus  no clubbing  palpation: Turgor normal.   eyes: inspection: normal conjunctivae and lids; no jaundice pupils: unremarkable.   ENMT: mouth: mucous membrane moist and pink fair dentition,  neck: Supple thyroid: not enlarged with normal trachea, no masses No JVD  respiratory: effort: normal air entry; good air movement and clear to auscultation. cardiovascular: Sounds are normal. normal rhythm; no murmurs. abdominal: abdomen:soft,benign exam without peritoneal signs, flanks full; no tenderness or masses. rectal: hemoccult/guaiac: not performed. musculoskeletal: digits and nails: no clubbing, cyanosis, edema and wasting  gait: not examined. lymphatic: No adenopathy in the neck. neurologic: cranial nerves: II-XII normal. Generally non focal.  psychiatric: judgement/insight: within normal limits. memory: within normal limits for recent and remote events. mood and affect: Normal affect. Basic Metabolic Profile   Recent Labs      05/28/18   0935   NA  136   K  4.4   CL  98*   CO2  32   BUN  57*   GLU  116*   CA  9.3         CBC w/Diff    Recent Labs      05/28/18   0935   WBC  10.6   RBC  4.11*   HGB  12.3*   HCT  37.5   MCV  91.2   MCH  29.9   MCHC  32.8   RDW  13.4   PLT  293    Recent Labs      05/28/18   0935   GRANS  82*   LYMPH  12*   EOS  2        Hepatic Function   Recent Labs      05/28/18   0935   ALB  3.5   TP  8.3*   TBILI  0.5   SGOT  16   AP  111          Te St MD, M.D. [facility], [address] [place] [address]  [email or web address]

## 2018-05-28 NOTE — PROGRESS NOTES
Bedside shift change report given to April RN (oncoming nurse) by Hung Whelan RN (offgoing nurse). Report included the following information SBAR.

## 2018-05-28 NOTE — ED PROVIDER NOTES
EMERGENCY DEPARTMENT HISTORY AND PHYSICAL EXAM    9:06 AM      Date: 5/28/2018  Patient Name: Adalberto Figueroa    History of Presenting Illness     Chief Complaint   Patient presents with    Rectal Bleeding         History Provided By: Patient    Additional History (Context): Adalberto Figueroa is a 68 y.o. male with hx of HTN, DM, HLD, CHF, prostate cancer, GERD, CKD on hemodialysis (Tue, Thu, Sat), CAD and CABG presenting to the ED with c/o constant, worsening rectal bleeding that began 4 days ago. Pt reports he has been passing blood in his stool 3-4 times a day. Denies any CP, SOB, fever, chills, headache, lightheadedness, abdominal pain, vomiting or urinary sx. Associated sx include nausea. Severity is 4/10. Pt is currently taking Plavix and is followed by Dr. Binta Coker, cardiology. Pt is followed by GI specialist Dr. Jacek Hardin. Last full dialysis session was Sat 5/26/2018 without any complications. No other sx or complaints given at this time. PCP: Marbella Gonzalez MD    Chief Complaint: Rectal Bleeding   Duration: 4 Days  Timing:  Constant and Worsening  Location: Rectum   Quality: N/A  Severity: 4 out of 10  Modifying Factors: None   Associated Symptoms: nausea       Current Facility-Administered Medications   Medication Dose Route Frequency Provider Last Rate Last Dose    sodium chloride 0.9 % bolus infusion 1,000 mL  1,000 mL IntraVENous ONCE Candelaria Bernardo MD        Followed by   [address] [place] [address] sodium chloride 0.9 % bolus infusion 1,000 mL  1,000 mL IntraVENous ONCE Candelaria Bernardo MD         Current Outpatient Prescriptions   Medication Sig Dispense Refill    clopidogrel (PLAVIX) 75 mg tab Take 1 Tab by mouth daily. 90 Tab 6    simvastatin (ZOCOR) 40 mg tablet TAKE 1 TABLET BY MOUTH NIGHTLY. 90 Tab 2    losartan (COZAAR) 25 mg tablet Take  by mouth daily.  aspirin delayed-release 81 mg tablet Take  by mouth daily.       fenofibrate (LOFIBRA) 54 mg tablet TAKE ONE TABLET BY MOUTH ONE TIME DAILY  30 Tab 4  tamsulosin (FLOMAX) 0.4 mg capsule TAKE ONE CAPSULE BY MOUTH ONE TIME DAILY 90 Cap 3    isosorbide mononitrate ER (IMDUR) 30 mg tablet Take 1 Tab by mouth daily. 30 Tab 0    b complex-vitamin c-folic acid (NEPHROCAPS) 1 mg capsule Take 1 Cap by mouth daily. 30 Cap 0    senna-docusate (PERICOLACE) 8.6-50 mg per tablet Take 1 Tab by mouth daily. 30 Tab 0    metoprolol tartrate (LOPRESSOR) 100 mg IR tablet TAKE 1 TABLET BY MOUTH TWO TIMES A DAY. 180 Tab 2    Cholecalciferol, Vitamin D3, (DECARA) 50,000 unit cap Take  by mouth every seven (7) days.  insulin glargine (LANTUS) 100 unit/mL injection 20 Units by SubCUTAneous route every twelve (12) hours. (Patient taking differently: 35 Units by SubCUTAneous route every twelve (12) hours. ) 1 Vial 2    nitroglycerin (NITROSTAT) 0.4 mg SL tablet 1 Tab by SubLINGual route as needed for Chest Pain. 25 Tab 1    insulin aspart (NOVOLOG) 100 unit/mL injection 20 units sq 3 times a day,sliding scale (Patient taking differently: sliding scale with meals) 10 mL 0    allopurinol (ZYLOPRIM) 100 mg tablet Take 100 mg by mouth. Monday, [address]         Past History     Past Medical History:  Past Medical History:   Diagnosis Date    Atherosclerosis of renal artery (HCC)     S/P Rt stent    CAD (coronary artery disease) , 1997, 2012    ,double bypass, 2 stents, 2stents 7/2016    Cancer (HCC)     prostate    CHF (congestive heart failure) (HCC)     Chronic diastolic heart failure (HCC)     Chronic kidney disease     Chronic kidney disease (CKD), stage IV (severe) (HCC)     On Dialysis now -T-Th-sat    Constipation     Coronary atherosclerosis of unspecified type of vessel, native or graft     Stable angina after recent PCI continue treatment.        CRI (chronic renal insufficiency)     Diabetes ([facility] 1973    type 2    Essential hypertension, benign     GERD (gastroesophageal reflux disease)     Gout     Hypertension 1982    Morbid obesity ([facility] Weight loss has been strongly encouraged by following dietary restrictions and an exercise routine    APRYL (obstructive sleep apnea) 6/26/2015    no cpap    Other and unspecified hyperlipidemia     HDL's are not at goal, LDL's are at goal, triglycerides are not at goal.    Other and unspecified hyperlipidemia     HDL's are not at goal, LDL's are at goal, triglycerides are not at goal.     Other ill-defined conditions(799.89) 1960    pneumonia twice    Sleep disturbance     Type II or unspecified type diabetes mellitus without mention of complication, not stated as uncontrolled        Past Surgical History:  Past Surgical History:   Procedure Laterality Date   [address]    lt. carotid endart. [facility] CARDIAC SURG PROCEDURE UNLIST  2012, 2016, Nov 2017    Coronary Stent    COLONOSCOPY N/A 6/23/2017    COLONOSCOPY w/ APC w/ polypectomies performed by Wayna Cheadle, MD at [address] N/A 1/19/2018    FLEXIBLE SIGMOIDOSCOPY WITH Radio frequency ablation performed by Wayna Cheadle, MD at [address] HX CHOLECYSTECTOMY      HX CORONARY ARTERY BYPASS GRAFT  2000    x2    HX HEENT  1997    cholecystectomy    HX UROLOGICAL  1998    renal art. surg    HX VASCULAR ACCESS      Perma cath for HD- right chest.       Family History:  Family History   Problem Relation Age of Onset    Heart Attack Father 64       Social History:  Social History   Substance Use Topics    Smoking status: Never Smoker    Smokeless tobacco: Never Used    Alcohol use No       Allergies: Allergies   Allergen Reactions    Nka [No Known Allergies] Other (comments)         Review of Systems     Review of Systems   Constitutional: Negative for chills and fever. Cardiovascular: Negative for chest pain. Gastrointestinal: Positive for anal bleeding, blood in stool and nausea. Negative for diarrhea and vomiting. Neurological: Negative for dizziness and light-headedness.    All other systems reviewed and are negative. Physical Exam     Visit Vitals    BP 93/46 (BP 1 Location: Left arm, BP Patient Position: At rest)    Pulse 65    Temp 98.3 °F (36.8 °C)    Resp 14    Ht 5' 4\" (1.626 m)    Wt 99.8 kg (220 lb)    SpO2 96%    BMI 37.76 kg/m2       Physical Exam   Constitutional: He is oriented to person, place, and time. He appears well-developed. HENT:   Head: Normocephalic and atraumatic. Eyes: EOM are normal. Pupils are equal, round, and reactive to light. Neck: Normal range of motion. Neck supple. Cardiovascular: Normal rate, regular rhythm and normal heart sounds. Exam reveals no friction rub. No murmur heard. Left av fistula with thrill  Right perm-cath   Pulmonary/Chest: Effort normal and breath sounds normal. No respiratory distress. He has no wheezes. Abdominal: Soft. He exhibits no distension. There is no tenderness. There is no rebound and no guarding. Musculoskeletal: Normal range of motion. Neurological: He is alert and oriented to person, place, and time. Skin: Skin is warm and dry. Psychiatric: He has a normal mood and affect. His behavior is normal. Thought content normal.         Diagnostic Study Results         Medical Decision Making     1. LGIB: bleedign x 4 days; inrcreased today; on plavix. On ESRD t/t/sa. Diagnosis     No diagnosis found. _______________________________    Attestations:  Scribe [address] [place] [address] acting as a scribe for and in the presence of Car Barclay MD      May 28, 2018 at 9:06 AM       Provider Attestation:      I personally performed the services described in the documentation, reviewed the documentation, as recorded by the scribe in my presence, and it accurately and completely records my words and actions.  May 28, 2018 at 9:06 AM - Car Barclay MD    _______________________________

## 2018-05-28 NOTE — H&P
History & Physical    Patient: Andrés Matos MRN: [id number]  CSN: [id number]    YOB: 1940  Age: 68 y.o. Sex: male      DOA: 5/28/2018    Chief Complaint:   Chief Complaint   Patient presents with    Rectal Bleeding          HPI:     Andrés Matos is a 68 y.o.  male who has CAD, CABD, ESRD (dialysis T, Th, Sat)Prostate cancers s/p XRT presents   With rectal bleeding 4 times daily after each meal   Hx of Radiation Proctitis   And Cautery by Dr. Gianluca Hartley 4 months ago  Denies chest pain palpitations or shortness of breath but feels abdominal pressure lower   Radiating better with BM associated with nausea dialyed yesterday with no difficulty    Past Medical History:   Diagnosis Date    Atherosclerosis of renal artery (HCC)     S/P Rt stent    CAD (coronary artery disease) , 1997, 2012    ,double bypass, 2 stents, 2stents 7/2016    Cancer ([facility] [address]     prostate    CHF (congestive heart failure) (HCC)     Chronic diastolic heart failure (HCC)     Chronic kidney disease     Chronic kidney disease (CKD), stage IV (severe) (HCC)     On Dialysis now -T-Th-sat    Constipation     Coronary atherosclerosis of unspecified type of vessel, native or graft     Stable angina after recent PCI continue treatment.        CRI (chronic renal insufficiency)     Diabetes ([address] 1973    type 2    Essential hypertension, benign     GERD (gastroesophageal reflux disease)     Gout     Hypertension 1982    Morbid obesity ([facility] [address]     Weight loss has been strongly encouraged by following dietary restrictions and an exercise routine    APRYL (obstructive sleep apnea) 6/26/2015    no cpap    Other and unspecified hyperlipidemia     HDL's are not at goal, LDL's are at goal, triglycerides are not at goal.    Other and unspecified hyperlipidemia     HDL's are not at goal, LDL's are at goal, triglycerides are not at goal.     Other ill-defined conditions(799.89) 1960    pneumonia twice    Sleep disturbance     Type II or unspecified type diabetes mellitus without mention of complication, not stated as uncontrolled        Past Surgical History:   Procedure Laterality Date   [facility]    lt. carotid endart. Giron Bur CARDIAC SURG PROCEDURE UNLIST  2012, 2016, Nov 2017    Coronary Stent    COLONOSCOPY N/A 6/23/2017    COLONOSCOPY w/ APC w/ polypectomies performed by Anitra Chinchilla MD at [address] N/A 1/19/2018    FLEXIBLE SIGMOIDOSCOPY WITH Radio frequency ablation performed by Anitra Chinchilla MD at [facility] HX CHOLECYSTECTOMY      HX CORONARY ARTERY BYPASS GRAFT  2000    x2    HX HEENT  1997    cholecystectomy    HX UROLOGICAL  1998    renal art. surg    HX VASCULAR ACCESS      Perma cath for HD- right chest.       Family History   Problem Relation Age of Onset    Heart Attack Father 64       Social History     Social History    Marital status:      Spouse name: N/A    Number of children: N/A    Years of education: N/A     Social History Main Topics    Smoking status: Never Smoker    Smokeless tobacco: Never Used    Alcohol use No    Drug use: No    Sexual activity: Not Asked     Other Topics Concern    None     Social History Narrative       Prior to Admission medications    Medication Sig Start Date End Date Taking? Authorizing Provider   clopidogrel (PLAVIX) 75 mg tab Take 1 Tab by mouth daily. 5/9/18   Jesse Brantley MD   simvastatin (ZOCOR) 40 mg tablet TAKE 1 TABLET BY MOUTH NIGHTLY. 5/9/18   Jesse Brantley MD   losartan (COZAAR) 25 mg tablet Take  by mouth daily. Historical Provider   aspirin delayed-release 81 mg tablet Take  by mouth daily.     Historical Provider   fenofibrate (LOFIBRA) 54 mg tablet TAKE ONE TABLET BY MOUTH ONE TIME DAILY  4/2/18   Urszula Coughlin NP   tamsulosin (FLOMAX) 0.4 mg capsule TAKE ONE CAPSULE BY MOUTH ONE TIME DAILY 1/8/18   Peace Mendez MD   isosorbide mononitrate ER (IMDUR) 30 mg tablet Take 1 Tab by mouth daily. 12/15/17   David Coombs MD   b complex-vitamin c-folic acid (NEPHROCAPS) 1 mg capsule Take 1 Cap by mouth daily. 10/25/17   Yesi Mendoza MD   senna-docusate (PERICOLACE) 8.6-50 mg per tablet Take 1 Tab by mouth daily. 10/25/17   Yesi Mendoza MD   metoprolol tartrate (LOPRESSOR) 100 mg IR tablet TAKE 1 TABLET BY MOUTH TWO TIMES A DAY. 7/19/17   Urszula Coughlin NP   Cholecalciferol, Vitamin D3, (DECARA) 50,000 unit cap Take  by mouth every seven (7) days. Historical Provider   insulin glargine (LANTUS) 100 unit/mL injection 20 Units by SubCUTAneous route every twelve (12) hours. Patient taking differently: 35 Units by SubCUTAneous route every twelve (12) hours. 7/21/16   Dawna Gauthier MD   nitroglycerin (NITROSTAT) 0.4 mg SL tablet 1 Tab by SubLINGual route as needed for Chest Pain. 6/21/16   Jesse Brantley MD   insulin aspart (NOVOLOG) 100 unit/mL injection 20 units sq 3 times a day,sliding scale  Patient taking differently: sliding scale with meals 3/13/13   Jesse Brantley MD   allopurinol (ZYLOPRIM) 100 mg tablet Take 100 mg by mouth. Monday, Wednesday,friday    Historical Provider       Allergies   Allergen Reactions    Nka [No Known Allergies] Other (comments)         Review of Systems  GENERAL: Patient alert, awake and oriented times 3, able to communicate full sentences and not in distress. HEENT: No change in vision, no earache, tinnitus, sore throat or sinus congestion. NECK: No pain or stiffness. PULMONARY: No shortness of breath, cough or wheeze. Cardiovascular: no pnd or orthopnea, no CP  GASTROINTESTINAL:+bdominal pain, +nausea, vomiting+diarrhea, melena + bright red blood per rectum. GENITOURINARY: No urinary frequency, urgency, hesitancy or dysuria. MUSCULOSKELETAL: No joint or muscle pain, no back pain, no recent trauma. DERMATOLOGIC: No rash, no itching, no lesions. ENDOCRINE: No polyuria, polydipsia, no heat or cold intolerance. No recent change in weight. HEMATOLOGICAL: No anemia or easy bruising or bleeding. NEUROLOGIC: No headache, seizures, numbness, tingling +weakness. Physical Exam:     Physical Exam:  Visit Vitals    BP 93/46 (BP 1 Location: Left arm, BP Patient Position: At rest)    Pulse 65    Temp 98.3 °F (36.8 °C)    Resp 14    Ht 5' 4\" (1.626 m)    Wt 99.8 kg (220 lb)    SpO2 96%    BMI 37.76 kg/m2      O2 Device: Room air    Temp (24hrs), Av.3 °F (36.8 °C), Min:98.3 °F (36.8 °C), Max:98.3 °F (36.8 °C)             General:  Alert, cooperative, no distress, appears stated age. Head: Normocephalic, without obvious abnormality, atraumatic. Eyes:  Conjunctivae/corneas clear. PERRL, EOMs intact. Nose: Nares normal. No drainage or sinus tenderness. Neck: Supple, symmetrical, trachea midline, no adenopathy, thyroid: no enlargement, no carotid bruit and no JVD. Lungs:   Clear to auscultation bilaterally. Heart:  Regular rate and rhythm, S1, S2 normal. Soft sys murm bradycardia      Abdomen: Soft, non-tender. Bowel sounds normal.    Extremities: Extremities normal, atraumatic, no cyanosis or edema. Pulses: 2+ and symmetric all extremities. Skin:  No rashes or lesions   Neurologic: AAOx3, No focal motor or sensory deficit. Recent Results (from the past 12 hour(s))   CBC WITH AUTOMATED DIFF    Collection Time: 18  9:35 AM   Result Value Ref Range    WBC 10.6 4.6 - 13.2 K/uL    RBC 4.11 (L) 4.70 - 5.50 M/uL    HGB 12.3 (L) 13.0 - 16.0 g/dL    HCT 37.5 36.0 - 48.0 %    MCV 91.2 74.0 - 97.0 FL    MCH 29.9 24.0 - 34.0 PG    MCHC 32.8 31.0 - 37.0 g/dL    RDW 13.4 11.6 - 14.5 %    PLATELET 128 015 - 807 K/uL    MPV 9.6 9.2 - 11.8 FL    NEUTROPHILS 82 (H) 40 - 73 %    LYMPHOCYTES 12 (L) 21 - 52 %    MONOCYTES 4 3 - 10 %    EOSINOPHILS 2 0 - 5 %    BASOPHILS 0 0 - 2 %    ABS. NEUTROPHILS 8.7 (H) 1.8 - 8.0 K/UL    ABS. LYMPHOCYTES 1.3 0.9 - 3.6 K/UL    ABS.  MONOCYTES 0.5 0.05 - 1.2 K/UL ABS. EOSINOPHILS 0.2 0.0 - 0.4 K/UL    ABS. BASOPHILS 0.0 0.0 - 0.1 K/UL    DF AUTOMATED     METABOLIC PANEL, COMPREHENSIVE    Collection Time: 05/28/18  9:35 AM   Result Value Ref Range    Sodium 136 136 - 145 mmol/L    Potassium 4.4 3.5 - 5.5 mmol/L    Chloride 98 (L) 100 - 108 mmol/L    CO2 32 21 - 32 mmol/L    Anion gap 6 3.0 - 18 mmol/L    Glucose 116 (H) 74 - 99 mg/dL    BUN 57 (H) 7.0 - 18 MG/DL    Creatinine 8.75 (H) 0.6 - 1.3 MG/DL    BUN/Creatinine ratio 7 (L) 12 - 20      GFR est AA 7 (L) >60 ml/min/1.73m2    GFR est non-AA 6 (L) >60 ml/min/1.73m2    Calcium 9.3 8.5 - 10.1 MG/DL    Bilirubin, total 0.5 0.2 - 1.0 MG/DL    ALT (SGPT) 17 16 - 61 U/L    AST (SGOT) 16 15 - 37 U/L    Alk. phosphatase 111 45 - 117 U/L    Protein, total 8.3 (H) 6.4 - 8.2 g/dL    Albumin 3.5 3.4 - 5.0 g/dL    Globulin 4.8 (H) 2.0 - 4.0 g/dL    A-G Ratio 0.7 (L) 0.8 - 1.7     PROTHROMBIN TIME + INR    Collection Time: 05/28/18  9:35 AM   Result Value Ref Range    Prothrombin time 12.7 11.5 - 15.2 sec    INR 1.0 0.8 - 1.2     TYPE & SCREEN    Collection Time: 05/28/18  9:35 AM   Result Value Ref Range    Crossmatch Expiration 05/31/2018     ABO/Rh(D) Carine Ford POSITIVE     Antibody screen NEG      Labs Reviewed: All lab results for the last 24 hours reviewed.   CT-scan of the abdomen and EKG    Procedures/imaging: see electronic medical records for all procedures/Xrays and details which were not copied into this note but were reviewed prior to creation of Plan      Assessment/Plan     Principal Problem:    Gastrointestinal hemorrhage associated with anorectal source (5/28/2018)  GI consult  H/H q 8  Protonix (but lower source some nausea)  SCD for DVT       Active Problems:    Prostate Cancer hx of XRT  Undetectable PSA  Likely cause of number one    CAD/CaBG hx of PCI  Hold plavix and aspirin   Check cardiac enzymes  Resume once bleeding stablizes     DM  Clear liquid diet  Npo after MN  Sliding scale insulin   Hold Lantus maribell log    ESRD  Dialysis Tues/thur/Sat  Dr. Patrick Man consult for dialysis in am     Sleep Apnea   RT consult CPap        DVT/GI Prophylaxis: SCD's    Discussed with patient at bedside about hospital admission and my plan care, who understood and agree with my plan care.     Cheryl Dillard MD  5/28/2018 10:45 AM

## 2018-05-28 NOTE — ED NOTES
Attempted x 2 for patient IV and blood draw without success. Pt is a difficult stick. Additional staff at bedside attempting.

## 2018-05-28 NOTE — ED NOTES
Pt left with transporter awake, alert and in Christina Began Dr Anthony Aguirre that patient did not need to be accompanied by nurse.

## 2018-05-28 NOTE — Clinical Note
Status[de-identified] Inpatient [101] Type of Bed: Telemetry [19] Inpatient Hospitalization Certified Necessary for the Following Reasons: 3. Patient receiving treatment that can only be provided in an inpatient setting (further clarification in H&P documentation) Admitting Diagnosis: Lower GI bleed [850491] Admitting Physician: Errol Amezquita [[id number]] Attending Physician: Errol Amezquita [[id number]] Estimated Length of Stay: 2 Midnights Discharge Plan[de-identified] Home with Office Follow-up

## 2018-05-28 NOTE — OP NOTES
[address] [place] [address] Dr  OPERATIVE REPORT    Aliyah Vee  MR#: [id number]  : 1940  ACCOUNT #: [de-identified]   DATE OF SERVICE: 2018    CONSULTATION REQUESTED BY:  Dr. Mike Miranda from the emergency room. REASON FOR CONSULTATION:  Dialysis patient admitted with rectal bleeding. HISTORY OF PRESENT ILLNESS:  This pleasant 75[facility] who is a dialysis patient of mine who usually gets dialysis under my care every Tuesday, Thursday, Saturday at [facility] in [place]. He has long-term history of hypertension, type 2 diabetes mellitus and was significant cardiac problems including CABG complicated by the blockage of this graft and now has stent in it and being treated with the Plavix. The patient also has a history of prostate cancer being treated with radiation therapy, which complicated to his GI tract because of known rectal bleeding. He has a diagnosis of radiation proctitis follows with Dr. Elaine Conley and Dr. Portillo Clark and off and on the cauterizations. At this time, patient denies any chest pain, shortness of breath, any palpitation, any nausea, vomiting. He was last dialyzed on the  of this month without problem and still he makes urine. No urinary complaint. PAST MEDICAL HISTORY:  Include atherosclerosis of the femoral artery, coronary artery disease, cancer of the prostate, diastolic dysfunction, type 2 diabetes mellitus, gout, hypertension, obstructive sleep apnea, hyperlipidemia and pneumonia also. PAST SURGICAL HISTORY:  Includes cardiac surgery in  and also in ,  and 2017. Colonoscopy in 2017. Flexible sigmoidoscopy with radiofrequency ablation of the bleeding on ,  history of cholecystectomy, coronary artery bypass graft, AV fistula, dialysis catheter and prostate surgery. FAMILY HISTORY:  Has history of heart attack in father. SOCIAL HISTORY:  Nonsmoker, nondrinker. ALLERGIES:  No known drug allergy.     REVIEW OF SYSTEMS:    CONSTITUTIONAL: Negative for chills, fever. CARDIOVASCULAR:  No chest pain, but occasional mild shortness of breath without palpitation. RESPIRATORY SYSTEM:  No cough, no wheezing, no calf muscle tenderness. GASTROINTESTINAL:  Positive for rectal bleeding, fresh blood coming out off and on getting more severe over the last 4 days and also mild nausea. No diarrhea, no constipation, no vomiting. MEDICATIONS:  Before admission were as well as follows: Allopurinol 100 mg p.o. daily,  Aspirin 81 mg p.o. daily, Nephrocaps 1 tablet daily, vitamin D3 2000 units one capsule every 7 days, Plavix 75 mg p.o. daily, fenofibrate 54 mg tablet 1 tablet daily, insulin NovoLog 20 units subcu 3 times daily, Lantus 20 units subcu q.12 hour, losartan 25 mg p.o. daily, Imdur 30 mg p.o. daily, metoprolol tartrate 100 mg 2 times daily, sublingual nitro 0.4 mg p.r.n. for chest pain. Lynn-Colace 1 tablet daily p.r.n. for constipation, simvastatin or Zocor 40 mg p.o. daily, Flomax 0.4 mg capsule 1 tablet p.o. daily. PHYSICAL EXAMINATION:  GENERAL: The patient is alert, awake and oriented, not in acute distress except just complaining of increased blood coming out from his rectum. VITAL SIGNS:  Temperature 98.3, heart rate 65, blood pressure 93/46 mean arterial pressure 64. HEENT:  Oral mucosa moist.  NECK:  Jugular venous pressure is nondistended. LUNGS:  Good air entry on both sides. HEART:  S1, S2, without any gallop or murmur. ABDOMEN:  Obese. Cannot appreciate organomegaly, no rebound tenderness. EXTREMITIES:  Negative edema and ankle negative edema. Has AV fistula on the left arm with evidence of recent bruits which has been resolving and also has a right IJ tunneled dialysis catheter. RELEVANT LABORATORY DATA:  As of today, WBC of 10.6, hemoglobin of 12.3, hematocrit of 37.5, platelets of 914,655. PT/INR:  Prothrombin time of 12.7 with INR of 1.0.   Sodium 136, potassium 4.4, chloride 98, CO2 of 32, glucose of 116, blood urine nitrogen of 57, creatinine of 8.75, calcium of 9.3. No chest x-ray being done. No EKG being done yet. ASSESSMENT AND PLAN:  1. End-stage renal disease. 2.  Rectal bleeding, possibly from  known radiation proctitis. 3.  Type 2 diabetes mellitus. 4.  Known coronary artery disease with a coronary artery bypass graft. 5.  Type 2 diabetes mellitus. 6.  Secondary hyperparathyroidism. PLAN:  No need for any acute dialysis. His hemoglobin is quite good. We just need to observe H and H.  Cardiology and GI to decide about whether to continue the Plavix or not and also to do any procedure like flex sig or not. We want to clinically follow the patient and observe. If the bleeding is severe enough then to decide what should be done whether the Plavix should be taken off or not. He will be dialyzed on his regular dialysis schedule and will use his AV fistula. Further recommendations will be given based on the hospital course.       MD MALCOLM Santoyo / MELISA  D: 05/28/2018 12:31     T: 05/28/2018 13:24  JOB #: [id number]

## 2018-05-28 NOTE — IP AVS SNAPSHOT
[address] Patient: Cecilia Palma MRN: [id number] HFY:6/28/9004 A check jose indicates which time of day the medication should be taken. My Medications START taking these medications Instructions Each Dose to Equal  
 Morning Noon Evening Bedtime  
 hydrocortisone 2.5 % rectal cream  
Commonly known as:  ANUSOL-HC Your last dose was: Your next dose is: Insert  into rectum four (4) times daily. lidocaine 5 % ointment Commonly known as:  XYLOCAINE Your last dose was: Your next dose is:    
   
   
 Apply to marilee-anal area and rectum daily as needed for pain  
     
   
   
   
  
 loperamide 2 mg capsule Commonly known as:  IMODIUM Your last dose was: Your next dose is: Take 1 Cap by mouth every four (4) hours as needed for Diarrhea for up to 10 days. 2 mg CHANGE how you take these medications Instructions Each Dose to Equal  
 Morning Noon Evening Bedtime  
 insulin aspart U-100 100 unit/mL injection Commonly known as:  NovoLOG U-100 Insulin aspart What changed:  additional instructions Your last dose was: Your next dose is:    
   
   
 20 units sq 3 times a day,sliding scale  
     
   
   
   
  
 insulin glargine 100 unit/mL injection Commonly known as:  LANTUS What changed:  how much to take Your last dose was: Your next dose is:    
   
   
 20 Units by SubCUTAneous route every twelve (12) hours. 20 Units CONTINUE taking these medications Instructions Each Dose to Equal  
 Morning Noon Evening Bedtime  
 allopurinol 100 mg tablet Commonly known as:  Evorn Slight Your last dose was: Your next dose is: Take 100 mg by mouth. Monday, Wednesday,friday  100 mg  
    
   
   
 aspirin delayed-release 81 mg tablet Your last dose was: Your next dose is: Take  by mouth daily. b complex-vitamin c-folic acid 1 mg capsule Commonly known as:  Charity Kumar Your last dose was: Your next dose is: Take 1 Cap by mouth daily. 1 Cap DECARA 50,000 unit capsule Generic drug:  cholecalciferol Your last dose was: Your next dose is: Take  by mouth every seven (7) days. fenofibrate 54 mg tablet Commonly known as:  LOFIBRA Your last dose was: Your next dose is: TAKE ONE TABLET BY MOUTH ONE TIME DAILY  
     
   
   
   
  
 isosorbide mononitrate ER 30 mg tablet Commonly known as:  IMDUR Your last dose was: Your next dose is: Take 1 Tab by mouth daily. 30 mg  
    
   
   
   
  
 losartan 25 mg tablet Commonly known as:  COZAAR Your last dose was: Your next dose is: Take  by mouth daily. metoprolol tartrate 100 mg IR tablet Commonly known as:  LOPRESSOR Your last dose was: Your next dose is: TAKE 1 TABLET BY MOUTH TWO TIMES A DAY. nitroglycerin 0.4 mg SL tablet Commonly known as:  NITROSTAT Your last dose was: Your next dose is:    
   
   
 1 Tab by SubLINGual route as needed for Chest Pain. 0.4 mg  
    
   
   
   
  
 senna-docusate 8.6-50 mg per tablet Commonly known as:  Lyleia Ozark Your last dose was: Your next dose is: Take 1 Tab by mouth daily. 1 Tab  
    
   
   
   
  
 simvastatin 40 mg tablet Commonly known as:  ZOCOR Your last dose was: Your next dose is: TAKE 1 TABLET BY MOUTH NIGHTLY.  
     
   
   
   
  
 tamsulosin 0.4 mg capsule Commonly known as:  FLOMAX Your last dose was: Your next dose is: TAKE ONE CAPSULE BY MOUTH ONE TIME DAILY  
     
   
   
   
  
  
STOP taking these medications   
 clopidogrel 75 mg Tab Commonly known as:  PLAVIX Where to Get Your Medications Information on where to get these meds will be given to you by the nurse or doctor. ! Ask your nurse or doctor about these medications  
  hydrocortisone 2.5 % rectal cream  
 lidocaine 5 % ointment  
 loperamide 2 mg capsule

## 2018-05-28 NOTE — ED NOTES
TRANSFER - OUT REPORT:    Verbal report given to [facility], RN(name) on Truddie Hodgkin  being transferred to (unit) for routine progression of care       Report consisted of patients Situation, Background, Assessment and   Recommendations(SBAR). Information from the following report(s) SBAR, ED Summary, [facility] and Recent Results was reviewed with the receiving nurse. Lines:   Peripheral IV 05/28/18 Right Antecubital (Active)   Site Assessment Clean, dry, & intact 5/28/2018  9:40 AM   Phlebitis Assessment 0 5/28/2018  9:40 AM   Infiltration Assessment 0 5/28/2018  9:40 AM   Dressing Status Clean, dry, & intact 5/28/2018  9:40 AM   Dressing Type 4 X 4;Tape;Transparent 5/28/2018  9:40 AM   Hub Color/Line Status Pink;Flushed;Patent 5/28/2018  9:40 AM   Action Taken Blood drawn 5/28/2018  9:40 AM        Opportunity for questions and clarification was provided.       Patient transported with:   [employer]

## 2018-05-29 VITALS
DIASTOLIC BLOOD PRESSURE: 64 MMHG | BODY MASS INDEX: 36.66 KG/M2 | RESPIRATION RATE: 18 BRPM | HEIGHT: 64 IN | SYSTOLIC BLOOD PRESSURE: 133 MMHG | TEMPERATURE: 99.1 F | WEIGHT: 214.7 LBS | HEART RATE: 89 BPM | OXYGEN SATURATION: 98 %

## 2018-05-29 LAB
ANION GAP SERPL CALC-SCNC: 12 MMOL/L (ref 3–18)
BASOPHILS # BLD: 0 K/UL (ref 0–0.1)
BASOPHILS NFR BLD: 0 % (ref 0–2)
BUN SERPL-MCNC: 62 MG/DL (ref 7–18)
BUN/CREAT SERPL: 7 (ref 12–20)
CALCIUM SERPL-MCNC: 8.9 MG/DL (ref 8.5–10.1)
CHLORIDE SERPL-SCNC: 103 MMOL/L (ref 100–108)
CK MB CFR SERPL CALC: 2.3 % (ref 0–4)
CK MB SERPL-MCNC: 2.2 NG/ML (ref 5–25)
CK SERPL-CCNC: 95 U/L (ref 39–308)
CO2 SERPL-SCNC: 26 MMOL/L (ref 21–32)
CREAT SERPL-MCNC: 8.56 MG/DL (ref 0.6–1.3)
DIFFERENTIAL METHOD BLD: ABNORMAL
EOSINOPHIL # BLD: 0.2 K/UL (ref 0–0.4)
EOSINOPHIL NFR BLD: 2 % (ref 0–5)
ERYTHROCYTE [DISTWIDTH] IN BLOOD BY AUTOMATED COUNT: 13.5 % (ref 11.6–14.5)
GLUCOSE BLD STRIP.AUTO-MCNC: 134 MG/DL (ref 70–110)
GLUCOSE SERPL-MCNC: 135 MG/DL (ref 74–99)
HBV SURFACE AB SER QL IA: NEGATIVE
HBV SURFACE AB SERPL IA-ACNC: <3.1 MIU/ML
HBV SURFACE AG SER QL: <0.1 INDEX
HBV SURFACE AG SER QL: NEGATIVE
HCT VFR BLD AUTO: 35.4 % (ref 36–48)
HCT VFR BLD AUTO: 40.1 % (ref 36–48)
HEP BS AB COMMENT,HBSAC: ABNORMAL
HGB BLD-MCNC: 11.5 G/DL (ref 13–16)
HGB BLD-MCNC: 12.8 G/DL (ref 13–16)
LYMPHOCYTES # BLD: 1.2 K/UL (ref 0.9–3.6)
LYMPHOCYTES NFR BLD: 13 % (ref 21–52)
MCH RBC QN AUTO: 29.6 PG (ref 24–34)
MCHC RBC AUTO-ENTMCNC: 32.5 G/DL (ref 31–37)
MCV RBC AUTO: 91 FL (ref 74–97)
MONOCYTES # BLD: 0.6 K/UL (ref 0.05–1.2)
MONOCYTES NFR BLD: 6 % (ref 3–10)
NEUTS SEG # BLD: 7.1 K/UL (ref 1.8–8)
NEUTS SEG NFR BLD: 79 % (ref 40–73)
PHOSPHATE SERPL-MCNC: 6.2 MG/DL (ref 2.5–4.9)
PLATELET # BLD AUTO: 298 K/UL (ref 135–420)
PMV BLD AUTO: 10.2 FL (ref 9.2–11.8)
POTASSIUM SERPL-SCNC: 4.5 MMOL/L (ref 3.5–5.5)
RBC # BLD AUTO: 3.89 M/UL (ref 4.7–5.5)
SODIUM SERPL-SCNC: 141 MMOL/L (ref 136–145)
TROPONIN I SERPL-MCNC: 0.02 NG/ML (ref 0–0.04)
WBC # BLD AUTO: 9.1 K/UL (ref 4.6–13.2)

## 2018-05-29 PROCEDURE — 90935 HEMODIALYSIS ONE EVALUATION: CPT

## 2018-05-29 PROCEDURE — 87340 HEPATITIS B SURFACE AG IA: CPT | Performed by: INTERNAL MEDICINE

## 2018-05-29 PROCEDURE — 82550 ASSAY OF CK (CPK): CPT | Performed by: INTERNAL MEDICINE

## 2018-05-29 PROCEDURE — 5A1D70Z PERFORMANCE OF URINARY FILTRATION, INTERMITTENT, LESS THAN 6 HOURS PER DAY: ICD-10-PCS | Performed by: INTERNAL MEDICINE

## 2018-05-29 PROCEDURE — 84100 ASSAY OF PHOSPHORUS: CPT | Performed by: INTERNAL MEDICINE

## 2018-05-29 PROCEDURE — C9113 INJ PANTOPRAZOLE SODIUM, VIA: HCPCS | Performed by: INTERNAL MEDICINE

## 2018-05-29 PROCEDURE — 74011000250 HC RX REV CODE- 250: Performed by: INTERNAL MEDICINE

## 2018-05-29 PROCEDURE — 74011250636 HC RX REV CODE- 250/636: Performed by: INTERNAL MEDICINE

## 2018-05-29 PROCEDURE — 80048 BASIC METABOLIC PNL TOTAL CA: CPT | Performed by: INTERNAL MEDICINE

## 2018-05-29 PROCEDURE — 36415 COLL VENOUS BLD VENIPUNCTURE: CPT | Performed by: INTERNAL MEDICINE

## 2018-05-29 PROCEDURE — 85018 HEMOGLOBIN: CPT | Performed by: NURSE PRACTITIONER

## 2018-05-29 PROCEDURE — 82962 GLUCOSE BLOOD TEST: CPT

## 2018-05-29 PROCEDURE — 85025 COMPLETE CBC W/AUTO DIFF WBC: CPT | Performed by: INTERNAL MEDICINE

## 2018-05-29 PROCEDURE — 86706 HEP B SURFACE ANTIBODY: CPT | Performed by: INTERNAL MEDICINE

## 2018-05-29 RX ORDER — LIDOCAINE 50 MG/G
OINTMENT TOPICAL AS NEEDED
Status: DISCONTINUED | OUTPATIENT
Start: 2018-05-29 | End: 2018-05-29 | Stop reason: HOSPADM

## 2018-05-29 RX ORDER — HYDROCORTISONE 25 MG/G
CREAM TOPICAL 4 TIMES DAILY
Status: DISCONTINUED | OUTPATIENT
Start: 2018-05-29 | End: 2018-05-29 | Stop reason: HOSPADM

## 2018-05-29 RX ORDER — LOPERAMIDE HYDROCHLORIDE 2 MG/1
2 CAPSULE ORAL
Qty: 12 CAP | Refills: 0 | Status: SHIPPED | OUTPATIENT
Start: 2018-05-29 | End: 2018-06-08

## 2018-05-29 RX ORDER — LOPERAMIDE HYDROCHLORIDE 2 MG/1
2 CAPSULE ORAL
Status: DISCONTINUED | OUTPATIENT
Start: 2018-05-29 | End: 2018-05-29 | Stop reason: HOSPADM

## 2018-05-29 RX ORDER — CALCIUM ACETATE 667 MG/1
1 CAPSULE ORAL
Status: DISCONTINUED | OUTPATIENT
Start: 2018-05-29 | End: 2018-05-29 | Stop reason: HOSPADM

## 2018-05-29 RX ORDER — HYDROCORTISONE 25 MG/G
CREAM TOPICAL 4 TIMES DAILY
Qty: 30 G | Refills: 0 | Status: SHIPPED | OUTPATIENT
Start: 2018-05-29 | End: 2018-07-09

## 2018-05-29 RX ORDER — LIDOCAINE 50 MG/G
OINTMENT TOPICAL
Qty: 1 TUBE | Refills: 0 | Status: SHIPPED | OUTPATIENT
Start: 2018-05-29 | End: 2018-07-09

## 2018-05-29 RX ADMIN — SODIUM CHLORIDE 40 MG: 9 INJECTION INTRAMUSCULAR; INTRAVENOUS; SUBCUTANEOUS at 09:07

## 2018-05-29 RX ADMIN — DOXERCALCIFEROL 1 MCG: 4 INJECTION, SOLUTION INTRAVENOUS at 12:11

## 2018-05-29 RX ADMIN — Medication 10 ML: at 17:39

## 2018-05-29 RX ADMIN — Medication 10 ML: at 06:00

## 2018-05-29 NOTE — PROGRESS NOTES
[email or web address]  [phone]    Gastroenterology follow up-Progress note    Impression:  1. Rectal pain-has been going on for a while. Suspect this is a fissure or skin excoriation. Typically XRT does not cause pain. 2. Frequent stools with excessive wiping  3. 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     Plan:  1. Lidocaine for local use. 2. Anusol/Analpram.  3. Imodium as needed  4. Flex sig as OP. Stable for DC if symptoms are under control. IF symptoms persist, may need flex sig as inpatient. Chief Complaint: F/u rectal pain, bleeding. Subjective:  No further bleeding at this time. ROS: Denies any fevers, chills, rash.      Eyes: conjunctiva normal, EOM normal   Neck: ROM normal, supple and trachea normal   Cardiovascular: heart normal, intact distal pulses, normal rate and regular rhythm   Pulmonary/Chest Wall: breath sounds normal and effort normal   Abdominal: appearance normal, bowel sounds normal and soft, non-acute, non-tender     Patient Active Problem List   Diagnosis Code    NSTEMI (non-ST elevated myocardial infarction) ([facility] [address] I21.4    Coronary atherosclerosis of native coronary artery I25.10    CKD (chronic kidney disease) stage 4, GFR 15-29 ml/min ([facility]) N18.4    HTN (hypertension) I10    DM2 (diabetes mellitus, type 2) ([facility]) E11.9    Dyslipidemia E78.5    Contrast dye induced nephropathy N14.1, T50.8X5A    Chest pain, unspecified R07.9    S/P coronary artery stent placement Z95.5    Postsurgical aortocoronary bypass status Z95.1    Atherosclerosis of renal artery ([facility]) I70.1    Chronic diastolic heart failure ([facility]) I50.32    Morbid obesity ([facility]) E66.01    Essential hypertension, benign I10    Sleep disturbance G47.9    Coronary atherosclerosis I25.10    Mixed hyperlipidemia E78.2    Type II or unspecified type diabetes mellitus without mention of complication, not stated as uncontrolled E11.9    Abdominal pain R10.9    GERD (gastroesophageal reflux disease) K21.9    Constipation K59.00    Chronic renal insufficiency N18.9    Prostate cancer ([facility] C61    APRYL (obstructive sleep apnea) F06.48    Diastolic CHF, acute on chronic ([facility]) I50.33    CHF (congestive heart failure), NYHA class IV ([facility]) I50.9    Fatigue R53.83    Secondary hyperparathyroidism of renal origin ([facility] N25.81    Chest pain R07.9    Hyperuricemia E79.0    Chronic kidney disease, stage V ([facility]) N18.5    ESRD (end stage renal disease) ([facility]) N18.6    Hypotension I95.9    Fluid overload E87.70    Hyperkalemia E87.5    Type 2 diabetes mellitus with nephropathy ([facility]) E11.21    Elevated troponin R74.8    Gastrointestinal hemorrhage associated with anorectal source K62.5    Lower GI bleed K92.2    GI bleed K92.2         Visit Vitals    /62    Pulse 75    Temp 98 °F (36.7 °C) (Oral)    Resp 18    Ht 5' 4\" (1.626 m)    Wt 97.4 kg (214 lb 11.2 oz)    SpO2 98%    BMI 36.85 kg/m2           Intake/Output Summary (Last 24 hours) at 05/29/18 1423  Last data filed at 05/29/18 1313   Gross per 24 hour   Intake                0 ml   Output             1967 ml   Net            -1967 ml       CBC w/Diff    Lab Results   Component Value Date/Time    WBC 9.1 05/29/2018 04:48 AM    RBC 3.89 (L) 05/29/2018 04:48 AM    HGB 11.5 (L) 05/29/2018 04:48 AM    HCT 35.4 (L) 05/29/2018 04:48 AM    MCV 91.0 05/29/2018 04:48 AM    MCH 29.6 05/29/2018 04:48 AM    MCHC 32.5 05/29/2018 04:48 AM    RDW 13.5 05/29/2018 04:48 AM     05/29/2018 04:48 AM    Lab Results   Component Value Date/Time    GRANS 79 (H) 05/29/2018 04:48 AM    LYMPH 13 (L) 05/29/2018 04:48 AM    EOS 2 05/29/2018 04:48 AM    BASOS 0 05/29/2018 04:48 AM      Basic Metabolic Profile   Recent Labs      05/29/18   0448   NA  141   K  4.5   CL  103   CO2  26   BUN  62*   CA  8.9   PHOS  6.2*        Hepatic Function    Lab Results   Component Value Date/Time    ALB 3.5 05/28/2018 09:35 AM    TP 8.3 (H) 05/28/2018 09:35 AM     05/28/2018 09:35 AM    Lab Results   Component Value Date/Time    SGOT 16 05/28/2018 09:35 AM          Coags   Recent Labs      05/28/18   0935   PTP  12.7   INR  1.0               Jodran Torres MD    [facility]. [email or web address]  Phone: [phone]  Pager: [phone]

## 2018-05-29 NOTE — PROGRESS NOTES
RENAL PROGRESS NOTE: Pt seen on dialysis. Follow up of ESRD            Subjective: Feels good except painful Defecation, no rectal bleeding since admission, no Chest pain. Patient is on Dialysis. Objective:AVF was cannulated , on 15 gauge needle    Patient Vitals for the past 12 hrs:   Temp Pulse Resp BP SpO2   05/29/18 1130 - 62 18 158/52 -   05/29/18 1100 - 62 18 152/55 -   05/29/18 1030 - 62 18 141/49 -   05/29/18 1016 - 65 - 149/46 -   05/29/18 1000 - 66 - 137/50 -   05/29/18 0930 - 69 - 159/68 -   05/29/18 0922 98.2 °F (36.8 °C) 70 18 164/61 -   05/29/18 0804 98 °F (36.7 °C) 73 18 161/68 98 %   05/29/18 0348 98.2 °F (36.8 °C) 76 18 138/62 96 %   05/29/18 0033 97.5 °F (36.4 °C) 67 18 135/59 98 %      No intake or output data in the 24 hours ending 05/29/18 1148    Physical Assessment:     General Appearance: NAD  Lung: clear to auscultation  Heart: regular rate and rhythm and no murmurs, clicks or gallops  Lower Extremities: no edema   Access: AVF, qb 350, has HD catheter. Labs    CBC w/Diff    Recent Labs      05/29/18 0448  05/28/18   1921  05/28/18   0935   WBC  9.1   --   10.6   RBC  3.89*   --   4.11*   HGB  11.5*  11.8*  12.3*   HCT  35.4*  36.1  37.5   MCV  91.0   --   91.2   MCH  29.6   --   29.9   MCHC  32.5   --   32.8   RDW  13.5   --   13.4    Recent Labs      05/29/18   0448  05/28/18   0935   MONOS  6  4   EOS  2  2   BASOS  0  0   RDW  13.5  13.4        Comprehensive Metabolic Profile    Recent Labs      05/29/18   0448  05/28/18   0935   NA  141  136   K  4.5  4.4   CL  103  98*   CO2  26  32   BUN  62*  57*   CREA  8.56*  8.75*    Recent Labs      05/29/18   0448  05/28/18   0935   CA  8.9  9.3   PHOS  6.2*   --    ALB   --   3.5   TP   --   8.3*   SGOT   --   16   TBILI   --   0.5          Basic Metabolic Profile       results  reviwed. MEDS:Reviwed.   Current Facility-Administered Medications   Medication Dose Route Frequency Provider Last Rate Last Dose    [START ON 5/30/2018] allopurinol (ZYLOPRIM) tablet 100 mg  100 mg Oral Q MON, WED & FRI Dorotha Goldmann, MD        fenofibrate nanocrystallized (TRICOR) tablet 48 mg  48 mg Oral DAILY Dorotha Goldmann, MD   Stopped at 05/29/18 5271    isosorbide mononitrate ER (IMDUR) tablet 30 mg  30 mg Oral DAILY Dorotha Goldmann, MD   Stopped at 05/29/18 0908    losartan (COZAAR) tablet 25 mg  25 mg Oral DAILY Dorotha Goldmann, MD   Stopped at 05/29/18 0908    nitroglycerin (NITROSTAT) tablet 0.4 mg  0.4 mg SubLINGual PRN Dorotha Goldmann, MD        senna-docusate (PERICOLACE) 8.6-50 mg per tablet 1 Tab  1 Tab Oral DAILY Dorotha Goldmann, MD        simvastatin (ZOCOR) tablet 40 mg  40 mg Oral QHS Dorotha Goldmann, MD   40 mg at 05/28/18 2147    insulin lispro (HUMALOG) injection   SubCUTAneous AC&HS Dorotha Goldmann, MD   Stopped at 05/28/18 1130    glucose chewable tablet 16 g  4 Tab Oral PRN Dorotha Goldmann, MD        glucagon (GLUCAGEN) injection 1 mg  1 mg IntraMUSCular PRN Dorotha Goldmann, MD        dextrose (D50W) injection syrg 12.5-25 g  25-50 mL IntraVENous PRN Dorotha Goldmann, MD        sodium chloride (NS) flush 5-10 mL  5-10 mL IntraVENous Q8H Dorotha Goldmann, MD   10 mL at 05/29/18 0600    sodium chloride (NS) flush 5-10 mL  5-10 mL IntraVENous PRN Dorotha Goldmann, MD        pantoprazole (PROTONIX) 40 mg in sodium chloride 0.9% 10 mL injection  40 mg IntraVENous Q12H Dorotha Goldmann, MD   40 mg at 05/29/18 0907    doxercalciferol (HECTOROL) 4 mcg/2 mL injection 1 mcg  1 mcg IntraVENous [facility] Evert Araya MD        metoprolol tartrate (LOPRESSOR) tablet 100 mg  100 mg Oral BID Florentin Augustin MD   Stopped at 05/28/18 2200       Impression:    ESRD: Tolerating HD well. Anemia of CKD: on  Epogen. Hyperparathyroidism Yes  Rectal bleeding  Plan  Dialysis for volume and solute management  Epogen : no need.   Hectorol: 0.5 microgram.  Continue supportive care.         Reynold Agarwal MD

## 2018-05-29 NOTE — DISCHARGE INSTRUCTIONS
Learning About Cleaning up Diarrhea  When cleaning up diarrhea, it is important to remember that germs can spread very easily. This can happen when people or items in the home come into contact with diarrhea. Careful cleaning can help reduce the chance of spreading germs. The Centers for Disease Control and Prevention (CDC) recommends that you wear disposable gloves when cleaning up diarrhea or other body fluids. You may wear reusable rubber gloves if you wash and disinfect them after each use. If you don't have gloves, be sure to wash your hands thoroughly with soap and water when you are finished. How do you clean up diarrhea from skin? 1. Wear disposable gloves. 2. Use damp paper towels to wipe up the stool off the skin, and put the used paper towels in a plastic trash bag.  3. Gently wash the area with warm water and a soft cloth. Rinse well, and dry completely. ¨ Do not use any soap on the person's bottom unless the area is very soiled. If soap is needed, use only a mild soap, such as Cetaphil. ¨ If there's a rash on the skin, do not clean the skin with wet wipes that have alcohol or propylene glycol. These wipes may sting the skin. 4. Remove the gloves, and throw them away in a plastic bag. Then wash your hands with soap and water right away. How do you clean up diarrhea from soiled linens and clothes? 1. Wear disposable gloves. 2. Wipe off any stool with paper towels. Put the used paper towels in a plastic trash bag. Small amounts of easily removed stool can be removed with toilet paper and flushed down the toilet. 3. Put the linens in a large plastic bag. The bag should prevent moisture from leaking through. Take the bag to the washing machine. 4. Put the linens in the washing machine. Wash items in a pre-wash cycle first. Then use a regular wash cycle with detergent. Use the warmest temperatures recommended on their labels.   5. After you finish handling soiled clothes, remove your gloves and throw them away in a plastic bag. Then wash your hands with soap and water right away. 6. Dry clothes and linens in a clothes dryer. Use the warmest temperature recommended on the labels. There is no need to disinfect the tubs of the washer or the dryer after a full cycle is completed. How do you clean up diarrhea from hard surfaces? 1. Wear disposable gloves. 2. Wipe up the stool with paper towels. Put the used paper towels in a plastic trash bag. Rinse the surfaces with water. 3. Disinfect hard surfaces with diluted household bleach or with disinfectants that you buy at the store. Wet the surface with the diluted bleach or disinfectant, and let it air dry. ¨ To dilute household bleach, follow the directions on the label. ¨ If you mix your own diluted bleach, use goggles or glasses to protect your eyes from splashes. ¨ Be aware that diluted bleach may remove color from some hard surfaces. 4. Remove your gloves, and throw them away in a plastic bag. Then wash your hands with soap and water right away. Where can you learn more? Go to http://kendal-farzaneh.info/. Enter C909 in the search box to learn more about \"Learning About Cleaning up Diarrhea. \"  Current as of: September 24, 2016  Content Version: 11.4  © 5531-5705 [employer]. Care instructions adapted under license by [employer] (which disclaims liability or warranty for this information). If you have questions about a medical condition or this instruction, always ask your healthcare professional. [place] [address] any warranty or liability for your use of this information. 1. Hold Plavix for 5 days; you must follow up with your PCP before restarting Plavix. 2. Follow Up with your PCP in 5-7 days. 3. Follow up with GI in 1 week.    4. Seek immediate medical attention should you experience worsened symptoms, severe abdominal pain, bloody stools, bloody vomitus, chest pain, fever, or chills. Gastrointestinal Bleeding: Care Instructions  Your Care Instructions    The digestive or gastrointestinal tract goes from the mouth to the anus. It is often called the GI tract. Bleeding can happen anywhere in the GI tract. It may be caused by an ulcer, an infection, or cancer. It may also be caused by medicines such as aspirin or ibuprofen. Light bleeding may not cause any symptoms at first. But if you continue to bleed for a while, you may feel very weak or tired. Sudden, heavy bleeding means you need to see a doctor right away. This kind of bleeding can be very dangerous. But it can usually be cured or controlled. The doctor may do some tests to find the cause of your bleeding. Follow-up care is a key part of your treatment and safety. Be sure to make and go to all appointments, and call your doctor if you are having problems. It's also a good idea to know your test results and keep a list of the medicines you take. How can you care for yourself at home? · Be safe with medicines. Take your medicines exactly as prescribed. Call your doctor if you think you are having a problem with your medicine. You will get more details on the specific medicines your doctor prescribes. · Do not take aspirin or other anti-inflammatory medicines, such as naproxen (Aleve) or ibuprofen (Advil, Motrin), without talking to your doctor first. Ask your doctor if it is okay to use acetaminophen (Tylenol). · Do not drink alcohol. · The bleeding may make you lose iron. So it's important to eat foods that have a lot of iron. These include red meat, shellfish, poultry, and eggs. They also include beans, raisins, whole-grain breads, and leafy green vegetables. If you want help planning meals, you can make an appointment with a dietitian. When should you call for help? Call 911 anytime you think you may need emergency care. For example, call if:  ? · You have sudden, severe belly pain.    ? · You vomit blood or what looks like coffee grounds. ? · You passed out (lost consciousness). ? · Your stools are maroon or very bloody. ?Call your doctor now or seek immediate medical care if:  ? · You are dizzy or lightheaded, or you feel like you may faint. ? · Your stools are black and look like tar, or they have streaks of blood. ? · You have belly pain. ? · You vomit or have nausea. ? · You have trouble swallowing, or it hurts when you swallow. ? Watch closely for changes in your health, and be sure to contact your doctor if:  ? · You do not get better as expected. Where can you learn more? Go to http://kendal-farzaneh.info/. Enter U603 in the search box to learn more about \"Gastrointestinal Bleeding: Care Instructions. \"  Current as of: March 20, 2017  Content Version: 11.4  © 3894-5008 [employer]. Care instructions adapted under license by [employer] (which disclaims liability or warranty for this information). If you have questions about a medical condition or this instruction, always ask your healthcare professional. [place] [address] any warranty or liability for your use of this information.

## 2018-05-29 NOTE — DIALYSIS
ACUTE HEMODIALYSIS FLOW SHEET    HEMODIALYSIS ORDERS: Physician: CHARLES Strickland MD     Dialyzer: revaclear   Duration: 3.5 hr  BFR: 350   DFR: 800   Dialysate:  Temp 36.0 K+   2    Ca+  2.5 Na 138 Bicarb 35   Weight:  97.4 kg    Bed Scale [x]     Unable to Obtain []      Dry weight/UF Goal: 2000 ml Access LUE AVF  Needle Gauge 15     Heparin []  Bolus      Units    [] Hourly       Units    [x]None      Catheter locking solution NA   Pre BP:   164/61    Pulse:     70     Temperature:   98.2  Respirations: 18  Tx: NS   250 ml    ml/Bolus  Other        [] N/A   Labs: Pre        Post:        [] N/A   Additional Orders(medications, blood products, hypotension management):   Hectorol    [] N/A     [x] [facility] Consent Verified     CATHETER ACCESS: []N/A   [x]Right chest  []Left   []IJ     []Fem   [] First use X-ray verified     []Tunnel                [] Non Tunneled   []No S/S infection  []Redness  []Drainage []Cultured []Swelling []Pain   [x]Medical Aseptic Prep Utilized   [x]Dressing Changed  [] Biopatch  Date: 5/29/2018      []Clotted   []Patent   Flows: []Good  []Poor  []Reversed   If access problem,  notified: []Yes    []N/A  Date:           GRAFT/FISTULA ACCESS:  []N/A     []Right     [x]Left     [x]UE     []LE   []AVG   [x]AVF        []Buttonhole    []Medical Aseptic Prep Utilized   [x]No S/S infection  []Redness  []Drainage []Cultured []Swelling []Pain    Bruit:   [x] Strong    [] Weak       Thrill :   [x] Strong    [] Weak       Needle Gauge: 15   Length: 1 inch   If access problem,  notified: []Yes     [x]N/A  Date:        Please describe access if present and not used:       GENERAL ASSESSMENT:    LUNGS:  Rate  SaO2%        [] N/A    [x] Clear  [] Coarse  [] Crackles  [] Wheezing        [] Diminished     Location : []RLL   []LLL    []RUL  []NOMI   Cough: []Productive  []Dry  [x]N/A   Respirations:  []Easy  []Labored   Therapy:  [x]RA  []NC  l/min    Mask: []NRB []Venti       O2%                  []Ventilator []Intubated  [] Trach  [] BiPaP   CARDIAC: []Regular      [] Irregular   [] Pericardial Rub  [] JVD        []  Monitored  [] Bedside  [] Remotely monitored [] N/A  Rhythm:    EDEMA: [x] None  []Generalized  [] Pitting [] 1    [] 2    [] 3    [] 4                 [] Facial  [] Pedal  []  UE  [] LE   SKIN:   [x] Warm  [] Hot     [] Cold   [] Dry     [] Pale   [] Diaphoretic                  [] Flushed  [] Jaundiced  [] Cyanotic  [] Rash  [] Weeping   LOC:    [] Alert      []Oriented:    [] Person     [] Place  []Time               [] Confused  [] Lethargic  [] Medicated  [] Non-responsive     GI / ABDOMEN:  [x] Flat    [] Distended    [] Soft    [] Firm   []  Obese                             [] Diarrhea  [x] Bowel Sounds  [] Nausea  [] Vomiting       / URINE ASSESSMENT:[] Voiding   [] Oliguria  [x] Anuria   []  Xiao     [] Incontinent    []  Incontinent Brief      []  Bathroom Privileges     PAIN: [x] 0 []1  []2   []3   []4   []5   []6   []7   []8   []9   []10            Scale 0-10  Action/Follow Up:    MOBILITY:  [] Amb    [] Amb/Assist    [x] Bed    [] Wheelchair  [] Stretcher      All Vitals and Treatment Details on Attached [address]: [facility]          Room # 208     [] 1st Time Acute  [] Stat  [x] Routine  [] Urgent     [x] Acute Room  []  Bedside  [] ICU/CCU  [] ER   Isolation Precautions:  [x] Dialysis   [] Airborne   [] Contact    [] Reverse   Special Considerations:         [] Blood Consent Verified []N/A     ALLERGIES:   [x] NKA          Code Status:  [x] Full Code  [] DNR  [] Other           HBsAg ONLY: Date Drawn 5/29/2018         []Negative []Positive [x]Unknown   HBsAb: Date 5/29/2018    [] Susceptible   [] [id number] []Not Drawn  [x] Drawn     Current Labs:    Date of Labs: 5/29/2018          Today [x]        Cut and paste current labs here. Results for Deidra Rasmussen (MRN [id number]) as of 5/29/2018 10:23   Ref.  Range 5/29/2018 04:48   WBC Latest Ref Range: 4.6 - 13.2 K/uL 9.1   RBC Latest Ref Range: 4.70 - 5.50 M/uL 3.89 (L)   HGB Latest Ref Range: 13.0 - 16.0 g/dL 11.5 (L)   HCT Latest Ref Range: 36.0 - 48.0 % 35.4 (L)   MCV Latest Ref Range: 74.0 - 97.0 FL 91.0   MCH Latest Ref Range: 24.0 - 34.0 PG 29.6   MCHC Latest Ref Range: 31.0 - 37.0 g/dL 32.5   RDW Latest Ref Range: 11.6 - 14.5 % 13.5   PLATELET Latest Ref Range: 135 - 420 K/uL 298   MPV Latest Ref Range: 9.2 - 11.8 FL 10.2   NEUTROPHILS Latest Ref Range: 40 - 73 % 79 (H)   LYMPHOCYTES Latest Ref Range: 21 - 52 % 13 (L)   MONOCYTES Latest Ref Range: 3 - 10 % 6   EOSINOPHILS Latest Ref Range: 0 - 5 % 2   BASOPHILS Latest Ref Range: 0 - 2 % 0   DF Latest Units:   AUTOMATED   ABS. NEUTROPHILS Latest Ref Range: 1.8 - 8.0 K/UL 7.1   ABS. LYMPHOCYTES Latest Ref Range: 0.9 - 3.6 K/UL 1.2   ABS. MONOCYTES Latest Ref Range: 0.05 - 1.2 K/UL 0.6   ABS. EOSINOPHILS Latest Ref Range: 0.0 - 0.4 K/UL 0.2   ABS. BASOPHILS Latest Ref Range: 0.0 - 0.1 K/UL 0.0   Sodium Latest Ref Range: 136 - 145 mmol/L 141   Potassium Latest Ref Range: 3.5 - 5.5 mmol/L 4.5   Chloride Latest Ref Range: 100 - 108 mmol/L 103   CO2 Latest Ref Range: 21 - 32 mmol/L 26   Anion gap Latest Ref Range: 3.0 - 18 mmol/L 12   Glucose Latest Ref Range: 74 - 99 mg/dL 135 (H)   BUN Latest Ref Range: 7.0 - 18 MG/DL 62 (H)   Creatinine Latest Ref Range: 0.6 - 1.3 MG/DL 8.56 (H)   BUN/Creatinine ratio Latest Ref Range: 12 - 20   7 (L)   Calcium Latest Ref Range: 8.5 - 10.1 MG/DL 8.9   Phosphorus Latest Ref Range: 2.5 - 4.9 MG/DL 6.2 (H)   GFR est non-AA Latest Ref Range: >60 ml/min/1.73m2 6 (L)   GFR est AA Latest Ref Range: >60 ml/min/1.73m2 7 (L)                                                                                DIET:  [x] Renal    [] Other     [] NPO     []  Diabetic      PRIMARY NURSE REPORT: First initial/Last name/Title      Pre Dialysis: OLIVIA Ang RN    Time: 0900      EDUCATION:    [x] Patient [] Other         Knowledge Basis: []None []Minimal [x] Substantial Barriers to learning  []N/A   [x] Access Care     [] S&S of infection     [] Fluid Management     []K+     [x]Procedural    []Albumin     [] Medications     [] Tx Options     [] Transplant     [] Diet     [] Other   Teaching Tools:  [] Explain  [] Demo  [] Handouts [] Video  Patient response:   [x] Verbalized understanding  [] Teach back  [] Return demonstration [] Requires follow up   Inappropriate due to            [x] Time Out/Safety Check  [x]Extracorporeal Circuit Tested for integrity       [address] [facility] Before each treatment:     Machine Number:                   [address] [facility]                                   [x] Unit Machine # 5 with centralized RO                                  [] Portable Machine #1/RO serial # [id number]                                  [] Portable Machine #2/RO serial # [id number]                                  [] Portable Machine #3/RO serial # [id number]                                                                                                       [address] [facility]                                  [] Portable Machine #11/RO serial # [id number]                                   [] Portable Machine #12/RO serial # [id number]                                  [] Portable Machine #13/RO serial #  [id number]      Alarm Test:  Pass time 9008         Other:         [x] RO/Machine Log Complete      Temp                 Dialysate: pH  7.4 Conductivity: Meter   14.0     HD Machine   13.9                  TCD: 13.7  Dialyzer Lot # [id number]         Blood Tubing Lot # [id number]  & [id number]        Saline Lot #  -[id number] &  [id number]     CHLORINE TESTING-Before each treatment and every 4 hours    Total Chlorine: [x] less than 0.1 ppm  Time: 0900 4 Hr/2nd Check Time:    (if greater than 0.1 ppm from Primary then every 30 minutes from Secondary)     TREATMENT INITIATION  with Dialysis Precautions:   [x] All Connections Secured                 [x] Saline Line Double Clamped   [x] Venous Parameters Set                  [x] Arterial Parameters Set    [x] Prime Given  250ml                          [x]Air Foam Detector Engaged      Treatment Initiation Note:  pt arrive via transport in bed, pt is A&O, no S/S of distress, VSS. tx started with out complications. CVC no S/S of complications. The dressing was changed during treatment. AVF/AVG x2 15G, cannulation without difficulties. During Treatment Notes: UF was cut of for hypotensive episode at 1015. It was cut back on at 1030. Charge nurse was informed. System clotted at 1155. Patient's blood was successfully returned. The machine was set up again and the treatment resumed at 1205. Medication Dose Volume Route Initials Dialyzer Cleared: [x] Good [] Fair  [] Poor    Blood processed:  67.5 L  UF Removed  1967 Ml    Post Wt: NA    kg  POst BP:   164/62       Pulse: 75      Respirations: 18  Temperature: 98.0                                   Post Tx Vascular Access: AVF/AVG: Bleeding stopped Art 5 min. Cecil. 10 Min                                      Catheter: Locking solution: Heparin 1:1000 Art. 1.9 ml  Cecil. 1.9 ml                                   Post Assessment:                                    Skin:  [x] Warm  [] Dry [] Diaphoretic    [] Flushed  [] Pale [] Cyanotic   [facility] Signatures Title Initials  Time Lungs: [x] Clear    [] Course  [] Crackles  [] Wheezing [] Diminished    RN   Cardiac: [x] Regular   [] Irregular   [] Monitor  [] N/A  Rhythm:           Edema:  [] None    [x] General     [] Facial   [] Pedal    [] UE    [] LE       Pain: [x]0  []1  []2   []3  []4   []5   []6   []7   []8   []9   []10         Post Treatment Note:     pt leaving via transport. Pt tolerated tx well. No S/S of distress. POST TREATMENT PRIMARY NURSE HANDOFF REPORT:     First initial/Last name/Title         Post Dialysis: OLIVIA Sullivan RN Time:  7521     Abbreviations: AVG-arterial venous graft, AVF-arterial venous fistula, IJ-Internal Jugular, Subcl-Subclavian, Fem-Femoral, Tx-treatment, AP/HR-apical heart rate, DFR-dialysate flow rate, BFR-blood flow rate, AP-arterial pressure, -venous pressure, UF-ultrafiltrate, TMP-transmembrane pressure, Cecil-Venous, Art-Arterial, RO-Reverse Osmosis

## 2018-05-29 NOTE — ROUTINE PROCESS
Bedside shift change report given to Mickie Pittman RN (oncoming nurse) by Sahra Goldman (offgoing nurse). Report included the following information SBAR, Kardex, ED Summary, Intake/Output, MAR, Accordion, Recent Results and Med Rec Status.

## 2018-05-29 NOTE — DISCHARGE SUMMARY
[facility]  Discharge Summary       Patient: Petr Fong Age: 68 y.o. : 1940 MR#: [id number] SSN: [id number]  PCP on record: Socorro Cardoza MD  Admit date: 2018  Discharge date: 2018    Disposition:    [x]Home   []Home with Home Health   []SNF/NH   []Rehab   []Home with family   []Alternate Facility:____________________    Discharge Diagnoses:                               1. Lower GI / Rectal bleed  2. CAD hx CABG with PCI and stents X 2  3. DM  4. ESRD  5. Sleep Apnea  6. 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     Discharge Medications:     Current Discharge Medication List      START taking these medications    Details   lidocaine (XYLOCAINE) 5 % ointment Apply to marilee-anal area and rectum daily as needed for pain  Qty: 1 Tube, Refills: 0      hydrocortisone (ANUSOL-HC) 2.5 % rectal cream Insert  into rectum four (4) times daily. Qty: 30 g, Refills: 0      loperamide (IMODIUM) 2 mg capsule Take 1 Cap by mouth every four (4) hours as needed for Diarrhea for up to 10 days. Qty: 12 Cap, Refills: 0    Associated Diagnoses: Diarrhea in adult patient         CONTINUE these medications which have NOT CHANGED    Details   simvastatin (ZOCOR) 40 mg tablet TAKE 1 TABLET BY MOUTH NIGHTLY. Qty: 90 Tab, Refills: 2      losartan (COZAAR) 25 mg tablet Take  by mouth daily. aspirin delayed-release 81 mg tablet Take  by mouth daily. fenofibrate (LOFIBRA) 54 mg tablet TAKE ONE TABLET BY MOUTH ONE TIME DAILY   Qty: 30 Tab, Refills: 4    Associated Diagnoses: Dyslipidemia      tamsulosin (FLOMAX) 0.4 mg capsule TAKE ONE CAPSULE BY MOUTH ONE TIME DAILY  Qty: 90 Cap, Refills: 3      isosorbide mononitrate ER (IMDUR) 30 mg tablet Take 1 Tab by mouth daily. Qty: 30 Tab, Refills: 0      b complex-vitamin c-folic acid (NEPHROCAPS) 1 mg capsule Take 1 Cap by mouth daily.   Qty: 30 Cap, Refills: 0      metoprolol tartrate (LOPRESSOR) 100 mg IR tablet TAKE 1 TABLET BY MOUTH TWO TIMES A DAY. Qty: 180 Tab, Refills: 2      insulin glargine (LANTUS) 100 unit/mL injection 20 Units by SubCUTAneous route every twelve (12) hours. Qty: 1 Vial, Refills: 2      insulin aspart (NOVOLOG) 100 unit/mL injection 20 units sq 3 times a day,sliding scale  Qty: 10 mL, Refills: 0      allopurinol (ZYLOPRIM) 100 mg tablet Take 100 mg by mouth. Monday, Wednesday,friday      senna-docusate (PERICOLACE) 8.6-50 mg per tablet Take 1 Tab by mouth daily. Qty: 30 Tab, Refills: 0      Cholecalciferol, Vitamin D3, (DECARA) 50,000 unit cap Take  by mouth every seven (7) days. nitroglycerin (NITROSTAT) 0.4 mg SL tablet 1 Tab by SubLINGual route as needed for Chest Pain. Qty: 25 Tab, Refills: 1         STOP taking these medications       clopidogrel (PLAVIX) 75 mg tab Comments:   Reason for Stopping:             Consults:    - GI consult  Procedures:  -  HD  Significant Diagnostic Studies:   [facility] by Problem   This is a 68 y.o.  male who has CAD, CABD, ESRD (dialysis T, Th, Sat) Prostate cancers s/p XRT, hx radiation proctitis and cautery by Dr. Brett Mejía 4 months ago who presented to ED with c/o rectal bleeding 4 times daily after each meal.  Denies chest pain, palpitations or shortness of breath, F/C, N/V/D/C or abd pain. 1. Lower GI/ rectal bleed with pain. Likely related to Radiation proctitis given pt's pmhx as discussed above, ?suspected fissure. Plavix held. GI consulted. No further episodes of blood in stools X 48 hrs. H&H stable throughout admission. Pt hemodynamically stable throughout admission. H&H 12.8/40.1 at time of discharge. No overt signs of bleeding. Pt cleared for discharge by GI, instructed to withhold Plavix X 5 days or until cleared by PCP. Discharged home with lidocaine ointment, Anusol rectal cream. To follow up with PCP in 3-5 days. To follow up with GI in 1 week. 2. CAD hx CABG with PCI and stents X 2.  Continued on BB, Statin, ASA. Plavix held due to #1. No cardiac events throughout hospital course. .  Pt to continue holding Plavix X 5 days or until cleared by PCP. To follow up with PCP in 3-5 days. 3. DM: A1C 7.0. Continued on home insulin regimen. No further interventions warranted. To follow up with PCP. 4. ESRD: on HD T, TH, Sat. Received HD during admission. No further interventions warranted. To continue HD T, TH, Sat.     5. Sleep Apnea: chronic. RT consulted for CPAP. No events overnight. Pt to resume CPAP use at home. To follow up with PCP. Today's examination of the patient revealed:     Subjective:   I have examined pt at bedside. Pt in good spirits, denies CP, F/C, N/V/D/C or abd pain. Objective:   VS:   Visit Vitals    /64 (BP 1 Location: Left arm, BP Patient Position: Sitting)    Pulse 89    Temp 99.1 °F (37.3 °C)    Resp 18    Ht 5' 4\" (1.626 m)    Wt 97.4 kg (214 lb 11.2 oz)    SpO2 98%    BMI 36.85 kg/m2      Tmax/24hrs: Temp (24hrs), Av.2 °F (36.8 °C), Min:97.5 °F (36.4 °C), Max:99.1 °F (37.3 °C)     Input/Output:   Intake/Output Summary (Last 24 hours) at 18 1606  Last data filed at 18 1313   Gross per 24 hour   Intake                0 ml   Output             1967 ml   Net            -1967 ml       General:  Alert, NAD  Cardiovascular:  RRR  Pulmonary:  LSC throughout; respiratory effort WNL  GI:  +BS in all four quadrants, soft, non-tender  Extremities:  No edema; 2+ dorsalis pedis pulses bilaterally  Neuro: alert and oriented X 4.      Labs:    Recent Results (from the past 24 hour(s))   GLUCOSE, POC    Collection Time: 18  5:27 PM   Result Value Ref Range    Glucose (POC) 121 (H) 70 - 110 mg/dL   CARDIAC PANEL,(CK, CKMB & TROPONIN)    Collection Time: 18  5:30 PM   Result Value Ref Range     39 - 308 U/L    CK - MB 2.8 <3.6 ng/ml    CK-MB Index 1.7 0.0 - 4.0 %    Troponin-I, Qt. <0.02 0.0 - 0.045 NG/ML   HGB & HCT    Collection Time: 18 7:21 PM   Result Value Ref Range    HGB 11.8 (L) 13.0 - 16.0 g/dL    HCT 36.1 36.0 - 48.0 %   GLUCOSE, POC    Collection Time: 05/28/18  9:36 PM   Result Value Ref Range    Glucose (POC) 118 (H) 70 - 110 mg/dL   CBC WITH AUTOMATED DIFF    Collection Time: 05/29/18  4:48 AM   Result Value Ref Range    WBC 9.1 4.6 - 13.2 K/uL    RBC 3.89 (L) 4.70 - 5.50 M/uL    HGB 11.5 (L) 13.0 - 16.0 g/dL    HCT 35.4 (L) 36.0 - 48.0 %    MCV 91.0 74.0 - 97.0 FL    MCH 29.6 24.0 - 34.0 PG    MCHC 32.5 31.0 - 37.0 g/dL    RDW 13.5 11.6 - 14.5 %    PLATELET 381 712 - 297 K/uL    MPV 10.2 9.2 - 11.8 FL    NEUTROPHILS 79 (H) 40 - 73 %    LYMPHOCYTES 13 (L) 21 - 52 %    MONOCYTES 6 3 - 10 %    EOSINOPHILS 2 0 - 5 %    BASOPHILS 0 0 - 2 %    ABS. NEUTROPHILS 7.1 1.8 - 8.0 K/UL    ABS. LYMPHOCYTES 1.2 0.9 - 3.6 K/UL    ABS. MONOCYTES 0.6 0.05 - 1.2 K/UL    ABS. EOSINOPHILS 0.2 0.0 - 0.4 K/UL    ABS.  BASOPHILS 0.0 0.0 - 0.1 K/UL    DF AUTOMATED     METABOLIC PANEL, BASIC    Collection Time: 05/29/18  4:48 AM   Result Value Ref Range    Sodium 141 136 - 145 mmol/L    Potassium 4.5 3.5 - 5.5 mmol/L    Chloride 103 100 - 108 mmol/L    CO2 26 21 - 32 mmol/L    Anion gap 12 3.0 - 18 mmol/L    Glucose 135 (H) 74 - 99 mg/dL    BUN 62 (H) 7.0 - 18 MG/DL    Creatinine 8.56 (H) 0.6 - 1.3 MG/DL    BUN/Creatinine ratio 7 (L) 12 - 20      GFR est AA 7 (L) >60 ml/min/1.73m2    GFR est non-AA 6 (L) >60 ml/min/1.73m2    Calcium 8.9 8.5 - 10.1 MG/DL   PHOSPHORUS    Collection Time: 05/29/18  4:48 AM   Result Value Ref Range    Phosphorus 6.2 (H) 2.5 - 4.9 MG/DL   CARDIAC PANEL,(CK, CKMB & TROPONIN)    Collection Time: 05/29/18  4:49 AM   Result Value Ref Range    CK 95 39 - 308 U/L    CK - MB 2.2 <3.6 ng/ml    CK-MB Index 2.3 0.0 - 4.0 %    Troponin-I, Qt. 0.02 0.0 - 0.045 NG/ML   GLUCOSE, POC    Collection Time: 05/29/18  8:30 AM   Result Value Ref Range    Glucose (POC) 134 (H) 70 - 110 mg/dL   HEP B SURFACE AB    Collection Time: 05/29/18  9:30 AM Result Value Ref Range    Hepatitis B surface Ab <3.10 (L) >10.0 mIU/mL    Hep B surface Ab Interp. NEGATIVE  (A) POS      Hep B surface Ab comment        Samples with a  value of 10 mIU/mL or greater are considered positive (protective immunity) in accordance with the CDC guidelines. HEP B SURFACE AG    Collection Time: 18  9:30 AM   Result Value Ref Range    Hepatitis B surface Ag <0.10 <1.00 Index    Hep B surface Ag Interp. NEGATIVE  NEG     HGB & HCT    Collection Time: 18  3:10 PM   Result Value Ref Range    HGB 12.8 (L) 13.0 - 16.0 g/dL    HCT 40.1 36.0 - 48.0 %     Additional Data Reviewed:     Condition:   Follow-up Appointments:   1. Your PCP: Kate Yee MD, within 7-10days  2.          Please follow-up on tests/labs that are still pendin.     >30 minutes spent coordinating this discharge (review instructions/follow-up, prescriptions, preparing report for sign off)    Signed:  Vitor Hernandes NP  2018  4:06 PM

## 2018-05-29 NOTE — PROGRESS NOTES
NUTRITION    Nursing Referral: [facility]     RECOMMENDATIONS / PLAN:     - Change diet order to renal, consistent carb. - Add supplements: Nepro, once daily.  - Monitor diet tolerance and encourage po/supplement intake. - Continue RD inpatient monitoring and evaluation. NUTRITION INTERVENTIONS & DIAGNOSIS:     [x] Meals/snacks: modify composition  [x] Medical food supplement therapy: initiate    Nutrition Diagnosis: Increased nutrient needs (calorie, protein) related to increased expenditure as evidenced by pt with ESRD on HD x 3 days/week. ASSESSMENT:     Pt reports good meal intake PTA until 5/27 when he was experiencing rectal bleeding after each meal. GI following, no bleeding at this time per GI note. On solid diet eating lunch during visit. Feeling hungry, unsure about % intake. BG levels elevated but stable. Phosphorus lab high at 6.2 mg/dL today, plan to add renal restriction. Average po intake adequate to meet patients estimated nutritional needs:   [] Yes     [] No   [x] Unable to determine at this time    Diet: DIET DIABETIC CONSISTENT CARB Regular      Food Allergies: NKFA  Current Appetite:   [x] Good     [] Fair     [] Poor     [] Other:  Appetite/meal intake prior to admission:   [x] Good; otherwise    [] Fair     [x] Poor x 1-2 days PTA    [] Other:  Feeding Limitations:  [] Swallowing difficulty    [] Chewing difficulty    [] Other:  Current Meal Intake: No data found.       BM:  5/29  Skin Integrity: WDL  Edema: Trace LEs  Pertinent Medications: Reviewed: SSI, bowel regimen    Recent Labs      05/29/18   0448  05/28/18   0935   NA  141  136   K  4.5  4.4   CL  103  98*   CO2  26  32   GLU  135*  116*   BUN  62*  57*   CREA  8.56*  8.75*   CA  8.9  9.3   PHOS  6.2*   --    ALB   --   3.5   SGOT   --   16   ALT   --   17       Intake/Output Summary (Last 24 hours) at 05/29/18 1519  Last data filed at 05/29/18 1313   Gross per 24 hour   Intake                0 ml   Output             1967 ml Net            -1967 ml       Anthropometrics:  Ht Readings from Last 1 Encounters:   05/28/18 5' 4\" (1.626 m)     Last 3 Recorded Weights in this Encounter    05/28/18 0825 05/29/18 0348   Weight: 99.8 kg (220 lb) 97.4 kg (214 lb 11.2 oz)     Body mass index is 36.85 kg/(m^2). Obese Class II     Weight History: Pt reports 6 lb weight loss x several days PTA. Per chart review, -11 lbs (4.8%) x 2 weeks PTA. Weight Metrics 5/29/2018 5/16/2018 5/7/2018 4/30/2018 3/22/2018 3/9/2018 2/15/2018   Weight 214 lb 11.2 oz 225 lb 225 lb 206 lb 217 lb 217 lb 11.2 oz 222 lb   BMI 36.85 kg/m2 34.21 kg/m2 34.21 kg/m2 31.32 kg/m2 32.05 kg/m2 32.15 kg/m2 32.78 kg/m2        Admitting Diagnosis: Lower GI bleed  GI bleed  Pertinent PMHx: CAD, CHF, HLD, CABG, ESRD on HD, prostate cancer    Education Needs:        [x] None identified  [] Identified - Not appropriate at this time  []  Identified and addressed - refer to education log  Learning Limitations:   [x] None identified  [] Identified    Cultural, Islam & ethnic food preferences:  [x] None identified    [] Identified and addressed     ESTIMATED NUTRITION NEEDS:     Calories: 4699-6308 kcal (30-35 kcal/kg) based on  [] Actual BW     [x] SBW: 71 kg   Protein:  gm (1.2-1.5 gm/kg) based on  [] Actual BW      [x] SBW   Fluid: 1 mL/kcal     MONITORING & EVALUATION:     Nutrition Goal(s):   1. Po intake of meals will meet >75% of patient estimated nutritional needs within the next 7 days.   Outcome:  [] Met/Ongoing    []  Not Met    [x] New/Initial Goal     Monitoring:   [x] Food and beverage intake   [x] Diet order   [x] Nutrition-focused physical findings   [x] Treatment/therapy   [] Weight   [] Enteral nutrition intake        Previous Recommendations (for follow-up assessments only):     []   Implemented       []   Not Implemented (RD to address)      [] No Longer Appropriate     [] No Recommendation Made     Discharge Planning:  Diabetic, cardiac, renal diet   [x] Participated in care planning, discharge planning, & interdisciplinary rounds as appropriate      Frieda Garg, TANISHA   Pager: [phone]

## 2018-05-29 NOTE — PROGRESS NOTES
Reason for Admission:   LOWER G I BLEED               RRAT Score:   50               Resources/supports as identified by patient/family:   FAMILY AND COMPLIANT WITH MEDICATIONS AND APPOINTMENT                Top Challenges facing patient (as identified by patient/family and CM):  [place]                     Finances/Medication cost?     NO PROBLEM               Transportation? NO PROBLEM              Support system or lack thereof? FAMILY LARGE UNIT                     Living arrangements? LIVES WITH WIFE AND ADULT CHILD               Self-care/ADLs/Cognition? VERY INDEPENDENT          Current Advanced Directive/Advance Care Plan:  NONE                          Plan for utilizing home health:  NOT ORDERED                        Likelihood of readmission: HIGH RED                 Transition of Care Plan:    HOME WITH FAMILY SUPPORT              Care Management Interventions  PCP Verified by CM: Yes (DR. Jennifer Hsu)  Palliative Care Criteria Met (RRAT>21 & CHF Dx)?: No  Mode of Transport at Discharge:  Other (see comment) (FAMILY WILL TRANSPORT)  Transition of Care Consult (CM Consult): Discharge Planning  Current Support Network: Lives with Spouse, Own Home, Family Lives Nearby  Confirm Follow Up Transport: Family  Plan discussed with Pt/Family/Caregiver: Yes (Cla Bloom 186)  [address] Provided?: No  Discharge Location  Discharge Placement: Home with family assistance

## 2018-05-29 NOTE — PHYSICIAN ADVISORY
Letter of admission status determination     Celeste Richey   Age: 68 y.o. MRN: [id number]  CSN:  [id number]    Date of admission: 5/28/2018    I have reviewed this case as it involves a Medicare patient admitted as inpatient that has not been hospitalized for at least two midnights and has a discharge order placed. Patient's condition and the documented plan of care at the time of admission do not fully support the expectation that the patient would require medically necessary hospital care for two or more midnights. Therefore, I recommend downgrade to Outpatient OBSERVATION. The final decision regarding the patient's hospitalization status depends on the attending physician's judgment.       Charles Simpson MD, [place], [address] [facility]  Physician Bill Davis.  [phone]    May 29, 2018   4:12 PM

## 2018-05-29 NOTE — PROGRESS NOTES
Care Management  Specialist reviewed Important Medicare Rights letter with patient. Patient signed letter. No questions ask regarding document. Patient given a copy of letter at bedside.

## 2018-05-29 NOTE — PROGRESS NOTES
D/C pt with discharge instructions given. Directed to call Dr Erik Grove in one week for  follow up appt. No complaint of  pain at this time. IV and telemetry box removed  Family on the way to escort pt home per patient.

## 2018-05-29 NOTE — PROGRESS NOTES
Patient is not available to be assessed at this time. No family at bedside.     [address] [facility] Certified [address] [place] [address]   [phone]

## 2018-05-30 ENCOUNTER — TELEPHONE (OUTPATIENT)
Dept: CARDIOLOGY CLINIC | Age: 78
End: 2018-05-30

## 2018-06-05 ENCOUNTER — HOSPITAL ENCOUNTER (INPATIENT)
Age: 78
LOS: 3 days | Discharge: HOME HEALTH | DRG: 377 | End: 2018-06-08
Attending: EMERGENCY MEDICINE | Admitting: INTERNAL MEDICINE
Payer: MEDICARE

## 2018-06-05 DIAGNOSIS — K92.2 LOWER GI BLEED: Primary | ICD-10-CM

## 2018-06-05 LAB
ABO + RH BLD: NORMAL
ALBUMIN SERPL-MCNC: 3.3 G/DL (ref 3.4–5)
ALBUMIN/GLOB SERPL: 0.7 {RATIO} (ref 0.8–1.7)
ALP SERPL-CCNC: 93 U/L (ref 45–117)
ALT SERPL-CCNC: 18 U/L (ref 16–61)
ANION GAP SERPL CALC-SCNC: 7 MMOL/L (ref 3–18)
AST SERPL-CCNC: 10 U/L (ref 15–37)
BASOPHILS # BLD: 0 K/UL (ref 0–0.1)
BASOPHILS NFR BLD: 0 % (ref 0–2)
BILIRUB SERPL-MCNC: 0.7 MG/DL (ref 0.2–1)
BLOOD GROUP ANTIBODIES SERPL: NORMAL
BUN SERPL-MCNC: 47 MG/DL (ref 7–18)
BUN/CREAT SERPL: 7 (ref 12–20)
CALCIUM SERPL-MCNC: 8.9 MG/DL (ref 8.5–10.1)
CHLORIDE SERPL-SCNC: 96 MMOL/L (ref 100–108)
CO2 SERPL-SCNC: 32 MMOL/L (ref 21–32)
CREAT SERPL-MCNC: 6.54 MG/DL (ref 0.6–1.3)
DIFFERENTIAL METHOD BLD: ABNORMAL
EOSINOPHIL # BLD: 0.2 K/UL (ref 0–0.4)
EOSINOPHIL NFR BLD: 2 % (ref 0–5)
ERYTHROCYTE [DISTWIDTH] IN BLOOD BY AUTOMATED COUNT: 13.1 % (ref 11.6–14.5)
GLOBULIN SER CALC-MCNC: 4.6 G/DL (ref 2–4)
GLUCOSE SERPL-MCNC: 80 MG/DL (ref 74–99)
HCT VFR BLD AUTO: 32.4 % (ref 36–48)
HGB BLD-MCNC: 10.6 G/DL (ref 13–16)
LYMPHOCYTES # BLD: 1.6 K/UL (ref 0.9–3.6)
LYMPHOCYTES NFR BLD: 17 % (ref 21–52)
MCH RBC QN AUTO: 29.2 PG (ref 24–34)
MCHC RBC AUTO-ENTMCNC: 32.7 G/DL (ref 31–37)
MCV RBC AUTO: 89.3 FL (ref 74–97)
MONOCYTES # BLD: 0.5 K/UL (ref 0.05–1.2)
MONOCYTES NFR BLD: 5 % (ref 3–10)
NEUTS SEG # BLD: 7.3 K/UL (ref 1.8–8)
NEUTS SEG NFR BLD: 76 % (ref 40–73)
PLATELET # BLD AUTO: 289 K/UL (ref 135–420)
PMV BLD AUTO: 10.7 FL (ref 9.2–11.8)
POTASSIUM SERPL-SCNC: 3.9 MMOL/L (ref 3.5–5.5)
PROT SERPL-MCNC: 7.9 G/DL (ref 6.4–8.2)
RBC # BLD AUTO: 3.63 M/UL (ref 4.7–5.5)
SODIUM SERPL-SCNC: 135 MMOL/L (ref 136–145)
SPECIMEN EXP DATE BLD: NORMAL
WBC # BLD AUTO: 9.7 K/UL (ref 4.6–13.2)

## 2018-06-05 PROCEDURE — 86900 BLOOD TYPING SEROLOGIC ABO: CPT | Performed by: EMERGENCY MEDICINE

## 2018-06-05 PROCEDURE — 99284 EMERGENCY DEPT VISIT MOD MDM: CPT

## 2018-06-05 PROCEDURE — 85025 COMPLETE CBC W/AUTO DIFF WBC: CPT | Performed by: EMERGENCY MEDICINE

## 2018-06-05 PROCEDURE — 65660000000 HC RM CCU STEPDOWN

## 2018-06-05 PROCEDURE — 80053 COMPREHEN METABOLIC PANEL: CPT | Performed by: EMERGENCY MEDICINE

## 2018-06-05 NOTE — IP AVS SNAPSHOT
[address] Patient: Candido Meckel MRN: [id number] AAK:[birth year] A check jose indicates which time of day the medication should be taken. My Medications START taking these medications Instructions Each Dose to Equal  
 Morning Noon Evening Bedtime  
 acetaminophen 325 mg tablet Commonly known as:  TYLENOL Your last dose was: Your next dose is: Take 2 Tabs by mouth every six (6) hours as needed. 650 mg CHANGE how you take these medications Instructions Each Dose to Equal  
 Morning Noon Evening Bedtime  
 insulin aspart U-100 100 unit/mL injection Commonly known as:  NovoLOG U-100 Insulin aspart What changed:  additional instructions Your last dose was: Your next dose is:    
   
   
 20 units sq 3 times a day,sliding scale  
     
   
   
   
  
 insulin glargine 100 unit/mL injection Commonly known as:  LANTUS What changed:  how much to take Your last dose was: Your next dose is:    
   
   
 20 Units by SubCUTAneous route every twelve (12) hours. 20 Units CONTINUE taking these medications Instructions Each Dose to Equal  
 Morning Noon Evening Bedtime  
 allopurinol 100 mg tablet Commonly known as:  Vena Ditch Your last dose was: Your next dose is: Take 100 mg by mouth. Monday, Wednesday,friday 100 mg  
    
   
   
   
  
 aspirin delayed-release 81 mg tablet Your last dose was: Your next dose is: Take  by mouth daily. b complex-vitamin c-folic acid 1 mg capsule Commonly known as:  Karie Osborn Your last dose was: Your next dose is: Take 1 Cap by mouth daily. 1 Cap DECARA 50,000 unit capsule Generic drug:  cholecalciferol Your last dose was: Your next dose is: Take  by mouth every seven (7) days. fenofibrate 54 mg tablet Commonly known as:  LOFIBRA Your last dose was: Your next dose is: TAKE ONE TABLET BY MOUTH ONE TIME DAILY  
     
   
   
   
  
 hydrocortisone 2.5 % rectal cream  
Commonly known as:  ANUSOL-HC Your last dose was: Your next dose is: Insert  into rectum four (4) times daily. isosorbide mononitrate ER 30 mg tablet Commonly known as:  IMDUR Your last dose was: Your next dose is: Take 1 Tab by mouth daily. 30 mg  
    
   
   
   
  
 lidocaine 5 % ointment Commonly known as:  XYLOCAINE Your last dose was: Your next dose is:    
   
   
 Apply to marilee-anal area and rectum daily as needed for pain  
     
   
   
   
  
 losartan 25 mg tablet Commonly known as:  COZAAR Your last dose was: Your next dose is: Take  by mouth daily. metoprolol tartrate 100 mg IR tablet Commonly known as:  LOPRESSOR Your last dose was: Your next dose is: TAKE 1 TABLET BY MOUTH TWO TIMES A DAY. nitroglycerin 0.4 mg SL tablet Commonly known as:  NITROSTAT Your last dose was: Your next dose is:    
   
   
 1 Tab by SubLINGual route as needed for Chest Pain. 0.4 mg  
    
   
   
   
  
 senna-docusate 8.6-50 mg per tablet Commonly known as:  Steva Gab Your last dose was: Your next dose is: Take 1 Tab by mouth daily. 1 Tab  
    
   
   
   
  
 simvastatin 40 mg tablet Commonly known as:  ZOCOR Your last dose was: Your next dose is: TAKE 1 TABLET BY MOUTH NIGHTLY.  
     
   
   
   
  
 tamsulosin 0.4 mg capsule Commonly known as:  FLOMAX Your last dose was: Your next dose is: TAKE ONE CAPSULE BY MOUTH ONE TIME DAILY  
     
   
   
   
  
  
STOP taking these medications   
 loperamide 2 mg capsule Commonly known as:  IMODIUM Where to Get Your Medications Information on where to get these meds will be given to you by the nurse or doctor. ! Ask your nurse or doctor about these medications  
  acetaminophen 325 mg tablet

## 2018-06-05 NOTE — IP AVS SNAPSHOT
[address] 1710 [facility] Patient: Yoon James MRN: [id number]:[id number] About your hospitalization You were admitted on:  June 5, 2018 You last received care in the:  [address] [place] [address] You were discharged on:  June 8, 2018 Why you were hospitalized Your primary diagnosis was: Lower Gi Bleed Your diagnoses also included:  S/P Coronary Artery Stent Placement, Essential Hypertension, Benign, Esrd (End Stage Renal Disease) (Hcc), Radiation Proctitis Follow-up Information Follow up With Details Comments Contact Info Shannon Conley MD Schedule an appointment as soon as possible for a visit  [address] [facility] 
[phone] Vera Guardado MD Schedule an appointment as soon as possible for a visit [facility] [address] [place] [address] 
[phone] Your Scheduled Appointments Wednesday July 11, 2018 10:30 AM EDT Nurse Visit with Meghan Eckert [facility]) [address] [place] [address] [address] [place] [address]  
[phone] Discharge Orders None A check jose indicates which time of day the medication should be taken. My Medications START taking these medications Instructions Each Dose to Equal  
 Morning Noon Evening Bedtime  
 acetaminophen 325 mg tablet Commonly known as:  TYLENOL Your last dose was: Your next dose is: Take 2 Tabs by mouth every six (6) hours as needed. 650 mg CHANGE how you take these medications Instructions Each Dose to Equal  
 Morning Noon Evening Bedtime  
 insulin aspart U-100 100 unit/mL injection Commonly known as:  NovoLOG U-100 Insulin aspart What changed:  additional instructions Your last dose was: Your next dose is:    
   
   
 20 units sq 3 times a day,sliding scale  
     
   
   
   
  
 insulin glargine 100 unit/mL injection Commonly known as:  LANTUS What changed:  how much to take Your last dose was: Your next dose is:    
   
   
 20 Units by SubCUTAneous route every twelve (12) hours. 20 Units CONTINUE taking these medications Instructions Each Dose to Equal  
 Morning Noon Evening Bedtime  
 allopurinol 100 mg tablet Commonly known as:  Caitlyn Talcott Your last dose was: Your next dose is: Take 100 mg by mouth. Monday, Wednesday,friday 100 mg  
    
   
   
   
  
 aspirin delayed-release 81 mg tablet Your last dose was: Your next dose is: Take  by mouth daily. b complex-vitamin c-folic acid 1 mg capsule Commonly known as:  Tarun Grace Your last dose was: Your next dose is: Take 1 Cap by mouth daily. 1 Cap DECARA 50,000 unit capsule Generic drug:  cholecalciferol Your last dose was: Your next dose is: Take  by mouth every seven (7) days. fenofibrate 54 mg tablet Commonly known as:  LOFIBRA Your last dose was: Your next dose is: TAKE ONE TABLET BY MOUTH ONE TIME DAILY  
     
   
   
   
  
 hydrocortisone 2.5 % rectal cream  
Commonly known as:  ANUSOL-HC Your last dose was: Your next dose is: Insert  into rectum four (4) times daily. isosorbide mononitrate ER 30 mg tablet Commonly known as:  IMDUR Your last dose was: Your next dose is: Take 1 Tab by mouth daily. 30 mg  
    
   
   
   
  
 lidocaine 5 % ointment Commonly known as:  XYLOCAINE Your last dose was: Your next dose is: Apply to marilee-anal area and rectum daily as needed for pain  
     
   
   
   
  
 losartan 25 mg tablet Commonly known as:  COZAAR Your last dose was: Your next dose is: Take  by mouth daily. metoprolol tartrate 100 mg IR tablet Commonly known as:  LOPRESSOR Your last dose was: Your next dose is: TAKE 1 TABLET BY MOUTH TWO TIMES A DAY. nitroglycerin 0.4 mg SL tablet Commonly known as:  NITROSTAT Your last dose was: Your next dose is:    
   
   
 1 Tab by SubLINGual route as needed for Chest Pain. 0.4 mg  
    
   
   
   
  
 senna-docusate 8.6-50 mg per tablet Commonly known as:  Angel Ra Your last dose was: Your next dose is: Take 1 Tab by mouth daily. 1 Tab  
    
   
   
   
  
 simvastatin 40 mg tablet Commonly known as:  ZOCOR Your last dose was: Your next dose is: TAKE 1 TABLET BY MOUTH NIGHTLY.  
     
   
   
   
  
 tamsulosin 0.4 mg capsule Commonly known as:  FLOMAX Your last dose was: Your next dose is: TAKE ONE CAPSULE BY MOUTH ONE TIME DAILY  
     
   
   
   
  
  
STOP taking these medications   
 loperamide 2 mg capsule Commonly known as:  IMODIUM Where to Get Your Medications Information on where to get these meds will be given to you by the nurse or doctor. ! Ask your nurse or doctor about these medications  
  acetaminophen 325 mg tablet Discharge Instructions DISCHARGE SUMMARY from Nurse PATIENT INSTRUCTIONS: 
 
 
Recognize signs and symptoms of STROKE: 
 
 F-face looks uneven A-arms unable to move or move unevenly S-speech slurred or non-existent T-time-call 911 as soon as signs and symptoms begin-DO NOT go Back to bed or wait to see if you get better-TIME IS BRAIN. Warning Signs of HEART ATTACK Call 911 if you have these symptoms: 
? Chest discomfort. Most heart attacks involve discomfort in the center of the chest that lasts more than a few minutes, or that goes away and comes back. It can feel like uncomfortable pressure, squeezing, fullness, or pain. ? Discomfort in other areas of the upper body. Symptoms can include pain or discomfort in one or both arms, the back, neck, jaw, or stomach. ? Shortness of breath with or without chest discomfort. ? Other signs may include breaking out in a cold sweat, nausea, or lightheadedness. Don't wait more than five minutes to call [address]! Fast action can save your life. Calling 911 is almost always the fastest way to get lifesaving treatment. Emergency Medical Services staff can begin treatment when they arrive  up to an hour sooner than if someone gets to the hospital by car. The discharge information has been reviewed with the patient. The patient verbalized understanding. Discharge medications reviewed with the patient and appropriate educational materials and side effects teaching were provided. AccuSilicon Activation Thank you for requesting access to AccuSilicon. Please follow the instructions below to securely access and download your online medical record. AccuSilicon allows you to send messages to your doctor, view your test results, renew your prescriptions, schedule appointments, and more. How Do I Sign Up? 1. In your internet browser, go to [email or web address] 
2. Click on the First Time User? Click Here link in the Sign In box. You will be redirect to the New Member Sign Up page. 3. Enter your AccuSilicon Access Code exactly as it appears below.  You will not need to use this code after youve completed the sign-up process. If you do not sign up before the expiration date, you must request a new code. Anatole Access Code: [email or web address] Expires: 2018  1:46 PM (This is the date your Anatole access code will ) 4. Enter the last four digits of your Social Security Number (xxxx) and Date of Birth (mm/dd/yyyy) as indicated and click Submit. You will be taken to the next sign-up page. 5. Create a Anatole ID. This will be your Anatole login ID and cannot be changed, so think of one that is secure and easy to remember. 6. Create a Anatole password. You can change your password at any time. 7. Enter your Password Reset Question and Answer. This can be used at a later time if you forget your password. 8. Enter your e-mail address. You will receive e-mail notification when new information is available in [address]. 9. Click Sign Up. You can now view and download portions of your medical record. 10. Click the Download Summary menu link to download a portable copy of your medical information. Additional Information If you have questions, please visit the Frequently Asked Questions section of the Anatole website at [email or web address] Remember, Anatole is NOT to be used for urgent needs. For medical emergencies, dial 911. Patient armband removed and shredded 
___________________________________________________________________________________________________________________________________ Colonoscopy: What to Expect at [facility] Your Recovery After you have a colonoscopy, you will stay at the clinic for 1 to 2 hours until the medicines wear off. Then you can go home. But you will need to arrange for a ride. Your doctor will tell you when you can eat and do your other usual activities. Your doctor will talk to you about when you will need your next colonoscopy.  Your doctor can help you decide how often you need to be checked. This will depend on the results of your test and your risk for colorectal cancer. After the test, you may be bloated or have gas pains. You may need to pass gas. If a biopsy was done or a polyp was removed, you may have streaks of blood in your stool (feces) for a few days. This care sheet gives you a general idea about how long it will take for you to recover. But each person recovers at a different pace. Follow the steps below to get better as quickly as possible. How can you care for yourself at home? Activity ? · Rest when you feel tired. ? · You can do your normal activities when it feels okay to do so. Diet ? · Follow your doctor's directions for eating. ? · Unless your doctor has told you not to, drink plenty of fluids. This helps to replace the fluids that were lost during the colon prep. ? · Do not drink alcohol. Medicines ? · Your doctor will tell you if and when you can restart your medicines. He or she will also give you instructions about taking any new medicines. ? · If you take blood thinners, such as warfarin (Coumadin), clopidogrel (Plavix), or aspirin, be sure to talk to your doctor. He or she will tell you if and when to start taking those medicines again. Make sure that you understand exactly what your doctor wants you to do. ? · If polyps were removed or a biopsy was done during the test, your doctor may tell you not to take aspirin or other anti-inflammatory medicines for a few days. These include ibuprofen (Advil, Motrin) and naproxen (Aleve). Other instructions ? · For your safety, do not drive or operate machinery until the medicine wears off and you can think clearly. Your doctor may tell you not to drive or operate machinery until the day after your test.  
? · Do not sign legal documents or make major decisions until the medicine wears off and you can think clearly. The anesthesia can make it hard for you to fully understand what you are agreeing to. Follow-up care is a key part of your treatment and safety. Be sure to make and go to all appointments, and call your doctor if you are having problems. It's also a good idea to know your test results and keep a list of the medicines you take. When should you call for help? Call 911 anytime you think you may need emergency care. For example, call if: 
? · You passed out (lost consciousness). ? · You pass maroon or bloody stools. ? · You have trouble breathing. ?Call your doctor now or seek immediate medical care if: 
? · You have pain that does not get better after you take pain medicine. ? · You are sick to your stomach or cannot drink fluids. ? · You have new or worse belly pain. ? · You have blood in your stools. ? · You have a fever. ? · You cannot pass stools or gas. ? Watch closely for changes in your health, and be sure to contact your doctor if you have any problems. Where can you learn more? Go to http://kendal-farzaneh.info/. Enter E264 in the search box to learn more about \"Colonoscopy: What to Expect at Home. \" Current as of: May 12, 2017 Content Version: 11.4 © 0274-6663 Healthwise, Incorporated. Care instructions adapted under license by popchips (which disclaims liability or warranty for this information). If you have questions about a medical condition or this instruction, always ask your healthcare professional. [place] [address] any warranty or liability for your use of this information. ACO Transitions of Care Introducing [employer] [address] offers a voluntary care coordination program to provide high quality service and care to [facility] fee-for-service beneficiaries. Elder Cabezas was designed to help you enhance your health and well-being through the following services: ? Transitions of Care  support for individuals who are transitioning from one care setting to another (example: Hospital to home). ? Chronic and Complex Care Coordination  support for individuals and caregivers of those with serious or chronic illnesses or with more than one chronic (ongoing) condition and those who take a number of different medications. If you meet specific medical criteria, a [facility] may call you directly to coordinate your care with your primary care physician and your other care providers. For questions about the [facility] programs, please, contact your physicians office. For general questions or additional information about Accountable Care Organizations: 
Please visit www.medicare.gov/acos. html or call 1-800-MEDICARE (3-828.888.1662) TTY users should call 3-520.905.3716. Introducing [facility] HE[facility]! [facility] introduces Peppercorn patient portal. Now you can access parts of your medical record, email your doctor's office, and request medication refills online. 1. In your internet browser, go to [email or web address] 2. Click on the First Time User? Click Here link in the Sign In box. You will see the New Member Sign Up page. 3. Enter your Peppercorn Access Code exactly as it appears below. You will not need to use this code after youve completed the sign-up process. If you do not sign up before the expiration date, you must request a new code. · Peppercorn Access Code: [email or web address] Expires: 7/18/2018  1:46 PM 
 
4. Enter the last four digits of your Social Security Number (xxxx) and Date of Birth (mm/dd/yyyy) as indicated and click Submit. You will be taken to the next sign-up page. 5. Create a Prolexic Technologiest ID. This will be your Peppercorn login ID and cannot be changed, so think of one that is secure and easy to remember. 6. Create a Peppercorn password. You can change your password at any time. 7. Enter your Password Reset Question and Answer.  This can be used at a later time if you forget your password. 8. Enter your e-mail address. You will receive e-mail notification when new information is available in [address] 9. Click Sign Up. You can now view and download portions of your medical record. 10. Click the Download Summary menu link to download a portable copy of your medical information. If you have questions, please visit the Frequently Asked Questions section of the Veducat website. Remember, [employer] is NOT to be used for urgent needs. For medical emergencies, dial 911. Now available from your iPhone and Android! Introducing Edgar Avalos As a [facility]  patient, I wanted to make you aware of our electronic visit tool called Edgar Avalos. Yoyocard  24/7 allows you to connect within minutes with a medical provider 24 hours a day, seven days a week via a mobile device or tablet or logging into a secure website from your computer. You can access Edgar Avalos from anywhere in the [place]. A virtual visit might be right for you when you have a simple condition and feel like you just dont want to get out of bed, or cant get away from work for an appointment, when your regular [facility]  provider is not available (evenings, weekends or holidays), or when youre out of town and need minor care. Electronic visits cost only $49 and if the Ar[employer]  24/7 provider determines a prescription is needed to treat your condition, one can be electronically transmitted to a nearby pharmacy*. Please take a moment to enroll today if you have not already done so. The enrollment process is free and takes just a few minutes. To enroll, please download the SwapBeats 24/7 breann to your tablet or phone, or visit [email or web address] to enroll on your computer.    
And, as an [address] [place] [address] patient with a [employer] account, the results of your visits will be scanned into your electronic medical record and your primary care provider will be able to view the scanned results. We urge you to continue to see your regular [facility] provider for your ongoing medical care. And while your primary care provider may not be the one available when you seek a [employer] virtual visit, the peace of mind you get from getting a real diagnosis real time can be priceless. For more information on [employer], view our Frequently Asked Questions (FAQs) at [email or web address]. Sincerely, 
 
Ana Cohen MD 
Chief Medical Officer Deanna Hogue *:  certain medications cannot be prescribed via [employer] Providers Seen During Your Hospitalization Provider Specialty Primary office phone Dana Stone MD Emergency Medicine [phone] Neeraj Landry MD Hospitalist [phone] Abril Gaxiola MD Internal Medicine [phone] Yesi Guerrero MD Infectious Diseases [phone] Your Primary Care Physician (PCP) Primary Care Physician Office Phone Office Fax Ivan Almodovar [phone] You are allergic to the following Allergen Reactions Nka (No Known Allergies) Other (comments) Recent Documentation Weight BMI Smoking Status 97.5 kg 36.89 kg/m2 Never Smoker Emergency Contacts Name Discharge Info Relation Home Work Mobile Lazarus,Winsome DISCHARGE CAREGIVER [3] Spouse [3] [phone] Lazarus,Annie N/A  AT THIS TIME [6] Daughter [21] [phone] Lazarus,Cary N/A  AT THIS TIME [6] Son [22] [phone] [address] [place] [address] CAREGIVER [3] Son [22] [phone] Damion Alvarez  Spouse [3] [phone] Patient Belongings  The following personal items are in your possession at time of discharge: 
  Dental Appliances: Uppers, Lowers  Visual Aid: None      Home Medications: None   Jewelry: None  Clothing: At bedside, Footwear, Pants, Shirt, Undergarments, With patient    Other Valuables: None Discharge Instructions Attachments/References RECTAL BLEEDING (ENGLISH) Patient Handouts Rectal Bleeding: Care Instructions Your Care Instructions Rectal bleeding in small amounts is common. You may see red spotting on toilet paper or drops of blood in the toilet. Rectal bleeding has many possible causes, from something as minor as hemorrhoids to something as serious as colon cancer. You may need more tests to find the cause of your bleeding. Follow-up care is a key part of your treatment and safety. Be sure to make and go to all appointments, and call your doctor if you are having problems. It's also a good idea to know your test results and keep a list of the medicines you take. How can you care for yourself at home? · Avoid aspirin and other nonsteroidal anti-inflammatory drugs (NSAIDs), such as ibuprofen (Advil, Motrin) and naproxen (Aleve). They can cause you to bleed more. Ask your doctor if you can take acetaminophen (Tylenol). Read and follow all instructions on the label. · Use a stool softener that contains bran or psyllium. You can save money by buying bran or psyllium (available in bulk at most health food stores) and sprinkling it on foods or stirring it into fruit juice. You can also use a product such as Metamucil or Citrucel. · Take your medicines exactly as directed. Call your doctor if you think you are having a problem with your medicine. When should you call for help? Call 911 anytime you think you may need emergency care. For example, call if: 
? · You passed out (lost consciousness). ?Call your doctor now or seek immediate medical care if: 
? · You have new or worse pain. ? · You have new or worse bleeding from the rectum. ? · You are dizzy or light-headed, or you feel like you may faint. ?Watch closely for changes in your health, and be sure to contact your doctor if: 
? · You cannot pass stools or gas. ? · You do not get better as expected. Where can you learn more? Go to http://kendal-farzaneh.info/. Enter R339 in the search box to learn more about \"Rectal Bleeding: Care Instructions. \" Current as of: May 12, 2017 Content Version: 11.4 © 2006-2017 Healthwise, Incorporated. Care instructions adapted under license by [employer] (which disclaims liability or warranty for this information). If you have questions about a medical condition or this instruction, always ask your healthcare professional. [address] any warranty or liability for your use of this information. Please provide this summary of care documentation to your next provider. Signatures-by signing, you are acknowledging that this After Visit Summary has been reviewed with you and you have received a copy. Patient Signature:  ____________________________________________________________ Date:  ____________________________________________________________  
  
Fayrene Retort Provider Signature:  ____________________________________________________________ Date:  ____________________________________________________________

## 2018-06-06 ENCOUNTER — APPOINTMENT (OUTPATIENT)
Dept: CT IMAGING | Age: 78
DRG: 377 | End: 2018-06-06
Attending: INTERNAL MEDICINE
Payer: MEDICARE

## 2018-06-06 PROBLEM — K62.7 RADIATION PROCTITIS: Status: ACTIVE | Noted: 2018-06-06

## 2018-06-06 LAB
ANION GAP SERPL CALC-SCNC: 11 MMOL/L (ref 3–18)
BASOPHILS # BLD: 0 K/UL (ref 0–0.1)
BASOPHILS NFR BLD: 0 % (ref 0–2)
BUN SERPL-MCNC: 50 MG/DL (ref 7–18)
BUN/CREAT SERPL: 8 (ref 12–20)
CALCIUM SERPL-MCNC: 8.8 MG/DL (ref 8.5–10.1)
CHLORIDE SERPL-SCNC: 99 MMOL/L (ref 100–108)
CO2 SERPL-SCNC: 28 MMOL/L (ref 21–32)
CREAT SERPL-MCNC: 6.43 MG/DL (ref 0.6–1.3)
DIFFERENTIAL METHOD BLD: ABNORMAL
EOSINOPHIL # BLD: 0.2 K/UL (ref 0–0.4)
EOSINOPHIL NFR BLD: 2 % (ref 0–5)
ERYTHROCYTE [DISTWIDTH] IN BLOOD BY AUTOMATED COUNT: 13.2 % (ref 11.6–14.5)
GLUCOSE BLD STRIP.AUTO-MCNC: 113 MG/DL (ref 70–110)
GLUCOSE BLD STRIP.AUTO-MCNC: 271 MG/DL (ref 70–110)
GLUCOSE BLD STRIP.AUTO-MCNC: 91 MG/DL (ref 70–110)
GLUCOSE BLD STRIP.AUTO-MCNC: 97 MG/DL (ref 70–110)
GLUCOSE SERPL-MCNC: 80 MG/DL (ref 74–99)
HCT VFR BLD AUTO: 29.8 % (ref 36–48)
HCT VFR BLD AUTO: 30.2 % (ref 36–48)
HCT VFR BLD AUTO: 34 % (ref 36–48)
HCT VFR BLD AUTO: 34.5 % (ref 36–48)
HGB BLD-MCNC: 10.8 G/DL (ref 13–16)
HGB BLD-MCNC: 11.1 G/DL (ref 13–16)
HGB BLD-MCNC: 9.8 G/DL (ref 13–16)
HGB BLD-MCNC: 9.9 G/DL (ref 13–16)
LYMPHOCYTES # BLD: 1.7 K/UL (ref 0.9–3.6)
LYMPHOCYTES NFR BLD: 19 % (ref 21–52)
MCH RBC QN AUTO: 29.4 PG (ref 24–34)
MCHC RBC AUTO-ENTMCNC: 32.9 G/DL (ref 31–37)
MCV RBC AUTO: 89.5 FL (ref 74–97)
MONOCYTES # BLD: 0.6 K/UL (ref 0.05–1.2)
MONOCYTES NFR BLD: 6 % (ref 3–10)
NEUTS SEG # BLD: 6.5 K/UL (ref 1.8–8)
NEUTS SEG NFR BLD: 73 % (ref 40–73)
PLATELET # BLD AUTO: 249 K/UL (ref 135–420)
PMV BLD AUTO: 10.7 FL (ref 9.2–11.8)
POTASSIUM SERPL-SCNC: 3.8 MMOL/L (ref 3.5–5.5)
RBC # BLD AUTO: 3.33 M/UL (ref 4.7–5.5)
SODIUM SERPL-SCNC: 138 MMOL/L (ref 136–145)
WBC # BLD AUTO: 8.9 K/UL (ref 4.6–13.2)

## 2018-06-06 PROCEDURE — 85018 HEMOGLOBIN: CPT | Performed by: INTERNAL MEDICINE

## 2018-06-06 PROCEDURE — 93005 ELECTROCARDIOGRAM TRACING: CPT

## 2018-06-06 PROCEDURE — C9113 INJ PANTOPRAZOLE SODIUM, VIA: HCPCS | Performed by: INTERNAL MEDICINE

## 2018-06-06 PROCEDURE — 74011250637 HC RX REV CODE- 250/637: Performed by: INTERNAL MEDICINE

## 2018-06-06 PROCEDURE — 74011636637 HC RX REV CODE- 636/637: Performed by: INTERNAL MEDICINE

## 2018-06-06 PROCEDURE — 82962 GLUCOSE BLOOD TEST: CPT

## 2018-06-06 PROCEDURE — 36415 COLL VENOUS BLD VENIPUNCTURE: CPT | Performed by: INTERNAL MEDICINE

## 2018-06-06 PROCEDURE — 80048 BASIC METABOLIC PNL TOTAL CA: CPT | Performed by: INTERNAL MEDICINE

## 2018-06-06 PROCEDURE — 85025 COMPLETE CBC W/AUTO DIFF WBC: CPT | Performed by: INTERNAL MEDICINE

## 2018-06-06 PROCEDURE — 65660000000 HC RM CCU STEPDOWN

## 2018-06-06 PROCEDURE — 74011250636 HC RX REV CODE- 250/636: Performed by: INTERNAL MEDICINE

## 2018-06-06 RX ORDER — PANTOPRAZOLE SODIUM 40 MG/10ML
40 INJECTION, POWDER, LYOPHILIZED, FOR SOLUTION INTRAVENOUS DAILY
Status: DISCONTINUED | OUTPATIENT
Start: 2018-06-06 | End: 2018-06-08 | Stop reason: HOSPADM

## 2018-06-06 RX ORDER — ASPIRIN 81 MG/1
81 TABLET ORAL DAILY
Status: DISCONTINUED | OUTPATIENT
Start: 2018-06-06 | End: 2018-06-08 | Stop reason: HOSPADM

## 2018-06-06 RX ORDER — DOCUSATE SODIUM 100 MG/1
100 CAPSULE, LIQUID FILLED ORAL
Status: DISCONTINUED | OUTPATIENT
Start: 2018-06-06 | End: 2018-06-08 | Stop reason: HOSPADM

## 2018-06-06 RX ORDER — SUCRALFATE 1 G/10ML
1 SUSPENSION ORAL
Status: DISCONTINUED | OUTPATIENT
Start: 2018-06-06 | End: 2018-06-07

## 2018-06-06 RX ORDER — DOXERCALCIFEROL 4 UG/2ML
1 INJECTION INTRAVENOUS
Status: DISCONTINUED | OUTPATIENT
Start: 2018-06-07 | End: 2018-06-08 | Stop reason: HOSPADM

## 2018-06-06 RX ORDER — DEXTROSE 50 % IN WATER (D50W) INTRAVENOUS SYRINGE
25-50 AS NEEDED
Status: DISCONTINUED | OUTPATIENT
Start: 2018-06-06 | End: 2018-06-08 | Stop reason: HOSPADM

## 2018-06-06 RX ORDER — NALOXONE HYDROCHLORIDE 0.4 MG/ML
0.4 INJECTION, SOLUTION INTRAMUSCULAR; INTRAVENOUS; SUBCUTANEOUS AS NEEDED
Status: DISCONTINUED | OUTPATIENT
Start: 2018-06-06 | End: 2018-06-08 | Stop reason: HOSPADM

## 2018-06-06 RX ORDER — NITROGLYCERIN 0.4 MG/1
0.4 TABLET SUBLINGUAL AS NEEDED
Status: DISCONTINUED | OUTPATIENT
Start: 2018-06-06 | End: 2018-06-08 | Stop reason: HOSPADM

## 2018-06-06 RX ORDER — INSULIN LISPRO 100 [IU]/ML
INJECTION, SOLUTION INTRAVENOUS; SUBCUTANEOUS
Status: DISCONTINUED | OUTPATIENT
Start: 2018-06-06 | End: 2018-06-08 | Stop reason: HOSPADM

## 2018-06-06 RX ORDER — MAGNESIUM SULFATE 100 %
4 CRYSTALS MISCELLANEOUS AS NEEDED
Status: DISCONTINUED | OUTPATIENT
Start: 2018-06-06 | End: 2018-06-08 | Stop reason: HOSPADM

## 2018-06-06 RX ORDER — INSULIN GLARGINE 100 [IU]/ML
10 INJECTION, SOLUTION SUBCUTANEOUS DAILY
Status: DISCONTINUED | OUTPATIENT
Start: 2018-06-06 | End: 2018-06-08 | Stop reason: HOSPADM

## 2018-06-06 RX ORDER — ONDANSETRON 2 MG/ML
4 INJECTION INTRAMUSCULAR; INTRAVENOUS
Status: DISCONTINUED | OUTPATIENT
Start: 2018-06-06 | End: 2018-06-08 | Stop reason: HOSPADM

## 2018-06-06 RX ORDER — OXYCODONE AND ACETAMINOPHEN 5; 325 MG/1; MG/1
1 TABLET ORAL
Status: DISCONTINUED | OUTPATIENT
Start: 2018-06-06 | End: 2018-06-08 | Stop reason: HOSPADM

## 2018-06-06 RX ORDER — ISOSORBIDE MONONITRATE 30 MG/1
30 TABLET, EXTENDED RELEASE ORAL DAILY
Status: DISCONTINUED | OUTPATIENT
Start: 2018-06-06 | End: 2018-06-08 | Stop reason: HOSPADM

## 2018-06-06 RX ORDER — SIMVASTATIN 40 MG/1
40 TABLET, FILM COATED ORAL
Status: DISCONTINUED | OUTPATIENT
Start: 2018-06-06 | End: 2018-06-08 | Stop reason: HOSPADM

## 2018-06-06 RX ORDER — ACETAMINOPHEN 325 MG/1
650 TABLET ORAL
Status: DISCONTINUED | OUTPATIENT
Start: 2018-06-06 | End: 2018-06-08 | Stop reason: HOSPADM

## 2018-06-06 RX ORDER — SODIUM CHLORIDE 9 MG/ML
75 INJECTION, SOLUTION INTRAVENOUS CONTINUOUS
Status: DISCONTINUED | OUTPATIENT
Start: 2018-06-06 | End: 2018-06-06

## 2018-06-06 RX ADMIN — SIMVASTATIN 40 MG: 40 TABLET, FILM COATED ORAL at 22:52

## 2018-06-06 RX ADMIN — PANTOPRAZOLE SODIUM 40 MG: 40 INJECTION, POWDER, FOR SOLUTION INTRAVENOUS at 08:39

## 2018-06-06 RX ADMIN — SODIUM CHLORIDE 75 ML/HR: 900 INJECTION, SOLUTION INTRAVENOUS at 11:53

## 2018-06-06 RX ADMIN — INSULIN GLARGINE 10 UNITS: 100 INJECTION, SOLUTION SUBCUTANEOUS at 23:03

## 2018-06-06 RX ADMIN — SUCRALFATE 1 G: 1 SUSPENSION ORAL at 17:08

## 2018-06-06 RX ADMIN — SODIUM CHLORIDE 75 ML/HR: 900 INJECTION, SOLUTION INTRAVENOUS at 01:24

## 2018-06-06 RX ADMIN — INSULIN LISPRO 4 UNITS: 100 INJECTION, SOLUTION INTRAVENOUS; SUBCUTANEOUS at 22:55

## 2018-06-06 RX ADMIN — ALUMINUM HYDROXIDE AND MAGNESIUM HYDROXIDE 15 ML: 200; 200 SUSPENSION ORAL at 19:00

## 2018-06-06 RX ADMIN — SUCRALFATE 1 G: 1 SUSPENSION ORAL at 22:52

## 2018-06-06 RX ADMIN — SUCRALFATE 1 G: 1 SUSPENSION ORAL at 11:52

## 2018-06-06 RX ADMIN — ISOSORBIDE MONONITRATE 30 MG: 30 TABLET, EXTENDED RELEASE ORAL at 08:38

## 2018-06-06 RX ADMIN — ASPIRIN 81 MG: 81 TABLET, COATED ORAL at 08:39

## 2018-06-06 RX ADMIN — SUCRALFATE 1 G: 1 SUSPENSION ORAL at 14:15

## 2018-06-06 NOTE — PROGRESS NOTES
SUBJECTIVE:    Sitting up in chair. Had some dark stool this morning. Has rectal discomfort. No dizziness. No chest or abdominal pain. No nausea or vomiting. OBJECTIVE:    /56 (BP 1 Location: Right arm, BP Patient Position: Sitting)  Pulse 66  Temp 98.3 °F (36.8 °C)  Resp 18  Wt 98.7 kg (217 lb 11.2 oz)  SpO2 97%  BMI 37.37 kg/m2    HEENT: No Pallor. Oral mucosa is moist  Neck: no JVD  CVS: RRR  RS: CTA bilaterally, no wheezes  GI: NT, BS +  Extremities: no pedal edema  General: NAD, Awake  CNS: moves all extremities    ASSESSMENT:    1. Rectal bleeding most likely due to radiation proctitis. 2. CAD h/o CABG 1997, stent to SVG in 2012 and 7/2016, SVG to diagonal 11/2017. Recent NSTEMI 1/2018  3. Chronic diastolic CHF, compensated. 4. End-stage renal disease on hemodialysis  5. Hypertension. 6. Hyperlipidemia. 7. DM 2    PLAN:    Cont current management  Hold plavix  GI to follow - input noted. Will order Xray barium enema as patient had recurrent admission in last 10 days due to similar issue.    Cardiology consulted  Advance diet  Possible discharge in 1-2 days if no more bleeding and stable H/H    CMP:   Lab Results   Component Value Date/Time     06/06/2018 03:30 AM    K 3.8 06/06/2018 03:30 AM    CL 99 (L) 06/06/2018 03:30 AM    CO2 28 06/06/2018 03:30 AM    AGAP 11 06/06/2018 03:30 AM    GLU 80 06/06/2018 03:30 AM    BUN 50 (H) 06/06/2018 03:30 AM    CREA 6.43 (H) 06/06/2018 03:30 AM    GFRAA 10 (L) 06/06/2018 03:30 AM    GFRNA 8 (L) 06/06/2018 03:30 AM    CA 8.8 06/06/2018 03:30 AM    ALB 3.3 (L) 06/05/2018 08:18 PM    TP 7.9 06/05/2018 08:18 PM    GLOB 4.6 (H) 06/05/2018 08:18 PM    AGRAT 0.7 (L) 06/05/2018 08:18 PM    SGOT 10 (L) 06/05/2018 08:18 PM    ALT 18 06/05/2018 08:18 PM     CBC:   Lab Results   Component Value Date/Time    WBC 8.9 06/06/2018 03:30 AM    HGB 11.1 (L) 06/06/2018 10:30 AM    HCT 34.5 (L) 06/06/2018 10:30 AM     06/06/2018 03:30 AM

## 2018-06-06 NOTE — ROUTINE PROCESS
TRANSFER - IN REPORT:    Telephone report received from [address] RN(name) on Leopoldo Weiss  being received from ED(unit) for routine progression of care      Report consisted of patients Situation, Background, Assessment and   Recommendations(SBAR). Information from the following report(s) SBAR, labs, VS and summary of care was reviewed with the receiving nurse. Opportunity for questions and clarification was provided. 0014 Assessment completed upon patients arrival to unit and care assumed. Patient oriented to unit, call light and urinal in reach. Patient encouraged to call for assistance OOB.

## 2018-06-06 NOTE — CONSULTS
[facility] - Consult Note    Date of  Admission: 6/5/2018  8:13 PM     Primary Care Physician:  Santiago Araujo MD    Patient seen and examined on 06/07/2017     Plan:     1. 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 Will follow along with GI.  2. CAD h/o CABG 1997, stent to SVG in 2012 and 7/2016, SVG to diagonal 11/2017. Recent NSTEMI 1/2018  3. Chronic diastolic CHF - NYHA class 3  4. End-stage renal disease on hemodialysis  5. Hypertension - controlled with current regimen  6. Hyperlipidemia - continue crestor  7. DM 2    Patient with rectal bleed secondary to radiation proctitis-- okay to hold plavix and continue aspirin for now. Plan discussed with patient. Assessment:     Hospital Problems  Date Reviewed: 5/16/2018          Codes Class Noted POA    * (Principal)Lower GI bleed ICD-10-CM: K92.2  ICD-9-CM: 578.9  5/28/2018 Unknown        ESRD (end stage renal disease) ([facility] [address] ICD-10-CM: N18.6  ICD-9-CM: 585.6  11/8/2017 Yes        Essential hypertension, benign (Chronic) ICD-10-CM: I10  ICD-9-CM: 401.1  Unknown Yes        S/P coronary artery stent placement ICD-10-CM: Z95.5  ICD-9-CM: V45.82  3/12/2013 Yes    Overview Signed 3/12/2013  3:37 PM by Yamini Nunes MD     vg to diag stent 10/2012                    History of Present Illness:     Mr. Rossy Neal is a 69 y/o male seen in consult for medication recommendations. He has PMH of CAD, CABG s/p stenting, HTN, chronic diastolic CHF - NYHA III, HLD, ESRD on HD, prostate cancer, GERD, DM 2, and sleep apnea. Mr. Rossy Neal was recently admitted (5/28-5/29) for lower GI bleed. Plavix was held, then resumed on D.C. Mr. Rossy Neal presented to ER with rectal bleeding. He denies chest pain, sob, orthopnea, edema, palpitations or dizziness. He was seen by GI with recommendation to stop plavix if able.         Past Medical History:     Past Medical History: Diagnosis Date    Atherosclerosis of renal artery (HCC)     S/P Rt stent    CAD (coronary artery disease) , 1997, 2012    ,double bypass, 2 stents, 2stents 7/2016    Cancer (HCC)     prostate    CHF (congestive heart failure) (HCC)     Chronic diastolic heart failure (HCC)     Chronic kidney disease     Chronic kidney disease (CKD), stage IV (severe) (HCC)     On Dialysis now -T-Th-sat    Constipation     Coronary atherosclerosis of unspecified type of vessel, native or graft     Stable angina after recent PCI continue treatment.  CRI (chronic renal insufficiency)     Diabetes ([facility] [address] 1973    type 2    Essential hypertension, benign     GERD (gastroesophageal reflux disease)     Gout     Hypertension 1982    Morbid obesity ([facility] [address]     Weight loss has been strongly encouraged by following dietary restrictions and an exercise routine    APRYL (obstructive sleep apnea) 6/26/2015    no cpap    Other and unspecified hyperlipidemia     HDL's are not at goal, LDL's are at goal, triglycerides are not at goal.    Other and unspecified hyperlipidemia     HDL's are not at goal, LDL's are at goal, triglycerides are not at goal.     Other ill-defined conditions(799.89) 1960    pneumonia twice    Sleep disturbance     Type II or unspecified type diabetes mellitus without mention of complication, not stated as uncontrolled          Social History:     Social History     Social History    Marital status:      Spouse name: N/A    Number of children: N/A    Years of education: N/A     Social History Main Topics    Smoking status: Never Smoker    Smokeless tobacco: Never Used    Alcohol use No    Drug use: No    Sexual activity: Not Asked     Other Topics Concern    None     Social History Narrative        Family History:     Family History   Problem Relation Age of Onset    Heart Attack Father 64        Medications:      Allergies   Allergen Reactions    Nka [No Known Allergies] Other (comments)        Current Facility-Administered Medications   Medication Dose Route Frequency    aspirin delayed-release tablet 81 mg  81 mg Oral DAILY    isosorbide mononitrate ER (IMDUR) tablet 30 mg  30 mg Oral DAILY    nitroglycerin (NITROSTAT) tablet 0.4 mg  0.4 mg SubLINGual PRN    simvastatin (ZOCOR) tablet 40 mg  40 mg Oral QHS    pantoprazole (PROTONIX) injection 40 mg  40 mg IntraVENous DAILY    acetaminophen (TYLENOL) tablet 650 mg  650 mg Oral Q6H PRN    oxyCODONE-acetaminophen (PERCOCET) 5-325 mg per tablet 1 Tab  1 Tab Oral Q6H PRN    naloxone (NARCAN) injection 0.4 mg  0.4 mg IntraVENous PRN    ondansetron (ZOFRAN) injection 4 mg  4 mg IntraVENous Q6H PRN    docusate sodium (COLACE) capsule 100 mg  100 mg Oral BID PRN    insulin glargine (LANTUS) injection 10 Units  10 Units SubCUTAneous DAILY    insulin lispro (HUMALOG) injection   SubCUTAneous AC&HS    glucose chewable tablet 16 g  4 Tab Oral PRN    glucagon (GLUCAGEN) injection 1 mg  1 mg IntraMUSCular PRN    dextrose (D50W) injection syrg 12.5-25 g  25-50 mL IntraVENous PRN    sucralfate (CARAFATE) 100 mg/mL oral suspension 1 g  1 g Oral 5XD    [START ON 6/7/2018] epoetin lee (EPOGEN;PROCRIT) injection 10,000 Units  10,000 Units IntraVENous DIALYSIS TUE, THU & SAT    [START ON 6/7/2018] doxercalciferol (HECTOROL) 4 mcg/2 mL injection 1 mcg  1 mcg IntraVENous DIALYSIS TUE, THU & SAT        Review Of Systems:         Constitutional: No fever, no chills, no weight loss, no night sweats   HEENT: No epistaxis, no nasal drainage, no difficulty in swallowing, no redness in eyes  Respiratory: Negative for cough, sputum, hemoptysis, pleurisy/chest pain, wheezing, dyspnea on exertion or emphysema  Cardiovascular: no chest pain, no chest pressure, no dyspnea, no pnd, no claudication no palpitations, no chronic leg edema, no syncope  Gastrointestinal: + bloody stools,  no abd pain, no vomiting, no diarrhea,   Genitourinary: No urinary symptoms or hematuria  Integument/breast: No ulcers or rashes  Musculoskeletal: no muscle pain, no weakness  Neurological: No focal weakness, no seizures, no headaches  Behvioral/Psych: No anxiety, no depression     Physical Exam:     Visit Vitals    /56 (BP 1 Location: Right arm, BP Patient Position: Sitting)    Pulse 66    Temp 98.3 °F (36.8 °C)    Resp 18    Wt 98.7 kg (217 lb 11.2 oz)    SpO2 97%    BMI 37.37 kg/m2     BP Readings from Last 3 Encounters:   06/06/18 125/56   05/29/18 133/64   05/16/18 122/54     Pulse Readings from Last 3 Encounters:   06/06/18 66   05/29/18 89   05/07/18 70     Wt Readings from Last 3 Encounters:   06/06/18 98.7 kg (217 lb 11.2 oz)   05/29/18 97.4 kg (214 lb 11.2 oz)   05/16/18 102.1 kg (225 lb)       General:  alert, cooperative, no distress, appears stated age  Skin: Warm and dry, acyanotic, normal color. Head: Normocephalic, atraumatic. Eyes: Sclerae anicteric, conjunctivae without injection. Neck:  nontender, no nuchal rigidity, no masses, no stridor, no carotid bruit, no JVD  Lungs:  clear to auscultation bilaterally, rhonchi , wheezes   Heart:  regular rate and rhythm, S1, S2 normal, systolic murmur 2/6 left lower sternal border at apex, no click, rub or gallop  Abdomen:  abdomen is soft without significant tenderness, masses, organomegaly or guarding  Extremities:  extremities normal, atraumatic, no cyanosis or edema  Neurological: grossly intact. No focal abnormalities, moves all extremities well. Psychiatric Affect: The patient is awake, alert and oriented x3. Mirza Lias is interactive and appropriate.    Data Review:     Recent Results (from the past 48 hour(s))   CBC WITH AUTOMATED DIFF    Collection Time: 06/05/18  8:18 PM   Result Value Ref Range    WBC 9.7 4.6 - 13.2 K/uL    RBC 3.63 (L) 4.70 - 5.50 M/uL    HGB 10.6 (L) 13.0 - 16.0 g/dL    HCT 32.4 (L) 36.0 - 48.0 %    MCV 89.3 74.0 - 97.0 FL    MCH 29.2 24.0 - 34.0 PG    MCHC 32.7 31.0 - 37.0 g/dL    RDW 13.1 11.6 - 14.5 %    PLATELET 195 637 - 018 K/uL    MPV 10.7 9.2 - 11.8 FL    NEUTROPHILS 76 (H) 40 - 73 %    LYMPHOCYTES 17 (L) 21 - 52 %    MONOCYTES 5 3 - 10 %    EOSINOPHILS 2 0 - 5 %    BASOPHILS 0 0 - 2 %    ABS. NEUTROPHILS 7.3 1.8 - 8.0 K/UL    ABS. LYMPHOCYTES 1.6 0.9 - 3.6 K/UL    ABS. MONOCYTES 0.5 0.05 - 1.2 K/UL    ABS. EOSINOPHILS 0.2 0.0 - 0.4 K/UL    ABS. BASOPHILS 0.0 0.0 - 0.1 K/UL    DF AUTOMATED     METABOLIC PANEL, COMPREHENSIVE    Collection Time: 06/05/18  8:18 PM   Result Value Ref Range    Sodium 135 (L) 136 - 145 mmol/L    Potassium 3.9 3.5 - 5.5 mmol/L    Chloride 96 (L) 100 - 108 mmol/L    CO2 32 21 - 32 mmol/L    Anion gap 7 3.0 - 18 mmol/L    Glucose 80 74 - 99 mg/dL    BUN 47 (H) 7.0 - 18 MG/DL    Creatinine 6.54 (H) 0.6 - 1.3 MG/DL    BUN/Creatinine ratio 7 (L) 12 - 20      GFR est AA 10 (L) >60 ml/min/1.73m2    GFR est non-AA 8 (L) >60 ml/min/1.73m2    Calcium 8.9 8.5 - 10.1 MG/DL    Bilirubin, total 0.7 0.2 - 1.0 MG/DL    ALT (SGPT) 18 16 - 61 U/L    AST (SGOT) 10 (L) 15 - 37 U/L    Alk.  phosphatase 93 45 - 117 U/L    Protein, total 7.9 6.4 - 8.2 g/dL    Albumin 3.3 (L) 3.4 - 5.0 g/dL    Globulin 4.6 (H) 2.0 - 4.0 g/dL    A-G Ratio 0.7 (L) 0.8 - 1.7     TYPE & SCREEN    Collection Time: 06/05/18  8:18 PM   Result Value Ref Range    Crossmatch Expiration 06/08/2018     ABO/Rh(D) Catha Pouch POSITIVE     Antibody screen NEG    HGB & HCT    Collection Time: 06/06/18 12:48 AM   Result Value Ref Range    HGB 9.9 (L) 13.0 - 16.0 g/dL    HCT 30.2 (L) 36.0 - 11.5 %   METABOLIC PANEL, BASIC    Collection Time: 06/06/18  3:30 AM   Result Value Ref Range    Sodium 138 136 - 145 mmol/L    Potassium 3.8 3.5 - 5.5 mmol/L    Chloride 99 (L) 100 - 108 mmol/L    CO2 28 21 - 32 mmol/L    Anion gap 11 3.0 - 18 mmol/L    Glucose 80 74 - 99 mg/dL    BUN 50 (H) 7.0 - 18 MG/DL    Creatinine 6.43 (H) 0.6 - 1.3 MG/DL    BUN/Creatinine ratio 8 (L) 12 - 20      GFR est AA 10 (L) >60 ml/min/1.73m2    GFR est non-AA 8 (L) >60 ml/min/1.73m2    Calcium 8.8 8.5 - 10.1 MG/DL   CBC WITH AUTOMATED DIFF    Collection Time: 06/06/18  3:30 AM   Result Value Ref Range    WBC 8.9 4.6 - 13.2 K/uL    RBC 3.33 (L) 4.70 - 5.50 M/uL    HGB 9.8 (L) 13.0 - 16.0 g/dL    HCT 29.8 (L) 36.0 - 48.0 %    MCV 89.5 74.0 - 97.0 FL    MCH 29.4 24.0 - 34.0 PG    MCHC 32.9 31.0 - 37.0 g/dL    RDW 13.2 11.6 - 14.5 %    PLATELET 287 982 - 978 K/uL    MPV 10.7 9.2 - 11.8 FL    NEUTROPHILS 73 40 - 73 %    LYMPHOCYTES 19 (L) 21 - 52 %    MONOCYTES 6 3 - 10 %    EOSINOPHILS 2 0 - 5 %    BASOPHILS 0 0 - 2 %    ABS. NEUTROPHILS 6.5 1.8 - 8.0 K/UL    ABS. LYMPHOCYTES 1.7 0.9 - 3.6 K/UL    ABS. MONOCYTES 0.6 0.05 - 1.2 K/UL    ABS. EOSINOPHILS 0.2 0.0 - 0.4 K/UL    ABS. BASOPHILS 0.0 0.0 - 0.1 K/UL    DF AUTOMATED     GLUCOSE, POC    Collection Time: 06/06/18  7:50 AM   Result Value Ref Range    Glucose (POC) 97 70 - 110 mg/dL   HGB & HCT    Collection Time: 06/06/18 10:30 AM   Result Value Ref Range    HGB 11.1 (L) 13.0 - 16.0 g/dL    HCT 34.5 (L) 36.0 - 48.0 %   GLUCOSE, POC    Collection Time: 06/06/18 11:09 AM   Result Value Ref Range    Glucose (POC) 91 70 - 110 mg/dL         Intake/Output Summary (Last 24 hours) at 06/06/18 1443  Last data filed at 06/06/18 0152   Gross per 24 hour   Intake                0 ml   Output                0 ml   Net                0 ml       Cardiographics:     ECG: ordered    Echocardiogram:  10/2017  SUMMARY:  Left ventricle: Systolic function was normal by visual assessment. Ejection   fraction was estimated in the range of 60 %  to 65 %. No obvious wall motion abnormalities identified in the views   obtained. Wall thickness was moderately to  markedly increased. Aortic valve: There was very mild stenosis. Valve peak gradient was 21 mmHg. Valve mean gradient was 12 mmHg. Estimated  aortic valve area (by VTI) was 1.6 cm-sq.     Signed By: Arnulfo Hebert NP supervised    June 6, 2018      I have independently evaluated and examined the patient. All relevant labs and testing data's are reviewed. Care plan discussed and updated after review.     Tk Yeung MD

## 2018-06-06 NOTE — ED PROVIDER NOTES
EMERGENCY DEPARTMENT HISTORY AND PHYSICAL EXAM    8:35 PM      Date: 6/5/2018  Patient Name: Gena Gillis    History of Presenting Illness     Chief Complaint   Patient presents with    Rectal Bleeding         History Provided By: Patient    Chief Complaint: Rectal Bleeding   Duration: 1 Days  Timing:  Constant and Worsening  Location: Rectum   Quality: Burning  Severity: Mild  Modifying Factors: None   Associated Symptoms: rectal pain      Additional History (Context): Gena Gillis is a 68 y.o. male with hx HTN, DM, HLD, chronic renal insufficiency, chronic diastolic heart failure, GERD, prostate cancer and CAD presenting to the ED with c/o constant, worsening rectal bleeding that began 1 day ago. Pt was admitted a week ago for same issue, however he states the bleeding is much worse than before. Notes he is passing large volumes of bright red blood and some clots. He states he received radiation to treatment his prostate cancer and was cautioned that it may cause damage his veins. Reports \"Dr. Mary Ko burned some veins to try to fix the problem\". Denies any CP, SOB, fever, chills, abdominal pain, nausea, vomiting, diarrhea or urinary sx. Associated sx include rectal pain. States he feels a burning sensation in his rectum. Severity is mild. No other sx or complaints given at this time. PCP: Bia Hayes MD    Current Outpatient Prescriptions   Medication Sig Dispense Refill    lidocaine (XYLOCAINE) 5 % ointment Apply to marilee-anal area and rectum daily as needed for pain 1 Tube 0    hydrocortisone (ANUSOL-HC) 2.5 % rectal cream Insert  into rectum four (4) times daily. 30 g 0    loperamide (IMODIUM) 2 mg capsule Take 1 Cap by mouth every four (4) hours as needed for Diarrhea for up to 10 days. 12 Cap 0    simvastatin (ZOCOR) 40 mg tablet TAKE 1 TABLET BY MOUTH NIGHTLY. 90 Tab 2    losartan (COZAAR) 25 mg tablet Take  by mouth daily.  aspirin delayed-release 81 mg tablet Take  by mouth daily.  fenofibrate (LOFIBRA) 54 mg tablet TAKE ONE TABLET BY MOUTH ONE TIME DAILY  30 Tab 4    tamsulosin (FLOMAX) 0.4 mg capsule TAKE ONE CAPSULE BY MOUTH ONE TIME DAILY 90 Cap 3    isosorbide mononitrate ER (IMDUR) 30 mg tablet Take 1 Tab by mouth daily. 30 Tab 0    b complex-vitamin c-folic acid (NEPHROCAPS) 1 mg capsule Take 1 Cap by mouth daily. 30 Cap 0    senna-docusate (PERICOLACE) 8.6-50 mg per tablet Take 1 Tab by mouth daily. 30 Tab 0    metoprolol tartrate (LOPRESSOR) 100 mg IR tablet TAKE 1 TABLET BY MOUTH TWO TIMES A DAY. 180 Tab 2    Cholecalciferol, Vitamin D3, (DECARA) 50,000 unit cap Take  by mouth every seven (7) days.  insulin glargine (LANTUS) 100 unit/mL injection 20 Units by SubCUTAneous route every twelve (12) hours. (Patient taking differently: 35 Units by SubCUTAneous route every twelve (12) hours. ) 1 Vial 2    nitroglycerin (NITROSTAT) 0.4 mg SL tablet 1 Tab by SubLINGual route as needed for Chest Pain. 25 Tab 1    insulin aspart (NOVOLOG) 100 unit/mL injection 20 units sq 3 times a day,sliding scale (Patient taking differently: sliding scale with meals) 10 mL 0    allopurinol (ZYLOPRIM) 100 mg tablet Take 100 mg by mouth. Monday, [address]         Past History     Past Medical History:  Past Medical History:   Diagnosis Date    Atherosclerosis of renal artery (HCC)     S/P Rt stent    CAD (coronary artery disease) , 1997, 2012    ,double bypass, 2 stents, 2stents 7/2016    Cancer (HCC)     prostate    CHF (congestive heart failure) (HCC)     Chronic diastolic heart failure (HCC)     Chronic kidney disease     Chronic kidney disease (CKD), stage IV (severe) (HCC)     On Dialysis now -T-Th-sat    Constipation     Coronary atherosclerosis of unspecified type of vessel, native or graft     Stable angina after recent PCI continue treatment.        CRI (chronic renal insufficiency)     Diabetes ([facility] [address] 1973    type 2    Essential hypertension, benign     GERD (gastroesophageal reflux disease)     Gout     Hypertension 1982    Morbid obesity ([facility] [address]     Weight loss has been strongly encouraged by following dietary restrictions and an exercise routine    APRYL (obstructive sleep apnea) 6/26/2015    no cpap    Other and unspecified hyperlipidemia     HDL's are not at goal, LDL's are at goal, triglycerides are not at goal.    Other and unspecified hyperlipidemia     HDL's are not at goal, LDL's are at goal, triglycerides are not at goal.     Other ill-defined conditions(799.89) 1960    pneumonia twice    Sleep disturbance     Type II or unspecified type diabetes mellitus without mention of complication, not stated as uncontrolled        Past Surgical History:  Past Surgical History:   Procedure Laterality Date   [address]    lt. carotid endart. [employer] CARDIAC SURG PROCEDURE UNLIST  2012, 2016, Nov 2017    Coronary Stent    COLONOSCOPY N/A 6/23/2017    COLONOSCOPY w/ APC w/ polypectomies performed by Abhishek Chatman MD at [address] [place] [address] N/A 1/19/2018    FLEXIBLE SIGMOIDOSCOPY WITH Radio frequency ablation performed by Abhihsek Chatman MD at [address] HX CHOLECYSTECTOMY      HX CORONARY ARTERY BYPASS GRAFT  2000    x2    HX HEENT  1997    cholecystectomy    HX UROLOGICAL  1998    renal art. surg    HX VASCULAR ACCESS      Perma cath for HD- right chest.       Family History:  Family History   Problem Relation Age of Onset    Heart Attack Father 64       Social History:  Social History   Substance Use Topics    Smoking status: Never Smoker    Smokeless tobacco: Never Used    Alcohol use No       Allergies: Allergies   Allergen Reactions    Nka [No Known Allergies] Other (comments)         Review of Systems       Review of Systems   Constitutional: Negative for activity change and appetite change. HENT: Negative for congestion. Eyes: Negative for visual disturbance.    Respiratory: Negative for cough and shortness of breath. Cardiovascular: Negative for chest pain. Gastrointestinal: Positive for anal bleeding. Negative for abdominal pain, diarrhea, nausea and vomiting. Genitourinary: Negative for dysuria. Musculoskeletal: Negative for arthralgias and myalgias. Skin: Negative for rash. Neurological: Negative for weakness and numbness. Physical Exam     Visit Vitals    /55    Pulse 69    Temp 99 °F (37.2 °C)    Resp 19    Wt 99.8 kg (220 lb 0.3 oz)    SpO2 99%    BMI 37.77 kg/m2         Physical Exam   Constitutional: He is oriented to person, place, and time. He appears well-developed and well-nourished. HENT:   Head: Normocephalic and atraumatic. Mouth/Throat: Oropharynx is clear and moist.   Eyes: Conjunctivae are normal.   Neck: Normal range of motion. Neck supple. No JVD present. Cardiovascular: Normal rate, regular rhythm, normal heart sounds and intact distal pulses. No murmur heard. Pulmonary/Chest: Effort normal and breath sounds normal.   Abdominal: Soft. Bowel sounds are normal. He exhibits no distension. There is no tenderness. Genitourinary:   Genitourinary Comments: Positive hemoccult   Musculoskeletal: Normal range of motion. He exhibits no deformity. Lymphadenopathy:     He has no cervical adenopathy. Neurological: He is alert and oriented to person, place, and time. Coordination normal.   Skin: Skin is warm and dry. No rash noted. Psychiatric: He has a normal mood and affect. Nursing note and vitals reviewed.         Diagnostic Study Results     Labs -  Recent Results (from the past 12 hour(s))   CBC WITH AUTOMATED DIFF    Collection Time: 06/05/18  8:18 PM   Result Value Ref Range    WBC 9.7 4.6 - 13.2 K/uL    RBC 3.63 (L) 4.70 - 5.50 M/uL    HGB 10.6 (L) 13.0 - 16.0 g/dL    HCT 32.4 (L) 36.0 - 48.0 %    MCV 89.3 74.0 - 97.0 FL    MCH 29.2 24.0 - 34.0 PG    MCHC 32.7 31.0 - 37.0 g/dL    RDW 13.1 11.6 - 14.5 %    PLATELET 557 794 - 267 K/uL    MPV 10.7 9.2 - 11.8 FL    NEUTROPHILS 76 (H) 40 - 73 %    LYMPHOCYTES 17 (L) 21 - 52 %    MONOCYTES 5 3 - 10 %    EOSINOPHILS 2 0 - 5 %    BASOPHILS 0 0 - 2 %    ABS. NEUTROPHILS 7.3 1.8 - 8.0 K/UL    ABS. LYMPHOCYTES 1.6 0.9 - 3.6 K/UL    ABS. MONOCYTES 0.5 0.05 - 1.2 K/UL    ABS. EOSINOPHILS 0.2 0.0 - 0.4 K/UL    ABS. BASOPHILS 0.0 0.0 - 0.1 K/UL    DF AUTOMATED     METABOLIC PANEL, COMPREHENSIVE    Collection Time: 06/05/18  8:18 PM   Result Value Ref Range    Sodium 135 (L) 136 - 145 mmol/L    Potassium 3.9 3.5 - 5.5 mmol/L    Chloride 96 (L) 100 - 108 mmol/L    CO2 32 21 - 32 mmol/L    Anion gap 7 3.0 - 18 mmol/L    Glucose 80 74 - 99 mg/dL    BUN 47 (H) 7.0 - 18 MG/DL    Creatinine 6.54 (H) 0.6 - 1.3 MG/DL    BUN/Creatinine ratio 7 (L) 12 - 20      GFR est AA 10 (L) >60 ml/min/1.73m2    GFR est non-AA 8 (L) >60 ml/min/1.73m2    Calcium 8.9 8.5 - 10.1 MG/DL    Bilirubin, total 0.7 0.2 - 1.0 MG/DL    ALT (SGPT) 18 16 - 61 U/L    AST (SGOT) 10 (L) 15 - 37 U/L    Alk. phosphatase 93 45 - 117 U/L    Protein, total 7.9 6.4 - 8.2 g/dL    Albumin 3.3 (L) 3.4 - 5.0 g/dL    Globulin 4.6 (H) 2.0 - 4.0 g/dL    A-G Ratio 0.7 (L) 0.8 - 1.7     TYPE & SCREEN    Collection Time: 06/05/18  8:18 PM   Result Value Ref Range    Crossmatch Expiration 06/08/2018     ABO/Rh(D) O POSITIVE     Antibody screen NEG        Radiologic Studies -   No orders to display         Medical Decision Making   I am the first provider for this patient. I reviewed the vital signs, available nursing notes, past medical history, past surgical history, family history and social history. Zoie Garcia is a 68 y.o. male with hx HTN, DM, HLD, chronic renal insufficiency, chronic diastolic heart failure, GERD, prostate cancer and CAD presenting to the ED with c/o constant, worsening rectal bleeding that began 1 day ago. Pt was admitted a week ago for same issue, however he states the bleeding is much worse than before.  Notes he is passing large volumes of bright red blood and some clots. Bright red blood on rectal exam.    Differential Diagnosis: lower GI bleed, will evaluate level of anemia. Testing: cbc, cmp, type and screen  Treatments: pending eval      Vital Signs-Reviewed the patient's vital signs. Pulse Oximetry Analysis -  98% on room air, stable     Cardiac Monitor:  Rate: 68  Rhythm:  Normal Sinus Rhythm    Records Reviewed: Nursing Notes and Old Medical Records (Time of Review: 8:35 PM)    ED Course: Progress Notes, Reevaluation, and Consults:    10:18 PM Consult: I discussed care with Dr. Summer Land (GI). It was a standard discussion including patient history, chief complaint, available diagnostic results, and predicted treatment course. Agrees with treatment plan and has no additional recommendations. 10:46: PM consult: discussed with Dr. Jonathan Womack for admission for lower GI bleed. Agrees with treatment plan. Procedures:     Core Measures:     Critical Care Time:     For Hospitalized Patients:    1. Hospitalization Decision Time:  The decision to hospitalize the patient was made by Dr. Omid Doan at 10:40 PM on 6/5/2018      Diagnosis     Clinical Impression:   1. Lower GI bleed        Disposition: Admit    Follow-up Information     None           Patient's Medications   Start Taking    No medications on file   Continue Taking    ALLOPURINOL (ZYLOPRIM) 100 MG TABLET    Take 100 mg by mouth. Monday, Wednesday,friday    ASPIRIN DELAYED-RELEASE 81 MG TABLET    Take  by mouth daily. B COMPLEX-VITAMIN C-FOLIC ACID (NEPHROCAPS) 1 MG CAPSULE    Take 1 Cap by mouth daily. CHOLECALCIFEROL, VITAMIN D3, (DECARA) 50,000 UNIT CAP    Take  by mouth every seven (7) days. FENOFIBRATE (LOFIBRA) 54 MG TABLET    TAKE ONE TABLET BY MOUTH ONE TIME DAILY     HYDROCORTISONE (ANUSOL-HC) 2.5 % RECTAL CREAM    Insert  into rectum four (4) times daily.     INSULIN ASPART (NOVOLOG) 100 UNIT/ML INJECTION    20 units sq 3 times a day,sliding scale    INSULIN GLARGINE (LANTUS) 100 UNIT/ML INJECTION    20 Units by SubCUTAneous route every twelve (12) hours. ISOSORBIDE MONONITRATE ER (IMDUR) 30 MG TABLET    Take 1 Tab by mouth daily. LIDOCAINE (XYLOCAINE) 5 % OINTMENT    Apply to marilee-anal area and rectum daily as needed for pain    LOPERAMIDE (IMODIUM) 2 MG CAPSULE    Take 1 Cap by mouth every four (4) hours as needed for Diarrhea for up to 10 days. LOSARTAN (COZAAR) 25 MG TABLET    Take  by mouth daily. METOPROLOL TARTRATE (LOPRESSOR) 100 MG IR TABLET    TAKE 1 TABLET BY MOUTH TWO TIMES A DAY. NITROGLYCERIN (NITROSTAT) 0.4 MG SL TABLET    1 Tab by SubLINGual route as needed for Chest Pain. SENNA-DOCUSATE (PERICOLACE) 8.6-50 MG PER TABLET    Take 1 Tab by mouth daily. SIMVASTATIN (ZOCOR) 40 MG TABLET    TAKE 1 TABLET BY MOUTH NIGHTLY. TAMSULOSIN (FLOMAX) 0.4 MG CAPSULE    TAKE ONE CAPSULE BY MOUTH ONE TIME DAILY   These Medications have changed    No medications on file   Stop Taking    No medications on file     _______________________________    Attestations:  Scribe Attestation     aJy Thomson acting as a scribe for and in the presence of Fortunato Erickson MD      June 05, 2018 at 8:35 PM       Provider Attestation:      I personally performed the services described in the documentation, reviewed the documentation, as recorded by the scribe in my presence, and it accurately and completely records my words and actions.  June 05, 2018 at 8:35 PM - Fortunato Erickson MD    _______________________________

## 2018-06-06 NOTE — PROGRESS NOTES
Pt alert and oriented x 4. No distress noted. Denies pain. Pt reports having 2 dark bloody stool during shift. Pt diet advanced to consistent carb. Pt tolerated well. Stand by assist needed when out of bed. Maalox administered. IV Saline locked. Call bell within reach. Fall precautions maintained. Will continue to monitor. Bedside shift change report given to Leanna Stanton RN (oncoming nurse) by Lee Moore RN (offgoing nurse). Report included the following information SBAR, Kardex, Intake/Output, MAR, Recent Results, Med Rec Status and Cardiac Rhythm NSR.

## 2018-06-06 NOTE — ED NOTES
Hemmocult completed by Dr. Barbi Lemus Nurse at the bedside. Test positive for blood. Patient tolerated well. Patient provided warm blanket after.  Family present at the bedside

## 2018-06-06 NOTE — PROGRESS NOTES
Pt complaining of burning in chest after eating dinner. Pt states its heart burn. Dr. Ana Jaffe notified and gave telephone order for one time dose of maalox 15cc PO.

## 2018-06-06 NOTE — ED NOTES
TRANSFER - OUT REPORT:    Verbal report given to [address] [place], [place] [address]e [facility]. RN(name) on Autumn Gilmore  being transferred to [facility](unit) for routine progression of care       Report consisted of patients Situation, Background, Assessment and   Recommendations(SBAR). Information from the following report(s) Kardex, ED Summary and MAR was reviewed with the receiving nurse. Lines:   Peripheral IV 06/05/18 Right Antecubital (Active)   Site Assessment Clean, dry, & intact 6/5/2018 10:24 PM   Hub Color/Line Status Flushed;Pink 6/5/2018 10:24 PM   Action Taken Blood drawn 6/5/2018 10:24 PM        Opportunity for questions and clarification was provided.       Patient transported with:   Registered Nurse

## 2018-06-06 NOTE — ROUTINE PROCESS
Dr Oreilly  in, examined patient, CT of abd canceled. Patient assisted to BR, noted bright red blood, no clots.

## 2018-06-06 NOTE — HOME CARE
[facility] ,explained to pt about [facility] services offered and left pt a brochure on [facility], notified  [facility]) that this \"[facility]\" pt was rounded upon this morning. STACI FREEMAN.

## 2018-06-06 NOTE — CONSULTS
[email or web address]  [phone]    GASTROENTEROLOGY CONSULT      Impression:   1. 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 He may need to find alternative to Plavix as this could be likely contributor to his recurrent bleeding. 2. Rectal pain, possible fissure  3. Frequent stools      Plan:     1. Will try sucralfate enemas  2. Continue lidocaine ointment as needed for marilee-anal and rectal pain  3. Flex sig with APC as OP     Stable for DC if symptoms under control, if persistent or worsening may need flex sig as inpatient. Chief Complaint: Rectal pain, bleeding      HPI:  Jaye Duverney is a 68 y.o. male who I am being asked to see in consultation for an opinion regarding the above. He presented to the ER yesterday with recurrent rectal bleeding and pain with BMs. He states this is recurrent from previous admission 5/29/2018, has had recent colo 2017 with APC and flex sig 1/2018 with same. He states bleeding has improved this morning. PMH:   Past Medical History:   Diagnosis Date    Atherosclerosis of renal artery (HCC)     S/P Rt stent    CAD (coronary artery disease) , 1997, 2012    ,double bypass, 2 stents, 2stents 7/2016    Cancer (HCC)     prostate    CHF (congestive heart failure) (HCC)     Chronic diastolic heart failure (HCC)     Chronic kidney disease     Chronic kidney disease (CKD), stage IV (severe) (HCC)     On Dialysis now -T-Th-sat    Constipation     Coronary atherosclerosis of unspecified type of vessel, native or graft     Stable angina after recent PCI continue treatment.        CRI (chronic renal insufficiency)     Diabetes ([facility] [address] 1973    type 2    Essential hypertension, benign     GERD (gastroesophageal reflux disease)     Gout     Hypertension 1982    Morbid obesity ([address]     Weight loss has been strongly encouraged by following dietary restrictions and an exercise routine    APRYL (obstructive sleep apnea) 6/26/2015    no cpap    Other and unspecified hyperlipidemia     HDL's are not at goal, LDL's are at goal, triglycerides are not at goal.    Other and unspecified hyperlipidemia     HDL's are not at goal, LDL's are at goal, triglycerides are not at goal.     Other ill-defined conditions(799.89) 1960    pneumonia twice    Sleep disturbance     Type II or unspecified type diabetes mellitus without mention of complication, not stated as uncontrolled        PSH:   Past Surgical History:   Procedure Laterality Date   [address]    lt. carotid endart. Tivis Abelson CARDIAC SURG PROCEDURE UNLIST  2012, 2016, Nov 2017    Coronary Stent    COLONOSCOPY N/A 6/23/2017    [facility]    FLEXIBLE SIGMOIDOSCOPY WITH Radio frequency ablation performed by Per Rodriguez MD at [address] [facility] HX CHOLECYSTECTOMY      HX CORONARY ARTERY BYPASS GRAFT  2000    x2    HX HEENT  1997    cholecystectomy    HX UROLOGICAL  1998    renal art. surg    HX VASCULAR ACCESS      Perma cath for HD- right chest.       Social HX:   Social History     Social History    Marital status:      Spouse name: N/A    Number of children: N/A    Years of education: N/A     Occupational History    Not on file.      Social History Main Topics    Smoking status: Never Smoker    Smokeless tobacco: Never Used    Alcohol use No    Drug use: No    Sexual activity: Not on file     Other Topics Concern    Not on file     Social History Narrative       FHX:   Family History   Problem Relation Age of Onset    Heart Attack Father 64       Allergy:   Allergies   Allergen Reactions    Nka [No Known Allergies] Other (comments)       Patient Active Problem List   Diagnosis Code    NSTEMI (non-ST elevated myocardial infarction) ([facility] [address] I21.4    Coronary atherosclerosis of native coronary artery I25.10    CKD (chronic kidney disease) stage 4, GFR 15-29 ml/min ([facility]) N18.4    HTN (hypertension) I10    DM2 (diabetes mellitus, type 2) ([facility]) E11.9    Dyslipidemia E78.5    Contrast dye induced nephropathy N14.1, T50.8X5A    Chest pain, unspecified R07.9    S/P coronary artery stent placement Z95.5    Postsurgical aortocoronary bypass status Z95.1    Atherosclerosis of renal artery ([facility]) I70.1    Chronic diastolic heart failure ([facility]) I50.32    Morbid obesity ([facility]) E66.01    Essential hypertension, benign I10    Sleep disturbance G47.9    Coronary atherosclerosis I25.10    Mixed hyperlipidemia E78.2    Type II or unspecified type diabetes mellitus without mention of complication, not stated as uncontrolled E11.9    Abdominal pain R10.9    GERD (gastroesophageal reflux disease) K21.9    Constipation K59.00    Chronic renal insufficiency N18.9    Prostate cancer ([facility] C61    APRYL (obstructive sleep apnea) K73.95    Diastolic CHF, acute on chronic ([facility]) I50.33    CHF (congestive heart failure), NYHA class IV ([facility]) I50.9    Fatigue R53.83    Secondary hyperparathyroidism of renal origin ([facility] N25.81    Chest pain R07.9    Hyperuricemia E79.0    Chronic kidney disease, stage V ([facility]) N18.5    ESRD (end stage renal disease) ([facility]) N18.6    Hypotension I95.9    Fluid overload E87.70    Hyperkalemia E87.5    Type 2 diabetes mellitus with nephropathy ([facility]) E11.21    Elevated troponin R74.8    Gastrointestinal hemorrhage associated with anorectal source K62.5    Lower GI bleed K92.2    GI bleed K92.2       Home Medications:     Prescriptions Prior to Admission   Medication Sig    lidocaine (XYLOCAINE) 5 % ointment Apply to marilee-anal area and rectum daily as needed for pain    hydrocortisone (ANUSOL-HC) 2.5 % rectal cream Insert  into rectum four (4) times daily.  loperamide (IMODIUM) 2 mg capsule Take 1 Cap by mouth every four (4) hours as needed for Diarrhea for up to 10 days.     simvastatin (ZOCOR) 40 mg tablet TAKE 1 TABLET BY MOUTH NIGHTLY.  losartan (COZAAR) 25 mg tablet Take  by mouth daily.  aspirin delayed-release 81 mg tablet Take  by mouth daily.  fenofibrate (LOFIBRA) 54 mg tablet TAKE ONE TABLET BY MOUTH ONE TIME DAILY     tamsulosin (FLOMAX) 0.4 mg capsule TAKE ONE CAPSULE BY MOUTH ONE TIME DAILY    isosorbide mononitrate ER (IMDUR) 30 mg tablet Take 1 Tab by mouth daily.  b complex-vitamin c-folic acid (NEPHROCAPS) 1 mg capsule Take 1 Cap by mouth daily.  senna-docusate (PERICOLACE) 8.6-50 mg per tablet Take 1 Tab by mouth daily.  metoprolol tartrate (LOPRESSOR) 100 mg IR tablet TAKE 1 TABLET BY MOUTH TWO TIMES A DAY.  Cholecalciferol, Vitamin D3, (DECARA) 50,000 unit cap Take  by mouth every seven (7) days.  insulin glargine (LANTUS) 100 unit/mL injection 20 Units by SubCUTAneous route every twelve (12) hours. (Patient taking differently: 35 Units by SubCUTAneous route every twelve (12) hours.)    nitroglycerin (NITROSTAT) 0.4 mg SL tablet 1 Tab by SubLINGual route as needed for Chest Pain.  insulin aspart (NOVOLOG) 100 unit/mL injection 20 units sq 3 times a day,sliding scale (Patient taking differently: sliding scale with meals)    allopurinol (ZYLOPRIM) 100 mg tablet Take 100 mg by mouth. Monday, Wednesday,friday       Review of Systems:     Constitutional: No fevers, chills, weight loss, fatigue. Skin: No rashes, pruritis, jaundice, ulcerations, erythema. HENT: No headaches, nosebleeds, sinus pressure, rhinorrhea, sore throat. Eyes: No visual changes, blurred vision, eye pain, photophobia, jaundice. Cardiovascular: No chest pain, heart palpitations. Respiratory: No cough, SOB, wheezing, chest discomfort, orthopnea. Gastrointestinal:    Genitourinary: No dysuria, bleeding, discharge, pyuria. Musculoskeletal: No weakness, arthralgias, wasting. Endo: No sweats. Heme: No bruising, easy bleeding. Allergies: As noted. Neurological: Cranial nerves intact. Alert and oriented.  Gait not assessed. Psychiatric:  No anxiety, depression, hallucinations. Visit Vitals    /71 (BP 1 Location: Right arm, BP Patient Position: Sitting)    Pulse 69    Temp 97.9 °F (36.6 °C)    Resp 18    Wt 98.7 kg (217 lb 11.2 oz)    SpO2 97%    BMI 37.37 kg/m2       Physical Assessment:     constitutional: appearance: well developed, well nourished, normal habitus, no deformities, in no acute distress. skin: inspection: no rashes, ulcers, icterus or other lesions; no clubbing or telangiectasias. palpation: no induration or subcutaneos nodules. eyes: inspection: normal conjunctivae and lids; no jaundice pupils: normal  ENMT: mouth: normal oral mucosa,lips and gums; good dentition. oropharynx: normal tongue, hard and soft palate; posterior pharynx without erithema, exudate or lesions. neck: thyroid: normal size, consistency and position; no masses or tenderness. respiratory: effort: normal chest excursion; no intercostal retraction or accessory muscle use. cardiovascular: abdominal aorta: normal size and position; no bruits. palpation: PMI of normal size and position; normal rhythm; no thrill or murmurs. abdominal: abdomen: normal consistency; no tenderness or masses. hernias: no hernias appreciated. liver: normal size and consistency. spleen: not palpable. rectal: hemoccult/guaiac: not performed. musculoskeletal: digits and nails: no clubbing, cyanosis, petechiae or other inflammatory conditions. gait: normal gait and station head and neck: normal range of motion; no pain, crepitation or contracture. spine/ribs/pelvis: normal range of motion; no pain, deformity or contracture. neurologic: cranial nerves: II-XII normal.   psychiatric: judgement/insight: within normal limits. memory: within normal limits for recent and remote events. mood and affect: no evidence of depression, anxiety or agitation. orientation: oriented to time, space and person.         Basic Metabolic Profile Recent Labs      06/06/18   0330   NA  138   K  3.8   CL  99*   CO2  28   BUN  50*   GLU  80   CA  8.8         CBC w/Diff    Recent Labs      06/06/18   0330   WBC  8.9   RBC  3.33*   HGB  9.8*   HCT  29.8*   MCV  89.5   MCH  29.4   MCHC  32.9   RDW  13.2   PLT  249    Recent Labs      06/06/18   0330   GRANS  73   LYMPH  19*   EOS  2        Hepatic Function   Recent Labs      06/05/18 2018   ALB  3.3*   TP  7.9   TBILI  0.7   SGOT  10*   AP  93        Coags   No results for input(s): PTP, INR, APTT in the last 72 hours. No lab exists for component: INREXT        LESLY Muller. [facility] 1947, [address] [facility]  Cell: [phone]  [email or web address]

## 2018-06-06 NOTE — H&P
History & Physical    Patient: Steven Hamilton MRN: [id number]  CSN: [id number]    YOB: 1940  Age: 68 y.o. Sex: male      DOA: 6/5/2018    Chief Complaint:   Chief Complaint   Patient presents with    Rectal Bleeding          HPI:     Steven Hamilton is a 68 y.o.  male who has PMH of SANDIP s/p multiple stents , last 11/2017, Hx of Upper and Lower GI bleed. Recently discharged from the hospital after LGI bleed episode. No intervention was done at that time as Pt stopped bleeding on own. Pt had his Plavix stopped and was restarted on discharge. Pt presents tonight with multiple episodes of LGI bleed of BRBPR with small clots. Denies abdominal pain but had 2 more episodes in ER and his room. Hx of ESRD on HD and received his dialysis day iof admission. Denies CP/SOB/fever/abdominal pain and urinary Sx but continues to c/o bloody bowel MVTs       Past Medical History:   Diagnosis Date    Atherosclerosis of renal artery (HCC)     S/P Rt stent    CAD (coronary artery disease) , 1997, 2012    ,double bypass, 2 stents, 2stents 7/2016    Cancer (HCC)     prostate    CHF (congestive heart failure) (HCC)     Chronic diastolic heart failure (HCC)     Chronic kidney disease     Chronic kidney disease (CKD), stage IV (severe) (HCC)     On Dialysis now -T-Th-sat    Constipation     Coronary atherosclerosis of unspecified type of vessel, native or graft     Stable angina after recent PCI continue treatment.        CRI (chronic renal insufficiency)     Diabetes ([facility] [address] 1973    type 2    Essential hypertension, benign     GERD (gastroesophageal reflux disease)     Gout     Hypertension 1982    Morbid obesity ([address]     Weight loss has been strongly encouraged by following dietary restrictions and an exercise routine    APRYL (obstructive sleep apnea) 6/26/2015    no cpap    Other and unspecified hyperlipidemia     HDL's are not at goal, LDL's are at goal, triglycerides are not at goal.    Other and unspecified hyperlipidemia     HDL's are not at goal, LDL's are at goal, triglycerides are not at goal.     Other ill-defined conditions(609.89) 1960    pneumonia twice    Sleep disturbance     Type II or unspecified type diabetes mellitus without mention of complication, not stated as uncontrolled        Past Surgical History:   Procedure Laterality Date    CARDIAC SURG PROCEDURE UNLIST  1995    lt. carotid endart. [facility] CARDIAC SURG PROCEDURE UNLIST  2012, 2016, Nov 2017    Coronary Stent    COLONOSCOPY N/A 6/23/2017    COLONOSCOPY w/ APC w/ polypectomies performed by Jill Zamorano MD at [address] N/A 1/19/2018    FLEXIBLE SIGMOIDOSCOPY WITH Radio frequency ablation performed by Jill Zamorano MD at [address] HX CHOLECYSTECTOMY      HX CORONARY ARTERY BYPASS GRAFT  2000    x2    HX HEENT  1997    cholecystectomy    HX UROLOGICAL  1998    renal art. surg    HX VASCULAR ACCESS      Perma cath for HD- right chest.       Family History   Problem Relation Age of Onset    Heart Attack Father 64       Social History     Social History    Marital status:      Spouse name: N/A    Number of children: N/A    Years of education: N/A     Social History Main Topics    Smoking status: Never Smoker    Smokeless tobacco: Never Used    Alcohol use No    Drug use: No    Sexual activity: Not Asked     Other Topics Concern    None     Social History Narrative       Prior to Admission medications    Medication Sig Start Date End Date Taking? Authorizing Provider   lidocaine (XYLOCAINE) 5 % ointment Apply to marilee-anal area and rectum daily as needed for pain 5/29/18   Nik Carbajal NP   hydrocortisone (ANUSOL-HC) 2.5 % rectal cream Insert  into rectum four (4) times daily. 5/29/18   Nik Carbajal NP   loperamide (IMODIUM) 2 mg capsule Take 1 Cap by mouth every four (4) hours as needed for Diarrhea for up to 10 days.  5/29/18 6/8/18  Nik Carbajal NP simvastatin (ZOCOR) 40 mg tablet TAKE 1 TABLET BY MOUTH NIGHTLY. 5/9/18   Demarcus Yanes MD   losartan (COZAAR) 25 mg tablet Take  by mouth daily. Historical Provider   aspirin delayed-release 81 mg tablet Take  by mouth daily. Historical Provider   fenofibrate (LOFIBRA) 54 mg tablet TAKE ONE TABLET BY MOUTH ONE TIME DAILY  4/2/18   Urszula Coughlin NP   tamsulosin (FLOMAX) 0.4 mg capsule TAKE ONE CAPSULE BY MOUTH ONE TIME DAILY 1/8/18   Sy Branch MD   isosorbide mononitrate ER (IMDUR) 30 mg tablet Take 1 Tab by mouth daily. 12/15/17   Anne Lau MD   b complex-vitamin c-folic acid (NEPHROCAPS) 1 mg capsule Take 1 Cap by mouth daily. 10/25/17   Donavan Gooden MD   senna-docusate (PERICOLACE) 8.6-50 mg per tablet Take 1 Tab by mouth daily. 10/25/17   Donavan Gooden MD   metoprolol tartrate (LOPRESSOR) 100 mg IR tablet TAKE 1 TABLET BY MOUTH TWO TIMES A DAY. 7/19/17   Urszula Coughlin NP   Cholecalciferol, Vitamin D3, (DECARA) 50,000 unit cap Take  by mouth every seven (7) days. Historical Provider   insulin glargine (LANTUS) 100 unit/mL injection 20 Units by SubCUTAneous route every twelve (12) hours. Patient taking differently: 35 Units by SubCUTAneous route every twelve (12) hours. 7/21/16   Janette Lopez MD   nitroglycerin (NITROSTAT) 0.4 mg SL tablet 1 Tab by SubLINGual route as needed for Chest Pain. 6/21/16   Demarcus Yanes MD   insulin aspart (NOVOLOG) 100 unit/mL injection 20 units sq 3 times a day,sliding scale  Patient taking differently: sliding scale with meals 3/13/13   Demarcus Yanes MD   allopurinol (ZYLOPRIM) 100 mg tablet Take 100 mg by mouth. Monday, Wednesday,friday    Historical Provider       Allergies   Allergen Reactions    Nka [No Known Allergies] Other (comments)         Review of Systems  GENERAL: Patient alert, awake and oriented times 3, able to communicate full sentences and not in distress.    HEENT: No change in vision, no earache, tinnitus, sore throat or sinus congestion. NECK: No pain or stiffness. PULMONARY: No shortness of breath, cough or wheeze. Cardiovascular: no pnd / orthopnea, no CP  GASTROINTESTINAL: No abdominal pain, +ve nausea, No vomiting +ve bright red blood per rectum. GENITOURINARY: No urinary frequency, urgency, hesitancy or dysuria. MUSCULOSKELETAL: No joint or muscle pain, no back pain, no recent trauma. DERMATOLOGIC: No rash, no itching, no lesions. ENDOCRINE: No polyuria, polydipsia, no heat or cold intolerance. No recent change in weight. HEMATOLOGICAL: No anemia or easy bruising or bleeding. NEUROLOGIC: No headache, seizures, numbness, tingling or weakness. Physical Exam:     Physical Exam:  Visit Vitals    /71 (BP 1 Location: Right arm, BP Patient Position: Sitting)    Pulse 69    Temp 97.9 °F (36.6 °C)    Resp 18    Wt 98.7 kg (217 lb 11.2 oz)    SpO2 97%    BMI 37.37 kg/m2      O2 Device: Room air    Temp (24hrs), Av.6 °F (37 °C), Min:97.9 °F (36.6 °C), Max:99 °F (37.2 °C)             General:  Alert, cooperative, no distress, appears stated age. Head: Normocephalic, without obvious abnormality, atraumatic. Eyes:  Conjunctivae/corneas clear. PERRL, EOMs intact. Nose: Nares normal. No drainage or sinus tenderness. Neck: Supple, symmetrical, trachea midline, no adenopathy, thyroid: no enlargement, no carotid bruit and no JVD. Lungs:   Clear to auscultation bilaterally. Heart:  Regular rate and rhythm, S1, S2 normal.     Abdomen: Soft, non-tender. Bowel sounds normal.    Extremities: Extremities normal, atraumatic, no cyanosis or edema. Pulses: 2+ and symmetric all extremities. Skin:  No rashes or lesions   Neurologic: AAOx3, No focal motor or sensory deficit. Labs Reviewed: All lab results for the last 24 hours reviewed.   CXR and EKG    Procedures/imaging: see electronic medical records for all procedures/Xrays and details which were not copied into this note but were reviewed prior to creation of Plan      Assessment/Plan     Principal Problem:    Lower GI bleed (5/28/2018)    Active Problems:    S/P coronary artery stent placement (11/2017)      Essential hypertension, benign ()      ESRD (end stage renal disease) ([facility] [address] (11/8/2017)          Pt will be admitted for lower GI Bleed with recent hx iof UGI bleed s/p embolization   Currently on ASA and Plavix for Hx of CAD s/p stent placement  Recent admission and discharge for similar episode     Monitor H/H  IVF  NPO  GI consult is called  PPI  Guaiac test >> BRBPR with clots     Will hold Plavix for now and continue ASA    DM with hyperglycemia >> lantus and SSI    Pt complained of abdominal pain while having a bowel MVT >> CT was ordered but then cancelled as Pt denied pain and abdominal exam was benign    DVT/GI Prophylaxis: H2B/PPI    Plan of care is discussed in details with Patient/Family at bedside and agreed upon    Mike Mcfadden MD  6/6/2018 11:42 PM

## 2018-06-06 NOTE — PROGRESS NOTES
Problem: Falls - Risk of  Goal: *Absence of Falls  Document Rene Fall Risk and appropriate interventions in the flowsheet.    Outcome: Progressing Towards Goal  Fall Risk Interventions:  Mobility Interventions: Assess mobility with egress test         Medication Interventions: Patient to call before getting OOB    Elimination Interventions: Bed/chair exit alarm

## 2018-06-06 NOTE — CDMP QUERY
Please clarify if this patient is being treated/managed for:    =>Anemia in the setting of acute blood loss and ESRD  =>Other Explanation of clinical findings  =>Unable to Determine (no explanation of clinical findings)    The medical record reflects the following:    Risk: Dx: Lower GI bleed; Hx ESRD    Clinical Indicators:  6/6 Nurse's note: Patient assisted to BR, noted bright red blood, no clots. H&H 9.8 and 29.8    Treatment: H&H q 8 hrs    If you DECLINE this query or would like to communicate with [facility], please utilize the \"[facility] message box\" at the TOP of the Progress Note on the right.       Thank you,  Rekha Hall RN/CCDS  [phone]

## 2018-06-06 NOTE — PROGRESS NOTES
Patient coming with recurrent Rectal Bleeding & GI note  & recommendations  Noted,  Cardiology needs to comment if any alternative medicine they can give , he has several stents in coronaries, H/H has dropped to some extent, no need for any dialysis today, was dialyzed yesterday. If he still remains here then will dialyze him tomorrow without  any Heparin.

## 2018-06-07 LAB
ANION GAP SERPL CALC-SCNC: 11 MMOL/L (ref 3–18)
ATRIAL RATE: 71 BPM
BASOPHILS # BLD: 0 K/UL (ref 0–0.06)
BASOPHILS NFR BLD: 0 % (ref 0–2)
BUN SERPL-MCNC: 55 MG/DL (ref 7–18)
BUN/CREAT SERPL: 8 (ref 12–20)
CALCIUM SERPL-MCNC: 8.7 MG/DL (ref 8.5–10.1)
CALCULATED P AXIS, ECG09: 72 DEGREES
CALCULATED R AXIS, ECG10: 22 DEGREES
CALCULATED T AXIS, ECG11: 83 DEGREES
CHLORIDE SERPL-SCNC: 101 MMOL/L (ref 100–108)
CO2 SERPL-SCNC: 27 MMOL/L (ref 21–32)
CREAT SERPL-MCNC: 7.04 MG/DL (ref 0.6–1.3)
DIAGNOSIS, 93000: NORMAL
DIFFERENTIAL METHOD BLD: ABNORMAL
EOSINOPHIL # BLD: 0.2 K/UL (ref 0–0.4)
EOSINOPHIL NFR BLD: 2 % (ref 0–5)
ERYTHROCYTE [DISTWIDTH] IN BLOOD BY AUTOMATED COUNT: 13.2 % (ref 11.6–14.5)
GLUCOSE BLD STRIP.AUTO-MCNC: 111 MG/DL (ref 70–110)
GLUCOSE BLD STRIP.AUTO-MCNC: 161 MG/DL (ref 70–110)
GLUCOSE BLD STRIP.AUTO-MCNC: 169 MG/DL (ref 70–110)
GLUCOSE BLD STRIP.AUTO-MCNC: 179 MG/DL (ref 70–110)
GLUCOSE SERPL-MCNC: 183 MG/DL (ref 74–99)
HCT VFR BLD AUTO: 30.4 % (ref 36–48)
HCT VFR BLD AUTO: 30.7 % (ref 36–48)
HCT VFR BLD AUTO: 30.7 % (ref 36–48)
HGB BLD-MCNC: 9.7 G/DL (ref 13–16)
HGB BLD-MCNC: 9.8 G/DL (ref 13–16)
HGB BLD-MCNC: 9.8 G/DL (ref 13–16)
LYMPHOCYTES # BLD: 1.1 K/UL (ref 0.9–3.6)
LYMPHOCYTES NFR BLD: 13 % (ref 21–52)
MAGNESIUM SERPL-MCNC: 2 MG/DL (ref 1.6–2.6)
MCH RBC QN AUTO: 29.6 PG (ref 24–34)
MCHC RBC AUTO-ENTMCNC: 31.9 G/DL (ref 31–37)
MCV RBC AUTO: 92.7 FL (ref 74–97)
MONOCYTES # BLD: 0.8 K/UL (ref 0.05–1.2)
MONOCYTES NFR BLD: 9 % (ref 3–10)
NEUTS SEG # BLD: 6.9 K/UL (ref 1.8–8)
NEUTS SEG NFR BLD: 76 % (ref 40–73)
P-R INTERVAL, ECG05: 156 MS
PHOSPHATE SERPL-MCNC: 4.7 MG/DL (ref 2.5–4.9)
PLATELET # BLD AUTO: 264 K/UL (ref 135–420)
PMV BLD AUTO: 10.8 FL (ref 9.2–11.8)
POTASSIUM SERPL-SCNC: 3.9 MMOL/L (ref 3.5–5.5)
Q-T INTERVAL, ECG07: 420 MS
QRS DURATION, ECG06: 106 MS
QTC CALCULATION (BEZET), ECG08: 456 MS
RBC # BLD AUTO: 3.31 M/UL (ref 4.7–5.5)
SODIUM SERPL-SCNC: 139 MMOL/L (ref 136–145)
VENTRICULAR RATE, ECG03: 71 BPM
WBC # BLD AUTO: 9 K/UL (ref 4.6–13.2)

## 2018-06-07 PROCEDURE — 85018 HEMOGLOBIN: CPT | Performed by: INTERNAL MEDICINE

## 2018-06-07 PROCEDURE — 74011250636 HC RX REV CODE- 250/636: Performed by: INTERNAL MEDICINE

## 2018-06-07 PROCEDURE — 65660000000 HC RM CCU STEPDOWN

## 2018-06-07 PROCEDURE — 36415 COLL VENOUS BLD VENIPUNCTURE: CPT | Performed by: INTERNAL MEDICINE

## 2018-06-07 PROCEDURE — 90935 HEMODIALYSIS ONE EVALUATION: CPT

## 2018-06-07 PROCEDURE — 5A1D70Z PERFORMANCE OF URINARY FILTRATION, INTERMITTENT, LESS THAN 6 HOURS PER DAY: ICD-10-PCS | Performed by: INTERNAL MEDICINE

## 2018-06-07 PROCEDURE — 74011636637 HC RX REV CODE- 636/637: Performed by: INTERNAL MEDICINE

## 2018-06-07 PROCEDURE — 84100 ASSAY OF PHOSPHORUS: CPT | Performed by: INTERNAL MEDICINE

## 2018-06-07 PROCEDURE — C9113 INJ PANTOPRAZOLE SODIUM, VIA: HCPCS | Performed by: INTERNAL MEDICINE

## 2018-06-07 PROCEDURE — 85025 COMPLETE CBC W/AUTO DIFF WBC: CPT | Performed by: INTERNAL MEDICINE

## 2018-06-07 PROCEDURE — 82962 GLUCOSE BLOOD TEST: CPT

## 2018-06-07 PROCEDURE — 74011250637 HC RX REV CODE- 250/637: Performed by: INTERNAL MEDICINE

## 2018-06-07 PROCEDURE — 80048 BASIC METABOLIC PNL TOTAL CA: CPT | Performed by: INTERNAL MEDICINE

## 2018-06-07 PROCEDURE — 83735 ASSAY OF MAGNESIUM: CPT | Performed by: INTERNAL MEDICINE

## 2018-06-07 RX ORDER — BISACODYL 5 MG
20 TABLET, DELAYED RELEASE (ENTERIC COATED) ORAL ONCE
Status: DISCONTINUED | OUTPATIENT
Start: 2018-06-07 | End: 2018-06-07

## 2018-06-07 RX ORDER — MAGNESIUM CITRATE
296 SOLUTION, ORAL ORAL
Status: DISCONTINUED | OUTPATIENT
Start: 2018-06-07 | End: 2018-06-07

## 2018-06-07 RX ORDER — SUCRALFATE 1 G/10ML
1 SUSPENSION ORAL
Status: DISCONTINUED | OUTPATIENT
Start: 2018-06-07 | End: 2018-06-07

## 2018-06-07 RX ORDER — FACIAL-BODY WIPES
10 EACH TOPICAL ONCE
Status: DISCONTINUED | OUTPATIENT
Start: 2018-06-07 | End: 2018-06-07

## 2018-06-07 RX ADMIN — INSULIN LISPRO 2 UNITS: 100 INJECTION, SOLUTION INTRAVENOUS; SUBCUTANEOUS at 22:07

## 2018-06-07 RX ADMIN — SIMVASTATIN 40 MG: 40 TABLET, FILM COATED ORAL at 22:07

## 2018-06-07 RX ADMIN — PANTOPRAZOLE SODIUM 40 MG: 40 INJECTION, POWDER, FOR SOLUTION INTRAVENOUS at 10:40

## 2018-06-07 RX ADMIN — ASPIRIN 81 MG: 81 TABLET, COATED ORAL at 10:22

## 2018-06-07 RX ADMIN — ISOSORBIDE MONONITRATE 30 MG: 30 TABLET, EXTENDED RELEASE ORAL at 10:22

## 2018-06-07 RX ADMIN — ERYTHROPOIETIN 10000 UNITS: 10000 INJECTION, SOLUTION INTRAVENOUS; SUBCUTANEOUS at 14:05

## 2018-06-07 RX ADMIN — ONDANSETRON 4 MG: 2 INJECTION INTRAMUSCULAR; INTRAVENOUS at 20:22

## 2018-06-07 NOTE — ROUTINE PROCESS
Bedside and Verbal shift change report given to [employer] RN (oncoming nurse) by Katherine Cotton RN (offgoing nurse). Report included the following information SBAR, Kardex, MAR and Recent Results. SITUATION:    Code Status: Full Code   Reason for Admission: Lower GI bleed    [facility] day: 2   Problem List:       Hospital Problems  Date Reviewed: 5/16/2018          Codes Class Noted POA    Radiation proctitis ICD-10-CM: K62.7  ICD-9-CM: 569.49  6/6/2018 Unknown        * (Principal)Lower GI bleed ICD-10-CM: K92.2  ICD-9-CM: 578.9  5/28/2018 Unknown        ESRD (end stage renal disease) ([facility] [address] ICD-10-CM: N18.6  ICD-9-CM: 585.6  11/8/2017 Yes        Essential hypertension, benign (Chronic) ICD-10-CM: I10  ICD-9-CM: 401.1  Unknown Yes        S/P coronary artery stent placement ICD-10-CM: Z95.5  ICD-9-CM: V45.82  3/12/2013 Yes    Overview Signed 3/12/2013  3:37 PM by Sean Reynolds MD     vg to diag stent 10/2012                   BACKGROUND:    Past Medical History:   Past Medical History:   Diagnosis Date    Atherosclerosis of renal artery ([address]     S/P Rt stent    CAD (coronary artery disease) , 1997, 2012    ,double bypass, 2 stents, 2stents 7/2016    Cancer ([address]     prostate    CHF (congestive heart failure) ([address]     Chronic diastolic heart failure (HCC)     Chronic kidney disease     Chronic kidney disease (CKD), stage IV (severe) ([address]     On Dialysis now -T-Th-sat    Constipation     Coronary atherosclerosis of unspecified type of vessel, native or graft     Stable angina after recent PCI continue treatment.        CRI (chronic renal insufficiency)     Diabetes ([address] 1973    type 2    Essential hypertension, benign     GERD (gastroesophageal reflux disease)     Gout     Hypertension 1982    Morbid obesity ([address]     Weight loss has been strongly encouraged by following dietary restrictions and an exercise routine    APRYL (obstructive sleep apnea) 6/26/2015    no cpap    Other and unspecified hyperlipidemia     HDL's are not at goal, LDL's are at goal, triglycerides are not at goal.    Other and unspecified hyperlipidemia     HDL's are not at goal, LDL's are at goal, triglycerides are not at goal.     Other ill-defined conditions(799.89) 1960    pneumonia twice    Sleep disturbance     Type II or unspecified type diabetes mellitus without mention of complication, not stated as uncontrolled          Patient taking anticoagulants no     ASSESSMENT:    Changes in Assessment Throughout Shift:  none     Patient has Central Line: no Reasons if yes: na   Patient has Xiao Cath: no Reasons if yes: na      Last Vitals:     Vitals:    06/06/18 0747 06/06/18 1110 06/06/18 1511 06/06/18 2049   BP: 167/71 125/56 161/61 167/70   Pulse: 69 66 71 94   Resp: 18 18 18 20   Temp: 97.9 °F (36.6 °C) 98.3 °F (36.8 °C) 97.8 °F (36.6 °C) 98.1 °F (36.7 °C)   SpO2: 97% 97% 96% 97%   Weight:            IV and DRAINS (will only show if present)   Peripheral IV 06/05/18 Right Antecubital-Site Assessment: Clean, dry, & intact     WOUND (if present)   Wound Type:  none   Dressing present Dressing Present : No   Wound Concerns/Notes:  none     PAIN    Pain Assessment    Pain Intensity 1: 0 (06/06/18 2049)              Patient Stated Pain Goal: 0  o Interventions for Pain:  none, denies  o Intervention effective: no and na  o Time of last intervention:  See MAR   o Reassessment Completed: yes      Last 3 Weights:  Last 3 Recorded Weights in this Encounter    06/05/18 2013 06/06/18 0425   Weight: 99.8 kg (220 lb 0.3 oz) 98.7 kg (217 lb 11.2 oz)     Weight change:      INTAKE/OUPUT    Current Shift:      Last three shifts:       LAB RESULTS     Recent Labs      06/06/18   1716  06/06/18   1030  06/06/18   0330   06/05/18 2018   WBC   --    --   8.9   --   9.7   HGB  10.8*  11.1*  9.8*   < >  10.6*   HCT  34.0*  34.5*  29.8*   < >  32.4*   PLT   --    --   249   --   289    < > = values in this interval not displayed. Recent Labs      06/06/18   0330  06/05/18 2018   NA  138  135*   K  3.8  3.9   GLU  80  80   BUN  50*  47*   CREA  6.43*  6.54*   CA  8.8  8.9       RECOMMENDATIONS AND DISCHARGE PLANNING     1. Pending tests/procedures/ Plan of Care or Other Needs: Barium Enema      2. Discharge plan for patient and Needs/Barriers: return home    3. Estimated Discharge Date: pending Posted on Whiteboard in Patients Room: yes      4. The patient's care plan was reviewed with the oncoming nurse. \"HEALS\" SAFETY CHECK      Fall Risk    Total Score: 1    Safety Measures: Safety Measures: Bed/Chair alarm on, Bed/Chair-Wheels locked, Bed in low position, Call light within reach, Fall prevention (comment), Side rails X 3    A safety check occurred in the patient's room between off going nurse and oncoming nurse listed above. The safety check included the below items  Area Items   H  High Alert Medications - Verify all high alert medication drips (heparin, PCA, etc.)   E  Equipment - Suction is set up for ALL patients (with ely)  - Red plugs utilized for all equipment (IV pumps, etc.)  - WOWs wiped down at end of shift.  - Room stocked with oxygen, suction, and other unit-specific supplies   A  Alarms - Bed alarm is set for fall risk patients  - Ensure chair alarm is in place and activated if patient is up in a chair   L  Lines - Check IV for any infiltration  - Xiao bag is empty if patient has a Xiao   - Tubing and IV bags are labeled   S  Safety   - Room is clean, patient is clean, and equipment is clean. - Hallways are clear from equipment besides carts. - Fall bracelet on for fall risk patients  - Ensure room is clear and free of clutter  - Suction is set up for ALL patients (with ely)  - Hallways are clear from equipment besides carts.    - Isolation precautions followed, supplies available outside room, sign posted     Arturo Purdy RN

## 2018-06-07 NOTE — PROGRESS NOTES
[email or web address]  [phone]    Gastroenterology follow up-Progress note    Impression:  1. Continued rectal bleeding due to radiation proctitis, some improvement, s/p APC in past  2. Rectal pain, possible fissure  3. Frequent stools, reports sometimes passing only blood    Plan:  1. Sucralfate enemas, administration clarified with nursing staff - must be retention enema not oral, if bleeding improved tomorrow will schedule for OP RFA  2. Monitor h/h, slight drop overnight, transfuse < 7.0  3. Continue lidocaine ointment as needed for marilee-anal and rectal pain    Carafate enemas unable to be given due to cost/improper equipment, will schedule flex sig w/ APC tomorrow. Chief Complaint: Rectal bleeding, rectal pain      Subjective:  Continued bleeding, less than at admission, more spotty now but has passed blood w/out stool overnight. ROS: Denies any fevers, chills, rash.      Eyes: conjunctiva normal, EOM normal   Neck: ROM normal, supple and trachea normal   Cardiovascular: heart normal, intact distal pulses, normal rate and regular rhythm   Pulmonary/Chest Wall: breath sounds normal and effort normal   Abdominal: appearance normal, bowel sounds normal and soft, non-acute, non-tender     Patient Active Problem List   Diagnosis Code    NSTEMI (non-ST elevated myocardial infarction) ([facility] [address] I21.4    Coronary atherosclerosis of native coronary artery I25.10    CKD (chronic kidney disease) stage 4, GFR 15-29 ml/min ([facility]) N18.4    HTN (hypertension) I10    DM2 (diabetes mellitus, type 2) ([facility]) E11.9    Dyslipidemia E78.5    Contrast dye induced nephropathy N14.1, T50.8X5A    Chest pain, unspecified R07.9    S/P coronary artery stent placement Z95.5    Postsurgical aortocoronary bypass status Z95.1    Atherosclerosis of renal artery ([facility]) I70.1    Chronic diastolic heart failure ([facility]) I50.32    Morbid obesity ([facility]) E66.01    Essential hypertension, benign I10    Sleep disturbance G47.9    Coronary atherosclerosis I25.10    Mixed hyperlipidemia E78.2    Type II or unspecified type diabetes mellitus without mention of complication, not stated as uncontrolled E11.9    Abdominal pain R10.9    GERD (gastroesophageal reflux disease) K21.9    Constipation K59.00    Chronic renal insufficiency N18.9    Prostate cancer ([facility] C61    APRYL (obstructive sleep apnea) J76.53    Diastolic CHF, acute on chronic ([facility]) I50.33    CHF (congestive heart failure), NYHA class IV ([facility]) I50.9    Fatigue R53.83    Secondary hyperparathyroidism of renal origin ([facility] N25.81    Chest pain R07.9    Hyperuricemia E79.0    Chronic kidney disease, stage V ([facility]) N18.5    ESRD (end stage renal disease) ([facility]) N18.6    Hypotension I95.9    Fluid overload E87.70    Hyperkalemia E87.5    Type 2 diabetes mellitus with nephropathy ([facility]) E11.21    Elevated troponin R74.8    Gastrointestinal hemorrhage associated with anorectal source K62.5    Lower GI bleed K92.2    GI bleed K92.2    Radiation proctitis K62.7         Visit Vitals    /60 (BP 1 Location: Left arm, BP Patient Position: At rest)    Pulse 78    Temp 97.9 °F (36.6 °C)    Resp 20    Wt 98.8 kg (217 lb 12.8 oz)    SpO2 96%    BMI 37.39 kg/m2           Intake/Output Summary (Last 24 hours) at 06/07/18 0959  Last data filed at 06/07/18 0400   Gross per 24 hour   Intake               80 ml   Output                0 ml   Net               80 ml       CBC w/Diff    Lab Results   Component Value Date/Time    WBC 9.0 06/07/2018 01:34 AM    RBC 3.31 (L) 06/07/2018 01:34 AM    HGB 9.7 (L) 06/07/2018 08:16 AM    HCT 30.4 (L) 06/07/2018 08:16 AM    MCV 92.7 06/07/2018 01:34 AM    MCH 29.6 06/07/2018 01:34 AM    MCHC 31.9 06/07/2018 01:34 AM    RDW 13.2 06/07/2018 01:34 AM     06/07/2018 01:34 AM    Lab Results   Component Value Date/Time    GRANS 76 (H) 06/07/2018 01:34 AM    LYMPH 13 (L) 06/07/2018 01:34 AM    EOS 2 06/07/2018 01:34 AM    BASOS 0 06/07/2018 01:34 AM      Basic Metabolic Profile   Recent Labs      06/07/18   0134   NA  139   K  3.9   CL  101   CO2  27   BUN  55*   CA  8.7   MG  2.0   PHOS  4.7        Hepatic Function    Lab Results   Component Value Date/Time    ALB 3.3 (L) 06/05/2018 08:18 PM    TP 7.9 06/05/2018 08:18 PM    AP 93 06/05/2018 08:18 PM    Lab Results   Component Value Date/Time    SGOT 10 (L) 06/05/2018 08:18 PM          Coags   No results for input(s): PTP, INR, APTT in the last 72 hours. No lab exists for component: INREXT            LESLY Garcia    [facility]. [email or web address]  Phone: [phone]  Pager: [phone]

## 2018-06-07 NOTE — PROGRESS NOTES
Problem: Diabetes Self-Management  Goal: *Disease process and treatment process  Define diabetes and identify own type of diabetes; list 3 options for treating diabetes. Outcome: Progressing Towards Goal  Continue blood sugar monitoring. Problem: Falls - Risk of  Goal: *Absence of Falls  Document Rene Fall Risk and appropriate interventions in the flowsheet.    Outcome: Progressing Towards Goal  Fall Risk Interventions:  Mobility Interventions: Assess mobility with egress test         Medication Interventions: Teach patient to arise slowly    Elimination Interventions: Call light in reach

## 2018-06-07 NOTE — PROGRESS NOTES
Admit Date: 2018  Date of Service: 2018    Reason for follow-up: rectal bleeding      Assessment:         Rectal bleeding- recurrent:  M/l related to radiation proctitis. Followed by GI; plans for Flex sig in am; Hb decreased by 1 gm over last 24 hours    CAD with h/o CABG ; stent to SVG in  and 2016, SVG to diagonal 2017. Recent NSTEMI 2018    ESRD on HD  HTN: controlled  Hyperlipidemia: on Zocor  Dm type 2:  On ssi; controlled at present    Plan:   Follow CBC, BMP  Discussed with nursing and GI this am  Will d/c barium enema and pre-procedure prep  Will d/c rectal carafate enemas  NPO after midnight for planned flex sig  HD today. Possible d/c after procedure tomorrow. Current Antibtiocs:   None    Lines:   Peripheral     I spent 45 minutes with the patient in face-to-face consultation, of which greater than 50% was spent in counseling and coordination of care as described above. Case discussed with:  [x]Patient  []Family  []Nursing  []Case Management  DVT Prophylaxis:  []Lovenox  []Hep SQ  [x]SCDs  []Coumadin   []On Heparin gtt-I have independently examined the patient and reviewed all lab studies and imgaing as well as review of nursing notes and physican notes from the past 24 hours. The plan of care has been discussed with the patient and all questions are answered. Thalia Enrique D.O. Pager [phone]      Allergies   Allergen Reactions    Nka [No Known Allergies] Other (comments)           Subjective:      Pt seen and examined. Doing about the same. Still having bleeding from rectum and pain with defecation. Waiting for HD today. No nausea. No fevers or chills. No CP.  No other felicia        Objective:        Visit Vitals    /68    Pulse 75    Temp 97.2 °F (36.2 °C)    Resp 20    Wt 98.8 kg (217 lb 12.8 oz)    SpO2 96%    BMI 37.39 kg/m2     Temp (24hrs), Av.8 °F (36.6 °C), Min:97.2 °F (36.2 °C), Max:98.1 °F (36.7 °C)        General:   awake alert and oriented, non-toxic   Skin:   no rashes or skin lesions noted on limited exam, dry and warm   HEENT:  No scleral icterus or pallor; oral mucosa moist, lips moist   Lymph Nodes:   not assessed today   Lungs:   non, labored; bilaterally clear to aspiration- no crackles wheezes rales or rhonchi   Heart:  RRR, s1 and s2; no murmurs rubs or gallops; no edema, + pedal pulses   Abdomen:  soft, protuberant, non-distended, active bowel sounds, mild LLQ tenderness   Genitourinary:  deferred   Extremities:   average muscle tone; no contractures, no joint effusions   Neurologic:  No gross focal motor or sensory abnormalities; CN 2-12 intact; Follows commands. Psychiatric:   appropriate and interactive. Labs: Results:   Chemistry Recent Labs      06/07/18 0134 06/06/18 0330 06/05/18 2018   GLU  183*  80  80   NA  139  138  135*   K  3.9  3.8  3.9   CL  101  99*  96*   CO2  27  28  32   BUN  55*  50*  47*   CREA  7.04*  6.43*  6.54*   CA  8.7  8.8  8.9   AGAP  11  11  7   BUCR  8*  8*  7*   AP   --    --   93   TP   --    --   7.9   ALB   --    --   3.3*   GLOB   --    --   4.6*   AGRAT   --    --   0.7*      CBC w/Diff Recent Labs      06/07/18   0816  06/07/18 0134 06/06/18   1716   06/06/18 0330 06/05/18 2018   WBC   --   9.0   --    --   8.9   --   9.7   RBC   --   3.31*   --    --   3.33*   --   3.63*   HGB  9.7*  9.8*  10.8*   < >  9.8*   < >  10.6*   HCT  30.4*  30.7*  34.0*   < >  29.8*   < >  32.4*   PLT   --   264   --    --   249   --   289   GRANS   --   76*   --    --   73   --   76*   LYMPH   --   13*   --    --   19*   --   17*   EOS   --   2   --    --   2   --   2    < > = values in this interval not displayed.         Lab Results   Component Value Date/Time    Specimen Description: [facility] 10/10/2012 06:15 AM    Lab Results   Component Value Date/Time    Culture result: NO GROWTH 6 DAYS 11/27/2017 05:01 PM    Culture result: NO GROWTH 6 DAYS 11/27/2017 04:40 PM    Culture result: NO GROWTH 2 DAYS 10/08/2015 07:46 PM    Culture result: NO GROWTH 1 DAY 10/10/2012 06:15 AM          Imaging:     None to review today

## 2018-06-07 NOTE — PROGRESS NOTES
Problem: Pressure Injury - Risk of  Goal: *Prevention of pressure injury  Document Emerson Scale and appropriate interventions in the flowsheet. Outcome: Progressing Towards Goal  Pressure Injury Interventions:             Activity Interventions: Increase time out of bed, Pressure redistribution bed/mattress(bed type)    Mobility Interventions: HOB 30 degrees or less    Nutrition Interventions: Document food/fluid/supplement intake

## 2018-06-07 NOTE — DIALYSIS
ACUTE HEMODIALYSIS FLOW SHEET    HEMODIALYSIS ORDERS: Physician: Rosio Rosado: pawel        Duration: 3.5 hr  BFR: 400   DFR: 800   Dialysate:  Temp 37 K+   3    Ca+  2.5 Na 138 Bicarb 35   Weight:  98.8 kg    Bed Scale []     Unable to Obtain []      Dry weight/UF Goal: 2000 Access AVG  Needle Gauge 15    Heparin []  Bolus      Units    [] Hourly       Units    [x]None      Catheter locking solution    Pre BP:   161/59    Pulse:     76     Temperature:   97.2  Respirations: 20  Tx: NS       ml/Bolus  Other        [x] N/A   Labs: Pre        Post:        [x] N/A   Additional Orders(medications, blood products, hypotension management):       [x] N/A     [x] [facility] Consent Verified     CATHETER ACCESS: [x]N/A   []Right   []Left   []IJ     []Fem   [] First use X-ray verified     []Tunnel                [] Non Tunneled   []No S/S infection  []Redness  []Drainage []Cultured []Swelling []Pain   []Medical Aseptic Prep Utilized   []Dressing Changed  [] Biopatch  Date:       []Clotted   []Patent   Flows: []Good  []Poor  []Reversed   If access problem,  notified: []Yes    []N/A  Date:           GRAFT/FISTULA ACCESS:  []N/A     []Right     [x]Left     [x]UE     []LE   [x]AVG   []AVF        []Buttonhole    []Medical Aseptic Prep Utilized   [x]No S/S infection  []Redness  []Drainage []Cultured []Swelling []Pain    Bruit:   [x] Strong    [] Weak       Thrill :   [x] Strong    [] Weak       Needle Gauge: 15   Length: 1   If access problem,  notified: []Yes     []N/A  Date:        Please describe access if present and not used:       GENERAL ASSESSMENT:    LUNGS:  Rate 18 SaO2%    96    [] N/A    [x] Clear  [] Coarse  [] Crackles  [] Wheezing        [] Diminished     Location : []RLL   []LLL    []RUL  []NOMI   Cough: []Productive  []Dry  [x]N/A   Respirations:  [x]Easy  []Labored   Therapy:  [x]RA  []NC  l/min    Mask: []NRB []Venti       O2%                  []Ventilator  []Intubated  [] Trach  [] BiPaP CARDIAC: [x]Regular      [] Irregular   [] Pericardial Rub  [] JVD        [x]  Monitored  [] Bedside  [] Remotely monitored [] N/A  Rhythm:    EDEMA: [] None  []Generalized  [] Pitting [] 1    [] 2    [] 3    [] 4                 [] Facial  [] Pedal  []  UE  [] LE   SKIN:   [x] Warm  [] Hot     [] Cold   [x] Dry     [] Pale   [] Diaphoretic                  [] Flushed  [] Jaundiced  [] Cyanotic  [] Rash  [] Weeping   LOC:    [x] Alert      [x]Oriented:    [x] Person     [x] Place  [x]Time               [] Confused  [] Lethargic  [] Medicated  [] Non-responsive     GI / ABDOMEN:  [] Flat    [x] Distended    [x] Soft    [] Firm   []  Obese                             [] Diarrhea  [] Bowel Sounds  [] Nausea  [] Vomiting       / URINE ASSESSMENT:[x] Voiding   [] Oliguria  [] Anuria   []  Xiao     [] Incontinent    []  Incontinent Brief      []  Bathroom Privileges     PAIN: [x] 0 []1  []2   []3   []4   []5   []6   []7   []8   []9   []10            Scale 0-10  Action/Follow Up:    MOBILITY:  [] Amb    [] Amb/Assist    [x] Bed    [] Wheelchair  [] Stretcher      All Vitals and Treatment Details on Attached [address]: [facility]          Room # 212     [] 1st Time Acute  [] Stat  [x] Routine  [] Urgent     [x] Acute Room  []  Bedside  [] ICU/CCU  [] ER   Isolation Precautions:  [x] Dialysis   [] Airborne   [] Contact    [] Reverse   Special Considerations:         [] Blood Consent Verified [x]N/A     ALLERGIES:   [x] NKA          Code Status:  [x] Full Code  [] DNR  [] Other           HBsAg ONLY: Date Drawn 5/29/18         [x]Negative []Positive []Unknown   HBsAb: Date 5/29/18    [x] Susceptible   [] [id number] []Not Drawn  [] Drawn     Current Labs:    Date of Labs: Today [x]        Cut and paste current labs here.                                                                                                                                   DIET:  [x] Renal    [] Other     [] NPO     []  Diabetic PRIMARY NURSE REPORT: First initial/Last name/Title      Pre Dialysis: Sean Bauer    Time: 1100      EDUCATION:    [x] Patient [] Other         Knowledge Basis: []None [x]Minimal [] Substantial   Barriers to learning  [x]N/A   [x] Access Care     [] S&S of infection     [] Fluid Management     []K+     []Procedural    []Albumin     [] Medications     [] Tx Options     [] Transplant     [] Diet     [] Other   Teaching Tools:  [x] Explain  [] Demo  [] Handouts [] Video  Patient response:   [] Verbalized understanding  [] Teach back  [] Return demonstration [] Requires follow up   Inappropriate due to            [x] Time Out/Safety Check       [facility] Before each treatment:     Machine Number:                   [address] [facility]                                   [] Shanika Lopez with centralized RO                                  [] Portable Machine #1/RO serial # [id number]                                  [] Portable Machine #2/RO serial # [id number]                                  [] Portable Machine #3/RO serial # [id number]                                                                                                       [facility]                                  [] Portable Machine #11/RO serial # [id number]                                   [] Portable Machine #12/RO serial # [id number]                                  [] Portable Machine #13/RO serial #  [id number]      Alarm Test:  Pass time 1116         Other:         [x] RO/Machine Log Complete      Temp                [x]Extracorporeal Circuit Tested for integrity   Dialysate: pH  7.4 Conductivity: Meter   13.6     HD Machine   13.9                  TCD: 13.8  Dialyzer Lot # [id number]            Blood Tubing Lot # [id number]          Saline Lot #  [id number]     CHLORINE TESTING-Before each treatment and every 4 hours    Total Chlorine: [x] less than 0.1 ppm  Time: 1145 4 Hr/2nd Check Time: 1400   (if greater than 0.1 ppm from Primary then every 30 minutes from Secondary)     TREATMENT INITIATION  with Dialysis Precautions:   [x] All Connections Secured                 [x] Saline Line Double Clamped   [x] Venous Parameters Set                  [x] Arterial Parameters Set    [x] Prime Given  200                              [x]Air Foam Detector Engaged      Treatment Initiation Note: left AVG cannulated and treatment started without complication     Medication Dose Volume Route Initials Dialyzer Cleared: [] Good [x] Fair  [] Poor    Blood processed:  61 L  UF Removed  2500 Ml    Post Wt:     kg  POst BP:   163/71       Pulse: 87      Respirations: 18  Temperature: 97.7                                   Post Tx Vascular Access: AVF/AVG: Bleeding stopped Art 10 min. Cecil. 10 Min   N/A                                   Catheter: Locking solution: Heparin 1:1000 Art. Cecil.    N/A                                 Post Assessment:                                    Skin:  [x] Warm  [] Dry [] Diaphoretic    [] Flushed  [] Pale [] Cyanotic   [facility] Signatures Title Initials  Time Lungs: [] Clear    [] Course  [] Crackles  [] Wheezing [] Diminished   Richy Long RN [address] Cardiac: [x] Regular   [] Irregular   [x] Monitor  [] N/A  Rhythm:           Edema:  [] None    [] General     [] Facial   [] Pedal    [] UE    [] LE       Pain: [x]0  []1  []2   []3  []4   []5   []6   []7   []8   []9   []10         Post Treatment Note: removed 2 liters held avf until hemostasis achieved patient tolerated treatment well     POST TREATMENT PRIMARY NURSE HANDOFF REPORT:     First initial/Last name/Title         Post Dialysis: Raul Giles Time:  1600     Abbreviations: AVG-arterial venous graft, AVF-arterial venous fistula, IJ-Internal Jugular, Subcl-Subclavian, Fem-Femoral, Tx-treatment, AP/HR-apical heart rate, DFR-dialysate flow rate, BFR-blood flow rate, AP-arterial pressure, -venous pressure, UF-ultrafiltrate, TMP-transmembrane pressure, Cecil-Venous, Art-Arterial, RO-Reverse Osmosis

## 2018-06-07 NOTE — PROGRESS NOTES
RENAL PROGRESS NOTE: Pt seen on dialysis. Follow up of ESRD     Present on Admission:   S/P coronary artery stent placement   Essential hypertension, benign   ESRD (end stage renal disease) ([address]         Subjective: Still complains of intermittent rectal bleeding, Hb  Dropped 1 gm in last 24 hoiurs    Patient is on Dialysis. Objective:    Patient Vitals for the past 12 hrs:   Temp Pulse Resp BP SpO2   06/07/18 1430 - 82 18 148/62 -   06/07/18 1400 - 88 18 117/56 -   06/07/18 1330 - 81 20 156/61 -   06/07/18 1300 - 82 20 137/62 -   06/07/18 1230 - 75 20 133/68 -   06/07/18 1205 - 70 20 154/61 -   06/07/18 1046 97.2 °F (36.2 °C) 80 20 161/69 96 %   06/07/18 0720 97.9 °F (36.6 °C) 78 20 157/60 96 %   06/07/18 0400 97.6 °F (36.4 °C) 72 20 155/64 99 %        Intake/Output Summary (Last 24 hours) at 06/07/18 1443  Last data filed at 06/07/18 0400   Gross per 24 hour   Intake               80 ml   Output                0 ml   Net               80 ml       Physical Assessment:     General Appearance: NAD  Lung: clear to auscultation  Heart: regular rate and rhythm and no murmurs, clicks or gallops  Lower Extremities: edema   Access: AVF is infiltrated, using 1 needle in cathrter &other one fro AVF site. Labs    CBC w/Diff    Recent Labs      06/07/18   0816  06/07/18   0134  06/06/18   1716   06/06/18   0330   06/05/18 2018   WBC   --   9.0   --    --   8.9   --   9.7   RBC   --   3.31*   --    --   3.33*   --   3.63*   HGB  9.7*  9.8*  10.8*   < >  9.8*   < >  10.6*   HCT  30.4*  30.7*  34.0*   < >  29.8*   < >  32.4*   MCV   --   92.7   --    --   89.5   --   89.3   MCH   --   29.6   --    --   29.4   --   29.2   MCHC   --   31.9   --    --   32.9   --   32.7   RDW   --   13.2   --    --   13.2   --   13.1    < > = values in this interval not displayed.     Recent Labs      06/07/18   0134  06/06/18   0330  06/05/18   2018   MONOS  9  6  5   EOS  2  2  2   BASOS  0  0  0   RDW  13.2  13.2  13.1 Comprehensive Metabolic Profile    Recent Labs      06/07/18   0134  06/06/18   0330  06/05/18 2018   NA  139  138  135*   K  3.9  3.8  3.9   CL  101  99*  96*   CO2  27  28  32   BUN  55*  50*  47*   CREA  7.04*  6.43*  6.54*    Recent Labs      06/07/18   0134  06/06/18   0330  06/05/18 2018   CA  8.7  8.8  8.9   PHOS  4.7   --    --    ALB   --    --   3.3*   TP   --    --   7.9   SGOT   --    --   10*   TBILI   --    --   0.7          Basic Metabolic Profile       results  reviwed. MEDS:Reviwed.   Current Facility-Administered Medications   Medication Dose Route Frequency Provider Last Rate Last Dose    aspirin delayed-release tablet 81 mg  81 mg Oral DAILY Rosa Daniel MD   81 mg at 06/07/18 1022    isosorbide mononitrate ER (IMDUR) tablet 30 mg  30 mg Oral DAILY Rosa Daniel MD   30 mg at 06/07/18 1022    nitroglycerin (NITROSTAT) tablet 0.4 mg  0.4 mg SubLINGual PRN Rosa Daniel MD        simvastatin (ZOCOR) tablet 40 mg  40 mg Oral QHS Rosa Daniel MD   40 mg at 06/06/18 2252    pantoprazole (PROTONIX) injection 40 mg  40 mg IntraVENous DAILY Rosa Daniel MD   40 mg at 06/07/18 1040    acetaminophen (TYLENOL) tablet 650 mg  650 mg Oral Q6H PRN Rosa Daniel MD        oxyCODONE-acetaminophen (PERCOCET) 5-325 mg per tablet 1 Tab  1 Tab Oral Q6H PRN Rosa Daniel MD        naloxone [facility]) injection 0.4 mg  0.4 mg IntraVENous PRN Rosa Daniel MD        ondansetron [facility]) injection 4 mg  4 mg IntraVENous Q6H PRN Rosa Daniel MD        docusate sodium (COLACE) capsule 100 mg  100 mg Oral BID PRN Rosa aDniel MD        insulin glargine (LANTUS) injection 10 Units  10 Units SubCUTAneous DAILY Rosa Daniel MD   10 Units at 06/06/18 2303    insulin lispro (HUMALOG) injection   SubCUTAneous AC&HS Rosa Daniel MD   Stopped at 06/07/18 1130    glucose chewable tablet 16 g  4 Tab Oral PRN Rosa Daniel MD        glucagon (GLUCAGEN) injection 1 mg  1 mg IntraMUSCular PRN Shanthi San MD        dextrose (D50W) injection syrg 12.5-25 g  25-50 mL IntraVENous PRN Shanthi San MD        epoetin lee (EPOGEN;PROCRIT) injection 10,000 Units  10,000 Units IntraVENous DIALYSIS Rody KRUGER MD   10,000 Units at 06/07/18 1405    doxercalciferol (HECTOROL) 4 mcg/2 mL injection 1 mcg  1 mcg IntraVENous DIALYSIS Mey KRUGER MD           Impression:    ESRD: Tolerating HD well. Anemia of CKD: on  Epogen. Hyperparathyroidism Yes  Rectal bleeding  Plan  Dialysis for volume and solute management  Epogen  28291 units. Hectorol   : 1 microgram  Monitor H/H ,off Plavix. Flex sig tomorrow by GI.       Mohinder Zendejas MD

## 2018-06-07 NOTE — PROGRESS NOTES
Med clarification needed on how to administer 10 cc Carafate retention enemas. Manager Tito Tian involved. Pharmacist James Thomas notified also trying to come with idea. Recommends to use madsen catheter for administration. Due to increased cost per catheter and enemas to be given 5 times a day, Tito Tian suggests that another solution is needed. Pharmacist James Thomas suggests that he will contact Dr. Garrison Garcia (GI) to suggest  increased volume to administer to decrease the frequency per day. Dr. Alvino Felix notified. 1000: Pt to go to dialysis today. Pt refusing any enemas until after dialysis later today. Dr. Alvino Felix notified. 1123: After no further progress. LESLY Hernandez notified of dilemma. States she will call Dr. Garrison Garcia and call back on how to proceed.

## 2018-06-07 NOTE — ROUTINE PROCESS
Pt alert and oriented x 4. No distress noted. Denies pain. Pt continues to have bright red  bloody stoolsduring shift. Pt  Received dialysis during the shift. Stand by assist needed when out of bed. Pt informed of sigmoidoscopy procedure tomorrow. Pt aware that he is NPO after midnight tonight. Pt removed IV. Has had 3 unsuccessful IV attempts. Will let night shift nurse attempt. Call bell within reach. Fall precautions maintained. Will continue to monitor. Bedside shift change report given to Riki Allen (oncoming nurse) by Danyelle Denny RN (offgoing nurse). Report included the following information SBAR, Kardex, Intake/Output, MAR, Recent Results, Med Rec Status and Cardiac Rhythm NSR.

## 2018-06-07 NOTE — PROGRESS NOTES
Problem: Falls - Risk of  Goal: *Absence of Falls  Document Rene Fall Risk and appropriate interventions in the flowsheet.    Outcome: Progressing Towards Goal  Fall Risk Interventions:  Mobility Interventions: Assess mobility with egress test         Medication Interventions: Teach patient to arise slowly    Elimination Interventions: Call light in reach, Patient to call for help with toileting needs, Urinal in reach

## 2018-06-08 ENCOUNTER — ANESTHESIA EVENT (OUTPATIENT)
Dept: ENDOSCOPY | Age: 78
DRG: 377 | End: 2018-06-08
Payer: MEDICARE

## 2018-06-08 ENCOUNTER — ANESTHESIA (OUTPATIENT)
Dept: ENDOSCOPY | Age: 78
DRG: 377 | End: 2018-06-08
Payer: MEDICARE

## 2018-06-08 VITALS
SYSTOLIC BLOOD PRESSURE: 146 MMHG | BODY MASS INDEX: 36.89 KG/M2 | RESPIRATION RATE: 15 BRPM | DIASTOLIC BLOOD PRESSURE: 54 MMHG | TEMPERATURE: 97.8 F | HEART RATE: 70 BPM | OXYGEN SATURATION: 99 % | WEIGHT: 214.9 LBS

## 2018-06-08 LAB
ANION GAP SERPL CALC-SCNC: 9 MMOL/L (ref 3–18)
BASOPHILS # BLD: 0 K/UL (ref 0–0.06)
BASOPHILS NFR BLD: 0 % (ref 0–2)
BUN SERPL-MCNC: 30 MG/DL (ref 7–18)
BUN/CREAT SERPL: 6 (ref 12–20)
CALCIUM SERPL-MCNC: 9.2 MG/DL (ref 8.5–10.1)
CHLORIDE SERPL-SCNC: 100 MMOL/L (ref 100–108)
CO2 SERPL-SCNC: 30 MMOL/L (ref 21–32)
CREAT SERPL-MCNC: 5.07 MG/DL (ref 0.6–1.3)
DIFFERENTIAL METHOD BLD: ABNORMAL
EOSINOPHIL # BLD: 0.2 K/UL (ref 0–0.4)
EOSINOPHIL NFR BLD: 2 % (ref 0–5)
ERYTHROCYTE [DISTWIDTH] IN BLOOD BY AUTOMATED COUNT: 13.3 % (ref 11.6–14.5)
GLUCOSE BLD STRIP.AUTO-MCNC: 152 MG/DL (ref 70–110)
GLUCOSE BLD STRIP.AUTO-MCNC: 156 MG/DL (ref 70–110)
GLUCOSE BLD STRIP.AUTO-MCNC: 161 MG/DL (ref 70–110)
GLUCOSE BLD STRIP.AUTO-MCNC: 172 MG/DL (ref 70–110)
GLUCOSE SERPL-MCNC: 128 MG/DL (ref 74–99)
HCT VFR BLD AUTO: 31 % (ref 36–48)
HCT VFR BLD AUTO: 31.5 % (ref 36–48)
HGB BLD-MCNC: 9.5 G/DL (ref 13–16)
HGB BLD-MCNC: 9.9 G/DL (ref 13–16)
LYMPHOCYTES # BLD: 2.1 K/UL (ref 0.9–3.6)
LYMPHOCYTES NFR BLD: 21 % (ref 21–52)
MCH RBC QN AUTO: 29.5 PG (ref 24–34)
MCHC RBC AUTO-ENTMCNC: 31.4 G/DL (ref 31–37)
MCV RBC AUTO: 93.8 FL (ref 74–97)
MONOCYTES # BLD: 0.7 K/UL (ref 0.05–1.2)
MONOCYTES NFR BLD: 8 % (ref 3–10)
NEUTS SEG # BLD: 6.8 K/UL (ref 1.8–8)
NEUTS SEG NFR BLD: 69 % (ref 40–73)
PLATELET # BLD AUTO: 273 K/UL (ref 135–420)
PMV BLD AUTO: 10.8 FL (ref 9.2–11.8)
POTASSIUM SERPL-SCNC: 3.8 MMOL/L (ref 3.5–5.5)
RBC # BLD AUTO: 3.36 M/UL (ref 4.7–5.5)
SODIUM SERPL-SCNC: 139 MMOL/L (ref 136–145)
WBC # BLD AUTO: 9.8 K/UL (ref 4.6–13.2)

## 2018-06-08 PROCEDURE — 76060000031 HC ANESTHESIA FIRST 0.5 HR: Performed by: INTERNAL MEDICINE

## 2018-06-08 PROCEDURE — 76040000019: Performed by: INTERNAL MEDICINE

## 2018-06-08 PROCEDURE — 74011000250 HC RX REV CODE- 250

## 2018-06-08 PROCEDURE — 82962 GLUCOSE BLOOD TEST: CPT

## 2018-06-08 PROCEDURE — 74011250636 HC RX REV CODE- 250/636: Performed by: ANESTHESIOLOGY

## 2018-06-08 PROCEDURE — 74011250636 HC RX REV CODE- 250/636: Performed by: INTERNAL MEDICINE

## 2018-06-08 PROCEDURE — 80048 BASIC METABOLIC PNL TOTAL CA: CPT | Performed by: INTERNAL MEDICINE

## 2018-06-08 PROCEDURE — 74011250637 HC RX REV CODE- 250/637: Performed by: INTERNAL MEDICINE

## 2018-06-08 PROCEDURE — 77030008565 HC TBNG SUC IRR ERBE -B: Performed by: INTERNAL MEDICINE

## 2018-06-08 PROCEDURE — 77030034694 HC SCPL CANADY PLSM DISP USMD -E: Performed by: INTERNAL MEDICINE

## 2018-06-08 PROCEDURE — 85018 HEMOGLOBIN: CPT | Performed by: INTERNAL MEDICINE

## 2018-06-08 PROCEDURE — 36415 COLL VENOUS BLD VENIPUNCTURE: CPT | Performed by: INTERNAL MEDICINE

## 2018-06-08 PROCEDURE — 77030011640 HC PAD GRND REM COVD -A: Performed by: INTERNAL MEDICINE

## 2018-06-08 PROCEDURE — 74011250637 HC RX REV CODE- 250/637: Performed by: ANESTHESIOLOGY

## 2018-06-08 PROCEDURE — 0W3P8ZZ CONTROL BLEEDING IN GASTROINTESTINAL TRACT, VIA NATURAL OR ARTIFICIAL OPENING ENDOSCOPIC: ICD-10-PCS | Performed by: INTERNAL MEDICINE

## 2018-06-08 PROCEDURE — 77030013992 HC SNR POLYP ENDOSC BSC -B: Performed by: INTERNAL MEDICINE

## 2018-06-08 PROCEDURE — C9113 INJ PANTOPRAZOLE SODIUM, VIA: HCPCS | Performed by: INTERNAL MEDICINE

## 2018-06-08 PROCEDURE — 85025 COMPLETE CBC W/AUTO DIFF WBC: CPT | Performed by: INTERNAL MEDICINE

## 2018-06-08 PROCEDURE — 74011636637 HC RX REV CODE- 636/637: Performed by: INTERNAL MEDICINE

## 2018-06-08 PROCEDURE — 74011250636 HC RX REV CODE- 250/636

## 2018-06-08 PROCEDURE — 77030009426 HC FCPS BIOP ENDOSC BSC -B: Performed by: INTERNAL MEDICINE

## 2018-06-08 PROCEDURE — 77030018846 HC SOL IRR STRL H20 ICUM -A: Performed by: INTERNAL MEDICINE

## 2018-06-08 RX ORDER — ONDANSETRON 2 MG/ML
4 INJECTION INTRAMUSCULAR; INTRAVENOUS ONCE
Status: CANCELLED | OUTPATIENT
Start: 2018-06-08 | End: 2018-06-08

## 2018-06-08 RX ORDER — INSULIN LISPRO 100 [IU]/ML
INJECTION, SOLUTION INTRAVENOUS; SUBCUTANEOUS
Status: DISCONTINUED | OUTPATIENT
Start: 2018-06-08 | End: 2018-06-08 | Stop reason: HOSPADM

## 2018-06-08 RX ORDER — SODIUM CHLORIDE 9 MG/ML
INJECTION, SOLUTION INTRAVENOUS
Status: DISCONTINUED | OUTPATIENT
Start: 2018-06-08 | End: 2018-06-08 | Stop reason: HOSPADM

## 2018-06-08 RX ORDER — DEXTROSE 50 % IN WATER (D50W) INTRAVENOUS SYRINGE
25-50 AS NEEDED
Status: CANCELLED | OUTPATIENT
Start: 2018-06-08

## 2018-06-08 RX ORDER — FAMOTIDINE 20 MG/1
20 TABLET, FILM COATED ORAL ONCE
Status: COMPLETED | OUTPATIENT
Start: 2018-06-08 | End: 2018-06-08

## 2018-06-08 RX ORDER — LIDOCAINE HYDROCHLORIDE 10 MG/ML
0.1 INJECTION, SOLUTION EPIDURAL; INFILTRATION; INTRACAUDAL; PERINEURAL AS NEEDED
Status: DISCONTINUED | OUTPATIENT
Start: 2018-06-08 | End: 2018-06-08 | Stop reason: HOSPADM

## 2018-06-08 RX ORDER — LIDOCAINE HYDROCHLORIDE 20 MG/ML
INJECTION, SOLUTION EPIDURAL; INFILTRATION; INTRACAUDAL; PERINEURAL AS NEEDED
Status: DISCONTINUED | OUTPATIENT
Start: 2018-06-08 | End: 2018-06-08 | Stop reason: HOSPADM

## 2018-06-08 RX ORDER — SODIUM CHLORIDE 0.9 % (FLUSH) 0.9 %
5-10 SYRINGE (ML) INJECTION AS NEEDED
Status: CANCELLED | OUTPATIENT
Start: 2018-06-08

## 2018-06-08 RX ORDER — MAGNESIUM SULFATE 100 %
4 CRYSTALS MISCELLANEOUS AS NEEDED
Status: CANCELLED | OUTPATIENT
Start: 2018-06-08

## 2018-06-08 RX ORDER — SODIUM CHLORIDE 9 MG/ML
25 INJECTION, SOLUTION INTRAVENOUS ONCE
Status: COMPLETED | OUTPATIENT
Start: 2018-06-08 | End: 2018-06-08

## 2018-06-08 RX ORDER — INSULIN LISPRO 100 [IU]/ML
INJECTION, SOLUTION INTRAVENOUS; SUBCUTANEOUS ONCE
Status: CANCELLED | OUTPATIENT
Start: 2018-06-08 | End: 2018-06-09

## 2018-06-08 RX ORDER — ACETAMINOPHEN 325 MG/1
650 TABLET ORAL
Qty: 30 TAB | Refills: 0 | Status: SHIPPED
Start: 2018-06-08 | End: 2018-08-06

## 2018-06-08 RX ORDER — PROPOFOL 10 MG/ML
INJECTION, EMULSION INTRAVENOUS AS NEEDED
Status: DISCONTINUED | OUTPATIENT
Start: 2018-06-08 | End: 2018-06-08 | Stop reason: HOSPADM

## 2018-06-08 RX ADMIN — OXYCODONE HYDROCHLORIDE AND ACETAMINOPHEN 1 TABLET: 5; 325 TABLET ORAL at 06:05

## 2018-06-08 RX ADMIN — INSULIN GLARGINE 10 UNITS: 100 INJECTION, SOLUTION SUBCUTANEOUS at 08:47

## 2018-06-08 RX ADMIN — PROPOFOL 50 MG: 10 INJECTION, EMULSION INTRAVENOUS at 15:48

## 2018-06-08 RX ADMIN — SODIUM CHLORIDE: 9 INJECTION, SOLUTION INTRAVENOUS at 15:30

## 2018-06-08 RX ADMIN — PANTOPRAZOLE SODIUM 40 MG: 40 INJECTION, POWDER, FOR SOLUTION INTRAVENOUS at 08:47

## 2018-06-08 RX ADMIN — INSULIN LISPRO 2 UNITS: 100 INJECTION, SOLUTION INTRAVENOUS; SUBCUTANEOUS at 15:03

## 2018-06-08 RX ADMIN — PROPOFOL 100 MG: 10 INJECTION, EMULSION INTRAVENOUS at 15:40

## 2018-06-08 RX ADMIN — SODIUM CHLORIDE 25 ML/HR: 900 INJECTION, SOLUTION INTRAVENOUS at 15:00

## 2018-06-08 RX ADMIN — PROPOFOL 50 MG: 10 INJECTION, EMULSION INTRAVENOUS at 15:45

## 2018-06-08 RX ADMIN — ASPIRIN 81 MG: 81 TABLET, COATED ORAL at 08:47

## 2018-06-08 RX ADMIN — LIDOCAINE HYDROCHLORIDE 80 MG: 20 INJECTION, SOLUTION EPIDURAL; INFILTRATION; INTRACAUDAL; PERINEURAL at 15:40

## 2018-06-08 RX ADMIN — ISOSORBIDE MONONITRATE 30 MG: 30 TABLET, EXTENDED RELEASE ORAL at 08:47

## 2018-06-08 RX ADMIN — FAMOTIDINE 20 MG: 20 TABLET ORAL at 15:04

## 2018-06-08 NOTE — DISCHARGE INSTRUCTIONS
DISCHARGE SUMMARY from Nurse    PATIENT INSTRUCTIONS:    After general anesthesia or intravenous sedation, for 24 hours or while taking prescription Narcotics:  · Limit your activities  · Do not drive and operate hazardous machinery  · Do not make important personal or business decisions  · Do  not drink alcoholic beverages  · If you have not urinated within 8 hours after discharge, please contact your surgeon on call. Report the following to your surgeon:  · Excessive pain, swelling, redness or odor of or around the surgical area  · Temperature over 100.5  · Nausea and vomiting lasting longer than 4 hours or if unable to take medications  · Any signs of decreased circulation or nerve impairment to extremity: change in color, persistent  numbness, tingling, coldness or increase pain  · Any questions    What to do at Home:  Recommended activity: Activity as tolerated. If you experience any of the following symptoms: rectal bleeding, shortness of breath, chest pain, temp 100.5 F, please follow up with Primary Care Provider or go to the nearest Emergency Room. *  Please give a list of your current medications to your Primary Care Provider. *  Please update this list whenever your medications are discontinued, doses are      changed, or new medications (including over-the-counter products) are added. *  Please carry medication information at all times in case of emergency situations. These are general instructions for a healthy lifestyle:    No smoking/ No tobacco products/ Avoid exposure to second hand smoke  Surgeon General's Warning:  Quitting smoking now greatly reduces serious risk to your health.     Obesity, smoking, and sedentary lifestyle greatly increases your risk for illness    A healthy diet, regular physical exercise & weight monitoring are important for maintaining a healthy lifestyle    You may be retaining fluid if you have a history of heart failure or if you experience any of the following symptoms:  Weight gain of 3 pounds or more overnight or 5 pounds in a week, increased swelling in our hands or feet or shortness of breath while lying flat in bed. Please call your doctor as soon as you notice any of these symptoms; do not wait until your next office visit. Recognize signs and symptoms of STROKE:    F-face looks uneven    A-arms unable to move or move unevenly    S-speech slurred or non-existent    T-time-call 911 as soon as signs and symptoms begin-DO NOT go       Back to bed or wait to see if you get better-TIME IS BRAIN. Warning Signs of HEART ATTACK     Call 911 if you have these symptoms:   Chest discomfort. Most heart attacks involve discomfort in the center of the chest that lasts more than a few minutes, or that goes away and comes back. It can feel like uncomfortable pressure, squeezing, fullness, or pain.  Discomfort in other areas of the upper body. Symptoms can include pain or discomfort in one or both arms, the back, neck, jaw, or stomach.  Shortness of breath with or without chest discomfort.  Other signs may include breaking out in a cold sweat, nausea, or lightheadedness. Don't wait more than five minutes to call 911 - MINUTES MATTER! Fast action can save your life. Calling 911 is almost always the fastest way to get lifesaving treatment. Emergency Medical Services staff can begin treatment when they arrive -- up to an hour sooner than if someone gets to the hospital by car. The discharge information has been reviewed with the patient. The patient verbalized understanding. Discharge medications reviewed with the patient and appropriate educational materials and side effects teaching were provided. [employer] Activation    Thank you for requesting access to [employer]. Please follow the instructions below to securely access and download your online medical record.  [employer] allows you to send messages to your doctor, view your test results, renew your prescriptions, schedule appointments, and more. How Do I Sign Up? 1. In your internet browser, go to [email or web address]. [email or web address]  2. Click on the First Time User? Click Here link in the Sign In box. You will be redirect to the New Member Sign Up page. 3. Enter your AppCard Access Code exactly as it appears below. You will not need to use this code after youve completed the sign-up process. If you do not sign up before the expiration date, you must request a new code. AppCard Access Code: [email or web address]  Expires: 2018  1:46 PM (This is the date your AppCard access code will )    4. Enter the last four digits of your Social Security Number (xxxx) and Date of Birth (mm/dd/yyyy) as indicated and click Submit. You will be taken to the next sign-up page. 5. Create a AppCard ID. This will be your AppCard login ID and cannot be changed, so think of one that is secure and easy to remember. 6. Create a AppCard password. You can change your password at any time. 7. Enter your Password Reset Question and Answer. This can be used at a later time if you forget your password. 8. Enter your e-mail address. You will receive e-mail notification when new information is available in [address] 9. Click Sign Up. You can now view and download portions of your medical record. 10. Click the Download Summary menu link to download a portable copy of your medical information. Additional Information    If you have questions, please visit the Frequently Asked Questions section of the AppCard website at [email or web address] Remember, AppCard is NOT to be used for urgent needs. For medical emergencies, dial 911.     Patient armband removed and shredded  ___________________________________________________________________________________________________________________________________     Colonoscopy: What to Expect at Home  Your Recovery  After you have a colonoscopy, you will stay at the clinic for 1 to 2 hours until the medicines wear off. Then you can go home. But you will need to arrange for a ride. Your doctor will tell you when you can eat and do your other usual activities. Your doctor will talk to you about when you will need your next colonoscopy. Your doctor can help you decide how often you need to be checked. This will depend on the results of your test and your risk for colorectal cancer. After the test, you may be bloated or have gas pains. You may need to pass gas. If a biopsy was done or a polyp was removed, you may have streaks of blood in your stool (feces) for a few days. This care sheet gives you a general idea about how long it will take for you to recover. But each person recovers at a different pace. Follow the steps below to get better as quickly as possible. How can you care for yourself at home? Activity  ? · Rest when you feel tired. ? · You can do your normal activities when it feels okay to do so. Diet  ? · Follow your doctor's directions for eating. ? · Unless your doctor has told you not to, drink plenty of fluids. This helps to replace the fluids that were lost during the colon prep. ? · Do not drink alcohol. Medicines  ? · Your doctor will tell you if and when you can restart your medicines. He or she will also give you instructions about taking any new medicines. ? · If you take blood thinners, such as warfarin (Coumadin), clopidogrel (Plavix), or aspirin, be sure to talk to your doctor. He or she will tell you if and when to start taking those medicines again. Make sure that you understand exactly what your doctor wants you to do. ? · If polyps were removed or a biopsy was done during the test, your doctor may tell you not to take aspirin or other anti-inflammatory medicines for a few days. These include ibuprofen (Advil, Motrin) and naproxen (Aleve). Other instructions  ?  · For your safety, do not drive or operate machinery until the medicine wears off and you can think clearly. Your doctor may tell you not to drive or operate machinery until the day after your test.   ? · Do not sign legal documents or make major decisions until the medicine wears off and you can think clearly. The anesthesia can make it hard for you to fully understand what you are agreeing to. Follow-up care is a key part of your treatment and safety. Be sure to make and go to all appointments, and call your doctor if you are having problems. It's also a good idea to know your test results and keep a list of the medicines you take. When should you call for help? Call 911 anytime you think you may need emergency care. For example, call if:  ? · You passed out (lost consciousness). ? · You pass maroon or bloody stools. ? · You have trouble breathing. ?Call your doctor now or seek immediate medical care if:  ? · You have pain that does not get better after you take pain medicine. ? · You are sick to your stomach or cannot drink fluids. ? · You have new or worse belly pain. ? · You have blood in your stools. ? · You have a fever. ? · You cannot pass stools or gas. ? Watch closely for changes in your health, and be sure to contact your doctor if you have any problems. Where can you learn more? Go to http://kendal-farzaneh.info/. Enter E264 in the search box to learn more about \"Colonoscopy: What to Expect at Home. \"  Current as of: May 12, 2017  Content Version: 11.4  © 7102-6189 Healthwise, Incorporated. Care instructions adapted under license by [employer] (which disclaims liability or warranty for this information). If you have questions about a medical condition or this instruction, always ask your healthcare professional. [address] any warranty or liability for your use of this information.

## 2018-06-08 NOTE — PROGRESS NOTES
Progress Note    Celeste Richey  68 y.o. Admit Date: 6/5/2018  Principal Problem:    Lower GI bleed (5/28/2018) POA: Unknown    Active Problems:    S/P coronary artery stent placement (3/12/2013) POA: Yes      Overview: vg to diag stent 10/2012      Essential hypertension, benign () POA: Yes      ESRD (end stage renal disease) ([address] (11/8/2017) POA: Yes      Radiation proctitis (6/6/2018) POA: Unknown            Subjective:     Patient feels goo, waiting for Flex Sig,off Plavix. Was dialyzed yesterday. A comprehensive review of systems was negative except for that written in the History of Present Illness.     Objective:     Visit Vitals    /66 (BP 1 Location: Right arm, BP Patient Position: At rest)    Pulse 85    Temp 98.2 °F (36.8 °C)    Resp 20    Wt 97.5 kg (214 lb 14.4 oz)    SpO2 98%    BMI 36.89 kg/m2         Intake/Output Summary (Last 24 hours) at 06/08/18 1312  Last data filed at 06/08/18 0846   Gross per 24 hour   Intake              480 ml   Output             2000 ml   Net            -1520 ml       Current Facility-Administered Medications   Medication Dose Route Frequency Provider Last Rate Last Dose    aspirin delayed-release tablet 81 mg  81 mg Oral DAILY Kiki Morrissey MD   81 mg at 06/08/18 0847    isosorbide mononitrate ER (IMDUR) tablet 30 mg  30 mg Oral DAILY Kiki Morrissey MD   30 mg at 06/08/18 0847    nitroglycerin (NITROSTAT) tablet 0.4 mg  0.4 mg SubLINGual PRN Kiki Morrissey MD        simvastatin (ZOCOR) tablet 40 mg  40 mg Oral QHS Kiki Morrissey MD   40 mg at 06/07/18 2207    pantoprazole (PROTONIX) injection 40 mg  40 mg IntraVENous DAILY Kiki Morrissey MD   40 mg at 06/08/18 0847    acetaminophen (TYLENOL) tablet 650 mg  650 mg Oral Q6H PRN Kiki Morrissey MD        oxyCODONE-acetaminophen (PERCOCET) 5-325 mg per tablet 1 Tab  1 Tab Oral Q6H PRN Kiki Morrissey MD   1 Tab at 06/08/18 0605    naloxone [facility]) injection 0.4 mg  0.4 mg IntraVENous PRN aMo Fontanez MD        [facility]) injection 4 mg  4 mg IntraVENous Q6H PRN Mao Fontanez MD   4 mg at 06/07/18 2022    docusate sodium (COLACE) capsule 100 mg  100 mg Oral BID PRN Mao Fontanez MD        insulin glargine (LANTUS) injection 10 Units  10 Units SubCUTAneous DAILY Mao Fontanez MD   10 Units at 06/08/18 0847    insulin lispro (HUMALOG) injection   SubCUTAneous AC&HS Mao Fontanez MD   Stopped at 06/08/18 0730    glucose chewable tablet 16 g  4 Tab Oral PRN Mao Fontanez MD        glucagon (GLUCAGEN) injection 1 mg  1 mg IntraMUSCular PRN Mao Fontanez MD        dextrose (D50W) injection syrg 12.5-25 g  25-50 mL IntraVENous PRN Mao Fontanez MD        epoetin lee (EPOGEN;PROCRIT) injection 10,000 Units  10,000 Units IntraVENous DIALYSIS ABIEL KRUGER & HUGO Bright MD   10,000 Units at 06/07/18 1405    doxercalciferol (HECTOROL) 4 mcg/2 mL injection 1 mcg  1 mcg IntraVENous DIALYSIS ABDI KRUGER MD   1 mcg at 06/07/18 1658        Physical Exam:     Physical Exam:   General:  Alert, cooperative, no distress, appears stated age. Eyes:  Conjunctivae/corneas clear. PERRL, EOMs intact. Mouth/Throat: Lips, mucosa, and tongue normal. Teeth and gums normal.   Neck: Supple, symmetrical, trachea midline, no adenopathy, thyroid: no enlargement/tenderness/nodules, no carotid bruit and no JVD. Lungs:   Clear to auscultation bilaterally. Heart:  Regular rate and rhythm, S1, S2 normal, no murmur, click, rub or gallop. Abdomen:   Soft, non-tender. Bowel sounds normal. No masses,  No organomegaly.    Extremities: Extremities normal, atraumatic, no cyanosis or edema, AVF on left arm has good thrill &bruit   Skin: Skin color, texture, turgor normal. No rashes or lesions         Data Review:    CBC w/Diff    Recent Labs      06/08/18   0907  06/08/18   0317  06/07/18   1620   06/07/18   0134   06/06/18   0330   WBC   --   9.8   -- --   9.0   --   8.9   RBC   --   3.36*   --    --   3.31*   --   3.33*   HGB  9.5*  9.9*  9.8*   < >  9.8*   < >  9.8*   HCT  31.0*  31.5*  30.7*   < >  30.7*   < >  29.8*   MCV   --   93.8   --    --   92.7   --   89.5   MCH   --   29.5   --    --   29.6   --   29.4   MCHC   --   31.4   --    --   31.9   --   32.9   RDW   --   13.3   --    --   13.2   --   13.2    < > = values in this interval not displayed. Recent Labs      06/08/18 0317 06/07/18 0134 06/06/18   0330   MONOS  8  9  6   EOS  2  2  2   BASOS  0  0  0   RDW  13.3  13.2  13.2        Comprehensive Metabolic Profile    Recent Labs      06/08/18 0317 06/07/18 0134 06/06/18   0330   NA  139  139  138   K  3.8  3.9  3.8   CL  100  101  99*   CO2  30  27  28   BUN  30*  55*  50*   CREA  5.07*  7.04*  6.43*    Recent Labs      06/08/18 0317 06/07/18 0134 06/06/18   0330  06/05/18 2018   CA  9.2  8.7  8.8  8.9   PHOS   --   4.7   --    --    ALB   --    --    --   3.3*   TP   --    --    --   7.9   SGOT   --    --    --   10*   TBILI   --    --    --   0.7                        Impression:       Active Hospital Problems    Diagnosis Date Noted    Radiation proctitis 06/06/2018    Lower GI bleed 05/28/2018    ESRD (end stage renal disease) [address] 11/08/2017    Essential hypertension, benign     S/P coronary artery stent placement 03/12/2013     vg to diag stent 10/2012              Plan:     Await Flex Sigmoidoscopy, watch H/H , Dialysis tomorrow without Heparin.       Dodie Farnsworth MD

## 2018-06-08 NOTE — PROGRESS NOTES
Problem: Falls - Risk of  Goal: *Absence of Falls  Document Rene Fall Risk and appropriate interventions in the flowsheet.    Outcome: Progressing Towards Goal  Fall Risk Interventions:  Mobility Interventions: Strengthening exercises (ROM-active/passive)         Medication Interventions: Evaluate medications/consider consulting pharmacy    Elimination Interventions: Call light in reach

## 2018-06-08 NOTE — ROUTINE PROCESS
Bedside and Verbal shift change report given to Kaye Calhoun RN (oncoming nurse) by Lyndon Del Castillo (offgoing nurse). Report included the following information SBAR, Kardex, MAR and Recent Results. SITUATION:    Code Status: Full Code   Reason for Admission: Lower GI bleed    [facility] day: 3   Problem List:       Hospital Problems  Date Reviewed: 5/16/2018          Codes Class Noted POA    Radiation proctitis ICD-10-CM: K62.7  ICD-9-CM: 569.49  6/6/2018 Unknown        * (Principal)Lower GI bleed ICD-10-CM: K92.2  ICD-9-CM: 578.9  5/28/2018 Unknown        ESRD (end stage renal disease) ([facility] ICD-10-CM: N18.6  ICD-9-CM: 585.6  11/8/2017 Yes        Essential hypertension, benign (Chronic) ICD-10-CM: I10  ICD-9-CM: 401.1  Unknown Yes        S/P coronary artery stent placement ICD-10-CM: Z95.5  ICD-9-CM: V45.82  3/12/2013 Yes    Overview Signed 3/12/2013  3:37 PM by Davi Álvarez MD     vg to diag stent 10/2012                   BACKGROUND:    Past Medical History:   Past Medical History:   Diagnosis Date    Atherosclerosis of renal artery ([address]     S/P Rt stent    CAD (coronary artery disease) , 1997, 2012    ,double bypass, 2 stents, 2stents 7/2016    Cancer ([address]     prostate    CHF (congestive heart failure) ([address]     Chronic diastolic heart failure (HCC)     Chronic kidney disease     Chronic kidney disease (CKD), stage IV (severe) ([address]     On Dialysis now -T-Th-sat    Constipation     Coronary atherosclerosis of unspecified type of vessel, native or graft     Stable angina after recent PCI continue treatment.        CRI (chronic renal insufficiency)     Diabetes ([address] 1973    type 2    Essential hypertension, benign     GERD (gastroesophageal reflux disease)     Gout     Hypertension 1982    Morbid obesity ([address]     Weight loss has been strongly encouraged by following dietary restrictions and an exercise routine    APRYL (obstructive sleep apnea) 6/26/2015    no cpap    Other and unspecified hyperlipidemia     HDL's are not at goal, LDL's are at goal, triglycerides are not at goal.    Other and unspecified hyperlipidemia     HDL's are not at goal, LDL's are at goal, triglycerides are not at goal.     Other ill-defined conditions(799.89) 1960    pneumonia twice    Sleep disturbance     Type II or unspecified type diabetes mellitus without mention of complication, not stated as uncontrolled          Patient taking anticoagulants no     ASSESSMENT:    Changes in Assessment Throughout Shift:  none     Patient has Central Line: no Reasons if yes: na   Patient has Xiao Cath: no Reasons if yes: na      Last Vitals:     Vitals:    06/07/18 1956 06/07/18 2108 06/08/18 0001 06/08/18 0415   BP: 143/60 137/66 151/68 142/60   Pulse: 94 93 83 82   Resp: 18 19 18 18   Temp: 98.6 °F (37 °C) 98.9 °F (37.2 °C) 98.3 °F (36.8 °C) 98.1 °F (36.7 °C)   SpO2: 96% 95% 98% 96%   Weight:   97.5 kg (214 lb 14.4 oz)         IV and DRAINS (will only show if present)   Peripheral IV 06/07/18 Left Wrist-Site Assessment: Clean, dry, & intact  [REMOVED] Peripheral IV 06/05/18 Right Antecubital-Site Assessment: Clean, dry, & intact     WOUND (if present)   Wound Type:  none   Dressing present Dressing Present : No   Wound Concerns/Notes:  none     PAIN    Pain Assessment    Pain Intensity 1: 0 (06/08/18 0406)              Patient Stated Pain Goal: 0  o Interventions for Pain:  yes  o Intervention effective: yes  o Time of last intervention:  See MAR   o Reassessment Completed: yes      Last 3 Weights:  Last 3 Recorded Weights in this Encounter    06/06/18 0425 06/07/18 0607 06/08/18 0001   Weight: 98.7 kg (217 lb 11.2 oz) 98.8 kg (217 lb 12.8 oz) 97.5 kg (214 lb 14.4 oz)     Weight change: -1.315 kg (-2 lb 14.4 oz)     INTAKE/OUPUT    Current Shift:      Last three shifts: 06/06 1901 - 06/08 0700  In: 80 [P.O.:80]  Out: 2000      LAB RESULTS     Recent Labs      06/08/18   0317  06/07/18   1620  06/07/18   6289 06/07/18   0134   06/06/18   0330   WBC  9.8   --    --   9.0   --   8.9   HGB  9.9*  9.8*  9.7*  9.8*   < >  9.8*   HCT  31.5*  30.7*  30.4*  30.7*   < >  29.8*   PLT  273   --    --   264   --   249    < > = values in this interval not displayed. Recent Labs      06/08/18   0317  06/07/18 0134 06/06/18   0330   NA  139  139  138   K  3.8  3.9  3.8   GLU  128*  183*  80   BUN  30*  55*  50*   CREA  5.07*  7.04*  6.43*   CA  9.2  8.7  8.8   MG   --   2.0   --        RECOMMENDATIONS AND DISCHARGE PLANNING     1. Pending tests/procedures/ Plan of Care or Other Needs: Sigmoidoscopy today      2. Discharge plan for patient and Needs/Barriers: return home    3. Estimated Discharge Date: pending Posted on Whiteboard in Patients Room: yes      4. The patient's care plan was reviewed with the oncoming nurse. \"HEALS\" SAFETY CHECK      Fall Risk    Total Score: 1    Safety Measures: Safety Measures: Bed/Chair-Wheels locked, Bed in low position, Call light within reach, Caregiver at bedside, Gripper socks    A safety check occurred in the patient's room between off going nurse and oncoming nurse listed above. The safety check included the below items  Area Items   H  High Alert Medications - Verify all high alert medication drips (heparin, PCA, etc.)   E  Equipment - Suction is set up for ALL patients (with yanker)  - Red plugs utilized for all equipment (IV pumps, etc.)  - WOWs wiped down at end of shift.  - Room stocked with oxygen, suction, and other unit-specific supplies   A  Alarms - Bed alarm is set for fall risk patients  - Ensure chair alarm is in place and activated if patient is up in a chair   L  Lines - Check IV for any infiltration  - Xiao bag is empty if patient has a Xiao   - Tubing and IV bags are labeled   S  Safety   - Room is clean, patient is clean, and equipment is clean. - Hallways are clear from equipment besides carts.    - Fall bracelet on for fall risk patients  - Ensure room is clear and free of clutter  - Suction is set up for ALL patients (with ely)  - Hallways are clear from equipment besides carts.    - Isolation precautions followed, supplies available outside room, sign posted     Florencio Oconnor

## 2018-06-08 NOTE — PROGRESS NOTES
Discussed with Dr. Jaz Rodriguez regarding medication reconciliation, follow up and discharge order as appropriate.      Signed By: Tatum Rider NP     June 8, 2018

## 2018-06-08 NOTE — PROGRESS NOTES
Pt cardiac monitoring order . Dr. Jaz Rodriguez notified and gave telephone order to renew tele order and allow pt to leave unit without tele.

## 2018-06-08 NOTE — PERIOP NOTES
TRANSFER - OUT REPORT:    Verbal report given to [address]  on Petr Fong  being transferred to  for routine post - op       Report consisted of patients Situation, Background, Assessment and   Recommendations(SBAR). Information from the following report(s) SBAR and MAR was reviewed with the receiving nurse. Lines:   Peripheral IV 06/07/18 Left Wrist (Active)   Site Assessment Clean, dry, & intact 6/8/2018  3:52 PM   Phlebitis Assessment 0 6/8/2018  3:52 PM   Infiltration Assessment 0 6/8/2018  3:52 PM   Dressing Status Clean, dry, & intact 6/8/2018  3:52 PM   Dressing Type Tape;Transparent 6/8/2018  3:52 PM   Hub Color/Line Status Blue; Infusing 6/8/2018  3:52 PM   Alcohol Cap Used Yes 6/7/2018 11:34 PM        Opportunity for questions and clarification was provided.       Patient transported with:   [facility]

## 2018-06-08 NOTE — ANESTHESIA PREPROCEDURE EVALUATION
Anesthetic History   No history of anesthetic complications            Review of Systems / Medical History  Patient summary reviewed and pertinent labs reviewed    Pulmonary        Sleep apnea: CPAP           Neuro/Psych   Within defined limits           Cardiovascular    Hypertension          CAD, cardiac stents and CABG    Exercise tolerance: >4 METS     GI/Hepatic/Renal         Renal disease: ESRD and dialysis       Endo/Other    Diabetes: type 2    Morbid obesity     Other Findings   Comments: Documentation of current medication  Current medications obtained, documented and obtained? YES      Risk Factors for Postoperative nausea/vomiting:       History of postoperative nausea/vomiting? NO       Female? NO       Motion sickness? NO       Intended opioid administration for postoperative analgesia? NO      Smoking Abstinence:  Current Smoker? NO  Elective Surgery? YES  Seen preoperatively by anesthesiologist or proxy prior to day of surgery? YES  Pt abstained from smoking 24 hours prior to anesthesia?  N/A    Preventive care/screening for High Blood Pressure:  Aged 18 years and older: YES  Screened for high blood pressure: YES  Patients with high blood pressure referred to primary care provider   for BP management: YES                 Physical Exam    Airway  Mallampati: II  TM Distance: 4 - 6 cm  Neck ROM: normal range of motion   Mouth opening: Normal     Cardiovascular  Regular rate and rhythm,  S1 and S2 normal,  no murmur, click, rub, or gallop  Rhythm: regular  Rate: normal         Dental    Dentition: Full upper dentures     Pulmonary  Breath sounds clear to auscultation               Abdominal  GI exam deferred       Other Findings            Anesthetic Plan    ASA: 4  Anesthesia type: MAC          Induction: Intravenous  Anesthetic plan and risks discussed with: Patient

## 2018-06-08 NOTE — PROGRESS NOTES
Admit Date: 2018  Date of Service: 2018    Reason for follow-up: rectal bleeding      Assessment:         Rectal bleeding- recurrent:  M/l related to radiation proctitis. Followed by GI; plans for Flex sig in am; Hb remains stable overall     CAD with h/o CABG ; stent to SVG in  and 2016, SVG to diagonal 2017. Recent NSTEMI 2018    ESRD on HD: Tues/Thurs, Sat  HTN: controlled  Hyperlipidemia: on Zocor  Dm type 2:  On ssi; controlled at present    Plan:   Follow CBC, BMP  Discussed with , nursing  this am  D/c either later today or in am depending upon outcome of flex sig. Current Antibtiocs:   None    Lines:   Peripheral     I spent 45 minutes with the patient in face-to-face consultation, of which greater than 50% was spent in counseling and coordination of care as described above. Case discussed with:  [x]Patient  []Family  []Nursing  []Case Management  DVT Prophylaxis:  []Lovenox  []Hep SQ  [x]SCDs  []Coumadin   []On Heparin gtt-I have independently examined the patient and reviewed all lab studies and imgaing as well as review of nursing notes and physican notes from the past 24 hours. The plan of care has been discussed with the patient and all questions are answered. Radha Tyler D.O. Pager [phone]      Allergies   Allergen Reactions    Nka [No Known Allergies] Other (comments)           Subjective:      Pt seen and examined. Feeling better today. No more blood in stools. Tolerated enema this am after Percocet. Still has modest rectal and lower abdominal pain. Hungry. No other complaints today. Anxious to go home if possible.  Procedure scheduled for 3 pm      Objective:        Visit Vitals    /69 (BP 1 Location: Right arm, BP Patient Position: At rest)    Pulse 74    Temp 97.4 °F (36.3 °C)    Resp 18    Wt 97.5 kg (214 lb 14.4 oz)    SpO2 95%    BMI 36.89 kg/m2     Temp (24hrs), Av.1 °F (36.7 °C), Min:97.2 °F (36.2 °C), Max:98.9 °F (37.2 °C)        General:   awake alert and oriented, non-toxic   Skin:   no rashes or skin lesions noted on limited exam, dry and warm   HEENT:  No scleral icterus or pallor; oral mucosa moist, lips moist   Lymph Nodes:   not assessed today   Lungs:   non, labored; bilaterally clear to aspiration- no crackles wheezes rales or rhonchi   Heart:  RRR, s1 and s2; no murmurs rubs or gallops; no edema, + pedal pulses   Abdomen:  soft, protuberant, non-distended, active bowel sounds,non-tender   Genitourinary:  deferred   Extremities:   average muscle tone; no contractures, no joint effusions   Neurologic:  No gross focal motor or sensory abnormalities; CN 2-12 intact; Follows commands. Psychiatric:   appropriate and interactive. Labs: Results:   Chemistry Recent Labs      06/08/18   0317  06/07/18   0134  06/06/18   0330  06/05/18 2018   GLU  128*  183*  80  80   NA  139  139  138  135*   K  3.8  3.9  3.8  3.9   CL  100  101  99*  96*   CO2  30  27  28  32   BUN  30*  55*  50*  47*   CREA  5.07*  7.04*  6.43*  6.54*   CA  9.2  8.7  8.8  8.9   AGAP  9  11  11  7   BUCR  6*  8*  8*  7*   AP   --    --    --   93   TP   --    --    --   7.9   ALB   --    --    --   3.3*   GLOB   --    --    --   4.6*   AGRAT   --    --    --   0.7*      CBC w/Diff Recent Labs      06/08/18   0907  06/08/18   0317  06/07/18   1620   06/07/18   0134   06/06/18   0330   WBC   --   9.8   --    --   9.0   --   8.9   RBC   --   3.36*   --    --   3.31*   --   3.33*   HGB  9.5*  9.9*  9.8*   < >  9.8*   < >  9.8*   HCT  31.0*  31.5*  30.7*   < >  30.7*   < >  29.8*   PLT   --   273   --    --   264   --   249   GRANS   --   69   --    --   76*   --   73   LYMPH   --   21   --    --   13*   --   19*   EOS   --   2   --    --   2   --   2    < > = values in this interval not displayed.         Lab Results   Component Value Date/Time    Specimen Description: CLEAN [facility] 10/10/2012 06:15 AM    Lab Results   Component Value Date/Time    Culture result: NO GROWTH 6 DAYS 11/27/2017 05:01 PM    Culture result: NO GROWTH 6 DAYS 11/27/2017 04:40 PM    Culture result: NO GROWTH 2 DAYS 10/08/2015 07:46 PM    Culture result: NO GROWTH 1 DAY 10/10/2012 06:15 AM          Imaging:     None to review today

## 2018-06-08 NOTE — ROUTINE PROCESS
Pt received tap water enema. Pt was able to tolerate 800 ml, divided. Mostly water came out, some formed brown stool. Pt fairly tolerated.

## 2018-06-08 NOTE — PROGRESS NOTES
Care Management Specialist reviewed over Important Medicare Rights Letter with patient. Patient signed letter. Patient given a copy of letter at bedside. Signed letter placed into patient chart.

## 2018-06-08 NOTE — PROGRESS NOTES
[facility]      CARDIOLOGY PROGRESS NOTE  RECS:  1. 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 Will follow along with GI. For flex sig today. Monitor H/H.  2. CAD h/o CABG 1997, stent to SVG in 2012 and 7/2016, SVG to diagonal 11/2017. Recent NSTEMI 1/2018  3. Chronic diastolic CHF - NYHA class 3 - compensated. 4. End-stage renal disease on hemodialysis - nephrology following  5. Hypertension - controlled with current regimen  6. Hyperlipidemia - continue crestor  7. DM 2       ASSESSMENT:  Hospital Problems  Date Reviewed: 5/16/2018          Codes Class Noted POA    Radiation proctitis ICD-10-CM: K62.7  ICD-9-CM: 569.49  6/6/2018 Unknown        * (Principal)Lower GI bleed ICD-10-CM: K92.2  ICD-9-CM: 578.9  5/28/2018 Unknown        ESRD (end stage renal disease) [facility] ICD-10-CM: N18.6  ICD-9-CM: 585.6  11/8/2017 Yes        Essential hypertension, benign (Chronic) ICD-10-CM: I10  ICD-9-CM: 401.1  Unknown Yes        S/P coronary artery stent placement ICD-10-CM: Z95.5  ICD-9-CM: V45.82  3/12/2013 Yes    Overview Signed 3/12/2013  3:37 PM by Teodoro Peña MD     vg to diag stent 10/2012                     SUBJECTIVE:  No CP or SOB or abdominal pain    OBJECTIVE:    VS:   Visit Vitals    /69 (BP 1 Location: Right arm, BP Patient Position: At rest)    Pulse 74    Temp 97.4 °F (36.3 °C)    Resp 18    Wt 97.5 kg (214 lb 14.4 oz)    SpO2 95%    BMI 36.89 kg/m2         Intake/Output Summary (Last 24 hours) at 06/08/18 0837  Last data filed at 06/08/18 0980   Gross per 24 hour   Intake                0 ml   Output             2000 ml   Net            -2000 ml     TELE: normal sinus rhythm    General: alert and in no apparent distress  HENT: Normocephalic, atraumatic. Normal external eye.   Neck :  no JVD  Cardiac:  regular rate and rhythm, systolic murmur: systolic ejection 2/6, harsh at lower left sternal border  Lungs: clear to auscultation bilaterally  Abdomen: Soft, nontender, no masses  Extremities:  No c/c/e, peripheral pulses present      Labs: Results:       Chemistry Recent Labs      06/08/18 0317 06/07/18   0134 06/06/18   0330  06/05/18   2018   GLU  128*  183*  80  80   NA  139  139  138  135*   K  3.8  3.9  3.8  3.9   CL  100  101  99*  96*   CO2  30  27  28  32   BUN  30*  55*  50*  47*   CREA  5.07*  7.04*  6.43*  6.54*   CA  9.2  8.7  8.8  8.9   AGAP  9  11  11  7   BUCR  6*  8*  8*  7*   AP   --    --    --   93   TP   --    --    --   7.9   ALB   --    --    --   3.3*   GLOB   --    --    --   4.6*   AGRAT   --    --    --   0.7*      CBC w/Diff Recent Labs      06/08/18 0317 06/07/18   1620  06/07/18   0816  06/07/18   0134   06/06/18   0330   WBC  9.8   --    --   9.0   --   8.9   RBC  3.36*   --    --   3.31*   --   3.33*   HGB  9.9*  9.8*  9.7*  9.8*   < >  9.8*   HCT  31.5*  30.7*  30.4*  30.7*   < >  29.8*   PLT  273   --    --   264   --   249   GRANS  69   --    --   76*   --   73   LYMPH  21   --    --   13*   --   19*   EOS  2   --    --   2   --   2    < > = values in this interval not displayed. Cardiac Enzymes No results for input(s): CPK, CKND1, PAIGE in the last 72 hours. No lab exists for component: CKRMB, TROIP   Coagulation No results for input(s): PTP, INR, APTT in the last 72 hours. No lab exists for component: INREXT    Lipid Panel Lab Results   Component Value Date/Time    Cholesterol, total 158 10/24/2017 05:08 AM    HDL Cholesterol 39 (L) 10/24/2017 05:08 AM    LDL, calculated 72.4 10/24/2017 05:08 AM    VLDL, calculated 46.6 10/24/2017 05:08 AM    Triglyceride 233 (H) 10/24/2017 05:08 AM    CHOL/HDL Ratio 4.1 10/24/2017 05:08 AM      BNP No results for input(s): BNPP in the last 72 hours.    Liver Enzymes Recent Labs      06/05/18 2018   TP  7.9   ALB  3.3*   AP  93   SGOT  10*      Thyroid Studies Lab Results   Component Value Date/Time    T4, Total 10.9 10/09/2012 04:53 AM    TSH 2.16 10/23/2017 11:08 PM              Felisha Frost NP   Pager # [phone]

## 2018-06-08 NOTE — PROGRESS NOTES
Problem: Falls - Risk of  Goal: *Absence of Falls  Document Rene Fall Risk and appropriate interventions in the flowsheet.    Outcome: Progressing Towards Goal  Fall Risk Interventions:  Mobility Interventions: Strengthening exercises (ROM-active/passive)         Medication Interventions: Evaluate medications/consider consulting pharmacy, Teach patient to arise slowly    Elimination Interventions: Call light in reach, Patient to call for help with toileting needs

## 2018-06-08 NOTE — PROGRESS NOTES
NUTRITION    Nutrition Screen     RECOMMENDATIONS / PLAN:     - Recommend resuming diabetic, renal diet as medically appropriate after procedure today. - Continue RD inpatient monitoring and evaluation. NUTRITION INTERVENTIONS & DIAGNOSIS:     [] Meals/snacks: modify composition: NPO    Nutrition Diagnosis: Inadequate oral intake related to altered GI function as evidenced by pt NPO for procedure today with GI bleed. Increased nutrient needs (calorie, protein) related to increased expenditure as evidenced by pt with ESRD on HD 3x per week. ASSESSMENT:     Pt NPO for flex sig this afternoon. No more blood in stools per MD note. Feeling hungry. Eating well PTA with stable weight hx. Pt reports consuming some liquids and solids on 6/6-6/7 then made NPO today for procedure. Average po intake adequate to meet patients estimated nutritional needs:   [] Yes     [x] No   [] Unable to determine at this time    Diet: DIET NPO Except Meds      Food Allergies: NKFA  Current Appetite:   [] Good     [] Fair     [] Poor     [x] Other: NPO  Appetite/meal intake prior to admission:   [x] Good     [] Fair     [] Poor     [x] Other: 3 meals/day  Feeding Limitations:  [] Swallowing difficulty    [] Chewing difficulty    [] Other:  Current Meal Intake: No data found.       BM:  6/8  Skin Integrity: WDL   Edema: None  Pertinent Medications: Reviewed: colace, hectorol, lantus, SSI, zofran, protonix    Recent Labs      06/08/18   0317  06/07/18   0134  06/06/18   0330  06/05/18 2018   NA  139  139  138  135*   K  3.8  3.9  3.8  3.9   CL  100  101  99*  96*   CO2  30  27  28  32   GLU  128*  183*  80  80   BUN  30*  55*  50*  47*   CREA  5.07*  7.04*  6.43*  6.54*   CA  9.2  8.7  8.8  8.9   MG   --   2.0   --    --    PHOS   --   4.7   --    --    ALB   --    --    --   3.3*   SGOT   --    --    --   10*   ALT   --    --    --   18       Intake/Output Summary (Last 24 hours) at 06/08/18 1337  Last data filed at 06/08/18 0929   Gross per 24 hour   Intake              480 ml   Output             2000 ml   Net            -1520 ml       Anthropometrics:  Ht Readings from Last 1 Encounters:   05/28/18 5' 4\" (1.626 m)     Last 3 Recorded Weights in this Encounter    06/06/18 0425 06/07/18 0607 06/08/18 0001   Weight: 98.7 kg (217 lb 11.2 oz) 98.8 kg (217 lb 12.8 oz) 97.5 kg (214 lb 14.4 oz)     Body mass index is 36.89 kg/(m^2). Obese Class II    Weight History: Pt reports stable body weigh with UBW around 218 lbs. Weight Metrics 6/8/2018 5/29/2018 5/16/2018 5/7/2018 4/30/2018 3/22/2018 3/9/2018   Weight 214 lb 14.4 oz 214 lb 11.2 oz 225 lb 225 lb 206 lb 217 lb 217 lb 11.2 oz   BMI 36.89 kg/m2 36.85 kg/m2 34.21 kg/m2 34.21 kg/m2 31.32 kg/m2 32.05 kg/m2 32.15 kg/m2        Admitting Diagnosis: Lower GI bleed  DX  Pertinent PMHx: CAD, prostate cancer, ESRD on HD, CHF, DM2, HTn    Education Needs:        [x] None identified  [] Identified - Not appropriate at this time  []  Identified and addressed - refer to education log  Learning Limitations:   [x] None identified  [] Identified    Cultural, Buddhist & ethnic food preferences:  [x] None identified    [] Identified and addressed     ESTIMATED NUTRITION NEEDS:     Calories: 5893-3385 kcal (30-35 kcal/kg) based on  [] Actual BW      [x] SBW: 71 kg   Protein:  gm (1.2-1.5 gm/kg) based on  [] Actual BW      [x] SBW   Fluid: 1 mL/kcal     MONITORING & EVALUATION:     Nutrition Goal(s):   1. Po intake of meals will meet >75% of patient estimated nutritional needs within the next 7 days.   Outcome:  [] Met/Ongoing    []  Not Met    [x] New/Initial Goal     Monitoring:   [x] Food and beverage intake   [x] Diet order   [x] Nutrition-focused physical findings   [x] Treatment/therapy   [] Weight   [] Enteral nutrition intake        Previous Recommendations (for follow-up assessments only):     []   Implemented       []   Not Implemented (RD to address)      [] No Longer Appropriate [] No Recommendation Made     Discharge Planning: diabetic, renal diet   [x] Participated in care planning, discharge planning, & interdisciplinary rounds as appropriate      Donny Mccrary RD   Pager: [phone]

## 2018-06-08 NOTE — PROGRESS NOTES
TRANSFER - IN REPORT:    Verbal report received from [address] [place] [address]. (name) on Racquel Delgado  being received from PACU(unit) for routine progression of care      Report consisted of patients Situation, Background, Assessment and   Recommendations(SBAR). Information from the following report(s) SBAR, Kardex, Procedure Summary and Recent Results was reviewed with the receiving nurse. Opportunity for questions and clarification was provided. Assessment completed upon patients arrival to unit and care assumed.

## 2018-06-08 NOTE — PROCEDURES
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 Upon sequential sedation as per above, a digital rectal exam was performed and was normal.  The Olympus videocolonoscope was inserted in the rectum and carefully advanced to the mid rectum. The quality of preparation was adequate. The colonoscope was slowly withdrawn with careful evaluation between folds. Retroflexion in the rectum was performed and was normal.    Findings:   Rectum: 2 areas of abnormal mucosa ,oozing BRB w/ gently water spray. Both areas ablated using the APC device  Sigmoid: not intubated  Descending Colon: not intubated    Interventions:  2 areas oozing BRB ablated w/ 1.5 L flow rate at 30 fraser   No further oozing after completion of APC Rx    Specimen Removed: * No specimens in log *    Complications: None. EBL:  None. Recommendations: -Regular diet. Resume normal medication(s). If bleed persists, will Rx w/ RFA as OP    Discharge Disposition:  Home in the company of a  when able to ambulate.         Radha Thurston MD  6/8/2018  3:51 PM

## 2018-06-08 NOTE — PROGRESS NOTES
Discharge instructions reviewed with patient. Pt states understanding. IV removed. Denies pain. Pt has all belongings. Pt discharged by wheelchair.  [address] [place] [address] aware of Home Health order.

## 2018-06-09 ENCOUNTER — HOME HEALTH ADMISSION (OUTPATIENT)
Dept: HOME HEALTH SERVICES | Facility: HOME HEALTH | Age: 78
End: 2018-06-09

## 2018-06-09 NOTE — ROUTINE PROCESS
Pt discharged in the p.m. To home with his family, pt had an order [facility], pt referred to [facility].

## 2018-06-09 NOTE — ANESTHESIA POSTPROCEDURE EVALUATION
Post-Anesthesia Evaluation and Assessment    Patient: Valdez Yang MRN: [id number]  SSN: [id number]    YOB: 1940  Age: 68 y.o. Sex: male      Data from PACU flowsheet    Cardiovascular Function/Vital Signs  Visit Vitals    /54    Pulse 70    Temp 36.6 °C (97.8 °F)    Resp 15    Wt 97.5 kg (214 lb 14.4 oz)    SpO2 99%    BMI 36.89 kg/m2       Patient is status post MAC anesthesia for Procedure(s):  SIGMOIDOSCOPY FLEXIBLE WITH APC. Nausea/Vomiting: controlled    Postoperative hydration reviewed and adequate. Pain:  Pain Scale 1: Numeric (0 - 10) (06/08/18 1656)  Pain Intensity 1: 0 (06/08/18 1656)   Managed      Mental Status and Level of Consciousness: Alert and oriented     Pulmonary Status:   O2 Device: Room air (06/08/18 1099)   Adequate oxygenation and airway patent    Complications related to anesthesia: None    Post-anesthesia assessment completed.  No concerns    Signed By: Ever Cabezas MD     June 8, 2018

## 2018-06-09 NOTE — DISCHARGE SUMMARY
Discharge Summary    Patient: Valdez Yang MRN: [id number]  CSN: [id number]    YOB: 1940  Age: 68 y.o.   Sex: male    DOA: 6/5/2018 LOS:  LOS: 3 days   Discharge Date: 6/8/2018     Admission Diagnoses: Lower GI bleed  DX    Discharge Diagnoses:    Problem List as of 6/8/2018  Date Reviewed: 6/8/2018          Codes Class Noted - Resolved    Radiation proctitis ICD-10-CM: K62.7  ICD-9-CM: 569.49  6/6/2018 - Present        Gastrointestinal hemorrhage associated with anorectal source ICD-10-CM: K62.5  ICD-9-CM: 569.3  5/28/2018 - Present        * (Principal)Lower GI bleed ICD-10-CM: K92.2  ICD-9-CM: 578.9  5/28/2018 - Present        GI bleed ICD-10-CM: K92.2  ICD-9-CM: 578.9  5/28/2018 - Present        Elevated troponin ICD-10-CM: R74.8  ICD-9-CM: 790.6  1/28/2018 - Present        Type 2 diabetes mellitus with nephropathy ([facility] ICD-10-CM: E11.21  ICD-9-CM: 250.40, 583.81  12/18/2017 - Present        Hyperkalemia ICD-10-CM: E87.5  ICD-9-CM: 276.7  12/13/2017 - Present        Hypotension ICD-10-CM: I95.9  ICD-9-CM: 458.9  12/12/2017 - Present        Fluid overload ICD-10-CM: E87.70  ICD-9-CM: 276.69  12/12/2017 - Present        ESRD (end stage renal disease) ([facility] ICD-10-CM: N18.6  ICD-9-CM: 585.6  11/8/2017 - Present        Hyperuricemia ICD-10-CM: E79.0  ICD-9-CM: 790.6  10/28/2017 - Present        Chronic kidney disease, stage V (HCC) (Chronic) ICD-10-CM: N18.5  ICD-9-CM: 585.5  10/28/2017 - Present        Chest pain ICD-10-CM: R07.9  ICD-9-CM: 786.50  10/27/2017 - Present        Secondary hyperparathyroidism of renal origin ([facility] (Chronic) ICD-10-CM: N25.81  ICD-9-CM: 588.81  10/25/2017 - Present        Fatigue ICD-10-CM: R53.83  ICD-9-CM: 780.79  8/23/2016 - Present    Overview Signed 8/23/2016  9:10 AM by Elinor Kim MD     k lytes  likely due to APRYL             Diastolic CHF, acute on chronic [facility]) ICD-10-CM: I50.33  ICD-9-CM: 428.33, 428.0  7/5/2016 - Present        CHF (congestive heart failure), NYHA class IV (HCC) ICD-10-CM: I50.9  ICD-9-CM: 428.0  7/5/2016 - Present        APRYL (obstructive sleep apnea) (Chronic) ICD-10-CM: G47.33  ICD-9-CM: 327.23  6/26/2015 - Present    Overview Signed 6/26/2015  9:25 AM by Jesse Brantley MD     non compliance to cpap             Prostate cancer ([facility] ICD-10-CM: C61  ICD-9-CM: 185  4/29/2015 - Present    Overview Addendum 5/6/2015  4:32 PM by Peace Mendez MD     4/2015  T2c  PSA 57, Navid;8 in 2 cores, and G7 in 2,  CT and Bone Scan no obvious mets             Abdominal pain ICD-10-CM: R10.9  ICD-9-CM: 789.00  3/5/2015 - Present        GERD (gastroesophageal reflux disease) ICD-10-CM: K21.9  ICD-9-CM: 530.81  3/5/2015 - Present        Constipation ICD-10-CM: K59.00  ICD-9-CM: 564.00  3/5/2015 - Present        Chronic renal insufficiency ICD-10-CM: N18.9  ICD-9-CM: 585.9  3/5/2015 - Present        Atherosclerosis of renal artery (HCC) (Chronic) ICD-10-CM: I70.1  ICD-9-CM: 440.1  Unknown - Present    Overview Signed 4/29/2013  3:09 PM by Lincoln Vinson     S/P Rt stent             Chronic diastolic heart failure (HCC) ICD-10-CM: I50.32  ICD-9-CM: 428.32  Unknown - Present        Morbid obesity ([facility] ICD-10-CM: E66.01  ICD-9-CM: 278.01  Unknown - Present    Overview Signed 4/29/2013  3:10 PM by Denice Parry     Weight loss has been strongly encouraged by following dietary restrictions and an exercise routine             Essential hypertension, benign (Chronic) ICD-10-CM: I10  ICD-9-CM: 401.1  Unknown - Present        Sleep disturbance ICD-10-CM: G47.9  ICD-9-CM: 780.50  Unknown - Present    Overview Signed 4/29/2013  3:11 PM by Lincoln Vinson     APRYL, not using CPAP regularly             Coronary atherosclerosis ICD-10-CM: I25.10  ICD-9-CM: 414.00  Unknown - Present    Overview Signed 4/29/2013  3:11 PM by Lincoln Vinson     Stable angina after recent PCI continue treatment.                  Mixed hyperlipidemia ICD-10-CM: E78.2  ICD-9-CM: 272.2 Unknown - Present    Overview Signed 4/29/2013  3:11 PM by Pradeep Calderon     HDL's are not at goal, LDL's are at goal, triglycerides are not at goal.             Type II or unspecified type diabetes mellitus without mention of complication, not stated as uncontrolled ICD-10-CM: E11.9  ICD-9-CM: 250.00  Unknown - Present        Chest pain, unspecified ICD-10-CM: R07.9  ICD-9-CM: 786.50  3/12/2013 - Present        S/P coronary artery stent placement ICD-10-CM: Z95.5  ICD-9-CM: V45.82  3/12/2013 - Present    Overview Signed 3/12/2013  3:37 PM by Sera Holland MD     vg to diag stent 10/2012             Postsurgical aortocoronary bypass status ICD-10-CM: Z95.1  ICD-9-CM: V45.81  3/12/2013 - Present        Contrast dye induced nephropathy ICD-10-CM: N14.1, T50.8X5A  ICD-9-CM: 584.5, E980.4  10/14/2012 - Present        NSTEMI (non-ST elevated myocardial infarction) [facility]) ICD-10-CM: I21.4  ICD-9-CM: 410.70  10/7/2012 - Present        Coronary atherosclerosis of native coronary artery ICD-10-CM: I25.10  ICD-9-CM: 414.01  10/7/2012 - Present        CKD (chronic kidney disease) stage 4, GFR 15-29 ml/min (HCC) ICD-10-CM: N18.4  ICD-9-CM: 585.4  10/7/2012 - Present        HTN (hypertension) ICD-10-CM: I10  ICD-9-CM: 401.9  10/7/2012 - Present        DM2 (diabetes mellitus, type 2) (HCC) (Chronic) ICD-10-CM: E11.9  ICD-9-CM: 250.00  10/7/2012 - Present        Dyslipidemia ICD-10-CM: E78.5  ICD-9-CM: 272.4  10/7/2012 - Present              Discharge Condition: Stable    PHYSICAL EXAM  Visit Vitals    /54    Pulse 70    Temp 97.8 °F (36.6 °C)    Resp 15    Wt 97.5 kg (214 lb 14.4 oz)    SpO2 99%    BMI 36.89 kg/m2       General:   awake alert and oriented, non-toxic   Skin:   no rashes or skin lesions noted on limited exam, dry and warm   HEENT:  No scleral icterus or pallor; oral mucosa moist, lips moist   Lymph Nodes:   not assessed today   Lungs:   non, labored; bilaterally clear to aspiration- no crackles wheezes rales or rhonchi   Heart:  RRR, s1 and s2; no murmurs rubs or gallops; no edema, + pedal pulses   Abdomen:  soft, protuberant, non-distended, active bowel sounds,non-tender   Genitourinary:  deferred   Extremities:   average muscle tone; no contractures, no joint effusions   Neurologic:  No gross focal motor or sensory abnormalities; CN 2-12 intact; Follows commands. Psychiatric:   appropriate and interactive         Hospital Course:   Rectal bleeding- recurrent:  M/l related to radiation proctitis. Followed by GI; plans for Flex sig in am; Hb remains stable overall   - s/p flex sig 6/8 with findings of 2 areas of abnormal mucosa ,oozing BRB w/ gently water spray. Both areas ablated using the APC device   - per GI recommendations, if bleeding persists, will treat with RFA as outpatient. Cleared for d/c after procedure.     CAD with h/o CABG 1997; stent to SVG in 2012 and 7/2016, SVG to diagonal 11/2017. Recent NSTEMI 1/2018; seen by cardiology; no change in management     ESRD on HD: Tues/Thurs, Sat  HTN: controlled  Hyperlipidemia: on Zocor  Dm type 2:  On ssi; controlled at present; to resume home medications       Consults:   Cardiology: Dr. Trisha Castellano  GI: Dr. Richelle Corado:   Results for Sincere Mercado (MRN [id number]) as of 6/9/2018 09:30   Ref. Range 5/29/2018 09:30   Hepatitis B surface Ag Latest Ref Range: <1.00 Index <0.10   Hep B surface Ag Interp. Latest Ref Range: NEG   NEGATIVE   Hepatitis B surface Ab Latest Ref Range: >10.0 mIU/mL <3.10 (L)   Hep B surface Ab Interp. Latest Ref Range: POS   NEGATIVE (A)   Hep B surface Ab comment Latest Units:   Samples with a  v... Results for Sincere Mercado (MRN [id number]) as of 6/9/2018 09:30   Ref.  Range 6/5/2018 20:18 6/6/2018 03:30 6/7/2018 01:34 6/8/2018 03:17   Sodium Latest Ref Range: 136 - 145 mmol/L 135 (L) 138 139 139   Potassium Latest Ref Range: 3.5 - 5.5 mmol/L 3.9 3.8 3.9 3.8   Chloride Latest Ref Range: 100 - 108 mmol/L 96 (L) 99 (L) 101 100   CO2 Latest Ref Range: 21 - 32 mmol/L 32 28 27 30   Anion gap Latest Ref Range: 3.0 - 18 mmol/L 7 11 11 9   Glucose Latest Ref Range: 74 - 99 mg/dL 80 80 183 (H) 128 (H)   BUN Latest Ref Range: 7.0 - 18 MG/DL 47 (H) 50 (H) 55 (H) 30 (H)   Creatinine Latest Ref Range: 0.6 - 1.3 MG/DL 6.54 (H) 6.43 (H) 7.04 (H) 5.07 (H)   BUN/Creatinine ratio Latest Ref Range: 12 - 20   7 (L) 8 (L) 8 (L) 6 (L)   Calcium Latest Ref Range: 8.5 - 10.1 MG/DL 8.9 8.8 8.7 9.2   Phosphorus Latest Ref Range: 2.5 - 4.9 MG/DL   4.7    Magnesium Latest Ref Range: 1.6 - 2.6 mg/dL   2.0    GFR est non-AA Latest Ref Range: >60 ml/min/1.73m2 8 (L) 8 (L) 8 (L) 11 (L)   GFR est AA Latest Ref Range: >60 ml/min/1.73m2 10 (L) 10 (L) 9 (L) 13 (L)   Bilirubin, total Latest Ref Range: 0.2 - 1.0 MG/DL 0.7      Protein, total Latest Ref Range: 6.4 - 8.2 g/dL 7.9      Albumin Latest Ref Range: 3.4 - 5.0 g/dL 3.3 (L)      Globulin Latest Ref Range: 2.0 - 4.0 g/dL 4.6 (H)      A-G Ratio Latest Ref Range: 0.8 - 1.7   0.7 (L)      ALT (SGPT) Latest Ref Range: 16 - 61 U/L 18      AST Latest Ref Range: 15 - 37 U/L 10 (L)      Results for Canales Scale (MRN [id number]) as of 6/9/2018 09:30   Ref.  Range 6/8/2018 03:17   WBC Latest Ref Range: 4.6 - 13.2 K/uL 9.8   RBC Latest Ref Range: 4.70 - 5.50 M/uL 3.36 (L)   HGB Latest Ref Range: 13.0 - 16.0 g/dL 9.9 (L)   HCT Latest Ref Range: 36.0 - 48.0 % 31.5 (L)   MCV Latest Ref Range: 74.0 - 97.0 FL 93.8   MCH Latest Ref Range: 24.0 - 34.0 PG 29.5   MCHC Latest Ref Range: 31.0 - 37.0 g/dL 31.4   RDW Latest Ref Range: 11.6 - 14.5 % 13.3   PLATELET Latest Ref Range: 135 - 420 K/uL 273   MPV Latest Ref Range: 9.2 - 11.8 FL 10.8   NEUTROPHILS Latest Ref Range: 40 - 73 % 69   LYMPHOCYTES Latest Ref Range: 21 - 52 % 21   MONOCYTES Latest Ref Range: 3 - 10 % 8   EOSINOPHILS Latest Ref Range: 0 - 5 % 2   BASOPHILS Latest Ref Range: 0 - 2 % 0     Discharge Medications:   Cannot display discharge medications since this patient is not currently admitted.       Activity: activity as tolerated    Diet: Renal Diet    Follow-up: with PCP, Santiago Araujo MD in 7-10days    Minutes spent on discharge: >30 minutes spent coordinating this discharge (review instructions/follow-up, prescriptions, preparing report for sign off)

## 2018-06-12 ENCOUNTER — HOME CARE VISIT (OUTPATIENT)
Dept: SCHEDULING | Facility: HOME HEALTH | Age: 78
End: 2018-06-12

## 2018-06-12 PROCEDURE — G0299 HHS/HOSPICE OF RN EA 15 MIN: HCPCS

## 2018-06-14 ENCOUNTER — TELEPHONE (OUTPATIENT)
Dept: VASCULAR SURGERY | Age: 78
End: 2018-06-14

## 2018-06-14 NOTE — TELEPHONE ENCOUNTER
Patient's wife states they would rather wait a few weeks before removing cath. States she will call back in 2 to 3 weeks to schedule.

## 2018-06-14 NOTE — TELEPHONE ENCOUNTER
Left message; awaiting return call. Fax received from [facility] requesting removal of cath. Will discuss upon return call, if this is something he wants to pursue.

## 2018-06-23 ENCOUNTER — APPOINTMENT (OUTPATIENT)
Dept: GENERAL RADIOLOGY | Age: 78
DRG: 314 | End: 2018-06-23
Attending: EMERGENCY MEDICINE
Payer: MEDICARE

## 2018-06-23 ENCOUNTER — HOSPITAL ENCOUNTER (INPATIENT)
Age: 78
LOS: 1 days | Discharge: HOME | DRG: 314 | End: 2018-06-25
Attending: EMERGENCY MEDICINE | Admitting: HOSPITALIST
Payer: MEDICARE

## 2018-06-23 DIAGNOSIS — R07.89 OTHER CHEST PAIN: ICD-10-CM

## 2018-06-23 DIAGNOSIS — R06.02 SOB (SHORTNESS OF BREATH): Primary | ICD-10-CM

## 2018-06-23 LAB
ALBUMIN SERPL-MCNC: 3.3 G/DL (ref 3.4–5)
ALBUMIN/GLOB SERPL: 0.8 {RATIO} (ref 0.8–1.7)
ALP SERPL-CCNC: 97 U/L (ref 45–117)
ALT SERPL-CCNC: 27 U/L (ref 16–61)
ANION GAP SERPL CALC-SCNC: 8 MMOL/L (ref 3–18)
ANION GAP SERPL CALC-SCNC: 9 MMOL/L (ref 3–18)
AST SERPL-CCNC: 19 U/L (ref 15–37)
ATRIAL RATE: 73 BPM
BASOPHILS # BLD: 0 K/UL (ref 0–0.06)
BASOPHILS NFR BLD: 0 % (ref 0–2)
BILIRUB SERPL-MCNC: 0.6 MG/DL (ref 0.2–1)
BUN SERPL-MCNC: 37 MG/DL (ref 7–18)
BUN SERPL-MCNC: 43 MG/DL (ref 7–18)
BUN/CREAT SERPL: 6 (ref 12–20)
BUN/CREAT SERPL: 7 (ref 12–20)
CALCIUM SERPL-MCNC: 8.5 MG/DL (ref 8.5–10.1)
CALCIUM SERPL-MCNC: 8.7 MG/DL (ref 8.5–10.1)
CALCULATED P AXIS, ECG09: 35 DEGREES
CALCULATED R AXIS, ECG10: 7 DEGREES
CALCULATED T AXIS, ECG11: 60 DEGREES
CHLORIDE SERPL-SCNC: 101 MMOL/L (ref 100–108)
CHLORIDE SERPL-SCNC: 102 MMOL/L (ref 100–108)
CK MB CFR SERPL CALC: 3.2 % (ref 0–4)
CK MB CFR SERPL CALC: 4.6 % (ref 0–4)
CK MB SERPL-MCNC: 2.6 NG/ML (ref 5–25)
CK MB SERPL-MCNC: 2.6 NG/ML (ref 5–25)
CK SERPL-CCNC: 57 U/L (ref 39–308)
CK SERPL-CCNC: 81 U/L (ref 39–308)
CO2 SERPL-SCNC: 29 MMOL/L (ref 21–32)
CO2 SERPL-SCNC: 30 MMOL/L (ref 21–32)
CREAT SERPL-MCNC: 5.87 MG/DL (ref 0.6–1.3)
CREAT SERPL-MCNC: 6.04 MG/DL (ref 0.6–1.3)
DIAGNOSIS, 93000: NORMAL
DIFFERENTIAL METHOD BLD: ABNORMAL
EOSINOPHIL # BLD: 0.2 K/UL (ref 0–0.4)
EOSINOPHIL NFR BLD: 3 % (ref 0–5)
ERYTHROCYTE [DISTWIDTH] IN BLOOD BY AUTOMATED COUNT: 14.3 % (ref 11.6–14.5)
GLOBULIN SER CALC-MCNC: 4.3 G/DL (ref 2–4)
GLUCOSE BLD STRIP.AUTO-MCNC: 70 MG/DL (ref 70–110)
GLUCOSE BLD STRIP.AUTO-MCNC: 74 MG/DL (ref 70–110)
GLUCOSE BLD STRIP.AUTO-MCNC: 91 MG/DL (ref 70–110)
GLUCOSE SERPL-MCNC: 75 MG/DL (ref 74–99)
GLUCOSE SERPL-MCNC: 79 MG/DL (ref 74–99)
HBV SURFACE AG SER QL: <0.1 INDEX
HBV SURFACE AG SER QL: NEGATIVE
HCT VFR BLD AUTO: 28.2 % (ref 36–48)
HGB BLD-MCNC: 8.9 G/DL (ref 13–16)
LIPASE SERPL-CCNC: 64 U/L (ref 73–393)
LYMPHOCYTES # BLD: 1.1 K/UL (ref 0.9–3.6)
LYMPHOCYTES NFR BLD: 13 % (ref 21–52)
MCH RBC QN AUTO: 29.7 PG (ref 24–34)
MCHC RBC AUTO-ENTMCNC: 31.6 G/DL (ref 31–37)
MCV RBC AUTO: 94 FL (ref 74–97)
MONOCYTES # BLD: 0.4 K/UL (ref 0.05–1.2)
MONOCYTES NFR BLD: 4 % (ref 3–10)
NEUTS SEG # BLD: 6.9 K/UL (ref 1.8–8)
NEUTS SEG NFR BLD: 80 % (ref 40–73)
P-R INTERVAL, ECG05: 154 MS
PLATELET # BLD AUTO: 254 K/UL (ref 135–420)
PMV BLD AUTO: 10.2 FL (ref 9.2–11.8)
POTASSIUM SERPL-SCNC: 3.4 MMOL/L (ref 3.5–5.5)
POTASSIUM SERPL-SCNC: 3.5 MMOL/L (ref 3.5–5.5)
PROT SERPL-MCNC: 7.6 G/DL (ref 6.4–8.2)
Q-T INTERVAL, ECG07: 412 MS
QRS DURATION, ECG06: 102 MS
QTC CALCULATION (BEZET), ECG08: 453 MS
RBC # BLD AUTO: 3 M/UL (ref 4.7–5.5)
SODIUM SERPL-SCNC: 139 MMOL/L (ref 136–145)
SODIUM SERPL-SCNC: 140 MMOL/L (ref 136–145)
TROPONIN I SERPL-MCNC: 0.08 NG/ML (ref 0–0.04)
TROPONIN I SERPL-MCNC: 0.17 NG/ML (ref 0–0.04)
VENTRICULAR RATE, ECG03: 73 BPM
WBC # BLD AUTO: 8.6 K/UL (ref 4.6–13.2)

## 2018-06-23 PROCEDURE — 83690 ASSAY OF LIPASE: CPT | Performed by: EMERGENCY MEDICINE

## 2018-06-23 PROCEDURE — 74011250637 HC RX REV CODE- 250/637: Performed by: INTERNAL MEDICINE

## 2018-06-23 PROCEDURE — 86706 HEP B SURFACE ANTIBODY: CPT | Performed by: INTERNAL MEDICINE

## 2018-06-23 PROCEDURE — 82550 ASSAY OF CK (CPK): CPT | Performed by: EMERGENCY MEDICINE

## 2018-06-23 PROCEDURE — 36415 COLL VENOUS BLD VENIPUNCTURE: CPT | Performed by: INTERNAL MEDICINE

## 2018-06-23 PROCEDURE — 82962 GLUCOSE BLOOD TEST: CPT

## 2018-06-23 PROCEDURE — 80048 BASIC METABOLIC PNL TOTAL CA: CPT | Performed by: HOSPITALIST

## 2018-06-23 PROCEDURE — 99285 EMERGENCY DEPT VISIT HI MDM: CPT

## 2018-06-23 PROCEDURE — 93005 ELECTROCARDIOGRAM TRACING: CPT

## 2018-06-23 PROCEDURE — 99218 HC RM OBSERVATION: CPT

## 2018-06-23 PROCEDURE — 71045 X-RAY EXAM CHEST 1 VIEW: CPT

## 2018-06-23 PROCEDURE — 87340 HEPATITIS B SURFACE AG IA: CPT | Performed by: INTERNAL MEDICINE

## 2018-06-23 PROCEDURE — 85025 COMPLETE CBC W/AUTO DIFF WBC: CPT | Performed by: EMERGENCY MEDICINE

## 2018-06-23 PROCEDURE — 80053 COMPREHEN METABOLIC PANEL: CPT | Performed by: EMERGENCY MEDICINE

## 2018-06-23 RX ORDER — FAMOTIDINE 20 MG/1
20 TABLET, FILM COATED ORAL ONCE
Status: COMPLETED | OUTPATIENT
Start: 2018-06-23 | End: 2018-06-23

## 2018-06-23 RX ADMIN — FAMOTIDINE 20 MG: 20 TABLET, FILM COATED ORAL at 22:17

## 2018-06-23 NOTE — PROGRESS NOTES
Now trying to cannulate AVF,arterial needle cannulated nicely, venous one lots of clot coming out,dialysis nurses tried 3 to  4 places without any success,will send for doppler & request vascular to see.

## 2018-06-23 NOTE — IP AVS SNAPSHOT
[id number] [address] Daisy Sunil Patient: Telma Peraza MRN: [id number] OEY:[id number] About your hospitalization You were admitted on:  June 23, 2018 You last received care in the:  [facility] You were discharged on:  June 25, 2018 Why you were hospitalized Your primary diagnosis was:  Not on File Your diagnoses also included:  Fluid Overload, Chest Pain, Chest Pain With High Risk Of Acute Coronary Syndrome, Anemia, Esrd (End Stage Renal Disease) On Dialysis (Hcc), Htn (Hypertension), Secondary Hyperparathyroidism Of Renal Origin (Hcc) Follow-up Information Follow up With Details Comments Contact Info Kan Francis MD In 1 week  will call tomorrow with a date and time for appt. [address] [facility] 
[phone] Your Scheduled Appointments Monday July 09, 2018 10:30 AM EDT New Patient with Kim Bucio MD  
[facility] [address] ([facility]) [address] [facility]  
[phone] Wednesday July 11, 2018 10:30 AM EDT Nurse Visit with [facility] ([facility]) [address] 200 [facility]  
[phone] Wednesday July 25, 2018 11:00 AM EDT PROCEDURE with Vann[facility] ([facility]) [address] [place] [address] Andrew [address]res [address] [facility]  
[phone] Discharge Orders None A check jose indicates which time of day the medication should be taken. My Medications CHANGE how you take these medications Instructions Each Dose to Equal  
 Morning Noon Evening Bedtime  
 insulin aspart U-100 100 unit/mL injection Commonly known as:  NovoLOG U-100 Insulin aspart What changed:  additional instructions Your last dose was: Your next dose is:    
   
   
 20 units sq 3 times a day,sliding scale  
     
   
   
   
  
 insulin glargine 100 unit/mL injection Commonly known as:  LANTUS What changed:  how much to take Your last dose was: Your next dose is:    
   
   
 20 Units by SubCUTAneous route every twelve (12) hours. 20 Units CONTINUE taking these medications Instructions Each Dose to Equal  
 Morning Noon Evening Bedtime  
 acetaminophen 325 mg tablet Commonly known as:  TYLENOL Your last dose was: Your next dose is: Take 2 Tabs by mouth every six (6) hours as needed. 650 mg  
    
   
   
   
  
 allopurinol 100 mg tablet Commonly known as:  Lyn Salts Your last dose was: Your next dose is: Take 100 mg by mouth. Monday, Wednesday,friday 100 mg  
    
   
   
   
  
 aspirin delayed-release 81 mg tablet Your last dose was: Your next dose is: Take  by mouth daily. b complex-vitamin c-folic acid 1 mg capsule Commonly known as:  Tremayneamberly Turner Your last dose was: Your next dose is: Take 1 Cap by mouth daily. 1 Cap DECARA 50,000 unit capsule Generic drug:  cholecalciferol Your last dose was: Your next dose is: Take  by mouth every seven (7) days. fenofibrate 54 mg tablet Commonly known as:  LOFIBRA Your last dose was: Your next dose is: TAKE ONE TABLET BY MOUTH ONE TIME DAILY  
     
   
   
   
  
 hydrocortisone 2.5 % rectal cream  
Commonly known as:  ANUSOL-HC Your last dose was: Your next dose is: Insert  into rectum four (4) times daily. isosorbide mononitrate ER 30 mg tablet Commonly known as:  IMDUR Your last dose was: Your next dose is: Take 1 Tab by mouth daily. 30 mg  
    
   
   
   
  
 lidocaine 5 % ointment Commonly known as:  XYLOCAINE Your last dose was: Your next dose is:    
   
   
 Apply to marilee-anal area and rectum daily as needed for pain  
     
   
   
   
  
 losartan 25 mg tablet Commonly known as:  COZAAR Your last dose was: Your next dose is: Take  by mouth daily. metoprolol tartrate 100 mg IR tablet Commonly known as:  LOPRESSOR Your last dose was: Your next dose is: TAKE 1 TABLET BY MOUTH TWO TIMES A DAY. nitroglycerin 0.4 mg SL tablet Commonly known as:  NITROSTAT Your last dose was: Your next dose is:    
   
   
 1 Tab by SubLINGual route as needed for Chest Pain. 0.4 mg  
    
   
   
   
  
 senna-docusate 8.6-50 mg per tablet Commonly known as:  Leanord Minh Your last dose was: Your next dose is: Take 1 Tab by mouth daily. 1 Tab  
    
   
   
   
  
 simvastatin 40 mg tablet Commonly known as:  ZOCOR Your last dose was: Your next dose is: TAKE 1 TABLET BY MOUTH NIGHTLY.  
     
   
   
   
  
 tamsulosin 0.4 mg capsule Commonly known as:  FLOMAX Your last dose was: Your next dose is: TAKE ONE CAPSULE BY MOUTH ONE TIME DAILY Discharge Instructions DISCHARGE SUMMARY from Nurse PATIENT INSTRUCTIONS: 
 
 
F-face looks uneven A-arms unable to move or move unevenly S-speech slurred or non-existent T-time-call 911 as soon as signs and symptoms begin-DO NOT go Back to bed or wait to see if you get better-TIME IS BRAIN. Warning Signs of HEART ATTACK Call 911 if you have these symptoms: 
? Chest discomfort. Most heart attacks involve discomfort in the center of the chest that lasts more than a few minutes, or that goes away and comes back. It can feel like uncomfortable pressure, squeezing, fullness, or pain. ? Discomfort in other areas of the upper body. Symptoms can include pain or discomfort in one or both arms, the back, neck, jaw, or stomach. ? Shortness of breath with or without chest discomfort. ? Other signs may include breaking out in a cold sweat, nausea, or lightheadedness. Don't wait more than five minutes to call [address]! Fast action can save your life. Calling 911 is almost always the fastest way to get lifesaving treatment. Emergency Medical Services staff can begin treatment when they arrive  up to an hour sooner than if someone gets to the hospital by car. The discharge information has been reviewed with the patient. The patient verbalized understanding. Discharge medications reviewed with the patient and appropriate educational materials and side effects teaching were provided. _____________________________________________________________________________ doohart Activation Thank you for requesting access to VaxInnate. Please follow the instructions below to securely access and download your online medical record. VaxIn[employer]ate allows you to send messages to your doctor, view your test results, renew your prescriptions, schedule appointments, and more. How Do I Sign Up? 1. In your internet browser, go to [email or web address]. 2. Click on the First Time User? Click Here link in the Sign In box. You will see the New Member Sign Up page. 3. Enter your VaxInnate Access Code exactly as it appears below.  You will not need to use this code after youve completed the sign-up process. If you do not sign up before the expiration date, you must request a new code. PublicEarth Access Code: [email or web address] Expires: 2018  1:46 PM (This is the date your PublicEarth access code will ) 4. Enter the last four digits of your Social Security Number (xxxx) and Date of Birth (mm/dd/yyyy) as indicated and click Submit. You will be taken to the next sign-up page. 5. Create a PublicEarth ID. This will be your PublicEarth login ID and cannot be changed, so think of one that is secure and easy to remember. 6. Create a PublicEarth password. You can change your password at any time. 7. Enter your Password Reset Question and Answer. This can be used at a later time if you forget your password. 8. Enter your e-mail address. You will receive e-mail notification when new information is available in [address] 9. Click Sign Up. You can now view and download portions of your medical record. 10. Click the Download Summary menu link to download a portable copy of your medical information. Additional Information If you have questions, please visit the Frequently Asked Questions section of the PublicEarth website at [email or web address] Remember, PublicEarth is NOT to be used for urgent needs. For medical emergencies, dial 911. Patient armband removed and shredded 
______________________________________________________ ACO Transitions of Care [facility] offers a voluntary care coordination program to provide high quality service and care to [facility] fee-for-service beneficiaries. Kael Pabon was designed to help you enhance your health and well-being through the following services: ? Transitions of Care  support for individuals who are transitioning from one care setting to another (example: Hospital to home). ? Chronic and Complex Care Coordination  support for individuals and caregivers of those with serious or chronic illnesses or with more than one chronic (ongoing) condition and those who take a number of different medications. If you meet specific medical criteria, a [facility] [address] may call you directly to coordinate your care with your primary care physician and your other care providers. For questions about the [facility] programs, please, contact your physicians office. For general questions or additional information about Accountable Care Organizations: 
Please visit www.medicare.gov/acos. html or call 1-800-MEDICARE (8-606.691.6271) TTY users should call 4-740.229.9082. [employer] Announcement We are excited to announce that we are making your provider's discharge notes available to you in [employer]. You will see these notes when they are completed and signed by the physician that discharged you from your recent hospital stay. If you have any questions or concerns about any information you see in [employer], please call the Health Information Department where you were seen or reach out to your Primary Care Provider for more information about your plan of care. Introducing [facility]! [employer] introduces [employer] patient portal. Now you can access parts of your medical record, email your doctor's office, and request medication refills online. 1. In your internet browser, go to [email or web address] 2. Click on the First Time User? Click Here link in the Sign In box. You will see the New Member Sign Up page. 3. Enter your [employer] Access Code exactly as it appears below. You will not need to use this code after youve completed the sign-up process. If you do not sign up before the expiration date, you must request a new code. · [employer] Access Code: [email or web address] Expires: 7/18/2018  1:46 PM 
 
 4. Enter the last four digits of your Social Security Number (xxxx) and Date of Birth (mm/dd/yyyy) as indicated and click Submit. You will be taken to the next sign-up page. 5. Create a Chunnel.TV ID. This will be your Chunnel.TV login ID and cannot be changed, so think of one that is secure and easy to remember. 6. Create a Chunnel.TV password. You can change your password at any time. 7. Enter your Password Reset Question and Answer. This can be used at a later time if you forget your password. 8. Enter your e-mail address. You will receive e-mail notification when new information is available in [address] 9. Click Sign Up. You can now view and download portions of your medical record. 10. Click the Download Summary menu link to download a portable copy of your medical information. If you have questions, please visit the Frequently Asked Questions section of the Chunnel.TV website. Remember, Ch[employer]TV is NOT to be used for urgent needs. For medical emergencies, dial 911. Now available from your iPhone and Android! Introducing Edgar Avalos As a Arnav Treviño patient, I wanted to make you aware of our electronic visit tool called Edgar AvilesRVR Systems. Arnav Treviño 24/7 allows you to connect within minutes with a medical provider 24 hours a day, seven days a week via a mobile device or tablet or logging into a secure website from your computer. You can access Edgar Gildardoangelafin from anywhere in the [place]. A virtual visit might be right for you when you have a simple condition and feel like you just dont want to get out of bed, or cant get away from work for an appointment, when your regular Denkatie Bishops provider is not available (evenings, weekends or holidays), or when youre out of town and need minor care.   Electronic visits cost only $49 and if the Edgar Avalos provider determines a prescription is needed to treat your condition, one can be electronically transmitted to a nearby pharmacy*. Please take a moment to enroll today if you have not already done so. The enrollment process is free and takes just a few minutes. To enroll, please download the Smarter Agent Mobile 24/7 breann to your tablet or phone, or visit [email or web address] to enroll on your computer. And, as an [address] [place] [address] patient with a Activehours account, the results of your visits will be scanned into your electronic medical record and your primary care provider will be able to view the scanned results. We urge you to continue to see your regular Smarter Agent Mobile provider for your ongoing medical care. And while your primary care provider may not be the one available when you seek a Goombal virtual visit, the peace of mind you get from getting a real diagnosis real time can be priceless. For more information on Goombal, view our Frequently Asked Questions (FAQs) at [email or web address]. Sincerely, 
 
Grace Negrete MD 
Chief Medical Officer [employer] *:  certain medications cannot be prescribed via Goombal Unresulted tests-please follow up with your PCP on these results Procedure/Test Authorizing Provider  CARDIAC PANEL,(CK, CKMB & TROPONIN) Chicho Valadez DO  
 CARDIAC PANEL,(CK, CKMB & TROPONIN) Chicho Valadez DO  
 CARDIAC PANEL,(CK, CKMB & TROPONIN) Chicho Valadez DO  
 CARDIAC PANEL,(CK, CKMB & TROPONIN) Rell Buckley MD  
 CBC WITH AUTOMATED DIFF Makenna Hernández MD  
 CBC WITH AUTOMATED DIFF Makenna Hernández MD  
 CBC WITH AUTOMATED DIFF Cranston Schlatter, MD  
 CBC WITH AUTOMATED DIFF Rell Buckley MD  
 EKG, 12 LEAD, INITIAL Rell Buckley MD  
 EKG, 12 LEAD, SUBSEQUENT Chicho Valadez DO  
 HEMOGLOBIN A1C WITH EAG Chicho Valadez DO  
 HEP B SURFACE AB Makenna Hernández MD  
 HEP B SURFACE AG Makenna Hernández MD  
 LIPASE Rell Buckley MD  
 METABOLIC PANEL, BASIC Gust Sandhoff, MD  
 METABOLIC PANEL, Wenceslao Donnelly MD  
 METABOLIC PANEL, Nell Bellamy MD  
 METABOLIC PANEL, BASIC Hernesto Davis, DO  
 METABOLIC PANEL, COMPREHENSIVE Lauren Hendrickson MD  
 [address], MD  
 [address], MD  
 PROTHROMBIN TIME + INR Hernesto Davis, DO  
 PTT Hernesto Davis, DO  
 XR CHEST PORT Lauren Hendrickson MD  
  
Providers Seen During Your Hospitalization Provider Specialty Primary office phone Lauren Hendrickson MD Emergency Medicine [phone] Hernesto Davis, [address] [facility] Internal Medicine [phone] Brunilda Gross MD Internal Medicine [phone] Davis Lorenzana MD Internal Medicine [phone] Your Primary Care Physician (PCP) Primary Care Physician Office Phone Office Fax Keith Blakely [phone] You are allergic to the following Allergen Reactions Nka (No Known Allergies) Other (comments) Recent Documentation Weight BMI Smoking Status 98.3 kg 37.21 kg/m2 Never Smoker Emergency Contacts Name Discharge Info Relation Home Work Mobile Lazarus,Winsome DISCHARGE CAREGIVER [3] Spouse [3] [phone] Lazarus,Annie N/A  AT THIS TIME [6] Daughter [21] [phone] Lazarus,Cary N/A  AT THIS TIME [6] Son [22] [phone] [address] [place] [address] CAREGIVER [3] Son [22] [phone] Leon Judd  Spouse [3] [phone] Patient Belongings The following personal items are in your possession at time of discharge: 
  Dental Appliances: With patient  Visual Aid: Glasses, With patient      Home Medications: None   Jewelry: Watch, Ring, With patient  Clothing: At bedside, Pants, Socks, Belt, With patient    Other Valuables: Eyeglasses, With patient, Other (comment) (hearing aid with patient.) Please provide this summary of care documentation to your next provider. Signatures-by signing, you are acknowledging that this After Visit Summary has been reviewed with you and you have received a copy. Patient Signature:  ____________________________________________________________ Date:  ____________________________________________________________  
  
[facility] Provider Signature:  ____________________________________________________________ Date:  ____________________________________________________________

## 2018-06-23 NOTE — ED NOTES
Pt states that they went to dialysis on Tuesday, did not go on Thursday, and only completed one hour of dialysis today prior to ED arrival.

## 2018-06-23 NOTE — ED PROVIDER NOTES
EMERGENCY DEPARTMENT HISTORY AND PHYSICAL EXAM    8:36 AM      Date: 6/23/2018  Patient Name: Amelia Jon    History of Presenting Illness     Chief Complaint   Patient presents with    Chest Pain         History Provided By: Patient    Additional History (Context): Amelia Jon is a 68 y.o. male with PMHx of diabetes, HTN, chronic renal insuffiencey, chronic diastolic heart failure, prostate cancer, GERD, CKD, CAD, and CHF who presents with c/o moderate pounding intermittent and radiating left side of chest, chest pain that started 1 day ago. Pt states that the chest pain radiates to the shoulder. Pt states that while he was getting his dialysis treatment \"this morning\" he started to feel the chest pain. Pt states that the chest pain happened 4 times, lasting about 5 seconds long. Pt was 1 hour into his dialysis treatment when the chest pain began. Pt's last dialysis treatment was 5 days ago. Pt had a shunt placed this weekend. Pt states the he has not had this chest pain before. Pt's cardiologist is Dr. Gerardo Multani and Dr. Dakota Looney and pt nephrologist is Dr. Katarina Vo. Pt is in the process if changing his PCP to \"Dr. Nuvia Brooks". Pt denies vomiting. Denies any further complaints or symptoms at the moment.        PCP: Kaitlynn Arango MD    Chief Complaint: Chest pain   Duration: 1 Day  Timing:  Intermittent and Radiating   Location: Right side of chest  Quality: Pounding   Severity: Moderate  Modifying Factors: None   Associated Symptoms: nausea and shoulder pain        Current Facility-Administered Medications   Medication Dose Route Frequency Provider Last Rate Last Dose    epoetin lee (EPOGEN;PROCRIT) injection 10,000 Units  10,000 Units IntraVENous Aruna Fields MD           Past History     Past Medical History:  Past Medical History:   Diagnosis Date    Atherosclerosis of renal artery (HCC)     S/P Rt stent    CAD (coronary artery disease) , 1997, 2012    ,double bypass, 2 stents, 2stents 7/2016    Cancer [facility])     prostate    CHF (congestive heart failure) (HCC)     Chronic diastolic heart failure (HCC)     Chronic kidney disease     Chronic kidney disease (CKD), stage IV (severe) (HCC)     On Dialysis now -T-Th-sat    Constipation     Coronary atherosclerosis of unspecified type of vessel, native or graft     Stable angina after recent PCI continue treatment.  CRI (chronic renal insufficiency)     Diabetes ([facility] [address] 1973    type 2    Essential hypertension, benign     GERD (gastroesophageal reflux disease)     Gout     Hypertension 1982    Morbid obesity ([facility] [address]     Weight loss has been strongly encouraged by following dietary restrictions and an exercise routine    APRYL (obstructive sleep apnea) 6/26/2015    no cpap    Other and unspecified hyperlipidemia     HDL's are not at goal, LDL's are at goal, triglycerides are not at goal.    Other and unspecified hyperlipidemia     HDL's are not at goal, LDL's are at goal, triglycerides are not at goal.     Other ill-defined conditions(799.89) 1960    pneumonia twice    Sleep disturbance     Type II or unspecified type diabetes mellitus without mention of complication, not stated as uncontrolled        Past Surgical History:  Past Surgical History:   Procedure Laterality Date   [address]    lt. carotid endart.    [facility] CARDIAC SURG PROCEDURE UNLIST  2012, 2016, Nov 2017    Coronary Stent    COLONOSCOPY N/A 6/23/2017    COLONOSCOPY w/ APC w/ polypectomies performed by Rodrigo Middleton MD at [address] N/A 1/19/2018    FLEXIBLE SIGMOIDOSCOPY WITH Radio frequency ablation performed by Rodrigo Middleton MD at [address] N/A 6/8/2018    SIGMOIDOSCOPY FLEXIBLE WITH APC performed by Glenis Lee MD at [address] HX CHOLECYSTECTOMY      HX CORONARY ARTERY BYPASS GRAFT  2000    x2    HX HEENT  1997    cholecystectomy    HX UROLOGICAL  1998    renal art. surg    HX VASCULAR ACCESS      Perma cath for HD- right chest.       Family History:  Family History   Problem Relation Age of Onset    Heart Attack Father 64       Social History:  Social History   Substance Use Topics    Smoking status: Never Smoker    Smokeless tobacco: Never Used    Alcohol use No       Allergies: Allergies   Allergen Reactions    Nka [No Known Allergies] Other (comments)         Review of Systems     Review of Systems   Cardiovascular: Positive for chest pain. Gastrointestinal: Positive for nausea. Negative for vomiting. Musculoskeletal:        Positive for shoulder pain (left side)   All other systems reviewed and are negative. Physical Exam     Visit Vitals    /48    Pulse 72    Temp 98.1 °F (36.7 °C)    Resp 16    SpO2 100%       Physical Exam   Constitutional: He is oriented to person, place, and time. He appears well-developed. HENT:   Head: Normocephalic and atraumatic. Eyes: EOM are normal. Pupils are equal, round, and reactive to light. Neck: Normal range of motion. Neck supple. Cardiovascular: Normal rate, regular rhythm and normal heart sounds. Exam reveals no friction rub. No murmur heard. Left AV graft with thrill   Pulmonary/Chest: Effort normal and breath sounds normal. No respiratory distress. He has no wheezes. Abdominal: Soft. He exhibits no distension. There is no tenderness. There is no rebound and no guarding. Musculoskeletal: Normal range of motion. Neurological: He is alert and oriented to person, place, and time. Skin: Skin is warm and dry. Psychiatric: He has a normal mood and affect. His behavior is normal. Thought content normal.         Diagnostic Study Results   Cath report 11/10/17  CONCLUSION: Triple vessel coronary artery disease. Patent left  internal mammary artery to left anterior descending.  Status post  percutaneous transluminal coronary angioplasty/stent, status post  aspiration thrombectomy, followed by percutaneous transluminal  coronary angioplasty and stent to saphenous vein graft to diagonal 1  artery. The patient was advised to be on intense medical management  and risk factor modification. Will reload this patient with 300 mg of  Plavix    EKG nsr at 73; nl axi.s nl int. No JAMES. + ST dep I, II, F; V4-6. Compared to 6/6/18 new ST Dep V1; similar otherwise. Pt had plavix stopped recently due to LGIB. Medical Decision Making     1. Atypical pain; sharp; last a few minutes. Resolves wihtout intervention. 2. ESRD: missed Thursday; only had 1 hour this am. - Dr Yusra Jordan to arrange dialyss  3. EKG change. - plavix recently stopped ; trend. Diagnosis     No diagnosis found. _______________________________    Attestations:  Rigo Maciej 97 acting as a scribe for and in the presence of Marin Tobar MD      June 23, 2018 at 8:36 AM       Provider Attestation:      I personally performed the services described in the documentation, reviewed the documentation, as recorded by the scribe in my presence, and it accurately and completely records my words and actions.  June 23, 2018 at 8:36 AM - Marin Tobar MD    _______________________________

## 2018-06-23 NOTE — ED TRIAGE NOTES
Pt arrived c/o chest pain since yesterday, dialysis site changed from R chest to left arm yesterday, denies SOB, a&ox4

## 2018-06-23 NOTE — PROGRESS NOTES
Patient arrived from emergency department,alert/oriented x4 accompanied by wife. plan of care and needs addressed. telemetry applied,call bell and needs in reach. bed in lowest position,locked.

## 2018-06-23 NOTE — ED NOTES
TRANSFER - OUT REPORT:    Verbal report given to Justice Wilson RN (name) on Leonid Hodgkin  being transferred to [employer] (unit) for routine progression of care       Report consisted of patients Situation, Background, Assessment and   Recommendations(SBAR). Information from the following report(s) SBAR, ED Summary and Recent Results was reviewed with the receiving nurse. Lines:   Peripheral IV 06/23/18 Right Antecubital (Active)   Site Assessment Clean, dry, & intact 6/23/2018  8:35 AM   Phlebitis Assessment 0 6/23/2018  8:35 AM   Infiltration Assessment 0 6/23/2018  8:35 AM   Dressing Status Clean, dry, & intact 6/23/2018  8:35 AM   Dressing Type Transparent 6/23/2018  8:35 AM        Opportunity for questions and clarification was provided.       Patient transported with:  Family

## 2018-06-24 PROBLEM — R07.9 CHEST PAIN WITH HIGH RISK OF ACUTE CORONARY SYNDROME: Status: ACTIVE | Noted: 2018-06-24

## 2018-06-24 PROBLEM — D64.9 ANEMIA: Status: ACTIVE | Noted: 2018-06-24

## 2018-06-24 PROBLEM — Z99.2 ESRD (END STAGE RENAL DISEASE) ON DIALYSIS (HCC): Status: ACTIVE | Noted: 2017-11-08

## 2018-06-24 LAB
ANION GAP SERPL CALC-SCNC: 11 MMOL/L (ref 3–18)
ANION GAP SERPL CALC-SCNC: 7 MMOL/L (ref 3–18)
ANION GAP SERPL CALC-SCNC: 8 MMOL/L (ref 3–18)
APTT PPP: 37.6 SEC (ref 23–36.4)
ATRIAL RATE: 89 BPM
BASOPHILS # BLD: 0.1 K/UL (ref 0–0.1)
BASOPHILS NFR BLD: 1 % (ref 0–2)
BUN SERPL-MCNC: 44 MG/DL (ref 7–18)
BUN SERPL-MCNC: 46 MG/DL (ref 7–18)
BUN SERPL-MCNC: 48 MG/DL (ref 7–18)
BUN/CREAT SERPL: 7 (ref 12–20)
CALCIUM SERPL-MCNC: 8.4 MG/DL (ref 8.5–10.1)
CALCIUM SERPL-MCNC: 8.5 MG/DL (ref 8.5–10.1)
CALCIUM SERPL-MCNC: 9.3 MG/DL (ref 8.5–10.1)
CALCULATED P AXIS, ECG09: 56 DEGREES
CALCULATED R AXIS, ECG10: -25 DEGREES
CALCULATED T AXIS, ECG11: 75 DEGREES
CHLORIDE SERPL-SCNC: 102 MMOL/L (ref 100–108)
CHLORIDE SERPL-SCNC: 103 MMOL/L (ref 100–108)
CHLORIDE SERPL-SCNC: 104 MMOL/L (ref 100–108)
CK MB CFR SERPL CALC: 5.3 % (ref 0–4)
CK MB CFR SERPL CALC: 5.5 % (ref 0–4)
CK MB SERPL-MCNC: 3.7 NG/ML (ref 5–25)
CK MB SERPL-MCNC: 3.9 NG/ML (ref 5–25)
CK SERPL-CCNC: 70 U/L (ref 39–308)
CK SERPL-CCNC: 71 U/L (ref 39–308)
CO2 SERPL-SCNC: 25 MMOL/L (ref 21–32)
CO2 SERPL-SCNC: 30 MMOL/L (ref 21–32)
CO2 SERPL-SCNC: 31 MMOL/L (ref 21–32)
CREAT SERPL-MCNC: 6.08 MG/DL (ref 0.6–1.3)
CREAT SERPL-MCNC: 6.28 MG/DL (ref 0.6–1.3)
CREAT SERPL-MCNC: 6.48 MG/DL (ref 0.6–1.3)
DIAGNOSIS, 93000: NORMAL
DIFFERENTIAL METHOD BLD: ABNORMAL
EOSINOPHIL # BLD: 0.3 K/UL (ref 0–0.4)
EOSINOPHIL NFR BLD: 3 % (ref 0–5)
ERYTHROCYTE [DISTWIDTH] IN BLOOD BY AUTOMATED COUNT: 14.5 % (ref 11.6–14.5)
EST. AVERAGE GLUCOSE BLD GHB EST-MCNC: 128 MG/DL
GLUCOSE BLD STRIP.AUTO-MCNC: 111 MG/DL (ref 70–110)
GLUCOSE BLD STRIP.AUTO-MCNC: 118 MG/DL (ref 70–110)
GLUCOSE BLD STRIP.AUTO-MCNC: 120 MG/DL (ref 70–110)
GLUCOSE BLD STRIP.AUTO-MCNC: 134 MG/DL (ref 70–110)
GLUCOSE SERPL-MCNC: 106 MG/DL (ref 74–99)
GLUCOSE SERPL-MCNC: 114 MG/DL (ref 74–99)
GLUCOSE SERPL-MCNC: 122 MG/DL (ref 74–99)
HBA1C MFR BLD: 6.1 % (ref 4.2–5.6)
HCT VFR BLD AUTO: 28.5 % (ref 36–48)
HGB BLD-MCNC: 8.9 G/DL (ref 13–16)
INR PPP: 1.2 (ref 0.8–1.2)
LYMPHOCYTES # BLD: 1.1 K/UL (ref 0.9–3.6)
LYMPHOCYTES NFR BLD: 11 % (ref 21–52)
MCH RBC QN AUTO: 28.9 PG (ref 24–34)
MCHC RBC AUTO-ENTMCNC: 31.2 G/DL (ref 31–37)
MCV RBC AUTO: 92.5 FL (ref 74–97)
MONOCYTES # BLD: 0.6 K/UL (ref 0.05–1.2)
MONOCYTES NFR BLD: 6 % (ref 3–10)
NEUTS SEG # BLD: 8 K/UL (ref 1.8–8)
NEUTS SEG NFR BLD: 79 % (ref 40–73)
P-R INTERVAL, ECG05: 154 MS
PHOSPHATE SERPL-MCNC: 4.7 MG/DL (ref 2.5–4.9)
PLATELET # BLD AUTO: 289 K/UL (ref 135–420)
PMV BLD AUTO: 10.7 FL (ref 9.2–11.8)
POTASSIUM SERPL-SCNC: 3.5 MMOL/L (ref 3.5–5.5)
POTASSIUM SERPL-SCNC: 3.5 MMOL/L (ref 3.5–5.5)
POTASSIUM SERPL-SCNC: 3.7 MMOL/L (ref 3.5–5.5)
PROTHROMBIN TIME: 14.9 SEC (ref 11.5–15.2)
Q-T INTERVAL, ECG07: 394 MS
QRS DURATION, ECG06: 100 MS
QTC CALCULATION (BEZET), ECG08: 479 MS
RBC # BLD AUTO: 3.08 M/UL (ref 4.7–5.5)
SODIUM SERPL-SCNC: 140 MMOL/L (ref 136–145)
SODIUM SERPL-SCNC: 140 MMOL/L (ref 136–145)
SODIUM SERPL-SCNC: 141 MMOL/L (ref 136–145)
TROPONIN I SERPL-MCNC: 0.45 NG/ML (ref 0–0.04)
TROPONIN I SERPL-MCNC: 0.92 NG/ML (ref 0–0.04)
VENTRICULAR RATE, ECG03: 89 BPM
WBC # BLD AUTO: 10 K/UL (ref 4.6–13.2)

## 2018-06-24 PROCEDURE — 82962 GLUCOSE BLOOD TEST: CPT

## 2018-06-24 PROCEDURE — 80048 BASIC METABOLIC PNL TOTAL CA: CPT | Performed by: INTERNAL MEDICINE

## 2018-06-24 PROCEDURE — 99218 HC RM OBSERVATION: CPT

## 2018-06-24 PROCEDURE — 83036 HEMOGLOBIN GLYCOSYLATED A1C: CPT | Performed by: HOSPITALIST

## 2018-06-24 PROCEDURE — 36415 COLL VENOUS BLD VENIPUNCTURE: CPT | Performed by: INTERNAL MEDICINE

## 2018-06-24 PROCEDURE — 93005 ELECTROCARDIOGRAM TRACING: CPT

## 2018-06-24 PROCEDURE — 85610 PROTHROMBIN TIME: CPT | Performed by: HOSPITALIST

## 2018-06-24 PROCEDURE — 82550 ASSAY OF CK (CPK): CPT | Performed by: HOSPITALIST

## 2018-06-24 PROCEDURE — 85025 COMPLETE CBC W/AUTO DIFF WBC: CPT | Performed by: INTERNAL MEDICINE

## 2018-06-24 PROCEDURE — 80048 BASIC METABOLIC PNL TOTAL CA: CPT | Performed by: HOSPITALIST

## 2018-06-24 PROCEDURE — 85730 THROMBOPLASTIN TIME PARTIAL: CPT | Performed by: HOSPITALIST

## 2018-06-24 PROCEDURE — 65660000000 HC RM CCU STEPDOWN

## 2018-06-24 PROCEDURE — 74011250637 HC RX REV CODE- 250/637: Performed by: HOSPITALIST

## 2018-06-24 PROCEDURE — 84100 ASSAY OF PHOSPHORUS: CPT | Performed by: INTERNAL MEDICINE

## 2018-06-24 RX ORDER — INSULIN LISPRO 100 [IU]/ML
20 INJECTION, SOLUTION INTRAVENOUS; SUBCUTANEOUS
Status: DISCONTINUED | OUTPATIENT
Start: 2018-06-24 | End: 2018-06-24

## 2018-06-24 RX ORDER — INSULIN LISPRO 100 [IU]/ML
18 INJECTION, SOLUTION INTRAVENOUS; SUBCUTANEOUS
Status: DISCONTINUED | OUTPATIENT
Start: 2018-06-24 | End: 2018-06-24

## 2018-06-24 RX ORDER — AMOXICILLIN 250 MG
1 CAPSULE ORAL DAILY
Status: DISCONTINUED | OUTPATIENT
Start: 2018-06-24 | End: 2018-06-25 | Stop reason: HOSPADM

## 2018-06-24 RX ORDER — INSULIN GLARGINE 100 [IU]/ML
18 INJECTION, SOLUTION SUBCUTANEOUS
Status: DISCONTINUED | OUTPATIENT
Start: 2018-06-24 | End: 2018-06-25 | Stop reason: HOSPADM

## 2018-06-24 RX ORDER — METOPROLOL TARTRATE 50 MG/1
100 TABLET ORAL EVERY 12 HOURS
Status: DISCONTINUED | OUTPATIENT
Start: 2018-06-24 | End: 2018-06-25 | Stop reason: HOSPADM

## 2018-06-24 RX ORDER — INSULIN GLARGINE 100 [IU]/ML
35 INJECTION, SOLUTION SUBCUTANEOUS
Status: DISCONTINUED | OUTPATIENT
Start: 2018-06-24 | End: 2018-06-24

## 2018-06-24 RX ORDER — LOSARTAN POTASSIUM 25 MG/1
25 TABLET ORAL DAILY
Status: DISCONTINUED | OUTPATIENT
Start: 2018-06-24 | End: 2018-06-25 | Stop reason: HOSPADM

## 2018-06-24 RX ORDER — FENOFIBRATE 48 MG/1
48 TABLET, COATED ORAL
Status: DISCONTINUED | OUTPATIENT
Start: 2018-06-24 | End: 2018-06-25 | Stop reason: HOSPADM

## 2018-06-24 RX ORDER — INSULIN GLARGINE 100 [IU]/ML
20 INJECTION, SOLUTION SUBCUTANEOUS
Status: DISCONTINUED | OUTPATIENT
Start: 2018-06-24 | End: 2018-06-24

## 2018-06-24 RX ORDER — ISOSORBIDE MONONITRATE 30 MG/1
30 TABLET, EXTENDED RELEASE ORAL DAILY
Status: DISCONTINUED | OUTPATIENT
Start: 2018-06-24 | End: 2018-06-25 | Stop reason: HOSPADM

## 2018-06-24 RX ORDER — TAMSULOSIN HYDROCHLORIDE 0.4 MG/1
0.4 CAPSULE ORAL
Status: DISCONTINUED | OUTPATIENT
Start: 2018-06-24 | End: 2018-06-25 | Stop reason: HOSPADM

## 2018-06-24 RX ORDER — MAGNESIUM SULFATE 100 %
4 CRYSTALS MISCELLANEOUS AS NEEDED
Status: DISCONTINUED | OUTPATIENT
Start: 2018-06-24 | End: 2018-06-25 | Stop reason: HOSPADM

## 2018-06-24 RX ORDER — INSULIN LISPRO 100 [IU]/ML
10 INJECTION, SOLUTION INTRAVENOUS; SUBCUTANEOUS
Status: DISCONTINUED | OUTPATIENT
Start: 2018-06-24 | End: 2018-06-25 | Stop reason: HOSPADM

## 2018-06-24 RX ORDER — ASPIRIN 81 MG/1
81 TABLET ORAL DAILY
Status: DISCONTINUED | OUTPATIENT
Start: 2018-06-24 | End: 2018-06-25 | Stop reason: HOSPADM

## 2018-06-24 RX ORDER — ACETAMINOPHEN 325 MG/1
650 TABLET ORAL
Status: DISCONTINUED | OUTPATIENT
Start: 2018-06-24 | End: 2018-06-25 | Stop reason: HOSPADM

## 2018-06-24 RX ORDER — INSULIN LISPRO 100 [IU]/ML
INJECTION, SOLUTION INTRAVENOUS; SUBCUTANEOUS
Status: DISCONTINUED | OUTPATIENT
Start: 2018-06-24 | End: 2018-06-25 | Stop reason: HOSPADM

## 2018-06-24 RX ORDER — DEXTROSE 50 % IN WATER (D50W) INTRAVENOUS SYRINGE
25-50 AS NEEDED
Status: DISCONTINUED | OUTPATIENT
Start: 2018-06-24 | End: 2018-06-25 | Stop reason: HOSPADM

## 2018-06-24 RX ORDER — OXYCODONE HYDROCHLORIDE 5 MG/1
5 TABLET ORAL
Status: DISCONTINUED | OUTPATIENT
Start: 2018-06-24 | End: 2018-06-25 | Stop reason: HOSPADM

## 2018-06-24 RX ADMIN — TAMSULOSIN HYDROCHLORIDE 0.4 MG: 0.4 CAPSULE ORAL at 21:43

## 2018-06-24 RX ADMIN — ASPIRIN 81 MG: 81 TABLET, COATED ORAL at 08:55

## 2018-06-24 RX ADMIN — NEPHROCAP 1 CAPSULE: 1 CAP ORAL at 08:56

## 2018-06-24 RX ADMIN — STANDARDIZED SENNA CONCENTRATE AND DOCUSATE SODIUM 1 TABLET: 8.6; 5 TABLET, FILM COATED ORAL at 08:56

## 2018-06-24 RX ADMIN — METOPROLOL TARTRATE 100 MG: 50 TABLET ORAL at 08:54

## 2018-06-24 RX ADMIN — METOPROLOL TARTRATE 100 MG: 50 TABLET ORAL at 21:43

## 2018-06-24 RX ADMIN — LOSARTAN POTASSIUM 25 MG: 25 TABLET ORAL at 08:56

## 2018-06-24 RX ADMIN — ISOSORBIDE MONONITRATE 30 MG: 30 TABLET, EXTENDED RELEASE ORAL at 08:56

## 2018-06-24 NOTE — CONSULTS
[facility] - Consult Note    Consultation request by Evon Cheek DO for advice/opinion related to evaluating Chest pain  Fluid overload    Date of  Admission: 6/23/2018  8:16 AM   Primary Care Physician:  Yenni Mahmood MD     Assessment:     Patient Active Problem List   Diagnosis Code    NSTEMI (non-ST elevated myocardial infarction) ([place] [address].) I21.4    Coronary atherosclerosis of native coronary artery I25.10    CKD (chronic kidney disease) stage 4, GFR 15-29 ml/min ([facility]) N18.4    HTN (hypertension) I10    DM2 (diabetes mellitus, type 2) ([facility] E11.9    Dyslipidemia E78.5    Contrast dye induced nephropathy N14.1, T50.8X5A    Chest pain, unspecified R07.9    S/P coronary artery stent placement Z95.5    Postsurgical aortocoronary bypass status Z95.1    Atherosclerosis of renal artery ([facility]) I70.1    Chronic diastolic heart failure ([facility]) I50.32    Morbid obesity ([place] [address].) E66.01    Essential hypertension, benign I10    Sleep disturbance G47.9    Coronary atherosclerosis I25.10    Mixed hyperlipidemia E78.2    Type II or unspecified type diabetes mellitus without mention of complication, not stated as uncontrolled E11.9    Abdominal pain R10.9    GERD (gastroesophageal reflux disease) K21.9    Constipation K59.00    Chronic renal insufficiency N18.9    Prostate cancer ([place] [address].) C61    APRYL (obstructive sleep apnea) S70.28    Diastolic CHF, acute on chronic ([facility]) I50.33    CHF (congestive heart failure), NYHA class IV ([facility]) I50.9    Fatigue R53.83    Secondary hyperparathyroidism of renal origin ([facility] N25.81    Chest pain R07.9    Hyperuricemia E79.0    Chronic kidney disease, stage V ([facility]) N18.5    ESRD (end stage renal disease) ([facility]) N18.6    Hypotension I95.9    Fluid overload E87.70    Hyperkalemia E87.5    Type 2 diabetes mellitus with nephropathy ([facility]) E11.21    Elevated troponin R74.8    Gastrointestinal hemorrhage associated with anorectal source K62.5    Lower GI bleed K92.2    GI bleed K92.2    Radiation proctitis K62.7     1. Acute AV fistula thrombus formation to L upper extremity- stable and being evaluated to possible thrombectomy  2. Chest pain- resolved, and may have been more related to the AV fistula thrombus symptoms. No changes to the EKG. 3. CAD- s/p CABG 1997, stent to SVG in 2012 and 7/2016, SVG to diagonal 11/2017. Recent NSTEMI 1/2018.  4. Indeterminate troponin- third test at 0.45. This most likely demand ischemia from pain from his thrombus in arm. Do not suspect ACS. 5. Chronic HFpEF- NYHA Class 3- he appears at his baseline and compensated. 6. HTN- stable on   7. Dyslipidemia- on Rosuvastatin and fibrates  8. DM Type II     Plan:     CV status stable and would not pursue any additional workup at this time. Continue with ASA, Tricor, nitrates, ARB and BB   He was taken off Plavix from prior GIB. Would appreciate input from GI if going back on Plavix is possible. Will defer to primary team.  Will follow. History of Present Illness: This is a 68 y.o. male admitted for Chest pain  Fluid overload. Patient complains of:  Patient with history of CKD on dialysis Tu/Th/ Sat who states that his AV fistula started to feel more tender over the last day or so, and it was found to be clotted off when he went for his normal dialysis yesterday. He started to have some discomfort running up in his arm where the AV fistula is, and he stated that he had a small amount of pain to his chest as well, but attributed it to the fullness of the fistula causing all the problems. He states that the fistula is feeling a little better, and his chest pain has subsided. He denies any shortness of breath, change in his normal routine, no N/V/D or other symptoms. Of note, the patient had a recent admission for rectal bleeding, and was taken off his Plavix.       Cardiac risk factors: family history, dyslipidemia, obesity, sedentary life style, male gender, hypertension, CAD      Review of Symptoms:  Except as stated above include:  Constitutional:  negative  Respiratory:  negative  Cardiovascular:  negative  Gastrointestinal: negative  Genitourinary:  negative  Musculoskeletal:  Negative  Neurological:  Negative  Dermatological:  Negative  Endocrinological: Negative  Psychological:  Negative    A comprehensive review of systems was negative except for that written in the HPI. Past Medical History:     Past Medical History:   Diagnosis Date    Atherosclerosis of renal artery (HCC)     S/P Rt stent    CAD (coronary artery disease) , 1997, 2012    ,double bypass, 2 stents, 2stents 7/2016    Cancer (HCC)     prostate    CHF (congestive heart failure) (HCC)     Chronic diastolic heart failure (HCC)     Chronic kidney disease     Chronic kidney disease (CKD), stage IV (severe) (HCC)     On Dialysis now -T-Th-sat    Constipation     Coronary atherosclerosis of unspecified type of vessel, native or graft     Stable angina after recent PCI continue treatment.  CRI (chronic renal insufficiency)     Diabetes ([facility] 1973    type 2    Essential hypertension, benign     GERD (gastroesophageal reflux disease)     Gout     Hypertension 1982    Morbid obesity ([facility]     Weight loss has been strongly encouraged by following dietary restrictions and an exercise routine    APRYL (obstructive sleep apnea) 6/26/2015    no cpap    Other and unspecified hyperlipidemia     HDL's are not at goal, LDL's are at goal, triglycerides are not at goal.    Other and unspecified hyperlipidemia     HDL's are not at goal, LDL's are at goal, triglycerides are not at goal.     Other ill-defined conditions(799.89) 1960    pneumonia twice    Sleep disturbance     Type II or unspecified type diabetes mellitus without mention of complication, not stated as uncontrolled       Cardiac cath 11/2017:  FINDINGS:  1. Left main is patent.  It bifurcates into left anterior descending artery  and circumflex artery. 2. Left anterior descending artery is occluded in the proximal portion up  to the bifurcation of septal 1 branch. 3. Circumflex artery is a medium caliber vessel with proximal 30% to  40% stenosis followed by diffuse 60% stenosis in the distal portion. It  subtends to become a small caliber vessel. Obtuse marginal 1 and obtuse  marginal 2 and obtuse marginal 3 are small to medium caliber vessels  with 20%-30% stenosis. 4. Right coronary artery is codominant with proximal 99% stenosis. It  appears to have right-to-right collaterals. 5. Left internal mammary artery to left anterior descending artery is  patent. Distal to anastomosis of the LIMA, LAD has wall irregularities. 6. SVG to diagonal 1 artery - site of prior stenting has severe in-stent  stenosis up to 99%. Status post PTCA using an Emerge 2.5 mm x 15  mm balloon followed by a noncompliant Emerge 3.0 mm x 15 mm  balloon, lesion reduced to about 10%. Aspiration thrombectomy was  performed to the distal portion of the graft due to thrombus during the  procedure. A successful aspiration thrombectomy was performed using  a Pronto catheter. Following this, we then deployed a Xience 2.75 mm  x 28 mm stent to cover the thrombus layered area. We noted BRENNA-3  flow at the end of the case. Distal to anastomosis, diagonal 1 artery is  a small to medium caliber vessel with wall irregularities. Brisk BRENNA-3  flow was noted.      Social History:     Social History     Social History    Marital status:      Spouse name: N/A    Number of children: N/A    Years of education: N/A     Social History Main Topics    Smoking status: Never Smoker    Smokeless tobacco: Never Used    Alcohol use No    Drug use: No    Sexual activity: Not Asked     Other Topics Concern    None     Social History Narrative        Family History:     Family History   Problem Relation Age of Onset    Heart Attack Father 64        Medications: Allergies   Allergen Reactions    Nka [No Known Allergies] Other (comments)        Current Facility-Administered Medications   Medication Dose Route Frequency    insulin glargine (LANTUS) injection 35 Units  35 Units SubCUTAneous ACB/HS    insulin lispro (HUMALOG) injection 20 Units  20 Units SubCUTAneous TIDAC    insulin lispro (HUMALOG) injection   SubCUTAneous AC&HS    glucose chewable tablet 16 g  4 Tab Oral PRN    glucagon (GLUCAGEN) injection 1 mg  1 mg IntraMUSCular PRN    dextrose (D50W) injection syrg 12.5-25 g  25-50 mL IntraVENous PRN    metoprolol tartrate (LOPRESSOR) tablet 100 mg  100 mg Oral Q12H    B complex-vitaminC-folic acid (NEPHROCAP) cap  1 Cap Oral DAILY    senna-docusate (PERICOLACE) 8.6-50 mg per tablet 1 Tab  1 Tab Oral DAILY    isosorbide mononitrate ER (IMDUR) tablet 30 mg  30 mg Oral DAILY    tamsulosin (FLOMAX) capsule 0.4 mg  0.4 mg Oral QHS    losartan (COZAAR) tablet 25 mg  25 mg Oral DAILY    aspirin delayed-release tablet 81 mg  81 mg Oral DAILY    fenofibrate nanocrystallized (TRICOR) tablet 48 mg  48 mg Oral QHS    acetaminophen (TYLENOL) tablet 650 mg  650 mg Oral Q6H PRN    oxyCODONE IR (ROXICODONE) tablet 5 mg  5 mg Oral Q4H PRN    phenyleph-shark andree oil-mo-pet (PREPARATION H) ointment   PeriANAL QID PRN         Physical Exam:     Visit Vitals    /65 (BP 1 Location: Right arm, BP Patient Position: At rest)    Pulse 87    Temp 99 °F (37.2 °C)    Resp 15    Wt 99.8 kg (220 lb)    SpO2 95%    BMI 37.76 kg/m2     BP Readings from Last 3 Encounters:   06/24/18 155/65   06/08/18 146/54   05/29/18 133/64     Pulse Readings from Last 3 Encounters:   06/24/18 87   06/08/18 70   05/29/18 89     Wt Readings from Last 3 Encounters:   06/24/18 99.8 kg (220 lb)   06/08/18 97.5 kg (214 lb 14.4 oz)   05/29/18 97.4 kg (214 lb 11.2 oz)       General:  alert, cooperative, no distress, appears stated age  Neck:  nontender, no carotid bruit, no JVD  Lungs: clear to auscultation bilaterally  Heart:  regular rate and rhythm, S1, S2 normal, no murmur, click, rub or gallop  Abdomen:  abdomen is soft without significant tenderness, masses, organomegaly or guarding  Extremities:  L upper extremity with AV fistula and bandage, mildly tender without edema  Skin: Warm and dry.  no hyperpigmentation, vitiligo, or suspicious lesions  Neuro: alert, oriented x3, affect appropriate, no focal neurological deficits, moves all extremities well, no involuntary movements  Psych: non focal     Data Review:     Recent Labs      06/24/18 0534  06/23/18   0830   WBC  10.0  8.6   HGB  8.9*  8.9*   HCT  28.5*  28.2*   PLT  289  254     Recent Labs      06/24/18 0534 06/23/18 2000 06/23/18   0830   NA  141  139  140   K  3.5  3.5  3.4*   CL  102  102  101   CO2  31  29  30   GLU  114*  75  79   BUN  44*  43*  37*   CREA  6.08*  6.04*  5.87*   CA  9.3  8.7  8.5   PHOS  4.7   --    --    ALB   --    --   3.3*   SGOT   --    --   19   ALT   --    --   27   INR  1.2   --    --        Results for orders placed or performed during the hospital encounter of 06/23/18   EKG, 12 LEAD, INITIAL   Result Value Ref Range    Ventricular Rate 73 BPM    Atrial Rate 73 BPM    P-R Interval 154 ms    QRS Duration 102 ms    Q-T Interval 412 ms    QTC Calculation (Bezet) 453 ms    Calculated P Axis 35 degrees    Calculated R Axis 7 degrees    Calculated T Axis 60 degrees    Diagnosis       Normal sinus rhythm  Incomplete right bundle branch block  Nonspecific ST abnormality  Abnormal ECG  When compared with ECG of 06-JUN-2018 15:51,  No significant change was found  Confirmed by Juan Andrews MD, Bossman Doll ([id number]) on 6/23/2018 5:11:52 PM         All Cardiac Markers in the last 24 hours:    Lab Results   Component Value Date/Time    CPK 71 06/24/2018 05:34 AM    CPK 57 06/23/2018 08:00 PM    CPK 81 06/23/2018 08:30 AM    CKMB 3.9 (H) 06/24/2018 05:34 AM    CKMB 2.6 06/23/2018 08:00 PM    CKMB 2.6 06/23/2018 08:30 AM CKND1 5.5 (H) 06/24/2018 05:34 AM    CKND1 4.6 (H) 06/23/2018 08:00 PM    CKND1 3.2 06/23/2018 08:30 AM    TROIQ 0.45 (H) 06/24/2018 05:34 AM    TROIQ 0.17 (H) 06/23/2018 08:00 PM    TROIQ 0.08 (H) 06/23/2018 08:30 AM       Last Lipid:    Lab Results   Component Value Date/Time    Cholesterol, total 158 10/24/2017 05:08 AM    HDL Cholesterol 39 (L) 10/24/2017 05:08 AM    LDL, calculated 72.4 10/24/2017 05:08 AM    Triglyceride 233 (H) 10/24/2017 05:08 AM    CHOL/HDL Ratio 4.1 10/24/2017 05:08 AM       Signed By: LESLY Craig supervised    June 24, 2018      I have independently evaluated and examined the patient. All relevant labs and testing data's are reviewed. Care plan discussed and updated after review.     Bernice Quigley MD
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
## 2018-06-24 NOTE — PROGRESS NOTES
Problem: Falls - Risk of  Goal: *Absence of Falls  Document Rene Fall Risk and appropriate interventions in the flowsheet.    Outcome: Progressing Towards Goal  Fall Risk Interventions:  Mobility Interventions: Assess mobility with egress test, Communicate number of staff needed for ambulation/transfer, OT consult for ADLs, Patient to call before getting OOB, PT Consult for mobility concerns         Medication Interventions: Evaluate medications/consider consulting pharmacy, Patient to call before getting OOB, Teach patient to arise slowly

## 2018-06-24 NOTE — PROGRESS NOTES
Progress Note    Arian Allison  68 y.o. Admit Date: 6/23/2018  Active Problems:    Chest pain (10/27/2017) POA: Unknown      Fluid overload (12/12/2017) POA: Unknown      Chest pain with high risk of acute coronary syndrome (6/24/2018) POA: Unknown      Anemia (6/24/2018) POA: Yes            Subjective:     Patient feels good, no chest pain now, Vascular surgery has seen the AVG,doppler study no done yet. A comprehensive review of systems was negative except for that written in the History of Present Illness.     Objective:     Visit Vitals    /60    Pulse 88    Temp 99.6 °F (37.6 °C)    Resp 20    Wt 99.8 kg (220 lb)    SpO2 96%    BMI 37.76 kg/m2         Intake/Output Summary (Last 24 hours) at 06/24/18 1822  Last data filed at 06/23/18 2200   Gross per 24 hour   Intake              360 ml   Output                0 ml   Net              360 ml       Current Facility-Administered Medications   Medication Dose Route Frequency Provider Last Rate Last Dose    insulin lispro (HUMALOG) injection   SubCUTAneous AC&HS Lajuanda Screen, DO   Stopped at 06/24/18 0730    glucose chewable tablet 16 g  4 Tab Oral PRN Lajuanda Screen, DO        glucagon (GLUCAGEN) injection 1 mg  1 mg IntraMUSCular PRN Lajuanda Screen, DO        dextrose (D50W) injection syrg 12.5-25 g  25-50 mL IntraVENous PRN Lajuanda Screen, DO        metoprolol tartrate (LOPRESSOR) tablet 100 mg  100 mg Oral Q12H Lajuanda Screen, DO   100 mg at 06/24/18 1163    B complex-vitaminC-folic acid (NEPHROCAP) cap  1 Cap Oral DAILY Lajuanda Screen, DO   1 Cap at 06/24/18 0856    senna-docusate (PERICOLACE) 8.6-50 mg per tablet 1 Tab  1 Tab Oral DAILY Lajuanda Screen, DO   1 Tab at 06/24/18 3920    isosorbide mononitrate ER (IMDUR) tablet 30 mg  30 mg Oral DAILY Lajuanda Screen, DO   30 mg at 06/24/18 0856    tamsulosin (FLOMAX) capsule 0.4 mg  0.4 mg Oral QHS Lajuanda Screen, DO        losartan (COZAAR) tablet 25 mg  25 mg Oral DAILY Ruthanna Lopez, DO   25 mg at 06/24/18 6688    aspirin delayed-release tablet 81 mg  81 mg Oral DAILY Ruthanna Lopez, DO   81 mg at 06/24/18 5856    fenofibrate nanocrystallized (TRICOR) tablet 48 mg  48 mg Oral QHS Ruthanna Lopez, DO        acetaminophen (TYLENOL) tablet 650 mg  650 mg Oral Q6H PRN Ruthanna Lopez, DO        oxyCODONE IR (ROXICODONE) tablet 5 mg  5 mg Oral Q4H PRN Ruthanna Lopez, DO        phenyleph-shark andree oil-mo-pet (PREPARATION H) ointment   PeriANAL QID PRN Ruthanna Lopez, DO        insulin glargine (LANTUS) injection 18 Units  18 Units SubCUTAneous ACB/HS Gabino Edmonds MD        insulin lispro (HUMALOG) injection 10 Units  10 Units SubCUTAneous Gallo Wright MD            Physical Exam:     Physical Exam:   General:  Alert, cooperative, no distress, appears stated age. Eyes:  Conjunctivae/corneas clear. PERRL, EOMs intact. Mouth/Throat: Lips, mucosa, and tongue normal. Teeth and gums normal.   Neck: Supple, symmetrical, trachea midline, no adenopathy, thyroid: no enlargement/tenderness/nodules, no carotid bruit and no JVD. Lungs:   Clear to auscultation bilaterally. Heart:  Regular rate and rhythm, S1, S2 normal, no murmur, click, rub or gallop. Abdomen:   Soft, non-tender. Bowel sounds normal. No masses,  No organomegaly. Extremities: Extremities normal, atraumatic, no cyanosis or edema, AVF has good thrill & bruit.          Data Review:    CBC w/Diff    Recent Labs      06/24/18   0534  06/23/18   0830   WBC  10.0  8.6   RBC  3.08*  3.00*   HGB  8.9*  8.9*   HCT  28.5*  28.2*   MCV  92.5  94.0   MCH  28.9  29.7   MCHC  31.2  31.6   RDW  14.5  14.3    Recent Labs      06/24/18   0534  06/23/18   0830   MONOS  6  4   EOS  3  3   BASOS  1  0   RDW  14.5  14.3        Comprehensive Metabolic Profile    Recent Labs      06/24/18   1350  06/24/18   0534  06/23/18 2000   NA  140  141  139   K  3.7  3.5  3.5   CL  103  102  102   CO2  30  31  29 BUN  46*  44*  43*   CREA  6.28*  6.08*  6.04*    Recent Labs      06/24/18   1350  06/24/18   0534  06/23/18 2000 06/23/18   0830   CA  8.5  9.3  8.7  8.5   PHOS   --   4.7   --    --    ALB   --    --    --   3.3*   TP   --    --    --   7.6   SGOT   --    --    --   19   TBILI   --    --    --   0.6                        Impression:       Active Hospital Problems    Diagnosis Date Noted    Chest pain with high risk of acute coronary syndrome 06/24/2018    Anemia 06/24/2018    Fluid overload 12/12/2017    Chest pain 76/39/0216      Complicatons of AVF.       Plan:     Duplex study of AVF tomorrow, if no significant problem within the AVF then Dialysis   tomorrow       Kasie Lamas MD

## 2018-06-24 NOTE — PROGRESS NOTES
Notified overnight  hospitalist services Dr. Michelle Solo of abnormal lab values and cardiac rhythm change;  Troponin trending up at 0.45 currently,per telemetry monitor tech patient had 15 second run of uncontrolled A-fib with max rate of 150. physician verbalized no new orders and to continue monitoring patient,cardiology to see patient.

## 2018-06-24 NOTE — CONSULTS
Surgery Consult      Patient: Racquel Delgado MRN: [id number]  CSN: [id number]      YOB: 1940    Age: 68 y.o. Sex: male      DOA: 6/23/2018       HPI:     Racquel Delgado is a 68 y.o. male who presents with ESRD and chest pain. Being followed by medicine and nephrology. Currently feels fine. Has some swelling of his left arm but this has resolved and seemed to improve. Last dialysis had some issues with \"pulling clots\". No evidence of steal syndrome. His biggest worry is when he will get his next dialysis and when he will be able to leave the hospital.    Past Medical History:   Diagnosis Date    Atherosclerosis of renal artery (HCC)     S/P Rt stent    CAD (coronary artery disease) , 1997, 2012    ,double bypass, 2 stents, 2stents 7/2016    Cancer ([facility] [address]     prostate    CHF (congestive heart failure) (HCC)     Chronic diastolic heart failure (HCC)     Chronic kidney disease     Chronic kidney disease (CKD), stage IV (severe) (HCC)     On Dialysis now -[facility]    Constipation     Coronary atherosclerosis of unspecified type of vessel, native or graft     Stable angina after recent PCI continue treatment.        CRI (chronic renal insufficiency)     Diabetes ([address] 1973    type 2    Essential hypertension, benign     GERD (gastroesophageal reflux disease)     Gout     Hypertension 1982    Morbid obesity ([address]     Weight loss has been strongly encouraged by following dietary restrictions and an exercise routine    APRYL (obstructive sleep apnea) 6/26/2015    no cpap    Other and unspecified hyperlipidemia     HDL's are not at goal, LDL's are at goal, triglycerides are not at goal.    Other and unspecified hyperlipidemia     HDL's are not at goal, LDL's are at goal, triglycerides are not at goal.     Other ill-defined conditions(799.89) 1960    pneumonia twice    Sleep disturbance     Type II or unspecified type diabetes mellitus without mention of complication, not stated as uncontrolled        Past Surgical History:   Procedure Laterality Date   [address]    lt. carotid endart. [facility] CARDIAC SURG PROCEDURE UNLIST  2012, 2016, Nov 2017    Coronary Stent    COLONOSCOPY N/A 6/23/2017    COLONOSCOPY w/ APC w/ polypectomies performed by Rima Kim MD at [address] N/A 1/19/2018    FLEXIBLE SIGMOIDOSCOPY WITH Radio frequency ablation performed by Rima Kim MD at [address] N/A 6/8/2018    SIGMOIDOSCOPY FLEXIBLE WITH APC performed by Baljit Dawkins MD at [facility] ENDOSCOPY    HX CHOLECYSTECTOMY      HX CORONARY ARTERY BYPASS GRAFT  2000    x2    HX HEENT  1997    cholecystectomy    HX UROLOGICAL  1998    renal art. surg    HX VASCULAR ACCESS      Perma cath for HD- right chest.       Family History   Problem Relation Age of Onset    Heart Attack Father 64       Social History     Social History    Marital status:      Spouse name: N/A    Number of children: N/A    Years of education: N/A     Social History Main Topics    Smoking status: Never Smoker    Smokeless tobacco: Never Used    Alcohol use No    Drug use: No    Sexual activity: Not Asked     Other Topics Concern    None     Social History Narrative       Prior to Admission medications    Medication Sig Start Date End Date Taking? Authorizing Provider   acetaminophen (TYLENOL) 325 mg tablet Take 2 Tabs by mouth every six (6) hours as needed. 6/8/18  Yes Luciano Jimenez NP   lidocaine (XYLOCAINE) 5 % ointment Apply to marilee-anal area and rectum daily as needed for pain 5/29/18  Yes Clarke Melgar NP   hydrocortisone (ANUSOL-HC) 2.5 % rectal cream Insert  into rectum four (4) times daily. 5/29/18  Yes Nik Carbajal NP   simvastatin (ZOCOR) 40 mg tablet TAKE 1 TABLET BY MOUTH NIGHTLY. 5/9/18  Yes Alisa Gross MD   losartan (COZAAR) 25 mg tablet Take  by mouth daily.    Yes Historical Provider   aspirin delayed-release 81 mg tablet Take  by mouth daily. Yes Historical Provider   fenofibrate (LOFIBRA) 54 mg tablet TAKE ONE TABLET BY MOUTH ONE TIME DAILY  4/2/18  Yes Urszula Coughlin NP   tamsulosin (FLOMAX) 0.4 mg capsule TAKE ONE CAPSULE BY MOUTH ONE TIME DAILY 1/8/18  Yes Nathan Wray MD   isosorbide mononitrate ER (IMDUR) 30 mg tablet Take 1 Tab by mouth daily. 12/15/17  Yes Amna Saul MD   b complex-vitamin c-folic acid (NEPHROCAPS) 1 mg capsule Take 1 Cap by mouth daily. 10/25/17  Yes Darnell Madrid MD   senna-docusate (PERICOLACE) 8.6-50 mg per tablet Take 1 Tab by mouth daily. 10/25/17  Yes Darnell Madrid MD   metoprolol tartrate (LOPRESSOR) 100 mg IR tablet TAKE 1 TABLET BY MOUTH TWO TIMES A DAY. 7/19/17  Yes Vladimir Robbins NP   Cholecalciferol, Vitamin D3, (DECARA) 50,000 unit cap Take  by mouth every seven (7) days. Yes Historical Provider   insulin glargine (LANTUS) 100 unit/mL injection 20 Units by SubCUTAneous route every twelve (12) hours. Patient taking differently: 35 Units by SubCUTAneous route every twelve (12) hours. 7/21/16  Yes Andrea Arroyo MD   insulin aspart (NOVOLOG) 100 unit/mL injection 20 units sq 3 times a day,sliding scale  Patient taking differently: sliding scale with meals 3/13/13  Yes Davi Álvarez MD   allopurinol (ZYLOPRIM) 100 mg tablet Take 100 mg by mouth.  Monday, Wednesday,friday   Yes Historical Provider   nitroglycerin (NITROSTAT) 0.4 mg SL tablet 1 Tab by SubLINGual route as needed for Chest Pain. 6/21/16   Davi Álvarez MD       Allergies   Allergen Reactions    Nka [No Known Allergies] Other (comments)       Physical Exam:      Visit Vitals    /56    Pulse 76    Temp 98.7 °F (37.1 °C)    Resp 18    Wt 220 lb (99.8 kg)    SpO2 97%    BMI 37.76 kg/m2       GENERAL: alert, cooperative, no distress, appears stated age, THROAT & NECK: normal and no erythema or exudates noted. , LUNG: clear to auscultation bilaterally, HEART: regular rate and rhythm, S1, S2 normal, ABDOMEN: soft, non-tender. Bowel sounds normal. No masses,  no organomegaly, abnormal findings:  obese, EXTREMITIES:  Left upper extremity with palpable thrill although more pulsatility noted towards the arterial side of what seems to be a braciocephalic fistula. There is an obvious hematoma noted within the mid arm likely back wall stick causing bleeding can also feel what is likely a pseudoaneurysm at this site. Most likely explanation for \"pulling clots\" is backwalled fistula with bleed and couldn't get back into fistula only hematoma, NEUROLOGIC: AOx3. Cranial nerves 2-12 and sensation grossly intact. ROS:  Constitutional: negative  Eyes: negative  Ears, nose, mouth, throat, and face: negative  Respiratory: negative  Gastrointestinal: negative  Genitourinary:negative  Integument/breast: negative  Musculoskeletal:negative  Neurological: negative  Behavioral/Psych: negative  Endocrine: negative  Allergic/Immunologic: negative  Unless otherwise mentioned in the HPI.     Data Review:    CBC:   Lab Results   Component Value Date/Time    WBC 10.0 06/24/2018 05:34 AM    RBC 3.08 (L) 06/24/2018 05:34 AM    HGB 8.9 (L) 06/24/2018 05:34 AM    HCT 28.5 (L) 06/24/2018 05:34 AM    PLATELET 346 73/48/8677 05:34 AM      BMP:   Lab Results   Component Value Date/Time    Glucose 114 (H) 06/24/2018 05:34 AM    Sodium 141 06/24/2018 05:34 AM    Potassium 3.5 06/24/2018 05:34 AM    Chloride 102 06/24/2018 05:34 AM    CO2 31 06/24/2018 05:34 AM    BUN 44 (H) 06/24/2018 05:34 AM    Creatinine 6.08 (H) 06/24/2018 05:34 AM    Calcium 9.3 06/24/2018 05:34 AM     Coagulation:   Lab Results   Component Value Date/Time    Prothrombin time 14.9 06/24/2018 05:34 AM    INR 1.2 06/24/2018 05:34 AM    aPTT 37.6 (H) 06/24/2018 05:34 AM     Cardiac markers:   Lab Results   Component Value Date/Time    CK 71 06/24/2018 05:34 AM    CK-MB Index 5.5 (H) 06/24/2018 05:34 AM         Assessment/Plan     69 y/o male with ESRD.    --hematoma will slowly resolve in arm.  --will see what ultrasound shows  --will keep npo mn just in case requires fistulagram  --likely hematoma will slowly resolve and make it easier to perform dialysis over time. --currently no need for fistula rest although pending ultrasound that decision may change. --Further plan pending ultrasound  --Please call with any further questions or concerns.     Active Problems:    Chest pain (10/27/2017)      Fluid overload (12/12/2017)      Chest pain with high risk of acute coronary syndrome (6/24/2018)        Sampson Harrison MD  June 24, 2018

## 2018-06-24 NOTE — ROUTINE PROCESS
2001 Assumed care of patient from off going nurse. Patient resting in bed. No distress noted. Call bell within reach, siderails up x 3, bed in lowest position, and patient instructed to use call bell for assistance. Will continue to monitor. 2008 Dr. Mohan Sees notified of need for medication orders. MD stated, \"Dr. Chung Mae is working on it. \"    2103 BS 70. BS asymptomatic. Alert and oriented x4. No s/s of hypoglycemia. 2 packs of massimo crackers and grape juice given to patient. 2121 Rechecked BS 74. Juice and massimo crackers given again. 2135 Patient refused for blood sugar to be checked at this time. Stated, Cristina Ginger it time. It will come up. \"    2147 BS 80    2209 Notified Dr. Mohan Sees that there were still no medication orders and patient is now complaining of indigestion. Received order for Pepcid x1.    0706 Bedside and Verbal shift change report given to Veveramanda Olea RN (oncoming nurse) by Ophelia Castellanos RN(offgoing nurse). Report included the following information Kardex, Intake/Output, MAR, Recent Results and Cardiac Rhythm SR tele box#[id number].

## 2018-06-25 ENCOUNTER — APPOINTMENT (OUTPATIENT)
Dept: VASCULAR SURGERY | Age: 78
DRG: 314 | End: 2018-06-25
Attending: INTERNAL MEDICINE
Payer: MEDICARE

## 2018-06-25 VITALS
SYSTOLIC BLOOD PRESSURE: 156 MMHG | TEMPERATURE: 98.2 F | RESPIRATION RATE: 16 BRPM | WEIGHT: 216.8 LBS | OXYGEN SATURATION: 95 % | DIASTOLIC BLOOD PRESSURE: 58 MMHG | BODY MASS INDEX: 37.21 KG/M2 | HEART RATE: 84 BPM

## 2018-06-25 LAB
ANION GAP SERPL CALC-SCNC: 11 MMOL/L (ref 3–18)
BASOPHILS # BLD: 0 K/UL (ref 0–0.1)
BASOPHILS NFR BLD: 0 % (ref 0–2)
BUN SERPL-MCNC: 49 MG/DL (ref 7–18)
BUN/CREAT SERPL: 7 (ref 12–20)
CALCIUM SERPL-MCNC: 9 MG/DL (ref 8.5–10.1)
CHLORIDE SERPL-SCNC: 102 MMOL/L (ref 100–108)
CO2 SERPL-SCNC: 27 MMOL/L (ref 21–32)
CREAT SERPL-MCNC: 6.69 MG/DL (ref 0.6–1.3)
DIFFERENTIAL METHOD BLD: ABNORMAL
EOSINOPHIL # BLD: 0.3 K/UL (ref 0–0.4)
EOSINOPHIL NFR BLD: 3 % (ref 0–5)
ERYTHROCYTE [DISTWIDTH] IN BLOOD BY AUTOMATED COUNT: 14.6 % (ref 11.6–14.5)
GLUCOSE BLD STRIP.AUTO-MCNC: 150 MG/DL (ref 70–110)
GLUCOSE SERPL-MCNC: 129 MG/DL (ref 74–99)
HBV SURFACE AB SER QL IA: NEGATIVE
HBV SURFACE AB SERPL IA-ACNC: <3.1 MIU/ML
HCT VFR BLD AUTO: 27.1 % (ref 36–48)
HEP BS AB COMMENT,HBSAC: ABNORMAL
HGB BLD-MCNC: 8.6 G/DL (ref 13–16)
LEFT ARTERIAL PROX ANASTOMOSIS AVF EDV: 133 CM/S
LEFT ARTERIAL PROX ANASTOMOSIS AVF PSV: 391 CM/S
LEFT AVF AVG GRAFT NAME: NORMAL
LEFT AVF AVG MID OUTFLOW VOL FLOW: 1908 ML/MIN
LEFT AVF AVG OUTFLOW VESSEL NAME: NORMAL
LEFT AVF AVG PROX OUTFLOW VOL FLOW: 2375 ML/MIN
LEFT DIST OUTFLOW AVF EDV: 148 CM/S
LEFT DIST OUTFLOW AVF PSV: 273 CM/S
LEFT INFLOW ARTERY AVF EDV: 224 CM/S
LEFT INFLOW ARTERY AVF PSV: 398 CM/S
LEFT MID OUTFLOW AVF EDV: 127 CM/S
LEFT MID OUTFLOW AVF PSV: 236 CM/S
LEFT OUTFLOW VESSEL AVF PSV: 162 CM/S
LEFT PROX OUTFLOW AVF EDV: 127 CM/S
LEFT PROX OUTFLOW AVF PSV: 251 CM/S
LEFT VENOUS DIST ANASTOMOSIS AVF PSV: 118 CM/S
LYMPHOCYTES # BLD: 1.4 K/UL (ref 0.9–3.6)
LYMPHOCYTES NFR BLD: 14 % (ref 21–52)
MCH RBC QN AUTO: 29.5 PG (ref 24–34)
MCHC RBC AUTO-ENTMCNC: 31.7 G/DL (ref 31–37)
MCV RBC AUTO: 92.8 FL (ref 74–97)
MONOCYTES # BLD: 0.7 K/UL (ref 0.05–1.2)
MONOCYTES NFR BLD: 7 % (ref 3–10)
NEUTS SEG # BLD: 7.7 K/UL (ref 1.8–8)
NEUTS SEG NFR BLD: 76 % (ref 40–73)
PHOSPHATE SERPL-MCNC: 4.6 MG/DL (ref 2.5–4.9)
PLATELET # BLD AUTO: 291 K/UL (ref 135–420)
PMV BLD AUTO: 11 FL (ref 9.2–11.8)
POTASSIUM SERPL-SCNC: 3.5 MMOL/L (ref 3.5–5.5)
RBC # BLD AUTO: 2.92 M/UL (ref 4.7–5.5)
SODIUM SERPL-SCNC: 140 MMOL/L (ref 136–145)
WBC # BLD AUTO: 10.1 K/UL (ref 4.6–13.2)

## 2018-06-25 PROCEDURE — 5A1D70Z PERFORMANCE OF URINARY FILTRATION, INTERMITTENT, LESS THAN 6 HOURS PER DAY: ICD-10-PCS | Performed by: INTERNAL MEDICINE

## 2018-06-25 PROCEDURE — 90935 HEMODIALYSIS ONE EVALUATION: CPT

## 2018-06-25 PROCEDURE — 80048 BASIC METABOLIC PNL TOTAL CA: CPT | Performed by: EMERGENCY MEDICINE

## 2018-06-25 PROCEDURE — 74011250637 HC RX REV CODE- 250/637: Performed by: HOSPITALIST

## 2018-06-25 PROCEDURE — 74011636637 HC RX REV CODE- 636/637: Performed by: EMERGENCY MEDICINE

## 2018-06-25 PROCEDURE — 74011250636 HC RX REV CODE- 250/636: Performed by: INTERNAL MEDICINE

## 2018-06-25 PROCEDURE — 97161 PT EVAL LOW COMPLEX 20 MIN: CPT

## 2018-06-25 PROCEDURE — 97116 GAIT TRAINING THERAPY: CPT

## 2018-06-25 PROCEDURE — 36415 COLL VENOUS BLD VENIPUNCTURE: CPT | Performed by: EMERGENCY MEDICINE

## 2018-06-25 PROCEDURE — 84100 ASSAY OF PHOSPHORUS: CPT | Performed by: EMERGENCY MEDICINE

## 2018-06-25 PROCEDURE — 93990 DOPPLER FLOW TESTING: CPT

## 2018-06-25 PROCEDURE — 85025 COMPLETE CBC W/AUTO DIFF WBC: CPT | Performed by: EMERGENCY MEDICINE

## 2018-06-25 PROCEDURE — 82962 GLUCOSE BLOOD TEST: CPT

## 2018-06-25 RX ADMIN — NEPHROCAP 1 CAPSULE: 1 CAP ORAL at 09:48

## 2018-06-25 RX ADMIN — ERYTHROPOIETIN 10000 UNITS: 10000 INJECTION, SOLUTION INTRAVENOUS; SUBCUTANEOUS at 18:34

## 2018-06-25 RX ADMIN — STANDARDIZED SENNA CONCENTRATE AND DOCUSATE SODIUM 1 TABLET: 8.6; 5 TABLET, FILM COATED ORAL at 09:48

## 2018-06-25 RX ADMIN — INSULIN GLARGINE 18 UNITS: 100 INJECTION, SOLUTION SUBCUTANEOUS at 09:00

## 2018-06-25 RX ADMIN — ASPIRIN 81 MG: 81 TABLET, COATED ORAL at 09:48

## 2018-06-25 NOTE — PROGRESS NOTES
conducted an initial consultation and Spiritual Assessment for Nory Stone, who is a 68 y. o.,male. Patients Primary Language is: [place]. According to the patients EMR Congregational Affiliation is: Mandaeism.      The reason the Patient came to the hospital is:   Patient Active Problem List    Diagnosis Date Noted    Chest pain with high risk of acute coronary syndrome 06/24/2018    Anemia 06/24/2018    Radiation proctitis 06/06/2018    Gastrointestinal hemorrhage associated with anorectal source 05/28/2018    Lower GI bleed 05/28/2018    GI bleed 05/28/2018    Elevated troponin 01/28/2018    Type 2 diabetes mellitus with nephropathy ([address] 12/18/2017    Hyperkalemia 12/13/2017    Hypotension 12/12/2017    Fluid overload 12/12/2017    ESRD (end stage renal disease) on dialysis ([address] 11/08/2017    Hyperuricemia 10/28/2017    Chronic kidney disease, stage V ([address] 10/28/2017    Chest pain 10/27/2017    Secondary hyperparathyroidism of renal origin ([address] 10/25/2017    Fatigue 42/86/5850    Diastolic CHF, acute on chronic (HCC) 07/05/2016    CHF (congestive heart failure), NYHA class IV ([address] 07/05/2016    APRYL (obstructive sleep apnea) 06/26/2015    Prostate cancer ([address] 04/29/2015    Abdominal pain 03/05/2015    GERD (gastroesophageal reflux disease) 03/05/2015    Constipation 03/05/2015    Chronic renal insufficiency 03/05/2015    Atherosclerosis of renal artery (HCC)     Chronic diastolic heart failure (HCC)     Morbid obesity (HCC)     Essential hypertension, benign     Sleep disturbance     Coronary atherosclerosis     Mixed hyperlipidemia     Type II or unspecified type diabetes mellitus without mention of complication, not stated as uncontrolled     Chest pain, unspecified 03/12/2013    S/P coronary artery stent placement 03/12/2013    Postsurgical aortocoronary bypass status 03/12/2013    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    NSTEMI (non-ST elevated myocardial infarction) ([facility] 10/07/2012    Coronary atherosclerosis of native coronary artery 10/07/2012    CKD (chronic kidney disease) stage 4, GFR 15-29 ml/min ([facility] 10/07/2012    HTN (hypertension) 10/07/2012    DM2 (diabetes mellitus, type 2) ([facility] 10/07/2012    Dyslipidemia 10/07/2012        The  provided the following Interventions:  Initiated a relationship of care and support. Listened empathically. Provided information about Spiritual Care Services. Chart reviewed. The following outcomes where achieved:  Patient expressed gratitude for 's visit. Assessment:  Patient does not have any Amish/cultural needs that will affect patients preferences in health care. There are no spiritual or Amish issues which require intervention at this time. Plan:  Chaplains will continue to follow and will provide pastoral care on an as needed/requested basis.  recommends bedside caregivers page  on duty if patient shows signs of acute spiritual or emotional distress.     [address]  [phone])

## 2018-06-25 NOTE — DISCHARGE SUMMARY
[facility]  Discharge Summary       Patient: Telma Peraza Age: 68 y.o. : 1940 MR#: [id number] SSN: [id number]  PCP on record: Kan Francis MD  Admit date: 2018  Discharge date: 2018    Consults:  Dr Tereasa Simmonds, J.,-cardiology  Dr. Oliverio Rg., -vascular surgery  -   Procedures:none  -     Significant Diagnostic Studies: 18: duplex us left upper extremity  Upper Extremity Arterial Findings          Left Hemodialysis Access        AV fistula  Left extremity widely patent with no evidence of hemodynamically significant stenosis.           -    Discharge Diagnoses:     Malfunction of AVF                                      Patient Active Problem List   Diagnosis Code    NSTEMI (non-ST elevated myocardial infarction) ([facility] I21.4    Coronary atherosclerosis of native coronary artery I25.10    CKD (chronic kidney disease) stage 4, GFR 15-29 ml/min ([facility]) N18.4    HTN (hypertension) I10    DM2 (diabetes mellitus, type 2) ([facility]) E11.9    Dyslipidemia E78.5    Contrast dye induced nephropathy N14.1, T50.8X5A    Chest pain, unspecified R07.9    S/P coronary artery stent placement Z95.5    Postsurgical aortocoronary bypass status Z95.1    Atherosclerosis of renal artery ([facility]) I70.1    Chronic diastolic heart failure ([facility]) I50.32    Morbid obesity ([facility]) E66.01    Essential hypertension, benign I10    Sleep disturbance G47.9    Coronary atherosclerosis I25.10    Mixed hyperlipidemia E78.2    Type II or unspecified type diabetes mellitus without mention of complication, not stated as uncontrolled E11.9    Abdominal pain R10.9    GERD (gastroesophageal reflux disease) K21.9    Constipation K59.00    Chronic renal insufficiency N18.9    Prostate cancer ([facility] C61    APRYL (obstructive sleep apnea) T74.15    Diastolic CHF, acute on chronic ([facility]) I50.33    CHF (congestive heart failure), NYHA class IV ([facility]) I50.9    Fatigue R53.83    Secondary hyperparathyroidism of renal origin ([facility] N25.81    Chest pain R07.9    Hyperuricemia E79.0    Chronic kidney disease, stage V (HCC) N18.5    ESRD (end stage renal disease) on dialysis ([facility]) N18.6, Z99.2    Hypotension I95.9    Fluid overload E87.70    Hyperkalemia E87.5    Type 2 diabetes mellitus with nephropathy ([facility]) E11.21    Elevated troponin R74.8    Gastrointestinal hemorrhage associated with anorectal source K62.5    Lower GI bleed K92.2    GI bleed K92.2    Radiation proctitis K62.7    Chest pain with high risk of acute coronary syndrome R07.9    Anemia D64.9       Hospital Course by Problem     68year old male with multiple co morbidities including ESRD on HD presented w/ cc of left sided cp and pain in LUE which is where his AVF is located. Day prior to admission pt had stent and angioplasty of AVF and removal of right chest TDC. CP occurred during HD and was noted to be reproduced on exam.     1- Acute AV fistula thrombus formation, managed conservatively. Hematoma around AVF left upper ext. Seen by vascular. Managed conservatively, us of fistula showed patent fistula. If pt tolerates HD today, can go home. 2-pt was seen by cardiology for c/o cp. Was noted to have some elevation of trop but per cards no EKG evidence of ACS. Cp secondary to ? NSTEMI vs demand ischemia. Options of management were discussed w/ pt by cards w/ ultimate decision for medical management. Pt noted to have GI bleeding w/ plavix and per discussion w/ cards, option is to cont to hold plavix at this time.         Today's examination of the patient revealed:     Subjective:     Objective:   VS:   Visit Vitals    /66 (BP 1 Location: Right arm, BP Patient Position: At rest)    Pulse 80    Temp 98 °F (36.7 °C)    Resp 18    Wt 98.3 kg (216 lb 12.8 oz)    SpO2 95%    BMI 37.21 kg/m2      Tmax/24hrs: Temp (24hrs), Av.8 °F (37.1 °C), Min:98 °F (36.7 °C), Max:99.6 °F (37.6 °C)     Input/Output: Intake/Output Summary (Last 24 hours) at 06/25/18 1520  Last data filed at 06/24/18 2005   Gross per 24 hour   Intake              240 ml   Output              150 ml   Net               90 ml       General: alert, awake, in nad   Cardiovascular:  Rrr, no murmurs  Pulmonary:  ctab  GI:  Soft, nt, nd  Extremities:  No edema lower ext, left upper ext some bruising, faint noted around avf site, no significant edema  Additional:      Labs:    Recent Results (from the past 24 hour(s))   GLUCOSE, POC    Collection Time: 06/24/18  4:16 PM   Result Value Ref Range    Glucose (POC) 118 (H) 70 - 864 mg/dL   METABOLIC PANEL, BASIC    Collection Time: 06/24/18  7:30 PM   Result Value Ref Range    Sodium 140 136 - 145 mmol/L    Potassium 3.5 3.5 - 5.5 mmol/L    Chloride 104 100 - 108 mmol/L    CO2 25 21 - 32 mmol/L    Anion gap 11 3.0 - 18 mmol/L    Glucose 106 (H) 74 - 99 mg/dL    BUN 48 (H) 7.0 - 18 MG/DL    Creatinine 6.48 (H) 0.6 - 1.3 MG/DL    BUN/Creatinine ratio 7 (L) 12 - 20      GFR est AA 10 (L) >60 ml/min/1.73m2    GFR est non-AA 8 (L) >60 ml/min/1.73m2    Calcium 8.4 (L) 8.5 - 10.1 MG/DL   GLUCOSE, POC    Collection Time: 06/24/18  8:30 PM   Result Value Ref Range    Glucose (POC) 134 (H) 70 - 110 mg/dL   CBC WITH AUTOMATED DIFF    Collection Time: 06/25/18  3:02 AM   Result Value Ref Range    WBC 10.1 4.6 - 13.2 K/uL    RBC 2.92 (L) 4.70 - 5.50 M/uL    HGB 8.6 (L) 13.0 - 16.0 g/dL    HCT 27.1 (L) 36.0 - 48.0 %    MCV 92.8 74.0 - 97.0 FL    MCH 29.5 24.0 - 34.0 PG    MCHC 31.7 31.0 - 37.0 g/dL    RDW 14.6 (H) 11.6 - 14.5 %    PLATELET 064 351 - 998 K/uL    MPV 11.0 9.2 - 11.8 FL    NEUTROPHILS 76 (H) 40 - 73 %    LYMPHOCYTES 14 (L) 21 - 52 %    MONOCYTES 7 3 - 10 %    EOSINOPHILS 3 0 - 5 %    BASOPHILS 0 0 - 2 %    ABS. NEUTROPHILS 7.7 1.8 - 8.0 K/UL    ABS. LYMPHOCYTES 1.4 0.9 - 3.6 K/UL    ABS. MONOCYTES 0.7 0.05 - 1.2 K/UL    ABS. EOSINOPHILS 0.3 0.0 - 0.4 K/UL    ABS.  BASOPHILS 0.0 0.0 - 0.1 K/UL    DF AUTOMATED     METABOLIC PANEL, BASIC    Collection Time: 06/25/18  3:02 AM   Result Value Ref Range    Sodium 140 136 - 145 mmol/L    Potassium 3.5 3.5 - 5.5 mmol/L    Chloride 102 100 - 108 mmol/L    CO2 27 21 - 32 mmol/L    Anion gap 11 3.0 - 18 mmol/L    Glucose 129 (H) 74 - 99 mg/dL    BUN 49 (H) 7.0 - 18 MG/DL    Creatinine 6.69 (H) 0.6 - 1.3 MG/DL    BUN/Creatinine ratio 7 (L) 12 - 20      GFR est AA 10 (L) >60 ml/min/1.73m2    GFR est non-AA 8 (L) >60 ml/min/1.73m2    Calcium 9.0 8.5 - 10.1 MG/DL   PHOSPHORUS    Collection Time: 06/25/18  3:02 AM   Result Value Ref Range    Phosphorus 4.6 2.5 - 4.9 MG/DL   GLUCOSE, POC    Collection Time: 06/25/18  7:42 AM   Result Value Ref Range    Glucose (POC) 150 (H) 70 - 110 mg/dL   DUPLEX HEMODIALYSIS ACCESS LEFT    Collection Time: 06/25/18 11:36 AM   Result Value Ref Range    Left AVF Inflow Artery .0 cm/s    Left AVF Inflow Artery .0 cm/s    Left AVF Arterial Prox Anastomosis .0 cm/s    Left AVF Arterial Prox Anastomosis .0 cm/s    Left AVF Prox Outflow .0 cm/s    Left AVF Prox Outflow .0 cm/s    Left AVF AVG Prox Outflow Vol Flow 2375.0 mL/min    Left AVF AVG Graft Name Left brachial to cephalic vein AVF     Left AVF Mid Outflow .0 cm/s    Left AVF Mid Outflow .0 cm/s    Left AVF Dist Outflow .0 cm/s    Left AVF Dist Outflow .0 cm/s    Left AVF Venous Dist Anastomosis .0 cm/s    Left AVF Outflow Vessel .0 cm/s    Left AVF AVG Mid Outflow Vol Flow 1908.0 mL/min    Left AVF AVG Outflow Vessel Name Subclavian      Additional Data Reviewed:     Condition:   Disposition:    []Home   []Home with Home Health   []SNF/NH   []Rehab   []Home with family   []Alternate Facility:____________________      Discharge Medications:     Current Discharge Medication List      CONTINUE these medications which have NOT CHANGED    Details   acetaminophen (TYLENOL) 325 mg tablet Take 2 Tabs by mouth every six (6) hours as needed. Qty: 30 Tab, Refills: 0      lidocaine (XYLOCAINE) 5 % ointment Apply to marilee-anal area and rectum daily as needed for pain  Qty: 1 Tube, Refills: 0      hydrocortisone (ANUSOL-HC) 2.5 % rectal cream Insert  into rectum four (4) times daily. Qty: 30 g, Refills: 0      simvastatin (ZOCOR) 40 mg tablet TAKE 1 TABLET BY MOUTH NIGHTLY. Qty: 90 Tab, Refills: 2      losartan (COZAAR) 25 mg tablet Take  by mouth daily. aspirin delayed-release 81 mg tablet Take  by mouth daily. fenofibrate (LOFIBRA) 54 mg tablet TAKE ONE TABLET BY MOUTH ONE TIME DAILY   Qty: 30 Tab, Refills: 4    Associated Diagnoses: Dyslipidemia      tamsulosin (FLOMAX) 0.4 mg capsule TAKE ONE CAPSULE BY MOUTH ONE TIME DAILY  Qty: 90 Cap, Refills: 3      isosorbide mononitrate ER (IMDUR) 30 mg tablet Take 1 Tab by mouth daily. Qty: 30 Tab, Refills: 0      b complex-vitamin c-folic acid (NEPHROCAPS) 1 mg capsule Take 1 Cap by mouth daily. Qty: 30 Cap, Refills: 0      senna-docusate (PERICOLACE) 8.6-50 mg per tablet Take 1 Tab by mouth daily. Qty: 30 Tab, Refills: 0      metoprolol tartrate (LOPRESSOR) 100 mg IR tablet TAKE 1 TABLET BY MOUTH TWO TIMES A DAY. Qty: 180 Tab, Refills: 2      Cholecalciferol, Vitamin D3, (DECARA) 50,000 unit cap Take  by mouth every seven (7) days. insulin glargine (LANTUS) 100 unit/mL injection 20 Units by SubCUTAneous route every twelve (12) hours. Qty: 1 Vial, Refills: 2      insulin aspart (NOVOLOG) 100 unit/mL injection 20 units sq 3 times a day,sliding scale  Qty: 10 mL, Refills: 0      allopurinol (ZYLOPRIM) 100 mg tablet Take 100 mg by mouth. Monday, Wednesday,friday      nitroglycerin (NITROSTAT) 0.4 mg SL tablet 1 Tab by SubLINGual route as needed for Chest Pain. Qty: 25 Tab, Refills: 1               Follow-up Appointments:   1. Your PCP: Issac Gibson MD, within 7-10days  2.          Please follow-up on tests/labs that are still pendin.     >30 minutes spent coordinating this discharge (review instructions/follow-up, prescriptions, preparing report for sign off)    Signed:  Bhaskar Humphrey MD  6/25/2018  3:20 PM

## 2018-06-25 NOTE — ROUTINE PROCESS
1930 Assumed care of patient from off going nurse. Patient resting in bed. No distress noted. Call bell within reach, siderails up x 3, bed in lowest position, and patient instructed to use call bell for assistance. Will continue to monitor. 1127 Bedside and Verbal shift change report given to [address] [place] [address] (oncoming nurse) by Gabriel Rosales RN (offgoing nurse). Report included the following information SBAR, Kardex, Intake/Output, MAR and Cardiac Rhythm telebox #[id number].

## 2018-06-25 NOTE — PROGRESS NOTES
Progress Note    Ekta Gibbs  68 y.o. Admit Date: 6/23/2018  Active Problems:    HTN (hypertension) (10/7/2012) POA: Yes      Secondary hyperparathyroidism of renal origin ([facility] (10/25/2017) POA: Yes      Chest pain (10/27/2017) POA: Unknown      ESRD (end stage renal disease) on dialysis ([facility] (11/8/2017) POA: Yes      Fluid overload (12/12/2017) POA: Unknown      Chest pain with high risk of acute coronary syndrome (6/24/2018) POA: Unknown      Anemia (6/24/2018) POA: Yes            Subjective:     Patient feels good, no SOB, no chest pain. ,back from Duplex study. A comprehensive review of systems was negative except for that written in the History of Present Illness.     Objective:     Visit Vitals    /66 (BP 1 Location: Right arm, BP Patient Position: At rest)    Pulse 80    Temp 98 °F (36.7 °C)    Resp 18    Wt 98.3 kg (216 lb 12.8 oz)    SpO2 95%    BMI 37.21 kg/m2         Intake/Output Summary (Last 24 hours) at 06/25/18 1312  Last data filed at 06/24/18 2005   Gross per 24 hour   Intake              240 ml   Output              150 ml   Net               90 ml       Current Facility-Administered Medications   Medication Dose Route Frequency Provider Last Rate Last Dose    insulin lispro (HUMALOG) injection   SubCUTAneous AC&HS Jestine Ting, DO   Stopped at 06/24/18 0730    glucose chewable tablet 16 g  4 Tab Oral PRN Jestine Ting, DO        glucagon (GLUCAGEN) injection 1 mg  1 mg IntraMUSCular PRN Jestine Ting, DO        dextrose (D50W) injection syrg 12.5-25 g  25-50 mL IntraVENous PRN Jestine Ting, DO        metoprolol tartrate (LOPRESSOR) tablet 100 mg  100 mg Oral Q12H Jestine Ting, DO   Stopped at 06/25/18 0900    B complex-vitaminC-folic acid (NEPHROCAP) cap  1 Cap Oral DAILY Jestine Ting, DO   1 Cap at 06/25/18 0948    senna-docusate (PERICOLACE) 8.6-50 mg per tablet 1 Tab  1 Tab Oral DAILY Jestine Ting, DO   1 Tab at 06/25/18 2998    isosorbide mononitrate ER (IMDUR) tablet 30 mg  30 mg Oral DAILY Neeta Millet, DO   30 mg at 06/24/18 0856    tamsulosin (FLOMAX) capsule 0.4 mg  0.4 mg Oral QHS Neeta Millet, DO   0.4 mg at 06/24/18 2143    losartan (COZAAR) tablet 25 mg  25 mg Oral DAILY Neeta Millet, DO   25 mg at 06/24/18 3641    aspirin delayed-release tablet 81 mg  81 mg Oral DAILY Neeta Millet, DO   81 mg at 06/25/18 5140    fenofibrate nanocrystallized (TRICOR) tablet 48 mg  48 mg Oral QHS Neeta Millet, DO        acetaminophen (TYLENOL) tablet 650 mg  650 mg Oral Q6H PRN Neeta Millet, DO        oxyCODONE IR (ROXICODONE) tablet 5 mg  5 mg Oral Q4H PRN Neeta Millet, DO        phenyleph-shark andree oil-mo-pet (PREPARATION H) ointment   PeriANAL QID PRN Neeta Millet, DO        insulin glargine (LANTUS) injection 18 Units  18 Units SubCUTAneous ACB/HS Jed Mantilla MD   18 Units at 06/25/18 0900    insulin lispro (HUMALOG) injection 10 Units  10 Units SubCUTAneous Shama Fuentes MD   Stopped at 06/25/18 0730        Physical Exam:     Physical Exam:   General:  Alert, cooperative, no distress, appears stated age. Neck: Supple, symmetrical, trachea midline, no adenopathy, thyroid: no enlargement/tenderness/nodules, no carotid bruit and no JVD. Lungs:   Clear to auscultation bilaterally. Heart:  Regular rate and rhythm, S1, S2 normal, no murmur, click, rub or gallop. Abdomen:   Soft, non-tender. Bowel sounds normal. No masses,  No organomegaly.    Extremities: Extremities normal, atraumatic, no cyanosis or edema,A VGhas good thrill, good Bruit,bruises are clearing up   Skin: Skin color, texture, turgor normal. No rashes or lesions         Data Review:    CBC w/Diff    Recent Labs      06/25/18   0302  06/24/18   0534  06/23/18   0830   WBC  10.1  10.0  8.6   RBC  2.92*  3.08*  3.00*   HGB  8.6*  8.9*  8.9*   HCT  27.1*  28.5*  28.2*   MCV  92.8  92.5  94.0   MCH  29.5  28.9  29.7   MCHC  31.7 31.2  31.6   RDW  14.6*  14.5  14.3    Recent Labs      06/25/18   0302  06/24/18   0534  06/23/18   0830   MONOS  7  6  4   EOS  3  3  3   BASOS  0  1  0   RDW  14.6*  14.5  14.3        Comprehensive Metabolic Profile    Recent Labs      06/25/18   0302  06/24/18   1930  06/24/18   1350   NA  140  140  140   K  3.5  3.5  3.7   CL  102  104  103   CO2  27  25  30   BUN  49*  48*  46*   CREA  6.69*  6.48*  6.28*    Recent Labs      06/25/18   0302  06/24/18   1930  06/24/18   1350  06/24/18   0534   06/23/18   0830   CA  9.0  8.4*  8.5  9.3   < >  8.5   PHOS  4.6   --    --   4.7   --    --    ALB   --    --    --    --    --   3.3*   TP   --    --    --    --    --   7.6   SGOT   --    --    --    --    --   19   TBILI   --    --    --    --    --   0.6    < > = values in this interval not displayed. Duplex study no stenosis, AVF is  Patent.               Impression:       Active Hospital Problems    Diagnosis Date Noted    Chest pain with high risk of acute coronary syndrome 06/24/2018    Anemia 06/24/2018    Fluid overload 12/12/2017    ESRD (end stage renal disease) on dialysis [facility]) 11/08/2017    Chest pain 10/27/2017    Secondary hyperparathyroidism of renal origin ([facility] 10/25/2017    HTN (hypertension) 10/07/2012            Plan:     Seems  OK to use AVF, left a message for Dr. Yahir Dennis if he clears thenwill dialyze him today  & then he can be discharged after dialysis      Ady Jj MD

## 2018-06-25 NOTE — PROGRESS NOTES
Problem: Falls - Risk of  Goal: *Absence of Falls  Document Rene Fall Risk and appropriate interventions in the flowsheet.    Outcome: Progressing Towards Goal  Fall Risk Interventions:  Mobility Interventions: Bed/chair exit alarm, Patient to call before getting OOB         Medication Interventions: Bed/chair exit alarm, Patient to call before getting OOB

## 2018-06-25 NOTE — PROGRESS NOTES
Problem: Mobility Impaired (Adult and Pediatric)  Goal: *Acute Goals and Plan of Care (Insert Text)  Outcome: Resolved/Met Date Met: 06/25/18  physical Therapy EVALUATION & Discharge    Patient: Racquel Delgado (78 y.o. male)  Date: 6/25/2018  Primary Diagnosis: ESRD (end stage renal disease) ([facility] [N18.6]  Procedure(s) (LRB):  CREATION LEFT ARM FISTULA/POSSIBLE CATHETER/C-ARM (Left)     Precautions: None      ASSESSMENT AND RECOMMENDATIONS:  Patient is 68yo M admitted to hospital for fluid overload and presents today alert and agreeable to therapy and was sitting at EOB upon arrival. Performed objective assessment then stood to ambulate total 125ft with no AD; no LoB or cues required. May benefit from continued [facility] use at home as was his baseline given leg length discrepancy. Patient negotiated 4 steps with BHR and required cues to negotiate leading with RLE ascending with LLE descending and to use step to pattern as opposed to reciprocal pattern for energy conservation. Patient demos good carryover and required brief standing break prior to returning to sitting at EOB. Patient was left resting with call bell by his side and denied need for further assist with SOB alleviated by rest. Patient is functioning at supervision level and skilled physical therapy is not indicated at this time. Discharge Recommendations: Home Health  Further Equipment Recommendations for Discharge: N/A      G-:CODES     Mobility  Current  CI= 1-19%   Goal  CI= 1-19%  D/C  CI= 1-19%. The severity rating is based on the Level of Assistance required for Functional Mobility and ADLs.       Evaluation Complexity     Eval Complexity: History: MEDIUM  Complexity : 1-2 comorbidities / personal factors will impact the outcome/ POC Exam:LOW Complexity : 1-2 Standardized tests and measures addressing body structure, function, activity limitation and / or participation in recreation  Presentation: LOW Complexity : Stable, uncomplicated Clinical Decision Making:Low Complexity   Overall Complexity:LOW     SUBJECTIVE:   Patient stated This leg is a different length than the other.     OBJECTIVE DATA SUMMARY:     Past Medical History:   Diagnosis Date    Atherosclerosis of renal artery (HCC)     S/P Rt stent    CAD (coronary artery disease) , 1997, 2012    ,double bypass, 2 stents, 2stents 7/2016    Cancer (HCC)     prostate    CHF (congestive heart failure) (HCC)     Chronic diastolic heart failure (HCC)     Chronic kidney disease     Chronic kidney disease (CKD), stage IV (severe) (HCC)     On Dialysis now -T-Th-sat    Constipation     Coronary atherosclerosis of unspecified type of vessel, native or graft     Stable angina after recent PCI continue treatment.  CRI (chronic renal insufficiency)     Diabetes ([facility] 1973    type 2    Essential hypertension, benign     GERD (gastroesophageal reflux disease)     Gout     Hypertension 1982    Morbid obesity ([facility]     Weight loss has been strongly encouraged by following dietary restrictions and an exercise routine    APRYL (obstructive sleep apnea) 6/26/2015    no cpap    Other and unspecified hyperlipidemia     HDL's are not at goal, LDL's are at goal, triglycerides are not at goal.    Other and unspecified hyperlipidemia     HDL's are not at goal, LDL's are at goal, triglycerides are not at goal.     Other ill-defined conditions(799.89) 1960    pneumonia twice    Sleep disturbance     Type II or unspecified type diabetes mellitus without mention of complication, not stated as uncontrolled      Past Surgical History:   Procedure Laterality Date   [address]    lt. carotid endart.    [facility] CARDIAC SURG PROCEDURE UNLIST  2012, 2016, Nov 2017    Coronary Stent    COLONOSCOPY N/A 6/23/2017    COLONOSCOPY w/ APC w/ polypectomies performed by Sherren Marsh, MD at [place] [address] N/A 1/19/2018    FLEXIBLE SIGMOIDOSCOPY WITH Radio frequency ablation performed by Satnam Terrazas MD at [address] [facility] N/A 6/8/2018    SIGMOIDOSCOPY FLEXIBLE WITH APC performed by Janeal Mcardle, MD at [address] HX CHOLECYSTECTOMY      HX CORONARY ARTERY BYPASS GRAFT  2000    x2    HX HEENT  1997    cholecystectomy    HX UROLOGICAL  1998    renal art. surg    HX VASCULAR ACCESS      Perma cath for HD- right chest.     Barriers to Learning/Limitations: None  Compensate with: visual, verbal, tactile, kinesthetic cues/model  Prior Level of Function/Home Situation: Patient lives with wife in 1 story home with 2STE with BHR and reports he used [facility] for mobility and required some assist with dressing/bathing at home. Patient has SC and grab bars in bathroom. Home Situation  Home Environment: Private residence  # Steps to Enter: 4  One/Two Story Residence: One story  Living Alone: No  Support Systems: Family member(s)  Patient Expects to be Discharged to[de-identified] Private residence  Current DME Used/Available at Home: None  Critical Behavior:    A&Ox4  Strength:    Strength: Within functional limits (BLE)   Tone & Sensation:   Tone: Normal (BLE)    Sensation: Intact (BLE)   Range Of Motion:  AROM: Within functional limits (BLE)   Functional Mobility:  Bed Mobility:   Scooting: Modified independent  Transfers:  Sit to Stand: Modified independent  Stand to Sit: Modified independent   Balance:   Sitting: Intact  Standing: Intact; With support  Ambulation/Gait Training:  Distance (ft): 125 Feet (ft)  Assistive Device:  (None)  Ambulation - Level of Assistance: Supervision   Gait Abnormalities: Decreased step clearance   Base of Support: Widened   Speed/Kanchan: Slow  Step Length: Left shortened;Right shortened   Interventions: Verbal cues; Visual/Demos  Pain:  Pt reports 0/10 pain or discomfort prior to treatment.    Pt reports 0/10 pain or discomfort post treatment.      Activity Tolerance:   Patient tolerated activity well with minimal SOB s/p ambulation and stair training that resolved within 2 mins of deep breathing and rest.   Please refer to the flowsheet for vital signs taken during this treatment. After treatment:   []         Patient left in no apparent distress sitting up in chair  [x]         Patient left in no apparent distress in bed  [x]         Call bell left within reach  [x]         Nursing notified  []         Caregiver present  []         Bed alarm activated    COMMUNICATION/EDUCATION:   [x]         Fall prevention education was provided and the patient/caregiver indicated understanding. [x]         Patient/family have participated as able in goal setting and plan of care. [x]         Patient/family agree to work toward stated goals and plan of care. []         Patient understands intent and goals of therapy, but is neutral about his/her participation. []         Patient is unable to participate in goal setting and plan of care.     Thank you for this referral.  Nusrat Diaz, PT   Time Calculation: 23 mins

## 2018-06-25 NOTE — DIALYSIS
ACUTE HEMODIALYSIS FLOW SHEET    HEMODIALYSIS ORDERS: Physician: josse      Dialyzer: revaclear         Duration: 3 hr  BFR: 400   DFR: 800   Dialysate:  Temp *37 K+   3    Ca+  2.5 Na 140 Bicarb 35   Weight:  98.3 kg    Bed Scale [x]     Unable to Obtain []      Dry weight/UF Goal: 1500 Access AVF  Needle Gauge 15    Heparin [x]  Bolus  1500    Units    [x] Hourly  500     Units    []None     Catheter locking solution    Pre BP:   151/60    Pulse:     81     Temperature:   98.4  Respirations: 16  Tx: NS       ml/Bolus  Other        [x] N/A   Labs: Pre        Post:        [x] N/A   Additional Orders(medications, blood products, hypotension management):       [x] N/A     [x] Time Out/Safety Check  [x] [facility] Consent Verified     CATHETER ACCESS: [x]N/A   []Right   []Left   []IJ     []Fem   [] First use X-ray verified     []Tunnel                [] Non Tunneled   []No S/S infection  []Redness  []Drainage []Cultured []Swelling []Pain   []Medical Aseptic Prep Utilized   []Dressing Changed  [] Biopatch  Date:       []Clotted   []Patent   Flows: []Good  []Poor  []Reversed   If access problem,  notified: []Yes    []N/A  Date:           GRAFT/FISTULA ACCESS:  []N/A     []Right     [x]Left     [x]UE     []LE   []AVG   [x]AVF        []Buttonhole    [x]Medical Aseptic Prep Utilized   [x]No S/S infection  []Redness  []Drainage []Cultured []Swelling []Pain    Bruit:   [x] Strong    [] Weak       Thrill :   [x] Strong    [] Weak       Needle Gauge: 15   Length: 1   If access problem,  notified: []Yes     [x]N/A  Date:        Please describe access if present and not used:       GENERAL ASSESSMENT:    LUNGS:  Rate  SaO2%        [x] N/A    [x] Clear  [] Coarse  [] Crackles  [] Wheezing        [] Diminished     Location : []RLL   []LLL    []RUL  []NOMI   Cough: []Productive  []Dry  [x]N/A   Respirations:  [x]Easy  []Labored   Therapy:  [x]RA  []NC  l/min    Mask: []NRB []Venti       O2%                  []Ventilator []Intubated  [] Trach  [] BiPaP   CARDIAC: [x]Regular      [] Irregular   [] Pericardial Rub  [] JVD        []  Monitored  [] Bedside  [] Remotely monitored [] N/A  Rhythm:    EDEMA: [] None  [x]Generalized  [] Pitting [] 1    [] 2    [] 3    [] 4                 [] Facial  [] Pedal  []  UE  [] LE   SKIN:   [x] Warm  [] Hot     [] Cold   [x] Dry     [] Pale   [] Diaphoretic                  [] Flushed  [] Jaundiced  [] Cyanotic  [] Rash  [] Weeping   LOC:    [x] Alert      [x]Oriented:    [x] Person     [x] Place  [x]Time               [] Confused  [] Lethargic  [] Medicated  [] Non-responsive     GI / ABDOMEN:  [] Flat    [] Distended    [x] Soft    [] Firm   []  Obese                             [] Diarrhea  [x] Bowel Sounds  [] Nausea  [] Vomiting       / URINE ASSESSMENT:[] Voiding   [x] Oliguria  [] Anuria   []  Xiao     [] Incontinent    []  Incontinent Brief      []  Bathroom Privileges     PAIN: [x] 0 []1  []2   []3   []4   []5   []6   []7   []8   []9   []10            Scale 0-10  Action/Follow Up:    MOBILITY:  [] Amb    [] Amb/Assist    [x] Bed    [] Wheelchair  [] Stretcher      All Vitals and Treatment Details on Attached [address]: [facility]          Room # 360     [] 1st Time Acute  [] Stat  [x] Routine  [] Urgent     [x] Acute Room  []  Bedside  [] ICU/CCU  [] ER   Isolation Precautions:  [x] Dialysis   [] Airborne   [] Contact    [] Reverse   Special Considerations:         [] Blood Consent Verified [x]N/A     ALLERGIES:   [x] NKA          Code Status:  [x] Full Code  [] DNR  [] Other           HBsAg ONLY: Date Drawn   06/23/18       [x]Negative []Positive []Unknown   HBsAb: Date 05/29/18    [x] Susceptible   [] [id number] []Not Drawn  [] Drawn     Current Labs:    Date of Labs: Today [x]        Cut and paste current labs here.                                                                                                                                   DIET:  [x] Renal    [] Other     [] NPO     []  Diabetic      PRIMARY NURSE REPORT: First initial/Last name/Title      Pre Dialysis: Aldo Matute RN    Time: 1500      EDUCATION:    [x] Patient [] Other         Knowledge Basis: []None [x]Minimal [] Substantial   Barriers to learning  [x]N/A   [] Access Care     [] S&S of infection     [] Fluid Management     []K+     [x]Procedural    []Albumin     [] Medications     [] Tx Options     [] Transplant     [] Diet     [] Other   Teaching Tools:  [x] Explain  [] Demo  [] Handouts [] Video  Patient response:   [x] Verbalized understanding  [] Teach back  [] Return demonstration [] Requires follow up   Inappropriate due to            [address] Before each treatment:     Machine Number:                   [facility]                                  [x] Unit Machine # 4 with centralized RO                                  [] Portable Machine #1/RO serial # [id number]                                  [] Portable Machine #2/RO serial # [id number]                                  [] Portable Machine #3/RO serial # [id number]                                                                                                       [facility]                                  [] Portable Machine #11/RO serial # [id number]                                   [] Portable Machine #12/RO serial # [id number]                                  [] Portable Machine #13/RO serial #  [id number]      Alarm Test:  Pass time 1500         Other:         [x] RO/Machine Log Complete      Temp    37            [x]Extracorporeal Circuit Tested for integrity   Dialysate: pH  7.4 Conductivity: Meter   14     HD Machine   14                  TCD: 14  Dialyzer Lot # [id number]        Blood Tubing Lot # [id number]     Saline Lot #       CHLORINE TESTING-Before each treatment and every 4 hours    Total Chlorine: [x] less than 0.1 ppm  Time: 1300 4 Hr/2nd Check Time: 1700   (if greater than 0.1 ppm from Primary then every 30 minutes from Secondary)     TREATMENT INITIATION  with Dialysis Precautions:   [x] All Connections Secured                 [x] Saline Line Double Clamped   [x] Venous Parameters Set                  [x] Arterial Parameters Set    [x] Prime Given  250 ml               [x]Air Foam Detector Engaged      Treatment Initiation Note: pt arrived in stable condition via bed AVF accessed and treatment initiated without difficulty. Dr Shaun Escobedo at bedside     Medication Dose Volume Route Initials Dialyzer Cleared: [] Good [x] Fair  [] Poor    Blood processed:  66.4 L  UF Removed  1500 Ml    Post Wt: 96.9    kg  POst BP:   156/80       Pulse: 85      Respirations: 16  Temperature: 98.2       epogen    [id number]      1ml                  Post Tx Vascular Access: AVF/AVG: Bleeding stopped Art 10 min. Cecil. 10 Min                                      Catheter: Locking solution: Heparin 1:1000 Art. Cecil. N/A                                 Post Assessment:                                    Skin:  [x] Warm  [x] Dry [] Diaphoretic    [] Flushed  [] Pale [] Cyanotic   [facility] Signatures Title Initials  Time  1800 Lungs: [x] Clear    [] Course  [] Crackles  [] Wheezing [] Diminished   Latasha Pace, RN    Cardiac: [x] Regular   [] Irregular   [] Monitor  [] N/A  Rhythm:           Edema:  [] None    [x] General     [] Facial   [] Pedal    [] UE    [] LE       Pain: [x]0  []1  []2   []3  []4   []5   []6   []7   []8   []9   []10         Post Treatment Note: HD well tolerated. 1.5L UF removed. No acute distress noted during or post HD treatment.      POST TREATMENT PRIMARY NURSE HANDOFF REPORT:     First initial/Last name/Title         Post Dialysis: Agnes Clemons RN Time:  1900     Abbreviations: AVG-arterial venous graft, AVF-arterial venous fistula, IJ-Internal Jugular, Subcl-Subclavian, Fem-Femoral, Tx-treatment, AP/HR-apical heart rate, DFR-dialysate flow rate, BFR-blood flow rate, AP-arterial pressure, -venous pressure, UF-ultrafiltrate, TMP-transmembrane pressure, Cecil-Venous, Art-Arterial, RO-Reverse Osmosis

## 2018-06-25 NOTE — PROGRESS NOTES
On dialysis, AVF is working fine with  Blood flow of 400,,initially there was small clot at the time of cannulation,since the it is working fine,stable Hemodynamically, he can be discharged today after dialysis.

## 2018-06-25 NOTE — PROGRESS NOTES
Duplex noted- left arm AV fistula widely patent with no evidence of hemodynamically significant stenosis. No indication for vascular intervention at this time. OK to feed pt. OK to cannulate left arm fistula for HD treatments.

## 2018-06-25 NOTE — CARDIO/PULMONARY
Cardiac rehab in to see patient briefly. He is well known the rehab program. He stated that he knows what he should be doing, he just needs to do it most of the time. Opportunity given for him to ask questions. Will follow throughout his hospital stay.

## 2018-06-26 NOTE — PROGRESS NOTES
Received report on pt.from off going RN. Resting quietly in bed on rounds. Denies c/o pain or SOB at this time. No acute distress noted. Call bell at side. NPO for possible fistulagram. Will cont to monitor for any changes in status. 2040 discharge instructions given to patient. Expressed understanding. Discharged home. To exit via w/c without noted distress.

## 2018-06-26 NOTE — DISCHARGE INSTRUCTIONS
DISCHARGE SUMMARY from Nurse    PATIENT INSTRUCTIONS:    After general anesthesia or intravenous sedation, for 24 hours or while taking prescription Narcotics:  · Limit your activities  · Do not drive and operate hazardous machinery  · Do not make important personal or business decisions  · Do  not drink alcoholic beverages  · If you have not urinated within 8 hours after discharge, please contact your surgeon on call. Report the following to your surgeon:  · Excessive pain, swelling, redness or odor of or around the surgical area  · Temperature over 100.5  · Nausea and vomiting lasting longer than 4 hours or if unable to take medications  · Any signs of decreased circulation or nerve impairment to extremity: change in color, persistent  numbness, tingling, coldness or increase pain  · Any questions    What to do at Home:  Recommended activity: Activity as tolerated, follow up with normally scheduled dialysis days    If you experience any of the following symptoms shortness of breath, chest pain, weakness, dizziness, please follow up with MD or call 911 if an emergency. *  Please give a list of your current medications to your Primary Care Provider. *  Please update this list whenever your medications are discontinued, doses are      changed, or new medications (including over-the-counter products) are added. *  Please carry medication information at all times in case of emergency situations. These are general instructions for a healthy lifestyle:    No smoking/ No tobacco products/ Avoid exposure to second hand smoke  Surgeon General's Warning:  Quitting smoking now greatly reduces serious risk to your health.     Obesity, smoking, and sedentary lifestyle greatly increases your risk for illness    A healthy diet, regular physical exercise & weight monitoring are important for maintaining a healthy lifestyle    You may be retaining fluid if you have a history of heart failure or if you experience any of the following symptoms:  Weight gain of 3 pounds or more overnight or 5 pounds in a week, increased swelling in our hands or feet or shortness of breath while lying flat in bed. Please call your doctor as soon as you notice any of these symptoms; do not wait until your next office visit. Recognize signs and symptoms of STROKE:    F-face looks uneven    A-arms unable to move or move unevenly    S-speech slurred or non-existent    T-time-call 911 as soon as signs and symptoms begin-DO NOT go       Back to bed or wait to see if you get better-TIME IS BRAIN. Warning Signs of HEART ATTACK     Call 911 if you have these symptoms:   Chest discomfort. Most heart attacks involve discomfort in the center of the chest that lasts more than a few minutes, or that goes away and comes back. It can feel like uncomfortable pressure, squeezing, fullness, or pain.  Discomfort in other areas of the upper body. Symptoms can include pain or discomfort in one or both arms, the back, neck, jaw, or stomach.  Shortness of breath with or without chest discomfort.  Other signs may include breaking out in a cold sweat, nausea, or lightheadedness. Don't wait more than five minutes to call 911 - MINUTES MATTER! Fast action can save your life. Calling 911 is almost always the fastest way to get lifesaving treatment. Emergency Medical Services staff can begin treatment when they arrive -- up to an hour sooner than if someone gets to the hospital by car. The discharge information has been reviewed with the patient. The patient verbalized understanding. Discharge medications reviewed with the patient and appropriate educational materials and side effects teaching were provided. _____________________________________________________________________________  [email or web address] Activation    Thank you for requesting access to [employer]. Please follow the instructions below to securely access and download your online medical record.  [employer] allows you to send messages to your doctor, view your test results, renew your prescriptions, schedule appointments, and more. How Do I Sign Up? 1. In your internet browser, go to [email or web address]. 2. Click on the First Time User? Click Here link in the Sign In box. You will see the New Member Sign Up page. 3. Enter your [employer] Access Code exactly as it appears below. You will not need to use this code after youve completed the sign-up process. If you do not sign up before the expiration date, you must request a new code. [employer] Access Code: [email or web address]  Expires: 2018  1:46 PM (This is the date your [employer] access code will )    4. Enter the last four digits of your Social Security Number (xxxx) and Date of Birth (mm/dd/yyyy) as indicated and click Submit. You will be taken to the next sign-up page. 5. Create a [employer] ID. This will be your [employer] login ID and cannot be changed, so think of one that is secure and easy to remember. 6. Create a [employer] password. You can change your password at any time. 7. Enter your Password Reset Question and Answer. This can be used at a later time if you forget your password. 8. Enter your e-mail address. You will receive e-mail notification when new information is available in [address] 9. Click Sign Up. You can now view and download portions of your medical record. 10. Click the Download Summary menu link to download a portable copy of your medical information. Additional Information    If you have questions, please visit the Frequently Asked Questions section of the [employer] website at [email or web address] Remember, [employer] is NOT to be used for urgent needs. For medical emergencies, dial 911.         Patient armband removed and shredded  ______________________________________________________

## 2018-07-01 NOTE — IP AVS SNAPSHOT
[address] [id number] [facility] [address] [facility] Patient: Aramis Owens MRN: [id number] About your hospitalization You were admitted on:  December 11, 2017 You last received care in the:  [facility] PACU You were discharged on:  December 11, 2017 Why you were hospitalized Your primary diagnosis was:  Not on File Your diagnoses also included:  Esrd (End Stage Renal Disease) (Hcc) Things You Need To Do (next 8 weeks) Follow up with Jayashree Watson MD  
  
Phone:  [phone] Where:  [facility], [address] [facility] Wednesday Dec 13, 2017 Nurse Visit with SUREKHA at 10:15 AM  
Where:  [facility] [address] [place] [address] ([address] [place] [address]) Monday Dec 18, 2017 Follow Up with Sammy Laird MD at 10:00 AM  
Where:  [facility] ([address] [place] [address]) Friday Dec 22, 2017 HOSPITAL DISCHARGE with LESLY Del Angel at 11:15 AM  
Where: [facility] ([address] [place] [address]) Monday Jan 08, 2018 ESTABLISHED PATIENT with Nanci Forrest MD at 10:00 AM  
Where:  [facility] [address] [place] [address] ([address] [place] [address]) Discharge Orders None A check jose indicates which time of day the medication should be taken. My Medications TAKE these medications as instructed Instructions Each Dose to Equal  
 Morning Noon Evening Bedtime  
 albuterol 90 mcg/actuation inhaler Commonly known as:  PROVENTIL HFA, VENTOLIN HFA, PROAIR HFA Your last dose was: Your next dose is: Take 2 Puffs by inhalation every four (4) hours as needed for Wheezing. 2 Puff  
    
   
   
   
  
 allopurinol 100 mg tablet Commonly known as:  Abel Becerra Your last dose was: Your next dose is: Take 100 mg by mouth daily.   
 100 mg  
    
   
   
   
  
 aspirin 81 mg chewable tablet Your last dose was: Your next dose is: Take 1 Tab by mouth daily. 81 mg  
    
   
   
   
  
 AUGMENTIN 875-125 mg per tablet Generic drug:  amoxicillin-clavulanate Your last dose was: Your next dose is: Take  by mouth two (2) times a day. Indications: HAEMOPHILUS INFLUENZAE PNEUMONIA  
     
   
   
   
  
 b complex-vitamin c-folic acid 1 mg capsule Commonly known as:  Carmelita Alvarezbaum Your last dose was: Your next dose is: Take 1 Cap by mouth daily. 1 Cap  
    
   
   
   
  
 clopidogrel 75 mg Tab Commonly known as:  PLAVIX Your last dose was: Your next dose is: Take 1 Tab by mouth daily. 75 mg DECARA 50,000 unit Cap Generic drug:  Cholecalciferol (Vitamin D3) Your last dose was: Your next dose is: Take  by mouth every seven (7) days. fenofibrate 54 mg tablet Commonly known as:  LOFIBRA Your last dose was: Your next dose is: Take 1 Tab by mouth daily. 54 mg  
    
   
   
   
  
 guaiFENesin-codeine 100-10 mg/5 mL solution Commonly known as:  ROBITUSSIN AC Your last dose was: Your next dose is: Take 5 mL by mouth nightly as needed for Cough. Max Daily Amount: 5 mL. 5 mL HYDROcodone-acetaminophen 5-325 mg per tablet Commonly known as:  Radonna Lincoln Your last dose was: Your next dose is: Take 1-2 Tabs by mouth every four (4) hours as needed for Pain. Max Daily Amount: 12 Tabs. 1-2 Tab  
    
   
   
   
  
 insulin aspart 100 unit/mL injection Commonly known as:  Brock Sánchez Your last dose was: Your next dose is:    
   
   
 20 units sq 3 times a day,sliding scale  
     
   
   
   
  
 insulin glargine 100 unit/mL injection Commonly known as:  LANTUS  
   
 Your last dose was: Your next dose is:    
   
   
 20 Units by SubCUTAneous route every twelve (12) hours. 20 Units  
    
   
   
   
  
 losartan 50 mg tablet Commonly known as:  COZAAR Your last dose was: Your next dose is: Take 1 Tab by mouth daily. 50 mg  
    
   
   
   
  
 metoprolol tartrate 100 mg IR tablet Commonly known as:  LOPRESSOR Your last dose was: Your next dose is: TAKE 1 TABLET BY MOUTH TWO TIMES A DAY. nitroglycerin 0.4 mg SL tablet Commonly known as:  NITROSTAT Your last dose was: Your next dose is:    
   
   
 1 Tab by SubLINGual route as needed for Chest Pain. 0.4 mg  
    
   
   
   
  
 senna-docusate 8.6-50 mg per tablet Commonly known as:  Lexy Downer Your last dose was: Your next dose is: Take 1 Tab by mouth daily. 1 Tab  
    
   
   
   
  
 simvastatin 40 mg tablet Commonly known as:  ZOCOR Your last dose was: Your next dose is: TAKE 1 TABLET BY MOUTH NIGHTLY.  
     
   
   
   
  
 tamsulosin 0.4 mg capsule Commonly known as:  FLOMAX Your last dose was: Your next dose is: TAKE ONE CAPSULE BY MOUTH ONE TIME DAILY Where to Get Your Medications Information on where to get these meds will be given to you by the nurse or doctor. ! Ask your nurse or doctor about these medications HYDROcodone-acetaminophen 5-325 mg per tablet Discharge Instructions DISCHARGE SUMMARY from Nurse PATIENT INSTRUCTIONS: 
 
 
F-face looks uneven A-arms unable to move or move unevenly S-speech slurred or non-existent T-time-call 911 as soon as signs and symptoms begin-DO NOT go  
 Back to bed or wait to see if you get better-TIME IS BRAIN. Warning Signs of HEART ATTACK Call 911 if you have these symptoms: 
? Chest discomfort. Most heart attacks involve discomfort in the center of the chest that lasts more than a few minutes, or that goes away and comes back. It can feel like uncomfortable pressure, squeezing, fullness, or pain. ? Discomfort in other areas of the upper body. Symptoms can include pain or discomfort in one or both arms, the back, neck, jaw, or stomach. ? Shortness of breath with or without chest discomfort. ? Other signs may include breaking out in a cold sweat, nausea, or lightheadedness. Don't wait more than five minutes to call [address]! Fast action can save your life. Calling 911 is almost always the fastest way to get lifesaving treatment. Emergency Medical Services staff can begin treatment when they arrive  up to an hour sooner than if someone gets to the hospital by car. The discharge information has been reviewed with the patient and spouse. The patient and spouse verbalized understanding. Discharge medications reviewed with the patient and spouse and appropriate educational materials and side effects teaching were provided. ___________________________________________________________________________________________________________________________________ Hemodialysis Access: What to Expect at [facility] Your Recovery Hemodialysis is a way to remove wastes from the blood when your kidneys can no longer do the job. It is not a cure, but it can help you live longer and feel better. It is a lifesaving treatment when you have kidney failure. Hemodialysis is often called dialysis. Your doctor created a place (called an access) in your arm for your blood to flow in and out of your body during your dialysis sessions. Your arm will probably be bruised and swollen. It may hurt.  The cut (incision) may bleed. The pain and bleeding will get better over several days. You will probably need only over-the-counter pain medicine. You can reduce swelling by propping your arm on 1 or 2 pillows and keeping your elbow straight. You will have stitches. These may dissolve on their own, or your doctor will tell you when to come in to have them removed. You should also be able to return to work in a few days. You may feel some coolness or numbness in your hand. These feelings usually go away in a few weeks. Your doctor may suggest squeezing a soft object. This will strengthen your access and may make hemodialysis faster and easier. You should always be able to feel blood rushing through the fistula or graft. It feels like a slight vibration when you put your fingers on the skin over the fistula or graft. This feeling is called a thrill or pulse. This care sheet gives you a general idea about how long it will take for you to recover. But each person recovers at a different pace. Follow the steps below to get better as quickly as possible. How can you care for yourself at home? Activity ? · Rest when you feel tired. Getting enough sleep will help you recover. Do not lie on or sleep on the arm with the access. ? · Avoid activities such as washing windows or gardening that put stress on the arm with the access. ? · You may use your arm, but do not lift anything that weighs more than about 15 pounds. This may include a child, heavy grocery bags, a heavy briefcase or backpack, cat litter or dog food bags, or a vacuum . ? · You can shower, but keep the access dry for the first 2 days. Cover the area with a plastic bag to keep it dry. ? · Do not soak or scrub the incision until it has healed. ? · Wear an arm guard to protect the area if you play sports or work with your arms. ? · You may drive when your doctor says it is okay. This is usually in 1 to 2 days. ? · Most people are able to return to work about 1 or 2 days after surgery. Diet ? · Follow an eating plan that is good for your kidneys. A registered dietitian can help you make a meal plan that is right for you. You may need to limit protein, salt, fluids, and certain foods. Medicines ? · Your doctor will tell you if and when you can restart your medicines. He or she will also give you instructions about taking any new medicines. ? · If you take blood thinners, such as warfarin (Coumadin), clopidogrel (Plavix), or aspirin, be sure to talk to your doctor. He or she will tell you if and when to start taking those medicines again. Make sure that you understand exactly what your doctor wants you to do. ? · Take pain medicines exactly as directed. ¨ If the doctor gave you a prescription medicine for pain, take it as prescribed. ¨ If you are not taking a prescription pain medicine, ask your doctor if you can take acetaminophen (Tylenol). Do not take ibuprofen (Advil, Motrin) or naproxen (Aleve), or similar medicines, unless your doctor tells you to. They may make chronic kidney disease worse. ¨ Do not take two or more pain medicines at the same time unless the doctor told you to. Many pain medicines have acetaminophen, which is Tylenol. Too much acetaminophen (Tylenol) can be harmful. ? · If you think your pain medicine is making you sick to your stomach: 
¨ Take your medicine after meals (unless your doctor has told you not to). ¨ Ask your doctor for a different pain medicine. ? · If your doctor prescribed antibiotics, take them as directed. Do not stop taking them just because you feel better. You need to take the full course of antibiotics. Incision care ? · Keep the area dry for 2 days. After 2 days, wash the area with soap and water every day, and always before dialysis. ? · Do not soak or scrub the incision until it has healed. ? · If you have a bandage, change it every day or as your doctor recommends. Your doctor will tell you when you can remove it. Exercise ? · Squeeze a soft ball or other object as your doctor tells you. This will help blood flow through the access and help prevent blood clots. ? Elevation ? · Prop up the sore arm on a pillow anytime you sit or lie down during the next 3 days. Try to keep it above the level of your heart. This will help reduce swelling. Other instructions ? · Every day, check your access for a pulse or thrill in the fistula or graft area. A thrill is a vibration. To feel a pulse or thrill, place the first two fingers of your hand over the access. ? · Do not bump your arm. ? · Do not wear tight clothing, jewelry, or anything else that may squeeze the access. ? · Use your other arm to have blood drawn or blood pressure taken. ? · Do not put cream or lotion on or near the access. ? · Make sure all doctors you deal with know you have a vascular access. Follow-up care is a key part of your treatment and safety. Be sure to make and go to all appointments, and call your doctor if you are having problems. It's also a good idea to know your test results and keep a list of the medicines you take. When should you call for help? Call 911 anytime you think you may need emergency care. For example, call if: 
? · You passed out (lost consciousness). ? · You have chest pain, are short of breath, or cough up blood. ?Call your doctor now or seek immediate medical care if: 
? · Your hand or arm is cold or dark-colored. ? · You have no pulse in your access. ? · You have nausea or you vomit. ? · You have pain that does not get better after you take pain medicine. ? · You have loose stitches, or your incision comes open. ? · You are bleeding from the incision. ? · You have signs of infection, such as: 
¨ Increased pain, swelling, warmth, or redness. [facility] ¨ Red streaks leading from the area. ¨ Pus draining from the area. ¨ A fever. ? · You have signs of a blood clot in your leg (called a deep vein thrombosis), such as: 
¨ Pain in your calf, back of the knee, thigh, or groin. ¨ Redness or swelling in your leg. ? Watch closely for changes in your health, and be sure to contact your doctor if you have any problems. Where can you learn more? Go to http://kendal-farzaneh.info/. Enter P616 in the search box to learn more about \"Hemodialysis Access: What to Expect at Home. \" Current as of: May 12, 2017 Content Version: 11.4 © 9025-9729 [employer]. Care instructions adapted under license by [employer] (which disclaims liability or warranty for this information). If you have questions about a medical condition or this instruction, always ask your healthcare professional. [address] [address] any warranty or liability for your use of this information. Hydrocodone/Acetaminophen (By mouth) Acetaminophen (l-yazj-t-MIN-oh-fen), Hydrocodone Bitartrate (yea-qkdv-BBP-done bye-TAR-trate) Treats pain. This medicine contains a narcotic pain reliever. Brand Name(s): Hycet, Lorcet, Lorcet HD, Lorcet Plus, Lortab 10/325, Lortab 5/325, Lortab 7.5/325, Lortab Elixir, Norco, Verdrocet, Vicodin, Vicodin ES, Vicodin HP, Xodol, Xodol 5/300 There may be other brand names for this medicine. When This Medicine Should Not Be Used: This medicine is not right for everyone. Do not use it if you had an allergic reaction to acetaminophen, hydrocodone, or other narcotic medicines, or stomach or bowel blockage (including paralytic ileus). How to Use This Medicine:  
Capsule, Liquid, Tablet · Your doctor will tell you how much medicine to use. Do not use more than directed. · An overdose can be dangerous. Follow directions carefully so you do not get too much medicine at one time. · Oral liquid: Measure the oral liquid medicine with a marked measuring spoon, oral syringe, or medicine cup. · Drink plenty of liquids to help avoid constipation. · This medicine should come with a Medication Guide. Ask your pharmacist for a copy if you do not have one. · Missed dose: Take a dose as soon as you remember. If it is almost time for your next dose, wait until then and take a regular dose. Do not take extra medicine to make up for a missed dose. · Store the medicine in a closed container at room temperature, away from heat, moisture, and direct light. Flush any unused Norco® tablets down the toilet. Drugs and Foods to Avoid: Ask your doctor or pharmacist before using any other medicine, including over-the-counter medicines, vitamins, and herbal products. · Do not use this medicine if you are using or have used an MAO inhibitor within the past 14 days. · Some medicines can affect how hydrocodone/acetaminophen works. Tell your doctor if you are using any of the following: ¨ Carbamazepine, erythromycin, ketoconazole, mirtazapine, phenytoin, rifampin, ritonavir, tramadol, trazodone ¨ Diuretic (water pill) ¨ Medicine to treat depression or mental health problems ¨ Medicine to treat migraine headaches ¨ Phenothiazine medicine · Tell your doctor if you use anything else that makes you sleepy. Some examples are allergy medicine, narcotic pain medicine, and alcohol. Tell your doctor if you are using buprenorphine, butorphanol, nalbuphine, pentazocine, or a muscle relaxer. · Do not drink alcohol while you are using this medicine. Acetaminophen can damage your liver, and your risk is higher if you also drink alcohol. Warnings While Using This Medicine: · Tell your doctor if you are pregnant or breastfeeding, or if you have kidney disease, liver disease, lung or breathing problems, gallbladder or pancreas problems, an underactive thyroid, Ar disease, prostate problems, trouble urinating, stomach problems, or a history of head injury or brain tumor, seizures, alcohol or drug addiction. · This medicine may cause the following problems:  
¨ High risk of overdose, which can lead to death ¨ Respiratory depression (serious breathing problem that can be life-threatening) ¨ Liver problems ¨ Serious skin reactions ¨ Serotonin syndrome (when used with certain medicines) · This medicine can be habit-forming. Do not use more than your prescribed dose. Call your doctor if you think your medicine is not working. · This medicine may make you dizzy or drowsy. Do not drive or doing anything else that could be dangerous until you know how this medicine affects you. · This medicine contains acetaminophen. Read the labels of all other medicines you are using to see if they also contain acetaminophen, or ask your doctor or pharmacist. Reanna Alicia not use more than 4 grams (4,000 milligrams) total of acetaminophen in one day. · Tell any doctor or dentist who treats you that you are using this medicine. This medicine may affect certain medical test results. · This medicine may cause constipation, especially with long-term use. Ask your doctor if you should use a laxative to prevent and treat constipation. · This medicine could cause infertility. Talk with your doctor before using this medicine if you plan to have children. · Keep all medicine out of the reach of children. Never share your medicine with anyone. Possible Side Effects While Using This Medicine:  
Call your doctor right away if you notice any of these side effects: · Allergic reaction: Itching or hives, swelling in your face or hands, swelling or tingling in your mouth or throat, chest tightness, trouble breathing · Anxiety, restlessness, fast heartbeat, fever, sweating, muscle spasms, twitching, diarrhea, seeing or hearing things that are not there · Blistering, peeling, red skin rash · Blue lips, fingernails, or skin · Dark urine or pale stools, loss of appetite, nausea or vomiting, stomach pain, yellow skin or eyes · Extreme weakness, shallow breathing, slow heartbeat, sweating, seizures, cold or clammy skin · Lightheadedness, dizziness, fainting If you notice these less serious side effects, talk with your doctor: · Constipation, nausea, vomiting · Tiredness or sleepiness If you notice other side effects that you think are caused by this medicine, tell your doctor. Call your doctor for medical advice about side effects. You may report side effects to FDA at 1-654-MAE-2890 © 2017 [facility] Information is for End User's use only and may not be sold, redistributed or otherwise used for commercial purposes. The above information is an  only. It is not intended as medical advice for individual conditions or treatments. Talk to your doctor, nurse or pharmacist before following any medical regimen to see if it is safe and effective for you. Sitemasher Activation Thank you for enrolling in [facility]. Please follow the instructions below to securely access your online medical record. Sitemasher allows you to send messages to your doctor, view your test results, renew your prescriptions, schedule appointments, and more. How Do I Sign Up? 1. In your internet browser, go to [email or web address]. 2. Click on the First Time User? Click Here link in the Sign In box. You will see the New Member Sign Up page. 3. Enter your Sitemasher Access Code exactly as it appears below. You will not need to use this code after youve completed the sign-up process. If you do not sign up before the expiration date, you must request a new code. Sitemasher Access Code: [email or web address] Expires: 12/25/2017  7:30 AM  
 
4. Enter the last four digits of your Social Security Number (xxxx) and Date of Birth (mm/dd/yyyy) as indicated and click Submit. You will be taken to the next sign-up page. 5. Create a Issue ID. This will be your Issue login ID and cannot be changed, so think of one that is secure and easy to remember. 6. Create a Issue password. You can change your password at any time. 7. Enter your Password Reset Question and Answer. This can be used at a later time if you forget your password. 8. Enter your e-mail address. You will receive e-mail notification when new information is available in [address] 9. Click Sign Up. You can now view your medical record. Additional Information Remember, Issue is NOT to be used for urgent needs. For medical emergencies, dial 911. Now available from your iPhone and Android! ACO Transitions of Care Introducing [employer] [address] offers a voluntary care coordination program to provide high quality service and care to [facility] fee-for-service beneficiaries. Ochoa Barron was designed to help you enhance your health and well-being through the following services: ? Transitions of Care  support for individuals who are transitioning from one care setting to another (example: Hospital to home). ? Chronic and Complex Care Coordination  support for individuals and caregivers of those with serious or chronic illnesses or with more than one chronic (ongoing) condition and those who take a number of different medications. If you meet specific medical criteria, a [facility] [address] may call you directly to coordinate your care with your primary care physician and your other care providers. For questions about the [facility] programs, please, contact your physicians office. For general questions or additional information about Accountable Care Organizations: 
Please visit www.medicare.gov/acos. html or call 1-800-MEDICARE (9-128.863.3366) TTY users should call 9-256.897.6726. Introducing [address]! [employer] introduces [employer] patient portal. Now you can access parts of your medical record, email your doctor's office, and request medication refills online. 1. In your internet browser, go to [email or web address] 2. Click on the First Time User? Click Here link in the Sign In box. You will see the New Member Sign Up page. 3. Enter your Kewen Access Code exactly as it appears below. You will not need to use this code after youve completed the sign-up process. If you do not sign up before the expiration date, you must request a new code. · K[employer]en Access Code: [email or web address] Expires: 12/25/2017  7:30 AM 
 
4. Enter the last four digits of your Social Security Number (xxxx) and Date of Birth (mm/dd/yyyy) as indicated and click Submit. You will be taken to the next sign-up page. 5. Create a Kewen ID. This will be your Kewen login ID and cannot be changed, so think of one that is secure and easy to remember. 6. Create a Kewen password. You can change your password at any time. 7. Enter your Password Reset Question and Answer. This can be used at a later time if you forget your password. 8. Enter your e-mail address. You will receive e-mail notification when new information is available in [address] 9. Click Sign Up. You can now view and download portions of your medical record. 10. Click the Download Summary menu link to download a portable copy of your medical information. If you have questions, please visit the Frequently Asked Questions section of the Kewen website. Remember, Kewen is NOT to be used for urgent needs. For medical emergencies, dial 911. Now available from your iPhone and Android! Providers Seen During Your Hospitalization Provider Specialty Primary office phone Yusra Fontanez MD Vascular Surgery [phone] Your Primary Care Physician (PCP) Primary Care Physician Office Phone Office Fax Agustina Webb [phone] You are allergic to the following Allergen Reactions Nka (No Known Allergies) Other (comments) Recent Documentation Height Weight BMI Smoking Status 1.651 m 98.4 kg 36.11 kg/m2 Never Smoker Emergency Contacts Name Discharge Info Relation Home Work Mobile Lazarus,Winsome DISCHARGE CAREGIVER [3] Spouse [3] [phone] Jonathon Troncoso CAREGIVER [3] Daughter [21] [phone] Lizbet Cook  Spouse [3] [phone] Patient Belongings The following personal items are in your possession at time of discharge: 
  Dental Appliances: Uppers  Visual Aid: Other (comment) (lens implants)      Home Medications: None   Jewelry: None  Clothing: Jacket/Coat, Undergarments, Pants, Socks, Shirt    Other Valuables: Cane  Personal Items Sent to Safe: Declines Please provide this summary of care documentation to your next provider. Signatures-by signing, you are acknowledging that this After Visit Summary has been reviewed with you and you have received a copy. Patient Signature:  ____________________________________________________________ Date:  ____________________________________________________________  
  
Joanna Orr Provider Signature:  ____________________________________________________________ Date:  ____________________________________________________________

## 2018-07-03 ENCOUNTER — TELEPHONE (OUTPATIENT)
Dept: CARDIAC REHAB | Age: 78
End: 2018-07-03

## 2018-07-03 NOTE — TELEPHONE ENCOUNTER
[facility] called patient and left a message about the program. Additional attempts at contact will be made.      Thank you,  Ayan Clark

## 2018-07-09 ENCOUNTER — OFFICE VISIT (OUTPATIENT)
Dept: FAMILY MEDICINE CLINIC | Age: 78
End: 2018-07-09

## 2018-07-09 VITALS
HEART RATE: 60 BPM | SYSTOLIC BLOOD PRESSURE: 142 MMHG | RESPIRATION RATE: 18 BRPM | TEMPERATURE: 98.2 F | WEIGHT: 226 LBS | OXYGEN SATURATION: 98 % | HEIGHT: 64 IN | BODY MASS INDEX: 38.58 KG/M2 | DIASTOLIC BLOOD PRESSURE: 51 MMHG

## 2018-07-09 DIAGNOSIS — F41.9 ANXIETY: ICD-10-CM

## 2018-07-09 DIAGNOSIS — Z79.4 TYPE 2 DIABETES MELLITUS WITHOUT COMPLICATION, WITH LONG-TERM CURRENT USE OF INSULIN (HCC): Primary | Chronic | ICD-10-CM

## 2018-07-09 DIAGNOSIS — Z71.89 ACP (ADVANCE CARE PLANNING): ICD-10-CM

## 2018-07-09 DIAGNOSIS — Z00.00 ENCOUNTER FOR MEDICARE ANNUAL WELLNESS EXAM: ICD-10-CM

## 2018-07-09 DIAGNOSIS — E11.9 COMPREHENSIVE DIABETIC FOOT EXAMINATION, TYPE 2 DM, ENCOUNTER FOR (HCC): ICD-10-CM

## 2018-07-09 DIAGNOSIS — C61 PROSTATE CANCER (HCC): ICD-10-CM

## 2018-07-09 DIAGNOSIS — E11.9 TYPE 2 DIABETES MELLITUS WITHOUT COMPLICATION, WITH LONG-TERM CURRENT USE OF INSULIN (HCC): Primary | Chronic | ICD-10-CM

## 2018-07-09 DIAGNOSIS — Z99.2 ESRD (END STAGE RENAL DISEASE) ON DIALYSIS (HCC): ICD-10-CM

## 2018-07-09 DIAGNOSIS — N18.6 ESRD (END STAGE RENAL DISEASE) ON DIALYSIS (HCC): ICD-10-CM

## 2018-07-09 RX ORDER — DULOXETIN HYDROCHLORIDE 30 MG/1
30 CAPSULE, DELAYED RELEASE ORAL DAILY
Qty: 30 CAP | Refills: 2 | Status: SHIPPED | OUTPATIENT
Start: 2018-07-09 | End: 2018-08-06 | Stop reason: DRUGHIGH

## 2018-07-09 RX ORDER — LOPERAMIDE HYDROCHLORIDE 2 MG/1
CAPSULE ORAL
COMMUNITY
Start: 2018-05-30 | End: 2018-08-06

## 2018-07-09 RX ORDER — SERTRALINE HYDROCHLORIDE 50 MG/1
TABLET, FILM COATED ORAL DAILY
COMMUNITY
End: 2018-07-09

## 2018-07-09 RX ORDER — PANTOPRAZOLE SODIUM 40 MG/1
40 TABLET, DELAYED RELEASE ORAL DAILY
COMMUNITY

## 2018-07-09 RX ORDER — OXYCODONE AND ACETAMINOPHEN 5; 325 MG/1; MG/1
TABLET ORAL
COMMUNITY
Start: 2018-06-22 | End: 2018-07-09

## 2018-07-09 NOTE — PATIENT INSTRUCTIONS
Medicare Part B Preventive Services Limitations Recommendation Scheduled   Bone Mass Measurement  (age 72 & older, biennial) Requires diagnosis related to osteoporosis or estrogen deficiency. Biennial benefit unless patient has history of long-term glucocorticoid tx or baseline is needed because initial test was by other method N/A    Cardiovascular Screening Blood Tests (every 5 years)  Total cholesterol, HDL, Triglycerides Order as a panel if possible Last labs 6/25/2018    Colorectal Cancer Screening  -Fecal occult blood test (annual)  -Flexible sigmoidoscopy (5y)  -Screening colonoscopy (10y)  -Barium Enema  UTD    Counseling to Prevent Tobacco Use (up to 8 sessions per year)  - Counseling greater than 3 and up to 10 minutes  - Counseling greater than 10 minutes Patients must be asymptomatic of tobacco-related conditions to receive as preventive service N/A    Diabetes Screening Tests (at least every 3 years, Medicare covers annually or at 6-month intervals for prediabetic patients)    Fasting blood sugar (FBS) or glucose tolerance test (GTT) Patient must be diagnosed with one of the following:  -Hypertension, Dyslipidemia, obesity, previous impaired FBS or GTT  Or any two of the following: overweight, FH of diabetes, age ? 72, history of gestational diabetes, birth of baby weighing more than 9 pounds UTD    Diabetes Self-Management Training (DSMT) (no USPSTF recommendation) Requires referral by treating physician for patient with diabetes or renal disease. 10 hours of initial DSMT session of no less than 30 minutes each in a continuous 12-month period. 2 hours of follow-up DSMT in subsequent years.  UTD    Glaucoma Screening (no USPSTF recommendation) Diabetes mellitus, family history, , age 48 or over,  American, age 72 or over Due    Human Immunodeficiency Virus (HIV) Screening (annually for increased risk patients)  HIV-1 and HIV-2 by EIA, GABINO, rapid antibody test, or oral mucosa transudate Patient must be at increased risk for HIV infection per USPSTF guidelines or pregnant. Tests covered annually for patients at increased risk. Pregnant patients may receive up to 3 test during pregnancy. N/A    Medical Nutrition Therapy (MNT) (for diabetes or renal disease not recommended schedule) Requires referral by treating physician for patient with diabetes or renal disease. Can be provided in same year as diabetes self-management training (DSMT), and CMS recommends medical nutrition therapy take place after DSMT. Up to 3 hours for initial year and 2 hours in subsequent years. N/A    Prostate Cancer Screening (annually up to age 76)  - Digital rectal exam (DARINEL)  - Prostate specific antigen (PSA) Annually (age 48 or over), DARINEL not paid separately when covered E/M service is provided on same date  Men up to age 76 may need a screening blood test for prostate cancer at certain intervals, depending on their personal and family history. This decision is between the patient and his provider. Patient sees Dr. Johnette Severe (Urology)    Seasonal Influenza Vaccination (annually)  UTD      Pneumococcal Vaccination (once after 65)    DUE    Hepatitis B Vaccinations (if medium/high risk) Medium/high risk factors:  End-stage renal disease,  Hemophiliacs who received Factor VIII or IX concentrates, Clients of institutions for the mentally retarded, Persons who live in the same house as a HepB virus carrier, Homosexual men, Illicit injectable drug abusers. UTD    Shingles Vaccination A shingles vaccine is also recommended once in a lifetime after age 61 Due    Ultrasound Screening for Abdominal Aortic Aneurysm (AAA) (once) Patient must be referred through [facility] and not have had a screening for abdominal aortic aneurysm before under Medicare.   Limited to patients who meet one of the following criteria:  - Men who are 73-68 years old and have smoked more than 100 cigarettes in their lifetime.  -Anyone with a FH of AAA  -Anyone recommended for screening by USPSTF N/A

## 2018-07-09 NOTE — PROGRESS NOTES
Chief Complaint   Patient presents with   [facility] Annual Wellness Visit     1. Have you been to the ER, urgent care clinic since your last visit? Hospitalized since your last visit? Yes, Last week at [facility] for chest pain. 2. Have you seen or consulted any other health care providers outside of the [address] [place] [address] since your last visit? Include any pap smears or colon screening. No      This is the Subsequent Medicare Annual Wellness Exam, performed 12 months or more after the Initial AWV or the last Subsequent AWV    I have reviewed the patient's medical history in detail and updated the computerized patient record. History   Georges Cannon comes in for Medicare wellness visit. Past Medical History:   Diagnosis Date    Atherosclerosis of renal artery (HCC)     S/P Rt stent    CAD (coronary artery disease) , 1997, 2012    ,double bypass, 2 stents, 2stents 7/2016    Cancer (HCC)     prostate    CHF (congestive heart failure) (HCC)     Chronic diastolic heart failure (HCC)     Chronic kidney disease     Chronic kidney disease (CKD), stage IV (severe) (HCC)     On Dialysis now -T-Th-sat    Constipation     Coronary atherosclerosis of unspecified type of vessel, native or graft     Stable angina after recent PCI continue treatment.        CRI (chronic renal insufficiency)     Diabetes ([address] 1973    type 2    Essential hypertension, benign     GERD (gastroesophageal reflux disease)     Gout     Hypertension 1982    Morbid obesity ([address]     Weight loss has been strongly encouraged by following dietary restrictions and an exercise routine    APRYL (obstructive sleep apnea) 6/26/2015    no cpap    Other and unspecified hyperlipidemia     HDL's are not at goal, LDL's are at goal, triglycerides are not at goal.    Other and unspecified hyperlipidemia     HDL's are not at goal, LDL's are at goal, triglycerides are not at goal.     Other ill-defined conditions(799.89) 1960 pneumonia twice    Prostate cancer ([facility]     Sleep disturbance     Type II or unspecified type diabetes mellitus without mention of complication, not stated as uncontrolled       Past Surgical History:   Procedure Laterality Date   [address]    lt. carotid endart. [facility] CARDIAC SURG PROCEDURE UNLIST  2012, 2016, Nov 2017    Coronary Stent    COLONOSCOPY N/A 6/23/2017    COLONOSCOPY w/ APC w/ polypectomies performed by Kristi Yepez MD at [address] N/A 1/19/2018    FLEXIBLE SIGMOIDOSCOPY WITH Radio frequency ablation performed by Kristi Yepez MD at [address] N/A 6/8/2018    SIGMOIDOSCOPY FLEXIBLE WITH APC performed by Saroj Bullock MD at [facility] ENDOSCOPY    HX CHOLECYSTECTOMY      HX CORONARY ARTERY BYPASS GRAFT  2000    x2    HX HEENT  1997    cholecystectomy    HX UROLOGICAL  1998    renal art. surg    HX VASCULAR ACCESS      Perma cath for HD- right chest.     Current Outpatient Prescriptions   Medication Sig Dispense Refill    sertraline (ZOLOFT) 50 mg tablet Take  by mouth daily.  DULCOLAX, BISACODYL, PO Take  by mouth.  pantoprazole (PROTONIX) 40 mg tablet Take 40 mg by mouth daily.  oxyCODONE-acetaminophen (PERCOCET) 5-325 mg per tablet       loperamide (IMODIUM) 2 mg capsule       acetaminophen (TYLENOL) 325 mg tablet Take 2 Tabs by mouth every six (6) hours as needed. 30 Tab 0    simvastatin (ZOCOR) 40 mg tablet TAKE 1 TABLET BY MOUTH NIGHTLY. 90 Tab 2    losartan (COZAAR) 25 mg tablet Take  by mouth daily.  aspirin delayed-release 81 mg tablet Take  by mouth daily.  fenofibrate (LOFIBRA) 54 mg tablet TAKE ONE TABLET BY MOUTH ONE TIME DAILY  30 Tab 4    tamsulosin (FLOMAX) 0.4 mg capsule TAKE ONE CAPSULE BY MOUTH ONE TIME DAILY 90 Cap 3    isosorbide mononitrate ER (IMDUR) 30 mg tablet Take 1 Tab by mouth daily.  30 Tab 0    b complex-vitamin c-folic acid (NEPHROCAPS) 1 mg capsule Take 1 Cap by mouth daily. 30 Cap 0    metoprolol tartrate (LOPRESSOR) 100 mg IR tablet TAKE 1 TABLET BY MOUTH TWO TIMES A DAY. 180 Tab 2    Cholecalciferol, Vitamin D3, (DECARA) 50,000 unit cap Take  by mouth every seven (7) days.  insulin glargine (LANTUS) 100 unit/mL injection 20 Units by SubCUTAneous route every twelve (12) hours. (Patient taking differently: 35 Units by SubCUTAneous route every twelve (12) hours. ) 1 Vial 2    nitroglycerin (NITROSTAT) 0.4 mg SL tablet 1 Tab by SubLINGual route as needed for Chest Pain. 25 Tab 1    insulin aspart (NOVOLOG) 100 unit/mL injection 20 units sq 3 times a day,sliding scale (Patient taking differently: sliding scale with meals) 10 mL 0    allopurinol (ZYLOPRIM) 100 mg tablet Take 100 mg by mouth. Monday, Wednesday,friday      lidocaine (XYLOCAINE) 5 % ointment Apply to marilee-anal area and rectum daily as needed for pain 1 Tube 0    hydrocortisone (ANUSOL-HC) 2.5 % rectal cream Insert  into rectum four (4) times daily. 30 g 0    senna-docusate (PERICOLACE) 8.6-50 mg per tablet Take 1 Tab by mouth daily.  27 Tab 0     Allergies   Allergen Reactions    Nka [No Known Allergies] Other (comments)     Family History   Problem Relation Age of Onset    Heart Attack Father 64     Social History   Substance Use Topics    Smoking status: Never Smoker    Smokeless tobacco: Never Used    Alcohol use No     Patient Active Problem List   Diagnosis Code    NSTEMI (non-ST elevated myocardial infarction) ([facility] I21.4    Coronary atherosclerosis of native coronary artery I25.10    CKD (chronic kidney disease) stage 4, GFR 15-29 ml/min ([facility] N18.4    HTN (hypertension) I10    DM2 (diabetes mellitus, type 2) ([facility]) E11.9    Dyslipidemia E78.5    Contrast dye induced nephropathy N14.1, T50.8X5A    Chest pain, unspecified R07.9    S/P coronary artery stent placement Z95.5    Postsurgical aortocoronary bypass status Z95.1    Atherosclerosis of renal artery ([facility]) I70.1  Chronic diastolic heart failure ([facility]) I50.32    Morbid obesity ([facility]) E66.01    Essential hypertension, benign I10    Sleep disturbance G47.9    Coronary atherosclerosis I25.10    Mixed hyperlipidemia E78.2    Type II or unspecified type diabetes mellitus without mention of complication, not stated as uncontrolled E11.9    Abdominal pain R10.9    GERD (gastroesophageal reflux disease) K21.9    Constipation K59.00    Chronic renal insufficiency N18.9    Prostate cancer ([facility] C61    APRYL (obstructive sleep apnea) T30.11    Diastolic CHF, acute on chronic ([facility]) I50.33    CHF (congestive heart failure), NYHA class IV ([facility]) I50.9    Fatigue R53.83    Secondary hyperparathyroidism of renal origin ([facility] N25.81    Chest pain R07.9    Hyperuricemia E79.0    Chronic kidney disease, stage V ([facility]) N18.5    ESRD (end stage renal disease) on dialysis ([facility]) N18.6, Z99.2    Hypotension I95.9    Fluid overload E87.70    Hyperkalemia E87.5    Type 2 diabetes mellitus with nephropathy ([facility]) E11.21    Elevated troponin R74.8    Gastrointestinal hemorrhage associated with anorectal source K62.5    Lower GI bleed K92.2    GI bleed K92.2    Radiation proctitis K62.7    Chest pain with high risk of acute coronary syndrome R07.9    Anemia D64.9       Depression Risk Factor Screening:     PHQ over the last two weeks 7/9/2018   Little interest or pleasure in doing things Nearly every day   Feeling down, depressed or hopeless Nearly every day   Total Score PHQ 2 6   Trouble falling or staying asleep, or sleeping too much Nearly every day   Feeling tired or having little energy Nearly every day   Poor appetite or overeating Several days   Feeling bad about yourself - or that you are a failure or have let yourself or your family down Nearly every day   Trouble concentrating on things such as school, work, reading or watching TV Not at all   Moving or speaking so slowly that other people could have noticed; or the opposite being so fidgety that others notice Not at all   Thoughts of being better off dead, or hurting yourself in some way Not at all   PHQ 9 Score 16   How difficult have these problems made it for you to do your work, take care of your home and get along with others Extremely difficult     Alcohol Risk Factor Screening: You do not drink alcohol or very rarely. Functional Ability and Level of Safety:   1. Was the patient's timed Up and GO test unsteady or longer than 30 seconds? No  2. Does the patient need help with the phone, transportation, shopping, preparing meals, housework, laundry, medications or managing money? No  3. Does the patients' home have rugs in the hallway, lack grab bars in the bathroom, lack handrails on the stairs or have poor lighting? No  4. Have you noticed any hearing difficulties? Patient wear hearing aids on both ears. Hearing Evaluation:    Hearing Loss  Hearing is good. The patient wears hearing aids. Activities of Daily Living  The home contains: no safety equipment. Patient does total self care    Fall Risk  Fall Risk Assessment, last 12 mths 2/15/2018   Able to walk? Yes   Fall in past 12 months? No       Abuse Screen  Patient is not abused    Cognitive Screening   Evaluation of Cognitive Function:  Has your family/caregiver stated any concerns about your memory: no  Normal    Patient Care Team   Patient Care Team:  Zachariah Bustillo MD as PCP - General (Family Practice)  Gloria Stout MD as Physician (Cardiology)  LESLY Carver (Physician Assistant)  [address] [place] [address] (Physician Assistant)  Reed Boudreaux MD (Nephrology)  Keiko Pappas MD (Urology)  Madalyn Ugalde MD (Gastroenterology)  Jammie John MD (Endocrinology)    Assessment/Plan   Education and counseling provided:  Are appropriate based on today's review and evaluation  End-of-Life planning (with patient's consent)  Pneumococcal Vaccine  Screening for glaucoma    1.  Encounter for Medicare annual wellness exam    2. ACP (advance care planning)    Health Maintenance Due   Topic Date Due    FOOT EXAM Q1  07/10/1950    MICROALBUMIN Q1  07/10/1950    EYE EXAM RETINAL OR DILATED Q1  07/10/1950    DTaP/Tdap/Td series (1 - Tdap) 07/10/1961    ZOSTER VACCINE AGE 60>  05/10/2000    GLAUCOMA SCREENING Q2Y  07/10/2005    Pneumococcal 65+ High/Highest Risk (2 of 2 - PPSV23) 03/09/2017    MEDICARE YEARLY EXAM  03/14/2018   I have discussed the diagnosis with the patient and the intended plan of care as seen in the above orders. The patient has received an after-visit summary and questions were answered concerning future plans. I have discussed medication, side effects, and warnings with the patient in detail. The patient verbalized understanding and is in agreement with the plan of care. The patient will contact the office with any additional concerns. Personalized preventative plan of care was discussed, printed and given to patient.     Katelyn Oliver MD

## 2018-07-09 NOTE — MR AVS SNAPSHOT
[facility] [address] [facility] [address] 
[phone] Patient: Cecile Sosa MRN: [id number] VLI:[id number] Visit Information Date & Time Provider Department Dept. Phone Encounter #  
 7/9/2018 10:30 AM Dillaned Kev Good MD [facility] [phone] [id number] Follow-up Instructions Return in about 1 month (around 8/9/2018), or if symptoms worsen or fail to improve, for esrd, htn, dm2. Your Appointments 7/11/2018 10:30 AM  
Nurse Visit with Simona Robertson [facility] ([facility]) Appt Note: PSA  
 [address] Replaced by [facility] [address]  
  
   
 200 [facility]  
  
    
 7/25/2018 11:00 AM  
PROCEDURE with Vannesa [facility] ([facility]) Appt Note: Cath removal  
 [address] [place], [place] [address] [address] [facility]  
[phone] [address]  
  
    
 11/5/2018 10:15 AM  
Office Visit with Rashad Cote MD  
[facility] ([facility]) Appt Note: 6 month follow up  
 [address] Replaced by [facility] [address]  
  
   
 [address] [address] [address] [address] Upcoming Health Maintenance Date Due  
 EYE EXAM RETINAL OR DILATED Q1 7/10/1950 DTaP/Tdap/Td series (1 - Tdap) 7/10/1961 ZOSTER VACCINE AGE 60> 5/10/2000 GLAUCOMA SCREENING Q2Y 7/10/2005 Pneumococcal 65+ High/Highest Risk (2 of 2 - PPSV23) 3/9/2017 MEDICARE YEARLY EXAM 3/14/2018 Influenza Age 5 to Adult 8/1/2018 LIPID PANEL Q1 11/14/2018 HEMOGLOBIN A1C Q6M 12/24/2018 FOOT EXAM Q1 7/9/2019 Allergies as of 7/9/2018  Review Complete On: 7/9/2018 By: Andrea Luong Severity Noted Reaction Type Reactions Nka [No Known Allergies]  10/07/2012    Other (comments) Current Immunizations  Reviewed on 3/12/2013 No immunizations on file. Not reviewed this visit You Were Diagnosed With   
  
 Codes Comments Type 2 diabetes mellitus without complication, with long-term current use of insulin (HCC)    -  Primary ICD-10-CM: E11.9, Z79.4 ICD-9-CM: 250.00, V58.67 Anxiety     ICD-10-CM: F41.9 ICD-9-CM: 300.00 ESRD (end stage renal disease) on dialysis ([facility])     ICD-10-CM: N18.6, Z99.2 ICD-9-CM: 585.6, V45.11 Prostate cancer [facility])     ICD-10-CM: V46 ICD-9-CM: 080 Vitals BP Pulse Temp Resp Height(growth percentile) Weight(growth percentile) 142/51 (BP 1 Location: Left arm, BP Patient Position: Sitting) 60 98.2 °F (36.8 °C) (Oral) 18 5' 4\" (1.626 m) 226 lb (102.5 kg) SpO2 BMI Smoking Status 98% 38.79 kg/m2 Never Smoker BMI and BSA Data Body Mass Index Body Surface Area 38.79 kg/m 2 2.15 m 2 Preferred Pharmacy Pharmacy Name Phone [facility] [phone] Your Updated Medication List  
  
   
This list is accurate as of 7/9/18 11:27 AM.  Always use your most recent med list.  
  
  
  
  
 acetaminophen 325 mg tablet Commonly known as:  TYLENOL Take 2 Tabs by mouth every six (6) hours as needed. allopurinol 100 mg tablet Commonly known as:  Rawland Shyanne Take 100 mg by mouth. Monday, Wednesday,friday  
  
 aspirin delayed-release 81 mg tablet Take  by mouth daily. b complex-vitamin c-folic acid 1 mg capsule Commonly known as:  Maritza Sacks Take 1 Cap by mouth daily. DECARA 50,000 unit capsule Generic drug:  cholecalciferol Take  by mouth every seven (7) days. DULCOLAX (BISACODYL) PO Take  by mouth. DULoxetine 30 mg capsule Commonly known as:  CYMBALTA Take 1 Cap by mouth daily. fenofibrate 54 mg tablet Commonly known as:  LOFIBRA TAKE ONE TABLET BY MOUTH ONE TIME DAILY  
  
 insulin aspart U-100 100 unit/mL injection Commonly known as:  NovoLOG U-100 Insulin aspart  
20 units sq 3 times a day,sliding scale  
  
 insulin glargine 100 unit/mL injection Commonly known as:  LANTUS  
20 Units by SubCUTAneous route every twelve (12) hours. isosorbide mononitrate ER 30 mg tablet Commonly known as:  IMDUR Take 1 Tab by mouth daily. loperamide 2 mg capsule Commonly known as:  IMODIUM  
  
 losartan 25 mg tablet Commonly known as:  COZAAR Take  by mouth daily. metoprolol tartrate 100 mg IR tablet Commonly known as:  LOPRESSOR  
TAKE 1 TABLET BY MOUTH TWO TIMES A DAY. nitroglycerin 0.4 mg SL tablet Commonly known as:  NITROSTAT  
1 Tab by SubLINGual route as needed for Chest Pain.  
  
 pantoprazole 40 mg tablet Commonly known as:  PROTONIX Take 40 mg by mouth daily. simvastatin 40 mg tablet Commonly known as:  ZOCOR  
TAKE 1 TABLET BY MOUTH NIGHTLY.  
  
 tamsulosin 0.4 mg capsule Commonly known as:  FLOMAX TAKE ONE CAPSULE BY MOUTH ONE TIME DAILY Prescriptions Sent to Pharmacy Refills DULoxetine (CYMBALTA) 30 mg capsule 2 Sig: Take 1 Cap by mouth daily. Class: Normal  
 [facility]acy: [facility] [place] [address] #: [phone] Route: Oral  
  
We Performed the Following REFERRAL TO OPHTHALMOLOGY [REF57 Custom] Comments:  
 Diabetes eye exam  
  
Follow-up Instructions Return in about 1 month (around 8/9/2018), or if symptoms worsen or fail to improve, for esrd, htn, dm2. Referral Information Referral ID Referred By Referred To  
  
 [id number] Leora Quiñonez N Not Available Visits Status Start Date End Date 1 New Request 7/9/18 7/9/19  If your referral has a status of pending review or denied, additional information will be sent to support the outcome of this decision. Patient Instructions Medicare Part B Preventive Services Limitations Recommendation Scheduled Bone Mass Measurement 
(age 72 & older, biennial) Requires diagnosis related to osteoporosis or estrogen deficiency. Biennial benefit unless patient has history of long-term glucocorticoid tx or baseline is needed because initial test was by other method N/A Cardiovascular Screening Blood Tests (every 5 years) Total cholesterol, HDL, Triglycerides Order as a panel if possible Last labs 6/25/2018 Colorectal Cancer Screening 
-Fecal occult blood test (annual) -Flexible sigmoidoscopy (5y) 
-Screening colonoscopy (10y) -Barium Enema  UTD Counseling to Prevent Tobacco Use (up to 8 sessions per year) - Counseling greater than 3 and up to 10 minutes - Counseling greater than 10 minutes Patients must be asymptomatic of tobacco-related conditions to receive as preventive service N/A Diabetes Screening Tests (at least every 3 years, Medicare covers annually or at 6-month intervals for prediabetic patients) Fasting blood sugar (FBS) or glucose tolerance test (GTT) Patient must be diagnosed with one of the following: 
-Hypertension, Dyslipidemia, obesity, previous impaired FBS or GTT 
Or any two of the following: overweight, FH of diabetes, age ? 72, history of gestational diabetes, birth of baby weighing more than 9 pounds UTD Diabetes Self-Management Training (DSMT) (no USPSTF recommendation) Requires referral by treating physician for patient with diabetes or renal disease. 10 hours of initial DSMT session of no less than 30 minutes each in a continuous 12-month period. 2 hours of follow-up DSMT in subsequent years. UTD Glaucoma Screening (no USPSTF recommendation) Diabetes mellitus, family history, , age 48 or over,  American, age 72 or over Due   
 Human Immunodeficiency Virus (HIV) Screening (annually for increased risk patients) HIV-1 and HIV-2 by EIA, GABINO, rapid antibody test, or oral mucosa transudate Patient must be at increased risk for HIV infection per USPSTF guidelines or pregnant. Tests covered annually for patients at increased risk. Pregnant patients may receive up to 3 test during pregnancy. N/A Medical Nutrition Therapy (MNT) (for diabetes or renal disease not recommended schedule) Requires referral by treating physician for patient with diabetes or renal disease. Can be provided in same year as diabetes self-management training (DSMT), and CMS recommends medical nutrition therapy take place after DSMT. Up to 3 hours for initial year and 2 hours in subsequent years. N/A Prostate Cancer Screening (annually up to age 76) - Digital rectal exam (DARINEL) - Prostate specific antigen (PSA) Annually (age 48 or over), DARINEL not paid separately when covered E/M service is provided on same date Men up to age 76 may need a screening blood test for prostate cancer at certain intervals, depending on their personal and family history. This decision is between the patient and his provider. Patient sees Dr. [address] (Urology) Seasonal Influenza Vaccination (annually)  UTD Pneumococcal Vaccination (once after 65) DUE Hepatitis B Vaccinations (if medium/high risk) Medium/high risk factors:  End-stage renal disease, Hemophiliacs who received Factor VIII or IX concentrates, Clients of institutions for the mentally retarded, Persons who live in the same house as a HepB virus carrier, Homosexual men, Illicit injectable drug abusers. UTD Shingles Vaccination A shingles vaccine is also recommended once in a lifetime after age 61 Due Ultrasound Screening for Abdominal Aortic Aneurysm (AAA) (once) Patient must be referred through IPPE and not have had a screening for abdominal aortic aneurysm before under Medicare. Limited to patients who meet one of the following criteria: 
- Men who are 73-68 years old and have smoked more than 100 cigarettes in their lifetime. 
-Anyone with a FH of AAA 
-Anyone recommended for screening by USPSTF N/A Introducing \[facility] [place] [facility]\"" & HEALTH SERVICES! [facility] introduces [employer] patient portal. Now you can access parts of your medical record, email your doctor's office, and request medication refills online. 1. In your internet browser, go to [email or web address] Bu [email or web address] Bu F?rsat 2. Click on the First Time User? Click Here link in the Sign In box. You will see the New Member Sign Up page. 3. Enter your [employer] Access Code exactly as it appears below. You will not need to use this code after youve completed the sign-up process. If you do not sign up before the expiration date, you must request a new code. · [employer] Access Code: [email or web address] Expires: 7/18/2018  1:46 PM 
 
4. Enter the last four digits of your Social Security Number (xxxx) and Date of Birth (mm/dd/yyyy) as indicated and click Submit. You will be taken to the next sign-up page. 5. Create a [employer] ID. This will be your [employer] login ID and cannot be changed, so think of one that is secure and easy to remember. 6. Create a [employer] password. You can change your password at any time. 7. Enter your Password Reset Question and Answer. This can be used at a later time if you forget your password. 8. Enter your e-mail address. You will receive e-mail notification when new information is available in [address] 9. Click Sign Up. You can now view and download portions of your medical record. 10. Click the Download Summary menu link to download a portable copy of your medical information. If you have questions, please visit the Frequently Asked Questions section of the [employer] website. Remember, [employer] is NOT to be used for urgent needs. For medical emergencies, dial 911. Now available from your iPhone and Android! Please provide this summary of care documentation to your next provider. Your primary care clinician is listed as Kim Aviles. If you have any questions after today's visit, please call [phone].

## 2018-07-09 NOTE — PROGRESS NOTES
CY Abarcajenifersuraj Elkins comes in to establish care. Diabetes mellitus type 2: Patient has type 2 diabetes mellitus. He is on insulin. Takes Lantus and NovoLog. He has been followed up by the endocrinologist at [facility]. His last HbA1c was 6.1. We will continue with the current treatment plan. End-stage renal disease: Patient is on hemodialysis. Gets this done 3 times a week. He is apprehensive about the hemodialysis. He has missed some sessions of hemodialysis and most recently did not go yesterday as per his schedule. He is scheduled to go in tomorrow. I did emphasize the need to be compliant with his dialysis regimen. Hypertension: Patient has hypertension. Blood pressure is slightly elevated today. He is on Cozaar, Lopressor and Imdur. We will continue with the current treatment plan. Most recent labs are stable. Cardiac: Patient has a history of CAD. He has had bypass surgery and the coronary stenting done. He is on Lopressor, Cozaar, Imdur, Zocor and aspirin. Anxiety: Patient has anxiety. Gets episodes of anxiety before he goes for dialysis. Has been on sertraline for this but feels the medication is not helping. Would like to try different medication. I will start him on Cymbalta. I will follow-up in 3-4 weeks to see how he is doing. States that his anxiety is mainly situational because he is afraid of the needles that I used when he goes for dialysis. He starts having panic and anxiety the  day before dialysis and the this anxiety has at times caused him to miss dialysis. It manifested as abdominal pain shortness of breath and chest tightness. He has had to go to the emergency room several times in the evaluated for chest tightness to rule out ACS. Most recently was in the emergency room last week. Dyslipidemia: Patient has dyslipidemia. I will check his lipid panel. He is on Zocor and fenofibrate. We will continue with the medication.   Prostate cancer: Patient has a history of prostate cancer. Has had proton therapy done. He does follow up with the urologist.    Past Medical History  Past Medical History:   Diagnosis Date    Atherosclerosis of renal artery (HCC)     S/P Rt stent    CAD (coronary artery disease) , 1997, 2012    ,double bypass, 2 stents, 2stents 7/2016    Cancer ([address]     prostate    CHF (congestive heart failure) (HCC)     Chronic diastolic heart failure (HCC)     Chronic kidney disease     Chronic kidney disease (CKD), stage IV (severe) (HCC)     On Dialysis now -T-Th-sat    Constipation     Coronary atherosclerosis of unspecified type of vessel, native or graft     Stable angina after recent PCI continue treatment.  CRI (chronic renal insufficiency)     Diabetes ([address] 1973    type 2    Essential hypertension, benign     GERD (gastroesophageal reflux disease)     Gout     Hypertension 1982    Morbid obesity ([address]     Weight loss has been strongly encouraged by following dietary restrictions and an exercise routine    APRYL (obstructive sleep apnea) 6/26/2015    no cpap    Other and unspecified hyperlipidemia     HDL's are not at goal, LDL's are at goal, triglycerides are not at goal.    Other and unspecified hyperlipidemia     HDL's are not at goal, LDL's are at goal, triglycerides are not at goal.     Other ill-defined conditions(799.89) 1960    pneumonia twice    Prostate cancer ([address]     Sleep disturbance     Type II or unspecified type diabetes mellitus without mention of complication, not stated as uncontrolled        Surgical History  Past Surgical History:   Procedure Laterality Date    CARDIAC SURG PROCEDURE UNLIST  1995    lt. carotid endart.    [facility] CARDIAC SURG PROCEDURE UNLIST  2012, 2016, Nov 2017    Coronary Stent    COLONOSCOPY N/A 6/23/2017    COLONOSCOPY w/ APC w/ polypectomies performed by Flor Landon MD at [address] N/A 1/19/2018    FLEXIBLE SIGMOIDOSCOPY WITH Radio frequency ablation performed by Ekaterina Monae MD [address] N/A 6/8/2018    SIGMOIDOSCOPY FLEXIBLE WITH APC performed by Teresa Caruso MD at [address] HX CHOLECYSTECTOMY      HX CORONARY ARTERY BYPASS GRAFT  2000    x2    HX HEENT  1997    cholecystectomy    HX UROLOGICAL  1998    renal art. surg    HX VASCULAR ACCESS      Perma cath for HD- right chest.        Medications  Current Outpatient Prescriptions   Medication Sig Dispense Refill    DULCOLAX, BISACODYL, PO Take  by mouth.  pantoprazole (PROTONIX) 40 mg tablet Take 40 mg by mouth daily.  loperamide (IMODIUM) 2 mg capsule       DULoxetine (CYMBALTA) 30 mg capsule Take 1 Cap by mouth daily. 30 Cap 2    acetaminophen (TYLENOL) 325 mg tablet Take 2 Tabs by mouth every six (6) hours as needed. 30 Tab 0    simvastatin (ZOCOR) 40 mg tablet TAKE 1 TABLET BY MOUTH NIGHTLY. 90 Tab 2    losartan (COZAAR) 25 mg tablet Take  by mouth daily.  aspirin delayed-release 81 mg tablet Take  by mouth daily.  fenofibrate (LOFIBRA) 54 mg tablet TAKE ONE TABLET BY MOUTH ONE TIME DAILY  30 Tab 4    tamsulosin (FLOMAX) 0.4 mg capsule TAKE ONE CAPSULE BY MOUTH ONE TIME DAILY 90 Cap 3    isosorbide mononitrate ER (IMDUR) 30 mg tablet Take 1 Tab by mouth daily. 30 Tab 0    b complex-vitamin c-folic acid (NEPHROCAPS) 1 mg capsule Take 1 Cap by mouth daily. 30 Cap 0    metoprolol tartrate (LOPRESSOR) 100 mg IR tablet TAKE 1 TABLET BY MOUTH TWO TIMES A DAY. 180 Tab 2    Cholecalciferol, Vitamin D3, (DECARA) 50,000 unit cap Take  by mouth every seven (7) days.  insulin glargine (LANTUS) 100 unit/mL injection 20 Units by SubCUTAneous route every twelve (12) hours. (Patient taking differently: 35 Units by SubCUTAneous route every twelve (12) hours. ) 1 Vial 2    nitroglycerin (NITROSTAT) 0.4 mg SL tablet 1 Tab by SubLINGual route as needed for Chest Pain.  25 Tab 1    insulin aspart (NOVOLOG) 100 unit/mL injection 20 units sq 3 times a day,sliding scale (Patient taking differently: sliding scale with meals) 10 mL 0    allopurinol (ZYLOPRIM) 100 mg tablet Take 100 mg by mouth. Monday, Wednesday,friday         Allergies  Allergies   Allergen Reactions    Nka [No Known Allergies] Other (comments)       Family History  Family History   Problem Relation Age of Onset    Heart Attack Father 64       Social History  Social History     Social History    Marital status:      Spouse name: N/A    Number of children: N/A    Years of education: N/A     Occupational History    Not on file.      Social History Main Topics    Smoking status: Never Smoker    Smokeless tobacco: Never Used    Alcohol use No    Drug use: No    Sexual activity: Not on file     Other Topics Concern    Not on file     Social History Narrative       Review of Systems  Review of Systems - History obtained from spouse and the patient  General ROS: positive for  - fatigue, malaise and sleep disturbance  Psychological ROS: positive for - anxiety, behavioral disorder and mood swings  Ophthalmic ROS: negative  ENT ROS: negative  Allergy and Immunology ROS: negative  Hematological and Lymphatic ROS: negative  Endocrine ROS: dm2  Respiratory ROS: no cough, shortness of breath, or wheezing  Cardiovascular ROS: no chest pain or dyspnea on exertion  History of CAD  Gastrointestinal ROS: Has diarrhea on and off  Genito-Urinary ROS: History of prostate cancer  Musculoskeletal ROS: positive for - joint pain and muscle pain  Neurological ROS: no TIA or stroke symptoms    Vital Signs  Visit Vitals    /51 (BP 1 Location: Left arm, BP Patient Position: Sitting)    Pulse 60    Temp 98.2 °F (36.8 °C) (Oral)    Resp 18    Ht 5' 4\" (1.626 m)    Wt 226 lb (102.5 kg)    SpO2 98%    BMI 38.79 kg/m2         Physical Exam  Physical Examination: General appearance - oriented to person, place, and time, overweight and acyanotic, in no respiratory distress  Mental status - alert, oriented to person, place, and time, affect appropriate to mood  Eyes - sclera anicteric  Ears - hearing grossly normal bilaterally  Nose - normal and patent, no erythema, discharge or polyps and normal nontender sinuses  Mouth - mucous membranes moist, pharynx normal without lesions  Neck - supple, no significant adenopathy  Lymphatics - no palpable lymphadenopathy  Chest - clear to auscultation, no wheezes, rales or rhonchi, symmetric air entry, no tachypnea, retractions or cyanosis  Heart - normal rate and regular rhythm, S1 and S2 normal  Abdomen - soft, nontender, nondistended, no masses or organomegaly  no rebound tenderness noted  bowel sounds normal  Back exam - limited range of motion  Neurological - motor and sensory grossly normal bilaterally  Musculoskeletal - osteoarthritic changes noted in both hands  Extremities - pedal edema 1 +, intact peripheral pulses    Diabetic foot exam:     Left Foot:   Visual Exam: normal    Pulse DP: 2+ (normal)   Filament test: normal sensation    Vibratory sensation: normal      Right Foot:   Visual Exam: normal    Pulse DP: 2+ (normal)   Filament test: reduced sensation    Vibratory sensation: diminished            Results  Results for orders placed or performed during the hospital encounter of 06/23/18   CBC WITH AUTOMATED DIFF   Result Value Ref Range    WBC 8.6 4.6 - 13.2 K/uL    RBC 3.00 (L) 4.70 - 5.50 M/uL    HGB 8.9 (L) 13.0 - 16.0 g/dL    HCT 28.2 (L) 36.0 - 48.0 %    MCV 94.0 74.0 - 97.0 FL    MCH 29.7 24.0 - 34.0 PG    MCHC 31.6 31.0 - 37.0 g/dL    RDW 14.3 11.6 - 14.5 %    PLATELET 652 155 - 354 K/uL    MPV 10.2 9.2 - 11.8 FL    NEUTROPHILS 80 (H) 40 - 73 %    LYMPHOCYTES 13 (L) 21 - 52 %    MONOCYTES 4 3 - 10 %    EOSINOPHILS 3 0 - 5 %    BASOPHILS 0 0 - 2 %    ABS. NEUTROPHILS 6.9 1.8 - 8.0 K/UL    ABS. LYMPHOCYTES 1.1 0.9 - 3.6 K/UL    ABS. MONOCYTES 0.4 0.05 - 1.2 K/UL    ABS. EOSINOPHILS 0.2 0.0 - 0.4 K/UL    ABS.  BASOPHILS 0.0 0.0 - 0.06 K/UL    DF AUTOMATED     METABOLIC PANEL, COMPREHENSIVE   Result Value Ref Range    Sodium 140 136 - 145 mmol/L    Potassium 3.4 (L) 3.5 - 5.5 mmol/L    Chloride 101 100 - 108 mmol/L    CO2 30 21 - 32 mmol/L    Anion gap 9 3.0 - 18 mmol/L    Glucose 79 74 - 99 mg/dL    BUN 37 (H) 7.0 - 18 MG/DL    Creatinine 5.87 (H) 0.6 - 1.3 MG/DL    BUN/Creatinine ratio 6 (L) 12 - 20      GFR est AA 11 (L) >60 ml/min/1.73m2    GFR est non-AA 9 (L) >60 ml/min/1.73m2    Calcium 8.5 8.5 - 10.1 MG/DL    Bilirubin, total 0.6 0.2 - 1.0 MG/DL    ALT (SGPT) 27 16 - 61 U/L    AST (SGOT) 19 15 - 37 U/L    Alk. phosphatase 97 45 - 117 U/L    Protein, total 7.6 6.4 - 8.2 g/dL    Albumin 3.3 (L) 3.4 - 5.0 g/dL    Globulin 4.3 (H) 2.0 - 4.0 g/dL    A-G Ratio 0.8 0.8 - 1.7     CARDIAC PANEL,(CK, CKMB & TROPONIN)   Result Value Ref Range    CK 81 39 - 308 U/L    CK - MB 2.6 <3.6 ng/ml    CK-MB Index 3.2 0.0 - 4.0 %    Troponin-I, Qt. 0.08 (H) 0.0 - 0.045 NG/ML   LIPASE   Result Value Ref Range    Lipase 64 (L) 73 - 393 U/L   HEP B SURFACE AB   Result Value Ref Range    Hepatitis B surface Ab <3.10 (L) >10.0 mIU/mL    Hep B surface Ab Interp. NEGATIVE  (A) POS      Hep B surface Ab comment        Samples with a  value of 10 mIU/mL or greater are considered positive (protective immunity) in accordance with the CDC guidelines. HEP B SURFACE AG   Result Value Ref Range    Hepatitis B surface Ag <0.10 <1.00 Index    Hep B surface Ag Interp.  NEGATIVE  NEG     CARDIAC PANEL,(CK, CKMB & TROPONIN)   Result Value Ref Range    CK 57 39 - 308 U/L    CK - MB 2.6 <3.6 ng/ml    CK-MB Index 4.6 (H) 0.0 - 4.0 %    Troponin-I, Qt. 0.17 (H) 0.0 - 3.085 NG/ML   METABOLIC PANEL, BASIC   Result Value Ref Range    Sodium 139 136 - 145 mmol/L    Potassium 3.5 3.5 - 5.5 mmol/L    Chloride 102 100 - 108 mmol/L    CO2 29 21 - 32 mmol/L    Anion gap 8 3.0 - 18 mmol/L    Glucose 75 74 - 99 mg/dL    BUN 43 (H) 7.0 - 18 MG/DL    Creatinine 6.04 (H) 0.6 - 1.3 MG/DL    BUN/Creatinine ratio 7 (L) 12 - 20      GFR est AA 11 (L) >60 ml/min/1.73m2    GFR est non-AA 9 (L) >60 ml/min/1.73m2    Calcium 8.7 8.5 - 10.1 MG/DL   CBC WITH AUTOMATED DIFF   Result Value Ref Range    WBC 10.0 4.6 - 13.2 K/uL    RBC 3.08 (L) 4.70 - 5.50 M/uL    HGB 8.9 (L) 13.0 - 16.0 g/dL    HCT 28.5 (L) 36.0 - 48.0 %    MCV 92.5 74.0 - 97.0 FL    MCH 28.9 24.0 - 34.0 PG    MCHC 31.2 31.0 - 37.0 g/dL    RDW 14.5 11.6 - 14.5 %    PLATELET 966 148 - 540 K/uL    MPV 10.7 9.2 - 11.8 FL    NEUTROPHILS 79 (H) 40 - 73 %    LYMPHOCYTES 11 (L) 21 - 52 %    MONOCYTES 6 3 - 10 %    EOSINOPHILS 3 0 - 5 %    BASOPHILS 1 0 - 2 %    ABS. NEUTROPHILS 8.0 1.8 - 8.0 K/UL    ABS. LYMPHOCYTES 1.1 0.9 - 3.6 K/UL    ABS. MONOCYTES 0.6 0.05 - 1.2 K/UL    ABS. EOSINOPHILS 0.3 0.0 - 0.4 K/UL    ABS.  BASOPHILS 0.1 0.0 - 0.1 K/UL    DF AUTOMATED     METABOLIC PANEL, BASIC   Result Value Ref Range    Sodium 141 136 - 145 mmol/L    Potassium 3.5 3.5 - 5.5 mmol/L    Chloride 102 100 - 108 mmol/L    CO2 31 21 - 32 mmol/L    Anion gap 8 3.0 - 18 mmol/L    Glucose 114 (H) 74 - 99 mg/dL    BUN 44 (H) 7.0 - 18 MG/DL    Creatinine 6.08 (H) 0.6 - 1.3 MG/DL    BUN/Creatinine ratio 7 (L) 12 - 20      GFR est AA 11 (L) >60 ml/min/1.73m2    GFR est non-AA 9 (L) >60 ml/min/1.73m2    Calcium 9.3 8.5 - 10.1 MG/DL   PHOSPHORUS   Result Value Ref Range    Phosphorus 4.7 2.5 - 4.9 MG/DL   CARDIAC PANEL,(CK, CKMB & TROPONIN)   Result Value Ref Range    CK 71 39 - 308 U/L    CK - MB 3.9 (H) <3.6 ng/ml    CK-MB Index 5.5 (H) 0.0 - 4.0 %    Troponin-I, Qt. 0.45 (H) 0.0 - 0.045 NG/ML   HEMOGLOBIN A1C WITH EAG   Result Value Ref Range    Hemoglobin A1c 6.1 (H) 4.2 - 5.6 %    Est. average glucose 128 mg/dL   PROTHROMBIN TIME + INR   Result Value Ref Range    Prothrombin time 14.9 11.5 - 15.2 sec    INR 1.2 0.8 - 1.2     PTT   Result Value Ref Range    aPTT 37.6 (H) 23.0 - 36.4 SEC   CARDIAC PANEL,(CK, CKMB & TROPONIN)   Result Value Ref Range    CK 70 39 - 308 U/L    CK - MB 3.7 (H) <3.6 ng/ml    CK-MB Index 5.3 (H) 0.0 - 4.0 %    Troponin-I, Qt. 0.92 (H) 0.0 - 8.704 NG/ML   METABOLIC PANEL, BASIC   Result Value Ref Range    Sodium 140 136 - 145 mmol/L    Potassium 3.7 3.5 - 5.5 mmol/L    Chloride 103 100 - 108 mmol/L    CO2 30 21 - 32 mmol/L    Anion gap 7 3.0 - 18 mmol/L    Glucose 122 (H) 74 - 99 mg/dL    BUN 46 (H) 7.0 - 18 MG/DL    Creatinine 6.28 (H) 0.6 - 1.3 MG/DL    BUN/Creatinine ratio 7 (L) 12 - 20      GFR est AA 11 (L) >60 ml/min/1.73m2    GFR est non-AA 9 (L) >60 ml/min/1.73m2    Calcium 8.5 8.5 - 41.1 MG/DL   METABOLIC PANEL, BASIC   Result Value Ref Range    Sodium 140 136 - 145 mmol/L    Potassium 3.5 3.5 - 5.5 mmol/L    Chloride 104 100 - 108 mmol/L    CO2 25 21 - 32 mmol/L    Anion gap 11 3.0 - 18 mmol/L    Glucose 106 (H) 74 - 99 mg/dL    BUN 48 (H) 7.0 - 18 MG/DL    Creatinine 6.48 (H) 0.6 - 1.3 MG/DL    BUN/Creatinine ratio 7 (L) 12 - 20      GFR est AA 10 (L) >60 ml/min/1.73m2    GFR est non-AA 8 (L) >60 ml/min/1.73m2    Calcium 8.4 (L) 8.5 - 10.1 MG/DL   CBC WITH AUTOMATED DIFF   Result Value Ref Range    WBC 10.1 4.6 - 13.2 K/uL    RBC 2.92 (L) 4.70 - 5.50 M/uL    HGB 8.6 (L) 13.0 - 16.0 g/dL    HCT 27.1 (L) 36.0 - 48.0 %    MCV 92.8 74.0 - 97.0 FL    MCH 29.5 24.0 - 34.0 PG    MCHC 31.7 31.0 - 37.0 g/dL    RDW 14.6 (H) 11.6 - 14.5 %    PLATELET 878 608 - 677 K/uL    MPV 11.0 9.2 - 11.8 FL    NEUTROPHILS 76 (H) 40 - 73 %    LYMPHOCYTES 14 (L) 21 - 52 %    MONOCYTES 7 3 - 10 %    EOSINOPHILS 3 0 - 5 %    BASOPHILS 0 0 - 2 %    ABS. NEUTROPHILS 7.7 1.8 - 8.0 K/UL    ABS. LYMPHOCYTES 1.4 0.9 - 3.6 K/UL    ABS. MONOCYTES 0.7 0.05 - 1.2 K/UL    ABS. EOSINOPHILS 0.3 0.0 - 0.4 K/UL    ABS.  BASOPHILS 0.0 0.0 - 0.1 K/UL    DF AUTOMATED     METABOLIC PANEL, BASIC   Result Value Ref Range    Sodium 140 136 - 145 mmol/L    Potassium 3.5 3.5 - 5.5 mmol/L    Chloride 102 100 - 108 mmol/L    CO2 27 21 - 32 mmol/L    Anion gap 11 3.0 - 18 mmol/L    Glucose 129 (H) 74 - 99 mg/dL BUN 49 (H) 7.0 - 18 MG/DL    Creatinine 6.69 (H) 0.6 - 1.3 MG/DL    BUN/Creatinine ratio 7 (L) 12 - 20      GFR est AA 10 (L) >60 ml/min/1.73m2    GFR est non-AA 8 (L) >60 ml/min/1.73m2    Calcium 9.0 8.5 - 10.1 MG/DL   PHOSPHORUS   Result Value Ref Range    Phosphorus 4.6 2.5 - 4.9 MG/DL   GLUCOSE, POC   Result Value Ref Range    Glucose (POC) 70 70 - 110 mg/dL   GLUCOSE, POC   Result Value Ref Range    Glucose (POC) 74 70 - 110 mg/dL   GLUCOSE, POC   Result Value Ref Range    Glucose (POC) 91 70 - 110 mg/dL   GLUCOSE, POC   Result Value Ref Range    Glucose (POC) 120 (H) 70 - 110 mg/dL   GLUCOSE, POC   Result Value Ref Range    Glucose (POC) 111 (H) 70 - 110 mg/dL   GLUCOSE, POC   Result Value Ref Range    Glucose (POC) 118 (H) 70 - 110 mg/dL   GLUCOSE, POC   Result Value Ref Range    Glucose (POC) 134 (H) 70 - 110 mg/dL   GLUCOSE, POC   Result Value Ref Range    Glucose (POC) 150 (H) 70 - 110 mg/dL   EKG, 12 LEAD, INITIAL   Result Value Ref Range    Ventricular Rate 73 BPM    Atrial Rate 73 BPM    P-R Interval 154 ms    QRS Duration 102 ms    Q-T Interval 412 ms    QTC Calculation (Bezet) 453 ms    Calculated P Axis 35 degrees    Calculated R Axis 7 degrees    Calculated T Axis 60 degrees    Diagnosis       Normal sinus rhythm  Incomplete right bundle branch block  Nonspecific ST abnormality  Abnormal ECG  When compared with ECG of 06-JUN-2018 15:51,  No significant change was found  Confirmed by Jazmyn Bennett MD, Shiva Walker ([id number]) on 6/23/2018 5:11:52 PM     EKG, 12 LEAD, SUBSEQUENT   Result Value Ref Range    Ventricular Rate 89 BPM    Atrial Rate 89 BPM    P-R Interval 154 ms    QRS Duration 100 ms    Q-T Interval 394 ms    QTC Calculation (Bezet) 479 ms    Calculated P Axis 56 degrees    Calculated R Axis -25 degrees    Calculated T Axis 75 degrees    Diagnosis       Normal sinus rhythm  ST & T wave abnormality, consider anterior ischemia  Prolonged QT  Abnormal ECG  When compared with ECG of 23-JUN-2018 08:21,  Incomplete right bundle branch block is no longer present  Confirmed by Franny Potts MD, Eduar Armas ([id number]) on 6/24/2018 4:20:11 PM     DUPLEX HEMODIALYSIS ACCESS LEFT   Result Value Ref Range    Left AVF Inflow Artery .0 cm/s    Left AVF Inflow Artery .0 cm/s    Left AVF Arterial Prox Anastomosis .0 cm/s    Left AVF Arterial Prox Anastomosis .0 cm/s    Left AVF Prox Outflow .0 cm/s    Left AVF Prox Outflow .0 cm/s    Left AVF AVG Prox Outflow Vol Flow 2375.0 mL/min    Left AVF AVG Graft Name Left brachial to cephalic vein AVF     Left AVF Mid Outflow .0 cm/s    Left AVF Mid Outflow .0 cm/s    Left AVF Dist Outflow .0 cm/s    Left AVF Dist Outflow .0 cm/s    Left AVF Venous Dist Anastomosis .0 cm/s    Left AVF Outflow Vessel .0 cm/s    Left AVF AVG Mid Outflow Vol Flow 1908.0 mL/min    Left AVF AVG Outflow Vessel Name Subclavian        ASSESSMENT and PLAN    ICD-10-CM ICD-9-CM    1. Type 2 diabetes mellitus without complication, with long-term current use of insulin ([facility]) E11.9 250.00 REFERRAL TO OPHTHALMOLOGY    Z79.4 V58.67    2. Anxiety F41.9 300.00 DULoxetine (CYMBALTA) 30 mg capsule   3. ESRD (end stage renal disease) on dialysis ([facility]) N18.6 585.6     Z99.2 V45.11    4. Prostate cancer ([place] [id number].) C61 [id number]    5.  Comprehensive diabetic foot examination, type 2 DM, encounter for ([facility] E11.9 250.00  DIABETES FOOT EXAM     lab results and schedule of future lab studies reviewed with patient  reviewed diet, exercise and weight control  cardiovascular risk and specific lipid/LDL goals reviewed  reviewed medications and side effects in detail  specific diabetic recommendations: low cholesterol diet, weight control and daily exercise discussed, home glucose monitoring emphasized, all medications, side effects and compliance discussed carefully, foot care discussed and Podiatry visits discussed, annual eye examinations at Ophthalmology discussed, glycohemoglobin and other lab monitoring discussed and long term diabetic complications discussed  I have discussed the diagnosis with the patient and the intended plan of care as seen in the above orders. The patient has received an after-visit summary and questions were answered concerning future plans. I have discussed medication, side effects, and warnings with the patient in detail. The patient verbalized understanding and is in agreement with the plan of care. The patient will contact the office with any additional concerns.     Melvin Acevedo MD

## 2018-07-09 NOTE — ACP (ADVANCE CARE PLANNING)
Advance Care Planning (ACP) Provider Note - Comprehensive     Date of ACP Conversation: 07/09/18  Persons included in Conversation:  patient and POA  Length of ACP Conversation in minutes:  16 minutes    Authorized Decision Maker (if patient is incapable of making informed decisions): This person is:  Patient's wife would be authorized decision maker. He would like everything done. Patient will fill out paperwork given and bring this back in for scanning into his EMR chart. General ACP for ALL Patients with Decision Making Capacity:   Importance of advance care planning, including choosing a healthcare agent to communicate patient's healthcare decisions if patient lost the ability to make decisions, such as after a sudden illness or accident  Understanding of the healthcare agent role was assessed and information provided  Exploration of values, goals, and preferences if recovery is not expected, even with continued medical treatment in the event of: Imminent death  Severe, permanent brain injury  \"In these circumstances, what matters most to you? \"  Care focused more on comfort or quality of life.   Opportunity offered to explore how cultural, Latter day, spiritual, or personal beliefs would affect decisions for future care     Interventions Provided:  Recommended completion of Advance Directive form after review of ACP materials and conversation with prospective healthcare agent      Kim Tuttle MD

## 2018-07-11 ENCOUNTER — TELEPHONE (OUTPATIENT)
Dept: CARDIAC REHAB | Age: 78
End: 2018-07-11

## 2018-07-11 NOTE — TELEPHONE ENCOUNTER
[facility]iac Rehab called patient and left a message about the program. This is the second message left since 7/3/18 without response. No additional attempts at contact will be made.      Thank you,  Virginia Sanchez

## 2018-07-17 ENCOUNTER — TELEPHONE (OUTPATIENT)
Dept: FAMILY MEDICINE CLINIC | Age: 78
End: 2018-07-17

## 2018-07-17 RX ORDER — NITROGLYCERIN 0.4 MG/1
0.4 TABLET SUBLINGUAL AS NEEDED
Qty: 25 TAB | Refills: 1 | Status: ON HOLD | OUTPATIENT
Start: 2018-07-17 | End: 2019-01-03 | Stop reason: SDUPTHER

## 2018-07-17 NOTE — TELEPHONE ENCOUNTER
Patient wife called to ask Dr Sandro Hess if this patient should have oxygen. Patient went to dialysis and was given oxygen and was able to breathe better. But now is having shortness of breathe and wondering if they should come in to see the Dr. Jessica Patel can Dr Sandro Hess can call in a prescription for oxygen.  Please contact the patien'ts wife when available

## 2018-07-25 RX ORDER — ALLOPURINOL 100 MG/1
TABLET ORAL
Qty: 90 TAB | Refills: 0 | Status: SHIPPED | OUTPATIENT
Start: 2018-07-25 | End: 2018-12-17 | Stop reason: SDUPTHER

## 2018-08-06 ENCOUNTER — OFFICE VISIT (OUTPATIENT)
Dept: FAMILY MEDICINE CLINIC | Age: 78
End: 2018-08-06

## 2018-08-06 VITALS
TEMPERATURE: 98 F | BODY MASS INDEX: 37.39 KG/M2 | DIASTOLIC BLOOD PRESSURE: 49 MMHG | SYSTOLIC BLOOD PRESSURE: 128 MMHG | RESPIRATION RATE: 18 BRPM | HEIGHT: 64 IN | HEART RATE: 61 BPM | WEIGHT: 219 LBS | OXYGEN SATURATION: 96 %

## 2018-08-06 DIAGNOSIS — F41.9 ANXIETY: Primary | ICD-10-CM

## 2018-08-06 DIAGNOSIS — Z99.2 ESRD (END STAGE RENAL DISEASE) ON DIALYSIS (HCC): ICD-10-CM

## 2018-08-06 DIAGNOSIS — I50.32 CHRONIC DIASTOLIC HEART FAILURE (HCC): ICD-10-CM

## 2018-08-06 DIAGNOSIS — E78.5 DYSLIPIDEMIA: ICD-10-CM

## 2018-08-06 DIAGNOSIS — N18.6 ESRD (END STAGE RENAL DISEASE) ON DIALYSIS (HCC): ICD-10-CM

## 2018-08-06 DIAGNOSIS — K21.9 GASTROESOPHAGEAL REFLUX DISEASE WITHOUT ESOPHAGITIS: ICD-10-CM

## 2018-08-06 DIAGNOSIS — I10 ESSENTIAL HYPERTENSION: ICD-10-CM

## 2018-08-06 DIAGNOSIS — Z79.4 TYPE 2 DIABETES MELLITUS WITHOUT COMPLICATION, WITH LONG-TERM CURRENT USE OF INSULIN (HCC): Chronic | ICD-10-CM

## 2018-08-06 DIAGNOSIS — E11.9 TYPE 2 DIABETES MELLITUS WITHOUT COMPLICATION, WITH LONG-TERM CURRENT USE OF INSULIN (HCC): Chronic | ICD-10-CM

## 2018-08-06 DIAGNOSIS — R06.02 SOB (SHORTNESS OF BREATH): ICD-10-CM

## 2018-08-06 RX ORDER — DULOXETIN HYDROCHLORIDE 60 MG/1
60 CAPSULE, DELAYED RELEASE ORAL DAILY
Qty: 30 CAP | Refills: 2 | Status: SHIPPED | OUTPATIENT
Start: 2018-08-06 | End: 2018-09-17

## 2018-08-06 NOTE — PROGRESS NOTES
Chief Complaint   Patient presents with    End Stage Renal Disease    Hypertension    Diabetes     1. Have you been to the ER, urgent care clinic since your last visit? Hospitalized since your last visit? No    2. Have you seen or consulted any other health care providers outside of the [facility] since your last visit? Include any pap smears or colon screening. Yes, 2 weeks ago at [facility] for routine eye exam.    CY Knutson comes in for follow-up care. Anxiety: Patient has a history of anxiety. Has been more anxious especially when he goes for dialysis. He is anxious that the he does need someone to take him to dialysis. His wife usually does this. I had started him on Cymbalta daily. States that the the current dose it is not doing much. We will increase the Cymbalta to 60 mg daily. I will follow-up in 2 weeks. If still not controlling his anxiety we could always go up to 90 mg. Diabetes mellitus type 2: Patient has type 2 diabetes mellitus and his last HbA1c was 6.1. He is on Lantus and NovoLog. We discussed how to use Lantus. His blood glucose numbers have ranged in the 130s-140s. He uses about 60 units of Lantus once a day and prefers to take this in 2 divided doses. He does use NovoLog as a correctional insulin with meals. I did reemphasize how to use his insulin. He does count his carbs. Cardiac: Patient has a history of coronary artery disease and has had MI. He has had coronary artery stents placed. Patient is on medication management. He is on Imdur, lopressor, Zocor, Cozaar, aspirin and Nitrostat as needed. Currently denies chest pain. He does get exertional dyspnea. Wonders if he should use oxygen. His oxygenation has been fine. Usually gets the oxygen when he gets to dialysis. I did explain that his exertional dyspnea is likely related to his cardiac status.   Usually when he stops to rest and does not exert himself he is not dyspneic. He does not need to have chronic oxygen at the moment as his O2 sats are above 90. In fact his level is 97-98%. He has been followed up by the cardiologist.  We will continue with the current treatment plan. Hypertension: Patient's blood pressure is stable. He is on Cozaar and Lopressor. Continue current treatment plan. Patient has a history of renal artery stenosis and has had a stent placed in his renal artery. Vascular: Patient has a history of left carotid artery stenosis status post left carotid endarterectomy. End-stage renal disease: Patient does hemodialysis 3 times a week. We will continue with the current management. Dyslipidemia: Patient wonders about cutting back on some of his medications. He is on [place] and Zocor. Will continue with thes medications. Gout: Patient on allopurinol. No gout attacks. Continue current treatment plan. He takes the medication 3 times a week. GERD: Patient has severe heartburn. He is on pantoprazole. We will continue with this medication. Denies hematemesis or dark stools. Urology: Patient has a history of prostate cancer and lower urinary tract symptoms. He is on Flomax. Continue current treatment plan. Patient did bring in his ACP paperwork. He would like everything done and we discussed this at it we will scan his ACP into his chart. Past Medical History  Past Medical History:   Diagnosis Date    Atherosclerosis of renal artery (HCC)     S/P Rt stent    CAD (coronary artery disease) , 1997, 2012    ,double bypass, 2 stents, 2stents 7/2016    Cancer (HCC)     prostate    CHF (congestive heart failure) (HCC)     Chronic diastolic heart failure (HCC)     Chronic kidney disease     Chronic kidney disease (CKD), stage IV (severe) (HCC)     On Dialysis now -T-Th-sat    Constipation     Coronary atherosclerosis of unspecified type of vessel, native or graft     Stable angina after recent PCI continue treatment.        CRI (chronic renal insufficiency)     Diabetes ([facility] [address] 1973    type 2    Essential hypertension, benign     GERD (gastroesophageal reflux disease)     Gout     Hypertension 1982    Morbid obesity ([facility] [address]     Weight loss has been strongly encouraged by following dietary restrictions and an exercise routine    APRYL (obstructive sleep apnea) 6/26/2015    no cpap    Other and unspecified hyperlipidemia     HDL's are not at goal, LDL's are at goal, triglycerides are not at goal.    Other and unspecified hyperlipidemia     HDL's are not at goal, LDL's are at goal, triglycerides are not at goal.     Other ill-defined conditions(799.89) 1960    pneumonia twice    Prostate cancer ([facility] [address]     Sleep disturbance     Type II or unspecified type diabetes mellitus without mention of complication, not stated as uncontrolled        Surgical History  Past Surgical History:   Procedure Laterality Date    CARDIAC SURG PROCEDURE UNLIST  1995    lt. carotid endart. [facility] CARDIAC SURG PROCEDURE UNLIST  2012, 2016, Nov 2017    Coronary Stent    COLONOSCOPY N/A 6/23/2017    COLONOSCOPY w/ APC w/ polypectomies performed by Tomma Dakin, MD at [address] N/A 1/19/2018    FLEXIBLE SIGMOIDOSCOPY WITH Radio frequency ablation performed by Tomma Dakin, MD at [address] N/A 6/8/2018    SIGMOIDOSCOPY FLEXIBLE WITH APC performed by Beth Watkins MD at [address] HX CHOLECYSTECTOMY      HX CORONARY ARTERY BYPASS GRAFT  2000    x2    HX HEENT  1997    cholecystectomy    HX UROLOGICAL  1998    renal art. surg    HX VASCULAR ACCESS      Perma cath for HD- right chest.        Medications  Current Outpatient Prescriptions   Medication Sig Dispense Refill    DULoxetine (CYMBALTA) 60 mg capsule Take 1 Cap by mouth daily.  30 Cap 2    allopurinol (ZYLOPRIM) 100 mg tablet Monday, Wednesday,friday 90 Tab 0    nitroglycerin (NITROSTAT) 0.4 mg SL tablet 1 Tab by SubLINGual route as needed for Chest Pain. 25 Tab 1    DULCOLAX, BISACODYL, PO Take  by mouth.  pantoprazole (PROTONIX) 40 mg tablet Take 40 mg by mouth daily.  simvastatin (ZOCOR) 40 mg tablet TAKE 1 TABLET BY MOUTH NIGHTLY. 90 Tab 2    losartan (COZAAR) 25 mg tablet Take  by mouth daily.  aspirin delayed-release 81 mg tablet Take  by mouth daily.  fenofibrate (LOFIBRA) 54 mg tablet TAKE ONE TABLET BY MOUTH ONE TIME DAILY  30 Tab 4    tamsulosin (FLOMAX) 0.4 mg capsule TAKE ONE CAPSULE BY MOUTH ONE TIME DAILY 90 Cap 3    isosorbide mononitrate ER (IMDUR) 30 mg tablet Take 1 Tab by mouth daily. 30 Tab 0    b complex-vitamin c-folic acid (NEPHROCAPS) 1 mg capsule Take 1 Cap by mouth daily. 30 Cap 0    metoprolol tartrate (LOPRESSOR) 100 mg IR tablet TAKE 1 TABLET BY MOUTH TWO TIMES A DAY. 180 Tab 2    Cholecalciferol, Vitamin D3, (DECARA) 50,000 unit cap Take  by mouth every seven (7) days.  insulin glargine (LANTUS) 100 unit/mL injection 20 Units by SubCUTAneous route every twelve (12) hours. (Patient taking differently: 35 Units by SubCUTAneous route every twelve (12) hours. ) 1 Vial 2    insulin aspart (NOVOLOG) 100 unit/mL injection 20 units sq 3 times a day,sliding scale (Patient taking differently: sliding scale with meals) 10 mL 0       Allergies  Allergies   Allergen Reactions    Nka [No Known Allergies] Other (comments)       Family History  Family History   Problem Relation Age of Onset    Heart Attack Father 64       Social History  Social History     Social History    Marital status:      Spouse name: N/A    Number of children: N/A    Years of education: N/A     Occupational History    Not on file.      Social History Main Topics    Smoking status: Never Smoker    Smokeless tobacco: Never Used    Alcohol use No    Drug use: No    Sexual activity: Not on file     Other Topics Concern    Not on file     Social History Narrative       Review of Systems  Review of Systems - History obtained from spouse, chart review and the patient  General ROS: positive for  - fatigue and malaise  Psychological ROS: positive for - anxiety and mood swings  Ophthalmic ROS: negative  ENT ROS: negative  Allergy and Immunology ROS: negative  Hematological and Lymphatic ROS: negative  Endocrine ROS: dm2  Respiratory ROS: positive for - shortness of breath  negative for - cough, hemoptysis or wheezing  Cardiovascular ROS: positive for - dyspnea on exertion  negative for - chest pain or irregular heartbeat  Gastrointestinal ROS: positive for - gas/bloating and heartburn  negative for - abdominal pain, blood in stools, melena or nausea/vomiting  Genito-Urinary ROS: LUTS.   Musculoskeletal ROS: positive for - joint pain and muscle pain  Neurological ROS: no TIA or stroke symptoms    Vital Signs  Visit Vitals    /49 (BP 1 Location: Right arm, BP Patient Position: Sitting)    Pulse 61    Temp 98 °F (36.7 °C) (Oral)    Resp 18    Ht 5' 4\" (1.626 m)    Wt 219 lb (99.3 kg)    SpO2 96%    BMI 37.59 kg/m2         Physical Exam  Physical Examination: General appearance - oriented to person, place, and time, overweight and well hydrated  Mental status - alert, oriented to person, place, and time, affect appropriate to mood  Mouth - mucous membranes moist, pharynx normal without lesions  Neck - supple, no significant adenopathy, healed surgical scar left side of the neck  Lymphatics - no palpable lymphadenopathy  Chest - clear to auscultation, no wheezes, rales or rhonchi, symmetric air entry  Heart - systolic murmur 3/6 at 2nd right intercostal space  Back exam - limited range of motion, pain with motion noted during exam  Neurological - neck supple without rigidity  Musculoskeletal - osteoarthritic changes noted in both hands  Extremities - intact peripheral pulses    Results  Results for orders placed or performed in visit on 07/18/18   AMB POC PVR, EHSAN,POST-VOID RES,US,NON-IMAGING   Result Value Ref Range    PVR 0 cc ASSESSMENT and PLAN    ICD-10-CM ICD-9-CM    1. Anxiety F41.9 300.00 DULoxetine (CYMBALTA) 60 mg capsule   2. SOB (shortness of breath) R06.02 786.05    3. Chronic diastolic heart failure (HCC) I50.32 428.32    4. Type 2 diabetes mellitus without complication, with long-term current use of insulin (HCC) E11.9 250.00     Z79.4 V58.67    5. ESRD (end stage renal disease) on dialysis ([facility]) N18.6 585.6     Z99.2 V45.11    6. Essential hypertension I10 401.9    7. Dyslipidemia E78.5 272.4    8. Gastroesophageal reflux disease without esophagitis K21.9 530.81      reviewed diet, exercise and weight control  cardiovascular risk and specific lipid/LDL goals reviewed  reviewed medications and side effects in detail  specific diabetic recommendations: low cholesterol diet, weight control and daily exercise discussed, home glucose monitoring emphasized, all medications, side effects and compliance discussed carefully, annual eye examinations at Ophthalmology discussed, glycohemoglobin and other lab monitoring discussed and long term diabetic complications discussed  use of aspirin to prevent MI and TIA's discussed    I have discussed the diagnosis with the patient and the intended plan of care as seen in the above orders. The patient has received an after-visit summary and questions were answered concerning future plans. I have discussed medication, side effects, and warnings with the patient in detail. The patient verbalized understanding and is in agreement with the plan of care. The patient will contact the office with any additional concerns.     Katherine Singer MD

## 2018-08-06 NOTE — MR AVS SNAPSHOT
[facility] [address] [facility] 
[phone] Patient: Juan Miguel Thompson MRN: [id number] OOB:[birth year] Visit Information Date & Time Provider Department Dept. Phone Encounter #  
 8/6/2018 10:30 AM [place] [phone] [id number] Follow-up Instructions Return in about 6 weeks (around 9/17/2018), or if symptoms worsen or fail to improve, for anxiety, ESRD, HTN. Your Appointments 11/5/2018 10:15 AM  
Office Visit with Long Stokes MD  
[facility] ([facility]) Appt Note: 6 month follow up  
 [address] [facility] [address]  
  
   
 [address] [address] [address] [address]  
  
    
 7/10/2019 10:30 AM  
Office Visit with Katherine Singer MD  
[facility] ([facility]) Appt Note:   
 [address] [facility] 107 [address] [address]  
  
   
 [address] [facility]  
  
    
  
 1/21/2019 10:00 AM  
Any with Haylee Daley MD  
[facility] ([address] [place] [address]) Appt Note: Return in about 6 months (around 1/18/2019) for PSA prior, with MD.  
 [address] [facility] [address] [address] [address]  
  
   
 [address] [facility] [address] [facility] 100 200 [facility] Upcoming Health Maintenance Date Due  
 EYE EXAM RETINAL OR DILATED Q1 7/10/1950 DTaP/Tdap/Td series (1 - Tdap) 7/10/1961 ZOSTER VACCINE AGE 60> 5/10/2000 GLAUCOMA SCREENING Q2Y 7/10/2005 Pneumococcal 65+ High/Highest Risk (2 of 2 - PPSV23) 3/9/2017 Influenza Age 5 to Adult 8/1/2018 LIPID PANEL Q1 11/14/2018 HEMOGLOBIN A1C Q6M 12/24/2018 FOOT EXAM Q1 7/9/2019 MEDICARE YEARLY EXAM 7/10/2019 Allergies as of 8/6/2018  Review Complete On: 8/6/2018 By: Katherine Singer MD  
  
 Severity Noted Reaction Type Reactions Nka [No Known Allergies]  10/07/2012    Other (comments) Current Immunizations  Reviewed on 3/12/2013 No immunizations on file. Not reviewed this visit You Were Diagnosed With   
  
 Codes Comments Anxiety    -  Primary ICD-10-CM: F41.9 ICD-9-CM: 300.00 SOB (shortness of breath)     ICD-10-CM: R06.02 
ICD-9-CM: 786.05 Chronic diastolic heart failure (HCC)     ICD-10-CM: I50.32 
ICD-9-CM: 428.32 Vitals BP Pulse Temp Resp Height(growth percentile) Weight(growth percentile) 128/49 (BP 1 Location: Right arm, BP Patient Position: Sitting) 61 98 °F (36.7 °C) (Oral) 18 5' 4\" (1.626 m) 219 lb (99.3 kg) SpO2 BMI Smoking Status 96% 37.59 kg/m2 Never Smoker BMI and BSA Data Body Mass Index Body Surface Area  
 37.59 kg/m 2 2.12 m 2 Preferred Pharmacy Pharmacy Name Phone [id number] [phone] Your Updated Medication List  
  
   
This list is accurate as of 8/6/18 10:50 AM.  Always use your most recent med list.  
  
  
  
  
 allopurinol 100 mg tablet Commonly known as:  Rachell Lemuel Monday, Wednesday,friday  
  
 aspirin delayed-release 81 mg tablet Take  by mouth daily. b complex-vitamin c-folic acid 1 mg capsule Commonly known as:  [place] Take 1 Cap by mouth daily. DECARA 50,000 unit capsule Generic drug:  cholecalciferol Take  by mouth every seven (7) days. DULCOLAX (BISACODYL) PO Take  by mouth. DULoxetine 60 mg capsule Commonly known as:  CYMBALTA Take 1 Cap by mouth daily. fenofibrate 54 mg tablet Commonly known as:  LOFIBRA TAKE ONE TABLET BY MOUTH ONE TIME DAILY  
  
 insulin aspart U-100 100 unit/mL injection Commonly known as:  NovoLOG U-100 Insulin aspart  
20 units sq 3 times a day,sliding scale  
  
 insulin glargine 100 unit/mL injection Commonly known as:  LANTUS  
 20 Units by SubCUTAneous route every twelve (12) hours. isosorbide mononitrate ER 30 mg tablet Commonly known as:  IMDUR Take 1 Tab by mouth daily. losartan 25 mg tablet Commonly known as:  COZAAR Take  by mouth daily. metoprolol tartrate 100 mg IR tablet Commonly known as:  LOPRESSOR  
TAKE 1 TABLET BY MOUTH TWO TIMES A DAY. nitroglycerin 0.4 mg SL tablet Commonly known as:  NITROSTAT  
1 Tab by SubLINGual route as needed for Chest Pain.  
  
 pantoprazole 40 mg tablet Commonly known as:  PROTONIX Take 40 mg by mouth daily. simvastatin 40 mg tablet Commonly known as:  ZOCOR  
TAKE 1 TABLET BY MOUTH NIGHTLY.  
  
 tamsulosin 0.4 mg capsule Commonly known as:  FLOMAX TAKE ONE CAPSULE BY MOUTH ONE TIME DAILY Prescriptions Sent to Pharmacy Refills DULoxetine (CYMBALTA) 60 mg capsule 2 Sig: Take 1 Cap by mouth daily. Class: Normal  
 Pharmacy: [facility] [address] [place] [address] #: [phone] Route: Oral  
  
Follow-up Instructions Return in about 6 weeks (around 9/17/2018), or if symptoms worsen or fail to improve, for anxiety, ESRD, HTN. Introducing [facility]! Rao Soni introduces userADgents patient portal. Now you can access parts of your medical record, email your doctor's office, and request medication refills online. 1. In your internet browser, go to [email or web address] 2. Click on the First Time User? Click Here link in the Sign In box. You will see the New Member Sign Up page. 3. Enter your userADgents Access Code exactly as it appears below. You will not need to use this code after youve completed the sign-up process. If you do not sign up before the expiration date, you must request a new code. · userADgents Access Code: [email or web address] Expires: 11/4/2018 10:46 AM 
 
4.  Enter the last four digits of your Social Security Number (xxxx) and Date of Birth (mm/dd/yyyy) as indicated and click Submit. You will be taken to the next sign-up page. 5. Create a SuperMama ID. This will be your SuperMama login ID and cannot be changed, so think of one that is secure and easy to remember. 6. Create a SuperMama password. You can change your password at any time. 7. Enter your Password Reset Question and Answer. This can be used at a later time if you forget your password. 8. Enter your e-mail address. You will receive e-mail notification when new information is available in [address] 9. Click Sign Up. You can now view and download portions of your medical record. 10. Click the Download Summary menu link to download a portable copy of your medical information. If you have questions, please visit the Frequently Asked Questions section of the SuperMama website. Remember, SuperMama is NOT to be used for urgent needs. For medical emergencies, dial 911. Now available from your iPhone and Android! Please provide this summary of care documentation to your next provider. Your primary care clinician is listed as Kim Aviles. If you have any questions after today's visit, please call [phone].

## 2018-08-14 ENCOUNTER — TELEPHONE (OUTPATIENT)
Dept: FAMILY MEDICINE CLINIC | Age: 78
End: 2018-08-14

## 2018-08-14 NOTE — TELEPHONE ENCOUNTER
Spoke with patient wife at this and verification to take medication was given to patient wife at this time

## 2018-08-14 NOTE — TELEPHONE ENCOUNTER
Please let patient know that it is okay to take this medication that was prescribed by the ENT physician.   George Mckenna MD

## 2018-08-14 NOTE — TELEPHONE ENCOUNTER
Patient's wife came into the office asking to speak to Dr. Harsh Mccullough regarding medication that was prescribed by ENT. She wants to know if this medication is safe for patient to take. Ipratropium Bromide Nasal Solution 0.03%    I explained to her that Dr. Lizett Andrea is seeing patients. I would put this in as a message and the nurse would get back to her. She understood. She left the paper with the side effects listed for Dr. Lizett Andrea to review.  (in with faxes)

## 2018-09-03 RX ORDER — ASPIRIN 81 MG/1
TABLET ORAL
Qty: 38 TAB | Refills: 4 | Status: SHIPPED | OUTPATIENT
Start: 2018-09-03

## 2018-09-17 ENCOUNTER — OFFICE VISIT (OUTPATIENT)
Dept: FAMILY MEDICINE CLINIC | Age: 78
End: 2018-09-17

## 2018-09-17 VITALS
BODY MASS INDEX: 37.22 KG/M2 | OXYGEN SATURATION: 99 % | HEIGHT: 64 IN | WEIGHT: 218 LBS | DIASTOLIC BLOOD PRESSURE: 54 MMHG | HEART RATE: 56 BPM | SYSTOLIC BLOOD PRESSURE: 130 MMHG | RESPIRATION RATE: 18 BRPM | TEMPERATURE: 97.6 F

## 2018-09-17 DIAGNOSIS — F41.9 ANXIETY: ICD-10-CM

## 2018-09-17 DIAGNOSIS — N18.6 ESRD (END STAGE RENAL DISEASE) ON DIALYSIS (HCC): ICD-10-CM

## 2018-09-17 DIAGNOSIS — E11.9 TYPE 2 DIABETES MELLITUS WITHOUT COMPLICATION, WITH LONG-TERM CURRENT USE OF INSULIN (HCC): Primary | Chronic | ICD-10-CM

## 2018-09-17 DIAGNOSIS — Z99.2 ESRD (END STAGE RENAL DISEASE) ON DIALYSIS (HCC): ICD-10-CM

## 2018-09-17 DIAGNOSIS — Z79.4 TYPE 2 DIABETES MELLITUS WITHOUT COMPLICATION, WITH LONG-TERM CURRENT USE OF INSULIN (HCC): Primary | Chronic | ICD-10-CM

## 2018-09-17 DIAGNOSIS — Z23 ENCOUNTER FOR IMMUNIZATION: ICD-10-CM

## 2018-09-17 DIAGNOSIS — I50.33 DIASTOLIC CHF, ACUTE ON CHRONIC (HCC): ICD-10-CM

## 2018-09-17 NOTE — MR AVS SNAPSHOT
[facility] 107 [address] [facility] 
[phone] Patient: Ramírez Chandler MRN: [id number] ZWO:[id number][birth year] Visit Information Date & Time Provider Department Dept. Phone Encounter #  
 9/17/2018 10:30 AM Dillaned MD Davonte Smart [phone] [id number] Follow-up Instructions Return in about 2 months (around 11/17/2018), or if symptoms worsen or fail to improve, for htn, dm2. Your Appointments 11/5/2018 10:15 AM  
Office Visit with Tosin Huntley MD  
[facility] ([facility]) Appt Note: 6 month follow up  
 [address] [facility] [address]  
  
   
 [address] [address] [address] [address]  
  
    
 7/10/2019 10:30 AM  
[facility]:   
 148 [facility] 107 [address] [address]  
  
   
 [address] 700 [facility]  
  
    
  
 1/21/2019 10:00 AM  
Any with Alida Bettencourt MD  
[facility] ([facility]) Appt Note: Return in about 6 months (around 1/18/2019) for PSA prior, with MD.  
 [address] [facility] 200 [id number] [address] [facility]  
  
   
 2057 [facility] 2301 [facility] 100 200 [facility] Upcoming Health Maintenance Date Due DTaP/Tdap/Td series (1 - Tdap) 7/10/1961 ZOSTER VACCINE AGE 60> 5/10/2000 Pneumococcal 65+ High/Highest Risk (2 of 2 - PPSV23) 3/9/2017 Influenza Age 5 to Adult 8/1/2018 LIPID PANEL Q1 11/14/2018 HEMOGLOBIN A1C Q6M 12/24/2018 FOOT EXAM Q1 7/9/2019 MEDICARE YEARLY EXAM 7/10/2019 EYE EXAM RETINAL OR DILATED Q1 7/20/2019 GLAUCOMA SCREENING Q2Y 7/20/2020 Allergies as of 9/17/2018  Review Complete On: 8/6/2018 By: Sunny Mancini MD  
  
 Severity Noted Reaction Type Reactions Nka [No Known Allergies]  10/07/2012    Other (comments) Current Immunizations  Reviewed on 3/12/2013 No immunizations on file. Not reviewed this visit You Were Diagnosed With   
  
 Codes Comments Type 2 diabetes mellitus without complication, with long-term current use of insulin (HCC)    -  Primary ICD-10-CM: E11.9, Z79.4 ICD-9-CM: 250.00, V58.67 Anxiety     ICD-10-CM: F41.9 ICD-9-CM: 300.00 ESRD (end stage renal disease) on dialysis ([facility]     ICD-10-CM: N18.6, Z99.2 ICD-9-CM: 585.6, V45.11 Diastolic CHF, acute on chronic (HCC)     ICD-10-CM: I50.33 ICD-9-CM: 428.33, 428.0 Vitals BP Pulse Temp Resp Height(growth percentile) Weight(growth percentile) 130/54 (BP 1 Location: Left arm, BP Patient Position: Sitting) (!) 56 97.6 °F (36.4 °C) (Oral) 18 5' 4\" (1.626 m) 218 lb (98.9 kg) SpO2 BMI Smoking Status 99% 37.42 kg/m2 Never Smoker BMI and BSA Data Body Mass Index Body Surface Area  
 37.42 kg/m 2 2.11 m 2 Preferred Pharmacy Pharmacy Name Phone [facility] [address] [phone] Your Updated Medication List  
  
   
This list is accurate as of 9/17/18 11:05 AM.  Always use your most recent med list.  
  
  
  
  
 allopurinol 100 mg tablet Commonly known as:  Aidan Needle Monday, Wednesday,friday ASPIR-LOW 81 mg tablet Generic drug:  aspirin delayed-release TAKE ONE TABLET BY MOUTH ONE TIME DAILY  
  
 b complex-vitamin c-folic acid 1 mg capsule Commonly known as:  Alice Flavors Take 1 Cap by mouth daily. DECARA 50,000 unit capsule Generic drug:  cholecalciferol Take  by mouth every seven (7) days. DULCOLAX (BISACODYL) PO Take  by mouth. fenofibrate 54 mg tablet Commonly known as:  LOFIBRA TAKE ONE TABLET BY MOUTH ONE TIME DAILY  
  
 insulin aspart U-100 100 unit/mL injection Commonly known as:  NovoLOG U-100 Insulin aspart  
20 units sq 3 times a day,sliding scale  
  
 insulin glargine 100 unit/mL injection Commonly known as:  LANTUS  
20 Units by SubCUTAneous route every twelve (12) hours. isosorbide mononitrate ER 30 mg tablet Commonly known as:  IMDUR Take 1 Tab by mouth daily. losartan 25 mg tablet Commonly known as:  COZAAR Take  by mouth daily. metoprolol tartrate 100 mg IR tablet Commonly known as:  LOPRESSOR  
TAKE 1 TABLET BY MOUTH TWO TIMES A DAY. nitroglycerin 0.4 mg SL tablet Commonly known as:  NITROSTAT  
1 Tab by SubLINGual route as needed for Chest Pain.  
  
 pantoprazole 40 mg tablet Commonly known as:  PROTONIX Take 40 mg by mouth daily. simvastatin 40 mg tablet Commonly known as:  ZOCOR  
TAKE 1 TABLET BY MOUTH NIGHTLY.  
  
 tamsulosin 0.4 mg capsule Commonly known as:  FLOMAX TAKE ONE CAPSULE BY MOUTH ONE TIME DAILY Follow-up Instructions Return in about 2 months (around 11/17/2018), or if symptoms worsen or fail to improve, for htn, dm2. Introducing [facility]! [employer] introduces [employer] patient portal. Now you can access parts of your medical record, email your doctor's office, and request medication refills online. 1. In your internet browser, go to [email or web address] 2. Click on the First Time User? Click Here link in the Sign In box. You will see the New Member Sign Up page. 3. Enter your [employer] Access Code exactly as it appears below. You will not need to use this code after youve completed the sign-up process. If you do not sign up before the expiration date, you must request a new code. · [employer] Access Code: [email or web address] Expires: 11/4/2018 10:46 AM 
 
4. Enter the last four digits of your Social Security Number (xxxx) and Date of Birth (mm/dd/yyyy) as indicated and click Submit. You will be taken to the next sign-up page. 5. Create a [employer] ID. This will be your [employer] login ID and cannot be changed, so think of one that is secure and easy to remember. 6. Create a [employer] password. You can change your password at any time. 7. Enter your Password Reset Question and Answer. This can be used at a later time if you forget your password. 8. Enter your e-mail address. You will receive e-mail notification when new information is available in [address] 9. Click Sign Up. You can now view and download portions of your medical record. 10. Click the Download Summary menu link to download a portable copy of your medical information. If you have questions, please visit the Frequently Asked Questions section of the [employer] website. Remember, [employer] is NOT to be used for urgent needs. For medical emergencies, dial 911. Now available from your iPhone and Android! Please provide this summary of care documentation to your next provider. Your primary care clinician is listed as Kim Aviles. If you have any questions after today's visit, please call [phone].

## 2018-09-17 NOTE — PROGRESS NOTES
Chief Complaint Patient presents with  Follow-up  
  anxiety, HTN, ESRD 1. Have you been to the ER, urgent care clinic since your last visit? Hospitalized since your last visit? No 
 
2. Have you seen or consulted any other health care providers outside of the [facility] since your last visit? Include any pap smears or colon screening. Yes Dialysis HPI Cecile Sosa comes in accompanied by the wife for acute care. Anxiety: Patient has a history of anxiety. A lot of the anxiety came when he had to go to dialysis. I had put him on Cymbalta. States that he took Cymbalta more than what had been prescribed and got some hematuria. Stopped taking the Cymbalta. Surprisingly his anxiety has resolved. Last time he was here we had talked about anxiety and supportive ways of helping control the symptoms and he has adopted some of these. Overall feels much improved. Not taking any anxiety medication. He is now going for dialysis and able to stay for the total 4 hours and like in the past when he would leave early and would need medication for anxiety. He also has been trying to talk to his friends whom he goes with to dialysis about needs to be compliant with treatment plans. Was congratulated on the same. ESRD: Patient has end-stage renal disease. He is on hemodialysis 3 times a week. We will continue with the regimen as recommended by the nephrologist. 
Hypertension:  patient has hypertension. He is on losartan and Lopressor. Blood pressure is stable. Continue current treatment plan. Cardiac: Patient has had coronary artery disease and has had a double bypass surgery and also coronary artery stenting. Currently denies chest pain, shortness of breath or diaphoresis. Continue current treatment plan. Diabetes mellitus type 2: Patient is on insulin. Last HbA1c was 6.1. Continue current treatment plan. Health maintenance: Patient will get the flu vaccine today. Past Medical History Past Medical History:  
Diagnosis Date  Atherosclerosis of renal artery ([address] S/P Rt stent  CAD (coronary artery disease) , 1997, 2012  
 ,double bypass, 2 stents, 2stents 7/2016  Cancer [facility])   
 prostate  CHF (congestive heart failure) ([address]  Chronic diastolic heart failure ([address]  Chronic kidney disease  Chronic kidney disease (CKD), stage IV (severe) ([address] On Dialysis now -T-Th-sat  Constipation  Coronary atherosclerosis of unspecified type of vessel, native or graft Stable angina after recent PCI continue treatment.  CRI (chronic renal insufficiency)  Diabetes ([address] 1973  
 type 2  
 Essential hypertension, benign  GERD (gastroesophageal reflux disease)  Gout  Hypertension 1982  Morbid obesity ([address] Weight loss has been strongly encouraged by following dietary restrictions and an exercise routine  APRYL (obstructive sleep apnea) 6/26/2015  
 no cpap  Other and unspecified hyperlipidemia HDL's are not at goal, LDL's are at goal, triglycerides are not at goal.  
 Other and unspecified hyperlipidemia HDL's are not at goal, LDL's are at goal, triglycerides are not at goal.   
 Other ill-defined conditions(799.89) 1960  
 pneumonia twice  Prostate cancer ([address]  Sleep disturbance  Type II or unspecified type diabetes mellitus without mention of complication, not stated as uncontrolled Surgical History Past Surgical History:  
Procedure Laterality Date [address] [facility] [address]  
 lt. carotid endart. [address]LIST  2012, 2016, Nov 2017 Coronary Stent  COLONOSCOPY N/A 6/23/2017 COLONOSCOPY w/ APC w/ polypectomies performed by Zainab Farias MD at [address]  FLEXIBLE SIGMOIDOSCOPY N/A 1/19/2018 FLEXIBLE SIGMOIDOSCOPY WITH Radio frequency ablation performed by Zainab Farias MD at [facility] ENDOSCOPY 2021 Jose Pinon N/A 6/8/2018 SIGMOIDOSCOPY FLEXIBLE WITH APC performed by Kamaljit Roth MD at [address] [facility]  HX CORONARY ARTERY BYPASS GRAFT  2000  
 x2  HX HEENT  1997  
 cholecystectomy  HX UROLOGICAL  1998  
 renal art. surg  HX VASCULAR ACCESS Perma cath for HD- right chest.  
  
 
Medications Current Outpatient Prescriptions Medication Sig Dispense Refill  ASPIR-LOW 81 mg tablet TAKE ONE TABLET BY MOUTH ONE TIME DAILY 38 Tab 4  
 allopurinol (ZYLOPRIM) 100 mg tablet Monday, Wednesday,friday 90 Tab 0  
 nitroglycerin (NITROSTAT) 0.4 mg SL tablet 1 Tab by SubLINGual route as needed for Chest Pain. 25 Tab 1  DULCOLAX, BISACODYL, PO Take  by mouth.  pantoprazole (PROTONIX) 40 mg tablet Take 40 mg by mouth daily.  simvastatin (ZOCOR) 40 mg tablet TAKE 1 TABLET BY MOUTH NIGHTLY. 90 Tab 2  
 losartan (COZAAR) 25 mg tablet Take  by mouth daily.  fenofibrate (LOFIBRA) 54 mg tablet TAKE ONE TABLET BY MOUTH ONE TIME DAILY  30 Tab 4  
 tamsulosin (FLOMAX) 0.4 mg capsule TAKE ONE CAPSULE BY MOUTH ONE TIME DAILY 90 Cap 3  
 isosorbide mononitrate ER (IMDUR) 30 mg tablet Take 1 Tab by mouth daily. 30 Tab 0  
 b complex-vitamin c-folic acid (NEPHROCAPS) 1 mg capsule Take 1 Cap by mouth daily. 30 Cap 0  
 metoprolol tartrate (LOPRESSOR) 100 mg IR tablet TAKE 1 TABLET BY MOUTH TWO TIMES A DAY. 180 Tab 2  Cholecalciferol, Vitamin D3, (DECARA) 50,000 unit cap Take  by mouth every seven (7) days.  insulin glargine (LANTUS) 100 unit/mL injection 20 Units by SubCUTAneous route every twelve (12) hours. (Patient taking differently: 35 Units by SubCUTAneous route every twelve (12) hours. ) 1 Vial 2  
 insulin aspart (NOVOLOG) 100 unit/mL injection 20 units sq 3 times a day,sliding scale (Patient taking differently: sliding scale with meals) 10 mL 0 Allergies Allergies Allergen Reactions  Nka [No Known Allergies] Other (comments) Family History Family History Problem Relation Age of Onset  Heart Attack Father 64 Social History Social History Social History  Marital status:  Spouse name: N/A  
 Number of children: N/A  
 Years of education: N/A Occupational History  Not on file. Social History Main Topics  Smoking status: Never Smoker  Smokeless tobacco: Never Used  Alcohol use No  
 Drug use: No  
 Sexual activity: Not on file Other Topics Concern  Not on file Social History Narrative Review of Systems Review of Systems - History obtained from spouse, chart review and the patient General ROS: negative for - chills, fatigue, fever or malaise Psychological ROS: negative Ophthalmic ROS: negative ENT ROS: negative Endocrine ROS: dm2 Respiratory ROS: no cough, shortness of breath, or wheezing Cardiovascular ROS: no chest pain or dyspnea on exertion Gastrointestinal ROS: no abdominal pain, change in bowel habits, or black or bloody stools Musculoskeletal ROS: positive for - joint pain and joint swelling Neurological ROS: no TIA or stroke symptoms Vital Signs Visit Vitals  /54 (BP 1 Location: Left arm, BP Patient Position: Sitting)  Pulse (!) 56  Temp 97.6 °F (36.4 °C) (Oral)  Resp 18  Ht 5' 4\" (1.626 m)  Wt 218 lb (98.9 kg)  SpO2 99%  BMI 37.42 kg/m2 Physical Exam 
Physical Examination: General appearance - oriented to person, place, and time, overweight and acyanotic, in no respiratory distress Mental status - alert, oriented to person, place, and time, affect appropriate to mood Mouth - mucous membranes moist, pharynx normal without lesions Lymphatics - no palpable lymphadenopathy Chest - clear to auscultation, no wheezes, rales or rhonchi, symmetric air entry, no tachypnea, retractions or cyanosis Heart - normal rate and regular rhythm, S1 and S2 normal 
Abdomen - no rebound tenderness noted Neurological - motor and sensory grossly normal bilaterally Musculoskeletal - osteoarthritic changes noted in both hands Extremities - intact peripheral pulses Results Results for orders placed or performed in visit on 07/18/18 AMB POC PVR, EHSAN,POST-VOID RES,US,NON-IMAGING Result Value Ref Range PVR 0 cc ASSESSMENT and PLAN 
  ICD-10-CM ICD-9-CM 1. Type 2 diabetes mellitus without complication, with long-term current use of insulin ([facility]) E11.9 250.00   
 Z79.4 V58.67   
2. Anxiety F41.9 300.00   
3. ESRD (end stage renal disease) on dialysis ([facility]) N18.6 585.6 Z99.2 V45.11   
4. Diastolic CHF, acute on chronic ([facility]) I50.33 428.33   
  428.0 5. Encounter for immunization Z23 V03.89 ADMIN INFLUENZA VIRUS VAC INFLUENZA VACCINE INACTIVATED (IIV), SUBUNIT, ADJUVANTED, IM  
 
reviewed diet, exercise and weight control 
reviewed medications and side effects in detail I have discussed the diagnosis with the patient and the intended plan of care as seen in the above orders. The patient has received an after-visit summary and questions were answered concerning future plans. I have discussed medication, side effects, and warnings with the patient in detail. The patient verbalized understanding and is in agreement with the plan of care. The patient will contact the office with any additional concerns.  
 
Nusrat Michaels MD

## 2018-09-17 NOTE — PROGRESS NOTES
Rory Carter is a 66 y.o. male who presents for routine immunizations. He denies any symptoms , reactions or allergies that would exclude them from being immunized today. Risks and adverse reactions were discussed and the VIS was given to them. All questions were addressed. He was observed for 15 min post injection. There were no reactions observed.  
 
Jo Ann Pedroza LPN

## 2018-09-29 ENCOUNTER — HOSPITAL ENCOUNTER (EMERGENCY)
Age: 78
Discharge: HOME | End: 2018-09-29
Attending: EMERGENCY MEDICINE | Admitting: EMERGENCY MEDICINE
Payer: MEDICARE

## 2018-09-29 ENCOUNTER — APPOINTMENT (OUTPATIENT)
Dept: GENERAL RADIOLOGY | Age: 78
End: 2018-09-29
Attending: PHYSICIAN ASSISTANT
Payer: MEDICARE

## 2018-09-29 VITALS
OXYGEN SATURATION: 97 % | HEART RATE: 72 BPM | TEMPERATURE: 98.5 F | RESPIRATION RATE: 16 BRPM | SYSTOLIC BLOOD PRESSURE: 145 MMHG | DIASTOLIC BLOOD PRESSURE: 60 MMHG

## 2018-09-29 DIAGNOSIS — R07.81 RIB PAIN ON RIGHT SIDE: ICD-10-CM

## 2018-09-29 DIAGNOSIS — R10.11 RUQ ABDOMINAL PAIN: Primary | ICD-10-CM

## 2018-09-29 LAB
ALBUMIN SERPL-MCNC: 3.7 G/DL (ref 3.4–5)
ALBUMIN/GLOB SERPL: 0.8 {RATIO} (ref 0.8–1.7)
ALP SERPL-CCNC: 135 U/L (ref 45–117)
ALT SERPL-CCNC: 16 U/L (ref 16–61)
ANION GAP SERPL CALC-SCNC: 7 MMOL/L (ref 3–18)
AST SERPL-CCNC: 9 U/L (ref 15–37)
BASOPHILS # BLD: 0 K/UL (ref 0–0.1)
BASOPHILS NFR BLD: 0 % (ref 0–2)
BILIRUB SERPL-MCNC: 0.5 MG/DL (ref 0.2–1)
BNP SERPL-MCNC: 1297 PG/ML (ref 0–1800)
BUN SERPL-MCNC: 70 MG/DL (ref 7–18)
BUN/CREAT SERPL: 8 (ref 12–20)
CALCIUM SERPL-MCNC: 9.1 MG/DL (ref 8.5–10.1)
CHLORIDE SERPL-SCNC: 99 MMOL/L (ref 100–108)
CK MB CFR SERPL CALC: 2.8 % (ref 0–4)
CK MB SERPL-MCNC: 2.7 NG/ML (ref 5–25)
CK SERPL-CCNC: 95 U/L (ref 39–308)
CO2 SERPL-SCNC: 31 MMOL/L (ref 21–32)
CREAT SERPL-MCNC: 8.32 MG/DL (ref 0.6–1.3)
DIFFERENTIAL METHOD BLD: ABNORMAL
EOSINOPHIL # BLD: 0.2 K/UL (ref 0–0.4)
EOSINOPHIL NFR BLD: 2 % (ref 0–5)
ERYTHROCYTE [DISTWIDTH] IN BLOOD BY AUTOMATED COUNT: 13.5 % (ref 11.6–14.5)
GLOBULIN SER CALC-MCNC: 4.6 G/DL (ref 2–4)
GLUCOSE SERPL-MCNC: 126 MG/DL (ref 74–99)
HCT VFR BLD AUTO: 37.5 % (ref 36–48)
HGB BLD-MCNC: 12.1 G/DL (ref 13–16)
INR PPP: 1 (ref 0.8–1.2)
LIPASE SERPL-CCNC: 181 U/L (ref 73–393)
LYMPHOCYTES # BLD: 2.2 K/UL (ref 0.9–3.6)
LYMPHOCYTES NFR BLD: 19 % (ref 21–52)
MAGNESIUM SERPL-MCNC: 2 MG/DL (ref 1.6–2.6)
MCH RBC QN AUTO: 29.2 PG (ref 24–34)
MCHC RBC AUTO-ENTMCNC: 32.3 G/DL (ref 31–37)
MCV RBC AUTO: 90.4 FL (ref 74–97)
MONOCYTES # BLD: 0.8 K/UL (ref 0.05–1.2)
MONOCYTES NFR BLD: 7 % (ref 3–10)
NEUTS SEG # BLD: 8.8 K/UL (ref 1.8–8)
NEUTS SEG NFR BLD: 72 % (ref 40–73)
PLATELET # BLD AUTO: 261 K/UL (ref 135–420)
PMV BLD AUTO: 10.7 FL (ref 9.2–11.8)
POTASSIUM SERPL-SCNC: 4.2 MMOL/L (ref 3.5–5.5)
PROT SERPL-MCNC: 8.3 G/DL (ref 6.4–8.2)
PROTHROMBIN TIME: 13.2 SEC (ref 11.5–15.2)
RBC # BLD AUTO: 4.15 M/UL (ref 4.7–5.5)
SODIUM SERPL-SCNC: 137 MMOL/L (ref 136–145)
TROPONIN I SERPL-MCNC: 0.02 NG/ML (ref 0–0.04)
WBC # BLD AUTO: 12.1 K/UL (ref 4.6–13.2)

## 2018-09-29 PROCEDURE — 82550 ASSAY OF CK (CPK): CPT | Performed by: PHYSICIAN ASSISTANT

## 2018-09-29 PROCEDURE — 74022 RADEX COMPL AQT ABD SERIES: CPT

## 2018-09-29 PROCEDURE — 83735 ASSAY OF MAGNESIUM: CPT | Performed by: PHYSICIAN ASSISTANT

## 2018-09-29 PROCEDURE — 99284 EMERGENCY DEPT VISIT MOD MDM: CPT

## 2018-09-29 PROCEDURE — 83880 ASSAY OF NATRIURETIC PEPTIDE: CPT | Performed by: PHYSICIAN ASSISTANT

## 2018-09-29 PROCEDURE — 85610 PROTHROMBIN TIME: CPT | Performed by: PHYSICIAN ASSISTANT

## 2018-09-29 PROCEDURE — 85025 COMPLETE CBC W/AUTO DIFF WBC: CPT | Performed by: PHYSICIAN ASSISTANT

## 2018-09-29 PROCEDURE — 93005 ELECTROCARDIOGRAM TRACING: CPT

## 2018-09-29 PROCEDURE — 83690 ASSAY OF LIPASE: CPT | Performed by: PHYSICIAN ASSISTANT

## 2018-09-29 PROCEDURE — 80053 COMPREHEN METABOLIC PANEL: CPT | Performed by: PHYSICIAN ASSISTANT

## 2018-09-29 NOTE — ED PROVIDER NOTES
EMERGENCY DEPARTMENT HISTORY AND PHYSICAL EXAM 
 
3:18 PM 
 
 
Date: 9/29/2018 Patient Name: Jesus Miller History of Presenting Illness Chief Complaint Patient presents with  Chest Pain  Rib Pain  Abdominal Pain History Provided By: Patient Chief Complaint: R rib pain Duration:  Almost 2 weeks ago Timing:  Acute Location: R rib Quality: Not reported Severity: Moderate Modifying Factors: No modifying or aggravating factors were reported. Associated Symptoms: Abd pain, nausea Additional History (Context):Julius Mercado is a 66 y.o. male with a pertinent history of DM, HTN, cancer, and CAD who presents to the emergency department for evaluation of an acute, moderate R rib pain onset almost 2 weeks ago, after getting his flu shot. Associated sx include diffuse mild abd pain and nausea. He denies vomiting, SOB, fever, or dysuria. Patient missed his dialysis this morning. Patient also c/o constant, burning, R chest pain that is baseline. The pain worsens with movement and is improved with NTG. He takes Plavix and Aspirin (baby dosage). His nephrologist is Dr. Karolyn Perez, and his cardiologists are Dr. Piedad Pulliam and Dr. Marvin Grace. He had a double bypass 20 years ago, and has multiple stints placed since then. No other symptoms or concerns were expressed. PCP:  Wade Blanco MD  
 
 
Current Outpatient Prescriptions Medication Sig Dispense Refill  ASPIR-LOW 81 mg tablet TAKE ONE TABLET BY MOUTH ONE TIME DAILY 38 Tab 4  
 allopurinol (ZYLOPRIM) 100 mg tablet Monday, Wednesday,friday 90 Tab 0  
 nitroglycerin (NITROSTAT) 0.4 mg SL tablet 1 Tab by SubLINGual route as needed for Chest Pain. 25 Tab 1  DULCOLAX, BISACODYL, PO Take  by mouth.  pantoprazole (PROTONIX) 40 mg tablet Take 40 mg by mouth daily.  simvastatin (ZOCOR) 40 mg tablet TAKE 1 TABLET BY MOUTH NIGHTLY. 90 Tab 2  
 losartan (COZAAR) 25 mg tablet Take  by mouth daily.  fenofibrate (LOFIBRA) 54 mg tablet TAKE ONE TABLET BY MOUTH ONE TIME DAILY  30 Tab 4  
 tamsulosin (FLOMAX) 0.4 mg capsule TAKE ONE CAPSULE BY MOUTH ONE TIME DAILY 90 Cap 3  
 isosorbide mononitrate ER (IMDUR) 30 mg tablet Take 1 Tab by mouth daily. 30 Tab 0  
 b complex-vitamin c-folic acid (NEPHROCAPS) 1 mg capsule Take 1 Cap by mouth daily. 30 Cap 0  
 metoprolol tartrate (LOPRESSOR) 100 mg IR tablet TAKE 1 TABLET BY MOUTH TWO TIMES A DAY. 180 Tab 2  Cholecalciferol, Vitamin D3, (DECARA) 50,000 unit cap Take  by mouth every seven (7) days.  insulin glargine (LANTUS) 100 unit/mL injection 20 Units by SubCUTAneous route every twelve (12) hours. (Patient taking differently: 35 Units by SubCUTAneous route every twelve (12) hours. ) 1 Vial 2  
 insulin aspart (NOVOLOG) 100 unit/mL injection 20 units sq 3 times a day,sliding scale (Patient taking differently: sliding scale with meals) 10 mL 0 Past History Past Medical History: 
Past Medical History:  
Diagnosis Date  Atherosclerosis of renal artery ([address] S/P Rt stent  CAD (coronary artery disease) , 1997, 2012  
 ,double bypass, 2 stents, 2stents 7/2016  Cancer [facility])   
 prostate  CHF (congestive heart failure) ([address]  Chronic diastolic heart failure ([address]  Chronic kidney disease  Chronic kidney disease (CKD), stage IV (severe) ([address] On Dialysis now -T-Th-sat  Constipation  Coronary atherosclerosis of unspecified type of vessel, native or graft Stable angina after recent PCI continue treatment.  CRI (chronic renal insufficiency)  Diabetes ([address] 1973  
 type 2  
 Essential hypertension, benign  GERD (gastroesophageal reflux disease)  Gout  Hypertension 1982  Morbid obesity ([address] Weight loss has been strongly encouraged by following dietary restrictions and an exercise routine  APRYL (obstructive sleep apnea) 6/26/2015  
 no cpap  Other and unspecified hyperlipidemia HDL's are not at goal, LDL's are at goal, triglycerides are not at goal.  
 Other and unspecified hyperlipidemia HDL's are not at goal, LDL's are at goal, triglycerides are not at goal.   
 Other ill-defined conditions(799.89) 1960  
 pneumonia twice  Prostate cancer [facility]  Sleep disturbance  Type II or unspecified type diabetes mellitus without mention of complication, not stated as uncontrolled Past Surgical History: 
Past Surgical History:  
Procedure Laterality Date [address]  
 lt. carotid endart. [address]  2012, 2016, Nov 2017 Coronary Stent  COLONOSCOPY N/A 6/23/2017 COLONOSCOPY w/ APC w/ polypectomies performed by Romayne Peers, MD at [address]  FLEXIBLE SIGMOIDOSCOPY N/A 1/19/2018 FLEXIBLE SIGMOIDOSCOPY WITH Radio frequency ablation performed by Romayne Peers, MD at [facility] ENDOSCOPY 2021 Jose Pinon N/A 6/8/2018 SIGMOIDOSCOPY FLEXIBLE WITH APC performed by Lori Martin MD at [address] [facility]  HX CORONARY ARTERY BYPASS GRAFT  2000  
 x2  HX HEENT  1997  
 cholecystectomy  HX UROLOGICAL  1998  
 renal art. surg  HX VASCULAR ACCESS Perma cath for HD- right chest.  
 
 
Family History: 
Family History Problem Relation Age of Onset  Heart Attack Father 64 Social History: 
Social History Substance Use Topics  Smoking status: Never Smoker  Smokeless tobacco: Never Used  Alcohol use No  
 
 
Allergies: Allergies Allergen Reactions  Nka [No Known Allergies] Other (comments) Review of Systems Review of Systems Constitutional: Negative for chills and fever. HENT: Negative for congestion, rhinorrhea and sore throat. Eyes: Negative for visual disturbance. Respiratory: Negative for cough and shortness of breath. Cardiovascular: Positive for chest pain (Burning). Negative for palpitations. Gastrointestinal: Positive for abdominal pain (Diffuse) and nausea. Negative for constipation, diarrhea and vomiting. Genitourinary: Negative for dysuria. Musculoskeletal: Positive for myalgias (R rib pain). Negative for back pain. Skin: Negative. Neurological: Negative for headaches. All other systems reviewed and are negative. Physical Exam  
 
Visit Vitals  /60 (BP 1 Location: Left arm, BP Patient Position: At rest)  Pulse 72  Temp 98.5 °F (36.9 °C)  Resp 16  SpO2 97% Physical Exam  
Constitutional: He is oriented to person, place, and time. He appears well-developed and well-nourished. No distress. HENT:  
Head: Normocephalic and atraumatic. Eyes: Conjunctivae are normal.  
Neck: Normal range of motion. Neck supple. No thyromegaly present. Cardiovascular: Normal rate, regular rhythm and normal heart sounds. Pulmonary/Chest: Effort normal and breath sounds normal. No respiratory distress. He has no wheezes. He has no rales. He exhibits tenderness. Lungs CTAB. Right inferolateral chest wall is mildly TTP. No erythema, edema, ecchymosis, crepitance, or overlying skin changes. Abdominal: Soft. Bowel sounds are normal. He exhibits no distension. There is no tenderness. There is no rebound and no guarding. Abdomen is non-tender to deep palpation. No rebound, rigidity, guarding, distention, or mass noted. (-) Dumont's sign. (-) Rovsing's sign. Normoactive BS all four quadrants. Musculoskeletal: Normal range of motion. He exhibits no edema or deformity. Neurological: He is alert and oriented to person, place, and time. No cranial nerve deficit. Skin: Skin is warm and dry. He is not diaphoretic. Psychiatric: He has a normal mood and affect. Nursing note and vitals reviewed. Diagnostic Study Results Labs - Recent Results (from the past 12 hour(s)) EKG, 12 LEAD, INITIAL  Collection Time: 09/29/18  2:00 PM  
 Result Value Ref Range Ventricular Rate 71 BPM  
 Atrial Rate 71 BPM  
 P-R Interval 160 ms QRS Duration 100 ms Q-T Interval 396 ms QTC Calculation (Bezet) 430 ms Calculated P Axis 41 degrees Calculated R Axis 2 degrees Calculated T Axis 77 degrees Diagnosis Normal sinus rhythm Incomplete right bundle branch block Borderline ECG When compared with ECG of 24-JUN-2018 07:59, Incomplete right bundle branch block is now present CBC WITH AUTOMATED DIFF Collection Time: 09/29/18  3:24 PM  
Result Value Ref Range WBC 12.1 4.6 - 13.2 K/uL  
 RBC 4.15 (L) 4.70 - 5.50 M/uL  
 HGB 12.1 (L) 13.0 - 16.0 g/dL HCT 37.5 36.0 - 48.0 % MCV 90.4 74.0 - 97.0 FL  
 MCH 29.2 24.0 - 34.0 PG  
 MCHC 32.3 31.0 - 37.0 g/dL  
 RDW 13.5 11.6 - 14.5 % PLATELET 655 137 - 945 K/uL MPV 10.7 9.2 - 11.8 FL  
 NEUTROPHILS 72 40 - 73 % LYMPHOCYTES 19 (L) 21 - 52 % MONOCYTES 7 3 - 10 % EOSINOPHILS 2 0 - 5 % BASOPHILS 0 0 - 2 %  
 ABS. NEUTROPHILS 8.8 (H) 1.8 - 8.0 K/UL  
 ABS. LYMPHOCYTES 2.2 0.9 - 3.6 K/UL  
 ABS. MONOCYTES 0.8 0.05 - 1.2 K/UL  
 ABS. EOSINOPHILS 0.2 0.0 - 0.4 K/UL  
 ABS. BASOPHILS 0.0 0.0 - 0.1 K/UL  
 DF AUTOMATED METABOLIC PANEL, COMPREHENSIVE Collection Time: 09/29/18  3:24 PM  
Result Value Ref Range Sodium 137 136 - 145 mmol/L Potassium 4.2 3.5 - 5.5 mmol/L Chloride 99 (L) 100 - 108 mmol/L  
 CO2 31 21 - 32 mmol/L Anion gap 7 3.0 - 18 mmol/L Glucose 126 (H) 74 - 99 mg/dL BUN 70 (H) 7.0 - 18 MG/DL Creatinine 8.32 (H) 0.6 - 1.3 MG/DL  
 BUN/Creatinine ratio 8 (L) 12 - 20 GFR est AA 8 (L) >60 ml/min/1.73m2 GFR est non-AA 6 (L) >60 ml/min/1.73m2 Calcium 9.1 8.5 - 10.1 MG/DL Bilirubin, total 0.5 0.2 - 1.0 MG/DL  
 ALT (SGPT) 16 16 - 61 U/L  
 AST (SGOT) 9 (L) 15 - 37 U/L Alk. phosphatase 135 (H) 45 - 117 U/L Protein, total 8.3 (H) 6.4 - 8.2 g/dL Albumin 3.7 3.4 - 5.0 g/dL Globulin 4.6 (H) 2.0 - 4.0 g/dL A-G Ratio 0.8 0.8 - 1.7 MAGNESIUM Collection Time: 09/29/18  3:24 PM  
Result Value Ref Range Magnesium 2.0 1.6 - 2.6 mg/dL PROTHROMBIN TIME + INR Collection Time: 09/29/18  3:24 PM  
Result Value Ref Range Prothrombin time 13.2 11.5 - 15.2 sec INR 1.0 0.8 - 1.2 CARDIAC PANEL,(CK, CKMB & TROPONIN) Collection Time: 09/29/18  3:24 PM  
Result Value Ref Range CK 95 39 - 308 U/L  
 CK - MB 2.7 <3.6 ng/ml CK-MB Index 2.8 0.0 - 4.0 % Troponin-I, Qt. 0.02 0.0 - 0.045 NG/ML  
LIPASE Collection Time: 09/29/18  3:24 PM  
Result Value Ref Range Lipase 181 73 - 393 U/L  
NT-PRO BNP Collection Time: 09/29/18  3:24 PM  
Result Value Ref Range NT pro-BNP 1297 0 - 1800 PG/ML Radiologic Studies - Xr Abd Acute W 1 V Chest 
 
Result Date: 9/29/2018 XR ABD ACUTE W 1 V CHEST Indication: abdominal pain, chest pain Comparison: Chest x-ray from 6/23/2018 Findings: The lungs are clear. No effusion or pneumothorax. Cardiomediastinal silhouette is normal in size. Atherosclerosis in the aortic arch. Vascular stent is noted in the left subclavian region. No evidence of obstruction or ileus. No free air. No organomegaly. Cholecystectomy clips. Osseous structures are intact. Lungs bases are clear. Impression: 1. No acute cardiopulmonary process. 2.  No obstruction or ileus. Medical Decision Making I am the first provider for this patient. I reviewed the vital signs, available nursing notes, past medical history, past surgical history, family history and social history. Vital Signs-Reviewed the patient's vital signs. Pulse Oximetry Analysis -  87% on room air, WNL Cardiac Monitor: 
Rate: 72 bpm 
Rhythm:  Normal Sinus Rhythm EKG: Interpreted by the EP. Time Interpreted:  
 Rate:  
 Rhythm: Normal Sinus Rhythm with new incomplete RBBB Interpretation:  
 
Records Reviewed: Nursing Notes (Time of Review: 3:18 PM) ED Course: Progress Notes, Reevaluation, and Consults: 
 
 
Provider Notes (Medical Decision Making):  
Differential Diagnosis: biliary disease, hepatitis, pancreatitis, PUD, gastritis, gastroenteritis, hiatal hernia, constipation, SBO, LBO, mesenteric ischemia/infarction,  ruptured AAA, renal colic, IBS, IBD, Celiac disease, PNA, AMI, PE, splenic abscess/infarction, malignancy, PID([facility]) Plan:  Pt presents ambulatory in NAD, well-hydrated, non-toxic in appearance, with normal vitals. Benign exam of abdomen without TTP or peritoneal signs. Afebrile. Symptoms x 1.5-2 weeks. Mildly elevated alk phos, but patient is s/p cholecystectomy. XRs negative. EKG without findings c/w ischemia. Cardiac panel and pro-BNP wnl. Labs are otherwise reassuring. Do not feel that further work-up, including emergent imaging, is warranted at this time. Pt is not clinically in fluid overload, despite missing dialysis. No need for emergent dialysis. Pt states he feels great and is ready to go home. Will DC home with strict return precautions. At this time, patient is stable and appropriate for discharge home. Patient demonstrates understanding of current diagnoses and is in agreement with the treatment plan. They are advised that while the likelihood of serious underlying condition is low at this point given the evaluation performed today, we cannot fully rule it out. They are advised to immediately return with any new symptoms or worsening of current condition. All questions have been answered. Patient is given educational material regarding their diagnoses, including danger symptoms and when to return to the ED. Urged patient to follow-up with PCP. Diagnosis Clinical Impression: 1. RUQ abdominal pain 2. Rib pain on right side Disposition: [address] [facility] Follow-up Information Follow up With Details Comments Contact Info Corinna Youngblood MD Call in 2 days For follow-up Cher Gaston. [address] [place] [address] Primary Care [facility] 
[phone] [facility] EMERGENCY DEPT Go to As needed, If symptoms worsen [address] [place] [address] [address] Discharge Medication List as of 9/29/2018  5:04 PM  
  
CONTINUE these medications which have NOT CHANGED Details ASPIR-LOW 81 mg tablet TAKE ONE TABLET BY MOUTH ONE TIME DAILY, Normal, Disp-38 Tab, R-4  
  
allopurinol (ZYLOPRIM) 100 mg tablet Monday, Wednesday,friday, Normal, Disp-90 Tab, R-0  
  
nitroglycerin (NITROSTAT) 0.4 mg SL tablet 1 Tab by SubLINGual route as needed for Chest Pain., Normal, Disp-25 Tab, R-1  
  
DULCOLAX, BISACODYL, PO Take  by mouth., Historical Med  
  
pantoprazole (PROTONIX) 40 mg tablet Take 40 mg by mouth daily. , Historical Med  
  
simvastatin (ZOCOR) 40 mg tablet TAKE 1 TABLET BY MOUTH NIGHTLY., Normal, Disp-90 Tab, R-2  
  
losartan (COZAAR) 25 mg tablet Take  by mouth daily. , Historical Med  
  
fenofibrate (LOFIBRA) 54 mg tablet TAKE ONE TABLET BY MOUTH ONE TIME DAILY , Normal, Disp-30 Tab, R-4  
  
tamsulosin (FLOMAX) 0.4 mg capsule TAKE ONE CAPSULE BY MOUTH ONE TIME DAILY, Normal, Disp-90 Cap, R-3  
  
isosorbide mononitrate ER (IMDUR) 30 mg tablet Take 1 Tab by mouth daily. , Print, Disp-30 Tab, R-0  
  
b complex-vitamin c-folic acid (NEPHROCAPS) 1 mg capsule Take 1 Cap by mouth daily. , Print, Disp-30 Cap, R-0  
  
metoprolol tartrate (LOPRESSOR) 100 mg IR tablet TAKE 1 TABLET BY MOUTH TWO TIMES A DAY., Normal, Disp-180 Tab, R-2 Cholecalciferol, Vitamin D3, (DECARA) 50,000 unit cap Take  by mouth every seven (7) days. , Historical Med  
  
insulin glargine (LANTUS) 100 unit/mL injection 20 Units by SubCUTAneous route every twelve (12) hours. , Print, Disp-1 Vial, R-2  
  
insulin aspart (NOVOLOG) 100 unit/mL injection 20 units sq 3 times a day,sliding scale, No Print, Disp-10 mL, R-0  
  
  
 
_______________________________ Rigo Holman Neva Khan acting as a scribe for and in the presence of Vannesa Lipscomb September 29, 2018 at 3:18 PM 
    
Provider Attestation:     
I personally performed the services described in the documentation, reviewed the documentation, as recorded by the scribe in my presence, and it accurately and completely records my words and actions.  September 29, 2018 at 3:18 PM - LESLY Lipscomb

## 2018-09-29 NOTE — ED TRIAGE NOTES
Patient complains of chest pain , rib pain , abd pain x 1 week. Patient states it started after getting the flu shot.

## 2018-09-29 NOTE — DISCHARGE INSTRUCTIONS
Musculoskeletal Chest Pain: Care Instructions  Your Care Instructions    Chest pain is not always a sign that something is wrong with your heart or that you have another serious problem. The doctor thinks your chest pain is caused by strained muscles or ligaments, inflamed chest cartilage, or another problem in your chest, rather than by your heart. You may need more tests to find the cause of your chest pain. Follow-up care is a key part of your treatment and safety. Be sure to make and go to all appointments, and call your doctor if you are having problems. It's also a good idea to know your test results and keep a list of the medicines you take. How can you care for yourself at home? · Take pain medicines exactly as directed. ¨ If the doctor gave you a prescription medicine for pain, take it as prescribed. ¨ If you are not taking a prescription pain medicine, ask your doctor if you can take an over-the-counter medicine. · Rest and protect the sore area. · Stop, change, or take a break from any activity that may be causing your pain or soreness. · Put ice or a cold pack on the sore area for 10 to 20 minutes at a time. Try to do this every 1 to 2 hours for the next 3 days (when you are awake) or until the swelling goes down. Put a thin cloth between the ice and your skin. · After 2 or 3 days, apply a heating pad set on low or a warm cloth to the area that hurts. Some doctors suggest that you go back and forth between hot and cold. · Do not wrap or tape your ribs for support. This may cause you to take smaller breaths, which could increase your risk of lung problems. · Mentholated creams such as Bengay or Icy Hot may soothe sore muscles. Follow the instructions on the package. · Follow your doctor's instructions for exercising. · Gentle stretching and massage may help you get better faster. Stretch slowly to the point just before pain begins, and hold the stretch for at least 15 to 30 seconds.  Do this 3 or 4 times a day. Stretch just after you have applied heat. · As your pain gets better, slowly return to your normal activities. Any increased pain may be a sign that you need to rest a while longer. When should you call for help? Call 911 anytime you think you may need emergency care. For example, call if:    · You have chest pain or pressure. This may occur with:  ¨ Sweating. ¨ Shortness of breath. ¨ Nausea or vomiting. ¨ Pain that spreads from the chest to the neck, jaw, or one or both shoulders or arms. ¨ Dizziness or lightheadedness. ¨ A fast or uneven pulse. After calling 911, chew 1 adult-strength aspirin. Wait for an ambulance. Do not try to drive yourself.     · You have sudden chest pain and shortness of breath, or you cough up blood.    Call your doctor now or seek immediate medical care if:    · You have any trouble breathing.     · Your chest pain gets worse.     · Your chest pain occurs consistently with exercise and is relieved by rest.    Watch closely for changes in your health, and be sure to contact your doctor if:    · Your chest pain does not get better after 1 week. Where can you learn more? Go to http://kendal-farzaneh.info/. Enter V293 in the search box to learn more about \"Musculoskeletal Chest Pain: Care Instructions. \"  Current as of: November 20, 2017  Content Version: 11.7  © 8989-3258 [employer]. Care instructions adapted under license by [employer] (which disclaims liability or warranty for this information). If you have questions about a medical condition or this instruction, always ask your healthcare professional. [place] [address] any warranty or liability for your use of this information. Abdominal Pain: Care Instructions  Your Care Instructions    Abdominal pain has many possible causes. Some aren't serious and get better on their own in a few days. Others need more testing and treatment.  If your pain continues or gets worse, you need to be rechecked and may need more tests to find out what is wrong. You may need surgery to correct the problem. Don't ignore new symptoms, such as fever, nausea and vomiting, urination problems, pain that gets worse, and dizziness. These may be signs of a more serious problem. Your doctor may have recommended a follow-up visit in the next 8 to 12 hours. If you are not getting better, you may need more tests or treatment. The doctor has checked you carefully, but problems can develop later. If you notice any problems or new symptoms, get medical treatment right away. Follow-up care is a key part of your treatment and safety. Be sure to make and go to all appointments, and call your doctor if you are having problems. It's also a good idea to know your test results and keep a list of the medicines you take. How can you care for yourself at home? · Rest until you feel better. · To prevent dehydration, drink plenty of fluids, enough so that your urine is light yellow or clear like water. Choose water and other caffeine-free clear liquids until you feel better. If you have kidney, heart, or liver disease and have to limit fluids, talk with your doctor before you increase the amount of fluids you drink. · If your stomach is upset, eat mild foods, such as rice, dry toast or crackers, bananas, and applesauce. Try eating several small meals instead of two or three large ones. · Wait until 48 hours after all symptoms have gone away before you have spicy foods, alcohol, and drinks that contain caffeine. · Do not eat foods that are high in fat. · Avoid anti-inflammatory medicines such as aspirin, ibuprofen (Advil, Motrin), and naproxen (Aleve). These can cause stomach upset. Talk to your doctor if you take daily aspirin for another health problem. When should you call for help? Call 911 anytime you think you may need emergency care.  For example, call if:    · You passed out (lost consciousness).     · You pass maroon or very bloody stools.     · You vomit blood or what looks like coffee grounds.     · You have new, severe belly pain.    Call your doctor now or seek immediate medical care if:    · Your pain gets worse, especially if it becomes focused in one area of your belly.     · You have a new or higher fever.     · Your stools are black and look like tar, or they have streaks of blood.     · You have unexpected vaginal bleeding.     · You have symptoms of a urinary tract infection. These may include:  ¨ Pain when you urinate. ¨ Urinating more often than usual.  ¨ Blood in your urine.     · You are dizzy or lightheaded, or you feel like you may faint.    Watch closely for changes in your health, and be sure to contact your doctor if:    · You are not getting better after 1 day (24 hours). Where can you learn more? Go to http://kendal-farzaneh.info/. Enter B420 in the search box to learn more about \"Abdominal Pain: Care Instructions. \"  Current as of: November 20, 2017  Content Version: 11.7  © 8872-7528 [employer]. Care instructions adapted under license by [employer] (which disclaims liability or warranty for this information). If you have questions about a medical condition or this instruction, always ask your healthcare professional. [address] any warranty or liability for your use of this information.

## 2018-10-01 DIAGNOSIS — E78.5 DYSLIPIDEMIA: ICD-10-CM

## 2018-10-01 RX ORDER — FENOFIBRATE 54 MG/1
TABLET ORAL
Qty: 30 TAB | Refills: 3 | Status: SHIPPED | OUTPATIENT
Start: 2018-10-01 | End: 2019-02-19 | Stop reason: SDUPTHER

## 2018-10-02 LAB
ATRIAL RATE: 71 BPM
CALCULATED P AXIS, ECG09: 41 DEGREES
CALCULATED R AXIS, ECG10: 2 DEGREES
CALCULATED T AXIS, ECG11: 77 DEGREES
DIAGNOSIS, 93000: NORMAL
P-R INTERVAL, ECG05: 160 MS
Q-T INTERVAL, ECG07: 396 MS
QRS DURATION, ECG06: 100 MS
QTC CALCULATION (BEZET), ECG08: 430 MS
VENTRICULAR RATE, ECG03: 71 BPM

## 2018-10-24 RX ORDER — INSULIN GLARGINE 100 [IU]/ML
35 INJECTION, SOLUTION SUBCUTANEOUS EVERY 12 HOURS
Qty: 30 ML | Refills: 3 | Status: SHIPPED | OUTPATIENT
Start: 2018-10-24 | End: 2019-04-24 | Stop reason: SDUPTHER

## 2018-11-05 ENCOUNTER — OFFICE VISIT (OUTPATIENT)
Dept: CARDIOLOGY CLINIC | Age: 78
End: 2018-11-05

## 2018-11-05 VITALS
SYSTOLIC BLOOD PRESSURE: 132 MMHG | BODY MASS INDEX: 37.73 KG/M2 | HEART RATE: 64 BPM | DIASTOLIC BLOOD PRESSURE: 53 MMHG | HEIGHT: 64 IN | WEIGHT: 221 LBS

## 2018-11-05 DIAGNOSIS — I10 ESSENTIAL HYPERTENSION, BENIGN: ICD-10-CM

## 2018-11-05 DIAGNOSIS — I25.119 ATHEROSCLEROSIS OF NATIVE CORONARY ARTERY OF NATIVE HEART WITH ANGINA PECTORIS (HCC): Primary | ICD-10-CM

## 2018-11-05 DIAGNOSIS — I50.32 CHRONIC DIASTOLIC HEART FAILURE (HCC): ICD-10-CM

## 2018-11-05 DIAGNOSIS — N18.6 ESRD (END STAGE RENAL DISEASE) (HCC): ICD-10-CM

## 2018-11-05 DIAGNOSIS — Z95.5 S/P CORONARY ARTERY STENT PLACEMENT: ICD-10-CM

## 2018-11-05 RX ORDER — CINACALCET HYDROCHLORIDE 30 MG/1
60 TABLET, COATED ORAL DAILY
COMMUNITY

## 2018-11-05 NOTE — PROGRESS NOTES
HISTORY OF PRESENT ILLNESS  Keith Burleson is a 66 y.o. male. Patient with cad,chf,post cabg and pci  . recent admission with chf,acs,non q mi  Had vg to [facility] pci-11/2017 12/2017-admitted with angina  1/2018-non stemi        CHF   The history is provided by the patient. This is a chronic problem. The problem occurs constantly. The problem has not changed since onset. Associated symptoms include chest pain and shortness of breath. The symptoms are aggravated by exertion. Hypertension   The history is provided by the patient. This is a chronic problem. The problem occurs constantly. The problem has not changed since onset. Associated symptoms include chest pain and shortness of breath. Nothing aggravates the symptoms. Shortness of Breath   The history is provided by the patient. This is a recurrent problem. The problem occurs intermittently. The current episode started more than 1 week ago. Associated symptoms include chest pain. Pertinent negatives include no fever, no cough, no sputum production, no hemoptysis, no wheezing, no PND, no orthopnea, no vomiting, no rash, no leg swelling and no claudication. The problem's precipitants include exercise (>30 mts of treadmill). Chest Pain (Angina)    This is a recurrent problem. The problem has not changed since onset. The problem occurs every several days. The pain is associated with exertion. The pain is present in the substernal region. The quality of the pain is described as pressure-like. The pain does not radiate. Associated symptoms include shortness of breath. Pertinent negatives include no claudication, no cough, no dizziness, no fever, no hemoptysis, no nausea, no orthopnea, no palpitations, no PND, no sputum production, no vomiting and no weakness. Review of Systems   Constitutional: Negative for chills and fever. HENT: Negative for nosebleeds. Eyes: Negative for blurred vision and double vision. Respiratory: Positive for shortness of breath. Negative for cough, hemoptysis, sputum production and wheezing. Cardiovascular: Positive for chest pain. Negative for palpitations, orthopnea, claudication, leg swelling and PND. Gastrointestinal: Negative for heartburn, nausea and vomiting. Musculoskeletal: Negative for myalgias. Skin: Negative for rash. Neurological: Negative for dizziness and weakness. Endo/Heme/Allergies: Does not bruise/bleed easily. Family History   Problem Relation Age of Onset    Heart Attack Father 64       Past Medical History:   Diagnosis Date    Atherosclerosis of renal artery (HCC)     S/P Rt stent    CAD (coronary artery disease) , 1997, 2012    ,double bypass, 2 stents, 2stents 7/2016    Cancer ([facility] [address]     prostate    CHF (congestive heart failure) (HCC)     Chronic diastolic heart failure (HCC)     Chronic kidney disease     Chronic kidney disease (CKD), stage IV (severe) (HCC)     On Dialysis now -T-Th-sat    Constipation     Coronary atherosclerosis of unspecified type of vessel, native or graft     Stable angina after recent PCI continue treatment.        CRI (chronic renal insufficiency)     Diabetes ([facility] [address] 1973    type 2    Essential hypertension, benign     GERD (gastroesophageal reflux disease)     Gout     Hypertension 1982    Morbid obesity ([address]     Weight loss has been strongly encouraged by following dietary restrictions and an exercise routine    APRYL (obstructive sleep apnea) 6/26/2015    no cpap    Other and unspecified hyperlipidemia     HDL's are not at goal, LDL's are at goal, triglycerides are not at goal.    Other and unspecified hyperlipidemia     HDL's are not at goal, LDL's are at goal, triglycerides are not at goal.     Other ill-defined conditions(799.89) 1960    pneumonia twice    Prostate cancer ([facility] [address]     Sleep disturbance     Type II or unspecified type diabetes mellitus without mention of complication, not stated as uncontrolled        Past Surgical History:   Procedure Laterality Date    CARDIAC SURG PROCEDURE UNLIST  1995    lt. carotid endart. [facility] CARDIAC SURG PROCEDURE UNLIST  2012, 2016, Nov 2017    Coronary Stent    HX CHOLECYSTECTOMY      HX CORONARY ARTERY BYPASS GRAFT  2000    x2    HX HEENT  1997    cholecystectomy    HX UROLOGICAL  1998    renal art. surg    HX VASCULAR ACCESS      Perma cath for HD- right chest.       Allergies   Allergen Reactions    Nka [No Known Allergies] Other (comments)       Current Outpatient Medications   Medication Sig    cinacalcet (SENSIPAR) 30 mg tablet Take 30 mg by mouth daily.  insulin glargine (LANTUS) 100 unit/mL injection 35 Units by SubCUTAneous route every twelve (12) hours.  fenofibrate (LOFIBRA) 54 mg tablet TAKE ONE TABLET BY MOUTH ONE TIME DAILY    ASPIR-LOW 81 mg tablet TAKE ONE TABLET BY MOUTH ONE TIME DAILY    allopurinol (ZYLOPRIM) 100 mg tablet Monday, Wednesday,friday    nitroglycerin (NITROSTAT) 0.4 mg SL tablet 1 Tab by SubLINGual route as needed for Chest Pain.  DULCOLAX, BISACODYL, PO Take  by mouth.  pantoprazole (PROTONIX) 40 mg tablet Take 40 mg by mouth daily.  simvastatin (ZOCOR) 40 mg tablet TAKE 1 TABLET BY MOUTH NIGHTLY.  losartan (COZAAR) 25 mg tablet Take  by mouth daily.  tamsulosin (FLOMAX) 0.4 mg capsule TAKE ONE CAPSULE BY MOUTH ONE TIME DAILY    isosorbide mononitrate ER (IMDUR) 30 mg tablet Take 1 Tab by mouth daily.  b complex-vitamin c-folic acid (NEPHROCAPS) 1 mg capsule Take 1 Cap by mouth daily.  metoprolol tartrate (LOPRESSOR) 100 mg IR tablet TAKE 1 TABLET BY MOUTH TWO TIMES A DAY.  Cholecalciferol, Vitamin D3, (DECARA) 50,000 unit cap Take  by mouth every seven (7) days.  insulin aspart (NOVOLOG) 100 unit/mL injection 20 units sq 3 times a day,sliding scale (Patient taking differently: sliding scale with meals)     No current facility-administered medications for this visit.         Visit Vitals  /53   Pulse 64   Ht 5' 4\" (1.626 m)   Wt 100.2 kg (221 lb)   BMI 37.93 kg/m²         Physical Exam   Constitutional: He is oriented to person, place, and time. He appears well-developed and well-nourished. obese   HENT:   Head: Normocephalic and atraumatic. Eyes: Conjunctivae are normal.   Neck: Neck supple. No JVD present. No tracheal deviation present. No thyromegaly present. Cardiovascular: Normal rate, regular rhythm and normal heart sounds. Exam reveals no gallop and no friction rub. No murmur heard. Pulmonary/Chest: Breath sounds normal. No respiratory distress. He has no wheezes. He has no rales. He exhibits no tenderness. Abdominal: Soft. There is no tenderness. Musculoskeletal: He exhibits no edema. Neurological: He is alert and oriented to person, place, and time. Skin: Skin is warm and dry. Psychiatric: He has a normal mood and affect. Mr. Jeanette Abarca has a reminder for a \"due or due soon\" health maintenance. I have asked that he contact his primary care provider for follow-up on this health maintenance. CARDIOLOGY STUDIES 10/8/2012   Cardiac Cath Result PATENT LIMA TO LAD, SVG TO D1 SUBTOTAL DIFFUSE, POST PCI WITH STENTS,HAD PROXIMAL IN STENT PROTRUSION UNABLE TO CROSS WITH BALLONS,D1 DIFFISE SEVERE   Echocardiogram - Complete Result NORMAL EF 70%     SUMMARY:echo:11/2014  Left ventricle: Systolic function was hyperdynamic. Ejection fraction was  estimated in the range of 70 % to 75 %. No obvious wall motion  abnormalities identified in the views obtained. Near cavity obliteration  seen in the mid to apical portions of the ventricular cavity. There was  mild concentric hypertrophy. Features were consistent with a pseudonormal  left ventricular filling pattern, with concomitant abnormal relaxation and  increased filling pressure (grade 2 diastolic dysfunction). NUCLEAR IMAGING:    Findings: stress test:11/2014  1.  Post-stress imaging in short axis, horizontal and vertical long axis views reveals normal isotope uptake in all areas.   2. Resting images also show normal isotope uptake in all areas. 3. Gated images show normal left ventricular size, wall motion and systolic function. Ejection fraction is 85%. Diagnosis:   1. Normal scan. 2. No evidence of significant fixed or reversible defect suggesting ischemia or myocardial infarction noted from this nuclear study. 3. low risk scan.  7/2016-pci  vg to diag  11/2017-pci svg-diag  I have personally reviewed patient's records available from hospital and other providers and incorporated findings in patient care. 1/2018-[facility]  Assessment         ICD-10-CM ICD-9-CM    1. Atherosclerosis of native coronary artery of native heart with angina pectoris ([facility] [address] I25.119 414.01      413.9     Stable continue treatment   2. Chronic diastolic heart failure (HCC) I50.32 428.32     Stable. Feels better on dialysis. Shortness of breath improving   3. Essential hypertension, benign I10 401.1     Stable   4. S/P coronary artery stent placement Z95.5 V45.82     11/2017. Stable   5. ESRD (end stage renal disease) ([facility] [address] N18.6 585.6        11/2018  Cardiac status stable. Off Plavix due to GI bleed but hemoglobin is stabilized. Last hemoglobin 11. Cholesterol 156. Continue current treatment. Occasional.  Of low blood pressure will change metoprolol as needed to 50 mg twice a day instead of 100 mg twice a day    There are no discontinued medications. No orders of the defined types were placed in this encounter.       Follow-up Disposition: Not on File

## 2018-11-05 NOTE — PROGRESS NOTES
1. Have you been to the ER, urgent care clinic since your last visit? Hospitalized since your last visit? Yes MV   2. Have you seen or consulted any other health care providers outside of the [address] [place] [address] since your last visit? Include any pap smears or colon screening. no  3. Since your last visit, have you had any of the following symptoms? shortness of breath.

## 2018-11-05 NOTE — LETTER
Choco Finch 1940 
 
11/5/2018 Dear MD Alfredito Hart,  [facility], [place] Alexy Quach, MD Lenard Montana, MD Katherne Goltz, MD Yazan Warner, MD Guillermo Sheehan, OD I had the pleasure of evaluating  Mr. Jacqueline Naylor in office today. Below are the relevant portions of my assessment and plan of care. ICD-10-CM ICD-9-CM 1. Atherosclerosis of native coronary artery of native heart with angina pectoris (HCC) I25.119 414.01   
  413.9 Stable continue treatment 2. Chronic diastolic heart failure (HCC) I50.32 428.32 Stable. Feels better on dialysis. Shortness of breath improving 3. Essential hypertension, benign I10 401.1 Stable 4. S/P coronary artery stent placement Z95.5 V45.82   
 11/2017. Stable 5. ESRD (end stage renal disease) ([facility] N18.6 585. 6 Current Outpatient Medications Medication Sig Dispense Refill  cinacalcet (SENSIPAR) 30 mg tablet Take 30 mg by mouth daily.  insulin glargine (LANTUS) 100 unit/mL injection 35 Units by SubCUTAneous route every twelve (12) hours. 30 mL 3  
 fenofibrate (LOFIBRA) 54 mg tablet TAKE ONE TABLET BY MOUTH ONE TIME DAILY 30 Tab 3  ASPIR-LOW 81 mg tablet TAKE ONE TABLET BY MOUTH ONE TIME DAILY 38 Tab 4  
 allopurinol (ZYLOPRIM) 100 mg tablet Monday, Wednesday,friday 90 Tab 0  
 nitroglycerin (NITROSTAT) 0.4 mg SL tablet 1 Tab by SubLINGual route as needed for Chest Pain. 25 Tab 1  DULCOLAX, BISACODYL, PO Take  by mouth.  pantoprazole (PROTONIX) 40 mg tablet Take 40 mg by mouth daily.  simvastatin (ZOCOR) 40 mg tablet TAKE 1 TABLET BY MOUTH NIGHTLY. 90 Tab 2  
 losartan (COZAAR) 25 mg tablet Take  by mouth daily.  tamsulosin (FLOMAX) 0.4 mg capsule TAKE ONE CAPSULE BY MOUTH ONE TIME DAILY 90 Cap 3  
 isosorbide mononitrate ER (IMDUR) 30 mg tablet Take 1 Tab by mouth daily.  30 Tab 0  
 b complex-vitamin c-folic acid (NEPHROCAPS) 1 mg capsule Take 1 Cap by mouth daily. 30 Cap 0  
 metoprolol tartrate (LOPRESSOR) 100 mg IR tablet TAKE 1 TABLET BY MOUTH TWO TIMES A DAY. 180 Tab 2  Cholecalciferol, Vitamin D3, (DECARA) 50,000 unit cap Take  by mouth every seven (7) days.  insulin aspart (NOVOLOG) 100 unit/mL injection 20 units sq 3 times a day,sliding scale (Patient taking differently: sliding scale with meals) 10 mL 0 Orders Placed This Encounter  cinacalcet (SENSIPAR) 30 mg tablet Sig: Take 30 mg by mouth daily. If you have questions, please do not hesitate to call me. I look forward to following Mr. Braeden Camacho along with you. Sincerely, Elvis Salas MD

## 2018-11-19 ENCOUNTER — OFFICE VISIT (OUTPATIENT)
Dept: FAMILY MEDICINE CLINIC | Age: 78
End: 2018-11-19

## 2018-11-19 ENCOUNTER — HOSPITAL ENCOUNTER (OUTPATIENT)
Dept: LAB | Age: 78
Discharge: HOME | End: 2018-11-19
Payer: MEDICARE

## 2018-11-19 VITALS
BODY MASS INDEX: 35.99 KG/M2 | DIASTOLIC BLOOD PRESSURE: 51 MMHG | WEIGHT: 216 LBS | HEART RATE: 61 BPM | HEIGHT: 65 IN | TEMPERATURE: 96 F | SYSTOLIC BLOOD PRESSURE: 133 MMHG | OXYGEN SATURATION: 97 % | RESPIRATION RATE: 16 BRPM

## 2018-11-19 DIAGNOSIS — H92.03 OTALGIA OF BOTH EARS: Primary | ICD-10-CM

## 2018-11-19 DIAGNOSIS — E78.5 DYSLIPIDEMIA: ICD-10-CM

## 2018-11-19 DIAGNOSIS — N18.6 ESRD (END STAGE RENAL DISEASE) ON DIALYSIS (HCC): ICD-10-CM

## 2018-11-19 DIAGNOSIS — E11.9 TYPE 2 DIABETES MELLITUS WITHOUT COMPLICATION, WITH LONG-TERM CURRENT USE OF INSULIN (HCC): ICD-10-CM

## 2018-11-19 DIAGNOSIS — I70.1 ATHEROSCLEROSIS OF RENAL ARTERY (HCC): ICD-10-CM

## 2018-11-19 DIAGNOSIS — H91.93 HEARING DIFFICULTY OF BOTH EARS: ICD-10-CM

## 2018-11-19 DIAGNOSIS — I10 ESSENTIAL HYPERTENSION: ICD-10-CM

## 2018-11-19 DIAGNOSIS — E11.21 TYPE 2 DIABETES MELLITUS WITH NEPHROPATHY (HCC): ICD-10-CM

## 2018-11-19 DIAGNOSIS — Z79.4 TYPE 2 DIABETES MELLITUS WITHOUT COMPLICATION, WITH LONG-TERM CURRENT USE OF INSULIN (HCC): ICD-10-CM

## 2018-11-19 DIAGNOSIS — Z99.2 ESRD (END STAGE RENAL DISEASE) ON DIALYSIS (HCC): ICD-10-CM

## 2018-11-19 LAB
CHOLEST SERPL-MCNC: 149 MG/DL
HDLC SERPL-MCNC: 41 MG/DL (ref 40–60)
HDLC SERPL: 3.6 {RATIO} (ref 0–5)
LDLC SERPL CALC-MCNC: 71.4 MG/DL (ref 0–100)
LIPID PROFILE,FLP: ABNORMAL
TRIGL SERPL-MCNC: 183 MG/DL (ref ?–150)
VLDLC SERPL CALC-MCNC: 36.6 MG/DL

## 2018-11-19 PROCEDURE — 80061 LIPID PANEL: CPT

## 2018-11-19 NOTE — PROGRESS NOTES
Chief Complaint Patient presents with  Hypertension  Diabetes 1. Have you been to the ER, urgent care clinic since your last visit? Hospitalized since your last visit? No 
 
2. Have you seen or consulted any other health care providers outside of the [address] [place] [address] since your last visit? Include any pap smears or colon screening. Yes, all same. Also Dr. Jeanette Aguilar, and [address] for Podiatry HPI Micah  comes in for follow-up care. Patient has hypertension. He is on losartan and Lopressor. Blood pressure is stable. We will continue with the current treatment plan. He also has a history of coronary artery disease. Has been followed up by the cardiologist.  He is on Lopressor, losartan, simvastatin and Imdur. Denies chest pain, shortness of breath or diaphoresis. Continue current treatment plan. Patient has a history of dyslipidemia. He takes fenofibrate and Zocor. I will check his lipid panel today. Patient has diabetes mellitus type 2. He has been followed up at [facility] clinic. These have shut down recently. He is on Lantus. Last HbA1c was 6.1. His blood glucose numbers have been stable. We will continue with the current treatment plan. Patient has a history of end-stage renal disease and is on hemodialysis. Gets this done 3 times a week. This going well. No pedal edema. We will continue current treatment plan. Patient has anxiety history. Was getting anxious to the point of not even getting his dialysis done. I did put him on medication initially. He did find ways of controlling the anxiety and since then has not needed medication. Patient has bilateral hearing loss. Lately feels more harming in both ears. Does have watery discharge that is clear colored. He cleaned this out every day. He does have to adjust his hearing aids also more frequently.   He requests referral to the ENT for evaluation given his hearing has diminished further as compared to how it was previously. Referral is made. Patient does have a history of renal artery atherosclerosis and has had stenting done in the right renal artery. This is stable. Patient is a BMI of 35.94. This is obese. He will do lifestyle and dietary modification in an effort to bring his weight down. Past Medical History Past Medical History:  
Diagnosis Date  Atherosclerosis of renal artery ([address] S/P Rt stent  CAD (coronary artery disease) , 1997, 2012  
 ,double bypass, 2 stents, 2stents 7/2016  Cancer [facility])   
 prostate  CHF (congestive heart failure) ([address]  Chronic diastolic heart failure ([address]  Chronic kidney disease  Chronic kidney disease (CKD), stage IV (severe) ([address] On Dialysis now -T-Th-sat  Constipation  Coronary atherosclerosis of unspecified type of vessel, native or graft Stable angina after recent PCI continue treatment.  CRI (chronic renal insufficiency)  Diabetes ([address] 1973  
 type 2  
 Essential hypertension, benign  GERD (gastroesophageal reflux disease)  Gout  Hypertension 1982  Morbid obesity ([address] Weight loss has been strongly encouraged by following dietary restrictions and an exercise routine  APRYL (obstructive sleep apnea) 6/26/2015  
 no cpap  Other and unspecified hyperlipidemia HDL's are not at goal, LDL's are at goal, triglycerides are not at goal.  
 Other and unspecified hyperlipidemia HDL's are not at goal, LDL's are at goal, triglycerides are not at goal.   
 Other ill-defined conditions(799.89) 1960  
 pneumonia twice  Prostate cancer ([address]  Sleep disturbance  Type II or unspecified type diabetes mellitus without mention of complication, not stated as uncontrolled Surgical History Past Surgical History:  
Procedure Laterality Date [facility]  
 lt. carotid endart. [address] UNLIST  2012, 2016, Nov 2017 Coronary Stent  HX CHOLECYSTECTOMY  HX CORONARY ARTERY BYPASS GRAFT  2000  
 x2  HX HEENT  1997  
 cholecystectomy  HX UROLOGICAL  1998  
 renal art. surg  HX VASCULAR ACCESS Perma cath for HD- right chest.  
  
 
Medications Current Outpatient Medications Medication Sig Dispense Refill  cinacalcet (SENSIPAR) 30 mg tablet Take 60 mg by mouth daily.  insulin glargine (LANTUS) 100 unit/mL injection 35 Units by SubCUTAneous route every twelve (12) hours. 30 mL 3  
 fenofibrate (LOFIBRA) 54 mg tablet TAKE ONE TABLET BY MOUTH ONE TIME DAILY 30 Tab 3  ASPIR-LOW 81 mg tablet TAKE ONE TABLET BY MOUTH ONE TIME DAILY 38 Tab 4  
 allopurinol (ZYLOPRIM) 100 mg tablet Monday, Wednesday,friday 90 Tab 0  
 nitroglycerin (NITROSTAT) 0.4 mg SL tablet 1 Tab by SubLINGual route as needed for Chest Pain. 25 Tab 1  DULCOLAX, BISACODYL, PO Take  by mouth.  pantoprazole (PROTONIX) 40 mg tablet Take 40 mg by mouth daily.  simvastatin (ZOCOR) 40 mg tablet TAKE 1 TABLET BY MOUTH NIGHTLY. 90 Tab 2  
 tamsulosin (FLOMAX) 0.4 mg capsule TAKE ONE CAPSULE BY MOUTH ONE TIME DAILY 90 Cap 3  
 isosorbide mononitrate ER (IMDUR) 30 mg tablet Take 1 Tab by mouth daily. 30 Tab 0  
 b complex-vitamin c-folic acid (NEPHROCAPS) 1 mg capsule Take 1 Cap by mouth daily. 30 Cap 0  
 metoprolol tartrate (LOPRESSOR) 100 mg IR tablet TAKE 1 TABLET BY MOUTH TWO TIMES A DAY. 180 Tab 2  Cholecalciferol, Vitamin D3, (DECARA) 50,000 unit cap Take  by mouth every seven (7) days.  insulin aspart (NOVOLOG) 100 unit/mL injection 20 units sq 3 times a day,sliding scale (Patient taking differently: sliding scale with meals) 10 mL 0  
 losartan (COZAAR) 25 mg tablet Take  by mouth daily. Allergies Allergies Allergen Reactions  Nka [No Known Allergies] Other (comments) Family History Family History Problem Relation Age of Onset  Heart Attack Father 64 Social History Social History Socioeconomic History  Marital status:  Spouse name: Not on file  Number of children: Not on file  Years of education: Not on file  Highest education level: Not on file Social Needs  Financial resource strain: Not on file  Food insecurity - worry: Not on file  Food insecurity - inability: Not on file  Transportation needs - medical: Not on file  Transportation needs - non-medical: Not on file Occupational History  Not on file Tobacco Use  Smoking status: Never Smoker  Smokeless tobacco: Never Used Substance and Sexual Activity  Alcohol use: No  
 Drug use: No  
 Sexual activity: Not on file Other Topics Concern  Not on file Social History Narrative  Not on file Review of Systems Review of Systems -rest of review of systems is negative except as noted above in the HPI. Vital Signs Visit Vitals /51 (BP 1 Location: Right arm, BP Patient Position: Sitting) Pulse 61 Temp 96 °F (35.6 °C) (Oral) Resp 16 Ht 5' 5\" (1.651 m) Wt 216 lb (98 kg) SpO2 97% BMI 35.94 kg/m² Physical Exam 
Physical Examination: General appearance - oriented to person, place, and time, overweight and acyanotic, in no respiratory distress Mental status - alert, oriented to person, place, and time, affect appropriate to mood Neck - supple, no significant adenopathy Lymphatics - no palpable lymphadenopathy, no hepatosplenomegaly Chest - clear to auscultation, no wheezes, rales or rhonchi, symmetric air entry Heart - S1 and S2 normal 
Back exam - limited range of motion Neurological - alert, oriented, normal speech, no focal findings or movement disorder noted Musculoskeletal - osteoarthritic changes noted in both hands Extremities - no pedal edema noted, intact peripheral pulses Results Results for orders placed or performed during the hospital encounter of 09/29/18 CBC WITH AUTOMATED DIFF Result Value Ref Range WBC 12.1 4.6 - 13.2 K/uL  
 RBC 4.15 (L) 4.70 - 5.50 M/uL  
 HGB 12.1 (L) 13.0 - 16.0 g/dL HCT 37.5 36.0 - 48.0 % MCV 90.4 74.0 - 97.0 FL  
 MCH 29.2 24.0 - 34.0 PG  
 MCHC 32.3 31.0 - 37.0 g/dL  
 RDW 13.5 11.6 - 14.5 % PLATELET 685 814 - 996 K/uL MPV 10.7 9.2 - 11.8 FL  
 NEUTROPHILS 72 40 - 73 % LYMPHOCYTES 19 (L) 21 - 52 % MONOCYTES 7 3 - 10 % EOSINOPHILS 2 0 - 5 % BASOPHILS 0 0 - 2 %  
 ABS. NEUTROPHILS 8.8 (H) 1.8 - 8.0 K/UL  
 ABS. LYMPHOCYTES 2.2 0.9 - 3.6 K/UL  
 ABS. MONOCYTES 0.8 0.05 - 1.2 K/UL  
 ABS. EOSINOPHILS 0.2 0.0 - 0.4 K/UL  
 ABS. BASOPHILS 0.0 0.0 - 0.1 K/UL  
 DF AUTOMATED METABOLIC PANEL, COMPREHENSIVE Result Value Ref Range Sodium 137 136 - 145 mmol/L Potassium 4.2 3.5 - 5.5 mmol/L Chloride 99 (L) 100 - 108 mmol/L  
 CO2 31 21 - 32 mmol/L Anion gap 7 3.0 - 18 mmol/L Glucose 126 (H) 74 - 99 mg/dL BUN 70 (H) 7.0 - 18 MG/DL Creatinine 8.32 (H) 0.6 - 1.3 MG/DL  
 BUN/Creatinine ratio 8 (L) 12 - 20 GFR est AA 8 (L) >60 ml/min/1.73m2 GFR est non-AA 6 (L) >60 ml/min/1.73m2 Calcium 9.1 8.5 - 10.1 MG/DL Bilirubin, total 0.5 0.2 - 1.0 MG/DL  
 ALT (SGPT) 16 16 - 61 U/L  
 AST (SGOT) 9 (L) 15 - 37 U/L Alk. phosphatase 135 (H) 45 - 117 U/L Protein, total 8.3 (H) 6.4 - 8.2 g/dL Albumin 3.7 3.4 - 5.0 g/dL Globulin 4.6 (H) 2.0 - 4.0 g/dL A-G Ratio 0.8 0.8 - 1.7 MAGNESIUM Result Value Ref Range Magnesium 2.0 1.6 - 2.6 mg/dL PROTHROMBIN TIME + INR Result Value Ref Range Prothrombin time 13.2 11.5 - 15.2 sec INR 1.0 0.8 - 1.2 CARDIAC PANEL,(CK, CKMB & TROPONIN) Result Value Ref Range CK 95 39 - 308 U/L  
 CK - MB 2.7 <3.6 ng/ml CK-MB Index 2.8 0.0 - 4.0 % Troponin-I, Qt. 0.02 0.0 - 0.045 NG/ML  
LIPASE Result Value Ref Range Lipase 181 73 - 393 U/L  
NT-PRO BNP Result Value Ref Range  NT pro-BNP 1,297 0 - 1,800 PG/ML  
 EKG, 12 LEAD, INITIAL Result Value Ref Range Ventricular Rate 71 BPM  
 Atrial Rate 71 BPM  
 P-R Interval 160 ms QRS Duration 100 ms Q-T Interval 396 ms QTC Calculation (Bezet) 430 ms Calculated P Axis 41 degrees Calculated R Axis 2 degrees Calculated T Axis 77 degrees Diagnosis Normal sinus rhythm Incomplete right bundle branch block Borderline ECG When compared with ECG of 24-JUN-2018 07:59, Incomplete right bundle branch block is now present Confirmed by Barbara Browning MD, --- ([phone] on 10/2/2018 11:59:10 AM 
  
 
 
ASSESSMENT and PLAN There are no diagnoses linked to this encounter. ICD-10-CM ICD-9-CM 1. Otalgia of both ears H92.03 388.70 REFERRAL TO ENT-OTOLARYNGOLOGY 2. Hearing difficulty of both ears H91.93 389.9 REFERRAL TO ENT-OTOLARYNGOLOGY 3. Type 2 diabetes mellitus without complication, with long-term current use of insulin ([facility]) E11.9 250.00   
 Z79.4 V58.67   
4. ESRD (end stage renal disease) on dialysis ([facility]) N18.6 585.6 Z99.2 V45.11   
5. Essential hypertension I10 401.9 6. Type 2 diabetes mellitus with nephropathy ([facility]) E11.21 250.40   
  583.81   
7. Atherosclerosis of renal artery ([facility]) I70.1 440.1 8. Dyslipidemia E78.5 272.4 LIPID PANEL Discussed the patient's BMI with him.   The BMI follow up plan is as follows:  
 
dietary management education, guidance, and counseling 
encourage exercise 
monitor weight 
lab results and schedule of future lab studies reviewed with patient 
reviewed diet, exercise and weight control 
cardiovascular risk and specific lipid/LDL goals reviewed 
reviewed medications and side effects in detail 
specific diabetic recommendations: low cholesterol diet, weight control and daily exercise discussed, home glucose monitoring emphasized, all medications, side effects and compliance discussed carefully, annual eye examinations at Ophthalmology discussed, glycohemoglobin and other lab monitoring discussed and long term diabetic complications discussed 
use of aspirin to prevent MI and TIA's discussed I have discussed the diagnosis with the patient and the intended plan of care as seen in the above orders. The patient has received an after-visit summary and questions were answered concerning future plans. I have discussed medication, side effects, and warnings with the patient in detail. The patient verbalized understanding and is in agreement with the plan of care. The patient will contact the office with any additional concerns.  
 
Harvinder Egan MD

## 2018-11-19 NOTE — PATIENT INSTRUCTIONS
Body Mass Index: Care Instructions Your Care Instructions Body mass index (BMI) can help you see if your weight is raising your risk for health problems. It uses a formula to compare how much you weigh with how tall you are. · A BMI lower than 18.5 is considered underweight. · A BMI between 18.5 and 24.9 is considered healthy. · A BMI between 25 and 29.9 is considered overweight. A BMI of 30 or higher is considered obese. If your BMI is in the normal range, it means that you have a lower risk for weight-related health problems. If your BMI is in the overweight or obese range, you may be at increased risk for weight-related health problems, such as high blood pressure, heart disease, stroke, arthritis or joint pain, and diabetes. If your BMI is in the underweight range, you may be at increased risk for health problems such as fatigue, lower protection (immunity) against illness, muscle loss, bone loss, hair loss, and hormone problems. BMI is just one measure of your risk for weight-related health problems. You may be at higher risk for health problems if you are not active, you eat an unhealthy diet, or you drink too much alcohol or use tobacco products. Follow-up care is a key part of your treatment and safety. Be sure to make and go to all appointments, and call your doctor if you are having problems. It's also a good idea to know your test results and keep a list of the medicines you take. How can you care for yourself at home? · Practice healthy eating habits. This includes eating plenty of fruits, vegetables, whole grains, lean protein, and low-fat dairy. · If your doctor recommends it, get more exercise. Walking is a good choice. Bit by bit, increase the amount you walk every day. Try for at least 30 minutes on most days of the week. · Do not smoke. Smoking can increase your risk for health problems.  If you need help quitting, talk to your doctor about stop-smoking programs and medicines. These can increase your chances of quitting for good. · Limit alcohol to 2 drinks a day for men and 1 drink a day for women. Too much alcohol can cause health problems. If you have a BMI higher than 25 · Your doctor may do other tests to check your risk for weight-related health problems. This may include measuring the distance around your waist. A waist measurement of more than 40 inches in men or 35 inches in women can increase the risk of weight-related health problems. · Talk with your doctor about steps you can take to stay healthy or improve your health. You may need to make lifestyle changes to lose weight and stay healthy, such as changing your diet and getting regular exercise. If you have a BMI lower than 18.5 · Your doctor may do other tests to check your risk for health problems. · Talk with your doctor about steps you can take to stay healthy or improve your health. You may need to make lifestyle changes to gain or maintain weight and stay healthy, such as getting more healthy foods in your diet and doing exercises to build muscle. Where can you learn more? Go to http://kendal-farzaneh.info/. Enter S176 in the search box to learn more about \"Body Mass Index: Care Instructions. \" Current as of: October 13, 2016 Content Version: 11.4 © 5045-8970 [employer]. Care instructions adapted under license by [employer] (which disclaims liability or warranty for this information). If you have questions about a medical condition or this instruction, always ask your healthcare professional. [address] any warranty or liability for your use of this information.

## 2018-11-21 NOTE — PROGRESS NOTES
Please let patient know his cholesterol result is reassuring. We will continue with the current treatment plan.  
Ruiz Daniel MD

## 2018-12-03 NOTE — PROGRESS NOTES
Patient called and was verified by name and . Patient and wife was informed of lab result and understanding verbalized.

## 2018-12-04 ENCOUNTER — TELEPHONE (OUTPATIENT)
Dept: VASCULAR SURGERY | Age: 78
End: 2018-12-04

## 2018-12-04 NOTE — TELEPHONE ENCOUNTER
Patient's wife (chapito) called and wanted to schedule an appt to see Dr. Carlos Enrique Lombardo to have him check her 's arm. She said that Thermon Goodpasture wanted his arm checked and sent him to  in [place]. Wife stated that he put in a stent in his shoulder. He is suppose to go back to have it checked again. The wife only wants Dr. Carlos Enrique Lombardo to see him. I did not know if the patient needed to go back there due to Dr. Katja Taveras is the one who did the stent. I wanted to check with you first before I scheduled appt.

## 2018-12-07 ENCOUNTER — HOSPITAL ENCOUNTER (OUTPATIENT)
Dept: LAB | Age: 78
Discharge: HOME | End: 2018-12-07
Payer: MEDICARE

## 2018-12-07 ENCOUNTER — OFFICE VISIT (OUTPATIENT)
Dept: VASCULAR SURGERY | Age: 78
End: 2018-12-07

## 2018-12-07 DIAGNOSIS — Z99.2 ESRD (END STAGE RENAL DISEASE) ON DIALYSIS (HCC): ICD-10-CM

## 2018-12-07 DIAGNOSIS — N18.6 ESRD (END STAGE RENAL DISEASE) ON DIALYSIS (HCC): ICD-10-CM

## 2018-12-07 DIAGNOSIS — N18.6 ESRD (END STAGE RENAL DISEASE) ON DIALYSIS (HCC): Primary | ICD-10-CM

## 2018-12-07 DIAGNOSIS — Z99.2 ESRD (END STAGE RENAL DISEASE) ON DIALYSIS (HCC): Primary | ICD-10-CM

## 2018-12-07 LAB
ANION GAP SERPL CALC-SCNC: 9 MMOL/L (ref 3–18)
BASOPHILS # BLD: 0 K/UL (ref 0–0.1)
BASOPHILS NFR BLD: 0 % (ref 0–2)
BUN SERPL-MCNC: 49 MG/DL (ref 7–18)
BUN/CREAT SERPL: 9 (ref 12–20)
CALCIUM SERPL-MCNC: 8 MG/DL (ref 8.5–10.1)
CHLORIDE SERPL-SCNC: 103 MMOL/L (ref 100–108)
CO2 SERPL-SCNC: 28 MMOL/L (ref 21–32)
CREAT SERPL-MCNC: 5.63 MG/DL (ref 0.6–1.3)
DIFFERENTIAL METHOD BLD: ABNORMAL
EOSINOPHIL # BLD: 0.2 K/UL (ref 0–0.4)
EOSINOPHIL NFR BLD: 2 % (ref 0–5)
ERYTHROCYTE [DISTWIDTH] IN BLOOD BY AUTOMATED COUNT: 13.4 % (ref 11.6–14.5)
GLUCOSE SERPL-MCNC: 88 MG/DL (ref 74–99)
HCT VFR BLD AUTO: 37.2 % (ref 36–48)
HGB BLD-MCNC: 11.8 G/DL (ref 13–16)
INR PPP: 1.1 (ref 0.8–1.2)
LYMPHOCYTES # BLD: 1.5 K/UL (ref 0.9–3.6)
LYMPHOCYTES NFR BLD: 17 % (ref 21–52)
MCH RBC QN AUTO: 29.9 PG (ref 24–34)
MCHC RBC AUTO-ENTMCNC: 31.7 G/DL (ref 31–37)
MCV RBC AUTO: 94.2 FL (ref 74–97)
MONOCYTES # BLD: 0.6 K/UL (ref 0.05–1.2)
MONOCYTES NFR BLD: 6 % (ref 3–10)
NEUTS SEG # BLD: 6.7 K/UL (ref 1.8–8)
NEUTS SEG NFR BLD: 75 % (ref 40–73)
PLATELET # BLD AUTO: 259 K/UL (ref 135–420)
PMV BLD AUTO: 11.4 FL (ref 9.2–11.8)
POTASSIUM SERPL-SCNC: 4.8 MMOL/L (ref 3.5–5.5)
PROTHROMBIN TIME: 13.8 SEC (ref 11.5–15.2)
RBC # BLD AUTO: 3.95 M/UL (ref 4.7–5.5)
SODIUM SERPL-SCNC: 140 MMOL/L (ref 136–145)
WBC # BLD AUTO: 9 K/UL (ref 4.6–13.2)

## 2018-12-07 PROCEDURE — 80048 BASIC METABOLIC PNL TOTAL CA: CPT

## 2018-12-07 PROCEDURE — 85025 COMPLETE CBC W/AUTO DIFF WBC: CPT

## 2018-12-07 PROCEDURE — 36415 COLL VENOUS BLD VENIPUNCTURE: CPT

## 2018-12-07 PROCEDURE — 85610 PROTHROMBIN TIME: CPT

## 2018-12-07 RX ORDER — ISOSORBIDE MONONITRATE 30 MG/1
TABLET, EXTENDED RELEASE ORAL
Qty: 90 TAB | Refills: 2 | Status: SHIPPED | OUTPATIENT
Start: 2018-12-07 | End: 2019-01-03

## 2018-12-07 RX ORDER — SODIUM CHLORIDE 0.9 % (FLUSH) 0.9 %
5-10 SYRINGE (ML) INJECTION EVERY 8 HOURS
Status: CANCELLED | OUTPATIENT
Start: 2018-12-07

## 2018-12-07 RX ORDER — SODIUM CHLORIDE 0.9 % (FLUSH) 0.9 %
5-10 SYRINGE (ML) INJECTION AS NEEDED
Status: CANCELLED | OUTPATIENT
Start: 2018-12-07

## 2018-12-07 NOTE — PROGRESS NOTES
Patient being seen for assessment of left arm fistula. Arm is slightly swollen and absent of bruit and thrill. Patient had stent placed by another vascular surgeon back in June and patient wanted Dr. Lela Bauman to evaluate access. Dr. Lela Bauman evaluated patient this visit and will schedule patient for a fistulogram on Monday. Patient is in agreement with plan.

## 2018-12-07 NOTE — H&P
Surgery History and Physical 
 
Subjective:  
  
Sabi Pete is a 66 y.o.  male who presents with ESRD. Patient being seen for assessment of left arm fistula. Arm is slightly swollen and absent of bruit and thrill. Patient had stent placed by another vascular surgeon back in June and patient wanted Dr. Carolin Newton to evaluate access. Dr. Carolin Newton evaluated patient this visit and will schedule patient for a fistulogram 
 
 
Patient Active Problem List  
 Diagnosis Date Noted  ACP (advance care planning) 07/09/2018  Chest pain with high risk of acute coronary syndrome 06/24/2018  Anemia 06/24/2018  Radiation proctitis 06/06/2018  Gastrointestinal hemorrhage associated with anorectal source 05/28/2018  Lower GI bleed 05/28/2018  GI bleed 05/28/2018  Elevated troponin 01/28/2018  Type 2 diabetes mellitus with nephropathy ([address] 12/18/2017  Hyperkalemia 12/13/2017  Hypotension 12/12/2017  Fluid overload 12/12/2017  ESRD (end stage renal disease) on dialysis ([address] 11/08/2017  Hyperuricemia 10/28/2017  Chronic kidney disease, stage V ([address] 10/28/2017  Chest pain 10/27/2017  Secondary hyperparathyroidism of renal origin ([address] 10/25/2017  Fatigue 08/23/2016  Diastolic CHF, acute on chronic (HCC) 07/05/2016  CHF (congestive heart failure), NYHA class IV ([address] 07/05/2016  APRYL (obstructive sleep apnea) 06/26/2015  Prostate cancer ([address] 04/29/2015  Abdominal pain 03/05/2015  GERD (gastroesophageal reflux disease) 03/05/2015  Constipation 03/05/2015  Chronic renal insufficiency 03/05/2015  Atherosclerosis of renal artery ([address]  Chronic diastolic heart failure ([address]  Morbid obesity ([address]  Essential hypertension, benign  Sleep disturbance  Coronary atherosclerosis  Mixed hyperlipidemia  Type II or unspecified type diabetes mellitus without mention of complication, not stated as uncontrolled  Chest pain, unspecified 03/12/2013  S/P coronary artery stent placement 03/12/2013  Postsurgical aortocoronary bypass status 03/12/2013  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  
 NSTEMI (non-ST elevated myocardial infarction) ([address] 10/07/2012  Coronary atherosclerosis of native coronary artery 10/07/2012  CKD (chronic kidney disease) stage 4, GFR 15-29 ml/min ([facility]) 10/07/2012  
 HTN (hypertension) 10/07/2012  DM2 (diabetes mellitus, type 2) ([address] 10/07/2012  Dyslipidemia 10/07/2012 Past Medical History:  
Diagnosis Date  Atherosclerosis of renal artery ([address] S/P Rt stent  CAD (coronary artery disease) , 1997, 2012  
 ,double bypass, 2 stents, 2stents 7/2016  Cancer [facility])   
 prostate  CHF (congestive heart failure) ([address]  Chronic diastolic heart failure ([address]  Chronic kidney disease  Chronic kidney disease (CKD), stage IV (severe) ([address] On Dialysis now -T-Th-sat  Constipation  Coronary atherosclerosis of unspecified type of vessel, native or graft Stable angina after recent PCI continue treatment.  CRI (chronic renal insufficiency)  Diabetes ([address] 1973  
 type 2  
 Essential hypertension, benign  GERD (gastroesophageal reflux disease)  Gout  Hypertension 1982  Morbid obesity ([address] Weight loss has been strongly encouraged by following dietary restrictions and an exercise routine  APRYL (obstructive sleep apnea) 6/26/2015  
 no cpap  Other and unspecified hyperlipidemia HDL's are not at goal, LDL's are at goal, triglycerides are not at goal.  
 Other and unspecified hyperlipidemia HDL's are not at goal, LDL's are at goal, triglycerides are not at goal.   
 Other ill-defined conditions(799.89) 1960  
 pneumonia twice  Prostate cancer ([address]  Sleep disturbance  Type II or unspecified type diabetes mellitus without mention of complication, not stated as uncontrolled Past Surgical History:  
Procedure Laterality Date [address]  
 lt. carotid endart. [address]  2012, 2016, Nov 2017 Coronary Stent  COLONOSCOPY N/A 6/23/2017 COLONOSCOPY w/ APC w/ polypectomies performed by Surendra Hankins MD at [address]  FLEXIBLE SIGMOIDOSCOPY N/A 1/19/2018 FLEXIBLE SIGMOIDOSCOPY WITH Radio frequency ablation performed by Surendra Hankins MD at [facility] END[facility]COPY [address] N/A 6/8/2018 SIGMOIDOSCOPY FLEXIBLE WITH APC performed by Ida Hopkins MD at [place] [address]  HX CORONARY ARTERY BYPASS GRAFT  2000  
 x2  HX HEENT  1997  
 cholecystectomy  HX UROLOGICAL  1998  
 renal art. surg  HX VASCULAR ACCESS Perma cath for HD- right chest.  
  
Social History Tobacco Use  Smoking status: Never Smoker  Smokeless tobacco: Never Used Substance Use Topics  Alcohol use: No  
  
Family History Problem Relation Age of Onset  Heart Attack Father 64 Prior to Admission medications Medication Sig Start Date End Date Taking? Authorizing Provider  
cinacalcet (SENSIPAR) 30 mg tablet Take 60 mg by mouth daily. Provider, Historical  
insulin glargine (LANTUS) 100 unit/mL injection 35 Units by SubCUTAneous route every twelve (12) hours. 10/24/18   Kim Aviles MD  
fenofibrate (LOFIBRA) 54 mg tablet TAKE ONE TABLET BY MOUTH ONE TIME DAILY 10/1/18   Urszula Coughlin NP  
ASPIR-LOW 81 mg tablet TAKE ONE TABLET BY MOUTH ONE TIME DAILY 9/3/18   Nia Olguin MD  
allopurinol (ZYLOPRIM) 100 mg tablet Monday, Wednesday,friday 7/25/18   Kim Aviles MD  
nitroglycerin (NITROSTAT) 0.4 mg SL tablet 1 Tab by SubLINGual route as needed for Chest Pain. 7/17/18   Alexei Conner NP  
DULCOLAX, BISACODYL, PO Take  by mouth. Provider, Historical  
pantoprazole (PROTONIX) 40 mg tablet Take 40 mg by mouth daily.     Provider, Historical  
 simvastatin (ZOCOR) 40 mg tablet TAKE 1 TABLET BY MOUTH NIGHTLY. 5/9/18   Deandre Lal MD  
losartan (COZAAR) 25 mg tablet Take  by mouth daily. Provider, Historical  
tamsulosin (FLOMAX) 0.4 mg capsule TAKE ONE CAPSULE BY MOUTH ONE TIME DAILY 1/8/18   Ayleen Land MD  
isosorbide mononitrate ER (IMDUR) 30 mg tablet Take 1 Tab by mouth daily. 12/15/17   Tessa Bunn MD  
b complex-vitamin c-folic acid (NEPHROCAPS) 1 mg capsule Take 1 Cap by mouth daily. 10/25/17   Julio Kocher, MD  
metoprolol tartrate (LOPRESSOR) 100 mg IR tablet TAKE 1 TABLET BY MOUTH TWO TIMES A DAY. 7/19/17   Oliver Coughlin, NP Cholecalciferol, Vitamin D3, (DECARA) 50,000 unit cap Take  by mouth every seven (7) days. Provider, Historical  
insulin aspart (NOVOLOG) 100 unit/mL injection 20 units sq 3 times a day,sliding scale Patient taking differently: sliding scale with meals 3/13/13   Deandre Lal MD  
 
Allergies Allergen Reactions  Nka [No Known Allergies] Other (comments) Review of Systems:   
Pertinent items are noted in the History of Present Illness. Objective:  
 
No data found. No data recorded. Physical Exam: 
GENERAL: alert, cooperative, no distress, appears stated age, LUNG: clear to auscultation bilaterally, HEART: regular rate and rhythm, S1, S2 normal, no murmur, click, rub or gallop, EXTREMITIES: left arm avf with arm edema Labs:  
Recent Results (from the past 24 hour(s)) PROTHROMBIN TIME + INR Collection Time: 12/07/18 10:48 AM  
Result Value Ref Range Prothrombin time 13.8 11.5 - 15.2 sec INR 1.1 0.8 - 1.2    
CBC WITH AUTOMATED DIFF Collection Time: 12/07/18 10:48 AM  
Result Value Ref Range WBC 9.0 4.6 - 13.2 K/uL  
 RBC 3.95 (L) 4.70 - 5.50 M/uL  
 HGB 11.8 (L) 13.0 - 16.0 g/dL HCT 37.2 36.0 - 48.0 % MCV 94.2 74.0 - 97.0 FL  
 MCH 29.9 24.0 - 34.0 PG  
 MCHC 31.7 31.0 - 37.0 g/dL  
 RDW 13.4 11.6 - 14.5 % PLATELET 710 981 - 669 K/uL MPV 11.4 9.2 - 11.8 FL  
 NEUTROPHILS 75 (H) 40 - 73 % LYMPHOCYTES 17 (L) 21 - 52 % MONOCYTES 6 3 - 10 % EOSINOPHILS 2 0 - 5 % BASOPHILS 0 0 - 2 %  
 ABS. NEUTROPHILS 6.7 1.8 - 8.0 K/UL  
 ABS. LYMPHOCYTES 1.5 0.9 - 3.6 K/UL  
 ABS. MONOCYTES 0.6 0.05 - 1.2 K/UL  
 ABS. EOSINOPHILS 0.2 0.0 - 0.4 K/UL  
 ABS. BASOPHILS 0.0 0.0 - 0.1 K/UL  
 DF AUTOMATED METABOLIC PANEL, BASIC Collection Time: 12/07/18 10:48 AM  
Result Value Ref Range Sodium 140 136 - 145 mmol/L Potassium 4.8 3.5 - 5.5 mmol/L Chloride 103 100 - 108 mmol/L  
 CO2 28 21 - 32 mmol/L Anion gap 9 3.0 - 18 mmol/L Glucose 88 74 - 99 mg/dL BUN 49 (H) 7.0 - 18 MG/DL Creatinine 5.63 (H) 0.6 - 1.3 MG/DL  
 BUN/Creatinine ratio 9 (L) 12 - 20 GFR est AA 12 (L) >60 ml/min/1.73m2 GFR est non-AA 10 (L) >60 ml/min/1.73m2 Calcium 8.0 (L) 8.5 - 10.1 MG/DL Assessment:  
 
ESRD Plan:  
 
Left arm fistulogram  
 
Signed By: LESLY Liu December 7, 2018

## 2018-12-10 ENCOUNTER — HOSPITAL ENCOUNTER (OUTPATIENT)
Age: 78
Setting detail: OUTPATIENT SURGERY
Discharge: HOME | End: 2018-12-10
Attending: SURGERY | Admitting: SURGERY
Payer: MEDICARE

## 2018-12-10 ENCOUNTER — ANESTHESIA (OUTPATIENT)
Dept: CARDIAC CATH/INVASIVE PROCEDURES | Age: 78
End: 2018-12-10
Payer: MEDICARE

## 2018-12-10 ENCOUNTER — ANESTHESIA EVENT (OUTPATIENT)
Dept: CARDIAC CATH/INVASIVE PROCEDURES | Age: 78
End: 2018-12-10
Payer: MEDICARE

## 2018-12-10 VITALS
SYSTOLIC BLOOD PRESSURE: 166 MMHG | HEIGHT: 63 IN | DIASTOLIC BLOOD PRESSURE: 72 MMHG | RESPIRATION RATE: 22 BRPM | TEMPERATURE: 98.2 F | BODY MASS INDEX: 37.74 KG/M2 | WEIGHT: 213 LBS | HEART RATE: 58 BPM | OXYGEN SATURATION: 99 %

## 2018-12-10 DIAGNOSIS — T82.898A ARTERIOVENOUS FISTULA OCCLUSION (HCC): ICD-10-CM

## 2018-12-10 DIAGNOSIS — N18.6 END STAGE RENAL DISEASE (HCC): ICD-10-CM

## 2018-12-10 LAB
BUN BLD-MCNC: 48 MG/DL (ref 7–18)
CHLORIDE BLD-SCNC: 100 MMOL/L (ref 100–108)
GLUCOSE BLD STRIP.AUTO-MCNC: 96 MG/DL (ref 74–106)
HCT VFR BLD CALC: 37 % (ref 36–49)
HGB BLD-MCNC: 12.6 G/DL (ref 12–16)
POTASSIUM BLD-SCNC: 4.5 MMOL/L (ref 3.5–5.5)
SODIUM BLD-SCNC: 140 MMOL/L (ref 136–145)

## 2018-12-10 PROCEDURE — 74011636320 HC RX REV CODE- 636/320: Performed by: SURGERY

## 2018-12-10 PROCEDURE — C1894 INTRO/SHEATH, NON-LASER: HCPCS | Performed by: SURGERY

## 2018-12-10 PROCEDURE — 36902 INTRO CATH DIALYSIS CIRCUIT: CPT | Performed by: SURGERY

## 2018-12-10 PROCEDURE — 77030013519 HC DEV INFL BASIX MRTM -B: Performed by: SURGERY

## 2018-12-10 PROCEDURE — C1769 GUIDE WIRE: HCPCS | Performed by: SURGERY

## 2018-12-10 PROCEDURE — 82435 ASSAY OF BLOOD CHLORIDE: CPT

## 2018-12-10 PROCEDURE — 77030002916 HC SUT ETHLN J&J -A: Performed by: SURGERY

## 2018-12-10 PROCEDURE — C1725 CATH, TRANSLUMIN NON-LASER: HCPCS | Performed by: SURGERY

## 2018-12-10 PROCEDURE — 76060000031 HC ANESTHESIA FIRST 0.5 HR: Performed by: SURGERY

## 2018-12-10 PROCEDURE — 77030013744: Performed by: SURGERY

## 2018-12-10 PROCEDURE — 74011250636 HC RX REV CODE- 250/636

## 2018-12-10 PROCEDURE — 74011250636 HC RX REV CODE- 250/636: Performed by: SURGERY

## 2018-12-10 RX ORDER — MAGNESIUM SULFATE 100 %
4 CRYSTALS MISCELLANEOUS AS NEEDED
Status: CANCELLED | OUTPATIENT
Start: 2018-12-10

## 2018-12-10 RX ORDER — INSULIN LISPRO 100 [IU]/ML
INJECTION, SOLUTION INTRAVENOUS; SUBCUTANEOUS ONCE
Status: CANCELLED | OUTPATIENT
Start: 2018-12-10 | End: 2018-12-10

## 2018-12-10 RX ORDER — LIDOCAINE HYDROCHLORIDE 20 MG/ML
INJECTION, SOLUTION EPIDURAL; INFILTRATION; INTRACAUDAL; PERINEURAL AS NEEDED
Status: DISCONTINUED | OUTPATIENT
Start: 2018-12-10 | End: 2018-12-10 | Stop reason: HOSPADM

## 2018-12-10 RX ORDER — SODIUM CHLORIDE 0.9 % (FLUSH) 0.9 %
5-10 SYRINGE (ML) INJECTION AS NEEDED
Status: CANCELLED | OUTPATIENT
Start: 2018-12-10

## 2018-12-10 RX ORDER — SODIUM CHLORIDE 0.9 % (FLUSH) 0.9 %
5-10 SYRINGE (ML) INJECTION EVERY 8 HOURS
Status: CANCELLED | OUTPATIENT
Start: 2018-12-10

## 2018-12-10 RX ORDER — HEPARIN SODIUM 1000 [USP'U]/ML
INJECTION, SOLUTION INTRAVENOUS; SUBCUTANEOUS AS NEEDED
Status: DISCONTINUED | OUTPATIENT
Start: 2018-12-10 | End: 2018-12-10 | Stop reason: HOSPADM

## 2018-12-10 RX ORDER — PROPOFOL 10 MG/ML
INJECTION, EMULSION INTRAVENOUS
Status: DISCONTINUED | OUTPATIENT
Start: 2018-12-10 | End: 2018-12-10 | Stop reason: HOSPADM

## 2018-12-10 RX ORDER — LIDOCAINE HYDROCHLORIDE 10 MG/ML
INJECTION, SOLUTION EPIDURAL; INFILTRATION; INTRACAUDAL; PERINEURAL AS NEEDED
Status: DISCONTINUED | OUTPATIENT
Start: 2018-12-10 | End: 2018-12-10 | Stop reason: HOSPADM

## 2018-12-10 RX ORDER — DEXTROSE 50 % IN WATER (D50W) INTRAVENOUS SYRINGE
25-50 AS NEEDED
Status: CANCELLED | OUTPATIENT
Start: 2018-12-10

## 2018-12-10 RX ADMIN — PROPOFOL 50 MCG/KG/MIN: 10 INJECTION, EMULSION INTRAVENOUS at 11:12

## 2018-12-10 RX ADMIN — HEPARIN SODIUM 5000 UNITS: 1000 INJECTION, SOLUTION INTRAVENOUS; SUBCUTANEOUS at 11:15

## 2018-12-10 RX ADMIN — LIDOCAINE HYDROCHLORIDE 20 MG: 20 INJECTION, SOLUTION EPIDURAL; INFILTRATION; INTRACAUDAL; PERINEURAL at 11:12

## 2018-12-10 NOTE — Clinical Note
TRANSFER - IN REPORT:  
 
Verbal report received from: Jolyn Aschoff, RN. Report consisted of patient's Situation, Background, Assessment and  
Recommendations(SBAR). Opportunity for questions and clarification was provided. Assessment completed upon patient's arrival to unit and care assumed. Patient transported with a Cardiac Cath Tech / Patient Care Tech.

## 2018-12-10 NOTE — ANESTHESIA POSTPROCEDURE EVALUATION
Procedure(s): 
FISTULOGRAM LEFT Angioplasty Fistula/Dialysis Circuit. Anesthesia Post Evaluation Multimodal analgesia: multimodal analgesia used between 6 hours prior to anesthesia start to PACU discharge Patient location during evaluation: bedside Patient participation: complete - patient participated Level of consciousness: awake Pain management: adequate Airway patency: patent Anesthetic complications: no 
Cardiovascular status: stable Respiratory status: acceptable Hydration status: acceptable Post anesthesia nausea and vomiting:  controlled Visit Vitals /72 (BP 1 Location: Right arm) Pulse (!) 58 Temp 36.8 °C (98.2 °F) Resp 22 Ht 5' 3\" (1.6 m) Wt 96.6 kg (213 lb) SpO2 99% BMI 37.73 kg/m²

## 2018-12-10 NOTE — Clinical Note
Left brachial prepped with ChloraPrep and draped. Wet prep solution applied at: 1056. Wet prep solution dried at: 1059. Wet prep elapsed drying time: 3 mins.

## 2018-12-10 NOTE — Clinical Note
Peripheral Lesion 1. Balloon inflated using single inflation technique. Pressure = 14 toni; Duration = 27 sec. Pressure = 10 toni; Duration = 35 sec.

## 2018-12-10 NOTE — DISCHARGE INSTRUCTIONS
[address] ARTERIOVENOUS FISTULA, GRAFT ACCESS, REVISION, OR DECLOT    ACTIVITY:  Limited activity today. Keep access arm straight and raised above the level of your heart for 24 hours or until the swelling goes away. DIET:  May have your usual food, unless told otherwise by your doctor. PAIN:  Use the prescription for pain medicine your doctor gave you. If you do not have one, usual your normal pain medicine. If this does not control your pain, call your doctor. DO NOT TAKE ASPIRIN OR MEDICINE WITH ASPIRIN IN IT, unless your doctor says you may. DRESSING CARE:   Keep your dressing clean and dry. Leave your dressing on until the Dialysis Nurse or your doctor takes it off. BLEEDING:  If you have any bleeding put pressure over the bleeding area and call your doctor. CARE OF YOUR ACCESS ARM:  You may have some bruising, swelling, and discoloration for several weeks. After the swelling has gone down, you will be able to see the outline of the graft. By placing your fingertips over the graft you will be able to feel a vibration (thrill) that means blood is flowing and the graft is working. DO:  1. Make sure your arm is washed with soap and water every day. 2. Feel for the [address] at area marked in the picture. 3. Call your Kidney doctor if you do not feel the [address] or for any problems like swelling, redness, tingling, or numbness in access arm, pus, or fever over 101. DO NOT:  1. Wear tight sleeves, watches, belts, or bracelets over the graft. 2. Carry heavy bags across the graft or fistula. 3. Sleep on your graft side. 4. Let your blood pressure be taken in your access arm. 5. Let blood be drawn from your access arm. For the next 24 hours, DO NOT Drive, Drink Alcoholic Beverages, or Make IMPORTANT Decisions. Follow-Up Instructions:  Please see your ordering doctor as he/she has requested.       DISCHARGE SUMMARY from Nurse    PATIENT INSTRUCTIONS:    After general anesthesia or intravenous sedation, for 24 hours or while taking prescription Narcotics:  · Limit your activities  · Do not drive and operate hazardous machinery  · Do not make important personal or business decisions  · Do  not drink alcoholic beverages  · If you have not urinated within 8 hours after discharge, please contact your surgeon on call. Report the following to your surgeon:  · Excessive pain, swelling, redness or odor of or around the surgical area  · Temperature over 100.5  · Nausea and vomiting lasting longer than 4 hours or if unable to take medications  · Any signs of decreased circulation or nerve impairment to extremity: change in color, persistent  numbness, tingling, coldness or increase pain  · Any questions    What to do at Home:    *  Please give a list of your current medications to your Primary Care Provider. *  Please update this list whenever your medications are discontinued, doses are      changed, or new medications (including over-the-counter products) are added. *  Please carry medication information at all times in case of emergency situations. These are general instructions for a healthy lifestyle:    No smoking/ No tobacco products/ Avoid exposure to second hand smoke  Surgeon General's Warning:  Quitting smoking now greatly reduces serious risk to your health. Obesity, smoking, and sedentary lifestyle greatly increases your risk for illness    A healthy diet, regular physical exercise & weight monitoring are important for maintaining a healthy lifestyle    You may be retaining fluid if you have a history of heart failure or if you experience any of the following symptoms:  Weight gain of 3 pounds or more overnight or 5 pounds in a week, increased swelling in our hands or feet or shortness of breath while lying flat in bed.   Please call your doctor as soon as you notice any of these symptoms; do not wait until your next office visit. Recognize signs and symptoms of STROKE:    F-face looks uneven    A-arms unable to move or move unevenly    S-speech slurred or non-existent    T-time-call 911 as soon as signs and symptoms begin-DO NOT go       Back to bed or wait to see if you get better-TIME IS BRAIN. Warning Signs of HEART ATTACK     Call 911 if you have these symptoms:   Chest discomfort. Most heart attacks involve discomfort in the center of the chest that lasts more than a few minutes, or that goes away and comes back. It can feel like uncomfortable pressure, squeezing, fullness, or pain.  Discomfort in other areas of the upper body. Symptoms can include pain or discomfort in one or both arms, the back, neck, jaw, or stomach.  Shortness of breath with or without chest discomfort.  Other signs may include breaking out in a cold sweat, nausea, or lightheadedness. Don't wait more than five minutes to call 911 - MINUTES MATTER! Fast action can save your life. Calling 911 is almost always the fastest way to get lifesaving treatment. Emergency Medical Services staff can begin treatment when they arrive -- up to an hour sooner than if someone gets to the hospital by car. The discharge information has been reviewed with the patient and caregiver. The patient and caregiver verbalized understanding. Discharge medications reviewed with the patient and caregiver and appropriate educational materials and side effects teaching were provided.   ___________________________________________________________________________________________________________________________________    To Reach Us:

## 2018-12-10 NOTE — Clinical Note
TRANSFER - OUT REPORT:  
 
Verbal report given to: Jerilynn Harada, RN. Report consisted of patient's Situation, Background, Assessment and  
Recommendations(SBAR). Opportunity for questions and clarification was provided. Patient transported with a Cardiac Cath Tech / Patient Care Tech. Patient transported to: [address]

## 2018-12-10 NOTE — Clinical Note
Guidewire removed. SWAPNA HOSPITALIST  History and Physical     [facility] Patient Status:  Inpatient    1978 MRN [id number]   [facility] 4SW-A Attending Ivis Zafar MD   Hosp Day # 0 PCP Roel Gates     Chief Complaint: Cristiana Baeza as directed. Disp: 3 tablet Rfl: 0   AmLODIPine Besylate 10 MG Oral Tab Take 1 tablet (10 mg total) by mouth daily. Disp: 30 tablet Rfl: 0   HYDROcodone-acetaminophen 5-325 MG Oral Tab Take 1-2 tablets by mouth every 4 (four) hours as needed for Pain.  Disp extremities. Extremities: No edema or cyanosis. Integument: No rashes or lesions. Psychiatric: Appropriate mood and affect.       Diagnostic Data:      Labs:  Recent Labs   Lab  10/10/18   0001  10/10/18   0046   WBC  20.6*   --    HGB  16.8   --    MCV

## 2018-12-10 NOTE — PROGRESS NOTES
Cath holding summary Patient escorted to cath holding from waiting area ambulatory, alert and oriented x 4, voicing no complaints. Changed into gown and placed on monitor. NPO since MN. Lab results, med rec and H&P reviewed on chart. PIV x 1 inserted without difficulty. Family to bedside. 1130 patient arrived awake and alert, vital signs stable left upper arm site clean and intact with no hematoma present, no C/O pain will continue to monitor. 1240 patient discharged home with family, , vital signs stable left upper arm site clean and intact with no hematoma present, no C/O pain.

## 2018-12-10 NOTE — OP NOTES
[address] [place] [address]  
OPERATIVE REPORT Freeman Sanford 
MR#: [id number] : 1940 ACCOUNT #: [de-identified] DATE OF SERVICE: 12/10/2018 SURGEON: Violet Givens DO  
 
ASSISTANT:  0 
 
PREOPERATIVE DIAGNOSIS:  Dysfunctional left arm fistula, end-stage renal disease cephalic vein occlusion. POSTOPERATIVE DIAGNOSIS:  Dysfunctional left arm fistula, end-stage renal disease cephalic vein occlusion. PROCEDURE PERFORMED:   Left arm fistulogram with radiographic interpretation, central venogram with radiographic interpretation, reflux arteriogram with radiographic interpretation, balloon angioplasty of a long segment occlusion, left arm cephalic vein, balloon angioplasty of stent occlusion, left arm cephalic vein with associated angiographic interpretation, cutting balloon angioplasty mid fistula with associated angiographic interpretation. COMPLICATIONS:  Zero. ESTIMATED BLOOD LOSS:  Zero. CONDITION:  The patient is stable. SPECIMEN REMOVED:  None. IMPLANTS:  None. ANESTHESIA:  Moderate sedation. DETAILS OF PROCEDURE:  The patient is a 72-year-old with a left arm cephalic vein fistula. It is becoming pulsatile, high venous pressures. He just recently went to a center in [address] [place] [address] where he had a cephalic vein stent placed. He is unsure of the nature of this, though. I did look at the operative report. They had a placed a self-expanding stent at the more proximal cephalic vein. He was brought to the cath lab, placed on the table in supine position. He had moderate sedation. The left arm was prepped and draped in usual standard fashion. 1% lidocaine was used for local.  On exam, what I see is patent, but super pulsatile cephalic vein with no thrill. I localized the antecubital fossa, did  a single anterior puncture into the cephalic vein and placed the cephalic vein fistula and placed a 4-Azeri sheath which was dilated in the wire.  I did a reflux arteriogram and then a brachial artery into its bifurcation was patent. The anastomosis to the fistula was patent. The proximal fistula was patent and dilated to a point where it makes the turn and goes into the arm portion and then there is a long segment occlusion with some signs of string sign. I wired this, upsized to a 5-Greek, balloon angioplastied. Also on fistulogram saw the stent had inflow and outflow stenosis. There is a nearly occlusion at the tail end of the stent. Central veins appear patent. I wired all this and brought 8 x 100 balloon and inflated it from the subclavian vein into the cephalic vein then deflated here and brought back and did another segment. I deflated and brought back and did the last segment. There was a resistant area of stenosis that did not open with the last balloon, so I placed a 6.2 cutter here and inflated 2 times therefore giving it a patency. I did the venogram now cephalic vein. There was a long segment occlusion was completely opened and appears well. The stent is patent. The outflow portion of the stent, needs to be re-ballooned so I placed an 8 x 40 in here, through a 6-Greek sheath and angioplasty of the venous outflow of the cephalic vein. FINAL PICTURES:   There was brisk flow and now there is a thrill in his graft. I was pleased with that. I removed the sheath, repaired with a size 3-0 Ethilon and he was transferred to recovery and he can use his fistula right away for dialysis. I anticipate he will need some periodic angioplasty. Thermon Goodpasture, DO CMM / VN 
D: 12/10/2018 13:10    
T: 12/10/2018 14:21 
JOB #: [id number]

## 2018-12-10 NOTE — Clinical Note
Peripheral Lesion 1. Pressure = 6 toni; Duration = 43 sec. Pressure = 10 toni; Duration = 26 sec. Pressure = 12 toni; Duration: 22 sec.

## 2018-12-10 NOTE — Clinical Note
Peripheral Lesion 1. Balloon inflated using single inflation technique. Pressure = 10 toni; Duration = 25 sec. Pressure = 10 toni; Duration = 25 sec. Pressure = 14 toni; Duration: 46 sec.

## 2018-12-17 RX ORDER — ALLOPURINOL 100 MG/1
TABLET ORAL
Qty: 36 TAB | Refills: 0 | Status: SHIPPED | OUTPATIENT
Start: 2018-12-17 | End: 2019-02-26 | Stop reason: SDUPTHER

## 2018-12-21 ENCOUNTER — OFFICE VISIT (OUTPATIENT)
Dept: VASCULAR SURGERY | Age: 78
End: 2018-12-21

## 2018-12-21 VITALS
HEIGHT: 63 IN | WEIGHT: 213 LBS | BODY MASS INDEX: 37.74 KG/M2 | HEART RATE: 60 BPM | SYSTOLIC BLOOD PRESSURE: 90 MMHG | DIASTOLIC BLOOD PRESSURE: 48 MMHG | RESPIRATION RATE: 16 BRPM

## 2018-12-21 DIAGNOSIS — Z99.2 ESRD (END STAGE RENAL DISEASE) ON DIALYSIS (HCC): Primary | ICD-10-CM

## 2018-12-21 DIAGNOSIS — N18.6 ESRD (END STAGE RENAL DISEASE) ON DIALYSIS (HCC): Primary | ICD-10-CM

## 2018-12-21 NOTE — PROGRESS NOTES
1. Have you been to an emergency room or urgent care clinic since your last visit? NO    Hospitalized since your last visit? If yes, where, when, and reason for visit? NO  2. Have you seen or consulted any other health care providers outside of the [facility] since your last visit including any procedures, health maintenance items. If yes, where, when and reason for visit?  NO

## 2018-12-21 NOTE — PROGRESS NOTES
Mr Sav Osorio is here following his recent left arm fistulogram    He had balloon angioplasty of a long segment occlusion, left arm cephalic vein, balloon angioplasty of stent occlusion, and cutting balloon angioplasty mid fistula     He has been dialyzing well. Fistula is patent with good thrill and bruit. Cannulation suture was removed    He did have some very small blistering from what appears to be tape. His wife states that he has had sensitivity to tape in the past, but they have not been using the tape that he normally uses and I think that has caused some of the irritation. We applied some hydrocortisone cream today and gave him some to continue to use.     I also gave him some skin prep to take with him to dialysis that perhaps they can try that before taping him, and perhaps they can even get that and use that there to cut back on some of his skin reactions      We will otherwise see him back as needed with any new problems related to his access

## 2018-12-31 ENCOUNTER — OFFICE VISIT (OUTPATIENT)
Dept: CARDIOLOGY CLINIC | Age: 78
End: 2018-12-31

## 2018-12-31 VITALS
DIASTOLIC BLOOD PRESSURE: 53 MMHG | WEIGHT: 222 LBS | SYSTOLIC BLOOD PRESSURE: 147 MMHG | HEIGHT: 63 IN | HEART RATE: 64 BPM | BODY MASS INDEX: 39.34 KG/M2

## 2018-12-31 DIAGNOSIS — Z95.5 S/P CORONARY ARTERY STENT PLACEMENT: ICD-10-CM

## 2018-12-31 DIAGNOSIS — N18.6 ESRD (END STAGE RENAL DISEASE) (HCC): ICD-10-CM

## 2018-12-31 DIAGNOSIS — I10 ESSENTIAL HYPERTENSION, BENIGN: ICD-10-CM

## 2018-12-31 DIAGNOSIS — I25.119 ATHEROSCLEROSIS OF NATIVE CORONARY ARTERY OF NATIVE HEART WITH ANGINA PECTORIS (HCC): Primary | ICD-10-CM

## 2018-12-31 DIAGNOSIS — R07.9 CHEST PAIN, UNSPECIFIED TYPE: ICD-10-CM

## 2018-12-31 DIAGNOSIS — Z95.1 POSTSURGICAL AORTOCORONARY BYPASS STATUS: ICD-10-CM

## 2018-12-31 DIAGNOSIS — I50.32 CHRONIC DIASTOLIC HEART FAILURE (HCC): ICD-10-CM

## 2018-12-31 NOTE — PROGRESS NOTES
HISTORY OF PRESENT ILLNESS  Paula Arriola is a 66 y.o. male. Patient with cad,chf,post cabg and pci  . recent admission with chf,acs,non q mi  Had vg to [facility] pci-11/2017 12/2017-admitted with angina  1/2018-non stemi  12/2018-patient presents with increasing chest pain substernal burning. Uses frequent nitroglycerin. This change in frequency has happened for 2 weeks. CHF   The history is provided by the patient. This is a chronic problem. The problem occurs constantly. The problem has not changed since onset. Associated symptoms include chest pain and shortness of breath. The symptoms are aggravated by exertion. Hypertension   The history is provided by the patient. This is a chronic problem. The problem occurs constantly. The problem has not changed since onset. Associated symptoms include chest pain and shortness of breath. Nothing aggravates the symptoms. Shortness of Breath   The history is provided by the patient. This is a recurrent problem. The problem occurs intermittently. The current episode started more than 1 week ago. Associated symptoms include chest pain. Pertinent negatives include no fever, no cough, no sputum production, no hemoptysis, no wheezing, no PND, no orthopnea, no vomiting, no rash, no leg swelling and no claudication. The problem's precipitants include exercise (>30 mts of treadmill). Chest Pain (Angina)    This is a recurrent problem. The problem has been gradually worsening. The problem occurs daily. The pain is associated with exertion. The pain is present in the substernal region. The quality of the pain is described as pressure-like. The pain does not radiate. Associated symptoms include shortness of breath. Pertinent negatives include no claudication, no cough, no dizziness, no fever, no hemoptysis, no nausea, no orthopnea, no palpitations, no PND, no sputum production, no vomiting and no weakness.        Review of Systems   Constitutional: Negative for chills and fever.   HENT: Negative for nosebleeds. Eyes: Negative for blurred vision and double vision. Respiratory: Positive for shortness of breath. Negative for cough, hemoptysis, sputum production and wheezing. Cardiovascular: Positive for chest pain. Negative for palpitations, orthopnea, claudication, leg swelling and PND. Gastrointestinal: Negative for heartburn, nausea and vomiting. Musculoskeletal: Negative for myalgias. Skin: Negative for rash. Neurological: Negative for dizziness and weakness. Endo/Heme/Allergies: Does not bruise/bleed easily. Family History   Problem Relation Age of Onset    Heart Attack Father 64       Past Medical History:   Diagnosis Date    Atherosclerosis of renal artery (HCC)     S/P Rt stent    CAD (coronary artery disease) , 1997, 2012    ,double bypass, 2 stents, 2stents 7/2016    Cancer ([facility] [address]     prostate    CHF (congestive heart failure) (HCC)     Chronic diastolic heart failure (HCC)     Chronic kidney disease     Chronic kidney disease (CKD), stage IV (severe) (HCC)     On Dialysis now -T-Th-sat    Constipation     Coronary atherosclerosis of unspecified type of vessel, native or graft     Stable angina after recent PCI continue treatment.        CRI (chronic renal insufficiency)     Diabetes ([address] 1973    type 2    Essential hypertension, benign     GERD (gastroesophageal reflux disease)     Gout     Hypertension 1982    Morbid obesity ([address]     Weight loss has been strongly encouraged by following dietary restrictions and an exercise routine    APRYL (obstructive sleep apnea) 6/26/2015    no cpap    Other and unspecified hyperlipidemia     HDL's are not at goal, LDL's are at goal, triglycerides are not at goal.    Other and unspecified hyperlipidemia     HDL's are not at goal, LDL's are at goal, triglycerides are not at goal.     Other ill-defined conditions(799.89) 1960    pneumonia twice    Prostate cancer ([address]     Sleep disturbance     Type II or unspecified type diabetes mellitus without mention of complication, not stated as uncontrolled        Past Surgical History:   Procedure Laterality Date   [address]    lt. carotid endart. [facility] CARDIAC SURG PROCEDURE UNLIST  2012, 2016, Nov 2017    Coronary Stent    COLONOSCOPY N/A 6/23/2017    COLONOSCOPY w/ APC w/ polypectomies performed by Maribel Buchanan MD at [address] N/A 1/19/2018    FLEXIBLE SIGMOIDOSCOPY WITH Radio frequency ablation performed by Maribel Buchanan MD at [address] N/A 6/8/2018    SIGMOIDOSCOPY FLEXIBLE WITH APC performed by Eduar Silva MD at [address] HX CHOLECYSTECTOMY      HX CORONARY ARTERY BYPASS GRAFT  2000    x2    HX HEENT  1997    cholecystectomy    HX UROLOGICAL  1998    renal art. surg    HX VASCULAR ACCESS      Perma cath for HD- right chest.    GA INTRO CATH DIALYSIS CIRCUIT DX ANGRPH FLUOR S&I N/A 12/10/2018    FISTULOGRAM LEFT performed by Francisco J Baig MD at [facility] CATH LAB    GA INTRO CATH DIALYSIS CIRCUIT W/TRLUML BALO ANGIOP N/A 12/10/2018    Angioplasty Fistula/Dialysis Circuit performed by Francisco J Baig MD at [facility] CATH LAB       Allergies   Allergen Reactions    Nka [No Known Allergies] Other (comments)       Current Outpatient Medications   Medication Sig    allopurinol (ZYLOPRIM) 100 mg tablet TAKE ONE TABLET BY MOUTH ON MONDAY, WEDNESDAY AND FRIDAY    isosorbide mononitrate ER (IMDUR) 30 mg tablet TAKE ONE TABLET BY MOUTH ONE TIME DAILY    cinacalcet (SENSIPAR) 30 mg tablet Take 60 mg by mouth daily.  insulin glargine (LANTUS) 100 unit/mL injection 35 Units by SubCUTAneous route every twelve (12) hours.     fenofibrate (LOFIBRA) 54 mg tablet TAKE ONE TABLET BY MOUTH ONE TIME DAILY    ASPIR-LOW 81 mg tablet TAKE ONE TABLET BY MOUTH ONE TIME DAILY    nitroglycerin (NITROSTAT) 0.4 mg SL tablet 1 Tab by SubLINGual route as needed for Chest Pain.    DULCOLAX, BISACODYL, PO Take  by mouth.  pantoprazole (PROTONIX) 40 mg tablet Take 40 mg by mouth daily.  simvastatin (ZOCOR) 40 mg tablet TAKE 1 TABLET BY MOUTH NIGHTLY.  losartan (COZAAR) 25 mg tablet Take  by mouth daily.  tamsulosin (FLOMAX) 0.4 mg capsule TAKE ONE CAPSULE BY MOUTH ONE TIME DAILY    b complex-vitamin c-folic acid (NEPHROCAPS) 1 mg capsule Take 1 Cap by mouth daily.  metoprolol tartrate (LOPRESSOR) 100 mg IR tablet TAKE 1 TABLET BY MOUTH TWO TIMES A DAY.  Cholecalciferol, Vitamin D3, (DECARA) 50,000 unit cap Take  by mouth every seven (7) days.  insulin aspart (NOVOLOG) 100 unit/mL injection 20 units sq 3 times a day,sliding scale (Patient taking differently: sliding scale with meals)     No current facility-administered medications for this visit. Visit Vitals  /53   Pulse 64   Ht 5' 3\" (1.6 m)   Wt 100.7 kg (222 lb)   BMI 39.33 kg/m²         Physical Exam   Constitutional: He is oriented to person, place, and time. He appears well-developed and well-nourished. obese   HENT:   Head: Normocephalic and atraumatic. Eyes: Conjunctivae are normal.   Neck: Neck supple. No JVD present. No tracheal deviation present. No thyromegaly present. Cardiovascular: Normal rate, regular rhythm and normal heart sounds. Exam reveals no gallop and no friction rub. No murmur heard. Pulmonary/Chest: Breath sounds normal. No respiratory distress. He has no wheezes. He has no rales. He exhibits no tenderness. Abdominal: Soft. There is no tenderness. Musculoskeletal: He exhibits no edema. Neurological: He is alert and oriented to person, place, and time. Skin: Skin is warm and dry. Psychiatric: He has a normal mood and affect. Mr. Meredith Hussein has a reminder for a \"due or due soon\" health maintenance. I have asked that he contact his primary care provider for follow-up on this health maintenance.     CARDIOLOGY STUDIES 10/8/2012   Cardiac Cath Result PATENT LIMA TO LAD, SVG TO D1 SUBTOTAL DIFFUSE, POST PCI WITH STENTS,HAD PROXIMAL IN STENT PROTRUSION UNABLE TO CROSS WITH BALLONS,D1 DIFFISE SEVERE   Echocardiogram - Complete Result NORMAL EF 70%     SUMMARY:echo:11/2014  Left ventricle: Systolic function was hyperdynamic. Ejection fraction was  estimated in the range of 70 % to 75 %. No obvious wall motion  abnormalities identified in the views obtained. Near cavity obliteration  seen in the mid to apical portions of the ventricular cavity. There was  mild concentric hypertrophy. Features were consistent with a pseudonormal  left ventricular filling pattern, with concomitant abnormal relaxation and  increased filling pressure (grade 2 diastolic dysfunction). NUCLEAR IMAGING:    Findings: stress test:11/2014  1. Post-stress imaging in short axis, horizontal and vertical long axis views reveals normal isotope uptake in all areas. 2. Resting images also show normal isotope uptake in all areas. 3. Gated images show normal left ventricular size, wall motion and systolic function. Ejection fraction is 85%. Diagnosis:   1. Normal scan. 2. No evidence of significant fixed or reversible defect suggesting ischemia or myocardial infarction noted from this nuclear study. 3. low risk scan.  7/2016-pci  vg to diag  11/2017-pci svg-diag  I have personally reviewed patient's records available from hospital and other providers and incorporated findings in patient care. 1/2018-[facility]        Assessment         ICD-10-CM ICD-9-CM    1. Chest pain, unspecified type R07.9 786.50 AMB POC EKG ROUTINE W/ 12 LEADS, INTER & REP   2. Atherosclerosis of native coronary artery of native heart with angina pectoris ([address] I25.119 414.01      413.9    3. Chronic diastolic heart failure (HCC) I50.32 428.32    4. Essential hypertension, benign I10 401.1    5. ESRD (end stage renal disease) ([address] N18.6 585.6    6. Postsurgical aortocoronary bypass status Z95.1 V45.81    7.  S/P coronary artery stent placement Z95.5 V45.82        11/2018  Cardiac status stable. Off Plavix due to GI bleed but hemoglobin is stabilized. Last hemoglobin 11. Cholesterol 156. Continue current treatment. Occasional.  Of low blood pressure will change metoprolol as needed to 50 mg twice a day instead of 100 mg twice a day  12/2018  Crescendo angina for 2 weeks. Frequent use of nitroglycerin. Will set up for cardiac catheterization. Advised to come to emergency room if symptoms are persistent  There are no discontinued medications.     Orders Placed This Encounter    AMB POC EKG ROUTINE W/ 12 LEADS, INTER & REP     Order Specific Question:   Reason for Exam:     Answer:   chest pain       Follow-up Disposition: Not on File

## 2018-12-31 NOTE — PATIENT INSTRUCTIONS
Instructions    Patients Name:  Lillian Berry    1. You are scheduled to have a CATH on Lloyd. 3, 2019  at  Please check in at 9:30 .     2. Please go to [facility] and park in the outpatient parking lot that is located around to the back of the hospital and enter through the [employer]. Once you enter through the [facility] check in with the  there. The  will either give you directions or assist you in getting to the cath holding area. 3. You are not to eat anything after midnight before the procedure. Please continue to drink fluids up until midnight.  (water, juice, black coffee, soft drinks). Do not drink milk or milk products or anything with cream. You may take medications(except for diabetes) with a small sip of water before 6am on the day of the procedure. .    4. If you are diabetic, do not take your insulin/sugar pill the morning of the procedure. 5. MEDICATION INSTRUCTIONS:   Please take your morning medications with the following special instructions:    [x]          Please make sure to take your Blood pressure medication with just enough water to swallow: .    []          Take your Aspirin and/or Plavix. []          Stop your Coumadin on   and do not resume it until after the procedure.     []          Take Prednisone 60 mg and Benadryl 25 mg by mouth at Bedtime on  and again on  at. This is to prevent you from having an allergic reaction to the dye. 6. We encourage families to wait in the waiting room on the first floor while the procedure is being done. The Doctor will come out and talk with you as soon as the procedure is over. 7. There is the possibility that you may spend the night in the hospital, depending on the results of the procedure. This will be determined after the procedure is done. If angioplasty or stent is planned, you will stay at least one day.     8. If you or your family have any questions, please call our office Monday -Friday, 9:00 a.m.-4:30 p.m.,  At [phone], and ask to speak to one of the nurses.

## 2018-12-31 NOTE — PROGRESS NOTES
1. Have you been to the ER, urgent care clinic since your last visit? Hospitalized since your last visit?     no  2. Have you seen or consulted any other health care providers outside of the [address] [place] [address] since your last visit? Include any pap smears or colon screening. Yes Where: pcp     3. Since your last visit, have you had any of the following symptoms? chest pains and shortness of breath.

## 2019-01-01 ENCOUNTER — TELEPHONE (OUTPATIENT)
Dept: VASCULAR SURGERY | Age: 79
End: 2019-01-01

## 2019-01-01 ENCOUNTER — TELEPHONE (OUTPATIENT)
Dept: CARDIOLOGY CLINIC | Age: 79
End: 2019-01-01

## 2019-01-01 ENCOUNTER — APPOINTMENT (OUTPATIENT)
Dept: GENERAL RADIOLOGY | Age: 79
DRG: 286 | End: 2019-01-01
Attending: EMERGENCY MEDICINE
Payer: MEDICARE

## 2019-01-01 ENCOUNTER — HOSPITAL ENCOUNTER (INPATIENT)
Age: 79
LOS: 2 days | Discharge: HOME HEALTH | DRG: 286 | End: 2019-01-03
Attending: EMERGENCY MEDICINE | Admitting: INTERNAL MEDICINE
Payer: MEDICARE

## 2019-01-01 ENCOUNTER — HOSPITAL ENCOUNTER (OUTPATIENT)
Dept: LAB | Age: 79
Discharge: HOME | End: 2019-07-10
Payer: MEDICARE

## 2019-01-01 ENCOUNTER — OFFICE VISIT (OUTPATIENT)
Dept: FAMILY MEDICINE CLINIC | Age: 79
End: 2019-01-01

## 2019-01-01 ENCOUNTER — HOSPITAL ENCOUNTER (OUTPATIENT)
Dept: ULTRASOUND IMAGING | Age: 79
Discharge: HOME | End: 2019-06-26
Attending: INTERNAL MEDICINE
Payer: MEDICARE

## 2019-01-01 ENCOUNTER — OFFICE VISIT (OUTPATIENT)
Dept: CARDIOLOGY CLINIC | Age: 79
End: 2019-01-01

## 2019-01-01 ENCOUNTER — TELEPHONE (OUTPATIENT)
Dept: FAMILY MEDICINE CLINIC | Age: 79
End: 2019-01-01

## 2019-01-01 VITALS
SYSTOLIC BLOOD PRESSURE: 152 MMHG | BODY MASS INDEX: 30.92 KG/M2 | WEIGHT: 204 LBS | HEIGHT: 68 IN | HEART RATE: 57 BPM | RESPIRATION RATE: 18 BRPM | DIASTOLIC BLOOD PRESSURE: 64 MMHG | TEMPERATURE: 97.6 F | OXYGEN SATURATION: 99 %

## 2019-01-01 VITALS
RESPIRATION RATE: 16 BRPM | WEIGHT: 206 LBS | OXYGEN SATURATION: 99 % | SYSTOLIC BLOOD PRESSURE: 128 MMHG | TEMPERATURE: 97.6 F | HEIGHT: 68 IN | BODY MASS INDEX: 31.22 KG/M2 | DIASTOLIC BLOOD PRESSURE: 70 MMHG | HEART RATE: 67 BPM

## 2019-01-01 VITALS
DIASTOLIC BLOOD PRESSURE: 49 MMHG | HEIGHT: 68 IN | BODY MASS INDEX: 30.92 KG/M2 | HEART RATE: 61 BPM | WEIGHT: 204 LBS | SYSTOLIC BLOOD PRESSURE: 119 MMHG

## 2019-01-01 DIAGNOSIS — G62.9 NEUROPATHY: ICD-10-CM

## 2019-01-01 DIAGNOSIS — I50.32 CHRONIC DIASTOLIC HEART FAILURE (HCC): ICD-10-CM

## 2019-01-01 DIAGNOSIS — T66.XXXS LATE EFFECT OF RADIATION: ICD-10-CM

## 2019-01-01 DIAGNOSIS — E11.9 TYPE 2 DIABETES MELLITUS WITHOUT COMPLICATION, WITH LONG-TERM CURRENT USE OF INSULIN (HCC): Primary | ICD-10-CM

## 2019-01-01 DIAGNOSIS — N18.6 ESRD (END STAGE RENAL DISEASE) (HCC): ICD-10-CM

## 2019-01-01 DIAGNOSIS — N18.6 ESRD (END STAGE RENAL DISEASE) ON DIALYSIS (HCC): ICD-10-CM

## 2019-01-01 DIAGNOSIS — Z95.5 S/P CORONARY ARTERY STENT PLACEMENT: ICD-10-CM

## 2019-01-01 DIAGNOSIS — I25.119 ATHEROSCLEROSIS OF CORONARY ARTERY OF NATIVE HEART WITH ANGINA PECTORIS, UNSPECIFIED VESSEL OR LESION TYPE (HCC): ICD-10-CM

## 2019-01-01 DIAGNOSIS — Z79.4 TYPE 2 DIABETES MELLITUS WITHOUT COMPLICATION, WITH LONG-TERM CURRENT USE OF INSULIN (HCC): Primary | ICD-10-CM

## 2019-01-01 DIAGNOSIS — Z79.4 TYPE 2 DIABETES MELLITUS WITH DIABETIC NEUROPATHY, WITH LONG-TERM CURRENT USE OF INSULIN (HCC): ICD-10-CM

## 2019-01-01 DIAGNOSIS — I10 ESSENTIAL HYPERTENSION: ICD-10-CM

## 2019-01-01 DIAGNOSIS — R07.9 CHEST PAIN WITH HIGH RISK OF ACUTE CORONARY SYNDROME: Primary | ICD-10-CM

## 2019-01-01 DIAGNOSIS — Z95.1 POSTSURGICAL AORTOCORONARY BYPASS STATUS: ICD-10-CM

## 2019-01-01 DIAGNOSIS — I35.0 NONRHEUMATIC AORTIC VALVE STENOSIS: ICD-10-CM

## 2019-01-01 DIAGNOSIS — Z99.2 ESRD (END STAGE RENAL DISEASE) ON DIALYSIS (HCC): ICD-10-CM

## 2019-01-01 DIAGNOSIS — E11.9 COMPREHENSIVE DIABETIC FOOT EXAMINATION, TYPE 2 DM, ENCOUNTER FOR (HCC): ICD-10-CM

## 2019-01-01 DIAGNOSIS — R10.11 RUQ PAIN: ICD-10-CM

## 2019-01-01 DIAGNOSIS — L21.9 SEBORRHEIC DERMATITIS OF SCALP: ICD-10-CM

## 2019-01-01 DIAGNOSIS — I25.119 CORONARY ARTERY DISEASE INVOLVING NATIVE CORONARY ARTERY WITH ANGINA PECTORIS (HCC): ICD-10-CM

## 2019-01-01 DIAGNOSIS — E78.5 DYSLIPIDEMIA: ICD-10-CM

## 2019-01-01 DIAGNOSIS — F41.9 ANXIETY: ICD-10-CM

## 2019-01-01 DIAGNOSIS — I50.32 HEART FAILURE, DIASTOLIC, CHRONIC (HCC): ICD-10-CM

## 2019-01-01 DIAGNOSIS — Z79.4 TYPE 2 DIABETES MELLITUS WITHOUT COMPLICATION, WITH LONG-TERM CURRENT USE OF INSULIN (HCC): ICD-10-CM

## 2019-01-01 DIAGNOSIS — I25.119 CORONARY ARTERY DISEASE INVOLVING NATIVE CORONARY ARTERY OF NATIVE HEART WITH ANGINA PECTORIS (HCC): Primary | ICD-10-CM

## 2019-01-01 DIAGNOSIS — Z00.00 ENCOUNTER FOR MEDICARE ANNUAL WELLNESS EXAM: ICD-10-CM

## 2019-01-01 DIAGNOSIS — L30.9 DERMATITIS: ICD-10-CM

## 2019-01-01 DIAGNOSIS — E11.40 TYPE 2 DIABETES MELLITUS WITH DIABETIC NEUROPATHY, WITH LONG-TERM CURRENT USE OF INSULIN (HCC): ICD-10-CM

## 2019-01-01 DIAGNOSIS — E11.9 TYPE 2 DIABETES MELLITUS WITHOUT COMPLICATION, WITH LONG-TERM CURRENT USE OF INSULIN (HCC): ICD-10-CM

## 2019-01-01 DIAGNOSIS — Z71.89 ACP (ADVANCE CARE PLANNING): ICD-10-CM

## 2019-01-01 PROBLEM — I25.10 CAD (CORONARY ARTERY DISEASE): Status: ACTIVE | Noted: 2019-01-01

## 2019-01-01 PROBLEM — I20.0 UNSTABLE ANGINA (HCC): Status: ACTIVE | Noted: 2019-01-01

## 2019-01-01 LAB
ALBUMIN SERPL-MCNC: 3.5 G/DL (ref 3.4–5)
ALBUMIN SERPL-MCNC: 3.5 G/DL (ref 3.4–5)
ALBUMIN/GLOB SERPL: 0.9 {RATIO} (ref 0.8–1.7)
ALBUMIN/GLOB SERPL: 0.9 {RATIO} (ref 0.8–1.7)
ALP SERPL-CCNC: 144 U/L (ref 45–117)
ALP SERPL-CCNC: 92 U/L (ref 45–117)
ALT SERPL-CCNC: 12 U/L (ref 16–61)
ALT SERPL-CCNC: 16 U/L (ref 16–61)
ANION GAP SERPL CALC-SCNC: 6 MMOL/L (ref 3–18)
ANION GAP SERPL CALC-SCNC: 8 MMOL/L (ref 3–18)
APTT PPP: 32.6 SEC (ref 23–36.4)
AST SERPL-CCNC: 14 U/L (ref 15–37)
AST SERPL-CCNC: 8 U/L (ref 15–37)
BASOPHILS # BLD: 0 K/UL (ref 0–0.1)
BASOPHILS NFR BLD: 0 % (ref 0–2)
BILIRUB SERPL-MCNC: 0.4 MG/DL (ref 0.2–1)
BILIRUB SERPL-MCNC: 0.6 MG/DL (ref 0.2–1)
BNP SERPL-MCNC: 2045 PG/ML (ref 0–1800)
BUN SERPL-MCNC: 33 MG/DL (ref 7–18)
BUN SERPL-MCNC: 34 MG/DL (ref 7–18)
BUN/CREAT SERPL: 5 (ref 12–20)
BUN/CREAT SERPL: 7 (ref 12–20)
CALCIUM SERPL-MCNC: 7.5 MG/DL (ref 8.5–10.1)
CALCIUM SERPL-MCNC: 8.1 MG/DL (ref 8.5–10.1)
CHLORIDE SERPL-SCNC: 95 MMOL/L (ref 100–108)
CHLORIDE SERPL-SCNC: 98 MMOL/L (ref 100–108)
CHOLEST SERPL-MCNC: 133 MG/DL
CK MB CFR SERPL CALC: 2.2 % (ref 0–4)
CK MB SERPL-MCNC: 2.3 NG/ML (ref 5–25)
CK SERPL-CCNC: 106 U/L (ref 39–308)
CO2 SERPL-SCNC: 32 MMOL/L (ref 21–32)
CO2 SERPL-SCNC: 33 MMOL/L (ref 21–32)
CREAT SERPL-MCNC: 4.79 MG/DL (ref 0.6–1.3)
CREAT SERPL-MCNC: 7.36 MG/DL (ref 0.6–1.3)
DIFFERENTIAL METHOD BLD: ABNORMAL
EOSINOPHIL # BLD: 0.2 K/UL (ref 0–0.4)
EOSINOPHIL # BLD: 0.2 K/UL (ref 0–0.4)
EOSINOPHIL # BLD: 0.3 K/UL (ref 0–0.4)
EOSINOPHIL NFR BLD: 1 % (ref 0–5)
EOSINOPHIL NFR BLD: 2 % (ref 0–5)
EOSINOPHIL NFR BLD: 3 % (ref 0–5)
ERYTHROCYTE [DISTWIDTH] IN BLOOD BY AUTOMATED COUNT: 12.7 % (ref 11.6–14.5)
ERYTHROCYTE [DISTWIDTH] IN BLOOD BY AUTOMATED COUNT: 12.7 % (ref 11.6–14.5)
ERYTHROCYTE [DISTWIDTH] IN BLOOD BY AUTOMATED COUNT: 14.3 % (ref 11.6–14.5)
EST. AVERAGE GLUCOSE BLD GHB EST-MCNC: 140 MG/DL
GLOBULIN SER CALC-MCNC: 3.8 G/DL (ref 2–4)
GLOBULIN SER CALC-MCNC: 4 G/DL (ref 2–4)
GLUCOSE BLD STRIP.AUTO-MCNC: 120 MG/DL (ref 70–110)
GLUCOSE SERPL-MCNC: 120 MG/DL (ref 74–99)
GLUCOSE SERPL-MCNC: 165 MG/DL (ref 74–99)
HBA1C MFR BLD: 6.5 % (ref 4.2–5.6)
HCT VFR BLD AUTO: 30.7 % (ref 36–48)
HCT VFR BLD AUTO: 31.8 % (ref 36–48)
HCT VFR BLD AUTO: 39.4 % (ref 36–48)
HDLC SERPL-MCNC: 47 MG/DL (ref 40–60)
HDLC SERPL: 2.8 {RATIO} (ref 0–5)
HGB BLD-MCNC: 10.1 G/DL (ref 13–16)
HGB BLD-MCNC: 12 G/DL (ref 13–16)
HGB BLD-MCNC: 9.6 G/DL (ref 13–16)
LDLC SERPL CALC-MCNC: 60.6 MG/DL (ref 0–100)
LIPID PROFILE,FLP: NORMAL
LYMPHOCYTES # BLD: 1.5 K/UL (ref 0.9–3.6)
LYMPHOCYTES # BLD: 1.9 K/UL (ref 0.9–3.6)
LYMPHOCYTES # BLD: 2.1 K/UL (ref 0.9–3.6)
LYMPHOCYTES NFR BLD: 17 % (ref 21–52)
LYMPHOCYTES NFR BLD: 18 % (ref 21–52)
LYMPHOCYTES NFR BLD: 19 % (ref 21–52)
MAGNESIUM SERPL-MCNC: 1.8 MG/DL (ref 1.6–2.6)
MCH RBC QN AUTO: 29.5 PG (ref 24–34)
MCH RBC QN AUTO: 29.7 PG (ref 24–34)
MCH RBC QN AUTO: 30.2 PG (ref 24–34)
MCHC RBC AUTO-ENTMCNC: 30.5 G/DL (ref 31–37)
MCHC RBC AUTO-ENTMCNC: 31.3 G/DL (ref 31–37)
MCHC RBC AUTO-ENTMCNC: 31.8 G/DL (ref 31–37)
MCV RBC AUTO: 93.5 FL (ref 74–97)
MCV RBC AUTO: 94.5 FL (ref 74–97)
MCV RBC AUTO: 99.2 FL (ref 74–97)
MONOCYTES # BLD: 0.6 K/UL (ref 0.05–1.2)
MONOCYTES # BLD: 0.8 K/UL (ref 0.05–1.2)
MONOCYTES # BLD: 0.8 K/UL (ref 0.05–1.2)
MONOCYTES NFR BLD: 7 % (ref 3–10)
MONOCYTES NFR BLD: 7 % (ref 3–10)
MONOCYTES NFR BLD: 8 % (ref 3–10)
NEUTS SEG # BLD: 6.5 K/UL (ref 1.8–8)
NEUTS SEG # BLD: 7 K/UL (ref 1.8–8)
NEUTS SEG # BLD: 8.7 K/UL (ref 1.8–8)
NEUTS SEG NFR BLD: 70 % (ref 40–73)
NEUTS SEG NFR BLD: 74 % (ref 40–73)
NEUTS SEG NFR BLD: 74 % (ref 40–73)
PLATELET # BLD AUTO: 245 K/UL (ref 135–420)
PLATELET # BLD AUTO: 254 K/UL (ref 135–420)
PLATELET # BLD AUTO: 293 K/UL (ref 135–420)
PMV BLD AUTO: 10.2 FL (ref 9.2–11.8)
PMV BLD AUTO: 10.6 FL (ref 9.2–11.8)
PMV BLD AUTO: 10.8 FL (ref 9.2–11.8)
POTASSIUM SERPL-SCNC: 4.1 MMOL/L (ref 3.5–5.5)
POTASSIUM SERPL-SCNC: 4.7 MMOL/L (ref 3.5–5.5)
PROT SERPL-MCNC: 7.3 G/DL (ref 6.4–8.2)
PROT SERPL-MCNC: 7.5 G/DL (ref 6.4–8.2)
RBC # BLD AUTO: 3.25 M/UL (ref 4.7–5.5)
RBC # BLD AUTO: 3.4 M/UL (ref 4.7–5.5)
RBC # BLD AUTO: 3.97 M/UL (ref 4.7–5.5)
SODIUM SERPL-SCNC: 136 MMOL/L (ref 136–145)
SODIUM SERPL-SCNC: 136 MMOL/L (ref 136–145)
TRIGL SERPL-MCNC: 127 MG/DL (ref ?–150)
TROPONIN I BLD-MCNC: <0.04 NG/ML (ref 0–0.08)
TROPONIN I SERPL-MCNC: 0.02 NG/ML (ref 0–0.04)
TROPONIN I SERPL-MCNC: 0.03 NG/ML (ref 0–0.04)
VLDLC SERPL CALC-MCNC: 25.4 MG/DL
WBC # BLD AUTO: 10 K/UL (ref 4.6–13.2)
WBC # BLD AUTO: 11.8 K/UL (ref 4.6–13.2)
WBC # BLD AUTO: 8.8 K/UL (ref 4.6–13.2)

## 2019-01-01 PROCEDURE — 80061 LIPID PANEL: CPT

## 2019-01-01 PROCEDURE — 83735 ASSAY OF MAGNESIUM: CPT

## 2019-01-01 PROCEDURE — 85025 COMPLETE CBC W/AUTO DIFF WBC: CPT

## 2019-01-01 PROCEDURE — 80053 COMPREHEN METABOLIC PANEL: CPT

## 2019-01-01 PROCEDURE — 83036 HEMOGLOBIN GLYCOSYLATED A1C: CPT

## 2019-01-01 PROCEDURE — 83880 ASSAY OF NATRIURETIC PEPTIDE: CPT

## 2019-01-01 PROCEDURE — 99285 EMERGENCY DEPT VISIT HI MDM: CPT

## 2019-01-01 PROCEDURE — 93005 ELECTROCARDIOGRAM TRACING: CPT

## 2019-01-01 PROCEDURE — 85730 THROMBOPLASTIN TIME PARTIAL: CPT

## 2019-01-01 PROCEDURE — 76705 ECHO EXAM OF ABDOMEN: CPT

## 2019-01-01 PROCEDURE — 77030011256 HC DRSG MEPILEX <16IN NO BORD MOLN -A

## 2019-01-01 PROCEDURE — 74011250637 HC RX REV CODE- 250/637: Performed by: INTERNAL MEDICINE

## 2019-01-01 PROCEDURE — 84484 ASSAY OF TROPONIN QUANT: CPT

## 2019-01-01 PROCEDURE — 82550 ASSAY OF CK (CPK): CPT

## 2019-01-01 PROCEDURE — 65270000029 HC RM PRIVATE

## 2019-01-01 PROCEDURE — 82962 GLUCOSE BLOOD TEST: CPT

## 2019-01-01 PROCEDURE — 71045 X-RAY EXAM CHEST 1 VIEW: CPT

## 2019-01-01 PROCEDURE — 36415 COLL VENOUS BLD VENIPUNCTURE: CPT

## 2019-01-01 RX ORDER — ACETAMINOPHEN 325 MG/1
650 TABLET ORAL
Status: DISCONTINUED | OUTPATIENT
Start: 2019-01-01 | End: 2019-01-03 | Stop reason: HOSPADM

## 2019-01-01 RX ORDER — INSULIN LISPRO 100 [IU]/ML
INJECTION, SOLUTION INTRAVENOUS; SUBCUTANEOUS
Status: DISCONTINUED | OUTPATIENT
Start: 2019-01-02 | End: 2019-01-03 | Stop reason: HOSPADM

## 2019-01-01 RX ORDER — ALLOPURINOL 100 MG/1
100 TABLET ORAL DAILY
Status: DISCONTINUED | OUTPATIENT
Start: 2019-01-02 | End: 2019-01-03 | Stop reason: HOSPADM

## 2019-01-01 RX ORDER — CINACALCET 60 MG/1
60 TABLET, FILM COATED ORAL DAILY
Status: DISCONTINUED | OUTPATIENT
Start: 2019-01-02 | End: 2019-01-03 | Stop reason: HOSPADM

## 2019-01-01 RX ORDER — METOPROLOL TARTRATE 50 MG/1
100 TABLET ORAL 2 TIMES DAILY
Status: DISCONTINUED | OUTPATIENT
Start: 2019-01-01 | End: 2019-01-03

## 2019-01-01 RX ORDER — ALLOPURINOL 100 MG/1
TABLET ORAL
Qty: 36 TAB | Refills: 0 | Status: SHIPPED | OUTPATIENT
Start: 2019-01-01 | End: 2020-01-01

## 2019-01-01 RX ORDER — NITROGLYCERIN 0.4 MG/1
0.4 TABLET SUBLINGUAL AS NEEDED
Status: DISCONTINUED | OUTPATIENT
Start: 2019-01-01 | End: 2019-01-03 | Stop reason: HOSPADM

## 2019-01-01 RX ORDER — CLOBETASOL PROPIONATE 0.05 G/100ML
SHAMPOO TOPICAL
Qty: 118 ML | Refills: 1 | Status: SHIPPED | OUTPATIENT
Start: 2019-01-01

## 2019-01-01 RX ORDER — INSULIN ASPART 100 [IU]/ML
INJECTION, SOLUTION INTRAVENOUS; SUBCUTANEOUS
Qty: 30 ML | Refills: 0 | Status: SHIPPED | OUTPATIENT
Start: 2019-01-01 | End: 2020-01-01 | Stop reason: SDUPTHER

## 2019-01-01 RX ORDER — NITROGLYCERIN 40 MG/100ML
0-20 INJECTION INTRAVENOUS
Status: DISCONTINUED | OUTPATIENT
Start: 2019-01-01 | End: 2019-01-02

## 2019-01-01 RX ORDER — NYSTATIN AND TRIAMCINOLONE ACETONIDE 100000; 1 [USP'U]/G; MG/G
CREAM TOPICAL 2 TIMES DAILY
Qty: 60 G | Refills: 0 | Status: SHIPPED | OUTPATIENT
Start: 2019-01-01 | End: 2019-01-01

## 2019-01-01 RX ORDER — MONTELUKAST SODIUM 10 MG/1
TABLET ORAL
Qty: 30 TAB | Refills: 2 | Status: SHIPPED | OUTPATIENT
Start: 2019-01-01 | End: 2020-01-01

## 2019-01-01 RX ORDER — TAMSULOSIN HYDROCHLORIDE 0.4 MG/1
0.4 CAPSULE ORAL DAILY
Status: DISCONTINUED | OUTPATIENT
Start: 2019-01-02 | End: 2019-01-03 | Stop reason: HOSPADM

## 2019-01-01 RX ORDER — PANTOPRAZOLE SODIUM 40 MG/1
40 TABLET, DELAYED RELEASE ORAL DAILY
Status: DISCONTINUED | OUTPATIENT
Start: 2019-01-02 | End: 2019-01-03 | Stop reason: HOSPADM

## 2019-01-01 RX ORDER — ALLOPURINOL 100 MG/1
TABLET ORAL
Qty: 36 TAB | Refills: 0 | Status: SHIPPED | OUTPATIENT
Start: 2019-01-01 | End: 2019-01-01 | Stop reason: SDUPTHER

## 2019-01-01 RX ORDER — INSULIN GLARGINE 100 [IU]/ML
35 INJECTION, SOLUTION SUBCUTANEOUS EVERY 12 HOURS
Status: DISCONTINUED | OUTPATIENT
Start: 2019-01-01 | End: 2019-01-03 | Stop reason: HOSPADM

## 2019-01-01 RX ORDER — LOSARTAN POTASSIUM 25 MG/1
25 TABLET ORAL DAILY
Status: DISCONTINUED | OUTPATIENT
Start: 2019-01-02 | End: 2019-01-03

## 2019-01-01 RX ORDER — BISACODYL 5 MG
5 TABLET, DELAYED RELEASE (ENTERIC COATED) ORAL
Status: DISCONTINUED | OUTPATIENT
Start: 2019-01-01 | End: 2019-01-03 | Stop reason: SDUPTHER

## 2019-01-01 RX ORDER — ASPIRIN 81 MG/1
81 TABLET ORAL DAILY
Status: DISCONTINUED | OUTPATIENT
Start: 2019-01-02 | End: 2019-01-03 | Stop reason: HOSPADM

## 2019-01-01 RX ORDER — SIMVASTATIN 40 MG/1
40 TABLET, FILM COATED ORAL
Status: DISCONTINUED | OUTPATIENT
Start: 2019-01-01 | End: 2019-01-03 | Stop reason: HOSPADM

## 2019-01-01 RX ORDER — AMLODIPINE BESYLATE 2.5 MG/1
TABLET ORAL
Qty: 30 TAB | Refills: 2 | Status: SHIPPED | OUTPATIENT
Start: 2019-01-01 | End: 2020-01-01

## 2019-01-01 RX ORDER — PREGABALIN 25 MG/1
25 CAPSULE ORAL 2 TIMES DAILY
Qty: 60 CAP | Refills: 2 | Status: SHIPPED | OUTPATIENT
Start: 2019-01-01 | End: 2020-01-01

## 2019-01-01 RX ORDER — FENOFIBRATE 48 MG/1
48 TABLET, COATED ORAL DAILY
Status: DISCONTINUED | OUTPATIENT
Start: 2019-01-02 | End: 2019-01-03 | Stop reason: HOSPADM

## 2019-01-01 RX ORDER — ISOSORBIDE MONONITRATE 30 MG/1
30 TABLET, EXTENDED RELEASE ORAL DAILY
Status: CANCELLED | OUTPATIENT
Start: 2019-01-02

## 2019-01-01 NOTE — Clinical Note
TRANSFER - IN REPORT:  
 
Verbal report received from: Marcos Chauhan RN. Tuan Riley Report consisted of patient's Situation, Background, Assessment and  
Recommendations(SBAR). Opportunity for questions and clarification was provided. Assessment completed upon patient's arrival to unit and care assumed. Patient transported with a Cardiac Cath Tech / Patient Care Tech.

## 2019-01-01 NOTE — ED TRIAGE NOTES
\"patient complain of chest pressure [id number]. Patient said he has been taking 3 nitro's a day and it hasn't been helping. Patient went to Dr. Mohsen Martinez and continued to have chest pain. Dr. Mohsen Martinez suggested patient come to ER for evaluation

## 2019-01-01 NOTE — H&P
History & Physical 
Patient: Duong Baxter MRN: [id number]  CSN: [id number] YOB: 1940  Age: 66 y.o. Sex: male DOA: 1/1/2019 Chief Complaint Patient presents with  Chest Pain HPI:  
 
Duong Baxter is a 66 y.o. male with a PMHx of CAD post status, double bypass, 2 stents placed, CHD, CKD, prostate CA, and other noted PMHx who presents with waxing and waning, moderate, tightening CP onset x \"this morning\" with no associated Sx. Pt reports he was walking to bathroom when the pain began. Pt has taken 4 nitros today and an aspirin, and the last nitro taken was x 30 minutes ago PTA. Pt goes to dialysis Tuesday, Thursday, Saturday, and attended his session today. He states he only completed 3 hours of 4. Pt was seen yesterday by Dr. Reg Freeman for his CP being present for 2 weeks, and is currently taking Plavix for a GI bleed. Pt is scheduled, by Dr. Leonard Castillo, for a cath on 1/3/2018. Denies new onset of SOB and leg swelling. No further concerns or complaints at this time. Dr. Leonard Castillo was notified and he wanted to do the cath a day sooner now and want the patient to be admitted. Past Medical History:  
Diagnosis Date  Atherosclerosis of renal artery ([address] S/P Rt stent  CAD (coronary artery disease) , 1997, 2012  
 ,double bypass, 2 stents, 2stents 7/2016  Cancer [facility])   
 prostate  CHF (congestive heart failure) ([address]  Chronic diastolic heart failure ([address]  Chronic kidney disease  Chronic kidney disease (CKD), stage IV (severe) ([address] On Dialysis now -T-Th-sat  Constipation  Coronary atherosclerosis of unspecified type of vessel, native or graft Stable angina after recent PCI continue treatment.  CRI (chronic renal insufficiency)  Diabetes ([address] 1973  
 type 2  
 Essential hypertension, benign  GERD (gastroesophageal reflux disease)  Gout  Hypertension 1982  Morbid obesity ([address] Weight loss has been strongly encouraged by following dietary restrictions and an exercise routine  APRYL (obstructive sleep apnea) 6/26/2015  
 no cpap  Other and unspecified hyperlipidemia HDL's are not at goal, LDL's are at goal, triglycerides are not at goal.  
 Other and unspecified hyperlipidemia HDL's are not at goal, LDL's are at goal, triglycerides are not at goal.   
 Other ill-defined conditions(799.89) 1960  
 pneumonia twice  Prostate cancer ([facility]  Sleep disturbance  Type II or unspecified type diabetes mellitus without mention of complication, not stated as uncontrolled Past Surgical History:  
Procedure Laterality Date [address]  
 lt. carotid endart. [address]  2012, 2016, Nov 2017 Coronary Stent  COLONOSCOPY N/A 6/23/2017 COLONOSCOPY w/ APC w/ polypectomies performed by Morris Townsend MD at [address] [facility] [address]  FLEXIBLE SIGMOIDOSCOPY N/A 1/19/2018 FLEXIBLE SIGMOIDOSCOPY WITH Radio frequency ablation performed by Morris Townsend MD at [facility] ENDOSCOPY 2021 [address] Liset Pinon N/A 6/8/2018 SIGMOIDOSCOPY FLEXIBLE WITH APC performed by Rosalio Bucio MD at [address]  HX CORONARY ARTERY BYPASS GRAFT  2000  
 x2  HX HEENT  1997  
 cholecystectomy  HX UROLOGICAL  1998  
 renal art. surg  HX VASCULAR ACCESS Perma cath for HD- right chest.  
 NC INTRO CATH DIALYSIS CIRCUIT DX ANGRPH FLUOR S&I N/A 12/10/2018 FISTULOGRAM LEFT performed by Akilah Miranda MD at [facility] CATH [facility]  NC INTRO CATH DIALYSIS CIRCUIT W/TRLUML BALO ANGIOP N/A 12/10/2018 Angioplasty Fistula/Dialysis Circuit performed by Akilah Miranda MD at [address] [place] [address] Family History Problem Relation Age of Onset  Heart Attack Father 64 Social History Socioeconomic History  Marital status:  Spouse name: Not on file  Number of children: Not on file  Years of education: Not on file  Highest education level: Not on file Tobacco Use  Smoking status: Never Smoker  Smokeless tobacco: Never Used Substance and Sexual Activity  Alcohol use: No  
 Drug use: No  
 
 
Prior to Admission medications Medication Sig Start Date End Date Taking? Authorizing Provider  
allopurinol (ZYLOPRIM) 100 mg tablet TAKE ONE TABLET BY MOUTH ON MONDAY, [facility] AND FRIDAY 12/17/18   Kim Aviles MD  
isosorbide mononitrate ER (IMDUR) 30 mg tablet TAKE ONE TABLET BY MOUTH ONE TIME DAILY 12/7/18   Urszula Coughlin NP  
cinacalcet (SENSIPAR) 30 mg tablet Take 60 mg by mouth daily. Provider, Historical  
insulin glargine (LANTUS) 100 unit/mL injection 35 Units by SubCUTAneous route every twelve (12) hours. 10/24/18   Kim Aviles MD  
fenofibrate (LOFIBRA) 54 mg tablet TAKE ONE TABLET BY MOUTH ONE TIME DAILY 10/1/18   Urszula Coughlin NP  
ASPIR-LOW 81 mg tablet TAKE ONE TABLET BY MOUTH ONE TIME DAILY 9/3/18   Darci Lamb MD  
nitroglycerin (NITROSTAT) 0.4 mg SL tablet 1 Tab by SubLINGual route as needed for Chest Pain. 7/17/18   Alexei Conner NP  
DULCOLAX, BISACODYL, PO Take  by mouth. Provider, Historical  
pantoprazole (PROTONIX) 40 mg tablet Take 40 mg by mouth daily. Provider, Historical  
simvastatin (ZOCOR) 40 mg tablet TAKE 1 TABLET BY MOUTH NIGHTLY. 5/9/18   Sang Romero MD  
losartan (COZAAR) 25 mg tablet Take  by mouth daily. Provider, Historical  
tamsulosin (FLOMAX) 0.4 mg capsule TAKE ONE CAPSULE BY MOUTH ONE TIME DAILY 1/8/18   Eric Wilkins MD  
b complex-vitamin c-folic acid (NEPHROCAPS) 1 mg capsule Take 1 Cap by mouth daily. 10/25/17   Dorinda Arriaza MD  
metoprolol tartrate (LOPRESSOR) 100 mg IR tablet TAKE 1 TABLET BY MOUTH TWO TIMES A DAY. 7/19/17   Bandar Coughlin NP Cholecalciferol, Vitamin D3, (DECARA) 50,000 unit cap Take  by mouth every seven (7) days.     Provider, Historical  
 insulin aspart (NOVOLOG) 100 unit/mL injection 20 units sq 3 times a day,sliding scale Patient taking differently: sliding scale with meals 3/13/13   Jose Coello MD  
 
 
Allergies Allergen Reactions  Nka [No Known Allergies] Other (comments) Review of Systems GENERAL: No fevers or chills. HEENT: No change in vision, no earache, tinnitus, sore throat or sinus congestion. NECK: No pain or stiffness. CARDIOVASCULAR: + chest pain and pressure. No palpitations. PULMONARY: + shortness of breath, cough or wheeze. GASTROINTESTINAL: No abdominal pain, nausea, vomiting or diarrhea, melena or       bright red blood per rectum. GENITOURINARY: No urinary frequency, urgency, hesitancy or dysuria. MUSCULOSKELETAL: No joint or muscle pain, no back pain, no recent trauma. DERMATOLOGIC: No rash, no itching, no lesions. ENDOCRINE: No polyuria, polydipsia, no heat or cold intolerance. No recent change in    weight. HEMATOLOGICAL: No anemia or easy bruising or bleeding. NEUROLOGIC: No headache, seizures, numbness, tingling or weakness. PSYCHIATRIC: No depression, anxiety, mood disorder, no loss of interest in normal       activity or change in sleep pattern. Physical Exam:  
 
Physical Exam: 
Visit Vitals /44 Pulse (!) 58 Temp 98.2 °F (36.8 °C) Resp 17 Ht 5' 7\" (1.702 m) Wt 100.7 kg (222 lb) SpO2 98% BMI 34.77 kg/m² O2 Device: Room air Temp (24hrs), Av.2 °F (36.8 °C), Min:98.2 °F (36.8 °C), Max:98.2 °F (36.8 °C) No intake/output data recorded. No intake/output data recorded. General:  Alert, cooperative, no distress, appears stated age. Head:  Normocephalic, without obvious abnormality, atraumatic. Eyes:  Conjunctivae/corneas clear. PERRL, EOMs intact. Nose: Nares normal. No drainage or sinus tenderness.   
Throat: Lips, mucosa, and tongue normal. Teeth and gums normal.  
Neck: Supple, symmetrical, trachea midline, no adenopathy, thyroid: no enlargement/tenderness/nodules, no carotid bruit and no JVD. Back:   ROM normal. No CVA tenderness. Lungs:   Clear to auscultation bilaterally. Chest wall:  No tenderness or deformity. Heart:  Regular rate and rhythm, S1, S2 normal, no murmur, click, rub or gallop. Abdomen: Soft, non-tender. Bowel sounds normal. No masses,  No organomegaly. Extremities: Extremities normal, atraumatic, no cyanosis or edema. Pulses: 2+ and symmetric all extremities. Skin: Skin color, texture, turgor normal. No rashes or lesions Neurologic: CNII-XII intact. No focal motor or sensory deficit. Labs Reviewed: 
 
Recent Results (from the past 24 hour(s)) EKG, 12 LEAD, INITIAL Collection Time: 01/01/19  2:08 PM  
Result Value Ref Range Ventricular Rate 62 BPM  
 Atrial Rate 62 BPM  
 P-R Interval 162 ms QRS Duration 98 ms Q-T Interval 432 ms QTC Calculation (Bezet) 438 ms Calculated P Axis 40 degrees Calculated R Axis 11 degrees Calculated T Axis 64 degrees Diagnosis Normal sinus rhythm Normal ECG When compared with ECG of 29-SEP-2018 14:00, Incomplete right bundle branch block is no longer present CARDIAC PANEL,(CK, CKMB & TROPONIN) Collection Time: 01/01/19  2:35 PM  
Result Value Ref Range  39 - 308 U/L  
 CK - MB 2.3 <3.6 ng/ml CK-MB Index 2.2 0.0 - 4.0 % Troponin-I, QT 0.02 0.0 - 0.045 NG/ML  
CBC WITH AUTOMATED DIFF Collection Time: 01/01/19  2:35 PM  
Result Value Ref Range WBC 8.8 4.6 - 13.2 K/uL  
 RBC 3.40 (L) 4.70 - 5.50 M/uL  
 HGB 10.1 (L) 13.0 - 16.0 g/dL HCT 31.8 (L) 36.0 - 48.0 % MCV 93.5 74.0 - 97.0 FL  
 MCH 29.7 24.0 - 34.0 PG  
 MCHC 31.8 31.0 - 37.0 g/dL  
 RDW 12.7 11.6 - 14.5 % PLATELET 376 239 - 091 K/uL MPV 10.8 9.2 - 11.8 FL  
 NEUTROPHILS 74 (H) 40 - 73 % LYMPHOCYTES 17 (L) 21 - 52 % MONOCYTES 7 3 - 10 % EOSINOPHILS 2 0 - 5 % BASOPHILS 0 0 - 2 %  
 ABS. NEUTROPHILS 6.5 1.8 - 8.0 K/UL ABS. LYMPHOCYTES 1.5 0.9 - 3.6 K/UL  
 ABS. MONOCYTES 0.6 0.05 - 1.2 K/UL  
 ABS. EOSINOPHILS 0.2 0.0 - 0.4 K/UL  
 ABS. BASOPHILS 0.0 0.0 - 0.1 K/UL  
 DF AUTOMATED MAGNESIUM Collection Time: 01/01/19  2:35 PM  
Result Value Ref Range Magnesium 1.8 1.6 - 2.6 mg/dL METABOLIC PANEL, COMPREHENSIVE Collection Time: 01/01/19  2:35 PM  
Result Value Ref Range Sodium 136 136 - 145 mmol/L Potassium 4.1 3.5 - 5.5 mmol/L Chloride 98 (L) 100 - 108 mmol/L  
 CO2 32 21 - 32 mmol/L Anion gap 6 3.0 - 18 mmol/L Glucose 165 (H) 74 - 99 mg/dL BUN 33 (H) 7.0 - 18 MG/DL Creatinine 4.79 (H) 0.6 - 1.3 MG/DL  
 BUN/Creatinine ratio 7 (L) 12 - 20 GFR est AA 14 (L) >60 ml/min/1.73m2 GFR est non-AA 12 (L) >60 ml/min/1.73m2 Calcium 7.5 (L) 8.5 - 10.1 MG/DL Bilirubin, total 0.4 0.2 - 1.0 MG/DL  
 ALT (SGPT) 12 (L) 16 - 61 U/L  
 AST (SGOT) 8 (L) 15 - 37 U/L Alk. phosphatase 144 (H) 45 - 117 U/L Protein, total 7.3 6.4 - 8.2 g/dL Albumin 3.5 3.4 - 5.0 g/dL Globulin 3.8 2.0 - 4.0 g/dL A-G Ratio 0.9 0.8 - 1.7 NT-PRO BNP Collection Time: 01/01/19  2:35 PM  
Result Value Ref Range NT pro-BNP 2,045 (H) 0 - 1,800 PG/ML  
POC TROPONIN-I Collection Time: 01/01/19  2:39 PM  
Result Value Ref Range Troponin-I (POC) <0.04 0.00 - 0.08 ng/mL Procedures/imaging: see electronic medical records for all procedures/Xrays and details which were not copied into this note but were reviewed prior to creation of Plan Assessment/Plan Active Problems: 
1. Unstable angina ([facility] [address] (1/1/2019) - IV heparin. 
- IV nitroglycerine - cardiology consult. 2. CAD (coronary artery disease) (1/1/2019) 
- needs cardiac cath. 3. ESRD 
- consult nephrology. 4. CHF 
- due to CAD 
- might need extra dialysis. 5. Code status - Full code. 6. DVT prophylaxis 
- on IV heparin. Miya Tapia MD 
January 1, 2019

## 2019-01-01 NOTE — PROGRESS NOTES
[facility] report on pt coming to [facility] from [facility]af in ED.  
1900 pt received to [facility]. No c/o pain at this time. Oriented to unit. Call bell in reach. Bedside shift change report given to Sherin Banuelos (oncoming nurse) by Irene Buchanan (offgoing nurse). Report included the following information SBAR, Kardex and MAR.

## 2019-01-01 NOTE — Clinical Note
TRANSFER - OUT REPORT:  
 
Verbal report given to: Pam Castro RN. Olivia Ayers Report consisted of patient's Situation, Background, Assessment and  
Recommendations(SBAR). Opportunity for questions and clarification was provided. Patient transported with a Registered Nurse and [address] [place] [address] / Patient Care Tech. Patient transported to: [address]

## 2019-01-01 NOTE — Clinical Note
Contrast Dose Calculator:  
Patient's age: 66.  
Patient's sex: Male. Patient weight (kg) = 97.3. Creatinine level (mg/dL) = 5.82. Creatinine clearance (mL/min): 14.  
Contrast concentration (mg/mL) = 300. MACD = 83.59 mL. Max Contrast dose per Creatinine Cl calculator = 31.5 mL.

## 2019-01-01 NOTE — ED PROVIDER NOTES
EMERGENCY DEPARTMENT HISTORY AND PHYSICAL EXAM 
 
2:05 PM 
 
Date: 1/1/2019 Patient Name: Adalberto Figueroa History of Presenting Illness No chief complaint on file. Chief Complaint:  Chest Pain History Provided By: Patient Additional History (Context): Adalberto Figueroa is a 66 y.o. male with a PMHx of CAD post status, double bypass, 2 stents placed, CHD, CKD, prostate CA, and other noted PMHx who presents with waxing and waning, moderate, tightening CP onset x \"this morning\" with no associated Sx. Pt reports he was walking to bathroom when the pain began. Pt has taken 4 nitros today and an aspirin, and the last nitro taken was x 30 minutes ago PTA. Pt goes to dialysis Tuesday, Thursday, Saturday, and attended his session today. He states he only completed 3 hours of 4. Pt was seen yesterday by Dr. Ugo Marcial for his CP being present for 2 weeks, and is currently taking Plavix for a GI bleed. Pt is scheduled, by Dr. Robbin Montalvo, for a cath on 1/3/2018. Denies new onset of SOB and leg swelling. No further concerns or complaints at this time. Cardiology: Dr. Ugo Marcial Nephrologist: Dr. Nely Torres PCP: Akua Rick MD 
 
 
 
Duration: \"this morning\" Hours Timing:  Waxing and Waning Location: Central chest 
Quality: Aching Severity: Moderate Modifying Factors: Nitro and aspirin had no significant relief Associated Symptoms: no associated Sx Past History Past Medical History: 
Past Medical History:  
Diagnosis Date  Atherosclerosis of renal artery ([address] S/P Rt stent  CAD (coronary artery disease) , 1997, 2012  
 ,double bypass, 2 stents, 2stents 7/2016  Cancer [facility])   
 prostate  CHF (congestive heart failure) [facility]  Chronic diastolic heart failure ([address]  Chronic kidney disease  Chronic kidney disease (CKD), stage IV (severe) ([address] On Dialysis now -T-Th-sat  Constipation  Coronary atherosclerosis of unspecified type of vessel, native or graft Stable angina after recent PCI continue treatment.  CRI (chronic renal insufficiency)  Diabetes ([address] 1973  
 type 2  
 Essential hypertension, benign  GERD (gastroesophageal reflux disease)  Gout  Hypertension 1982  Morbid obesity ([address] Weight loss has been strongly encouraged by following dietary restrictions and an exercise routine  APRYL (obstructive sleep apnea) 6/26/2015  
 no cpap  Other and unspecified hyperlipidemia HDL's are not at goal, LDL's are at goal, triglycerides are not at goal.  
 Other and unspecified hyperlipidemia HDL's are not at goal, LDL's are at goal, triglycerides are not at goal.   
 Other ill-defined conditions(799.89) 1960  
 pneumonia twice  Prostate cancer ([address]  Sleep disturbance  Type II or unspecified type diabetes mellitus without mention of complication, not stated as uncontrolled Past Surgical History: 
Past Surgical History:  
Procedure Laterality Date [address]  
 lt. carotid endart. [address]  2012, 2016, Nov 2017 Coronary Stent  COLONOSCOPY N/A 6/23/2017 COLONOSCOPY w/ APC w/ polypectomies performed by Melly Posadas MD at [address]  FLEXIBLE SIGMOIDOSCOPY N/A 1/19/2018 FLEXIBLE SIGMOIDOSCOPY WITH Radio frequency ablation performed by Melly Posadas MD at [facility] ENDOSCOPY 2021 Jose Pinon N/A 6/8/2018 SIGMOIDOSCOPY FLEXIBLE WITH APC performed by Jessica Cole MD at [address] [facility]  HX CORONARY ARTERY BYPASS GRAFT  2000  
 x2  HX HEENT  1997  
 cholecystectomy  HX UROLOGICAL  1998  
 renal art. surg  HX VASCULAR ACCESS Perma cath for HD- right chest.  
 [place] INTRO CATH DIALYSIS CIRCUIT DX ANGRPH FLUOR S&I N/A 12/10/2018 FISTULOGRAM LEFT performed by Wolf Buck MD at [facility] CATH LAB  [place] INTRO CATH DIALYSIS CIRCUIT W/TRLUML BALO ANGIOP N/A 12/10/2018 Angioplasty Fistula/Dialysis Circuit performed by Acacia Beck MD at [address] [facility] LAB Family History: 
Family History Problem Relation Age of Onset  Heart Attack Father 64 Social History: 
Social History Tobacco Use  Smoking status: Never Smoker  Smokeless tobacco: Never Used Substance Use Topics  Alcohol use: No  
 Drug use: No  
 
 
Allergies: Allergies Allergen Reactions  Nka [No Known Allergies] Other (comments) Review of Systems Review of Systems Constitutional: Negative for fever. Respiratory: Negative for shortness of breath. Cardiovascular: Positive for chest pain. Negative for leg swelling. All other systems reviewed and are negative. Physical Exam  
 
Patient Vitals for the past 12 hrs: 
 Temp Pulse Resp BP SpO2  
01/01/19 2025 98.5 °F (36.9 °C) 61 18 102/56 96 % 01/01/19 1852 98.6 °F (37 °C) 60 18 127/63 98 % 01/01/19 1801  62 20  100 % 01/01/19 1800  (!) 59 15 (!) 108/39   
01/01/19 1730  (!) 58 17 114/44 98 % 01/01/19 1715  (!) 58 18 105/45 98 % 01/01/19 1700  (!) 58 20 107/41 97 % 01/01/19 1615  (!) 56 19 108/42 98 % 01/01/19 1600  (!) 57 19 104/40 98 % 01/01/19 1545  (!) 58 17 104/43 99 % 01/01/19 1530  (!) 56 19 (!) 105/39 98 % 01/01/19 1515  (!) 59 16 (!) 107/35 99 % 01/01/19 1500  60 20 (!) 119/33 99 % 01/01/19 1419 98.2 °F (36.8 °C) (!) 58 20 117/45 99 % Physical Exam  
Constitutional: He is oriented to person, place, and time. He appears well-developed. HENT:  
Head: Normocephalic and atraumatic. Eyes: Conjunctivae and EOM are normal.  
Neck: Normal range of motion. Cardiovascular: Normal heart sounds. Exam reveals no gallop and no friction rub. No murmur heard. Pulmonary/Chest: Effort normal and breath sounds normal. No stridor. Abdominal: Soft. There is no tenderness. Musculoskeletal: Normal range of motion. He exhibits no tenderness. The left upper extremity has dialysis access. Neurological: He is alert and oriented to person, place, and time. Skin: Skin is warm and dry. He is not diaphoretic. Psychiatric: He has a normal mood and affect. His behavior is normal.  
Nursing note and vitals reviewed. Diagnostic Study Results Labs - Recent Results (from the past 12 hour(s)) EKG, 12 LEAD, INITIAL Collection Time: 01/01/19  2:08 PM  
Result Value Ref Range Ventricular Rate 62 BPM  
 Atrial Rate 62 BPM  
 P-R Interval 162 ms QRS Duration 98 ms Q-T Interval 432 ms QTC Calculation (Bezet) 438 ms Calculated P Axis 40 degrees Calculated R Axis 11 degrees Calculated T Axis 64 degrees Diagnosis Normal sinus rhythm Normal ECG When compared with ECG of 29-SEP-2018 14:00, Incomplete right bundle branch block is no longer present CARDIAC PANEL,(CK, CKMB & TROPONIN) Collection Time: 01/01/19  2:35 PM  
Result Value Ref Range  39 - 308 U/L  
 CK - MB 2.3 <3.6 ng/ml CK-MB Index 2.2 0.0 - 4.0 % Troponin-I, QT 0.02 0.0 - 0.045 NG/ML  
CBC WITH AUTOMATED DIFF Collection Time: 01/01/19  2:35 PM  
Result Value Ref Range WBC 8.8 4.6 - 13.2 K/uL  
 RBC 3.40 (L) 4.70 - 5.50 M/uL  
 HGB 10.1 (L) 13.0 - 16.0 g/dL HCT 31.8 (L) 36.0 - 48.0 % MCV 93.5 74.0 - 97.0 FL  
 MCH 29.7 24.0 - 34.0 PG  
 MCHC 31.8 31.0 - 37.0 g/dL  
 RDW 12.7 11.6 - 14.5 % PLATELET 132 704 - 051 K/uL MPV 10.8 9.2 - 11.8 FL  
 NEUTROPHILS 74 (H) 40 - 73 % LYMPHOCYTES 17 (L) 21 - 52 % MONOCYTES 7 3 - 10 % EOSINOPHILS 2 0 - 5 % BASOPHILS 0 0 - 2 %  
 ABS. NEUTROPHILS 6.5 1.8 - 8.0 K/UL  
 ABS. LYMPHOCYTES 1.5 0.9 - 3.6 K/UL  
 ABS. MONOCYTES 0.6 0.05 - 1.2 K/UL  
 ABS. EOSINOPHILS 0.2 0.0 - 0.4 K/UL  
 ABS. BASOPHILS 0.0 0.0 - 0.1 K/UL  
 DF AUTOMATED MAGNESIUM Collection Time: 01/01/19  2:35 PM  
Result Value Ref Range Magnesium 1.8 1.6 - 2.6 mg/dL METABOLIC PANEL, COMPREHENSIVE  
 Collection Time: 01/01/19  2:35 PM  
Result Value Ref Range Sodium 136 136 - 145 mmol/L Potassium 4.1 3.5 - 5.5 mmol/L Chloride 98 (L) 100 - 108 mmol/L  
 CO2 32 21 - 32 mmol/L Anion gap 6 3.0 - 18 mmol/L Glucose 165 (H) 74 - 99 mg/dL BUN 33 (H) 7.0 - 18 MG/DL Creatinine 4.79 (H) 0.6 - 1.3 MG/DL  
 BUN/Creatinine ratio 7 (L) 12 - 20 GFR est AA 14 (L) >60 ml/min/1.73m2 GFR est non-AA 12 (L) >60 ml/min/1.73m2 Calcium 7.5 (L) 8.5 - 10.1 MG/DL Bilirubin, total 0.4 0.2 - 1.0 MG/DL  
 ALT (SGPT) 12 (L) 16 - 61 U/L  
 AST (SGOT) 8 (L) 15 - 37 U/L Alk. phosphatase 144 (H) 45 - 117 U/L Protein, total 7.3 6.4 - 8.2 g/dL Albumin 3.5 3.4 - 5.0 g/dL Globulin 3.8 2.0 - 4.0 g/dL A-G Ratio 0.9 0.8 - 1.7 NT-PRO BNP Collection Time: 01/01/19  2:35 PM  
Result Value Ref Range NT pro-BNP 2,045 (H) 0 - 1,800 PG/ML  
POC TROPONIN-I Collection Time: 01/01/19  2:39 PM  
Result Value Ref Range Troponin-I (POC) <0.04 0.00 - 0.08 ng/mL Radiologic Studies -  
XR CHEST PORT    (Results Pending) Medical Decision Making ED Course: Progress Notes, Reevaluation, and Consults: 
Consult:  Discussed care with Dr. Keren Wesley, Specialty: Nephrology. Standard discussion; including history of patients chief complaint, available diagnostic results, and treatment course. Will follow patient. 3:04 PM, 1/1/2019 Consult:  Discussed care with Dr. Brynn Masters, Specialty: Cardiology. Standard discussion; including history of patients chief complaint, available diagnostic results, and treatment course. Will follow patient. 4:08 PM, 1/1/2019 Consult:  Discussed care with Dr. Maykel Pizarro, Specialty: Hospitalist. Standard discussion; including history of patients chief complaint, available diagnostic results, and treatment course. Accepts the patient for admission 4:59 PM, 1/1/2019 Provider Notes (Medical Decision Making): Based on my history, physical exam, and diagnostic evaluation, the patient presents to the emergency department with left sided chest pain suggestive of a coronary syndrome. The pt has cardiac risk factors of prior CAD s/p CABG and stents. Their physical exam was unremarkable including normal chest and respiratory exam. The diagnostic evaluation including laboratory data, EKG, and x-ray were non-diagnostic. Based on this evaluation, I do believe the symptoms are concerning for acute coronary syndrome and pt has a high HEART score. I do not believe this is a vascular catastrophe such as a dissection or an acute rupture of an AAA. The pt is low risk for acute thrombo-embolic phenomena. I discussed the case with the hospitalist who will admit. I spoke with his cardiologist who will move the cath up to tomorrow instead of the day after. I have ordered the nitro drip to be at bedside in case he has any more chest pain but he currently does not need it and is stable for the floor. Current Facility-Administered Medications Medication Dose Route Frequency Provider Last Rate Last Dose  nitroglycerin (TRIDIL) 400 mcg/ml infusion  0-20 mcg/min IntraVENous TITRATE Nery Fuchs MD      
 
Current Outpatient Medications Medication Sig Dispense Refill  allopurinol (ZYLOPRIM) 100 mg tablet TAKE ONE TABLET BY MOUTH ON MONDAY, WEDNESDAY AND FRIDAY 36 Tab 0  
 isosorbide mononitrate ER (IMDUR) 30 mg tablet TAKE ONE TABLET BY MOUTH ONE TIME DAILY 90 Tab 2  
 cinacalcet (SENSIPAR) 30 mg tablet Take 60 mg by mouth daily.  insulin glargine (LANTUS) 100 unit/mL injection 35 Units by SubCUTAneous route every twelve (12) hours. 30 mL 3  
 fenofibrate (LOFIBRA) 54 mg tablet TAKE ONE TABLET BY MOUTH ONE TIME DAILY 30 Tab 3  ASPIR-LOW 81 mg tablet TAKE ONE TABLET BY MOUTH ONE TIME DAILY 38 Tab 4  
 nitroglycerin (NITROSTAT) 0.4 mg SL tablet 1 Tab by SubLINGual route as needed for Chest Pain. 25 Tab 1  DULCOLAX, BISACODYL, PO Take  by mouth.  pantoprazole (PROTONIX) 40 mg tablet Take 40 mg by mouth daily.  simvastatin (ZOCOR) 40 mg tablet TAKE 1 TABLET BY MOUTH NIGHTLY. 90 Tab 2  
 losartan (COZAAR) 25 mg tablet Take  by mouth daily.  tamsulosin (FLOMAX) 0.4 mg capsule TAKE ONE CAPSULE BY MOUTH ONE TIME DAILY 90 Cap 3  
 b complex-vitamin c-folic acid (NEPHROCAPS) 1 mg capsule Take 1 Cap by mouth daily. 30 Cap 0  
 metoprolol tartrate (LOPRESSOR) 100 mg IR tablet TAKE 1 TABLET BY MOUTH TWO TIMES A DAY. 180 Tab 2  Cholecalciferol, Vitamin D3, (DECARA) 50,000 unit cap Take  by mouth every seven (7) days.  insulin aspart (NOVOLOG) 100 unit/mL injection 20 units sq 3 times a day,sliding scale (Patient taking differently: sliding scale with meals) 10 mL 0 Vital Signs-Reviewed the patient's vital signs. Pulse Oximetry Analysis -   
EKG: Interpreted by the EP. Time Interpreted: 14:08 Rate: 62 bpm 
 Rhythm: NSR Interpretation: non specific ST changes and normal internal 
 
Records Reviewed: Nursing Notes and Old Medical Records (Time of Review: 2:05 PM) 
-I am the first provider for this patient. 
-I reviewed the vital signs, available nursing notes, past medical history, past surgical history, family history and social history. Diagnosis Clinical Impression: 1. Chest pain with high risk of acute coronary syndrome Disposition: Admission Follow-up Information None Medication List  
  
ASK your doctor about these medications   
allopurinol 100 mg tablet Commonly known as:  ZYLOPRIM 
TAKE ONE TABLET BY MOUTH ON MONDAY, [facility] ASPIR-LOW 81 mg tablet Generic drug:  aspirin delayed-release TAKE ONE TABLET BY MOUTH ONE TIME DAILY 
  
b complex-vitamin c-folic acid 1 mg capsule Commonly known as:  Deryl Cool Take 1 Cap by mouth daily. DECARA 50,000 unit capsule Generic drug:  cholecalciferol DULCOLAX (BISACODYL) PO 
  
fenofibrate 54 mg tablet Commonly known as:  LOFIBRA TAKE ONE TABLET BY MOUTH ONE TIME DAILY 
  
insulin aspart U-100 100 unit/mL injection Commonly known as:  NovoLOG U-100 Insulin aspart 
20 units sq 3 times a day,sliding scale 
  
insulin glargine 100 unit/mL injection Commonly known as:  LANTUS 
35 Units by SubCUTAneous route every twelve (12) hours. isosorbide mononitrate ER 30 mg tablet Commonly known as:  IMDUR 
TAKE ONE TABLET BY MOUTH ONE TIME DAILY losartan 25 mg tablet Commonly known as:  COZAAR 
  
metoprolol tartrate 100 mg IR tablet Commonly known as:  LOPRESSOR 
TAKE 1 TABLET BY MOUTH TWO TIMES A DAY. nitroglycerin 0.4 mg SL tablet Commonly known as:  NITROSTAT 
1 Tab by SubLINGual route as needed for Chest Pain. 
  
pantoprazole 40 mg tablet Commonly known as:  PROTONIX 
  
SENSIPAR 30 mg tablet Generic drug:  cinacalcet 
  
simvastatin 40 mg tablet Commonly known as:  ZOCOR 
TAKE 1 TABLET BY MOUTH NIGHTLY. 
  
tamsulosin 0.4 mg capsule Commonly known as:  FLOMAX TAKE ONE CAPSULE BY MOUTH ONE TIME DAILY 
  
  
 
_______________________________ Attestations: 
Scribe Attestation Jose Angel Snow acting as a scribe for and in the presence of Mikaela Juarez MD     
January 01, 2019 at 2:05 PM 
    
Provider Attestation:     
I personally performed the services described in the documentation, reviewed the documentation, as recorded by the scribe in my presence, and it accurately and completely records my words and actions. January 01, 2019 at 2:05 PM - Mikaela Juarez MD   
_______________________________

## 2019-01-01 NOTE — Clinical Note
Multiple views of the saphenous vein graft to the left anterior descending obtained using hand injection.

## 2019-01-02 LAB
ALBUMIN SERPL-MCNC: 3.4 G/DL (ref 3.4–5)
ALBUMIN/GLOB SERPL: 0.8 {RATIO} (ref 0.8–1.7)
ALP SERPL-CCNC: 132 U/L (ref 45–117)
ALT SERPL-CCNC: 11 U/L (ref 16–61)
ANION GAP SERPL CALC-SCNC: 10 MMOL/L (ref 3–18)
APTT PPP: 30.4 SEC (ref 23–36.4)
AST SERPL-CCNC: 10 U/L (ref 15–37)
ATRIAL RATE: 62 BPM
BASOPHILS # BLD: 0 K/UL (ref 0–0.1)
BASOPHILS NFR BLD: 0 % (ref 0–2)
BILIRUB SERPL-MCNC: 0.5 MG/DL (ref 0.2–1)
BUN SERPL-MCNC: 43 MG/DL (ref 7–18)
BUN/CREAT SERPL: 7 (ref 12–20)
CALCIUM SERPL-MCNC: 7.6 MG/DL (ref 8.5–10.1)
CALCULATED P AXIS, ECG09: 40 DEGREES
CALCULATED R AXIS, ECG10: 11 DEGREES
CALCULATED T AXIS, ECG11: 64 DEGREES
CHLORIDE SERPL-SCNC: 99 MMOL/L (ref 100–108)
CO2 SERPL-SCNC: 29 MMOL/L (ref 21–32)
CREAT SERPL-MCNC: 5.82 MG/DL (ref 0.6–1.3)
DIAGNOSIS, 93000: NORMAL
DIFFERENTIAL METHOD BLD: ABNORMAL
EOSINOPHIL # BLD: 0.3 K/UL (ref 0–0.4)
EOSINOPHIL NFR BLD: 4 % (ref 0–5)
ERYTHROCYTE [DISTWIDTH] IN BLOOD BY AUTOMATED COUNT: 12.9 % (ref 11.6–14.5)
GLOBULIN SER CALC-MCNC: 4.1 G/DL (ref 2–4)
GLUCOSE BLD STRIP.AUTO-MCNC: 106 MG/DL (ref 70–110)
GLUCOSE BLD STRIP.AUTO-MCNC: 204 MG/DL (ref 70–110)
GLUCOSE BLD STRIP.AUTO-MCNC: 66 MG/DL (ref 70–110)
GLUCOSE BLD STRIP.AUTO-MCNC: 68 MG/DL (ref 70–110)
GLUCOSE BLD STRIP.AUTO-MCNC: 81 MG/DL (ref 70–110)
GLUCOSE BLD STRIP.AUTO-MCNC: 89 MG/DL (ref 70–110)
GLUCOSE SERPL-MCNC: 73 MG/DL (ref 74–99)
HCT VFR BLD AUTO: 34.1 % (ref 36–48)
HGB BLD-MCNC: 10.9 G/DL (ref 13–16)
LYMPHOCYTES # BLD: 1.8 K/UL (ref 0.9–3.6)
LYMPHOCYTES NFR BLD: 19 % (ref 21–52)
MCH RBC QN AUTO: 29.9 PG (ref 24–34)
MCHC RBC AUTO-ENTMCNC: 32 G/DL (ref 31–37)
MCV RBC AUTO: 93.7 FL (ref 74–97)
MONOCYTES # BLD: 0.8 K/UL (ref 0.05–1.2)
MONOCYTES NFR BLD: 9 % (ref 3–10)
NEUTS SEG # BLD: 6.4 K/UL (ref 1.8–8)
NEUTS SEG NFR BLD: 68 % (ref 40–73)
P-R INTERVAL, ECG05: 162 MS
PLATELET # BLD AUTO: 240 K/UL (ref 135–420)
PMV BLD AUTO: 10.6 FL (ref 9.2–11.8)
POTASSIUM SERPL-SCNC: 4 MMOL/L (ref 3.5–5.5)
PROT SERPL-MCNC: 7.5 G/DL (ref 6.4–8.2)
Q-T INTERVAL, ECG07: 432 MS
QRS DURATION, ECG06: 98 MS
QTC CALCULATION (BEZET), ECG08: 438 MS
RBC # BLD AUTO: 3.64 M/UL (ref 4.7–5.5)
SODIUM SERPL-SCNC: 138 MMOL/L (ref 136–145)
TROPONIN I SERPL-MCNC: 0.02 NG/ML (ref 0–0.04)
TROPONIN I SERPL-MCNC: <0.02 NG/ML (ref 0–0.04)
VENTRICULAR RATE, ECG03: 62 BPM
WBC # BLD AUTO: 9.4 K/UL (ref 4.6–13.2)

## 2019-01-02 PROCEDURE — 85025 COMPLETE CBC W/AUTO DIFF WBC: CPT

## 2019-01-02 PROCEDURE — 4A023N7 MEASUREMENT OF CARDIAC SAMPLING AND PRESSURE, LEFT HEART, PERCUTANEOUS APPROACH: ICD-10-PCS | Performed by: INTERNAL MEDICINE

## 2019-01-02 PROCEDURE — 74011636320 HC RX REV CODE- 636/320: Performed by: INTERNAL MEDICINE

## 2019-01-02 PROCEDURE — 74011250636 HC RX REV CODE- 250/636: Performed by: INTERNAL MEDICINE

## 2019-01-02 PROCEDURE — 77030016699 HC CATH ANGI DX INFN1 CARD -A: Performed by: INTERNAL MEDICINE

## 2019-01-02 PROCEDURE — B2111ZZ FLUOROSCOPY OF MULTIPLE CORONARY ARTERIES USING LOW OSMOLAR CONTRAST: ICD-10-PCS | Performed by: INTERNAL MEDICINE

## 2019-01-02 PROCEDURE — C1894 INTRO/SHEATH, NON-LASER: HCPCS | Performed by: INTERNAL MEDICINE

## 2019-01-02 PROCEDURE — C1769 GUIDE WIRE: HCPCS | Performed by: INTERNAL MEDICINE

## 2019-01-02 PROCEDURE — 93455 CORONARY ART/GRFT ANGIO S&I: CPT | Performed by: INTERNAL MEDICINE

## 2019-01-02 PROCEDURE — 36415 COLL VENOUS BLD VENIPUNCTURE: CPT

## 2019-01-02 PROCEDURE — 85730 THROMBOPLASTIN TIME PARTIAL: CPT

## 2019-01-02 PROCEDURE — 74011250637 HC RX REV CODE- 250/637: Performed by: INTERNAL MEDICINE

## 2019-01-02 PROCEDURE — 77030013797 HC KT TRNSDUC PRSSR EDWD -A: Performed by: INTERNAL MEDICINE

## 2019-01-02 PROCEDURE — 99152 MOD SED SAME PHYS/QHP 5/>YRS: CPT | Performed by: INTERNAL MEDICINE

## 2019-01-02 PROCEDURE — 74011250636 HC RX REV CODE- 250/636

## 2019-01-02 PROCEDURE — 80053 COMPREHEN METABOLIC PANEL: CPT

## 2019-01-02 PROCEDURE — 82962 GLUCOSE BLOOD TEST: CPT

## 2019-01-02 PROCEDURE — 65270000029 HC RM PRIVATE

## 2019-01-02 PROCEDURE — 84484 ASSAY OF TROPONIN QUANT: CPT

## 2019-01-02 PROCEDURE — 99153 MOD SED SAME PHYS/QHP EA: CPT | Performed by: INTERNAL MEDICINE

## 2019-01-02 PROCEDURE — B2131ZZ FLUOROSCOPY OF MULTIPLE CORONARY ARTERY BYPASS GRAFTS USING LOW OSMOLAR CONTRAST: ICD-10-PCS | Performed by: INTERNAL MEDICINE

## 2019-01-02 RX ORDER — DEXTROSE 50 % IN WATER (D50W) INTRAVENOUS SYRINGE
25-50 AS NEEDED
Status: DISCONTINUED | OUTPATIENT
Start: 2019-01-02 | End: 2019-01-03 | Stop reason: HOSPADM

## 2019-01-02 RX ORDER — DEXTROSE 40 %
1 GEL (GRAM) ORAL AS NEEDED
Status: DISCONTINUED | OUTPATIENT
Start: 2019-01-02 | End: 2019-01-03 | Stop reason: HOSPADM

## 2019-01-02 RX ORDER — MIDAZOLAM HYDROCHLORIDE 1 MG/ML
INJECTION, SOLUTION INTRAMUSCULAR; INTRAVENOUS AS NEEDED
Status: DISCONTINUED | OUTPATIENT
Start: 2019-01-02 | End: 2019-01-02 | Stop reason: HOSPADM

## 2019-01-02 RX ORDER — TEMAZEPAM 15 MG/1
15 CAPSULE ORAL
Status: DISCONTINUED | OUTPATIENT
Start: 2019-01-02 | End: 2019-01-03 | Stop reason: HOSPADM

## 2019-01-02 RX ORDER — FENTANYL CITRATE 50 UG/ML
INJECTION, SOLUTION INTRAMUSCULAR; INTRAVENOUS AS NEEDED
Status: DISCONTINUED | OUTPATIENT
Start: 2019-01-02 | End: 2019-01-02 | Stop reason: HOSPADM

## 2019-01-02 RX ORDER — ADHESIVE BANDAGE
30 BANDAGE TOPICAL
Status: DISCONTINUED | OUTPATIENT
Start: 2019-01-02 | End: 2019-01-03 | Stop reason: HOSPADM

## 2019-01-02 RX ORDER — SODIUM CHLORIDE 0.9 % (FLUSH) 0.9 %
5-10 SYRINGE (ML) INJECTION AS NEEDED
Status: DISCONTINUED | OUTPATIENT
Start: 2019-01-02 | End: 2019-01-03 | Stop reason: HOSPADM

## 2019-01-02 RX ORDER — HYDRALAZINE HYDROCHLORIDE 20 MG/ML
20 INJECTION INTRAMUSCULAR; INTRAVENOUS
Status: DISCONTINUED | OUTPATIENT
Start: 2019-01-02 | End: 2019-01-03 | Stop reason: HOSPADM

## 2019-01-02 RX ORDER — SODIUM CHLORIDE 0.9 % (FLUSH) 0.9 %
5-10 SYRINGE (ML) INJECTION EVERY 8 HOURS
Status: DISCONTINUED | OUTPATIENT
Start: 2019-01-02 | End: 2019-01-03 | Stop reason: HOSPADM

## 2019-01-02 RX ORDER — LIDOCAINE HYDROCHLORIDE 10 MG/ML
INJECTION, SOLUTION EPIDURAL; INFILTRATION; INTRACAUDAL; PERINEURAL AS NEEDED
Status: DISCONTINUED | OUTPATIENT
Start: 2019-01-02 | End: 2019-01-02 | Stop reason: HOSPADM

## 2019-01-02 RX ADMIN — FENOFIBRATE 48 MG: 48 TABLET ORAL at 09:18

## 2019-01-02 RX ADMIN — Medication 10 ML: at 18:37

## 2019-01-02 RX ADMIN — NEPHROCAP 1 CAPSULE: 1 CAP ORAL at 09:18

## 2019-01-02 RX ADMIN — PANTOPRAZOLE SODIUM 40 MG: 40 TABLET, DELAYED RELEASE ORAL at 09:18

## 2019-01-02 RX ADMIN — Medication 10 ML: at 21:19

## 2019-01-02 RX ADMIN — TAMSULOSIN HYDROCHLORIDE 0.4 MG: 0.4 CAPSULE ORAL at 09:18

## 2019-01-02 RX ADMIN — LOSARTAN POTASSIUM 25 MG: 25 TABLET ORAL at 09:18

## 2019-01-02 RX ADMIN — ALLOPURINOL 100 MG: 100 TABLET ORAL at 09:18

## 2019-01-02 RX ADMIN — METOPROLOL TARTRATE 100 MG: 50 TABLET ORAL at 18:32

## 2019-01-02 RX ADMIN — ASPIRIN 81 MG: 81 TABLET, COATED ORAL at 09:19

## 2019-01-02 RX ADMIN — CINACALCET HYDROCHLORIDE 60 MG: 60 TABLET, COATED ORAL at 09:18

## 2019-01-02 RX ADMIN — SIMVASTATIN 40 MG: 40 TABLET, FILM COATED ORAL at 21:20

## 2019-01-02 RX ADMIN — METOPROLOL TARTRATE 100 MG: 50 TABLET ORAL at 09:18

## 2019-01-02 NOTE — PROGRESS NOTES
7:19 PM Beside shift change report given by YARITZA Lomas RN  to LELO Ferguson RN. Report included the information from the [facility] I/O, and recent results. Denies pain or discomfort. Call light within reach. Bed in low position. 7:49 PM approached by . Patient is being non-cooperative with blood draw. Will educate patient on importance of getting PTT levels for continuation of treatment. New  will come up to obtain levels shortly. 9:28 PM paged hospitalist to inquire about missed Nitro drip from 1420 this afternoon. Dr. Adeline Aguilar will review patient chart and look into initial order. Will hold order for now. 11:55 PM paged hospitalist to discuss baseline/maintenance laboratory orders added in the system without a Heparin drip. 4:35 AM Patient states that he is having mild numbness and tingling of BUE with worsening of symptoms on the LUE. Patient states that he's had this for a few months and will discuss with the provider in the morning.

## 2019-01-02 NOTE — PROGRESS NOTES
TRANSFER - OUT REPORT: 
 
Verbal report given to agata BOWEN on [facility]  being transferred to  for routine progression of care Report consisted of patients Situation, Background, Assessment and  
Recommendations(SBAR). Information from the following report(s) SBAR, Procedure Summary and MAR was reviewed with the receiving nurse. Lines:  
Peripheral IV 01/01/19 Right Antecubital (Active) Site Assessment Clean, dry, & intact 1/1/2019  7:19 PM  
Phlebitis Assessment 0 1/1/2019  7:19 PM  
Infiltration Assessment 0 1/1/2019  7:19 PM  
Dressing Status Clean, dry, & intact 1/1/2019  7:19 PM  
Dressing Type Transparent;Tape 1/1/2019  7:19 PM  
Hub Color/Line Status Pink;Patent; Flushed 1/1/2019  7:19 PM  
Action Taken Open ports on tubing capped 1/1/2019  7:19 PM  
Alcohol Cap Used Yes 1/1/2019  7:19 PM  
  
 
Opportunity for questions and clarification was provided. Patient transported with: 
 Registered Nurse

## 2019-01-02 NOTE — PROGRESS NOTES
Reason for Admission:   CAD (coronary artery disease) Unstable angina (HCC) RRAT Score:     47 Resources/supports as identified by patient/family:   Patient reports that he has lots of family supports. He is also open to home health if it is recommended. Top Challenges facing patient (as identified by patient/family and CM): Finances/Medication cost?    No 
 
Transportation     Family will transport home. Support system or lack thereof? Strong family support system. Living arrangements? Lives with his wife. Self-care/ADLs/Cognition? Patient is alert and oriented. Current Advanced Directive/Advance Care Plan:  YES Plan for utilizing home health: There are no home health orders in place at this time. Patient is open to home health if needed. Freedom of choice was completed ([facility]) in case home health is recommended. Likelihood of readmission:   HIGH Transition of Care Plan:    Patient's plan is to return home with help from his family. He may require home health. FOC is already completed and he has elected to use [facility]. Patient's family will transport home at the time of discharge. Initial assessment completed with patient. Face sheet information confirmed:  YES. The patient requests we communicate with his wife, in reference to medical care. This patient lives in a house with his wife. Patient is able to navigate steps as needed. Prior to hospitalization, patient was considered to be independent : YES. If not independent,  patient needs assist with : N/A. Cognitive status of patient:  Alert and oriented. The patient's family will be available to transport patient home upon discharge. The patient already has a cane available in the home. Patient is not currently active with home health.  If active, agency name is N/A. Patient has never stayed in a skilled nursing facility or rehab. List of available home health agencies was reviewed with the patient prior to discharge. Freedom of choice signed for [facility]. Currently, the discharge plan is to return home with help from his family and possible home health. Patient's family will assist with care, as needed, post discharge. Patient's family will transport home at the time of discharge. Patient's wife is Isabelle Grimaldo, [place]#[phone]). Patient's son is Shankar Loyola, #[phone]). Patient's other son is Kellee Becker, #[phone]). Patient's PCP is Dr. Myah Griffiths. Patient is insured through [facility] and he also has [employer]. This writer will continue to closely monitor for discharge planning to ensure a safe discharge home from [facility]. Care Management Interventions PCP Verified by CM: Yes(Dr. Kim Aviles) Palliative Care Criteria Met (RRAT>21 & CHF Dx)?: No 
Mode of Transport at Discharge: Other (see comment)(Family will transport home at the time of discharge.) Transition of Care Consult (CM Consult): Discharge Planning, Home Health [address]: Yes Current Support Network: Lives with Spouse, Family Lives [facility] Follow Up Transport: Family Plan discussed with Pt/Family/Caregiver: Yes Freedom of Choice Offered: Yes Discharge Location Discharge Placement: Home with family assistance(Could possible need home. [place] of choice completed.) Brenda Hoover. MSW Care Manager [phone]

## 2019-01-02 NOTE — PROGRESS NOTES
Pt transport ed from 5 N room 554 to cardiac cath holding area bed 2. Pt placed on cardiac monitor NSR with rate of 62 . Pt is alert and oriented x 4 with no complaints upon arrival to cath holding. Pt understands he is here for a cardiac cath with Dr Gerardo Multani and gives consent for procedure.

## 2019-01-02 NOTE — CONSULTS
[facility] - Consult Note Date of  Admission: 1/1/2019  2:00 PM 
  
Primary Care Physician:  Sarah Gaming MD 
 
 Plan: 1. Recurrent  Angina-patient presents with increasing chest pain substernal burning. Uses frequent nitroglycerin. This change in frequency has happened for 2 weeks- currently chest pain free. Has mild DARBY. Troponin are negative. His ekg NSR with no ischemic changes. Will proceed with Cardiac cath today. Discussed with patient risk and benefits. Patient wants to proceed 2. Hx of CAD h/o CABG 1997, stent to SVG in 2012 and 7/2016, SVG to diagonal 11/2017. Recent NSTEMI 1/2018- on asa, statin and bb. Not on Plavix due to GI bleed in 6/2018 3. Hypertesion- fairly controlled on current medications. BB and ARB 4. Chronic diastolic heart failure- appears compensated- volume status per renal. Monitor for s/s fluid overload          
5. Hyperlipidemia- on statin and Tricor     
6. ESRD on HD  Renal following 7. Diabetes- medications per medical team 
8. 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 Patient understands risk of potential cath complications including but not limited to bleeding, infection, difficulty healing the arteriotomy access site(2 chances in 100) which may require surgical repair, potential thromboembolic complications which could result in stroke, myocardial infarction, loss of limb or organ function and/or death( 1 chance in 1000)and potential allergic reaction to contrast dye or other medication used during the procedure. Patient is also aware of the therapeutic implications for medical management vs coronary artery bypass surgery vs percutaneous coronary intervention in treatment of coronary artery disease.  The additional risks for percutaneous coronary artery intervention include the need for emergent bypass surgery at 1 chance in 100 and for restenosis which may range from 35% for plain balloon angioplasty, 15% for bare metal stent, and 5% for drug eluting stent implants. The need for mandatory uninterrupted dual antiplatelet therapy with lifelong Aspirin combined with Plavix for up to 12 months following drug eluting stents, and minimum 1 month following bare metal stents to prevent stent thrombosis which is the equivalent of acute heart attack has been reviewed in detail. No contraindications to use of drug eluting stents has been discovered. 
  
Patient has been having unstable angina clinically with increasing frequency of chest pains. Discussed with patient, Dr. Isabell Maguire who follows him in the office and reviewed old films. He has had multiple stents in the entire graft of SVG to diagonal.  He will likely need intervention in the same graft but will decide on the angiograms. If needed in the graft, I would likely lean towards doing balloon angioplasty only this time and follow him clinically. This will avoid long-term Plavix as well as another layer of stent in this graft which has about 3 layers of stents. Assessment:  
 
Hospital Problems  Date Reviewed: 12/31/2018 Codes Class Noted POA  
 CAD (coronary artery disease) ICD-10-CM: I25.10 ICD-9-CM: 414.00  1/1/2019 Unknown Unstable angina (HCC) ICD-10-CM: I20.0 ICD-9-CM: 411.1  1/1/2019 Unknown History of Present Illness: This is a 66 y.o. male admitted for CAD (coronary artery disease). Unstable angina ([facility] [address] PMHx of CAD post status, CABG twice. CHD, CKD, prostate CA. The patient presented to the ED with moderate, tightening CP burning sensation onset 2 weeks. Gradually worsening with exertion and using NGT daily. Patient reports he was walking to bathroom when the pain began, CP lasted for a few minutes.  Patient  associated symptoms diaphoresis and SOB. Denies any radiation. currently resting in bed no distress noted no chest pain no chest pressure no palpitations. No dizziness or lightheadedness. No leg swelling. No recent CVA. Past Medical History:  
 
Past Medical History:  
Diagnosis Date  Atherosclerosis of renal artery ([address] S/P Rt stent  CAD (coronary artery disease) , 1997, 2012  
 ,double bypass, 2 stents, 2stents 7/2016  Cancer [facility])   
 prostate  CHF (congestive heart failure) ([address]  Chronic diastolic heart failure ([address]  Chronic kidney disease  Chronic kidney disease (CKD), stage IV (severe) ([address] On Dialysis now -T-Th-sat  Constipation  Coronary atherosclerosis of unspecified type of vessel, native or graft Stable angina after recent PCI continue treatment.  CRI (chronic renal insufficiency)  Diabetes ([address] 1973  
 type 2  
 Essential hypertension, benign  GERD (gastroesophageal reflux disease)  Gout  Hypertension 1982  Morbid obesity ([address] Weight loss has been strongly encouraged by following dietary restrictions and an exercise routine  APRYL (obstructive sleep apnea) 6/26/2015  
 no cpap  Other and unspecified hyperlipidemia HDL's are not at goal, LDL's are at goal, triglycerides are not at goal.  
 Other and unspecified hyperlipidemia HDL's are not at goal, LDL's are at goal, triglycerides are not at goal.   
 Other ill-defined conditions(799.89) 1960  
 pneumonia twice  Prostate cancer ([address]  Sleep disturbance  Type II or unspecified type diabetes mellitus without mention of complication, not stated as uncontrolled Social History:  
 
Social History Socioeconomic History  Marital status:  Spouse name: Not on file  Number of children: Not on file  Years of education: Not on file  Highest education level: Not on file Tobacco Use  Smoking status: Never Smoker  Smokeless tobacco: Never Used Substance and Sexual Activity  Alcohol use: No  
 Drug use: No  
 
 
 Family History:  
 
Family History Problem Relation Age of Onset  Heart Attack Father 64 Medications: Allergies Allergen Reactions  Nka [No Known Allergies] Other (comments) Current Facility-Administered Medications Medication Dose Route Frequency  dextrose 40% (GLUTOSE) oral gel 1 Tube  1 Tube Oral PRN  
 glucagon (GLUCAGEN) injection 1 mg  1 mg IntraMUSCular PRN  
 dextrose (D50W) injection syrg 12.5-25 g  25-50 mL IntraVENous PRN  
 insulin lispro (HUMALOG) injection   SubCUTAneous TIDAC  metoprolol tartrate (LOPRESSOR) tablet 100 mg  100 mg Oral BID  B complex-vitaminC-folic acid (NEPHROCAP) cap  1 Cap Oral DAILY  tamsulosin (FLOMAX) capsule 0.4 mg  0.4 mg Oral DAILY  losartan (COZAAR) tablet 25 mg  25 mg Oral DAILY  simvastatin (ZOCOR) tablet 40 mg  40 mg Oral QHS  bisacodyl (DULCOLAX) tablet 5 mg  5 mg Oral QHS PRN  pantoprazole (PROTONIX) tablet 40 mg  40 mg Oral DAILY  nitroglycerin (NITROSTAT) tablet 0.4 mg  0.4 mg SubLINGual PRN  
 aspirin delayed-release tablet 81 mg  81 mg Oral DAILY  fenofibrate nanocrystallized (TRICOR) tablet 48 mg  48 mg Oral DAILY  insulin glargine (LANTUS) injection 35 Units  35 Units SubCUTAneous Q12H  cinacalcet tab 60 mg  60 mg Oral DAILY  allopurinol (ZYLOPRIM) tablet 100 mg  100 mg Oral DAILY  acetaminophen (TYLENOL) tablet 650 mg  650 mg Oral Q4H PRN Review Of Systems:  
 
 
 
Constitutional: No fever, no chills, no weight loss, no night sweats HEENT: No epistaxis, no nasal drainage, no difficulty in swallowing, no redness in eyes Respiratory:  dyspnea on exertion Cardiovascular: chest pain,  chest pressure, dyspnea Gastrointestinal: no abd pain, no vomiting, no diarrhea, no bleeding symptoms Genitourinary: No urinary symptoms or hematuria Integument/breast: No ulcers or rashes Musculoskeletal: no muscle pain, no weakness Neurological: No focal weakness, no seizures, no headaches Behvioral/Psych: No anxiety, no depression Physical Exam:  
 
Visit Vitals /62 (BP 1 Location: Right arm, BP Patient Position: At rest) Pulse 65 Temp 97.1 °F (36.2 °C) Resp 19 Ht 5' 7\" (1.702 m) Wt 97.3 kg (214 lb 9.6 oz) SpO2 97% BMI 33.61 kg/m² BP Readings from Last 3 Encounters:  
01/02/19 155/62  
12/31/18 147/53  
12/21/18 90/48 Pulse Readings from Last 3 Encounters:  
01/02/19 65  
12/31/18 64  
12/21/18 60 Wt Readings from Last 3 Encounters:  
01/02/19 97.3 kg (214 lb 9.6 oz) 12/31/18 100.7 kg (222 lb) 12/21/18 96.6 kg (213 lb) General:  alert, cooperative, no distress, appears stated age, mildly obese Skin: Warm and dry, acyanotic, normal color. Head: Normocephalic, atraumatic. Eyes: Sclerae anicteric, conjunctivae without injection. Neck:  nontender, no nuchal rigidity, no masses, no stridor, no carotid bruit, no JVD Lungs:  clear to auscultation bilaterally. Heart:  regular rate and rhythm, S1, S2 normal, systolic murmur: holosystolic 3/6, blowing at lower left sternal border, at apex, radiates to axilla. This is actually the bruit from AV shunt. No click, no rub Abdomen:  abdomen is soft without significant tenderness, masses, organomegaly or guarding Extremities:  extremities normal, atraumatic, no cyanosis or edema. Peripheral pulses. Bruit from AV shunt in the left precordium as well as left neck towards the left shoulder. Neurological: grossly intact. No focal abnormalities, moves all extremities well. Psychiatric Affect: The patient is awake, alert and oriented x3. Emmit Dickinson is interactive and appropriate. Data Review:  
 
Recent Results (from the past 48 hour(s)) EKG, 12 LEAD, INITIAL Collection Time: 01/01/19  2:08 PM  
Result Value Ref Range  Ventricular Rate 62 BPM  
 Atrial Rate 62 BPM  
 P-R Interval 162 ms QRS Duration 98 ms Q-T Interval 432 ms QTC Calculation (Bezet) 438 ms Calculated P Axis 40 degrees Calculated R Axis 11 degrees Calculated T Axis 64 degrees Diagnosis Normal sinus rhythm Normal ECG When compared with ECG of 29-SEP-2018 14:00, Incomplete right bundle branch block is no longer present Confirmed by Daisy Hancock ([phone]) on 1/2/2019 7:42:59 AM 
  
CARDIAC PANEL,(CK, CKMB & TROPONIN) Collection Time: 01/01/19  2:35 PM  
Result Value Ref Range  39 - 308 U/L  
 CK - MB 2.3 <3.6 ng/ml CK-MB Index 2.2 0.0 - 4.0 % Troponin-I, QT 0.02 0.0 - 0.045 NG/ML  
CBC WITH AUTOMATED DIFF Collection Time: 01/01/19  2:35 PM  
Result Value Ref Range WBC 8.8 4.6 - 13.2 K/uL  
 RBC 3.40 (L) 4.70 - 5.50 M/uL  
 HGB 10.1 (L) 13.0 - 16.0 g/dL HCT 31.8 (L) 36.0 - 48.0 % MCV 93.5 74.0 - 97.0 FL  
 MCH 29.7 24.0 - 34.0 PG  
 MCHC 31.8 31.0 - 37.0 g/dL  
 RDW 12.7 11.6 - 14.5 % PLATELET 596 523 - 961 K/uL MPV 10.8 9.2 - 11.8 FL  
 NEUTROPHILS 74 (H) 40 - 73 % LYMPHOCYTES 17 (L) 21 - 52 % MONOCYTES 7 3 - 10 % EOSINOPHILS 2 0 - 5 % BASOPHILS 0 0 - 2 %  
 ABS. NEUTROPHILS 6.5 1.8 - 8.0 K/UL  
 ABS. LYMPHOCYTES 1.5 0.9 - 3.6 K/UL  
 ABS. MONOCYTES 0.6 0.05 - 1.2 K/UL  
 ABS. EOSINOPHILS 0.2 0.0 - 0.4 K/UL  
 ABS. BASOPHILS 0.0 0.0 - 0.1 K/UL  
 DF AUTOMATED MAGNESIUM Collection Time: 01/01/19  2:35 PM  
Result Value Ref Range Magnesium 1.8 1.6 - 2.6 mg/dL METABOLIC PANEL, COMPREHENSIVE Collection Time: 01/01/19  2:35 PM  
Result Value Ref Range Sodium 136 136 - 145 mmol/L Potassium 4.1 3.5 - 5.5 mmol/L Chloride 98 (L) 100 - 108 mmol/L  
 CO2 32 21 - 32 mmol/L Anion gap 6 3.0 - 18 mmol/L Glucose 165 (H) 74 - 99 mg/dL BUN 33 (H) 7.0 - 18 MG/DL Creatinine 4.79 (H) 0.6 - 1.3 MG/DL  
 BUN/Creatinine ratio 7 (L) 12 - 20 GFR est AA 14 (L) >60 ml/min/1.73m2 GFR est non-AA 12 (L) >60 ml/min/1.73m2 Calcium 7.5 (L) 8.5 - 10.1 MG/DL Bilirubin, total 0.4 0.2 - 1.0 MG/DL  
 ALT (SGPT) 12 (L) 16 - 61 U/L  
 AST (SGOT) 8 (L) 15 - 37 U/L Alk. phosphatase 144 (H) 45 - 117 U/L Protein, total 7.3 6.4 - 8.2 g/dL Albumin 3.5 3.4 - 5.0 g/dL Globulin 3.8 2.0 - 4.0 g/dL A-G Ratio 0.9 0.8 - 1.7 NT-PRO BNP Collection Time: 01/01/19  2:35 PM  
Result Value Ref Range NT pro-BNP 2,045 (H) 0 - 1,800 PG/ML  
POC TROPONIN-I Collection Time: 01/01/19  2:39 PM  
Result Value Ref Range Troponin-I (POC) <0.04 0.00 - 0.08 ng/mL TROPONIN I Collection Time: 01/01/19  9:00 PM  
Result Value Ref Range Troponin-I, QT 0.03 0.0 - 0.045 NG/ML  
CBC WITH AUTOMATED DIFF Collection Time: 01/01/19  9:00 PM  
Result Value Ref Range WBC 10.0 4.6 - 13.2 K/uL  
 RBC 3.25 (L) 4.70 - 5.50 M/uL HGB 9.6 (L) 13.0 - 16.0 g/dL HCT 30.7 (L) 36.0 - 48.0 % MCV 94.5 74.0 - 97.0 FL  
 MCH 29.5 24.0 - 34.0 PG  
 MCHC 31.3 31.0 - 37.0 g/dL  
 RDW 12.7 11.6 - 14.5 % PLATELET 156 234 - 795 K/uL MPV 10.6 9.2 - 11.8 FL  
 NEUTROPHILS 70 40 - 73 % LYMPHOCYTES 19 (L) 21 - 52 % MONOCYTES 8 3 - 10 % EOSINOPHILS 3 0 - 5 % BASOPHILS 0 0 - 2 %  
 ABS. NEUTROPHILS 7.0 1.8 - 8.0 K/UL  
 ABS. LYMPHOCYTES 1.9 0.9 - 3.6 K/UL  
 ABS. MONOCYTES 0.8 0.05 - 1.2 K/UL  
 ABS. EOSINOPHILS 0.3 0.0 - 0.4 K/UL  
 ABS. BASOPHILS 0.0 0.0 - 0.1 K/UL  
 DF AUTOMATED    
PTT Collection Time: 01/01/19  9:00 PM  
Result Value Ref Range aPTT 32.6 23.0 - 36.4 SEC GLUCOSE, POC Collection Time: 01/01/19 10:04 PM  
Result Value Ref Range Glucose (POC) 120 (H) 70 - 110 mg/dL METABOLIC PANEL, COMPREHENSIVE Collection Time: 01/02/19  6:18 AM  
Result Value Ref Range Sodium 138 136 - 145 mmol/L Potassium 4.0 3.5 - 5.5 mmol/L Chloride 99 (L) 100 - 108 mmol/L  
 CO2 29 21 - 32 mmol/L Anion gap 10 3.0 - 18 mmol/L Glucose 73 (L) 74 - 99 mg/dL  BUN 43 (H) 7.0 - 18 MG/DL  
 Creatinine 5.82 (H) 0.6 - 1.3 MG/DL  
 BUN/Creatinine ratio 7 (L) 12 - 20 GFR est AA 11 (L) >60 ml/min/1.73m2 GFR est non-AA 9 (L) >60 ml/min/1.73m2 Calcium 7.6 (L) 8.5 - 10.1 MG/DL Bilirubin, total 0.5 0.2 - 1.0 MG/DL  
 ALT (SGPT) 11 (L) 16 - 61 U/L  
 AST (SGOT) 10 (L) 15 - 37 U/L Alk. phosphatase 132 (H) 45 - 117 U/L Protein, total 7.5 6.4 - 8.2 g/dL Albumin 3.4 3.4 - 5.0 g/dL Globulin 4.1 (H) 2.0 - 4.0 g/dL A-G Ratio 0.8 0.8 - 1.7 PTT Collection Time: 01/02/19  6:18 AM  
Result Value Ref Range aPTT 30.4 23.0 - 36.4 SEC  
CBC WITH AUTOMATED DIFF Collection Time: 01/02/19  6:18 AM  
Result Value Ref Range WBC 9.4 4.6 - 13.2 K/uL  
 RBC 3.64 (L) 4.70 - 5.50 M/uL  
 HGB 10.9 (L) 13.0 - 16.0 g/dL HCT 34.1 (L) 36.0 - 48.0 % MCV 93.7 74.0 - 97.0 FL  
 MCH 29.9 24.0 - 34.0 PG  
 MCHC 32.0 31.0 - 37.0 g/dL  
 RDW 12.9 11.6 - 14.5 % PLATELET 064 626 - 851 K/uL MPV 10.6 9.2 - 11.8 FL  
 NEUTROPHILS 68 40 - 73 % LYMPHOCYTES 19 (L) 21 - 52 % MONOCYTES 9 3 - 10 % EOSINOPHILS 4 0 - 5 % BASOPHILS 0 0 - 2 %  
 ABS. NEUTROPHILS 6.4 1.8 - 8.0 K/UL  
 ABS. LYMPHOCYTES 1.8 0.9 - 3.6 K/UL  
 ABS. MONOCYTES 0.8 0.05 - 1.2 K/UL  
 ABS. EOSINOPHILS 0.3 0.0 - 0.4 K/UL  
 ABS. BASOPHILS 0.0 0.0 - 0.1 K/UL  
 DF AUTOMATED    
TROPONIN I Collection Time: 01/02/19  6:18 AM  
Result Value Ref Range Troponin-I, QT 0.02 0.0 - 0.045 NG/ML  
GLUCOSE, POC Collection Time: 01/02/19  7:51 AM  
Result Value Ref Range Glucose (POC) 66 (L) 70 - 110 mg/dL GLUCOSE, POC Collection Time: 01/02/19  8:50 AM  
Result Value Ref Range Glucose (POC) 89 70 - 110 mg/dL Intake/Output Summary (Last 24 hours) at 1/2/2019 5070 Last data filed at 1/2/2019 0816 Gross per 24 hour Intake 490 ml Output  Net 490 ml Cardiographics:  
 
ECG: Normal sinus rhythm. When compared with previous EKG Incomplete right bundle branch block is no longer present Signed By: Jolene Esquivel NP-C Phone [phone] January 2, 2019 I have independently evaluated and examined the patient. All relevant labs and testing data's are reviewed. Care plan discussed and updated after review.  
Adriana Alarcon MD

## 2019-01-02 NOTE — ROUTINE PROCESS
TRANSFER - IN REPORT: 
 
Verbal report received from  on Steven Hamilton  being received from [employer] for ordered procedure Report consisted of patients Situation, Background, Assessment and  
Recommendations(SBAR). Information from the following report(s) SBAR, Procedure Summary and MAR was reviewed with the receiving nurse. Opportunity for questions and clarification was provided. Assessment completed upon patients arrival to unit and care assumed.

## 2019-01-02 NOTE — PROGRESS NOTES
Problem: Falls - Risk of 
Goal: *Absence of Falls Document Casper Mittal Fall Risk and appropriate interventions in the flowsheet. Outcome: Progressing Towards Goal 
Fall Risk Interventions: 
Mobility Interventions: Bed/chair exit alarm, Patient to call before getting OOB, Utilize walker, cane, or other assistive device Medication Interventions: Teach patient to arise slowly, Patient to call before getting OOB History of Falls Interventions: Bed/chair exit alarm, Door open when patient unattended

## 2019-01-02 NOTE — PROGRESS NOTES
Received report on pt.from off going RN. Resting quietly in bed on rounds. Denies c/o pain or SOB at this time. No acute distress noted. Call bell at side. Will cont to monitor for any changes in status. 1 Dr Raman Mederos paged for clarification on IV Nitroglycerine drip and IV Heparin Drip. Pt denies CP or SOB at this time. Accucheck 66. Pt is asymptomatic but is NPO. Dr. Abel Kelly paged for orders. 0825 orders received got glucose gel from Dr Abel Kelly ans Adriana Abarca NP in to see pt for cardiology. 200  Went  it to give glucose gel and pt had already chewed up 2 mentos . sts \" this is what I always take when my sugars are low\". 8864 repeat accucheck 89. 
 
0925 pt transported to cardiac cath lab per bed. No distress noted at time of transport. 1245 received back to room from cath lab. Pressure drsg dry and intact to et groin. No bruising or hematoma. . No bleeding. Pt instructed on bedrest until 1515.  
 
1330 dressing to rt groin dry and intact. No bleeding, hematoma or pain. Remains on bedrest. 
 
1530 up and about in room. Rt  groin dressing dry and intact . Denies complaints of pain or SOB. 1800 ambulated in room and hallway with son. Did well. No SOB or pain. 1930 Bedside and Verbal shift change report given to [facility] RN (oncoming nurse) by Soraya Laboy RN (offgoing nurse). Report given with Marshall DELANEY and MAR.

## 2019-01-02 NOTE — PROGRESS NOTES
Admitted with Chest pain, known CABG, occluded Graft by Cardiac cath today, received Contrast, will Dialyze tomorrow  Morning & then DC.

## 2019-01-02 NOTE — CONSULTS
[address]   Alisha Escamilla 
MR#: [id number] : 1940 ACCOUNT #: [de-identified] DATE OF SERVICE: 2019 Consult was requested by the emergency room physician. REASON FOR CONSULTATION:  Dialysis patient admitted due to chest pain. Will need cardiac catheterization and followup from renal point. HISTORY OF PRESENT ILLNESS:  This 78-year-old  gentleman known to me for a long period of time was admitted last night with recurrent chest pain. He has known coronary artery disease with a coronary artery bypass graft surgery as well as stent and a graft also. Besides that, he also has hypertension, type 2 diabetes mellitus, ESRD, hyperlipidemia, obesity, prostate cancer and also beam radiation and complicated with radiation colitis and off and on has hematochezia and GI bleed. He is off Plavix because of the GI bleed and also does not take the medications, beta blocker that was advised to him by cardiology as he claims that the blood pressure drops. The patient takes nitroglycerin off and on for chest pain. At this time, the troponin is negative. EKG does not have any new changes and the patient is pain free while in the hospital.  Patient usually gets dialysis every Tuesday, Thursday and Saturday early morning, but very frequently he does not complete the treatment with the complaint that he gets cramps in the chest and also sometimes spasms in the rectal area from radiation colitis and  claims that he had been bleeding from the rectal area. He is supposed to get dialysis 4 hours 3 times a week, but most of the time he does not complete 4 hours and off and on has a tendency of missing treatment. He also has a history of renal osteodystrophy 
from secondary hyperparathyroidism from the ESRD complication. Besides that has also a history of type 2 diabetes mellitus.  
 
PAST MEDICAL HISTORY:  On admission, atherosclerotic renal artery status post right renal artery stent, coronary artery disease, prostate cancer, diastolic dysfunction, constipation, coronary arteries atherosclerotic disease, type 2 diabetes mellitus, hypertension, obesity and obstructive sleep apnea. SOCIAL HISTORY:  , nonsmoker. No history of any drug or alcohol related problems. FAMILY HISTORY:  Significant for heart attack in the family. ALLERGIES:  NO KNOWN DRUG ALLERGY. MEDICATIONS:  Lopressor 100 mg p.o. twice daily, but he does not take. However, he takes 50 mg on as needed basis and cardiology knows that. Also, he is on Humalog insulin on a sliding scale 3 times daily, Nephrocaps once a day, also the losartan 100 mg p.o. daily, Zocor 40 mg p.o. daily, Dulcolax 5 mg tablet daily, nitroglycerin tablet 0.4 mg every 5 minutes 3 times p.r.n. for chest pain, aspirin 81 mg p.o. daily, fenofibrate 48 mg p.o. daily, allopurinol 100 mg daily. REVIEW OF SYSTEMS:   
GENERAL:  Well-built, well-nourished gentleman without any acute distress. RESPIRATORY:  Mild dyspnea on exertion without any calf muscle tenderness. No coughing or sneezing. No hemoptysis. CARDIOVASCULAR:  Off and on chest pain. No chest pain now. Also complains of chest pressure and dyspnea. No palpitations. GASTROINTESTINAL:  No abdominal pain, no nausea, no vomiting. No recent hematochezia. GENITOURINARY:  Denies any urinary symptoms. No urgency, no hesitancy, no hematuria, no flank pain. NEUROLOGIC:  No focal weakness, no seizure, no headache. UNDERLYING PSYCHIATRIC HISTORY:  No anxiety. No depression. PHYSICAL EXAMINATION: 
VITAL SIGNS:  Temperature 98.5, heart rate 60 per minute, blood pressure 133/69 with a mean arterial pressure of 71. Respirations 16-18, oxygen saturation % on room air. Weight is 100.7 kg. Today is 97.3 kg. HEENT:  Oral mucosa moist. 
NECK:  Jugular venous pressure not distended. LUNGS:  Clear to auscultation in both sides. HEART:  S1, S2, without any gallop or murmur. No reproducible chest pain. ABDOMEN:  Obese. EXTREMITIES:  Ankle trace edema. On the left arm AV fistula has a good thrill and bruit which was under recent angioplasty via vascular surgery. RELEVANT LABORATORY DATA:  As follows:  WBC 9.4, hemoglobin of 10.9, hematocrit of 34.1, platelets of 338,444. Chemistry:  Sodium 138, potassium 4.0, chloride 99, CO2 29, glucose of 73, blood urea nitrogen of 43, creatinine of 5.82, total protein of 7.5 with albumin of 3.4. Troponin is 0.02, 0.03, 0.02 and less than 0.02. BNP is 2045. Chest x-ray was done on the day of admission, shows prominent perihilar interstitium may be stimulated by low lung volumes, subtle vascular congestion on perihilar infiltrate difficult to exclude, mildly enlarged cardiac silhouette, left bibasilar streaky densities similar to previous study likely atelectasis. EKG done, normal sinus rhythm. No other problem except incomplete right bundle branch block, which was present in the past.  It is no more evident. IMPRESSION AND PLAN: 
1. Unstable angina. 2.  Hypertension. 3.  Type 2 diabetes mellitus. 4.  Known coronary artery disease. 5.  End stage renal disease. 6.  Obesity. 7.  Sleep apnea. PLAN:  Patient already has undergone cardiac catheterization and has coronary artery bypass graft occluded. Cardiology does not want to do any further aggressive workup. He did receive contrast today. For that reason, he will need dialysis tomorrow morning and once the dialysis has been done, he could be discharged home from renal point of view. He was encouraged to take the beta blocker at the request of the cardiology and depending on his hemodynamics the dose should be adjusted. Further recommendations will be given based on the hospital course. Aurelia Phillips MD 
  
 
[facility] [place] / [place] 
D: 01/02/2019 14:28 T: 01/02/2019 15:40 JOB #: [id number]

## 2019-01-03 ENCOUNTER — HOME HEALTH ADMISSION (OUTPATIENT)
Dept: HOME HEALTH SERVICES | Facility: HOME HEALTH | Age: 79
End: 2019-01-03
Payer: MEDICARE

## 2019-01-03 VITALS
OXYGEN SATURATION: 100 % | DIASTOLIC BLOOD PRESSURE: 54 MMHG | WEIGHT: 216.3 LBS | RESPIRATION RATE: 22 BRPM | TEMPERATURE: 97.5 F | SYSTOLIC BLOOD PRESSURE: 137 MMHG | HEART RATE: 87 BPM | HEIGHT: 67 IN | BODY MASS INDEX: 33.95 KG/M2

## 2019-01-03 LAB
ANION GAP SERPL CALC-SCNC: 12 MMOL/L (ref 3–18)
BASOPHILS # BLD: 0 K/UL (ref 0–0.1)
BASOPHILS NFR BLD: 0 % (ref 0–2)
BUN SERPL-MCNC: 54 MG/DL (ref 7–18)
BUN/CREAT SERPL: 8 (ref 12–20)
CALCIUM SERPL-MCNC: 7.2 MG/DL (ref 8.5–10.1)
CALCIUM SERPL-MCNC: 7.7 MG/DL (ref 8.5–10.1)
CHLORIDE SERPL-SCNC: 100 MMOL/L (ref 100–108)
CO2 SERPL-SCNC: 28 MMOL/L (ref 21–32)
CREAT SERPL-MCNC: 6.97 MG/DL (ref 0.6–1.3)
DIFFERENTIAL METHOD BLD: ABNORMAL
EOSINOPHIL # BLD: 0.3 K/UL (ref 0–0.4)
EOSINOPHIL NFR BLD: 3 % (ref 0–5)
ERYTHROCYTE [DISTWIDTH] IN BLOOD BY AUTOMATED COUNT: 12.6 % (ref 11.6–14.5)
GLUCOSE BLD STRIP.AUTO-MCNC: 114 MG/DL (ref 70–110)
GLUCOSE BLD STRIP.AUTO-MCNC: 155 MG/DL (ref 70–110)
GLUCOSE BLD STRIP.AUTO-MCNC: 185 MG/DL (ref 70–110)
GLUCOSE SERPL-MCNC: 113 MG/DL (ref 74–99)
HBV SURFACE AB SER QL IA: NEGATIVE
HBV SURFACE AB SERPL IA-ACNC: <3.1 MIU/ML
HBV SURFACE AG SER QL: <0.1 INDEX
HBV SURFACE AG SER QL: NEGATIVE
HCT VFR BLD AUTO: 33.9 % (ref 36–48)
HEP BS AB COMMENT,HBSAC: ABNORMAL
HGB BLD-MCNC: 10.6 G/DL (ref 13–16)
LYMPHOCYTES # BLD: 1.7 K/UL (ref 0.9–3.6)
LYMPHOCYTES NFR BLD: 17 % (ref 21–52)
MCH RBC QN AUTO: 29.4 PG (ref 24–34)
MCHC RBC AUTO-ENTMCNC: 31.3 G/DL (ref 31–37)
MCV RBC AUTO: 93.9 FL (ref 74–97)
MONOCYTES # BLD: 0.7 K/UL (ref 0.05–1.2)
MONOCYTES NFR BLD: 7 % (ref 3–10)
NEUTS SEG # BLD: 7 K/UL (ref 1.8–8)
NEUTS SEG NFR BLD: 73 % (ref 40–73)
PHOSPHATE SERPL-MCNC: 5.4 MG/DL (ref 2.5–4.9)
PLATELET # BLD AUTO: 256 K/UL (ref 135–420)
PMV BLD AUTO: 10.6 FL (ref 9.2–11.8)
POTASSIUM SERPL-SCNC: 4.3 MMOL/L (ref 3.5–5.5)
PTH-INTACT SERPL-MCNC: 816.1 PG/ML (ref 18.4–88)
RBC # BLD AUTO: 3.61 M/UL (ref 4.7–5.5)
SODIUM SERPL-SCNC: 140 MMOL/L (ref 136–145)
WBC # BLD AUTO: 9.6 K/UL (ref 4.6–13.2)

## 2019-01-03 PROCEDURE — 86706 HEP B SURFACE ANTIBODY: CPT

## 2019-01-03 PROCEDURE — 93005 ELECTROCARDIOGRAM TRACING: CPT

## 2019-01-03 PROCEDURE — 5A1D70Z PERFORMANCE OF URINARY FILTRATION, INTERMITTENT, LESS THAN 6 HOURS PER DAY: ICD-10-PCS | Performed by: INTERNAL MEDICINE

## 2019-01-03 PROCEDURE — 74011250636 HC RX REV CODE- 250/636: Performed by: INTERNAL MEDICINE

## 2019-01-03 PROCEDURE — 85025 COMPLETE CBC W/AUTO DIFF WBC: CPT

## 2019-01-03 PROCEDURE — 84100 ASSAY OF PHOSPHORUS: CPT

## 2019-01-03 PROCEDURE — 90935 HEMODIALYSIS ONE EVALUATION: CPT

## 2019-01-03 PROCEDURE — 87340 HEPATITIS B SURFACE AG IA: CPT

## 2019-01-03 PROCEDURE — 82962 GLUCOSE BLOOD TEST: CPT

## 2019-01-03 PROCEDURE — 83970 ASSAY OF PARATHORMONE: CPT

## 2019-01-03 PROCEDURE — 36415 COLL VENOUS BLD VENIPUNCTURE: CPT

## 2019-01-03 PROCEDURE — 74011250637 HC RX REV CODE- 250/637: Performed by: INTERNAL MEDICINE

## 2019-01-03 PROCEDURE — 80048 BASIC METABOLIC PNL TOTAL CA: CPT

## 2019-01-03 RX ORDER — AMLODIPINE BESYLATE 2.5 MG/1
2.5 TABLET ORAL DAILY
Qty: 30 TAB | Refills: 0 | Status: SHIPPED | OUTPATIENT
Start: 2019-01-04 | End: 2019-02-18 | Stop reason: SDUPTHER

## 2019-01-03 RX ORDER — METOPROLOL TARTRATE 50 MG/1
50 TABLET ORAL 2 TIMES DAILY
Status: DISCONTINUED | OUTPATIENT
Start: 2019-01-03 | End: 2019-01-03 | Stop reason: HOSPADM

## 2019-01-03 RX ORDER — CLOPIDOGREL BISULFATE 75 MG/1
75 TABLET ORAL DAILY
Qty: 30 TAB | Refills: 0 | Status: SHIPPED | OUTPATIENT
Start: 2019-01-03 | End: 2020-01-01 | Stop reason: SDUPTHER

## 2019-01-03 RX ORDER — AMLODIPINE BESYLATE 2.5 MG/1
2.5 TABLET ORAL DAILY
Status: DISCONTINUED | OUTPATIENT
Start: 2019-01-03 | End: 2019-01-03 | Stop reason: HOSPADM

## 2019-01-03 RX ORDER — ISOSORBIDE MONONITRATE 30 MG/1
30 TABLET, EXTENDED RELEASE ORAL DAILY
Qty: 30 TAB | Refills: 0 | Status: SHIPPED | OUTPATIENT
Start: 2019-01-07 | End: 2020-01-01

## 2019-01-03 RX ORDER — NITROGLYCERIN 0.4 MG/1
0.4 TABLET SUBLINGUAL AS NEEDED
Qty: 1 BOTTLE | Refills: 0 | Status: SHIPPED | OUTPATIENT
Start: 2019-01-03 | End: 2020-01-01 | Stop reason: SDUPTHER

## 2019-01-03 RX ORDER — METOPROLOL TARTRATE 50 MG/1
50 TABLET ORAL 2 TIMES DAILY
Qty: 60 TAB | Refills: 0 | Status: SHIPPED | OUTPATIENT
Start: 2019-01-03 | End: 2019-02-18 | Stop reason: SDUPTHER

## 2019-01-03 RX ORDER — HEPARIN SODIUM 1000 [USP'U]/ML
INJECTION, SOLUTION INTRAVENOUS; SUBCUTANEOUS
Status: DISCONTINUED
Start: 2019-01-03 | End: 2019-01-03 | Stop reason: HOSPADM

## 2019-01-03 RX ADMIN — AMLODIPINE BESYLATE 2.5 MG: 2.5 TABLET ORAL at 10:31

## 2019-01-03 RX ADMIN — NITROGLYCERIN 0.4 MG: 0.4 TABLET SUBLINGUAL at 10:05

## 2019-01-03 RX ADMIN — Medication 10 ML: at 06:03

## 2019-01-03 RX ADMIN — NITROGLYCERIN 0.4 MG: 0.4 TABLET SUBLINGUAL at 17:14

## 2019-01-03 RX ADMIN — ERYTHROPOIETIN 5000 UNITS: 3000 INJECTION, SOLUTION INTRAVENOUS; SUBCUTANEOUS at 17:34

## 2019-01-03 RX ADMIN — METOPROLOL TARTRATE 50 MG: 50 TABLET ORAL at 18:58

## 2019-01-03 RX ADMIN — Medication 10 ML: at 13:09

## 2019-01-03 NOTE — ROUTINE PROCESS
Pt pain 0/10 and Per Dr. Valentino Kitchen ambulate pt again since he has received the nitro table. After pt ambulated around the unit once he denied any chest pain or shortness of breath when he returned to the room. Vitals stable. Will continue to monitor.

## 2019-01-03 NOTE — PROGRESS NOTES
Discharge: 
 
Patient will discharge home today (1/3/2019). Patient's home health has been arranged through [facility]. Patient's family will transport home at the time of discharge. There are no other care manager concerns regarding this discharge. This writer will continue to closely monitor for discharge planning until patient has officially discharged from [facility]. Aleksandr Cronin. [facility] Care Manager [phone]

## 2019-01-03 NOTE — PROGRESS NOTES
RENAL PROGRESS NOTE: Pt seen on dialysis. Follow up of ESRD Subjective: Feels good now , earlier  had Chest pain,chest pain resolved Patient is on Dialysis. Objective: 
 
Patient Vitals for the past 12 hrs: 
 Temp Pulse Resp BP SpO2  
01/03/19 1147 98.8 °F (37.1 °C) 66 17 113/51 97 % 01/03/19 1020  67 20 153/64 97 % 01/03/19 1010  67 18 120/62 96 % 01/03/19 1002  (!) 58 22 148/46 100 % 01/03/19 0946  62 20 155/63 98 % 01/03/19 0858 97.6 °F (36.4 °C) (!) 58 20 (!) 128/39 97 % 01/03/19 0735 98.3 °F (36.8 °C) 62 22 164/63 100 % 01/03/19 0335 98.2 °F (36.8 °C) 60 18 138/70 96 % Intake/Output Summary (Last 24 hours) at 1/3/2019 1458 Last data filed at 1/2/2019 1830 Gross per 24 hour Intake 240 ml Output 0 ml Net 240 ml Physical Assessment:  
 
General Appearance: NAD Lung: clear to auscultation Heart: regular rate and rhythm and no murmurs, clicks or gallops Lower Extremities:no  edema Access: (L) arm AVF has good thrill  & bruit, has recent infiltration.  Labs CBC w/Diff Recent Labs 01/03/19 
6118 01/02/19 0618 01/01/19 
2100 WBC 9.6 9.4 10.0  
RBC 3.61* 3.64* 3.25* HGB 10.6* 10.9* 9.6* HCT 33.9* 34.1* 30.7* MCV 93.9 93.7 94.5 MCH 29.4 29.9 29.5 MCHC 31.3 32.0 31.3  
RDW 12.6 12.9 12.7 Recent Labs 01/03/19 
3447 01/02/19 0618 01/01/19 
2100 MONOS 7 9 8 EOS 3 4 3  
BASOS 0 0 0  
RDW 12.6 12.9 12.7 Comprehensive Metabolic Profile Recent Labs 01/03/19 
0317 01/02/19 
0618 01/01/19 
1435  138 136  
K 4.3 4.0 4.1  99* 98* CO2 28 29 32 BUN 54* 43* 33* CREA 6.97* 5.82* 4.79* Recent Labs 01/03/19 
0317 01/02/19 
0618 01/01/19 
1435 CA 7.2*  7.7* 7.6* 7.5* PHOS 5.4*  --   --   
ALB  --  3.4 3.5 TP  --  7.5 7.3 SGOT  --  10* 8*  
TBILI  --  0.5 0.4 Basic Metabolic Profile   
 
 results  reviwed. MEDS:Reviwed. Current Facility-Administered Medications Medication Dose Route Frequency Provider Last Rate Last Dose  heparin (porcine) 1,000 unit/mL injection  metoprolol tartrate (LOPRESSOR) tablet 50 mg  50 mg Oral BID Allison Modi MD      
 [facility]) tablet 2.5 mg  2.5 mg Oral DAILY Allison Modi MD   2.5 mg at 01/03/19 1031  
 dextrose 40% (GLUTOSE) oral gel 1 Tube  1 Tube Oral PRN Darryle Mass, MD      
 glucagon (GLUCAGEN) injection 1 mg  1 mg IntraMUSCular PRN Darryle Mass, MD      
 dextrose (D50W) injection syrg 12.5-25 g  25-50 mL IntraVENous PRN Darryle Mass, MD      
 sodium chloride (NS) flush 5-10 mL  5-10 mL IntraVENous Q8H Allison Modi MD   10 mL at 01/03/19 1309  
 sodium chloride (NS) flush 5-10 mL  5-10 mL IntraVENous PRN Allison Modi MD      
 magnesium hydroxide (MILK OF MAGNESIA) 400 mg/5 mL oral suspension 30 mL  30 mL Oral QHS PRN Allison Modi MD      
 temazepam (RESTORIL) capsule 15 mg  15 mg Oral QHS PRN Allison Modi MD      
 hydrALAZINE (APRESOLINE) 20 mg/mL injection 20 mg  20 mg IntraVENous Q30MIN PRN Allison Modi MD      
 epoetin lee (EPOGEN;PROCRIT) 5,000 Units  5,000 Units IntraVENous DIALYSIS ABIEL KRUGER & HUGO San MD      
 insulin lispro (HUMALOG) injection   SubCUTAneous TIDAC Areli Ace MD   Stopped at 01/02/19 0730  B complex-vitaminC-folic acid (NEPHROCAP) cap  1 Cap Oral DAILY Areli Ace MD   1 Cap at 01/02/19 0492  tamsulosin (FLOMAX) capsule 0.4 mg  0.4 mg Oral DAILY Areli Ace MD   0.4 mg at 01/02/19 0116  simvastatin (ZOCOR) tablet 40 mg  40 mg Oral QHS Areli Ace MD   40 mg at 01/02/19 3440  bisacodyl (DULCOLAX) tablet 5 mg  5 mg Oral QHS PRN Areli Ace MD      
 pantoprazole (PROTONIX) tablet 40 mg  40 mg Oral DAILY Areli Ace MD   40 mg at 01/02/19 1123  nitroglycerin (NITROSTAT) tablet 0.4 mg  0.4 mg SubLINGual PRN Db Rivera Oliver MD   0.4 mg at 01/03/19 1005  aspirin delayed-release tablet 81 mg  81 mg Oral DAILY Rivera Ace MD   81 mg at 01/02/19 0919  
 fenofibrate nanocrystallized (TRICOR) tablet 48 mg  48 mg Oral DAILY Rivera Ace MD   48 mg at 01/02/19 9594  insulin glargine (LANTUS) injection 35 Units  35 Units SubCUTAneous Q12H Rivera Ace MD   Stopped at 01/02/19 0900  cinacalcet tab 60 mg  60 mg Oral DAILY Rivera Ace MD   Stopped at 01/03/19 0900  
 allopurinol (ZYLOPRIM) tablet 100 mg  100 mg Oral DAILY Rivera Ace MD   100 mg at 01/02/19 7548  acetaminophen (TYLENOL) tablet 650 mg  650 mg Oral Q4H PRN Rivera Ace MD      
 
 
Impression: 
 
ESRD: Tolerating HD well. Anemia of CKD: on 5000  Epogen. Hyperparathyroidism Yes Unstable angina. Plan Dialysis for volume and solute management Epogen  5000 units. Hectorol: 0.5 microgram. 
Heparin Bolus 2000 units & maintain  At  500 units /hour. DC home after dialysis, continue Out patient dialysis.  
 
 
 
Rossy Langston MD

## 2019-01-03 NOTE — DISCHARGE INSTRUCTIONS
DISCHARGE SUMMARY from Nurse    PATIENT INSTRUCTIONS:    After general anesthesia or intravenous sedation, for 24 hours or while taking prescription Narcotics:  · Limit your activities  · Do not drive and operate hazardous machinery  · Do not make important personal or business decisions  · Do  not drink alcoholic beverages  · If you have not urinated within 8 hours after discharge, please contact your surgeon on call. Report the following to your surgeon:  · Excessive pain, swelling, redness or odor of or around the surgical area  · Temperature over 100.5  · Nausea and vomiting lasting longer than 4 hours or if unable to take medications  · Any signs of decreased circulation or nerve impairment to extremity: change in color, persistent  numbness, tingling, coldness or increase pain  · Any questions    What to do at Home:  Recommended activity: Activity as tolerated    If you experience any of the following symptoms worsening shortness of breath, pain and/ or swelling in one of both arms or legs, nausea and unable to keep down meds , please follow up with primary care physician. *  Please give a list of your current medications to your Primary Care Provider. *  Please update this list whenever your medications are discontinued, doses are      changed, or new medications (including over-the-counter products) are added. *  Please carry medication information at all times in case of emergency situations. These are general instructions for a healthy lifestyle:    No smoking/ No tobacco products/ Avoid exposure to second hand smoke  Surgeon General's Warning:  Quitting smoking now greatly reduces serious risk to your health.     Obesity, smoking, and sedentary lifestyle greatly increases your risk for illness    A healthy diet, regular physical exercise & weight monitoring are important for maintaining a healthy lifestyle    You may be retaining fluid if you have a history of heart failure or if you experience any of the following symptoms:  Weight gain of 3 pounds or more overnight or 5 pounds in a week, increased swelling in our hands or feet or shortness of breath while lying flat in bed. Please call your doctor as soon as you notice any of these symptoms; do not wait until your next office visit. Recognize signs and symptoms of STROKE:    F-face looks uneven    A-arms unable to move or move unevenly    S-speech slurred or non-existent    T-time-call 911 as soon as signs and symptoms begin-DO NOT go       Back to bed or wait to see if you get better-TIME IS BRAIN. Warning Signs of HEART ATTACK     Call 911 if you have these symptoms:   Chest discomfort. Most heart attacks involve discomfort in the center of the chest that lasts more than a few minutes, or that goes away and comes back. It can feel like uncomfortable pressure, squeezing, fullness, or pain.  Discomfort in other areas of the upper body. Symptoms can include pain or discomfort in one or both arms, the back, neck, jaw, or stomach.  Shortness of breath with or without chest discomfort.  Other signs may include breaking out in a cold sweat, nausea, or lightheadedness. Don't wait more than five minutes to call 911 - MINUTES MATTER! Fast action can save your life. Calling 911 is almost always the fastest way to get lifesaving treatment. Emergency Medical Services staff can begin treatment when they arrive -- up to an hour sooner than if someone gets to the hospital by car. The discharge information has been reviewed with the patient. The patient verbalized understanding. Discharge medications reviewed with the patient and appropriate educational materials and side effects teaching were provided.   ___________________________________________________________________________________________________________________________________    Patient Education        Angina: Care Instructions  Your Care Instructions    You have a problem called angina. Angina happens when there is not enough blood flow to your heart muscle. Angina is a sign of coronary artery disease (CAD). CAD occurs when blood vessels that supply the heart become narrowed. Having CAD increases your risk of a heart attack. Chest pain or pressure is the most common symptom of angina. But some people have other symptoms, like:  · Pain, pressure, or a strange feeling in the back, neck, jaw, or upper belly, or in one or both shoulders or arms. · Shortness of breath. · Nausea or vomiting. · Lightheadedness or sudden weakness. · Fast or irregular heartbeat. Women are somewhat more likely than men to have angina symptoms like shortness of breath, nausea, and back or jaw pain. Angina can be dangerous. That's why it is important to pay attention to your symptoms. Know what is typical for you, learn how to control your symptoms, and understand when you need to get treatment. A change in your usual pattern of symptoms is an emergency. It may mean that you are having a heart attack. The doctor has checked you carefully, but problems can develop later. If you notice any problems or new symptoms, get medical treatment right away. Follow-up care is a key part of your treatment and safety. Be sure to make and go to all appointments, and call your doctor if you are having problems. It's also a good idea to know your test results and keep a list of the medicines you take. How can you care for yourself at home? Medicines    · If your doctor has given you nitroglycerin for angina symptoms, keep it with you at all times. If you have symptoms, sit down and rest, and take the first dose of nitroglycerin as directed. If your symptoms get worse or are not getting better within 5 minutes, call 911 right away. Stay on the phone.  The emergency  will give you further instructions.     · If your doctor advises it, take 1 low-dose aspirin a day to prevent heart attack.     · Be safe with medicines. Take your medicines exactly as prescribed. Call your doctor if you think you are having a problem with your medicine. You will get more details on the specific medicines your doctor prescribes.    Lifestyle changes    · Do not smoke. If you need help quitting, talk to your doctor about stop-smoking programs and medicines. These can increase your chances of quitting for good.     · Eat a heart-healthy diet that is low in saturated fat and salt, and is high in fiber. Talk to your doctor or a dietitian about healthy eating.     · Stay at a healthy weight. Or lose weight if you need to. Activity    · Talk to your doctor about a level of activity that is safe for you.     · If an activity causes angina symptoms, stop and rest.   When should you call for help? Call 911 anytime you think you may need emergency care. For example, call if:    · You passed out (lost consciousness).     · You have symptoms of a heart attack. These may include:  ? Chest pain or pressure, or a strange feeling in the chest.  ? Sweating. ? Shortness of breath. ? Nausea or vomiting. ? Pain, pressure, or a strange feeling in the back, neck, jaw, or upper belly or in one or both shoulders or arms. ? Lightheadedness or sudden weakness. ? A fast or irregular heartbeat. After you call 911, the  may tell you to chew 1 adult-strength or 2 to 4 low-dose aspirin. Wait for an ambulance. Do not try to drive yourself.     · You have angina symptoms that do not go away with rest or are not getting better within 5 minutes after you take a dose of nitroglycerin.    Call your doctor now or seek immediate medical care if:    · You are having angina symptoms more often than usual, or they are different or worse than usual.     · You feel dizzy or lightheaded, or you feel like you may faint.    Watch closely for changes in your health, and be sure to contact your doctor if you have any problems. Where can you learn more?   Go to http://kendal-farzaneh.info/. Enter H129 in the search box to learn more about \"Angina: Care Instructions. \"  Current as of: 2017  Content Version: 11.8  © 7159-7054 [employer]. Care instructions adapted under license by [employer] (which disclaims liability or warranty for this information). If you have questions about a medical condition or this instruction, always ask your healthcare professional. [place] [address] any warranty or liability for your use of this information. Patient armband removed and shredded    MyChart Activation    Thank you for requesting access to [employer]. Please follow the instructions below to securely access and download your online medical record. [employer] allows you to send messages to your doctor, view your test results, renew your prescriptions, schedule appointments, and more. How Do I Sign Up? 1. In your internet browser, go to [email or web address]  2. Click on the First Time User? Click Here link in the Sign In box. You will be redirect to the New Member Sign Up page. 3. Enter your [employer] Access Code exactly as it appears below. You will not need to use this code after youve completed the sign-up process. If you do not sign up before the expiration date, you must request a new code. [employer] Access Code: [email or web address]  Expires: 2/3/2019  9:55 AM (This is the date your [employer] access code will )    4. Enter the last four digits of your Social Security Number (xxxx) and Date of Birth (mm/dd/yyyy) as indicated and click Submit. You will be taken to the next sign-up page. 5. Create a [employer] ID. This will be your [employer] login ID and cannot be changed, so think of one that is secure and easy to remember. 6. Create a [employer] password. You can change your password at any time. 7. Enter your Password Reset Question and Answer. This can be used at a later time if you forget your password. 8. Enter your e-mail address. You will receive e-mail notification when new information is available in [address] 9. Click Sign Up. You can now view and download portions of your medical record. 10. Click the Download Summary menu link to download a portable copy of your medical information. Additional Information    If you have questions, please visit the Frequently Asked Questions section of the [employer] website at https://[employer]. [employer]. [employer]/TÃ¡[email or web address] Remember, [employer] is NOT to be used for urgent needs. For medical emergencies, dial 911.

## 2019-01-03 NOTE — PROGRESS NOTES
Pt ambulated around the unit once. When he got back to the room he c/o mid chest tightness 2/10  with shortness of breath. Pt denies pain to the left arm and dizziness. Vitals take and stable. Dr. Nitish Donato at the bedside and ordered to give the pt nitro x1.

## 2019-01-03 NOTE — ROUTINE PROCESS
Bedside and Verbal shift change report given to Brandon Bustamante RN 
 (oncoming nurse) by Mariela Romo RN (offgoing nurse). Report included the following information SBAR, Kardex, Intake/Output, MAR and Recent Results.

## 2019-01-03 NOTE — PROGRESS NOTES
Problem: Falls - Risk of 
Goal: *Absence of Falls Document Jolene Mccartney Fall Risk and appropriate interventions in the flowsheet. Outcome: Progressing Towards Goal 
Fall Risk Interventions: 
Mobility Interventions: Patient to call before getting OOB, Utilize walker, cane, or other assistive device Medication Interventions: Patient to call before getting OOB, Teach patient to arise slowly History of Falls Interventions: Door open when patient unattended

## 2019-01-03 NOTE — PROGRESS NOTES
conducted an initial consultation and Spiritual Assessment for Brynn Morales, who is a 66 y. o.,male. Patients Primary Language is: [place]. According to the patients EMR [place] Affiliation is: Buddhist. The reason the Patient came to the hospital is:  
Patient Active Problem List  
 Diagnosis Date Noted  CAD (coronary artery disease) 01/01/2019  Unstable angina ([address] 01/01/2019  ACP (advance care planning) 07/09/2018  Chest pain with high risk of acute coronary syndrome 06/24/2018  Anemia 06/24/2018  Radiation proctitis 06/06/2018  Gastrointestinal hemorrhage associated with anorectal source 05/28/2018  Lower GI bleed 05/28/2018  GI bleed 05/28/2018  Elevated troponin 01/28/2018  Type 2 diabetes mellitus with nephropathy ([address] 12/18/2017  Hyperkalemia 12/13/2017  Hypotension 12/12/2017  Fluid overload 12/12/2017  ESRD (end stage renal disease) on dialysis ([address] 11/08/2017  Hyperuricemia 10/28/2017  Chronic kidney disease, stage V ([address] 10/28/2017  Chest pain 10/27/2017  Secondary hyperparathyroidism of renal origin ([address] 10/25/2017  Fatigue 08/23/2016  Diastolic CHF, acute on chronic (HCC) 07/05/2016  CHF (congestive heart failure), NYHA class IV ([address] 07/05/2016  APRYL (obstructive sleep apnea) 06/26/2015  Prostate cancer ([address] 04/29/2015  Abdominal pain 03/05/2015  GERD (gastroesophageal reflux disease) 03/05/2015  Constipation 03/05/2015  Chronic renal insufficiency 03/05/2015  Atherosclerosis of renal artery ([address]  Chronic diastolic heart failure ([address]  Morbid obesity ([address]  Essential hypertension, benign  Sleep disturbance  Coronary atherosclerosis  Mixed hyperlipidemia  Type II or unspecified type diabetes mellitus without mention of complication, not stated as uncontrolled  Chest pain, unspecified 03/12/2013  S/P coronary artery stent placement 03/12/2013  Postsurgical aortocoronary bypass status 03/12/2013  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  
 NSTEMI (non-ST elevated myocardial infarction) ([facility] 10/07/2012  Coronary atherosclerosis of native coronary artery 10/07/2012  CKD (chronic kidney disease) stage 4, GFR 15-29 ml/min ([facility] 10/07/2012  
 HTN (hypertension) 10/07/2012  DM2 (diabetes mellitus, type 2) ([facility] 10/07/2012  Dyslipidemia 10/07/2012 The  provided the following Interventions: 
Initiated a relationship of care and support. Explored issues of negrita, belief, spirituality and Holiness/ritual needs while hospitalized. Provided information about Spiritual Care Services. Chart reviewed. The following outcomes where achieved: 
 confirmed Patient's [employer]ian Affiliation. Patient expressed gratitude for 's visit. Assessment: 
Patient's negrita in God is very important for him to cope. Patient does not have any Holiness/cultural needs that will affect patients preferences in health care. There are no spiritual or Holiness issues which require intervention at this time. Plan: 
Chaplains will continue to follow and will provide pastoral care on an as needed/requested basis.  recommends bedside caregivers page  on duty if patient shows signs of acute spiritual or emotional distress. [address] [facility] Spiritual Care 
([phone])

## 2019-01-03 NOTE — PROGRESS NOTES
Pt reported chest tightness 3/10 with a \"sharp prick like pain\" to left arm. Pt denies any dizziness or SOB. I went to get nitro and vital sign machine but when I got back his chest tightness had resolved. Vitals take and stable. Morales Siddiqui NP notified and she is in route to see pt. ECG ordered.

## 2019-01-03 NOTE — DIALYSIS
ACUTE HEMODIALYSIS FLOW SHEET 
 
HEMODIALYSIS ORDERS: Physician: CHARLES Delgado Dialyzer: revaclear   Duration: 3.5 hr  BFR: 400   DFR: 800 Dialysate:  Temp 36.5 K+   2    Ca+  2.5 Na 138 Bicarb 35 Weight:  98.1 kg    Patient Chart [x]     Unable to Obtain []   Dry weight/UF Goal: 300 Access AVF Needle Gauge 15 Heparin [x]  Bolus  2000    Units    [x] Hourly   500    Units    []None Catheter locking solution n/a  
Pre BP:   171/60    Pulse:     70     Temperature:   98.2  Respirations: 18  Tx: NS       ml/Bolus  Other        [x] N/A Labs: Pre        Post:        [x] N/A Additional Orders(medications, blood products, hypotension management):       [x] N/A [x] Izabela Consent Verified CATHETER ACCESS: [x]N/A   []Right   []Left   []IJ     []Fem [] First use X-ray verified     []Tunnel                [] Non Tunneled []No S/S infection  []Redness  []Drainage []Cultured []Swelling []Pain []Medical Aseptic Prep Utilized   []Dressing Changed  [] Biopatch  Date:      
[]Clotted   []Patent   Flows: []Good  []Poor  []Reversed If access problem,  notified: []Yes    []N/A  Date:        
 
GRAFT/FISTULA ACCESS:  []N/A     []Right     [x]Left     [x]UE     []LE [x]AVG   []AVF        []Buttonhole    [x]Medical Aseptic Prep Utilized [x]No S/S infection  []Redness  []Drainage []Cultured []Swelling []Pain Bruit:   [x] Strong    [] Weak       Thrill :   [x] Strong    [] Weak Needle Gauge: 15   Length: 1 If access problem,  notified: []Yes     [x]N/A  Date:       
Please describe access if present and not used:  
 
 
GENERAL ASSESSMENT:   
LUNGS:  Rate 18 SaO2%        [] N/A    [x] Clear  [] Coarse  [] Crackles  [] Wheezing 
      [] Diminished     Location : []RLL   []LLL    []RUL  []NOMI Cough: []Productive  []Dry  [x]N/A   Respirations:  [x]Easy  []Labored Therapy:  [x]RA  []NC  l/min    Mask: []NRB []Venti       O2% []Ventilator  []Intubated  [] Trach  [] BiPaP CARDIAC: [x]Regular      [] Irregular   [] Pericardial Rub  [] JVD []  Monitored  [] Bedside  [] Remotely monitored [] N/A  Rhythm: EDEMA: [] None  [x]Generalized  [] Pitting [] 1    [] 2    [] 3    [] 4 [] Facial  [] Pedal  []  UE  [] LE  
SKIN:   [x] Warm  [] Hot     [] Cold   [x] Dry     [] Pale   [] Diaphoretic    
             [] Flushed  [] Jaundiced  [] Cyanotic  [] Rash  [] Weeping LOC:    [x] Alert      [x]Oriented:    [x] Person     [x] Place  []Time 
             [] Confused  [] Lethargic  [] Medicated  [] Non-responsive GI / ABDOMEN:  [] Flat    [] Distended    [x] Soft    [] Firm   []  Obese 
                           [] Diarrhea  [x] Bowel Sounds  [] Nausea  [] Vomiting  / URINE ASSESSMENT:[] Voiding   [x] Oliguria  [] Anuria   []  Xiao [] Incontinent    []  Incontinent Brief      []  Bathroom Privileges PAIN: [x] 0 []1  []2   []3   []4   []5   []6   []7   []8   []9   []10 Scale 0-10  Action/Follow Up: n/a MOBILITY:  [] Amb    [] Amb/Assist    [x] Bed    [] Wheelchair  [] Stretcher All Vitals and Treatment Details on Attached Flowsheet Hospital: [facility] Room # 21 [phone] [] 1st Time Acute  [] Stat  [x] Routine  [] Urgent [x] Acute Room  []  Bedside  [] ICU/CCU  [] ER Isolation Precautions:  [x] Dialysis   [] Airborne   [] Contact    [] Reverse Special Considerations:         [] Blood Consent Verified [x]N/A ALLERGIES:   [x] NKA Code Status:  [x] Full Code  [] DNR  [] Other HBsAg ONLY: Date Drawn 12/27/2018         [x]Negative []Positive []Unknown HBsAb: Date 12/27/2018    [x] Susceptible   [] [id number] []Not Drawn  [] Drawn Current Labs:    Date of Labs: Today [x] Cut and paste current labs here. Results for Vikas Backers (MRN [id number]) as of 1/3/2019 19:24 Ref.  Range 1/3/2019 03:17  
 Sodium Latest Ref Range: 136 - 145 mmol/L 140 Potassium Latest Ref Range: 3.5 - 5.5 mmol/L 4.3 Chloride Latest Ref Range: 100 - 108 mmol/L 100 CO2 Latest Ref Range: 21 - 32 mmol/L 28 Anion gap Latest Ref Range: 3.0 - 18 mmol/L 12 Glucose Latest Ref Range: 74 - 99 mg/dL 113 (H) BUN Latest Ref Range: 7.0 - 18 MG/DL 54 (H) Creatinine Latest Ref Range: 0.6 - 1.3 MG/DL 6.97 (H) BUN/Creatinine ratio Latest Ref Range: 12 - 20   8 (L) Calcium Latest Ref Range: 8.5 - 10.1 MG/DL 7.7 (L) Phosphorus Latest Ref Range: 2.5 - 4.9 MG/DL 5.4 (H) GFR est non-AA Latest Ref Range: >60 ml/min/1.73m2 8 (L) GFR est AA Latest Ref Range: >60 ml/min/1.73m2 9 (L) DIET:  [x] Renal    [] Other     [] NPO     []  Diabetic PRIMARY NURSE REPORT: First initial/Last name/Title Pre Dialysis: Monica Haider    Time: 9219 EDUCATION:   
[] Patient [] Other         Knowledge Basis: []None []Minimal [] Substantial  
Barriers to learning  [x]N/A  
[] Access Care     [x] S&S of infection     [] Fluid Management     []K+     [x]Procedural   
[]Albumin     [] Medications     [] Tx Options     [] Transplant     [] Diet     [] Other Teaching Tools:  [x] Explain  [] Demo  [] Handouts [] Video Patient response:   [x] Verbalized understanding  [] Teach back  [] Return demonstration [] Requires follow up Inappropriate due to         
 
[x] Time Out/Safety Check  [x]Extracorporeal Circuit Tested for integrity RO/HEMODIALYSIS MACHINE SAFETY CHECKS  Before each treatment:    
Machine Number:                   [facility]  [x] Unit Machine # 8 with centralized RO Alarm Test:  Pass time 2086         Other:        
[x] RO/Machine Log Complete Temp    36.5 Dialysate: pH  7.0 Conductivity: Meter   13.8     HD Machine   13.6                  TCD: 13.7 Dialyzer Lot # [id number]            Blood Tubing Lot # [id number]          Saline Lot #  [id number]  
 
CHLORINE TESTING-Before each treatment and every 4 hours Total Chlorine: [x] less than 0.1 ppm  Time: 1300 4 Hr/2nd Check Time: 1700 (if greater than 0.1 ppm from Primary then every 30 minutes from Secondary) TREATMENT INITIATION  with Dialysis Precautions:  
[x] All Connections Secured                 [x] Saline Line Double Clamped  
[x] Venous Parameters Set                  [x] Arterial Parameters Set [x] Prime Given 250 mL                         [x]Air Foam Detector Engaged Treatment Initiation Note: Received pt to the treatment area awake and alert. Access site is clean, dry and intact with no s/s of infection. Treatment started as ordered. During Treatment Notes:  
 
Medication Dose Volume Route Initials Dialyzer Cleared: [] Good [x] Fair  [] Poor Blood processed:  82.6 L 
UF Removed  3000 Ml POst BP:   125/54       Pulse: 83      Respirations: 20  Temperature: 98.2 Post Tx Vascular Access: AVF/AVG: Bleeding stopped Art 15 min. Cecil. [facility] Catheter: Locking solution: Heparin 1:1000 Art. Cecil. N/A Post Assessment:  
  
                              Skin:  [x] Warm  [x] Dry [] Diaphoretic    [] Flushed  [] Pale [] Cyanotic [facility] Signatures Title Initials  Time Lungs: [x] Clear    [] Course  [] Crackles  [] Wheezing [] Diminished Dunnavant Gemma RN LG 9219 Cardiac: [x] Regular   [] Irregular   [] Monitor  [] N/A  Rhythm:      
    Edema:  [] None    [x] General     [] Facial   [] Pedal    [] UE    [] LE  
    Pain: [x]0  []1  []2   []3  []4   []5   []6   []7   []8   []9   []10 Post Treatment Note: 
 
 Pt tolerated treatment with no hemodialysis-related complications. Pt has no current c/o distress.  Pt returned to room by transport team.  
  
 
 POST TREATMENT PRIMARY NURSE HANDOFF REPORT:  
 
First initial/Last name/Title Post Dialysis: Katy Joyner Time:  4169 Abbreviations: AVG-arterial venous graft, AVF-arterial venous fistula, IJ-Internal Jugular, Subcl-Subclavian, Fem-Femoral, Tx-treatment, AP/HR-apical heart rate, DFR-dialysate flow rate, BFR-blood flow rate, AP-arterial pressure, -venous pressure, UF-ultrafiltrate, TMP-transmembrane pressure, Cecil-Venous, Art-Arterial, RO-Reverse Osmosis

## 2019-01-03 NOTE — PROGRESS NOTES
Hospitalist Progress Note Patient: Zoie Garcia Age: 66 y.o. : 1940 MR#: [id number] SSN: [id number] Date/Time: 2019 6:00 PM 
 
 
 
Subjective: He was admitted last night for chest pain, h/o CAD with CABG. ESRD on HD. He underwent cardiac cath this AM. Since the SVG has been intervened multiple times in the past and is now totally occluded, no attempts were made to reopen it. Patient should be treated medically. This would avoid the use of dual antiplatelets which have caused GI bleed repeatedly in the past. 
Aggressive risk factor modification. Uncomplicated post cath care. ROS: no fever/chills, no headache, no dizziness, no facial pain, no sinus congestion, No swallowing pain, No chest pain, no palpitation, ++ shortness of breath, no abd pain, No diarrhea, no urinary complaint, no leg pain or swelling Assessment/Plan: 1. CAD with CABG and stent, with angina. S/p cardiac cath today. - recommended to cont medical management for now. - risk of GI bleed with on dual antiplatelet 2. CHF, diastolic, not decompensation, on medical management. HD for volume management 3. ESRD, continue tomorrow. 4.  DMT2, on Lantus and ISS. Needs weight loss 5. Obesity, need weight loss and dietary change. 6.  APRYL. Will be discharge tomorrow . Full code Additional Notes:   
 
Case discussed with:  [x]Patient  []Family  []Nursing  []Case Management DVT Prophylaxis:  []Lovenox  []Hep SQ  [x]SCDs  []Coumadin   []On Heparin gtt Signed By: Jordan Molina MD   
 2019 6:00 PM  
  
 
Objective:  
VS:  
Visit Vitals /61 (BP 1 Location: Right arm, BP Patient Position: At rest) Pulse 70 Temp 97.9 °F (36.6 °C) Resp 18 Ht 5' 7\" (1.702 m) Wt 97.3 kg (214 lb 9.6 oz) SpO2 96% BMI 33.61 kg/m² Tmax/24hrs: Temp (24hrs), Av °F (36.7 °C), Min:97.1 °F (36.2 °C), Max:98.5 °F (36.9 °C) Intake/Output Summary (Last 24 hours) at 2019 1800 Last data filed at 1/2/2019 5985 Gross per 24 hour Intake 240 ml Output  Net 240 ml Tele:  
General:  Cooperative, Not in acute distress, speaks in full sentence while in bed HEENT: PERRL, EOMI, supple neck, no JVD, dry oral mucosa Cardiovascular: S1S2 regular, no rub/gallop Pulmonary: Clear air entry bilaterally, no wheezing, no crackle GI:  Soft, non tender, non distended, +bs, no guarding Extremities:  No pedal edema, +distal pulses appreciated Neuro: AOx3, moving all extremities, no gross deficit. Additional:  
Current Facility-Administered Medications Medication Dose Route Frequency  dextrose 40% (GLUTOSE) oral gel 1 Tube  1 Tube Oral PRN  
 glucagon (GLUCAGEN) injection 1 mg  1 mg IntraMUSCular PRN  
 dextrose (D50W) injection syrg 12.5-25 g  25-50 mL IntraVENous PRN  
 sodium chloride (NS) flush 5-10 mL  5-10 mL IntraVENous Q8H  
 sodium chloride (NS) flush 5-10 mL  5-10 mL IntraVENous PRN  
 magnesium hydroxide (MILK OF MAGNESIA) 400 mg/5 mL oral suspension 30 mL  30 mL Oral QHS PRN  
 temazepam (RESTORIL) capsule 15 mg  15 mg Oral QHS PRN  
 hydrALAZINE (APRESOLINE) 20 mg/mL injection 20 mg  20 mg IntraVENous Q30MIN PRN  
 [START ON 1/3/2019] epoetin lee (EPOGEN;PROCRIT) 5,000 Units  5,000 Units IntraVENous DIALYSIS TUE, THU & SAT  insulin lispro (HUMALOG) injection   SubCUTAneous TIDAC  metoprolol tartrate (LOPRESSOR) tablet 100 mg  100 mg Oral BID  B complex-vitaminC-folic acid (NEPHROCAP) cap  1 Cap Oral DAILY  tamsulosin (FLOMAX) capsule 0.4 mg  0.4 mg Oral DAILY  losartan (COZAAR) tablet 25 mg  25 mg Oral DAILY  simvastatin (ZOCOR) tablet 40 mg  40 mg Oral QHS  bisacodyl (DULCOLAX) tablet 5 mg  5 mg Oral QHS PRN  pantoprazole (PROTONIX) tablet 40 mg  40 mg Oral DAILY  nitroglycerin (NITROSTAT) tablet 0.4 mg  0.4 mg SubLINGual PRN  
 aspirin delayed-release tablet 81 mg  81 mg Oral DAILY  fenofibrate nanocrystallized (TRICOR) tablet 48 mg  48 mg Oral DAILY  insulin glargine (LANTUS) injection 35 Units  35 Units SubCUTAneous Q12H  cinacalcet tab 60 mg  60 mg Oral DAILY  allopurinol (ZYLOPRIM) tablet 100 mg  100 mg Oral DAILY  acetaminophen (TYLENOL) tablet 650 mg  650 mg Oral Q4H PRN Labs:   
Recent Results (from the past 24 hour(s)) TROPONIN I Collection Time: 01/01/19  9:00 PM  
Result Value Ref Range Troponin-I, QT 0.03 0.0 - 0.045 NG/ML  
CBC WITH AUTOMATED DIFF Collection Time: 01/01/19  9:00 PM  
Result Value Ref Range WBC 10.0 4.6 - 13.2 K/uL  
 RBC 3.25 (L) 4.70 - 5.50 M/uL HGB 9.6 (L) 13.0 - 16.0 g/dL HCT 30.7 (L) 36.0 - 48.0 % MCV 94.5 74.0 - 97.0 FL  
 MCH 29.5 24.0 - 34.0 PG  
 MCHC 31.3 31.0 - 37.0 g/dL  
 RDW 12.7 11.6 - 14.5 % PLATELET 451 994 - 019 K/uL MPV 10.6 9.2 - 11.8 FL  
 NEUTROPHILS 70 40 - 73 % LYMPHOCYTES 19 (L) 21 - 52 % MONOCYTES 8 3 - 10 % EOSINOPHILS 3 0 - 5 % BASOPHILS 0 0 - 2 %  
 ABS. NEUTROPHILS 7.0 1.8 - 8.0 K/UL  
 ABS. LYMPHOCYTES 1.9 0.9 - 3.6 K/UL  
 ABS. MONOCYTES 0.8 0.05 - 1.2 K/UL  
 ABS. EOSINOPHILS 0.3 0.0 - 0.4 K/UL  
 ABS. BASOPHILS 0.0 0.0 - 0.1 K/UL  
 DF AUTOMATED    
PTT Collection Time: 01/01/19  9:00 PM  
Result Value Ref Range aPTT 32.6 23.0 - 36.4 SEC GLUCOSE, POC Collection Time: 01/01/19 10:04 PM  
Result Value Ref Range Glucose (POC) 120 (H) 70 - 110 mg/dL METABOLIC PANEL, COMPREHENSIVE Collection Time: 01/02/19  6:18 AM  
Result Value Ref Range Sodium 138 136 - 145 mmol/L Potassium 4.0 3.5 - 5.5 mmol/L Chloride 99 (L) 100 - 108 mmol/L  
 CO2 29 21 - 32 mmol/L Anion gap 10 3.0 - 18 mmol/L Glucose 73 (L) 74 - 99 mg/dL BUN 43 (H) 7.0 - 18 MG/DL Creatinine 5.82 (H) 0.6 - 1.3 MG/DL  
 BUN/Creatinine ratio 7 (L) 12 - 20 GFR est AA 11 (L) >60 ml/min/1.73m2 GFR est non-AA 9 (L) >60 ml/min/1.73m2  Calcium 7.6 (L) 8.5 - 10.1 MG/DL  
 Bilirubin, total 0.5 0.2 - 1.0 MG/DL  
 ALT (SGPT) 11 (L) 16 - 61 U/L  
 AST (SGOT) 10 (L) 15 - 37 U/L Alk. phosphatase 132 (H) 45 - 117 U/L Protein, total 7.5 6.4 - 8.2 g/dL Albumin 3.4 3.4 - 5.0 g/dL Globulin 4.1 (H) 2.0 - 4.0 g/dL A-G Ratio 0.8 0.8 - 1.7 PTT Collection Time: 01/02/19  6:18 AM  
Result Value Ref Range aPTT 30.4 23.0 - 36.4 SEC  
CBC WITH AUTOMATED DIFF Collection Time: 01/02/19  6:18 AM  
Result Value Ref Range WBC 9.4 4.6 - 13.2 K/uL  
 RBC 3.64 (L) 4.70 - 5.50 M/uL  
 HGB 10.9 (L) 13.0 - 16.0 g/dL HCT 34.1 (L) 36.0 - 48.0 % MCV 93.7 74.0 - 97.0 FL  
 MCH 29.9 24.0 - 34.0 PG  
 MCHC 32.0 31.0 - 37.0 g/dL  
 RDW 12.9 11.6 - 14.5 % PLATELET 421 299 - 783 K/uL MPV 10.6 9.2 - 11.8 FL  
 NEUTROPHILS 68 40 - 73 % LYMPHOCYTES 19 (L) 21 - 52 % MONOCYTES 9 3 - 10 % EOSINOPHILS 4 0 - 5 % BASOPHILS 0 0 - 2 %  
 ABS. NEUTROPHILS 6.4 1.8 - 8.0 K/UL  
 ABS. LYMPHOCYTES 1.8 0.9 - 3.6 K/UL  
 ABS. MONOCYTES 0.8 0.05 - 1.2 K/UL  
 ABS. EOSINOPHILS 0.3 0.0 - 0.4 K/UL  
 ABS. BASOPHILS 0.0 0.0 - 0.1 K/UL  
 DF AUTOMATED    
TROPONIN I Collection Time: 01/02/19  6:18 AM  
Result Value Ref Range Troponin-I, QT 0.02 0.0 - 0.045 NG/ML  
GLUCOSE, POC Collection Time: 01/02/19  7:51 AM  
Result Value Ref Range Glucose (POC) 66 (L) 70 - 110 mg/dL GLUCOSE, POC Collection Time: 01/02/19  8:50 AM  
Result Value Ref Range Glucose (POC) 89 70 - 110 mg/dL TROPONIN I Collection Time: 01/02/19  9:10 AM  
Result Value Ref Range Troponin-I, QT <0.02 0.0 - 0.045 NG/ML  
GLUCOSE, POC Collection Time: 01/02/19  9:58 AM  
Result Value Ref Range Glucose (POC) 106 70 - 110 mg/dL GLUCOSE, POC Collection Time: 01/02/19 11:50 AM  
Result Value Ref Range Glucose (POC) 81 70 - 110 mg/dL GLUCOSE, POC Collection Time: 01/02/19  4:58 PM  
Result Value Ref Range Glucose (POC) 68 (L) 70 - 110 mg/dL Cardiac Cath: 
· Patent LIMA to LAD 
 · 100% ostial occlusion of SVG to diagonal artery which has been intervened multiple times in the past. 
· Critical disease in distal circumflex and proximal and distal RCA but these are small vessels and the lesions are chronic. Since the SVG has been intervened multiple times in the past and is now totally occluded, no attempts were made to reopen it. Patient should be treated medically. This would avoid the use of dual antiplatelets which have caused GI bleed repeatedly in the past. 
Aggressive risk factor modification.

## 2019-01-03 NOTE — PROGRESS NOTES
[facility] 
 
 
CARDIOLOGY PROGRESS NOTE 
RECS: 
1. Recurrent  Angina-medical rx only Has mild DARBY. Troponin are negative. His ekg NSR with no ischemic changes. S/p Cardiac cath. Add plavix. D/w pt and he accepts risk of bleed. Change losartan to norvasc. S/l NTG PRN. Add imdur in a few days. 2. Hx of CAD h/o CABG 1997, stent to SVG in 2012 and 7/2016, SVG to diagonal 11/2017. Recent NSTEMI 1/2018- on asa, statin and bb. Not on Plavix due to GI bleed in 6/2018 3. Hypertesion- fairly controlled on current medications. BB and ARB 4. Chronic diastolic heart failure- appears compensated- volume status per renal. Monitor for s/s fluid overload          
5. Hyperlipidemia- on statin and Tricor     
6. ESRD on HD  Renal following 7. Diabetes- medications per medical team 
8. 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  
  
Ok for discharge from cardiac view if stable post HD an dfew hours after taking norvasc. Follow up in office in 1-2 wks ASSESSMENT: 
Hospital Problems  Date Reviewed: 12/31/2018 Codes Class Noted POA  
 CAD (coronary artery disease) ICD-10-CM: I25.10 ICD-9-CM: 414.00  1/1/2019 Unknown Unstable angina (HCC) ICD-10-CM: I20.0 ICD-9-CM: 411.1  1/1/2019 Unknown SUBJECTIVE: 
No CP No SOB OBJECTIVE: 
 
VS:  
Visit Vitals /46 (BP 1 Location: Right arm, BP Patient Position: Sitting) Pulse (!) 58 Temp 97.6 °F (36.4 °C) Resp 22 Ht 5' 7\" (1.702 m) Wt 98.1 kg (216 lb 4.8 oz) SpO2 100% BMI 33.88 kg/m² Intake/Output Summary (Last 24 hours) at 1/3/2019 1009 Last data filed at 1/2/2019 1830 Gross per 24 hour Intake 480 ml Output 0 ml Net 480 ml  
 
TELE: normal sinus rhythm General: alert and in no apparent distress HENT: Normocephalic, atraumatic. Normal external eye. Neck :  no bruit, no JVD Cardiac:  regular rate and rhythm, S1, S2 normal, systolic murmur: late systolic 3/6, blowing at lower left sternal border, at apex, radiates to axilla Chest/Lungs:chest clear, no wheezing, rales, normal symmetric air entry Abdomen: Soft, nontender, no masses Extremities:  No c/c/edema, peripheral pulses present Labs: Results:  
   
Chemistry Recent Labs 01/03/19 
0317 01/02/19 
0618 01/01/19 
1435 * 73* 165*  138 136  
K 4.3 4.0 4.1  99* 98* CO2 28 29 32 BUN 54* 43* 33* CREA 6.97* 5.82* 4.79* CA 7.2*  7.7* 7.6* 7.5* MG  --   --  1.8 PHOS 5.4*  --   --   
AGAP 12 10 6 BUCR 8* 7* 7* AP  --  132* 144* TP  --  7.5 7.3 ALB  --  3.4 3.5 GLOB  --  4.1* 3.8 AGRAT  --  0.8 0.9  
  
CBC w/Diff Recent Labs 01/03/19 
7868 01/02/19 
0618 01/01/19 
2100 WBC 9.6 9.4 10.0  
RBC 3.61* 3.64* 3.25* HGB 10.6* 10.9* 9.6* HCT 33.9* 34.1* 30.7*  240 245 GRANS 73 68 70 LYMPH 17* 19* 19* EOS 3 4 3 Cardiac Enzymes Recent Labs 01/01/19 
1435  CKND1 2.2 Coagulation Recent Labs 01/02/19 
0618 01/01/19 
2100 APTT 30.4 32.6 Lipid Panel Lab Results Component Value Date/Time Cholesterol, total 149 11/19/2018 11:26 AM  
 HDL Cholesterol 41 11/19/2018 11:26 AM  
 LDL, calculated 71.4 11/19/2018 11:26 AM  
 VLDL, calculated 36.6 11/19/2018 11:26 AM  
 Triglyceride 183 (H) 11/19/2018 11:26 AM  
 CHOL/HDL Ratio 3.6 11/19/2018 11:26 AM  
  
BNP No results for input(s): BNPP in the last 72 hours. Liver Enzymes Recent Labs 01/02/19 
7647 TP 7.5 ALB 3.4 * SGOT 10* Digoxin Thyroid Studies Lab Results Component Value Date/Time T4, Total 10.9 10/09/2012 04:53 AM  
 TSH 2.16 10/23/2017 11:08 PM  
    
 
 
 
Vincent Chavez MD   Pager # [phone]

## 2019-01-03 NOTE — PROGRESS NOTES
Problem: Falls - Risk of 
Goal: *Absence of Falls Document Jamie Forbes Fall Risk and appropriate interventions in the flowsheet. Outcome: Progressing Towards Goal 
Fall Risk Interventions: 
Mobility Interventions: Bed/chair exit alarm, Patient to call before getting OOB Medication Interventions: Patient to call before getting OOB, Teach patient to arise slowly History of Falls Interventions: Bed/chair exit alarm, Door open when patient unattended

## 2019-01-04 ENCOUNTER — PATIENT OUTREACH (OUTPATIENT)
Dept: FAMILY MEDICINE CLINIC | Age: 79
End: 2019-01-04

## 2019-01-04 NOTE — PROGRESS NOTES
Hospital Discharge Follow-Up Date/Time:  2019 9:40 AM 
 
Patient was admitted to [facility] on 19 and discharged on 1/3/19 for CAD, left heart cath, ESRD, DM. The physician discharge summary was available at the time of outreach. Patient was contacted within one business days of discharge. Spoke with patient's wife who was at the pharmacy at the time of the call having new RXs filled. Wife was able to complete medication reconciliation with this writer. Per wife patient is feeling well since return home from hospital.  Wife stated patient is independent and drives self to dialysis on a //S schedule. Wife/ptient are aware of appointment with Dr. Nava Lew on 18 at 36. Top Challenges reviewed with the provider Feeling well now. Aware of apt with PCP on 19 Method of communication with provider :staff message Inpatient RRAT score: 47 Was this a readmission? no  
Patient stated reason for the readmission: NA 
 
Nurse Navigator (NN) contacted the family by telephone to perform post hospital discharge assessment. Verified name and  with family as identifiers. Provided introduction to self, and explanation of the Nurse Navigator role. Reviewed discharge instructions and red flags with family who verbalized understanding. Family given an opportunity to ask questions and does not have any further questions or concerns at this time. The family agrees to contact the PCP office for questions related to their healthcare. NN provided contact information for future reference. Disease Specific:   N/A Summary of patient's top problems: 1. CAD 2. ESRD 3. DM Home Health orders at discharge: PT, OT, SN Home Health company: [facility] Date of initial visit: TBD Durable Medical Equipment ordered/company: none Durable Medical Equipment received: NA Barriers to care? lack of knowledge about disease Advance Care Planning: Does patient have an Advance Directive:  reviewed and current Medication(s):  
New Medications at Discharge: Norvasc, Plavix Changed Medications at Discharge: Lopressor, Imdur, Nitrostat Discontinued Medications at Discharge: Cozaar Medication reconciliation was performed with family, who verbalizes understanding of administration of home medications. There were no barriers to obtaining medications identified at this time. Referral to Pharm D needed: no  
 
Current Outpatient Medications Medication Sig  
 metoprolol tartrate (LOPRESSOR) 50 mg tablet Take 1 Tab by mouth two (2) times a day.  amLODIPine (NORVASC) 2.5 mg tablet Take 1 Tab by mouth daily.  clopidogrel (PLAVIX) 75 mg tab Take 1 Tab by mouth daily.  [START ON 1/7/2019] isosorbide mononitrate ER (IMDUR) 30 mg tablet Take 1 Tab by mouth daily.  allopurinol (ZYLOPRIM) 100 mg tablet TAKE ONE TABLET BY MOUTH ON MONDAY, WEDNESDAY AND FRIDAY  cinacalcet (SENSIPAR) 30 mg tablet Take 60 mg by mouth daily.  insulin glargine (LANTUS) 100 unit/mL injection 35 Units by SubCUTAneous route every twelve (12) hours.  fenofibrate (LOFIBRA) 54 mg tablet TAKE ONE TABLET BY MOUTH ONE TIME DAILY  ASPIR-LOW 81 mg tablet TAKE ONE TABLET BY MOUTH ONE TIME DAILY  DULCOLAX, BISACODYL, PO Take  by mouth.  pantoprazole (PROTONIX) 40 mg tablet Take 40 mg by mouth daily.  simvastatin (ZOCOR) 40 mg tablet TAKE 1 TABLET BY MOUTH NIGHTLY.  tamsulosin (FLOMAX) 0.4 mg capsule TAKE ONE CAPSULE BY MOUTH ONE TIME DAILY  b complex-vitamin c-folic acid (NEPHROCAPS) 1 mg capsule Take 1 Cap by mouth daily.  Cholecalciferol, Vitamin D3, (DECARA) 50,000 unit cap Take  by mouth every seven (7) days.  insulin aspart (NOVOLOG) 100 unit/mL injection 20 units sq 3 times a day,sliding scale (Patient taking differently: sliding scale with meals)  nitroglycerin (NITROSTAT) 0.4 mg SL tablet 1 Tab by SubLINGual route as needed for Chest Pain. No current facility-administered medications for this visit. There are no discontinued medications. [facility] follow up appointment(s):  
Future Appointments Date Time Provider Artemio Basilio 1/9/2019  4:30 PM MD RAUL Merchant [facility] SCHED  
1/21/2019 10:00 AM MD Geovanna Sharma  
1/29/2019  1:15 PM MD SANDIP Allen [address]  
2/20/2019 10:30 AM Delilah Aviles MD [facility] [place] SCHED  
5/6/2019 10:15 AM MD SANDIP Allen SCHED  
7/10/2019 10:30 AM Delilah Aviles MD [facility] [address] Non-[facility] follow up appointment(s): none [facility]:  n/a  
 
 
Goals  Attends follow-up appointments as directed. Plan:  Monitor for attendance at apts and assist as needed to schedule  Knowledge and adherence of prescribed medication (ie. action, side effects, missed dose, etc.). Plan:  Reconcile medications and assess understanding using the teach back technique 1/4/18-done with wife who is able to name medications, purpose, and how to take  Prevent complications post hospitalization.

## 2019-01-04 NOTE — PROGRESS NOTES
TRANSFER - IN REPORT: 
 
Verbal report received from Debra(name) on Andrés Borregoippo  being received from dialysis(unit) for routine progression of care Report consisted of patients Situation, Background, Assessment and  
Recommendations(SBAR). Information from the following report(s) Intake/Output and Recent Results and vitals was reviewed with the receiving nurse. Opportunity for questions and clarification was provided. Assessment completed upon patients arrival to unit and care assumed.

## 2019-01-04 NOTE — DISCHARGE SUMMARY
[facility] Discharge Summary Patient: Zoie Garcia Age: 66 y.o. : 1940 MR#: [id number] SSN: [id number] PCP on record: Bryanna Goyal MD 
Admit date: 2019 Discharge date: 1/3/2019 Disposition:  
 []Home   [x]Home with Home Health   []SNF/NH   []Rehab   []Home with family []Alternate Facility:____________________ Admission Diagnoses: 
CAD (coronary artery disease) Unstable angina (HCC) Discharge Diagnoses: 1. CAD with CABG and stent, with angina 2. Chronic CHF, diastolic 3. ESRD on HD 4. Type 2 DM 
5. Obesity 6. APRYL. Discharge Medications:  
 
Discharge Medication List as of 1/3/2019  6:52 PM  
  
START taking these medications Details  
amLODIPine (NORVASC) 2.5 mg tablet Take 1 Tab by mouth daily. , Print, Disp-30 Tab, R-0  
  
clopidogrel (PLAVIX) 75 mg tab Take 1 Tab by mouth daily. , Print, Disp-30 Tab, R-0  
  
  
CONTINUE these medications which have CHANGED Details  
metoprolol tartrate (LOPRESSOR) 50 mg tablet Take 1 Tab by mouth two (2) times a day., Print, Disp-60 Tab, R-0  
  
isosorbide mononitrate ER (IMDUR) 30 mg tablet Take 1 Tab by mouth daily. , Print, Disp-30 Tab, R-0  
  
nitroglycerin (NITROSTAT) 0.4 mg SL tablet 1 Tab by SubLINGual route as needed for Chest Pain., Print, Disp-1 Bottle, R-0  
  
  
CONTINUE these medications which have NOT CHANGED Details  
allopurinol (ZYLOPRIM) 100 mg tablet TAKE ONE TABLET BY MOUTH ON MONDAY, WEDNESDAY AND FRIDAY, Normal, Disp-36 Tab, R-0  
  
cinacalcet (SENSIPAR) 30 mg tablet Take 60 mg by mouth daily. , Historical Med  
  
insulin glargine (LANTUS) 100 unit/mL injection 35 Units by SubCUTAneous route every twelve (12) hours. , Normal, Disp-30 mL, R-3  
  
fenofibrate (LOFIBRA) 54 mg tablet TAKE ONE TABLET BY MOUTH ONE TIME DAILY, Normal, Disp-30 Tab, R-3  
  
ASPIR-LOW 81 mg tablet TAKE ONE TABLET BY MOUTH ONE TIME DAILY, Normal, Disp-38 Tab, R-4  
  
DULCOLAX, BISACODYL, PO Take  by mouth., Historical Med  
  
pantoprazole (PROTONIX) 40 mg tablet Take 40 mg by mouth daily. , Historical Med  
  
simvastatin (ZOCOR) 40 mg tablet TAKE 1 TABLET BY MOUTH NIGHTLY., Normal, Disp-90 Tab, R-2  
  
tamsulosin (FLOMAX) 0.4 mg capsule TAKE ONE CAPSULE BY MOUTH ONE TIME DAILY, Normal, Disp-90 Cap, R-3  
  
b complex-vitamin c-folic acid (NEPHROCAPS) 1 mg capsule Take 1 Cap by mouth daily. , Print, Disp-30 Cap, R-0 Cholecalciferol, Vitamin D3, (DECARA) 50,000 unit cap Take  by mouth every seven (7) days. , Historical Med  
  
insulin aspart (NOVOLOG) 100 unit/mL injection 20 units sq 3 times a day,sliding scale, No Print, Disp-10 mL, R-0  
  
  
STOP taking these medications  
  
 losartan (COZAAR) 25 mg tablet Comments:  
Reason for Stopping:   
   
  
 
Consults:   
- Cardiology - Nephrology Procedures: 
Coronary Angiography LEFT HEART CATH On 01/02/2019 · Patent LIMA to LAD · 100% ostial occlusion of SVG to diagonal artery which has been intervened multiple times in the past. 
· Critical disease in distal circumflex and proximal and distal RCA but these are small vessels and the lesions are chronic. Since the SVG has been intervened multiple times in the past and is now totally occluded, no attempts were made to reopen it. Patient should be treated medically. This would avoid the use of dual antiplatelets which have caused GI bleed repeatedly in the past. 
Aggressive risk factor modification. Significant Diagnostic Studies:  
-  XR CHEST PORT on 01/01/2019 IMPRESSION:  
Prominent perihilar interstitium may be simulated by low lung volumes. Subtle vascular congestion or perihilar infiltrate difficult to exclude. Mildly enlarged cardiac silhouette. Left basilar streaky density similar to prior, likely atelectasis. Hospital Course by Problem 1.  CAD with CABG and stent, with recurrent angina - S/p cardiac cath while inpatient with recommendation for aggressive medical management. troponins have been negative with EKG no ischemic changes following ambulation earlier today. Patient placed on plavix in addition to low dose aspirin with awareness / acceptance of bleeding risk. Continued on ntg and resume imdur on 2019 per cardiology recommendation. 2.  CHF, diastolic, not decompensation, on medical management. HD for volume management 3. ESRD on HD - followed by Dr. Keren Wesley during admission and continued on regular HD Tues - Thurs-Sat schedule. 4.  Type 2 DM - on Lantus and SSI during admission. Continue home regimen at time of discharge. Needs weight loss 5. Obesity, need weight loss and dietary change. 6.  APRYL. Today's examination of the patient revealed:  
 
Subjective:  
Feels well - no CP / shortness of breath at rest.  Ambulated with this provider, reports improved endurance but continues to \"get winded\" with extensive ambulation. Denies chest pain at rest, reports episode of transient (lasting approx 10-15 seconds) chest pain following ambulation with this provider. Objective:  
VS:  
Visit Vitals /54 (BP 1 Location: Right arm, BP Patient Position: At rest) Pulse 87 Temp 97.5 °F (36.4 °C) Resp 22 Ht 5' 7\" (1.702 m) Wt 98.1 kg (216 lb 4.8 oz) SpO2 100% BMI 33.88 kg/m² Tmax/24hrs: Temp (24hrs), Av.1 °F (36.7 °C), Min:97.5 °F (36.4 °C), Max:98.8 °F (37.1 °C) Input/Output:  
 
Intake/Output Summary (Last 24 hours) at 1/3/2019 2139 Last data filed at 1/3/2019 1800 Gross per 24 hour Intake  Output 3000 ml Net -3000 ml General:  Alert, NAD Cardiovascular:  RRR Pulmonary:  LSC throughout; respiratory effort WNL; with ambulation oxygen saturations maintain at 90% or greater on RA 
GI:  +BS in all four quadrants, soft, non-tender Extremities:  No edema; 2+ dorsalis pedis pulses bilaterally Neuro: oriented x 3 Labs: Recent Results (from the past 24 hour(s)) METABOLIC PANEL, BASIC Collection Time: 01/03/19  3:17 AM  
Result Value Ref Range Sodium 140 136 - 145 mmol/L Potassium 4.3 3.5 - 5.5 mmol/L Chloride 100 100 - 108 mmol/L  
 CO2 28 21 - 32 mmol/L Anion gap 12 3.0 - 18 mmol/L Glucose 113 (H) 74 - 99 mg/dL BUN 54 (H) 7.0 - 18 MG/DL Creatinine 6.97 (H) 0.6 - 1.3 MG/DL  
 BUN/Creatinine ratio 8 (L) 12 - 20 GFR est AA 9 (L) >60 ml/min/1.73m2 GFR est non-AA 8 (L) >60 ml/min/1.73m2 Calcium 7.7 (L) 8.5 - 10.1 MG/DL  
CBC WITH AUTOMATED DIFF Collection Time: 01/03/19  3:17 AM  
Result Value Ref Range WBC 9.6 4.6 - 13.2 K/uL  
 RBC 3.61 (L) 4.70 - 5.50 M/uL  
 HGB 10.6 (L) 13.0 - 16.0 g/dL HCT 33.9 (L) 36.0 - 48.0 % MCV 93.9 74.0 - 97.0 FL  
 MCH 29.4 24.0 - 34.0 PG  
 MCHC 31.3 31.0 - 37.0 g/dL  
 RDW 12.6 11.6 - 14.5 % PLATELET 163 423 - 822 K/uL MPV 10.6 9.2 - 11.8 FL  
 NEUTROPHILS 73 40 - 73 % LYMPHOCYTES 17 (L) 21 - 52 % MONOCYTES 7 3 - 10 % EOSINOPHILS 3 0 - 5 % BASOPHILS 0 0 - 2 %  
 ABS. NEUTROPHILS 7.0 1.8 - 8.0 K/UL  
 ABS. LYMPHOCYTES 1.7 0.9 - 3.6 K/UL  
 ABS. MONOCYTES 0.7 0.05 - 1.2 K/UL  
 ABS. EOSINOPHILS 0.3 0.0 - 0.4 K/UL  
 ABS. BASOPHILS 0.0 0.0 - 0.1 K/UL  
 DF AUTOMATED    
HEP B SURFACE AB Collection Time: 01/03/19  3:17 AM  
Result Value Ref Range Hepatitis B surface Ab <3.10 (L) >10.0 mIU/mL Hep B surface Ab Interp. NEGATIVE  (A) POS Hep B surface Ab comment Samples with a  value of 10 mIU/mL or greater are considered positive (protective immunity) in accordance with the CDC guidelines. HEP B SURFACE AG Collection Time: 01/03/19  3:17 AM  
Result Value Ref Range Hepatitis B surface Ag <0.10 <1.00 Index Hep B surface Ag Interp. NEGATIVE  NEG PHOSPHORUS Collection Time: 01/03/19  3:17 AM  
Result Value Ref Range Phosphorus 5.4 (H) 2.5 - 4.9 MG/DL  
PTH INTACT  Collection Time: 01/03/19  3:17 AM  
 Result Value Ref Range Calcium 7.2 (L) 8.5 - 10.1 MG/DL  
 PTH, Intact 816.1 (H) 18.4 - 88.0 pg/mL GLUCOSE, POC Collection Time: 01/03/19  7:44 AM  
Result Value Ref Range Glucose (POC) 114 (H) 70 - 110 mg/dL GLUCOSE, POC Collection Time: 01/03/19 11:51 AM  
Result Value Ref Range Glucose (POC) 185 (H) 70 - 110 mg/dL GLUCOSE, POC Collection Time: 01/03/19  6:45 PM  
Result Value Ref Range Glucose (POC) 155 (H) 70 - 110 mg/dL Additional Data Reviewed: 
  
Condition: Stable Follow-up Appointments:  
Dr. Stanislaw Rivas in 5-7 days Dr. Teresa Monroe in 1-2 weeks Continue HD tues  Thurs - sat as scheduled >30 minutes spent coordinating this discharge (review instructions/follow-up, prescriptions, preparing report for sign off) Signed: 
Yany Blakely NP 
1/3/2019 
9:39 PM

## 2019-01-04 NOTE — PROGRESS NOTES
TRANSFER - OUT REPORT: 
 
Verbal report given to Debra(name) on Steven Hamilton  being transferred to dialysis(unit) for routine progression of care Report consisted of patients Situation, Background, Assessment and  
Recommendations(SBAR). Information from the following report(s) SBAR, MAR and Recent Results was reviewed with the receiving nurse. Lines: 20 RAC NO IVF going and a L upper arm fistula. Opportunity for questions and clarification was provided. Patient transported with: room air and no tele

## 2019-01-05 ENCOUNTER — HOME CARE VISIT (OUTPATIENT)
Dept: SCHEDULING | Facility: HOME HEALTH | Age: 79
End: 2019-01-05
Payer: MEDICARE

## 2019-01-05 PROCEDURE — G0299 HHS/HOSPICE OF RN EA 15 MIN: HCPCS

## 2019-01-05 PROCEDURE — 400013 HH SOC

## 2019-01-05 PROCEDURE — 3331090002 HH PPS REVENUE DEBIT

## 2019-01-05 PROCEDURE — 3331090001 HH PPS REVENUE CREDIT

## 2019-01-06 LAB
ATRIAL RATE: 57 BPM
CALCULATED P AXIS, ECG09: 38 DEGREES
CALCULATED R AXIS, ECG10: -4 DEGREES
CALCULATED T AXIS, ECG11: 58 DEGREES
DIAGNOSIS, 93000: NORMAL
P-R INTERVAL, ECG05: 164 MS
Q-T INTERVAL, ECG07: 464 MS
QRS DURATION, ECG06: 104 MS
QTC CALCULATION (BEZET), ECG08: 451 MS
VENTRICULAR RATE, ECG03: 57 BPM

## 2019-01-06 PROCEDURE — 3331090001 HH PPS REVENUE CREDIT

## 2019-01-06 PROCEDURE — 3331090002 HH PPS REVENUE DEBIT

## 2019-01-07 ENCOUNTER — HOME CARE VISIT (OUTPATIENT)
Dept: SCHEDULING | Facility: HOME HEALTH | Age: 79
End: 2019-01-07
Payer: MEDICARE

## 2019-01-07 ENCOUNTER — HOME CARE VISIT (OUTPATIENT)
Dept: HOME HEALTH SERVICES | Facility: HOME HEALTH | Age: 79
End: 2019-01-07
Payer: MEDICARE

## 2019-01-07 VITALS
OXYGEN SATURATION: 95 % | TEMPERATURE: 98.4 F | DIASTOLIC BLOOD PRESSURE: 70 MMHG | SYSTOLIC BLOOD PRESSURE: 110 MMHG | HEART RATE: 63 BPM

## 2019-01-07 PROCEDURE — G0151 HHCP-SERV OF PT,EA 15 MIN: HCPCS

## 2019-01-07 PROCEDURE — 3331090002 HH PPS REVENUE DEBIT

## 2019-01-07 PROCEDURE — 3331090001 HH PPS REVENUE CREDIT

## 2019-01-07 PROCEDURE — 3331090003 HH PPS REVENUE ADJ

## 2019-01-08 ENCOUNTER — HOME CARE VISIT (OUTPATIENT)
Dept: SCHEDULING | Facility: HOME HEALTH | Age: 79
End: 2019-01-08
Payer: MEDICARE

## 2019-01-08 VITALS
HEART RATE: 75 BPM | SYSTOLIC BLOOD PRESSURE: 142 MMHG | TEMPERATURE: 97.4 F | DIASTOLIC BLOOD PRESSURE: 60 MMHG | OXYGEN SATURATION: 95 %

## 2019-01-08 PROCEDURE — G0152 HHCP-SERV OF OT,EA 15 MIN: HCPCS

## 2019-01-09 ENCOUNTER — OFFICE VISIT (OUTPATIENT)
Dept: FAMILY MEDICINE CLINIC | Age: 79
End: 2019-01-09

## 2019-01-09 VITALS
WEIGHT: 219 LBS | HEART RATE: 67 BPM | OXYGEN SATURATION: 96 % | BODY MASS INDEX: 34.37 KG/M2 | SYSTOLIC BLOOD PRESSURE: 122 MMHG | DIASTOLIC BLOOD PRESSURE: 44 MMHG | HEIGHT: 67 IN | RESPIRATION RATE: 16 BRPM | TEMPERATURE: 98.4 F

## 2019-01-09 DIAGNOSIS — I25.119 ATHEROSCLEROSIS OF CORONARY ARTERY OF NATIVE HEART WITH ANGINA PECTORIS, UNSPECIFIED VESSEL OR LESION TYPE (HCC): Primary | ICD-10-CM

## 2019-01-09 DIAGNOSIS — I10 ESSENTIAL HYPERTENSION: ICD-10-CM

## 2019-01-09 DIAGNOSIS — E11.9 TYPE 2 DIABETES MELLITUS WITHOUT COMPLICATION, WITH LONG-TERM CURRENT USE OF INSULIN (HCC): Chronic | ICD-10-CM

## 2019-01-09 DIAGNOSIS — C61 PROSTATE CANCER (HCC): ICD-10-CM

## 2019-01-09 DIAGNOSIS — E66.01 MORBID OBESITY (HCC): ICD-10-CM

## 2019-01-09 DIAGNOSIS — N18.6 ESRD (END STAGE RENAL DISEASE) ON DIALYSIS (HCC): ICD-10-CM

## 2019-01-09 DIAGNOSIS — Z79.4 TYPE 2 DIABETES MELLITUS WITHOUT COMPLICATION, WITH LONG-TERM CURRENT USE OF INSULIN (HCC): Chronic | ICD-10-CM

## 2019-01-09 DIAGNOSIS — E11.21 TYPE 2 DIABETES MELLITUS WITH NEPHROPATHY (HCC): ICD-10-CM

## 2019-01-09 DIAGNOSIS — Z99.2 ESRD (END STAGE RENAL DISEASE) ON DIALYSIS (HCC): ICD-10-CM

## 2019-01-09 DIAGNOSIS — N25.81 SECONDARY HYPERPARATHYROIDISM OF RENAL ORIGIN (HCC): ICD-10-CM

## 2019-01-09 DIAGNOSIS — K21.9 GASTROESOPHAGEAL REFLUX DISEASE WITHOUT ESOPHAGITIS: ICD-10-CM

## 2019-01-09 DIAGNOSIS — I70.1 ATHEROSCLEROSIS OF RENAL ARTERY (HCC): ICD-10-CM

## 2019-01-09 DIAGNOSIS — H92.03 OTALGIA OF BOTH EARS: ICD-10-CM

## 2019-01-09 DIAGNOSIS — G47.30 SLEEP APNEA, UNSPECIFIED TYPE: ICD-10-CM

## 2019-01-09 LAB — HBA1C MFR BLD HPLC: 6.5 %

## 2019-01-09 NOTE — PROGRESS NOTES
Chief Complaint Patient presents with  Follow-up [facility] Admission from 1-1-2019 to1-3-2019 1. Have you been to the ER, urgent care clinic since your last visit? Hospitalized since your last visit? Yes [facility] Admission from 01- to 01- 
 
2. Have you seen or consulted any other health care providers outside of the [address] [place] [address] since your last visit? Include any pap smears or colon screening. Yes ENT two weeks ago and on  01- for tube placement in ears HPI Fredy Call comes in accompanied by the wife for transitional care. Patient was admitted at [facility] from 1 January 2019 to 3 January 2019. CAD, ESRD and diabetes mellitus. Underwent left heart catheterization. Patient has a history of coronary artery disease and has had coronary stenting placed. He has been followed up by the cardiologist.  Has had episodes of chest pain on and off and was recently admitted following episodes of chest pain. Had left heart catheterization done. Found to have 100% ostial occlusion of the SVG to diagonal artery. Multiple interventions have been tried in the past and since there was complete occlusion no attempts were made to reopen it and medical management was recommended. Patient also has a history of GI bleed thus dual antiplatelet therapy is not recommended. Patient continues to have chest pain on and off. He wondered about CABG. I did try to explain to him about the risks involved with this both intraoperatively and also possible outcomes with regards to the healing process and quality of life. He should discuss this further with his cardiologist as they would have further comment and better experience with this. He should take their advice into consideration. In the meantime he should try to be more positive about medical management.   He does continue to have the chest pain and decreased exercise tolerance. He is undergoing cardiac rehab and this should help. We discussed with the cardiologist other medications to help with the chest pain including ranolazine. He has been taking nitro and this helps with the pain. He is on Imdur. At times this causes his blood pressure to go down but this is mainly on the days that he has dialysis. May consider cutting back on the Imdur on this particular days. Patient has end-stage renal disease. He is on dialysis 3 times a week. He will continue with this. This has been going well. He has diabetes mellitus type 2. He is on insulin. States that his blood glucose numbers have been stable. We did check his HbA1c and this is 6.5. Encouraged to continue with the current treatment plan. Patient recently underwent placement of tubes in both ears. This was due to otalgia. Right ear does have some bleeding and drainage. He will follow-up with the ENT on this. Left ear the tube is in place. Denies pain, fever or chills. Patient has a history of renal artery atherosclerosis. Has end-stage renal disease as noted above. He also has secondary hyperparathyroidism. These are stable. He does see the nephrologist on this. He has nephropathy also due to diabetes. He is on hemodialysis. Patient is morbidly obese. His weight has gone up lately. He was not doing dietary lifestyle modification owing to the festive season. He will try to intensify this. BMI is 34.30. We discussed normal BMI. Patient does have gastroesophageal reflux disease. He gets heartburn on and off. He has been followed up with a gastroenterologist.  He is on Protonix. We will have him go up on the Protonix to 40 mg twice a day and to call his gastroenterologist for advice and follow-up. He has sleep apnea. Has not been using his CPAP machine as this is the old kind.   He wonders whether he would get a more modified kind that does not cover the face as he has claustrophobia. I will place a referral for him to be seen in the sleep clinic. Patient has a history of prostate cancer. He has been followed up by the urologist.  He is seen by Dr. Eleni Castro. He has a follow-up appointment. Past Medical History Past Medical History:  
Diagnosis Date  Atherosclerosis of renal artery ([address] S/P Rt stent  CAD (coronary artery disease) , 1997, 2012  
 ,double bypass, 2 stents, 2stents 7/2016  Cancer [facility])   
 prostate  CHF (congestive heart failure) ([address]  Chronic diastolic heart failure ([address]  Chronic kidney disease  Chronic kidney disease (CKD), stage IV (severe) ([address] On Dialysis now -T-Th-sat  Constipation  Coronary atherosclerosis of unspecified type of vessel, native or graft Stable angina after recent PCI continue treatment.  CRI (chronic renal insufficiency)  Diabetes ([address] 1973  
 type 2  
 Essential hypertension, benign  GERD (gastroesophageal reflux disease)  Gout  Hypertension 1982  Morbid obesity ([address] Weight loss has been strongly encouraged by following dietary restrictions and an exercise routine  APRYL (obstructive sleep apnea) 6/26/2015  
 no cpap  Other and unspecified hyperlipidemia HDL's are not at goal, LDL's are at goal, triglycerides are not at goal.  
 Other and unspecified hyperlipidemia HDL's are not at goal, LDL's are at goal, triglycerides are not at goal.   
 Other ill-defined conditions(799.89) 1960  
 pneumonia twice  Prostate cancer ([address]  Sleep disturbance  Type II or unspecified type diabetes mellitus without mention of complication, not stated as uncontrolled Surgical History Past Surgical History:  
Procedure Laterality Date [address]  
 lt. carotid endart. [address] UNLIST  2012, 2016, Nov 2017 Coronary Stent  COLONOSCOPY N/A 6/23/2017 COLONOSCOPY w/ APC w/ polypectomies performed by Wayna Cheadle, MD at [address]  FLEXIBLE SIGMOIDOSCOPY N/A 1/19/2018 FLEXIBLE SIGMOIDOSCOPY WITH Radio frequency ablation performed by Wayna Cheadle, MD at [facility] ENDOSCOPY 2021 Jose Hutchins [address] N/A 6/8/2018 SIGMOIDOSCOPY FLEXIBLE WITH APC performed by Emeli Leon MD at [address] [facility]  HX CORONARY ARTERY BYPASS GRAFT  2000  
 x2  HX HEENT  1997  
 cholecystectomy  HX UROLOGICAL  1998  
 renal art. surg  HX VASCULAR ACCESS Perma cath for HD- right chest.  
 MS INTRO CATH DIALYSIS CIRCUIT DX ANGRPH FLUOR S&I N/A 12/10/2018 FISTULOGRAM LEFT performed by Alisa Gabriel MD at [facility] CATH LAB  MS INTRO CATH DIALYSIS CIRCUIT W/TRLUML BALO ANGIOP N/A 12/10/2018 Angioplasty Fistula/Dialysis Circuit performed by Alisa Gabriel MD at [address] [place] [address] Medications Current Outpatient Medications Medication Sig Dispense Refill  L.acid/L.casei/B.bif/B.ken/FOS (PROBIOTIC BLEND PO) Take 2 Tabs by mouth daily.  losartan (COZAAR) 50 mg tablet Take 25 mg by mouth daily. Take 1/2 tab daily  metoprolol tartrate (LOPRESSOR) 50 mg tablet Take 1 Tab by mouth two (2) times a day. 60 Tab 0  
 amLODIPine (NORVASC) 2.5 mg tablet Take 1 Tab by mouth daily. 30 Tab 0  clopidogrel (PLAVIX) 75 mg tab Take 1 Tab by mouth daily. 30 Tab 0  
 isosorbide mononitrate ER (IMDUR) 30 mg tablet Take 1 Tab by mouth daily. 30 Tab 0  
 nitroglycerin (NITROSTAT) 0.4 mg SL tablet 1 Tab by SubLINGual route as needed for Chest Pain. 1 Bottle 0  
 allopurinol (ZYLOPRIM) 100 mg tablet TAKE ONE TABLET BY MOUTH ON MONDAY, WEDNESDAY AND FRIDAY 36 Tab 0  
 cinacalcet (SENSIPAR) 30 mg tablet Take 60 mg by mouth daily.  insulin glargine (LANTUS) 100 unit/mL injection 35 Units by SubCUTAneous route every twelve (12) hours.  30 mL 3  
 fenofibrate (LOFIBRA) 54 mg tablet TAKE ONE TABLET BY MOUTH ONE TIME DAILY 30 Tab 3  ASPIR-LOW 81 mg tablet TAKE ONE TABLET BY MOUTH ONE TIME DAILY 38 Tab 4  
 DULCOLAX, BISACODYL, PO Take  by mouth.  pantoprazole (PROTONIX) 40 mg tablet Take 40 mg by mouth daily.  simvastatin (ZOCOR) 40 mg tablet TAKE 1 TABLET BY MOUTH NIGHTLY. 90 Tab 2  
 tamsulosin (FLOMAX) 0.4 mg capsule TAKE ONE CAPSULE BY MOUTH ONE TIME DAILY 90 Cap 3  
 b complex-vitamin c-folic acid (NEPHROCAPS) 1 mg capsule Take 1 Cap by mouth daily. 30 Cap 0  Cholecalciferol, Vitamin D3, (DECARA) 50,000 unit cap Take  by mouth every seven (7) days.  insulin aspart (NOVOLOG) 100 unit/mL injection 20 units sq 3 times a day,sliding scale (Patient taking differently: sliding scale with meals) 10 mL 0 Allergies Allergies Allergen Reactions  Nka [No Known Allergies] Other (comments) Family History Family History Problem Relation Age of Onset  Heart Attack Father 64 Social History Social History Socioeconomic History  Marital status:  Spouse name: Not on file  Number of children: Not on file  Years of education: Not on file  Highest education level: Not on file Social Needs  Financial resource strain: Not on file  Food insecurity - worry: Not on file  Food insecurity - inability: Not on file  Transportation needs - medical: Not on file  Transportation needs - non-medical: Not on file Occupational History  Not on file Tobacco Use  Smoking status: Never Smoker  Smokeless tobacco: Never Used Substance and Sexual Activity  Alcohol use: No  
 Drug use: No  
 Sexual activity: Not on file Other Topics Concern  Not on file Social History Narrative  Not on file Review of Systems Review of Systems -14 point review of systems is negative except as noted above in the HPI. Vital Signs Visit Vitals /44 (BP 1 Location: Left arm, BP Patient Position: Sitting) Pulse 67  
 Temp 98.4 °F (36.9 °C) (Oral) Resp 16 Ht 5' 7\" (1.702 m) Wt 219 lb (99.3 kg) SpO2 96% BMI 34.30 kg/m² Physical Exam 
Physical Examination: General appearance - oriented to person, place, and time, overweight, acyanotic, in no respiratory distress and anxious Mental status - alert, oriented to person, place, and time, affect appropriate to mood Eyes - sclera anicteric Ears -left ear with tube in place, right ear with bloody discharge noted Nose - normal and patent, no erythema, discharge or polyps Mouth - mucous membranes moist, pharynx normal without lesions Neck - supple, no significant adenopathy Lymphatics - no palpable lymphadenopathy, no hepatosplenomegaly Chest - no tachypnea, retractions or cyanosis Heart - systolic murmur 2/6 at lower left sternal border Abdomen - soft, nontender, nondistended, no masses or organomegaly Neurological - motor and sensory grossly normal bilaterally Musculoskeletal - full range of motion without pain Extremities - intact peripheral pulses Results Results for orders placed or performed in visit on 01/09/19 AMB POC HEMOGLOBIN A1C Result Value Ref Range Hemoglobin A1c (POC) 6.5 % ASSESSMENT and PLAN 
  ICD-10-CM ICD-9-CM 1. Atherosclerosis of coronary artery of native heart with angina pectoris, unspecified vessel or lesion type ([facility] I25.119 414.01   
  413.9 2. Type 2 diabetes mellitus without complication, with long-term current use of insulin ([facility]) E11.9 250.00 AMB POC HEMOGLOBIN A1C  
 Z79.4 V58.67   
3. Sleep apnea, unspecified type G47.30 780.57 REFERRAL TO SLEEP STUDIES 4. ESRD (end stage renal disease) on dialysis ([facility]) N18.6 585.6 Z99.2 V45.11   
5. Essential hypertension I10 401.9 6. Otalgia of both ears H92.03 388.70   
7. Gastroesophageal reflux disease without esophagitis K21.9 530.81   
8. Morbid obesity ([facility] E66.01 278.01   
9. Atherosclerosis of renal artery ([facility]) I70.1 440.1 10. Secondary hyperparathyroidism of renal origin ([facility] N25.81 588.81   
11. Type 2 diabetes mellitus with nephropathy (HCC) E11.21 250.40   
  583.81   
12. Prostate cancer ([place] [address].) C61 185   
 
lab results and schedule of future lab studies reviewed with patient 
reviewed diet, exercise and weight control 
cardiovascular risk and specific lipid/LDL goals reviewed 
reviewed medications and side effects in detail 
specific diabetic recommendations: low cholesterol diet, weight control and daily exercise discussed, home glucose monitoring emphasized, all medications, side effects and compliance discussed carefully, glycohemoglobin and other lab monitoring discussed and long term diabetic complications discussed I have discussed the diagnosis with the patient and the intended plan of care as seen in the above orders. The patient has received an after-visit summary and questions were answered concerning future plans. I have discussed medication, side effects, and warnings with the patient in detail. The patient verbalized understanding and is in agreement with the plan of care. The patient will contact the office with any additional concerns.  
 
Madonna Linton MD

## 2019-01-10 ENCOUNTER — PATIENT OUTREACH (OUTPATIENT)
Dept: FAMILY MEDICINE CLINIC | Age: 79
End: 2019-01-10

## 2019-01-11 VITALS
DIASTOLIC BLOOD PRESSURE: 50 MMHG | OXYGEN SATURATION: 98 % | TEMPERATURE: 97.7 F | RESPIRATION RATE: 16 BRPM | SYSTOLIC BLOOD PRESSURE: 100 MMHG | HEART RATE: 87 BPM

## 2019-01-29 ENCOUNTER — OFFICE VISIT (OUTPATIENT)
Dept: CARDIOLOGY CLINIC | Age: 79
End: 2019-01-29

## 2019-01-29 ENCOUNTER — PATIENT OUTREACH (OUTPATIENT)
Dept: FAMILY MEDICINE CLINIC | Age: 79
End: 2019-01-29

## 2019-01-29 VITALS
HEART RATE: 60 BPM | SYSTOLIC BLOOD PRESSURE: 121 MMHG | BODY MASS INDEX: 31.98 KG/M2 | DIASTOLIC BLOOD PRESSURE: 42 MMHG | WEIGHT: 211 LBS | HEIGHT: 68 IN

## 2019-01-29 DIAGNOSIS — Z95.5 S/P CORONARY ARTERY STENT PLACEMENT: ICD-10-CM

## 2019-01-29 DIAGNOSIS — N18.6 END STAGE RENAL DISEASE (HCC): ICD-10-CM

## 2019-01-29 DIAGNOSIS — I50.32 CHRONIC DIASTOLIC HEART FAILURE (HCC): ICD-10-CM

## 2019-01-29 DIAGNOSIS — Z95.1 POSTSURGICAL AORTOCORONARY BYPASS STATUS: ICD-10-CM

## 2019-01-29 DIAGNOSIS — I25.119 CORONARY ARTERY DISEASE INVOLVING NATIVE CORONARY ARTERY OF NATIVE HEART WITH ANGINA PECTORIS (HCC): Primary | ICD-10-CM

## 2019-01-29 NOTE — PROGRESS NOTES
1. Have you been to the ER, urgent care clinic since your last visit? Hospitalized since your last visit? Yes where: [facility]/Chest Pain    2. Have you seen or consulted any other health care providers outside of the [address] [place] [address] since your last visit? Include any pap smears or colon screening. Yes Where: Dr Jackman/Urologist/Dr Aviles/PCP     3. Since your last visit, have you had any of the following symptoms? .           4. Have you had any blood work, X-rays or cardiac testing? 5. Where do you normally have your labs drawn? 6. Do you need any refills today?

## 2019-01-29 NOTE — PATIENT INSTRUCTIONS
[employer] Activation    Thank you for requesting access to [employer]. Please follow the instructions below to securely access and download your online medical record. [employer] allows you to send messages to your doctor, view your test results, renew your prescriptions, schedule appointments, and more. How Do I Sign Up? 1. In your internet browser, go to [email or web address]. 2. Click on the First Time User? Click Here link in the Sign In box. You will see the New Member Sign Up page. 3. Enter your [employer] Access Code exactly as it appears below. You will not need to use this code after youve completed the sign-up process. If you do not sign up before the expiration date, you must request a new code. [employer] Access Code: [email or web address]  Expires: 2019 12:19 PM (This is the date your [employer] access code will )    4. Enter the last four digits of your Social Security Number (xxxx) and Date of Birth (mm/dd/yyyy) as indicated and click Submit. You will be taken to the next sign-up page. 5. Create a [employer] ID. This will be your [employer] login ID and cannot be changed, so think of one that is secure and easy to remember. 6. Create a [employer] password. You can change your password at any time. 7. Enter your Password Reset Question and Answer. This can be used at a later time if you forget your password. 8. Enter your e-mail address. You will receive e-mail notification when new information is available in [address]. 9. Click Sign Up. You can now view and download portions of your medical record. 10. Click the Download Summary menu link to download a portable copy of your medical information. Additional Information    If you have questions, please visit the Frequently Asked Questions section of the [employer] website at [email or web address] Remember, [employer] is NOT to be used for urgent needs. For medical emergencies, dial 911.

## 2019-01-29 NOTE — LETTER
Darwin Kirby 1940 
 
1/29/2019 Dear MD Neftali Lucas MD Enedina Dine, MD Vallarie Bertin, MD Gifford Crooks, MD Jerrlyn Boots, RONALDO Payton, JESSI 
 
I had the pleasure of evaluating  Mr. Chel Peng in office today. Below are the relevant portions of my assessment and plan of care. ICD-10-CM ICD-9-CM 1. Coronary artery disease involving native coronary artery of native heart with angina pectoris ([facility] I25.119 414.01 ECHO ADULT COMPLETE  
  413.9 Stable recent non-STEMI. Has occluded vein graft to diagonal artery managing medically managed 2. Chronic diastolic heart failure (HCC) I50.32 428.32 Stable. Class III 3. S/P coronary artery stent placement Z95.5 V45.82 Occluded vein graft stent on cath 1/2019 4. Postsurgical aortocoronary bypass status Z95.1 V45.81 Stable 5. End stage renal disease ([facility]) N18.6 585.6 On dialysis Current Outpatient Medications Medication Sig Dispense Refill  L.acid/L.casei/B.bif/B.ken/FOS (PROBIOTIC BLEND PO) Take 2 Tabs by mouth daily.  losartan (COZAAR) 50 mg tablet Take 25 mg by mouth daily. Take 1/2 tab daily  metoprolol tartrate (LOPRESSOR) 50 mg tablet Take 1 Tab by mouth two (2) times a day. 60 Tab 0  
 amLODIPine (NORVASC) 2.5 mg tablet Take 1 Tab by mouth daily. 30 Tab 0  clopidogrel (PLAVIX) 75 mg tab Take 1 Tab by mouth daily. 30 Tab 0  
 isosorbide mononitrate ER (IMDUR) 30 mg tablet Take 1 Tab by mouth daily. 30 Tab 0  
 nitroglycerin (NITROSTAT) 0.4 mg SL tablet 1 Tab by SubLINGual route as needed for Chest Pain. 1 Bottle 0  
 allopurinol (ZYLOPRIM) 100 mg tablet TAKE ONE TABLET BY MOUTH ON MONDAY, WEDNESDAY AND FRIDAY 36 Tab 0  
 cinacalcet (SENSIPAR) 30 mg tablet Take 60 mg by mouth daily.  insulin glargine (LANTUS) 100 unit/mL injection 35 Units by SubCUTAneous route every twelve (12) hours.  30 mL 3  
  fenofibrate (LOFIBRA) 54 mg tablet TAKE ONE TABLET BY MOUTH ONE TIME DAILY 30 Tab 3  ASPIR-LOW 81 mg tablet TAKE ONE TABLET BY MOUTH ONE TIME DAILY 38 Tab 4  
 DULCOLAX, BISACODYL, PO Take  by mouth.  pantoprazole (PROTONIX) 40 mg tablet Take 40 mg by mouth daily.  simvastatin (ZOCOR) 40 mg tablet TAKE 1 TABLET BY MOUTH NIGHTLY. 90 Tab 2  
 tamsulosin (FLOMAX) 0.4 mg capsule TAKE ONE CAPSULE BY MOUTH ONE TIME DAILY 90 Cap 3  
 b complex-vitamin c-folic acid (NEPHROCAPS) 1 mg capsule Take 1 Cap by mouth daily. 30 Cap 0  Cholecalciferol, Vitamin D3, (DECARA) 50,000 unit cap Take  by mouth every seven (7) days.  insulin aspart (NOVOLOG) 100 unit/mL injection 20 units sq 3 times a day,sliding scale 10 mL 0 No orders of the defined types were placed in this encounter. If you have questions, please do not hesitate to call me. I look forward to following Mr. Madi Calloway along with you. Sincerely, Bouchra Desai MD

## 2019-01-29 NOTE — PROGRESS NOTES
HISTORY OF PRESENT ILLNESS  Andrés Matos is a 66 y.o. male. Patient with cad,chf,post cabg and pci  . recent admission with chf,acs,non q mi  Had vg to [facility] pci-11/2017 12/2017-admitted with angina  1/2018-non stemi  12/2018-patient presents with increasing chest pain substernal burning. Uses frequent nitroglycerin. This change in frequency has happened for 2 weeks. 1/2019-admission with unstable angina. Cardiac catheterization showing new occluded vein graft to diagonal branch. Medically managed due to multiple prior PCI      CHF   The history is provided by the patient. This is a chronic problem. The problem occurs constantly. The problem has not changed since onset. Associated symptoms include chest pain and shortness of breath. The symptoms are aggravated by exertion. Hypertension   The history is provided by the patient. This is a chronic problem. The problem occurs constantly. The problem has not changed since onset. Associated symptoms include chest pain and shortness of breath. Nothing aggravates the symptoms. Shortness of Breath   The history is provided by the patient. This is a recurrent problem. The problem occurs intermittently. The current episode started more than 1 week ago. Associated symptoms include chest pain. Pertinent negatives include no fever, no cough, no sputum production, no hemoptysis, no wheezing, no PND, no orthopnea, no vomiting, no rash, no leg swelling and no claudication. The problem's precipitants include exercise (>30 mts of treadmill). Chest Pain (Angina)    This is a recurrent problem. The problem has been rapidly improving. The problem occurs rarely. The pain is associated with exertion. The pain is present in the substernal region. The quality of the pain is described as pressure-like. The pain does not radiate. Associated symptoms include shortness of breath.  Pertinent negatives include no claudication, no cough, no dizziness, no fever, no hemoptysis, no nausea, no orthopnea, no palpitations, no PND, no sputum production, no vomiting and no weakness. Review of Systems   Constitutional: Negative for chills and fever. HENT: Negative for nosebleeds. Eyes: Negative for blurred vision and double vision. Respiratory: Positive for shortness of breath. Negative for cough, hemoptysis, sputum production and wheezing. Cardiovascular: Positive for chest pain. Negative for palpitations, orthopnea, claudication, leg swelling and PND. Gastrointestinal: Negative for heartburn, nausea and vomiting. Musculoskeletal: Negative for myalgias. Skin: Negative for rash. Neurological: Negative for dizziness and weakness. Endo/Heme/Allergies: Does not bruise/bleed easily. Family History   Problem Relation Age of Onset    Heart Attack Father 64       Past Medical History:   Diagnosis Date    Atherosclerosis of renal artery (HCC)     S/P Rt stent    CAD (coronary artery disease) , 1997, 2012    ,double bypass, 2 stents, 2stents 7/2016    Cancer ([facility] [address]     prostate    CHF (congestive heart failure) (HCC)     Chronic diastolic heart failure (HCC)     Chronic kidney disease     Chronic kidney disease (CKD), stage IV (severe) (HCC)     On Dialysis now -T-Th-sat    Constipation     Coronary atherosclerosis of unspecified type of vessel, native or graft     Stable angina after recent PCI continue treatment.        CRI (chronic renal insufficiency)     Diabetes ([address] 1973    type 2    Essential hypertension, benign     GERD (gastroesophageal reflux disease)     Gout     Hypertension 1982    Morbid obesity ([address]     Weight loss has been strongly encouraged by following dietary restrictions and an exercise routine    APRYL (obstructive sleep apnea) 6/26/2015    no cpap    Other and unspecified hyperlipidemia     HDL's are not at goal, LDL's are at goal, triglycerides are not at goal.    Other and unspecified hyperlipidemia     HDL's are not at goal, LDL's are at goal, triglycerides are not at goal.     Other ill-defined conditions(799.89) 1960    pneumonia twice    Prostate cancer ([facility] [address]     Sleep disturbance     Type II or unspecified type diabetes mellitus without mention of complication, not stated as uncontrolled        Past Surgical History:   Procedure Laterality Date   [address]    lt. carotid endart. [employer] CARDIAC SURG PROCEDURE UNLIST  2012, 2016, Nov 2017    Coronary Stent    COLONOSCOPY N/A 6/23/2017    COLONOSCOPY w/ APC w/ polypectomies performed by Carlos León MD at [address] N/A 1/19/2018    FLEXIBLE SIGMOIDOSCOPY WITH Radio frequency ablation performed by Carlos León MD at [address] N/A 6/8/2018    SIGMOIDOSCOPY FLEXIBLE WITH APC performed by Norberto Beth MD at [address] HX CHOLECYSTECTOMY      HX CORONARY ARTERY BYPASS GRAFT  2000    x2    HX HEENT  1997    cholecystectomy    HX UROLOGICAL  1998    renal art. surg    HX VASCULAR ACCESS      Perma cath for HD- right chest.    [place] INTRO CATH DIALYSIS CIRCUIT DX ANGRPH FLUOR S&I N/A 12/10/2018    FISTULOGRAM LEFT performed by Eduardo Martinez MD at [facility] CATH LAB    [place] INTRO CATH DIALYSIS CIRCUIT W/TRLUML BALO ANGIOP N/A 12/10/2018    Angioplasty Fistula/Dialysis Circuit performed by Eduardo Martinez MD at [facility] CATH LAB       Allergies   Allergen Reactions    Nka [No Known Allergies] Other (comments)       Current Outpatient Medications   Medication Sig    L.acid/L.casei/B.bif/B.ken/FOS (PROBIOTIC BLEND PO) Take 2 Tabs by mouth daily.  losartan (COZAAR) 50 mg tablet Take 25 mg by mouth daily. Take 1/2 tab daily    metoprolol tartrate (LOPRESSOR) 50 mg tablet Take 1 Tab by mouth two (2) times a day.  amLODIPine (NORVASC) 2.5 mg tablet Take 1 Tab by mouth daily.  clopidogrel (PLAVIX) 75 mg tab Take 1 Tab by mouth daily.     isosorbide mononitrate ER (IMDUR) 30 mg tablet Take 1 Tab by mouth daily.  nitroglycerin (NITROSTAT) 0.4 mg SL tablet 1 Tab by SubLINGual route as needed for Chest Pain.  allopurinol (ZYLOPRIM) 100 mg tablet TAKE ONE TABLET BY MOUTH ON MONDAY, WEDNESDAY AND FRIDAY    cinacalcet (SENSIPAR) 30 mg tablet Take 60 mg by mouth daily.  insulin glargine (LANTUS) 100 unit/mL injection 35 Units by SubCUTAneous route every twelve (12) hours.  fenofibrate (LOFIBRA) 54 mg tablet TAKE ONE TABLET BY MOUTH ONE TIME DAILY    ASPIR-LOW 81 mg tablet TAKE ONE TABLET BY MOUTH ONE TIME DAILY    DULCOLAX, BISACODYL, PO Take  by mouth.  pantoprazole (PROTONIX) 40 mg tablet Take 40 mg by mouth daily.  simvastatin (ZOCOR) 40 mg tablet TAKE 1 TABLET BY MOUTH NIGHTLY.  tamsulosin (FLOMAX) 0.4 mg capsule TAKE ONE CAPSULE BY MOUTH ONE TIME DAILY    b complex-vitamin c-folic acid (NEPHROCAPS) 1 mg capsule Take 1 Cap by mouth daily.  Cholecalciferol, Vitamin D3, (DECARA) 50,000 unit cap Take  by mouth every seven (7) days.  insulin aspart (NOVOLOG) 100 unit/mL injection 20 units sq 3 times a day,sliding scale     No current facility-administered medications for this visit. Visit Vitals  /42   Pulse 60   Ht 5' 8\" (1.727 m)   Wt 95.7 kg (211 lb)   BMI 32.08 kg/m²         Physical Exam   Constitutional: He is oriented to person, place, and time. He appears well-developed and well-nourished. obese   HENT:   Head: Normocephalic and atraumatic. Eyes: Conjunctivae are normal.   Neck: Neck supple. No JVD present. No tracheal deviation present. No thyromegaly present. Cardiovascular: Normal rate, regular rhythm and normal heart sounds. Exam reveals no gallop and no friction rub. No murmur heard. Pulmonary/Chest: Breath sounds normal. No respiratory distress. He has no wheezes. He has no rales. He exhibits no tenderness. Abdominal: Soft. There is no tenderness. Musculoskeletal: He exhibits no edema.    Neurological: He is alert and oriented to person, place, and time. Skin: Skin is warm and dry. Psychiatric: He has a normal mood and affect. Mr. Nabila Calvo has a reminder for a \"due or due soon\" health maintenance. I have asked that he contact his primary care provider for follow-up on this health maintenance. CARDIOLOGY STUDIES 10/8/2012   Cardiac Cath Result PATENT LIMA TO LAD, SVG TO D1 SUBTOTAL DIFFUSE, POST PCI WITH STENTS,HAD PROXIMAL IN STENT PROTRUSION UNABLE TO CROSS WITH BALLONS,D1 DIFFISE SEVERE   Echocardiogram - Complete Result NORMAL EF 70%     SUMMARY:echo:11/2014  Left ventricle: Systolic function was hyperdynamic. Ejection fraction was  estimated in the range of 70 % to 75 %. No obvious wall motion  abnormalities identified in the views obtained. Near cavity obliteration  seen in the mid to apical portions of the ventricular cavity. There was  mild concentric hypertrophy. Features were consistent with a pseudonormal  left ventricular filling pattern, with concomitant abnormal relaxation and  increased filling pressure (grade 2 diastolic dysfunction). NUCLEAR IMAGING:    Findings: stress test:11/2014  1. Post-stress imaging in short axis, horizontal and vertical long axis views reveals normal isotope uptake in all areas. 2. Resting images also show normal isotope uptake in all areas. 3. Gated images show normal left ventricular size, wall motion and systolic function. Ejection fraction is 85%. Diagnosis:   1. Normal scan. 2. No evidence of significant fixed or reversible defect suggesting ischemia or myocardial infarction noted from this nuclear study. 3. low risk scan.  7/2016-pci  vg to diag  11/2017-pci svg-diag  I have personally reviewed patient's records available from hospital and other providers and incorporated findings in patient care.   1/2018-[facility]    Conclusion 1/2019       · Patent LIMA to LAD  · 100% ostial occlusion of SVG to diagonal artery which has been intervened multiple times in the past.  · Critical disease in distal circumflex and proximal and distal RCA but these are small vessels and the lesions are chronic. Since the SVG has been intervened multiple times in the past and is now totally occluded, no attempts were made to reopen it. Patient should be treated medically. This would avoid the use of dual antiplatelets which have caused GI bleed repeatedly in the past.  Aggressive risk factor modification.            Assessment         ICD-10-CM ICD-9-CM    1. Coronary artery disease involving native coronary artery of native heart with angina pectoris ([facility] [address] I25.119 414.01 ECHO ADULT COMPLETE     413.9     Stable recent non-STEMI. Has occluded vein graft to diagonal artery managing medically managed   2. Chronic diastolic heart failure (HCC) I50.32 428.32     Stable. Class III   3. S/P coronary artery stent placement Z95.5 V45.82     Occluded vein graft stent on cath 1/2019   4. Postsurgical aortocoronary bypass status Z95.1 V45.81     Stable   5. End stage renal disease ([facility] [address] N18.6 585.6     On dialysis       11/2018  Cardiac status stable. Off Plavix due to GI bleed but hemoglobin is stabilized. Last hemoglobin 11. Cholesterol 156. Continue current treatment. Occasional.  Of low blood pressure will change metoprolol as needed to 50 mg twice a day instead of 100 mg twice a day  12/2018  Crescendo angina for 2 weeks. Frequent use of nitroglycerin. Will set up for cardiac catheterization. Advised to come to emergency room if symptoms are persistent  There are no discontinued medications. No orders of the defined types were placed in this encounter. Follow-up Disposition:  Return in about 3 months (around 4/29/2019).

## 2019-02-04 ENCOUNTER — PATIENT OUTREACH (OUTPATIENT)
Dept: INTERNAL MEDICINE CLINIC | Age: 79
End: 2019-02-04

## 2019-02-04 NOTE — PROGRESS NOTES
Hospital Discharge Follow-Up 
  
  
Patient was admitted [facility] on 1/2/19 and discharged on 1/3/19 for CAD, left heart cath, ESRD, DM. Reached wife who stated patient who is doing very well. Wife stated patient is taking medications as directed, eating well, and attending dialysis regularly. Patient has graduated from the Transitions of Care Coordination  program on 2/4/19. Patient's symptoms are stable at this time. Patient/family has the ability to self-manage. Care management goals have been completed at this time. No further nurse navigator follow up scheduled. Pt has nurse navigator's contact information for any further questions, concerns, or needs. Patients upcoming visits:   
Future Appointments Date Time Provider Artemio Basilio 2/20/2019 10:30 AM Sherri Aviles MD [facility] SCHED  
3/18/2019 12:30 PM [facility] ECHO SANDIP [facility] SCHED  
4/22/2019  9:30 AM Evert Castillo MD [facility] SCHED  
7/10/2019 10:30 AM Sherri Aviles MD [facility] [address] Goals Addressed This Visit's Progress  Attends follow-up appointments as directed. On track Plan:  Monitor for attendance at apts and assist as needed to schedule 1/9/19-attended apt with Dr. Vivien Enriquez 1/28/19-attended apt with Dr. Peyman Avila, urology 1/29/19-attended apt with Dr. Leyda Ivey, cardiology  Knowledge and adherence of prescribed medication (ie. action, side effects, missed dose, etc.). On track Plan:  Reconcile medications and assess understanding using the teach back technique 1/4/18-done with wife who is able to name medications, purpose, and how to take

## 2019-02-11 ENCOUNTER — OFFICE VISIT (OUTPATIENT)
Dept: FAMILY MEDICINE CLINIC | Age: 79
End: 2019-02-11

## 2019-02-11 VITALS
BODY MASS INDEX: 32.28 KG/M2 | DIASTOLIC BLOOD PRESSURE: 60 MMHG | SYSTOLIC BLOOD PRESSURE: 161 MMHG | WEIGHT: 213 LBS | HEART RATE: 64 BPM | TEMPERATURE: 97.4 F | HEIGHT: 68 IN | RESPIRATION RATE: 18 BRPM | OXYGEN SATURATION: 96 %

## 2019-02-11 DIAGNOSIS — Z99.2 ESRD (END STAGE RENAL DISEASE) ON DIALYSIS (HCC): ICD-10-CM

## 2019-02-11 DIAGNOSIS — J40 BRONCHITIS: Primary | ICD-10-CM

## 2019-02-11 DIAGNOSIS — E66.01 MORBID OBESITY (HCC): ICD-10-CM

## 2019-02-11 DIAGNOSIS — I10 ESSENTIAL HYPERTENSION: ICD-10-CM

## 2019-02-11 DIAGNOSIS — N18.6 ESRD (END STAGE RENAL DISEASE) ON DIALYSIS (HCC): ICD-10-CM

## 2019-02-11 RX ORDER — AZITHROMYCIN 250 MG/1
TABLET, FILM COATED ORAL
Qty: 6 TAB | Refills: 0 | Status: SHIPPED | OUTPATIENT
Start: 2019-02-11 | End: 2019-02-16

## 2019-02-11 RX ORDER — GUAIFENESIN DEXTROMETHORPHAN HYDROBROMIDE ORAL SOLUTION 10; 100 MG/5ML; MG/5ML
10 SOLUTION ORAL
Qty: 240 ML | Refills: 0 | Status: SHIPPED | OUTPATIENT
Start: 2019-02-11 | End: 2019-03-07 | Stop reason: SDUPTHER

## 2019-02-11 RX ORDER — ALBUTEROL SULFATE 90 UG/1
2 AEROSOL, METERED RESPIRATORY (INHALATION)
Qty: 1 INHALER | Refills: 0 | Status: SHIPPED | OUTPATIENT
Start: 2019-02-11 | End: 2019-04-24

## 2019-02-11 NOTE — PROGRESS NOTES
Chief Complaint Patient presents with  Cough  
  x1week 1. Have you been to the ER, urgent care clinic since your last visit? Hospitalized since your last visit? No 
 
2. Have you seen or consulted any other health care providers outside of the [address] [place] [address] since your last visit? Include any pap smears or colon screening. No  
 
HPI Truddie Hodgkin comes in for acute care. He is accompanied by his wife. Patient has a cough that has been ongoing for weeks. He was in [place] for  of a friend. Since then has this cough that is productive of mucopurulent phlegm. Associated fever, chills and pleuritic chest pain. He does have wheeze and shortness of breath and chest tightness. No hemoptysis. Has tried using over-the-counter medication without much relief. Patient has a history of end-stage renal disease. Did not go for dialysis 2 days ago as he was feeling weak due to the cough. Still continues to have malaise. Blood pressure today is slightly elevated. He denies headache, focal weakness or visual changes. He is on Cozaar, Lopressor and Norvasc. He has a history of coronary artery disease. He is on Plavix, Cozaar and Lopressor. Also takes Imdur and the Nitrostat. This is bronchitis. Will put patient on azithromycin. Given he is wheezing I will give albuterol inhaler. Will have him take some guaifenesin for the symptomatic relief of cough as needed. I will follow-up at next visit or sooner in case of no improvement or worsening symptoms. Patient was strongly advised to go for his dialysis tomorrow. Patient has morbid obesity. His weight is 213 pounds and BMI 32.39. Will continue with lifestyle and dietary modification and efforts to cut down on his weight. Past Medical History Past Medical History:  
Diagnosis Date  Atherosclerosis of renal artery ([facility] [address] S/P Rt stent  CAD (coronary artery disease) , ,  ,double bypass, 2 stents, 2stents 7/2016  Cancer [facility])   
 prostate  CHF (congestive heart failure) ([address]  Chronic diastolic heart failure ([address]  Chronic kidney disease  Chronic kidney disease (CKD), stage IV (severe) ([address] On Dialysis now -T-Th-sat  Constipation  Coronary atherosclerosis of unspecified type of vessel, native or graft Stable angina after recent PCI continue treatment.  CRI (chronic renal insufficiency)  Diabetes ([address] 1973  
 type 2  
 Essential hypertension, benign  GERD (gastroesophageal reflux disease)  Gout  Hypertension 1982  Morbid obesity ([address] Weight loss has been strongly encouraged by following dietary restrictions and an exercise routine  APRYL (obstructive sleep apnea) 6/26/2015  
 no cpap  Other and unspecified hyperlipidemia HDL's are not at goal, LDL's are at goal, triglycerides are not at goal.  
 Other and unspecified hyperlipidemia HDL's are not at goal, LDL's are at goal, triglycerides are not at goal.   
 Other ill-defined conditions(799.89) 1960  
 pneumonia twice  Prostate cancer ([address]  Sleep disturbance  Type II or unspecified type diabetes mellitus without mention of complication, not stated as uncontrolled Surgical History Past Surgical History:  
Procedure Laterality Date [address]  
 lt. carotid endart. [address] UNLIST  2012, 2016, Nov 2017 Coronary Stent  COLONOSCOPY N/A 6/23/2017 COLONOSCOPY w/ APC w/ polypectomies performed by Akua Salcedo MD at [address]  FLEXIBLE SIGMOIDOSCOPY N/A 1/19/2018 FLEXIBLE SIGMOIDOSCOPY WITH Radio frequency ablation performed by Akua Salcedo MD at [facility] ENDOSCOPY 2021 Jose Pinon N/A 6/8/2018 SIGMOIDOSCOPY FLEXIBLE WITH APC performed by Ricardo Hansen MD at [address] [facility]  HX CORONARY ARTERY BYPASS GRAFT  2000  
 x2 [address] cholecystectomy  HX UROLOGICAL  1998  
 renal art. surg  HX VASCULAR ACCESS Perma cath for HD- right chest.  
 MD INTRO CATH DIALYSIS CIRCUIT DX ANGRPH FLUOR S&I N/A 12/10/2018 FISTULOGRAM LEFT performed by José Antonio Rodriguez MD at [facility] CATH LAB  MD INTRO CATH DIALYSIS CIRCUIT W/TRLUML BALO ANGIOP N/A 12/10/2018 Angioplasty Fistula/Dialysis Circuit performed by José Antonio Rodriguez MD at [address] [place] [address] Medications Current Outpatient Medications Medication Sig Dispense Refill  azithromycin (ZITHROMAX) 250 mg tablet Take 2 tablets today, then take 1 tablet daily 6 Tab 0  
 albuterol (PROVENTIL HFA, VENTOLIN HFA, PROAIR HFA) 90 mcg/actuation inhaler Take 2 Puffs by inhalation every four (4) hours as needed for Wheezing. 1 Inhaler 0  
 guaiFENesin-dextromethorphan (GUAIFENESIN-DM)  mg/5 mL liqd Take 10 mL by mouth every four (4) hours as needed. 240 mL 0  
 L.acid/L.casei/B.bif/B.ken/FOS (PROBIOTIC BLEND PO) Take 2 Tabs by mouth daily.  losartan (COZAAR) 50 mg tablet Take 25 mg by mouth daily. Take 1/2 tab daily  metoprolol tartrate (LOPRESSOR) 50 mg tablet Take 1 Tab by mouth two (2) times a day. 60 Tab 0  
 amLODIPine (NORVASC) 2.5 mg tablet Take 1 Tab by mouth daily. 30 Tab 0  clopidogrel (PLAVIX) 75 mg tab Take 1 Tab by mouth daily. 30 Tab 0  
 isosorbide mononitrate ER (IMDUR) 30 mg tablet Take 1 Tab by mouth daily. 30 Tab 0  
 nitroglycerin (NITROSTAT) 0.4 mg SL tablet 1 Tab by SubLINGual route as needed for Chest Pain. 1 Bottle 0  
 allopurinol (ZYLOPRIM) 100 mg tablet TAKE ONE TABLET BY MOUTH ON MONDAY, WEDNESDAY AND FRIDAY 36 Tab 0  
 cinacalcet (SENSIPAR) 30 mg tablet Take 60 mg by mouth daily.  insulin glargine (LANTUS) 100 unit/mL injection 35 Units by SubCUTAneous route every twelve (12) hours.  30 mL 3  
 fenofibrate (LOFIBRA) 54 mg tablet TAKE ONE TABLET BY MOUTH ONE TIME DAILY 30 Tab 3  
  ASPIR-LOW 81 mg tablet TAKE ONE TABLET BY MOUTH ONE TIME DAILY 38 Tab 4  
 DULCOLAX, BISACODYL, PO Take  by mouth.  pantoprazole (PROTONIX) 40 mg tablet Take 40 mg by mouth daily.  simvastatin (ZOCOR) 40 mg tablet TAKE 1 TABLET BY MOUTH NIGHTLY. 90 Tab 2  
 tamsulosin (FLOMAX) 0.4 mg capsule TAKE ONE CAPSULE BY MOUTH ONE TIME DAILY 90 Cap 3  
 b complex-vitamin c-folic acid (NEPHROCAPS) 1 mg capsule Take 1 Cap by mouth daily. 30 Cap 0  Cholecalciferol, Vitamin D3, (DECARA) 50,000 unit cap Take  by mouth every seven (7) days.  insulin aspart (NOVOLOG) 100 unit/mL injection 20 units sq 3 times a day,sliding scale 10 mL 0 Allergies Allergies Allergen Reactions  Nka [No Known Allergies] Other (comments) Family History Family History Problem Relation Age of Onset  Heart Attack Father 64 Social History Social History Socioeconomic History  Marital status:  Spouse name: Not on file  Number of children: Not on file  Years of education: Not on file  Highest education level: Not on file Social Needs  Financial resource strain: Not on file  Food insecurity - worry: Not on file  Food insecurity - inability: Not on file  Transportation needs - medical: Not on file  Transportation needs - non-medical: Not on file Occupational History  Not on file Tobacco Use  Smoking status: Never Smoker  Smokeless tobacco: Never Used Substance and Sexual Activity  Alcohol use: No  
 Drug use: No  
 Sexual activity: Not on file Other Topics Concern  Not on file Social History Narrative  Not on file Review of Systems Review of Systems -review of all systems is negative except as noted above in the HPI. Vital Signs Visit Vitals /60 (BP 1 Location: Right arm, BP Patient Position: Sitting) Pulse 64 Temp 97.4 °F (36.3 °C) (Oral) Resp 18 Ht 5' 8\" (1.727 m) Wt 213 lb (96.6 kg) SpO2 96% BMI 32.39 kg/m² Physical Exam 
Physical Examination: General appearance - oriented to person, place, and time, overweight and acyanotic, in no respiratory distress Mental status - alert, oriented to person, place, and time, affect appropriate to mood Neck - supple, no significant adenopathy Lymphatics - no palpable lymphadenopathy Chest - wheezing noted bilateral mid lung zones, decreased air entry noted bilateral lung bases Heart - S1 and S2 normal 
Neurological - motor and sensory grossly normal bilaterally Musculoskeletal - osteoarthritic changes noted in both hands Extremities - no pedal edema noted, intact peripheral pulses Results Results for orders placed or performed in visit on 01/18/19 PSA, DIAGNOSTIC (PROSTATE SPECIFIC AG) Result Value Ref Range Prostate Specific Ag 0.15 ASSESSMENT and PLAN 
  ICD-10-CM ICD-9-CM 1. Bronchitis J40 490 azithromycin (ZITHROMAX) 250 mg tablet  
   albuterol (PROVENTIL HFA, VENTOLIN HFA, PROAIR HFA) 90 mcg/actuation inhaler  
   guaiFENesin-dextromethorphan (GUAIFENESIN-DM)  mg/5 mL liqd 2. ESRD (end stage renal disease) on dialysis ([facility]) N18.6 585.6 Z99.2 V45.11   
3. Essential hypertension I10 401.9 4. Morbid obesity ([facility] [address] E66.01 278.01   
 
reviewed diet, exercise and weight control 
reviewed medications and side effects in detail I have discussed the diagnosis with the patient and the intended plan of care as seen in the above orders. The patient has received an after-visit summary and questions were answered concerning future plans. I have discussed medication, side effects, and warnings with the patient in detail. The patient verbalized understanding and is in agreement with the plan of care. The patient will contact the office with any additional concerns.  
 
Rodney Chambers MD

## 2019-02-18 RX ORDER — METOPROLOL TARTRATE 50 MG/1
50 TABLET ORAL 2 TIMES DAILY
Qty: 60 TAB | Refills: 0 | Status: SHIPPED | OUTPATIENT
Start: 2019-02-18 | End: 2019-03-17 | Stop reason: SDUPTHER

## 2019-02-18 RX ORDER — AMLODIPINE BESYLATE 2.5 MG/1
2.5 TABLET ORAL DAILY
Qty: 30 TAB | Refills: 0 | Status: SHIPPED | OUTPATIENT
Start: 2019-02-18 | End: 2019-03-17 | Stop reason: SDUPTHER

## 2019-02-18 NOTE — TELEPHONE ENCOUNTER
Requested Prescriptions     Pending Prescriptions Disp Refills    amLODIPine (NORVASC) 2.5 mg tablet 30 Tab 0     Sig: Take 1 Tab by mouth daily.  metoprolol tartrate (LOPRESSOR) 50 mg tablet 60 Tab 0     Sig: Take 1 Tab by mouth two (2) times a day.

## 2019-02-19 DIAGNOSIS — E78.5 DYSLIPIDEMIA: ICD-10-CM

## 2019-02-19 RX ORDER — FENOFIBRATE 54 MG/1
TABLET ORAL
Qty: 30 TAB | Refills: 2 | Status: ON HOLD | OUTPATIENT
Start: 2019-02-19 | End: 2019-05-17 | Stop reason: SDUPTHER

## 2019-02-20 ENCOUNTER — OFFICE VISIT (OUTPATIENT)
Dept: FAMILY MEDICINE CLINIC | Age: 79
End: 2019-02-20

## 2019-02-20 VITALS
TEMPERATURE: 97.4 F | DIASTOLIC BLOOD PRESSURE: 56 MMHG | HEART RATE: 58 BPM | SYSTOLIC BLOOD PRESSURE: 124 MMHG | WEIGHT: 209 LBS | RESPIRATION RATE: 16 BRPM | HEIGHT: 68 IN | BODY MASS INDEX: 31.67 KG/M2 | OXYGEN SATURATION: 98 %

## 2019-02-20 DIAGNOSIS — Z99.2 ESRD (END STAGE RENAL DISEASE) ON DIALYSIS (HCC): ICD-10-CM

## 2019-02-20 DIAGNOSIS — N18.6 ESRD (END STAGE RENAL DISEASE) ON DIALYSIS (HCC): ICD-10-CM

## 2019-02-20 DIAGNOSIS — I50.33 DIASTOLIC CHF, ACUTE ON CHRONIC (HCC): ICD-10-CM

## 2019-02-20 DIAGNOSIS — I25.119 ATHEROSCLEROSIS OF CORONARY ARTERY OF NATIVE HEART WITH ANGINA PECTORIS, UNSPECIFIED VESSEL OR LESION TYPE (HCC): ICD-10-CM

## 2019-02-20 DIAGNOSIS — E11.9 TYPE 2 DIABETES MELLITUS WITHOUT COMPLICATION, WITH LONG-TERM CURRENT USE OF INSULIN (HCC): ICD-10-CM

## 2019-02-20 DIAGNOSIS — Z79.4 TYPE 2 DIABETES MELLITUS WITHOUT COMPLICATION, WITH LONG-TERM CURRENT USE OF INSULIN (HCC): ICD-10-CM

## 2019-02-20 DIAGNOSIS — I10 ESSENTIAL HYPERTENSION: ICD-10-CM

## 2019-02-20 DIAGNOSIS — H69.81 DYSFUNCTION OF RIGHT EUSTACHIAN TUBE: Primary | ICD-10-CM

## 2019-02-20 RX ORDER — DULOXETIN HYDROCHLORIDE 60 MG/1
CAPSULE, DELAYED RELEASE ORAL
COMMUNITY
Start: 2019-02-19 | End: 2019-04-26 | Stop reason: SDUPTHER

## 2019-02-20 RX ORDER — LORATADINE 10 MG
10 TABLET,DISINTEGRATING ORAL
Qty: 30 TAB | Refills: 0 | Status: SHIPPED | OUTPATIENT
Start: 2019-02-20 | End: 2019-03-14

## 2019-02-20 NOTE — PROGRESS NOTES
Chief Complaint Patient presents with  Nasal Congestion  
  symptoms improving with medication 1. Have you been to the ER, urgent care clinic since your last visit? Hospitalized since your last visit? No 
 
2. Have you seen or consulted any other health care providers outside of the [address] [place] [address] since your last visit? Include any pap smears or colon screening. No  
 
HPI Darwin Kirby comes in for follow-up care. Patient has nasal congestion and plugged up sensation of both years. No fever or chills. Recently treated for bronchitis and cough has improved. However continues to have this sensation of being congested. Would like to try medication for this. After discussion I will put him on Claritin to take daily. Patient has diabetes mellitus type 2. He is on insulin. Has previously been followed up by the endocrinologist.  States that the endocrinologist office did shut down. He had requested a refill of his insulin recently seen. He is here to get his Lantus. We will call his insurance to ask that they approve his medication. Patient has end-stage renal disease. He is on dialysis. I keep encouraging him to be compliant with dialysis 3 times a week. He at times has missed 1 or 2 sessions of dialysis. Patient has hypertension. He is on losartan 50 mg daily, amlodipine 2.5 mg daily and metoprolol 50 mg twice daily. Has a history of coronary artery disease and CHF. Currently no fluid overload. He is on dialysis. Denies chest pain, shortness of breath or diaphoresis. Patient is on Plavix and Imdur, aspirin and Lopressor for the coronary artery disease. Also on losartan. Takes 50 mg daily of this. Past Medical History Past Medical History:  
Diagnosis Date  Atherosclerosis of renal artery ([facility] S/P Rt stent  CAD (coronary artery disease) , 1997, 2012  
 ,double bypass, 2 stents, 2stents 7/2016  Cancer [facility])   
 prostate  CHF (congestive heart failure) ([address]  Chronic diastolic heart failure ([address]  Chronic kidney disease  Chronic kidney disease (CKD), stage IV (severe) ([address] On Dialysis now -T-Th-sat  Constipation  Coronary atherosclerosis of unspecified type of vessel, native or graft Stable angina after recent PCI continue treatment.  CRI (chronic renal insufficiency)  Diabetes ([address] 1973  
 type 2  
 Essential hypertension, benign  GERD (gastroesophageal reflux disease)  Gout  Hypertension 1982  Morbid obesity ([address] Weight loss has been strongly encouraged by following dietary restrictions and an exercise routine  APRYL (obstructive sleep apnea) 6/26/2015  
 no cpap  Other and unspecified hyperlipidemia HDL's are not at goal, LDL's are at goal, triglycerides are not at goal.  
 Other and unspecified hyperlipidemia HDL's are not at goal, LDL's are at goal, triglycerides are not at goal.   
 Other ill-defined conditions(799.89) 1960  
 pneumonia twice  Prostate cancer ([address]  Sleep disturbance  Type II or unspecified type diabetes mellitus without mention of complication, not stated as uncontrolled Surgical History Past Surgical History:  
Procedure Laterality Date [address]  
 lt. carotid endart. [address]  2012, 2016, Nov 2017 Coronary Stent  COLONOSCOPY N/A 6/23/2017 COLONOSCOPY w/ APC w/ polypectomies performed by Sherren Marsh, MD at [address]  FLEXIBLE SIGMOIDOSCOPY N/A 1/19/2018 FLEXIBLE SIGMOIDOSCOPY WITH Radio frequency ablation performed by Sherren Marsh, MD at [facility] ENDOSCOPY 2021 Jose Pinon N/A 6/8/2018 SIGMOIDOSCOPY FLEXIBLE WITH APC performed by Irma Rivera MD at [address] [facility]  HX CORONARY ARTERY BYPASS GRAFT  2000  
 x2  HX HEENT  1997  
 cholecystectomy  HX UROLOGICAL  1998  
 renal art. surg  HX VASCULAR ACCESS Perma cath for HD- right chest.  
 [facility] INTRO CATH DIALYSIS CIRCUIT DX ANGRPH FLUOR S&I N/A 12/10/2018 FISTULOGRAM LEFT performed by Sharif Castillo MD at [address] [facility] CATH LAB  [facility] INTRO CATH DIALYSIS CIRCUIT W/TRLUDOTTY BALO ANGIOP N/A 12/10/2018 Angioplasty Fistula/Dialysis Circuit performed by Sharif Castillo MD at [address] [facility] Medications Current Outpatient Medications Medication Sig Dispense Refill  loratadine (CLARITIN REDITABS) 10 mg dissolvable tablet Take 1 Tab by mouth daily as needed for Allergies. 30 Tab 0  
 fenofibrate (LOFIBRA) 54 mg tablet TAKE ONE TABLET BY MOUTH DAILY 30 Tab 2  
 amLODIPine (NORVASC) 2.5 mg tablet Take 1 Tab by mouth daily. 30 Tab 0  
 metoprolol tartrate (LOPRESSOR) 50 mg tablet Take 1 Tab by mouth two (2) times a day. 60 Tab 0  
 albuterol (PROVENTIL HFA, VENTOLIN HFA, PROAIR HFA) 90 mcg/actuation inhaler Take 2 Puffs by inhalation every four (4) hours as needed for Wheezing. 1 Inhaler 0  
 guaiFENesin-dextromethorphan (GUAIFENESIN-DM)  mg/5 mL liqd Take 10 mL by mouth every four (4) hours as needed. 240 mL 0  
 L.acid/L.casei/B.bif/B.ken/FOS (PROBIOTIC BLEND PO) Take 2 Tabs by mouth daily.  losartan (COZAAR) 50 mg tablet Take 25 mg by mouth daily. Take 1/2 tab daily  clopidogrel (PLAVIX) 75 mg tab Take 1 Tab by mouth daily. 30 Tab 0  
 isosorbide mononitrate ER (IMDUR) 30 mg tablet Take 1 Tab by mouth daily. 30 Tab 0  
 nitroglycerin (NITROSTAT) 0.4 mg SL tablet 1 Tab by SubLINGual route as needed for Chest Pain. 1 Bottle 0  
 allopurinol (ZYLOPRIM) 100 mg tablet TAKE ONE TABLET BY MOUTH ON MONDAY, WEDNESDAY AND FRIDAY 36 Tab 0  
 cinacalcet (SENSIPAR) 30 mg tablet Take 60 mg by mouth daily.  insulin glargine (LANTUS) 100 unit/mL injection 35 Units by SubCUTAneous route every twelve (12) hours. 30 mL 3  
 ASPIR-LOW 81 mg tablet TAKE ONE TABLET BY MOUTH ONE TIME DAILY 38 Tab 4  DULCOLAX, BISACODYL, PO Take  by mouth.  pantoprazole (PROTONIX) 40 mg tablet Take 40 mg by mouth daily.  simvastatin (ZOCOR) 40 mg tablet TAKE 1 TABLET BY MOUTH NIGHTLY. 90 Tab 2  
 tamsulosin (FLOMAX) 0.4 mg capsule TAKE ONE CAPSULE BY MOUTH ONE TIME DAILY 90 Cap 3  
 b complex-vitamin c-folic acid (NEPHROCAPS) 1 mg capsule Take 1 Cap by mouth daily. 30 Cap 0  Cholecalciferol, Vitamin D3, (DECARA) 50,000 unit cap Take  by mouth every seven (7) days.  insulin aspart (NOVOLOG) 100 unit/mL injection 20 units sq 3 times a day,sliding scale 10 mL 0  
 DULoxetine (CYMBALTA) 60 mg capsule Allergies Allergies Allergen Reactions  Nka [No Known Allergies] Other (comments) Family History Family History Problem Relation Age of Onset  Heart Attack Father 64 Social History Social History Socioeconomic History  Marital status:  Spouse name: Not on file  Number of children: Not on file  Years of education: Not on file  Highest education level: Not on file Social Needs  Financial resource strain: Not on file  Food insecurity - worry: Not on file  Food insecurity - inability: Not on file  Transportation needs - medical: Not on file  Transportation needs - non-medical: Not on file Occupational History  Not on file Tobacco Use  Smoking status: Never Smoker  Smokeless tobacco: Never Used Substance and Sexual Activity  Alcohol use: No  
 Drug use: No  
 Sexual activity: Not on file Other Topics Concern  Not on file Social History Narrative  Not on file Review of Systems Review of Systems -review of all systems is negative except as noted above in the HPI. Vital Signs Visit Vitals /56 (BP 1 Location: Right arm, BP Patient Position: Sitting) Pulse (!) 58 Temp 97.4 °F (36.3 °C) (Oral) Resp 16 Ht 5' 8\" (1.727 m) Wt 209 lb (94.8 kg) SpO2 98% BMI 31.78 kg/m² Physical Exam 
 Physical Examination: General appearance - oriented to person, place, and time, overweight, acyanotic, in no respiratory distress and well hydrated Mental status - alert, oriented to person, place, and time, affect appropriate to mood Eyes - sclera anicteric Ears - no dullness or swelling of the TMs bilaterally. Nose - normal and patent, no erythema, discharge or polyps Mouth - mucous membranes moist, pharynx normal without lesions Neck - supple, no significant adenopathy Lymphatics - no palpable lymphadenopathy Chest - decreased air entry noted bilateral lung bases Heart - S1 and S2 normal 
Neurological - motor and sensory grossly normal bilaterally Musculoskeletal - osteoarthritic changes noted in both hands Extremities - intact peripheral pulses Results Results for orders placed or performed in visit on 01/18/19 PSA, DIAGNOSTIC (PROSTATE SPECIFIC AG) Result Value Ref Range Prostate Specific Ag 0.15 ASSESSMENT and PLAN 
  ICD-10-CM ICD-9-CM 1. Dysfunction of right eustachian tube H69.81 381.81 loratadine (CLARITIN REDITABS) 10 mg dissolvable tablet 2. Type 2 diabetes mellitus without complication, with long-term current use of insulin ([facility]) E11.9 250.00   
 Z79.4 V58.67   
3. ESRD (end stage renal disease) on dialysis ([facility]) N18.6 585.6 Z99.2 V45.11   
4. Essential hypertension I10 401.9 5. Atherosclerosis of coronary artery of native heart with angina pectoris, unspecified vessel or lesion type ([facility] I25.119 414.01   
  413.9 6.  Diastolic CHF, acute on chronic ([facility]) I50.33 428.33   
  428.0   
 
lab results and schedule of future lab studies reviewed with patient 
reviewed diet, exercise and weight control 
reviewed medications and side effects in detail 
specific diabetic recommendations: low cholesterol diet, weight control and daily exercise discussed, home glucose monitoring emphasized, all medications, side effects and compliance discussed carefully, glycohemoglobin and other lab monitoring discussed and long term diabetic complications discussed I have discussed the diagnosis with the patient and the intended plan of care as seen in the above orders. The patient has received an after-visit summary and questions were answered concerning future plans. I have discussed medication, side effects, and warnings with the patient in detail. The patient verbalized understanding and is in agreement with the plan of care. The patient will contact the office with any additional concerns.  
 
Lazarus Book, MD

## 2019-02-21 ENCOUNTER — TELEPHONE (OUTPATIENT)
Dept: FAMILY MEDICINE CLINIC | Age: 79
End: 2019-02-21

## 2019-02-21 NOTE — TELEPHONE ENCOUNTER
Patient insurance does not cover the Claratien and would like an alternative.  Please advise pt when available

## 2019-02-26 RX ORDER — MONTELUKAST SODIUM 10 MG/1
10 TABLET ORAL DAILY
Qty: 30 TAB | Refills: 0 | Status: SHIPPED | OUTPATIENT
Start: 2019-02-26 | End: 2019-03-26 | Stop reason: SDUPTHER

## 2019-02-26 RX ORDER — ALLOPURINOL 100 MG/1
TABLET ORAL
Qty: 36 TAB | Refills: 0 | Status: SHIPPED | OUTPATIENT
Start: 2019-02-26 | End: 2019-03-09 | Stop reason: SDUPTHER

## 2019-03-04 RX ORDER — SIMVASTATIN 40 MG/1
TABLET, FILM COATED ORAL
Qty: 90 TAB | Refills: 3 | Status: SHIPPED | OUTPATIENT
Start: 2019-03-04 | End: 2020-01-01

## 2019-03-07 ENCOUNTER — OFFICE VISIT (OUTPATIENT)
Dept: FAMILY MEDICINE CLINIC | Age: 79
End: 2019-03-07

## 2019-03-07 VITALS
OXYGEN SATURATION: 96 % | HEART RATE: 94 BPM | WEIGHT: 207 LBS | DIASTOLIC BLOOD PRESSURE: 64 MMHG | RESPIRATION RATE: 16 BRPM | TEMPERATURE: 98.3 F | HEIGHT: 68 IN | BODY MASS INDEX: 31.37 KG/M2 | SYSTOLIC BLOOD PRESSURE: 142 MMHG

## 2019-03-07 DIAGNOSIS — R05.9 COUGH: Primary | ICD-10-CM

## 2019-03-07 DIAGNOSIS — J40 BRONCHITIS: ICD-10-CM

## 2019-03-07 RX ORDER — GUAIFENESIN DEXTROMETHORPHAN HYDROBROMIDE ORAL SOLUTION 10; 100 MG/5ML; MG/5ML
10 SOLUTION ORAL
Qty: 240 ML | Refills: 0 | Status: SHIPPED | OUTPATIENT
Start: 2019-03-07 | End: 2019-03-14

## 2019-03-07 NOTE — PATIENT INSTRUCTIONS
Bronchitis: Care Instructions  Your Care Instructions    Bronchitis is inflammation of the bronchial tubes, which carry air to the lungs. The tubes swell and produce mucus, or phlegm. The mucus and inflamed bronchial tubes make you cough. You may have trouble breathing. Most cases of bronchitis are caused by viruses like those that cause colds. Antibiotics usually do not help and they may be harmful. Bronchitis usually develops rapidly and lasts about 2 to 3 weeks in otherwise healthy people. Follow-up care is a key part of your treatment and safety. Be sure to make and go to all appointments, and call your doctor if you are having problems. It's also a good idea to know your test results and keep a list of the medicines you take. How can you care for yourself at home? · Take all medicines exactly as prescribed. Call your doctor if you think you are having a problem with your medicine. · Get some extra rest.  · Take an over-the-counter pain medicine, such as acetaminophen (Tylenol), ibuprofen (Advil, Motrin), or naproxen (Aleve) to reduce fever and relieve body aches. Read and follow all instructions on the label. · Do not take two or more pain medicines at the same time unless the doctor told you to. Many pain medicines have acetaminophen, which is Tylenol. Too much acetaminophen (Tylenol) can be harmful. · Take an over-the-counter cough medicine that contains dextromethorphan to help quiet a dry, hacking cough so that you can sleep. Avoid cough medicines that have more than one active ingredient. Read and follow all instructions on the label. · Breathe moist air from a humidifier, hot shower, or sink filled with hot water. The heat and moisture will thin mucus so you can cough it out. · Do not smoke. Smoking can make bronchitis worse. If you need help quitting, talk to your doctor about stop-smoking programs and medicines. These can increase your chances of quitting for good.   When should you call for help? Call 911 anytime you think you may need emergency care. For example, call if:    · You have severe trouble breathing.    Call your doctor now or seek immediate medical care if:    · You have new or worse trouble breathing.     · You cough up dark brown or bloody mucus (sputum).     · You have a new or higher fever.     · You have a new rash.    Watch closely for changes in your health, and be sure to contact your doctor if:    · You cough more deeply or more often, especially if you notice more mucus or a change in the color of your mucus.     · You are not getting better as expected. Where can you learn more? Go to http://kendal-farzaneh.info/. Enter H333 in the search box to learn more about \"Bronchitis: Care Instructions. \"  Current as of: September 5, 2018  Content Version: 11.9  © 3861-7921 [employer]. Care instructions adapted under license by [employer] (which disclaims liability or warranty for this information). If you have questions about a medical condition or this instruction, always ask your healthcare professional. [address] any warranty or liability for your use of this information.

## 2019-03-07 NOTE — PROGRESS NOTES
OFFICE NOTE    Jaye Duverney is a 66 y.o. male presenting today for office visit. 3/7/2019  2:27 PM      Chief Complaint   Patient presents with    Cough     dry cough has returned     Nasal Congestion    Chest Pain     reports discomfort with coughing         HPI: Here today for cold symptoms. PCP Edgar Feng MD. Recent history of bronchitis, presented two weeks ago and was evaluated by PCP- given guaifenesin/dextromethorphan, Zpack, and Albuterol, Did have improvement of symptoms but cough returned yesterday. Patient thinks it could have been brought on by being out in the early morning cold air (going to dialysis at 430am) and having to wear O2 at dialysis. Dry cough started after that. With his cough, he also experiences some chest/rib pain while coughing. Cough is nonproductive, and his throat feels scratchy, but not painful. He denies fever. Review of Systems   Constitutional: Negative for chills, fatigue and fever. HENT: Negative for congestion, ear pain, facial swelling, postnasal drip, rhinorrhea, sinus pressure, sinus pain, sneezing and sore throat (scratchy). Eyes: Negative for pain, discharge, itching and visual disturbance. Respiratory: Positive for cough. Negative for shortness of breath and wheezing. Cardiovascular: Positive for chest pain. Gastrointestinal: Negative for abdominal pain, diarrhea, nausea and vomiting. Musculoskeletal: Negative for arthralgias and myalgias. Skin: Negative for rash. Allergic/Immunologic: Negative for environmental allergies. Neurological: Negative for headaches.          PHQ Screening   3 most recent PHQ Screens 2/11/2019   Little interest or pleasure in doing things Not at all   Feeling down, depressed, irritable, or hopeless Not at all   Total Score PHQ 2 0   Trouble falling or staying asleep, or sleeping too much -   Feeling tired or having little energy -   Poor appetite, weight loss, or overeating -   Feeling bad about yourself - or that you are a failure or have let yourself or your family down -   Trouble concentrating on things such as school, work, reading, or watching TV -   Moving or speaking so slowly that other people could have noticed; or the opposite being so fidgety that others notice -   Thoughts of being better off dead, or hurting yourself in some way -   PHQ 9 Score -   How difficult have these problems made it for you to do your work, take care of your home and get along with others -         History  Past Medical History:   Diagnosis Date    Atherosclerosis of renal artery ([address]     S/P Rt stent    CAD (coronary artery disease) , 1997, 2012    ,double bypass, 2 stents, 2stents 7/2016    Cancer ([address]     prostate    CHF (congestive heart failure) ([address]     Chronic diastolic heart failure ([address]     Chronic kidney disease     Chronic kidney disease (CKD), stage IV (severe) ([address]     On Dialysis now -T-Th-sat    Constipation     Coronary atherosclerosis of unspecified type of vessel, native or graft     Stable angina after recent PCI continue treatment.        CRI (chronic renal insufficiency)     Diabetes ([address] 1973    type 2    Essential hypertension, benign     GERD (gastroesophageal reflux disease)     Gout     Hypertension 1982    Morbid obesity ([address]     Weight loss has been strongly encouraged by following dietary restrictions and an exercise routine    APRYL (obstructive sleep apnea) 6/26/2015    no cpap    Other and unspecified hyperlipidemia     HDL's are not at goal, LDL's are at goal, triglycerides are not at goal.    Other and unspecified hyperlipidemia     HDL's are not at goal, LDL's are at goal, triglycerides are not at goal.     Other ill-defined conditions(799.89) 1960    pneumonia twice    Prostate cancer ([address]     Sleep disturbance     Type II or unspecified type diabetes mellitus without mention of complication, not stated as uncontrolled        Past Surgical History: Procedure Laterality Date    CARDIAC SURG PROCEDURE UNLIST  1995    lt. carotid endart.    [facility] CARDIAC SURG PROCEDURE UNLIST  2012, 2016, Nov 2017    Coronary Stent    COLONOSCOPY N/A 6/23/2017    COLONOSCOPY w/ APC w/ polypectomies performed by Duglas Frankel MD at [address] N/A 1/19/2018    FLEXIBLE SIGMOIDOSCOPY WITH Radio frequency ablation performed by Duglas Frankel MD at [address] N/A 6/8/2018    SIGMOIDOSCOPY FLEXIBLE WITH APC performed by Theodore Toscano MD at [address] HX CHOLECYSTECTOMY      HX CORONARY ARTERY BYPASS GRAFT  2000    x2    HX HEENT  1997    cholecystectomy    HX UROLOGICAL  1998    renal art. surg    HX VASCULAR ACCESS      Perma cath for HD- right chest.    [place] INTRO CATH DIALYSIS CIRCUIT DX ANGRPH FLUOR S&I N/A 12/10/2018    FISTULOGRAM LEFT performed by Valdez Bradshaw MD at [facility]    [place] INTRO CATH DIALYSIS CIRCUIT W/TRLUML BALO ANGIOP N/A 12/10/2018    Angioplasty Fistula/Dialysis Circuit performed by Valdez Bradshaw MD at [facility] CATH LAB       Social History     Socioeconomic History    Marital status:      Spouse name: Not on file    Number of children: Not on file    Years of education: Not on file    Highest education level: Not on file   Social Needs    Financial resource strain: Not on file    Food insecurity - worry: Not on file    Food insecurity - inability: Not on file   [employer] needs - medical: Not on file   [employer] needs - non-medical: Not on file   Occupational History    Not on file   Tobacco Use    Smoking status: Never Smoker    Smokeless tobacco: Never Used   Substance and Sexual Activity    Alcohol use: No    Drug use: No    Sexual activity: Not on file   Other Topics Concern    Not on file   Social History Narrative    Not on file       Allergies   Allergen Reactions    Nka [No Known Allergies] Other (comments)       Current Outpatient Medications   Medication Sig Dispense Refill    guaiFENesin-dextromethorphan (GUAIFENESIN-DM)  mg/5 mL liqd Take 10 mL by mouth every four (4) hours as needed for Cough. 240 mL 0    simvastatin (ZOCOR) 40 mg tablet TAKE 1 TABLET BY MOUTH NIGHTLY. 90 Tab 3    allopurinol (ZYLOPRIM) 100 mg tablet TAKE ONE TABLET BY MOUTH ON MONDAY, WEDNESDAY AND FRIDAY 36 Tab 0    montelukast (SINGULAIR) 10 mg tablet Take 1 Tab by mouth daily. 30 Tab 0    DULoxetine (CYMBALTA) 60 mg capsule       fenofibrate (LOFIBRA) 54 mg tablet TAKE ONE TABLET BY MOUTH DAILY 30 Tab 2    amLODIPine (NORVASC) 2.5 mg tablet Take 1 Tab by mouth daily. 30 Tab 0    metoprolol tartrate (LOPRESSOR) 50 mg tablet Take 1 Tab by mouth two (2) times a day. 60 Tab 0    L.acid/L.casei/B.bif/B.ken/FOS (PROBIOTIC BLEND PO) Take 2 Tabs by mouth daily.  losartan (COZAAR) 50 mg tablet Take 25 mg by mouth daily. Take 1/2 tab daily      clopidogrel (PLAVIX) 75 mg tab Take 1 Tab by mouth daily. 30 Tab 0    isosorbide mononitrate ER (IMDUR) 30 mg tablet Take 1 Tab by mouth daily. 30 Tab 0    nitroglycerin (NITROSTAT) 0.4 mg SL tablet 1 Tab by SubLINGual route as needed for Chest Pain. 1 Bottle 0    cinacalcet (SENSIPAR) 30 mg tablet Take 60 mg by mouth daily.  insulin glargine (LANTUS) 100 unit/mL injection 35 Units by SubCUTAneous route every twelve (12) hours. 30 mL 3    ASPIR-LOW 81 mg tablet TAKE ONE TABLET BY MOUTH ONE TIME DAILY 38 Tab 4    DULCOLAX, BISACODYL, PO Take  by mouth.  pantoprazole (PROTONIX) 40 mg tablet Take 40 mg by mouth daily.  tamsulosin (FLOMAX) 0.4 mg capsule TAKE ONE CAPSULE BY MOUTH ONE TIME DAILY 90 Cap 3    b complex-vitamin c-folic acid (NEPHROCAPS) 1 mg capsule Take 1 Cap by mouth daily. 30 Cap 0    Cholecalciferol, Vitamin D3, (DECARA) 50,000 unit cap Take  by mouth every seven (7) days.       insulin aspart (NOVOLOG) 100 unit/mL injection 20 units sq 3 times a day,sliding scale 10 mL 0    loratadine (CLARITIN REDITABS) 10 mg dissolvable tablet Take 1 Tab by mouth daily as needed for Allergies. 30 Tab 0    albuterol (PROVENTIL HFA, VENTOLIN HFA, PROAIR HFA) 90 mcg/actuation inhaler Take 2 Puffs by inhalation every four (4) hours as needed for Wheezing. 1 Inhaler 0         Patient Care Team:  Patient Care Team:  Ignacio Chirinos MD as PCP - General (Family Practice)  Nohemy Carter MD as Physician (Cardiology)  Braulio Galeas MD (Nephrology)  Jorge Cm MD (Urology)  Ni Amin MD (Gastroenterology)  David Stinson MD (Endocrinology)  Tyler Pereyra OD (Ophthalmology)  Celine Krueger, RN as Ambulatory Care Navigator        LABS:  None new to review    RADIOLOGY:    *Xray done today in office- no definite PNA noted- will send to radiology for interpretation      Physical Exam   Constitutional: He is oriented to person, place, and time. He appears well-developed and well-nourished. No distress. HENT:   Right Ear: External ear and ear canal normal.   Left Ear: External ear and ear canal normal.   Nose: Nose normal. No mucosal edema or rhinorrhea. Right sinus exhibits no maxillary sinus tenderness and no frontal sinus tenderness. Left sinus exhibits no maxillary sinus tenderness and no frontal sinus tenderness. Mouth/Throat: Uvula is midline, oropharynx is clear and moist and mucous membranes are normal. No oropharyngeal exudate or posterior oropharyngeal erythema.   +mild cerumen bilaterally- unable to visualize TM completely   Eyes: EOM are normal. Pupils are equal, round, and reactive to light. Right eye exhibits no discharge. Left eye exhibits no discharge. Neck: Normal range of motion. Neck supple. Cardiovascular: Normal rate, regular rhythm and normal heart sounds. Pulmonary/Chest: Effort normal. No respiratory distress. He has decreased breath sounds. He has rhonchi (clears with coughing). Abdominal: Soft.  Bowel sounds are normal. There is no tenderness. Lymphadenopathy:     He has no cervical adenopathy. Neurological: He is alert and oriented to person, place, and time. Skin: Skin is warm and dry. No rash noted. Vitals:    03/07/19 1405   BP: 142/64   Pulse: 94   Resp: 16   Temp: 98.3 °F (36.8 °C)   TempSrc: Oral   SpO2: 96%   Weight: 207 lb (93.9 kg)   Height: 5' 8\" (1.727 m)   PainSc:   4   PainLoc: Chest         Assessment and Plan    Cough/ Bronchitis  *Chest x-ray does not show definite signs of pneumonia. Will treat symptoms for cough with guaifenesin/dextromethorphan and encouraged patient to use his albuterol inhaler. Instructed to return if symptoms worsen or fever develops. - guaiFENesin-dextromethorphan (GUAIFENESIN-DM)  mg/5 mL liqd; Take 10 mL by mouth every four (4) hours as needed for Cough. Dispense: 240 mL; Refill: 0  - XR CHEST PA LAT; Future        *Plan of care reviewed with patient. Patient in agreement with plan and expresses understanding. All questions answered and patient encouraged to call or RTO if further questions or concerns. Follow-up Disposition:  Return if symptoms worsen or fail to improve.

## 2019-03-08 ENCOUNTER — TELEPHONE (OUTPATIENT)
Dept: FAMILY MEDICINE CLINIC | Age: 79
End: 2019-03-08

## 2019-03-08 DIAGNOSIS — J40 BRONCHITIS: Primary | ICD-10-CM

## 2019-03-08 RX ORDER — PREDNISONE 20 MG/1
40 TABLET ORAL
Qty: 6 TAB | Refills: 0 | Status: SHIPPED | OUTPATIENT
Start: 2019-03-08 | End: 2019-03-11

## 2019-03-08 NOTE — TELEPHONE ENCOUNTER
3/8/2019  1:54 PM    Chief Complaint   Patient presents with    Results     Cxray results       3/8/2019  1:54 PM    Chief Complaint   Patient presents with    Results     Cxray results       Noted results of cxray. Called patient at this time- verified by name and . Advised on findings- normal. He states that he is frustrated that he has not been feeling better. Again attempted to advise patient on patho behind bronchitis- he has already been on an AB. Could trial burst of steroid but he does have diabetes so I discussed with him about hyperglycemia that can occur- he states that he can control this with his insulin and would like to do this. *Plan of care reviewed with patient. Patient in agreement with plan and expresses understanding. All questions answered and patient encouraged to call or RTO if further questions or concerns. XR Results (most recent):  Results from Appointment encounter on 19   XR CHEST PA LAT    Narrative CHEST PA AND LATERAL:    CPT CODE: 68801    COMPARISON: 2019    INDICATION: Persistent dry cough    FINDINGS: The heart is normal in size. Status post median sternotomy. Coronary  stents are present. A left subclavian stent is present. Lung volumes are low. There are areas of bibasilar parenchymal scarring. No focal infiltrates are  seen. No pleural effusions are seen. Surgical clips are seen from prior  cholecystectomy. Impression Impression:    Low lung volumes with bibasilar parenchymal scarring. No definite acute  cardiopulmonary abnormalities are seen with certainty.

## 2019-03-12 RX ORDER — ALLOPURINOL 100 MG/1
TABLET ORAL
Qty: 36 TAB | Refills: 0 | Status: SHIPPED | OUTPATIENT
Start: 2019-03-12 | End: 2019-01-01 | Stop reason: SDUPTHER

## 2019-03-14 ENCOUNTER — OFFICE VISIT (OUTPATIENT)
Dept: FAMILY MEDICINE CLINIC | Age: 79
End: 2019-03-14

## 2019-03-14 VITALS
HEIGHT: 68 IN | BODY MASS INDEX: 31.83 KG/M2 | HEART RATE: 109 BPM | SYSTOLIC BLOOD PRESSURE: 143 MMHG | WEIGHT: 210 LBS | RESPIRATION RATE: 20 BRPM | OXYGEN SATURATION: 96 % | DIASTOLIC BLOOD PRESSURE: 78 MMHG | TEMPERATURE: 98.2 F

## 2019-03-14 DIAGNOSIS — Z99.2 ESRD (END STAGE RENAL DISEASE) ON DIALYSIS (HCC): ICD-10-CM

## 2019-03-14 DIAGNOSIS — Z79.4 TYPE 2 DIABETES MELLITUS WITHOUT COMPLICATION, WITH LONG-TERM CURRENT USE OF INSULIN (HCC): ICD-10-CM

## 2019-03-14 DIAGNOSIS — N18.6 ESRD (END STAGE RENAL DISEASE) ON DIALYSIS (HCC): ICD-10-CM

## 2019-03-14 DIAGNOSIS — E11.9 TYPE 2 DIABETES MELLITUS WITHOUT COMPLICATION, WITH LONG-TERM CURRENT USE OF INSULIN (HCC): ICD-10-CM

## 2019-03-14 DIAGNOSIS — J40 BRONCHITIS: Primary | ICD-10-CM

## 2019-03-14 DIAGNOSIS — I10 ESSENTIAL HYPERTENSION: ICD-10-CM

## 2019-03-14 RX ORDER — HYDROCODONE POLISTIREX AND CHLORPHENIRAMINE POLISTIREX 10; 8 MG/5ML; MG/5ML
5 SUSPENSION, EXTENDED RELEASE ORAL
Qty: 115 ML | Refills: 0 | Status: SHIPPED | OUTPATIENT
Start: 2019-03-14 | End: 2019-03-17

## 2019-03-14 RX ORDER — DOXYCYCLINE 100 MG/1
100 TABLET ORAL 2 TIMES DAILY
Qty: 20 TAB | Refills: 0 | Status: SHIPPED | OUTPATIENT
Start: 2019-03-14 | End: 2019-03-24

## 2019-03-14 NOTE — PROGRESS NOTES
Chief Complaint   Patient presents with    Cough     persistent cough not improving.  Chest Pain     pain while coughing    Groin Pain     pain while coughing     1. Have you been to the ER, urgent care clinic since your last visit? Hospitalized since your last visit? No    2. Have you seen or consulted any other health care providers outside of [facility] [place] since your last visit? Include any pap smears or colon screening. No     HPI  Truddie Hodgkin comes in accompanied by the wife for cough. Patient has a cough that has been persistent for him. I did see him last month with a cough. Put him on medication. Felt some improvement but this did not last long. Cough has recurred. Seen recently and given his cough medication. This did not help much. He would like to have an antibiotic. Cough is productive of mucopurulent phlegm. Associated pleuritic chest pain. Occasional wheeze for which he is using his inhaler medications with good results. Patient states that the cough is worse at night and he is unable to sleep due to coughing spells. No hemoptysis. No fever or chills. This is a patient on dialysis. He has end-stage renal disease. He also has diabetes mellitus type 2 and is on insulin. He has a history of CAD with a history of double coronary bypass surgery and has had coronary stents having been placed. Does have a history of prostate cancer and has had radiation therapy. For now we will treat for the bronchitis. I will put him on doxycycline and give him Tussionex. Will continue with his other medications. I will follow-up at next visit. Blood pressure slightly elevated today but I suspect this is due to the cough. When he coughs he at times gets pain in his groin the right side. No bulge. No obstipation. Suspect muscle strain owing to the cough. He did have an x-ray of the chest done recently that was negative of any acute abnormality.       Past Medical History  Past Medical History:   Diagnosis Date    Atherosclerosis of renal artery (HCC)     S/P Rt stent    CAD (coronary artery disease) , 1997, 2012    ,double bypass, 2 stents, 2stents 7/2016    Cancer (HCC)     prostate    CHF (congestive heart failure) (HCC)     Chronic diastolic heart failure (HCC)     Chronic kidney disease     Chronic kidney disease (CKD), stage IV (severe) (HCC)     On Dialysis now -T-Th-sat    Constipation     Coronary atherosclerosis of unspecified type of vessel, native or graft     Stable angina after recent PCI continue treatment.  CRI (chronic renal insufficiency)     Diabetes ([address] 1973    type 2    Essential hypertension, benign     GERD (gastroesophageal reflux disease)     Gout     Hypertension 1982    Morbid obesity ([address]     Weight loss has been strongly encouraged by following dietary restrictions and an exercise routine    APRYL (obstructive sleep apnea) 6/26/2015    no cpap    Other and unspecified hyperlipidemia     HDL's are not at goal, LDL's are at goal, triglycerides are not at goal.    Other and unspecified hyperlipidemia     HDL's are not at goal, LDL's are at goal, triglycerides are not at goal.     Other ill-defined conditions(799.89) 1960    pneumonia twice    Prostate cancer ([address]     Sleep disturbance     Type II or unspecified type diabetes mellitus without mention of complication, not stated as uncontrolled        Surgical History  Past Surgical History:   Procedure Laterality Date    CARDIAC SURG PROCEDURE UNLIST  1995    lt. carotid endart.    [facility] CARDIAC SURG PROCEDURE UNLIST  2012, 2016, Nov 2017    Coronary Stent    COLONOSCOPY N/A 6/23/2017    COLONOSCOPY w/ APC w/ polypectomies performed by Morris Townsend MD at [address] N/A 1/19/2018    FLEXIBLE SIGMOIDOSCOPY WITH Radio frequency ablation performed by Morris Townsend MD at [address] B N/A 6/8/2018    [address] WITH APC performed by Melodie Caro MD at [address] HX CHOLECYSTECTOMY      HX CORONARY ARTERY BYPASS GRAFT  2000    x2    HX HEENT  1997    cholecystectomy    HX UROLOGICAL  1998    renal art. surg    HX VASCULAR ACCESS      Perma cath for HD- right chest.    [facility] INTRO CATH DIALYSIS CIRCUIT DX ANGRPH FLUOR S&I N/A 12/10/2018    FISTULOGRAM LEFT performed by Acacia Beck MD at [facility] CATH LAB    [facility] INTRO CATH DIALYSIS CIRCUIT W/TRLUML BALO ANGIOP N/A 12/10/2018    Angioplasty Fistula/Dialysis Circuit performed by Acacia Beck MD at [facility] CATH LAB        Medications  Current Outpatient Medications   Medication Sig Dispense Refill    doxycycline (ADOXA) 100 mg tablet Take 1 Tab by mouth two (2) times a day for 10 days. 20 Tab 0    chlorpheniramine-HYDROcodone (TUSSIONEX) 10-8 mg/5 mL suspension Take 5 mL by mouth every twelve (12) hours as needed for Cough for up to 3 days. Max Daily Amount: 10 mL. 115 mL 0    allopurinol (ZYLOPRIM) 100 mg tablet TAKE ONE TABLET BY MOUTH THREE TIMES PER WEEK ON MONDAY, WEDNESDAY AND FRIDAY 36 Tab 0    simvastatin (ZOCOR) 40 mg tablet TAKE 1 TABLET BY MOUTH NIGHTLY. 90 Tab 3    montelukast (SINGULAIR) 10 mg tablet Take 1 Tab by mouth daily. 30 Tab 0    DULoxetine (CYMBALTA) 60 mg capsule       fenofibrate (LOFIBRA) 54 mg tablet TAKE ONE TABLET BY MOUTH DAILY 30 Tab 2    amLODIPine (NORVASC) 2.5 mg tablet Take 1 Tab by mouth daily. 30 Tab 0    metoprolol tartrate (LOPRESSOR) 50 mg tablet Take 1 Tab by mouth two (2) times a day. 60 Tab 0    albuterol (PROVENTIL HFA, VENTOLIN HFA, PROAIR HFA) 90 mcg/actuation inhaler Take 2 Puffs by inhalation every four (4) hours as needed for Wheezing. 1 Inhaler 0    L.acid/L.casei/B.bif/B.ken/FOS (PROBIOTIC BLEND PO) Take 2 Tabs by mouth daily.  losartan (COZAAR) 50 mg tablet Take 25 mg by mouth daily. Take 1/2 tab daily      clopidogrel (PLAVIX) 75 mg tab Take 1 Tab by mouth daily.  30 Tab 0    isosorbide mononitrate ER (IMDUR) 30 mg tablet Take 1 Tab by mouth daily. 30 Tab 0    nitroglycerin (NITROSTAT) 0.4 mg SL tablet 1 Tab by SubLINGual route as needed for Chest Pain. 1 Bottle 0    cinacalcet (SENSIPAR) 30 mg tablet Take 60 mg by mouth daily.  insulin glargine (LANTUS) 100 unit/mL injection 35 Units by SubCUTAneous route every twelve (12) hours. 30 mL 3    ASPIR-LOW 81 mg tablet TAKE ONE TABLET BY MOUTH ONE TIME DAILY 38 Tab 4    DULCOLAX, BISACODYL, PO Take  by mouth.  pantoprazole (PROTONIX) 40 mg tablet Take 40 mg by mouth daily.  tamsulosin (FLOMAX) 0.4 mg capsule TAKE ONE CAPSULE BY MOUTH ONE TIME DAILY 90 Cap 3    b complex-vitamin c-folic acid (NEPHROCAPS) 1 mg capsule Take 1 Cap by mouth daily. 30 Cap 0    cholecalciferol (DECARA) 50,000 unit capsule Take  by mouth every seven (7) days.       insulin aspart (NOVOLOG) 100 unit/mL injection 20 units sq 3 times a day,sliding scale 10 mL 0       Allergies  Allergies   Allergen Reactions    Nka [No Known Allergies] Other (comments)       Family History  Family History   Problem Relation Age of Onset    Heart Attack Father 64       Social History  Social History     Socioeconomic History    Marital status:      Spouse name: Not on file    Number of children: Not on file    Years of education: Not on file    Highest education level: Not on file   Social Needs    Financial resource strain: Not on file    Food insecurity - worry: Not on file    Food insecurity - inability: Not on file   [employer] needs - medical: Not on file   [employer] needs - non-medical: Not on file   Occupational History    Not on file   Tobacco Use    Smoking status: Never Smoker    Smokeless tobacco: Never Used   Substance and Sexual Activity    Alcohol use: No    Drug use: No    Sexual activity: Not on file   Other Topics Concern    Not on file   Social History Narrative    Not on file       Review of Systems  Review of Systems -review of all systems is negative except as noted above in the HPI. Vital Signs  Visit Vitals  /78 (BP 1 Location: Right arm, BP Patient Position: Sitting)   Pulse (!) 109   Temp 98.2 °F (36.8 °C) (Oral)   Resp 20   Ht 5' 8\" (1.727 m)   Wt 210 lb (95.3 kg)   SpO2 96%   BMI 31.93 kg/m²         Physical Exam  Physical Examination: General appearance - oriented to person, place, and time, overweight, acyanotic, in no respiratory distress and well hydrated  Mental status - alert, oriented to person, place, and time, anxious  Nose - normal and patent, no erythema, discharge or polyps  Mouth - mucous membranes moist, pharynx normal without lesions  Neck - supple, no significant adenopathy  Lymphatics - no palpable lymphadenopathy  Chest - decreased air entry noted bilateral lung bases with occasional wheezing  Heart - S1 and S2 normal  Abdomen - no rebound tenderness noted  Back exam - limited range of motion  Neurological - neck supple without rigidity, motor and sensory grossly normal bilaterally  Musculoskeletal - osteoarthritic changes noted in both hands  Extremities - intact peripheral pulses    Results  Results for orders placed or performed in visit on 01/18/19   PSA, DIAGNOSTIC (PROSTATE SPECIFIC AG)   Result Value Ref Range    Prostate Specific Ag 0.15        ASSESSMENT and PLAN    ICD-10-CM ICD-9-CM    1. Bronchitis J40 490 doxycycline (ADOXA) 100 mg tablet      chlorpheniramine-HYDROcodone (TUSSIONEX) 10-8 mg/5 mL suspension   2. Type 2 diabetes mellitus without complication, with long-term current use of insulin ([facility]) E11.9 250.00     Z79.4 V58.67    3. ESRD (end stage renal disease) on dialysis ([facility]) N18.6 585.6     Z99.2 V45.11    4. Essential hypertension I10 401.9      reviewed diet, exercise and weight control  reviewed medications and side effects in detail      I have discussed the diagnosis with the patient and the intended plan of care as seen in the above orders.  The patient has received an after-visit summary and questions were answered concerning future plans. I have discussed medication, side effects, and warnings with the patient in detail. The patient verbalized understanding and is in agreement with the plan of care. The patient will contact the office with any additional concerns.     Lazarus Book, MD

## 2019-03-17 DIAGNOSIS — F41.9 ANXIETY: ICD-10-CM

## 2019-03-17 RX ORDER — METOPROLOL TARTRATE 50 MG/1
TABLET ORAL
Qty: 60 TAB | Refills: 0 | Status: SHIPPED | OUTPATIENT
Start: 2019-03-17 | End: 2019-04-10 | Stop reason: SDUPTHER

## 2019-03-17 RX ORDER — AMLODIPINE BESYLATE 2.5 MG/1
TABLET ORAL
Qty: 30 TAB | Refills: 0 | Status: SHIPPED | OUTPATIENT
Start: 2019-03-17 | End: 2019-04-15 | Stop reason: SDUPTHER

## 2019-03-17 RX ORDER — DULOXETIN HYDROCHLORIDE 60 MG/1
CAPSULE, DELAYED RELEASE ORAL
Qty: 30 CAP | Refills: 1 | Status: ON HOLD | OUTPATIENT
Start: 2019-03-17 | End: 2019-05-15 | Stop reason: SDUPTHER

## 2019-03-26 RX ORDER — MONTELUKAST SODIUM 10 MG/1
TABLET ORAL
Qty: 30 TAB | Refills: 0 | Status: SHIPPED | OUTPATIENT
Start: 2019-03-26 | End: 2019-04-22 | Stop reason: SDUPTHER

## 2019-04-10 ENCOUNTER — TELEPHONE (OUTPATIENT)
Dept: FAMILY MEDICINE CLINIC | Age: 79
End: 2019-04-10

## 2019-04-10 RX ORDER — METOPROLOL TARTRATE 100 MG/1
100 TABLET ORAL 2 TIMES DAILY
Qty: 180 TAB | Refills: 0 | Status: SHIPPED | OUTPATIENT
Start: 2019-04-10 | End: 2020-01-01

## 2019-04-10 RX ORDER — METOPROLOL TARTRATE 50 MG/1
TABLET ORAL
Qty: 180 TAB | Refills: 1 | Status: SHIPPED | OUTPATIENT
Start: 2019-04-10 | End: 2019-04-10

## 2019-04-15 ENCOUNTER — TELEPHONE (OUTPATIENT)
Dept: FAMILY MEDICINE CLINIC | Age: 79
End: 2019-04-15

## 2019-04-15 RX ORDER — AMLODIPINE BESYLATE 2.5 MG/1
TABLET ORAL
Qty: 30 TAB | Refills: 0 | Status: SHIPPED | OUTPATIENT
Start: 2019-04-15 | End: 2019-05-13 | Stop reason: SDUPTHER

## 2019-04-15 RX ORDER — METOPROLOL TARTRATE 50 MG/1
TABLET ORAL
Qty: 60 TAB | Refills: 0 | Status: SHIPPED | OUTPATIENT
Start: 2019-04-15 | End: 2019-04-26 | Stop reason: SDUPTHER

## 2019-04-15 NOTE — TELEPHONE ENCOUNTER
[facility] called for clarification on medication. They state metoprolol 100mg was sent in 4/10/19. They received another for 50mg today. Which one do you want patient to take.     [phone]

## 2019-04-22 ENCOUNTER — OFFICE VISIT (OUTPATIENT)
Dept: CARDIOLOGY CLINIC | Age: 79
End: 2019-04-22

## 2019-04-22 VITALS
HEART RATE: 62 BPM | WEIGHT: 210.4 LBS | SYSTOLIC BLOOD PRESSURE: 142 MMHG | DIASTOLIC BLOOD PRESSURE: 62 MMHG | HEIGHT: 68 IN | BODY MASS INDEX: 31.89 KG/M2

## 2019-04-22 DIAGNOSIS — I25.119 CORONARY ARTERY DISEASE INVOLVING NATIVE CORONARY ARTERY OF NATIVE HEART WITH ANGINA PECTORIS (HCC): Primary | ICD-10-CM

## 2019-04-22 DIAGNOSIS — I50.32 CHRONIC DIASTOLIC HEART FAILURE (HCC): ICD-10-CM

## 2019-04-22 DIAGNOSIS — Z95.5 S/P CORONARY ARTERY STENT PLACEMENT: ICD-10-CM

## 2019-04-22 DIAGNOSIS — I35.0 NONRHEUMATIC AORTIC VALVE STENOSIS: ICD-10-CM

## 2019-04-22 RX ORDER — MONTELUKAST SODIUM 10 MG/1
TABLET ORAL
Qty: 30 TAB | Refills: 0 | Status: SHIPPED | OUTPATIENT
Start: 2019-04-22 | End: 2019-05-21 | Stop reason: SDUPTHER

## 2019-04-22 NOTE — PROGRESS NOTES
HISTORY OF PRESENT ILLNESS  Leonor Braden is a 66 y.o. male. Patient with cad,chf,post cabg and pci  . recent admission with chf,acs,non q mi  Had vg to [facility] pci-11/2017 12/2017-admitted with angina  1/2018-non stemi  12/2018-patient presents with increasing chest pain substernal burning. Uses frequent nitroglycerin. This change in frequency has happened for 2 weeks. 1/2019-admission with unstable angina. Cardiac catheterization showing new occluded vein graft to diagonal branch. Medically managed due to multiple prior PCI    CHF   The history is provided by the patient. This is a chronic problem. The problem occurs constantly. The problem has not changed since onset. Associated symptoms include chest pain and shortness of breath. The symptoms are aggravated by exertion. Hypertension   The history is provided by the patient. This is a chronic problem. The problem occurs constantly. The problem has not changed since onset. Associated symptoms include chest pain and shortness of breath. Nothing aggravates the symptoms. Shortness of Breath   The history is provided by the patient. This is a recurrent problem. The problem occurs intermittently. The current episode started more than 1 week ago. Associated symptoms include chest pain. Pertinent negatives include no fever, no cough, no sputum production, no hemoptysis, no wheezing, no PND, no orthopnea, no vomiting, no rash, no leg swelling and no claudication. The problem's precipitants include exercise (>30 mts of treadmill). Chest Pain (Angina)    This is a recurrent problem. The problem has been rapidly improving. The problem occurs rarely. The pain is associated with exertion. The pain is present in the substernal region. The quality of the pain is described as pressure-like. The pain does not radiate. Associated symptoms include shortness of breath.  Pertinent negatives include no claudication, no cough, no dizziness, no fever, no hemoptysis, no nausea, no orthopnea, no palpitations, no PND, no sputum production, no vomiting and no weakness. Review of Systems   Constitutional: Negative for chills and fever. HENT: Negative for nosebleeds. Eyes: Negative for blurred vision and double vision. Respiratory: Positive for shortness of breath. Negative for cough, hemoptysis, sputum production and wheezing. Cardiovascular: Positive for chest pain. Negative for palpitations, orthopnea, claudication, leg swelling and PND. Gastrointestinal: Negative for heartburn, nausea and vomiting. Musculoskeletal: Negative for myalgias. Skin: Negative for rash. Neurological: Negative for dizziness and weakness. Endo/Heme/Allergies: Does not bruise/bleed easily. Family History   Problem Relation Age of Onset    Heart Attack Father 64       Past Medical History:   Diagnosis Date    Atherosclerosis of renal artery (HCC)     S/P Rt stent    CAD (coronary artery disease) , 1997, 2012    ,double bypass, 2 stents, 2stents 7/2016    Cancer ([facility] [address]     prostate    CHF (congestive heart failure) (HCC)     Chronic diastolic heart failure (HCC)     Chronic kidney disease     Chronic kidney disease (CKD), stage IV (severe) (HCC)     On Dialysis now -T-Th-sat    Constipation     Coronary atherosclerosis of unspecified type of vessel, native or graft     Stable angina after recent PCI continue treatment.        CRI (chronic renal insufficiency)     Diabetes ([address] 1973    type 2    Essential hypertension, benign     GERD (gastroesophageal reflux disease)     Gout     Hypertension 1982    Morbid obesity ([facility] [address]     Weight loss has been strongly encouraged by following dietary restrictions and an exercise routine    APRYL (obstructive sleep apnea) 6/26/2015    no cpap    Other and unspecified hyperlipidemia     HDL's are not at goal, LDL's are at goal, triglycerides are not at goal.    Other and unspecified hyperlipidemia     HDL's are not at goal, LDL's are at goal, triglycerides are not at goal.     Other ill-defined conditions(799.89) 1960    pneumonia twice    Prostate cancer ([facility] [address]     Sleep disturbance     Type II or unspecified type diabetes mellitus without mention of complication, not stated as uncontrolled        Past Surgical History:   Procedure Laterality Date   [address]    lt. carotid endart. [facility] CARDIAC SURG PROCEDURE UNLIST  2012, 2016, Nov 2017    Coronary Stent    COLONOSCOPY N/A 6/23/2017    COLONOSCOPY w/ APC w/ polypectomies performed by Vania Fothergill, MD at [address] N/A 1/19/2018    FLEXIBLE SIGMOIDOSCOPY WITH Radio frequency ablation performed by Vania Fothergill, MD at [address] N/A 6/8/2018    SIGMOIDOSCOPY FLEXIBLE WITH APC performed by Kodak Jara MD at [facility] ENDOSCOPY    HX CHOLECYSTECTOMY      HX CORONARY ARTERY BYPASS GRAFT  2000    x2    HX HEENT  1997    cholecystectomy    HX UROLOGICAL  1998    renal art. surg    HX VASCULAR ACCESS      Perma cath for HD- right chest.    MO INTRO CATH DIALYSIS CIRCUIT DX ANGRPH FLUOR S&I N/A 12/10/2018    FISTULOGRAM LEFT performed by Farida Dial MD at [facility] CATH LAB    MO INTRO CATH DIALYSIS CIRCUIT W/TRLUML BALO ANGIOP N/A 12/10/2018    Angioplasty Fistula/Dialysis Circuit performed by Farida Dial MD at [facility] CATH LAB       Allergies   Allergen Reactions    Nka [No Known Allergies] Other (comments)       Current Outpatient Medications   Medication Sig    metoprolol tartrate (LOPRESSOR) 50 mg tablet TAKE ONE TABLET BY MOUTH TWICE A DAY (Patient taking differently: TAKE ONE TABLET BY MOUTH DAILY)    amLODIPine (NORVASC) 2.5 mg tablet TAKE ONE TABLET BY MOUTH DAILY    metoprolol tartrate (LOPRESSOR) 100 mg IR tablet Take 1 Tab by mouth two (2) times a day.     montelukast (SINGULAIR) 10 mg tablet TAKE ONE TABLET BY MOUTH DAILY    tamsulosin (FLOMAX) 0.4 mg capsule TAKE ONE CAPSULE BY MOUTH ONE TIME DAILY    DULoxetine (CYMBALTA) 60 mg capsule TAKE ONE CAPSULE BY MOUTH DAILY    allopurinol (ZYLOPRIM) 100 mg tablet TAKE ONE TABLET BY MOUTH THREE TIMES PER WEEK ON MONDAY, WEDNESDAY AND FRIDAY    simvastatin (ZOCOR) 40 mg tablet TAKE 1 TABLET BY MOUTH NIGHTLY.  DULoxetine (CYMBALTA) 60 mg capsule     fenofibrate (LOFIBRA) 54 mg tablet TAKE ONE TABLET BY MOUTH DAILY    albuterol (PROVENTIL HFA, VENTOLIN HFA, PROAIR HFA) 90 mcg/actuation inhaler Take 2 Puffs by inhalation every four (4) hours as needed for Wheezing.  L.acid/L.casei/B.bif/B.ken/FOS (PROBIOTIC BLEND PO) Take 2 Tabs by mouth daily.  losartan (COZAAR) 50 mg tablet Take 25 mg by mouth daily. Take 1/2 tab daily    clopidogrel (PLAVIX) 75 mg tab Take 1 Tab by mouth daily.  isosorbide mononitrate ER (IMDUR) 30 mg tablet Take 1 Tab by mouth daily.  nitroglycerin (NITROSTAT) 0.4 mg SL tablet 1 Tab by SubLINGual route as needed for Chest Pain.  cinacalcet (SENSIPAR) 30 mg tablet Take 60 mg by mouth daily.  insulin glargine (LANTUS) 100 unit/mL injection 35 Units by SubCUTAneous route every twelve (12) hours.  ASPIR-LOW 81 mg tablet TAKE ONE TABLET BY MOUTH ONE TIME DAILY    DULCOLAX, BISACODYL, PO Take  by mouth.  pantoprazole (PROTONIX) 40 mg tablet Take 40 mg by mouth daily.  b complex-vitamin c-folic acid (NEPHROCAPS) 1 mg capsule Take 1 Cap by mouth daily.  cholecalciferol (DECARA) 50,000 unit capsule Take  by mouth every seven (7) days.  insulin aspart (NOVOLOG) 100 unit/mL injection 20 units sq 3 times a day,sliding scale     No current facility-administered medications for this visit. Visit Vitals  /62   Pulse 62   Ht 5' 8\" (1.727 m)   Wt 95.4 kg (210 lb 6.4 oz)   BMI 31.99 kg/m²         Physical Exam   Constitutional: He is oriented to person, place, and time. He appears well-developed and well-nourished. obese   HENT:   Head: Normocephalic and atraumatic.    Eyes: Conjunctivae are normal.   Neck: Neck supple. No JVD present. No tracheal deviation present. No thyromegaly present. Cardiovascular: Normal rate, regular rhythm and normal heart sounds. Exam reveals no gallop and no friction rub. No murmur heard. Pulmonary/Chest: Breath sounds normal. No respiratory distress. He has no wheezes. He has no rales. He exhibits no tenderness. Abdominal: Soft. There is no tenderness. Musculoskeletal: He exhibits no edema. Neurological: He is alert and oriented to person, place, and time. Skin: Skin is warm and dry. Psychiatric: He has a normal mood and affect. Mr. Castellanos has a reminder for a \"due or due soon\" health maintenance. I have asked that he contact his primary care provider for follow-up on this health maintenance. CARDIOLOGY STUDIES 10/8/2012   Cardiac Cath Result PATENT LIMA TO LAD, SVG TO D1 SUBTOTAL DIFFUSE, POST PCI WITH STENTS,HAD PROXIMAL IN STENT PROTRUSION UNABLE TO CROSS WITH BALLONS,D1 DIFFISE SEVERE   Echocardiogram - Complete Result NORMAL EF 70%     SUMMARY:echo:11/2014  Left ventricle: Systolic function was hyperdynamic. Ejection fraction was  estimated in the range of 70 % to 75 %. No obvious wall motion  abnormalities identified in the views obtained. Near cavity obliteration  seen in the mid to apical portions of the ventricular cavity. There was  mild concentric hypertrophy. Features were consistent with a pseudonormal  left ventricular filling pattern, with concomitant abnormal relaxation and  increased filling pressure (grade 2 diastolic dysfunction). NUCLEAR IMAGING:    Findings: stress test:11/2014  1. Post-stress imaging in short axis, horizontal and vertical long axis views reveals normal isotope uptake in all areas. 2. Resting images also show normal isotope uptake in all areas. 3. Gated images show normal left ventricular size, wall motion and systolic function. Ejection fraction is 85%. Diagnosis:   1. Normal scan.    2. No evidence of significant fixed or reversible defect suggesting ischemia or myocardial infarction noted from this nuclear study. 3. low risk scan.  7/2016-pci  vg to diag  11/2017-pci svg-diag  I have personally reviewed patient's records available from hospital and other providers and incorporated findings in patient care. 1/2018-[facility]    Conclusion 1/2019       · Patent LIMA to LAD  · 100% ostial occlusion of SVG to diagonal artery which has been intervened multiple times in the past.  · Critical disease in distal circumflex and proximal and distal RCA but these are small vessels and the lesions are chronic. Since the SVG has been intervened multiple times in the past and is now totally occluded, no attempts were made to reopen it. Patient should be treated medically. This would avoid the use of dual antiplatelets which have caused GI bleed repeatedly in the past.  Aggressive risk factor modification.        Interpretation Summary 3/2019    · Estimated left ventricular ejection fraction is 56 - 60%. Left ventricular moderate concentric hypertrophy. Moderate (grade 2) left ventricular diastolic dysfunction. · Normal right ventricular size and function. · Moderate aortic valve leaflet calcification present with reduced excursion. Moderate aortic valve stenosis. Aortic valve peak gradient is 43.6 mmHg. Aortic valve mean gradient is 23.4 mmHg. Aortic valve area is 1.2 cm2. · Mitral valve thickening. Moderate mitral annular calcification. Mild mitral valve regurgitation. Assessment         ICD-10-CM ICD-9-CM    1. Coronary artery disease involving native coronary artery of native heart with angina pectoris ([facility] [address] I25.119 414.01      413.9     Stable continue treatment   2. Chronic diastolic heart failure (HCC) I50.32 428.32     Stable. Class II   3. S/P coronary artery stent placement Z95.5 V45.82     Stable   4. Nonrheumatic aortic valve stenosis I35.0 424.1     Moderate aortic stenosis.   Area 1.2 cm² continue to monitor       11/2018  Cardiac status stable. Off Plavix due to GI bleed but hemoglobin is stabilized. Last hemoglobin 11. Cholesterol 156. Continue current treatment. Occasional.  Of low blood pressure will change metoprolol as needed to 50 mg twice a day instead of 100 mg twice a day  12/2018  Crescendo angina for 2 weeks. Frequent use of nitroglycerin. Will set up for cardiac catheterization. Advised to come to emergency room if symptoms are persistent  4/ 2019  Moderate aortic stenosis on recent echo. Normal ejection fraction continue to monitor for symptom  There are no discontinued medications. No orders of the defined types were placed in this encounter. Follow-up and Dispositions    · Return in about 6 months (around 10/22/2019).

## 2019-04-22 NOTE — PATIENT INSTRUCTIONS
[employer] Activation    Thank you for requesting access to [employer]. Please follow the instructions below to securely access and download your online medical record. [employer] allows you to send messages to your doctor, view your test results, renew your prescriptions, schedule appointments, and more. How Do I Sign Up? 1. In your internet browser, go to [email or web address]. 2. Click on the First Time User? Click Here link in the Sign In box. You will see the New Member Sign Up page. 3. Enter your [employer] Access Code exactly as it appears below. You will not need to use this code after youve completed the sign-up process. If you do not sign up before the expiration date, you must request a new code. [employer] Access Code: [email or web address]  Expires: 2019  9:13 AM (This is the date your [employer] access code will )    4. Enter the last four digits of your Social Security Number (xxxx) and Date of Birth (mm/dd/yyyy) as indicated and click Submit. You will be taken to the next sign-up page. 5. Create a [employer] ID. This will be your [employer] login ID and cannot be changed, so think of one that is secure and easy to remember. 6. Create a [employer] password. You can change your password at any time. 7. Enter your Password Reset Question and Answer. This can be used at a later time if you forget your password. 8. Enter your e-mail address. You will receive e-mail notification when new information is available in [address] 9. Click Sign Up. You can now view and download portions of your medical record. 10. Click the Download Summary menu link to download a portable copy of your medical information. Additional Information    If you have questions, please visit the Frequently Asked Questions section of the [employer] website at [email or web address] Remember, [employer] is NOT to be used for urgent needs. For medical emergencies, dial 911.

## 2019-04-22 NOTE — PROGRESS NOTES
1. Have you been to the ER, urgent care clinic since your last visit? Hospitalized since your last visit?     no  2. Have you seen or consulted any other health care providers outside of the [address] [place] [address] since your last visit? Include any pap smears or colon screening. No     3. Since your last visit, have you had any of the following symptoms? shortness of breath.

## 2019-04-24 ENCOUNTER — OFFICE VISIT (OUTPATIENT)
Dept: FAMILY MEDICINE CLINIC | Age: 79
End: 2019-04-24

## 2019-04-24 VITALS
BODY MASS INDEX: 31.83 KG/M2 | SYSTOLIC BLOOD PRESSURE: 148 MMHG | HEART RATE: 61 BPM | WEIGHT: 210 LBS | TEMPERATURE: 97.4 F | DIASTOLIC BLOOD PRESSURE: 60 MMHG | OXYGEN SATURATION: 97 % | HEIGHT: 68 IN | RESPIRATION RATE: 18 BRPM

## 2019-04-24 DIAGNOSIS — Z79.4 TYPE 2 DIABETES MELLITUS WITHOUT COMPLICATION, WITH LONG-TERM CURRENT USE OF INSULIN (HCC): ICD-10-CM

## 2019-04-24 DIAGNOSIS — E11.9 TYPE 2 DIABETES MELLITUS WITHOUT COMPLICATION, WITH LONG-TERM CURRENT USE OF INSULIN (HCC): ICD-10-CM

## 2019-04-24 DIAGNOSIS — I10 ESSENTIAL HYPERTENSION: Primary | ICD-10-CM

## 2019-04-24 DIAGNOSIS — E78.5 DYSLIPIDEMIA: ICD-10-CM

## 2019-04-24 DIAGNOSIS — G89.29 CHRONIC RIGHT SHOULDER PAIN: ICD-10-CM

## 2019-04-24 DIAGNOSIS — Z99.2 ESRD (END STAGE RENAL DISEASE) ON DIALYSIS (HCC): ICD-10-CM

## 2019-04-24 DIAGNOSIS — F39 MOOD DISORDER (HCC): ICD-10-CM

## 2019-04-24 DIAGNOSIS — M25.511 CHRONIC RIGHT SHOULDER PAIN: ICD-10-CM

## 2019-04-24 DIAGNOSIS — N18.6 ESRD (END STAGE RENAL DISEASE) ON DIALYSIS (HCC): ICD-10-CM

## 2019-04-24 RX ORDER — INSULIN GLARGINE 100 [IU]/ML
35 INJECTION, SOLUTION SUBCUTANEOUS EVERY 12 HOURS
Qty: 30 ML | Refills: 3 | Status: SHIPPED | OUTPATIENT
Start: 2019-04-24 | End: 2019-05-22 | Stop reason: ALTCHOICE

## 2019-04-24 NOTE — PROGRESS NOTES
Chief Complaint   Patient presents with    Hypertension     ESRD, ETD     Medication Evaluation     SIDE EFFECTS Renvela      1. Have you been to the ER, urgent care clinic since your last visit? Hospitalized since your last visit? No    2. Have you seen or consulted any other health care providers outside of the [address] [place] [address] since your last visit? Include any pap smears or colon screening. No     HPI  Rory Carter bleed by his wife for follow-up care. Patient has hypertension. He is on losartan, amlodipine and Lopressor. At 123 systolic. He has not taken his blood pressure medication today. Denies headache, changes in vision or focal weakness. She is advised that he take his medication. He states that his blood pressure is usually stable especially when he goes for dialysis. Does not want any adjustment in medication at the moment. End-stage renal disease and is on hemodialysis 3 times a week. Has been put on Renvela. He is concerned about the medication makes him feel weak and flushed. He has only started taking the medication. In discussion he will try to take the medication for a few more days. If symptoms worsen or persist he will let us know. We will have him follow-up with a nephrologist at this. The patient has a history of coronary artery disease. He is followed up by the cardiologist.  He is on Imdur, Lopressor, losartan, Plavix and aspirin. He has Nitrostat to use as needed. He denies chest pain, shortness of breath or diaphoresis. Patient has a history of anxiety and mood disorder. Is on Cymbalta at this has been doing well for him. No longer has anxiety when he goes for dialysis. We will continue with the treatment. He has a history of gout arthritis. No gout flareups and he is on allopurinol. Patient has history of prostate cancer with LUTS. Has been followed up with a urologist.  He is on Flomax. No hematuria or dysuria.   Patient has diabetes mellitus type 2. He is on insulin. He has been on Lantus. His endocrinologist left the practice. He is yet to be established with a new endocrinologist.  His last HbA1c was 6.5. States that his blood glucose numbers have been stable. We will continue with the current treatment plan. Past Medical History  Past Medical History:   Diagnosis Date    Atherosclerosis of renal artery (HCC)     S/P Rt stent    CAD (coronary artery disease) , 1997, 2012    ,double bypass, 2 stents, 2stents 7/2016    Cancer (HCC)     prostate    CHF (congestive heart failure) (HCC)     Chronic diastolic heart failure (HCC)     Chronic kidney disease     Chronic kidney disease (CKD), stage IV (severe) (HCC)     On Dialysis now -T-Th-sat    Constipation     Coronary atherosclerosis of unspecified type of vessel, native or graft     Stable angina after recent PCI continue treatment.  CRI (chronic renal insufficiency)     Diabetes ([address] 1973    type 2    Essential hypertension, benign     GERD (gastroesophageal reflux disease)     Gout     Hypertension 1982    Morbid obesity ([address]     Weight loss has been strongly encouraged by following dietary restrictions and an exercise routine    APRYL (obstructive sleep apnea) 6/26/2015    no cpap    Other and unspecified hyperlipidemia     HDL's are not at goal, LDL's are at goal, triglycerides are not at goal.    Other and unspecified hyperlipidemia     HDL's are not at goal, LDL's are at goal, triglycerides are not at goal.     Other ill-defined conditions(799.89) 1960    pneumonia twice    Prostate cancer ([address]     Sleep disturbance     Type II or unspecified type diabetes mellitus without mention of complication, not stated as uncontrolled        Surgical History  Past Surgical History:   Procedure Laterality Date    CARDIAC SURG PROCEDURE UNLIST  1995    lt. carotid endart.    [facility] CARDIAC SURG PROCEDURE UNLIST  2012, 2016, Nov 2017    Coronary Stent    COLONOSCOPY N/A 6/23/2017    COLONOSCOPY w/ APC w/ polypectomies performed by Rick Young MD at [address] N/A 1/19/2018    FLEXIBLE SIGMOIDOSCOPY WITH Radio frequency ablation performed by Rick Young MD at [address] N/A 6/8/2018    SIGMOIDOSCOPY FLEXIBLE WITH APC performed by Balbir Rojas MD at [address] HX CHOLECYSTECTOMY      HX CORONARY ARTERY BYPASS GRAFT  2000    x2    HX HEENT  1997    cholecystectomy    HX UROLOGICAL  1998    renal art. surg    HX VASCULAR ACCESS      Perma cath for HD- right chest.    [place] INTRO CATH DIALYSIS CIRCUIT DX ANGRPH FLUOR S&I N/A 12/10/2018    FISTULOGRAM LEFT performed by Karin Hicks MD at [facility] CATH LAB    [place] INTRO CATH DIALYSIS CIRCUIT W/TRLUML BALO ANGIOP N/A 12/10/2018    Angioplasty Fistula/Dialysis Circuit performed by Karin Hicks MD at [facility] CATH LAB        Medications  Current Outpatient Medications   Medication Sig Dispense Refill    insulin glargine (LANTUS) 100 unit/mL injection 35 Units by SubCUTAneous route every twelve (12) hours. 30 mL 3    montelukast (SINGULAIR) 10 mg tablet TAKE ONE TABLET BY MOUTH DAILY 30 Tab 0    metoprolol tartrate (LOPRESSOR) 50 mg tablet TAKE ONE TABLET BY MOUTH TWICE A DAY (Patient taking differently: TAKE ONE TABLET BY MOUTH DAILY) 60 Tab 0    amLODIPine (NORVASC) 2.5 mg tablet TAKE ONE TABLET BY MOUTH DAILY 30 Tab 0    metoprolol tartrate (LOPRESSOR) 100 mg IR tablet Take 1 Tab by mouth two (2) times a day. 180 Tab 0    tamsulosin (FLOMAX) 0.4 mg capsule TAKE ONE CAPSULE BY MOUTH ONE TIME DAILY 90 Cap 1    DULoxetine (CYMBALTA) 60 mg capsule TAKE ONE CAPSULE BY MOUTH DAILY 30 Cap 1    allopurinol (ZYLOPRIM) 100 mg tablet TAKE ONE TABLET BY MOUTH THREE TIMES PER WEEK ON MONDAY, WEDNESDAY AND FRIDAY 36 Tab 0    simvastatin (ZOCOR) 40 mg tablet TAKE 1 TABLET BY MOUTH NIGHTLY.  90 Tab 3    DULoxetine (CYMBALTA) 60 mg capsule       fenofibrate (LOFIBRA) 54 mg tablet TAKE ONE TABLET BY MOUTH DAILY 30 Tab 2    L.acid/L.casei/B.bif/B.ken/FOS (PROBIOTIC BLEND PO) Take 2 Tabs by mouth daily.  losartan (COZAAR) 50 mg tablet Take 25 mg by mouth daily. Take 1/2 tab daily      clopidogrel (PLAVIX) 75 mg tab Take 1 Tab by mouth daily. 30 Tab 0    isosorbide mononitrate ER (IMDUR) 30 mg tablet Take 1 Tab by mouth daily. 30 Tab 0    nitroglycerin (NITROSTAT) 0.4 mg SL tablet 1 Tab by SubLINGual route as needed for Chest Pain. 1 Bottle 0    cinacalcet (SENSIPAR) 30 mg tablet Take 60 mg by mouth daily.  ASPIR-LOW 81 mg tablet TAKE ONE TABLET BY MOUTH ONE TIME DAILY 38 Tab 4    DULCOLAX, BISACODYL, PO Take  by mouth.  pantoprazole (PROTONIX) 40 mg tablet Take 40 mg by mouth daily.  b complex-vitamin c-folic acid (NEPHROCAPS) 1 mg capsule Take 1 Cap by mouth daily. 30 Cap 0    cholecalciferol (DECARA) 50,000 unit capsule Take  by mouth every seven (7) days.       insulin aspart (NOVOLOG) 100 unit/mL injection 20 units sq 3 times a day,sliding scale 10 mL 0       Allergies  Allergies   Allergen Reactions    Nka [No Known Allergies] Other (comments)       Family History  Family History   Problem Relation Age of Onset    Heart Attack Father 64       Social History  Social History     Socioeconomic History    Marital status:      Spouse name: Not on file    Number of children: Not on file    Years of education: Not on file    Highest education level: Not on file   Occupational History    Not on file   Social Needs    Financial resource strain: Not on file    Food insecurity:     Worry: Not on file     Inability: Not on file    Transportation needs:     Medical: Not on file     Non-medical: Not on file   Tobacco Use    Smoking status: Never Smoker    Smokeless tobacco: Never Used   Substance and Sexual Activity    Alcohol use: No    Drug use: No    Sexual activity: Not on file   Lifestyle    Physical activity:     Days per week: Not on file     Minutes per session: Not on file    Stress: Not on file   Relationships    Social connections:     Talks on phone: Not on file     Gets together: Not on file     Attends Church service: Not on file     Active member of club or organization: Not on file     Attends meetings of clubs or organizations: Not on file     Relationship status: Not on file    Intimate partner violence:     Fear of current or ex partner: Not on file     Emotionally abused: Not on file     Physically abused: Not on file     Forced sexual activity: Not on file   Other Topics Concern    Not on file   Social History Narrative    Not on file       Review of Systems  Review of Systems - Review of all systems is negative except as noted above in the HPI. Vital Signs  Visit Vitals  /60 (BP 1 Location: Right arm, BP Patient Position: Sitting)   Pulse 61   Temp 97.4 °F (36.3 °C) (Oral)   Resp 18   Ht 5' 8\" (1.727 m)   Wt 210 lb (95.3 kg)   SpO2 97%   BMI 31.93 kg/m²         Physical Exam  Physical Examination: General appearance - oriented to person, place, and time, overweight and acyanotic, in no respiratory distress  Mental status - alert, oriented to person, place, and time, affect appropriate to mood  Neck - supple, no significant adenopathy  Lymphatics - no palpable lymphadenopathy  Chest - decreased air entry noted bilateral lung bases  Heart - S1 and S2 normal, systolic murmur 2/6 left lower sternal border  Back exam - limited range of motion  Neurological - motor and sensory grossly normal bilaterally  Musculoskeletal - osteoarthritic changes noted in both hands, limited range of motion right shoulder especially to overhead motions.   Discomfort to palpation around the [facility] joint and rotator cuff tendon  Extremities - no pedal edema noted, intact peripheral pulses    Results  Results for orders placed or performed in visit on 03/18/19   ECHO ADULT COMPLETE   Result Value Ref Range    LA Volume 85.9 (A) 18 - 58 mL    Ao Root D 3.54 cm    LA Vol Index 41.16 16 - 28 ml/m2    AO ASC D 3.52 cm    Aortic Valve Systolic Peak Velocity 955.66 cm/s    AoV VTI 71.38 cm    Aortic Valve Area by Continuity of Peak Velocity 1.1 cm2    Aortic Valve Area by Continuity of VTI 1.2 cm2    AoV PG 43.6 mmHg    LVIDd 3.98 (A) 4.2 - 5.9 cm    LVPWd 1.45 (A) 0.6 - 1.0 cm    LVIDs 2.90 cm    IVSd 1.72 (A) 0.6 - 1.0 cm    LVOT d 2.00 cm    LVOT Peak Velocity 116.80 cm/s    LVOT Peak Gradient 5.5 mmHg    LVOT VTI 27.17 cm    LV E' Septal Velocity 6.63 cm/s    LV E' Lateral Velocity 9.35 cm/s    MVA (PHT) 3.2 cm2    MV A James 119.38 cm/s    MV E James 98.27 cm/s    MV E/A 0.82     RVIDd 4.10 cm    Aortic Valve Systolic Mean Gradient 61.7 mmHg    LA Vol 4C 60.48 (A) 18 - 58 mL    LA Vol 2C 69.67 (A) 18 - 58 mL    LA Area 2C 26.39 cm2    LA Area 4C 18.9 cm2    LV Mass .3 (A) 88 - 224 g    LV Mass AL Index 144.4 (A) 49 - 115 g/m2    E/E' lateral 10.51     E/E' septal 14.82     Right Atrial Area 4C 8.8 cm2    MV \"A\" wave duration 129.4 msec    E/E' ratio (averaged) 12.67     Mitral Valve E Wave Deceleration Time 234.6 ms    Mitral Valve Pressure Half-time 68.0 ms    Left Atrium Major Axis 4.41 cm    Tapse 1.99 1.5 - 2.0 cm    Pulmonic Valve Max Velocity 105.23 cm/s    LA Vol Index 33.39 16 - 28 ml/m2    LA Vol Index 28.98 16 - 28 ml/m2    LAURO/BSA Pk James 0.5 cm2/m2    LAURO/BSA VTI 0.6 cm2/m2    PV peak gradient 4.4 mmHg       ASSESSMENT and PLAN    ICD-10-CM ICD-9-CM    1. Essential hypertension I10 401.9    2. ESRD (end stage renal disease) on dialysis (HCC) N18.6 585.6     Z99.2 V45.11    3. Chronic right shoulder pain M25.511 719.41 XR SHOULDER RT AP/LAT MIN 2 V    G89.29 338.29    4. Type 2 diabetes mellitus without complication, with long-term current use of insulin (HCC) E11.9 250.00     Z79.4 V58.67    5. Dyslipidemia E78.5 272.4    6.  Mood disorder ([facility] F39 296.90      lab results and schedule of future lab studies reviewed with patient  reviewed diet, exercise and weight control  reviewed medications and side effects in detail  specific diabetic recommendations: low cholesterol diet, weight control and daily exercise discussed, home glucose monitoring emphasized, all medications, side effects and compliance discussed carefully, glycohemoglobin and other lab monitoring discussed and long term diabetic complications discussed    I have discussed the diagnosis with the patient and the intended plan of care as seen in the above orders. The patient has received an after-visit summary and questions were answered concerning future plans. I have discussed medication, side effects, and warnings with the patient in detail. The patient verbalized understanding and is in agreement with the plan of care. The patient will contact the office with any additional concerns. More than 50% of 30-minute visit spent counseling and coordinating care with patient face to face on hemodialysis, importance of the same and the importance of taking medications as prescribed. Discussed diabetes with good blood glucose control. . Discussed need for compliance with treatment regimen, follow-up, and taking responsibility for his disease process.         Linda Sandoval MD

## 2019-04-27 PROBLEM — F39 MOOD DISORDER (HCC): Status: ACTIVE | Noted: 2019-04-27

## 2019-04-29 DIAGNOSIS — G89.29 CHRONIC RIGHT SHOULDER PAIN: Primary | ICD-10-CM

## 2019-04-29 DIAGNOSIS — M25.511 CHRONIC RIGHT SHOULDER PAIN: Primary | ICD-10-CM

## 2019-05-01 ENCOUNTER — TELEPHONE (OUTPATIENT)
Dept: FAMILY MEDICINE CLINIC | Age: 79
End: 2019-05-01

## 2019-05-01 NOTE — TELEPHONE ENCOUNTER
Patient is requesting to be contacted by clinical staff for advise about the results of the x-ray when available to do so

## 2019-05-08 ENCOUNTER — OFFICE VISIT (OUTPATIENT)
Dept: ORTHOPEDIC SURGERY | Age: 79
End: 2019-05-08

## 2019-05-08 VITALS
WEIGHT: 210 LBS | SYSTOLIC BLOOD PRESSURE: 127 MMHG | RESPIRATION RATE: 24 BRPM | TEMPERATURE: 97.9 F | OXYGEN SATURATION: 98 % | HEART RATE: 70 BPM | HEIGHT: 68 IN | DIASTOLIC BLOOD PRESSURE: 71 MMHG | BODY MASS INDEX: 31.83 KG/M2

## 2019-05-08 DIAGNOSIS — M19.011 PRIMARY OSTEOARTHRITIS OF RIGHT SHOULDER: Primary | ICD-10-CM

## 2019-05-08 RX ORDER — TRIAMCINOLONE ACETONIDE 40 MG/ML
40 INJECTION, SUSPENSION INTRA-ARTICULAR; INTRAMUSCULAR ONCE
Qty: 1 ML | Refills: 0
Start: 2019-05-08 | End: 2019-05-08

## 2019-05-08 RX ORDER — CALCIUM ACETATE 667 MG/1
1 CAPSULE ORAL
COMMUNITY
Start: 2019-05-04

## 2019-05-08 NOTE — PROGRESS NOTES
1. Have you been to the ER, urgent care clinic since your last visit? Hospitalized since your last visit? No 
 
2. Have you seen or consulted any other health care providers outside of the [address] [place] [address] since your last visit? Include any pap smears or colon screening.  No

## 2019-05-08 NOTE — PROGRESS NOTES
Patient: Donny Eric                MRN: [id number]       SSN: [id number] YOB: 1940        AGE: 66 y.o. SEX: male Body mass index is 31.93 kg/m². PCP: Vidya Gray MD 
05/08/19 Chief Complaint: Right shoulder pain [very poor audio quality - static and low volume - pls read carefully for potential errors or omissions] HISTORY OF PRESENT ILLNESS:   Candelaria Mckay is a 66year-old male who comes in with right shoulder pain. He has had pain for six months. He denies any injury or trauma. He said it is worse  . He has pain with overhead reaching. He has pain at night. .  He has not had any injections. He has not had x-rays. .   
 
 
 
Past Medical History:  
Diagnosis Date  Atherosclerosis of renal artery ([address] S/P Rt stent  CAD (coronary artery disease) , 1997, 2012  
 ,double bypass, 2 stents, 2stents 7/2016  Cancer [facility])   
 prostate  CHF (congestive heart failure) ([address]  Chronic diastolic heart failure ([address]  Chronic kidney disease  Chronic kidney disease (CKD), stage IV (severe) ([address] On Dialysis now -T-Th-sat  Constipation  Coronary atherosclerosis of unspecified type of vessel, native or graft Stable angina after recent PCI continue treatment.  CRI (chronic renal insufficiency)  Diabetes ([address] 1973  
 type 2  
 Essential hypertension, benign  GERD (gastroesophageal reflux disease)  Gout  Hypertension 1982  Morbid obesity ([address] Weight loss has been strongly encouraged by following dietary restrictions and an exercise routine  APRYL (obstructive sleep apnea) 6/26/2015  
 no cpap  Other and unspecified hyperlipidemia HDL's are not at goal, LDL's are at goal, triglycerides are not at goal.  
 Other and unspecified hyperlipidemia HDL's are not at goal, LDL's are at goal, triglycerides are not at goal.   
 Other ill-defined conditions(799.89) 1960  
 pneumonia twice  Prostate cancer ([facility]  Sleep disturbance  Type II or unspecified type diabetes mellitus without mention of complication, not stated as uncontrolled Family History Problem Relation Age of Onset  Heart Attack Father 64 Current Outpatient Medications Medication Sig Dispense Refill  triamcinolone acetonide (KENALOG) 40 mg/mL injection 1 mL by Intra artICUlar route once for 1 dose. 1 mL 0  
 insulin glargine (LANTUS) 100 unit/mL injection 35 Units by SubCUTAneous route every twelve (12) hours. 30 mL 3  
 montelukast (SINGULAIR) 10 mg tablet TAKE ONE TABLET BY MOUTH DAILY 30 Tab 0  
 amLODIPine (NORVASC) 2.5 mg tablet TAKE ONE TABLET BY MOUTH DAILY 30 Tab 0  
 metoprolol tartrate (LOPRESSOR) 100 mg IR tablet Take 1 Tab by mouth two (2) times a day. 180 Tab 0  
 tamsulosin (FLOMAX) 0.4 mg capsule TAKE ONE CAPSULE BY MOUTH ONE TIME DAILY 90 Cap 1  
 DULoxetine (CYMBALTA) 60 mg capsule TAKE ONE CAPSULE BY MOUTH DAILY 30 Cap 1  
 allopurinol (ZYLOPRIM) 100 mg tablet TAKE ONE TABLET BY MOUTH THREE TIMES PER WEEK ON MONDAY, WEDNESDAY AND FRIDAY 36 Tab 0  
 simvastatin (ZOCOR) 40 mg tablet TAKE 1 TABLET BY MOUTH NIGHTLY. 90 Tab 3  
 fenofibrate (LOFIBRA) 54 mg tablet TAKE ONE TABLET BY MOUTH DAILY 30 Tab 2  
 L.acid/L.casei/B.bif/B.ken/FOS (PROBIOTIC BLEND PO) Take 2 Tabs by mouth daily.  losartan (COZAAR) 50 mg tablet Take 25 mg by mouth daily. Take 1/2 tab daily  clopidogrel (PLAVIX) 75 mg tab Take 1 Tab by mouth daily. 30 Tab 0  
 isosorbide mononitrate ER (IMDUR) 30 mg tablet Take 1 Tab by mouth daily. 30 Tab 0  
 nitroglycerin (NITROSTAT) 0.4 mg SL tablet 1 Tab by SubLINGual route as needed for Chest Pain. 1 Bottle 0  
 cinacalcet (SENSIPAR) 30 mg tablet Take 60 mg by mouth daily.  ASPIR-LOW 81 mg tablet TAKE ONE TABLET BY MOUTH ONE TIME DAILY 38 Tab 4  
 DULCOLAX, BISACODYL, PO Take  by mouth.  pantoprazole (PROTONIX) 40 mg tablet Take 40 mg by mouth daily.  b complex-vitamin c-folic acid (NEPHROCAPS) 1 mg capsule Take 1 Cap by mouth daily. 30 Cap 0  cholecalciferol (DECARA) 50,000 unit capsule Take  by mouth every seven (7) days.  insulin aspart (NOVOLOG) 100 unit/mL injection 20 units sq 3 times a day,sliding scale 10 mL 0  
 calcium acetate (PHOSLO) 667 mg cap Allergies Allergen Reactions  Nka [No Known Allergies] Other (comments) Past Surgical History:  
Procedure Laterality Date [address]  
 lt. carotid endart. [address] UNLIST  2012, 2016, Nov 2017 Coronary Stent  COLONOSCOPY N/A 6/23/2017 COLONOSCOPY w/ APC w/ polypectomies performed by Heath Wu MD at [address]  FLEXIBLE SIGMOIDOSCOPY N/A 1/19/2018 FLEXIBLE SIGMOIDOSCOPY WITH Radio frequency ablation performed by Heath Wu MD at [facility] ENDOSCOPY 2021 Jose Pinon N/A 6/8/2018 SIGMOIDOSCOPY FLEXIBLE WITH APC performed by Felipe Diaz MD at [address] [facility]  HX CORONARY ARTERY BYPASS GRAFT  2000  
 x2  HX HEENT  1997  
 cholecystectomy  HX UROLOGICAL  1998  
 renal art. surg  HX VASCULAR ACCESS Perma cath for HD- right chest.  
 [place] INTRO CATH DIALYSIS CIRCUIT DX ANGRPH FLUOR S&I N/A 12/10/2018 FISTULOGRAM LEFT performed by Gabrielle Reyes MD at [facility] CATH LAB  [place] INTRO CATH DIALYSIS CIRCUIT W/TRLUML BALO ANGIOP N/A 12/10/2018 Angioplasty Fistula/Dialysis Circuit performed by Gabrielle Reyes MD at [address] [place] [address] Social History Socioeconomic History  Marital status:  Spouse name: Not on file  Number of children: Not on file  Years of education: Not on file  Highest education level: Not on file Occupational History  Not on file Social Needs  Financial resource strain: Not on file  Food insecurity:  
  Worry: Not on file Inability: Not on file  Transportation needs:  
  Medical: Not on file Non-medical: Not on file Tobacco Use  Smoking status: Never Smoker  Smokeless tobacco: Never Used Substance and Sexual Activity  Alcohol use: No  
 Drug use: No  
 Sexual activity: Not on file Lifestyle  Physical activity:  
  Days per week: Not on file Minutes per session: Not on file  Stress: Not on file Relationships  Social connections:  
  Talks on phone: Not on file Gets together: Not on file Attends L[employer] service: Not on file Active member of club or organization: Not on file Attends meetings of clubs or organizations: Not on file Relationship status: Not on file  Intimate partner violence:  
  Fear of current or ex partner: Not on file Emotionally abused: Not on file Physically abused: Not on file Forced sexual activity: Not on file Other Topics Concern  Not on file Social History Narrative  Not on file REVIEW OF SYSTEMS:   
 
CON: negative for recent weight loss/gain, fever, or chills EYE: negative for double or blurry vision ENT: negative for hoarseness RS:   negative for cough, URI, SOB 
CV:  negative for chest pain, palpitations GI:    negative for blood in stool, nausea/vomiting :  negative for blood in urine MS: As per HPI Other systems reviewed and noted below. PHYSICAL EXAMINATION: 
Visit Vitals /71 Pulse 70 Temp 97.9 °F (36.6 °C) Resp 24 Ht 5' 8\" (1.727 m) Wt 210 lb (95.3 kg) SpO2 98% BMI 31.93 kg/m² Body mass index is 31.93 kg/m². GENERAL: Alert and oriented x3, in no acute distress, well-developed, well-nourished. HEENT: Normocephalic, atraumatic. RESP: Non labored breathing with equal chest rise on inspiration. CV: Well perfused extremities. No cyanosis or clubbing noted. ABDOMEN: Soft, non-tender, non-distended. PHYSICAL EXAM:  Physical exam of the right shoulder with slight decreased range of motion, especially with internal rotation of the back. Mild crepitus. Mild pain with supraspinatus and infraspinatus rotator cuff testing. No pain with belly press testing. Mild pain with gentle glenohumeral joint range of motion and capsular stretch. Neurovascularly intact distally. IMAGING:  X-rays were reviewed of the right shoulder, which show glenohumeral arthritis. ASSESSMENT AND PLAN:   Laurita Scott is a 66year-old male with right shoulder arthritis. I recommended a cortisone shot today into the shoulder, which he was agreeable to. I will see him back as needed. [address] [facility] [address] [facility] PROCEDURE PROGRESS NOTE Chart reviewed for the following: 
 Rosie Brar MD, have reviewed the History, Physical and updated the Allergic reactions for Brii Humberto TIME OUT performed immediately prior to start of procedure: 
 Rosie Brar MD, have performed the following reviews on Biri Humberto prior to the start of the procedure: 
         
* Patient was identified by name and date of birth * Agreement on procedure being performed was verified * Risks and Benefits explained to the patient * Procedure site verified and marked as necessary * Patient was positioned for comfort * Consent was signed and verified Time: 1105 Date of procedure: 5/8/2019 Procedure performed by:  Jose Francisco Alonzo MD 
 
Provider assisted by: Brian Urias LPN Patient assisted by: self How tolerated by patient: tolerated the procedure well with no complications Post Procedural Pain Scale: 0 - No Hurt Comments: none Electronically signed by: Jose Francisco Alonzo MD

## 2019-05-15 DIAGNOSIS — F41.9 ANXIETY: ICD-10-CM

## 2019-05-16 ENCOUNTER — TELEPHONE (OUTPATIENT)
Dept: VASCULAR SURGERY | Age: 79
End: 2019-05-16

## 2019-05-16 NOTE — TELEPHONE ENCOUNTER
Patient's dialysis unit has notified our office that patient's left arm fistula has clotted and no bruit and no thrill present. Contacted patient and wife and patient confirmed the fistula has clotted. Reviewed with Dr. Claudia Black and patient will be scheduled for a thrombectomy tomorrow. Surgery scheduler will contact patient with date and time.

## 2019-05-17 ENCOUNTER — ANESTHESIA EVENT (OUTPATIENT)
Dept: CARDIOTHORACIC SURGERY | Age: 79
End: 2019-05-17
Payer: MEDICARE

## 2019-05-17 ENCOUNTER — APPOINTMENT (OUTPATIENT)
Dept: GENERAL RADIOLOGY | Age: 79
End: 2019-05-17
Attending: SURGERY
Payer: MEDICARE

## 2019-05-17 ENCOUNTER — HOSPITAL ENCOUNTER (OUTPATIENT)
Age: 79
Setting detail: OUTPATIENT SURGERY
Discharge: HOME | End: 2019-05-17
Attending: SURGERY | Admitting: SURGERY
Payer: MEDICARE

## 2019-05-17 ENCOUNTER — ANESTHESIA (OUTPATIENT)
Dept: CARDIOTHORACIC SURGERY | Age: 79
End: 2019-05-17
Payer: MEDICARE

## 2019-05-17 VITALS
HEART RATE: 70 BPM | DIASTOLIC BLOOD PRESSURE: 52 MMHG | RESPIRATION RATE: 14 BRPM | BODY MASS INDEX: 31.84 KG/M2 | TEMPERATURE: 97.4 F | WEIGHT: 210.09 LBS | OXYGEN SATURATION: 99 % | HEIGHT: 68 IN | SYSTOLIC BLOOD PRESSURE: 156 MMHG

## 2019-05-17 DIAGNOSIS — Z99.2 ESRD (END STAGE RENAL DISEASE) ON DIALYSIS (HCC): Primary | ICD-10-CM

## 2019-05-17 DIAGNOSIS — N18.6 ESRD (END STAGE RENAL DISEASE) ON DIALYSIS (HCC): Primary | ICD-10-CM

## 2019-05-17 DIAGNOSIS — E78.5 DYSLIPIDEMIA: ICD-10-CM

## 2019-05-17 LAB
ANION GAP SERPL CALC-SCNC: 8 MMOL/L (ref 3–18)
BUN BLD-MCNC: 81 MG/DL (ref 7–18)
BUN SERPL-MCNC: 62 MG/DL (ref 7–18)
BUN/CREAT SERPL: 8 (ref 12–20)
CALCIUM SERPL-MCNC: 8.2 MG/DL (ref 8.5–10.1)
CHLORIDE BLD-SCNC: 101 MMOL/L (ref 100–108)
CHLORIDE SERPL-SCNC: 98 MMOL/L (ref 100–108)
CO2 SERPL-SCNC: 30 MMOL/L (ref 21–32)
CREAT SERPL-MCNC: 7.73 MG/DL (ref 0.6–1.3)
GLUCOSE BLD STRIP.AUTO-MCNC: 136 MG/DL (ref 70–110)
GLUCOSE BLD STRIP.AUTO-MCNC: 170 MG/DL (ref 74–106)
GLUCOSE SERPL-MCNC: 165 MG/DL (ref 74–99)
HCT VFR BLD CALC: 35 % (ref 36–49)
HGB BLD-MCNC: 11.9 G/DL (ref 12–16)
POTASSIUM BLD-SCNC: 8.6 MMOL/L (ref 3.5–5.5)
POTASSIUM SERPL-SCNC: 4.9 MMOL/L (ref 3.5–5.5)
SODIUM BLD-SCNC: 132 MMOL/L (ref 136–145)
SODIUM SERPL-SCNC: 136 MMOL/L (ref 136–145)

## 2019-05-17 PROCEDURE — 76210000006 HC OR PH I REC 0.5 TO 1 HR: Performed by: SURGERY

## 2019-05-17 PROCEDURE — 74011250636 HC RX REV CODE- 250/636: Performed by: SURGERY

## 2019-05-17 PROCEDURE — 76010000129 HC CV SURG 1.5 TO 2 HR: Performed by: SURGERY

## 2019-05-17 PROCEDURE — C1769 GUIDE WIRE: HCPCS | Performed by: SURGERY

## 2019-05-17 PROCEDURE — 77030002996 HC SUT SLK J&J -A: Performed by: SURGERY

## 2019-05-17 PROCEDURE — 80048 BASIC METABOLIC PNL TOTAL CA: CPT

## 2019-05-17 PROCEDURE — C1757 CATH, THROMBECTOMY/EMBOLECT: HCPCS | Performed by: SURGERY

## 2019-05-17 PROCEDURE — 77030002986 HC SUT PROL J&J -A: Performed by: SURGERY

## 2019-05-17 PROCEDURE — 77030004565 HC CATH ANGI DX TMPO CARD -B: Performed by: SURGERY

## 2019-05-17 PROCEDURE — C1894 INTRO/SHEATH, NON-LASER: HCPCS | Performed by: SURGERY

## 2019-05-17 PROCEDURE — C1725 CATH, TRANSLUMIN NON-LASER: HCPCS | Performed by: SURGERY

## 2019-05-17 PROCEDURE — 82962 GLUCOSE BLOOD TEST: CPT

## 2019-05-17 PROCEDURE — 77030018836 HC SOL IRR NACL ICUM -A: Performed by: SURGERY

## 2019-05-17 PROCEDURE — 77030018719 HC DRSG PTCH ANTIMIC J&J -A: Performed by: SURGERY

## 2019-05-17 PROCEDURE — 74011250636 HC RX REV CODE- 250/636

## 2019-05-17 PROCEDURE — 77030013515 HC DEV INFL ANGI BSC -B: Performed by: SURGERY

## 2019-05-17 PROCEDURE — 74011000258 HC RX REV CODE- 258: Performed by: ANESTHESIOLOGY

## 2019-05-17 PROCEDURE — 76060000034 HC ANESTHESIA 1.5 TO 2 HR: Performed by: SURGERY

## 2019-05-17 PROCEDURE — 74011250637 HC RX REV CODE- 250/637

## 2019-05-17 PROCEDURE — 82947 ASSAY GLUCOSE BLOOD QUANT: CPT

## 2019-05-17 PROCEDURE — 76210000026 HC REC RM PH II 1 TO 1.5 HR: Performed by: SURGERY

## 2019-05-17 PROCEDURE — 77030003390 HC NDL ANGI MRTM -A: Performed by: SURGERY

## 2019-05-17 PROCEDURE — 77030039266 HC ADH SKN EXOFIN S2SG -A: Performed by: SURGERY

## 2019-05-17 PROCEDURE — 77030031139 HC SUT VCRL2 J&J -A: Performed by: SURGERY

## 2019-05-17 PROCEDURE — C1874 STENT, COATED/COV W/DEL SYS: HCPCS | Performed by: SURGERY

## 2019-05-17 PROCEDURE — 74011250636 HC RX REV CODE- 250/636: Performed by: ANESTHESIOLOGY

## 2019-05-17 PROCEDURE — 74011636637 HC RX REV CODE- 636/637

## 2019-05-17 PROCEDURE — 75710 ARTERY X-RAYS ARM/LEG: CPT

## 2019-05-17 PROCEDURE — 77030002924 HC SUT GORTX WLGO -B: Performed by: SURGERY

## 2019-05-17 DEVICE — IMPLANTABLE DEVICE: Type: IMPLANTABLE DEVICE | Site: ARM | Status: FUNCTIONAL

## 2019-05-17 RX ORDER — FENOFIBRATE 54 MG/1
TABLET ORAL
Qty: 90 TAB | Refills: 0 | Status: SHIPPED | OUTPATIENT
Start: 2019-05-17 | End: 2020-01-01

## 2019-05-17 RX ORDER — HEPARIN SODIUM 200 [USP'U]/100ML
INJECTION, SOLUTION INTRAVENOUS
Status: DISCONTINUED
Start: 2019-05-17 | End: 2019-05-17 | Stop reason: HOSPADM

## 2019-05-17 RX ORDER — PROPOFOL 10 MG/ML
INJECTION, EMULSION INTRAVENOUS
Status: DISCONTINUED | OUTPATIENT
Start: 2019-05-17 | End: 2019-05-17 | Stop reason: HOSPADM

## 2019-05-17 RX ORDER — METOPROLOL TARTRATE 50 MG/1
TABLET ORAL
Qty: 60 TAB | Refills: 0 | OUTPATIENT
Start: 2019-05-17

## 2019-05-17 RX ORDER — INSULIN LISPRO 100 [IU]/ML
INJECTION, SOLUTION INTRAVENOUS; SUBCUTANEOUS ONCE
Status: COMPLETED | OUTPATIENT
Start: 2019-05-17 | End: 2019-05-17

## 2019-05-17 RX ORDER — DEXTROSE 50 % IN WATER (D50W) INTRAVENOUS SYRINGE
25-50 AS NEEDED
Status: DISCONTINUED | OUTPATIENT
Start: 2019-05-17 | End: 2019-05-17 | Stop reason: HOSPADM

## 2019-05-17 RX ORDER — PROPOFOL 10 MG/ML
INJECTION, EMULSION INTRAVENOUS AS NEEDED
Status: DISCONTINUED | OUTPATIENT
Start: 2019-05-17 | End: 2019-05-17 | Stop reason: HOSPADM

## 2019-05-17 RX ORDER — LIDOCAINE HYDROCHLORIDE 10 MG/ML
INJECTION, SOLUTION EPIDURAL; INFILTRATION; INTRACAUDAL; PERINEURAL
Status: DISCONTINUED
Start: 2019-05-17 | End: 2019-05-17 | Stop reason: HOSPADM

## 2019-05-17 RX ORDER — HYDROMORPHONE HYDROCHLORIDE 2 MG/ML
INJECTION, SOLUTION INTRAMUSCULAR; INTRAVENOUS; SUBCUTANEOUS
Status: DISCONTINUED
Start: 2019-05-17 | End: 2019-05-17 | Stop reason: HOSPADM

## 2019-05-17 RX ORDER — LIDOCAINE HYDROCHLORIDE 20 MG/ML
INJECTION, SOLUTION EPIDURAL; INFILTRATION; INTRACAUDAL; PERINEURAL AS NEEDED
Status: DISCONTINUED | OUTPATIENT
Start: 2019-05-17 | End: 2019-05-17 | Stop reason: HOSPADM

## 2019-05-17 RX ORDER — MAGNESIUM SULFATE 100 %
4 CRYSTALS MISCELLANEOUS AS NEEDED
Status: DISCONTINUED | OUTPATIENT
Start: 2019-05-17 | End: 2019-05-17 | Stop reason: HOSPADM

## 2019-05-17 RX ORDER — CEFAZOLIN SODIUM 2 G/50ML
SOLUTION INTRAVENOUS
Status: DISCONTINUED
Start: 2019-05-17 | End: 2019-05-17 | Stop reason: HOSPADM

## 2019-05-17 RX ORDER — INSULIN LISPRO 100 [IU]/ML
INJECTION, SOLUTION INTRAVENOUS; SUBCUTANEOUS
Status: COMPLETED
Start: 2019-05-17 | End: 2019-05-17

## 2019-05-17 RX ORDER — IODIXANOL 320 MG/ML
INJECTION, SOLUTION INTRAVASCULAR
Status: DISCONTINUED
Start: 2019-05-17 | End: 2019-05-17 | Stop reason: HOSPADM

## 2019-05-17 RX ORDER — FAMOTIDINE 20 MG/1
20 TABLET, FILM COATED ORAL ONCE
Status: COMPLETED | OUTPATIENT
Start: 2019-05-17 | End: 2019-05-17

## 2019-05-17 RX ORDER — HYDROMORPHONE HYDROCHLORIDE 2 MG/ML
0.5 INJECTION, SOLUTION INTRAMUSCULAR; INTRAVENOUS; SUBCUTANEOUS
Status: DISCONTINUED | OUTPATIENT
Start: 2019-05-17 | End: 2019-05-17 | Stop reason: HOSPADM

## 2019-05-17 RX ORDER — OXYCODONE AND ACETAMINOPHEN 5; 325 MG/1; MG/1
1 TABLET ORAL
Qty: 20 TAB | Refills: 0 | OUTPATIENT
Start: 2019-05-17 | End: 2019-05-17 | Stop reason: SDUPTHER

## 2019-05-17 RX ORDER — OXYCODONE AND ACETAMINOPHEN 5; 325 MG/1; MG/1
1 TABLET ORAL
Qty: 20 TAB | Refills: 0 | Status: SHIPPED | OUTPATIENT
Start: 2019-05-17 | End: 2019-05-20

## 2019-05-17 RX ORDER — DULOXETIN HYDROCHLORIDE 60 MG/1
CAPSULE, DELAYED RELEASE ORAL
Qty: 30 CAP | Refills: 1 | Status: SHIPPED | OUTPATIENT
Start: 2019-05-17 | End: 2019-01-01 | Stop reason: SDUPTHER

## 2019-05-17 RX ORDER — HEPARIN SODIUM 1000 [USP'U]/ML
INJECTION, SOLUTION INTRAVENOUS; SUBCUTANEOUS AS NEEDED
Status: DISCONTINUED | OUTPATIENT
Start: 2019-05-17 | End: 2019-05-17 | Stop reason: HOSPADM

## 2019-05-17 RX ORDER — AMLODIPINE BESYLATE 2.5 MG/1
TABLET ORAL
Qty: 30 TAB | Refills: 1 | Status: SHIPPED | OUTPATIENT
Start: 2019-05-17 | End: 2019-01-01 | Stop reason: SDUPTHER

## 2019-05-17 RX ORDER — LABETALOL HCL 20 MG/4 ML
10 SYRINGE (ML) INTRAVENOUS
Status: COMPLETED | OUTPATIENT
Start: 2019-05-17 | End: 2019-05-17

## 2019-05-17 RX ORDER — FAMOTIDINE 20 MG/1
TABLET, FILM COATED ORAL
Status: COMPLETED
Start: 2019-05-17 | End: 2019-05-17

## 2019-05-17 RX ORDER — CEFAZOLIN SODIUM 2 G/50ML
2 SOLUTION INTRAVENOUS ONCE
Status: DISCONTINUED | OUTPATIENT
Start: 2019-05-17 | End: 2019-05-17 | Stop reason: HOSPADM

## 2019-05-17 RX ORDER — HEPARIN SODIUM 5000 [USP'U]/ML
INJECTION, SOLUTION INTRAVENOUS; SUBCUTANEOUS
Status: DISCONTINUED
Start: 2019-05-17 | End: 2019-05-17 | Stop reason: WASHOUT

## 2019-05-17 RX ORDER — CEFAZOLIN SODIUM IN 0.9 % NACL 2 G/100 ML
PLASTIC BAG, INJECTION (ML) INTRAVENOUS AS NEEDED
Status: DISCONTINUED | OUTPATIENT
Start: 2019-05-17 | End: 2019-05-17 | Stop reason: HOSPADM

## 2019-05-17 RX ORDER — DEXTROSE MONOHYDRATE AND SODIUM CHLORIDE 5; .225 G/100ML; G/100ML
25 INJECTION, SOLUTION INTRAVENOUS CONTINUOUS
Status: DISCONTINUED | OUTPATIENT
Start: 2019-05-17 | End: 2019-05-17 | Stop reason: HOSPADM

## 2019-05-17 RX ADMIN — HEPARIN SODIUM 6000 UNITS: 1000 INJECTION, SOLUTION INTRAVENOUS; SUBCUTANEOUS at 16:51

## 2019-05-17 RX ADMIN — LIDOCAINE HYDROCHLORIDE 80 MG: 20 INJECTION, SOLUTION EPIDURAL; INFILTRATION; INTRACAUDAL; PERINEURAL at 16:28

## 2019-05-17 RX ADMIN — LABETALOL 20 MG/4 ML (5 MG/ML) INTRAVENOUS SYRINGE 10 MG: at 19:00

## 2019-05-17 RX ADMIN — PROPOFOL 50 MG: 10 INJECTION, EMULSION INTRAVENOUS at 16:28

## 2019-05-17 RX ADMIN — INSULIN LISPRO 3 UNITS: 100 INJECTION, SOLUTION INTRAVENOUS; SUBCUTANEOUS at 15:44

## 2019-05-17 RX ADMIN — FAMOTIDINE 20 MG: 20 TABLET ORAL at 13:53

## 2019-05-17 RX ADMIN — PROPOFOL 60 MCG/KG/MIN: 10 INJECTION, EMULSION INTRAVENOUS at 16:37

## 2019-05-17 RX ADMIN — FAMOTIDINE 20 MG: 20 TABLET, FILM COATED ORAL at 13:53

## 2019-05-17 RX ADMIN — HYDROMORPHONE HYDROCHLORIDE 0.5 MG: 2 INJECTION, SOLUTION INTRAMUSCULAR; INTRAVENOUS; SUBCUTANEOUS at 18:46

## 2019-05-17 RX ADMIN — DEXTROSE MONOHYDRATE AND SODIUM CHLORIDE 25 ML/HR: 5; .225 INJECTION, SOLUTION INTRAVENOUS at 13:47

## 2019-05-17 RX ADMIN — Medication 2 G: at 16:28

## 2019-05-18 NOTE — DISCHARGE INSTRUCTIONS
DISCHARGE SUMMARY from Nurse    PATIENT INSTRUCTIONS:    After general anesthesia or intravenous sedation, for 24 hours or while taking prescription Narcotics:  · Limit your activities  · Do not drive and operate hazardous machinery  · Do not make important personal or business decisions  · Do  not drink alcoholic beverages  · If you have not urinated within 8 hours after discharge, please contact your surgeon on call. Report the following to your surgeon:  · Excessive pain, swelling, redness or odor of or around the surgical area  · Temperature over 100.5  · Nausea and vomiting lasting longer than 4 hours or if unable to take medications  · Any signs of decreased circulation or nerve impairment to extremity: change in color, persistent  numbness, tingling, coldness or increase pain  · Any questions    What to do at Home:  Recommended activity: Activity as tolerated and no driving for today. *  Please give a list of your current medications to your Primary Care Provider. *  Please update this list whenever your medications are discontinued, doses are      changed, or new medications (including over-the-counter products) are added. *  Please carry medication information at all times in case of emergency situations. These are general instructions for a healthy lifestyle:    No smoking/ No tobacco products/ Avoid exposure to second hand smoke  Surgeon General's Warning:  Quitting smoking now greatly reduces serious risk to your health.     Obesity, smoking, and sedentary lifestyle greatly increases your risk for illness    A healthy diet, regular physical exercise & weight monitoring are important for maintaining a healthy lifestyle    You may be retaining fluid if you have a history of heart failure or if you experience any of the following symptoms:  Weight gain of 3 pounds or more overnight or 5 pounds in a week, increased swelling in our hands or feet or shortness of breath while lying flat in bed.  Please call your doctor as soon as you notice any of these symptoms; do not wait until your next office visit. Recognize signs and symptoms of STROKE:    F-face looks uneven    A-arms unable to move or move unevenly    S-speech slurred or non-existent    T-time-call 911 as soon as signs and symptoms begin-DO NOT go       Back to bed or wait to see if you get better-TIME IS BRAIN. Warning Signs of HEART ATTACK     Call 911 if you have these symptoms:   Chest discomfort. Most heart attacks involve discomfort in the center of the chest that lasts more than a few minutes, or that goes away and comes back. It can feel like uncomfortable pressure, squeezing, fullness, or pain.  Discomfort in other areas of the upper body. Symptoms can include pain or discomfort in one or both arms, the back, neck, jaw, or stomach.  Shortness of breath with or without chest discomfort.  Other signs may include breaking out in a cold sweat, nausea, or lightheadedness. Don't wait more than five minutes to call 911 - MINUTES MATTER! Fast action can save your life. Calling 911 is almost always the fastest way to get lifesaving treatment. Emergency Medical Services staff can begin treatment when they arrive -- up to an hour sooner than if someone gets to the hospital by car. The discharge information has been reviewed with the patient and spouse. The patient and spouse verbalized understanding. Discharge medications reviewed with the patient and spouse and appropriate educational materials and side effects teaching were provided. Patient Education        Hemodialysis Access: What to Expect at [address] [place] [address]  Hemodialysis is a way to remove wastes from the blood when your kidneys can no longer do the job. It is not a cure, but it can help you live longer and feel better. It is a lifesaving treatment when you have kidney failure. Hemodialysis is often called dialysis.  Your doctor created a place (called an access) in your arm for your blood to flow in and out of your body during your dialysis sessions. Your arm will probably be bruised and swollen. It may hurt. The cut (incision) may bleed. The pain and bleeding will get better over several days. You will probably need only over-the-counter pain medicine. You can reduce swelling by propping your arm on 1 or 2 pillows and keeping your elbow straight. You will have stitches. These may dissolve on their own, or your doctor will tell you when to come in to have them removed. You should also be able to return to work in a few days. You may feel some coolness or numbness in your hand. These feelings usually go away in a few weeks. Your doctor may suggest squeezing a soft object. This will strengthen your access and may make hemodialysis faster and easier. You should always be able to feel blood rushing through the fistula or graft. It feels like a slight vibration when you put your fingers on the skin over the fistula or graft. This feeling is called a thrill or pulse. This care sheet gives you a general idea about how long it will take for you to recover. But each person recovers at a different pace. Follow the steps below to get better as quickly as possible. How can you care for yourself at home? Activity    · Rest when you feel tired. Getting enough sleep will help you recover. Do not lie on or sleep on the arm with the access.     · Avoid activities such as washing windows or gardening that put stress on the arm with the access.     · You may use your arm, but do not lift anything that weighs more than about 15 pounds. This may include a child, heavy grocery bags, a heavy briefcase or backpack, cat litter or dog food bags, or a vacuum .     · You can shower, but keep the access dry for the first 2 days.  Cover the area with a plastic bag to keep it dry.     · Do not soak or scrub the incision until it has healed.     · Wear an arm guard to protect the area if you play sports or work with your arms.     · You may drive when your doctor says it is okay. This is usually in 1 to 2 days.     · Most people are able to return to work about 1 or 2 days after surgery. Diet    · Follow an eating plan that is good for your kidneys. A registered dietitian can help you make a meal plan that is right for you. You may need to limit protein, salt, fluids, and certain foods. Medicines    · Your doctor will tell you if and when you can restart your medicines. He or she will also give you instructions about taking any new medicines.     · If you take blood thinners, such as warfarin (Coumadin), clopidogrel (Plavix), or aspirin, be sure to talk to your doctor. He or she will tell you if and when to start taking those medicines again. Make sure that you understand exactly what your doctor wants you to do.     · Take pain medicines exactly as directed. ? If the doctor gave you a prescription medicine for pain, take it as prescribed. ? If you are not taking a prescription pain medicine, ask your doctor if you can take acetaminophen (Tylenol). Do not take ibuprofen (Advil, Motrin) or naproxen (Aleve), or similar medicines, unless your doctor tells you to. They may make chronic kidney disease worse. ? Do not take two or more pain medicines at the same time unless the doctor told you to. Many pain medicines have acetaminophen, which is Tylenol. Too much acetaminophen (Tylenol) can be harmful.     · If you think your pain medicine is making you sick to your stomach:  ? Take your medicine after meals (unless your doctor has told you not to). ? Ask your doctor for a different pain medicine.     · If your doctor prescribed antibiotics, take them as directed. Do not stop taking them just because you feel better. You need to take the full course of antibiotics. Incision care    · Keep the area dry for 2 days.  After 2 days, wash the area with soap and water every day, and always before dialysis.     · Do not soak or scrub the incision until it has healed.     · If you have a bandage, change it every day or as your doctor recommends. Your doctor will tell you when you can remove it. Exercise    · Squeeze a soft ball or other object as your doctor tells you. This will help blood flow through the access and help prevent blood clots.    Elevation    · Prop up the sore arm on a pillow anytime you sit or lie down during the next 3 days. Try to keep it above the level of your heart. This will help reduce swelling. Other instructions    · Every day, check your access for a pulse or thrill in the fistula or graft area. A thrill is a vibration. To feel a pulse or thrill, place the first two fingers of your hand over the access.     · Do not bump your arm.     · Do not wear tight clothing, jewelry, or anything else that may squeeze the access.     · Use your other arm to have blood drawn or blood pressure taken.     · Do not put cream or lotion on or near the access.     · Make sure all doctors you deal with know you have a vascular access. Follow-up care is a key part of your treatment and safety. Be sure to make and go to all appointments, and call your doctor if you are having problems. It's also a good idea to know your test results and keep a list of the medicines you take. When should you call for help? Call 911 anytime you think you may need emergency care.  For example, call if:    · You passed out (lost consciousness).     · You have chest pain, are short of breath, or cough up blood.    Call your doctor now or seek immediate medical care if:    · Your hand or arm is cold or dark-colored.     · You have no pulse in your access.     · You have nausea or you vomit.     · You have pain that does not get better after you take pain medicine.     · You have loose stitches, or your incision comes open.     · You are bleeding from the incision.     · You have signs of infection, such as:  ? Increased pain, swelling, warmth, or redness. ? Red streaks leading from the area. ? Pus draining from the area. ? A fever.     · You have signs of a blood clot in your leg (called a deep vein thrombosis), such as:  ? Pain in your calf, back of the knee, thigh, or groin. ? Redness or swelling in your leg.    Watch closely for changes in your health, and be sure to contact your doctor if you have any problems. Where can you learn more? Go to http://kendal-farzaneh.info/. Enter P616 in the search box to learn more about \"Hemodialysis Access: What to Expect at Home. \"  Current as of: March 14, 2018  Content Version: 11.9  © 4214-8654 [employer]. Care instructions adapted under license by [employer] (which disclaims liability or warranty for this information). If you have questions about a medical condition or this instruction, always ask your healthcare professional. [place] [address] any warranty or liability for your use of this information.          ___________________________________________________________________________________________________________________________________

## 2019-05-18 NOTE — OP NOTES
[address] [place] [address]  
OPERATIVE REPORT Name:  Corby Condon 
MR#:   [id number] :  1940 ACCOUNT #:  [de-identified] DATE OF SERVICE:  2019 PREOPERATIVE DIAGNOSIS:  End-stage renal disease with clotted fistula. POSTOPERATIVE DIAGNOSIS:  End-stage renal disease with clotted fistula. PROCEDURE PERFORMED: 1. Left upper extremity open fistula declot. 2.  Balloon angioplasty of cephalic vein. 3.  Left upper extremity fistulogram. 
4.  Transluminal intravascular placement of stent within the left upper extremity cephalic vein, 8 x 121 Viabahn stent. SURGEON:  Alexandra Martínez MD 
 
ASSISTANT:  None. ANESTHESIA:  MAC with local anesthetic. COMPLICATIONS:  The patient did have a rupture of the cephalic vein at one of the tight areas of stenosis within the midportion. We did fix this with an 8 x 150 Viabahn stent without any difficulty. This did link up with the patient's previous stent as well. SPECIMENS REMOVED:  None. IMPLANTS:  Please see above. ESTIMATED BLOOD LOSS:  200 mL. CULTURES:  None. DRAINS:  None. INDICATIONS FOR THE PROCEDURE:  The patient is a 51-year-old gentleman with end-stage renal disease in need of dialysis access as his fistula was clotted. The patient was given the risks and benefits of the procedure, including but not limited to bleeding, infection, damage to adjacent structures, MI, stroke, and death as well as loss of upper extremity, loss of fistula, and need for further surgery. The patient was understanding of all the risks and underwent the procedure. PROCEDURE:  The patient was correctly identified in the preoperative holding area and taken to the operating room in stable condition. The patient had pre-incision time-out per Anesthesia. The patient was prepped and draped in normal sterile fashion according to the CDC guidelines for aseptic technique.   The patient also had preoperative antibiotics prior to any incision. We then were able to numb him up appropriately towards the arterial inflow of the cephalic vein. We dissected out the cephalic vein both proximally and distally, and looped it with a red vessel loop in a Valle fashion, created an arteriotomy using 11 blade and Valle scissors, and then using 5-Lao Jayant balloon, we attempted to open the fistula, but we were unable to get any of the clot out. It was actually very in there. Eventually, we were actually able to do it with pushing on the arm and squeezing the clot out and eventually able to get some backward flow on the venous side. On the arterial side, we did use 5-Jayant. We noticed that there was still some chronic-appearing thrombus. We used Kevorkian on both sides, able to get out some more chronic clot, had good arterial inflow. So, we closed our arteriotomy using 5-0 Prolene sutures. Four repair sutures were required. We then took our single-wall entry needle to gain access into the fistula and a 6-Lao sheath was placed. The patient was heparinized throughout the case. We then performed the fistulogram.  This showed near total occlusion of the cephalic vein within the mid-arm and then there was still some chronic thrombus near the arterial anastomosis. We ballooned out with an 8 x 40 compliant balloon and saw that there was continued 95% narrowing and even after 24 hours, we were unable to get the balloon open. So, we used a noncompliant Conquest balloon throughout the cephalic vein as well as the previous stented portion. We shot our fistulogram again and saw the rupture. We placed V-18 and 7-Lao sheath and then placed our 8 x 150 Viabahn in post-dilated with the 8 x 40 Conquest, had a great shuntogram at this point. There was still about a 50% narrowing within the midportion of the previous stent and a 90% narrowing at the distal anastomosis.   We ballooned both of these with our 8 x 40 Conquest and then we were basically able to get good blood flow going through this area. There was still about a 50% narrowing at the previous stent, but we did not want to go too much higher than the current balloon that we already had and we had a great thrill at this point, so it was decided to leave this at this point. We removed our sheath, closed with a 5-0 Prolene suture, reopened our arteriotomy and then pushed out the old chronic clot, closed this up with a 5-0 Prolene suture and then gained hemostasis. We then were able to copiously irrigate the wound, closed with 3-0 Vicryl deep dermal layer and 4-0 Vicryl subcuticular layer and Dermabond for dressing. The patient tolerated the procedure well without any issues and had a great thrill at the end of the case. Manolo Navarro MD [email or web address] 
D:  05/17/2019 17:59 
T:  05/17/2019 23:33 
JOB #:  [id number]

## 2019-05-21 RX ORDER — MONTELUKAST SODIUM 10 MG/1
TABLET ORAL
Qty: 30 TAB | Refills: 0 | Status: SHIPPED | OUTPATIENT
Start: 2019-05-21 | End: 2019-06-17 | Stop reason: SDUPTHER

## 2019-05-22 ENCOUNTER — TELEPHONE (OUTPATIENT)
Dept: FAMILY MEDICINE CLINIC | Age: 79
End: 2019-05-22

## 2019-05-22 RX ORDER — INSULIN GLARGINE 100 [IU]/ML
INJECTION, SOLUTION SUBCUTANEOUS
Qty: 30 ML | Refills: 2 | Status: SHIPPED | OUTPATIENT
Start: 2019-05-22 | End: 2020-01-01 | Stop reason: SDUPTHER

## 2019-05-22 NOTE — TELEPHONE ENCOUNTER
Patient's wife called stating the ins will no longer cover the Lantus Insulin for this patient, stated the pharmacy explained that they faxed a document over two days ago for Dr Dioni Robins to sign and authorize the medication but have not heard anything back thus far. Ms. Yue Graham stated her  is on his last dosage of Lantus and dont know what to do.  Please contact Ms Yue Graham when available to discuss this matter

## 2019-05-22 NOTE — TELEPHONE ENCOUNTER
Please let patient know that we will call and Jade Quarles. This is similar to Lantus.   Nusrat Michaels MD

## 2019-05-23 NOTE — TELEPHONE ENCOUNTER
Rema, from [place], called regarding medication Lantus. This is non formulary under patient's plan. Meainging it's not covered.  Please send alternate in to pharmacy:    Ehsan Gooden    If patient has to have Lantus, please call for [place] at  [phone]

## 2019-05-23 NOTE — TELEPHONE ENCOUNTER
Spoke with pharmacy at this time and the new Rx provider sent in [place] is covered at this time. Patient just have a 8 dollar co pay.

## 2019-05-29 DIAGNOSIS — Z79.4 TYPE 2 DIABETES MELLITUS WITHOUT COMPLICATION, WITH LONG-TERM CURRENT USE OF INSULIN (HCC): Primary | ICD-10-CM

## 2019-05-29 DIAGNOSIS — E11.9 TYPE 2 DIABETES MELLITUS WITHOUT COMPLICATION, WITH LONG-TERM CURRENT USE OF INSULIN (HCC): Primary | ICD-10-CM

## 2019-05-29 RX ORDER — PEN NEEDLE, DIABETIC 30 GX3/16"
NEEDLE, DISPOSABLE MISCELLANEOUS
Qty: 200 PACKAGE | Refills: 3 | Status: SHIPPED | OUTPATIENT
Start: 2019-05-29

## 2019-06-12 ENCOUNTER — OFFICE VISIT (OUTPATIENT)
Dept: VASCULAR SURGERY | Age: 79
End: 2019-06-12

## 2019-06-12 VITALS
SYSTOLIC BLOOD PRESSURE: 130 MMHG | DIASTOLIC BLOOD PRESSURE: 56 MMHG | HEIGHT: 68 IN | WEIGHT: 210 LBS | BODY MASS INDEX: 31.83 KG/M2 | RESPIRATION RATE: 16 BRPM

## 2019-06-12 DIAGNOSIS — N18.6 ESRD (END STAGE RENAL DISEASE) ON DIALYSIS (HCC): Primary | ICD-10-CM

## 2019-06-12 DIAGNOSIS — Z99.2 ESRD (END STAGE RENAL DISEASE) ON DIALYSIS (HCC): Primary | ICD-10-CM

## 2019-06-12 NOTE — PROGRESS NOTES
1. Have you been to an emergency room or urgent care clinic since your last visit? NO    Hospitalized since your last visit? If yes, where, when, and reason for visit? NO  2. Have you seen or consulted any other health care providers outside of the [facility] since your last visit including any procedures, health maintenance items. If yes, where, when and reason for visit?  NO

## 2019-06-12 NOTE — PROGRESS NOTES
70-year-old male with end-stage renal disease on hemodialysis  Recently his left upper extremity fistula clotted and he presents today in follow-up after open declot of the left upper extremity fistula with balloon angioplasty of the cephalic vein. He also had emergency stenting of the cephalic vein due to rupture at 1 of the tight areas of stenosis. The stent did link up with a previous stent as well. Today he has no complaints. He denies pain. No fevers or chills. He states that the fistula is working without issue at this time. Fistula does have palpable thrill and excellent bruit  Discussed that he can follow-up in our office as needed at this time. He is well aware of the signs and symptoms of when to call the office sooner if needed.   Plan was discussed and patient expresses understanding and agrees

## 2019-06-18 RX ORDER — MONTELUKAST SODIUM 10 MG/1
TABLET ORAL
Qty: 30 TAB | Refills: 0 | Status: SHIPPED | OUTPATIENT
Start: 2019-06-18 | End: 2019-01-01 | Stop reason: SDUPTHER

## 2019-06-19 ENCOUNTER — OFFICE VISIT (OUTPATIENT)
Dept: ORTHOPEDIC SURGERY | Age: 79
End: 2019-06-19

## 2019-06-19 VITALS
HEART RATE: 106 BPM | DIASTOLIC BLOOD PRESSURE: 58 MMHG | RESPIRATION RATE: 24 BRPM | BODY MASS INDEX: 30.92 KG/M2 | HEIGHT: 68 IN | WEIGHT: 204 LBS | SYSTOLIC BLOOD PRESSURE: 131 MMHG | OXYGEN SATURATION: 97 %

## 2019-06-19 DIAGNOSIS — M19.011 PRIMARY OSTEOARTHRITIS OF RIGHT SHOULDER: Primary | ICD-10-CM

## 2019-06-19 NOTE — PROGRESS NOTES
Patient: Cecile Sosa                MRN: [id number]       SSN: [id number] YOB: 1940        AGE: 66 y.o. SEX: male Body mass index is 31.02 kg/m². PCP: Edwar Gonzalez MD 
06/19/19 Chief Complaint: Right shoulder pain/follow up HISTORY OF PRESENT ILLNESS:  Laura Warner returns to the office today for his right shoulder. He continues to have some right shoulder pain. He got an injection at his last visit. He said he did not really get much in the way of relief from it. He still has some pain at night and pain with moving his arm. He does have good motion and the strength is not bad, but he continues to have pain in his shoulder. He states that he is not really a candidate for surgery due to some underlying cardiac issues. Past Medical History:  
Diagnosis Date  Atherosclerosis of renal artery ([address] S/P Rt stent  CAD (coronary artery disease) , 1997, 2012  
 ,double bypass, 2 stents, 2stents 7/2016  Cancer [facility])   
 prostate  CHF (congestive heart failure) ([address]  Chronic diastolic heart failure ([address]  Chronic kidney disease  Chronic kidney disease (CKD), stage IV (severe) ([address] On Dialysis now -T-Th-sat  Constipation  Coronary atherosclerosis of unspecified type of vessel, native or graft Stable angina after recent PCI continue treatment.  CRI (chronic renal insufficiency)  Diabetes ([address] 1973  
 type 2  
 Essential hypertension, benign  GERD (gastroesophageal reflux disease)  Gout  Hypertension 1982  Morbid obesity ([address] Weight loss has been strongly encouraged by following dietary restrictions and an exercise routine  APRYL (obstructive sleep apnea) 6/26/2015  
 no cpap  Other and unspecified hyperlipidemia HDL's are not at goal, LDL's are at goal, triglycerides are not at goal.  
 Other and unspecified hyperlipidemia HDL's are not at goal, LDL's are at goal, triglycerides are not at goal.   
 Other ill-defined conditions(799.89) 1960  
 pneumonia twice  Prostate cancer ([facility]  Sleep disturbance  Type II or unspecified type diabetes mellitus without mention of complication, not stated as uncontrolled Family History Problem Relation Age of Onset  Heart Attack Father 64 Current Outpatient Medications Medication Sig Dispense Refill  montelukast (SINGULAIR) 10 mg tablet TAKE ONE TABLET BY MOUTH DAILY 30 Tab 0  
 Insulin Needles, Disposable, 31 gauge x 5/16\" ndle Use to inject insulin 2 x day 200 Package 3  
 insulin glargine (LANTUS,BASAGLAR) 100 unit/mL (3 mL) inpn Give Basaglar 15 units twice a day. 30 mL 2  
 amLODIPine (NORVASC) 2.5 mg tablet TAKE ONE TABLET BY MOUTH DAILY 30 Tab 1  DULoxetine (CYMBALTA) 60 mg capsule TAKE ONE CAPSULE BY MOUTH DAILY 30 Cap 1  
 fenofibrate (LOFIBRA) 54 mg tablet TAKE ONE TABLET BY MOUTH DAILY 90 Tab 0  
 calcium acetate (PHOSLO) 667 mg cap three (3) times daily (with meals).  metoprolol tartrate (LOPRESSOR) 100 mg IR tablet Take 1 Tab by mouth two (2) times a day. 180 Tab 0  
 tamsulosin (FLOMAX) 0.4 mg capsule TAKE ONE CAPSULE BY MOUTH ONE TIME DAILY 90 Cap 1  
 allopurinol (ZYLOPRIM) 100 mg tablet TAKE ONE TABLET BY MOUTH THREE TIMES PER WEEK ON MONDAY, WEDNESDAY AND FRIDAY 36 Tab 0  
 simvastatin (ZOCOR) 40 mg tablet TAKE 1 TABLET BY MOUTH NIGHTLY. 90 Tab 3  
 L.acid/L.casei/B.bif/B.ken/FOS (PROBIOTIC BLEND PO) Take 2 Tabs by mouth daily.  losartan (COZAAR) 50 mg tablet Take 50 mg by mouth daily. Take 1/2 tab daily  clopidogrel (PLAVIX) 75 mg tab Take 1 Tab by mouth daily. 30 Tab 0  
 isosorbide mononitrate ER (IMDUR) 30 mg tablet Take 1 Tab by mouth daily. 30 Tab 0  
 nitroglycerin (NITROSTAT) 0.4 mg SL tablet 1 Tab by SubLINGual route as needed for Chest Pain.  1 Bottle 0  
  cinacalcet (SENSIPAR) 30 mg tablet Take 60 mg by mouth daily.  ASPIR-LOW 81 mg tablet TAKE ONE TABLET BY MOUTH ONE TIME DAILY 38 Tab 4  
 DULCOLAX, BISACODYL, PO Take  by mouth.  pantoprazole (PROTONIX) 40 mg tablet Take 40 mg by mouth daily.  b complex-vitamin c-folic acid (NEPHROCAPS) 1 mg capsule Take 1 Cap by mouth daily. 30 Cap 0  cholecalciferol (DECARA) 50,000 unit capsule Take  by mouth every seven (7) days.  insulin aspart (NOVOLOG) 100 unit/mL injection 20 units sq 3 times a day,sliding scale 10 mL 0 Allergies Allergen Reactions  Nka [No Known Allergies] Other (comments) Past Surgical History:  
Procedure Laterality Date [address]  
 lt. carotid endart. [address] UNLIST  2012, 2016, Nov 2017 Coronary Stent  COLONOSCOPY N/A 6/23/2017 COLONOSCOPY w/ APC w/ polypectomies performed by Ekaterina Monae MD at [address] [place] [address] Place  FLEXIBLE SIGMOIDOSCOPY N/A 1/19/2018 FLEXIBLE SIGMOIDOSCOPY WITH Radio frequency ablation performed by Ekaterina Monae MD at [facility] ENDOSCOPY [address] N/A 6/8/2018 SIGMOIDOSCOPY FLEXIBLE WITH APC performed by Teresa Caruso MD at [facility]  HX CORONARY ARTERY BYPASS GRAFT  2000  
 x2  HX HEENT  1997  
 cholecystectomy  HX UROLOGICAL  1998  
 renal art. surg  HX VASCULAR ACCESS Perma cath for HD- right chest.  
 [place] INTRO CATH DIALYSIS CIRCUIT DX ANGRPH FLUOR S&I N/A 12/10/2018 FISTULOGRAM LEFT performed by Soco Hutchinson MD at [facility] CATH LAB  [place] INTRO CATH DIALYSIS CIRCUIT W/TRLUML BALO ANGIOP N/A 12/10/2018 Angioplasty Fistula/Dialysis Circuit performed by Soco Hutchinson MD at [address] [place] [address] Social History Socioeconomic History  Marital status:  Spouse name: Not on file  Number of children: Not on file  Years of education: Not on file  Highest education level: Not on file Occupational History  Not on file Social Needs  Financial resource strain: Not on file  Food insecurity:  
  Worry: Not on file Inability: Not on file  Transportation needs:  
  Medical: Not on file Non-medical: Not on file Tobacco Use  Smoking status: Never Smoker  Smokeless tobacco: Never Used Substance and Sexual Activity  Alcohol use: No  
 Drug use: No  
 Sexual activity: Not on file Lifestyle  Physical activity:  
  Days per week: Not on file Minutes per session: Not on file  Stress: Not on file Relationships  Social connections:  
  Talks on phone: Not on file Gets together: Not on file Attends Jain service: Not on file Active member of club or organization: Not on file Attends meetings of clubs or organizations: Not on file Relationship status: Not on file  Intimate partner violence:  
  Fear of current or ex partner: Not on file Emotionally abused: Not on file Physically abused: Not on file Forced sexual activity: Not on file Other Topics Concern  Not on file Social History Narrative  Not on file REVIEW OF SYSTEMS:   
 
No changes from previous review of systems unless noted. PHYSICAL EXAMINATION: 
Visit Vitals /58 Pulse (!) 106 Resp 24 Ht 5' 8\" (1.727 m) Wt 204 lb (92.5 kg) SpO2 97% BMI 31.02 kg/m² Body mass index is 31.02 kg/m². GENERAL: Alert and oriented x3, in no acute distress. HEENT: Normocephalic, atraumatic. RESP: Non labored breathing. SKIN: No rashes or lesions noted. PHYSICAL EXAM:  Physical exam of the right shoulder with almost full range of motion. He does have some weakness with supraspinatus testing. There is mild crepitus. There is no obvious deformity or signs of effusion, warmth or erythema or trauma. He is otherwise neurovascularly intact. He has good strength with belly press and infraspinatus testing. ASSESSMENT AND PLAN:   Misty Peralta is a 66year-old male with right shoulder likely rotator cuff arthropathy. He does have some glenohumeral arthritis on his x-ray with a slight elevation of his humeral head and some weakness with supraspinatus testing. Unfortunately, the shot did not get him better. I would like for him to try a home exercise program as an alternative to surgery, as he does not think he is a good candidate for surgery. I will support him in any way that I can. We will try the home therapy program for now. I will see him back in six weeks.   
 
 
 
 
 
Electronically signed by: Owen Webber MD

## 2019-07-10 NOTE — PROGRESS NOTES
Chief Complaint Patient presents with Nini [place]avel Annual Wellness Visit 1. Have you been to the ER, urgent care clinic since your last visit? Hospitalized since your last visit? No 
 
2. Have you seen or consulted any other health care providers outside of the [address] [place] [address] since your last visit? Include any pap smears or colon screening. No 
This is the Subsequent Medicare Annual Wellness Exam, performed 12 months or more after the Initial AWV or the last Subsequent AWV I have reviewed the patient's medical history in detail and updated the computerized patient record. History Joey Montes comes in for Medicare wellness exam. 
 
Past Medical History:  
Diagnosis Date  Atherosclerosis of renal artery ([address] S/P Rt stent  CAD (coronary artery disease) , 1997, 2012  
 ,double bypass, 2 stents, 2stents 7/2016  Cancer [facility])   
 prostate  CHF (congestive heart failure) ([address]  Chronic diastolic heart failure ([address]  Chronic kidney disease  Chronic kidney disease (CKD), stage IV (severe) ([address] On Dialysis now -T-Th-sat  Constipation  Coronary atherosclerosis of unspecified type of vessel, native or graft Stable angina after recent PCI continue treatment.  CRI (chronic renal insufficiency)  Diabetes ([address] 1973  
 type 2  
 Essential hypertension, benign  GERD (gastroesophageal reflux disease)  Gout  Hypertension 1982  Morbid obesity ([address] Weight loss has been strongly encouraged by following dietary restrictions and an exercise routine  APRYL (obstructive sleep apnea) 6/26/2015  
 no cpap  Other and unspecified hyperlipidemia HDL's are not at goal, LDL's are at goal, triglycerides are not at goal.  
 Other and unspecified hyperlipidemia HDL's are not at goal, LDL's are at goal, triglycerides are not at goal.   
 Other ill-defined conditions(799.89) 1960  
 pneumonia twice  Prostate cancer ([address]  Sleep disturbance  Type II or unspecified type diabetes mellitus without mention of complication, not stated as uncontrolled Past Surgical History:  
Procedure Laterality Date [address]  
 lt. carotid endart. [address] UNLIST  2012, 2016, Nov 2017 Coronary Stent  COLONOSCOPY N/A 6/23/2017 COLONOSCOPY w/ APC w/ polypectomies performed by Bernice Lopez MD at [address]  FLEXIBLE SIGMOIDOSCOPY N/A 1/19/2018 FLEXIBLE SIGMOIDOSCOPY WITH Radio frequency ablation performed by Bernice Lopez MD at [facility] ENDOSCOPY [address] N/A 6/8/2018 SIGMOIDOSCOPY FLEXIBLE WITH APC performed by Qing Beckman MD at [address] [facility]  HX CORONARY ARTERY BYPASS GRAFT  2000  
 x2  HX HEENT  1997  
 cholecystectomy  HX UROLOGICAL  1998  
 renal art. surg  HX VASCULAR ACCESS Perma cath for HD- right chest.  
 [place] INTRO CATH DIALYSIS CIRCUIT DX ANGRPH FLUOR S&I N/A 12/10/2018 FISTULOGRAM LEFT performed by Haroon Herrera MD at [facility] CATH LAB  [place] INTRO CATH DIALYSIS CIRCUIT W/TRLUML BALO ANGIOP N/A 12/10/2018 Angioplasty Fistula/Dialysis Circuit performed by Haroon Herrera MD at [address] [place] [address] Current Outpatient Medications Medication Sig Dispense Refill  montelukast (SINGULAIR) 10 mg tablet TAKE ONE TABLET BY MOUTH DAILY 30 Tab 0  
 Insulin Needles, Disposable, 31 gauge x 5/16\" ndle Use to inject insulin 2 x day 200 Package 3  
 insulin glargine (LANTUS,BASAGLAR) 100 unit/mL (3 mL) inpn Give Basaglar 15 units twice a day. 30 mL 2  
 amLODIPine (NORVASC) 2.5 mg tablet TAKE ONE TABLET BY MOUTH DAILY 30 Tab 1  DULoxetine (CYMBALTA) 60 mg capsule TAKE ONE CAPSULE BY MOUTH DAILY 30 Cap 1  
 fenofibrate (LOFIBRA) 54 mg tablet TAKE ONE TABLET BY MOUTH DAILY 90 Tab 0  
 calcium acetate (PHOSLO) 667 mg cap three (3) times daily (with meals).  metoprolol tartrate (LOPRESSOR) 100 mg IR tablet Take 1 Tab by mouth two (2) times a day. 180 Tab 0  
 tamsulosin (FLOMAX) 0.4 mg capsule TAKE ONE CAPSULE BY MOUTH ONE TIME DAILY 90 Cap 1  
 allopurinol (ZYLOPRIM) 100 mg tablet TAKE ONE TABLET BY MOUTH THREE TIMES PER WEEK ON MONDAY, WEDNESDAY AND FRIDAY 36 Tab 0  
 simvastatin (ZOCOR) 40 mg tablet TAKE 1 TABLET BY MOUTH NIGHTLY. 90 Tab 3  
 L.acid/L.casei/B.bif/B.ken/FOS (PROBIOTIC BLEND PO) Take 2 Tabs by mouth daily.  losartan (COZAAR) 50 mg tablet Take 50 mg by mouth daily. Take 1/2 tab daily  clopidogrel (PLAVIX) 75 mg tab Take 1 Tab by mouth daily. 30 Tab 0  
 isosorbide mononitrate ER (IMDUR) 30 mg tablet Take 1 Tab by mouth daily. 30 Tab 0  
 nitroglycerin (NITROSTAT) 0.4 mg SL tablet 1 Tab by SubLINGual route as needed for Chest Pain. 1 Bottle 0  
 cinacalcet (SENSIPAR) 30 mg tablet Take 60 mg by mouth daily.  ASPIR-LOW 81 mg tablet TAKE ONE TABLET BY MOUTH ONE TIME DAILY 38 Tab 4  
 DULCOLAX, BISACODYL, PO Take  by mouth.  pantoprazole (PROTONIX) 40 mg tablet Take 40 mg by mouth daily.  b complex-vitamin c-folic acid (NEPHROCAPS) 1 mg capsule Take 1 Cap by mouth daily. 30 Cap 0  cholecalciferol (DECARA) 50,000 unit capsule Take  by mouth every seven (7) days.  insulin aspart (NOVOLOG) 100 unit/mL injection 20 units sq 3 times a day,sliding scale 10 mL 0 Allergies Allergen Reactions  Nka [No Known Allergies] Other (comments) Family History Problem Relation Age of Onset  Heart Attack Father 64 Social History Tobacco Use  Smoking status: Never Smoker  Smokeless tobacco: Never Used Substance Use Topics  Alcohol use: No  
 
Patient Active Problem List  
Diagnosis Code  NSTEMI (non-ST elevated myocardial infarction) ([facility] I21.4  Coronary atherosclerosis of native coronary artery I25.10  CKD (chronic kidney disease) stage 4, GFR 15-29 ml/min ([facility]) N18.4  HTN (hypertension) I10  
 DM2 (diabetes mellitus, type 2) ([facility]) E11.9  Dyslipidemia E78.5  Contrast dye induced nephropathy N14.1, T50.8X5A  Chest pain, unspecified R07.9  S/P coronary artery stent placement Z95.5  Postsurgical aortocoronary bypass status Z95.1  Atherosclerosis of renal artery ([facility]) I70.1  Chronic diastolic heart failure ([facility]) I50.32  
 Morbid obesity ([facility]) E66.01  
 Essential hypertension, benign I10  Sleep disturbance G47.9  Coronary atherosclerosis I25.10  Mixed hyperlipidemia E78.2  Type II or unspecified type diabetes mellitus without mention of complication, not stated as uncontrolled E11.9  Abdominal pain R10.9  GERD (gastroesophageal reflux disease) K21.9  Constipation K59.00  Chronic renal insufficiency N18.9  Prostate cancer ([facility] C61  
 APRYL (obstructive sleep apnea) M62.85  
 Diastolic CHF, acute on chronic ([facility]) I50.33  
 CHF (congestive heart failure), NYHA class IV ([facility]) I50.9  Fatigue R53.83  Secondary hyperparathyroidism of renal origin ([facility] N25.81  Chest pain R07.9  Hyperuricemia E79.0  Chronic kidney disease, stage V ([facility] N18.5  ESRD (end stage renal disease) on dialysis ([facility]) N18.6, Z99.2  Hypotension I95.9  Fluid overload E87.70  Hyperkalemia E87.5  Type 2 diabetes mellitus with nephropathy ([facility]) E11.21  
 Elevated troponin R74.8  Gastrointestinal hemorrhage associated with anorectal source K62.5  Lower GI bleed K92.2  
 GI bleed K92.2  Radiation proctitis K62.7  Chest pain with high risk of acute coronary syndrome R07.9  Anemia D64.9  ACP (advance care planning) Z71.89  
 CAD (coronary artery disease) I25.10  Unstable angina ([facility]) I20.0  Nonrheumatic aortic valve stenosis I35.0  Mood disorder ([facility] F39 Depression Risk Factor Screening:  
 
3 most recent PHQ Screens 7/10/2019 Little interest or pleasure in doing things Not at all Feeling down, depressed, irritable, or hopeless Not at all Total Score PHQ 2 0 Trouble falling or staying asleep, or sleeping too much - Feeling tired or having little energy - Poor appetite, weight loss, or overeating - Feeling bad about yourself - or that you are a failure or have let yourself or your family down - Trouble concentrating on things such as school, work, reading, or watching TV - Moving or speaking so slowly that other people could have noticed; or the opposite being so fidgety that others notice - Thoughts of being better off dead, or hurting yourself in some way -  
PHQ 9 Score - How difficult have these problems made it for you to do your work, take care of your home and get along with others - Alcohol Risk Factor Screening: You do not drink alcohol or very rarely. Functional Ability and Level of Safety:  
1. Was the patient's timed Up and GO test unsteady or longer than 30 seconds? Yes 2. Does the patient need help with the phone, transportation, shopping, preparing meals, housework, laundry, medications or managing money? Yes 3. Does the patients' home have rugs in the hallway, lack grab bars in the bathroom, lack handrails on the stairs or have poor lighting? No 
4. Have you noticed any hearing difficulties? Yes Hearing Evaluation: 
 
Hearing Loss The patient wears hearing aids. Activities of Daily Living The home contains: no safety equipment. Patient does total self care Fall Risk Fall Risk Assessment, last 12 mths 7/10/2019 Able to walk? Yes Fall in past 12 months? No  
Fall with injury? -  
Number of falls in past 12 months - Fall Risk Score -  
 
 
Abuse Screen Patient is not abused Cognitive Screening Evaluation of Cognitive Function: 
Has your family/caregiver stated any concerns about your memory: no 
Normal 
 
Patient Care Team  
Patient Care Team: 
Gerardo Nick MD as PCP - General (Family Practice) Helene Toussaint MD as Physician (Cardiology) Carla Conti MD (Nephrology) Ion Lock MD (Urology) Shelbie Snow MD (Gastroenterology) Maye Alejandre MD (Endocrinology) Brett Sol OD (Ophthalmology) Porter Moe, RN as Ambulatory Care Navigator Assessment/Plan Education and counseling provided: 
Diabetes screening test 
 
1. Encounter for Medicare annual wellness exam 
 
2. ACP (advance care planning) Health Maintenance Due Topic Date Due  
 DTaP/Tdap/Td series (1 - Tdap) 07/10/1961  Shingrix Vaccine Age 50> (1 of 2) 07/10/1990  Pneumococcal 65+ years (1 of 2 - PCV13) 07/10/2005  
 FOOT EXAM Q1  07/09/2019  
 HEMOGLOBIN A1C Q6M  07/09/2019  MEDICARE YEARLY EXAM  07/10/2019 I have discussed the diagnosis with the patient and the intended plan of care as seen in the above orders. The patient has received an after-visit summary and questions were answered concerning future plans. I have discussed medication, side effects, and warnings with the patient in detail. The patient verbalized understanding and is in agreement with the plan of care. The patient will contact the office with any additional concerns. Personalized preventative plan of care was discussed, printed and given to patient.  
 
Alessia Antonio MD

## 2019-07-10 NOTE — PATIENT INSTRUCTIONS
Medicare Part B Preventive Services Limitations Recommendation Scheduled Bone Mass Measurement 
(age 72 & older, biennial) Requires diagnosis related to osteoporosis or estrogen deficiency. Biennial benefit unless patient has history of long-term glucocorticoid tx or baseline is needed because initial test was by other method Due    
Cardiovascular Screening Blood Tests (every 5 years) Total cholesterol, HDL, Triglycerides and ECG Order blood work  as a panel if possible and  for adults with routine risk  an electrocardiogram (ECG) at intervals determined by the provider. [facility] Colorectal Cancer Screening 
-Fecal occult blood test (annual) -Flexible sigmoidoscopy (5y) 
-Screening colonoscopy (10y) -Barium Enema Colorectal cancer screening should be done for adults age 54-65 with no increased risk factors for colorectal cancer. There are a number of acceptable methods of screening for this type of cancer. Each test has its own benefits and drawbacks. Discuss with your provider what is most appropriate for you during your annual wellness visit. The different tests include: colonoscopy (considered the best screening method), a fecal occult blood test, a fecal DNA test, and sigmoidoscopy N/a Counseling to Prevent Tobacco Use (up to 8 sessions per year) - Counseling greater than 3 and up to 10 minutes - Counseling greater than 10 minutes Patients must be asymptomatic of tobacco-related conditions to receive as preventive service N/a Diabetes Screening Tests (at least every 3 years, Medicare covers annually or at 6-month intervals for prediabetic patients) Fasting blood sugar (FBS) or glucose tolerance test (GTT) All adults age 38-68 who are overweight should have a diabetes screening test once every three years.  
-Other screening tests & preventive services for persons with diabetes include: an eye exam to screen for diabetic retinopathy, a kidney function test, a foot exam, and stricter control over your cholesterol. Due    
Diabetes Self-Management Training (DSMT) (no USPSTF recommendation) Requires referral by treating physician for patient with diabetes or renal disease. 10 hours of initial DSMT session of no less than 30 minutes each in a continuous 12-month period. 2 hours of follow-up DSMT in subsequent years. N/a Glaucoma Screening (no USPSTF recommendation) Diabetes mellitus, family history, , age 48 or over,  American, age 72 or over UTD Human Immunodeficiency Virus (HIV) Screening (annually for increased risk patients) HIV-1 and HIV-2 by EIA, GABINO, rapid antibody test, or oral mucosa transudate Patient must be at increased risk for HIV infection per USPSTF guidelines or pregnant. Tests covered annually for patients at increased risk. Pregnant patients may receive up to 3 test during pregnancy. N/A Medical Nutrition Therapy (MNT) (for diabetes or renal disease not recommended schedule) Requires referral by treating physician for patient with diabetes or renal disease. Can be provided in same year as diabetes self-management training (DSMT), and CMS recommends medical nutrition therapy take place after DSMT. Up to 3 hours for initial year and 2 hours in subsequent years. N/A Prostate Cancer Screening (annually up to age 76) - Digital rectal exam (DARINEL) - Prostate specific antigen (PSA) Annually (age 48 or over), DARINEL not paid separately when covered E/M service is provided on same date Men up to age 76 may need a screening blood test for prostate cancer at certain intervals, depending on their personal and family history. This decision is between the patient and his provider. DUE Seasonal Influenza Vaccination (annually) All adults should have a flu vaccine yearly  UTD Pneumococcal Vaccination (once after 72) All adults  over age 72 should receive the recommended pneumonia vaccines. Current USPSTF guidelines recommend a series of two vaccines for the best pneumonia protection. DUE Hepatitis B Vaccinations (if medium/high risk) Medium/high risk factors:  End-stage renal disease, Hemophiliacs who received Factor VIII or IX concentrates, Clients of institutions for the mentally retarded, Persons who live in the same house as a HepB virus carrier, Homosexual men, Illicit injectable drug abusers. N/A Shingles Vaccination A shingles vaccine is also recommended once in a lifetime after age 61 [place] [address] Ultrasound Screening for Abdominal Aortic Aneurysm (AAA) (once) An Abdominal Aortic Aneurysm (AAA) Screening is recommended for men age 73-68 who has ever smoked in their lifetime. of the following criteria: 
- Men who are 73-68 years old and have smoked more than 100 cigarettes in their lifetime. 
-Anyone with a FH of AAA 
-Anyone recommended for screening by USPSTF N/a Hep C All adults born between [birth year] and 1965 should be screened once for Hepatitis C N/a Tetanus  All adults should have a tetanus vaccine every 10 years Due

## 2019-07-12 NOTE — PROGRESS NOTES
Please let patient know his HbA1c 6.5. We will continue with the current treatment plan for diabetes.   His cholesterol levels are within normal. 
Kim Shirley MD

## 2019-07-13 NOTE — PROGRESS NOTES
CY Santos comes in for follow-up care. End-stage renal disease: Patient has end-stage renal disease and is on hemodialysis. Gets this 3 times a week. This has been going well. He does get occasional malaise and fatigue after the dialysis but overall stable. He does get his lab work checked by the nephrologist.  We will continue with the current management plan. He is on cinacalcet and Nephrocaps. Diabetes mellitus type 2: Patient is on insulin. We recently changed his insulin type from glargine to [address]. He is stable on this medication. He does need a refill of NovoLog. He prefers to get the vial.  Prescription will be sent in. Blood glucose numbers have been stable. His last HbA1c was 6.5. We will continue with the current treatment plan. He is doing lifestyle and dietary modification. I did a foot exam that is stable. Hypertension: His blood pressure is stable. He is on amlodipine and metoprolol. We will continue with the current treatment plan. CAD: Patient has a history of coronary artery disease and has had stents placed with coronary bypass surgery x2. He is on medical management at the moment. This is stable. He is on Imdur, metoprolol, Zocor, losartan, Plavix and nitroglycerin as needed. He is also on aspirin. GERD: Stable. Patient is on Protonix. Denies hematemesis or dark stools. Occasional heartburn. Continue current treatment plan. Mood disorder: Patient has a history of anxiety. This is stable. He takes Cymbalta. Dyslipidemia: Patient is on [place] and Zocor. We will recheck lipid panel. Continue current treatment plan. Rash: Patient has fungal dermatitis rash on his lower extremities and back. Would like some medication for this. Will give prescription for Mycolog to apply. Past Medical History Past Medical History:  
Diagnosis Date  Atherosclerosis of renal artery ([facility] S/P Rt stent  CAD (coronary artery disease) , 1997, 2012 ,double bypass, 2 stents, 2stents 7/2016  Cancer [facility])   
 prostate  CHF (congestive heart failure) ([address]  Chronic diastolic heart failure ([address]  Chronic kidney disease  Chronic kidney disease (CKD), stage IV (severe) ([address] On Dialysis now -T-Th-sat  Constipation  Coronary atherosclerosis of unspecified type of vessel, native or graft Stable angina after recent PCI continue treatment.  CRI (chronic renal insufficiency)  Diabetes ([address] 1973  
 type 2  
 Essential hypertension, benign  GERD (gastroesophageal reflux disease)  Gout  Hypertension 1982  Morbid obesity ([address] Weight loss has been strongly encouraged by following dietary restrictions and an exercise routine  APRYL (obstructive sleep apnea) 6/26/2015  
 no cpap  Other and unspecified hyperlipidemia HDL's are not at goal, LDL's are at goal, triglycerides are not at goal.  
 Other and unspecified hyperlipidemia HDL's are not at goal, LDL's are at goal, triglycerides are not at goal.   
 Other ill-defined conditions(799.89) 1960  
 pneumonia twice  Prostate cancer ([address]  Sleep disturbance  Type II or unspecified type diabetes mellitus without mention of complication, not stated as uncontrolled Surgical History Past Surgical History:  
Procedure Laterality Date [address]  
 lt. carotid endart. [address]LIST  2012, 2016, Nov 2017 Coronary Stent  COLONOSCOPY N/A 6/23/2017 COLONOSCOPY w/ APC w/ polypectomies performed by Dali Connelly MD at [address]  FLEXIBLE SIGMOIDOSCOPY N/A 1/19/2018 FLEXIBLE SIGMOIDOSCOPY WITH Radio frequency ablation performed by Dali Connelly MD at [facility] ENDOSCOPY 2021 Jose Pinon N/A 6/8/2018 SIGMOIDOSCOPY FLEXIBLE WITH APC performed by Soraya Garcia MD at [address] [facility]  HX CORONARY ARTERY BYPASS GRAFT  2000  
 x2 [address] cholecystectomy  HX UROLOGICAL  1998  
 renal art. surg  HX VASCULAR ACCESS Perma cath for HD- right chest.  
 IL INTRO CATH DIALYSIS CIRCUIT DX ANGRPH FLUOR S&I N/A 12/10/2018 FISTULOGRAM LEFT performed by Neeru Montez MD at [facility] CATH LAB  IL INTRO CATH DIALYSIS CIRCUIT W/TRLUML BALO ANGIOP N/A 12/10/2018 Angioplasty Fistula/Dialysis Circuit performed by Neeru Montez MD at [address] [place] [address] Medications Current Outpatient Medications Medication Sig Dispense Refill  insulin aspart U-100 (NOVOLOG U-100 INSULIN ASPART) 100 unit/mL injection 20 units sq 3 times a day,sliding scale 30 mL 0  
 nystatin-triamcinolone (MYCOLOG II) topical cream Apply  to affected area two (2) times a day. Apply thin layer 60 g 0  
 montelukast (SINGULAIR) 10 mg tablet TAKE ONE TABLET BY MOUTH DAILY 30 Tab 0  
 Insulin Needles, Disposable, 31 gauge x 5/16\" ndle Use to inject insulin 2 x day 200 Package 3  
 insulin glargine (LANTUS,BASAGLAR) 100 unit/mL (3 mL) inpn Give Basaglar 15 units twice a day. 30 mL 2  
 amLODIPine (NORVASC) 2.5 mg tablet TAKE ONE TABLET BY MOUTH DAILY 30 Tab 1  DULoxetine (CYMBALTA) 60 mg capsule TAKE ONE CAPSULE BY MOUTH DAILY 30 Cap 1  
 fenofibrate (LOFIBRA) 54 mg tablet TAKE ONE TABLET BY MOUTH DAILY 90 Tab 0  
 calcium acetate (PHOSLO) 667 mg cap three (3) times daily (with meals).  metoprolol tartrate (LOPRESSOR) 100 mg IR tablet Take 1 Tab by mouth two (2) times a day. 180 Tab 0  
 tamsulosin (FLOMAX) 0.4 mg capsule TAKE ONE CAPSULE BY MOUTH ONE TIME DAILY 90 Cap 1  
 allopurinol (ZYLOPRIM) 100 mg tablet TAKE ONE TABLET BY MOUTH THREE TIMES PER WEEK ON MONDAY, WEDNESDAY AND FRIDAY 36 Tab 0  
 simvastatin (ZOCOR) 40 mg tablet TAKE 1 TABLET BY MOUTH NIGHTLY. 90 Tab 3  
 L.acid/L.casei/B.bif/B.ken/FOS (PROBIOTIC BLEND PO) Take 2 Tabs by mouth daily.  losartan (COZAAR) 50 mg tablet Take 50 mg by mouth daily. Take 1/2 tab daily  clopidogrel (PLAVIX) 75 mg tab Take 1 Tab by mouth daily. 30 Tab 0  
 isosorbide mononitrate ER (IMDUR) 30 mg tablet Take 1 Tab by mouth daily. 30 Tab 0  
 nitroglycerin (NITROSTAT) 0.4 mg SL tablet 1 Tab by SubLINGual route as needed for Chest Pain. 1 Bottle 0  
 cinacalcet (SENSIPAR) 30 mg tablet Take 60 mg by mouth daily.  ASPIR-LOW 81 mg tablet TAKE ONE TABLET BY MOUTH ONE TIME DAILY 38 Tab 4  
 DULCOLAX, BISACODYL, PO Take  by mouth.  pantoprazole (PROTONIX) 40 mg tablet Take 40 mg by mouth daily.  b complex-vitamin c-folic acid (NEPHROCAPS) 1 mg capsule Take 1 Cap by mouth daily. 30 Cap 0  cholecalciferol (DECARA) 50,000 unit capsule Take  by mouth every seven (7) days. Allergies Allergies Allergen Reactions  Nka [No Known Allergies] Other (comments) Family History Family History Problem Relation Age of Onset  Heart Attack Father 64 Social History Social History Socioeconomic History  Marital status:  Spouse name: Not on file  Number of children: Not on file  Years of education: Not on file  Highest education level: Not on file Occupational History  Not on file Social Needs  Financial resource strain: Not on file  Food insecurity:  
  Worry: Not on file Inability: Not on file  Transportation needs:  
  Medical: Not on file Non-medical: Not on file Tobacco Use  Smoking status: Never Smoker  Smokeless tobacco: Never Used Substance and Sexual Activity  Alcohol use: No  
 Drug use: No  
 Sexual activity: Not on file Lifestyle  Physical activity:  
  Days per week: Not on file Minutes per session: Not on file  Stress: Not on file Relationships  Social connections:  
  Talks on phone: Not on file Gets together: Not on file Attends Mu-ism service: Not on file Active member of club or organization: Not on file Attends meetings of clubs or organizations: Not on file Relationship status: Not on file  Intimate partner violence:  
  Fear of current or ex partner: Not on file Emotionally abused: Not on file Physically abused: Not on file Forced sexual activity: Not on file Other Topics Concern  Not on file Social History Narrative  Not on file Review of Systems Review of Systems - Review of all systems is negative except as noted above in the HPI. Vital Signs Visit Vitals /70 (BP 1 Location: Left arm, BP Patient Position: Sitting) Pulse 67 Temp 97.6 °F (36.4 °C) (Oral) Resp 16 Ht 5' 8\" (1.727 m) Wt 206 lb (93.4 kg) SpO2 99% BMI 31.32 kg/m² Physical Exam 
Physical Examination: General appearance - oriented to person, place, and time, overweight, acyanotic, in no respiratory distress and well hydrated Mental status - alert, oriented to person, place, and time, affect appropriate to mood Mouth - mucous membranes moist, pharynx normal without lesions Neck - supple, no significant adenopathy Lymphatics - no palpable lymphadenopathy, no hepatosplenomegaly Chest - clear to auscultation, no wheezes, rales or rhonchi, symmetric air entry Heart - S1 and S2 normal 
Abdomen - no rebound tenderness noted Back exam - limited range of motion Neurological - neck supple without rigidity Musculoskeletal - osteoarthritic changes noted in both hands Extremities - intact peripheral pulses Diabetic foot exam:  
 
Left Foot: 
 Visual Exam: normal  
 Pulse DP: 2+ (normal) Filament test: normal sensation Right Foot: 
 Visual Exam: normal  
 Pulse DP: 2+ (normal) Filament test: normal sensation Results Results for orders placed or performed during the hospital encounter of 05/17/19 METABOLIC PANEL, BASIC Result Value Ref Range Sodium 136 136 - 145 mmol/L Potassium 4.9 3.5 - 5.5 mmol/L  Chloride 98 (L) 100 - 108 mmol/L  
 CO2 30 21 - 32 mmol/L Anion gap 8 3.0 - 18 mmol/L Glucose 165 (H) 74 - 99 mg/dL BUN 62 (H) 7.0 - 18 MG/DL Creatinine 7.73 (H) 0.6 - 1.3 MG/DL  
 BUN/Creatinine ratio 8 (L) 12 - 20 GFR est AA 8 (L) >60 ml/min/1.73m2 GFR est non-AA 7 (L) >60 ml/min/1.73m2 Calcium 8.2 (L) 8.5 - 10.1 MG/DL  
POC 6 PLUS Result Value Ref Range Sodium,  (L) 136 - 145 MMOL/L Potassium, POC 8.6 (HH) 3.5 - 5.5 MMOL/L Chloride,  100 - 108 MMOL/L  
 BUN, POC 81 (H) 7 - 18 MG/DL Glucose,  (H) 74 - 106 MG/DL Hematocrit, POC 35 (L) 36 - 49 % Hemoglobin, POC 11.9 (L) 12 - 16 G/DL  
GLUCOSE, POC Result Value Ref Range Glucose (POC) 136 (H) 70 - 110 mg/dL ASSESSMENT and PLAN 
  ICD-10-CM ICD-9-CM 1. Type 2 diabetes mellitus without complication, with long-term current use of insulin ([facility]) E11.9 250.00  DIABETES FOOT EXAM  
 Z79.4 V58.67 LIPID PANEL  
   METABOLIC PANEL, COMPREHENSIVE  
   CBC WITH AUTOMATED DIFF  
   COLLECTION VENOUS BLOOD,VENIPUNCTURE HEMOGLOBIN A1C WITH EAG  
   CANCELED: AMB POC HEMOGLOBIN A1C  
2. ESRD (end stage renal disease) on dialysis ([facility] N18.6 585.6 COLLECTION VENOUS BLOOD,VENIPUNCTURE  
 Z99.2 V45.11   
3. Essential hypertension I10 401.9 4. Comprehensive diabetic foot examination, type 2 DM, encounter for ([facility] E11.9 250.00  DIABETES FOOT EXAM  
5. Dermatitis L30.9 692.9 nystatin-triamcinolone (MYCOLOG II) topical cream  
6. Anxiety F41.9 300.00   
7. Atherosclerosis of coronary artery of native heart with angina pectoris, unspecified vessel or lesion type ([facility] I25.119 414.01   
  413.9 8. Encounter for Medicare annual wellness exam Z00.00 V70.0 9. ACP (advance care planning) Z71.89 V65.49   
 
lab results and schedule of future lab studies reviewed with patient 
reviewed diet, exercise and weight control 
cardiovascular risk and specific lipid/LDL goals reviewed 
reviewed medications and side effects in detail specific diabetic recommendations: low cholesterol diet, weight control and daily exercise discussed, home glucose monitoring emphasized, all medications, side effects and compliance discussed carefully, foot care discussed and Podiatry visits discussed, annual eye examinations at Ophthalmology discussed, glycohemoglobin and other lab monitoring discussed and long term diabetic complications discussed 
use of aspirin to prevent MI and TIA's discussed I have discussed the diagnosis with the patient and the intended plan of care as seen in the above orders. The patient has received an after-visit summary and questions were answered concerning future plans. I have discussed medication, side effects, and warnings with the patient in detail. The patient verbalized understanding and is in agreement with the plan of care. The patient will contact the office with any additional concerns. Damion Peres MD 
 
PLEASE NOTE:  
This document has been produced using voice recognition software. Unrecognized errors in transcription may be present

## 2019-07-15 NOTE — ACP (ADVANCE CARE PLANNING)
Advance Care Planning (ACP) Provider [facility] Date of ACP Conversation: 07/10/19 Persons included in Conversation:  patient and family Length of ACP Conversation in minutes:  <16 minutes (Non-Billable) Authorized Decision Maker (if patient is incapable of making informed decisions): This person is:  
He has advanced directive on file. For Patients with Decision Making Capacity:  
Values/Goals: Exploration of values, goals, and preferences if recovery is not expected, even with continued medical treatment in the event of:  Imminent death Severe, permanent brain injury \"In these circumstances, what matters most to you? \"  Care focused more on comfort or quality of life. Conversation Outcomes / Follow-Up Plan:  
Reviewed existing Advance Directive Mars Galeas MD

## 2019-07-17 NOTE — PROGRESS NOTES
Venipuncture to left forearm. Patient tolerated well. No concerns noted at this time. Labs ordered by PCP.

## 2019-10-09 PROBLEM — E11.40 TYPE 2 DIABETES MELLITUS WITH DIABETIC NEUROPATHY (HCC): Status: ACTIVE | Noted: 2019-01-01

## 2019-10-09 NOTE — PROGRESS NOTES
Chief Complaint   Patient presents with    Diabetes    End Stage Renal Disease     1. Have you been to the ER, urgent care clinic since your last visit? Hospitalized since your last visit? No    2. Have you seen or consulted any other health care providers outside of the [address] [place] [address] since your last visit? Include any pap smears or colon screening. No     HPI  Abraham Villarreal comes in for follow-up care. He is accompanied by his wife. Diabetes mellitus type 2: Last HbA1c was 6.5. His blood glucose numbers have been stable. He is on insulin. We will continue with the current treatment plan. Previously was followed up by the endocrinologist but they did move to [facility] as he is getting the follow-up from this clinic. I did emphasize lifestyle and dietary modification. End-stage renal disease: Patient is on hemodialysis. He does get this done Monday Wednesday and Friday. He is due to have dialysis done later on today. He is on Nephrocaps. Neuropathy: Patient has a history of diabetic neuropathy. Has been getting numbness and tingling bilateral lower extremities. This seems to be getting worse. He has been taking Cymbalta without much relief. He would like to try different medication. I will send in a prescription for Lyrica. Mood disorder: Patient is on Cymbalta. He is on dialysis. Mood is stable. Hypertension: Patient is on losartan and Lopressor and amlodipine. Blood pressure is stable. We will continue with the current treatment plan. CAD: Patient stable. Denies chest pain or diaphoresis. He is on Imdur, Lopressor, losartan, Norvasc, fenofibrate, Zocor and aspirin. We will continue the current treatment plan. Scalp irritation: Patient has scalp dermatitis. Has been having itching of his scalp. In the past has been given a steroid shampoo that did help with the itching. Would like to try this. I will send in prescription for clobetasol shampoo.   CHF: Patient has a history of CHF. Currently not in failure. He is on medication management and also does dialysis 3 times a week. Past Medical History  Past Medical History:   Diagnosis Date    Atherosclerosis of renal artery (HCC)     S/P Rt stent    CAD (coronary artery disease) , 1997, 2012    ,double bypass, 2 stents, 2stents 7/2016    Cancer (HCC)     prostate    CHF (congestive heart failure) (HCC)     Chronic diastolic heart failure (HCC)     Chronic kidney disease     Chronic kidney disease (CKD), stage IV (severe) (HCC)     On Dialysis now -T-Th-sat    Constipation     Coronary atherosclerosis of unspecified type of vessel, native or graft     Stable angina after recent PCI continue treatment.  CRI (chronic renal insufficiency)     Diabetes ([address] 1973    type 2    Essential hypertension, benign     GERD (gastroesophageal reflux disease)     Gout     Hypertension 1982    Morbid obesity ([address]     Weight loss has been strongly encouraged by following dietary restrictions and an exercise routine    APRYL (obstructive sleep apnea) 6/26/2015    no cpap    Other and unspecified hyperlipidemia     HDL's are not at goal, LDL's are at goal, triglycerides are not at goal.    Other and unspecified hyperlipidemia     HDL's are not at goal, LDL's are at goal, triglycerides are not at goal.     Other ill-defined conditions(799.89) 1960    pneumonia twice    Prostate cancer ([facility]     Sleep disturbance     Type II or unspecified type diabetes mellitus without mention of complication, not stated as uncontrolled        Surgical History  Past Surgical History:   Procedure Laterality Date    CARDIAC SURG PROCEDURE UNLIST  1995    lt. carotid endart.    Patrick Velez CARDIAC SURG PROCEDURE UNLIST  2012, 2016, Nov 2017    Coronary Stent    COLONOSCOPY N/A 6/23/2017    COLONOSCOPY w/ APC w/ polypectomies performed by Mikala Cardoza MD at [address] N/A 1/19/2018    [address] WITH Radio frequency ablation performed by Donna Ponce MD at [address] N/A 6/8/2018    SIGMOIDOSCOPY FLEXIBLE WITH APC performed by Sonam Garrett MD at [address] HX CHOLECYSTECTOMY      HX CORONARY ARTERY BYPASS GRAFT  2000    x2    HX HEENT  1997    cholecystectomy    HX UROLOGICAL  1998    renal art. surg    HX VASCULAR ACCESS      Perma cath for HD- right chest.    ND INTRO CATH DIALYSIS CIRCUIT DX ANGRPH FLUOR S&I N/A 12/10/2018    FISTULOGRAM LEFT performed by Pranav Hernandez MD at [facility] CATH LAB    ND INTRO CATH DIALYSIS CIRCUIT W/TRLUML BALO ANGIOP N/A 12/10/2018    Angioplasty Fistula/Dialysis Circuit performed by Pranav Hernandez MD at [address] [place] [address]        Medications  Current Outpatient Medications   Medication Sig Dispense Refill    pregabalin (LYRICA) 25 mg capsule Take 1 Cap by mouth two (2) times a day. Max Daily Amount: 50 mg. 60 Cap 2    clobetasol 0.05 % sham Use daily for scalp irritation. 118 mL 1    tamsulosin (FLOMAX) 0.4 mg capsule TAKE ONE CAPSULE BY MOUTH ONE TIME DAILY 90 Cap 3    allopurinol (ZYLOPRIM) 100 mg tablet TAKE ONE TABLET BY MOUTH THREE TIMES PER WEEK ON MONDAY, WEDNESDAY AND FRIDAY 36 Tab 0    DULoxetine (CYMBALTA) 60 mg capsule TAKE ONE CAPSULE BY MOUTH DAILY 30 Cap 3    amLODIPine (NORVASC) 2.5 mg tablet TAKE ONE TABLET BY MOUTH DAILY 30 Tab 3    montelukast (SINGULAIR) 10 mg tablet TAKE ONE TABLET BY MOUTH DAILY 30 Tab 3    insulin aspart U-100 (NOVOLOG U-100 INSULIN ASPART) 100 unit/mL injection 20 units sq 3 times a day,sliding scale 30 mL 0    Insulin Needles, Disposable, 31 gauge x 5/16\" ndle Use to inject insulin 2 x day 200 Package 3    insulin glargine (LANTUS,BASAGLAR) 100 unit/mL (3 mL) inpn Give Basaglar 15 units twice a day. 30 mL 2    fenofibrate (LOFIBRA) 54 mg tablet TAKE ONE TABLET BY MOUTH DAILY 90 Tab 0    calcium acetate (PHOSLO) 667 mg cap three (3) times daily (with meals).       metoprolol tartrate (LOPRESSOR) 100 mg IR tablet Take 1 Tab by mouth two (2) times a day. 180 Tab 0    simvastatin (ZOCOR) 40 mg tablet TAKE 1 TABLET BY MOUTH NIGHTLY. 90 Tab 3    L.acid/L.casei/B.bif/B.ken/FOS (PROBIOTIC BLEND PO) Take 2 Tabs by mouth daily.  losartan (COZAAR) 50 mg tablet Take 50 mg by mouth daily. Take 1/2 tab daily      clopidogrel (PLAVIX) 75 mg tab Take 1 Tab by mouth daily. 30 Tab 0    isosorbide mononitrate ER (IMDUR) 30 mg tablet Take 1 Tab by mouth daily. 30 Tab 0    nitroglycerin (NITROSTAT) 0.4 mg SL tablet 1 Tab by SubLINGual route as needed for Chest Pain. 1 Bottle 0    cinacalcet (SENSIPAR) 30 mg tablet Take 60 mg by mouth daily.  ASPIR-LOW 81 mg tablet TAKE ONE TABLET BY MOUTH ONE TIME DAILY 38 Tab 4    DULCOLAX, BISACODYL, PO Take  by mouth.  pantoprazole (PROTONIX) 40 mg tablet Take 40 mg by mouth daily.  b complex-vitamin c-folic acid (NEPHROCAPS) 1 mg capsule Take 1 Cap by mouth daily. 30 Cap 0    cholecalciferol (DECARA) 50,000 unit capsule Take  by mouth every seven (7) days.          Allergies  Allergies   Allergen Reactions    Nka [No Known Allergies] Other (comments)       Family History  Family History   Problem Relation Age of Onset    Heart Attack Father 64       Social History  Social History     Socioeconomic History    Marital status:      Spouse name: Not on file    Number of children: Not on file    Years of education: Not on file    Highest education level: Not on file   Occupational History    Not on file   Social Needs    Financial resource strain: Not on file    Food insecurity:     Worry: Not on file     Inability: Not on file    Transportation needs:     Medical: Not on file     Non-medical: Not on file   Tobacco Use    Smoking status: Never Smoker    Smokeless tobacco: Never Used   Substance and Sexual Activity    Alcohol use: No    Drug use: No    Sexual activity: Not on file   Lifestyle    Physical activity:     Days per week: Not on file     Minutes per session: Not on file    Stress: Not on file   Relationships    Social connections:     Talks on phone: Not on file     Gets together: Not on file     Attends Sikh service: Not on file     Active member of club or organization: Not on file     Attends meetings of clubs or organizations: Not on file     Relationship status: Not on file    Intimate partner violence:     Fear of current or ex partner: Not on file     Emotionally abused: Not on file     Physically abused: Not on file     Forced sexual activity: Not on file   Other Topics Concern    Not on file   Social History Narrative    Not on file       Review of Systems  Review of Systems - Review of all systems is negative except as noted above in the HPI.     Vital Signs  Visit Vitals  /64 (BP 1 Location: Right arm, BP Patient Position: Sitting)   Pulse (!) 57   Temp 97.6 °F (36.4 °C) (Oral)   Resp 18   Ht 5' 8\" (1.727 m)   Wt 204 lb (92.5 kg)   SpO2 99%   BMI 31.02 kg/m²         Physical Exam  Physical Examination: General appearance - alert, well appearing, and in no distress, overweight and acyanotic, in no respiratory distress  Mental status - alert, oriented to person, place, and time, affect appropriate to mood  Neck - supple, no significant adenopathy  Lymphatics - no palpable lymphadenopathy  Chest - decreased air entry noted bilateral lung bases  Heart - S1 and S2 normal, systolic murmur 2/6 at lower left sternal border  Abdomen - no rebound tenderness noted  Back exam - limited range of motion  Neurological - motor and sensory grossly normal bilaterally  Musculoskeletal - osteoarthritic changes noted in both hands  Extremities - no pedal edema noted    Results  Results for orders placed or performed in visit on 07/22/19   PROSTATE SPECIFIC ANTIGEN, TOTAL (PSA)   Result Value Ref Range    Prostate Specific Ag 0.25 0.00 - 4.00 ng/mL   AMB POC URINALYSIS DIP STICK AUTO W/O MICRO   Result Value Ref Range Color (UA POC) Yellow     Clarity (UA POC) Clear     Glucose (UA POC) Negative Negative    Bilirubin (UA POC) 1+ Negative    Ketones (UA POC) Trace Negative    Specific gravity (UA POC) 1.020 1.001 - 1.035    Blood (UA POC) Negative Negative    pH (UA POC) 5 4.6 - 8.0    Protein (UA POC) 1+ Negative    Urobilinogen (UA POC) 0.2 mg/dL 0.2 - 1    Nitrites (UA POC) Negative Negative    Leukocyte esterase (UA POC) Negative Negative       ASSESSMENT and PLAN    ICD-10-CM ICD-9-CM    1. Type 2 diabetes mellitus without complication, with long-term current use of insulin ([facility]) E11.9 250.00     Z79.4 V58.67    2. ESRD (end stage renal disease) on dialysis ([facility]) N18.6 585.6     Z99.2 V45.11    3. Essential hypertension I10 401.9    4. Neuropathy G62.9 355.9 pregabalin (LYRICA) 25 mg capsule   5. Seborrheic dermatitis of scalp L21.9 690.18 clobetasol 0.05 % sham   6. Type 2 diabetes mellitus with diabetic neuropathy, with long-term current use of insulin ([facility]) E11.40 250.60     Z79.4 357.2      V58.67      lab results and schedule of future lab studies reviewed with patient  reviewed diet, exercise and weight control  cardiovascular risk and specific lipid/LDL goals reviewed  reviewed medications and side effects in detail  specific diabetic recommendations: low cholesterol diet, weight control and daily exercise discussed, home glucose monitoring emphasized, all medications, side effects and compliance discussed carefully, annual eye examinations at Ophthalmology discussed, glycohemoglobin and other lab monitoring discussed and long term diabetic complications discussed      I have discussed the diagnosis with the patient and the intended plan of care as seen in the above orders. The patient has received an after-visit summary and questions were answered concerning future plans. I have discussed medication, side effects, and warnings with the patient in detail.  The patient verbalized understanding and is in agreement with the plan of care. The patient will contact the office with any additional concerns. Des Sanderson MD    PLEASE NOTE:   This document has been produced using voice recognition software.  Unrecognized errors in transcription may be present

## 2019-10-11 NOTE — PROGRESS NOTES
HISTORY OF PRESENT ILLNESS  Memo Aguilar is a 78 y.o. male. Patient with cad,chf,post cabg and pci  . recent admission with chf,acs,non q mi  Had vg to [facility] pci-11/2017 12/2017-admitted with angina  1/2018-non stemi  12/2018-patient presents with increasing chest pain substernal burning. Uses frequent nitroglycerin. This change in frequency has happened for 2 weeks. 1/2019-admission with unstable angina. Cardiac catheterization showing new occluded vein graft to diagonal branch. Medically managed due to multiple prior PCI    CHF   The history is provided by the patient. This is a chronic problem. The problem occurs constantly. The problem has not changed since onset. Associated symptoms include chest pain and shortness of breath. The symptoms are aggravated by exertion. Hypertension   The history is provided by the patient. This is a chronic problem. The problem occurs constantly. The problem has not changed since onset. Associated symptoms include chest pain and shortness of breath. Nothing aggravates the symptoms. Shortness of Breath   The history is provided by the patient. This is a recurrent problem. The problem occurs intermittently. The current episode started more than 1 week ago. Associated symptoms include chest pain. Pertinent negatives include no fever, no cough, no sputum production, no hemoptysis, no wheezing, no PND, no orthopnea, no vomiting, no rash, no leg swelling and no claudication. The problem's precipitants include exercise (>30 mts of treadmill). Chest Pain (Angina)    This is a recurrent problem. The problem has been rapidly improving. The problem occurs rarely. The pain is associated with exertion. The pain is present in the substernal region. The quality of the pain is described as pressure-like. The pain does not radiate. Associated symptoms include shortness of breath.  Pertinent negatives include no claudication, no cough, no dizziness, no fever, no hemoptysis, no nausea, no orthopnea, no palpitations, no PND, no sputum production, no vomiting and no weakness. Review of Systems   Constitutional: Negative for chills and fever. HENT: Negative for nosebleeds. Eyes: Negative for blurred vision and double vision. Respiratory: Positive for shortness of breath. Negative for cough, hemoptysis, sputum production and wheezing. Cardiovascular: Positive for chest pain. Negative for palpitations, orthopnea, claudication, leg swelling and PND. Gastrointestinal: Negative for heartburn, nausea and vomiting. Musculoskeletal: Negative for myalgias. Skin: Negative for rash. Neurological: Negative for dizziness and weakness. Endo/Heme/Allergies: Does not bruise/bleed easily. Family History   Problem Relation Age of Onset    Heart Attack Father 64       Past Medical History:   Diagnosis Date    Atherosclerosis of renal artery (HCC)     S/P Rt stent    CAD (coronary artery disease) , 1997, 2012    ,double bypass, 2 stents, 2stents 7/2016    Cancer ([facility] [address]     prostate    CHF (congestive heart failure) (HCC)     Chronic diastolic heart failure (HCC)     Chronic kidney disease     Chronic kidney disease (CKD), stage IV (severe) (HCC)     On Dialysis now -T-Th-sat    Constipation     Coronary atherosclerosis of unspecified type of vessel, native or graft     Stable angina after recent PCI continue treatment.        CRI (chronic renal insufficiency)     Diabetes ([address] 1973    type 2    Essential hypertension, benign     GERD (gastroesophageal reflux disease)     Gout     Hypertension 1982    Morbid obesity ([facility] [address] [facility]     Weight loss has been strongly encouraged by following dietary restrictions and an exercise routine    APRYL (obstructive sleep apnea) 6/26/2015    no cpap    Other and unspecified hyperlipidemia     HDL's are not at goal, LDL's are at goal, triglycerides are not at goal.    Other and unspecified hyperlipidemia     HDL's are not at goal, LDL's are at goal, triglycerides are not at goal.     Other ill-defined conditions(799.89) 1960    pneumonia twice    Prostate cancer ([facility] [address]     Sleep disturbance     Type II or unspecified type diabetes mellitus without mention of complication, not stated as uncontrolled        Past Surgical History:   Procedure Laterality Date   [address]    lt. carotid endart. [facility] CARDIAC SURG PROCEDURE UNLIST  2012, 2016, Nov 2017    Coronary Stent    COLONOSCOPY N/A 6/23/2017    COLONOSCOPY w/ APC w/ polypectomies performed by Orlando Calvin MD at [address] N/A 1/19/2018    FLEXIBLE SIGMOIDOSCOPY WITH Radio frequency ablation performed by Orlando Calvin MD at [address] N/A 6/8/2018    SIGMOIDOSCOPY FLEXIBLE WITH APC performed by Ashwin Kumar MD at [facility] ENDOSCOPY    HX CHOLECYSTECTOMY      HX CORONARY ARTERY BYPASS GRAFT  2000    x2    HX HEENT  1997    cholecystectomy    HX UROLOGICAL  1998    renal art. surg    HX VASCULAR ACCESS      Perma cath for HD- right chest.    HI INTRO CATH DIALYSIS CIRCUIT DX ANGRPH FLUOR S&I N/A 12/10/2018    FISTULOGRAM LEFT performed by Germán Hdz MD at [facility] CATH LAB    HI INTRO CATH DIALYSIS CIRCUIT W/TRLUML BALO ANGIOP N/A 12/10/2018    Angioplasty Fistula/Dialysis Circuit performed by Germán Hdz MD at [facility] CATH LAB       Allergies   Allergen Reactions    Nka [No Known Allergies] Other (comments)       Current Outpatient Medications   Medication Sig    pregabalin (LYRICA) 25 mg capsule Take 1 Cap by mouth two (2) times a day. Max Daily Amount: 50 mg.    clobetasol 0.05 % sham Use daily for scalp irritation.     tamsulosin (FLOMAX) 0.4 mg capsule TAKE ONE CAPSULE BY MOUTH ONE TIME DAILY    allopurinol (ZYLOPRIM) 100 mg tablet TAKE ONE TABLET BY MOUTH THREE TIMES PER WEEK ON MONDAY, WEDNESDAY AND FRIDAY    DULoxetine (CYMBALTA) 60 mg capsule TAKE ONE CAPSULE BY MOUTH DAILY    amLODIPine (NORVASC) 2.5 mg tablet TAKE ONE TABLET BY MOUTH DAILY    montelukast (SINGULAIR) 10 mg tablet TAKE ONE TABLET BY MOUTH DAILY    insulin aspart U-100 (NOVOLOG U-100 INSULIN ASPART) 100 unit/mL injection 20 units sq 3 times a day,sliding scale    Insulin Needles, Disposable, 31 gauge x 5/16\" ndle Use to inject insulin 2 x day    insulin glargine (LANTUS,BASAGLAR) 100 unit/mL (3 mL) inpn Give Basaglar 15 units twice a day.  fenofibrate (LOFIBRA) 54 mg tablet TAKE ONE TABLET BY MOUTH DAILY    calcium acetate (PHOSLO) 667 mg cap three (3) times daily (with meals).  metoprolol tartrate (LOPRESSOR) 100 mg IR tablet Take 1 Tab by mouth two (2) times a day.  simvastatin (ZOCOR) 40 mg tablet TAKE 1 TABLET BY MOUTH NIGHTLY.    L.acid/L.casei/B.bif/B.ken/FOS (PROBIOTIC BLEND PO) Take 2 Tabs by mouth daily.  losartan (COZAAR) 50 mg tablet Take 50 mg by mouth daily. Take 1/2 tab daily    clopidogrel (PLAVIX) 75 mg tab Take 1 Tab by mouth daily.  isosorbide mononitrate ER (IMDUR) 30 mg tablet Take 1 Tab by mouth daily.  nitroglycerin (NITROSTAT) 0.4 mg SL tablet 1 Tab by SubLINGual route as needed for Chest Pain.  cinacalcet (SENSIPAR) 30 mg tablet Take 60 mg by mouth daily.  ASPIR-LOW 81 mg tablet TAKE ONE TABLET BY MOUTH ONE TIME DAILY    DULCOLAX, BISACODYL, PO Take  by mouth.  pantoprazole (PROTONIX) 40 mg tablet Take 40 mg by mouth daily.  b complex-vitamin c-folic acid (NEPHROCAPS) 1 mg capsule Take 1 Cap by mouth daily.  cholecalciferol (DECARA) 50,000 unit capsule Take  by mouth every seven (7) days. No current facility-administered medications for this visit. Visit Vitals  /49   Pulse 61   Ht 5' 8\" (1.727 m)   Wt 92.5 kg (204 lb)   BMI 31.02 kg/m²         Physical Exam   Constitutional: He is oriented to person, place, and time. He appears well-developed and well-nourished. obese   HENT:   Head: Normocephalic and atraumatic.    Eyes: Conjunctivae are normal.   Neck: Neck supple. No JVD present. No tracheal deviation present. No thyromegaly present. Cardiovascular: Normal rate, regular rhythm and normal heart sounds. Exam reveals no gallop and no friction rub. No murmur heard. Pulmonary/Chest: Breath sounds normal. No respiratory distress. He has no wheezes. He has no rales. He exhibits no tenderness. Abdominal: Soft. There is no tenderness. Musculoskeletal: He exhibits no edema. Neurological: He is alert and oriented to person, place, and time. Skin: Skin is warm and dry. Psychiatric: He has a normal mood and affect. Mr. Chintan Samuel has a reminder for a \"due or due soon\" health maintenance. I have asked that he contact his primary care provider for follow-up on this health maintenance. CARDIOLOGY STUDIES 10/8/2012   Cardiac Cath Result PATENT LIMA TO LAD, SVG TO D1 SUBTOTAL DIFFUSE, POST PCI WITH STENTS,HAD PROXIMAL IN STENT PROTRUSION UNABLE TO CROSS WITH BALLONS,D1 DIFFISE SEVERE   Echocardiogram - Complete Result NORMAL EF 70%     SUMMARY:echo:11/2014  Left ventricle: Systolic function was hyperdynamic. Ejection fraction was  estimated in the range of 70 % to 75 %. No obvious wall motion  abnormalities identified in the views obtained. Near cavity obliteration  seen in the mid to apical portions of the ventricular cavity. There was  mild concentric hypertrophy. Features were consistent with a pseudonormal  left ventricular filling pattern, with concomitant abnormal relaxation and  increased filling pressure (grade 2 diastolic dysfunction). NUCLEAR IMAGING:    Findings: stress test:11/2014  1. Post-stress imaging in short axis, horizontal and vertical long axis views reveals normal isotope uptake in all areas. 2. Resting images also show normal isotope uptake in all areas. 3. Gated images show normal left ventricular size, wall motion and systolic function. Ejection fraction is 85%. Diagnosis:   1. Normal scan.    2. No evidence of significant fixed or reversible defect suggesting ischemia or myocardial infarction noted from this nuclear study. 3. low risk scan.  7/2016-pci  vg to diag  11/2017-pci svg-diag  I have personally reviewed patient's records available from hospital and other providers and incorporated findings in patient care. 1/2018-[facility]    Conclusion 1/2019       · Patent LIMA to LAD  · 100% ostial occlusion of SVG to diagonal artery which has been intervened multiple times in the past.  · Critical disease in distal circumflex and proximal and distal RCA but these are small vessels and the lesions are chronic. Since the SVG has been intervened multiple times in the past and is now totally occluded, no attempts were made to reopen it. Patient should be treated medically. This would avoid the use of dual antiplatelets which have caused GI bleed repeatedly in the past.  Aggressive risk factor modification.        Interpretation Summary 3/2019    · Estimated left ventricular ejection fraction is 56 - 60%. Left ventricular moderate concentric hypertrophy. Moderate (grade 2) left ventricular diastolic dysfunction. · Normal right ventricular size and function. · Moderate aortic valve leaflet calcification present with reduced excursion. Moderate aortic valve stenosis. Aortic valve peak gradient is 43.6 mmHg. Aortic valve mean gradient is 23.4 mmHg. Aortic valve area is 1.2 cm2. · Mitral valve thickening. Moderate mitral annular calcification. Mild mitral valve regurgitation. I Have personally reviewed recent relevant labs available and discussed with patient  7/2019-lipid,bmp,cbc    Assessment         ICD-10-CM ICD-9-CM    1. Coronary artery disease involving native coronary artery of native heart with angina pectoris ([facility] [address] I25.119 414.01      413.9     Stable continue current medical management   2. Chronic diastolic heart failure (HCC) I50.32 428.32     Stable compensated. Class 3. Continue therapy on dialysis   3.  S/P coronary artery stent placement Z95.5 V45.82     Stable on treatment   4. Postsurgical aortocoronary bypass status Z95.1 V45.81     Stable   5. ESRD (end stage renal disease) ([facility] [address] N18.6 585.6     Tolerating dialysis. Continue therapy   6. Dyslipidemia E78.5 272.4     Continue treatment. Labs reviewed. LDL 60-7 2019   7. Nonrheumatic aortic valve stenosis I35.0 424.1     Stable continue to monitor. 11/2018  Cardiac status stable. Off Plavix due to GI bleed but hemoglobin is stabilized. Last hemoglobin 11. Cholesterol 156. Continue current treatment. Occasional.  Of low blood pressure will change metoprolol as needed to 50 mg twice a day instead of 100 mg twice a day  12/2018  Crescendo angina for 2 weeks. Frequent use of nitroglycerin. Will set up for cardiac catheterization. Advised to come to emergency room if symptoms are persistent  4/ 2019  Moderate aortic stenosis on recent echo. Normal ejection fraction continue to monitor for symptom  There are no discontinued medications. No orders of the defined types were placed in this encounter. Follow-up and Dispositions    · Return in about 6 months (around 4/11/2020).

## 2019-10-11 NOTE — PATIENT INSTRUCTIONS
[employer] Activation    Thank you for requesting access to [employer]. Please follow the instructions below to securely access and download your online medical record. [employer] allows you to send messages to your doctor, view your test results, renew your prescriptions, schedule appointments, and more. How Do I Sign Up? 1. In your internet browser, go to [email or web address]. 2. Click on the First Time User? Click Here link in the Sign In box. You will see the New Member Sign Up page. 3. Enter your [employer] Access Code exactly as it appears below. You will not need to use this code after youve completed the sign-up process. If you do not sign up before the expiration date, you must request a new code. [employer] Access Code: [email or web address]  Expires: 2019 11:26 AM (This is the date your [employer] access code will )    4. Enter the last four digits of your Social Security Number (xxxx) and Date of Birth (mm/dd/yyyy) as indicated and click Submit. You will be taken to the next sign-up page. 5. Create a [employer] ID. This will be your [employer] login ID and cannot be changed, so think of one that is secure and easy to remember. 6. Create a [employer] password. You can change your password at any time. 7. Enter your Password Reset Question and Answer. This can be used at a later time if you forget your password. 8. Enter your e-mail address. You will receive e-mail notification when new information is available in [address] 9. Click Sign Up. You can now view and download portions of your medical record. 10. Click the Download Summary menu link to download a portable copy of your medical information. Additional Information    If you have questions, please visit the Frequently Asked Questions section of the [employer] website at [email or web address] Remember, [employer] is NOT to be used for urgent needs. For medical emergencies, dial 911.

## 2019-10-11 NOTE — PROGRESS NOTES
1. Have you been to the ER, urgent care clinic since your last visit? Hospitalized since your last visit? No    2. Have you seen or consulted any other health care providers outside of the [address] [place] [address] since your last visit? Include any pap smears or colon screening.  No

## 2019-10-18 NOTE — TELEPHONE ENCOUNTER
Patient wife called and wanted to know if patient can hold asa d/t not be able to stop bleeding after dialysis.  Please advise

## 2019-10-18 NOTE — TELEPHONE ENCOUNTER
Called and spoke to patient wife per Dr. Marci Richter patient may hold aspirin for 5 days. She voices understanding and acceptance of this advice and will call back if any further questions or concerns.

## 2019-12-12 NOTE — TELEPHONE ENCOUNTER
Received communication that dialysis was pulling clots and had high venous pressures. Called and spoke with Mary Olguin and she states he only ran half treatment yesterday they are bringing him in today to see how he does, advised would set patient up for fistulagram and if something else comes to call office.

## 2020-01-01 ENCOUNTER — HOSPITAL ENCOUNTER (EMERGENCY)
Age: 80
Discharge: HOME | End: 2020-04-18
Attending: EMERGENCY MEDICINE
Payer: MEDICARE

## 2020-01-01 ENCOUNTER — HOSPITAL ENCOUNTER (INPATIENT)
Age: 80
LOS: 1 days | Discharge: HOME HEALTH | DRG: 280 | End: 2020-01-25
Attending: EMERGENCY MEDICINE | Admitting: INTERNAL MEDICINE
Payer: MEDICARE

## 2020-01-01 ENCOUNTER — DOCUMENTATION ONLY (OUTPATIENT)
Dept: FAMILY MEDICINE CLINIC | Age: 80
End: 2020-01-01

## 2020-01-01 ENCOUNTER — HOME CARE VISIT (OUTPATIENT)
Dept: SCHEDULING | Facility: HOME HEALTH | Age: 80
End: 2020-01-01
Payer: MEDICARE

## 2020-01-01 ENCOUNTER — APPOINTMENT (OUTPATIENT)
Dept: GENERAL RADIOLOGY | Age: 80
DRG: 280 | End: 2020-01-01
Attending: PHYSICIAN ASSISTANT
Payer: MEDICARE

## 2020-01-01 ENCOUNTER — HOSPITAL ENCOUNTER (OUTPATIENT)
Dept: LAB | Age: 80
Discharge: HOME | End: 2020-03-06
Payer: MEDICARE

## 2020-01-01 ENCOUNTER — HOME HEALTH ADMISSION (OUTPATIENT)
Dept: HOME HEALTH SERVICES | Facility: HOME HEALTH | Age: 80
End: 2020-01-01
Payer: MEDICARE

## 2020-01-01 ENCOUNTER — OFFICE VISIT (OUTPATIENT)
Dept: FAMILY MEDICINE CLINIC | Age: 80
End: 2020-01-01

## 2020-01-01 ENCOUNTER — HOSPITAL ENCOUNTER (OUTPATIENT)
Age: 80
Setting detail: OUTPATIENT SURGERY
Discharge: HOME | End: 2020-01-02
Attending: SURGERY | Admitting: SURGERY
Payer: MEDICARE

## 2020-01-01 ENCOUNTER — PATIENT OUTREACH (OUTPATIENT)
Dept: FAMILY MEDICINE CLINIC | Facility: CLINIC | Age: 80
End: 2020-01-01

## 2020-01-01 ENCOUNTER — HOME CARE VISIT (OUTPATIENT)
Dept: HOME HEALTH SERVICES | Facility: HOME HEALTH | Age: 80
End: 2020-01-01
Payer: MEDICARE

## 2020-01-01 ENCOUNTER — APPOINTMENT (OUTPATIENT)
Dept: GENERAL RADIOLOGY | Age: 80
End: 2020-01-01
Attending: PHYSICIAN ASSISTANT
Payer: MEDICARE

## 2020-01-01 ENCOUNTER — HOME CARE VISIT (OUTPATIENT)
Dept: HOME HEALTH SERVICES | Facility: HOME HEALTH | Age: 80
End: 2020-01-01

## 2020-01-01 ENCOUNTER — OFFICE VISIT (OUTPATIENT)
Dept: CARDIOLOGY CLINIC | Age: 80
End: 2020-01-01

## 2020-01-01 ENCOUNTER — TELEPHONE (OUTPATIENT)
Dept: FAMILY MEDICINE CLINIC | Age: 80
End: 2020-01-01

## 2020-01-01 ENCOUNTER — OFFICE VISIT (OUTPATIENT)
Dept: VASCULAR SURGERY | Age: 80
End: 2020-01-01

## 2020-01-01 ENCOUNTER — LAB ONLY (OUTPATIENT)
Dept: FAMILY MEDICINE CLINIC | Age: 80
End: 2020-01-01

## 2020-01-01 ENCOUNTER — PATIENT OUTREACH (OUTPATIENT)
Dept: CASE MANAGEMENT | Age: 80
End: 2020-01-01

## 2020-01-01 ENCOUNTER — APPOINTMENT (OUTPATIENT)
Dept: NON INVASIVE DIAGNOSTICS | Age: 80
DRG: 280 | End: 2020-01-01
Attending: INTERNAL MEDICINE
Payer: MEDICARE

## 2020-01-01 ENCOUNTER — APPOINTMENT (OUTPATIENT)
Dept: GENERAL RADIOLOGY | Age: 80
End: 2020-01-01
Attending: EMERGENCY MEDICINE
Payer: MEDICARE

## 2020-01-01 ENCOUNTER — HOSPITAL ENCOUNTER (EMERGENCY)
Age: 80
Discharge: LEFT BEFORE DISCHARGE | End: 2020-01-23
Attending: EMERGENCY MEDICINE
Payer: MEDICARE

## 2020-01-01 VITALS
DIASTOLIC BLOOD PRESSURE: 60 MMHG | RESPIRATION RATE: 19 BRPM | OXYGEN SATURATION: 98 % | HEART RATE: 96 BPM | TEMPERATURE: 98 F | SYSTOLIC BLOOD PRESSURE: 122 MMHG

## 2020-01-01 VITALS
HEART RATE: 82 BPM | RESPIRATION RATE: 20 BRPM | DIASTOLIC BLOOD PRESSURE: 60 MMHG | SYSTOLIC BLOOD PRESSURE: 144 MMHG | TEMPERATURE: 98.5 F | OXYGEN SATURATION: 96 %

## 2020-01-01 VITALS
RESPIRATION RATE: 18 BRPM | SYSTOLIC BLOOD PRESSURE: 142 MMHG | HEART RATE: 60 BPM | TEMPERATURE: 98 F | DIASTOLIC BLOOD PRESSURE: 78 MMHG

## 2020-01-01 VITALS
OXYGEN SATURATION: 100 % | RESPIRATION RATE: 23 BRPM | HEART RATE: 98 BPM | TEMPERATURE: 98.6 F | SYSTOLIC BLOOD PRESSURE: 125 MMHG | DIASTOLIC BLOOD PRESSURE: 60 MMHG

## 2020-01-01 VITALS
DIASTOLIC BLOOD PRESSURE: 65 MMHG | HEART RATE: 70 BPM | OXYGEN SATURATION: 99 % | SYSTOLIC BLOOD PRESSURE: 100 MMHG | RESPIRATION RATE: 70 BRPM | TEMPERATURE: 96.5 F

## 2020-01-01 VITALS
TEMPERATURE: 98 F | DIASTOLIC BLOOD PRESSURE: 62 MMHG | SYSTOLIC BLOOD PRESSURE: 155 MMHG | OXYGEN SATURATION: 98 % | TEMPERATURE: 98.4 F | HEART RATE: 90 BPM | RESPIRATION RATE: 18 BRPM | OXYGEN SATURATION: 98 % | HEART RATE: 70 BPM | OXYGEN SATURATION: 98 % | RESPIRATION RATE: 17 BRPM | SYSTOLIC BLOOD PRESSURE: 136 MMHG | SYSTOLIC BLOOD PRESSURE: 137 MMHG | HEART RATE: 65 BPM | TEMPERATURE: 97.4 F | RESPIRATION RATE: 17 BRPM | DIASTOLIC BLOOD PRESSURE: 64 MMHG | DIASTOLIC BLOOD PRESSURE: 85 MMHG

## 2020-01-01 VITALS
HEIGHT: 68 IN | WEIGHT: 200 LBS | BODY MASS INDEX: 30.31 KG/M2 | DIASTOLIC BLOOD PRESSURE: 70 MMHG | RESPIRATION RATE: 18 BRPM | SYSTOLIC BLOOD PRESSURE: 140 MMHG

## 2020-01-01 VITALS
HEART RATE: 100 BPM | OXYGEN SATURATION: 99 % | SYSTOLIC BLOOD PRESSURE: 139 MMHG | RESPIRATION RATE: 14 BRPM | DIASTOLIC BLOOD PRESSURE: 70 MMHG | WEIGHT: 202 LBS | HEIGHT: 68 IN | BODY MASS INDEX: 30.62 KG/M2 | TEMPERATURE: 98.4 F

## 2020-01-01 VITALS
SYSTOLIC BLOOD PRESSURE: 144 MMHG | WEIGHT: 208 LBS | DIASTOLIC BLOOD PRESSURE: 65 MMHG | BODY MASS INDEX: 31.63 KG/M2 | OXYGEN SATURATION: 96 % | HEART RATE: 72 BPM

## 2020-01-01 VITALS
HEART RATE: 81 BPM | RESPIRATION RATE: 16 BRPM | OXYGEN SATURATION: 97 % | WEIGHT: 200.2 LBS | SYSTOLIC BLOOD PRESSURE: 158 MMHG | DIASTOLIC BLOOD PRESSURE: 58 MMHG | TEMPERATURE: 96.5 F | BODY MASS INDEX: 30.34 KG/M2 | HEIGHT: 68 IN

## 2020-01-01 VITALS
DIASTOLIC BLOOD PRESSURE: 54 MMHG | HEART RATE: 77 BPM | WEIGHT: 206 LBS | OXYGEN SATURATION: 98 % | SYSTOLIC BLOOD PRESSURE: 132 MMHG | TEMPERATURE: 97.3 F | RESPIRATION RATE: 18 BRPM | BODY MASS INDEX: 31.22 KG/M2 | HEIGHT: 68 IN

## 2020-01-01 VITALS
SYSTOLIC BLOOD PRESSURE: 124 MMHG | DIASTOLIC BLOOD PRESSURE: 49 MMHG | HEIGHT: 68 IN | BODY MASS INDEX: 31.67 KG/M2 | WEIGHT: 209 LBS | HEART RATE: 63 BPM

## 2020-01-01 VITALS
RESPIRATION RATE: 17 BRPM | SYSTOLIC BLOOD PRESSURE: 128 MMHG | TEMPERATURE: 97.6 F | OXYGEN SATURATION: 98 % | DIASTOLIC BLOOD PRESSURE: 65 MMHG | HEART RATE: 70 BPM

## 2020-01-01 VITALS
TEMPERATURE: 98 F | SYSTOLIC BLOOD PRESSURE: 110 MMHG | HEART RATE: 67 BPM | DIASTOLIC BLOOD PRESSURE: 60 MMHG | OXYGEN SATURATION: 98 % | RESPIRATION RATE: 20 BRPM

## 2020-01-01 VITALS
HEART RATE: 73 BPM | TEMPERATURE: 98 F | DIASTOLIC BLOOD PRESSURE: 68 MMHG | OXYGEN SATURATION: 98 % | RESPIRATION RATE: 18 BRPM | SYSTOLIC BLOOD PRESSURE: 137 MMHG

## 2020-01-01 VITALS
RESPIRATION RATE: 20 BRPM | TEMPERATURE: 98.5 F | HEART RATE: 68 BPM | DIASTOLIC BLOOD PRESSURE: 62 MMHG | OXYGEN SATURATION: 90 % | SYSTOLIC BLOOD PRESSURE: 130 MMHG

## 2020-01-01 VITALS
SYSTOLIC BLOOD PRESSURE: 162 MMHG | OXYGEN SATURATION: 99 % | DIASTOLIC BLOOD PRESSURE: 74 MMHG | HEART RATE: 69 BPM | TEMPERATURE: 97.8 F

## 2020-01-01 VITALS
DIASTOLIC BLOOD PRESSURE: 65 MMHG | TEMPERATURE: 97.6 F | HEART RATE: 70 BPM | OXYGEN SATURATION: 98 % | SYSTOLIC BLOOD PRESSURE: 128 MMHG

## 2020-01-01 VITALS
RESPIRATION RATE: 14 BRPM | SYSTOLIC BLOOD PRESSURE: 168 MMHG | DIASTOLIC BLOOD PRESSURE: 70 MMHG | WEIGHT: 204 LBS | HEART RATE: 84 BPM | HEIGHT: 68 IN | OXYGEN SATURATION: 97 % | BODY MASS INDEX: 30.92 KG/M2

## 2020-01-01 VITALS
BODY MASS INDEX: 30.62 KG/M2 | DIASTOLIC BLOOD PRESSURE: 69 MMHG | WEIGHT: 202 LBS | RESPIRATION RATE: 16 BRPM | OXYGEN SATURATION: 100 % | TEMPERATURE: 98.3 F | SYSTOLIC BLOOD PRESSURE: 134 MMHG | HEART RATE: 60 BPM | HEIGHT: 68 IN

## 2020-01-01 VITALS
HEIGHT: 68 IN | DIASTOLIC BLOOD PRESSURE: 68 MMHG | SYSTOLIC BLOOD PRESSURE: 170 MMHG | HEART RATE: 84 BPM | RESPIRATION RATE: 16 BRPM | WEIGHT: 201 LBS | OXYGEN SATURATION: 99 % | BODY MASS INDEX: 30.46 KG/M2

## 2020-01-01 VITALS
DIASTOLIC BLOOD PRESSURE: 54 MMHG | TEMPERATURE: 96.9 F | RESPIRATION RATE: 20 BRPM | SYSTOLIC BLOOD PRESSURE: 141 MMHG | BODY MASS INDEX: 32.28 KG/M2 | OXYGEN SATURATION: 87 % | WEIGHT: 213 LBS | HEART RATE: 71 BPM | HEIGHT: 68 IN

## 2020-01-01 VITALS
SYSTOLIC BLOOD PRESSURE: 112 MMHG | OXYGEN SATURATION: 92 % | HEART RATE: 101 BPM | TEMPERATURE: 97.8 F | DIASTOLIC BLOOD PRESSURE: 66 MMHG

## 2020-01-01 VITALS
OXYGEN SATURATION: 96 % | TEMPERATURE: 96.9 F | RESPIRATION RATE: 19 BRPM | HEART RATE: 69 BPM | SYSTOLIC BLOOD PRESSURE: 133 MMHG | DIASTOLIC BLOOD PRESSURE: 60 MMHG

## 2020-01-01 VITALS
TEMPERATURE: 97.5 F | HEART RATE: 68 BPM | BODY MASS INDEX: 31.83 KG/M2 | OXYGEN SATURATION: 98 % | WEIGHT: 210 LBS | DIASTOLIC BLOOD PRESSURE: 55 MMHG | HEIGHT: 68 IN | SYSTOLIC BLOOD PRESSURE: 124 MMHG | RESPIRATION RATE: 18 BRPM

## 2020-01-01 VITALS
WEIGHT: 206 LBS | HEIGHT: 68 IN | TEMPERATURE: 97.2 F | BODY MASS INDEX: 31.22 KG/M2 | DIASTOLIC BLOOD PRESSURE: 59 MMHG | HEART RATE: 91 BPM | SYSTOLIC BLOOD PRESSURE: 129 MMHG

## 2020-01-01 VITALS
SYSTOLIC BLOOD PRESSURE: 144 MMHG | DIASTOLIC BLOOD PRESSURE: 58 MMHG | HEART RATE: 80 BPM | RESPIRATION RATE: 17 BRPM | OXYGEN SATURATION: 97 % | TEMPERATURE: 97.7 F

## 2020-01-01 VITALS
OXYGEN SATURATION: 97 % | HEART RATE: 94 BPM | DIASTOLIC BLOOD PRESSURE: 50 MMHG | SYSTOLIC BLOOD PRESSURE: 142 MMHG | TEMPERATURE: 99.1 F | RESPIRATION RATE: 20 BRPM

## 2020-01-01 VITALS
DIASTOLIC BLOOD PRESSURE: 60 MMHG | SYSTOLIC BLOOD PRESSURE: 130 MMHG | RESPIRATION RATE: 19 BRPM | HEART RATE: 75 BPM | TEMPERATURE: 97.4 F | OXYGEN SATURATION: 97 %

## 2020-01-01 DIAGNOSIS — E78.5 DYSLIPIDEMIA: ICD-10-CM

## 2020-01-01 DIAGNOSIS — N18.6 ESRD (END STAGE RENAL DISEASE) ON DIALYSIS (HCC): ICD-10-CM

## 2020-01-01 DIAGNOSIS — I50.32 CHRONIC DIASTOLIC HEART FAILURE (HCC): ICD-10-CM

## 2020-01-01 DIAGNOSIS — Z79.4 TYPE 2 DIABETES MELLITUS WITH DIABETIC NEUROPATHY, WITH LONG-TERM CURRENT USE OF INSULIN (HCC): ICD-10-CM

## 2020-01-01 DIAGNOSIS — G47.00 INSOMNIA, UNSPECIFIED TYPE: ICD-10-CM

## 2020-01-01 DIAGNOSIS — R05.9 COUGH: ICD-10-CM

## 2020-01-01 DIAGNOSIS — N18.6 ESRD (END STAGE RENAL DISEASE) (HCC): ICD-10-CM

## 2020-01-01 DIAGNOSIS — I25.119 CORONARY ARTERY DISEASE INVOLVING NATIVE CORONARY ARTERY OF NATIVE HEART WITH ANGINA PECTORIS (HCC): Primary | ICD-10-CM

## 2020-01-01 DIAGNOSIS — Z99.2 ESRD (END STAGE RENAL DISEASE) ON DIALYSIS (HCC): Primary | ICD-10-CM

## 2020-01-01 DIAGNOSIS — Z79.4 TYPE 2 DIABETES MELLITUS WITHOUT COMPLICATION, WITH LONG-TERM CURRENT USE OF INSULIN (HCC): Primary | ICD-10-CM

## 2020-01-01 DIAGNOSIS — F39 MOOD DISORDER (HCC): ICD-10-CM

## 2020-01-01 DIAGNOSIS — I50.9 CONGESTIVE HEART FAILURE, NYHA CLASS 4, UNSPECIFIED CONGESTIVE HEART FAILURE TYPE (HCC): ICD-10-CM

## 2020-01-01 DIAGNOSIS — I25.2 OLD MI (MYOCARDIAL INFARCTION): ICD-10-CM

## 2020-01-01 DIAGNOSIS — R09.02 HYPOXEMIA: Primary | ICD-10-CM

## 2020-01-01 DIAGNOSIS — I10 ESSENTIAL HYPERTENSION: ICD-10-CM

## 2020-01-01 DIAGNOSIS — I70.1 ATHEROSCLEROSIS OF RENAL ARTERY (HCC): ICD-10-CM

## 2020-01-01 DIAGNOSIS — I21.4 NSTEMI (NON-ST ELEVATED MYOCARDIAL INFARCTION) (HCC): Primary | ICD-10-CM

## 2020-01-01 DIAGNOSIS — I25.119 CORONARY ARTERY DISEASE INVOLVING NATIVE CORONARY ARTERY OF NATIVE HEART WITH ANGINA PECTORIS (HCC): ICD-10-CM

## 2020-01-01 DIAGNOSIS — N18.6 ESRD (END STAGE RENAL DISEASE) ON DIALYSIS (HCC): Primary | ICD-10-CM

## 2020-01-01 DIAGNOSIS — Z01.89 ENCOUNTER FOR LABORATORY EXAMINATION: ICD-10-CM

## 2020-01-01 DIAGNOSIS — E11.9 TYPE 2 DIABETES MELLITUS WITHOUT COMPLICATION, WITH LONG-TERM CURRENT USE OF INSULIN (HCC): Primary | ICD-10-CM

## 2020-01-01 DIAGNOSIS — Z99.2 ESRD (END STAGE RENAL DISEASE) ON DIALYSIS (HCC): ICD-10-CM

## 2020-01-01 DIAGNOSIS — R42 LIGHTHEADEDNESS: ICD-10-CM

## 2020-01-01 DIAGNOSIS — N25.81 SECONDARY HYPERPARATHYROIDISM OF RENAL ORIGIN (HCC): ICD-10-CM

## 2020-01-01 DIAGNOSIS — E11.40 TYPE 2 DIABETES MELLITUS WITH DIABETIC NEUROPATHY, WITH LONG-TERM CURRENT USE OF INSULIN (HCC): ICD-10-CM

## 2020-01-01 DIAGNOSIS — E66.01 MORBID OBESITY (HCC): ICD-10-CM

## 2020-01-01 DIAGNOSIS — Z99.2 HEMODIALYSIS PATIENT (HCC): ICD-10-CM

## 2020-01-01 DIAGNOSIS — Z95.5 S/P CORONARY ARTERY STENT PLACEMENT: ICD-10-CM

## 2020-01-01 DIAGNOSIS — I35.0 NONRHEUMATIC AORTIC VALVE STENOSIS: ICD-10-CM

## 2020-01-01 DIAGNOSIS — Z95.1 POSTSURGICAL AORTOCORONARY BYPASS STATUS: ICD-10-CM

## 2020-01-01 DIAGNOSIS — L30.9 DERMATITIS: ICD-10-CM

## 2020-01-01 DIAGNOSIS — C61 PROSTATE CANCER (HCC): Primary | ICD-10-CM

## 2020-01-01 DIAGNOSIS — C61 PROSTATE CANCER (HCC): ICD-10-CM

## 2020-01-01 DIAGNOSIS — R06.02 SHORTNESS OF BREATH: ICD-10-CM

## 2020-01-01 DIAGNOSIS — G62.9 NEUROPATHY: ICD-10-CM

## 2020-01-01 DIAGNOSIS — R57.1 HYPOVOLEMIC SHOCK (HCC): Primary | ICD-10-CM

## 2020-01-01 DIAGNOSIS — R21 RASH: ICD-10-CM

## 2020-01-01 DIAGNOSIS — T82.9XXA COMPLICATION OF DIALYSIS ACCESS INSERTION: ICD-10-CM

## 2020-01-01 DIAGNOSIS — N18.6 END STAGE RENAL DISEASE (HCC): ICD-10-CM

## 2020-01-01 DIAGNOSIS — I50.32 CHRONIC DIASTOLIC HEART FAILURE (HCC): Primary | ICD-10-CM

## 2020-01-01 LAB
ALBUMIN SERPL-MCNC: 3.3 G/DL (ref 3.4–5)
ALBUMIN SERPL-MCNC: 3.4 G/DL (ref 3.4–5)
ALBUMIN/GLOB SERPL: 0.7 {RATIO} (ref 0.8–1.7)
ALBUMIN/GLOB SERPL: 0.9 {RATIO} (ref 0.8–1.7)
ALBUMIN/GLOB SERPL: 0.9 {RATIO} (ref 0.8–1.7)
ALBUMIN/GLOB SERPL: 1 {RATIO} (ref 0.8–1.7)
ALP SERPL-CCNC: 103 U/L (ref 45–117)
ALP SERPL-CCNC: 127 U/L (ref 45–117)
ALP SERPL-CCNC: 136 U/L (ref 45–117)
ALP SERPL-CCNC: 177 U/L (ref 45–117)
ALT SERPL-CCNC: 12 U/L (ref 16–61)
ALT SERPL-CCNC: 14 U/L (ref 16–61)
ALT SERPL-CCNC: 15 U/L (ref 16–61)
ALT SERPL-CCNC: 16 U/L (ref 16–61)
ANION GAP SERPL CALC-SCNC: 3 MMOL/L (ref 3–18)
ANION GAP SERPL CALC-SCNC: 5 MMOL/L (ref 3–18)
ANION GAP SERPL CALC-SCNC: 6 MMOL/L (ref 3–18)
ANION GAP SERPL CALC-SCNC: 7 MMOL/L (ref 3–18)
APTT PPP: 26.9 SEC (ref 23–36.4)
APTT PPP: 47.7 SEC (ref 23–36.4)
APTT PPP: 85.4 SEC (ref 23–36.4)
AST SERPL-CCNC: 10 U/L (ref 10–38)
AST SERPL-CCNC: 17 U/L (ref 10–38)
AST SERPL-CCNC: 20 U/L (ref 10–38)
AST SERPL-CCNC: 30 U/L (ref 10–38)
ATRIAL RATE: 102 BPM
ATRIAL RATE: 87 BPM
ATRIAL RATE: 88 BPM
AV VELOCITY RATIO: 0.27
AV VTI RATIO: 0.3
BASOPHILS # BLD: 0 K/UL (ref 0–0.1)
BASOPHILS # BLD: 0.1 K/UL (ref 0–0.1)
BASOPHILS NFR BLD: 0 % (ref 0–2)
BASOPHILS NFR BLD: 1 % (ref 0–2)
BILIRUB SERPL-MCNC: 0.5 MG/DL (ref 0.2–1)
BILIRUB SERPL-MCNC: 0.6 MG/DL (ref 0.2–1)
BILIRUB SERPL-MCNC: 0.6 MG/DL (ref 0.2–1)
BILIRUB SERPL-MCNC: 0.7 MG/DL (ref 0.2–1)
BUN BLD-MCNC: 39 MG/DL (ref 7–18)
BUN SERPL-MCNC: 39 MG/DL (ref 7–18)
BUN SERPL-MCNC: 39 MG/DL (ref 7–18)
BUN SERPL-MCNC: 40 MG/DL (ref 7–18)
BUN SERPL-MCNC: 47 MG/DL (ref 7–18)
BUN/CREAT SERPL: 5 (ref 12–20)
BUN/CREAT SERPL: 6 (ref 12–20)
CALCIUM SERPL-MCNC: 8.9 MG/DL (ref 8.5–10.1)
CALCIUM SERPL-MCNC: 9 MG/DL (ref 8.5–10.1)
CALCIUM SERPL-MCNC: 9.2 MG/DL (ref 8.5–10.1)
CALCIUM SERPL-MCNC: 9.6 MG/DL (ref 8.5–10.1)
CALCULATED P AXIS, ECG09: 19 DEGREES
CALCULATED P AXIS, ECG09: 33 DEGREES
CALCULATED P AXIS, ECG09: 33 DEGREES
CALCULATED R AXIS, ECG10: 21 DEGREES
CALCULATED R AXIS, ECG10: 23 DEGREES
CALCULATED R AXIS, ECG10: 51 DEGREES
CALCULATED T AXIS, ECG11: -21 DEGREES
CALCULATED T AXIS, ECG11: 87 DEGREES
CALCULATED T AXIS, ECG11: 93 DEGREES
CHLORIDE BLD-SCNC: 94 MMOL/L (ref 100–108)
CHLORIDE SERPL-SCNC: 103 MMOL/L (ref 100–111)
CHLORIDE SERPL-SCNC: 103 MMOL/L (ref 100–111)
CHLORIDE SERPL-SCNC: 104 MMOL/L (ref 100–111)
CHLORIDE SERPL-SCNC: 93 MMOL/L (ref 100–111)
CHOLEST SERPL-MCNC: 131 MG/DL
CK MB CFR SERPL CALC: 11.1 % (ref 0–4)
CK MB SERPL-MCNC: 9.7 NG/ML (ref 5–25)
CK SERPL-CCNC: 87 U/L (ref 39–308)
CO2 SERPL-SCNC: 27 MMOL/L (ref 21–32)
CO2 SERPL-SCNC: 29 MMOL/L (ref 21–32)
CO2 SERPL-SCNC: 30 MMOL/L (ref 21–32)
CO2 SERPL-SCNC: 31 MMOL/L (ref 21–32)
CREAT SERPL-MCNC: 7.22 MG/DL (ref 0.6–1.3)
CREAT SERPL-MCNC: 7.34 MG/DL (ref 0.6–1.3)
CREAT SERPL-MCNC: 8.11 MG/DL (ref 0.6–1.3)
CREAT SERPL-MCNC: 8.41 MG/DL (ref 0.6–1.3)
DIAGNOSIS, 93000: NORMAL
DIFFERENTIAL METHOD BLD: ABNORMAL
ECHO AO ROOT DIAM: 3.47 CM
ECHO AV AREA PEAK VELOCITY: 0.8 CM2
ECHO AV AREA VTI: 0.9 CM2
ECHO AV AREA/BSA PEAK VELOCITY: 0.4 CM2/M2
ECHO AV AREA/BSA VTI: 0.4 CM2/M2
ECHO AV MEAN GRADIENT: 27.9 MMHG
ECHO AV MEAN VELOCITY: 2.37 M/S
ECHO AV PEAK GRADIENT: 58.5 MMHG
ECHO AV PEAK VELOCITY: 382.59 CM/S
ECHO AV VTI: 93.47 CM
ECHO EST RA PRESSURE: 3 MMHG
ECHO IVC SNIFF: 1.71 CM
ECHO LA MAJOR AXIS: 3.84 CM
ECHO LA TO AORTIC ROOT RATIO: 1.11
ECHO LV EDV TEICHHOLZ: 0.89 ML
ECHO LV ESV TEICHHOLZ: 0.38 ML
ECHO LV INTERNAL DIMENSION DIASTOLIC: 5.6 CM (ref 4.2–5.9)
ECHO LV INTERNAL DIMENSION SYSTOLIC: 3.91 CM
ECHO LV IVSD: 1.09 CM (ref 0.6–1)
ECHO LV MASS 2D: 292.9 G (ref 88–224)
ECHO LV MASS INDEX 2D: 142.7 G/M2 (ref 49–115)
ECHO LV POSTERIOR WALL DIASTOLIC: 1.09 CM (ref 0.6–1)
ECHO LVOT CARDIAC OUTPUT: 12.2 L/MIN
ECHO LVOT DIAM: 1.98 CM
ECHO LVOT PEAK GRADIENT: 4.4 MMHG
ECHO LVOT PEAK VELOCITY: 104.41 CM/S
ECHO LVOT SV: 80.3 ML
ECHO LVOT VTI: 25.96 CM
ECHO MV A VELOCITY: 132.26 CM/S
ECHO MV AREA PHT: 2.7 CM2
ECHO MV AREA VTI: 2 CM2
ECHO MV E DECELERATION TIME (DT): 227.4 MS
ECHO MV E VELOCITY: 126.37 CM/S
ECHO MV E/A RATIO: 0.96
ECHO MV MAX VELOCITY: 150.55 CM/S
ECHO MV MEAN GRADIENT: 3.8 MMHG
ECHO MV MEAN INFLOW VELOCITY: 0.91 M/S
ECHO MV PEAK GRADIENT: 9.1 MMHG
ECHO MV PRESSURE HALF TIME (PHT): 82.1 MS
ECHO MV VTI: 39.64 CM
ECHO PULMONARY ARTERY SYSTOLIC PRESSURE (PASP): 18.6 MMHG
ECHO RIGHT VENTRICULAR SYSTOLIC PRESSURE (RVSP): 18.6 MMHG
ECHO TV REGURGITANT MAX VELOCITY: 197.32 CM/S
ECHO TV REGURGITANT PEAK GRADIENT: 15.6 MMHG
EOSINOPHIL # BLD: 0.1 K/UL (ref 0–0.4)
EOSINOPHIL # BLD: 0.2 K/UL (ref 0–0.4)
EOSINOPHIL NFR BLD: 1 % (ref 0–5)
EOSINOPHIL NFR BLD: 2 % (ref 0–5)
ERYTHROCYTE [DISTWIDTH] IN BLOOD BY AUTOMATED COUNT: 13.8 % (ref 11.6–14.5)
ERYTHROCYTE [DISTWIDTH] IN BLOOD BY AUTOMATED COUNT: 14.4 % (ref 11.6–14.5)
ERYTHROCYTE [DISTWIDTH] IN BLOOD BY AUTOMATED COUNT: 14.5 % (ref 11.6–14.5)
ERYTHROCYTE [DISTWIDTH] IN BLOOD BY AUTOMATED COUNT: 14.5 % (ref 11.6–14.5)
ERYTHROCYTE [DISTWIDTH] IN BLOOD BY AUTOMATED COUNT: 14.7 % (ref 11.6–14.5)
EST. AVERAGE GLUCOSE BLD GHB EST-MCNC: 143 MG/DL
GLOBULIN SER CALC-MCNC: 3.4 G/DL (ref 2–4)
GLOBULIN SER CALC-MCNC: 3.6 G/DL (ref 2–4)
GLOBULIN SER CALC-MCNC: 3.7 G/DL (ref 2–4)
GLOBULIN SER CALC-MCNC: 4.6 G/DL (ref 2–4)
GLUCOSE BLD STRIP.AUTO-MCNC: 133 MG/DL (ref 70–110)
GLUCOSE BLD STRIP.AUTO-MCNC: 155 MG/DL (ref 70–110)
GLUCOSE BLD STRIP.AUTO-MCNC: 239 MG/DL (ref 70–110)
GLUCOSE BLD STRIP.AUTO-MCNC: 59 MG/DL (ref 70–110)
GLUCOSE BLD STRIP.AUTO-MCNC: 68 MG/DL (ref 74–106)
GLUCOSE BLD STRIP.AUTO-MCNC: 84 MG/DL (ref 70–110)
GLUCOSE SERPL-MCNC: 116 MG/DL (ref 74–99)
GLUCOSE SERPL-MCNC: 191 MG/DL (ref 74–99)
GLUCOSE SERPL-MCNC: 293 MG/DL (ref 74–99)
GLUCOSE SERPL-MCNC: 58 MG/DL (ref 74–99)
HBA1C MFR BLD: 6.6 % (ref 4.2–5.6)
HCT VFR BLD AUTO: 34.1 % (ref 36–48)
HCT VFR BLD AUTO: 34.4 % (ref 36–48)
HCT VFR BLD AUTO: 37.2 % (ref 36–48)
HCT VFR BLD AUTO: 37.4 % (ref 36–48)
HCT VFR BLD AUTO: 38.4 % (ref 36–48)
HCT VFR BLD CALC: 32 % (ref 36–49)
HDLC SERPL-MCNC: 34 MG/DL (ref 40–60)
HDLC SERPL: 3.9 {RATIO} (ref 0–5)
HGB BLD-MCNC: 10.6 G/DL (ref 13–16)
HGB BLD-MCNC: 10.9 G/DL (ref 12–16)
HGB BLD-MCNC: 10.9 G/DL (ref 13–16)
HGB BLD-MCNC: 11.6 G/DL (ref 13–16)
HGB BLD-MCNC: 11.7 G/DL (ref 13–16)
HGB BLD-MCNC: 11.7 G/DL (ref 13–16)
INR PPP: 1.1 (ref 0.8–1.2)
INR PPP: 1.1 (ref 0.8–1.2)
LACTATE SERPL-SCNC: 1.8 MMOL/L (ref 0.4–2)
LDLC SERPL CALC-MCNC: 58 MG/DL (ref 0–100)
LIPID PROFILE,FLP: ABNORMAL
LVFS 2D: 30.17 %
LVOT MG: 2.08 MMHG
LVOT MV: 0.66 CM/S
LVSV (TEICH): 41.66 ML
LYMPHOCYTES # BLD: 1.4 K/UL (ref 0.9–3.6)
LYMPHOCYTES # BLD: 1.5 K/UL (ref 0.9–3.6)
LYMPHOCYTES # BLD: 2.4 K/UL (ref 0.9–3.6)
LYMPHOCYTES # BLD: 2.6 K/UL (ref 0.9–3.6)
LYMPHOCYTES NFR BLD: 14 % (ref 21–52)
LYMPHOCYTES NFR BLD: 18 % (ref 21–52)
LYMPHOCYTES NFR BLD: 21 % (ref 21–52)
LYMPHOCYTES NFR BLD: 23 % (ref 21–52)
MCH RBC QN AUTO: 29.8 PG (ref 24–34)
MCH RBC QN AUTO: 30.4 PG (ref 24–34)
MCH RBC QN AUTO: 30.4 PG (ref 24–34)
MCH RBC QN AUTO: 30.8 PG (ref 24–34)
MCH RBC QN AUTO: 30.8 PG (ref 24–34)
MCHC RBC AUTO-ENTMCNC: 30.5 G/DL (ref 31–37)
MCHC RBC AUTO-ENTMCNC: 31 G/DL (ref 31–37)
MCHC RBC AUTO-ENTMCNC: 31.1 G/DL (ref 31–37)
MCHC RBC AUTO-ENTMCNC: 31.5 G/DL (ref 31–37)
MCHC RBC AUTO-ENTMCNC: 31.7 G/DL (ref 31–37)
MCV RBC AUTO: 94.9 FL (ref 74–97)
MCV RBC AUTO: 96.1 FL (ref 74–97)
MCV RBC AUTO: 99.1 FL (ref 74–97)
MCV RBC AUTO: 99.2 FL (ref 74–97)
MCV RBC AUTO: 99.7 FL (ref 74–97)
MONOCYTES # BLD: 0.6 K/UL (ref 0.05–1.2)
MONOCYTES # BLD: 0.8 K/UL (ref 0.05–1.2)
MONOCYTES # BLD: 0.9 K/UL (ref 0.05–1.2)
MONOCYTES # BLD: 0.9 K/UL (ref 0.05–1.2)
MONOCYTES NFR BLD: 6 % (ref 3–10)
MONOCYTES NFR BLD: 7 % (ref 3–10)
MONOCYTES NFR BLD: 9 % (ref 3–10)
MONOCYTES NFR BLD: 9 % (ref 3–10)
MV DEC SLOPE: 5.56
NEUTS SEG # BLD: 6.2 K/UL (ref 1.8–8)
NEUTS SEG # BLD: 7 K/UL (ref 1.8–8)
NEUTS SEG # BLD: 7.3 K/UL (ref 1.8–8)
NEUTS SEG # BLD: 9.1 K/UL (ref 1.8–8)
NEUTS SEG NFR BLD: 67 % (ref 40–73)
NEUTS SEG NFR BLD: 72 % (ref 40–73)
NEUTS SEG NFR BLD: 74 % (ref 40–73)
NEUTS SEG NFR BLD: 74 % (ref 40–73)
P-R INTERVAL, ECG05: 146 MS
P-R INTERVAL, ECG05: 148 MS
P-R INTERVAL, ECG05: 150 MS
PLATELET # BLD AUTO: 232 K/UL (ref 135–420)
PLATELET # BLD AUTO: 258 K/UL (ref 135–420)
PLATELET # BLD AUTO: 265 K/UL (ref 135–420)
PLATELET # BLD AUTO: 280 K/UL (ref 135–420)
PLATELET # BLD AUTO: 286 K/UL (ref 135–420)
PMV BLD AUTO: 10 FL (ref 9.2–11.8)
PMV BLD AUTO: 10.1 FL (ref 9.2–11.8)
PMV BLD AUTO: 10.3 FL (ref 9.2–11.8)
PMV BLD AUTO: 10.6 FL (ref 9.2–11.8)
PMV BLD AUTO: 9.7 FL (ref 9.2–11.8)
POTASSIUM BLD-SCNC: 4.2 MMOL/L (ref 3.5–5.5)
POTASSIUM SERPL-SCNC: 4.2 MMOL/L (ref 3.5–5.5)
POTASSIUM SERPL-SCNC: 4.4 MMOL/L (ref 3.5–5.5)
POTASSIUM SERPL-SCNC: 4.8 MMOL/L (ref 3.5–5.5)
POTASSIUM SERPL-SCNC: 5 MMOL/L (ref 3.5–5.5)
PROT SERPL-MCNC: 6.7 G/DL (ref 6.4–8.2)
PROT SERPL-MCNC: 7 G/DL (ref 6.4–8.2)
PROT SERPL-MCNC: 7 G/DL (ref 6.4–8.2)
PROT SERPL-MCNC: 7.9 G/DL (ref 6.4–8.2)
PROTHROMBIN TIME: 13.7 SEC (ref 11.5–15.2)
PROTHROMBIN TIME: 14 SEC (ref 11.5–15.2)
PSA SERPL-MCNC: 0.5 NG/ML (ref 0–4)
Q-T INTERVAL, ECG07: 338 MS
Q-T INTERVAL, ECG07: 362 MS
Q-T INTERVAL, ECG07: 384 MS
QRS DURATION, ECG06: 100 MS
QRS DURATION, ECG06: 108 MS
QRS DURATION, ECG06: 90 MS
QTC CALCULATION (BEZET), ECG08: 438 MS
QTC CALCULATION (BEZET), ECG08: 440 MS
QTC CALCULATION (BEZET), ECG08: 462 MS
RBC # BLD AUTO: 3.44 M/UL (ref 4.7–5.5)
RBC # BLD AUTO: 3.58 M/UL (ref 4.7–5.5)
RBC # BLD AUTO: 3.77 M/UL (ref 4.7–5.5)
RBC # BLD AUTO: 3.85 M/UL (ref 4.7–5.5)
RBC # BLD AUTO: 3.92 M/UL (ref 4.7–5.5)
SODIUM BLD-SCNC: 137 MMOL/L (ref 136–145)
SODIUM SERPL-SCNC: 130 MMOL/L (ref 136–145)
SODIUM SERPL-SCNC: 136 MMOL/L (ref 136–145)
SODIUM SERPL-SCNC: 137 MMOL/L (ref 136–145)
SODIUM SERPL-SCNC: 138 MMOL/L (ref 136–145)
TRIGL SERPL-MCNC: 195 MG/DL (ref ?–150)
TROPONIN I SERPL-MCNC: 0.03 NG/ML (ref 0–0.04)
TROPONIN I SERPL-MCNC: 2.65 NG/ML (ref 0–0.04)
TROPONIN I SERPL-MCNC: 4.59 NG/ML (ref 0–0.04)
TROPONIN I SERPL-MCNC: 5.37 NG/ML (ref 0–0.04)
TSH SERPL DL<=0.05 MIU/L-ACNC: 0.21 UIU/ML (ref 0.36–3.74)
VENTRICULAR RATE, ECG03: 102 BPM
VENTRICULAR RATE, ECG03: 87 BPM
VENTRICULAR RATE, ECG03: 88 BPM
VLDLC SERPL CALC-MCNC: 39 MG/DL
WBC # BLD AUTO: 10.5 K/UL (ref 4.6–13.2)
WBC # BLD AUTO: 12.6 K/UL (ref 4.6–13.2)
WBC # BLD AUTO: 8.4 K/UL (ref 4.6–13.2)
WBC # BLD AUTO: 9.8 K/UL (ref 4.6–13.2)
WBC # BLD AUTO: 9.9 K/UL (ref 4.6–13.2)

## 2020-01-01 PROCEDURE — 3331090002 HH PPS REVENUE DEBIT

## 2020-01-01 PROCEDURE — 84484 ASSAY OF TROPONIN QUANT: CPT

## 2020-01-01 PROCEDURE — 3331090001 HH PPS REVENUE CREDIT

## 2020-01-01 PROCEDURE — 77030021352 HC CBL LD SYS DISP COVD -B

## 2020-01-01 PROCEDURE — B2111ZZ FLUOROSCOPY OF MULTIPLE CORONARY ARTERIES USING LOW OSMOLAR CONTRAST: ICD-10-PCS | Performed by: INTERNAL MEDICINE

## 2020-01-01 PROCEDURE — G0157 HHC PT ASSISTANT EA 15: HCPCS

## 2020-01-01 PROCEDURE — 74011000250 HC RX REV CODE- 250: Performed by: SURGERY

## 2020-01-01 PROCEDURE — 74011250637 HC RX REV CODE- 250/637: Performed by: INTERNAL MEDICINE

## 2020-01-01 PROCEDURE — 99152 MOD SED SAME PHYS/QHP 5/>YRS: CPT | Performed by: INTERNAL MEDICINE

## 2020-01-01 PROCEDURE — 94762 N-INVAS EAR/PLS OXIMTRY CONT: CPT

## 2020-01-01 PROCEDURE — G0299 HHS/HOSPICE OF RN EA 15 MIN: HCPCS

## 2020-01-01 PROCEDURE — 77030013797 HC KT TRNSDUC PRSSR EDWD -A: Performed by: INTERNAL MEDICINE

## 2020-01-01 PROCEDURE — 74011250636 HC RX REV CODE- 250/636: Performed by: INTERNAL MEDICINE

## 2020-01-01 PROCEDURE — G0151 HHCP-SERV OF PT,EA 15 MIN: HCPCS

## 2020-01-01 PROCEDURE — 83036 HEMOGLOBIN GLYCOSYLATED A1C: CPT

## 2020-01-01 PROCEDURE — 74011250636 HC RX REV CODE- 250/636: Performed by: PHYSICIAN ASSISTANT

## 2020-01-01 PROCEDURE — 71045 X-RAY EXAM CHEST 1 VIEW: CPT

## 2020-01-01 PROCEDURE — G0495 RN CARE TRAIN/EDU IN HH: HCPCS

## 2020-01-01 PROCEDURE — 84443 ASSAY THYROID STIM HORMONE: CPT

## 2020-01-01 PROCEDURE — 93306 TTE W/DOPPLER COMPLETE: CPT

## 2020-01-01 PROCEDURE — 85730 THROMBOPLASTIN TIME PARTIAL: CPT

## 2020-01-01 PROCEDURE — 84153 ASSAY OF PSA TOTAL: CPT

## 2020-01-01 PROCEDURE — 74011000250 HC RX REV CODE- 250: Performed by: INTERNAL MEDICINE

## 2020-01-01 PROCEDURE — 83605 ASSAY OF LACTIC ACID: CPT

## 2020-01-01 PROCEDURE — G0300 HHS/HOSPICE OF LPN EA 15 MIN: HCPCS

## 2020-01-01 PROCEDURE — 82947 ASSAY GLUCOSE BLOOD QUANT: CPT

## 2020-01-01 PROCEDURE — 93455 CORONARY ART/GRFT ANGIO S&I: CPT | Performed by: INTERNAL MEDICINE

## 2020-01-01 PROCEDURE — C1769 GUIDE WIRE: HCPCS | Performed by: SURGERY

## 2020-01-01 PROCEDURE — 80053 COMPREHEN METABOLIC PANEL: CPT

## 2020-01-01 PROCEDURE — 85025 COMPLETE CBC W/AUTO DIFF WBC: CPT

## 2020-01-01 PROCEDURE — 74011250636 HC RX REV CODE- 250/636

## 2020-01-01 PROCEDURE — 74011636320 HC RX REV CODE- 636/320: Performed by: SURGERY

## 2020-01-01 PROCEDURE — B2151ZZ FLUOROSCOPY OF LEFT HEART USING LOW OSMOLAR CONTRAST: ICD-10-PCS | Performed by: INTERNAL MEDICINE

## 2020-01-01 PROCEDURE — 82962 GLUCOSE BLOOD TEST: CPT

## 2020-01-01 PROCEDURE — 75810000275 HC EMERGENCY DEPT VISIT NO LEVEL OF CARE

## 2020-01-01 PROCEDURE — 4A023N7 MEASUREMENT OF CARDIAC SAMPLING AND PRESSURE, LEFT HEART, PERCUTANEOUS APPROACH: ICD-10-PCS | Performed by: INTERNAL MEDICINE

## 2020-01-01 PROCEDURE — 36415 COLL VENOUS BLD VENIPUNCTURE: CPT

## 2020-01-01 PROCEDURE — 96365 THER/PROPH/DIAG IV INF INIT: CPT

## 2020-01-01 PROCEDURE — 400013 HH SOC

## 2020-01-01 PROCEDURE — 77030003629 HC NDL PERC VASC COOK -A: Performed by: INTERNAL MEDICINE

## 2020-01-01 PROCEDURE — 36902 INTRO CATH DIALYSIS CIRCUIT: CPT | Performed by: SURGERY

## 2020-01-01 PROCEDURE — 96374 THER/PROPH/DIAG INJ IV PUSH: CPT

## 2020-01-01 PROCEDURE — 77030016699 HC CATH ANGI DX INFN1 CARD -A: Performed by: INTERNAL MEDICINE

## 2020-01-01 PROCEDURE — 99152 MOD SED SAME PHYS/QHP 5/>YRS: CPT | Performed by: SURGERY

## 2020-01-01 PROCEDURE — 80061 LIPID PANEL: CPT

## 2020-01-01 PROCEDURE — 74011636637 HC RX REV CODE- 636/637: Performed by: HOSPITALIST

## 2020-01-01 PROCEDURE — 77030002916 HC SUT ETHLN J&J -A: Performed by: SURGERY

## 2020-01-01 PROCEDURE — 74011250636 HC RX REV CODE- 250/636: Performed by: EMERGENCY MEDICINE

## 2020-01-01 PROCEDURE — 99284 EMERGENCY DEPT VISIT MOD MDM: CPT

## 2020-01-01 PROCEDURE — 5A1D70Z PERFORMANCE OF URINARY FILTRATION, INTERMITTENT, LESS THAN 6 HOURS PER DAY: ICD-10-PCS | Performed by: INTERNAL MEDICINE

## 2020-01-01 PROCEDURE — C1725 CATH, TRANSLUMIN NON-LASER: HCPCS | Performed by: SURGERY

## 2020-01-01 PROCEDURE — 77030012597: Performed by: INTERNAL MEDICINE

## 2020-01-01 PROCEDURE — 82550 ASSAY OF CK (CPK): CPT

## 2020-01-01 PROCEDURE — 74011636320 HC RX REV CODE- 636/320: Performed by: INTERNAL MEDICINE

## 2020-01-01 PROCEDURE — 76210000002 HC OR PH I REC 3 TO 3.5 HR: Performed by: INTERNAL MEDICINE

## 2020-01-01 PROCEDURE — 85610 PROTHROMBIN TIME: CPT

## 2020-01-01 PROCEDURE — 74011250637 HC RX REV CODE- 250/637: Performed by: PHYSICIAN ASSISTANT

## 2020-01-01 PROCEDURE — C1894 INTRO/SHEATH, NON-LASER: HCPCS | Performed by: INTERNAL MEDICINE

## 2020-01-01 PROCEDURE — 77030013761 HC KT HRT LFT ANGI -B: Performed by: INTERNAL MEDICINE

## 2020-01-01 PROCEDURE — 85027 COMPLETE CBC AUTOMATED: CPT

## 2020-01-01 PROCEDURE — 74011250636 HC RX REV CODE- 250/636: Performed by: SURGERY

## 2020-01-01 PROCEDURE — 93005 ELECTROCARDIOGRAM TRACING: CPT

## 2020-01-01 PROCEDURE — 96360 HYDRATION IV INFUSION INIT: CPT

## 2020-01-01 PROCEDURE — C1769 GUIDE WIRE: HCPCS | Performed by: INTERNAL MEDICINE

## 2020-01-01 PROCEDURE — 99285 EMERGENCY DEPT VISIT HI MDM: CPT

## 2020-01-01 PROCEDURE — 90935 HEMODIALYSIS ONE EVALUATION: CPT

## 2020-01-01 PROCEDURE — G0152 HHCP-SERV OF OT,EA 15 MIN: HCPCS

## 2020-01-01 PROCEDURE — C1894 INTRO/SHEATH, NON-LASER: HCPCS | Performed by: SURGERY

## 2020-01-01 PROCEDURE — 65660000000 HC RM CCU STEPDOWN

## 2020-01-01 RX ORDER — ACETAMINOPHEN 325 MG/1
650 TABLET ORAL
Status: DISCONTINUED | OUTPATIENT
Start: 2020-01-01 | End: 2020-01-01 | Stop reason: HOSPADM

## 2020-01-01 RX ORDER — LOSARTAN POTASSIUM 25 MG/1
25 TABLET ORAL DAILY
Qty: 30 TAB | Refills: 0 | Status: SHIPPED | OUTPATIENT
Start: 2020-01-01 | End: 2020-01-01 | Stop reason: SDUPTHER

## 2020-01-01 RX ORDER — LANOLIN ALCOHOL/MO/W.PET/CERES
12 CREAM (GRAM) TOPICAL
Status: DISCONTINUED | OUTPATIENT
Start: 2020-01-01 | End: 2020-01-01 | Stop reason: HOSPADM

## 2020-01-01 RX ORDER — ISOSORBIDE MONONITRATE 30 MG/1
30 TABLET, EXTENDED RELEASE ORAL DAILY
Status: DISCONTINUED | OUTPATIENT
Start: 2020-01-01 | End: 2020-01-01 | Stop reason: HOSPADM

## 2020-01-01 RX ORDER — ATORVASTATIN CALCIUM 80 MG/1
80 TABLET, FILM COATED ORAL
Qty: 30 TAB | Refills: 0 | Status: SHIPPED | OUTPATIENT
Start: 2020-01-01 | End: 2020-01-01 | Stop reason: ALTCHOICE

## 2020-01-01 RX ORDER — ONDANSETRON 2 MG/ML
4 INJECTION INTRAMUSCULAR; INTRAVENOUS
Status: DISCONTINUED | OUTPATIENT
Start: 2020-01-01 | End: 2020-01-01 | Stop reason: HOSPADM

## 2020-01-01 RX ORDER — HEPARIN SODIUM 10000 [USP'U]/100ML
10.9-25 INJECTION, SOLUTION INTRAVENOUS
Status: DISCONTINUED | OUTPATIENT
Start: 2020-01-01 | End: 2020-01-01

## 2020-01-01 RX ORDER — CLOPIDOGREL BISULFATE 75 MG/1
75 TABLET ORAL DAILY
Status: DISCONTINUED | OUTPATIENT
Start: 2020-01-01 | End: 2020-01-01 | Stop reason: HOSPADM

## 2020-01-01 RX ORDER — ISOSORBIDE MONONITRATE 30 MG/1
TABLET, EXTENDED RELEASE ORAL
Qty: 90 TAB | Refills: 1 | Status: SHIPPED | OUTPATIENT
Start: 2020-01-01

## 2020-01-01 RX ORDER — HYOSCYAMINE SULFATE 0.12 MG/1
0.12 TABLET SUBLINGUAL
COMMUNITY

## 2020-01-01 RX ORDER — TAMSULOSIN HYDROCHLORIDE 0.4 MG/1
0.4 CAPSULE ORAL DAILY
Status: DISCONTINUED | OUTPATIENT
Start: 2020-01-01 | End: 2020-01-01 | Stop reason: HOSPADM

## 2020-01-01 RX ORDER — AMLODIPINE BESYLATE 5 MG/1
2.5 TABLET ORAL DAILY
Status: DISCONTINUED | OUTPATIENT
Start: 2020-01-01 | End: 2020-01-01 | Stop reason: HOSPADM

## 2020-01-01 RX ORDER — HEPARIN SODIUM 1000 [USP'U]/ML
INJECTION, SOLUTION INTRAVENOUS; SUBCUTANEOUS AS NEEDED
Status: DISCONTINUED | OUTPATIENT
Start: 2020-01-01 | End: 2020-01-01 | Stop reason: HOSPADM

## 2020-01-01 RX ORDER — INSULIN ASPART 100 [IU]/ML
INJECTION, SOLUTION INTRAVENOUS; SUBCUTANEOUS
Qty: 30 ML | Refills: 0 | Status: SHIPPED | OUTPATIENT
Start: 2020-01-01 | End: 2020-01-01

## 2020-01-01 RX ORDER — MONTELUKAST SODIUM 10 MG/1
TABLET ORAL
Qty: 90 TAB | Refills: 1 | Status: SHIPPED | OUTPATIENT
Start: 2020-01-01

## 2020-01-01 RX ORDER — TRIAMCINOLONE ACETONIDE 5 MG/G
CREAM TOPICAL 2 TIMES DAILY
Qty: 60 G | Refills: 0 | Status: SHIPPED | OUTPATIENT
Start: 2020-01-01

## 2020-01-01 RX ORDER — ADHESIVE BANDAGE
30 BANDAGE TOPICAL DAILY PRN
Status: DISCONTINUED | OUTPATIENT
Start: 2020-01-01 | End: 2020-01-01 | Stop reason: HOSPADM

## 2020-01-01 RX ORDER — LIDOCAINE HYDROCHLORIDE 10 MG/ML
INJECTION, SOLUTION EPIDURAL; INFILTRATION; INTRACAUDAL; PERINEURAL AS NEEDED
Status: DISCONTINUED | OUTPATIENT
Start: 2020-01-01 | End: 2020-01-01 | Stop reason: HOSPADM

## 2020-01-01 RX ORDER — ALLOPURINOL 100 MG/1
TABLET ORAL
Qty: 36 TAB | Refills: 0 | Status: SHIPPED | OUTPATIENT
Start: 2020-01-01 | End: 2020-01-01

## 2020-01-01 RX ORDER — HEPARIN SODIUM 200 [USP'U]/100ML
INJECTION, SOLUTION INTRAVENOUS AS NEEDED
Status: DISCONTINUED | OUTPATIENT
Start: 2020-01-01 | End: 2020-01-01 | Stop reason: HOSPADM

## 2020-01-01 RX ORDER — OXYCODONE AND ACETAMINOPHEN 5; 325 MG/1; MG/1
1 TABLET ORAL
Status: DISCONTINUED | OUTPATIENT
Start: 2020-01-01 | End: 2020-01-01 | Stop reason: HOSPADM

## 2020-01-01 RX ORDER — SODIUM CHLORIDE 9 MG/ML
25 INJECTION, SOLUTION INTRAVENOUS
Status: DISCONTINUED | OUTPATIENT
Start: 2020-01-01 | End: 2020-01-01 | Stop reason: HOSPADM

## 2020-01-01 RX ORDER — CINACALCET 30 MG/1
60 TABLET, FILM COATED ORAL DAILY
Status: DISCONTINUED | OUTPATIENT
Start: 2020-01-01 | End: 2020-01-01 | Stop reason: HOSPADM

## 2020-01-01 RX ORDER — CALCIUM ACETATE 667 MG/1
1 CAPSULE ORAL
Status: DISCONTINUED | OUTPATIENT
Start: 2020-01-01 | End: 2020-01-01 | Stop reason: HOSPADM

## 2020-01-01 RX ORDER — NITROGLYCERIN 0.4 MG/1
0.4 TABLET SUBLINGUAL AS NEEDED
Qty: 1 BOTTLE | Refills: 0 | Status: SHIPPED | OUTPATIENT
Start: 2020-01-01 | End: 2020-01-01

## 2020-01-01 RX ORDER — LOSARTAN POTASSIUM 25 MG/1
25 TABLET ORAL DAILY
Status: DISCONTINUED | OUTPATIENT
Start: 2020-01-01 | End: 2020-01-01 | Stop reason: HOSPADM

## 2020-01-01 RX ORDER — ATORVASTATIN CALCIUM 40 MG/1
80 TABLET, FILM COATED ORAL
Status: DISCONTINUED | OUTPATIENT
Start: 2020-01-01 | End: 2020-01-01 | Stop reason: HOSPADM

## 2020-01-01 RX ORDER — DULOXETIN HYDROCHLORIDE 30 MG/1
60 CAPSULE, DELAYED RELEASE ORAL DAILY
Status: DISCONTINUED | OUTPATIENT
Start: 2020-01-01 | End: 2020-01-01

## 2020-01-01 RX ORDER — FENTANYL CITRATE 50 UG/ML
INJECTION, SOLUTION INTRAMUSCULAR; INTRAVENOUS AS NEEDED
Status: DISCONTINUED | OUTPATIENT
Start: 2020-01-01 | End: 2020-01-01 | Stop reason: HOSPADM

## 2020-01-01 RX ORDER — HEPARIN SODIUM 1000 [USP'U]/ML
INJECTION, SOLUTION INTRAVENOUS; SUBCUTANEOUS
Status: COMPLETED
Start: 2020-01-01 | End: 2020-01-01

## 2020-01-01 RX ORDER — HEPARIN SODIUM 1000 [USP'U]/ML
3000 INJECTION, SOLUTION INTRAVENOUS; SUBCUTANEOUS ONCE
Status: COMPLETED | OUTPATIENT
Start: 2020-01-01 | End: 2020-01-01

## 2020-01-01 RX ORDER — DIPHENHYDRAMINE HYDROCHLORIDE 50 MG/ML
12.5 INJECTION, SOLUTION INTRAMUSCULAR; INTRAVENOUS
Status: DISCONTINUED | OUTPATIENT
Start: 2020-01-01 | End: 2020-01-01 | Stop reason: HOSPADM

## 2020-01-01 RX ORDER — SODIUM CHLORIDE 0.9 % (FLUSH) 0.9 %
5-40 SYRINGE (ML) INJECTION EVERY 8 HOURS
Status: DISCONTINUED | OUTPATIENT
Start: 2020-01-01 | End: 2020-01-01 | Stop reason: HOSPADM

## 2020-01-01 RX ORDER — CALCIUM CARBONATE 200(500)MG
2 TABLET,CHEWABLE ORAL 3 TIMES DAILY
COMMUNITY

## 2020-01-01 RX ORDER — HEPARIN SODIUM 1000 [USP'U]/ML
4000 INJECTION, SOLUTION INTRAVENOUS; SUBCUTANEOUS
Status: COMPLETED | OUTPATIENT
Start: 2020-01-01 | End: 2020-01-01

## 2020-01-01 RX ORDER — INSULIN GLARGINE 100 [IU]/ML
INJECTION, SOLUTION SUBCUTANEOUS
Qty: 30 ML | Refills: 2 | Status: CANCELLED | OUTPATIENT
Start: 2020-01-01

## 2020-01-01 RX ORDER — PANTOPRAZOLE SODIUM 40 MG/1
40 TABLET, DELAYED RELEASE ORAL
Status: DISCONTINUED | OUTPATIENT
Start: 2020-01-01 | End: 2020-01-01 | Stop reason: HOSPADM

## 2020-01-01 RX ORDER — INSULIN LISPRO 100 [IU]/ML
INJECTION, SOLUTION INTRAVENOUS; SUBCUTANEOUS
Status: DISCONTINUED | OUTPATIENT
Start: 2020-01-01 | End: 2020-01-01 | Stop reason: HOSPADM

## 2020-01-01 RX ORDER — INSULIN LISPRO 100 [IU]/ML
INJECTION, SOLUTION INTRAVENOUS; SUBCUTANEOUS
Status: DISCONTINUED | OUTPATIENT
Start: 2020-01-01 | End: 2020-01-01

## 2020-01-01 RX ORDER — ALLOPURINOL 100 MG/1
TABLET ORAL
Qty: 36 TAB | Refills: 0 | Status: SHIPPED | OUTPATIENT
Start: 2020-01-01

## 2020-01-01 RX ORDER — ASPIRIN 81 MG/1
81 TABLET ORAL DAILY
Status: DISCONTINUED | OUTPATIENT
Start: 2020-01-01 | End: 2020-01-01 | Stop reason: HOSPADM

## 2020-01-01 RX ORDER — MIDAZOLAM HYDROCHLORIDE 1 MG/ML
INJECTION, SOLUTION INTRAMUSCULAR; INTRAVENOUS AS NEEDED
Status: DISCONTINUED | OUTPATIENT
Start: 2020-01-01 | End: 2020-01-01 | Stop reason: HOSPADM

## 2020-01-01 RX ORDER — IPRATROPIUM BROMIDE AND ALBUTEROL SULFATE 2.5; .5 MG/3ML; MG/3ML
3 SOLUTION RESPIRATORY (INHALATION)
Status: DISCONTINUED | OUTPATIENT
Start: 2020-01-01 | End: 2020-01-01 | Stop reason: HOSPADM

## 2020-01-01 RX ORDER — ZOLPIDEM TARTRATE 5 MG/1
5 TABLET ORAL
Status: DISCONTINUED | OUTPATIENT
Start: 2020-01-01 | End: 2020-01-01 | Stop reason: HOSPADM

## 2020-01-01 RX ORDER — RAMELTEON 8 MG/1
8 TABLET ORAL
Qty: 90 TAB | Refills: 1 | Status: SHIPPED | OUTPATIENT
Start: 2020-01-01

## 2020-01-01 RX ORDER — DOCUSATE SODIUM 100 MG/1
100 CAPSULE, LIQUID FILLED ORAL 2 TIMES DAILY
Status: DISCONTINUED | OUTPATIENT
Start: 2020-01-01 | End: 2020-01-01 | Stop reason: HOSPADM

## 2020-01-01 RX ORDER — MONTELUKAST SODIUM 10 MG/1
10 TABLET ORAL
Status: DISCONTINUED | OUTPATIENT
Start: 2020-01-01 | End: 2020-01-01 | Stop reason: HOSPADM

## 2020-01-01 RX ORDER — AMLODIPINE BESYLATE 2.5 MG/1
TABLET ORAL
Qty: 90 TAB | Refills: 1 | Status: SHIPPED | OUTPATIENT
Start: 2020-01-01

## 2020-01-01 RX ORDER — HEPARIN SODIUM 200 [USP'U]/100ML
1000 INJECTION, SOLUTION INTRAVENOUS CONTINUOUS
Status: DISCONTINUED | OUTPATIENT
Start: 2020-01-01 | End: 2020-01-01

## 2020-01-01 RX ORDER — LOSARTAN POTASSIUM 25 MG/1
25 TABLET ORAL DAILY
Qty: 90 TAB | Refills: 1 | Status: SHIPPED | OUTPATIENT
Start: 2020-01-01

## 2020-01-01 RX ORDER — CLOPIDOGREL BISULFATE 75 MG/1
75 TABLET ORAL DAILY
Qty: 90 TAB | Refills: 3 | Status: SHIPPED | OUTPATIENT
Start: 2020-01-01

## 2020-01-01 RX ORDER — MORPHINE SULFATE 10 MG/ML
1 INJECTION, SOLUTION INTRAMUSCULAR; INTRAVENOUS
Status: DISCONTINUED | OUTPATIENT
Start: 2020-01-01 | End: 2020-01-01 | Stop reason: HOSPADM

## 2020-01-01 RX ORDER — ALLOPURINOL 100 MG/1
100 TABLET ORAL
Status: DISCONTINUED | OUTPATIENT
Start: 2020-01-01 | End: 2020-01-01 | Stop reason: HOSPADM

## 2020-01-01 RX ORDER — SIMVASTATIN 40 MG/1
40 TABLET, FILM COATED ORAL
Qty: 90 TAB | Refills: 1 | Status: SHIPPED | OUTPATIENT
Start: 2020-01-01

## 2020-01-01 RX ORDER — INSULIN GLARGINE 100 [IU]/ML
INJECTION, SOLUTION SUBCUTANEOUS
Qty: 30 ML | Refills: 2 | Status: SHIPPED | OUTPATIENT
Start: 2020-01-01

## 2020-01-01 RX ORDER — LIDOCAINE HYDROCHLORIDE 10 MG/ML
INJECTION INFILTRATION; PERINEURAL AS NEEDED
Status: DISCONTINUED | OUTPATIENT
Start: 2020-01-01 | End: 2020-01-01 | Stop reason: HOSPADM

## 2020-01-01 RX ORDER — METOPROLOL TARTRATE 50 MG/1
50 TABLET ORAL 2 TIMES DAILY
Qty: 180 TAB | Refills: 3 | Status: SHIPPED | OUTPATIENT
Start: 2020-01-01 | End: 2020-01-01

## 2020-01-01 RX ORDER — METOPROLOL TARTRATE 50 MG/1
100 TABLET ORAL EVERY 12 HOURS
Status: DISCONTINUED | OUTPATIENT
Start: 2020-01-01 | End: 2020-01-01 | Stop reason: HOSPADM

## 2020-01-01 RX ORDER — HYDRALAZINE HYDROCHLORIDE 20 MG/ML
10 INJECTION INTRAMUSCULAR; INTRAVENOUS
Status: DISCONTINUED | OUTPATIENT
Start: 2020-01-01 | End: 2020-01-01 | Stop reason: HOSPADM

## 2020-01-01 RX ORDER — SODIUM CHLORIDE 0.9 % (FLUSH) 0.9 %
5-40 SYRINGE (ML) INJECTION AS NEEDED
Status: DISCONTINUED | OUTPATIENT
Start: 2020-01-01 | End: 2020-01-01 | Stop reason: HOSPADM

## 2020-01-01 RX ADMIN — ASPIRIN 81 MG: 81 TABLET, COATED ORAL at 09:23

## 2020-01-01 RX ADMIN — CALCIUM ACETATE 667 MG: 667 CAPSULE ORAL at 12:45

## 2020-01-01 RX ADMIN — HEPARIN SODIUM 4000 UNITS: 1000 INJECTION INTRAVENOUS; SUBCUTANEOUS at 22:45

## 2020-01-01 RX ADMIN — Medication 10 ML: at 09:29

## 2020-01-01 RX ADMIN — ATORVASTATIN CALCIUM 80 MG: 40 TABLET, FILM COATED ORAL at 22:07

## 2020-01-01 RX ADMIN — SODIUM CHLORIDE 500 ML: 900 INJECTION, SOLUTION INTRAVENOUS at 18:59

## 2020-01-01 RX ADMIN — Medication 10 ML: at 22:00

## 2020-01-01 RX ADMIN — CINACALCET HYDROCHLORIDE 60 MG: 30 TABLET, FILM COATED ORAL at 09:24

## 2020-01-01 RX ADMIN — MONTELUKAST 10 MG: 10 TABLET, FILM COATED ORAL at 22:07

## 2020-01-01 RX ADMIN — INSULIN LISPRO 4 UNITS: 100 INJECTION, SOLUTION INTRAVENOUS; SUBCUTANEOUS at 12:45

## 2020-01-01 RX ADMIN — CLOPIDOGREL BISULFATE 75 MG: 75 TABLET ORAL at 09:24

## 2020-01-01 RX ADMIN — DOCUSATE SODIUM 100 MG: 100 CAPSULE, LIQUID FILLED ORAL at 09:23

## 2020-01-01 RX ADMIN — NITROGLYCERIN 0.5 INCH: 20 OINTMENT TOPICAL at 22:50

## 2020-01-01 RX ADMIN — Medication 10 ML: at 18:26

## 2020-01-01 RX ADMIN — TAMSULOSIN HYDROCHLORIDE 0.4 MG: 0.4 CAPSULE ORAL at 09:22

## 2020-01-01 RX ADMIN — CALCIUM ACETATE 667 MG: 667 CAPSULE ORAL at 09:23

## 2020-01-01 RX ADMIN — HEPARIN SODIUM AND DEXTROSE 10.9 UNITS/KG/HR: 10000; 5 INJECTION INTRAVENOUS at 22:46

## 2020-01-01 RX ADMIN — SODIUM CHLORIDE 500 ML: 900 INJECTION, SOLUTION INTRAVENOUS at 20:07

## 2020-01-01 RX ADMIN — METOPROLOL TARTRATE 100 MG: 50 TABLET, FILM COATED ORAL at 12:24

## 2020-01-01 RX ADMIN — CALCIUM ACETATE 667 MG: 667 CAPSULE ORAL at 18:37

## 2020-01-01 RX ADMIN — PANTOPRAZOLE SODIUM 40 MG: 40 TABLET, DELAYED RELEASE ORAL at 09:22

## 2020-01-01 RX ADMIN — AMLODIPINE BESYLATE 2.5 MG: 5 TABLET ORAL at 12:21

## 2020-01-01 RX ADMIN — Medication 10 ML: at 17:52

## 2020-01-01 RX ADMIN — METOPROLOL TARTRATE 100 MG: 50 TABLET, FILM COATED ORAL at 23:04

## 2020-01-01 RX ADMIN — HEPARIN SODIUM 3000 UNITS: 1000 INJECTION, SOLUTION INTRAVENOUS; SUBCUTANEOUS at 07:20

## 2020-01-02 NOTE — DISCHARGE INSTRUCTIONS
[facility] ARTERIOVENOUS FISTULA, GRAFT ACCESS, REVISION, OR DECLOT    ACTIVITY:  Limited activity today. Keep access arm straight and raised above the level of your heart for 24 hours or until the swelling goes away. DIET:  May have your usual food, unless told otherwise by your doctor. PAIN:  Use the prescription for pain medicine your doctor gave you. If you do not have one, usual your normal pain medicine. If this does not control your pain, call your doctor. DO NOT TAKE ASPIRIN OR MEDICINE WITH ASPIRIN IN IT, unless your doctor says you may. DRESSING CARE:   Keep your dressing clean and dry. Leave your dressing on until the Dialysis Nurse or your doctor takes it off. BLEEDING:  If you have any bleeding put pressure over the bleeding area and call your doctor. CARE OF YOUR ACCESS ARM:  You may have some bruising, swelling, and discoloration for several weeks. After the swelling has gone down, you will be able to see the outline of the graft. By placing your fingertips over the graft you will be able to feel a vibration (thrill) that means blood is flowing and the graft is working. DO:  1. Make sure your arm is washed with soap and water every day. 2. Feel for the [address] at area marked in the picture. 3. Call your Kidney doctor if you do not feel the [address] or for any problems like swelling, redness, tingling, or numbness in access arm, pus, or fever over 101. DO NOT:  1. Wear tight sleeves, watches, belts, or bracelets over the graft. 2. Carry heavy bags across the graft or fistula. 3. Sleep on your graft side. 4. Let your blood pressure be taken in your access arm. 5. Let blood be drawn from your access arm. For the next 24 hours, DO NOT Drive, Drink Alcoholic Beverages, or Make IMPORTANT Decisions. Follow-Up Instructions:  Please see your ordering doctor as he/she has requested.     To Reach Us:

## 2020-01-02 NOTE — H&P (VIEW-ONLY)
Subjective:      Elijah Healy is a 78 y.o. male who presents today for post op visit, status post thrombectomy at another vascular facility for clotted access  Though he was sent elsewhere, pt/family wished to follow up with us, as we did his original access    We also learn today he again did not get a full treatment yesterday due to pulling clots from access    Objective:     Visit Vitals  /70 (BP 1 Location: Right arm, BP Patient Position: Sitting)   Pulse 84   Resp 14   Ht 5' 8\" (1.727 m)   Wt 204 lb (92.5 kg)   SpO2 97%   BMI 31.02 kg/m²     Graft patent  Questionable pseudoaneurysm at access point from his last procedure? Assessment:     Wound check/discharge visit. Plan:       ICD-10-CM ICD-9-CM    1. ESRD (end stage renal disease) on dialysis (HCC) N18.6 585.6     Z99.2 V45.11      No orders of the defined types were placed in this encounter. He hadn't eaten today  Will go ahead and plan further eval/intervention with shuntogram this afternoon      LESLY Vasquez    Portions of this note have been entered using voice recognition software.

## 2020-01-02 NOTE — Clinical Note
TRANSFER - OUT REPORT:     Verbal report given to: Brandie Flores RN. Report consisted of patient's Situation, Background, Assessment and   Recommendations(SBAR). Opportunity for questions and clarification was provided. Patient transported with a Cardiac Cath Tech / Patient Care Tech. Patient transported to: [address]

## 2020-01-02 NOTE — Clinical Note
TRANSFER - IN REPORT:     Verbal report received from: Holly Moctezuma RN. Report consisted of patient's Situation, Background, Assessment and   Recommendations(SBAR). Opportunity for questions and clarification was provided. Assessment completed upon patient's arrival to unit and care assumed. Patient transported with a Cardiac Cath Tech / Patient Care Tech.

## 2020-01-02 NOTE — PROGRESS NOTES
1. Have you been to an emergency room or urgent care clinic since your last visit?   no  Hospitalized since your last visit? If yes, where, when, and reason for visit?   no  2. Have you seen or consulted any other health care providers outside of the [facility] since your last visit including any procedures, health maintenance items. If yes, where, when and reason for visit?    yes

## 2020-01-02 NOTE — PROGRESS NOTES
Subjective:      Nagi Esqueda is a 78 y.o. male who presents today for post op visit, status post thrombectomy at another vascular facility for clotted access  Though he was sent elsewhere, pt/family wished to follow up with us, as we did his original access    We also learn today he again did not get a full treatment yesterday due to pulling clots from access    Objective:     Visit Vitals  /70 (BP 1 Location: Right arm, BP Patient Position: Sitting)   Pulse 84   Resp 14   Ht 5' 8\" (1.727 m)   Wt 204 lb (92.5 kg)   SpO2 97%   BMI 31.02 kg/m²     Graft patent  Questionable pseudoaneurysm at access point from his last procedure? Assessment:     Wound check/discharge visit. Plan:       ICD-10-CM ICD-9-CM    1. ESRD (end stage renal disease) on dialysis (HCC) N18.6 585.6     Z99.2 V45.11      No orders of the defined types were placed in this encounter. He hadn't eaten today  Will go ahead and plan further eval/intervention with shuntogram this afternoon      LESLY Hdz    Portions of this note have been entered using voice recognition software.

## 2020-01-02 NOTE — Clinical Note
Peripheral Lesion 1. Balloon inflated using single inflation technique. Pressure = 25 toni; Duration = 60 sec.

## 2020-01-02 NOTE — INTERVAL H&P NOTE
H&P Update:  Juan Miguel Thompson was seen and examined. History and physical has been reviewed. The patient has been examined.  There have been no significant clinical changes since the completion of the originally dated History and Physical.

## 2020-01-02 NOTE — Clinical Note
Left brachial prepped with ChloraPrep and draped. Wet prep solution applied at: 1433. Wet prep solution dried at: 1436. Wet prep elapsed drying time: 3 mins.

## 2020-01-09 NOTE — PATIENT INSTRUCTIONS
Body Mass Index: Care Instructions  Your Care Instructions    Body mass index (BMI) can help you see if your weight is raising your risk for health problems. It uses a formula to compare how much you weigh with how tall you are. · A BMI lower than 18.5 is considered underweight. · A BMI between 18.5 and 24.9 is considered healthy. · A BMI between 25 and 29.9 is considered overweight. A BMI of 30 or higher is considered obese. If your BMI is in the normal range, it means that you have a lower risk for weight-related health problems. If your BMI is in the overweight or obese range, you may be at increased risk for weight-related health problems, such as high blood pressure, heart disease, stroke, arthritis or joint pain, and diabetes. If your BMI is in the underweight range, you may be at increased risk for health problems such as fatigue, lower protection (immunity) against illness, muscle loss, bone loss, hair loss, and hormone problems. BMI is just one measure of your risk for weight-related health problems. You may be at higher risk for health problems if you are not active, you eat an unhealthy diet, or you drink too much alcohol or use tobacco products. Follow-up care is a key part of your treatment and safety. Be sure to make and go to all appointments, and call your doctor if you are having problems. It's also a good idea to know your test results and keep a list of the medicines you take. How can you care for yourself at home? · Practice healthy eating habits. This includes eating plenty of fruits, vegetables, whole grains, lean protein, and low-fat dairy. · If your doctor recommends it, get more exercise. Walking is a good choice. Bit by bit, increase the amount you walk every day. Try for at least 30 minutes on most days of the week. · Do not smoke. Smoking can increase your risk for health problems. If you need help quitting, talk to your doctor about stop-smoking programs and medicines. These can increase your chances of quitting for good. · Limit alcohol to 2 drinks a day for men and 1 drink a day for women. Too much alcohol can cause health problems. If you have a BMI higher than 25  · Your doctor may do other tests to check your risk for weight-related health problems. This may include measuring the distance around your waist. A waist measurement of more than 40 inches in men or 35 inches in women can increase the risk of weight-related health problems. · Talk with your doctor about steps you can take to stay healthy or improve your health. You may need to make lifestyle changes to lose weight and stay healthy, such as changing your diet and getting regular exercise. If you have a BMI lower than 18.5  · Your doctor may do other tests to check your risk for health problems. · Talk with your doctor about steps you can take to stay healthy or improve your health. You may need to make lifestyle changes to gain or maintain weight and stay healthy, such as getting more healthy foods in your diet and doing exercises to build muscle. Where can you learn more? Go to http://kendal-farzaneh.info/. Enter S176 in the search box to learn more about \"Body Mass Index: Care Instructions. \"  Current as of: October 13, 2016  Content Version: 11.4  © 3259-6909 [employer]. Care instructions adapted under license by [employer] (which disclaims liability or warranty for this information). If you have questions about a medical condition or this instruction, always ask your healthcare professional. [address] any warranty or liability for your use of this information.

## 2020-01-09 NOTE — PROGRESS NOTES
Chief Complaint   Patient presents with    Chronic Kidney Disease    Diabetes     1. Have you been to the ER, urgent care clinic since your last visit? Hospitalized since your last visit? No    2. Have you seen or consulted any other health care providers outside of the [address] [place] [address] since your last visit? Include any pap smears or colon screening. No     CY Ambrose comes in for follow-up care. Diabetes mellitus type 2: Patient has type 2 diabetes mellitus. He is on insulin. He is followed up by the endocrinologist.  States that his blood glucose numbers have been fluctuating lately. We will check his HbA1c. Cardiac: Patient has a history of CAD. He has had CABG done and also has had stent placement in the coronary vasculature. He is on Plavix. He denies chest pain, shortness of breath or diaphoresis. He has been follow-up with a cardiologist.  Currently he is on Lopressor, Zocor, Plavix, Imdur, Cozaar and fenofibrate. He also takes low-dose aspirin. We will continue with this medication. End-stage renal disease: Patient has end-stage renal disease. He is on hemodialysis. He does this 3 days a week. Most recently he had clotting in his AV fistula and now has a new fistula placed. Neuropathy: Patient has numbness and tingling of the hands and the feet. He is on pregabalin and this has helped with the symptoms. We will continue with the current treatment plan. Insomnia: Patient has been having trouble getting to sleep. I will give Rozerem to take. I will follow-up at next visit. Hypertension: Patient's blood pressure slightly better today. At times it has been low. He is on amlodipine, Cozaar, Lopressor. We will continue with the current treatment plan. Mood disorder: Patient has been on Cymbalta. He does have end-stage renal disease. We will discontinue Cymbalta.        Past Medical History  Past Medical History:   Diagnosis Date    Atherosclerosis of renal artery ([facility]     S/P Rt stent    CAD (coronary artery disease) , 1997, 2012    ,double bypass, 2 stents, 2stents 7/2016    Cancer ([address]     prostate    CHF (congestive heart failure) (HCC)     Chronic diastolic heart failure (HCC)     Chronic kidney disease     Chronic kidney disease (CKD), stage IV (severe) (HCC)     On Dialysis now -T-Th-sat    Constipation     Coronary atherosclerosis of unspecified type of vessel, native or graft     Stable angina after recent PCI continue treatment.  CRI (chronic renal insufficiency)     Diabetes ([address] 1973    type 2    Essential hypertension, benign     GERD (gastroesophageal reflux disease)     Gout     Hypertension 1982    Morbid obesity ([address]     Weight loss has been strongly encouraged by following dietary restrictions and an exercise routine    APRYL (obstructive sleep apnea) 6/26/2015    no cpap    Other and unspecified hyperlipidemia     HDL's are not at goal, LDL's are at goal, triglycerides are not at goal.    Other and unspecified hyperlipidemia     HDL's are not at goal, LDL's are at goal, triglycerides are not at goal.     Other ill-defined conditions(799.89) 1960    pneumonia twice    Prostate cancer ([address]     Sleep disturbance     Type II or unspecified type diabetes mellitus without mention of complication, not stated as uncontrolled        Surgical History  Past Surgical History:   Procedure Laterality Date    CARDIAC SURG PROCEDURE UNLIST  1995    lt. carotid endart.    Verlinda Smoker CARDIAC SURG PROCEDURE UNLIST  2012, 2016, Nov 2017    Coronary Stent    COLONOSCOPY N/A 6/23/2017    COLONOSCOPY w/ APC w/ polypectomies performed by Heath Wu MD at [address] [place] [address] N/A 1/19/2018    FLEXIBLE SIGMOIDOSCOPY WITH Radio frequency ablation performed by Heath Wu MD at [address] [place] [address] N/A 6/8/2018    SIGMOIDOSCOPY FLEXIBLE WITH APC performed by Felipe Diaz MD at [facility]  HX CORONARY ARTERY BYPASS GRAFT  2000    x2    HX HEENT  1997    cholecystectomy    HX UROLOGICAL  1998    renal art. surg    HX VASCULAR ACCESS      Perma cath for HD- right chest.    NC INTRO CATH DIALYSIS CIRCUIT DX ANGRPH FLUOR S&I N/A 12/10/2018    FISTULOGRAM LEFT performed by Soumya Melgar MD at [facility] CATH LAB    NC INTRO CATH DIALYSIS CIRCUIT DX [address] FLUOR S&I Left 1/2/2020    SHUNTOGRAM LEFT performed by Sharif Dent MD at [facility] CATH LAB    NC INTRO CATH DIALYSIS CIRCUIT W/TRLUML BALO ANGIOP N/A 12/10/2018    Angioplasty Fistula/Dialysis Circuit performed by Soumya Melgar MD at [facility] CATH LAB    NC INTRO CATH DIALYSIS CIRCUIT W/TRLUML BALO ANGIOP N/A 1/2/2020    Angioplasty Fistula/Dialysis Circuit performed by Sharif Dent MD at [facility] CATH LAB        Medications  Current Outpatient Medications   Medication Sig Dispense Refill    hyoscyamine SL (LEVSIN/SL) 0.125 mg SL tablet 0.125 mg by SubLINGual route every four (4) hours as needed for Cramping.  ramelteon (ROZEREM) 8 mg tablet Take 1 Tab by mouth nightly. 90 Tab 1    montelukast (SINGULAIR) 10 mg tablet TAKE ONE TABLET BY MOUTH DAILY 30 Tab 2    amLODIPine (NORVASC) 2.5 mg tablet TAKE ONE TABLET BY MOUTH DAILY 30 Tab 2    allopurinol (ZYLOPRIM) 100 mg tablet TAKE ONE TABLET BY MOUTH THREE TIMES PER WEEK ON MONDAY, WEDNESDAY AND FRIDAY 36 Tab 0    pregabalin (LYRICA) 25 mg capsule Take 1 Cap by mouth two (2) times a day. Max Daily Amount: 50 mg. 60 Cap 2    clobetasol 0.05 % sham Use daily for scalp irritation.  118 mL 1    tamsulosin (FLOMAX) 0.4 mg capsule TAKE ONE CAPSULE BY MOUTH ONE TIME DAILY 90 Cap 3    DULoxetine (CYMBALTA) 60 mg capsule TAKE ONE CAPSULE BY MOUTH DAILY 30 Cap 3    insulin aspart U-100 (NOVOLOG U-100 INSULIN ASPART) 100 unit/mL injection 20 units sq 3 times a day,sliding scale 30 mL 0    Insulin Needles, Disposable, 31 gauge x 5/16\" ndle Use to inject insulin 2 x day 200 Package 3    insulin glargine (LANTUS,BASAGLAR) 100 unit/mL (3 mL) inpn Give Basaglar 15 units twice a day. 30 mL 2    fenofibrate (LOFIBRA) 54 mg tablet TAKE ONE TABLET BY MOUTH DAILY 90 Tab 0    calcium acetate (PHOSLO) 667 mg cap three (3) times daily (with meals).  metoprolol tartrate (LOPRESSOR) 100 mg IR tablet Take 1 Tab by mouth two (2) times a day. 180 Tab 0    simvastatin (ZOCOR) 40 mg tablet TAKE 1 TABLET BY MOUTH NIGHTLY. 90 Tab 3    L.acid/L.casei/B.bif/B.ken/FOS (PROBIOTIC BLEND PO) Take 2 Tabs by mouth daily.  losartan (COZAAR) 50 mg tablet Take 50 mg by mouth daily. Take 1/2 tab daily      clopidogrel (PLAVIX) 75 mg tab Take 1 Tab by mouth daily. 30 Tab 0    isosorbide mononitrate ER (IMDUR) 30 mg tablet Take 1 Tab by mouth daily. 30 Tab 0    nitroglycerin (NITROSTAT) 0.4 mg SL tablet 1 Tab by SubLINGual route as needed for Chest Pain. 1 Bottle 0    cinacalcet (SENSIPAR) 30 mg tablet Take 60 mg by mouth daily.  ASPIR-LOW 81 mg tablet TAKE ONE TABLET BY MOUTH ONE TIME DAILY 38 Tab 4    DULCOLAX, BISACODYL, PO Take  by mouth.  pantoprazole (PROTONIX) 40 mg tablet Take 40 mg by mouth daily.  b complex-vitamin c-folic acid (NEPHROCAPS) 1 mg capsule Take 1 Cap by mouth daily. 30 Cap 0    cholecalciferol (DECARA) 50,000 unit capsule Take  by mouth every seven (7) days.          Allergies  Allergies   Allergen Reactions    Nka [No Known Allergies] Other (comments)       Family History  Family History   Problem Relation Age of Onset    Heart Attack Father 64       Social History  Social History     Socioeconomic History    Marital status:      Spouse name: Not on file    Number of children: Not on file    Years of education: Not on file    Highest education level: Not on file   Occupational History    Not on file   Social Needs    Financial resource strain: Not on file    Food insecurity:     Worry: Not on file     Inability: Not on file   Jose Guadalupe Chavez Transportation needs:     Medical: Not on file     Non-medical: Not on file   Tobacco Use    Smoking status: Never Smoker    Smokeless tobacco: Never Used   Substance and Sexual Activity    Alcohol use: No    Drug use: No    Sexual activity: Not on file   Lifestyle    Physical activity:     Days per week: Not on file     Minutes per session: Not on file    Stress: Not on file   Relationships    Social connections:     Talks on phone: Not on file     Gets together: Not on file     Attends Sabianist service: Not on file     Active member of club or organization: Not on file     Attends meetings of clubs or organizations: Not on file     Relationship status: Not on file    Intimate partner violence:     Fear of current or ex partner: Not on file     Emotionally abused: Not on file     Physically abused: Not on file     Forced sexual activity: Not on file   Other Topics Concern    Not on file   Social History Narrative    Not on file       Review of Systems  Review of Systems - Review of all systems is negative except as noted above in the HPI. Vital Signs  Visit Vitals  /58 (BP 1 Location: Right arm, BP Patient Position: Sitting)   Pulse 81   Temp 96.5 °F (35.8 °C) (Oral)   Resp 16   Ht 5' 8\" (1.727 m)   Wt 200 lb 3.2 oz (90.8 kg)   SpO2 97%   BMI 30.44 kg/m²         Physical Exam  Physical Examination: General appearance - oriented to person, place, and time, overweight and acyanotic, in no respiratory distress  Mental status - affect appropriate to mood  Nose - mucosal congestion  Mouth - mucous membranes moist, pharynx normal without lesions  Neck - supple, no significant adenopathy  Lymphatics - no palpable lymphadenopathy  Chest - no tachypnea, retractions or cyanosis, decreased air entry noted bilateral lung bases.   Heart - S1 and S2 normal, systolic murmur 2/6 at lower left sternal border  Abdomen - no rebound tenderness noted  Back exam - limited range of motion  Neurological - neck supple without rigidity, abnormal neurological exam unchanged from prior examinations  Musculoskeletal - osteoarthritic changes noted in both hands  Extremities - intact peripheral pulses    Results  Results for orders placed or performed during the hospital encounter of 01/02/20   POC 6 PLUS   Result Value Ref Range    Sodium,  136 - 145 MMOL/L    Potassium, POC 4.2 3.5 - 5.5 MMOL/L    Chloride, POC 94 (L) 100 - 108 MMOL/L    BUN, POC 39 (H) 7 - 18 MG/DL    Glucose, POC 68 (L) 74 - 106 MG/DL    Hematocrit, POC 32 (L) 36 - 49 %    Hemoglobin, POC 10.9 (L) 12 - 16 G/DL       ASSESSMENT and PLAN    ICD-10-CM ICD-9-CM    1. Type 2 diabetes mellitus without complication, with long-term current use of insulin ([facility]) E11.9 250.00     Z79.4 V58.67    2. Insomnia, unspecified type G47.00 780.52 ramelteon (ROZEREM) 8 mg tablet   3. ESRD (end stage renal disease) on dialysis ([facility]) N18.6 585.6     Z99.2 V45.11    4. Essential hypertension I10 401.9    5. Neuropathy G62.9 355.9    6. Mood disorder ([facility]) F39 296.90    7. Dermatitis L30.9 692.9    8. Dyslipidemia E78.5 272.4    9. ESRD (end stage renal disease) ([email or web address] N18.6 585.6    10. Hemodialysis patient ([email or web address] Z99.2 V45.11    11. Chronic diastolic heart failure ([facility]) I50.32 428.32    12. Type 2 diabetes mellitus with diabetic neuropathy, with long-term current use of insulin ([facility]) E11.40 250.60     Z79.4 357.2      V58.67    13. Atherosclerosis of renal artery ([facility]) I70.1 440.1    14. Morbid obesity ([email or web address] E66.01 278.01    15. Secondary hyperparathyroidism of renal origin ([email or web address] N25.81 588.81    16. Prostate cancer ([email or web address] V45 533    22. Coronary artery disease involving native coronary artery of native heart with angina pectoris ([email or web address] I25.119 414.01      413.9    Discussed the patient's BMI with him.   The BMI follow up plan is as follows:     dietary management education, guidance, and counseling  encourage exercise  monitor weight  lab results and schedule of future lab studies reviewed with patient  reviewed diet, exercise and weight control  reviewed medications and side effects in detail  specific diabetic recommendations: low cholesterol diet, weight control and daily exercise discussed, home glucose monitoring emphasized, all medications, side effects and compliance discussed carefully, annual eye examinations at Ophthalmology discussed, glycohemoglobin and other lab monitoring discussed and long term diabetic complications discussed      I have discussed the diagnosis with the patient and the intended plan of care as seen in the above orders. The patient has received an after-visit summary and questions were answered concerning future plans. I have discussed medication, side effects, and warnings with the patient in detail. The patient verbalized understanding and is in agreement with the plan of care. The patient will contact the office with any additional concerns. Bard Nageotte, MD    PLEASE NOTE:   This document has been produced using voice recognition software.  Unrecognized errors in transcription may be present

## 2020-01-16 NOTE — PROGRESS NOTES
Subjective:      Hugo Sanchez is a 78 y.o. male who presents today for post op visit, status post shuntogram    He had been in more recently, status post thrombectomy at another vascular facility for clotted access  Though he was sent elsewhere, pt/family wished to follow up with us, as we did his original access     We learned at that previous visit he did not get a full treatment yesterday due to pulling clots from access  There was also questionable pseudoaneurysm at access point from his last procedure? We were able to get him in same day for shuntogram    Operative findings:   #1  Widely patent left brachiocephalic vein fistula  2. Arterial anastomosis is patent without stenosis based on ultrasound and fistulogram  #3 patient has full metal jacket of stent from the mid arm to the subclavian vein confluence  #4 left subclavian vein shows 80% narrowing at the distal stent  #5 left brachiocephalic vein and SVC are patent without stenosis    Intervention:  Balloon angioplasty of left subclavian vein    Objective:     Visit Vitals  /70 (BP 1 Location: Left arm, BP Patient Position: Sitting)   Resp 18   Ht 5' 8\" (1.727 m)   Wt 200 lb (90.7 kg)   BMI 30.41 kg/m²       Cannulation suture removed  Graft with good thrill/bruit    Assessment:     Wound check/discharge visit. Plan:       ICD-10-CM ICD-9-CM    1. ESRD (end stage renal disease) on dialysis (HCC) N18.6 585.6     Z99.2 V45.11      No orders of the defined types were placed in this encounter. It may be that he will require future interventions to maintain function of access as any issues are reported to us from dialysis  However, if frequently recurrent concerns may have to start discussion for evaluation of new access site      LESLY Daniels    Portions of this note have been entered using voice recognition software.

## 2020-01-17 NOTE — PROGRESS NOTES
1. Have you been to an emergency room or urgent care clinic since your last visit? NO    Hospitalized since your last visit? If yes, where, when, and reason for visit? NO  2. Have you seen or consulted any other health care providers outside of the [facility] since your last visit including any procedures, health maintenance items. If yes, where, when and reason for visit?  NO

## 2020-01-23 NOTE — ED NOTES
I performed a brief evaluation, including history and physical, of the patient here in triage and I have determined that pt will need further treatment and evaluation from the main side ER physician. I have placed initial orders to help in expediting patients care. January 23, 2020 at 5:57 PM - Emma Ware PA-C Visit Vitals /70 (BP 1 Location: Right arm, BP Patient Position: At rest) Pulse 100 Temp 98.4 °F (36.9 °C) Resp 14 Ht 5' 8\" (1.727 m) Wt 91.6 kg (202 lb) SpO2 99% BMI 30.71 kg/m²

## 2020-01-23 NOTE — ED NOTES
ED 
 
Pt brought back to room but floor was dirty so pt refused. Floor cleaned by me; I went to waiting room to get pt and found he had left. Left vm on cell phone. Left VM on home phone. (same number as emergency contact) Brett Valdes MD 
 
6:58 PM notified trop of ~ 2.5. Will call police and send to his house. 7:00 PM left second vm on cell phone

## 2020-01-23 NOTE — ED NOTES
The patient requested to have his IV removed. States, \"I don't want to stay. I want to go home. \"

## 2020-01-23 NOTE — Clinical Note
Bilateral groin clipped, prepped with ChloraPrep and draped. Wet prep solution applied at: 1324. Wet prep solution dried at: 1327. Wet prep elapsed drying time: 3 mins.

## 2020-01-23 NOTE — Clinical Note
TRANSFER - OUT REPORT:     Verbal report given to: Christiano Jang RN. Report consisted of patient's Situation, Background, Assessment and   Recommendations(SBAR). Opportunity for questions and clarification was provided. Patient transported with a Cardiac Cath Tech / Patient Care Tech. Patient transported to: PACU.

## 2020-01-23 NOTE — ED TRIAGE NOTES
The patient presents for evaluation of chest pain that began yesterday. He received HD Mondays, Wednesdays, and Fridays. Last treatment was yesterday.

## 2020-01-24 NOTE — CONSULTS
[facility] - Consult Note Consultation request by Dr. Batsheva Elias for advice/opinion related to evaluating NSTEMI Date of  Admission: 1/23/2020  8:47 PM  
Primary Care Physician:  Josué Ureña MD 
 
 
I saw, evaluated, interviewed and examined the patient personally. I agree with the findings and plan of care as documented below with PA-C note Patient with known CAD and end-stage renal disease was admitted with chest pain 48 hours ago. Ruled in for myocardial infarction with elevated cardiac biomarker. Currently pain-free. Continue IV heparin Cardiac medication as tolerated I personally reviewed angiogram from January 2019. I also discussed with primary cardiologist Dr. Justine Ramey about patient's presentation. After discussing with primary cardiologist as well as patient, plan is to perform left heart catheterization to see if patient has any revascularization option. Echo at bedside performed and reviewed which showed normal ejection fraction and moderate aortic stenosis Called patient's wife over the phone and also discussed plan and she is agreeable with left heart catheterization DW patient regarding management strategy which includes medical management vs. Ischemia evaluation (Invasive). Risk, benefit and alternatives of each strategy discussed in detail. Risk, benefit, complication of LHC and possible PCI ( including but not limited to bleeding, vascular trauma requiring surgery,  infection, heart failure, stroke, MI, emergent bypass surgery, severe allergic reactions, kidney failure, dialysis and death ) were discussed with patient and willing to proceed with procedure. Will be using moderate sedation Jeremie Fernandez MD  
 
 
 Assessment:  
 
-NSTEMI, troponin 2.65 --> 4.59 --> 5.37 with episode of chest pain 2 days ago. Has known CAD, s/p CABG x 2, last cardiac catheterization 1/2/2019 with findings as follows: · Patent LIMA-LAD · 100% ostial occlusion of SVG-diagonal artery which has been intervened multiple times in the past 
· Critical disease in distal circumflex and proximal and distal RCA, but these are small vessels and the lesions are chronic 
- CAD S/P CABG 
- Chronic HFpEF 
-ESRD, on HD M/W/F, followed by Dr. Clive Hicks -HTN 
-DM 
- HLD Primary cardiologist is Dr. Jose Alberto Juarez Plan:  
 
-Cardiac catheterization, including risks/benefits/alternatives, was discussed with pt. Pt is in agreement with proceeding with cardiac cath. Pt has been NPO, will plan for cath today, cath lab has been notified. 
-Pt continued on Heparin infusion. 
-He has not been on ASA due to history of bleeding problems, ASA stopped in October due to problems controlling bleeding from HD access. 
-Would otherwise continue cardiac medication regimen to include BB, statin. History of Present Illness: This is a 78 y.o. male admitted for NSTEMI (non-ST elevated myocardial infarction) ([facility] [address] [I21.4]. Patient complains of:  Chest pain Darryl Urias is a 78 y.o. male with PMHx as described above, who presented to the hospital due to chest pain. Pt reports that he has been having intermittent chest pain x 2 days ago. He describes the pain as a burning sensation across his lower chest, L --> R. He initially thought he was having reflux, so he took Tums without relief. He states he ultimately took SL NTG x 3 with improvement of his pain. He initially went to [facility] ER yesterday but left before evaluated by main side ER provider. His troponin, ordered by triage, resulted at 2.32 and thus police were notified to go to patient's home to advise him to return to the hospital for evaluation. Pt states that he decided to come here to [place] instead of [facility].   He is followed by Dr. Jose Alberto Juarez, cardiologist, and last cath was in January 2019 but no intervention performed as pt has had multiple interventions on the 100% occluded graft and has had difficulty with tolerance of DAPT due to Hx GI bleed; he otherwise had chronic critical stenoses of two smaller vessels. He is currently chest pain free at time of evaluation. Cardiac risk factors: diabetes mellitus, male gender, hypertension, known CAD Review of Symptoms:  Except as stated above include: 
Constitutional:  negative Respiratory:  negative Cardiovascular:  As per HPI Gastrointestinal: negative Genitourinary:  negative Musculoskeletal:  Negative Neurological:  Negative Dermatological:  Negative Endocrinological: Negative Psychological:  Negative Past Medical History:  
 
Past Medical History:  
Diagnosis Date  Atherosclerosis of renal artery ([address] S/P Rt stent  CAD (coronary artery disease) , 1997, 2012  
 ,double bypass, 2 stents, 2stents 7/2016  Cancer [facility])   
 prostate  CHF (congestive heart failure) ([address]  Chronic diastolic heart failure ([address]  Chronic kidney disease  Chronic kidney disease (CKD), stage IV (severe) ([address] On Dialysis now -T-Th-sat  Constipation  Coronary atherosclerosis of unspecified type of vessel, native or graft Stable angina after recent PCI continue treatment.  CRI (chronic renal insufficiency)  Diabetes ([address] 1973  
 type 2  
 Essential hypertension, benign  GERD (gastroesophageal reflux disease)  Gout  Hypertension 1982  Morbid obesity ([address] Weight loss has been strongly encouraged by following dietary restrictions and an exercise routine  APRYL (obstructive sleep apnea) 6/26/2015  
 no cpap  Other and unspecified hyperlipidemia HDL's are not at goal, LDL's are at goal, triglycerides are not at goal.  
 Other and unspecified hyperlipidemia HDL's are not at goal, LDL's are at goal, triglycerides are not at goal.   
 Other ill-defined conditions(799.89) 1960  
 pneumonia twice  Prostate cancer ([address]  Sleep disturbance  Type II or unspecified type diabetes mellitus without mention of complication, not stated as uncontrolled Social History:  
 
Social History Socioeconomic History  Marital status:  Spouse name: Not on file  Number of children: Not on file  Years of education: Not on file  Highest education level: Not on file Tobacco Use  Smoking status: Never Smoker  Smokeless tobacco: Never Used Substance and Sexual Activity  Alcohol use: No  
 Drug use: No  
 
 
 Family History:  
 
Family History Problem Relation Age of Onset  Heart Attack Father 64 Medications: Allergies Allergen Reactions  Nka [No Known Allergies] Other (comments) Current Facility-Administered Medications Medication Dose Route Frequency  [START ON 1/26/2020] allopurinoL (ZYLOPRIM) tablet 100 mg  100 mg Oral Q MON, WED & SUN  
 amLODIPine (NORVASC) tablet 2.5 mg  2.5 mg Oral DAILY  aspirin delayed-release tablet 81 mg  81 mg Oral DAILY  calcium acetate(phosphat bind) (PHOSLO) capsule 667 mg  1 Cap Oral TID WITH MEALS  cinacalcet tab 60 mg  60 mg Oral DAILY  clopidogreL (PLAVIX) tablet 75 mg  75 mg Oral DAILY  isosorbide mononitrate ER (IMDUR) tablet 30 mg  30 mg Oral DAILY  losartan (COZAAR) tablet 25 mg  25 mg Oral DAILY  metoprolol tartrate (LOPRESSOR) tablet 100 mg  100 mg Oral Q12H  
 montelukast (SINGULAIR) tablet 10 mg  10 mg Oral QHS  pantoprazole (PROTONIX) tablet 40 mg  40 mg Oral ACB  zolpidem (AMBIEN) tablet 5 mg  5 mg Oral QHS PRN  
 tamsulosin (FLOMAX) capsule 0.4 mg  0.4 mg Oral DAILY  sodium chloride (NS) flush 5-40 mL  5-40 mL IntraVENous Q8H  
 sodium chloride (NS) flush 5-40 mL  5-40 mL IntraVENous PRN  
 magnesium hydroxide (MILK OF MAGNESIA) 400 mg/5 mL oral suspension 30 mL  30 mL Oral DAILY PRN  
 docusate sodium (COLACE) capsule 100 mg  100 mg Oral BID  atorvastatin (LIPITOR) tablet 80 mg  80 mg Oral QHS  ondansetron (ZOFRAN) injection 4 mg  4 mg IntraVENous Q4H PRN  
 acetaminophen (TYLENOL) tablet 650 mg  650 mg Oral Q6H PRN  
 melatonin tablet 12 mg  12 mg Oral QHS PRN  
 albuterol-ipratropium (DUO-NEB) 2.5 MG-0.5 MG/3 ML  3 mL Nebulization Q4H PRN  
 hydrALAZINE (APRESOLINE) 20 mg/mL injection 10 mg  10 mg IntraVENous Q6H PRN  
 Please clarify if patient is still taking fenofibrate (LOFIBRA) tablet 54 mg at home, the dose and frequency   Other BID  heparin 25,000 units in D5W 250 ml infusion  10.9-25 Units/kg/hr IntraVENous TITRATE Current Outpatient Medications Medication Sig  pregabalin (LYRICA) 25 mg capsule TAKE ONE CAPSULE BY MOUTH TWICE A DAY  (  MAX DAILY AMOUNT 50MG )  hyoscyamine SL (LEVSIN/SL) 0.125 mg SL tablet 0.125 mg by SubLINGual route every four (4) hours as needed for Cramping.  ramelteon (ROZEREM) 8 mg tablet Take 1 Tab by mouth nightly.  montelukast (SINGULAIR) 10 mg tablet TAKE ONE TABLET BY MOUTH DAILY  amLODIPine (NORVASC) 2.5 mg tablet TAKE ONE TABLET BY MOUTH DAILY  allopurinol (ZYLOPRIM) 100 mg tablet TAKE ONE TABLET BY MOUTH THREE TIMES PER WEEK ON MONDAY, WEDNESDAY AND FRIDAY  clobetasol 0.05 % sham Use daily for scalp irritation.  tamsulosin (FLOMAX) 0.4 mg capsule TAKE ONE CAPSULE BY MOUTH ONE TIME DAILY  DULoxetine (CYMBALTA) 60 mg capsule TAKE ONE CAPSULE BY MOUTH DAILY  insulin aspart U-100 (NOVOLOG U-100 INSULIN ASPART) 100 unit/mL injection 20 units sq 3 times a day,sliding scale  Insulin Needles, Disposable, 31 gauge x 5/16\" ndle Use to inject insulin 2 x day  insulin glargine (LANTUS,BASAGLAR) 100 unit/mL (3 mL) inpn Give Basaglar 15 units twice a day.  fenofibrate (LOFIBRA) 54 mg tablet TAKE ONE TABLET BY MOUTH DAILY  calcium acetate (PHOSLO) 667 mg cap three (3) times daily (with meals).  metoprolol tartrate (LOPRESSOR) 100 mg IR tablet Take 1 Tab by mouth two (2) times a day.  simvastatin (ZOCOR) 40 mg tablet TAKE 1 TABLET BY MOUTH NIGHTLY.  
 L.acid/L.casei/B.bif/B.ken/FOS (PROBIOTIC BLEND PO) Take 2 Tabs by mouth daily.  losartan (COZAAR) 50 mg tablet Take 50 mg by mouth daily. Take 1/2 tab daily  clopidogrel (PLAVIX) 75 mg tab Take 1 Tab by mouth daily.  isosorbide mononitrate ER (IMDUR) 30 mg tablet Take 1 Tab by mouth daily.  nitroglycerin (NITROSTAT) 0.4 mg SL tablet 1 Tab by SubLINGual route as needed for Chest Pain.  cinacalcet (SENSIPAR) 30 mg tablet Take 60 mg by mouth daily.  ASPIR-LOW 81 mg tablet TAKE ONE TABLET BY MOUTH ONE TIME DAILY  DULCOLAX, BISACODYL, PO Take  by mouth.  pantoprazole (PROTONIX) 40 mg tablet Take 40 mg by mouth daily.  b complex-vitamin c-folic acid (NEPHROCAPS) 1 mg capsule Take 1 Cap by mouth daily.  cholecalciferol (DECARA) 50,000 unit capsule Take  by mouth every seven (7) days. Physical Exam:  
 
Visit Vitals /81 Pulse 76 Temp 98.6 °F (37 °C) Resp 14 Ht 5' 8\" (1.727 m) Wt 202 lb (91.6 kg) SpO2 98% BMI 30.71 kg/m² BP Readings from Last 3 Encounters:  
01/24/20 162/81  
01/23/20 139/70  
01/17/20 140/70 Pulse Readings from Last 3 Encounters:  
01/24/20 76  
01/23/20 100  
01/09/20 81 Wt Readings from Last 3 Encounters:  
01/24/20 202 lb (91.6 kg) 01/23/20 202 lb (91.6 kg) 01/17/20 200 lb (90.7 kg) General:  alert, cooperative, no distress, appears stated age Neck:  supple Lungs:  clear to auscultation bilaterally Heart:  Regular rate and rhythm Abdomen:  abdomen is soft without significant tenderness, masses, organomegaly or guarding Extremities:  Atraumatic, no edema Skin: Warm and dry. Neuro: alert, oriented x3, affect appropriate, no focal neurological deficits, moves all extremities well, no involuntary movements Psych: non focal 
 
 Data Review:  
 
Recent Labs  
  01/24/20 
0509 01/23/20 
2100 01/23/20 
1801 WBC 9.8 10.5 8.4 HGB 11.6* 10.6* 10.9*  
 HCT 37.4 34.1* 34.4*  
 258 232 Recent Labs  
  01/24/20 
0509 01/23/20 
2100 01/23/20 
1801 NA  --  136 138 K  --  4.4 4.2 CL  --  104 103 CO2  --  29 30  
GLU  --  58* 191* BUN  --  39* 39* CREA  --  7.34* 7.22* CA  --  9.0 9.2 ALB  --  3.3* 3.4 SGOT  --  30 17 ALT  --  14* 12* INR 1.1 1.1  -- Results for orders placed or performed during the hospital encounter of 01/23/20 EKG, 12 LEAD, INITIAL Result Value Ref Range Ventricular Rate 88 BPM  
 Atrial Rate 88 BPM  
 P-R Interval 146 ms  
 QRS Duration 108 ms Q-T Interval 362 ms QTC Calculation (Bezet) 438 ms Calculated P Axis 19 degrees Calculated R Axis 51 degrees Calculated T Axis -21 degrees Diagnosis Sinus rhythm with occasional premature ventricular complexes Incomplete right bundle branch block Septal infarct , age undetermined Abnormal ECG When compared with ECG of 23-JAN-2020 17:51, 
premature ventricular complexes are now present Septal infarct is now present T wave inversion now evident in Inferior leads T wave inversion no longer evident in Lateral leads All Cardiac Markers in the last 24 hours:   
Lab Results Component Value Date/Time TROIQ 5.37 () 01/24/2020 05:09 AM  
 TROIQ 4.59 ([facility]) 01/23/2020 09:00 PM  
 
 
Last Lipid:   
Lab Results Component Value Date/Time Cholesterol, total 133 07/10/2019 11:34 AM  
 HDL Cholesterol 47 07/10/2019 11:34 AM  
 LDL, calculated 60.6 07/10/2019 11:34 AM  
 Triglyceride 127 07/10/2019 11:34 AM  
 CHOL/HDL Ratio 2.8 07/10/2019 11:34 AM  
 
 
Signed By: Lee Rodriguez PA-C January 24, 2020 Total critical care time spent in reviewing the case, multiple EMR databases, physician notes, procedure notes, examining the patient, and documentation, is >75 minutes. Discussed in details with patient at bedside. All questions were answered to their full satisfaction. >50% time spent in counseling and coordination of care.

## 2020-01-24 NOTE — ED NOTES
Patient is leaving at this time for Cath Lab. Bedside handoff provided to Jodi Deras. ECG leads and IV pump infusing Heparin sent with Jodi Deras (who will return them to ED). Patient remains alert, oriented, and on room air. Denies pain. Belongings on bed and transported as well. Jodi Deras stated Aspirin can be given in Cath Lab.

## 2020-01-24 NOTE — PROGRESS NOTES
TRANSFER - IN REPORT: 
 
Verbal report received from Yumiko RN(name) on Jake Gil  being received from [facility]) for routine progression of care Report consisted of patients Situation, Background, Assessment and  
Recommendations(SBAR). Information from the following report(s) SBAR, Kardex, Intake/Output, MAR and Recent Results was reviewed with the receiving nurse. Opportunity for questions and clarification was provided. Assessment completed upon patients arrival to unit and care assumed.

## 2020-01-24 NOTE — ED NOTES
[place] police informed of the patient's elopement. Requested police to go to the patient's residence and inform him to return to the ED or to go to another ED of his choice.

## 2020-01-24 NOTE — ED PROVIDER NOTES
EMERGENCY DEPARTMENT HISTORY AND PHYSICAL EXAM 
 
 
Date: 1/23/2020 Patient Name: Kenisha Menchaca History of Presenting Illness Chief Complaint Patient presents with  Chest Pain  Shortness of Breath History Provided By: Patient, Patient's Wife and Patient's Son HPI: Kenisha Menchaca, 78 y.o. male PMHx significant for DM, htn, CHF, ESRD, DM, hypercholesterolemia, prostate cancer, gout, GERD, CAD, APRYL presents ambulatory to the ED with cc of nonradiating intermittent burning to left and right chest beginning last night with intermittent assoc SOB. Pt reports sx were worse with activity. Pt denies dizziness. Pt reports thinking the sx were related to GERD and took PPI without relief of sx. Pt reports CP continued intermittently today, and he took nitro 3 times. Pt reports relief of sx each time. Pt reports relief of sx when last taking nitro around 5-6, and has not had return of CP since. Denies CP currently. Hx 5 cardiac stents, with the most recent 1-2 years ago. Pt received dialysis MWF, and received dialysis yesterday. Patient reports he takes Plavix for his stents and last took Plavix yesterday morning. He last took ASA 2 days ago. Pt states he did not take BP mediation today, because he is often told to hold BP meds after dialysis due to drop in BP. Of note, pt was called to come back to ED by [address] [place] [address] Dr pathak, and police was sent to house to inform he needs to return to ED since pt was not answering. Pt came right away to ED. There are no other complaints, changes, or physical findings at this time. PCP: Marivel Moss MD 
 
No current facility-administered medications on file prior to encounter. Current Outpatient Medications on File Prior to Encounter Medication Sig Dispense Refill  pregabalin (LYRICA) 25 mg capsule TAKE ONE CAPSULE BY MOUTH TWICE A DAY  (  MAX DAILY AMOUNT 50MG ) 60 Cap 1  
 hyoscyamine SL (LEVSIN/SL) 0.125 mg SL tablet 0.125 mg by SubLINGual route every four (4) hours as needed for Cramping.  ramelteon (ROZEREM) 8 mg tablet Take 1 Tab by mouth nightly. 90 Tab 1  
 montelukast (SINGULAIR) 10 mg tablet TAKE ONE TABLET BY MOUTH DAILY 30 Tab 2  
 amLODIPine (NORVASC) 2.5 mg tablet TAKE ONE TABLET BY MOUTH DAILY 30 Tab 2  
 allopurinol (ZYLOPRIM) 100 mg tablet TAKE ONE TABLET BY MOUTH THREE TIMES PER WEEK ON MONDAY, WEDNESDAY AND FRIDAY 36 Tab 0  clobetasol 0.05 % sham Use daily for scalp irritation. 118 mL 1  
 tamsulosin (FLOMAX) 0.4 mg capsule TAKE ONE CAPSULE BY MOUTH ONE TIME DAILY 90 Cap 3  
 DULoxetine (CYMBALTA) 60 mg capsule TAKE ONE CAPSULE BY MOUTH DAILY 30 Cap 3  
 insulin aspart U-100 (NOVOLOG U-100 INSULIN ASPART) 100 unit/mL injection 20 units sq 3 times a day,sliding scale 30 mL 0  
 Insulin Needles, Disposable, 31 gauge x 5/16\" ndle Use to inject insulin 2 x day 200 Package 3  
 insulin glargine (LANTUS,BASAGLAR) 100 unit/mL (3 mL) inpn Give Basaglar 15 units twice a day. 30 mL 2  
 fenofibrate (LOFIBRA) 54 mg tablet TAKE ONE TABLET BY MOUTH DAILY 90 Tab 0  
 calcium acetate (PHOSLO) 667 mg cap three (3) times daily (with meals).  metoprolol tartrate (LOPRESSOR) 100 mg IR tablet Take 1 Tab by mouth two (2) times a day. 180 Tab 0  
 simvastatin (ZOCOR) 40 mg tablet TAKE 1 TABLET BY MOUTH NIGHTLY. 90 Tab 3  
 L.acid/L.casei/B.bif/B.ken/FOS (PROBIOTIC BLEND PO) Take 2 Tabs by mouth daily.  losartan (COZAAR) 50 mg tablet Take 50 mg by mouth daily. Take 1/2 tab daily  clopidogrel (PLAVIX) 75 mg tab Take 1 Tab by mouth daily. 30 Tab 0  
 isosorbide mononitrate ER (IMDUR) 30 mg tablet Take 1 Tab by mouth daily. 30 Tab 0  
 nitroglycerin (NITROSTAT) 0.4 mg SL tablet 1 Tab by SubLINGual route as needed for Chest Pain. 1 Bottle 0  
 cinacalcet (SENSIPAR) 30 mg tablet Take 60 mg by mouth daily.  ASPIR-LOW 81 mg tablet TAKE ONE TABLET BY MOUTH ONE TIME DAILY 38 Tab 4  DULCOLAX, BISACODYL, PO Take  by mouth.  pantoprazole (PROTONIX) 40 mg tablet Take 40 mg by mouth daily.  b complex-vitamin c-folic acid (NEPHROCAPS) 1 mg capsule Take 1 Cap by mouth daily. 30 Cap 0  cholecalciferol (DECARA) 50,000 unit capsule Take  by mouth every seven (7) days. Past History Past Medical History: 
Past Medical History:  
Diagnosis Date  Atherosclerosis of renal artery ([address] S/P Rt stent  CAD (coronary artery disease) , 1997, 2012  
 ,double bypass, 2 stents, 2stents 7/2016  Cancer [facility])   
 prostate  CHF (congestive heart failure) ([address]  Chronic diastolic heart failure ([address]  Chronic kidney disease  Chronic kidney disease (CKD), stage IV (severe) ([address] On Dialysis now -T-Th-sat  Constipation  Coronary atherosclerosis of unspecified type of vessel, native or graft Stable angina after recent PCI continue treatment.  CRI (chronic renal insufficiency)  Diabetes ([address] 1973  
 type 2  
 Essential hypertension, benign  GERD (gastroesophageal reflux disease)  Gout  Hypertension 1982  Morbid obesity ([address] Weight loss has been strongly encouraged by following dietary restrictions and an exercise routine  APRYL (obstructive sleep apnea) 6/26/2015  
 no cpap  Other and unspecified hyperlipidemia HDL's are not at goal, LDL's are at goal, triglycerides are not at goal.  
 Other and unspecified hyperlipidemia HDL's are not at goal, LDL's are at goal, triglycerides are not at goal.   
 Other ill-defined conditions(799.89) 1960  
 pneumonia twice  Prostate cancer ([address]  Sleep disturbance  Type II or unspecified type diabetes mellitus without mention of complication, not stated as uncontrolled Past Surgical History: 
Past Surgical History:  
Procedure Laterality Date [address]  
 lt. carotid endart. [address]  2012, 2016, Nov 2017 Coronary Stent  COLONOSCOPY N/A 6/23/2017 COLONOSCOPY w/ APC w/ polypectomies performed by Myranda Youngblood MD at [address]  FLEXIBLE SIGMOIDOSCOPY N/A 1/19/2018 FLEXIBLE SIGMOIDOSCOPY WITH Radio frequency ablation performed by Myranda Youngblood MD at [facility] ENDOSCOPY 2021 Jose Pinon N/A 6/8/2018 SIGMOIDOSCOPY FLEXIBLE WITH APC performed by Jessika Morrissey MD at [address] [facility]  HX CORONARY ARTERY BYPASS GRAFT  2000  
 x2  HX HEENT  1997  
 cholecystectomy  HX UROLOGICAL  1998  
 renal art. surg  HX VASCULAR ACCESS Perma cath for HD- right chest.  
 ND INTRO CATH DIALYSIS CIRCUIT DX ANGRPH FLUOR S&I N/A 12/10/2018 FISTULOGRAM LEFT performed by Sarah Cabrera MD at [facility] CATH LAB  ND INTRO CATH DIALYSIS CIRCUIT DX ANGRPH FLUOR S&I Left 1/2/2020 SHUNTOGRAM LEFT performed by Myron Mckinney MD at [facility] LAB  ND INTRO CATH DIALYSIS CIRCUIT W/TRLUML BALO ANGIOP N/A 12/10/2018 Angioplasty Fistula/Dialysis Circuit performed by Sarah Cabrera MD at [address] [facility]  ND INTRO CATH DIALYSIS CIRCUIT W/TRLUML BALO ANGIOP N/A 1/2/2020 Angioplasty Fistula/Dialysis Circuit performed by Myron Mckinney MD at [facility] CATH LAB Family History: 
Family History Problem Relation Age of Onset  Heart Attack Father 64 Social History: 
Social History Tobacco Use  Smoking status: Never Smoker  Smokeless tobacco: Never Used Substance Use Topics  Alcohol use: No  
 Drug use: No  
 
 
Allergies: Allergies Allergen Reactions  Nka [No Known Allergies] Other (comments) Review of Systems Review of Systems Constitutional: Negative for chills and fever. HENT: Negative for facial swelling. Eyes: Negative for photophobia and visual disturbance. Respiratory: Positive for shortness of breath. Cardiovascular: Positive for chest pain. Gastrointestinal: Negative for abdominal pain, nausea and vomiting. Genitourinary: Negative for flank pain. Skin: Negative for color change, pallor, rash and wound. Neurological: Negative for dizziness, weakness, light-headedness and headaches. All other systems reviewed and are negative. Physical Exam  
Physical Exam 
Vitals signs and nursing note reviewed. Constitutional:   
   General: He is not in acute distress. Appearance: He is well-developed. Comments: Pt well-appearing in NAD HENT:  
   Head: Normocephalic and atraumatic. Eyes:  
   Conjunctiva/sclera: Conjunctivae normal.  
Cardiovascular:  
   Rate and Rhythm: Normal rate and regular rhythm. Heart sounds: Normal heart sounds. Pulmonary:  
   Effort: Pulmonary effort is normal. No respiratory distress. Breath sounds: Normal breath sounds. Comments: Lungs CTA Pt not working to breathe Abdominal:  
   General: Bowel sounds are normal.  
   Palpations: Abdomen is soft. Tenderness: There is no tenderness. Comments: Abdomen soft, nontender Musculoskeletal: Normal range of motion. Skin: 
   General: Skin is warm. Findings: No rash. Comments: AV fistula with thrill to left bicep Neurological:  
   Mental Status: He is alert and oriented to person, place, and time. Cranial Nerves: No cranial nerve deficit. Psychiatric:     
   Behavior: Behavior normal.  
 
 
 
Diagnostic Study Results Labs - Recent Results (from the past 12 hour(s)) EKG, 12 LEAD, INITIAL Collection Time: 01/23/20  5:51 PM  
Result Value Ref Range Ventricular Rate 102 BPM  
 Atrial Rate 102 BPM  
 P-R Interval 148 ms QRS Duration 90 ms Q-T Interval 338 ms QTC Calculation (Bezet) 440 ms Calculated P Axis 33 degrees Calculated R Axis 21 degrees Calculated T Axis 87 degrees Diagnosis Sinus tachycardia Nonspecific ST and T wave abnormality Abnormal ECG 
 When compared with ECG of 03-JAN-2019 09:18, 
Vent. rate has increased BY  45 BPM 
Inverted T waves have replaced nonspecific T wave abnormality in Lateral  
leads CBC WITH AUTOMATED DIFF Collection Time: 01/23/20  6:01 PM  
Result Value Ref Range WBC 8.4 4.6 - 13.2 K/uL  
 RBC 3.58 (L) 4.70 - 5.50 M/uL  
 HGB 10.9 (L) 13.0 - 16.0 g/dL HCT 34.4 (L) 36.0 - 48.0 % MCV 96.1 74.0 - 97.0 FL  
 MCH 30.4 24.0 - 34.0 PG  
 MCHC 31.7 31.0 - 37.0 g/dL  
 RDW 14.5 11.6 - 14.5 % PLATELET 991 291 - 414 K/uL MPV 9.7 9.2 - 11.8 FL  
 NEUTROPHILS 74 (H) 40 - 73 % LYMPHOCYTES 18 (L) 21 - 52 % MONOCYTES 7 3 - 10 % EOSINOPHILS 1 0 - 5 % BASOPHILS 0 0 - 2 %  
 ABS. NEUTROPHILS 6.2 1.8 - 8.0 K/UL  
 ABS. LYMPHOCYTES 1.5 0.9 - 3.6 K/UL  
 ABS. MONOCYTES 0.6 0.05 - 1.2 K/UL  
 ABS. EOSINOPHILS 0.1 0.0 - 0.4 K/UL  
 ABS. BASOPHILS 0.0 0.0 - 0.1 K/UL  
 DF AUTOMATED METABOLIC PANEL, COMPREHENSIVE Collection Time: 01/23/20  6:01 PM  
Result Value Ref Range Sodium 138 136 - 145 mmol/L Potassium 4.2 3.5 - 5.5 mmol/L Chloride 103 100 - 111 mmol/L  
 CO2 30 21 - 32 mmol/L Anion gap 5 3.0 - 18 mmol/L Glucose 191 (H) 74 - 99 mg/dL BUN 39 (H) 7.0 - 18 MG/DL Creatinine 7.22 (H) 0.6 - 1.3 MG/DL  
 BUN/Creatinine ratio 5 (L) 12 - 20 GFR est AA 9 (L) >60 ml/min/1.73m2 GFR est non-AA 7 (L) >60 ml/min/1.73m2 Calcium 9.2 8.5 - 10.1 MG/DL Bilirubin, total 0.5 0.2 - 1.0 MG/DL  
 ALT (SGPT) 12 (L) 16 - 61 U/L  
 AST (SGOT) 17 10 - 38 U/L Alk. phosphatase 177 (H) 45 - 117 U/L Protein, total 7.0 6.4 - 8.2 g/dL Albumin 3.4 3.4 - 5.0 g/dL Globulin 3.6 2.0 - 4.0 g/dL A-G Ratio 0.9 0.8 - 1.7 CARDIAC PANEL,(CK, CKMB & TROPONIN) Collection Time: 01/23/20  6:01 PM  
Result Value Ref Range CK 87 39 - 308 U/L  
 CK - MB 9.7 (H) <3.6 ng/ml CK-MB Index 11.1 (H) 0.0 - 4.0 % Troponin-I, QT 2.65 (HH) 0.0 - 0.045 NG/ML  
EKG, 12 LEAD, INITIAL Collection Time: 01/23/20  8:33 PM  
Result Value Ref Range Ventricular Rate 88 BPM  
 Atrial Rate 88 BPM  
 P-R Interval 146 ms  
 QRS Duration 108 ms Q-T Interval 362 ms QTC Calculation (Bezet) 438 ms Calculated P Axis 19 degrees Calculated R Axis 51 degrees Calculated T Axis -21 degrees Diagnosis Sinus rhythm with occasional premature ventricular complexes Incomplete right bundle branch block Septal infarct , age undetermined Abnormal ECG When compared with ECG of 23-JAN-2020 17:51, 
premature ventricular complexes are now present Septal infarct is now present T wave inversion now evident in Inferior leads T wave inversion no longer evident in Lateral leads TROPONIN I Collection Time: 01/23/20  9:00 PM  
Result Value Ref Range Troponin-I, QT 4.59 (HH) 0.0 - 0.045 NG/ML  
CBC WITH AUTOMATED DIFF Collection Time: 01/23/20  9:00 PM  
Result Value Ref Range WBC 10.5 4.6 - 13.2 K/uL  
 RBC 3.44 (L) 4.70 - 5.50 M/uL  
 HGB 10.6 (L) 13.0 - 16.0 g/dL HCT 34.1 (L) 36.0 - 48.0 % MCV 99.1 (H) 74.0 - 97.0 FL  
 MCH 30.8 24.0 - 34.0 PG  
 MCHC 31.1 31.0 - 37.0 g/dL  
 RDW 14.4 11.6 - 14.5 % PLATELET 130 058 - 644 K/uL MPV 10.0 9.2 - 11.8 FL  
 NEUTROPHILS 67 40 - 73 % LYMPHOCYTES 23 21 - 52 % MONOCYTES 9 3 - 10 % EOSINOPHILS 1 0 - 5 % BASOPHILS 0 0 - 2 %  
 ABS. NEUTROPHILS 7.0 1.8 - 8.0 K/UL  
 ABS. LYMPHOCYTES 2.4 0.9 - 3.6 K/UL  
 ABS. MONOCYTES 0.9 0.05 - 1.2 K/UL  
 ABS. EOSINOPHILS 0.1 0.0 - 0.4 K/UL  
 ABS. BASOPHILS 0.0 0.0 - 0.1 K/UL  
 DF AUTOMATED METABOLIC PANEL, COMPREHENSIVE Collection Time: 01/23/20  9:00 PM  
Result Value Ref Range Sodium 136 136 - 145 mmol/L Potassium 4.4 3.5 - 5.5 mmol/L Chloride 104 100 - 111 mmol/L  
 CO2 29 21 - 32 mmol/L Anion gap 3 3.0 - 18 mmol/L Glucose 58 (L) 74 - 99 mg/dL BUN 39 (H) 7.0 - 18 MG/DL  Creatinine 7.34 (H) 0.6 - 1.3 MG/DL  
 BUN/Creatinine ratio 5 (L) 12 - 20    
 GFR est AA 9 (L) >60 ml/min/1.73m2 GFR est non-AA 7 (L) >60 ml/min/1.73m2 Calcium 9.0 8.5 - 10.1 MG/DL Bilirubin, total 0.6 0.2 - 1.0 MG/DL  
 ALT (SGPT) 14 (L) 16 - 61 U/L  
 AST (SGOT) 30 10 - 38 U/L Alk. phosphatase 127 (H) 45 - 117 U/L Protein, total 7.0 6.4 - 8.2 g/dL Albumin 3.3 (L) 3.4 - 5.0 g/dL Globulin 3.7 2.0 - 4.0 g/dL A-G Ratio 0.9 0.8 - 1.7 PTT Collection Time: 01/23/20  9:00 PM  
Result Value Ref Range aPTT 26.9 23.0 - 36.4 SEC PROTHROMBIN TIME + INR Collection Time: 01/23/20  9:00 PM  
Result Value Ref Range Prothrombin time 14.0 11.5 - 15.2 sec INR 1.1 0.8 - 1.2 Radiologic Studies -  
XR CHEST PORT    (Results Pending) CT Results  (Last 48 hours) None CXR Results  (Last 48 hours) None Medical Decision Making I am the first provider for this patient. I reviewed the vital signs, available nursing notes, past medical history, past surgical history, family history and social history. Vital Signs-Reviewed the patient's vital signs. Patient Vitals for the past 12 hrs: 
 Temp Pulse Resp BP SpO2  
01/23/20 2123     99 % 01/23/20 2100  82 14 117/56 100 % 01/23/20 2046 98.6 °F (37 °C) 85 17 136/52 99 % EKG interpretation: (Preliminary) Rhythm: Sinus rhythm; and PVCs. Rate (approx.): 88; Axis: normal; NH interval: normal; QRS interval: normal ; ST/T wave: T wave inverted; Other findings: abnormal ekg. When compared to previous EKG performed at 1751, t-wave inversion now present in III and V1. Records Reviewed: Nursing Notes, Old Medical Records and Previous electrocardiograms Provider Notes (Medical Decision Making): DDX: NSTEMI, NSTEMI, Unstable angina 79 yo M with complaints of intermittent left and right CP x 1 day. Hx 5 cardiac stents. ED Course:  
Initial assessment performed.  The patients presenting problems have been discussed, and they are in agreement with the care plan formulated and outlined with them. I have encouraged them to ask questions as they arise throughout their visit. 2131 Spoke Dr. Chong Mi, consult for cardiology. Discussed patient's elevated troponin, repeat EKG and awaiting second troponin. Thus patient's history of 5 previous stents with the most recent being 1-2 years ago. Also discussed that patient received dialysis and is currently taking Plavix, with the last pill taken yesterday morning. He recommends a bolus of heparin with a heparin drip and also half inch of Nitropaste due to the most recent blood pressure of 117/56. He request making patient NPO after midnight because they will likely take patient to the Cath Lab in the morning. 2157 Spoke with pharmacist, who inserted ACS NSTEMI protocol for heparin. BS 58 on BMP. Pt mentating without difficulty. Pt given apple juice and lean cuisine meal. Repeat BS 98. 
 
0040 Spoke with Dr. Deborah Gomez, consult for hospitalist.  Discussed patient's elevated troponins, EKG changes and past medical history. Also discussed consult with cardiology with recommendations for heparin, Nitropaste and n.p.o. after midnight. He agrees to admit patient to inpatient hospital services. Disposition: 
Admit PLAN: 
1. Current Discharge Medication List  
  
 
2. Follow-up Information None Return to ED if worse Diagnosis Clinical Impression: 1. NSTEMI (non-ST elevated myocardial infarction) ([facility] [address] Attestations: 
 
LESLY Grewal Please note that this dictation was completed with [employer], the computer voice recognition software. Quite often unanticipated grammatical, syntax, homophones, and other interpretive errors are inadvertently transcribed by the computer software. Please disregard these errors. Please excuse any errors that have escaped final proofreading. Thank you.

## 2020-01-24 NOTE — ED NOTES
Patient is leaving at this time for Cath Lab re-attempt. Bedside handoff provided to Andria Tijerina. ECG leads and IV pump infusing Heparin sent with Andria Tijerina (who will return them to ED). Patient remains alert, oriented, and on room air. Denies pain. Belongings on bed and transported as well. Andria Tijerina stated Aspirin can be given in Cath Lab.

## 2020-01-24 NOTE — ED NOTES
I performed a brief history of the patient here in triage and I have determined that pt will need further treatment and evaluation from the main side ER physician. I have placed initial orders based on the history to help in expediting patients care.     
 
LESLY Obando 8:46 PM

## 2020-01-24 NOTE — ED NOTES
Bloodwork obtained, unable to secure IV at this time, only R side available for venipuncture. Will continue to try.

## 2020-01-24 NOTE — ED NOTES
Patient sleeping in bed, talking in his sleep, with no signs of distress noted. Cardiac monitor, BP monitor and pulse ox remain intact. IV insertion with no redness or edema noted. HOB elevated and call bell within reach. Will continue to monitor for any change in condition.

## 2020-01-24 NOTE — ED TRIAGE NOTES
Arrives in triage with family. C/o burning chest pain with shortness of breath that began yesterday. Took 3 nitro tabs today with no relief. Dialysis patient MWF Patient just left [facility], where he had a complete work up.   
 
1800- Troponin 2.65

## 2020-01-24 NOTE — H&P
History and Physical 
 
Patient: Georges Cannon MRN: [id number]  SSN: [id number] YOB: 1940  Age: 78 y.o. Sex: male Subjective:  
  
Georges Cannon is a 78 y.o. -American male who presents to [facility] with complaint of Shortness of Breath and Chest Pain. Per Nursing Staff, Patient was at [facility] on 1/23/2020 approximately 3 hours prior to arrival to [facility]; Patient reportedly eloped while waiting in Results Waiting area of [facility] and had a positive Troponin that resulted in Police being called to try and inform Patient. Patient arrives at [facility] after being informed (possibly by Police) about his elevated Troponin that was drawn at [facility]. Patient states that he has been having chest pain with increasing frequency over the last two days that is not improved with tums and GI medications, but did improve with sublingual nitroglycerin, which is what prompted him to go to [facility]. Patient states that he has had shortness of breath and diaphoresis with these pains that he rates at 10/10 intensity of a \"burning\" character in a distribution from sternum to neck that is worse with exertion and was initially improved with resting until it became near constant. Patient denies fevers, chills, nausea, vomiting, coughing, diarrhea, or dysuria. Notably, Patient states that he doesn't think he could tolerate a Cardiac Stress Test and also reports that physicians have told him that he is not a good candidate for general anesthesia. Patient reports a history of 5 Cardiac Stents, 2-Vessel CABG, and at least 4 Heart Catheterizations. In [facility] ER, Patient was noted to have Troponin of 2.65 (from [facility] ER) that increased to 4.59. EKG was noted to have some changes concerning for Ischemia. Cardiological services were consulted and reportedly stated that Patient would be taken for Heart Catheterization in AM. Patient is admitted to [facility] Telemetry Unit for NSTEMI with EKG changes and anticipated Heart Catheterization. Past Medical History:  
Diagnosis Date  Atherosclerosis of renal artery ([address] S/P Rt stent  CAD (coronary artery disease) , 1997, 2012  
 ,double bypass, 2 stents, 2stents 7/2016  Chronic diastolic heart failure ([address]  Chronic kidney disease  Constipation  Diabetes ([facility] 1973  
 type 2  
 Essential hypertension, benign  GERD (gastroesophageal reflux disease)  Gout  Grade II diastolic dysfunction  Hypertension 1982  Moderate aortic stenosis  Morbid obesity ([facility] [facility] Weight loss has been strongly encouraged by following dietary restrictions and an exercise routine  APRYL (obstructive sleep apnea) 6/26/2015  
 no cpap  Other and unspecified hyperlipidemia HDL's are not at goal, LDL's are at goal, triglycerides are not at goal.  
 Prostate cancer [facility])   
 S/P Excision and Radiation  Sleep disturbance  Type II or unspecified type diabetes mellitus without mention of complication, not stated as uncontrolled Past Surgical History:  
Procedure Laterality Date [address]  
 lt. carotid endart. [address] UNLIST  2012, 2016, Nov 2017 Coronary Stent  COLONOSCOPY N/A 6/23/2017 COLONOSCOPY w/ APC w/ polypectomies performed by Hazel Uribe MD at [address]  FLEXIBLE SIGMOIDOSCOPY N/A 1/19/2018 FLEXIBLE SIGMOIDOSCOPY WITH Radio frequency ablation performed by Hazel Uribe MD at [facility] ENDOSCOPY 2021 Jose Hutchins Kathrin N/A 6/8/2018 SIGMOIDOSCOPY FLEXIBLE WITH APC performed by Annabel O[place]zewski, [place] at [address] [facility]  HX CORONARY ARTERY BYPASS GRAFT  2000  
 x2  HX HEENT  1997  
 cholecystectomy  HX UROLOGICAL  1998  
 renal art. surg  HX VASCULAR ACCESS  Perma cath for HD- right chest.  
  MO INTRO CATH DIALYSIS CIRCUIT DX ANGRPH FLUOR S&I N/A 12/10/2018 FISTULOGRAM LEFT performed by David Ku MD at [facility] CATH LAB  MO INTRO CATH DIALYSIS CIRCUIT DX ANGRPH FLUOR S&I Left 1/2/2020 SHUNTOGRAM LEFT performed by Andres Juarez MD at [facility] LAB  MO INTRO CATH DIALYSIS CIRCUIT W/TRLUML BALO ANGIOP N/A 12/10/2018 Angioplasty Fistula/Dialysis Circuit performed by David Ku MD at [address] [place] [address] Pkwy LAB  MO INTRO CATH DIALYSIS CIRCUIT W/TRLUML BALO ANGIOP N/A 1/2/2020 Angioplasty Fistula/Dialysis Circuit performed by Andres Juarez MD at [facility] CATH LAB Family History Problem Relation Age of Onset  Heart Attack Father 64  Diabetes Mother  No Known Problems Son  No Known Problems Son  No Known Problems Son  No Known Problems Son  No Known Problems Son  No Known Problems Son  No Known Problems Daughter  No Known Problems Daughter  No Known Problems Daughter  No Known Problems Daughter  No Known Problems Daughter  No Known Problems Daughter  Kidney Disease Brother ESRD Social History Tobacco Use  Smoking status: Never Smoker  Smokeless tobacco: Never Used Substance Use Topics  Alcohol use: No  
  
Patient lives alone at home. Patient is mobile at home with use of a [employer] with recent transition to a [employer]. Patient is a Never Smoker who denies Vaping, and Illicit Drug Use. Patient reports Occasional ETOH Use. Patient is Retired and Formerly worked as an Supervisor for [employer] Patient has a [employer] and [employer] at home. Prior to Admission medications Medication Sig Start Date End Date Taking?  Authorizing Provider  
pregabalin (LYRICA) 25 mg capsule TAKE ONE CAPSULE BY MOUTH TWICE A DAY  (  MAX DAILY AMOUNT 50MG ) 1/12/20   Kim Aviles MD  
hyoscyamine SL (LEVSIN/SL) 0.125 mg SL tablet 0.125 mg by SubLINGual route every four (4) hours as needed for Cramping. Provider, Historical  
ramelteon (ROZEREM) 8 mg tablet Take 1 Tab by mouth nightly. 1/9/20   Kim Aviles MD  
montelukast (SINGULAIR) 10 mg tablet TAKE ONE TABLET BY MOUTH DAILY 11/13/19   Kim Aviles MD  
amLODIPine (NORVASC) 2.5 mg tablet TAKE ONE TABLET BY MOUTH DAILY 11/13/19   Kim Aviles MD  
allopurinol (ZYLOPRIM) 100 mg tablet TAKE ONE TABLET BY MOUTH THREE TIMES PER WEEK ON MONDAY, WEDNESDAY AND FRIDAY 11/4/19   Kim Aviles MD  
clobetasol 0.05 % sham Use daily for scalp irritation. 10/9/19   Kim Aviles MD  
tamsulosin (FLOMAX) 0.4 mg capsule TAKE ONE CAPSULE BY MOUTH ONE TIME DAILY 9/17/19   Brad Pratt MD  
DULoxetine (CYMBALTA) 60 mg capsule TAKE ONE CAPSULE BY MOUTH DAILY 7/17/19   Kim Aviles MD  
insulin aspart U-100 (NOVOLOG U-100 INSULIN ASPART) 100 unit/mL injection 20 units sq 3 times a day,sliding scale 7/10/19   Kim Aviles MD  
Insulin Needles, Disposable, 31 gauge x 5/16\" ndle Use to inject insulin 2 x day 5/29/19   Jered Rai MD  
insulin glargine (LANTUS,BASAGLAR) 100 unit/mL (3 mL) inpn Give Basaglar 15 units twice a day. 5/22/19   Kim Aviles MD  
fenofibrate (LOFIBRA) 54 mg tablet TAKE ONE TABLET BY MOUTH DAILY 5/17/19   Urszula Coughlin NP  
calcium acetate (PHOSLO) 667 mg cap three (3) times daily (with meals). 5/4/19   Provider, Historical  
metoprolol tartrate (LOPRESSOR) 100 mg IR tablet Take 1 Tab by mouth two (2) times a day. 4/10/19   Kim Aviles MD  
simvastatin (ZOCOR) 40 mg tablet TAKE 1 TABLET BY MOUTH NIGHTLY. 3/4/19   Black, Myria G, NP  
L.acid/L.casei/B.bif/B.ken/FOS (PROBIOTIC BLEND PO) Take 2 Tabs by mouth daily. Provider, Historical  
losartan (COZAAR) 50 mg tablet Take 50 mg by mouth daily. Take 1/2 tab daily    Provider, Historical  
clopidogrel (PLAVIX) 75 mg tab Take 1 Tab by mouth daily.  1/3/19   Christine Aguirre NP  
 isosorbide mononitrate ER (IMDUR) 30 mg tablet Take 1 Tab by mouth daily. 1/7/19   Cayla Salvador NP  
nitroglycerin (NITROSTAT) 0.4 mg SL tablet 1 Tab by SubLINGual route as needed for Chest Pain. 1/3/19   Cayla Salvador NP  
cinacalcet (SENSIPAR) 30 mg tablet Take 60 mg by mouth daily. Provider, Historical  
ASPIR-LOW 81 mg tablet TAKE ONE TABLET BY MOUTH ONE TIME DAILY 9/3/18   Nikolai Rondon MD  
DULCOLAX, BISACODYL, PO Take  by mouth. Provider, Historical  
pantoprazole (PROTONIX) 40 mg tablet Take 40 mg by mouth daily. Provider, Historical  
b complex-vitamin c-folic acid (NEPHROCAPS) 1 mg capsule Take 1 Cap by mouth daily. 10/25/17   Genny Ocasio MD  
cholecalciferol [facility]) 50,000 unit capsule Take  by mouth every seven (7) days. Provider, Historical  
  
 
Allergies Allergen Reactions  Nka [No Known Allergies] Other (comments) Review of Systems: 
(+) Shortness of Breath 
(+) Chest Pain 
(+) Heart Burn 
(+) Diaphoresis (-) Fevers (-) Chills (-) Fatigue (-) Generalized Weakness (-) Cough (-) Increased Sputum Production (-) Pain with Inspiration (-) Wheezing (-) Abdominal Pain (-) Nausea (-) Vomiting (-) Diarrhea All other systems have been reviewed and are negative Objective:  
 
Vitals:  
 01/24/20 0612 01/24/20 0700 01/24/20 0730 01/24/20 1217 BP:  168/65 146/60 162/81 Pulse: 77 77 76 Resp: 14 15 14 Temp:      
SpO2: 100% 98% Weight:    91.6 kg (202 lb) Height:    5' 8\" (1.727 m) Physical Exam: 
General:  Older Adult -American male lying in bed in no acute distress HEENT:  Atraumatic, normocephalic; Pupils equally round and reactive to light with accommodation; (+) Bilateral Pseudophakia; Extraocular muscles intact; Moist Oropharynx without erythema, edema, or exudates; (+) Edentulous Neck:  No Bruits; No Lymphadenopathy Chest:  No pectus carinatum; No pectus excavatum Cardiovascular:  Regular rate and rhythm without rubs or gallops; (+) Questionable Murmur Respiratory:  Clear to Auscultation Bilaterally without wheezes, rales, or rhonchi; normal effort of breathing Abdominal:  Soft, non-tense, non-tender abdomen; BS present without guarding, rebound, or masses :  Deferred Extremities:  Pulses (+) 1+ x4 without edema, clubbing, or cyanosis Musculoskeletal:  Strength 5/5 and symmetrical in BUE and BLE Integument:  No rash on face, forearms, or legs Neurological:  A&O x4/4; No gross deficits of Visual Acuity, Eye Movement, Jaw Opening, Facial Expression, Hearing, Phonation, or Head Movement; No gross deficits of Tongue Movement or Slurring of Speech Psychiatric:  Affect is appropriate; Language is present and fluent; Behavior is appropriate I have personally reviewed the CXR and have found, per my read, negative chest x-ray. I have personally reviewed the EKG and have found, per my read, significant ST-segment elevation isolated to lead V1 with Non-significant ST-segment elevations in lead aVR and non-significant ST-segment depressions in leads I, II, V4, and V5. T-wave inversion in lead III. Incomplete RBBB. Assessment:  
 
Hospital Problems  Date Reviewed: 1/24/2020 Codes Class Noted POA * (Principal) NSTEMI (non-ST elevated myocardial infarction) ([facility] ICD-10-CM: I21.4 ICD-9-CM: 410.70  10/7/2012 Yes Coronary atherosclerosis of native coronary artery ICD-10-CM: I25.10 ICD-9-CM: 414.01  10/7/2012 Yes ESRD (end stage renal disease) on dialysis ([facility] ICD-10-CM: N18.6, Z99.2 ICD-9-CM: 585.6, V45.11  11/8/2017 Yes Essential hypertension, benign (Chronic) ICD-10-CM: I10 
ICD-9-CM: 401.1  Unknown Yes DM2 (diabetes mellitus, type 2) (HCC) (Chronic) ICD-10-CM: E11.9 ICD-9-CM: 250.00  10/7/2012 Yes Dyslipidemia ICD-10-CM: E78.5 ICD-9-CM: 272.4  10/7/2012 Yes APRYL (obstructive sleep apnea) (Chronic) ICD-10-CM: G47.33 
ICD-9-CM: 327.23  6/26/2015 Yes Overview Signed 6/26/2015  9:25 AM by Ciara Nelson MD  
  non compliance to cpap Chronic diastolic heart failure (HCC) ICD-10-CM: I50.32 
ICD-9-CM: 428.32  Unknown Yes Prostate cancer [facility]) ICD-10-CM: P44 ICD-9-CM: 185  4/29/2015 Yes Overview Addendum 5/6/2015  4:32 PM by Anya Hilliard MD  
  4/2015  T2c  PSA 57, Spencer;8 in 2 cores, and G7 in 2,  CT and Bone Scan no obvious mets GERD (gastroesophageal reflux disease) ICD-10-CM: K21.9 ICD-9-CM: 530.81  3/5/2015 Yes Plan:  
 
IV Heparin gtt, ASA, Clopidogrel, Beta-Blocker, ARB, High-Statin Therapy, Serial Troponins, and Serial EKGs. NPO. Cardiological services consulted in [facility] ER with anticipation of Heart Catheterization in AM. Continue home medications for ASCVD/CAD, Gout, ESRD, Constipation, Seasonal Allergies, GERD, and BPH. Consultation to Nephrology for Dialysis following Heart Catheterization (for management of Radiopaque dye). DVT is achieved by continuing IV Heparin gtt. Signed By: Chencho Fields DO   
 January 24, 2020

## 2020-01-24 NOTE — PERIOP NOTES
Recd care of pt from cath lab via stretcher. Attached to monitor. VSS. Cath lab report acknowledged. Denies pain or discomforts. 5 FR sheath noted right groin. No bleeding, hematoma or swelling noted. Will cont to monitor. [facility]. Pt alert and oriented. Will pull right groin sheath and given box lunch. 1423  5 FR sheath pulled right groin. Manual pressure applied. Will monitor. 1443  Hemostasis obtain. 4x4, clotting pad and transparent dsg applied. Pt alert and oriented. Box lunch and cranberry juice given. 1730  TRANSFER - OUT REPORT: 
 
Verbal report given to Sydney Beltran RN (name) on Jf Dalalner  being transferred to [facility] (unit) for routine progression of care Report consisted of patients Situation, Background, Assessment and  
Recommendations(SBAR). Information from the following report(s) SBAR, Procedure Summary, Recent Results and Cardiac Rhythm sinus rhythm was reviewed with the receiving nurse. Lines:  
Peripheral IV 01/23/20 Right Antecubital (Active) Peripheral IV 01/24/20 Right;Upper Basilic (Active) Site Assessment Clean, dry, & intact 1/24/2020  9:46 AM  
Phlebitis Assessment 0 1/24/2020  9:46 AM  
Infiltration Assessment 0 1/24/2020  9:46 AM  
Dressing Status Clean, dry, & intact 1/24/2020  9:46 AM  
Dressing Type Transparent 1/24/2020  9:46 AM  
Hub Color/Line Status Pink;Patent; Flushed 1/24/2020  9:46 AM  
  
 
Opportunity for questions and clarification was provided. Patient transported with: 
 Monitor Registered Nurse Tech

## 2020-01-24 NOTE — PROGRESS NOTES
attempted to completed the initial Spiritual Assessment of the patient in bed 3 of the emergency room and offer Pastoral Care support to the patient but found him resting peacefully at this time with no family members present. ,  Patient does not have any Baptism/cultural needs that will affect patients preferences in health care. Chaplains will continue to follow and will provide pastoral care on an as needed/requested basis. Cyndy Petersen Board Certified [place] [facility] [phone]

## 2020-01-24 NOTE — PROGRESS NOTES
Problem: Diabetes Self-Management Goal: *Disease process and treatment process Description Define diabetes and identify own type of diabetes; list 3 options for treating diabetes. Outcome: Progressing Towards Goal 
Goal: *Incorporating nutritional management into lifestyle Description Describe effect of type, amount and timing of food on blood glucose; list 3 methods for planning meals. Outcome: Progressing Towards Goal 
Goal: *Incorporating physical activity into lifestyle Description State effect of exercise on blood glucose levels. Outcome: Progressing Towards Goal 
Goal: *Developing strategies to promote health/change behavior Description Define the ABC's of diabetes; identify appropriate screenings, schedule and personal plan for screenings. Outcome: Progressing Towards Goal 
Goal: *Using medications safely Description State effect of diabetes medications on diabetes; name diabetes medication taking, action and side effects. Outcome: Progressing Towards Goal 
Goal: *Monitoring blood glucose, interpreting and using results Description Identify recommended blood glucose targets  and personal targets. Outcome: Progressing Towards Goal 
Goal: *Prevention, detection, treatment of acute complications Description List symptoms of hyper- and hypoglycemia; describe how to treat low blood sugar and actions for lowering  high blood glucose level. Outcome: Progressing Towards Goal 
Goal: *Prevention, detection and treatment of chronic complications Description Define the natural course of diabetes and describe the relationship of blood glucose levels to long term complications of diabetes. Outcome: Progressing Towards Goal 
Goal: *Developing strategies to address psychosocial issues Description Describe feelings about living with diabetes; identify support needed and support network Outcome: Progressing Towards Goal 
Goal: *Insulin pump training Outcome: Progressing Towards Goal 
 Goal: *Sick day guidelines Outcome: Progressing Towards Goal 
Goal: *Patient Specific Goal (EDIT GOAL, INSERT TEXT) Outcome: Progressing Towards Goal 
  
Problem: Patient Education: Go to Patient Education Activity Goal: Patient/Family Education Outcome: Progressing Towards Goal 
  
Problem: Falls - Risk of 
Goal: *Absence of Falls Description Document Carlos Modi Fall Risk and appropriate interventions in the flowsheet. Outcome: Progressing Towards Goal 
Note: Fall Risk Interventions: 
Mobility Interventions: Patient to call before getting OOB Medication Interventions: Patient to call before getting OOB, Teach patient to arise slowly Elimination Interventions: Call light in reach, Patient to call for help with toileting needs, Urinal in reach Problem: Patient Education: Go to Patient Education Activity Goal: Patient/Family Education Outcome: Progressing Towards Goal 
  
Problem: Falls - Risk of 
Goal: *Absence of Falls Description Document Carlos Modi Fall Risk and appropriate interventions in the flowsheet. Outcome: Progressing Towards Goal 
Note: Fall Risk Interventions: 
Mobility Interventions: Patient to call before getting OOB Medication Interventions: Patient to call before getting OOB, Teach patient to arise slowly Elimination Interventions: Call light in reach, Patient to call for help with toileting needs, Urinal in reach

## 2020-01-24 NOTE — ED NOTES
Provider noted patient BG 58, patient given apple juice and provided warm meal. 
 
0135 - POC glucose performed, BG up to 84, provider notified.

## 2020-01-25 NOTE — PROGRESS NOTES
Problem: Pain Goal: *Control of Pain Outcome: Progressing Towards Goal 
  
Problem: Falls - Risk of 
Goal: *Absence of Falls Description Document Vita Turner Fall Risk and appropriate interventions in the flowsheet. Outcome: Progressing Towards Goal 
Note: Fall Risk Interventions: 
Mobility Interventions: Patient to call before getting OOB Medication Interventions: Patient to call before getting OOB Elimination Interventions: Call light in reach

## 2020-01-25 NOTE — DIALYSIS
Mckayla Collado ACUTE HEMODIALYSIS FLOW SHEET 
 
 
HEMODIALYSIS ORDERS: Physician: Dr. Teena Vee Dialyzer: revaclear   Duration: 3 hr  BFR: 400   DFR: 800 Dialysate:  Temp 36-37*C  K+   2    Ca+  2.5 Na 140 Bicarb 35 Weight:  91.6 kg    Patient Chart []     Unable to Obtain []   Dry weight/UF Goal: 3000 ml Access LUE AVF Needle Gauge 15 Heparin []  Bolus      Units    [] Hourly       Units    []None Catheter locking solution NA Pre BP:   146/55    Pulse:     65       Respirations: 18  Temperature:   98.3 Labs: Pre        Post:        [x] N/A Additional Orders(medications, blood products, hypotension management):       [x] N/A [x] [facility] Consent Verified CATHETER ACCESS: [x]N/A   []Right   []Left   []IJ     []Fem   []chest wall  
[] First use X-ray verified     []Tunnel                [] Non Tunneled []No S/S infection  []Redness  []Drainage []Cultured []Swelling []Pain []Medical Aseptic Prep Utilized   []Dressing Changed  [] Biopatch  Date:      
[]Clotted   []Patent   Flows: []Good  []Poor  []Reversed If access problem,  notified: []Yes    [x]N/A  Date:        
 
GRAFT/FISTULA ACCESS:  []N/A     []Right     [x]Left     [x]UE     []LE []AVG   [x]AVF        []Buttonhole    [x]Medical Aseptic Prep Utilized [x]No S/S infection  []Redness  []Drainage []Cultured []Swelling []Pain Bruit:   [x] Strong    [] Weak       Thrill :   [x] Strong    [] Weak Needle Gauge: 15   Length: 1 inch If access problem,  notified: []Yes     [x]N/A  Date:       
Please describe access if present and not used:  
            
            GENERAL ASSESSMENT:  
  
LUNGS:  Rate 18 SaO2%        [] N/A    [x] Clear  [] Coarse  [] Crackles  [] Wheezing 
      [] Diminished     Location : []RLL   []LLL    []RUL  []NOMI Cough: []Productive  []Dry  []N/A   Respirations:  []Easy  []Labored Therapy:   [x]RA  []NC  l/min    Mask: []NRB []Venti       O2% []Ventilator  []Intubated  [] Trach  [] BiPaP CARDIAC: [x]Regular      [] Irregular   [] Pericardial Rub  [] JVD []  Monitored  [] Bedside  [] Remotely monitored [] N/A  Rhythm: EDEMA: [] None  [x]Generalized  [] Pitting [] 1    [] 2    [] 3    [] 4 [] Facial  [] Pedal  []  UE  [] LE  
 
SKIN:   [x] Warm  [] Hot     [] Cold   [x] Dry     [] Pale   [] Diaphoretic    
             [] Flushed  [] Jaundiced  [] Cyanotic  [] Rash  [] Weeping LOC:    [x] Alert      [x]Oriented:    [x] Person     [x] Place  [x]Time 
             [] Confused  [] Lethargic  [] Medicated  [] Non-responsive GI / ABDOMEN:  [] Flat    [] Distended    [x] Soft    [] Firm   []  Obese 
                           [] Diarrhea  [] Bowel Sounds  [] Nausea  [] Vomiting  / URINE ASSESSMENT:[] Voiding   [] Oliguria  [] Anuria   []  Xiao [] Incontinent    []  Incontinent Brief      []  Bathroom Privileges PAIN: [x] 0 []1  []2   []3   []4   []5   []6   []7   []8   []9   []10 Scale 0-10  Action/Follow Up: MOBILITY:  [] Amb    [] Amb/Assist    [] Bed    [] Wheelchair  [] Stretcher All Vitals and Treatment Details on Attached Flowsheet Hospital: [facility] Room # 0005/34 [] 1st Time Acute  [] Stat  [] Routine  [] Urgent    
[] Acute Room  []  Bedside  [] ICU/CCU  [] ER Isolation Precautions: There are currently no Active Isolations Special Considerations:         [] Blood Consent Verified [x]N/A ALLERGIES:  
Allergies Allergen Reactions  Nka [No Known Allergies] Other (comments) Code Status:Full Code Hepatitis Status:    2nd RN check:                    
Lab Results Component Value Date/Time Hepatitis B surface Ag <0.10 01/03/2019 03:17 AM  
 Hepatitis B surface Ab <3.10 (L) 01/03/2019 03:17 AM  
   
 
            Current Labs:  
Lab Results Component Value Date/Time  Sodium 137 01/25/2020 03:47 AM  
 Potassium 5.0 01/25/2020 03:47 AM  
 Chloride 103 01/25/2020 03:47 AM  
 CO2 27 01/25/2020 03:47 AM  
 Anion gap 7 01/25/2020 03:47 AM  
 Glucose 116 (H) 01/25/2020 03:47 AM  
 BUN 47 (H) 01/25/2020 03:47 AM  
 Creatinine 8.11 (H) 01/25/2020 03:47 AM  
 BUN/Creatinine ratio 6 (L) 01/25/2020 03:47 AM  
 GFR est AA 8 (L) 01/25/2020 03:47 AM  
 GFR est non-AA 6 (L) 01/25/2020 03:47 AM  
 Calcium 9.6 01/25/2020 03:47 AM  
  
Lab Results Component Value Date/Time WBC 9.9 01/25/2020 03:47 AM  
 Hemoglobin, POC 10.9 (L) 01/02/2020 02:09 PM  
 HGB 11.7 (L) 01/25/2020 03:47 AM  
 Hematocrit, POC 32 (L) 01/02/2020 02:09 PM  
 HCT 38.4 01/25/2020 03:47 AM  
 PLATELET 324 28/70/3426 03:47 AM  
 MCV 99.7 (H) 01/25/2020 03:47 AM  
  
  
 
                                                                         
DIET: DIET CARDIAC PRIMARY NURSE REPORT: First initial/Last name/Title [facility] [address]     Time: 0789 EDUCATION:   
[x] Patient [] Other         Knowledge Basis: []None []Minimal [x] Substantial  
Barriers to learning  []N/A  
[] Access Care     [] S&S of infection     [x] Fluid Management     []K+     [x]Procedural   
[]Albumin     [] Medications     [x] Tx Options     [] Transplant     [] Diet     [] Other Teaching Tools:  [x] Explain  [] Demo  [] Handouts [] Video Patient response:   [x] Verbalized understanding  [] Teach back  [] Return demonstration [] Requires follow up Inappropriate due to         
 
[x] Time Out/Safety Check  [x]Extracorporeal Circuit Tested for integrity RO/HEMODIALYSIS MACHINE SAFETY CHECKS  Before each treatment:    
Machine Number:                   [facility] 
                                [] Unit Machine #  with centralized RO 
                                [] Portable Machine #1/RO serial # [id number] [] Portable Machine #2/RO serial # [id number] [] Portable Machine #4/RO serial # [id number] [address] 
                                [] Portable Machine #11/RO serial # [id number] [] Portable Machine #12/RO serial # [id number] [x] Portable Machine #3/RO serial #  [id number] Alarm Test:  Pass time 6018 [x] RO/Machine Log Complete Temp    36 Dialysate: pH  7.4 Conductivity: Meter   13.9     HD Machine                     TCD: 13.6 Dialyzer Lot # [id number]            Blood Tubing Lot # [id number]          Saline Lot #  [id number] CHLORINE TESTING-Before each treatment and every 4 hours Total Chlorine: [x] less than 0.1 ppm  Time: 1500 4 Hr/2nd Check Time:   
(if greater than 0.1 ppm from Primary then every 30 minutes from Secondary) TREATMENT INITIATION  with Dialysis Precautions:  
[x] All Connections Secured                 [x] Saline Line Double Clamped  
[x] Venous Parameters Set                  [x] Arterial Parameters Set [x] Prime Given 250ml                          [x]Air Foam Detector Engaged Treatment Initiation Note:Patient was brought to dialysis suite stable to begin HD treatment. His LUE AVF was accessed, and his treatment began. During Treatment Notes: At 1700 the patient requested to sit on the side of his bed, complaining of lower extremity discomfort, and abdominal discomfort. His bed was placed in the lowest position, and he was allowed to do so, while HD nurse observed very closely. He sat dangling his legs about 10 minutes, and then he put his legs back on the bed and laid back. Medication Dose Volume Route Time [facility] name Title Post Assessment:  
Dialyzer Cleared: [x] Good [] Fair  [] Poor Blood processed:  58.7 L 
UF Removed  3000 Ml POst BP:   134/69       Pulse: 60 Respirations: 18  Temperature:  Lungs: 
 
 [x] Clear      [] Course         [] Crackles  
 [] Wheezing         [] Diminished Post Tx Vascular Access: AVF/AVG: Bleeding stopped Art 5 min. Cecil. 5 Min    Cardiac:  
[] Regular   [x] Irregular   [] Monitor  [] N/A Rhythm:      
Catheter: N/A Skin:   Pain:   
[x] Warm  [x] Dry [] Diaphoretic    [] Flushed   
[] Pale [] Cyanotic []0  []1  []2   []3  []4   []5   []6   []7   []8   []9   []10 (Patient still reports general pain all over) Post Treatment Note: 
 Patient's blood was returned at the conclusion of his treatment. The needles were removed from his LUE AVF, and the insertion sites were covered with gauze and tape. He tolerated well. He was returned to his room by nursing staff. POST TREATMENT PRIMARY NURSE HANDOFF REPORT:  
 
First initial/Last name/Title Post Dialysis: SHOBHA [place] Time:  5847 Abbreviations: AVG-arterial venous graft, AVF-arterial venous fistula, IJ-Internal Jugular, Subcl-Subclavian, Fem-Femoral, Tx-treatment, AP/HR-apical heart rate, DFR-dialysate flow rate, BFR-blood flow rate, AP-arterial pressure, -venous pressure, UF-ultrafiltrate, TMP-transmembrane pressure, Cecil-Venous, Art-Arterial, RO-Reverse Osmosis

## 2020-01-25 NOTE — PROGRESS NOTES
[facility]  -  Progress Note Patient: Whitney Angel MRN: [id number]  SSN: [id number] YOB: 1940  Age: 78 y.o. Sex: male Admit Date: 1/23/2020 Assessment:  
 
Hospital Problems  Date Reviewed: 1/24/2020 Codes Class Noted POA  
 ESRD (end stage renal disease) on dialysis ([facility] ICD-10-CM: N18.6, Z99.2 ICD-9-CM: 585.6, V45.11  11/8/2017 Yes APRYL (obstructive sleep apnea) (Chronic) ICD-10-CM: G47.33 
ICD-9-CM: 327.23  6/26/2015 Yes Overview Signed 6/26/2015  9:25 AM by Michael Monteiro MD  
  non compliance to cpap Prostate cancer [facility]) ICD-10-CM: H22 ICD-9-CM: 185  4/29/2015 Yes Overview Addendum 5/6/2015  4:32 PM by Jhonathan Wheeler MD  
  4/2015  T2c  PSA 57, [place];8 in 2 cores, and G7 in 2,  CT and Bone Scan no obvious mets GERD (gastroesophageal reflux disease) ICD-10-CM: K21.9 ICD-9-CM: 530.81  3/5/2015 Yes Chronic diastolic heart failure (HCC) ICD-10-CM: I50.32 
ICD-9-CM: 428.32  Unknown Yes Essential hypertension, benign (Chronic) ICD-10-CM: I10 
ICD-9-CM: 401.1  Unknown Yes * (Principal) NSTEMI (non-ST elevated myocardial infarction) ([facility] ICD-10-CM: I21.4 ICD-9-CM: 410.70  10/7/2012 Yes Coronary atherosclerosis of native coronary artery ICD-10-CM: I25.10 ICD-9-CM: 414.01  10/7/2012 Yes DM2 (diabetes mellitus, type 2) (HCC) (Chronic) ICD-10-CM: E11.9 ICD-9-CM: 250.00  10/7/2012 Yes Dyslipidemia ICD-10-CM: E78.5 ICD-9-CM: 272.4  10/7/2012 Yes  
   
  
 
 
-NSTEMI, troponin 2.65 --> 4.59 --> 5.37. Has known CAD, s/p CABG x 2, last cardiac catheterization 1/2/2019 with findings as follows: · Patent LIMA-LAD · 100% ostial occlusion of SVG-diagonal artery which has been intervened multiple times in the past 
Critical disease in distal circumflex and proximal and distal RCA, but these are small vessels and the lesions are chronic Repeat cardiac catheterization this admission shows unchanged anatomy with a patent LIMA to LAD graft and total occlusion of his lateral wall vein graft. Native RCA and distal circumflex unchanged. Medical therapy recommended. - CAD S/P CABG 
- Chronic HFpEF. No evidence of decompensated heart failure. -ESRD, on HD M/W/F, followed by Dr. Priyank Kate -HTN 
-DM 
- HLD 
  
Primary cardiologist is Dr. Bipin Zheng Plan:  
 
Would continue his current cardiac medical regimen including dual antiplatelet therapy, a potent statin, and antianginal agents. Plan for hemodialysis today since he missed his normal dialysis day yesterday. Stable for discharge home following hemodialysis. He can follow-up with his outpatient cardiologist in 4 to 6 weeks. Subjective: No new complaints. No further chest pain. Objective:  
  
Patient Vitals for the past 8 hrs: 
 Temp Pulse Resp BP SpO2  
01/25/20 0843 97.5 °F (36.4 °C) 100 16 107/67 96 % 01/25/20 0450 97.9 °F (36.6 °C) 70 16 152/59 99 % Patient Vitals for the past 96 hrs: 
 Weight  
01/24/20 0819 91.6 kg (202 lb)  
01/23/20 2046 91.6 kg (202 lb) Intake/Output Summary (Last 24 hours) at 1/25/2020 1054 Last data filed at 1/25/2020 8904 Gross per 24 hour Intake 360 ml Output  Net 360 ml Physical Exam: 
General:  alert, cooperative, no distress, appears stated age Neck:  no JVD Lungs:  clear to auscultation bilaterally Heart:  regular rate and rhythm Abdomen:  abdomen is soft without significant tenderness, masses, organomegaly or guarding Extremities:  extremities normal, atraumatic, no cyanosis or edema, right groin site soft, no hematoma or ecchymosis Data Review:  
 
Labs: Results:  
   
Chemistry Recent Labs  
  01/25/20 
0347 01/23/20 
2100 01/23/20 
1801 * 58* 191*  136 138  
K 5.0 4.4 4.2  104 103 CO2 27 29 30 BUN 47* 39* 39* CREA 8.11* 7.34* 7.22* CA 9.6 9.0 9.2 AGAP 7 3 5 BUCR 6* 5* 5*  127* 177* TP 6.7 7.0 7.0 ALB 3.3* 3.3* 3.4 GLOB 3.4 3.7 3.6 AGRAT 1.0 0.9 0.9  
  
CBC w/Diff Recent Labs  
  01/25/20 
0347 01/24/20 
0509 01/23/20 
2100 01/23/20 
1801 WBC 9.9 9.8 10.5 8.4  
RBC 3.85* 3.77* 3.44* 3.58* HGB 11.7* 11.6* 10.6* 10.9* HCT 38.4 37.4 34.1* 34.4*  
 286 258 232 GRANS 74*  --  67 74* LYMPH 14*  --  23 18* EOS 2  --  1 1 Cardiac Enzymes No results found for: CPK, CK, CKMMB, CKMB, RCK3, CKMBT, CKNDX, CKND1, PAIGE, TROPT, TROIQ, RENATO, TROPT, TNIPOC, BNP, BNPP Coagulation Recent Labs  
  01/24/20 
1108 01/24/20 
0509 01/23/20 
2100 PTP  --  13.7 14.0 INR  --  1.1 1.1 APTT 85.4* 47.7* 26.9 Lipid Panel Lab Results Component Value Date/Time Cholesterol, total 131 01/25/2020 03:47 AM  
 HDL Cholesterol 34 (L) 01/25/2020 03:47 AM  
 LDL, calculated 58 01/25/2020 03:47 AM  
 VLDL, calculated 39 01/25/2020 03:47 AM  
 Triglyceride 195 (H) 01/25/2020 03:47 AM  
 CHOL/HDL Ratio 3.9 01/25/2020 03:47 AM  
  
BNP No results found for: BNP, BNPP, XBNPT Liver Enzymes Recent Labs  
  01/25/20 
0347 TP 6.7 ALB 3.3*  SGOT 20 Digoxin Thyroid Studies Lab Results Component Value Date/Time  T4, Total 10.9 10/09/2012 04:53 AM  
 TSH 0.21 (L) 01/24/2020 05:09 AM

## 2020-01-25 NOTE — PROGRESS NOTES
Problem: Diabetes Self-Management Goal: *Disease process and treatment process Description Define diabetes and identify own type of diabetes; list 3 options for treating diabetes. Outcome: Progressing Towards Goal 
Goal: *Incorporating nutritional management into lifestyle Description Describe effect of type, amount and timing of food on blood glucose; list 3 methods for planning meals. Outcome: Progressing Towards Goal 
Goal: *Incorporating physical activity into lifestyle Description State effect of exercise on blood glucose levels. Outcome: Progressing Towards Goal 
Goal: *Developing strategies to promote health/change behavior Description Define the ABC's of diabetes; identify appropriate screenings, schedule and personal plan for screenings. Outcome: Progressing Towards Goal 
Goal: *Using medications safely Description State effect of diabetes medications on diabetes; name diabetes medication taking, action and side effects. Outcome: Progressing Towards Goal 
Goal: *Monitoring blood glucose, interpreting and using results Description Identify recommended blood glucose targets  and personal targets. Outcome: Progressing Towards Goal 
Goal: *Prevention, detection, treatment of acute complications Description List symptoms of hyper- and hypoglycemia; describe how to treat low blood sugar and actions for lowering  high blood glucose level. Outcome: Progressing Towards Goal 
Goal: *Prevention, detection and treatment of chronic complications Description Define the natural course of diabetes and describe the relationship of blood glucose levels to long term complications of diabetes. Outcome: Progressing Towards Goal 
Goal: *Developing strategies to address psychosocial issues Description Describe feelings about living with diabetes; identify support needed and support network Outcome: Progressing Towards Goal 
Goal: *Insulin pump training Outcome: Progressing Towards Goal 
 Goal: *Sick day guidelines Outcome: Progressing Towards Goal 
Goal: *Patient Specific Goal (EDIT GOAL, INSERT TEXT) Outcome: Progressing Towards Goal 
  
Problem: Patient Education: Go to Patient Education Activity Goal: Patient/Family Education Outcome: Progressing Towards Goal 
  
Problem: Falls - Risk of 
Goal: *Absence of Falls Description Document Erika Morales Fall Risk and appropriate interventions in the flowsheet. Outcome: Progressing Towards Goal 
Note: Fall Risk Interventions: 
Mobility Interventions: Patient to call before getting OOB Medication Interventions: Patient to call before getting OOB Elimination Interventions: Call light in reach Problem: Patient Education: Go to Patient Education Activity Goal: Patient/Family Education Outcome: Progressing Towards Goal 
  
Problem: Pain Goal: *Control of Pain Outcome: Progressing Towards Goal 
Goal: *PALLIATIVE CARE:  Alleviation of Pain Outcome: Progressing Towards Goal

## 2020-01-25 NOTE — PROGRESS NOTES
1920 - Assumed pt care from [facility]. Pt in bed, alert and oriented x 4. Not in any form of distress. Denies pain. Frequent use items and call bell within reach. Verbalized understanding to call for assistance. Bed locked in lowest position.

## 2020-01-25 NOTE — CONSULTS
Consult Note Consult requested by: RANDA Win is a 78 y.o. male [address] who is being seen on consult for mgt of HD Chief Complaint Patient presents with  Chest Pain  Shortness of Breath Admission diagnosis: NSTEMI (non-ST elevated myocardial infarction) ([address] HPI:  79 yo AAM admitted for eval of CP/NSTEMI, on HD MWF via rt arm AVF under  in [facility] unit, he missed his tx yesterday Impression:  
ESRD on HD-TTS 
NSTEMI/ACS Plan:  
HD today to make Back to regular schedule on MWF 
ACS per cardiology Vivien Baum MD 
January 25, 2020 Past Medical History:  
Diagnosis Date  Atherosclerosis of renal artery ([address] S/P Rt stent  CAD (coronary artery disease) , 1997, 2012  
 ,double bypass, 2 stents, 2stents 7/2016  Chronic diastolic heart failure ([address]  Chronic kidney disease  Constipation  Diabetes ([address] 1973  
 type 2  
 Essential hypertension, benign  GERD (gastroesophageal reflux disease)  Gout  Grade II diastolic dysfunction  Hypertension 1982  Moderate aortic stenosis  Morbid obesity ([address] Weight loss has been strongly encouraged by following dietary restrictions and an exercise routine  APRYL (obstructive sleep apnea) 6/26/2015  
 no cpap  Other and unspecified hyperlipidemia HDL's are not at goal, LDL's are at goal, triglycerides are not at goal.  
 Prostate cancer [facility])   
 S/P Excision and Radiation  Sleep disturbance  Type II or unspecified type diabetes mellitus without mention of complication, not stated as uncontrolled Past Surgical History:  
Procedure Laterality Date [address]  
 lt. carotid endart. [address]  2012, 2016, Nov 2017 Coronary Stent  COLONOSCOPY N/A 6/23/2017 COLONOSCOPY w/ APC w/ polypectomies performed by Zainab Farias MD at [address]  FLEXIBLE SIGMOIDOSCOPY N/A 1/19/2018 FLEXIBLE SIGMOIDOSCOPY WITH Radio frequency ablation performed by Bethanie Zee MD at [facility] ENDOSCOPY 2021 Jose Pinon N/A 6/8/2018 SIGMOIDOSCOPY FLEXIBLE WITH APC performed by Kamaljit Roth MD at [address] [facility]  HX CORONARY ARTERY BYPASS GRAFT  2000  
 x2  HX HEENT  1997  
 cholecystectomy  HX UROLOGICAL  1998  
 renal art. surg  HX VASCULAR ACCESS Perma cath for HD- right chest.  
 MI INTRO CATH DIALYSIS CIRCUIT DX ANGRPH FLUOR S&I N/A 12/10/2018 FISTULOGRAM LEFT performed by Jennifer Jarvis MD at [facility] CATH LAB  MI INTRO CATH DIALYSIS CIRCUIT DX ANGRPH FLUOR S&I Left 1/2/2020 SHUNTOGRAM LEFT performed by Natalie Childs MD at [facility] LAB  MI INTRO CATH DIALYSIS CIRCUIT W/TRLUML BALO ANGIOP N/A 12/10/2018 Angioplasty Fistula/Dialysis Circuit performed by Jennifer Jarvis MD at [address] [place] [address]  MI INTRO CATH DIALYSIS CIRCUIT W/TRLUML BALO ANGIOP N/A 1/2/2020 Angioplasty Fistula/Dialysis Circuit performed by Natalie Childs MD at [facility] LAB Social History Socioeconomic History  Marital status:  Spouse name: Not on file  Number of children: Not on file  Years of education: Not on file  Highest education level: Not on file Occupational History  Not on file Social Needs  Financial resource strain: Not on file  Food insecurity:  
  Worry: Not on file Inability: Not on file  Transportation needs:  
  Medical: Not on file Non-medical: Not on file Tobacco Use  Smoking status: Never Smoker  Smokeless tobacco: Never Used Substance and Sexual Activity  Alcohol use: No  
 Drug use: No  
 Sexual activity: Not on file Lifestyle  Physical activity:  
  Days per week: Not on file Minutes per session: Not on file  Stress: Not on file Relationships  Social connections: Talks on phone: Not on file Gets together: Not on file Attends Orthodoxy service: Not on file Active member of club or organization: Not on file Attends meetings of clubs or organizations: Not on file Relationship status: Not on file  Intimate partner violence:  
  Fear of current or ex partner: Not on file Emotionally abused: Not on file Physically abused: Not on file Forced sexual activity: Not on file Other Topics Concern [address] Service Not Asked  Blood Transfusions Not Asked  Caffeine Concern Not Asked  Occupational Exposure Not Asked [place] Hazards Not Asked  Sleep Concern Not Asked  Stress Concern Not Asked  Weight Concern Not Asked  Special Diet Not Asked  Back Care Not Asked  Exercise Not Asked  Bike Helmet Not Asked  Seat Belt Not Asked  Self-Exams Not Asked Social History Narrative  Not on file Family History Problem Relation Age of Onset  Heart Attack Father 64  Diabetes Mother  No Known Problems Son  No Known Problems Son  No Known Problems Son  No Known Problems Son  No Known Problems Son  No Known Problems Son  No Known Problems Daughter  No Known Problems Daughter  No Known Problems Daughter  No Known Problems Daughter  No Known Problems Daughter  No Known Problems Daughter  Kidney Disease Brother ESRD Allergies Allergen Reactions  Nka [No Known Allergies] Other (comments) Home Medications:  
 
Current Facility-Administered Medications Medication Dose Route Frequency  [START ON 1/26/2020] allopurinoL (ZYLOPRIM) tablet 100 mg  100 mg Oral Q MON, WED & SUN  
 amLODIPine (NORVASC) tablet 2.5 mg  2.5 mg Oral DAILY  aspirin delayed-release tablet 81 mg  81 mg Oral DAILY  calcium acetate(phosphat bind) (PHOSLO) capsule 667 mg  1 Cap Oral TID WITH MEALS  cinacalcet tab 60 mg  60 mg Oral DAILY  clopidogreL (PLAVIX) tablet 75 mg  75 mg Oral DAILY  isosorbide mononitrate ER (IMDUR) tablet 30 mg  30 mg Oral DAILY  losartan (COZAAR) tablet 25 mg  25 mg Oral DAILY  metoprolol tartrate (LOPRESSOR) tablet 100 mg  100 mg Oral Q12H  
 montelukast (SINGULAIR) tablet 10 mg  10 mg Oral QHS  pantoprazole (PROTONIX) tablet 40 mg  40 mg Oral ACB  zolpidem (AMBIEN) tablet 5 mg  5 mg Oral QHS PRN  
 tamsulosin (FLOMAX) capsule 0.4 mg  0.4 mg Oral DAILY  sodium chloride (NS) flush 5-40 mL  5-40 mL IntraVENous Q8H  
 sodium chloride (NS) flush 5-40 mL  5-40 mL IntraVENous PRN  
 magnesium hydroxide (MILK OF MAGNESIA) 400 mg/5 mL oral suspension 30 mL  30 mL Oral DAILY PRN  
 docusate sodium (COLACE) capsule 100 mg  100 mg Oral BID  atorvastatin (LIPITOR) tablet 80 mg  80 mg Oral QHS  ondansetron (ZOFRAN) injection 4 mg  4 mg IntraVENous Q4H PRN  
 acetaminophen (TYLENOL) tablet 650 mg  650 mg Oral Q6H PRN  
 melatonin tablet 12 mg  12 mg Oral QHS PRN  
 albuterol-ipratropium (DUO-NEB) 2.5 MG-0.5 MG/3 ML  3 mL Nebulization Q4H PRN  
 hydrALAZINE (APRESOLINE) 20 mg/mL injection 10 mg  10 mg IntraVENous Q6H PRN  
 Please clarify if patient is still taking fenofibrate (LOFIBRA) tablet 54 mg at home, the dose and frequency   Other BID Review of Systems:  
Constitutional: Negative for chills and fever. HENT: Negative for facial swelling. Eyes: Negative for photophobia and visual disturbance. Respiratory: Positive for shortness of breath. Cardiovascular: Positive for chest pain. Gastrointestinal: Negative for abdominal pain, nausea and vomiting. Genitourinary: Negative for flank pain. Skin: Negative for color change, pallor, rash and wound. Neurological: Negative for dizziness, weakness, light-headedness and headaches. All other systems reviewed and are negative. Data Review: 
 
Labs: Results:  
   
Chemistry Recent Labs  
  01/25/20 
0347 01/23/20 
2100 01/23/20 [facility] * 58* 191*  136 138  
K 5.0 4.4 4.2  104 103 CO2 27 29 30 BUN 47* 39* 39* CREA 8.11* 7.34* 7.22* CA 9.6 9.0 9.2 AGAP 7 3 5 BUCR 6* 5* 5*  127* 177* TP 6.7 7.0 7.0 ALB 3.3* 3.3* 3.4 GLOB 3.4 3.7 3.6 AGRAT 1.0 0.9 0.9  
  
CBC w/Diff Recent Labs  
  01/25/20 
0347 01/24/20 
0509 01/23/20 
2100 01/23/20 
1801 WBC 9.9 9.8 10.5 8.4  
RBC 3.85* 3.77* 3.44* 3.58* HGB 11.7* 11.6* 10.6* 10.9* HCT 38.4 37.4 34.1* 34.4*  
 286 258 232 GRANS 74*  --  67 74* LYMPH 14*  --  23 18* EOS 2  --  1 1 Coagulation Recent Labs  
  01/24/20 
1108 01/24/20 
0509 01/23/20 
2100 PTP  --  13.7 14.0 INR  --  1.1 1.1 APTT 85.4* 47.7* 26.9 Iron/Ferritin No results for input(s): IRON in the last 72 hours. No lab exists for component: TIBCCALC BNP No results for input(s): BNPP in the last 72 hours. Cardiac Enzymes Recent Labs  
  01/23/20 
1801 CPK 87 CKND1 11.1* Liver Enzymes Recent Labs  
  01/25/20 
0347 TP 6.7 ALB 3.3*  SGOT 20 Thyroid Studies Lab Results Component Value Date/Time T4, Total 10.9 10/09/2012 04:53 AM  
 TSH 0.21 (L) 01/24/2020 05:09 AM  
    
  
 
Physical Assessment:  
 
Visit Vitals /67 (BP Patient Position: At rest) Pulse 100 Temp 97.5 °F (36.4 °C) Resp 16 Ht 5' 8\" (1.727 m) Wt 91.6 kg (202 lb) SpO2 96% BMI 30.71 kg/m² Intake/Output Summary (Last 24 hours) at 1/25/2020 1101 Last data filed at 1/25/2020 2991 Gross per 24 hour Intake 360 ml Output  Net 360 ml Physial Exam: 
Vitals signs and nursing note reviewed. Constitutional:   
   General: He is not in acute distress. Appearance: He is well-developed. Comments: Pt well-appearing in NAD HENT:  
   Head: Normocephalic and atraumatic. Eyes:  
   Conjunctiva/sclera: Conjunctivae normal.  
Cardiovascular:  
   Rate and Rhythm: Normal rate and regular rhythm. Heart sounds: Normal heart sounds. Pulmonary:  
   Effort: Pulmonary effort is normal. No respiratory distress. Breath sounds: Normal breath sounds. Comments: Lungs CTA Pt not working to breathe Abdominal:  
   General: Bowel sounds are normal.  
   Palpations: Abdomen is soft. Tenderness: There is no tenderness. Comments: Abdomen soft, nontender Musculoskeletal: Normal range of motion. Skin: 
   General: Skin is warm. Findings: No rash. Comments: AV fistula with thrill to left bicep Neurological:  
   Mental Status: He is alert and oriented to person, place, and time. Cranial Nerves: No cranial nerve deficit.   
Psychiatric:     
   Behavior: Behavior normal.

## 2020-01-25 NOTE — DISCHARGE INSTRUCTIONS
Patient Education        Heart Attack: Care Instructions  Your Care Instructions    A heart attack (myocardial infarction, or MI) occurs when one or more of the coronary arteries, which supply the heart with oxygen-rich blood, is blocked. A blockage usually occurs when plaque inside the artery breaks open and a blood clot forms in the artery. After a heart attack, you may be worried about your future. Over the next several weeks, your heart will start to heal. Though it can be hard to break old habits, you can prevent another heart attack by making some lifestyle changes and by taking medicines. You may use this information for ideas about what to do at home to speed your recovery. Follow-up care is a key part of your treatment and safety. Be sure to make and go to all appointments, and call your doctor if you are having problems. It's also a good idea to know your test results and keep a list of the medicines you take. How can you care for yourself at home? Activity    · Until your doctor says it is okay, do not do strenuous exercise. And do not lift, pull, or push anything heavy. Ask your doctor what types of activities are safe for you.     · If your doctor has not set you up with a cardiac rehabilitation (rehab) program, talk to him or her about whether that is right for you. Cardiac rehab includes supervised exercise. It also includes help with diet and lifestyle changes and emotional support. It may reduce your risk of future heart problems.     · Increase your activities slowly. Take short rest breaks when you get tired.     · If your doctor recommends it, get more exercise. Walking is a good choice. Bit by bit, increase the amount you walk every day. Try for at least 30 minutes on most days of the week. You also may want to swim, bike, or do other activities.  Talk with your doctor before you start an exercise program to make sure it is safe for you.     · Ask your doctor when you can drive, go back to work, and do other daily activities again.     · You can have sex as soon as you feel ready for it. Often this means when you can easily walk around or climb stairs. Talk with your doctor if you have any concerns. If you are taking nitroglycerin, do not take erection-enhancing medicine such as sildenafil (Viagra), tadalafil (Cialis), or vardenafil (Levitra) .    Lifestyle changes    · Do not smoke. Smoking increases your risk of another heart attack. If you need help quitting, talk to your doctor about stop-smoking programs and medicines. These can increase your chances of quitting for good.     · Eat a heart-healthy diet that is low in saturated fat and salt, and is full of fruits, vegetables and whole-grains. Eat at least two servings of fish each week. You may get more details about how to eat healthy. But these tips can help you get started.     · Stay at a healthy weight, or lose weight if you need to. Medicines    · Be safe with medicines. Take your medicines exactly as prescribed. Call your doctor if you think you are having a problem with your medicine. You will get more details on the specific medicines your doctor prescribes. Do not stop taking your medicine unless your doctor tells you to. Not taking your medicine might raise your risk of having another heart attack.     · You may need several medicines to help lower your risk of another heart attack. These include:  ? Blood pressure medicines such as angiotensin-converting enzyme (ACE) inhibitors, ARBs (angiotensin II receptor blockers), and beta-blockers. ? Cholesterol medicine called statins. ? Aspirin and other blood thinners. These prevent blood clots that can cause a heart attack.     · If your doctor has given you nitroglycerin, keep it with you at all times. If you have angina symptoms, such as chest pain or pressure, sit down and rest. Take the first dose of nitroglycerin as directed.  If symptoms get worse or are not getting better within 5 minutes, call 911 right away. Stay on the phone. The emergency  will tell you what to do.     · Do not take any over-the-counter medicines, vitamins, or herbal products without talking to your doctor first.    Staying healthy    · Manage other health conditions such as high blood pressure and diabetes.     · Avoid colds and flu. Get a pneumococcal vaccine shot. If you have had one before, ask your doctor whether you need another dose. Get the flu vaccine every year. If you must be around people with colds or flu, wash your hands often.     · Be sure to tell your doctor about any angina symptoms you have had, even if they went away. Pay attention to your angina symptoms. Know what is typical for you and learn how to control it. Know when to call for help.     · Talk to your family, friends, or a counselor about your feelings. It is normal to feel frightened, angry, hopeless, helpless, and even guilty. Talking openly about bad feelings can help you cope. If you have symptoms of depression, talk to your doctor. When should you call for help? Call 911 anytime you think you may need emergency care. For example, call if:    · You have symptoms of a heart attack. These may include:  ? Chest pain or pressure, or a strange feeling in the chest.  ? Sweating. ? Shortness of breath. ? Nausea or vomiting. ? Pain, pressure, or a strange feeling in the back, neck, jaw, or upper belly or in one or both shoulders or arms. ? Lightheadedness or sudden weakness. ? A fast or irregular heartbeat. After you call 911, the  may tell you to chew 1 adult-strength or 2 to 4 low-dose aspirin. Wait for an ambulance. Do not try to drive yourself.     · You have angina symptoms (such as chest pain or pressure) that do not go away with rest or are not getting better within 5 minutes after you take a dose of nitroglycerin.     · You passed out (lost consciousness).     · You feel like you are having another heart attack.  Call your doctor now or seek immediate medical care if:    · You are having angina symptoms, such as chest pain or pressure, more often than usual, or the symptoms are different or worse than usual.     · You have new or increased shortness of breath.     · You are dizzy or lightheaded, or you feel like you may faint.    Watch closely for changes in your health, and be sure to contact your doctor if you have any problems. Where can you learn more? Go to http://kendal-farzaneh.info/. Enter [id number] in the search box to learn more about \"Heart Attack: Care Instructions. \"  Current as of: April 9, 2019  Content Version: 12.2  © 1904-9269 [employer]. Care instructions adapted under license by [employer] (which disclaims liability or warranty for this information). If you have questions about a medical condition or this instruction, always ask your healthcare professional. [place] [address] any warranty or liability for your use of this information. Patient Education        Learning About Diabetes Food Guidelines  Your Care Instructions    Meal planning is important to manage diabetes. It helps keep your blood sugar at a target level (which you set with your doctor). You don't have to eat special foods. You can eat what your family eats, including sweets once in a while. But you do have to pay attention to how often you eat and how much you eat of certain foods. You may want to work with a dietitian or a certified diabetes educator (CDE) to help you plan meals and snacks. A dietitian or CDE can also help you lose weight if that is one of your goals. What should you know about eating carbs? Managing the amount of carbohydrate (carbs) you eat is an important part of healthy meals when you have diabetes. Carbohydrate is found in many foods. · Learn which foods have carbs. And learn the amounts of carbs in different foods.   ? Bread, cereal, pasta, and rice have about 15 grams of carbs in a serving. A serving is 1 slice of bread (1 ounce), ½ cup of cooked cereal, or 1/3 cup of cooked pasta or rice. ? Fruits have 15 grams of carbs in a serving. A serving is 1 small fresh fruit, such as an apple or orange; ½ of a banana; ½ cup of cooked or canned fruit; ½ cup of fruit juice; 1 cup of melon or raspberries; or 2 tablespoons of dried fruit. ? Milk and no-sugar-added yogurt have 15 grams of carbs in a serving. A serving is 1 cup of milk or 2/3 cup of no-sugar-added yogurt. ? Starchy vegetables have 15 grams of carbs in a serving. A serving is ½ cup of mashed potatoes or sweet potato; 1 cup winter squash; ½ of a small baked potato; ½ cup of cooked beans; or ½ cup cooked corn or green peas. · Learn how much carbs to eat each day and at each meal. A dietitian or CDE can teach you how to keep track of the amount of carbs you eat. This is called carbohydrate counting. · If you are not sure how to count carbohydrate grams, use the Plate Method to plan meals. It is a good, quick way to make sure that you have a balanced meal. It also helps you spread carbs throughout the day. ? Divide your plate by types of foods. Put non-starchy vegetables on half the plate, meat or other protein food on one-quarter of the plate, and a grain or starchy vegetable in the final quarter of the plate. To this you can add a small piece of fruit and 1 cup of milk or yogurt, depending on how many carbs you are supposed to eat at a meal.  · Try to eat about the same amount of carbs at each meal. Do not \"save up\" your daily allowance of carbs to eat at one meal.  · Proteins have very little or no carbs per serving. Examples of proteins are beef, chicken, turkey, fish, eggs, tofu, cheese, cottage cheese, and peanut butter. A serving size of meat is 3 ounces, which is about the size of a deck of cards.  Examples of meat substitute serving sizes (equal to 1 ounce of meat) are 1/4 cup of cottage cheese, 1 egg, 1 tablespoon of peanut butter, and ½ cup of tofu. How can you eat out and still eat healthy? · Learn to estimate the serving sizes of foods that have carbohydrate. If you measure food at home, it will be easier to estimate the amount in a serving of restaurant food. · If the meal you order has too much carbohydrate (such as potatoes, corn, or baked beans), ask to have a low-carbohydrate food instead. Ask for a salad or green vegetables. · If you use insulin, check your blood sugar before and after eating out to help you plan how much to eat in the future. · If you eat more carbohydrate at a meal than you had planned, take a walk or do other exercise. This will help lower your blood sugar. What else should you know? · Limit saturated fat, such as the fat from meat and dairy products. This is a healthy choice because people who have diabetes are at higher risk of heart disease. So choose lean cuts of meat and nonfat or low-fat dairy products. Use olive or canola oil instead of butter or shortening when cooking. · Don't skip meals. Your blood sugar may drop too low if you skip meals and take insulin or certain medicines for diabetes. · Check with your doctor before you drink alcohol. Alcohol can cause your blood sugar to drop too low. Alcohol can also cause a bad reaction if you take certain diabetes medicines. Follow-up care is a key part of your treatment and safety. Be sure to make and go to all appointments, and call your doctor if you are having problems. It's also a good idea to know your test results and keep a list of the medicines you take. Where can you learn more? Go to http://kendal-farzaneh.info/. Enter N724 in the search box to learn more about \"Learning About Diabetes Food Guidelines. \"  Current as of: April 16, 2019  Content Version: 12.2  © 3385-2870 [employer].  Care instructions adapted under license by [employer] (which disclaims liability or warranty for this information). If you have questions about a medical condition or this instruction, always ask your healthcare professional. [address] any warranty or liability for your use of this information. Patient Education        Learning About Diabetes and Coronary Artery Disease  How are diabetes and heart disease connected? Many people think diabetes and heart disease go hand in hand. But having diabetes doesn't have to mean that you are going to have a heart attack someday. Healthy living can help prevent many of the problems that come with both diabetes and heart disease. For some people, diabetes can cause problems in your body that may lead to heart disease. Diabetes can make the problems of heart disease worse. Experts do not fully understand how diabetes affects the heart. Many things can lead to heart disease, including high blood sugar, insulin resistance, high cholesterol, and high blood pressure. But lifestyle and genetics may also affect a person's risk. But here's the good news: The good things you're doing to stay healthy with diabetes--eating healthy foods, quitting smoking, getting exercise and more--are also helping your heart. What increases your risk for heart disease? When you have diabetes, your risk for heart disease is even higher if you have:  · High blood pressure, which pushes blood through the arteries with too much force. Over time, this damages the walls of the arteries. · High cholesterol, which causes the buildup of a kind of fat inside the blood vessel walls. This buildup can lower blood flow to the heart muscle and raise your risk for having a heart attack. · Kidney damage, which shares many of the risk factors for heart disease (such as high blood sugar, high blood pressure, and high cholesterol). How can you keep your heart healthy when you have diabetes?   Managing your diabetes and keeping your heart healthy are two sides of the same coin. Here are some things you can do. · Test your blood sugar levels and get your diabetes tests on schedule. Try to keep your numbers within your target range. · Keep track of your blood pressure. Your doctor will give you a goal that's right for you. If your blood pressure is high, your treatment may also include medicine. Changes in your lifestyle, such as staying at a healthy weight, may also help you lower your blood pressure. · Eat heart-healthy foods. These include fruits, vegetables, whole grains, fish, and low-fat or nonfat dairy foods. Limit sodium, alcohol, and sweets. · If your doctor recommends it, get more exercise. Walking is a good choice. Bit by bit, increase the amount you walk every day. Try for at least 30 minutes on most days of the week. · Do not smoke. Smoking can make diabetes and heart disease worse. If you need help quitting, talk to your doctor about stop-smoking programs and medicines. These can increase your chances of quitting for good. · Your doctor may talk with you about taking medicines for your heart. For example, your doctor may suggest taking a statin or daily aspirin. Where can you learn more? Go to http://kendalRed's All naturalfarzaneh.info/. Enter W967 in the search box to learn more about \"Learning About Diabetes and Coronary Artery Disease. \"  Current as of: April 16, 2019  Content Version: 12.2  © 8090-3610 [employer]. Care instructions adapted under license by [employer] (which disclaims liability or warranty for this information). If you have questions about a medical condition or this instruction, always ask your healthcare professional. [place] [address] any warranty or liability for your use of this information.     DISCHARGE SUMMARY from Nurse    PATIENT INSTRUCTIONS:    After general anesthesia or intravenous sedation, for 24 hours or while taking prescription Narcotics:  · Limit your activities  · Do not drive and operate hazardous machinery  · Do not make important personal or business decisions  · Do  not drink alcoholic beverages  · If you have not urinated within 8 hours after discharge, please contact your surgeon on call. Report the following to your surgeon:  · Excessive pain, swelling, redness or odor of or around the surgical area  · Temperature over 100.5  · Nausea and vomiting lasting longer than 4 hours or if unable to take medications  · Any signs of decreased circulation or nerve impairment to extremity: change in color, persistent  numbness, tingling, coldness or increase pain  · Any questions    What to do at Home:  Recommended activity: Activity as tolerated. If you experience any of the following symptoms as described in the \"When Should You Call For Help? \", section of the care instructions for Heart Attack, please follow up with your Primary Care Provider and/or call 911. *  Please give a list of your current medications to your Primary Care Provider. *  Please update this list whenever your medications are discontinued, doses are      changed, or new medications (including over-the-counter products) are added. *  Please carry medication information at all times in case of emergency situations. These are general instructions for a healthy lifestyle:    No smoking/ No tobacco products/ Avoid exposure to second hand smoke  Surgeon General's Warning:  Quitting smoking now greatly reduces serious risk to your health.     Obesity, smoking, and sedentary lifestyle greatly increases your risk for illness    A healthy diet, regular physical exercise & weight monitoring are important for maintaining a healthy lifestyle    You may be retaining fluid if you have a history of heart failure or if you experience any of the following symptoms:  Weight gain of 3 pounds or more overnight or 5 pounds in a week, increased swelling in our hands or feet or shortness of breath while lying flat in bed. Please call your doctor as soon as you notice any of these symptoms; do not wait until your next office visit. The discharge information has been reviewed with the patient. The patient verbalized understanding. Discharge medications reviewed with the patient and appropriate educational materials and side effects teaching were provided.   ___________________________________________________________________________________________________________________________________       Patient armband removed and shredded

## 2020-01-25 NOTE — DISCHARGE SUMMARY
Internal Medicine Discharge Summary Patient: Maggie Silverman YOB: 1940 Age:  78 y.o. Admit Date: 1/23/2020 Discharge Date: 1/25/2020 LOS:  LOS: 1 day Discharge To: Home Consults: Cardiology and Nephrology Admission Diagnoses: NSTEMI (non-ST elevated myocardial infarction) ([facility] [address] [I21.4] Discharge Diagnoses:   
Problem List as of 1/25/2020 Date Reviewed: 1/24/2020 Codes Class Noted - Resolved Type 2 diabetes mellitus with diabetic neuropathy (HCC) ICD-10-CM: E11.40 ICD-9-CM: 250.60, 357.2  10/9/2019 - Present Mood disorder [facility]) ICD-10-CM: F39 
ICD-9-CM: 296.90  4/27/2019 - Present Nonrheumatic aortic valve stenosis ICD-10-CM: I35.0 ICD-9-CM: 424.1  4/22/2019 - Present Overview Signed 4/22/2019  9:15 AM by Helena Devi MD  
  Moderate aortic stenosis. Area 1.2 cm² continue to monitor CAD (coronary artery disease) ICD-10-CM: I25.10 ICD-9-CM: 414.00  1/1/2019 - Present Unstable angina (HCC) ICD-10-CM: I20.0 ICD-9-CM: 411.1  1/1/2019 - Present ACP (advance care planning) ICD-10-CM: Z71.89 ICD-9-CM: V65.49  7/9/2018 - Present Chest pain with high risk of acute coronary syndrome ICD-10-CM: R07.9 ICD-9-CM: 786.50  6/24/2018 - Present Anemia ICD-10-CM: D64.9 ICD-9-CM: 285.9  6/24/2018 - Present Radiation proctitis ICD-10-CM: K62.7 ICD-9-CM: 569.49  6/6/2018 - Present Gastrointestinal hemorrhage associated with anorectal source ICD-10-CM: K62.5 ICD-9-CM: 569.3  5/28/2018 - Present Lower GI bleed ICD-10-CM: K92.2 ICD-9-CM: 578.9  5/28/2018 - Present GI bleed ICD-10-CM: K92.2 ICD-9-CM: 578.9  5/28/2018 - Present Elevated troponin ICD-10-CM: R79.89 ICD-9-CM: 790.6  1/28/2018 - Present Type 2 diabetes mellitus with nephropathy ([facility] ICD-10-CM: E11.21 
ICD-9-CM: 250.40, 583.81  12/18/2017 - Present Hyperkalemia ICD-10-CM: E87.5 ICD-9-CM: 276.7  12/13/2017 - Present Hypotension ICD-10-CM: I95.9 ICD-9-CM: 458.9  12/12/2017 - Present Fluid overload ICD-10-CM: E87.70 ICD-9-CM: 276.69  12/12/2017 - Present ESRD (end stage renal disease) on dialysis ([facility] ICD-10-CM: N18.6, Z99.2 ICD-9-CM: 585.6, V45.11  11/8/2017 - Present Hyperuricemia ICD-10-CM: E79.0 ICD-9-CM: 790.6  10/28/2017 - Present Chest pain ICD-10-CM: R07.9 ICD-9-CM: 786.50  10/27/2017 - Present Secondary hyperparathyroidism of renal origin ([facility] (Chronic) ICD-10-CM: N25.81 ICD-9-CM: 588.81  10/25/2017 - Present Fatigue ICD-10-CM: R53.83 ICD-9-CM: 780.79  8/23/2016 - Present Overview Signed 8/23/2016  9:10 AM by MD mabel King 
likely due to APRYL Diastolic CHF, acute on chronic (HCC) ICD-10-CM: I50.33 ICD-9-CM: 428.33, 428.0  7/5/2016 - Present CHF (congestive heart failure), NYHA class IV (HCC) ICD-10-CM: I50.9 ICD-9-CM: 428.0  7/5/2016 - Present APRYL (obstructive sleep apnea) (Chronic) ICD-10-CM: D23.73 
ICD-9-CM: 327.23  6/26/2015 - Present Overview Signed 6/26/2015  9:25 AM by Oneyda June MD  
  non compliance to cpap Prostate cancer [facility]) ICD-10-CM: F01 ICD-9-CM: 185  4/29/2015 - Present Overview Addendum 5/6/2015  4:32 PM by Adrián Li MD  
  4/2015  T2c  PSA 57, [place];8 in 2 cores, and G7 in 2,  CT and Bone Scan no obvious mets Abdominal pain ICD-10-CM: R10.9 ICD-9-CM: 789.00  3/5/2015 - Present GERD (gastroesophageal reflux disease) ICD-10-CM: K21.9 ICD-9-CM: 530.81  3/5/2015 - Present Constipation ICD-10-CM: K59.00 ICD-9-CM: 564.00  3/5/2015 - Present Atherosclerosis of renal artery (HCC) (Chronic) ICD-10-CM: I70.1 ICD-9-CM: 440.1  Unknown - Present Overview Signed 4/29/2013  3:09 PM by Thalia Silva  
  S/P Rt stent Chronic diastolic heart failure (HCC) ICD-10-CM: I50.32 
ICD-9-CM: 428.32  Unknown - Present Morbid obesity ([facility] ICD-10-CM: E66.01 
ICD-9-CM: 278.01  Unknown - Present Overview Signed 4/29/2013  3:10 PM by Rosa Elena Can Weight loss has been strongly encouraged by following dietary restrictions and an exercise routine Essential hypertension, benign (Chronic) ICD-10-CM: I10 
ICD-9-CM: 401.1  Unknown - Present Sleep disturbance ICD-10-CM: G47.9 ICD-9-CM: 780.50  Unknown - Present Overview Signed 4/29/2013  3:11 PM by Thalia Rodriges, not using CPAP regularly Coronary atherosclerosis ICD-10-CM: I25.10 ICD-9-CM: 414.00  Unknown - Present Overview Signed 4/29/2013  3:11 PM by Rosa Elena Can Stable angina after recent PCI continue treatment. Mixed hyperlipidemia ICD-10-CM: E78.2 ICD-9-CM: 272.2  Unknown - Present Overview Signed 4/29/2013  3:11 PM by Rosa Elena Can  
  HDL's are not at goal, LDL's are at goal, triglycerides are not at goal. 
  
  
   
 Type II or unspecified type diabetes mellitus without mention of complication, not stated as uncontrolled ICD-10-CM: E11.9 ICD-9-CM: 250.00  Unknown - Present Chest pain, unspecified ICD-10-CM: R07.9 ICD-9-CM: 786.50  3/12/2013 - Present S/P coronary artery stent placement ICD-10-CM: Z95.5 ICD-9-CM: V45.82  3/12/2013 - Present Overview Signed 3/12/2013  3:37 PM by Michael Monteiro MD  
  vg to diag stent 10/2012 Postsurgical aortocoronary bypass status ICD-10-CM: Z95.1 ICD-9-CM: V45.81  3/12/2013 - Present Contrast dye induced nephropathy ICD-10-CM: N14.1, T50.8X5A 
ICD-9-CM: 584.5, E980.4  10/14/2012 - Present * (Principal) NSTEMI (non-ST elevated myocardial infarction) ([address] ICD-10-CM: I21.4 ICD-9-CM: 410.70  10/7/2012 - Present Coronary atherosclerosis of native coronary artery ICD-10-CM: I25.10 ICD-9-CM: 414.01  10/7/2012 - Present HTN (hypertension) ICD-10-CM: I10 
ICD-9-CM: 401.9  10/7/2012 - Present DM2 (diabetes mellitus, type 2) (HCC) (Chronic) ICD-10-CM: E11.9 ICD-9-CM: 250.00  10/7/2012 - Present Dyslipidemia ICD-10-CM: E78.5 ICD-9-CM: 272.4  10/7/2012 - Present Discharge Condition:  Improved Procedures: [facility] 
 
  
 
HPI: Whitney Angel is a 78 y.o. -American male who presents to [facility] ER with complaint of Shortness of Breath and Chest Pain. Per Nursing Staff, Patient was at [facility] on 1/23/2020 approximately 3 hours prior to arrival to [facility]; Patient reportedly eloped while waiting in Results Waiting area of [facility] and had a positive Troponin that resulted in Police being called to try and inform Patient. Patient arrives at [facility] after being informed (possibly by Police) about his elevated Troponin that was drawn at [facility]. Patient states that he has been having chest pain with increasing frequency over the last two days that is not improved with tums and GI medications, but did improve with sublingual nitroglycerin, which is what prompted him to go to [facility]. Patient states that he has had shortness of breath and diaphoresis with these pains that he rates at 10/10 intensity of a \"burning\" character in a distribution from sternum to neck that is worse with exertion and was initially improved with resting until it became near constant. Patient denies fevers, chills, nausea, vomiting, coughing, diarrhea, or dysuria. Notably, Patient states that he doesn't think he could tolerate a Cardiac Stress Test and also reports that physicians have told him that he is not a good candidate for general anesthesia.   Patient reports a history of 5 Cardiac Stents, 2-Vessel CABG, and at least 4 Heart Catheterizations. 
  
In [facility] ER, Patient was noted to have Troponin of 2.65 (from [facility] ER) that increased to 4.59. EKG was noted to have some changes concerning for Ischemia. Cardiological services were consulted and reportedly stated that Patient would be taken for Heart Catheterization in AM. 
  
Patient is admitted to [facility] Telemetry Unit for NSTEMI with EKG changes and anticipated Heart Catheterization Hospital Course: NSTEMI - Patient treated with IV heparin drip. Cardiology consulted and patient taken for LHC. Repeat cardiac catheterization this admission shows unchanged anatomy with a patent LIMA to LAD graft and total occlusion of his lateral wall vein graft. Native RCA and distal circumflex unchanged. Medical therapy recommended. Cardiology recommended continuing on aspirin/plavix, statin and anti-anginal agents. Follow up in 4-6 weeks. No further chest pain on day of discharge. ESRD on Dialysis - Nephrology consulted and managed during inpatient. No acute issues, but given he missed dialysis on Friday he was dialyzed on Saturday and ok for discharge to go back to regular schedule next week. The rest of the patient's chronic conditions were managed appropriately during their admission. They were medically stable at the time of discharge. Visit Vitals /78 (BP 1 Location: Right arm, BP Patient Position: At rest) Pulse 67 Temp 98.1 °F (36.7 °C) Resp 16 Ht 5' 8\" (1.727 m) Wt 91.6 kg (202 lb) SpO2 100% BMI 30.71 kg/m² Physical Exam at Discharge: 
General Appearance: NAD, conversant HENT: normocephalic/atraumatic, moist mucus membranes Lungs: CTA with normal respiratory effort CV: RRR, no m/r/g Abdomen: soft, non-tender, normal bowel sounds Extremities: no cyanosis, no peripheral edema Neuro: moves all extremities, no focal deficits Psych: appropriate affect, alert and oriented to person, place and time Labs Prior to Discharge: 
Labs: Results:  
   
Chemistry Recent Labs  
  01/25/20 
0347 01/23/20 
2100 01/23/20 
1801 * 58* 191*  136 138  
K 5.0 4.4 4.2  104 103 CO2 27 29 30 BUN 47* 39* 39* CREA 8.11* 7.34* 7.22* CA 9.6 9.0 9.2 AGAP 7 3 5 BUCR 6* 5* 5*  127* 177* TP 6.7 7.0 7.0 ALB 3.3* 3.3* 3.4 GLOB 3.4 3.7 3.6 AGRAT 1.0 0.9 0.9  
  
CBC w/Diff Recent Labs  
  01/25/20 
0347 01/24/20 
0509 01/23/20 
2100 01/23/20 
1801 WBC 9.9 9.8 10.5 8.4  
RBC 3.85* 3.77* 3.44* 3.58* HGB 11.7* 11.6* 10.6* 10.9* HCT 38.4 37.4 34.1* 34.4*  
 286 258 232 GRANS 74*  --  67 74* LYMPH 14*  --  23 18* EOS 2  --  1 1 Cardiac Enzymes Recent Labs  
  01/23/20 
1801 CPK 87 CKND1 11.1* Coagulation Recent Labs  
  01/24/20 
1108 01/24/20 
0509 01/23/20 
2100 PTP  --  13.7 14.0 INR  --  1.1 1.1 APTT 85.4* 47.7* 26.9 Lipid Panel Lab Results Component Value Date/Time Cholesterol, total 131 01/25/2020 03:47 AM  
 HDL Cholesterol 34 (L) 01/25/2020 03:47 AM  
 LDL, calculated 58 01/25/2020 03:47 AM  
 VLDL, calculated 39 01/25/2020 03:47 AM  
 Triglyceride 195 (H) 01/25/2020 03:47 AM  
 CHOL/HDL Ratio 3.9 01/25/2020 03:47 AM  
  
BNP No results for input(s): BNPP in the last 72 hours. Liver Enzymes Recent Labs  
  01/25/20 0347 TP 6.7 ALB 3.3*  SGOT 20 Thyroid Studies Lab Results Component Value Date/Time T4, Total 10.9 10/09/2012 04:53 AM  
 TSH 0.21 (L) 01/24/2020 05:09 AM  
    
 
 
Significant Imaging: Xr Chest [facility] Result Date: 1/24/2020 EXAM: XR CHEST PORT CLINICAL INDICATION/HISTORY: chest pain   > Additional: None. COMPARISON: January 23, 2020 TECHNIQUE: Portable chest _______________ FINDINGS: SUPPORT DEVICES: None. HEART AND MEDIASTINUM: Prior median sternotomy with evidence of coronary stent placement. Normal size and contour. Normal pulmonary vasculature. LUNGS AND PLEURAL SPACES: The lungs are under expanded with mild linear atelectasis in both lung bases. No focal consolidation, effusion, or pneumothorax.  BONY THORAX AND SOFT TISSUES: No acute osseous abnormality. _______________ IMPRESSION: Low lung volumes and bibasilar atelectasis. Xr Chest [facility] Result Date: 1/24/2020 Examination: Portable AP chest History: Chest pain Comparison: March 7, 2019 Findings: There is bibasilar atelectasis. There is no focal consolidation, pleural effusion, pulmonary edema,. Stable density overlying the left upper lobe is likely the anterior costochondral junction has not changed over multiple prior comparisons. Heart size is stable. Atherosclerosis. Coronary and vascular stents noted. Impression: 1. Bibasilar atelectasis. Discharge Medications:    
Current Discharge Medication List  
  
START taking these medications Details  
atorvastatin (LIPITOR) 80 mg tablet Take 1 Tab by mouth nightly. Qty: 30 Tab, Refills: 0 CONTINUE these medications which have CHANGED Details  
losartan (COZAAR) 25 mg tablet Take 1 Tab by mouth daily. Qty: 30 Tab, Refills: 0 CONTINUE these medications which have NOT CHANGED Details  
calcium carbonate (TUMS) 200 mg calcium (500 mg) chew Take 2 Tabs by mouth three (3) times daily. pregabalin (LYRICA) 25 mg capsule TAKE ONE CAPSULE BY MOUTH TWICE A DAY  (  MAX DAILY AMOUNT 50MG ) Qty: 60 Cap, Refills: 1 Associated Diagnoses: Neuropathy  
  
montelukast (SINGULAIR) 10 mg tablet TAKE ONE TABLET BY MOUTH DAILY Qty: 30 Tab, Refills: 2  
  
amLODIPine (NORVASC) 2.5 mg tablet TAKE ONE TABLET BY MOUTH DAILY Qty: 30 Tab, Refills: 2  
  
allopurinol (ZYLOPRIM) 100 mg tablet TAKE ONE TABLET BY MOUTH THREE TIMES PER WEEK ON MONDAY, WEDNESDAY AND FRIDAY Qty: 36 Tab, Refills: 0  
  
tamsulosin (FLOMAX) 0.4 mg capsule TAKE ONE CAPSULE BY MOUTH ONE TIME DAILY Qty: 90 Cap, Refills: 3  
  
insulin aspart U-100 (NOVOLOG U-100 INSULIN ASPART) 100 unit/mL injection 20 units sq 3 times a day,sliding scale Qty: 30 mL, Refills: 0 insulin glargine (LANTUS,BASAGLAR) 100 unit/mL (3 mL) inpn Give Basaglar 15 units twice a day. Qty: 30 mL, Refills: 2  
  
fenofibrate (LOFIBRA) 54 mg tablet TAKE ONE TABLET BY MOUTH DAILY Qty: 90 Tab, Refills: 0 Associated Diagnoses: Dyslipidemia  
  
calcium acetate (PHOSLO) 667 mg cap three (3) times daily (with meals). metoprolol tartrate (LOPRESSOR) 100 mg IR tablet Take 1 Tab by mouth two (2) times a day. Qty: 180 Tab, Refills: 0  
  
clopidogrel (PLAVIX) 75 mg tab Take 1 Tab by mouth daily. Qty: 30 Tab, Refills: 0  
  
isosorbide mononitrate ER (IMDUR) 30 mg tablet Take 1 Tab by mouth daily. Qty: 30 Tab, Refills: 0  
  
cinacalcet (SENSIPAR) 30 mg tablet Take 60 mg by mouth daily. ASPIR-LOW 81 mg tablet TAKE ONE TABLET BY MOUTH ONE TIME DAILY Qty: 38 Tab, Refills: 4  
  
pantoprazole (PROTONIX) 40 mg tablet Take 40 mg by mouth daily. b complex-vitamin c-folic acid (NEPHROCAPS) 1 mg capsule Take 1 Cap by mouth daily. Qty: 30 Cap, Refills: 0  
  
cholecalciferol (DECARA) 50,000 unit capsule Take  by mouth every seven (7) days. hyoscyamine SL (LEVSIN/SL) 0.125 mg SL tablet 0.125 mg by SubLINGual route every four (4) hours as needed for Cramping. ramelteon (ROZEREM) 8 mg tablet Take 1 Tab by mouth nightly. Qty: 90 Tab, Refills: 1 Associated Diagnoses: Insomnia, unspecified type  
  
clobetasol 0.05 % sham Use daily for scalp irritation. Qty: 118 mL, Refills: 1 Associated Diagnoses: Seborrheic dermatitis of scalp DULoxetine (CYMBALTA) 60 mg capsule TAKE ONE CAPSULE BY MOUTH DAILY Qty: 30 Cap, Refills: 3 Associated Diagnoses: Anxiety Insulin Needles, Disposable, 31 gauge x 5/16\" ndle Use to inject insulin 2 x day 
Qty: 200 Package, Refills: 3 Associated Diagnoses: Type 2 diabetes mellitus without complication, with long-term current use of insulin ([address] L.acid/L.casei/B.bif/B.ekn/FOS (PROBIOTIC BLEND PO) Take 2 Tabs by mouth daily. nitroglycerin (NITROSTAT) 0.4 mg SL tablet 1 Tab by SubLINGual route as needed for Chest Pain. Qty: 1 Bottle, Refills: 0 DULCOLAX, BISACODYL, PO Take  by mouth. STOP taking these medications  
  
 simvastatin (ZOCOR) 40 mg tablet Comments:  
Reason for Stopping:   
   
  
 
 
Activity: Activity as tolerated Diet: Resume previous diet Wound Care: None needed Follow-up:  
Please follow up with your PCP within 7 days to discuss your recent hospitalization. Patient to arrange. Cardiology in 4-6 weeks Total time spent including time spent on final examination and discharge discussion, discharge documentation and records reviewed and medication reconciliation: > 30 minutes Brendan Forman DO Internal Medicine, Hospitalist 
Pager: [phone] [facility]

## 2020-01-25 NOTE — PROGRESS NOTES
Discharge/Transition Planning Problem: Discharge Planning Goal: *Discharge to safe environment Outcome: Progressing Towards Goal 
   
  
 
Reason for Admission:   NSTEMI 
            
RUR Score:   29% Resources/supports as identified by patient/family:   Supportive family Top Challenges facing patient (as identified by patient/family and CM):  \\ Finances/Medication cost?       
           
Transportation? Support system or lack thereof? Living arrangements? Self-care/ADLs/Cognition? Son concerned pt forgets medication sometimes and needs help with ADL. Weak. Notified them can get Home Health and educated on [facility]. Suggested they call [employer]ARP and see if he has any personal care with plan Current Advanced Directive/Advance Care Plan: On file Plan for utilizing home health:    h2h if agrees Transition of Care Plan: Interviewed patient. Verified demographics listed on face sheet with patient; all information correct. Pt with Medicare and AARPPatient stated their PCP is Dr Lilly Alonzo and last appt beginning of mo. Patient lives in single family with wife Patient's NOK is wife. Patient independent with ADLs prior to admission. Dailysis M,W, F at [place] No use of DME prior to admission. Discharge plan is Patient has designated _wife and 2 kids_______________________ to participate in his/her discharge plan and to receive any needed information. Lazarus,Winsome Spouse [phone] Jackie Doctor Son [phone] iMke Rios Son [phone] [facility] & [address] Provider list has been given to the patient and/or patient representative. Patient and/or patient representative has signed the [place] of Choice selecting ______[facility]___as their preference agency and a copy given.  Both Home Health Provider list and Freedom of Choice have been placed on the chart. Received and placed [facility] orders. Referral placed and called into [facility] Care Management Interventions PCP Verified by CM: Yes(Dr Aviles) Last Visit to PCP: 01/02/20 Mode of Transport at Discharge: Other (see comment)(family) Transition of Care Consult (CM Consult): Discharge Planning, Home Health [address] [place] [address] Road: Yes Physical Therapy Consult: Yes Occupational Therapy Consult: Yes Current Support Network: Own Home, Lives with Spouse Confirm Follow Up Transport: Family The Plan for Transition of Care is Related to the Following Treatment Goals : successful planning that hekps pt keep on track and out of hospital 
The Patient and/or Patient Representative was Provided with a Choice of Provider and Agrees with the Discharge Plan?: Yes Freedom of Choice List was Provided with Basic Dialogue that Supports the Patient's Individualized Plan of Care/Goals, Treatment Preferences and Shares the Quality Data Associated with the Providers?: Yes Discharge Location Discharge Placement: Home with home health

## 2020-01-25 NOTE — PROGRESS NOTES
Patient received in bed awake. Patient alert and oriented X4, denies pain and discomfort. Patient resting quietly. Frequent use items within reach. Bed locked in low position. Call bell within reach and patient verbalized understanding of use for assistance and needs. Dual skin assessment conducted with Kelly Toure RN. Skin is intact. Patient's right groin cath site intact. 1820:  Patient returned from dialysis. 3 liters of fluid was removed during dialysis. Patient is resting in bed, in stable condition, no complaints of pain. Patient eating dinner then will be discharged to home.

## 2020-01-27 NOTE — PROGRESS NOTES
Hospital Discharge Follow-Up      Date/Time:  1/27/2020 1:34 PM    Patient was admitted to [facility] on 1/23/20 and discharged on 1/25/20 for NSTEMI. The physician discharge summary was available at the time of outreach. Patient was contacted within 1 business day of discharge. CTN spoke with Pt. Spouse on phone. Pt. Spouse is not listed on PHI. No medical information given to Pt. Spouse. Pt. Spouse reported that Patient is not home and currently at Dialysis. Pt. Spouse requesting for CTN to call back tomorrow. Patient's spouse kept the conversation short and ended the call.      [facility] follow up appointment(s):   Future Appointments   Date Time Provider Department Center   [facility]2020 [facility]   1/28/2020 12:00 PM [place] Devi [place] [id number] [facility]   3/12/2020 10:00 AM Kellee Cardenas MD [facility]   4/9/2020 10:15 AM Audrey Aviles MD [facility]   7/13/2020 10:30 AM Audrey Aviles MD [facility] [address]

## 2020-01-27 NOTE — TELEPHONE ENCOUNTER
Archana Abraham from [id number] [facility] is calling because we sent for authorization for this patient's medication ramelteon tab. Want to know if the patient has tried anything else for the diagnosis.  [phone]

## 2020-01-28 NOTE — PROGRESS NOTES
1. Have you been to the ER, urgent care clinic since your last visit? Hospitalized since your last visit? Yes When: 1/2020Where: [facility] Reason for visit: CHF 2. Have you seen or consulted any other health care providers outside of the [address] [place] [address] since your last visit? Include any pap smears or colon screening. Yes Where: pcp 3. Since your last visit, have you had any of the following symptoms? chest pains, shortness of breath and swelling in legs/arms.

## 2020-01-28 NOTE — TELEPHONE ENCOUNTER
Spoke with representative at this time and medication has been denied at this time. Denial letter will be sent via fax

## 2020-01-28 NOTE — PATIENT INSTRUCTIONS
On day of dialysis hold metoprolol, losartan, amlodipine, Isordil MyChart Activation Thank you for requesting access to [facility]. Please follow the instructions below to securely access and download your online medical record. [facility] allows you to send messages to your doctor, view your test results, renew your prescriptions, schedule appointments, and more. How Do I Sign Up? 1. In your internet browser, go to [email or web address]. 2. Click on the First Time User? Click Here link in the Sign In box. You will see the New Member Sign Up page. 3. Enter your [facility] Access Code exactly as it appears below. You will not need to use this code after youve completed the sign-up process. If you do not sign up before the expiration date, you must request a new code. [email or web address] Access Code: [email or web address] Expires: 3/8/2020  5:53 PM (This is the date your [facility] access code will ) 4. Enter the last four digits of your Social Security Number (xxxx) and Date of Birth (mm/dd/yyyy) as indicated and click Submit. You will be taken to the next sign-up page. 5. Create a [facility] ID. This will be your [facility] login ID and cannot be changed, so think of one that is secure and easy to remember. 6. Create a [facility] password. You can change your password at any time. 7. Enter your Password Reset Question and Answer. This can be used at a later time if you forget your password. 8. Enter your e-mail address. You will receive e-mail notification when new information is available in [address] 9. Click Sign Up. You can now view and download portions of your medical record. 10. Click the Download Summary menu link to download a portable copy of your medical information. Additional Information If you have questions, please visit the Frequently Asked Questions section of the [facility] website at [email or web address] Remember, MyChart is NOT to be used for urgent needs. For medical emergencies, dial 911.

## 2020-01-28 NOTE — PROGRESS NOTES
HISTORY OF PRESENT ILLNESS Saman Booth is a 78 y.o. male. Patient with cad,chf,post cabg and pci Josseline Gonzalez recent admission with chf,acs,non q mi Had vg to [address] pci-11/2017 12/2017-admitted with angina 1/2018-non stemi 12/2018-patient presents with increasing chest pain substernal burning. Uses frequent nitroglycerin. This change in frequency has happened for 2 weeks. 1/2019-admission with unstable angina. Cardiac catheterization showing new occluded vein graft to diagonal branch. Medically managed due to multiple prior PCI 
1/2020 admitted with non-STEMI. Cardiac cath showing patent LIMA to LAD. Other diffuse disease as before. Medically managed. Moderate aortic stenosis noted. Short of breath and edema since discharge. CHF The history is provided by the patient. This is a chronic problem. The problem occurs constantly. The problem has not changed since onset. Associated symptoms include chest pain and shortness of breath. The symptoms are aggravated by exertion. Hypertension The history is provided by the patient. This is a chronic problem. The problem occurs constantly. The problem has not changed since onset. Associated symptoms include chest pain and shortness of breath. Nothing aggravates the symptoms. Shortness of Breath The history is provided by the patient. This is a recurrent problem. The problem occurs intermittently. The current episode started more than 1 week ago. Associated symptoms include chest pain. Pertinent negatives include no fever, no cough, no sputum production, no hemoptysis, no wheezing, no PND, no orthopnea, no vomiting, no rash, no leg swelling and no claudication. The problem's precipitants include exercise (>30 mts of treadmill). Chest Pain (Angina) This is a recurrent problem. The problem has been rapidly improving. The problem occurs rarely. The pain is associated with exertion.  The pain is present in the substernal region. The quality of the pain is described as pressure-like. The pain does not radiate. Associated symptoms include shortness of breath. Pertinent negatives include no claudication, no cough, no dizziness, no fever, no hemoptysis, no nausea, no orthopnea, no palpitations, no PND, no sputum production, no vomiting and no weakness. Review of Systems Constitutional: Negative for chills and fever. HENT: Negative for nosebleeds. Eyes: Negative for blurred vision and double vision. Respiratory: Positive for shortness of breath. Negative for cough, hemoptysis, sputum production and wheezing. Cardiovascular: Positive for chest pain. Negative for palpitations, orthopnea, claudication, leg swelling and PND. Gastrointestinal: Negative for heartburn, nausea and vomiting. Musculoskeletal: Negative for myalgias. Skin: Negative for rash. Neurological: Negative for dizziness and weakness. Endo/Heme/Allergies: Does not bruise/bleed easily. Family History Problem Relation Age of Onset  Heart Attack Father 64  Diabetes Mother  No Known Problems Son  No Known Problems Son  No Known Problems Son  No Known Problems Son  No Known Problems Son  No Known Problems Son  No Known Problems Daughter  No Known Problems Daughter  No Known Problems Daughter  No Known Problems Daughter  No Known Problems Daughter  No Known Problems Daughter  Kidney Disease Brother ESRD Past Medical History:  
Diagnosis Date  Atherosclerosis of renal artery ([address] S/P Rt stent  CAD (coronary artery disease) , 1997, 2012  
 ,double bypass, 2 stents, 2stents 7/2016  Chronic diastolic heart failure ([address]  Chronic kidney disease  Constipation  Diabetes ([address] 1973  
 type 2  
 Essential hypertension, benign  GERD (gastroesophageal reflux disease)  Gout  Grade II diastolic dysfunction  Hypertension 1982  Moderate aortic stenosis  Morbid obesity ([facility] Weight loss has been strongly encouraged by following dietary restrictions and an exercise routine  APRYL (obstructive sleep apnea) 6/26/2015  
 no cpap  Other and unspecified hyperlipidemia HDL's are not at goal, LDL's are at goal, triglycerides are not at goal.  
 Prostate cancer [facility])   
 S/P Excision and Radiation  Sleep disturbance  Type II or unspecified type diabetes mellitus without mention of complication, not stated as uncontrolled Past Surgical History:  
Procedure Laterality Date [address]  
 lt. carotid endart. [address] UNLIST  2012, 2016, Nov 2017 Coronary Stent  COLONOSCOPY N/A 6/23/2017 COLONOSCOPY w/ APC w/ polypectomies performed by Ekaterina Monae MD at [address]  FLEXIBLE SIGMOIDOSCOPY N/A 1/19/2018 FLEXIBLE SIGMOIDOSCOPY WITH Radio frequency ablation performed by Ekaterina Monae MD at [facility] ENDOSCOPY 2021 [place] Lis[place] suraj N/A 6/8/2018 SIGMOIDOSCOPY FLEXIBLE WITH APC performed by Teresa Caruso MD at [address] [facility]  HX CORONARY ARTERY BYPASS GRAFT  2000  
 x2  HX HEENT  1997  
 cholecystectomy  HX UROLOGICAL  1998  
 renal art. surg  HX VASCULAR ACCESS Perma cath for HD- right chest.  
 [facility] INTRO CATH DIALYSIS CIRCUIT DX ANGRPH FLUOR S&I N/A 12/10/2018 FISTULOGRAM LEFT performed by Soco Hutchinson MD at [facility] CATH LAB  NH INTRO CATH DIALYSIS CIRCUIT DX ANGRPH FLUOR S&I Left 1/2/2020 SHUNTOGRAM LEFT performed by Dao Munoz MD at [facility] CATH LAB  NH INTRO CATH DIALYSIS CIRCUIT W/TRLUML BALO ANGIOP N/A 12/10/2018 Angioplasty Fistula/Dialysis Circuit performed by Soco Hutchinson MD at [address] [place] [address]  NH INTRO CATH DIALYSIS CIRCUIT W/TRLUML BALO ANGIOP N/A 1/2/2020  Angioplasty Fistula/Dialysis Circuit performed by Dao Munoz [facility] Allergies Allergen Reactions  Nka [No Known Allergies] Other (comments) Current Outpatient Medications Medication Sig  
 metoprolol tartrate (LOPRESSOR) 50 mg tablet Take 1 Tab by mouth two (2) times a day.  allopurinoL (ZYLOPRIM) 100 mg tablet TAKE ONE TABLET BY MOUTH THREE TIMES A WEEK ON MONDAY, WEDNESDAY AND FRIDAY  losartan (COZAAR) 25 mg tablet Take 1 Tab by mouth daily.  atorvastatin (LIPITOR) 80 mg tablet Take 1 Tab by mouth nightly.  calcium carbonate (TUMS) 200 mg calcium (500 mg) chew Take 2 Tabs by mouth three (3) times daily.  pregabalin (LYRICA) 25 mg capsule TAKE ONE CAPSULE BY MOUTH TWICE A DAY  (  MAX DAILY AMOUNT 50MG )  hyoscyamine SL (LEVSIN/SL) 0.125 mg SL tablet 0.125 mg by SubLINGual route every four (4) hours as needed for Cramping.  ramelteon (ROZEREM) 8 mg tablet Take 1 Tab by mouth nightly.  montelukast (SINGULAIR) 10 mg tablet TAKE ONE TABLET BY MOUTH DAILY  amLODIPine (NORVASC) 2.5 mg tablet TAKE ONE TABLET BY MOUTH DAILY  clobetasol 0.05 % sham Use daily for scalp irritation.  tamsulosin (FLOMAX) 0.4 mg capsule TAKE ONE CAPSULE BY MOUTH ONE TIME DAILY  insulin aspart U-100 (NOVOLOG U-100 INSULIN ASPART) 100 unit/mL injection 20 units sq 3 times a day,sliding scale  Insulin Needles, Disposable, 31 gauge x 5/16\" ndle Use to inject insulin 2 x day  insulin glargine (LANTUS,BASAGLAR) 100 unit/mL (3 mL) inpn Give Basaglar 15 units twice a day.  fenofibrate (LOFIBRA) 54 mg tablet TAKE ONE TABLET BY MOUTH DAILY  calcium acetate (PHOSLO) 667 mg cap three (3) times daily (with meals).  L.acid/L.casei/B.bif/B.ken/FOS (PROBIOTIC BLEND PO) Take 2 Tabs by mouth daily.  clopidogrel (PLAVIX) 75 mg tab Take 1 Tab by mouth daily.  isosorbide mononitrate ER (IMDUR) 30 mg tablet Take 1 Tab by mouth daily.  nitroglycerin (NITROSTAT) 0.4 mg SL tablet 1 Tab by SubLINGual route as needed for Chest Pain.  cinacalcet (SENSIPAR) 30 mg tablet Take 60 mg by mouth daily.  ASPIR-LOW 81 mg tablet TAKE ONE TABLET BY MOUTH ONE TIME DAILY  DULCOLAX, BISACODYL, PO Take  by mouth.  pantoprazole (PROTONIX) 40 mg tablet Take 40 mg by mouth daily.  b complex-vitamin c-folic acid (NEPHROCAPS) 1 mg capsule Take 1 Cap by mouth daily.  cholecalciferol (DECARA) 50,000 unit capsule Take  by mouth every seven (7) days.  DULoxetine (CYMBALTA) 60 mg capsule TAKE ONE CAPSULE BY MOUTH DAILY No current facility-administered medications for this visit. Visit Vitals /49 Pulse 63 Ht 5' 8\" (1.727 m) Wt 94.8 kg (209 lb) BMI 31.78 kg/m² Physical Exam  
Constitutional: He is oriented to person, place, and time. He appears well-developed and well-nourished. obese HENT:  
Head: Normocephalic and atraumatic. Eyes: Conjunctivae are normal.  
Neck: Neck supple. No JVD present. No tracheal deviation present. No thyromegaly present. Cardiovascular: Normal rate and regular rhythm. Exam reveals no gallop and no friction rub. Murmur heard. Midsystolic murmur is present at the upper right sternal border and lower left sternal border. Pulmonary/Chest: Breath sounds normal. No respiratory distress. He has no wheezes. He has no rales. He exhibits no tenderness. Abdominal: Soft. There is no abdominal tenderness. Musculoskeletal:     
   General: Edema present. Neurological: He is alert and oriented to person, place, and time. Skin: Skin is warm and dry. Psychiatric: He has a normal mood and affect. Mr. Luiz Markham has a reminder for a \"due or due soon\" health maintenance. I have asked that he contact his primary care provider for follow-up on this health maintenance. CARDIOLOGY STUDIES 10/8/2012 Cardiac Cath Result PATENT LIMA TO LAD, SVG TO D1 SUBTOTAL DIFFUSE, POST PCI WITH STENTS,HAD PROXIMAL IN STENT PROTRUSION UNABLE TO CROSS WITH BALLONS,D1 DIFFISE SEVERE  
 Echocardiogram - Complete Result NORMAL EF 70% SUMMARY:echo:11/2014 Left ventricle: Systolic function was hyperdynamic. Ejection fraction was 
estimated in the range of 70 % to 75 %. No obvious wall motion 
abnormalities identified in the views obtained. Near cavity obliteration 
seen in the mid to apical portions of the ventricular cavity. There was 
mild concentric hypertrophy. Features were consistent with a pseudonormal 
left ventricular filling pattern, with concomitant abnormal relaxation and 
increased filling pressure (grade 2 diastolic dysfunction). NUCLEAR IMAGING: 
 
Findings: stress test:11/2014 1. Post-stress imaging in short axis, horizontal and vertical long axis views reveals normal isotope uptake in all areas. 2. Resting images also show normal isotope uptake in all areas. 3. Gated images show normal left ventricular size, wall motion and systolic function. Ejection fraction is 85%. Diagnosis: 1. Normal scan. 2. No evidence of significant fixed or reversible defect suggesting ischemia or myocardial infarction noted from this nuclear study. 3. low risk scan. 
7/2016-pci 
vg to diag 11/2017-pci svg-diag I have personally reviewed patient's records available from hospital and other providers and incorporated findings in patient care. 1/2018-[facility] Conclusion 1/2019 
 
  
· Patent LIMA to LAD · 100% ostial occlusion of SVG to diagonal artery which has been intervened multiple times in the past. 
· Critical disease in distal circumflex and proximal and distal RCA but these are small vessels and the lesions are chronic. Since the SVG has been intervened multiple times in the past and is now totally occluded, no attempts were made to reopen it. Patient should be treated medically. This would avoid the use of dual antiplatelets which have caused GI bleed repeatedly in the past. 
Aggressive risk factor modification. 
   
 
Interpretation Summary 3/2019 · Estimated left ventricular ejection fraction is 56 - 60%. Left ventricular moderate concentric hypertrophy. Moderate (grade 2) left ventricular diastolic dysfunction. · Normal right ventricular size and function. · Moderate aortic valve leaflet calcification present with reduced excursion. Moderate aortic valve stenosis. Aortic valve peak gradient is 43.6 mmHg. Aortic valve mean gradient is 23.4 mmHg. Aortic valve area is 1.2 cm2. · Mitral valve thickening. Moderate mitral annular calcification. Mild mitral valve regurgitation. I Have personally reviewed recent relevant labs available and discussed with patient 7/2019-lipid,bmp,cbc Assessment Conclusion cardiac cath 1/2020 · Patent LIMA to LAD · 100% ostial occlusion of SVG to diagonal artery which has been intervened multiple times in the past. Unchanged from prior cath in 01/19 · Known disease in circumflex and RCA but these are small vessels and the lesions are chronic. Medical management has been pursued in past.  
  
Patient has been opted to treat medically in the past because of history of GI bleed on dual antiplatelet agent Aggressive risk factor modification. Interpretation Summary 1/2020 · Normal cavity size, systolic function (ejection fraction normal) and diastolic function. Mild concentric hypertrophy. Estimated left ventricular ejection fraction is 50 - 55%. Visually measured ejection fraction. No regional wall motion abnormality noted. · Mildly dilated left atrium. · Aortic valve sclerosis with reduced excursion. Aortic valve mean gradient is 28 mmHg. Aortic valve area is 0.9 cm2. Moderate aortic valve stenosis is present. · Mitral valve non-specific thickening. Moderate mitral annular calcification. Mild mitral valve stenosis is present. Trace mitral valve regurgitation is present. · There is no evidence of pulmonary hypertension. ICD-10-CM ICD-9-CM 1. Coronary artery disease involving native coronary artery of native heart with angina pectoris ([facility] I25.119 414.01   
  413.9 Recent non-STEMI cath revealed patent LIMA to LAD at the disease diffuse medical management 2. Chronic diastolic heart failure (HCC) I50.32 428.32 Recent increase fluid overload. Requires more fluid removal on dialysis 3. S/P coronary artery stent placement Z95.5 V45.82   
4. ESRD (end stage renal disease) ([facility] [address] N18.6 585. 6 Low blood pressure on dialysis will reduce metoprolol. Hold other blood pressure medication on day of dialysis to take more fluid out 5. Postsurgical aortocoronary bypass status Z95.1 V45.81   
6. Old MI (myocardial infarction) I25.2 412 Recent non-STEMI. Cath reviewed 7. Nonrheumatic aortic valve stenosis I35.0 424.1 Moderate aortic stenosis recent echo finding reviewed. Monitor 11/2018 Cardiac status stable. Off Plavix due to GI bleed but hemoglobin is stabilized. Last hemoglobin 11. Cholesterol 156. Continue current treatment. Occasional.  Of low blood pressure will change metoprolol as needed to 50 mg twice a day instead of 100 mg twice a day 12/2018 Crescendo angina for 2 weeks. Frequent use of nitroglycerin. Will set up for cardiac catheterization. Advised to come to emergency room if symptoms are persistent 4/ 2019 Moderate aortic stenosis on recent echo. Normal ejection fraction continue to monitor for symptom 1/2020 Recent non-STEMI. Cardiac cath with patent LIMA to LAD diffuse disease in other vessels as before. Medical management. Low blood pressure during dialysis making difficult to in fluid removal.  Will reduce metoprolol. Hold other medication on dialysis today. Also has moderate aortic stenosis which is progressive. Continue to monitor for need for TAVR Medications Discontinued During This Encounter Medication Reason  metoprolol tartrate (LOPRESSOR) 100 mg IR tablet Orders Placed This Encounter  metoprolol tartrate (LOPRESSOR) 50 mg tablet Sig: Take 1 Tab by mouth two (2) times a day. Dispense:  180 Tab Refill:  3 Follow-up and Dispositions · Return in about 6 weeks (around 3/10/2020).

## 2020-01-31 NOTE — PROGRESS NOTES
Chief Complaint Patient presents with  
[facility] Follow Up 1. Have you been to the ER, urgent care clinic since your last visit? Hospitalized since your last visit? Yes When: 01/23/2020 Where: [facility] Reason for visit: chest pain 2. Have you seen or consulted any other health care providers outside of the [address] [place] [address] [place] since your last visit? Include any pap smears or colon screening. No  
 
[facility] Pia Servin comes in for follow-up care. Patient was recently admitted at [facility] with non-ST elevation MI. He was admitted from 01/23/2020- 01/25/2020. Patient had presented with chest pain to the emergency room. Labs were noted to have elevated troponins. He was seen by the cardiologist and had left heart catheterization done. CAD: he is f/u by the cardiologist, he is on medical management. He denies diaphoresis or SOB. Currently no chest pain though occasionally feels pain on and off. Metoprolol, Plavix, aspirin, Cozaar, Zocor and Imdur. He also has nitroglycerin to use as needed. CHF: Patient has chronic diastolic HF history. Does have some slight lower extremity edema but he is on hemodialysis. Hypertension: Patient is on amlodipine, Lopressor and Cozaar. Denies headache, changes in vision. We will continue with the current management plan. DM2: Patient has type 2 diabetes mellitus. He is on insulin. Last HbA1c was 6.6. Blood glucose numbers have been stable. He should intensify lifestyle and dietary modification. ESRD: Patient has end-stage renal disease and is on hemodialysis. Gets this done 3 times a week. Mood disorder: Patient has a history of mood disorder. At times has had depression episodes and mood swings. He is on Cymbalta. We discussed Cymbalta especially in the setting of end-stage renal disease. He is on dialysis. May need to get a different medication. Morbid obesity: Patient has a BMI of 31.93.   He will intensify lifestyle and dietary modification. Past Medical History Past Medical History:  
Diagnosis Date  Atherosclerosis of renal artery ([address] S/P Rt stent  CAD (coronary artery disease) , 1997, 2012  
 ,double bypass, 2 stents, 2stents 7/2016  Chronic diastolic heart failure ([address]  Chronic kidney disease  Constipation  Diabetes ([address] 1973  
 type 2  
 Essential hypertension, benign  GERD (gastroesophageal reflux disease)  Gout  Grade II diastolic dysfunction  Hypertension 1982  Moderate aortic stenosis  Morbid obesity ([address] Weight loss has been strongly encouraged by following dietary restrictions and an exercise routine  APRYL (obstructive sleep apnea) 6/26/2015  
 no cpap  Other and unspecified hyperlipidemia HDL's are not at goal, LDL's are at goal, triglycerides are not at goal.  
 Prostate cancer [facility])   
 S/P Excision and Radiation  Sleep disturbance  Type II or unspecified type diabetes mellitus without mention of complication, not stated as uncontrolled Surgical History Past Surgical History:  
Procedure Laterality Date [address]  
 lt. carotid endart. [address] UNLIST  2012, 2016, Nov 2017 Coronary Stent  COLONOSCOPY N/A 6/23/2017 COLONOSCOPY w/ APC w/ polypectomies performed by Rick Young MD at [address]  FLEXIBLE SIGMOIDOSCOPY N/A 1/19/2018 FLEXIBLE SIGMOIDOSCOPY WITH Radio frequency ablation performed by Rick Young MD at [facility] ENDOSCOPY 2021 Garcia Liset suraj N/A 6/8/2018 SIGMOIDOSCOPY FLEXIBLE WITH APC performed by Balbir Rojas MD at [address]  HX CORONARY ARTERY BYPASS GRAFT  2000  
 x2  HX HEENT  1997  
 cholecystectomy  HX UROLOGICAL  1998  
 renal art. surg  HX VASCULAR ACCESS Perma cath for HD- right chest.  
 ME INTRO CATH DIALYSIS CIRCUIT DX ANGRPH FLUOR S&I N/A 12/10/2018 FISTULOGRAM LEFT performed by Sarah Cabrera MD at [facility] CATH LAB  PA INTRO CATH DIALYSIS CIRCUIT DX ANGRPH FLUOR S&I Left 1/2/2020 SHUNTOGRAM LEFT performed by Myron Mckinney MD at [facility] LAB  PA INTRO CATH DIALYSIS CIRCUIT W/TRLUML BALO ANGIOP N/A 12/10/2018 Angioplasty Fistula/Dialysis Circuit performed by Sarah Cabrera MD at [address] [place] [address]  PA INTRO CATH DIALYSIS CIRCUIT W/TRLUML BALO ANGIOP N/A 1/2/2020 Angioplasty Fistula/Dialysis Circuit performed by Myron Mckinney MD at [facility] LAB Medications Current Outpatient Medications Medication Sig Dispense Refill  metoprolol tartrate (LOPRESSOR) 50 mg tablet Take 1 Tab by mouth two (2) times a day. 180 Tab 3  
 simvastatin (ZOCOR) 40 mg tablet Take 1 Tab by mouth nightly. 90 Tab 1  
 allopurinoL (ZYLOPRIM) 100 mg tablet TAKE ONE TABLET BY MOUTH THREE TIMES A WEEK ON MONDAY, WEDNESDAY AND FRIDAY 36 Tab 0  
 losartan (COZAAR) 25 mg tablet Take 1 Tab by mouth daily. 30 Tab 0  
 calcium carbonate (TUMS) 200 mg calcium (500 mg) chew Take 2 Tabs by mouth three (3) times daily.  pregabalin (LYRICA) 25 mg capsule TAKE ONE CAPSULE BY MOUTH TWICE A DAY  (  MAX DAILY AMOUNT 50MG ) 60 Cap 1  
 hyoscyamine SL (LEVSIN/SL) 0.125 mg SL tablet 0.125 mg by SubLINGual route every four (4) hours as needed for Cramping.  montelukast (SINGULAIR) 10 mg tablet TAKE ONE TABLET BY MOUTH DAILY 30 Tab 2  
 amLODIPine (NORVASC) 2.5 mg tablet TAKE ONE TABLET BY MOUTH DAILY 30 Tab 2  clobetasol 0.05 % sham Use daily for scalp irritation.  118 mL 1  
 tamsulosin (FLOMAX) 0.4 mg capsule TAKE ONE CAPSULE BY MOUTH ONE TIME DAILY 90 Cap 3  
 DULoxetine (CYMBALTA) 60 mg capsule TAKE ONE CAPSULE BY MOUTH DAILY 30 Cap 3  
 insulin aspart U-100 (NOVOLOG U-100 INSULIN ASPART) 100 unit/mL injection 20 units sq 3 times a day,sliding scale (Patient taking differently: 20 Units by SubCUTAneous route Before breakfast, lunch, and dinner. 20 units sq 3 times a day) 30 mL 0  
 Insulin Needles, Disposable, 31 gauge x 5/16\" ndle Use to inject insulin 2 x day 200 Package 3  
 insulin glargine (LANTUS,BASAGLAR) 100 unit/mL (3 mL) inpn Give Basaglar 15 units twice a day. (Patient taking differently: by SubCUTAneous route. Give Basaglar 15 units twice a day.) 30 mL 2  
 fenofibrate (LOFIBRA) 54 mg tablet TAKE ONE TABLET BY MOUTH DAILY 90 Tab 0  
 calcium acetate (PHOSLO) 667 mg cap Take 1 Cap by mouth three (3) times daily (with meals).  L.acid/L.casei/B.bif/B.ken/FOS (PROBIOTIC BLEND PO) Take 2 Tabs by mouth daily.  clopidogrel (PLAVIX) 75 mg tab Take 1 Tab by mouth daily. 30 Tab 0  
 isosorbide mononitrate ER (IMDUR) 30 mg tablet Take 1 Tab by mouth daily. 30 Tab 0  
 nitroglycerin (NITROSTAT) 0.4 mg SL tablet 1 Tab by SubLINGual route as needed for Chest Pain. (Patient taking differently: 1 Tab by SubLINGual route every five (5) minutes as needed for Chest Pain.) 1 Bottle 0  
 cinacalcet (SENSIPAR) 30 mg tablet Take 60 mg by mouth daily.  ASPIR-LOW 81 mg tablet TAKE ONE TABLET BY MOUTH ONE TIME DAILY 38 Tab 4  
 DULCOLAX, BISACODYL, PO Take 1 Tab by mouth daily as needed for Other (constipation).  pantoprazole (PROTONIX) 40 mg tablet Take 40 mg by mouth daily.  b complex-vitamin c-folic acid (NEPHROCAPS) 1 mg capsule Take 1 Cap by mouth daily. 30 Cap 0  cholecalciferol (DECARA) 50,000 unit capsule Take 50,000 Units by mouth every seven (7) days.  ramelteon (ROZEREM) 8 mg tablet Take 1 Tab by mouth nightly. 90 Tab 1 Allergies Allergies Allergen Reactions  Nka [No Known Allergies] Other (comments) Family History Family History Problem Relation Age of Onset  Heart Attack Father 64  Diabetes Mother  No Known Problems Son  No Known Problems Son  No Known Problems Son  No Known Problems Son   
  No Known Problems Son  No Known Problems Son  No Known Problems Daughter  No Known Problems Daughter  No Known Problems Daughter  No Known Problems Daughter  No Known Problems Daughter  No Known Problems Daughter  Kidney Disease Brother ESRD Social History Social History Socioeconomic History  Marital status:  Spouse name: Not on file  Number of children: Not on file  Years of education: Not on file  Highest education level: Not on file Occupational History  Not on file Social Needs  Financial resource strain: Not on file  Food insecurity:  
  Worry: Not on file Inability: Not on file  Transportation needs:  
  Medical: Not on file Non-medical: Not on file Tobacco Use  Smoking status: Never Smoker  Smokeless tobacco: Never Used Substance and Sexual Activity  Alcohol use: No  
 Drug use: No  
 Sexual activity: Not on file Lifestyle  Physical activity:  
  Days per week: Not on file Minutes per session: Not on file  Stress: Not on file Relationships  Social connections:  
  Talks on phone: Not on file Gets together: Not on file Attends Faith service: Not on file Active member of club or organization: Not on file Attends meetings of clubs or organizations: Not on file Relationship status: Not on file  Intimate partner violence:  
  Fear of current or ex partner: Not on file Emotionally abused: Not on file Physically abused: Not on file Forced sexual activity: Not on file Other Topics Concern [address] Service Not Asked  Blood Transfusions Not Asked  Caffeine Concern Not Asked  Occupational Exposure Not Asked [place] Hazards Not Asked  Sleep Concern Not Asked  Stress Concern Not Asked  Weight Concern Not Asked  Special Diet Not Asked  Back Care Not Asked  Exercise Not Asked  Bike Helmet Not Asked  Seat Belt Not Asked  Self-Exams Not Asked Social History Narrative  Not on file Review of Systems Review of Systems - Review of all systems is negative except as noted above in the HPI. Vital Signs Visit Vitals /55 (BP 1 Location: Left arm, BP Patient Position: Sitting) Pulse 68 Temp 97.5 °F (36.4 °C) (Oral) Resp 18 Ht 5' 8\" (1.727 m) Wt 210 lb (95.3 kg) SpO2 98% BMI 31.93 kg/m² Physical Exam 
Physical Examination: General appearance - oriented to person, place, and time, overweight and acyanotic, in no respiratory distress Mental status - normal mood, behavior, speech, dress, motor activity, and thought processes, affect appropriate to mood Neck - supple, no significant adenopathy Lymphatics - no palpable lymphadenopathy Chest - no tachypnea, retractions or cyanosis, decreased air entry noted bilateral lung bases Heart - systolic murmur 3/6 at 2nd right intercostal space Abdomen - no rebound tenderness noted Back exam - limited range of motion Neurological - abnormal neurological exam unchanged from prior examinations Musculoskeletal - osteoarthritic changes noted in both hands Extremities - pedal edema 1 +, intact peripheral pulses Results Results for orders placed or performed during the hospital encounter of 01/23/20 TROPONIN I Result Value Ref Range Troponin-I, QT 4.59 (HH) 0.0 - 0.045 NG/ML  
CBC WITH AUTOMATED DIFF Result Value Ref Range WBC 10.5 4.6 - 13.2 K/uL  
 RBC 3.44 (L) 4.70 - 5.50 M/uL  
 HGB 10.6 (L) 13.0 - 16.0 g/dL HCT 34.1 (L) 36.0 - 48.0 % MCV 99.1 (H) 74.0 - 97.0 FL  
 MCH 30.8 24.0 - 34.0 PG  
 MCHC 31.1 31.0 - 37.0 g/dL  
 RDW 14.4 11.6 - 14.5 % PLATELET 385 531 - 352 K/uL MPV 10.0 9.2 - 11.8 FL  
 NEUTROPHILS 67 40 - 73 % LYMPHOCYTES 23 21 - 52 % MONOCYTES 9 3 - 10 % EOSINOPHILS 1 0 - 5 % BASOPHILS 0 0 - 2 %  
 ABS. NEUTROPHILS 7.0 1.8 - 8.0 K/UL  
 ABS. LYMPHOCYTES 2.4 0.9 - 3.6 K/UL ABS. MONOCYTES 0.9 0.05 - 1.2 K/UL  
 ABS. EOSINOPHILS 0.1 0.0 - 0.4 K/UL  
 ABS. BASOPHILS 0.0 0.0 - 0.1 K/UL  
 DF AUTOMATED METABOLIC PANEL, COMPREHENSIVE Result Value Ref Range Sodium 136 136 - 145 mmol/L Potassium 4.4 3.5 - 5.5 mmol/L Chloride 104 100 - 111 mmol/L  
 CO2 29 21 - 32 mmol/L Anion gap 3 3.0 - 18 mmol/L Glucose 58 (L) 74 - 99 mg/dL BUN 39 (H) 7.0 - 18 MG/DL Creatinine 7.34 (H) 0.6 - 1.3 MG/DL  
 BUN/Creatinine ratio 5 (L) 12 - 20 GFR est AA 9 (L) >60 ml/min/1.73m2 GFR est non-AA 7 (L) >60 ml/min/1.73m2 Calcium 9.0 8.5 - 10.1 MG/DL Bilirubin, total 0.6 0.2 - 1.0 MG/DL  
 ALT (SGPT) 14 (L) 16 - 61 U/L  
 AST (SGOT) 30 10 - 38 U/L Alk. phosphatase 127 (H) 45 - 117 U/L Protein, total 7.0 6.4 - 8.2 g/dL Albumin 3.3 (L) 3.4 - 5.0 g/dL Globulin 3.7 2.0 - 4.0 g/dL A-G Ratio 0.9 0.8 - 1.7 PTT Result Value Ref Range aPTT 26.9 23.0 - 36.4 SEC PROTHROMBIN TIME + INR Result Value Ref Range Prothrombin time 14.0 11.5 - 15.2 sec INR 1.1 0.8 - 1.2 TSH 3RD GENERATION Result Value Ref Range TSH 0.21 (L) 0.36 - 3.74 uIU/mL CBC W/O DIFF Result Value Ref Range WBC 9.8 4.6 - 13.2 K/uL  
 RBC 3.77 (L) 4.70 - 5.50 M/uL  
 HGB 11.6 (L) 13.0 - 16.0 g/dL HCT 37.4 36.0 - 48.0 % MCV 99.2 (H) 74.0 - 97.0 FL  
 MCH 30.8 24.0 - 34.0 PG  
 MCHC 31.0 31.0 - 37.0 g/dL  
 RDW 14.5 11.6 - 14.5 % PLATELET 561 969 - 547 K/uL MPV 10.1 9.2 - 11.8 FL  
TROPONIN I Result Value Ref Range Troponin-I, QT 5.37 (HH) 0.0 - 0.045 NG/ML  
HEMOGLOBIN A1C WITH EAG Result Value Ref Range Hemoglobin A1c 6.6 (H) 4.2 - 5.6 % Est. average glucose 143 mg/dL PTT Result Value Ref Range aPTT 47.7 (H) 23.0 - 36.4 SEC PROTHROMBIN TIME + INR Result Value Ref Range Prothrombin time 13.7 11.5 - 15.2 sec INR 1.1 0.8 - 1.2 PTT Result Value Ref Range aPTT 85.4 (H) 23.0 - 36.4 SEC  
LIPID PANEL Result Value Ref Range LIPID PROFILE Cholesterol, total 131 <200 MG/DL Triglyceride 195 (H) <150 MG/DL  
 HDL Cholesterol 34 (L) 40 - 60 MG/DL  
 LDL, calculated 58 0 - 100 MG/DL VLDL, calculated 39 MG/DL  
 CHOL/HDL Ratio 3.9 0 - 5.0    
CBC WITH AUTOMATED DIFF Result Value Ref Range WBC 9.9 4.6 - 13.2 K/uL  
 RBC 3.85 (L) 4.70 - 5.50 M/uL  
 HGB 11.7 (L) 13.0 - 16.0 g/dL HCT 38.4 36.0 - 48.0 % MCV 99.7 (H) 74.0 - 97.0 FL  
 MCH 30.4 24.0 - 34.0 PG  
 MCHC 30.5 (L) 31.0 - 37.0 g/dL  
 RDW 14.7 (H) 11.6 - 14.5 % PLATELET 980 424 - 749 K/uL MPV 10.3 9.2 - 11.8 FL  
 NEUTROPHILS 74 (H) 40 - 73 % LYMPHOCYTES 14 (L) 21 - 52 % MONOCYTES 9 3 - 10 % EOSINOPHILS 2 0 - 5 % BASOPHILS 1 0 - 2 %  
 ABS. NEUTROPHILS 7.3 1.8 - 8.0 K/UL  
 ABS. LYMPHOCYTES 1.4 0.9 - 3.6 K/UL  
 ABS. MONOCYTES 0.9 0.05 - 1.2 K/UL  
 ABS. EOSINOPHILS 0.2 0.0 - 0.4 K/UL  
 ABS. BASOPHILS 0.1 0.0 - 0.1 K/UL  
 DF AUTOMATED METABOLIC PANEL, COMPREHENSIVE Result Value Ref Range Sodium 137 136 - 145 mmol/L Potassium 5.0 3.5 - 5.5 mmol/L Chloride 103 100 - 111 mmol/L  
 CO2 27 21 - 32 mmol/L Anion gap 7 3.0 - 18 mmol/L Glucose 116 (H) 74 - 99 mg/dL BUN 47 (H) 7.0 - 18 MG/DL Creatinine 8.11 (H) 0.6 - 1.3 MG/DL  
 BUN/Creatinine ratio 6 (L) 12 - 20 GFR est AA 8 (L) >60 ml/min/1.73m2 GFR est non-AA 6 (L) >60 ml/min/1.73m2 Calcium 9.6 8.5 - 10.1 MG/DL Bilirubin, total 0.7 0.2 - 1.0 MG/DL  
 ALT (SGPT) 16 16 - 61 U/L  
 AST (SGOT) 20 10 - 38 U/L Alk. phosphatase 103 45 - 117 U/L Protein, total 6.7 6.4 - 8.2 g/dL Albumin 3.3 (L) 3.4 - 5.0 g/dL Globulin 3.4 2.0 - 4.0 g/dL A-G Ratio 1.0 0.8 - 1.7 GLUCOSE, POC Result Value Ref Range Glucose (POC) 84 70 - 110 mg/dL GLUCOSE, POC Result Value Ref Range Glucose (POC) 59 (L) 70 - 110 mg/dL GLUCOSE, POC Result Value Ref Range Glucose (POC) 155 (H) 70 - 110 mg/dL GLUCOSE, POC Result Value Ref Range Glucose (POC) 239 (H) 70 - 110 mg/dL GLUCOSE, POC Result Value Ref Range Glucose (POC) 133 (H) 70 - 110 mg/dL EKG, 12 LEAD, INITIAL Result Value Ref Range Ventricular Rate 88 BPM  
 Atrial Rate 88 BPM  
 P-R Interval 146 ms  
 QRS Duration 108 ms Q-T Interval 362 ms QTC Calculation (Bezet) 438 ms Calculated P Axis 19 degrees Calculated R Axis 51 degrees Calculated T Axis -21 degrees Diagnosis Sinus rhythm with occasional premature ventricular complexes Incomplete right bundle branch block Septal infarct , age undetermined Abnormal ECG When compared with ECG of 23-JAN-2020 17:51, 
premature ventricular complexes are now present Septal infarct is now present T wave inversion now evident in Inferior leads T wave inversion no longer evident in Lateral leads Confirmed by Madhuri Spivey ([id number]) on 1/24/2020 10:16:06 AM 
  
ECHO ADULT COMPLETE Result Value Ref Range Aortic Valve Systolic Peak Velocity 338.44 cm/s AoV VTI 93.47 cm Aortic Valve Area by Continuity of Peak Velocity 0.8 cm2 Aortic Valve Area by Continuity of VTI 0.9 cm2 AoV PG 58.5 mmHg LVIDd 5.60 4.2 - 5.9 cm  
 LVPWd 1.09 (A) 0.6 - 1.0 cm LVIDs 3.91 cm IVSd 1.09 (A) 0.6 - 1.0 cm  
 LVOT d 1.98 cm  
 LVOT Peak Velocity 104.41 cm/s LVOT Peak Gradient 4.4 mmHg LVOT VTI 25.96 cm  
 MVA (PHT) 2.7 cm2  
 MV A James 132.26 cm/s  
 MV E James 126.37 cm/s  
 MV E/A 0.96   
 MV Mean Gradient 3.8 mmHg Mitral Valve Annulus Velocity Time Integral 39.64 cm Left Atrium to Aortic Root Ratio 1.11 Aortic Valve Systolic Mean Gradient 99.4 mmHg LVOT Cardiac Output 12.2 L/min Inferior Vena Cava Diameter Sniffing 1.71 cm  
 LV Mass .9 (A) 88 - 224 g LV Mass AL Index 142.7 (A) 49 - 115 g/m2 RVSP 18.6 mmHg MV Peak Gradient 9.1 mmHg Est. RA Pressure 3.0 mmHg Mitral Valve E Wave Deceleration Time 227.4 ms  Mitral Valve Pressure Half-time 82.1 ms  
 Left Atrium Major Axis 3.84 cm Triscuspid Valve Regurgitation Peak Gradient 15.6 mmHg Mitral Valve Max Velocity 150.55 cm/s MVA VTI 2.0 cm2 LVOT SV 80.3 ml  
 TR Max Velocity 197.32 cm/s PASP 18.6 mmHg Ao Root D 3.47 cm  
 LAURO/BSA Pk James 0.4 cm2/m2 LAURO/BSA VTI 0.4 cm2/m2 Left Ventricular Fractional Shortening by 2D 43.077263044 % Left Ventricular Outflow Tract Mean Gradient 6.7104427218 mmHg Left Ventricular Outflow Tract Mean Velocity 0.61781271805 cm/s Mitral Valve Deceleration Beaufort 4.585776868 Mitral valve mean inflow velocity 1.1940225 m/s AV Velocity Ratio 0.27 AV VTI Ratio 0.3 Aortic valve mean velocity 9.6694976956 m/s Left Ventricular End Diastolic Volume by Teichholz Method 3.06645838060 mL Left Ventricular End Systolic Volume by Teichholz Method 2.56725059794 mL Left Ventricular Stroke Volume by Brenda Balsam Method 35.977024855 mL  
 
 
ASSESSMENT and PLAN 
  ICD-10-CM ICD-9-CM 1. Chronic diastolic heart failure (HCC) I50.32 428.32   
2. Coronary artery disease involving native coronary artery of native heart with angina pectoris ([facility] I25.119 414.01   
  413.9 3. Insomnia, unspecified type G47.00 780.52   
4. ESRD (end stage renal disease) on dialysis (HCC) N18.6 585.6 Z99.2 V45.11   
5. Essential hypertension I10 401.9 6. Mood disorder ([facility] F39 296.90   
 
lab results and schedule of future lab studies reviewed with patient 
reviewed diet, exercise and weight control 
cardiovascular risk and specific lipid/LDL goals reviewed 
reviewed medications and side effects in detail 
specific diabetic recommendations: low cholesterol diet, weight control and daily exercise discussed, home glucose monitoring emphasized, all medications, side effects and compliance discussed carefully, glycohemoglobin and other lab monitoring discussed and long term diabetic complications discussed I have discussed the diagnosis with the patient and the intended plan of care as seen in the above orders. The patient has received an after-visit summary and questions were answered concerning future plans. I have discussed medication, side effects, and warnings with the patient in detail. The patient verbalized understanding and is in agreement with the plan of care. The patient will contact the office with any additional concerns. Katelyn Oliver MD 
 
PLEASE NOTE:  
This document has been produced using voice recognition software. Unrecognized errors in transcription may be present

## 2020-02-14 NOTE — PROGRESS NOTES
Jannet Dixon presents today for   Chief Complaint   Patient presents with    Cough     x 2 months not getting any better got worse yesterday    Shortness of Breath       Is someone accompanying this pt? yes    Is the patient using any DME equipment during OV? no    Depression Screening:  3 most recent PHQ Screens 7/10/2019   Little interest or pleasure in doing things Not at all   Feeling down, depressed, irritable, or hopeless Not at all   Total Score PHQ 2 0   Trouble falling or staying asleep, or sleeping too much -   Feeling tired or having little energy -   Poor appetite, weight loss, or overeating -   Feeling bad about yourself - or that you are a failure or have let yourself or your family down -   Trouble concentrating on things such as school, work, reading, or watching TV -   Moving or speaking so slowly that other people could have noticed; or the opposite being so fidgety that others notice -   Thoughts of being better off dead, or hurting yourself in some way -   PHQ 9 Score -   How difficult have these problems made it for you to do your work, take care of your home and get along with others -       Learning Assessment:  Learning Assessment 1/17/2020   PRIMARY LEARNER Patient   PRIMARY LANGUAGE ENGLISH   LEARNER PREFERENCE PRIMARY LISTENING   ANSWERED BY patient   RELATIONSHIP SELF       Abuse Screening:  Abuse Screening Questionnaire 7/10/2019   Do you ever feel afraid of your partner? N   Are you in a relationship with someone who physically or mentally threatens you? N   Is it safe for you to go home? Y       Fall Risk  Fall Risk Assessment, last 12 mths 2/14/2020   Able to walk? Yes   Fall in past 12 months? No   Fall with injury? -   Number of falls in past 12 months -   Fall Risk Score -       Health Maintenance reviewed and discussed and ordered per Provider.     Health Maintenance Due   Topic Date Due    DTaP/Tdap/Td series (1 - Tdap) 07/10/1951    Shingrix Vaccine Age 50> (1 of 2) 07/10/1990    Pneumococcal 65+ years (1 of 1 - PPSV23) 07/10/2005   . Coordination of Care:  1. Have you been to the ER, urgent care clinic since your last visit? Hospitalized since your last visit? no    2. Have you seen or consulted any other health care providers outside of the [address] [place] [address] since your last visit? Include any pap smears or colon screening.  no

## 2020-02-14 NOTE — TELEPHONE ENCOUNTER
Follow up is scheduled for March 12, 2020             Requested Prescriptions     Pending Prescriptions Disp Refills    clopidogreL (PLAVIX) 75 mg tab 90 Tab 3     Sig: Take 1 Tab by mouth daily.

## 2020-02-14 NOTE — PATIENT INSTRUCTIONS
Patient & wife in agreement that safest place is hospital/ER setting. On 3 L O2 highest pulse ox reading 87%. Without O2, sats in 60s-upper 70s.

## 2020-02-14 NOTE — PROGRESS NOTES
PROBLEM/SICK OFFICE NOTE (SOAP)    2/14/2020  10:35 AM    SUBJECTIVE:    Chief Complaint   Patient presents with    Cough     x 2 months not getting any better got worse yesterday    Shortness of Breath       HPI:  Nohemy Lopez is a 78 y.o. male presenting today for office visit. Mr. Trudi Zeng is a patient of Dr. Sabrina Alarcon, presenting today with his wife for a cough and shortness of breath that has been a chronic problem for him, but started getting worse yesterday. Per patient report, his home health nurse noted that yesterday he did not appear to be moving air adequately and suggested a follow up with PCP. Hypoxemia- With patient intake, O2 sats found to be very low. Ranging anywhere between 55%-70%. Patient was placed on supplemental oxygen and sats raised to upper 80s. According to home care visit note yesterday, o2 sat at 96%. Patient was recently [facility] [place] with NSTEMI 1/23-25. He has a history of CHF and ESRD on hemodialysis. He is due to have dialysis today. Today patient denies chest pain, headaches, weakness, fever. He does admit to a non-productive cough and occasional wheezing, and shortness of breath is worse on exertion. No x-ray technician available for chest x-ray in clinic today. Advised patient on going to ER for more urgent/thorough evaluation as I am concerned for multiple processes including volume overload due to CHF/ESRD, pneumonia, respiratory distress, etc. He declines ambulance transport. AMA paperwork signed but patient and wife confirm they will go to [facility] [place] [facility] immediately. Review of Systems:  Review of Systems   Constitutional: Positive for fatigue. Negative for chills and fever. Respiratory: Positive for cough, shortness of breath and wheezing. Cardiovascular: Positive for leg swelling. Negative for chest pain and palpitations. Neurological: Negative for dizziness, syncope, weakness and headaches. Depression- PHQ Screening   3 most recent PHQ Screens 7/10/2019   Little interest or pleasure in doing things Not at all   Feeling down, depressed, irritable, or hopeless Not at all   Total Score PHQ 2 0   Trouble falling or staying asleep, or sleeping too much -   Feeling tired or having little energy -   Poor appetite, weight loss, or overeating -   Feeling bad about yourself - or that you are a failure or have let yourself or your family down -   Trouble concentrating on things such as school, work, reading, or watching TV -   Moving or speaking so slowly that other people could have noticed; or the opposite being so fidgety that others notice -   Thoughts of being better off dead, or hurting yourself in some way -   PHQ 9 Score -   How difficult have these problems made it for you to do your work, take care of your home and get along with others -         History  Past Medical History:   Diagnosis Date    Atherosclerosis of renal artery ([address]     S/P Rt stent    CAD (coronary artery disease) , 1997, 2012    ,double bypass, 2 stents, 2stents 7/2016    Chronic diastolic heart failure ([address]     Chronic kidney disease     Constipation     Diabetes ([address] 1973    type 2    Essential hypertension, benign     GERD (gastroesophageal reflux disease)     Gout     Grade II diastolic dysfunction     Hypertension 1982    Moderate aortic stenosis     Morbid obesity ([address]     Weight loss has been strongly encouraged by following dietary restrictions and an exercise routine    APRYL (obstructive sleep apnea) 6/26/2015    no cpap    Other and unspecified hyperlipidemia     HDL's are not at goal, LDL's are at goal, triglycerides are not at goal.    Prostate cancer ([address]     S/P Excision and Radiation    Sleep disturbance     Type II or unspecified type diabetes mellitus without mention of complication, not stated as uncontrolled        Past Surgical History:   Procedure Laterality Date    CARDIAC SURG PROCEDURE UNLIST  1995    lt. carotid endart.    [facility] [place] CARDIAC SURG PROCEDURE UNLIST  2012, 2016, Nov 2017    Coronary Stent    COLONOSCOPY N/A 6/23/2017    COLONOSCOPY w/ APC w/ polypectomies performed by Monica Tavarez MD at [address] N/A 1/19/2018    FLEXIBLE SIGMOIDOSCOPY WITH Radio frequency ablation performed by Monica Tavarez MD at [address] N/A 6/8/2018    SIGMOIDOSCOPY FLEXIBLE WITH APC performed by Sebastián Rico MD at [facility] ENDOSCOPY    HX CHOLECYSTECTOMY      HX CORONARY ARTERY BYPASS GRAFT  2000    x2    HX HEENT  1997    cholecystectomy    HX UROLOGICAL  1998    renal art. surg    HX VASCULAR ACCESS      Perma cath for HD- right chest.    [place] [facility] DX ANGRPH FLUOR S&I N/A 12/10/2018    FISTULOGRAM LEFT performed by Deidra Zhong MD at [address] [facility] CATH LAB    [place] INTR[place] CATH DIAL[place]IS CIRCUIT DX [address] FLUOR S&I Left 1/2/2020    SHUNTOGRAM LEFT performed by Oumar Henriquez MD at [address] [place] [address] CATH LAB    [place] CATH DIAL[place]IS [place] W/TRLUML BALO ANGIOP N/A 12/10/2018    Angioplasty Fistula/Dialysis Circuit performed by Deidra Zhong MD at [address] [place] [address] CATH LAB    [place] CATH DIAL[place]IS CIRCUIT W/TRLUML BALO ANGIOP N/A 1/2/2020    Angioplasty Fistula/Dialysis Circuit performed by Oumar Henriquez MD at [address] [place] [address] CATH LAB       Social History     Socioeconomic History    Marital status:      Spouse name: Not on file    Number of children: Not on file    Years of education: Not on file    Highest education level: Not on file   Occupational History    Not on file   Social Needs    Financial resource strain: Not on file    Food insecurity:     Worry: Not on file     Inability: Not on file    Transportation needs:     Medical: Not on file     Non-medical: Not on file   Tobacco Use    Smoking status: Never Smoker    Smokeless tobacco: Never Used   Substance and Sexual Activity    Alcohol use: No    Drug use: No    Sexual activity: Not on file   Lifestyle    Physical activity:     Days per week: Not on file     Minutes per session: Not on file    Stress: Not on file   Relationships    Social connections:     Talks on phone: Not on file     Gets together: Not on file     Attends [employer] service: Not on file     Active member of club or organization: Not on file     Attends meetings of clubs or organizations: Not on file     Relationship status: Not on file    Intimate partner violence:     Fear of current or ex partner: Not on file     Emotionally abused: Not on file     Physically abused: Not on file     Forced sexual activity: Not on file   Other Topics Concern     Service Not Asked    Blood Transfusions Not Asked    Caffeine Concern Not Asked    Occupational Exposure Not Asked   [place] Hazards Not Asked    Sleep Concern Not Asked    Stress Concern Not Asked    Weight Concern Not Asked    Special Diet Not Asked    Back Care Not Asked    Exercise Not Asked    Bike Helmet Not Asked    Seat Belt Not Asked    Self-Exams Not Asked   Social History Narrative    Not on file       Allergies   Allergen Reactions    Nka [No Known Allergies] Other (comments)       Current Outpatient Medications   Medication Sig Dispense Refill    montelukast (SINGULAIR) 10 mg tablet TAKE ONE TABLET BY MOUTH DAILY 90 Tab 1    amLODIPine (NORVASC) 2.5 mg tablet TAKE ONE TABLET BY MOUTH DAILY 90 Tab 1    metoprolol tartrate (LOPRESSOR) 50 mg tablet Take 1 Tab by mouth two (2) times a day. 180 Tab 3    simvastatin (ZOCOR) 40 mg tablet Take 1 Tab by mouth nightly. 90 Tab 1    allopurinoL (ZYLOPRIM) 100 mg tablet TAKE ONE TABLET BY MOUTH THREE TIMES A WEEK ON MONDAY, WEDNESDAY AND FRIDAY 36 Tab 0    losartan (COZAAR) 25 mg tablet Take 1 Tab by mouth daily. 30 Tab 0    calcium carbonate (TUMS) 200 mg calcium (500 mg) chew Take 2 Tabs by mouth three (3) times daily.       pregabalin (LYRICA) 25 mg capsule TAKE ONE CAPSULE BY MOUTH TWICE A DAY  (  MAX DAILY AMOUNT 50MG ) 60 Cap 1    hyoscyamine SL (LEVSIN/SL) 0.125 mg SL tablet 0.125 mg by SubLINGual route every four (4) hours as needed for Cramping.  ramelteon (ROZEREM) 8 mg tablet Take 1 Tab by mouth nightly. 90 Tab 1    clobetasol 0.05 % sham Use daily for scalp irritation. 118 mL 1    tamsulosin (FLOMAX) 0.4 mg capsule TAKE ONE CAPSULE BY MOUTH ONE TIME DAILY 90 Cap 3    insulin aspart U-100 (NOVOLOG U-100 INSULIN ASPART) 100 unit/mL injection 20 units sq 3 times a day,sliding scale (Patient taking differently: 20 Units by SubCUTAneous route Before breakfast, lunch, and dinner. 20 units sq 3 times a day) 30 mL 0    Insulin Needles, Disposable, 31 gauge x 5/16\" ndle Use to inject insulin 2 x day 200 Package 3    insulin glargine (LANTUS,BASAGLAR) 100 unit/mL (3 mL) inpn Give Basaglar 15 units twice a day. (Patient taking differently: by SubCUTAneous route. Give Basaglar 15 units twice a day.) 30 mL 2    fenofibrate (LOFIBRA) 54 mg tablet TAKE ONE TABLET BY MOUTH DAILY 90 Tab 0    calcium acetate (PHOSLO) 667 mg cap Take 1 Cap by mouth three (3) times daily (with meals).  L.acid/L.casei/B.bif/B.ken/FOS (PROBIOTIC BLEND PO) Take 2 Tabs by mouth daily.  clopidogrel (PLAVIX) 75 mg tab Take 1 Tab by mouth daily. 30 Tab 0    isosorbide mononitrate ER (IMDUR) 30 mg tablet Take 1 Tab by mouth daily. 30 Tab 0    nitroglycerin (NITROSTAT) 0.4 mg SL tablet 1 Tab by SubLINGual route as needed for Chest Pain. (Patient taking differently: 1 Tab by SubLINGual route every five (5) minutes as needed for Chest Pain.) 1 Bottle 0    cinacalcet (SENSIPAR) 30 mg tablet Take 60 mg by mouth daily.  ASPIR-LOW 81 mg tablet TAKE ONE TABLET BY MOUTH ONE TIME DAILY 38 Tab 4    DULCOLAX, BISACODYL, PO Take 1 Tab by mouth daily as needed for Other (constipation).  pantoprazole (PROTONIX) 40 mg tablet Take 40 mg by mouth daily.       b complex-vitamin c-folic acid (NEPHROCAPS) 1 mg capsule Take 1 Cap by mouth daily. 30 Cap 0    cholecalciferol (DECARA) 50,000 unit capsule Take 50,000 Units by mouth every seven (7) days.  DULoxetine (CYMBALTA) 60 mg capsule TAKE ONE CAPSULE BY MOUTH DAILY 30 Cap 3           Patient Care Team:  Patient Care Team:  Josué Ureña MD as PCP - General (Family Practice)  Josué Ureña MD as PCP - [facility]  Mason Cook MD as Physician (Cardiology)  Madi Floyd MD (Nephrology)  Lindsey Underwood MD (Urology)  Aj Lundy MD (Gastroenterology)  Pauline Hernandes MD (Endocrinology)  Magdaleno Fletcher OD (Ophthalmology)  Rosemary Simental, RN as Ambulatory Care Manager        OBJECTIVE:    Vitals:    02/14/20 1014   BP: 141/54   Pulse: 71   Resp: 20   Temp: 96.9 °F (36.1 °C)   TempSrc: Oral   SpO2: (!) 87%   Weight: 213 lb (96.6 kg)   Height: 5' 8\" (1.727 m)   PainSc:   5   PainLoc: Chest       Physical Exam  Vitals signs and nursing note reviewed. Constitutional:       General: He is not in acute distress. Appearance: Normal appearance. He is not toxic-appearing or diaphoretic. Cardiovascular:      Rate and Rhythm: Normal rate and regular rhythm. Pulses:           Radial pulses are 1+ on the right side and 1+ on the left side. Heart sounds: Murmur present. Systolic murmur present. Pulmonary:      Effort: No tachypnea, bradypnea, accessory muscle usage or respiratory distress. Breath sounds: Decreased air movement present. Examination of the right-middle field reveals decreased breath sounds. Examination of the left-middle field reveals decreased breath sounds. Examination of the right-lower field reveals decreased breath sounds. Examination of the left-lower field reveals decreased breath sounds. Decreased breath sounds present. No wheezing or rhonchi. Skin:     General: Skin is cool and dry.    Neurological:      Mental Status: He is alert and oriented to person, place, and time. Motor: No weakness. Gait: Gait normal.             Assessment & Plan:    Due to recent NSTEMI and hospitalization, complicated medical history with ESRD and CHF, and hypoxemia at visit today I advised patient to be seen in ER. He declined ambulance transfer. AMA signed. Will go to [facility] for further evaluation. 1. Hypoxemia      2. Cough      3. Shortness of breath      4. ESRD (end stage renal disease) on dialysis ([facility]      5. Congestive heart failure, NYHA class 4, unspecified congestive heart failure type ([facility]          No orders of the defined types were placed in this encounter. Plan of care reviewed with patient. Patient in agreement with plan and expresses understanding. I have discussed when to anticipate results and how results will be communicated, if applicable. Anticipatory guidance given and questions answered, patient encouraged to call or RTO if further questions or concerns.     Hal Aschoff, NP  02/14/20

## 2020-02-24 NOTE — TELEPHONE ENCOUNTER
Patient's wife called very upset. She states she went to the pharmacy and patient's refill request was rejected. She states her  is a diabetic and needs his insulin. She said \"I don't play around. \" She wants to speak to the doctor himself.

## 2020-02-27 NOTE — TELEPHONE ENCOUNTER
Patient's wife came into the office to request Lantus Basaglar Insulin. Ms Pierce García was upset and asked why was this medication denied. Advised pt I would submit her request. Please be advise and contact if not able to send prescription to the pharmacy when available.  Patient's wife was very upset

## 2020-03-06 NOTE — PROGRESS NOTES
Patient presents for lab draw ordered by Dr Sergey Salas  Ordering Department/Practice:  [facility]  Date Ordered:  3-5-2020     The following labs were drawn and sent to [facility]     PSA    The following tubes were sent:    Gold  ( 1)    Draw site:  RAC  Pain Level:0  Needle Gauge23  Aseptic technique used  Blood thinners:n    Band-Aid applied  Draw fee added   Procedure tolerated well, patient voiced no complaints.

## 2020-04-18 NOTE — ED TRIAGE NOTES
Full run of dialysis yesterday - checked his BP today and it was in the 80s. Patient arrives stating he feels weak and is emotional.  Bilateral hearing aids noted and cane

## 2020-04-19 NOTE — ED NOTES
Called wife Justa Nuñez) who stated Kev Coker (son) is outside waiting for father. No answer on listed and confirmed phone number. Will call back in a couple minutes.

## 2020-04-19 NOTE — ED NOTES
Patient taken to bathroom in wheelchair for BM, states he is feeling much better and has 'all his senses back'.

## 2020-04-19 NOTE — ED PROVIDER NOTES
EMERGENCY DEPARTMENT HISTORY AND PHYSICAL EXAM 
 
 
Date: 4/18/2020 Patient Name: Pauline Fajardo History of Presenting Illness Chief Complaint Patient presents with  Hypotension History (Context): Pauline Fajardo is a 78 y.o. gentleman with ESRD on intermittent HD, who had his last run of hemodialysis yesterday, and who has a complicated set of comorbid conditions as noted below, who presents unexplained sudden onset, resolved, severe, lightheadedness, fatigue, and near loss of consciousness that started shortly after HD treatment. No exacerbating/sleeping features or other associated symptoms. The patient does not have a cardiac monitoring device. Patient was taking his blood pressure at home and noted to be hypotensive. Patient presents to the ED brought in by neighbor. On review of systems, the patient denies fever, chills, recent trauma trauma, chest pain, back pain, abdominal pain, nausea, vomiting, diaphoresis, seizure-like activity, numbness, weakness, tingling. PCP: Kirill Miller MD 
 
Current Outpatient Medications Medication Sig Dispense Refill  isosorbide mononitrate ER (IMDUR) 30 mg tablet TAKE ONE TABLET BY MOUTH DAILY 90 Tab 1  pregabalin (LYRICA) 25 mg capsule TAKE ONE CAPSULE BY MOUTH TWICE A DAY (MAX DAILY AMOUNT 50MG) 60 Cap 3  clotrimazole-betamethasone (LOTRISONE) topical cream     
 insulin glargine (LANTUS,BASAGLAR) 100 unit/mL (3 mL) inpn Give Basaglar 15 units twice a day. 30 mL 2  
 losartan (COZAAR) 25 mg tablet Take 1 Tab by mouth daily. 90 Tab 1  
 albuterol sulfate 90 mcg/actuation aepb Take 2 Puffs by inhalation every six (6) hours as needed for Cough (bronchitis from ER.).  clopidogreL (PLAVIX) 75 mg tab Take 1 Tab by mouth daily.  90 Tab 3  
 montelukast (SINGULAIR) 10 mg tablet TAKE ONE TABLET BY MOUTH DAILY 90 Tab 1  
 amLODIPine (NORVASC) 2.5 mg tablet TAKE ONE TABLET BY MOUTH DAILY 90 Tab 1  
  metoprolol tartrate (LOPRESSOR) 50 mg tablet Take 1 Tab by mouth two (2) times a day. 180 Tab 3  
 simvastatin (ZOCOR) 40 mg tablet Take 1 Tab by mouth nightly. 90 Tab 1  
 allopurinoL (ZYLOPRIM) 100 mg tablet TAKE ONE TABLET BY MOUTH THREE TIMES A WEEK ON MONDAY, WEDNESDAY AND FRIDAY 36 Tab 0  
 calcium carbonate (TUMS) 200 mg calcium (500 mg) chew Take 2 Tabs by mouth three (3) times daily.  hyoscyamine SL (LEVSIN/SL) 0.125 mg SL tablet 0.125 mg by SubLINGual route every four (4) hours as needed for Cramping.  ramelteon (ROZEREM) 8 mg tablet Take 1 Tab by mouth nightly. 90 Tab 1  clobetasol 0.05 % sham Use daily for scalp irritation. 118 mL 1  
 tamsulosin (FLOMAX) 0.4 mg capsule TAKE ONE CAPSULE BY MOUTH ONE TIME DAILY 90 Cap 3  
 DULoxetine (CYMBALTA) 60 mg capsule TAKE ONE CAPSULE BY MOUTH DAILY 30 Cap 3  
 Insulin Needles, Disposable, 31 gauge x 5/16\" ndle Use to inject insulin 2 x day 200 Package 3  
 calcium acetate (PHOSLO) 667 mg cap Take 1 Cap by mouth three (3) times daily (with meals).  L.acid/L.casei/B.bif/B.ken/FOS (PROBIOTIC BLEND PO) Take 2 Tabs by mouth daily.  cinacalcet (SENSIPAR) 30 mg tablet Take 60 mg by mouth daily.  ASPIR-LOW 81 mg tablet TAKE ONE TABLET BY MOUTH ONE TIME DAILY 38 Tab 4  
 DULCOLAX, BISACODYL, PO Take 1 Tab by mouth daily as needed for Other (constipation).  pantoprazole (PROTONIX) 40 mg tablet Take 40 mg by mouth daily.  cholecalciferol (DECARA) 50,000 unit capsule Take 50,000 Units by mouth every seven (7) days. Past History Past Medical History: 
Past Medical History:  
Diagnosis Date  Atherosclerosis of renal artery ([address] S/P Rt stent  CAD (coronary artery disease) , 1997, 2012  
 ,double bypass, 2 stents, 2stents 7/2016  Chronic diastolic heart failure ([address]  Chronic kidney disease  Constipation  Diabetes ([address] 1973  
 type 2  
 Essential hypertension, benign  GERD (gastroesophageal reflux disease)  Gout  Grade II diastolic dysfunction  Hypertension 1982  Moderate aortic stenosis  Morbid obesity ([address] Weight loss has been strongly encouraged by following dietary restrictions and an exercise routine  APRYL (obstructive sleep apnea) 6/26/2015  
 no cpap  Other and unspecified hyperlipidemia HDL's are not at goal, LDL's are at goal, triglycerides are not at goal.  
 Prostate cancer [facility])   
 S/P Excision and Radiation  Sleep disturbance  Type II or unspecified type diabetes mellitus without mention of complication, not stated as uncontrolled Past Surgical History: 
Past Surgical History:  
Procedure Laterality Date [place]  
 lt. carotid endart. [address]  2012, 2016, Nov 2017 Coronary Stent  COLONOSCOPY N/A 6/23/2017 COLONOSCOPY w/ APC w/ polypectomies performed by Joseph Walker MD at [address]  FLEXIBLE SIGMOIDOSCOPY N/A 1/19/2018 FLEXIBLE SIGMOIDOSCOPY WITH Radio frequency ablation performed by Joseph Walker MD at [facility] ENDOSCOPY 2021 Jose Pinon N/A 6/8/2018 SIGMOIDOSCOPY FLEXIBLE WITH APC performed by Shania Chavarria MD at [address] [facility]  HX CORONARY ARTERY BYPASS GRAFT  2000  
 x2  HX HEENT  1997  
 cholecystectomy  HX UROLOGICAL  1998  
 renal art. surg  HX VASCULAR ACCESS Perma cath for HD- right chest.  
 OK INTRO CATH DIALYSIS CIRCUIT DX ANGRPH FLUOR S&I N/A 12/10/2018 FISTULOGRAM LEFT performed by Radha Malin MD at [facility] CATH LAB  OK INTRO CATH DIALYSIS CIRCUIT DX ANGRPH FLUOR S&I Left 1/2/2020 SHUNTOGRAM LEFT performed by Marie Newman MD at [facility] CATH LAB  OK INTRO CATH DIALYSIS CIRCUIT W/TRLUML BALO ANGIOP N/A 12/10/2018  Angioplasty Fistula/Dialysis Circuit performed by Radha Malin MD at [facility]  
  NC INTRO CATH DIALYSIS CIRCUIT W/TRLUML BALO ANGIOP N/A 1/2/2020 Angioplasty Fistula/Dialysis Circuit performed by Prudencio Marcelo MD at [facility] CATH LAB Family History: 
Family History Problem Relation Age of Onset  Heart Attack Father 64  Diabetes Mother  No Known Problems Son  No Known Problems Son  No Known Problems Son  No Known Problems Son  No Known Problems Son  No Known Problems Son  No Known Problems Daughter  No Known Problems Daughter  No Known Problems Daughter  No Known Problems Daughter  No Known Problems Daughter  No Known Problems Daughter  Kidney Disease Brother ESRD Social History: 
Social History Tobacco Use  Smoking status: Never Smoker  Smokeless tobacco: Never Used Substance Use Topics  Alcohol use: No  
 Drug use: No  
 
 
Allergies: Allergies Allergen Reactions  Nka [No Known Allergies] Other (comments) PMH, PSH, family history, social history, allergies reviewed with the patient with significant items noted above. Review of Systems As per HPI, otherwise reviewed and negative. Physical Exam  
 
Vitals:  
 04/18/20 2000 04/18/20 2030 04/18/20 2045 04/18/20 2100 BP: 97/43 121/79 110/49 125/60 Pulse: 88 94 94 98 Resp: 14 19 20 23 Temp:      
SpO2: 94% 100% 100% Gen: Well-appearing, in no acute distress HEENT: Normocephalic, sclera anicteric Cardiovascular: Normal rate, regular rhythm, no murmurs, rubs, gallops. Pulses intact and equal distally. Pulmonary: No respiratory distress. No stridor. Clear lungs. ABD: Soft, nontender, nondistended. Neuro: Alert. Normal speech. Normal mentation. Psych: Normal thought content and thought processes. : No CVA tenderness EXT: No swelling. Moves all extremities well. No cyanosis or clubbing. Skin: Warm and well-perfused. Other: 
 
 
 
Diagnostic Study Results Labs - 
  
 No results found for this or any previous visit (from the past 12 hour(s)). Radiologic Studies -  
XR CHEST PORT Final Result IMPRESSION:  
  
Postop changes as described with mildly underexpanded lungs. No superimposed  
acute radiographic abnormalities. CT Results  (Last 48 hours) None CXR Results  (Last 48 hours) 04/18/20 1928  XR CHEST PORT Final result Impression:  IMPRESSION:  
   
Postop changes as described with mildly underexpanded lungs. No superimposed  
acute radiographic abnormalities. Narrative:  EXAM: XR CHEST PORT  
   
CLINICAL INDICATION/HISTORY: chest pain, sob, and/or arrhythmia  
-Additional: None COMPARISON: 1/23/2020 TECHNIQUE: Frontal view of the chest  
   
_______________ FINDINGS:  
   
HEART AND MEDIASTINUM: Stable cardiac size and mediastinal contours with postop  
changes from median sternotomy noted. LUNGS AND PLEURAL SPACES: Underexpanded lungs are present without focal  
pneumonic opacity. No pneumothorax or pleural effusion. BONY THORAX AND SOFT TISSUES: No acute osseous abnormality. Vascular stent  
projects over the left subclavian region. _______________ Medical Decision Making I am the first provider for this patient. I reviewed the vital signs, available nursing notes, past medical history, past surgical history, family history and social history. Vital Signs-Reviewed the patient's vital signs. EKG: Interpreted by myself, as noted below in ED course. No evidence of long/short QT, short NV interval, WPW, high degree heart block, ARVD, HOCM, Brugada syndrome, frequent PVCs. Records Reviewed: Personally, on initial evaluation MDM:  
Patient presents with fatigue, near loss of consciousness. Exam significant for hypotension.   
DDX considered: Hypovolemia, arrhythmia, vasovagal, orthostatic hypotension, other cause of syncope (electrolyte abnormality, anemia), anxiety DDX thought to be less likely but also considered due to high risk condition: ACS, PE, seizure Patient condition on initial evaluation: Severely ill Plan:  
Close Observation Cardiac monitoring As per orders noted below: 
Orders Placed This Encounter  XR CHEST PORT  CBC WITH AUTOMATED DIFF  
 COMPREHENSIVE METABOLIC PANEL  
 LACTIC ACID, PLASMA  TROPONIN I  
 CARDIAC MONITORING  
 EKG, 12 LEAD, INITIAL  sodium chloride 0.9 % bolus infusion 500 mL  sodium chloride 0.9 % bolus infusion 500 mL ED Course:  
ED Course as of Apr 19 1105 Sat Apr 18, 2020 2114 First 500 cc bolus still if the patient hypotensive, with the patient felt markedly better. Repeat  
 [DT] ED Course User Index 
[DT] Ang Villareal MD  
 
After second bolus of 500 cc, the patient normotensive and feeling much better. Patient be discharged home. Work-up as above otherwise negative. Cardiac monitor reviewed prior to discharge. Patient condition at time of disposition: Stable DISCHARGE NOTE:  
Pt has been reexamined. Patient has no new complaints, changes, or physical findings. Care plan outlined and precautions discussed. Results were reviewed with the patient. All medications were reviewed with the patient; will d/c home with no changes to meds. All of pt's questions and concerns were addressed. Alarm symptoms and return precautions associated with chief complaint and evaluation were reviewed with the patient in detail. The patient demonstrated adequate understanding. Patient was instructed and agrees to follow up with nephrology, as well as to return to the ED upon further deterioration. Patient is ready to go home. The patient is happy with this plan Follow-up Information Follow up With Specialties Details Why Contact [facility] EMERGENCY DEPT Emergency Medicine  As needed, If symptoms worsen [address] 
[phone] Meredith Aldana MD Family Practice Call  [address] [facility] 
[phone] Discharge Medication List as of 4/18/2020  9:14 PM  
  
 
 
Critical Care Time: The services I provided to this patient were to treat and/or prevent clinically significant deterioration that could result in the failure of one or more body systems and/or organ systems due to hypovolemic shock. Services included the following: 
-reviewing nursing notes and old charts 
-vital sign assessments 
-direct patient care 
-medication orders and management 
-interpreting and reviewing diagnostic studies/labs 
-re-evaluations 
-documentation time Aggregate critical care time was 35 minutes, which includes only time during which I was engaged in work directly related to the patient's care as described above, whether I was at bedside or elsewhere in the Emergency Department. It did not include time spent performing other reported procedures or the services of residents, students, nurses, or advance practice providers. Diagnosis Clinical Impression: 1. Hypovolemic shock ([address] 2. Lightheadedness Milan Franco MD 
Emergency Physician 
[facility] As a voice dictation software was utilized to dictate this note, minor word transpositions can occur. I apologize for confusing wording and typographic errors. Please feel free to contact me for clarification.

## 2020-04-20 NOTE — PROGRESS NOTES
Patient contacted regarding recent discharge and COVID-19 risk Care Transition Nurse/ Ambulatory Care Manager contacted the wife by telephone to perform post discharge assessment. Verified name and  with wife as identifiers. Patient has following risk factors of: heart failure, diabetes and ESRD, HTN. CTN/ACM reviewed discharge instructions, medical action plan and red flags related to discharge diagnosis. Reviewed and educated them on any new and changed medications related to discharge diagnosis. Advised obtaining a 90-day supply of all daily and as-needed medications. Education provided regarding infection prevention, and signs and symptoms of COVID-19 and when to seek medical attention withwife who verbalized understanding. Discussed exposure protocols and quarantine from [address] you at higher risk for severe illness  and given an opportunity for questions and concerns. The wife agrees to contact the COVID-19 hotline [phone] or PCP office for questions related to their healthcare. CTN/ACM provided contact information for future reference. From CDC: Are you at higher risk for severe illness?  Wash your hands often.  Avoid close contact (6 feet, which is about two arm lengths) with people who are sick.  Put distance between yourself and other people if COVID-19 is spreading in your community.  Clean and disinfect frequently touched surfaces.  Avoid all cruise travel and non-essential air travel.  Call your healthcare professional if you have concerns about COVID-19 and your underlying condition or if you are sick. For more information on steps you can take to protect yourself, see CDC's How to Protect Yourself Patient/family/caregiver given information for [employer] and agrees to enroll no Patient's preferred e-mail: no 
Patient's preferred phone number: no 
Based on Loop alert triggers, patient will be contacted by nurse care manager for worsening symptoms. Plan for follow-up call in 7-14 days based on severity of symptoms and risk factors. Patient to ED with complaint of sudden onset chest pain, SOB, and headache.  Per EMS the patient states she is very stressed recently.  Patient in no acute distress

## 2020-05-04 NOTE — PROGRESS NOTES
Patient resolved from Transition of Care episode on 5/4/2020 Patient/family has been provided the following resources and education related to COVID-19:  
           
          Signs, symptoms and red flags related to COVID-19 CDC exposure and quarantine guidelines Conduit exposure contact - [phone] Contact for their local Department of Health Patient currently reports that the following symptoms have improved:  no new symptoms. No further outreach scheduled with this Care Coordinator. Episode of Care resolved. Patient has this Care Coordinator contact information if future needs arise.

## 2020-06-02 NOTE — PROGRESS NOTES
1. Have you been to the ER, urgent care clinic since your last visit? Hospitalized since your last visit? Yes depaul 2. Have you seen or consulted any other health care providers outside of the [address] [place] [address] since your last visit? Include any pap smears or colon screening. No  
 
3. Since your last visit, have you had any of the following symptoms? shortness of breath.

## 2020-06-02 NOTE — PROGRESS NOTES
HISTORY OF PRESENT ILLNESS Anay Alexander is a 78 y.o. male. Patient with cad,chf,post cabg and pci [place] recent admission with chf,acs,non q mi Had vg to [address] pci-11/2017 12/2017-admitted with angina 1/2018-non stemi 12/2018-patient presents with increasing chest pain substernal burning. Uses frequent nitroglycerin. This change in frequency has happened for 2 weeks. 1/2019-admission with unstable angina. Cardiac catheterization showing new occluded vein graft to diagonal branch. Medically managed due to multiple prior PCI 
1/2020- admitted with non-STEMI. Cardiac cath showing patent LIMA to LAD. Other diffuse disease as before. Medically managed. Moderate aortic stenosis noted. Short of breath and edema since discharge. 6/6/2020 Seen for follow-up. Shortness of breath on exertion stable. Exertional angina stable. Unable to tolerate metoprolol due to real low blood pressure. CHF The history is provided by the patient. This is a chronic problem. The problem occurs constantly. The problem has not changed since onset. Associated symptoms include chest pain and shortness of breath. The symptoms are aggravated by exertion. Hypertension The history is provided by the patient. This is a chronic problem. The problem occurs constantly. The problem has not changed since onset. Associated symptoms include chest pain and shortness of breath. Nothing aggravates the symptoms. Shortness of Breath The history is provided by the patient. This is a recurrent problem. The problem occurs intermittently. The current episode started more than 1 week ago. Associated symptoms include chest pain. Pertinent negatives include no fever, no cough, no sputum production, no hemoptysis, no wheezing, no PND, no orthopnea, no vomiting, no rash, no leg swelling and no claudication. The problem's precipitants include exercise (>30 mts of treadmill). Chest Pain (Angina) This is a recurrent problem. The problem has been rapidly improving. The problem occurs rarely. The pain is associated with exertion. The pain is present in the substernal region. The quality of the pain is described as pressure-like. The pain does not radiate. Associated symptoms include shortness of breath. Pertinent negatives include no claudication, no cough, no dizziness, no fever, no hemoptysis, no nausea, no orthopnea, no palpitations, no PND, no sputum production, no vomiting and no weakness. Review of Systems Constitutional: Negative for chills and fever. HENT: Negative for nosebleeds. Eyes: Negative for blurred vision and double vision. Respiratory: Positive for shortness of breath. Negative for cough, hemoptysis, sputum production and wheezing. Cardiovascular: Positive for chest pain. Negative for palpitations, orthopnea, claudication, leg swelling and PND. Gastrointestinal: Negative for heartburn, nausea and vomiting. Musculoskeletal: Negative for myalgias. Skin: Negative for rash. Neurological: Negative for dizziness and weakness. Endo/Heme/Allergies: Does not bruise/bleed easily. Family History Problem Relation Age of Onset  Heart Attack Father 64  Diabetes Mother  No Known Problems Son  No Known Problems Son  No Known Problems Son  No Known Problems Son  No Known Problems Son  No Known Problems Son  No Known Problems Daughter  No Known Problems Daughter  No Known Problems Daughter  No Known Problems Daughter  No Known Problems Daughter  No Known Problems Daughter  Kidney Disease Brother ESRD Past Medical History:  
Diagnosis Date  Atherosclerosis of renal artery ([address] S/P Rt stent  CAD (coronary artery disease) , 1997, 2012  
 ,double bypass, 2 stents, 2stents 7/2016  Chronic diastolic heart failure ([address]  Chronic kidney disease  Constipation  Diabetes ([address] 1973 type 2  
 Essential hypertension, benign  GERD (gastroesophageal reflux disease)  Gout  Grade II diastolic dysfunction  Hypertension 1982  Moderate aortic stenosis  Morbid obesity ([address] Weight loss has been strongly encouraged by following dietary restrictions and an exercise routine  APRYL (obstructive sleep apnea) 6/26/2015  
 no cpap  Other and unspecified hyperlipidemia HDL's are not at goal, LDL's are at goal, triglycerides are not at goal.  
 Prostate cancer [facility])   
 S/P Excision and Radiation  Sleep disturbance  Type II or unspecified type diabetes mellitus without mention of complication, not stated as uncontrolled Past Surgical History:  
Procedure Laterality Date [address]  
 lt. carotid endart. [address] UNLIST  2012, 2016, Nov 2017 Coronary Stent  COLONOSCOPY N/A 6/23/2017 COLONOSCOPY w/ APC w/ polypectomies performed by Rachel Morales MD at [address]  FLEXIBLE SIGMOIDOSCOPY N/A 1/19/2018 FLEXIBLE SIGMOIDOSCOPY WITH Radio frequency ablation performed by Rachel Morales MD at [facility] END[facility]COPY 2021 Jose Pinon N/A 6/8/2018 SIGMOIDOSCOPY FLEXIBLE WITH APC performed by Luciana Marshall MD at [address] [facility]  HX CORONARY ARTERY BYPASS GRAFT  2000  
 x2  HX HEENT  1997  
 cholecystectomy  HX UROLOGICAL  1998  
 renal art. surg  HX VASCULAR ACCESS Perma cath for HD- right chest.  
 [place] INTRO CATH DIALYSIS CIRCUIT DX ANGRPH FLUOR S&I N/A 12/10/2018 FISTULOGRAM LEFT performed by Carolyn Morrison MD at [facility] C[facility]  FL INTRO CATH DIALYSIS CIRCUIT DX ANGRPH FLUOR S&I Left 1/2/2020 SHUNTOGRAM LEFT performed by Flip Canales MD at [facility] CATH [facility]  FL INTRO CATH DIALYSIS CIRCUIT W/TRLUML BALO ANGIOP N/A 12/10/2018  Angioplasty Fistula/Dialysis Circuit performed by Carolyn Morrison MD at [address] [place] [address]  
  [place] INTRO CATH DIALYSIS CIRCUIT W/TRLUML BALO ANGIOP N/A 1/2/2020 Angioplasty Fistula/Dialysis Circuit performed by Cass Rios MD at [facility] Allergies Allergen Reactions  Nka [No Known Allergies] Other (comments) Current Outpatient Medications Medication Sig  
 triamcinolone (ARISTOCORT) 0.5 % topical cream Apply  to affected area two (2) times a day. use thin layer  allopurinoL (ZYLOPRIM) 100 mg tablet TAKE ONE TABLET BY MOUTH THREE TIMES A WEEK ON MONDAY, WEDNESDAY AND FRIDAY  isosorbide mononitrate ER (IMDUR) 30 mg tablet TAKE ONE TABLET BY MOUTH DAILY  pregabalin (LYRICA) 25 mg capsule TAKE ONE CAPSULE BY MOUTH TWICE A DAY (MAX DAILY AMOUNT 50MG)  insulin glargine (LANTUS,BASAGLAR) 100 unit/mL (3 mL) inpn Give Basaglar 15 units twice a day.  losartan (COZAAR) 25 mg tablet Take 1 Tab by mouth daily.  albuterol sulfate 90 mcg/actuation aepb Take 2 Puffs by inhalation every six (6) hours as needed for Cough (bronchitis from ER.).  clopidogreL (PLAVIX) 75 mg tab Take 1 Tab by mouth daily.  montelukast (SINGULAIR) 10 mg tablet TAKE ONE TABLET BY MOUTH DAILY  amLODIPine (NORVASC) 2.5 mg tablet TAKE ONE TABLET BY MOUTH DAILY  simvastatin (ZOCOR) 40 mg tablet Take 1 Tab by mouth nightly.  calcium carbonate (TUMS) 200 mg calcium (500 mg) chew Take 2 Tabs by mouth three (3) times daily.  hyoscyamine SL (LEVSIN/SL) 0.125 mg SL tablet 0.125 mg by SubLINGual route every four (4) hours as needed for Cramping.  ramelteon (ROZEREM) 8 mg tablet Take 1 Tab by mouth nightly.  clobetasol 0.05 % sham Use daily for scalp irritation.  tamsulosin (FLOMAX) 0.4 mg capsule TAKE ONE CAPSULE BY MOUTH ONE TIME DAILY  DULoxetine (CYMBALTA) 60 mg capsule TAKE ONE CAPSULE BY MOUTH DAILY  Insulin Needles, Disposable, 31 gauge x 5/16\" ndle Use to inject insulin 2 x day  calcium acetate (PHOSLO) 667 mg cap Take 1 Cap by mouth three (3) times daily (with meals).  L.acid/L.casei/B.bif/B.ken/FOS (PROBIOTIC BLEND PO) Take 2 Tabs by mouth daily.  cinacalcet (SENSIPAR) 30 mg tablet Take 60 mg by mouth daily.  ASPIR-LOW 81 mg tablet TAKE ONE TABLET BY MOUTH ONE TIME DAILY  DULCOLAX, BISACODYL, PO Take 1 Tab by mouth daily as needed for Other (constipation).  pantoprazole (PROTONIX) 40 mg tablet Take 40 mg by mouth daily.  cholecalciferol (DECARA) 50,000 unit capsule Take 50,000 Units by mouth every seven (7) days. No current facility-administered medications for this visit. Visit Vitals /59 Pulse 91 Temp 97.2 °F (36.2 °C) Ht 5' 8\" (1.727 m) Wt 93.4 kg (206 lb) BMI 31.32 kg/m² Physical Exam  
Constitutional: He is oriented to person, place, and time. He appears well-developed and well-nourished. obese HENT:  
Head: Normocephalic and atraumatic. Eyes: Conjunctivae are normal.  
Neck: Neck supple. No JVD present. No tracheal deviation present. No thyromegaly present. Cardiovascular: Normal rate and regular rhythm. Exam reveals no gallop and no friction rub. Murmur heard. Midsystolic murmur is present at the upper right sternal border and lower left sternal border. Pulmonary/Chest: Breath sounds normal. No respiratory distress. He has no wheezes. He has no rales. He exhibits no tenderness. Abdominal: Soft. There is no abdominal tenderness. Musculoskeletal:     
   General: Edema present. Neurological: He is alert and oriented to person, place, and time. Skin: Skin is warm and dry. Psychiatric: He has a normal mood and affect. Mr. Saima Nice has a reminder for a \"due or due soon\" health maintenance. I have asked that he contact his primary care provider for follow-up on this health maintenance. CARDIOLOGY STUDIES 10/8/2012 Cardiac Cath Result PATENT LIMA TO LAD, SVG TO D1 SUBTOTAL DIFFUSE, POST PCI WITH STENTS,HAD PROXIMAL IN STENT PROTRUSION UNABLE TO CROSS WITH BALLONS,D1 DIFFISE SEVERE Echocardiogram - Complete Result NORMAL EF 70% SUMMARY:echo:11/2014 Left ventricle: Systolic function was hyperdynamic. Ejection fraction was 
estimated in the range of 70 % to 75 %. No obvious wall motion 
abnormalities identified in the views obtained. Near cavity obliteration 
seen in the mid to apical portions of the ventricular cavity. There was 
mild concentric hypertrophy. Features were consistent with a pseudonormal 
left ventricular filling pattern, with concomitant abnormal relaxation and 
increased filling pressure (grade 2 diastolic dysfunction). NUCLEAR IMAGING: 
 
Findings: stress test:11/2014 1. Post-stress imaging in short axis, horizontal and vertical long axis views reveals normal isotope uptake in all areas. 2. Resting images also show normal isotope uptake in all areas. 3. Gated images show normal left ventricular size, wall motion and systolic function. Ejection fraction is 85%. Diagnosis: 1. Normal scan. 2. No evidence of significant fixed or reversible defect suggesting ischemia or myocardial infarction noted from this nuclear study. 3. low risk scan. 
7/2016-pci 
vg to diag 11/2017-pci svg-diag I have personally reviewed patient's records available from hospital and other providers and incorporated findings in patient care. 1/2018-[facility] Conclusion 1/2019 
 
  
· Patent LIMA to LAD · 100% ostial occlusion of SVG to diagonal artery which has been intervened multiple times in the past. 
· Critical disease in distal circumflex and proximal and distal RCA but these are small vessels and the lesions are chronic. Since the SVG has been intervened multiple times in the past and is now totally occluded, no attempts were made to reopen it. Patient should be treated medically.   This would avoid the use of dual antiplatelets which have caused GI bleed repeatedly in the past. 
Aggressive risk factor modification. 
   
 
 Interpretation Summary 3/2019 · Estimated left ventricular ejection fraction is 56 - 60%. Left ventricular moderate concentric hypertrophy. Moderate (grade 2) left ventricular diastolic dysfunction. · Normal right ventricular size and function. · Moderate aortic valve leaflet calcification present with reduced excursion. Moderate aortic valve stenosis. Aortic valve peak gradient is 43.6 mmHg. Aortic valve mean gradient is 23.4 mmHg. Aortic valve area is 1.2 cm2. · Mitral valve thickening. Moderate mitral annular calcification. Mild mitral valve regurgitation. I Have personally reviewed recent relevant labs available and discussed with patient 7/2019-lipid,bmp,cbc Assessment Conclusion cardiac cath 1/2020 · Patent LIMA to LAD · 100% ostial occlusion of SVG to diagonal artery which has been intervened multiple times in the past. Unchanged from prior cath in 01/19 · Known disease in circumflex and RCA but these are small vessels and the lesions are chronic. Medical management has been pursued in past.  
  
Patient has been opted to treat medically in the past because of history of GI bleed on dual antiplatelet agent Aggressive risk factor modification. Interpretation Summary 1/2020 · Normal cavity size, systolic function (ejection fraction normal) and diastolic function. Mild concentric hypertrophy. Estimated left ventricular ejection fraction is 50 - 55%. Visually measured ejection fraction. No regional wall motion abnormality noted. · Mildly dilated left atrium. · Aortic valve sclerosis with reduced excursion. Aortic valve mean gradient is 28 mmHg. Aortic valve area is 0.9 cm2. Moderate aortic valve stenosis is present. · Mitral valve non-specific thickening. Moderate mitral annular calcification. Mild mitral valve stenosis is present. Trace mitral valve regurgitation is present. · There is no evidence of pulmonary hypertension. ICD-10-CM ICD-9-CM 1. Coronary artery disease involving native coronary artery of native heart with angina pectoris ([facility] I25.119 414.01   
  413.9   
 N-STEMI 1/2020 -cath revealed patent LIMA to LAD - medical management 2. Chronic diastolic heart failure (HCC) I50.32 428.32 Stable; fluid management with dialysis 3. S/P coronary artery stent placement Z95.5 V45.82 Stable 4. Old MI (myocardial infarction) I25.2 412 Stable 5. Nonrheumatic aortic valve stenosis I35.0 424.1 Moderate aortic stenosis recent echo finding reviewed. Monitor 6. ESRD (end stage renal disease) ([facility] N18.6 585. 6 Low blood pressure on dialysis will reduce metoprolol. Hold other blood pressure medication on day of dialysis to take more fluid out 7. Dyslipidemia E78.5 272.4 Continue statin 11/2018 Cardiac status stable. Off Plavix due to GI bleed but hemoglobin is stabilized. Last hemoglobin 11. Cholesterol 156. Continue current treatment. Occasional.  Of low blood pressure will change metoprolol as needed to 50 mg twice a day instead of 100 mg twice a day 12/2018 Crescendo angina for 2 weeks. Frequent use of nitroglycerin. Will set up for cardiac catheterization. Advised to come to emergency room if symptoms are persistent 4/ 2019 Moderate aortic stenosis on recent echo. Normal ejection fraction continue to monitor for symptom 1/2020 Recent non-STEMI. Cardiac cath with patent LIMA to LAD diffuse disease in other vessels as before. Medical management. Low blood pressure during dialysis making difficult to in fluid removal.  Will reduce metoprolol. Hold other medication on dialysis today. Also has moderate aortic stenosis which is progressive. Continue to monitor for need for TAVR 
6/2020 Cardiac status stable angina and shortness of breath stable. No significant fluid overload. Unable to tolerate metoprolol due to drop in blood pressure. Otherwise hemodynamically stable Medications Discontinued During This Encounter Medication Reason  metoprolol tartrate (LOPRESSOR) 50 mg tablet Other No orders of the defined types were placed in this encounter. Follow-up and Dispositions · Return in about 4 months (around 10/2/2020). I have independently evaluated taken history and examined the patient. All relevant labs and testing data's are reviewed. Care plan discussed and updated after review.  
Ho San MD

## 2020-06-02 NOTE — PROGRESS NOTES
Chief Complaint Patient presents with  Follow-up  
  chronic condition  Abdominal Pain  Hearing Problem  
  rash 1. Have you been to the ER, urgent care clinic since your last visit? Hospitalized since your last visit? Yes When: 05/20 Where: [facility]  Reason for visit: low blood pressure 2. Have you seen or consulted any other health care providers outside of the [address] [place] [address] since your last visit? Include any pap smears or colon screening. No  
 
[facility] Desmond Evans is seen for follow-up care. Diabetes mellitus type 2: Patient has type 2 diabetes mellitus. His last HbA1c was 6.6. He is on Lantus 35 units daily. He also takes NovoLog correctional insulin. We discussed adjustment of his medications as he states that at times his blood glucose numbers drop. We will have him cut back on the Lantus to 30 units daily. He will continue with the Humalog correctional insulin. He will keep a blood glucose log and I will follow-up at next visit. End-stage renal disease: Patient has end-stage renal disease. Gets dialysis done 3 times a week. We will continue with this. Hypertension: Patient has a history of hypertension. Blood pressure has been stable. He is on amlodipine, losartan and Lopressor. He denies headache, changes in vision or focal weakness. Continue current treatment plan. Rash: Patient has a rash left arm medial aspect. This is where he has his dialysis port placed and when the Band-Aid is applied he does get a reaction to it. I did advise that he likely is allergic to the material from the Band-Aid and thus should avoid it. He should use something that he is not allergic to. He does have an erythematous rash that is similar to a contact dermatitis. I will give triamcinolone to apply in that area. GERD: Patient gets heartburn on and off. Has seen the gastroenterologist in the past for this.   Has been advised to avoid some foods and fruits especially ones that tend to trigger symptoms and cause hyperacidity. Patient has not been strict with the dietary restrictions. Currently he is on Protonix but does not like taking this medication. He prefers to take Tums. I did discuss compliance with treatment regimen. Cardiac: Patient has a history of diastolic heart failure. He does get dialysis done. He is on Imdur and Lopressor. He has history of CAD and has had bypass surgery and coronary stents placed. He is on Zocor, Imdur, Cozaar, Lopressor, Plavix and aspirin. Denies chest pain, diaphoresis or shortness of breath. We will continue with the current management plan. He is followed up by the cardiologist. 
Dyslipidemia: Patient is on Zocor. He should take a diet low in polysaturated fats. Continue with the medication and we will recheck lipid panel in 6 months. Past Medical History Past Medical History:  
Diagnosis Date  Atherosclerosis of renal artery ([address] S/P Rt stent  CAD (coronary artery disease) , 1997, 2012  
 ,double bypass, 2 stents, 2stents 7/2016  Chronic diastolic heart failure ([address]  Chronic kidney disease  Constipation  Diabetes ([address] 1973  
 type 2  
 Essential hypertension, benign  GERD (gastroesophageal reflux disease)  Gout  Grade II diastolic dysfunction  Hypertension 1982  Moderate aortic stenosis  Morbid obesity ([address] Weight loss has been strongly encouraged by following dietary restrictions and an exercise routine  APRYL (obstructive sleep apnea) 6/26/2015  
 no cpap  Other and unspecified hyperlipidemia HDL's are not at goal, LDL's are at goal, triglycerides are not at goal.  
 Prostate cancer [facility])   
 S/P Excision and Radiation  Sleep disturbance  Type II or unspecified type diabetes mellitus without mention of complication, not stated as uncontrolled Surgical History Past Surgical History:  
Procedure Laterality Date [facility]  
 lt. carotid endart. [address]LIST  2012, 2016, Nov 2017 Coronary Stent  COLONOSCOPY N/A 6/23/2017 COLONOSCOPY w/ APC w/ polypectomies performed by Chandan Suazo MD at [address]  FLEXIBLE SIGMOIDOSCOPY N/A 1/19/2018 FLEXIBLE SIGMOIDOSCOPY WITH Radio frequency ablation performed by Chandan Suazo MD at [facility] ENDOSCOPY [address] N/A 6/8/2018 SIGMOIDOSCOPY FLEXIBLE WITH APC performed by Rachel Zamorano MD at [address] [facility]  HX CORONARY ARTERY BYPASS GRAFT  2000  
 x2  HX HEENT  1997  
 cholecystectomy  HX UROLOGICAL  1998  
 renal art. surg  HX VASCULAR ACCESS Perma cath for HD- right chest.  
 SC INTRO CATH DIALYSIS CIRCUIT DX ANGRPH FLUOR S&I N/A 12/10/2018 FISTULOGRAM LEFT performed by Ekaterina Joshi MD at [facility] CATH LAB  SC INTRO CATH DIALYSIS CIRCUIT DX ANGRPH FLUOR S&I Left 1/2/2020 SHUNTOGRAM LEFT performed by Fernanda Anaya MD at [facility]  SC INTRO CATH DIALYSIS CIRCUIT W/TRLUML BALO ANGIOP N/A 12/10/2018 Angioplasty Fistula/Dialysis Circuit performed by Ekaterina Joshi MD at [address] [place] [address]  SC INTRO CATH DIALYSIS CIRCUIT W/TRLUML BALO ANGIOP N/A 1/2/2020 Angioplasty Fistula/Dialysis Circuit performed by Fernanda Anaya MD at [facility] CATH LAB Medications Current Outpatient Medications Medication Sig Dispense Refill  triamcinolone (ARISTOCORT) 0.5 % topical cream Apply  to affected area two (2) times a day. use thin layer 60 g 0  
 allopurinoL (ZYLOPRIM) 100 mg tablet TAKE ONE TABLET BY MOUTH THREE TIMES A WEEK ON MONDAY, WEDNESDAY AND FRIDAY 36 Tab 0  
 isosorbide mononitrate ER (IMDUR) 30 mg tablet TAKE ONE TABLET BY MOUTH DAILY 90 Tab 1  pregabalin (LYRICA) 25 mg capsule TAKE ONE CAPSULE BY MOUTH TWICE A DAY (MAX DAILY AMOUNT 50MG) 60 Cap 3  
  insulin glargine (LANTUS,BASAGLAR) 100 unit/mL (3 mL) inpn Give Basaglar 15 units twice a day. 30 mL 2  
 losartan (COZAAR) 25 mg tablet Take 1 Tab by mouth daily. 90 Tab 1  
 albuterol sulfate 90 mcg/actuation aepb Take 2 Puffs by inhalation every six (6) hours as needed for Cough (bronchitis from ER.).  clopidogreL (PLAVIX) 75 mg tab Take 1 Tab by mouth daily. 90 Tab 3  
 montelukast (SINGULAIR) 10 mg tablet TAKE ONE TABLET BY MOUTH DAILY 90 Tab 1  
 amLODIPine (NORVASC) 2.5 mg tablet TAKE ONE TABLET BY MOUTH DAILY 90 Tab 1  
 metoprolol tartrate (LOPRESSOR) 50 mg tablet Take 1 Tab by mouth two (2) times a day. 180 Tab 3  
 simvastatin (ZOCOR) 40 mg tablet Take 1 Tab by mouth nightly. 90 Tab 1  calcium carbonate (TUMS) 200 mg calcium (500 mg) chew Take 2 Tabs by mouth three (3) times daily.  hyoscyamine SL (LEVSIN/SL) 0.125 mg SL tablet 0.125 mg by SubLINGual route every four (4) hours as needed for Cramping.  clobetasol 0.05 % sham Use daily for scalp irritation. 118 mL 1  
 tamsulosin (FLOMAX) 0.4 mg capsule TAKE ONE CAPSULE BY MOUTH ONE TIME DAILY 90 Cap 3  
 Insulin Needles, Disposable, 31 gauge x 5/16\" ndle Use to inject insulin 2 x day 200 Package 3  
 calcium acetate (PHOSLO) 667 mg cap Take 1 Cap by mouth three (3) times daily (with meals).  cinacalcet (SENSIPAR) 30 mg tablet Take 60 mg by mouth daily.  ASPIR-LOW 81 mg tablet TAKE ONE TABLET BY MOUTH ONE TIME DAILY 38 Tab 4  pantoprazole (PROTONIX) 40 mg tablet Take 40 mg by mouth daily.  cholecalciferol (DECARA) 50,000 unit capsule Take 50,000 Units by mouth every seven (7) days.  ramelteon (ROZEREM) 8 mg tablet Take 1 Tab by mouth nightly. 90 Tab 1  DULoxetine (CYMBALTA) 60 mg capsule TAKE ONE CAPSULE BY MOUTH DAILY 30 Cap 3  
 L.acid/L.casei/B.bif/B.ken/FOS (PROBIOTIC BLEND PO) Take 2 Tabs by mouth daily.     
 DULCOLAX, BISACODYL, PO Take 1 Tab by mouth daily as needed for Other (constipation). Allergies Allergies Allergen Reactions  Nka [No Known Allergies] Other (comments) Family History Family History Problem Relation Age of Onset  Heart Attack Father 64  Diabetes Mother  No Known Problems Son  No Known Problems Son  No Known Problems Son  No Known Problems Son  No Known Problems Son  No Known Problems Son  No Known Problems Daughter  No Known Problems Daughter  No Known Problems Daughter  No Known Problems Daughter  No Known Problems Daughter  No Known Problems Daughter  Kidney Disease Brother ESRD Social History Social History Socioeconomic History  Marital status:  Spouse name: Not on file  Number of children: Not on file  Years of education: Not on file  Highest education level: Not on file Occupational History  Not on file Social Needs  Financial resource strain: Not on file  Food insecurity Worry: Not on file Inability: Not on file  Transportation needs Medical: Not on file Non-medical: Not on file Tobacco Use  Smoking status: Never Smoker  Smokeless tobacco: Never Used Substance and Sexual Activity  Alcohol use: No  
 Drug use: No  
 Sexual activity: Not on file Lifestyle  Physical activity Days per week: Not on file Minutes per session: Not on file  Stress: Not on file Relationships  Social connections Talks on phone: Not on file Gets together: Not on file Attends Alevism service: Not on file Active member of club or organization: Not on file Attends meetings of clubs or organizations: Not on file Relationship status: Not on file  Intimate partner violence Fear of current or ex partner: Not on file Emotionally abused: Not on file Physically abused: Not on file Forced sexual activity: Not on file Other Topics Concern [address] Service Not Asked  Blood Transfusions Not Asked  Caffeine Concern Not Asked  Occupational Exposure Not Asked [facility] Hazards Not Asked  Sleep Concern Not Asked  Stress Concern Not Asked  Weight Concern Not Asked  Special Diet Not Asked  Back Care Not Asked  Exercise Not Asked  Bike Helmet Not Asked  Seat Belt Not Asked  Self-Exams Not Asked Social History Narrative  Not on file Review of Systems Review of Systems - Review of all systems is negative except as noted above in the HPI. Vital Signs Visit Vitals /54 (BP 1 Location: Left arm, BP Patient Position: Sitting) Pulse 77 Temp 97.3 °F (36.3 °C) (Temporal) Resp 18 Ht 5' 8\" (1.727 m) Wt 206 lb (93.4 kg) SpO2 98% BMI 31.32 kg/m² Physical Exam 
Physical Examination: General appearance - oriented to person, place, and time, overweight and acyanotic, in no respiratory distress Mental status - alert, oriented to person, place, and time, affect appropriate to mood Lymphatics - no palpable lymphadenopathy, no hepatosplenomegaly Chest - decreased air entry noted bilateral lung bases Heart - systolic murmur 3/6 at 2nd left intercostal space and at 2nd right intercostal space Back exam - limited range of motion Neurological - abnormal neurological exam unchanged from prior examinations Musculoskeletal - osteoarthritic changes noted in both hands Extremities - no pedal edema noted, intact peripheral pulses Skin - DERMATITIS NOTED: erythematous maculopapular dermatitis on left arm medial aspect Results Results for orders placed or performed during the hospital encounter of 04/18/20 CBC WITH AUTOMATED DIFF Result Value Ref Range WBC 12.6 4.6 - 13.2 K/uL  
 RBC 3.92 (L) 4.70 - 5.50 M/uL  
 HGB 11.7 (L) 13.0 - 16.0 g/dL HCT 37.2 36.0 - 48.0 % MCV 94.9 74.0 - 97.0 FL  
 MCH 29.8 24.0 - 34.0 PG  
 MCHC 31.5 31.0 - 37.0 g/dL  
 RDW 13.8 11.6 - 14.5 % PLATELET 985 709 - 685 K/uL MPV 10.6 9.2 - 11.8 FL  
 NEUTROPHILS 72 40 - 73 % LYMPHOCYTES 21 21 - 52 % MONOCYTES 6 3 - 10 % EOSINOPHILS 1 0 - 5 % BASOPHILS 0 0 - 2 %  
 ABS. NEUTROPHILS 9.1 (H) 1.8 - 8.0 K/UL  
 ABS. LYMPHOCYTES 2.6 0.9 - 3.6 K/UL  
 ABS. MONOCYTES 0.8 0.05 - 1.2 K/UL  
 ABS. EOSINOPHILS 0.1 0.0 - 0.4 K/UL  
 ABS. BASOPHILS 0.0 0.0 - 0.1 K/UL  
 DF AUTOMATED METABOLIC PANEL, COMPREHENSIVE Result Value Ref Range Sodium 130 (L) 136 - 145 mmol/L Potassium 4.8 3.5 - 5.5 mmol/L Chloride 93 (L) 100 - 111 mmol/L  
 CO2 31 21 - 32 mmol/L Anion gap 6 3.0 - 18 mmol/L Glucose 293 (H) 74 - 99 mg/dL BUN 40 (H) 7.0 - 18 MG/DL Creatinine 8.41 (H) 0.6 - 1.3 MG/DL  
 BUN/Creatinine ratio 5 (L) 12 - 20 GFR est AA 7 (L) >60 ml/min/1.73m2 GFR est non-AA 6 (L) >60 ml/min/1.73m2 Calcium 8.9 8.5 - 10.1 MG/DL Bilirubin, total 0.6 0.2 - 1.0 MG/DL  
 ALT (SGPT) 15 (L) 16 - 61 U/L  
 AST (SGOT) 10 10 - 38 U/L Alk. phosphatase 136 (H) 45 - 117 U/L Protein, total 7.9 6.4 - 8.2 g/dL Albumin 3.3 (L) 3.4 - 5.0 g/dL Globulin 4.6 (H) 2.0 - 4.0 g/dL A-G Ratio 0.7 (L) 0.8 - 1.7 LACTIC ACID Result Value Ref Range Lactic acid 1.8 0.4 - 2.0 MMOL/L  
TROPONIN I Result Value Ref Range Troponin-I, QT 0.03 0.0 - 0.045 NG/ML  
EKG, 12 LEAD, INITIAL Result Value Ref Range Ventricular Rate 87 BPM  
 Atrial Rate 87 BPM  
 P-R Interval 150 ms QRS Duration 100 ms Q-T Interval 384 ms QTC Calculation (Bezet) 462 ms Calculated P Axis 33 degrees Calculated R Axis 23 degrees Calculated T Axis 93 degrees Diagnosis Sinus rhythm with premature atrial complexes with aberrant conduction Incomplete right bundle branch block Abnormal ECG When compared with ECG of 23-JAN-2020 20:33, 
premature ventricular complexes are no longer present 
aberrant conduction is now present T wave inversion no longer evident in Inferior leads Nonspecific ST and T wave abnormality , possible increase of 
Confirmed by Len Serrano ([id number]) on 4/20/2020 8:25:09 AM 
  
 
 
ASSESSMENT and PLAN 
  ICD-10-CM ICD-9-CM 1. Type 2 diabetes mellitus without complication, with long-term current use of insulin ([facility]) E11.9 250.00   
 Z79.4 V58.67   
2. Rash R21 782.1 triamcinolone (ARISTOCORT) 0.5 % topical cream  
3. ESRD (end stage renal disease) on dialysis ([facility]) N18.6 585.6 Z99.2 V45.11   
4. Chronic diastolic heart failure ([facility]) I50.32 428.32   
5. Coronary artery disease involving native coronary artery of native heart with angina pectoris ([facility] I25.119 414.01   
  413.9 6. Essential hypertension I10 401.9 reviewed diet, exercise and weight control 
cardiovascular risk and specific lipid/LDL goals reviewed 
reviewed medications and side effects in detail 
specific diabetic recommendations: low cholesterol diet, weight control and daily exercise discussed, home glucose monitoring emphasized, all medications, side effects and compliance discussed carefully, annual eye examinations at Ophthalmology discussed, glycohemoglobin and other lab monitoring discussed and long term diabetic complications discussed I have discussed the diagnosis with the patient and the intended plan of care as seen in the above orders. The patient has received an after-visit summary and questions were answered concerning future plans. I have discussed medication, side effects, and warnings with the patient in detail. The patient verbalized understanding and is in agreement with the plan of care. The patient will contact the office with any additional concerns. Jamie Brown MD 
 
PLEASE NOTE:  
This document has been produced using voice recognition software. Unrecognized errors in transcription may be present

## 2020-06-02 NOTE — TELEPHONE ENCOUNTER
Patient's wife called upset. She states this is the second time she's been up to [facility] and is unable to  medication because they need clarification. She wants to know why this hasn't been done. She states, \"she doesn't play games. \"

## 2020-06-02 NOTE — TELEPHONE ENCOUNTER
[facility] is requesting a day supply on the medication.  Please follow up when available

## 2020-07-15 ENCOUNTER — TELEPHONE (OUTPATIENT)
Dept: CARDIOLOGY CLINIC | Age: 80
End: 2020-07-15

## 2020-11-28 NOTE — PROGRESS NOTES
Admitted with numbness & tingling sensation of both arms with worsening renal function, off Diuretics, troponin is coming down, no acute cardiac event this time, will do gentle hydration IVF at 50 cc/hour & Follow Renal function, no immediate need for dialysis BPIC Daily Progress Note    SUBJECTIVE     Subjective:     Patient is lying in bed, alert, NAD. He states he is feeling a little better. Had patient perform return demonstration on IS, was only able to get to 50, kept coughing frequently. Informed him to keep using this 10x/hr. Informed patient to sit up in the chair for his meals, with assistance.      MEDICATIONS     Current Facility-Administered Medications   Medication   • albuterol inhaler 4 puff   • ondansetron (ZOFRAN) injection 4 mg   • albuterol inhaler 2 puff   • dexamethasone (PF) (DECADRON) injection 6 mg   • sodium chloride 0.9 % flush bag 25 mL   • sodium chloride (PF) 0.9 % injection 2 mL   • enoxaparin (LOVENOX) injection 40 mg   • cefTRIAXone (ROCEPHIN) syringe 2,000 mg   • guaiFENesin-DM) (ROBITUSSIN DM) 100-10 MG/5ML syrup 10 mL   • albuterol inhaler 2 puff   • zinc sulfate (ZINCATE) capsule 220 mg   • ascorbic acid (VITAMIN C) tablet 500 mg   • melatonin tablet 3 mg   • acetaminophen (TYLENOL) tablet 650 mg   • remdesivir 100 mg in sodium chloride 0.9 % 250 mL total volume infusion   • benzonatate (TESSALON PERLES) capsule 100 mg       OBJECTIVE     I&Os    Intake/Output Summary (Last 24 hours) at 11/28/2020 1304  Last data filed at 11/28/2020 1018  Gross per 24 hour   Intake 2650 ml   Output 300 ml   Net 2350 ml       Last Recorded Vitals  Vital Last Value 24 Hour Range   Temperature 98.6 °F (37 °C) (11/28/20 1018) Temp  Min: 98.1 °F (36.7 °C)  Max: 99 °F (37.2 °C)   Pulse 96 (11/28/20 1018) Pulse  Min: 84  Max: 109   Respiratory 15 (11/28/20 1018) Resp  Min: 13  Max: 16   Non-Invasive  Blood Pressure 115/74 (11/28/20 1018) BP  Min: 103/68  Max: 119/78   Pulse Oximetry 95 % (11/28/20 1018) SpO2  Min: 94 %  Max: 98 %     Vital Today Admitted   Weight 117 kg (257 lb 15 oz) (11/26/20 1240) Weight: 117 kg (257 lb 15 oz) (11/26/20 1240)   Height N/A Height: 5' 5\" (165.1 cm) (11/26/20 1240)   BMI N/A BMI (Calculated): 42.92 (11/26/20 1240)      PHYSICAL EXAMINATION     height is 5' 5\" (1.651 m) and weight is 117 kg (257 lb 15 oz). His oral temperature is 98.6 °F (37 °C). His blood pressure is 115/74 and his pulse is 96. His respiration is 15 and oxygen saturation is 95%.     Physical Exam  Constitutional:       Appearance: He is obese.   HENT:      Head: Normocephalic.   Cardiovascular:      Rate and Rhythm: Normal rate and regular rhythm.      Pulses: Normal pulses.      Heart sounds: Normal heart sounds. No murmur.   Pulmonary:      Effort: Pulmonary effort is normal.      Breath sounds: Decreased breath sounds present.   Abdominal:      General: Bowel sounds are normal.      Palpations: Abdomen is soft.   Musculoskeletal: Normal range of motion.   Skin:     General: Skin is warm and dry.      Capillary Refill: Capillary refill takes less than 2 seconds.   Neurological:      General: No focal deficit present.      Mental Status: He is alert and oriented to person, place, and time.         REVIEW OF LABORATORY DATA  I have reviewed the following:    Recent Labs   Lab 11/28/20  0540 11/28/20  0440 11/27/20  0420 11/26/20  1300   WBC 6.5  --  4.9 5.4   HCT 39.9  --  40.8 45.8   HGB 13.2  --  13.5 14.9     --  112* 107*   INR  --   --  1.0  --    SODIUM  --  140 141 139   POTASSIUM  --  4.4 4.3 3.7   CHLORIDE  --  108* 110* 105   CO2  --  30 25 27   CALCIUM  --  8.4 8.3* 8.5   GLUCOSE  --  103* 95 87   BUN  --  14 11 11   CREATININE  --  0.73 0.75 0.84   AST  --  36 32 43*   GPT  --  31  --  30   ALKPT  --  40*  --  56   BILIRUBIN  --  0.3  --  0.5   ALBUMIN  --  2.9*  --  3.5*   GFRNA  --  >90 >90 >90       STUDIES     US VASC EXTREMITY LOWER VENOUS DUPLEX  Narrative: EXAM: US VASC LOWER EXTREMITY VENOUS DUPLEX BILATERAL    CLINICAL INDICATION: Elevated d-dimer.  Shortness breath.  Covid positive.  Leg swelling.     COMPARISON: None available.    FINDINGS: The visualized veins of the lower extremities bilaterally were investigated using extensive  real-time scanning with graded compression and augmentation as well as duplex Doppler and color flow imaging.  The visualized veins throughout show good   compression and flow without evidence of deep venous thrombosis.  Impression: 1. No evidence of deep venous thrombosis in the visualized veins of the lower extremities bilaterally.    Electronically Signed by: TAI JASSO M.D.   Signed on: 11/27/2020 7:32 PM        ASSESSMENT and PLAN     Sepsis, present on admission, 2/2 Covid-19 pneumonia  -AEB fevers (Tmax 38.9 in last 24 hours), tachycardia ('s), and tachypnea (RR 20's)  -CXR (11/26) shows bilateral pulmonary opacities  -CTA chest (11/26) shows no PE. There is extensive bilateral patchy groundglass and pulmonary opacities  -Covid-19 PCR positive 11/26/20  --->487, Ferritin 675-->637, pro calcitonin 0.15, D-dimer 1.95-->1.04, and CRP 7.1-->3.6  -Continue IV abx with azithromycin and ceftriaxone  -Continue Remdesivir, day #2   -Continue Decadron 6 mg daily x 10 days  -Respiratory support with scheduled/prn albuterol, Vitamin C, Zinc, Tessalon perls, and Mucinex DM  -Supportive care with prn tylenol and prn zofran  -Monitor telemetry  -Maintain contact and droplet isolation precautions     Acute hypoxic respiratory failure 2/2 above  -Currently oxygenating on 3 L nasal cannula- continue to wean as tolerated  -Continue respiratory mgmt as stated above  -IS use encouraged, only able to get to 500 today     Thrombocytopenia- resolved  Obesity (BMI 42.92)- Diet and lifestyle modifications advised     DVT prophylaxis: SCD, Lovenox  Pulmonary: Cough and deep breathing, incentive spirometer      Disposition: Pending improvement in respiratory status, still requiring oxygen; timing unclear     Care discussed with Dr. Marlene Hood, CNP  Best Practices Inpatient Care

## 2021-01-11 NOTE — Clinical Note
Clinical Nutrition   Reason For Visit: Admission assessment, Physician consult, Difficulty chewing/swallowing    Patient Name: Dirk Miles  YOB: 1940  MRN: [id number]  Date of Encounter: 01/11/21 12:13 EST  Admission date: 1/10/2021      Comments:       Nutrition Assessment     Admission Problem List:    Cellulitis of right buttock    Acute CVA (cerebrovascular accident) (CMS/HCC)    A-fib (CMS/HCC)    Hypertension    Diabetes mellitus (CMS/HCC)    Dementia with behavioral disturbance (CMS/HCC)    Hyperlipemia    Stenosis of left carotid artery    Anemia, chronic disease    COVID-19 virus detected         Applicable PMH:    PMH: He  has a past medical history of A-fib (CMS/HCC), Arthritis, B12 deficiency, Coronary artery disease, COVID-19, Diabetes mellitus (CMS/HCC), DNR (do not resuscitate), Dysphagia, Falls, GERD (gastroesophageal reflux disease), Hemiplegia (CMS/HCC), Hyperlipidemia, Hypertension, Insomnia, Pressure sore on buttocks, and Stroke (CMS/[facility]).   PSxH: He  has a past surgical history that includes Hernia repair and Cataract extraction, bilateral.        Reported/Observed/Food/Nutrition Related History     Spoke with RN. States patient can be difficult to get to consume PO at meals. Reported he might tolerate ONS. Pt currently in restraints. Has unstageable Buttock wound with superimposed cellulitis. Pt resides at nursing home, recently transferred there from [facility].       Anthropometrics   Height: 69in  Weight: 162lbs  BMI: 24  BMI classification: Normal: 18.5-24.9kg/m2   IBW: 160lbs      Weight change:   7.9% weight loss x 1 month per EMR    Weight Weight (kg) Weight (lbs) Weight Method   1/11/2021 73.755 kg 162 lb 9.6 oz Bed scale   1/10/2021 74.39 kg 164 lb -   1/2/2021 83.915 kg 185 lb Stated   12/8/2020 80.2 kg 176 lb 12.9 oz Bed scale   12/2/2020 83.915 kg 185 lb -   12/2/2020 83.915 kg 185 lb -   12/1/2020 83.915 kg 185 lb Stated   12/1/2020 83.915 kg 185 lb Stated  Multiple views of the saphenous vein graft to the diagonal obtained using hand injection.         Nutrition Focused Physical Exam     Unable to perform exam due to: covid-19 pandemic and in efforts to preserve PPE.     Labs reviewed   Labs reviewed: Yes    Results from last 7 days   Lab Units 01/11/21  0710 01/10/21  1921   SODIUM mmol/L 141 137   POTASSIUM mmol/L 4.4 4.4   CHLORIDE mmol/L 102 101   CO2 mmol/L 28.0 27.0   BUN mg/dL 19 20   CREATININE mg/dL 1.27 1.14   GLUCOSE mg/dL 150* 160*   CALCIUM mg/dL 8.9 8.9   MAGNESIUM mg/dL  --  1.9     Results from last 7 days   Lab Units 01/11/21  0710 01/10/21  1921   WBC 10*3/mm3 10.97* 11.56*   ALBUMIN g/dL 3.40* 3.30*   CRP mg/dL  --  10.66*     Results from last 7 days   Lab Units 01/11/21  0307   GLUCOSE mg/dL 140*     Lab Results   Lab Value Date/Time    HGBA1C 6.40 (H) 12/02/2020 0826     Medications reviewed   Medications reviewed: Yes  Pertinent: ABX, vitamin c, lipitor, vitamin D, colace, decadron, pepcid, lasix, insulin, remdesivir, vitamin B-12      Needs Assessment   Height used: 69in  Weight used: 162lbs      Estimated Calories needs:   2705-7685 kcals (25-30kcals/kg)  1854 kcals (MSJ)    Estimated Protein needs:   88-110g pro      Current Nutrition Prescription   PO: Diet Pureed; Cardiac, Low Sodium, Consistent Carbohydrate  Oral Nutrition Supplement: Orders Placed This Encounter      DIET MESSAGE Please send patient a breakfast tray with pureed food      Dietary Nutrition Supplements Boost Glucose Control       Evaluation of Received Nutrient/Fluid Intake:  Insufficient data       Nutrition Diagnosis     Problem Inadequate oral intake   Etiology Altered mental status   Signs/Symptoms Poor po intake at meals, 7.9% weight loss x 1 month     Problem Increased protein needs   Etiology Increased demand for nutrient with wound healing   Signs/Symptoms unstageable wound to buttock     Intervention   Intervention: Follow treatment progress, Care plan reviewed, Encourage intake, Supplement provided     - Boost Glucose Control 3x/day      Goal:    General: Nutrition support treatment  PO: Increase intake, Meet estimated needs     Additional goals: No further weight loss      Monitoring/Evaluation:       Monitoring/Evaluation: Per protocol, I&O, PO intake, Supplement intake, Weight, Skin status, Symptoms, POC/GOC    Lakesha Isaac RD  Time Spent: 40 min

## 2021-05-03 NOTE — Clinical Note
Catheter inserted. [Normal] : well developed, well nourished, in no acute distress [de-identified] : Soft, non tender, right upper quadrant wick removed and packed with gauze

## 2023-03-22 NOTE — PROGRESS NOTES
HISTORY OF PRESENT ILLNESS  Kala Knutson is a 68 y.o. male. HPI Comments: Patient with cad,chf,post cabg and pci  . recent admission with chf,acs,non q mi  Had vg to [facility] pci-11/2017 12/2017-admitted with angina  Occasional anginal episode after discharge      CHF   The history is provided by the patient. This is a chronic problem. The problem occurs constantly. The problem has not changed since onset. Pertinent negatives include no chest pain, no abdominal pain, no headaches and no shortness of breath. The symptoms are aggravated by exertion. Hypertension   The history is provided by the patient. This is a chronic problem. The problem occurs constantly. The problem has not changed since onset. Pertinent negatives include no chest pain, no abdominal pain, no headaches and no shortness of breath. Nothing aggravates the symptoms. Shortness of Breath   The history is provided by the patient. This is a recurrent problem. The problem occurs intermittently. The current episode started more than 1 week ago. Pertinent negatives include no fever, no headaches, no cough, no sputum production, no hemoptysis, no wheezing, no PND, no orthopnea, no chest pain, no vomiting, no abdominal pain, no rash, no leg swelling and no claudication. The problem's precipitants include exercise (>30 mts of treadmill). Chest Pain (Angina)    This is a recurrent problem. The problem has not changed since onset. The problem occurs every several days. The pain is associated with exertion. The pain is present in the substernal region. The quality of the pain is described as pressure-like. The pain does not radiate. Pertinent negatives include no abdominal pain, no claudication, no cough, no dizziness, no fever, no headaches, no hemoptysis, no nausea, no orthopnea, no palpitations, no PND, no shortness of breath, no sputum production, no vomiting and no weakness. Review of Systems   Constitutional: Negative for chills and fever. HENT: Negative for nosebleeds. Eyes: Negative for blurred vision and double vision. Respiratory: Negative for cough, hemoptysis, sputum production, shortness of breath and wheezing. Cardiovascular: Negative for chest pain, palpitations, orthopnea, claudication, leg swelling and PND. Gastrointestinal: Negative for abdominal pain, heartburn, nausea and vomiting. Musculoskeletal: Negative for myalgias. Skin: Negative for rash. Neurological: Negative for dizziness, weakness and headaches. Endo/Heme/Allergies: Does not bruise/bleed easily. Family History   Problem Relation Age of Onset    Heart Attack Father 64       Past Medical History:   Diagnosis Date    Atherosclerosis of renal artery (HCC)     S/P Rt stent    CAD (coronary artery disease) , 1997, 2012    ,double bypass, 2 stents, 2stents 7/2016    Cancer ([facility] [address]     prostate    CHF (congestive heart failure) (HCC)     Chronic diastolic heart failure (HCC)     Chronic kidney disease (CKD), stage IV (severe) (HCC)     Constipation     Coronary atherosclerosis of unspecified type of vessel, native or graft     Stable angina after recent PCI continue treatment.        CRI (chronic renal insufficiency)     Diabetes ([address] 1973    type 2    Essential hypertension, benign     GERD (gastroesophageal reflux disease)     Gout     Hypertension 1982    Morbid obesity ([facility] [address]     Weight loss has been strongly encouraged by following dietary restrictions and an exercise routine    APRYL (obstructive sleep apnea) 6/26/2015    no cpap    Other and unspecified hyperlipidemia     HDL's are not at goal, LDL's are at goal, triglycerides are not at goal.    Other and unspecified hyperlipidemia     HDL's are not at goal, LDL's are at goal, triglycerides are not at goal.     Other ill-defined conditions(799.89) 1960    pneumonia twice    Sleep disturbance     Type II or unspecified type diabetes mellitus without mention of complication, not stated as uncontrolled        Past Surgical History:   Procedure Laterality Date    CARDIAC SURG PROCEDURE UNLIST  1995    lt. carotid endart.  COLONOSCOPY N/A 6/23/2017    COLONOSCOPY w/ APC w/ polypectomies performed by Felipe Campbell MD at [address] HX CHOLECYSTECTOMY      HX CORONARY ARTERY BYPASS GRAFT  2000    x2    HX HEENT  1997    cholecystectomy    HX UROLOGICAL  1998    renal art. surg       Allergies   Allergen Reactions    Nka [No Known Allergies] Other (comments)       Current Outpatient Prescriptions   Medication Sig    isosorbide mononitrate ER (IMDUR) 30 mg tablet Take 1 Tab by mouth daily.  amLODIPine (NORVASC) 5 mg tablet Take 1 Tab by mouth daily.  HYDROcodone-acetaminophen (NORCO) 5-325 mg per tablet Take 1-2 Tabs by mouth every four (4) hours as needed for Pain. Max Daily Amount: 12 Tabs.  losartan (COZAAR) 50 mg tablet TAKE ONE TABLET BY MOUTH ONE TIME DAILY     tamsulosin (FLOMAX) 0.4 mg capsule TAKE ONE CAPSULE BY MOUTH ONE TIME DAILY     albuterol (PROVENTIL HFA, VENTOLIN HFA, PROAIR HFA) 90 mcg/actuation inhaler Take 2 Puffs by inhalation every four (4) hours as needed for Wheezing.  aspirin 81 mg chewable tablet Take 1 Tab by mouth daily.  b complex-vitamin c-folic acid (NEPHROCAPS) 1 mg capsule Take 1 Cap by mouth daily.  senna-docusate (PERICOLACE) 8.6-50 mg per tablet Take 1 Tab by mouth daily.  fenofibrate (LOFIBRA) 54 mg tablet Take 1 Tab by mouth daily.  simvastatin (ZOCOR) 40 mg tablet TAKE 1 TABLET BY MOUTH NIGHTLY.  metoprolol tartrate (LOPRESSOR) 100 mg IR tablet TAKE 1 TABLET BY MOUTH TWO TIMES A DAY.  clopidogrel (PLAVIX) 75 mg tab Take 1 Tab by mouth daily.  Cholecalciferol, Vitamin D3, (DECARA) 50,000 unit cap Take  by mouth every seven (7) days.  insulin glargine (LANTUS) 100 unit/mL injection 20 Units by SubCUTAneous route every twelve (12) hours.     nitroglycerin (NITROSTAT) 0.4 mg SL tablet 1 Tab by SubLINGual route as needed for Chest Pain.  insulin aspart (NOVOLOG) 100 unit/mL injection 20 units sq 3 times a day,sliding scale    allopurinol (ZYLOPRIM) 100 mg tablet Take 100 mg by mouth daily. No current facility-administered medications for this visit. Visit Vitals    /46    Pulse 75    Ht 5' 8\" (1.727 m)    Wt 98 kg (216 lb)    BMI 32.84 kg/m2         Physical Exam   Constitutional: He is oriented to person, place, and time. He appears well-developed and well-nourished. obese   HENT:   Head: Normocephalic and atraumatic. Eyes: Conjunctivae are normal.   Neck: Neck supple. No JVD present. No tracheal deviation present. No thyromegaly present. Cardiovascular: Normal rate, regular rhythm and normal heart sounds. Exam reveals no gallop and no friction rub. No murmur heard. Pulmonary/Chest: Breath sounds normal. No respiratory distress. He has no wheezes. He has no rales. He exhibits no tenderness. Abdominal: Soft. There is no tenderness. Musculoskeletal: He exhibits no edema. Neurological: He is alert and oriented to person, place, and time. Skin: Skin is warm and dry. Psychiatric: He has a normal mood and affect. Mr. Jose Carlos Storm has a reminder for a \"due or due soon\" health maintenance. I have asked that he contact his primary care provider for follow-up on this health maintenance. CARDIOLOGY STUDIES 10/8/2012   Cardiac Cath Result PATENT LIMA TO LAD, SVG TO D1 SUBTOTAL DIFFUSE, POST PCI WITH STENTS,HAD PROXIMAL IN STENT PROTRUSION UNABLE TO CROSS WITH BALLONS,D1 DIFFISE SEVERE   Echocardiogram - Complete Result NORMAL EF 70%     SUMMARY:echo:11/2014  Left ventricle: Systolic function was hyperdynamic. Ejection fraction was  estimated in the range of 70 % to 75 %. No obvious wall motion  abnormalities identified in the views obtained. Near cavity obliteration  seen in the mid to apical portions of the ventricular cavity. There was  mild concentric hypertrophy.  Features were consistent with a pseudonormal  left ventricular filling pattern, with concomitant abnormal relaxation and  increased filling pressure (grade 2 diastolic dysfunction). NUCLEAR IMAGING:    Findings: stress test:11/2014  1. Post-stress imaging in short axis, horizontal and vertical long axis views reveals normal isotope uptake in all areas. 2. Resting images also show normal isotope uptake in all areas. 3. Gated images show normal left ventricular size, wall motion and systolic function. Ejection fraction is 85%. Diagnosis:   1. Normal scan. 2. No evidence of significant fixed or reversible defect suggesting ischemia or myocardial infarction noted from this nuclear study. 3. low risk scan.  7/2016-pci  vg to diag  11/2017-pci svg-diag  Assessment         ICD-10-CM ICD-9-CM    1. Atherosclerosis of coronary artery of native heart with angina pectoris, unspecified vessel or lesion type ([facility] [address] I25.119 414.01      413.9     occasional angina   2. Chronic diastolic heart failure (HCC) I50.32 428.32     stable   3. Essential hypertension, benign I10 401.1     mildly elevated   4. Postsurgical aortocoronary bypass status Z95.1 V45.81    5. ESRD (end stage renal disease) ([facility] [address] N18.6 585.6     on dialysis   6. S/P coronary artery stent placement Z95.5 V45.82      9/2017-asa stopped-continue plavix-last stent 7/2016      There are no discontinued medications. No orders of the defined types were placed in this encounter. Follow-up Disposition:  Return in about 3 months (around 3/18/2018). To get better and follow your care plan as instructed.

## 2023-08-29 NOTE — Clinical Note
Peripheral Lesion 1. Opzelura Counseling:  I discussed with the patient the risks of Opzelura including but not limited to nasopharngitis, bronchitis, ear infection, eosinophila, hives, diarrhea, folliculitis, tonsillitis, and rhinorrhea.  Taken orally, this medication has been linked to serious infections; higher rate of mortality; malignancy and lymphoproliferative disorders; major adverse cardiovascular events; thrombosis; thrombocytopenia, anemia, and neutropenia; and lipid elevations.

## 2024-06-07 NOTE — PROGRESS NOTES
Called patient at this time. LVM informing patient of lab results. Informed patient on voicemail if he have any question to please call back Yes

## (undated) DEVICE — CATHETER SUCT TR FL TIP 14FR W/ O CTRL

## (undated) DEVICE — FLEX ADVANTAGE 1500CC: Brand: FLEX ADVANTAGE

## (undated) DEVICE — SUTURE MCRYL 3-0 L27IN ABSRB VLT SH L26MM 1/2 CIR Y316H

## (undated) DEVICE — Device

## (undated) DEVICE — SUTURE PERMA-HAND SZ 3-0 L24IN NONABSORBABLE BLK W/O NDL SA74H

## (undated) DEVICE — SUTURE MCRYL SZ 4-0 L18IN ABSRB UD P-3 L13MM 3/8 CIR PRIM Y494G

## (undated) DEVICE — DERMABOND SKIN ADH 0.7ML -- DERMABOND ADVANCED 12/BX

## (undated) DEVICE — GUIDEWIRE VASC L260CM DIA0.035IN RAD 3MM J TIP L7CM PTFE

## (undated) DEVICE — INTRO SHTH AVNT + 4FRX11CM -- AVANTI+ - ORDER AS EA

## (undated) DEVICE — SYR 50ML SLIP TIP NSAF LF STRL --

## (undated) DEVICE — (D)PREP SKN CHLRAPRP APPL 26ML -- CONVERT TO ITEM 371833

## (undated) DEVICE — APPLICATOR BNDG 1MM ADH PREMIERPRO EXOFIN

## (undated) DEVICE — FLUFF AND POLYMER UNDERPAD,EXTRA HEAVY: Brand: WINGS

## (undated) DEVICE — NEEDLE ANGIO 1 WALL ULT SMOOTH 19GAX7CM MERIT AVANCE

## (undated) DEVICE — SUT ETHLN 3-0 18IN PC5 BLK --

## (undated) DEVICE — SOLUTION IRRIG 1000ML H2O STRL BLT

## (undated) DEVICE — CANNULA ORIG TL CLR W FOAM CUSHIONS AND 14FT SUPL TB 3 CHN

## (undated) DEVICE — GUIDEWIRE WITH ICE™ HYDROPHILIC COATING: Brand: V-18™ CONTROL WIRE™

## (undated) DEVICE — LIMB HOLDER, WRIST/ANKLE: Brand: DEROYAL

## (undated) DEVICE — INTENDED FOR TISSUE SEPARATION, AND OTHER PROCEDURES THAT REQUIRE A SHARP SURGICAL BLADE TO PUNCTURE OR CUT.: Brand: BARD-PARKER ® STAINLESS STEEL BLADES

## (undated) DEVICE — INTENDED FOR TISSUE SEPARATION, AND OTHER PROCEDURES THAT REQUIRE A SHARP SURGICAL BLADE TO PUNCTURE OR CUT.: Brand: BARD-PARKER SAFETY BLADES SIZE 11, STERILE

## (undated) DEVICE — GLOVE SURG BIOGEL 8.0 STRL -- SKINSENSE

## (undated) DEVICE — SYRINGE MED 20ML STD CLR PLAS LUERLOCK TIP N CTRL DISP

## (undated) DEVICE — COVER US PRB W15XL120CM W/ GEL RUBBERBAND TAPE STRP FLD GEN

## (undated) DEVICE — Z DISCONTINUED SURGICAL PROCEDURE PACK PERM CUST MARYVIEW PERMCATH

## (undated) DEVICE — MEDI-VAC NON-CONDUCTIVE SUCTION TUBING: Brand: CARDINAL HEALTH

## (undated) DEVICE — SYRINGE MED 25GA 3ML L5/8IN SUBQ PLAS W/ DETACH NDL SFTY

## (undated) DEVICE — PRESSURE MONITORING SET: Brand: TRUWAVE

## (undated) DEVICE — FORCEPS BX L240CM JAW DIA2.8MM L CAP W/ NDL MIC MESH TOOTH

## (undated) DEVICE — GOWN ISOL IMPERV UNIV, DISP, OPEN BACK, BLUE --

## (undated) DEVICE — INTRODUCER SHTH 4FR CANN L11CM DIL TIP 25MM RED TUNGSTEN

## (undated) DEVICE — GLOVE SURG SZ 7.5 L11.73IN FNGR THK9.8MIL STRW LTX POLYMER

## (undated) DEVICE — KIT CLN UP BON SECOURS MARYV

## (undated) DEVICE — INTRODUCER SHTH 6FR CANN L11CM DIL TIP 35MM GRN TUNGSTEN

## (undated) DEVICE — (D)GLOVE EXAM LG NITRL NS -- DISC BY MFR NO SUB

## (undated) DEVICE — GLOVE SURG SZ 7 L11.33IN FNGR THK9.8MIL STRW LTX POLYMER

## (undated) DEVICE — 3M™ DURAPORE™ SURGICAL TAPE 1538-3, 3 INCH X 10 YARD (7,5CM X 9,1M), 4 ROLLS/BOX: Brand: 3M™ DURAPORE™

## (undated) DEVICE — FLEX ADVANTAGE 3000CC: Brand: FLEX ADVANTAGE

## (undated) DEVICE — 3M™ TEGADERM™ HP TRANSPARENT FILM DRESSING FRAME STYLE, 9534HP, 2-3/8 X 2-3/4 IN (6 CM X 7 CM), 100/CT 4CT/CASE: Brand: 3M™ TEGADERM™

## (undated) DEVICE — (D)SYR 10ML 1/5ML GRAD NSAF -- PKGING CHANGE USE ITEM 338027

## (undated) DEVICE — SOLUTION IV 1000ML 0.9% SOD CHL

## (undated) DEVICE — PERCUTANEOUS ENTRY THINWALL NEEDLE  ONE-PART: Brand: COOK

## (undated) DEVICE — PTA BALLOON DILATATION CATHETER: Brand: MUSTANG™

## (undated) DEVICE — SUTURE VCRL SZ 3-0 L27IN ABSRB UD L26MM SH 1/2 CIR J416H

## (undated) DEVICE — SUTURE PROL SZ 2-0 L36IN NONABSORBABLE BLU V-7 L26MM 1/2 8977H

## (undated) DEVICE — INTRODUCER SHTH 6FR CANN L5.5CM DIL TIP 35MM GRN TUNGSTEN

## (undated) DEVICE — THE CANADY HYBRID PLASMA SCALPEL IS AN ELECTROSURGICAL PLASMA SCALPEL THAT USES AN 35MM NON-BENDABLE TIP. THE ELECTROSURGICAL PLASMA SCALPEL IS USED TO SIMULTANEOUSLY CUT AND COAGULATE BIOLOGICAL TISSUE.: Brand: CANADY HYBRID PLASMA SCALPEL

## (undated) DEVICE — SUTURE PERMA-HAND SZ 2-0 L24IN NONABSORBABLE BLK W/O NDL SA75H

## (undated) DEVICE — GUIDEWIRE VASC L260CM DIA0035IN TIP L3MM PTFE J STD TAPR FIX

## (undated) DEVICE — SUTURE PROL SZ 5-0 L36IN NONABSORBABLE BLU L13MM C-1 3/8 8720H

## (undated) DEVICE — AIRLIFE™ NASAL OXYGEN CANNULA CURVED, NONFLARED TIP WITH 14 FOOT (4.3 M) CRUSH-RESISTANT TUBING, OVER-THE-EAR STYLE: Brand: AIRLIFE™

## (undated) DEVICE — SUTURE GOR TX SZ 4-0 L30IN NONABSORBABLE L13MM TTC-13 3/8 5M02A

## (undated) DEVICE — SET ANGIO L6.5IN L BOR 3 W STPCOCK SPIK TBNG

## (undated) DEVICE — MEDI-VAC SUCTION HIGH CAPACITY: Brand: CARDINAL HEALTH

## (undated) DEVICE — CATHETER ANGIO 4FR L100CM S STL NYL JL4 3 SEG BRAID SFT

## (undated) DEVICE — 3M™ TEGADERM™ TRANSPARENT FILM DRESSING FRAME STYLE, 1626, 4 IN X 4-3/4 IN (10 CM X 12 CM), 50/CT 4CT/CASE: Brand: 3M™ TEGADERM™

## (undated) DEVICE — CATHETER ANGIO 5FR L100CM GRY S STL NYL JR4 3 SEG BRAID L

## (undated) DEVICE — SNARE POLYP M W27MMXL240CM OVL STIFF DISP CAPTIVATOR

## (undated) DEVICE — CONQUEST® 40 PTA DILATATION CATHETER 8 MM X 40 MM, 75 CM CATHETER: Brand: CONQUEST® 40

## (undated) DEVICE — SUTURE MCRYL SZ 4-0 L18IN ABSRB UD L19MM PS-2 3/8 CIR PRIM Y496G

## (undated) DEVICE — GUIDEWIRE VASC L180CM DIA0035IN L15CM STR TIP PTFE HEP S STL

## (undated) DEVICE — INFLATION DEVICE: Brand: ENCORE™ 26

## (undated) DEVICE — SUTURE COAT VCRL PC 5 PRECIS COSM CONVENTIONAL CUT PRIM 3 8 J823H

## (undated) DEVICE — GAUZE SPONGES,16 PLY: Brand: CURITY

## (undated) DEVICE — DEVICE INFL ENCORE MEDI 26

## (undated) DEVICE — INTRODUCER SHTH 7FR CANN L11CM DIL TIP 35MM ORNG TUNGSTEN

## (undated) DEVICE — PACK PROCEDURE SURG VASC CATH 161 MMC LF

## (undated) DEVICE — CATHETER ENDOSCP L135CM W7.5XL15.7MM DIA2.8MM FLX RFA

## (undated) DEVICE — REM POLYHESIVE ADULT PATIENT RETURN ELECTRODE: Brand: VALLEYLAB

## (undated) DEVICE — 48" PROBE COVER W/GEL, ULTRASOUND, STERILE: Brand: SITE-RITE

## (undated) DEVICE — SURGICAL PROCEDURE KIT LT HRT CUST

## (undated) DEVICE — CATHETER DIAG AD 5FR L100CM COR GRY HYDRPHLC NYL IM W/O

## (undated) DEVICE — PTA BALLOON DILATATION CATHETER: Brand: STERLING™

## (undated) DEVICE — SHEATH INTRO 5FR L11CM EVAR S STL FLX AND KINK RESIST CANN

## (undated) DEVICE — GAUZE,SPONGE,4"X4",12PLY,STERILE,LF,2'S: Brand: MEDLINE

## (undated) DEVICE — 3M™ STERI-STRIP™ COMPOUND BENZOIN TINCTURE 40 BAGS/CARTON 4 CARTONS/CASE C1544: Brand: 3M™ STERI-STRIP™

## (undated) DEVICE — GAUZE SPONGES,8 PLY: Brand: CURITY

## (undated) DEVICE — CATHETER DIAG 4FR L100CM COR NYL 3 DRC W/O SIDE H RADPQ

## (undated) DEVICE — SUTURE PERMAHAND SZ 2-0 L30IN NONABSORBABLE BLK SILK W/O A305H

## (undated) DEVICE — PENCIL ES L3M BTTN SWCH S STL HEX LOK BLDE ELECTRD HOLSTER

## (undated) DEVICE — ENDOSCOPY PUMP TUBING/ CAP SET: Brand: ERBE

## (undated) DEVICE — FOGARTY EMBOLECTOMY CATHETER: Brand: FOGARTY

## (undated) DEVICE — PROCEDURE KIT FLUID MGMT 10 FR CUST MAINFOLD

## (undated) DEVICE — DRSG PATCH ANTIMIC 1INX7.0MM -- CONVERT TO ITEM 356054

## (undated) DEVICE — CATHETER ANGIO BER2 038 4 FRX65 CM TEMPO AQUA

## (undated) DEVICE — SET FLD ADMIN 3 W STPCOCK FIX FEM L BOR 1IN

## (undated) DEVICE — SUTURE ABSORBABLE BRAIDED 4-0 P-3 18 IN UD VICRYL J494G

## (undated) DEVICE — SUT PROL 6-0 30IN C1 DA BLU --

## (undated) DEVICE — FOGARTY ARTERIAL EMBOLECTOMY CATHETER 4F 40CM: Brand: FOGARTY

## (undated) DEVICE — INTRODUCER SHTH 5FR CANN L11CM DIL TIP 25MM GRY TUNGSTEN

## (undated) DEVICE — DRAPE XR C ARM 41X74IN LF --

## (undated) DEVICE — GDWIRE TAPR STR 0.035INX180CM --

## (undated) DEVICE — ANGIOGRAPHY KIT CUST VASC

## (undated) DEVICE — SUTURE VCRL SZ 4-0 L18IN ABSRB UD L19MM PS-2 3/8 CIR PRIM J496H

## (undated) DEVICE — CATHETER DLYS PALINDROME

## (undated) DEVICE — CATHETER ANGIO 4FR L100CM S STL NYL IM 3 SEG BRAID SFT

## (undated) DEVICE — MICROSURGICAL DILATATION DEVICE: Brand: 2CM PERIPHERAL CUTTING BALLOON®

## (undated) DEVICE — CATHETER ANGIO JL4 0.045 INX5 FRX100 CM THRULUMEN EXPO

## (undated) DEVICE — DEVICE INFL L13IN 40ATM 30ML BASIXTOUCH